# Patient Record
Sex: MALE | Race: WHITE | NOT HISPANIC OR LATINO | Employment: OTHER | URBAN - NONMETROPOLITAN AREA
[De-identification: names, ages, dates, MRNs, and addresses within clinical notes are randomized per-mention and may not be internally consistent; named-entity substitution may affect disease eponyms.]

---

## 2017-01-03 ENCOUNTER — TELEPHONE (OUTPATIENT)
Dept: FAMILY MEDICINE CLINIC | Facility: CLINIC | Age: 67
End: 2017-01-03

## 2017-01-03 DIAGNOSIS — N18.9 CHRONIC KIDNEY DISEASE, UNSPECIFIED STAGE: Primary | ICD-10-CM

## 2017-01-03 NOTE — TELEPHONE ENCOUNTER
----- Message from Radha Foote MA sent at 1/3/2017  9:17 AM EST -----  Mary from Dr Rojas office called and said that pt has an apt scheduled for tomorrow but has a different insurance and she said that his previous referral was from his old pcp. She wanted to know if you would be ok with putting a new referral in for him     # [phone]

## 2017-01-05 ENCOUNTER — OFFICE VISIT (OUTPATIENT)
Dept: FAMILY MEDICINE CLINIC | Facility: CLINIC | Age: 67
End: 2017-01-05

## 2017-01-05 ENCOUNTER — ANTICOAGULATION VISIT (OUTPATIENT)
Dept: CARDIOLOGY | Facility: CLINIC | Age: 67
End: 2017-01-05

## 2017-01-05 VITALS
TEMPERATURE: 98 F | HEIGHT: 65 IN | SYSTOLIC BLOOD PRESSURE: 162 MMHG | WEIGHT: 227 LBS | BODY MASS INDEX: 37.82 KG/M2 | OXYGEN SATURATION: 97 % | HEART RATE: 62 BPM | DIASTOLIC BLOOD PRESSURE: 110 MMHG

## 2017-01-05 DIAGNOSIS — R42 VERTIGO: Primary | ICD-10-CM

## 2017-01-05 DIAGNOSIS — R51.9 ACUTE INTRACTABLE HEADACHE, UNSPECIFIED HEADACHE TYPE: ICD-10-CM

## 2017-01-05 DIAGNOSIS — Z79.01 WARFARIN ANTICOAGULATION: ICD-10-CM

## 2017-01-05 DIAGNOSIS — R07.89 OTHER CHEST PAIN: ICD-10-CM

## 2017-01-05 DIAGNOSIS — J44.1 COPD EXACERBATION (HCC): ICD-10-CM

## 2017-01-05 DIAGNOSIS — R29.6 RECURRENT FALLS: ICD-10-CM

## 2017-01-05 DIAGNOSIS — R10.84 GENERALIZED ABDOMINAL PAIN: ICD-10-CM

## 2017-01-05 DIAGNOSIS — R30.0 DYSURIA: ICD-10-CM

## 2017-01-05 DIAGNOSIS — I10 UNCONTROLLED HYPERTENSION: ICD-10-CM

## 2017-01-05 LAB — INR PPP: 9.7

## 2017-01-05 PROCEDURE — 93000 ELECTROCARDIOGRAM COMPLETE: CPT | Performed by: FAMILY MEDICINE

## 2017-01-05 PROCEDURE — 36415 COLL VENOUS BLD VENIPUNCTURE: CPT | Performed by: FAMILY MEDICINE

## 2017-01-05 PROCEDURE — 99215 OFFICE O/P EST HI 40 MIN: CPT | Performed by: FAMILY MEDICINE

## 2017-01-05 RX ORDER — MECLIZINE HYDROCHLORIDE 25 MG/1
25 TABLET ORAL 3 TIMES DAILY PRN
Qty: 90 TABLET | Refills: 0 | Status: SHIPPED | OUTPATIENT
Start: 2017-01-05 | End: 2017-03-14 | Stop reason: SDUPTHER

## 2017-01-05 NOTE — MR AVS SNAPSHOT
Robe Aleisha Manny   1/5/2017 10:00 AM   Office Visit    Dept Phone:  [phone]   Encounter #:  [id number]    Provider:  Deanna Wheeler MD   Department:  [facility] PRIMARY CARE                Your Full Care Plan              Today's Medication Changes          These changes are accurate as of: 1/5/17 11:08 AM.  If you have any questions, ask your nurse or doctor.               New Medication(s)Ordered:     meclizine 25 MG tablet   Commonly known as:  ANTIVERT   Take 1 tablet by mouth 3 (Three) Times a Day As Needed for dizziness.            Where to Get Your Medications      These medications were sent to [facility] - [place], [place] - [address] - [phone]  - [phone] FX  [address] [place] [address]     Phone:  [phone]     meclizine 25 MG tablet                  Your Updated Medication List          This list is accurate as of: 1/5/17 11:08 AM.  Always use your most recent med list.                aspirin 81 MG tablet       baclofen 10 MG tablet   Commonly known as:  LIORESAL   TAKE 1 TABLET TWICE DAILY AS NEEDED FOR MUSCLE SPASM       bisoprolol-hydrochlorothiazide 10-6.25 MG per tablet   Commonly known as:  ZIAC       bumetanide 2 MG tablet   Commonly known as:  BUMEX       DULERA 100-5 MCG/ACT inhaler   Generic drug:  mometasone-formoterol   INHALE 2 PUFFS TWICE DAILY       FLUoxetine 20 MG capsule   Commonly known as:  PROzac   TAKE 1 CAPSULE DAILY       gabapentin 600 MG tablet   Commonly known as:  NEURONTIN   1 po twice as needed during day, may take 2 po qhs       hydrALAZINE 25 MG tablet   Commonly known as:  APRESOLINE   TAKE ONE-HALF TABLET FOUR TIMES DAILY       hydrochlorothiazide 25 MG tablet   Commonly known as:  HYDRODIURIL   TAKE ONE TABLET IN THE MORNING       HYDROcodone-acetaminophen  MG per tablet   Commonly known as:  NORCO   1 po q 8 prn severe pain; must last 30 days       * ipratropium-albuterol  MCG/ACT  inhaler   Commonly known as:  COMBIVENT RESPIMAT   Inhale 1 puff 4 (four) times a day. Indications: Disease involving Spasms of the Lungs       * ipratropium-albuterol 0.5-2.5 mg/mL nebulizer   Commonly known as:  DUO-NEB       * isosorbide mononitrate 30 MG 24 hr tablet   Commonly known as:  IMDUR   Take 1 tablet by mouth Every Night.       * isosorbide mononitrate 60 MG 24 hr tablet   Commonly known as:  IMDUR   Take 1 tablet by mouth Daily.       ketoconazole 2 % cream   Commonly known as:  NIZORAL   APPLY TOPICALLY EVERY 12 HOURS       lisinopril 20 MG tablet   Commonly known as:  PRINIVIL,ZESTRIL       meclizine 25 MG tablet   Commonly known as:  ANTIVERT   Take 1 tablet by mouth 3 (Three) Times a Day As Needed for dizziness.       metoprolol succinate  MG 24 hr tablet   Commonly known as:  TOPROL-XL   TAKE ONE TABLET DAILY       Multiple Vitamin tablet       nitroglycerin 0.4 MG SL tablet   Commonly known as:  NITROSTAT   Place 1 tablet under the tongue Every 5 (Five) Minutes As Needed for chest pain.       omeprazole 40 MG capsule   Commonly known as:  priLOSEC   TAKE ONE CAPSULE DAILY       potassium chloride 10 MEQ CR tablet   Commonly known as:  K-DUR   Take 1 tablet by mouth daily. Indications: Low Amount of Potassium in the Blood       pravastatin 80 MG tablet   Commonly known as:  PRAVACHOL   Take 1 tablet by mouth every night. Indications: Type II A Hyperlipidemia       tamsulosin 0.4 MG capsule 24 hr capsule   Commonly known as:  FLOMAX       terazosin 2 MG capsule   Commonly known as:  HYTRIN       traZODone 50 MG tablet   Commonly known as:  DESYREL       warfarin 10 MG tablet   Commonly known as:  COUMADIN   TAKE ONE TABLET DAILY       zolpidem 5 MG tablet   Commonly known as:  AMBIEN       * Notice:  This list has 4 medication(s) that are the same as other medications prescribed for you. Read the directions carefully, and ask your doctor or other care provider to review them with you.             We Performed the Following     Amylase     C-reactive Protein     CBC & Differential     CBC Auto Differential     Comprehensive Metabolic Panel     Lipase     Sedimentation Rate       You Were Diagnosed With        Codes Comments    Vertigo    -  Primary ICD-10-CM: R42  ICD-9-CM: 780.4     Generalized abdominal pain     ICD-10-CM: R10.84  ICD-9-CM: 789.07     Uncontrolled hypertension     ICD-10-CM: I10  ICD-9-CM: 401.9     COPD exacerbation     ICD-10-CM: J44.1  ICD-9-CM: 491.21     Acute intractable headache, unspecified headache type     ICD-10-CM: R51  ICD-9-CM: 784.0     Recurrent falls     ICD-10-CM: R29.6  ICD-9-CM: V15.88     Dysuria     ICD-10-CM: R30.0  ICD-9-CM: 788.1     Other chest pain     ICD-10-CM: R07.89  ICD-9-CM: 786.59     Warfarin anticoagulation     ICD-10-CM: Z79.01  ICD-9-CM: V58.61       Instructions     None    Patient Instructions History      Upcoming Appointments     Visit Type Date Time Department    FOLLOW UP 1/5/2017 10:00 AM [facility]    INIT MEDICARE WELLNESS VISIT 1/10/2017  8:30 AM [facility]    FOLLOW UP 3/14/2017  9:00 AM [facility]    FOLLOW UP 12/7/2017  1:15 PM [facility]      MyChart Signup     Our records indicate that you have declined [facility] MyChart signup. If you would like to sign up for [employer], please email [email or web address] or call [phone] to obtain an activation code.             Other Info from Your Visit           Your Appointments     Cristino 10, 2017  8:30 AM EST   Initial Medicare Wellness Visit with Deanna Wheeler MD   [facility] PRIMARY [facility] (--)    [address]  [place] [address]   [phone]            Mar 14, 2017  9:00 AM EDT   Follow Up with Deanna Wheeler MD   [facility]IMARY [facility] (--)    [address]  [place] [address]   [phone]           Arrive 15 minutes prior to appointment.            Dec 07, 2017  1:15 PM EST   Follow Up with Arelis Tucker MD  "  [facility] CARDIOLOGY (--)    [address] [facility] [address] [place] [id number] [phone]           Arrive 15 minutes prior to appointment.              Allergies     No Known Allergies      Reason for Visit     Dizziness     Difficulty Urinating burning with urination.      Vital Signs     Blood Pressure Pulse Temperature Height Weight Oxygen Saturation    162/110 (BP Location: Left arm, Patient Position: Standing) 62 98 °F (36.7 °C) 65\" (165.1 cm) 227 lb (103 kg) 97%    Body Mass Index Smoking Status                37.77 kg/m2 Former Smoker          Problems and Diagnoses Noted     Taking blood thinners    Vertigo    -  Primary    Generalized abdominal pain        Uncontrolled hypertension        Chronic bronchitis        Acute intractable headache, unspecified headache type        Recurrent falls        Difficult or painful urination        Chest pain            "

## 2017-01-05 NOTE — PROGRESS NOTES
"Subjective   Robe Aleisha Bryant is a 66 y.o. male.     History of Present Illness   Mr. Bryant presents today with c/o severe dizziness as well as dysuria. He became ill initially around 12/27 with cough, congestion and chest pain.  Was seen here in clinic on 12/30 by Jaki. She was concerned about his h/o CAD and reported left sided chest pain radiating to left arm, assoc'd with dizziness and relieved by NTG. He was sent to [facility] ER for further eval where he was dx'd with COPD exacerbation.  He was tx'd with abx and corticosteroids, advised to continue nebs every 4-6 hrs. His CXR was non-acute, his labs were essentially normal including u/a.  His rapid strep was neg as were Flu A/B.  He was dc'd home.  Shortly thereafter he began having severe dizziness which he describes as \"everything spinning\".  He has had multiple falls due to severity of dizziness. Feel and struck his left side on TV console at home. Denies head injury or LOC. Denies palpitations, increased swelling in feet. Worse when lying down, change in head position but otherwise constant. He has eaten very little but feels he has has stayed well-hydrated.  Symptoms have not improved. No particular treatments tried.  States he had similar symptoms on Imdur previously, prompting Dr. Tucker to decrease dose.    In regard to his dysuria, he denies difficulty urinating, decreased stream, n/v, hematuria, penile d/c. C/o lower abd pain radiating around sides bilaterally.    In regard to his COPD, he states his breathing and cough have improved. Still having some wheeze but this responds to neb tx. He does c/o hemoptysis/blood-tinged sputum. Notably he is on coumadin for A fib.    He has diabetes; blood glucose have been on \"low side\" but he is not checking them regularly.    The following portions of the patient's history were reviewed and updated as appropriate: allergies, current medications, past family history, past medical history, past social " history, past surgical history and problem list.    Review of Systems   Constitutional: Positive for appetite change, diaphoresis, fatigue and fever (subjective). Negative for unexpected weight change.   HENT: Positive for congestion and postnasal drip. Negative for mouth sores, nosebleeds, sore throat and trouble swallowing.    Eyes: Positive for visual disturbance. Negative for pain, discharge and redness.   Respiratory: Positive for cough, shortness of breath and wheezing.    Cardiovascular: Positive for chest pain (worse with cough). Negative for palpitations and leg swelling.   Gastrointestinal: Positive for abdominal pain. Negative for blood in stool, diarrhea, nausea and vomiting.   Endocrine: Positive for heat intolerance.   Genitourinary: Positive for dysuria. Negative for difficulty urinating, discharge, frequency, hematuria and urgency.   Musculoskeletal: Positive for arthralgias, back pain and myalgias.   Skin: Negative for rash and wound.   Neurological: Positive for dizziness, weakness, light-headedness and headaches. Negative for seizures and syncope.        See HPI   Hematological: Bruises/bleeds easily.   Psychiatric/Behavioral: Positive for confusion, dysphoric mood and sleep disturbance. The patient is nervous/anxious.      Objective    Vitals:    01/05/17 1012   BP: (!) 162/110   Pulse: 62   Temp:    SpO2: 97%     Body mass index is 37.77 kg/(m^2).  Last 2 weights    01/05/17  1003 01/05/17  1012   Weight: 227 lb (103 kg) 227 lb (103 kg)     ORTHOSTATICS:  Supine /108 HR 66  Sitting /108 HR 62  Standing /110 HR 62    Physical Exam   Constitutional: He is oriented to person, place, and time. He appears well-developed and well-nourished. He is cooperative. He appears ill. He appears distressed.   obese   HENT:   Head: Normocephalic and atraumatic.   Right Ear: Tympanic membrane, external ear and ear canal normal. Decreased hearing is noted.   Left Ear: Tympanic membrane, external  ear and ear canal normal. Decreased hearing is noted.   Nose: Mucosal edema present. No rhinorrhea.   Mouth/Throat: Uvula is midline, oropharynx is clear and moist and mucous membranes are normal. Mucous membranes are not dry.   Eyes: Conjunctivae, EOM and lids are normal. Pupils are equal, round, and reactive to light. Right eye exhibits no nystagmus. Left eye exhibits no nystagmus. Pupils are equal (small pupils).   Photophobia noted   Neck: Neck supple. Normal carotid pulses and no JVD present. No thyroid mass and no thyromegaly present.   Chronic hoarseness noted   Cardiovascular: Normal rate, regular rhythm and intact distal pulses.  Exam reveals distant heart sounds.    Pulmonary/Chest: Effort normal. No respiratory distress. He has decreased breath sounds (diffusely). He has wheezes (coarse scattered). He has rhonchi (scattered). He has no rales.   Able to lie propped on one pillow without SOA, orthopnea   Abdominal: Soft. He exhibits no distension and no mass. Bowel sounds are decreased. There is no hepatosplenomegaly (exam limited by body habitus). There is tenderness (moderate suprapubically, mild diffusely). There is no CVA tenderness.       Vascular Status -  His exam exhibits no right foot edema. His exam exhibits no left foot edema.  Lymphadenopathy:     He has no cervical adenopathy.   Neurological: He is alert and oriented to person, place, and time. He has normal strength. No cranial nerve deficit or sensory deficit. Gait (ataxic) abnormal.   Normal finger to nose test.  Markedly positive bilateral toby-hallpike   Skin: Skin is warm and dry. Bruising (left abdominal wall/lateral chest wall) noted. No rash noted.   Psychiatric: His speech is normal. Thought content normal. His mood appears anxious. He is agitated (mildly). Cognition and memory are normal.   Nursing note and vitals reviewed.      ECG 12 Lead  Date/Time: 1/5/2017 10:24 AM  Performed by: CECILE GRAHAM  Authorized by: CECILE GRAHAM    Comparison: compared with previous ECG from 5/15/2016  Comparison to previous ECG: Similar to previous EKG with exception of:  1) improved QTc on current EKG (previously 450)  2) longer QRS duration (previously 106)  Rhythm: paced  Ectopy comments: none  Rate: bradycardic  BPM: 60  Conduction: non-specific intraventricular conduction delay  ST Segments: ST segments normal  T depression: aVL and V1  T flattening: I  QRS axis: normal  Other findings: prolonged QTc interval  Clinical impression: non-specific ECG  Comments: MO int: 184  QRS dur: 121  QTc: 421          Assessment/Plan   Robe was seen today for dizziness and difficulty urinating.    Diagnoses and all orders for this visit:    Vertigo  -     CBC & Differential  -     CBC Auto Differential  -     CT Head Without Contrast; Future  -     XR Chest PA & Lateral; Future  -     meclizine (ANTIVERT) 25 MG tablet; Take 1 tablet by mouth 3 (Three) Times a Day As Needed for dizziness.  -     ECG 12 Lead    Generalized abdominal pain  -     CBC & Differential  -     Comprehensive Metabolic Panel  -     Sedimentation Rate  -     C-reactive Protein  -     Amylase  -     Lipase  -     CBC Auto Differential  -     XR Abdomen KUB; Future    Uncontrolled hypertension  -     CBC & Differential  -     Comprehensive Metabolic Panel  -     CBC Auto Differential  -     CT Head Without Contrast; Future  -     ECG 12 Lead  -     Protime-INR    COPD exacerbation  -     CBC & Differential  -     CBC Auto Differential  -     XR Chest PA & Lateral; Future    Acute intractable headache, unspecified headache type  -     CBC & Differential  -     Comprehensive Metabolic Panel  -     Sedimentation Rate  -     C-reactive Protein  -     CBC Auto Differential  -     CT Head Without Contrast; Future  -     Protime-INR    Recurrent falls  -     CBC & Differential  -     Comprehensive Metabolic Panel  -     CBC Auto Differential  -     CT Head Without Contrast; Future    Dysuria  -     XR  Abdomen KUB; Future    Other chest pain  -     XR Chest PA & Lateral; Future  -     ECG 12 Lead    Warfarin anticoagulation  -     Protime-INR    Recheck BP after pt resting in dark room, 140s/100.  Multiple concerning symptoms in a pt with multiple underlying chronic medication problems.  I have reviewed possible diagnoses, further eval/tx options.  I feel it is important to r/o stroke, particularly hemorrhagic.  His EKG is stable today.  His COPD exacerbation is improving overall but hemoptysis a concern. Dysuria and lower abd pain of unclear etiology as his u/a was normal in ER. He is amenable to stat CT scan head, repeat CXR, KUB and labs as above.  We have discussed decrease in Imdur dosing to 30 bid with further decrease to 30 qd if dizziness does not improve.  If CT neg consider use of Antivert as needed.  I will contact patient regarding test results and provide instructions regarding any necessary changes in plan of care.  Patient was encouraged to keep me informed of any acute changes, lack of improvement, or any new concerning symptoms.  He is aware of reasons to seek emergent care.  Patient voiced understanding of all instructions and denied further questions.

## 2017-01-06 LAB
ALBUMIN SERPL-MCNC: 3.8 G/DL (ref 3.2–4.8)
ALBUMIN/GLOB SERPL: 1.5 G/DL (ref 1.5–2.5)
ALP SERPL-CCNC: 66 U/L (ref 25–100)
ALT SERPL-CCNC: 21 U/L (ref 7–40)
AMYLASE SERPL-CCNC: 61 U/L (ref 30–118)
AST SERPL-CCNC: 27 U/L (ref 0–33)
BASOPHILS # BLD AUTO: 0.05 10*3/MM3 (ref 0–0.2)
BASOPHILS NFR BLD AUTO: 0.6 % (ref 0–1)
BILIRUB SERPL-MCNC: 0.5 MG/DL (ref 0.3–1.2)
BUN SERPL-MCNC: 19 MG/DL (ref 9–23)
BUN/CREAT SERPL: 14.6 (ref 7–25)
CALCIUM SERPL-MCNC: 9.8 MG/DL (ref 8.7–10.4)
CHLORIDE SERPL-SCNC: 97 MMOL/L (ref 99–109)
CO2 SERPL-SCNC: 27 MMOL/L (ref 20–31)
CREAT SERPL-MCNC: 1.3 MG/DL (ref 0.6–1.3)
CRP SERPL-MCNC: 7.9 MG/DL (ref 0–10)
DIFFERENTIAL COMMENT: NORMAL
EOSINOPHIL # BLD AUTO: 0.32 10*3/MM3 (ref 0.1–0.3)
EOSINOPHIL NFR BLD AUTO: 3.6 % (ref 0–3)
ERYTHROCYTE [DISTWIDTH] IN BLOOD BY AUTOMATED COUNT: 13.1 % (ref 11.3–14.5)
ERYTHROCYTE [SEDIMENTATION RATE] IN BLOOD BY WESTERGREN METHOD: 3 MM/HR (ref 0–20)
GLOBULIN SER CALC-MCNC: 2.5 GM/DL
GLUCOSE SERPL-MCNC: 84 MG/DL (ref 70–100)
HCT VFR BLD AUTO: 47.6 % (ref 38.9–50.9)
HGB BLD-MCNC: 15.7 G/DL (ref 13.1–17.5)
IMM GRANULOCYTES # BLD: 0.06 10*3/MM3 (ref 0–0.03)
IMM GRANULOCYTES NFR BLD: 0.7 % (ref 0–0.6)
LIPASE SERPL-CCNC: 32 U/L (ref 6–51)
LYMPHOCYTES # BLD AUTO: 1.63 10*3/MM3 (ref 0.6–4.8)
LYMPHOCYTES NFR BLD AUTO: 18.2 % (ref 24–44)
MCH RBC QN AUTO: 29.5 PG (ref 27–31)
MCHC RBC AUTO-ENTMCNC: 33 G/DL (ref 32–36)
MCV RBC AUTO: 89.5 FL (ref 80–99)
MONOCYTES # BLD AUTO: 0.79 10*3/MM3 (ref 0–1)
MONOCYTES NFR BLD AUTO: 8.8 % (ref 0–12)
NEUTROPHILS # BLD AUTO: 6.11 10*3/MM3 (ref 1.5–8.3)
NEUTROPHILS NFR BLD AUTO: 68.1 % (ref 41–71)
PLATELET # BLD AUTO: 196 10*3/MM3 (ref 150–450)
PLATELET BLD QL SMEAR: NORMAL
POTASSIUM SERPL-SCNC: 3.7 MMOL/L (ref 3.5–5.5)
PROT SERPL-MCNC: 6.3 G/DL (ref 5.7–8.2)
RBC # BLD AUTO: 5.32 10*6/MM3 (ref 4.2–5.76)
RBC MORPH BLD: NORMAL
SODIUM SERPL-SCNC: 139 MMOL/L (ref 132–146)
WBC # BLD AUTO: 8.96 10*3/MM3 (ref 3.5–10.8)

## 2017-01-06 NOTE — PROGRESS NOTES
I have reviewed the anticoagulation track calender, labs, and dosage adjustments made by Saba Monzon RN and I agree.    Electronically signed by BROOKE Livingston 01/06/17 12:46 PM

## 2017-01-09 ENCOUNTER — TELEPHONE (OUTPATIENT)
Dept: FAMILY MEDICINE CLINIC | Facility: CLINIC | Age: 67
End: 2017-01-09

## 2017-01-09 NOTE — TELEPHONE ENCOUNTER
----- Message from Deanna Wheeler MD sent at 1/5/2017  4:36 PM EST -----  As discussed please inform pt to hold coumadin and send results to Dr. Tucker as well as she manages his coumadin.  Also please make sure he is aware his scans were normal.

## 2017-01-10 ENCOUNTER — OFFICE VISIT (OUTPATIENT)
Dept: FAMILY MEDICINE CLINIC | Facility: CLINIC | Age: 67
End: 2017-01-10

## 2017-01-10 ENCOUNTER — ANTICOAGULATION VISIT (OUTPATIENT)
Dept: CARDIOLOGY | Facility: CLINIC | Age: 67
End: 2017-01-10

## 2017-01-10 ENCOUNTER — TELEPHONE (OUTPATIENT)
Dept: FAMILY MEDICINE CLINIC | Facility: CLINIC | Age: 67
End: 2017-01-10

## 2017-01-10 VITALS
SYSTOLIC BLOOD PRESSURE: 152 MMHG | WEIGHT: 221 LBS | DIASTOLIC BLOOD PRESSURE: 100 MMHG | OXYGEN SATURATION: 98 % | HEART RATE: 78 BPM | BODY MASS INDEX: 36.82 KG/M2 | HEIGHT: 65 IN

## 2017-01-10 DIAGNOSIS — M15.9 PRIMARY OSTEOARTHRITIS INVOLVING MULTIPLE JOINTS: ICD-10-CM

## 2017-01-10 DIAGNOSIS — Z13.6 SCREENING FOR AAA (AORTIC ABDOMINAL ANEURYSM): ICD-10-CM

## 2017-01-10 DIAGNOSIS — F33.1 MODERATE EPISODE OF RECURRENT MAJOR DEPRESSIVE DISORDER (HCC): ICD-10-CM

## 2017-01-10 DIAGNOSIS — Z00.00 ENCOUNTER FOR MEDICARE ANNUAL WELLNESS EXAM: Primary | ICD-10-CM

## 2017-01-10 DIAGNOSIS — M53.9 MULTILEVEL DEGENERATIVE DISC DISEASE: ICD-10-CM

## 2017-01-10 DIAGNOSIS — N40.1 BENIGN PROSTATIC HYPERPLASIA WITH LOWER URINARY TRACT SYMPTOMS, UNSPECIFIED MORPHOLOGY: ICD-10-CM

## 2017-01-10 DIAGNOSIS — Z11.59 NEED FOR HEPATITIS C SCREENING TEST: ICD-10-CM

## 2017-01-10 DIAGNOSIS — G89.29 CHRONIC SHOULDER PAIN, UNSPECIFIED LATERALITY: ICD-10-CM

## 2017-01-10 DIAGNOSIS — I10 ESSENTIAL HYPERTENSION: ICD-10-CM

## 2017-01-10 DIAGNOSIS — N42.9 DISORDER OF PROSTATE: ICD-10-CM

## 2017-01-10 DIAGNOSIS — Z12.5 SCREENING FOR PROSTATE CANCER: ICD-10-CM

## 2017-01-10 DIAGNOSIS — Z13.220 SCREENING FOR LIPID DISORDERS: ICD-10-CM

## 2017-01-10 DIAGNOSIS — M25.519 CHRONIC SHOULDER PAIN, UNSPECIFIED LATERALITY: ICD-10-CM

## 2017-01-10 DIAGNOSIS — Z87.891 FORMER SMOKER: ICD-10-CM

## 2017-01-10 PROCEDURE — 99214 OFFICE O/P EST MOD 30 MIN: CPT | Performed by: FAMILY MEDICINE

## 2017-01-10 PROCEDURE — G0438 PPPS, INITIAL VISIT: HCPCS | Performed by: FAMILY MEDICINE

## 2017-01-10 PROCEDURE — 96160 PT-FOCUSED HLTH RISK ASSMT: CPT | Performed by: FAMILY MEDICINE

## 2017-01-10 PROCEDURE — 36415 COLL VENOUS BLD VENIPUNCTURE: CPT | Performed by: FAMILY MEDICINE

## 2017-01-10 RX ORDER — LISINOPRIL 20 MG/1
TABLET ORAL
Qty: 60 TABLET | Refills: 6 | Status: SHIPPED | OUTPATIENT
Start: 2017-01-10 | End: 2017-04-26

## 2017-01-10 RX ORDER — IPRATROPIUM/ALBUTEROL SULFATE 20-100 MCG
MIST INHALER (GRAM) INHALATION
Qty: 4 G | Refills: 5 | Status: SHIPPED | OUTPATIENT
Start: 2017-01-10 | End: 2017-08-15

## 2017-01-10 RX ORDER — POTASSIUM CHLORIDE 750 MG/1
TABLET, FILM COATED, EXTENDED RELEASE ORAL
Qty: 30 TABLET | Refills: 5 | Status: SHIPPED | OUTPATIENT
Start: 2017-01-10 | End: 2017-05-24 | Stop reason: SDUPTHER

## 2017-01-10 RX ORDER — PRAVASTATIN SODIUM 80 MG/1
TABLET ORAL
Qty: 30 TABLET | Refills: 5 | Status: SHIPPED | OUTPATIENT
Start: 2017-01-10 | End: 2017-05-24 | Stop reason: SDUPTHER

## 2017-01-10 RX ORDER — HYDROCODONE BITARTRATE AND ACETAMINOPHEN 10; 325 MG/1; MG/1
TABLET ORAL
Qty: 90 TABLET | Refills: 0 | Status: SHIPPED | OUTPATIENT
Start: 2017-01-10 | End: 2017-02-07

## 2017-01-10 NOTE — MR AVS SNAPSHOT
Robe Moraes Manny   1/10/2017 8:30 AM   Office Visit    Dept Phone:  [phone]   Encounter #:  [id number]    Provider:  Deanna Wheeler MD   Department:  [facility] PRIMARY CARE                Your Full Care Plan              Your Updated Medication List          This list is accurate as of: 1/10/17  9:44 AM.  Always use your most recent med list.                aspirin 81 MG tablet       baclofen 10 MG tablet   Commonly known as:  LIORESAL   TAKE 1 TABLET TWICE DAILY AS NEEDED FOR MUSCLE SPASM       bisoprolol-hydrochlorothiazide 10-6.25 MG per tablet   Commonly known as:  ZIAC       bumetanide 2 MG tablet   Commonly known as:  BUMEX       DULERA 100-5 MCG/ACT inhaler   Generic drug:  mometasone-formoterol   INHALE 2 PUFFS TWICE DAILY       FLUoxetine 20 MG capsule   Commonly known as:  PROzac   TAKE 1 CAPSULE DAILY       gabapentin 600 MG tablet   Commonly known as:  NEURONTIN   1 po twice as needed during day, may take 2 po qhs       hydrALAZINE 25 MG tablet   Commonly known as:  APRESOLINE   TAKE ONE-HALF TABLET FOUR TIMES DAILY       hydrochlorothiazide 25 MG tablet   Commonly known as:  HYDRODIURIL   TAKE ONE TABLET IN THE MORNING       HYDROcodone-acetaminophen  MG per tablet   Commonly known as:  NORCO   1 po q 8 prn severe pain; must last 30 days       * ipratropium-albuterol 0.5-2.5 mg/mL nebulizer   Commonly known as:  DUO-NEB       * COMBIVENT RESPIMAT  MCG/ACT inhaler   Generic drug:  ipratropium-albuterol   USE 1 PUFF FOUR TIMES DAILY       * isosorbide mononitrate 30 MG 24 hr tablet   Commonly known as:  IMDUR   Take 1 tablet by mouth Every Night.       * isosorbide mononitrate 60 MG 24 hr tablet   Commonly known as:  IMDUR   Take 1 tablet by mouth Daily.       ketoconazole 2 % cream   Commonly known as:  NIZORAL   APPLY TOPICALLY EVERY 12 HOURS       lisinopril 20 MG tablet   Commonly known as:  PRINIVIL,ZESTRIL   TAKE ONE TABLET TWICE  DAILY       meclizine 25 MG tablet   Commonly known as:  ANTIVERT   Take 1 tablet by mouth 3 (Three) Times a Day As Needed for dizziness.       metFORMIN 500 MG tablet   Commonly known as:  GLUCOPHAGE       metoprolol succinate  MG 24 hr tablet   Commonly known as:  TOPROL-XL   TAKE ONE TABLET DAILY       Multiple Vitamin tablet       nitroglycerin 0.4 MG SL tablet   Commonly known as:  NITROSTAT   Place 1 tablet under the tongue Every 5 (Five) Minutes As Needed for chest pain.       omeprazole 40 MG capsule   Commonly known as:  priLOSEC   TAKE ONE CAPSULE DAILY       potassium chloride 10 MEQ CR tablet   Commonly known as:  K-DUR   TAKE 1 TABLET EVERY DAY       pravastatin 80 MG tablet   Commonly known as:  PRAVACHOL   TAKE 1 TABLET AT BEDTIME FOR CHOLESTEROL       tamsulosin 0.4 MG capsule 24 hr capsule   Commonly known as:  FLOMAX       terazosin 2 MG capsule   Commonly known as:  HYTRIN       traZODone 50 MG tablet   Commonly known as:  DESYREL       warfarin 10 MG tablet   Commonly known as:  COUMADIN   TAKE ONE TABLET DAILY       zolpidem 5 MG tablet   Commonly known as:  AMBIEN       * Notice:  This list has 4 medication(s) that are the same as other medications prescribed for you. Read the directions carefully, and ask your doctor or other care provider to review them with you.            We Performed the Following     Cholesterol, Total     Hepatitis C Antibody     PSA       You Were Diagnosed With        Codes Comments    Encounter for Medicare annual wellness exam    -  Primary ICD-10-CM: Z00.00  ICD-9-CM: V70.0     Screening for prostate cancer     ICD-10-CM: Z12.5  ICD-9-CM: V76.44     Screening for lipid disorders     ICD-10-CM: Z13.220  ICD-9-CM: V77.91     Disorder of prostate     ICD-10-CM: N42.9  ICD-9-CM: 602.9     Need for hepatitis C screening test     ICD-10-CM: Z11.59  ICD-9-CM: V73.89     Screening for AAA (aortic abdominal aneurysm)     ICD-10-CM: Z13.6  ICD-9-CM: V81.2     Former  smoker     ICD-10-CM: Z87.891  ICD-9-CM: V15.82     Essential hypertension     ICD-10-CM: I10  ICD-9-CM: 401.9       Instructions      Medicare Wellness  Personal Prevention Plan of Service     Date of Office Visit:  01/10/2017  Encounter Provider:  Deanna Wheeler MD  Place of Service:  [facility] PRIMARY CARE  Patient Name: Robe Bryant  :  1950    As part of the Medicare Wellness portion of your visit today, we are providing you with this personalized preventive plan of services (PPPS). This plan is based upon recommendations of the [place] Preventive Services Task Force (USPSTF) and the Advisory Committee on Immunization Practices (ACIP).    This lists the preventive care services that should be considered, and provides dates of when you are due. Items listed as completed are up-to-date and do not require any further intervention.    Health Maintenance   Topic Date Due   • TDAP/TD VACCINES (1 - Tdap) 1968   • AAA SCREEN (ONE-TIME)  2016   • DIABETIC FOOT EXAM  2016   • DIABETIC EYE EXAM  2017   • HEMOGLOBIN A1C  2017   • PNEUMOCOCCAL VACCINES (65+ LOW/MEDIUM RISK) (2 of 2 - PPSV23) 2017   • LIPID PANEL  2017   • URINE MICROALBUMIN  2017   • COLONOSCOPY  2025   • HEPATITIS C SCREENING  Addressed   • INFLUENZA VACCINE  Addressed   • ZOSTER VACCINE  Addressed         Patient Self-Management and Personalized Health Advice  The patient has been provided with information about: diet, exercise, weight management, prevention of cardiac or vascular disease, the relationship between weight and GERD, fall prevention, designing advance directives and mental health concerns and preventive services including:   · Advanced directives: has NO advanced directive - information provided to the patient today, Counseling for cardiovascular disease risk reduction, Fall Risk assessment done, Nutrition counseling provided, Prostate cancer  screening discussed, Screening for AAA, referral for ultrasound placed, Genesight testing performed, cardiology to be contacted regarding uncontrolled BP.    Visit Diagnoses:    ICD-10-CM ICD-9-CM   1. Encounter for Medicare annual wellness exam Z00.00 V70.0   2. Screening for prostate cancer Z12.5 V76.44   3. Screening for lipid disorders Z13.220 V77.91   4. Disorder of prostate  N42.9 602.9   5. Need for hepatitis C screening test Z11.59 V73.89   6. Screening for AAA (aortic abdominal aneurysm) Z13.6 V81.2   7. Former smoker Z87.891 V15.82   8. Essential hypertension I10 401.9       Orders Placed This Encounter   Procedures   • PSA   • Cholesterol, Total   • Hepatitis C Antibody       Outpatient Encounter Prescriptions as of 1/10/2017   Medication Sig Dispense Refill   • aspirin 81 MG tablet Take  by mouth daily.     • baclofen (LIORESAL) 10 MG tablet TAKE 1 TABLET TWICE DAILY AS NEEDED FOR MUSCLE SPASM 60 tablet 2   • bisoprolol-hydrochlorothiazide (ZIAC) 10-6.25 MG per tablet Take 1 tablet by mouth daily.     • bumetanide (BUMEX) 2 MG tablet Take 1 tablet by mouth daily.     • COMBIVENT RESPIMAT  MCG/ACT inhaler USE 1 PUFF FOUR TIMES DAILY 4 g 5   • DULERA 100-5 MCG/ACT inhaler INHALE 2 PUFFS TWICE DAILY 13 g 5   • FLUoxetine (PROzac) 20 MG capsule TAKE 1 CAPSULE DAILY 30 capsule 5   • gabapentin (NEURONTIN) 600 MG tablet 1 po twice as needed during day, may take 2 po qhs 120 tablet 2   • hydrALAZINE (APRESOLINE) 25 MG tablet TAKE ONE-HALF TABLET FOUR TIMES DAILY 180 tablet 1   • hydrochlorothiazide (HYDRODIURIL) 25 MG tablet TAKE ONE TABLET IN THE MORNING 30 tablet 5   • HYDROcodone-acetaminophen (NORCO)  MG per tablet 1 po q 8 prn severe pain; must last 30 days 90 tablet 0   • ipratropium-albuterol (DUO-NEB) 0.5-2.5 mg/mL nebulizer      • isosorbide mononitrate (IMDUR) 30 MG 24 hr tablet Take 1 tablet by mouth Every Night. 90 tablet 3   • isosorbide mononitrate (IMDUR) 60 MG 24 hr tablet Take 1  tablet by mouth Daily. 90 tablet 3   • ketoconazole (NIZORAL) 2 % cream APPLY TOPICALLY EVERY 12 HOURS 60 g 1   • lisinopril (PRINIVIL,ZESTRIL) 20 MG tablet TAKE ONE TABLET TWICE DAILY 60 tablet 6   • meclizine (ANTIVERT) 25 MG tablet Take 1 tablet by mouth 3 (Three) Times a Day As Needed for dizziness. 90 tablet 0   • metFORMIN (GLUCOPHAGE) 500 MG tablet      • metoprolol succinate XL (TOPROL-XL) 100 MG 24 hr tablet TAKE ONE TABLET DAILY 30 tablet 5   • Multiple Vitamin tablet Take 1 tablet by mouth daily.     • nitroglycerin (NITROSTAT) 0.4 MG SL tablet Place 1 tablet under the tongue Every 5 (Five) Minutes As Needed for chest pain. 25 tablet 11   • omeprazole (PriLOSEC) 40 MG capsule TAKE ONE CAPSULE DAILY 30 capsule 6   • potassium chloride (K-DUR) 10 MEQ CR tablet TAKE 1 TABLET EVERY DAY 30 tablet 5   • pravastatin (PRAVACHOL) 80 MG tablet TAKE 1 TABLET AT BEDTIME FOR CHOLESTEROL 30 tablet 5   • tamsulosin (FLOMAX) 0.4 MG capsule 24 hr capsule Take 1 capsule by mouth daily.     • terazosin (HYTRIN) 2 MG capsule Every Night.     • traZODone (DESYREL) 50 MG tablet      • warfarin (COUMADIN) 10 MG tablet TAKE ONE TABLET DAILY 30 tablet 6   • zolpidem (AMBIEN) 5 MG tablet Take 10 mg by mouth At Night As Needed for sleep.       No facility-administered encounter medications on file as of 1/10/2017.        Reviewed use of high risk medication in the elderly: yes  Reviewed for potential of harmful drug interactions in the elderly: yes    Follow Up:  Return for Next scheduled follow up.     An After Visit Summary and PPPS with all of these plans were given to the patient.              Patient Instructions History      Upcoming Appointments     Visit Type Date Time Department    INIT MEDICARE WELLNESS VISIT 1/10/2017  8:30 AM [facility]    FOLLOW UP 3/14/2017  9:00 AM [facility]    FOLLOW UP 12/7/2017  1:15 PM [facility]FF[facility] Carter Signup     Our records indicate that you have declined [facility]  "MyChart signup. If you would like to sign up for [facility], please email [email or web address] or call [phone] to obtain an activation code.             Other Info from Your Visit           Your Appointments     Mar 14, 2017  9:00 AM EDT   Follow Up with Deanna Wheeler MD   [facility] PRIMARY CARE (--)    [address] [place] [address]   [phone]           Arrive 15 minutes prior to appointment.            Dec 07, 2017  1:15 PM EST   Follow Up with Arelis Tucker MD   [facility] CARDIOLOGY (--)    [address]  [place] [address] [phone]           Arrive 15 minutes prior to appointment.            Jan 11, 2018  9:30 AM EST   Subsequent Medicare Wellness with Deanna Wheeler MD   [facility] PRIMARY CARE (--)    [address]  [place] [address]   [phone]              Allergies     No Known Allergies      Vital Signs     Blood Pressure Pulse Height Weight Oxygen Saturation Body Mass Index    152/100 78 65\" (165.1 cm) 221 lb (100 kg) 98% 36.78 kg/m2    Smoking Status                   Former Smoker           Problems and Diagnoses Noted     High blood pressure    Encounter for Medicare annual wellness exam    -  Primary    Screening for prostate cancer        Screening for cholesterol level        Disorder of prostate        Need for hepatitis C screening test        Screening for abdominal aortic aneurysm        Former smoker            "

## 2017-01-10 NOTE — TELEPHONE ENCOUNTER
Please contact Dr. Tucker's office regarding pt's persistently elevated BP. Running 150-160s/100. Do they have any recommendations regarding changes in his medications or would they rather contact him themselves. He does not have f/u visit until March.

## 2017-01-10 NOTE — PATIENT INSTRUCTIONS
Medicare Wellness  Personal Prevention Plan of Service     Date of Office Visit:  01/10/2017  Encounter Provider:  Deanna Wheeler MD  Place of Service:  [facility] PRIMARY CARE  Patient Name: Robe Bryant  :  1950    As part of the Medicare Wellness portion of your visit today, we are providing you with this personalized preventive plan of services (PPPS). This plan is based upon recommendations of the [place] Preventive Services Task Force (USPSTF) and the Advisory Committee on Immunization Practices (ACIP).    This lists the preventive care services that should be considered, and provides dates of when you are due. Items listed as completed are up-to-date and do not require any further intervention.    Health Maintenance   Topic Date Due   • TDAP/TD VACCINES (1 - Tdap) 1968   • AAA SCREEN (ONE-TIME)  2016   • DIABETIC FOOT EXAM  2016   • DIABETIC EYE EXAM  2017   • HEMOGLOBIN A1C  2017   • PNEUMOCOCCAL VACCINES (65+ LOW/MEDIUM RISK) (2 of 2 - PPSV23) 2017   • LIPID PANEL  2017   • URINE MICROALBUMIN  2017   • COLONOSCOPY  2025   • HEPATITIS C SCREENING  Addressed   • INFLUENZA VACCINE  Addressed   • ZOSTER VACCINE  Addressed         Patient Self-Management and Personalized Health Advice  The patient has been provided with information about: diet, exercise, weight management, prevention of cardiac or vascular disease, the relationship between weight and GERD, fall prevention, designing advance directives and mental health concerns and preventive services including:   · Advanced directives: has NO advanced directive - information provided to the patient today, Counseling for cardiovascular disease risk reduction, Fall Risk assessment done, Nutrition counseling provided, Prostate cancer screening discussed, Screening for AAA, referral for ultrasound placed, Genesight testing performed, cardiology to be contacted  regarding uncontrolled BP.    Visit Diagnoses:    ICD-10-CM ICD-9-CM   1. Encounter for Medicare annual wellness exam Z00.00 V70.0   2. Screening for prostate cancer Z12.5 V76.44   3. Screening for lipid disorders Z13.220 V77.91   4. Disorder of prostate  N42.9 602.9   5. Need for hepatitis C screening test Z11.59 V73.89   6. Screening for AAA (aortic abdominal aneurysm) Z13.6 V81.2   7. Former smoker Z87.891 V15.82   8. Essential hypertension I10 401.9       Orders Placed This Encounter   Procedures   • PSA   • Cholesterol, Total   • Hepatitis C Antibody       Outpatient Encounter Prescriptions as of 1/10/2017   Medication Sig Dispense Refill   • aspirin 81 MG tablet Take  by mouth daily.     • baclofen (LIORESAL) 10 MG tablet TAKE 1 TABLET TWICE DAILY AS NEEDED FOR MUSCLE SPASM 60 tablet 2   • bisoprolol-hydrochlorothiazide (ZIAC) 10-6.25 MG per tablet Take 1 tablet by mouth daily.     • bumetanide (BUMEX) 2 MG tablet Take 1 tablet by mouth daily.     • COMBIVENT RESPIMAT  MCG/ACT inhaler USE 1 PUFF FOUR TIMES DAILY 4 g 5   • DULERA 100-5 MCG/ACT inhaler INHALE 2 PUFFS TWICE DAILY 13 g 5   • FLUoxetine (PROzac) 20 MG capsule TAKE 1 CAPSULE DAILY 30 capsule 5   • gabapentin (NEURONTIN) 600 MG tablet 1 po twice as needed during day, may take 2 po qhs 120 tablet 2   • hydrALAZINE (APRESOLINE) 25 MG tablet TAKE ONE-HALF TABLET FOUR TIMES DAILY 180 tablet 1   • hydrochlorothiazide (HYDRODIURIL) 25 MG tablet TAKE ONE TABLET IN THE MORNING 30 tablet 5   • HYDROcodone-acetaminophen (NORCO)  MG per tablet 1 po q 8 prn severe pain; must last 30 days 90 tablet 0   • ipratropium-albuterol (DUO-NEB) 0.5-2.5 mg/mL nebulizer      • isosorbide mononitrate (IMDUR) 30 MG 24 hr tablet Take 1 tablet by mouth Every Night. 90 tablet 3   • isosorbide mononitrate (IMDUR) 60 MG 24 hr tablet Take 1 tablet by mouth Daily. 90 tablet 3   • ketoconazole (NIZORAL) 2 % cream APPLY TOPICALLY EVERY 12 HOURS 60 g 1   • lisinopril  (PRINIVIL,ZESTRIL) 20 MG tablet TAKE ONE TABLET TWICE DAILY 60 tablet 6   • meclizine (ANTIVERT) 25 MG tablet Take 1 tablet by mouth 3 (Three) Times a Day As Needed for dizziness. 90 tablet 0   • metFORMIN (GLUCOPHAGE) 500 MG tablet      • metoprolol succinate XL (TOPROL-XL) 100 MG 24 hr tablet TAKE ONE TABLET DAILY 30 tablet 5   • Multiple Vitamin tablet Take 1 tablet by mouth daily.     • nitroglycerin (NITROSTAT) 0.4 MG SL tablet Place 1 tablet under the tongue Every 5 (Five) Minutes As Needed for chest pain. 25 tablet 11   • omeprazole (PriLOSEC) 40 MG capsule TAKE ONE CAPSULE DAILY 30 capsule 6   • potassium chloride (K-DUR) 10 MEQ CR tablet TAKE 1 TABLET EVERY DAY 30 tablet 5   • pravastatin (PRAVACHOL) 80 MG tablet TAKE 1 TABLET AT BEDTIME FOR CHOLESTEROL 30 tablet 5   • tamsulosin (FLOMAX) 0.4 MG capsule 24 hr capsule Take 1 capsule by mouth daily.     • terazosin (HYTRIN) 2 MG capsule Every Night.     • traZODone (DESYREL) 50 MG tablet      • warfarin (COUMADIN) 10 MG tablet TAKE ONE TABLET DAILY 30 tablet 6   • zolpidem (AMBIEN) 5 MG tablet Take 10 mg by mouth At Night As Needed for sleep.       No facility-administered encounter medications on file as of 1/10/2017.        Reviewed use of high risk medication in the elderly: yes  Reviewed for potential of harmful drug interactions in the elderly: yes    Follow Up:  Return for Next scheduled follow up.     An After Visit Summary and PPPS with all of these plans were given to the patient.

## 2017-01-10 NOTE — PROGRESS NOTES
QUICK REFERENCE INFORMATION:  The ABCs of the Annual Wellness Visit    Initial Medicare Wellness Visit    HEALTH RISK ASSESSMENT    Recent Hospitalizations:  No recent hospitalization(s).    Health Habits:  Current Diet: Well Balanced Diet  Dental Exam. n/a, has dentures  Eye Exam. up to date  Exercise: 3 times/week.  Current exercise activities include: walking    Current Medical Providers:  Patient Care Team:  Chace Vasquez MD as PCP - General (Nephrology)  Deanna Wheeler MD as PCP - Family Medicine  Arelis Tucker MD as Consulting Physician (Cardiology)  Osiel Heaton MD as Consulting Physician (Urology)  Chace Vasquez MD as Consulting Physician (Nephrology)  Blayne Whitman MD as Consulting Physician (Ophthalmology)  Jose Hargrove MD as Consulting Physician (Pulmonary Disease)  John Solorzano MD as Consulting Physician (Urology)    The [facility] providers who are involved in the care of this patient are listed above. Additional providers and suppliers are listed below:  Dr. King Dr. Whitman      Smoking Status:  History   Smoking Status   • Former Smoker   Smokeless Tobacco   • Not on file       Alcohol Consumption:  History   Alcohol Use No       Depression Screen:   PHQ-9 Depression Screening 1/10/2017   Little interest or pleasure in doing things 1   Feeling down, depressed, or hopeless 0   Trouble falling or staying asleep, or sleeping too much 3   Feeling tired or having little energy 3   Poor appetite or overeating 0   Feeling bad about yourself - or that you are a failure or have let yourself or your family down 0   Trouble concentrating on things, such as reading the newspaper or watching television 0   Moving or speaking so slowly that other people could have noticed. Or the opposite - being so fidgety or restless that you have been moving around a lot more than usual 0   Thoughts that you would be better off dead, or of hurting yourself in some way 0   PHQ-9 Total Score 7   If you  checked off any problems, how difficult have these problems made it for you to do your work, take care of things at home, or get along with other people? Not difficult at all       Functional and Cognitive Screening:  Functional & Cognitive Status 1/10/2017   Do you have difficulty preparing food and eating? No   Do you have difficulty bathing yourself? No   Do you have difficulty getting dressed? No   Do you have difficulty using the toilet? No   Do you have difficulty moving around from place to place? No   In the past year have you fallen or experienced a near fall? Yes   Do you need help using the phone?  No   Are you deaf or do you have serious difficulty hearing?  No   Do you need help with transportation? No   Do you need help shopping? No   Do you need help preparing meals?  No   Do you need help with housework?  No   Do you need help with laundry? No   Do you need help taking your medications? No   Do you need help managing money? No   Do you have difficulty concentrating, remembering or making decisions? No       Falls Risk Assessment:   Recently eval'd for vertigo assoc'd with acute illness/fall. CT head negative    Does the patient have evidence of cognitive impairment? No    Recent Lab Results:  CMP:  Lab Results   Component Value Date    GLU 84 01/05/2017    BUN 19 01/05/2017    CREATININE 1.30 01/05/2017    EGFRIFNONA 55 (L) 01/05/2017    EGFRIFAFRI 67 01/05/2017    BCR 14.6 01/05/2017     01/05/2017    K 3.7 01/05/2017    CO2 27.0 01/05/2017    CALCIUM 9.8 01/05/2017    PROTENTOTREF 6.3 01/05/2017    ALBUMIN 3.80 01/05/2017    LABGLOBREF 2.5 01/05/2017    LABIL2 1.5 01/05/2017    BILITOT 0.5 01/05/2017    ALKPHOS 66 01/05/2017    AST 27 01/05/2017    ALT 21 01/05/2017     Lipid Panel:  Lab Results   Component Value Date    CHLPL 149 05/16/2016    TRIG 241 (H) 09/13/2016    HDL 33 (L) 09/13/2016     HbA1c:  Lab Results   Component Value Date    HGBA1C 5.9 (H) 12/14/2016     Urine  "Microalbumin:  Lab Results   Component Value Date    MARIANO 43.7 09/13/2016     Visual Acuity:   Visual Acuity Screening    Right eye Left eye Both eyes   Without correction: 20/25 20/25 20/20   With correction:      Comments: Patient has reading glasses at home.      Age-appropriate Screening Schedule:  Refer to the list below for future screening recommendations based on patient's age, sex and/or medical conditions. Orders for these recommended tests are listed in the plan section. The patient has been provided with a written plan.    Health Maintenance   Topic Date Due   • TDAP/TD VACCINES (1 - Tdap) 11/23/1969   • DIABETIC FOOT EXAM  09/13/2016   • DIABETIC EYE EXAM  06/01/2017   • HEMOGLOBIN A1C  06/14/2017   • PNEUMOCOCCAL VACCINES (65+ LOW/MEDIUM RISK) (2 of 2 - PPSV23) 09/13/2017   • LIPID PANEL  09/13/2017   • URINE MICROALBUMIN  09/13/2017   • COLONOSCOPY  08/25/2025   • INFLUENZA VACCINE  Addressed   • ZOSTER VACCINE  Addressed        Subjective   History of Present Illness    Robe Bryant is a 66 y.o. male who presents for an Annual Wellness Visit. In addition, we addressed the following health issues: COPD, CAD, uncontrolled HTN, dizziness, depression, recent supratherapeutic INR. Changes in dosing recommended to him per Dr. Tucker. He denies nikolas bleeding problems. Recent CT head negative. (Previous note reviewed.) COPD exacerbation improving, \"almost back to normal\".  He also requests refill of routine pain medications. No changes in status of pain since last visit. Meds improving ADLs, denies side effects.    Has upcoming apt with Dr. Vasquez. Has not been seen by urology in several years. Has h/o previous procedure for BPH. He is having significant symptoms including lower abd discomfort, urinary freq, nocturia, etc. (see ROS)    Home BP running 150s/ generally. Taking all meds as rx'd. He does have h/o CEHO for which he is using BiPAP. Recent visit with Dr. Hargrove. No changes " "made.    Has been on prozac for depression for \"some time\". Does not feel it helps. Denies suicidality. Denies side effects. Does not believe he has tried other meds. Currently on trazodone for sleep.    The following portions of the patient's history were reviewed and updated as appropriate: allergies, current medications, past family history, past medical history, past social history, past surgical history and problem list.    Outpatient Medications Prior to Visit   Medication Sig Dispense Refill   • aspirin 81 MG tablet Take  by mouth daily.     • baclofen (LIORESAL) 10 MG tablet TAKE 1 TABLET TWICE DAILY AS NEEDED FOR MUSCLE SPASM 60 tablet 2   • bisoprolol-hydrochlorothiazide (ZIAC) 10-6.25 MG per tablet Take 1 tablet by mouth daily.     • bumetanide (BUMEX) 2 MG tablet Take 1 tablet by mouth daily.     • COMBIVENT RESPIMAT  MCG/ACT inhaler USE 1 PUFF FOUR TIMES DAILY 4 g 5   • DULERA 100-5 MCG/ACT inhaler INHALE 2 PUFFS TWICE DAILY 13 g 5   • FLUoxetine (PROzac) 20 MG capsule TAKE 1 CAPSULE DAILY 30 capsule 5   • gabapentin (NEURONTIN) 600 MG tablet 1 po twice as needed during day, may take 2 po qhs 120 tablet 2   • hydrALAZINE (APRESOLINE) 25 MG tablet TAKE ONE-HALF TABLET FOUR TIMES DAILY 180 tablet 1   • hydrochlorothiazide (HYDRODIURIL) 25 MG tablet TAKE ONE TABLET IN THE MORNING 30 tablet 5   • ipratropium-albuterol (DUO-NEB) 0.5-2.5 mg/mL nebulizer      • isosorbide mononitrate (IMDUR) 30 MG 24 hr tablet Take 1 tablet by mouth Every Night. 90 tablet 3   • isosorbide mononitrate (IMDUR) 60 MG 24 hr tablet Take 1 tablet by mouth Daily. 90 tablet 3   • ketoconazole (NIZORAL) 2 % cream APPLY TOPICALLY EVERY 12 HOURS 60 g 1   • lisinopril (PRINIVIL,ZESTRIL) 20 MG tablet TAKE ONE TABLET TWICE DAILY 60 tablet 6   • meclizine (ANTIVERT) 25 MG tablet Take 1 tablet by mouth 3 (Three) Times a Day As Needed for dizziness. 90 tablet 0   • metoprolol succinate XL (TOPROL-XL) 100 MG 24 hr tablet TAKE ONE TABLET " DAILY 30 tablet 5   • Multiple Vitamin tablet Take 1 tablet by mouth daily.     • nitroglycerin (NITROSTAT) 0.4 MG SL tablet Place 1 tablet under the tongue Every 5 (Five) Minutes As Needed for chest pain. 25 tablet 11   • omeprazole (PriLOSEC) 40 MG capsule TAKE ONE CAPSULE DAILY 30 capsule 6   • potassium chloride (K-DUR) 10 MEQ CR tablet TAKE 1 TABLET EVERY DAY 30 tablet 5   • pravastatin (PRAVACHOL) 80 MG tablet TAKE 1 TABLET AT BEDTIME FOR CHOLESTEROL 30 tablet 5   • tamsulosin (FLOMAX) 0.4 MG capsule 24 hr capsule Take 1 capsule by mouth daily.     • terazosin (HYTRIN) 2 MG capsule Every Night.     • traZODone (DESYREL) 50 MG tablet      • warfarin (COUMADIN) 10 MG tablet TAKE ONE TABLET DAILY 30 tablet 6   • zolpidem (AMBIEN) 5 MG tablet Take 10 mg by mouth At Night As Needed for sleep.     • HYDROcodone-acetaminophen (NORCO)  MG per tablet 1 po q 8 prn severe pain; must last 30 days 90 tablet 0     No facility-administered medications prior to visit.        Patient Active Problem List   Diagnosis   • Pacemaker   • Neck pain   • Chronic low back pain   • Chronic kidney disease   • Essential hypertension   • Seborrheic dermatitis   • Benign prostatic hyperplasia   • Paroxysmal atrial fibrillation   • Cervico-occipital neuralgia   • Hypercholesterolemia   • Acute erosive gastritis   • Diastolic heart failure   • Chronic obstructive pulmonary disease   • Chronic coronary artery disease   • Gastroesophageal reflux disease   • Chronic shoulder pain   • Obstructive sleep apnea of adult   • Dysphagia   • Symptomatic bradycardia   • Type 2 diabetes mellitus   • Chronic chest pain   • Restless leg syndrome   • Warfarin anticoagulation   • Osteoarthritis of multiple joints   • Multilevel degenerative disc disease   • Moderate episode of recurrent major depressive disorder       Advanced Care Planning:  has NO advanced directive - information provided to the patient today    Identification of Risk Factors:  Risk  factors include: weight , cardiovascular risk, increased fall risk, chronic pain, depression and polypharmacy.    Review of Systems   Constitutional: Positive for fatigue. Negative for fever and unexpected weight change.   HENT: Positive for postnasal drip and sinus pressure. Negative for congestion, mouth sores, nosebleeds, sore throat and trouble swallowing.    Eyes: Positive for visual disturbance (chronic, stable). Negative for pain and redness.   Respiratory: Positive for cough (improving) and wheezing (improving). Negative for shortness of breath.    Cardiovascular: Negative for chest pain, palpitations and leg swelling.   Gastrointestinal: Positive for abdominal distention (lower, suprapubic, radiating around to sides). Negative for blood in stool, diarrhea, nausea and vomiting.   Endocrine: Positive for heat intolerance and polyuria. Negative for polydipsia.   Genitourinary: Positive for frequency. Negative for dysuria and urgency.        Nocturia   Musculoskeletal: Positive for arthralgias and back pain.   Skin: Negative for rash and wound.   Neurological: Positive for dizziness and headaches. Negative for syncope and weakness.        No further falls since last visit   Hematological: Negative for adenopathy. Bruises/bleeds easily.   Psychiatric/Behavioral: Positive for dysphoric mood. Negative for confusion and suicidal ideas. The patient is nervous/anxious.      Compared to one year ago, the patient feels his physical health is the same.  Compared to one year ago, the patient feels his mental health is worse.    Objective     Physical Exam   Constitutional: He is oriented to person, place, and time. He appears well-developed and well-nourished. He is cooperative. He does not appear ill (much improved from previous visit). No distress.   Appears older than stated age, overweight   HENT:   Mouth/Throat: Uvula is midline, oropharynx is clear and moist and mucous membranes are normal. Mucous membranes are not  "dry.   Eyes: Conjunctivae and lids are normal.   Neck: Neck supple.   Cardiovascular: Normal rate, regular rhythm and intact distal pulses.  Exam reveals distant heart sounds.    Pulmonary/Chest: Effort normal. He has decreased breath sounds. He has no wheezes. He has no rhonchi. He has no rales.   Abdominal: Soft. There is tenderness (mild) in the right lower quadrant, suprapubic area and left lower quadrant. There is no rigidity, no rebound and no guarding.       Vascular Status -  His exam exhibits no right foot edema. His exam exhibits no left foot edema.  Neurological: He is alert and oriented to person, place, and time. He has normal strength. No cranial nerve deficit. Gait (antalgic) abnormal.   Skin: Skin is warm and dry. No bruising and no rash noted.   Psychiatric: He has a normal mood and affect. His behavior is normal. Cognition and memory are normal.   Nursing note and vitals reviewed.    Vitals:    01/10/17 0837   BP: 152/100   Pulse: 78   SpO2: 98%   Weight: 221 lb (100 kg)   Height: 65\" (165.1 cm)       Body mass index is 36.78 kg/(m^2).  Discussed the patient's BMI with him. The BMI is above average; BMI management plan is completed.    Assessment/Plan   Patient Self-Management and Personalized Health Advice  The patient has been provided with information about: diet, exercise, weight management, prevention of cardiac or vascular disease, the relationship between weight and GERD, fall prevention, designing advance directives and mental health concerns and preventive services including:   · Advanced directives: has NO advanced directive - information provided to the patient today, Counseling for cardiovascular disease risk reduction, Fall Risk assessment done, Nutrition counseling provided, Prostate cancer screening discussed, Screening for AAA, referral for ultrasound placed, Genesight testing performed, cardiology to be contacted regarding uncontrolled BP.    Visit Diagnoses:    ICD-10-CM ICD-9-CM "   1. Encounter for Medicare annual wellness exam Z00.00 V70.0   2. Screening for prostate cancer Z12.5 V76.44   3. Screening for lipid disorders Z13.220 V77.91   4. Disorder of prostate  N42.9 602.9   5. Need for hepatitis C screening test Z11.59 V73.89   6. Screening for AAA (aortic abdominal aneurysm) Z13.6 V81.2   7. Former smoker Z87.891 V15.82   8. Essential hypertension I10 401.9   9. Chronic shoulder pain, unspecified laterality M25.519 719.41    G89.29 338.29   10. Primary osteoarthritis involving multiple joints M15.0 715.09   11. Multilevel degenerative disc disease M53.9 722.6   12. Benign prostatic hyperplasia with lower urinary tract symptoms, unspecified morphology N40.1 600.01   13. Moderate episode of recurrent major depressive disorder F33.1 296.32       Orders Placed This Encounter   Procedures   • PSA   • Cholesterol, Total   • Hepatitis C Antibody   • Ambulatory Referral to Urology     Referral Priority:   Routine     Referral Type:   Consultation     Referral Reason:   Specialty Services Required     Requested Specialty:   Urology     Number of Visits Requested:   1   US screen for AAA  GeneSight test performed, results pending. Further rec's pending review of results.    Outpatient Encounter Prescriptions as of 1/10/2017   Medication Sig Dispense Refill   • aspirin 81 MG tablet Take  by mouth daily.     • baclofen (LIORESAL) 10 MG tablet TAKE 1 TABLET TWICE DAILY AS NEEDED FOR MUSCLE SPASM 60 tablet 2   • bisoprolol-hydrochlorothiazide (ZIAC) 10-6.25 MG per tablet Take 1 tablet by mouth daily.     • bumetanide (BUMEX) 2 MG tablet Take 1 tablet by mouth daily.     • COMBIVENT RESPIMAT  MCG/ACT inhaler USE 1 PUFF FOUR TIMES DAILY 4 g 5   • DULERA 100-5 MCG/ACT inhaler INHALE 2 PUFFS TWICE DAILY 13 g 5   • FLUoxetine (PROzac) 20 MG capsule TAKE 1 CAPSULE DAILY 30 capsule 5   • gabapentin (NEURONTIN) 600 MG tablet 1 po twice as needed during day, may take 2 po qhs 120 tablet 2   • hydrALAZINE  (APRESOLINE) 25 MG tablet TAKE ONE-HALF TABLET FOUR TIMES DAILY 180 tablet 1   • hydrochlorothiazide (HYDRODIURIL) 25 MG tablet TAKE ONE TABLET IN THE MORNING 30 tablet 5   • HYDROcodone-acetaminophen (NORCO)  MG per tablet 1 po q 8 prn severe pain; must last 30 days 90 tablet 0   • ipratropium-albuterol (DUO-NEB) 0.5-2.5 mg/mL nebulizer      • isosorbide mononitrate (IMDUR) 30 MG 24 hr tablet Take 1 tablet by mouth Every Night. 90 tablet 3   • isosorbide mononitrate (IMDUR) 60 MG 24 hr tablet Take 1 tablet by mouth Daily. 90 tablet 3   • ketoconazole (NIZORAL) 2 % cream APPLY TOPICALLY EVERY 12 HOURS 60 g 1   • lisinopril (PRINIVIL,ZESTRIL) 20 MG tablet TAKE ONE TABLET TWICE DAILY 60 tablet 6   • meclizine (ANTIVERT) 25 MG tablet Take 1 tablet by mouth 3 (Three) Times a Day As Needed for dizziness. 90 tablet 0   • metFORMIN (GLUCOPHAGE) 500 MG tablet      • metoprolol succinate XL (TOPROL-XL) 100 MG 24 hr tablet TAKE ONE TABLET DAILY 30 tablet 5   • Multiple Vitamin tablet Take 1 tablet by mouth daily.     • nitroglycerin (NITROSTAT) 0.4 MG SL tablet Place 1 tablet under the tongue Every 5 (Five) Minutes As Needed for chest pain. 25 tablet 11   • omeprazole (PriLOSEC) 40 MG capsule TAKE ONE CAPSULE DAILY 30 capsule 6   • potassium chloride (K-DUR) 10 MEQ CR tablet TAKE 1 TABLET EVERY DAY 30 tablet 5   • pravastatin (PRAVACHOL) 80 MG tablet TAKE 1 TABLET AT BEDTIME FOR CHOLESTEROL 30 tablet 5   • tamsulosin (FLOMAX) 0.4 MG capsule 24 hr capsule Take 1 capsule by mouth daily.     • terazosin (HYTRIN) 2 MG capsule Every Night.     • traZODone (DESYREL) 50 MG tablet      • warfarin (COUMADIN) 10 MG tablet TAKE ONE TABLET DAILY 30 tablet 6   • zolpidem (AMBIEN) 5 MG tablet Take 10 mg by mouth At Night As Needed for sleep.     • [DISCONTINUED] HYDROcodone-acetaminophen (NORCO)  MG per tablet 1 po q 8 prn severe pain; must last 30 days 90 tablet 0     No facility-administered encounter medications on file as  of 1/10/2017.        Reviewed use of high risk medication in the elderly: yes  Reviewed for potential of harmful drug interactions in the elderly: yes    Follow Up:  Return for Next scheduled follow up.   I will contact patient regarding test results and provide instructions regarding any necessary changes in plan of care.  He is considering Tdap vaccination.   Routine pain medication refilled.  ALMA report reviewed and scanned into chart.  Last ALMA date 10/12/16.  As part of patient's treatment plan I am prescribing a controlled substance.  The patient has been made aware of the appropriate use of such medications, including potential risk of somnolence, limited ability to drive and/or work safely, and potential for dependence and/or overdose.  It has also been made clear that these medications are for use by this patient only, without concomitant use of alcohol or other substances, unless prescribed.  He is encouraged to f/u with Dr. Tucker and Dr. Vasquez as scheduled.  I will discuss with Dr. Tucker what she wishes to do in regard to his elevated BP.    An After Visit Summary and PPPS with all of these plans were given to the patient.

## 2017-01-11 LAB
CHOLEST SERPL-MCNC: 134 MG/DL (ref 100–199)
HCV AB S/CO SERPL IA: <0.1 S/CO RATIO (ref 0–0.9)
PSA SERPL-MCNC: 0.8 NG/ML (ref 0–4)

## 2017-01-12 ENCOUNTER — TELEPHONE (OUTPATIENT)
Dept: CARDIOLOGY | Facility: CLINIC | Age: 67
End: 2017-01-12

## 2017-01-12 RX ORDER — TERAZOSIN 5 MG/1
5 CAPSULE ORAL NIGHTLY
Qty: 90 CAPSULE | Refills: 0 | Status: SHIPPED | OUTPATIENT
Start: 2017-01-12 | End: 2017-05-17 | Stop reason: SDUPTHER

## 2017-01-12 NOTE — TELEPHONE ENCOUNTER
I spoke with Silva at  office and she stated Dr. Tucker wants to increase his terazosin from 2 mg to 5 mg.  I informed patient for them and they sent in the new strength.

## 2017-01-12 NOTE — TELEPHONE ENCOUNTER
Per [facility], increase terazosin to 5 mg daily. Jennifer at Dr. Wheeler's office notified. New script sent.

## 2017-01-12 NOTE — TELEPHONE ENCOUNTER
Jennifer at Dr. Wheeler's office called reporting patient having increased BP's. He was seen in her office for dizziness and they performed orthostatics, 146/108, 66 lying, 152/108, 62 sitting, 162/110, 62 standing. They also did an EKG, which per their office was unchanged from previous. They were wanting to know what you recommended as far as med changes? 1/5/2017 creatinine 1.3. On lisinopril 20 mg daily, metoprolol succ 100 mg daily, bumex 2 mg, hydralazine 25 1/2 tablet 4 times daily, HCTZ 25 mg daily, terazosin 2 mg nightly.

## 2017-01-31 ENCOUNTER — APPOINTMENT (OUTPATIENT)
Dept: ULTRASOUND IMAGING | Facility: HOSPITAL | Age: 67
End: 2017-01-31

## 2017-02-07 ENCOUNTER — OFFICE VISIT (OUTPATIENT)
Dept: FAMILY MEDICINE CLINIC | Facility: CLINIC | Age: 67
End: 2017-02-07

## 2017-02-07 VITALS
WEIGHT: 227 LBS | SYSTOLIC BLOOD PRESSURE: 146 MMHG | DIASTOLIC BLOOD PRESSURE: 96 MMHG | HEART RATE: 62 BPM | TEMPERATURE: 98.4 F | HEIGHT: 65 IN | OXYGEN SATURATION: 96 % | BODY MASS INDEX: 37.82 KG/M2

## 2017-02-07 DIAGNOSIS — Z79.01 WARFARIN ANTICOAGULATION: ICD-10-CM

## 2017-02-07 DIAGNOSIS — I48.0 PAROXYSMAL ATRIAL FIBRILLATION (HCC): ICD-10-CM

## 2017-02-07 DIAGNOSIS — E78.00 HYPERCHOLESTEROLEMIA: ICD-10-CM

## 2017-02-07 DIAGNOSIS — I10 ESSENTIAL HYPERTENSION: Primary | ICD-10-CM

## 2017-02-07 DIAGNOSIS — G89.4 CHRONIC PAIN SYNDROME: ICD-10-CM

## 2017-02-07 LAB — INR PPP: 4.3

## 2017-02-07 PROCEDURE — 36415 COLL VENOUS BLD VENIPUNCTURE: CPT | Performed by: FAMILY MEDICINE

## 2017-02-07 PROCEDURE — 99214 OFFICE O/P EST MOD 30 MIN: CPT | Performed by: FAMILY MEDICINE

## 2017-02-07 RX ORDER — KETOCONAZOLE 20 MG/G
CREAM TOPICAL
Qty: 60 G | Refills: 1 | Status: SHIPPED | OUTPATIENT
Start: 2017-02-07 | End: 2017-05-24 | Stop reason: SDUPTHER

## 2017-02-07 RX ORDER — HYDRALAZINE HYDROCHLORIDE 25 MG/1
TABLET, FILM COATED ORAL
Qty: 180 TABLET | Refills: 1 | Status: SHIPPED | OUTPATIENT
Start: 2017-02-07 | End: 2017-05-24 | Stop reason: SDUPTHER

## 2017-02-07 RX ORDER — OXYCODONE AND ACETAMINOPHEN 10; 325 MG/1; MG/1
1 TABLET ORAL EVERY 8 HOURS PRN
Qty: 90 TABLET | Refills: 0 | Status: SHIPPED | OUTPATIENT
Start: 2017-02-07 | End: 2017-03-03 | Stop reason: SDUPTHER

## 2017-02-07 NOTE — PROGRESS NOTES
"Subjective   Robe Aleisha Bryant is a 66 y.o. male.     History of Present Illness   Mr. Bryant is a 65 yo cauc male with h/o CAD, COPD, DM, and chronic pain syndrome due to OA, DDD who presents today with c/o \"sore spot on chest\". Located on left side upper chest. Presents x 6 months, more pain over past 3-4 weeks.  Has not noticed any aggravating or relieving factors. Thinks it may be a vein. No acute changes in status otherwise. BG well controlled. BP is improving- now running 130-140s/80-90.  Followed by Dr. Tucker for A fib and coumadin mngt. INR at last check 1 month ago was supratherapeutic. Denies bleeding tendencies, new focal neuro symptoms.    He wishes to adjust his chronic pain mgnt. Has been on lortab for several years. Feels it has declined in efficacy. "[employer] testing performed previous visit. Results reviewed again with pt. No red flags regarding gene-drug interactions for analgesics.    The following portions of the patient's history were reviewed and updated as appropriate: allergies, current medications, past family history, past medical history, past social history, past surgical history and problem list.    Review of Systems   Constitutional: Positive for fatigue. Negative for fever and unexpected weight change.   HENT: Positive for congestion and postnasal drip. Negative for nosebleeds, sore throat and trouble swallowing.    Eyes: Negative for pain, discharge and redness.   Respiratory: Negative for cough, shortness of breath and wheezing.    Cardiovascular: Negative for chest pain, palpitations and leg swelling.   Gastrointestinal: Negative for diarrhea, nausea and vomiting.   Genitourinary: Negative for dysuria and hematuria.   Musculoskeletal: Positive for arthralgias, back pain, joint swelling and myalgias.   Skin: Negative for rash and wound.        See HPI   Neurological: Negative for dizziness, syncope, weakness and headaches.   Hematological: Negative for adenopathy. " Bruises/bleeds easily.   Psychiatric/Behavioral: Positive for dysphoric mood. Negative for confusion, sleep disturbance and suicidal ideas. The patient is nervous/anxious.      Objective    Vitals:    02/07/17 0801   BP: 146/96   Pulse: 62   Temp: 98.4 °F (36.9 °C)   SpO2: 96%     Body mass index is 37.77 kg/(m^2).  Last 2 weights    02/07/17 0801   Weight: 227 lb (103 kg)     Physical Exam   Constitutional: He is oriented to person, place, and time. He appears well-developed and well-nourished. He is cooperative. He does not appear ill. No distress.   Overweight   HENT:   Mouth/Throat: Mucous membranes are normal. Mucous membranes are not dry.   Eyes: Conjunctivae and lids are normal.   Neck: Neck supple. Normal carotid pulses present. No thyroid mass and no thyromegaly present.   Chronic hoarseness noted   Cardiovascular: Normal rate, regular rhythm and intact distal pulses.    Pulmonary/Chest: Effort normal. He has decreased breath sounds. He has no wheezes. He has no rhonchi. He has no rales.       Vascular Status -  His exam exhibits no right foot edema. His exam exhibits no left foot edema.  Lymphadenopathy:     He has no cervical adenopathy.   Neurological: He is alert and oriented to person, place, and time. He has normal strength. Gait (antalgic) abnormal.   Skin: Skin is warm and dry. Bruising (anterior left chest with vascular lesion mildly TTP, appears c/w ruptured capillaries vs telangiectasia) noted. No rash noted.   Psychiatric: He has a normal mood and affect. His behavior is normal. Cognition and memory are normal.   Nursing note and vitals reviewed.    Recent Results (from the past 1008 hour(s))   Rapid Influenza A / B Antigens    Collection Time: 12/30/16 12:58 PM   Result Value Ref Range    Rapid Influenza A Ag NEGATIVE NEGATIVE    Influenza B Antigen NEGATIVE NEGATIVE   Urinalysis With / Culture If Indicated    Collection Time: 12/30/16  1:15 PM   Result Value Ref Range    Color, UA Yellow      Appearance, UA Clear     Glucose, UA Negative NEGATIVE    Bilirubin, UA Negative NEGATIVE    Ketones, UA Negative NEGATIVE    Specific Gravity, UA 1.010 1.003 - 1.035    Blood, UA Negative NEGATIVE    pH, UA 7.0 5.0 - 8.0    Protein, UA Negative NEGATIVE    Urobilinogen, UA 0.2 0.2    Nitrite, UA Negative NEGATIVE    Leukocytes, UA Negative NEGATIVE    WBC, UA 0-3 0 - 3    RBC, UA 0-3 0 - 3    Epithelial Cells, UA 0-3 0 - 3   CBC & Differential    Collection Time: 12/30/16  1:20 PM   Result Value Ref Range    WBC 5.5 4.8 - 10.8 THOUS    RBC 4.96 4.70 - 6.10 m/uL    Hemoglobin 14.2 14.0 - 18.0 g/dL    Hematocrit 43 42 - 52 %    MCV 86.7 80.0 - 94.0 fL    MCH 28.6 27.0 - 31.0 uug    MCHC 33.0 30.0 - 37.0 g/dL    RDW 12.7 11.5 - 14.5 %    Platelets 166 130 - 400 THOUS    Neutrophil Rel % 50.4 37.0 - 80.0 %    Lymphocyte Rel % 29.9 10.0 - 50.0 %    Monocyte Rel % 11.6 0.0 - 12.0 %    Eosinophil Rel % 6.5 0.0 - 7.0 %    Basophil Rel % 1.10 0.00 - 2.50 %    Immature Granulocyte Rel % 0.50 0.00 - 2.50 %    Neutrophils Absolute 2.77 2.00 - 6.90 THOUS    Lymphocytes Absolute 1.65 0.60 - 3.40 THOUS    Monocytes Absolute 0.64 0.00 - 0.90 THOUS    Eosinophils Absolute 0.36 0.00 - 0.70 THOUS    Basophils Absolute 0.06 0.00 - 0.20 THOUS    Abs Imm Gran 0.03 0.00 - 0.60 THOUS   Comprehensive Metabolic Panel    Collection Time: 12/30/16  1:20 PM   Result Value Ref Range    Sodium 145 137 - 145 mmol/L    Potassium 3.5 3.5 - 5.1 mmol/L    Chloride 101 98 - 107 mmol/L    CO2 33 (H) 26 - 30 mmol/L    Anion Gap 15 10 - 20 mmol/L    BUN 19 7 - 20 mg/dL    Creatinine 1.3 0.6 - 1.3 mg/dL    BUN/Creatinine Ratio 14.6 6.3 - 21.9    eGFR 55 mL/min    Glucose 72 (L) 74 - 98 mg/dL    Calcium 9.2 8.4 - 10.2 mg/dL    Total Bilirubin 0.5 0.2 - 1.3 mg/dL    AST (SGOT) 29 15 - 46 U/L    ALT (SGPT) 36 13 - 69 U/L    Total Protein 6.6 6.3 - 8.2 g/dL    Albumin 3.8 3.5 - 5.0 g/dL    A/G Ratio 1.4 1.0 - 2.0    Alkaline Phosphatase 62 38 - 126 U/L    Protime-INR    Collection Time: 01/05/17 12:00 AM   Result Value Ref Range    INR 9.70    CBC & Differential    Collection Time: 01/05/17 10:57 AM   Result Value Ref Range    WBC 8.96 3.50 - 10.80 10*3/mm3    RBC 5.32 4.20 - 5.76 10*6/mm3    Hemoglobin 15.7 13.1 - 17.5 g/dL    Hematocrit 47.6 38.9 - 50.9 %    MCV 89.5 80.0 - 99.0 fL    MCH 29.5 27.0 - 31.0 pg    MCHC 33.0 32.0 - 36.0 g/dL    RDW 13.1 11.3 - 14.5 %    Platelets 196 150 - 450 10*3/mm3    Neutrophil Rel % 68.1 41.0 - 71.0 %    Lymphocyte Rel % 18.2 (L) 24.0 - 44.0 %    Monocyte Rel % 8.8 0.0 - 12.0 %    Eosinophil Rel % 3.6 (H) 0.0 - 3.0 %    Basophil Rel % 0.6 0.0 - 1.0 %    Neutrophils Absolute 6.11 1.50 - 8.30 10*3/mm3    Lymphocytes Absolute 1.63 0.60 - 4.80 10*3/mm3    Monocytes Absolute 0.79 0.00 - 1.00 10*3/mm3    Eosinophils Absolute 0.32 (H) 0.10 - 0.30 10*3/mm3    Basophils Absolute 0.05 0.00 - 0.20 10*3/mm3    Immature Granulocyte Rel % 0.7 (H) 0.0 - 0.6 %    Immature Grans Absolute 0.06 (H) 0.00 - 0.03 10*3/mm3   Comprehensive Metabolic Panel    Collection Time: 01/05/17 10:57 AM   Result Value Ref Range    Glucose 84 70 - 100 mg/dL    BUN 19 9 - 23 mg/dL    Creatinine 1.30 0.60 - 1.30 mg/dL    eGFR Non African Am 55 (L) >60 mL/min/1.73    eGFR African Am 67 >60 mL/min/1.73    BUN/Creatinine Ratio 14.6 7.0 - 25.0    Sodium 139 132 - 146 mmol/L    Potassium 3.7 3.5 - 5.5 mmol/L    Chloride 97 (L) 99 - 109 mmol/L    Total CO2 27.0 20.0 - 31.0 mmol/L    Calcium 9.8 8.7 - 10.4 mg/dL    Total Protein 6.3 5.7 - 8.2 g/dL    Albumin 3.80 3.20 - 4.80 g/dL    Globulin 2.5 gm/dL    A/G Ratio 1.5 1.5 - 2.5 g/dL    Total Bilirubin 0.5 0.3 - 1.2 mg/dL    Alkaline Phosphatase 66 25 - 100 U/L    AST (SGOT) 27 0 - 33 U/L    ALT (SGPT) 21 7 - 40 U/L   Manual Differential    Collection Time: 01/05/17 10:57 AM   Result Value Ref Range    Differential Comment Comment     Comment Comment     Plt Comment Comment    Sedimentation Rate    Collection Time:  01/05/17 10:57 AM   Result Value Ref Range    Sed Rate 3 0 - 20 mm/hr   Amylase    Collection Time: 01/05/17 10:57 AM   Result Value Ref Range    Amylase 61 30 - 118 U/L   Lipase    Collection Time: 01/05/17 10:57 AM   Result Value Ref Range    Lipase 32 6 - 51 U/L   C-reactive Protein    Collection Time: 01/05/17 10:57 AM   Result Value Ref Range    C-Reactive Protein 7.90 0.00 - 10.00 mg/dL   PSA    Collection Time: 01/10/17  9:42 AM   Result Value Ref Range    PSA 0.8 0.0 - 4.0 ng/mL   Cholesterol, Total    Collection Time: 01/10/17  9:42 AM   Result Value Ref Range    Total Cholesterol 134 100 - 199 mg/dL   Hepatitis C Antibody    Collection Time: 01/10/17  9:42 AM   Result Value Ref Range    Hep C Virus Ab <0.1 0.0 - 0.9 s/co ratio     Labs reviewed with pt.    Assessment/Plan   Robe was seen today for bleeding/bruising.    Diagnoses and all orders for this visit:    Essential hypertension  -     Lipid Panel    Warfarin anticoagulation  -     Protime-INR    Hypercholesterolemia  -     Lipid Panel    Paroxysmal atrial fibrillation    Chronic pain syndrome    Other orders  -     hydrALAZINE (APRESOLINE) 25 MG tablet; 1/2 tablet po four times per day  -     oxyCODONE-acetaminophen (PERCOCET)  MG per tablet; Take 1 tablet by mouth Every 8 (Eight) Hours As Needed for severe pain (7-10).    BP with improved control.  DM stable.  A fib rate controlled; INR pending. Will be forwarded to Dr. Tucker.   Decreased efficacy of lortab. Needs narcotic analgesic rotation.  As part of patient's treatment plan I am prescribing a controlled substance.  The patient has been made aware of the appropriate use of such medications, including potential risk of somnolence, limited ability to drive and/or work safely, and potential for dependence and/or overdose.  It has also been made clear that these medications are for use by this patient only, without concomitant use of alcohol or other substances, unless  prescribed.  Pt reassured regarding varicosity left upper chest.  Will continue to closely monitor.  ALMA report reviewed and scanned into chart.  Last ALMA date 12/14/16.  Keep routine f/u in 1 month, f/u sooner as needed.  F/U with Dr. Tucker as scheduled.  Pt advised to eat a heart healthy diet and get regular aerobic exercise.  Patient was encouraged to keep me informed of any acute changes, lack of improvement, or any new concerning symptoms.  Patient voiced understanding of all instructions and denied further questions.

## 2017-02-08 ENCOUNTER — ANTICOAGULATION VISIT (OUTPATIENT)
Dept: CARDIOLOGY | Facility: CLINIC | Age: 67
End: 2017-02-08

## 2017-02-08 LAB
CHOLEST SERPL-MCNC: 99 MG/DL (ref 100–199)
HDLC SERPL-MCNC: 24 MG/DL
INR PPP: 4.3 (ref 0.8–1.2)
LDLC SERPL CALC-MCNC: 34 MG/DL (ref 0–99)
PROTHROMBIN TIME: 43.4 SEC (ref 9.1–12)
TRIGL SERPL-MCNC: 204 MG/DL (ref 0–149)
VLDLC SERPL CALC-MCNC: 41 MG/DL (ref 5–40)

## 2017-02-16 NOTE — PROGRESS NOTES
I have reviewed the anticoagulation track calender, labs, and dosage adjustments made by Carline Juraod RN and I agree.    Electronically signed by BROOKE Livingston 02/16/17 2:22 PM

## 2017-03-03 ENCOUNTER — TELEPHONE (OUTPATIENT)
Dept: FAMILY MEDICINE CLINIC | Facility: CLINIC | Age: 67
End: 2017-03-03

## 2017-03-03 RX ORDER — BUMETANIDE 2 MG/1
2 TABLET ORAL DAILY
Qty: 30 TABLET | Refills: 5 | Status: SHIPPED | OUTPATIENT
Start: 2017-03-03 | End: 2017-05-24 | Stop reason: SDUPTHER

## 2017-03-03 RX ORDER — OXYCODONE AND ACETAMINOPHEN 10; 325 MG/1; MG/1
1 TABLET ORAL EVERY 8 HOURS PRN
Qty: 90 TABLET | Refills: 0 | Status: SHIPPED | OUTPATIENT
Start: 2017-03-03 | End: 2017-04-11 | Stop reason: SDUPTHER

## 2017-03-14 ENCOUNTER — OFFICE VISIT (OUTPATIENT)
Dept: FAMILY MEDICINE CLINIC | Facility: CLINIC | Age: 67
End: 2017-03-14

## 2017-03-14 VITALS
WEIGHT: 223 LBS | HEART RATE: 86 BPM | HEIGHT: 65 IN | SYSTOLIC BLOOD PRESSURE: 148 MMHG | OXYGEN SATURATION: 97 % | BODY MASS INDEX: 37.15 KG/M2 | DIASTOLIC BLOOD PRESSURE: 100 MMHG

## 2017-03-14 DIAGNOSIS — J44.9 CHRONIC OBSTRUCTIVE PULMONARY DISEASE, UNSPECIFIED COPD TYPE (HCC): ICD-10-CM

## 2017-03-14 DIAGNOSIS — I95.1 ORTHOSTASIS: ICD-10-CM

## 2017-03-14 DIAGNOSIS — R42 VERTIGO: ICD-10-CM

## 2017-03-14 DIAGNOSIS — G25.81 RESTLESS LEG SYNDROME: ICD-10-CM

## 2017-03-14 DIAGNOSIS — G47.33 OBSTRUCTIVE SLEEP APNEA OF ADULT: ICD-10-CM

## 2017-03-14 DIAGNOSIS — E11.8 TYPE 2 DIABETES MELLITUS WITH COMPLICATION, WITHOUT LONG-TERM CURRENT USE OF INSULIN (HCC): ICD-10-CM

## 2017-03-14 DIAGNOSIS — I10 ESSENTIAL HYPERTENSION: ICD-10-CM

## 2017-03-14 DIAGNOSIS — R42 DIZZINESS: Primary | ICD-10-CM

## 2017-03-14 DIAGNOSIS — Z79.01 WARFARIN ANTICOAGULATION: ICD-10-CM

## 2017-03-14 DIAGNOSIS — I50.32 CHRONIC DIASTOLIC HEART FAILURE (HCC): ICD-10-CM

## 2017-03-14 LAB — INR PPP: 2.5 (ref 0.9–1.1)

## 2017-03-14 PROCEDURE — 99214 OFFICE O/P EST MOD 30 MIN: CPT | Performed by: FAMILY MEDICINE

## 2017-03-14 PROCEDURE — 36416 COLLJ CAPILLARY BLOOD SPEC: CPT | Performed by: FAMILY MEDICINE

## 2017-03-14 PROCEDURE — 85610 PROTHROMBIN TIME: CPT | Performed by: FAMILY MEDICINE

## 2017-03-14 RX ORDER — ISOSORBIDE MONONITRATE 60 MG/1
TABLET, EXTENDED RELEASE ORAL
Qty: 90 TABLET | Refills: 3 | Status: SHIPPED | OUTPATIENT
Start: 2017-03-14 | End: 2017-04-20

## 2017-03-14 RX ORDER — GABAPENTIN 600 MG/1
TABLET ORAL
Qty: 120 TABLET | Refills: 2 | Status: SHIPPED | OUTPATIENT
Start: 2017-03-14 | End: 2017-05-24 | Stop reason: SDUPTHER

## 2017-03-14 RX ORDER — HYDROCHLOROTHIAZIDE 25 MG/1
TABLET ORAL
Qty: 30 TABLET | Refills: 5 | Status: SHIPPED | OUTPATIENT
Start: 2017-03-14 | End: 2017-05-24 | Stop reason: SDUPTHER

## 2017-03-14 RX ORDER — FLUOXETINE HYDROCHLORIDE 20 MG/1
CAPSULE ORAL
Qty: 30 CAPSULE | Refills: 5 | Status: SHIPPED | OUTPATIENT
Start: 2017-03-14 | End: 2017-05-24 | Stop reason: SDUPTHER

## 2017-03-14 RX ORDER — MECLIZINE HYDROCHLORIDE 25 MG/1
25 TABLET ORAL 3 TIMES DAILY PRN
Qty: 90 TABLET | Refills: 0 | Status: SHIPPED | OUTPATIENT
Start: 2017-03-14 | End: 2017-08-29

## 2017-03-14 RX ORDER — BACLOFEN 10 MG/1
TABLET ORAL
Qty: 60 TABLET | Refills: 2 | Status: SHIPPED | OUTPATIENT
Start: 2017-03-14 | End: 2017-05-24 | Stop reason: SDUPTHER

## 2017-03-14 RX ORDER — METOPROLOL SUCCINATE 100 MG/1
TABLET, EXTENDED RELEASE ORAL
Qty: 30 TABLET | Refills: 5 | Status: SHIPPED | OUTPATIENT
Start: 2017-03-14 | End: 2017-05-24 | Stop reason: SDUPTHER

## 2017-03-14 NOTE — PROGRESS NOTES
"Subjective   Robe Bryant is a 66 y.o. male.     History of Present Illness  Mr. Bryant is a 65 yo cauc male with h/o CAD, COPD, DM, and chronic pain syndrome due to OA, DDD who presents today with c/o  dizziness. Describes a \"light-headedness\" and weakness during which his legs \"tend to give way\". He denies vertigo or LOC. Denies assoc'd chest pain, palpitations, nausea or SOA.  Has similar dizziness upon sitting or standing up too fast. BP at home has been running 150s/80s.  He is followed by Dr. Tucker for diastolic heart failure, h/o A fib on anticoagulation, resistant hypertension, etc. Last INR was high.  BG well controlled. Denies bleeding tendencies.    He reports that he has been without his BiPAP machine for approx 2 weeks as it malfunctioned. He has requested new machine but is not sure when he will receive it.  He is followed by Dr. Hargrove for ECHO.  Has had increased cough, white sputum and \"low grade fever\". He is not sleeping as well as he did previously.     The following portions of the patient's history were reviewed and updated as appropriate: allergies, current medications, past family history, past medical history, past social history, past surgical history and problem list.    Review of Systems   Constitutional: Positive for fatigue and fever (subjective). Negative for unexpected weight change.   HENT: Positive for congestion and postnasal drip. Negative for nosebleeds, sore throat and trouble swallowing.    Eyes: Negative for pain, discharge and redness.   Respiratory: Positive for cough. Negative for shortness of breath and wheezing.    Cardiovascular: Negative for chest pain, palpitations and leg swelling.   Gastrointestinal: Negative for abdominal pain, diarrhea, nausea and vomiting.   Genitourinary: Negative for dysuria and hematuria.   Musculoskeletal: Positive for arthralgias, back pain, joint swelling and myalgias.   Skin: Negative for rash and wound.   Neurological: " Positive for dizziness and weakness. Negative for syncope and headaches.   Hematological: Negative for adenopathy. Bruises/bleeds easily.   Psychiatric/Behavioral: Positive for dysphoric mood. Negative for confusion, sleep disturbance and suicidal ideas. The patient is nervous/anxious.      Objective    Vitals:    03/14/17 0914   BP: 148/100   Pulse: 86   SpO2: 97%     Body mass index is 37.11 kg/(m^2).  Last 2 weights    03/14/17 0914   Weight: 223 lb (101 kg)       Physical Exam   Constitutional: He is oriented to person, place, and time. He appears well-developed and well-nourished. He is cooperative. He does not appear ill. No distress.   Overweight, chronically ill appearing   HENT:   Head: Normocephalic and atraumatic.   Right Ear: Tympanic membrane, external ear and ear canal normal.   Left Ear: Tympanic membrane, external ear and ear canal normal.   Nose: Nose normal.   Mouth/Throat: Oropharynx is clear and moist and mucous membranes are normal. Mucous membranes are not dry. No oral lesions.   Eyes: Conjunctivae, EOM and lids are normal.   Neck: Neck supple. Normal carotid pulses and no JVD present. No thyroid mass and no thyromegaly present.   Chronic hoarseness noted   Cardiovascular: Normal rate, regular rhythm and intact distal pulses.    Pulmonary/Chest: Effort normal. He has decreased breath sounds. He has no wheezes. He has no rhonchi. He has no rales.       Vascular Status -  His exam exhibits no right foot edema. His exam exhibits no left foot edema.  Lymphadenopathy:     He has no cervical adenopathy.   Neurological: He is alert and oriented to person, place, and time. He has normal strength. He displays no tremor. No cranial nerve deficit or sensory deficit. Gait (antalgic) abnormal.   Dizziness recreated with sitting up from supine position, standing from sitting.   Skin: Skin is warm and dry. No bruising and no rash noted.   Psychiatric: He has a normal mood and affect. His behavior is normal.  Cognition and memory are normal.   Nursing note and vitals reviewed.    Previous labs reviewed on chart.    Recent Results (from the past 24 hour(s))   POC INR    Collection Time: 03/14/17 10:53 AM   Result Value Ref Range    INR 2.50 (A) 0.9 - 1.1     Assessment/Plan   Robe was seen today for dizziness.    Diagnoses and all orders for this visit:    Dizziness  -     POC INR    Orthostasis  -     POC INR    Obstructive sleep apnea of adult  -     POC INR    Chronic obstructive pulmonary disease, unspecified COPD type  -     POC INR    Warfarin anticoagulation  -     POC INR    Type 2 diabetes mellitus with complication, without long-term current use of insulin  -     POC INR    Chronic diastolic heart failure  -     POC INR    Essential hypertension  -     POC INR    Vertigo  -     meclizine (ANTIVERT) 25 MG tablet; Take 1 tablet by mouth 3 (Three) Times a Day As Needed for dizziness.  -     POC INR    Will report therapeutic INR to Dr. Tucker as well as pt's uncontrolled BP and orthostatic symptoms.  Suspect dizziness is most likely from orthostasis.  Needs to restart BiPAP as soon as possible.  Other chronic conditions appear stable.  Continue current medications.  Routine f/u in 3 months, sooner as needed.  Patient was encouraged to keep me informed of any acute changes, lack of improvement, or any new concerning symptoms.  Pt is aware of reasons to seek emergent care.  Patient voiced understanding of all instructions and denied further questions.

## 2017-04-11 ENCOUNTER — TELEPHONE (OUTPATIENT)
Dept: FAMILY MEDICINE CLINIC | Facility: CLINIC | Age: 67
End: 2017-04-11

## 2017-04-11 RX ORDER — OXYCODONE AND ACETAMINOPHEN 10; 325 MG/1; MG/1
1 TABLET ORAL EVERY 8 HOURS PRN
Qty: 90 TABLET | Refills: 0 | Status: SHIPPED | OUTPATIENT
Start: 2017-04-11 | End: 2017-05-12 | Stop reason: SDUPTHER

## 2017-04-11 RX ORDER — OMEPRAZOLE 40 MG/1
CAPSULE, DELAYED RELEASE ORAL
Qty: 30 CAPSULE | Refills: 6 | Status: SHIPPED | OUTPATIENT
Start: 2017-04-11 | End: 2017-05-24 | Stop reason: SDUPTHER

## 2017-04-11 RX ORDER — TERAZOSIN 5 MG/1
CAPSULE ORAL
Qty: 90 CAPSULE | Refills: 0 | OUTPATIENT
Start: 2017-04-11

## 2017-04-11 RX ORDER — WARFARIN SODIUM 10 MG/1
TABLET ORAL
Qty: 30 TABLET | Refills: 6 | Status: SHIPPED | OUTPATIENT
Start: 2017-04-11 | End: 2017-05-24

## 2017-04-20 ENCOUNTER — OFFICE VISIT (OUTPATIENT)
Dept: FAMILY MEDICINE CLINIC | Facility: CLINIC | Age: 67
End: 2017-04-20

## 2017-04-20 VITALS
HEIGHT: 65 IN | OXYGEN SATURATION: 97 % | BODY MASS INDEX: 36.15 KG/M2 | HEART RATE: 94 BPM | DIASTOLIC BLOOD PRESSURE: 100 MMHG | WEIGHT: 217 LBS | SYSTOLIC BLOOD PRESSURE: 142 MMHG

## 2017-04-20 DIAGNOSIS — E11.8 TYPE 2 DIABETES MELLITUS WITH COMPLICATION, WITHOUT LONG-TERM CURRENT USE OF INSULIN (HCC): ICD-10-CM

## 2017-04-20 DIAGNOSIS — Z79.01 WARFARIN ANTICOAGULATION: ICD-10-CM

## 2017-04-20 DIAGNOSIS — R55 SYNCOPE, UNSPECIFIED SYNCOPE TYPE: Primary | ICD-10-CM

## 2017-04-20 DIAGNOSIS — N18.9 CHRONIC KIDNEY DISEASE, UNSPECIFIED STAGE: ICD-10-CM

## 2017-04-20 DIAGNOSIS — I48.0 PAROXYSMAL ATRIAL FIBRILLATION (HCC): ICD-10-CM

## 2017-04-20 DIAGNOSIS — Z95.0 PACEMAKER: ICD-10-CM

## 2017-04-20 DIAGNOSIS — I10 ESSENTIAL HYPERTENSION: ICD-10-CM

## 2017-04-20 LAB
GLUCOSE BLDC GLUCOMTR-MCNC: 142 MG/DL (ref 70–130)
INR PPP: 1.4
INR PPP: 1.4 (ref 0.9–1.1)

## 2017-04-20 PROCEDURE — 85610 PROTHROMBIN TIME: CPT | Performed by: FAMILY MEDICINE

## 2017-04-20 PROCEDURE — 99215 OFFICE O/P EST HI 40 MIN: CPT | Performed by: FAMILY MEDICINE

## 2017-04-20 PROCEDURE — 82962 GLUCOSE BLOOD TEST: CPT | Performed by: FAMILY MEDICINE

## 2017-04-20 NOTE — PROGRESS NOTES
"Subjective   Robe Aleisha Bryant is a 66 y.o. male.     History of Present Illness  Mr. Bryant presents today with c/o recurrent syncope. He reports being out in his yard yesterday, feeling dizzy, passing out and hitting head on fence. He is not sure how long LOC lasted. He denies having CP, SOA or palpitations prior to syncope. He states he has been having left arm/leg numbness/tingling for 2 weeks. He has h/o A fib, is s/p pacemaker placement. Has assoc'd CAD, diastolic HF, HTN, HLP. Primary cardiologist is Dr. Tucker. He is on coumadin, Last INR In January which was supratherapeutic. He has had previous episodes of syncope. Denies h/o CVA. He reports pacemaker \"alarm\" has been going off but \"phone line for it does not work\".    He has h/o ECHO, COPD. Is back on his BiPAP after getting new equipment. He is sleeping well. Denies increased SOA, cough, orthopnea, pedal edema. NOo fever, CP or hemoptysis.    Has comorbid NIDDM, CKD. Blood sugars have been stable. Denies hypoglycemia.    His chronic back pain is worse since fall. Some radiation down left leg.    The following portions of the patient's history were reviewed and updated as appropriate: allergies, current medications, past family history, past medical history, past social history, past surgical history and problem list.    Review of Systems   Constitutional: Positive for fatigue. Negative for appetite change, fever and unexpected weight change.   HENT: Positive for congestion and postnasal drip. Negative for nosebleeds, sore throat and trouble swallowing.    Eyes: Negative for pain, discharge and redness.   Respiratory: Negative for cough, shortness of breath and wheezing.    Cardiovascular: Negative for chest pain, palpitations and leg swelling.   Gastrointestinal: Negative for diarrhea, nausea and vomiting.   Genitourinary: Negative for dysuria and hematuria.   Musculoskeletal: Positive for arthralgias, back pain, joint swelling and myalgias. "   Skin: Negative for rash and wound.   Neurological: Positive for dizziness, syncope, weakness and numbness. Negative for headaches.   Hematological: Negative for adenopathy. Bruises/bleeds easily.   Psychiatric/Behavioral: Positive for dysphoric mood. Negative for confusion, sleep disturbance and suicidal ideas. The patient is nervous/anxious.        Objective    Vitals:    04/20/17 0803   BP: 142/100   Pulse: 94   SpO2: 97%     Body mass index is 36.11 kg/(m^2).  Last 2 weights    04/20/17  0803   Weight: 217 lb (98.4 kg)       Physical Exam   Constitutional: He is oriented to person, place, and time. He appears well-developed and well-nourished. He is cooperative. He appears ill. No distress.   Overweight, chronically ill appearing   HENT:   Head: Normocephalic and atraumatic.   Mouth/Throat: Oropharynx is clear and moist and mucous membranes are normal. Mucous membranes are not dry. No oral lesions.   Eyes: Conjunctivae, EOM and lids are normal. Pupils are equal, round, and reactive to light.   Neck: Neck supple. Normal carotid pulses and no JVD present. No thyroid mass and no thyromegaly present.   Chronic hoarseness noted   Cardiovascular: Normal rate, regular rhythm and intact distal pulses.    Pulmonary/Chest: Effort normal. He has decreased breath sounds. He has no wheezes. He has no rhonchi. He has no rales.       Vascular Status -  His exam exhibits no right foot edema. His exam exhibits no left foot edema.  Lymphadenopathy:     He has no cervical adenopathy.   Neurological: He is alert and oriented to person, place, and time. He has normal strength. He displays no tremor. No cranial nerve deficit or sensory deficit. Gait (antalgic) abnormal.   Skin: Skin is warm and dry. No bruising and no rash noted.   Psychiatric: He has a normal mood and affect. His speech is normal and behavior is normal. Cognition and memory are normal.   Nursing note and vitals reviewed.      INR 1.4      Assessment/Plan    Robe was seen today for back pain, numbness, headache and syncope.    Diagnoses and all orders for this visit:    Syncope, unspecified syncope type  -     CT Head Without Contrast; Future  -     CBC (No Diff); Future  -     Comprehensive Metabolic Panel; Future  -     POC INR    Warfarin anticoagulation  -     CT Head Without Contrast; Future  -     CBC (No Diff); Future  -     POC INR    Chronic kidney disease, unspecified stage  -     Comprehensive Metabolic Panel; Future  -     POC INR    Type 2 diabetes mellitus with complication, without long-term current use of insulin  -     Comprehensive Metabolic Panel; Future  -     Hemoglobin A1c; Future  -     POC INR  -     POC Glucose Fingerstick    Paroxysmal atrial fibrillation  -     CT Head Without Contrast; Future  -     CBC (No Diff); Future  -     POC INR    Pacemaker  -     POC INR    Essential hypertension  -     Comprehensive Metabolic Panel; Future  -     POC INR    Recurrent syncopal episode with c/o persistent left sided paresthesia. High risk for thrombotic and hemorrhagic stroke. Unable to have MRI due to pacemaker. CT head ordered.  Episode possibly due to dysrhythmia, med side effects etc.   Labs as above.  I will contact patient regarding test results and provide instructions regarding any necessary changes in plan of care.  subtherapeutic INR. Will forward report to Dr. Tucker's office. Her office also informed of recent episode, uncontrolled BP etc. F/u apt moved to next Thursday.   Patient was encouraged to keep me informed of any acute changes, lack of improvement, or any new concerning symptoms.  Pt is aware of reasons to seek emergent care.  Keep next f/u apt as schedule, f/u sooner as needed.  Patient voiced understanding of all instructions and denied further questions.

## 2017-04-21 ENCOUNTER — TELEPHONE (OUTPATIENT)
Dept: FAMILY MEDICINE CLINIC | Facility: CLINIC | Age: 67
End: 2017-04-21

## 2017-04-21 NOTE — TELEPHONE ENCOUNTER
Please inform pt his recent CT of head showed no changes. Labs are stable.    Also please make sure Dr. Tucker's office is informed of his recent INR as his dose will likely need to be changed.

## 2017-04-25 ENCOUNTER — RESULTS ENCOUNTER (OUTPATIENT)
Dept: FAMILY MEDICINE CLINIC | Facility: CLINIC | Age: 67
End: 2017-04-25

## 2017-04-25 ENCOUNTER — TELEPHONE (OUTPATIENT)
Dept: FAMILY MEDICINE CLINIC | Facility: CLINIC | Age: 67
End: 2017-04-25

## 2017-04-25 ENCOUNTER — ANTICOAGULATION VISIT (OUTPATIENT)
Dept: CARDIOLOGY | Facility: CLINIC | Age: 67
End: 2017-04-25

## 2017-04-25 DIAGNOSIS — R55 SYNCOPE, UNSPECIFIED SYNCOPE TYPE: ICD-10-CM

## 2017-04-25 DIAGNOSIS — I48.0 PAROXYSMAL ATRIAL FIBRILLATION (HCC): ICD-10-CM

## 2017-04-25 DIAGNOSIS — N18.9 CHRONIC KIDNEY DISEASE, UNSPECIFIED STAGE: ICD-10-CM

## 2017-04-25 DIAGNOSIS — Z79.01 WARFARIN ANTICOAGULATION: ICD-10-CM

## 2017-04-25 DIAGNOSIS — E11.8 TYPE 2 DIABETES MELLITUS WITH COMPLICATION, WITHOUT LONG-TERM CURRENT USE OF INSULIN (HCC): ICD-10-CM

## 2017-04-25 DIAGNOSIS — I10 ESSENTIAL HYPERTENSION: ICD-10-CM

## 2017-04-25 NOTE — TELEPHONE ENCOUNTER
OK- thank you so much- I will make sure that our Coumadin clinic looks at his last INR- he has not had one in our office since February-

## 2017-04-25 NOTE — TELEPHONE ENCOUNTER
No not sure of name, think it was the , I had told her INR result and that it was abnormal also that it was documented in his chart and needed to be addressed. They never transferred me to anyone else, they put me on hold and then came back and said they would check with someone about his pacemaker (because pt had stated that his pacemaker had gone off a couple of times) and get an ok to schedule him an earlier apt, and give me an call back.  So in about 20 min. They called back and said she could go head and schedule him.

## 2017-04-26 ENCOUNTER — OFFICE VISIT (OUTPATIENT)
Dept: FAMILY MEDICINE CLINIC | Facility: CLINIC | Age: 67
End: 2017-04-26

## 2017-04-26 VITALS
OXYGEN SATURATION: 95 % | DIASTOLIC BLOOD PRESSURE: 80 MMHG | WEIGHT: 220 LBS | BODY MASS INDEX: 36.65 KG/M2 | SYSTOLIC BLOOD PRESSURE: 158 MMHG | HEIGHT: 65 IN | HEART RATE: 60 BPM

## 2017-04-26 DIAGNOSIS — Z98.890 HISTORY OF RIGHT INGUINAL HERNIA REPAIR: ICD-10-CM

## 2017-04-26 DIAGNOSIS — Z87.19 HISTORY OF RIGHT INGUINAL HERNIA REPAIR: ICD-10-CM

## 2017-04-26 DIAGNOSIS — R10.31 RIGHT GROIN PAIN: ICD-10-CM

## 2017-04-26 PROCEDURE — 99214 OFFICE O/P EST MOD 30 MIN: CPT | Performed by: NURSE PRACTITIONER

## 2017-04-26 RX ORDER — LISINOPRIL 20 MG/1
20 TABLET ORAL DAILY
COMMUNITY
End: 2017-05-24 | Stop reason: SDUPTHER

## 2017-04-26 NOTE — PROGRESS NOTES
I have reviewed the anticoagulation track calender, labs, and dosage adjustments made by Carline Jurado RN and I agree.    Electronically signed by BROOKE Livingston 04/26/17 9:44 AM

## 2017-04-26 NOTE — PROGRESS NOTES
Subjective   Robe Bryant is a 66 y.o. male.     HPI Comments: The patient was lifting a heavy box yesterday around 5:00 when he felt something pull in his right groin.  He has had 2 prior hernia repair surgeries with mesh, last one in 2002.  He states the pain is the same as when he had his prior hernias.  He states is his pain is severe, 8/10, tender to touch.  Pain does not improve when he lays down and it worsens when he strains to use the bathroom.  Right testicle also sore.  Takes home Percocet, Baclofen which hasn't helped the symptoms.       The following portions of the patient's history were reviewed and updated as appropriate: allergies, current medications, past family history, past medical history, past social history, past surgical history and problem list.    Review of Systems   Constitutional: Positive for activity change.   HENT: Negative.    Eyes: Negative.    Respiratory: Negative.    Cardiovascular: Negative.    Gastrointestinal: Negative.    Genitourinary: Positive for testicular pain.        Right groin pain, right testicular pain    Musculoskeletal: Negative.    Skin: Negative.    Hematological: Negative for adenopathy.   Psychiatric/Behavioral: Negative for confusion and suicidal ideas. The patient is not nervous/anxious.        Objective   Physical Exam   Constitutional: He is oriented to person, place, and time. Vital signs are normal. He appears well-developed and well-nourished.   Appears uncomfortable   HENT:   Head: Normocephalic.   Right Ear: External ear normal.   Left Ear: External ear normal.   Mouth/Throat: Oropharynx is clear and moist.   Neck: Normal range of motion. Neck supple.   Cardiovascular: Normal rate and regular rhythm.    Pulmonary/Chest: Effort normal. No respiratory distress.   Abdominal: Soft.   Right groin tender to palpation   Genitourinary:   Genitourinary Comments: Right testicle tender to touch    Neurological: He is alert and oriented to person, place, and  time.   Skin: Skin is warm, dry and intact. No rash noted.   Psychiatric: He has a normal mood and affect. His behavior is normal. Judgment and thought content normal.   Nursing note and vitals reviewed.      Assessment/Plan   Robe was seen today for groin pain.    Diagnoses and all orders for this visit:    Right groin pain  -     Cancel: CT abdomen pelvis w wo contrast; Future  -     CT Abdomen Pelvis With & Without Contrast    History of right inguinal hernia repair  -     Cancel: CT abdomen pelvis w wo contrast; Future  -     CT Abdomen Pelvis With & Without Contrast       CT of abdomen and pelvis ordered is going to be done today at 2:45 at [facility],  given patient's symptoms and history of hernia repair x 2.  If indicated, we will refer him to general surgery.  Patient to continue current pain meds and muscle relaxers.  I also encouraged him to use ice packs and to rest.        Patient was encouraged to keep me informed of any acute changes, lack of improvement, or any new concerning symptoms.  I will contact patient regarding test results and provide instructions regarding any necessary changes in plan of care.  Patient voiced understanding of all instructions and denied further questions.    Patient to follow up based on CT scan results.

## 2017-04-27 ENCOUNTER — OFFICE VISIT (OUTPATIENT)
Dept: CARDIOLOGY | Facility: CLINIC | Age: 67
End: 2017-04-27

## 2017-04-27 ENCOUNTER — TELEPHONE (OUTPATIENT)
Dept: FAMILY MEDICINE CLINIC | Facility: CLINIC | Age: 67
End: 2017-04-27

## 2017-04-27 VITALS
DIASTOLIC BLOOD PRESSURE: 74 MMHG | WEIGHT: 220.4 LBS | BODY MASS INDEX: 36.72 KG/M2 | SYSTOLIC BLOOD PRESSURE: 136 MMHG | HEART RATE: 61 BPM | HEIGHT: 65 IN

## 2017-04-27 DIAGNOSIS — R55 SYNCOPE, UNSPECIFIED SYNCOPE TYPE: Primary | ICD-10-CM

## 2017-04-27 DIAGNOSIS — I10 ESSENTIAL HYPERTENSION: ICD-10-CM

## 2017-04-27 DIAGNOSIS — I48.0 PAROXYSMAL ATRIAL FIBRILLATION (HCC): ICD-10-CM

## 2017-04-27 DIAGNOSIS — I50.32 CHRONIC DIASTOLIC HEART FAILURE (HCC): ICD-10-CM

## 2017-04-27 DIAGNOSIS — I25.10 CHRONIC CORONARY ARTERY DISEASE: ICD-10-CM

## 2017-04-27 PROCEDURE — 99213 OFFICE O/P EST LOW 20 MIN: CPT | Performed by: INTERNAL MEDICINE

## 2017-04-27 RX ORDER — WARFARIN SODIUM 10 MG/1
10 TABLET ORAL
COMMUNITY
End: 2017-05-24 | Stop reason: SDUPTHER

## 2017-04-27 NOTE — TELEPHONE ENCOUNTER
----- Message from Radha Foote MA sent at 4/27/2017  6:03 PM EDT -----  Pt notified of results    ----- Message -----     From: BROOKE Schofield     Sent: 4/27/2017  11:04 AM       To: Radha Foote MA    Let him know his CT scan was normal and he must have just strained a muscle in his groin from the lifting. Alternate heat and ice on it and rest it as much as possible and let us know if it is not getting better. It will take it a while to get better though.

## 2017-04-28 PROBLEM — R55 SYNCOPE: Status: ACTIVE | Noted: 2017-04-28

## 2017-05-08 ENCOUNTER — TELEPHONE (OUTPATIENT)
Dept: FAMILY MEDICINE CLINIC | Facility: CLINIC | Age: 67
End: 2017-05-08

## 2017-05-12 ENCOUNTER — APPOINTMENT (OUTPATIENT)
Dept: CARDIOLOGY | Facility: HOSPITAL | Age: 67
End: 2017-05-12

## 2017-05-12 RX ORDER — OXYCODONE AND ACETAMINOPHEN 10; 325 MG/1; MG/1
1 TABLET ORAL EVERY 8 HOURS PRN
Qty: 90 TABLET | Refills: 0 | Status: SHIPPED | OUTPATIENT
Start: 2017-05-12 | End: 2017-05-31 | Stop reason: SDUPTHER

## 2017-05-17 RX ORDER — ISOSORBIDE MONONITRATE 30 MG/1
30 TABLET, EXTENDED RELEASE ORAL 2 TIMES DAILY
Qty: 180 TABLET | Refills: 3 | Status: SHIPPED | OUTPATIENT
Start: 2017-05-17 | End: 2019-09-19

## 2017-05-17 RX ORDER — TERAZOSIN 5 MG/1
5 CAPSULE ORAL NIGHTLY
Qty: 90 CAPSULE | Refills: 3 | Status: SHIPPED | OUTPATIENT
Start: 2017-05-17 | End: 2017-08-29 | Stop reason: SINTOL

## 2017-05-24 DIAGNOSIS — G25.81 RESTLESS LEG SYNDROME: ICD-10-CM

## 2017-05-24 RX ORDER — BACLOFEN 10 MG/1
10 TABLET ORAL 2 TIMES DAILY
Qty: 180 TABLET | Refills: 0 | Status: SHIPPED | OUTPATIENT
Start: 2017-05-24 | End: 2017-08-03 | Stop reason: SDUPTHER

## 2017-05-24 RX ORDER — BISOPROLOL FUMARATE AND HYDROCHLOROTHIAZIDE 10; 6.25 MG/1; MG/1
1 TABLET ORAL DAILY
Qty: 90 TABLET | Refills: 0 | Status: SHIPPED | OUTPATIENT
Start: 2017-05-24 | End: 2017-07-06

## 2017-05-24 RX ORDER — PRAVASTATIN SODIUM 80 MG/1
80 TABLET ORAL NIGHTLY
Qty: 90 TABLET | Refills: 0 | Status: SHIPPED | OUTPATIENT
Start: 2017-05-24 | End: 2017-08-03 | Stop reason: SDUPTHER

## 2017-05-24 RX ORDER — HYDRALAZINE HYDROCHLORIDE 25 MG/1
TABLET, FILM COATED ORAL
Qty: 180 TABLET | Refills: 1 | Status: SHIPPED | OUTPATIENT
Start: 2017-05-24 | End: 2017-08-29

## 2017-05-24 RX ORDER — FLUOXETINE HYDROCHLORIDE 20 MG/1
20 CAPSULE ORAL DAILY
Qty: 90 CAPSULE | Refills: 0 | Status: SHIPPED | OUTPATIENT
Start: 2017-05-24 | End: 2017-08-03 | Stop reason: SDUPTHER

## 2017-05-24 RX ORDER — METOPROLOL SUCCINATE 100 MG/1
100 TABLET, EXTENDED RELEASE ORAL DAILY
Qty: 90 TABLET | Refills: 0 | Status: SHIPPED | OUTPATIENT
Start: 2017-05-24 | End: 2017-07-05 | Stop reason: SDUPTHER

## 2017-05-24 RX ORDER — KETOCONAZOLE 20 MG/G
CREAM TOPICAL EVERY 12 HOURS SCHEDULED
Qty: 180 G | Refills: 0 | Status: SHIPPED | OUTPATIENT
Start: 2017-05-24 | End: 2017-08-03 | Stop reason: SDUPTHER

## 2017-05-24 RX ORDER — HYDROCHLOROTHIAZIDE 25 MG/1
25 TABLET ORAL DAILY
Qty: 90 TABLET | Refills: 0 | Status: SHIPPED | OUTPATIENT
Start: 2017-05-24 | End: 2017-08-03 | Stop reason: SDUPTHER

## 2017-05-24 RX ORDER — TRAZODONE HYDROCHLORIDE 50 MG/1
50 TABLET ORAL NIGHTLY
Qty: 90 TABLET | Refills: 0 | Status: SHIPPED | OUTPATIENT
Start: 2017-05-24 | End: 2017-08-03 | Stop reason: SDUPTHER

## 2017-05-24 RX ORDER — GABAPENTIN 600 MG/1
TABLET ORAL
Qty: 360 TABLET | Refills: 0 | Status: SHIPPED | OUTPATIENT
Start: 2017-05-24 | End: 2017-08-03 | Stop reason: SDUPTHER

## 2017-05-24 RX ORDER — LISINOPRIL 20 MG/1
20 TABLET ORAL DAILY
Qty: 90 TABLET | Refills: 0 | Status: SHIPPED | OUTPATIENT
Start: 2017-05-24 | End: 2017-08-03 | Stop reason: SDUPTHER

## 2017-05-24 RX ORDER — WARFARIN SODIUM 10 MG/1
10 TABLET ORAL
Qty: 90 TABLET | Refills: 0 | Status: SHIPPED | OUTPATIENT
Start: 2017-05-24 | End: 2017-05-30 | Stop reason: SDUPTHER

## 2017-05-24 RX ORDER — BUMETANIDE 2 MG/1
2 TABLET ORAL DAILY
Qty: 90 TABLET | Refills: 0 | Status: SHIPPED | OUTPATIENT
Start: 2017-05-24 | End: 2017-08-03 | Stop reason: SDUPTHER

## 2017-05-24 RX ORDER — POTASSIUM CHLORIDE 750 MG/1
10 TABLET, FILM COATED, EXTENDED RELEASE ORAL DAILY
Qty: 90 TABLET | Refills: 0 | Status: SHIPPED | OUTPATIENT
Start: 2017-05-24 | End: 2018-01-11 | Stop reason: SDUPTHER

## 2017-05-24 RX ORDER — OMEPRAZOLE 40 MG/1
40 CAPSULE, DELAYED RELEASE ORAL DAILY
Qty: 90 CAPSULE | Refills: 0 | Status: SHIPPED | OUTPATIENT
Start: 2017-05-24 | End: 2017-08-03 | Stop reason: SDUPTHER

## 2017-05-31 ENCOUNTER — OFFICE VISIT (OUTPATIENT)
Dept: FAMILY MEDICINE CLINIC | Facility: CLINIC | Age: 67
End: 2017-05-31

## 2017-05-31 ENCOUNTER — TELEPHONE (OUTPATIENT)
Dept: FAMILY MEDICINE CLINIC | Facility: CLINIC | Age: 67
End: 2017-05-31

## 2017-05-31 VITALS
BODY MASS INDEX: 33.99 KG/M2 | HEIGHT: 65 IN | OXYGEN SATURATION: 96 % | WEIGHT: 204 LBS | SYSTOLIC BLOOD PRESSURE: 128 MMHG | DIASTOLIC BLOOD PRESSURE: 82 MMHG | HEART RATE: 78 BPM

## 2017-05-31 DIAGNOSIS — E11.8 TYPE 2 DIABETES MELLITUS WITH COMPLICATION, WITHOUT LONG-TERM CURRENT USE OF INSULIN (HCC): ICD-10-CM

## 2017-05-31 DIAGNOSIS — M54.2 NECK PAIN: ICD-10-CM

## 2017-05-31 DIAGNOSIS — M47.812 CERVICAL SPINE ARTHRITIS: ICD-10-CM

## 2017-05-31 DIAGNOSIS — I48.0 PAROXYSMAL ATRIAL FIBRILLATION (HCC): ICD-10-CM

## 2017-05-31 DIAGNOSIS — Z79.01 WARFARIN ANTICOAGULATION: ICD-10-CM

## 2017-05-31 DIAGNOSIS — G89.4 CHRONIC PAIN SYNDROME: ICD-10-CM

## 2017-05-31 DIAGNOSIS — M79.2 RADICULAR PAIN IN LEFT ARM: Primary | ICD-10-CM

## 2017-05-31 PROCEDURE — 99214 OFFICE O/P EST MOD 30 MIN: CPT | Performed by: FAMILY MEDICINE

## 2017-05-31 PROCEDURE — 96372 THER/PROPH/DIAG INJ SC/IM: CPT | Performed by: FAMILY MEDICINE

## 2017-05-31 RX ORDER — PREDNISONE 10 MG/1
TABLET ORAL
Qty: 15 TABLET | Refills: 0 | Status: SHIPPED | OUTPATIENT
Start: 2017-05-31 | End: 2017-08-15

## 2017-05-31 RX ORDER — METHYLPREDNISOLONE ACETATE 80 MG/ML
120 INJECTION, SUSPENSION INTRA-ARTICULAR; INTRALESIONAL; INTRAMUSCULAR; SOFT TISSUE ONCE
Status: COMPLETED | OUTPATIENT
Start: 2017-05-31 | End: 2017-05-31

## 2017-05-31 RX ORDER — OXYCODONE AND ACETAMINOPHEN 10; 325 MG/1; MG/1
1 TABLET ORAL EVERY 6 HOURS PRN
Qty: 120 TABLET | Refills: 0 | Status: SHIPPED | OUTPATIENT
Start: 2017-05-31 | End: 2017-06-26 | Stop reason: SDUPTHER

## 2017-05-31 RX ADMIN — METHYLPREDNISOLONE ACETATE 120 MG: 80 INJECTION, SUSPENSION INTRA-ARTICULAR; INTRALESIONAL; INTRAMUSCULAR; SOFT TISSUE at 09:53

## 2017-06-01 LAB — HBA1C MFR BLD: 6.1 %

## 2017-06-05 ENCOUNTER — TELEPHONE (OUTPATIENT)
Dept: CARDIOLOGY | Facility: CLINIC | Age: 67
End: 2017-06-05

## 2017-06-05 NOTE — TELEPHONE ENCOUNTER
Called PT in regards to missed Tilt table test and need for INR- he states that he had a death in the family and needs to reschedule- tom notified- In regards to INR- he states that he had it done at PCP last week- according to Dr. Wheeler's note, his INR was 1- I asked that he go get his labs drawn today- he states that he is in [place] but will go Friday when he gets back-

## 2017-06-15 LAB — INR PPP: 1.1

## 2017-06-16 ENCOUNTER — ANTICOAGULATION VISIT (OUTPATIENT)
Dept: CARDIOLOGY | Facility: CLINIC | Age: 67
End: 2017-06-16

## 2017-06-16 NOTE — PROGRESS NOTES
Patient states that he has been taking 10 mg Wednesday, Friday and Sunday and 5 mg all other days- states that he has not missed any doses- states that he has not started any meds-   We went over in detail his new dose and that he must take the medication as ordered and not miss any doses- explained that he MUST get his INR checked in 1 week to make sure that his number is coming up- he verbalized understanding-

## 2017-06-20 ENCOUNTER — OFFICE VISIT (OUTPATIENT)
Dept: NEUROSURGERY | Facility: CLINIC | Age: 67
End: 2017-06-20

## 2017-06-20 VITALS — WEIGHT: 204 LBS | BODY MASS INDEX: 33.99 KG/M2 | HEIGHT: 65 IN

## 2017-06-20 DIAGNOSIS — M50.30 DDD (DEGENERATIVE DISC DISEASE), CERVICAL: Primary | ICD-10-CM

## 2017-06-20 PROCEDURE — 99203 OFFICE O/P NEW LOW 30 MIN: CPT | Performed by: NEUROLOGICAL SURGERY
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
## 2017-06-22 ENCOUNTER — HOSPITAL ENCOUNTER (OUTPATIENT)
Dept: CARDIOLOGY | Facility: HOSPITAL | Age: 67
Discharge: HOME | End: 2017-06-22
Admitting: NURSE PRACTITIONER

## 2017-06-22 VITALS — HEART RATE: 60 BPM | SYSTOLIC BLOOD PRESSURE: 160 MMHG | DIASTOLIC BLOOD PRESSURE: 90 MMHG

## 2017-06-22 PROCEDURE — 93660 TILT TABLE EVALUATION: CPT

## 2017-06-22 PROCEDURE — 93660 TILT TABLE EVALUATION: CPT | Performed by: INTERNAL MEDICINE

## 2017-06-22 RX ORDER — WARFARIN SODIUM 10 MG/1
TABLET ORAL
Qty: 30 TABLET | Refills: 0 | Status: SHIPPED | OUTPATIENT
Start: 2017-06-22 | End: 2017-08-09 | Stop reason: SDUPTHER

## 2017-06-23 ENCOUNTER — TELEPHONE (OUTPATIENT)
Dept: FAMILY MEDICINE CLINIC | Facility: CLINIC | Age: 67
End: 2017-06-23

## 2017-06-23 DIAGNOSIS — M79.2 RADICULAR PAIN IN LEFT ARM: ICD-10-CM

## 2017-06-23 DIAGNOSIS — M54.2 NECK PAIN: ICD-10-CM

## 2017-06-23 DIAGNOSIS — G89.4 CHRONIC PAIN SYNDROME: ICD-10-CM

## 2017-06-23 DIAGNOSIS — M47.812 CERVICAL SPINE ARTHRITIS: ICD-10-CM

## 2017-06-26 RX ORDER — OXYCODONE AND ACETAMINOPHEN 10; 325 MG/1; MG/1
1 TABLET ORAL EVERY 6 HOURS PRN
Qty: 120 TABLET | Refills: 0 | Status: SHIPPED | OUTPATIENT
Start: 2017-06-26 | End: 2017-07-06 | Stop reason: SDUPTHER

## 2017-06-26 NOTE — TELEPHONE ENCOUNTER
ALMA reviewed.  Rx refill authorized.  Pt to keep routine f/u apt.    He can  at his convenience but it can't be filled until due.

## 2017-06-28 NOTE — PROGRESS NOTES
I have reviewed the anticoagulation track calender, labs, and dosage adjustments made by Arelis Tucker MD and I agree.    Electronically signed by BROOKE Livingston 06/27/17 10:38 PM

## 2017-07-05 RX ORDER — METOPROLOL SUCCINATE 100 MG/1
100 TABLET, EXTENDED RELEASE ORAL DAILY
Qty: 90 TABLET | Refills: 3 | Status: SHIPPED | OUTPATIENT
Start: 2017-07-05 | End: 2019-09-19

## 2017-07-06 ENCOUNTER — OFFICE VISIT (OUTPATIENT)
Dept: FAMILY MEDICINE CLINIC | Facility: CLINIC | Age: 67
End: 2017-07-06

## 2017-07-06 VITALS
SYSTOLIC BLOOD PRESSURE: 142 MMHG | BODY MASS INDEX: 35.32 KG/M2 | OXYGEN SATURATION: 98 % | HEART RATE: 70 BPM | HEIGHT: 65 IN | WEIGHT: 212 LBS | DIASTOLIC BLOOD PRESSURE: 100 MMHG

## 2017-07-06 DIAGNOSIS — M47.812 CERVICAL SPINE ARTHRITIS: ICD-10-CM

## 2017-07-06 DIAGNOSIS — G89.4 CHRONIC PAIN SYNDROME: ICD-10-CM

## 2017-07-06 DIAGNOSIS — I48.0 PAROXYSMAL ATRIAL FIBRILLATION (HCC): ICD-10-CM

## 2017-07-06 DIAGNOSIS — M79.2 RADICULAR PAIN IN LEFT ARM: ICD-10-CM

## 2017-07-06 DIAGNOSIS — Z79.01 WARFARIN ANTICOAGULATION: ICD-10-CM

## 2017-07-06 DIAGNOSIS — M54.2 NECK PAIN: ICD-10-CM

## 2017-07-06 DIAGNOSIS — E11.8 TYPE 2 DIABETES MELLITUS WITH COMPLICATION, WITHOUT LONG-TERM CURRENT USE OF INSULIN (HCC): Primary | ICD-10-CM

## 2017-07-06 LAB
GLUCOSE BLDC GLUCOMTR-MCNC: 138 MG/DL (ref 70–130)
INR PPP: 1.5 (ref 0.9–1.1)

## 2017-07-06 PROCEDURE — 99214 OFFICE O/P EST MOD 30 MIN: CPT | Performed by: FAMILY MEDICINE

## 2017-07-06 PROCEDURE — 82962 GLUCOSE BLOOD TEST: CPT | Performed by: FAMILY MEDICINE

## 2017-07-06 PROCEDURE — 85610 PROTHROMBIN TIME: CPT | Performed by: FAMILY MEDICINE

## 2017-07-06 RX ORDER — OXYCODONE AND ACETAMINOPHEN 10; 325 MG/1; MG/1
1 TABLET ORAL EVERY 6 HOURS PRN
Qty: 120 TABLET | Refills: 0 | Status: SHIPPED | OUTPATIENT
Start: 2017-07-06 | End: 2017-08-25 | Stop reason: SDUPTHER

## 2017-07-06 RX ORDER — WARFARIN SODIUM 5 MG/1
TABLET ORAL
COMMUNITY
Start: 2017-06-22 | End: 2017-08-09

## 2017-07-06 RX ORDER — TAMSULOSIN HYDROCHLORIDE 0.4 MG/1
1 CAPSULE ORAL DAILY
Qty: 90 CAPSULE | Refills: 1 | Status: SHIPPED | OUTPATIENT
Start: 2017-07-06 | End: 2019-09-19

## 2017-07-06 NOTE — PROGRESS NOTES
Subjective   Robe Bryant is a 66 y.o. male.     History of Present Illness  Mr. Bryant is a 67 yo male with h/o multiple chronic medical problems who presents today for routine f/u. He has a known h/o severe OA and DDD with chronic neck and back pain. Has been medically managed. He reports marked increased in neck pain over past 3 months. Pain is worst/severe in left neck, burning/aching, radiates down left arm to hand. Assoc'd with numbness in digits 1-3 as well as weakness in left arm. He has trouble lifting any weight, decreased  as well. Neck pain increased with rotation of head, looking up, looking down. He was seen for same at [facility] ER on 5/24/17. He had CT scan of C spine which showed diffuse marked changes from degenerative arthritis. (he is unable to undergo MRI due to pacemaker placement). 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 He has had surgical consultation with Dr. Montiel and was felt to not be a good surgical candidate at this time. He has been referred to PT; has yet to schedule apt but does plan to.    Since last visit he has also had f/u with Dr. Tucker. Has h/o A fib, CAD, HTN, HLP, ECHO. Had what sounds like + tilt table test. He has additional f/u later this month.  He had apparently gone without his metoprolol for 2 months. Has since been refilled per Dr. Tucker. He has had difficulty getting his coumadin regulated.    His diabetes has been well controlled.      The following portions of the patient's history were reviewed and updated as appropriate: allergies, current medications, past family history, past medical history, past social history, past surgical history and problem list.    Review of Systems   Constitutional: Positive for fatigue and unexpected weight change. Negative for appetite change and fever.   HENT: Positive for congestion and postnasal drip. Negative for nosebleeds, sore throat and trouble swallowing.     Eyes: Positive for visual disturbance (chronic).   Respiratory: Negative for cough, shortness of breath and wheezing.    Cardiovascular: Negative for chest pain, palpitations and leg swelling.   Gastrointestinal: Negative for diarrhea, nausea and vomiting.   Genitourinary: Negative for dysuria and hematuria.   Musculoskeletal: Positive for arthralgias, back pain, joint swelling, myalgias, neck pain and neck stiffness.   Skin: Negative for rash and wound.   Neurological: Positive for dizziness, weakness and numbness. Negative for headaches.   Hematological: Negative for adenopathy. Bruises/bleeds easily.   Psychiatric/Behavioral: Positive for dysphoric mood. Negative for confusion, sleep disturbance and suicidal ideas. The patient is nervous/anxious.        Objective    Vitals:    07/06/17 0906   BP: 142/100   Pulse: 70   SpO2: 98%     Body mass index is 35.28 kg/(m^2).  Last 2 weights    07/06/17 0906   Weight: 212 lb (96.2 kg)       Physical Exam   Constitutional: He is oriented to person, place, and time. He appears well-developed and well-nourished. He is cooperative. He does not appear ill. No distress.   HENT:   Head: Normocephalic and atraumatic.   Mouth/Throat: Oropharynx is clear and moist and mucous membranes are normal. Mucous membranes are not dry.   Eyes: Conjunctivae, EOM and lids are normal.   Cardiovascular: Normal rate, regular rhythm and intact distal pulses.    Pulmonary/Chest: Effort normal. He has decreased breath sounds. He has no wheezes. He has no rhonchi. He has no rales.   Musculoskeletal:        Left shoulder: He exhibits decreased range of motion (flexion, abduction to 90 deg), tenderness (diffuse soft tissue) and spasm.        Cervical back: He exhibits decreased range of motion (all planes), tenderness (diffuse soft tissue, L > R), bony tenderness (C4-C7) and spasm.       Vascular Status -  His exam exhibits no right foot edema. His exam exhibits no left foot edema.  Neurological: He  is alert and oriented to person, place, and time. A sensory deficit (dysesthesia left hand) is present. No cranial nerve deficit. Gait normal.   Skin: Skin is warm and dry. No rash noted.   Psychiatric: He has a normal mood and affect. His behavior is normal. Cognition and memory are normal.   Nursing note and vitals reviewed.    Consult notes per Dr. Montiel, Dr. Tucker reviewed on chart. Procedures reviewed on chart.    Recent Results (from the past 48 hour(s))   POC Glucose Fingerstick    Collection Time: 07/06/17 12:42 PM   Result Value Ref Range    Glucose 138 (A) 70 - 130 mg/dL   POC INR    Collection Time: 07/06/17 12:43 PM   Result Value Ref Range    INR 1.50 (A) 0.9 - 1.1     Assessment/Plan   Robe was seen today for diabetes, anticoagulation and pain.    Diagnoses and all orders for this visit:    Type 2 diabetes mellitus with complication, without long-term current use of insulin  -     POC Glucose Fingerstick  -     POC INR    Warfarin anticoagulation  -     POC INR    Paroxysmal atrial fibrillation  -     POC INR    Cervical spine arthritis  -     oxyCODONE-acetaminophen (PERCOCET)  MG per tablet; Take 1 tablet by mouth Every 6 (Six) Hours As Needed for Severe Pain (7-10). May fill 7/30/17  -     POC INR    Radicular pain in left arm  -     oxyCODONE-acetaminophen (PERCOCET)  MG per tablet; Take 1 tablet by mouth Every 6 (Six) Hours As Needed for Severe Pain (7-10). May fill 7/30/17  -     POC INR    Neck pain  -     oxyCODONE-acetaminophen (PERCOCET)  MG per tablet; Take 1 tablet by mouth Every 6 (Six) Hours As Needed for Severe Pain (7-10). May fill 7/30/17  -     POC INR    Chronic pain syndrome  -     oxyCODONE-acetaminophen (PERCOCET)  MG per tablet; Take 1 tablet by mouth Every 6 (Six) Hours As Needed for Severe Pain (7-10). May fill 7/30/17  -     POC INR    Other orders  -     tamsulosin (FLOMAX) 0.4 MG capsule 24 hr capsule; Take 1 capsule by mouth Daily.    DM  well controlled.  Chronic neck pain with marked spinal arthritis and left radicular symptoms. He is encouraged to follow through with PT as rc'd per Dr. Montiel.  Increase dosing of percocet to q 6 temporarily due to severity of pain, marked functional limitation.  I will inform his cardiologist of subtherapeutic INR. He is encouraged to keep f/u apt with her as scheduled.  Patient was encouraged to keep me informed of any acute changes, lack of improvement, or any new concerning symptoms.  Pt is aware of reasons to seek emergent care.  As part of patient's treatment plan I am prescribing a controlled substance. The patient has been made aware of the appropriate use of such medications, including potential risk of somnolence, limited ability to drive and/or work safely, and potential for dependence and/or overdose. It has also been made clear that these medications are for use by this patient only, without concomitant use of alcohol or other substances, unless prescribed.  ALMA report reviewed and scanned into chart. Last ALMA date 5/31/17.  Patient voiced understanding of all instructions and denied further questions.

## 2017-07-12 ENCOUNTER — ANTICOAGULATION VISIT (OUTPATIENT)
Dept: CARDIOLOGY | Facility: CLINIC | Age: 67
End: 2017-07-12

## 2017-07-12 LAB — INR PPP: 1.5

## 2017-07-17 NOTE — PROGRESS NOTES
I have reviewed the anticoagulation track calender, labs, and dosage adjustments made by Carline Jurado RN and I agree.    Electronically signed by BROOKE Livingston 07/17/17 12:17 PM

## 2017-07-26 ENCOUNTER — TELEPHONE (OUTPATIENT)
Dept: FAMILY MEDICINE CLINIC | Facility: CLINIC | Age: 67
End: 2017-07-26

## 2017-07-26 NOTE — TELEPHONE ENCOUNTER
----- Message from Jennifer Martinez MA sent at 7/26/2017  4:04 PM EDT -----  Regarding: FW: INR Results.  FYI  ----- Message -----     From: Brittney Leon RN     Sent: 7/25/2017   1:13 PM       To: Jennifer Martinez MA  Subject: RE: INR Results.                                 [facility]- I am Dr. Tucker's nurse in the office- Just wanted to let you know that Mr. Nagel is not keeping his appointments with us- he has no showed a few appointments- I talked with him about the results of tilt table and worked him into the office again last week and he no showed- we are giving him one more time to come see us- we are contacting him with an appointment- he was due for another INR this week and has yet to have it done-   Thanks for your help with this patient-     ----- Message -----     From: Arelis Tucker MD     Sent: 7/25/2017  12:52 PM       To: Brittney Leon RN  Subject: FW: INR Results.                                 INR results  ----- Message -----     From: Jennifer Martinez MA     Sent: 7/6/2017  12:46 PM       To: Arelis Tucker MD  Subject: INR Results.                                     Patient had an apt. Today with Dr. Wheeler and asked that we check his coumadin for you today.  His results are entered in his chart.  His INR-1.5 PT-18.1.

## 2017-08-03 ENCOUNTER — TELEPHONE (OUTPATIENT)
Dept: CARDIOLOGY | Facility: CLINIC | Age: 67
End: 2017-08-03

## 2017-08-03 DIAGNOSIS — G25.81 RESTLESS LEG SYNDROME: ICD-10-CM

## 2017-08-04 RX ORDER — GABAPENTIN 600 MG/1
TABLET ORAL
Qty: 360 TABLET | Refills: 0 | Status: SHIPPED | OUTPATIENT
Start: 2017-08-04 | End: 2017-08-17 | Stop reason: SDUPTHER

## 2017-08-04 RX ORDER — HYDROCHLOROTHIAZIDE 25 MG/1
TABLET ORAL
Qty: 90 TABLET | Refills: 0 | Status: SHIPPED | OUTPATIENT
Start: 2017-08-04 | End: 2017-08-29 | Stop reason: SINTOL

## 2017-08-04 RX ORDER — TRAZODONE HYDROCHLORIDE 50 MG/1
TABLET ORAL
Qty: 90 TABLET | Refills: 0 | Status: SHIPPED | OUTPATIENT
Start: 2017-08-04 | End: 2017-10-27

## 2017-08-04 RX ORDER — FLUOXETINE HYDROCHLORIDE 20 MG/1
CAPSULE ORAL
Qty: 90 CAPSULE | Refills: 0 | Status: SHIPPED | OUTPATIENT
Start: 2017-08-04 | End: 2017-11-30 | Stop reason: SDUPTHER

## 2017-08-04 RX ORDER — BUMETANIDE 2 MG/1
TABLET ORAL
Qty: 90 TABLET | Refills: 0 | Status: SHIPPED | OUTPATIENT
Start: 2017-08-04 | End: 2018-03-06 | Stop reason: HOSPADM

## 2017-08-04 RX ORDER — PRAVASTATIN SODIUM 80 MG/1
TABLET ORAL
Qty: 90 TABLET | Refills: 0 | Status: SHIPPED | OUTPATIENT
Start: 2017-08-04 | End: 2017-11-30 | Stop reason: SDUPTHER

## 2017-08-04 RX ORDER — BACLOFEN 10 MG/1
TABLET ORAL
Qty: 180 TABLET | Refills: 0 | Status: SHIPPED | OUTPATIENT
Start: 2017-08-04 | End: 2017-08-29 | Stop reason: SINTOL

## 2017-08-04 RX ORDER — POTASSIUM CHLORIDE 750 MG/1
TABLET, EXTENDED RELEASE ORAL
Qty: 90 TABLET | Refills: 0 | Status: SHIPPED | OUTPATIENT
Start: 2017-08-04 | End: 2017-08-15

## 2017-08-04 RX ORDER — LISINOPRIL 20 MG/1
TABLET ORAL
Qty: 90 TABLET | Refills: 0 | Status: SHIPPED | OUTPATIENT
Start: 2017-08-04 | End: 2017-11-30 | Stop reason: SDUPTHER

## 2017-08-04 RX ORDER — OMEPRAZOLE 40 MG/1
CAPSULE, DELAYED RELEASE ORAL
Qty: 90 CAPSULE | Refills: 0 | Status: SHIPPED | OUTPATIENT
Start: 2017-08-04 | End: 2017-11-30 | Stop reason: SDUPTHER

## 2017-08-04 RX ORDER — KETOCONAZOLE 20 MG/G
CREAM TOPICAL
Qty: 180 G | Refills: 0 | Status: SHIPPED | OUTPATIENT
Start: 2017-08-04 | End: 2019-08-26

## 2017-08-09 ENCOUNTER — ANTICOAGULATION VISIT (OUTPATIENT)
Dept: CARDIOLOGY | Facility: CLINIC | Age: 67
End: 2017-08-09

## 2017-08-09 LAB — INR PPP: 1

## 2017-08-09 RX ORDER — WARFARIN SODIUM 10 MG/1
TABLET ORAL
Qty: 30 TABLET | Refills: 1 | Status: SHIPPED | OUTPATIENT
Start: 2017-08-09 | End: 2017-10-20

## 2017-08-09 NOTE — PATIENT INSTRUCTIONS
Patient says that he has been taking his meds every day!!!!!!  Says that he is out of meds.now.    To take 10 mg qd and to recheck 1 week    Will order his Warfarin.    AH

## 2017-08-10 DIAGNOSIS — G25.81 RESTLESS LEG SYNDROME: ICD-10-CM

## 2017-08-11 RX ORDER — GABAPENTIN 600 MG/1
TABLET ORAL
Qty: 360 TABLET | Refills: 0 | OUTPATIENT
Start: 2017-08-11

## 2017-08-14 NOTE — PROGRESS NOTES
I have reviewed the anticoagulation track calender, labs, and dosage adjustments made by Carline Jurado RN and I agree.    Electronically signed by BROOKE Livingston 08/14/17 3:48 PM

## 2017-08-15 ENCOUNTER — OFFICE VISIT (OUTPATIENT)
Dept: CARDIOLOGY | Facility: CLINIC | Age: 67
End: 2017-08-15

## 2017-08-15 VITALS
HEART RATE: 68 BPM | DIASTOLIC BLOOD PRESSURE: 80 MMHG | SYSTOLIC BLOOD PRESSURE: 156 MMHG | WEIGHT: 208 LBS | BODY MASS INDEX: 34.66 KG/M2 | HEIGHT: 65 IN

## 2017-08-15 DIAGNOSIS — R42 POSTURAL DIZZINESS WITH PRESYNCOPE: Primary | ICD-10-CM

## 2017-08-15 DIAGNOSIS — I50.32 CHRONIC DIASTOLIC HEART FAILURE (HCC): ICD-10-CM

## 2017-08-15 DIAGNOSIS — E78.00 HYPERCHOLESTEROLEMIA: ICD-10-CM

## 2017-08-15 DIAGNOSIS — I25.10 CHRONIC CORONARY ARTERY DISEASE: ICD-10-CM

## 2017-08-15 DIAGNOSIS — R55 POSTURAL DIZZINESS WITH PRESYNCOPE: Primary | ICD-10-CM

## 2017-08-15 DIAGNOSIS — R00.1 SYMPTOMATIC BRADYCARDIA: ICD-10-CM

## 2017-08-15 DIAGNOSIS — I10 ESSENTIAL HYPERTENSION: ICD-10-CM

## 2017-08-15 DIAGNOSIS — I48.0 PAROXYSMAL ATRIAL FIBRILLATION (HCC): ICD-10-CM

## 2017-08-15 PROCEDURE — 99213 OFFICE O/P EST LOW 20 MIN: CPT | Performed by: INTERNAL MEDICINE

## 2017-08-15 PROCEDURE — 93288 INTERROG EVL PM/LDLS PM IP: CPT | Performed by: INTERNAL MEDICINE

## 2017-08-15 NOTE — PROGRESS NOTES
Robe BOLAÑOS Bryant  1950  [phone]      08/15/2017    Deanna Wheeler MD    Chief Complaint   Patient presents with   • Follow-up     Denies having any complaints    • Pacemaker Check       PROBLEM LIST:  1. Coronary artery disease:  a. Left heart catheterization, 01/14/2009, Dr. Vasquez: Placement of a 3.0 x 23 mm Xience drug-eluting stent to the ramus, 3.0 x 12 mm drug-eluting stent to the mid-circumflex.  b. Left heart catheterization, 10/11/2010: EF 45%, LVEDP 28.   c. A 2.25 x 13 mm Cypher drug-eluting stent placement to the distal circumflex, 05/22/2011. Discharge medications: Plavix and aspirin.   d. Cardiac catheterization, 01/10/2012, Arelis Tucker MD, revealing noncritical coronary artery disease, patient ramus and left circumflex stents, LVH with near cavity obliteration.  e. Medical management.  f. Abnormal Cardiolite, 03/01/2013, Dunia Rosado, showing moderate to severe perfusion defect of the basolateral wall of the LV.  g. Cardiac catheterization, 03/15/2013, Arelis Tucker MD, revealing widely patent stents of the left circumflex and ramus intermedius coronary arteries, no significant disease is seen in any territory.   h. Diastolic heart failure with recent hospital admission, May 2013, at [facility] in [place].  i. Recurrent anginal symptoms, September 2014; initiation of Imdur therapy, 09/04/2014.  j. Cardiac catheterization, 09/15/2014, Dr. Tucker: Noncritical coronary disease with widely patent stents in the circumflex and ramus intermedius arteries. Other sources for the patient’s chest discomfort should be considered.  k. [facility], 6/10/16,  EF Normal, widely patent ramus intermedius stent; no significant disease within LAD, Circ, RCA  2. Diastolic heart failure:  a. Echocardiogram, 05/16/2013, Hermes Vargas MD, revealing hyperdynamic LV with EF 75%, mild TR, trace MI.  b. Complaints of dyspnea at the time of office visit, 05/22/2013, with O2 saturation in the 91% to  95% range with ambulation.  c. Echocardiogram, 09/15/2014: Moderate LVH with LVEF 60% to 65%. Mild MR and mild TR.   3. Hypertension, uncontrolled at the time of office visit, 05/22/2013.  4. Symptomatic bradycardia:  a. Continued symptoms of presyncope in the setting of hypotension and bradycardia, 02/16/2012.  b. Permanent pacemaker implantation, Dr. Tucker, 02/21/2012, [employer] Accent RFDR, model #2210.  c. Hospitalization with acute dyspnea, 02/27/2013, at [facility] secondary to pacemaker-induced tachycardia.  d. Tilt Table (6/22/2017): Positive for orthostatic syncope. Patient developed abrupt symptomatic hypotension at 9 minutes, with any corresponding increase in heart rate. Consistent with vasodepressive response.  5. Paroxysmal atrial fibrillation:  a. Coumadin therapy followed by [facility].   b. CHADS-VASc score = 5.   c. Admission to [facility], 06/13/2015 through 06/16/2015, with DC cardioversion and return to sinus rhythm and subsequent metoprolol dosage increase.  6. Recent symptoms of left-sided facial numbness and occasional left arm weakness.  7. Nonsustained VT per device interrogation, 09/04/2014.  8. Brief episodes of atrial tachycardia at the time of device interrogation, 05/03/2012.  9. Hyperlipidemia.  10. Type 2 diabetes mellitus.  11. Obstructive sleep apnea with CPAP therapy.  12. Questionable transient ischemic attack, January 2010, without workup:  a. Carotid duplex, 09/15/2014: Noncritical coronary artery disease with widely patent bilateral carotid arteries. Velocity is all within normal limits.   13. History of tobacco use with cessation x7 years.   14. Gastroesophageal reflux disease.  15. Hernia repair x3.   16. Chronic kidney disease with a creatinine of 2.1 during hospitalization, 05/17/2013.      No Known Allergies    Current Medications:    Current Outpatient Prescriptions:   •  aspirin 81 MG tablet, Take  by mouth daily., Disp: , Rfl:   •  baclofen (LIORESAL) 10  MG tablet, TAKE 1 TABLET TWICE DAILY, Disp: 180 tablet, Rfl: 0  •  bumetanide (BUMEX) 2 MG tablet, TAKE 1 TABLET EVERY DAY, Disp: 90 tablet, Rfl: 0  •  DULERA 100-5 MCG/ACT inhaler, INHALE 2 PUFFS TWICE DAILY, Disp: 13 g, Rfl: 5  •  FLUoxetine (PROzac) 20 MG capsule, TAKE 1 CAPSULE EVERY DAY, Disp: 90 capsule, Rfl: 0  •  gabapentin (NEURONTIN) 600 MG tablet, TAKE 1 TABLET TWICE DAILY DURING THE DAY AS NEEDED. MAY TAKE 2 TABLETS AT BEDTIME, Disp: 360 tablet, Rfl: 0  •  hydrALAZINE (APRESOLINE) 25 MG tablet, 1/2 tablet po four times per day, Disp: 180 tablet, Rfl: 1  •  hydrochlorothiazide (HYDRODIURIL) 25 MG tablet, TAKE 1 TABLET EVERY DAY, Disp: 90 tablet, Rfl: 0  •  ipratropium-albuterol (DUO-NEB) 0.5-2.5 mg/mL nebulizer, 2 (Two) Times a Day., Disp: , Rfl:   •  isosorbide mononitrate (IMDUR) 30 MG 24 hr tablet, Take 1 tablet by mouth 2 (Two) Times a Day., Disp: 180 tablet, Rfl: 3  •  ketoconazole (NIZORAL) 2 % cream, APPLY  TOPICALLY EVERY 12 (TWELVE) HOURS., Disp: 180 g, Rfl: 0  •  lisinopril (PRINIVIL,ZESTRIL) 20 MG tablet, TAKE 1 TABLET EVERY DAY, Disp: 90 tablet, Rfl: 0  •  meclizine (ANTIVERT) 25 MG tablet, Take 1 tablet by mouth 3 (Three) Times a Day As Needed for dizziness., Disp: 90 tablet, Rfl: 0  •  metFORMIN (GLUCOPHAGE) 500 MG tablet, TAKE 1 TABLET EVERY DAY WITH BREAKFAST, Disp: 90 tablet, Rfl: 0  •  metoprolol succinate XL (TOPROL-XL) 100 MG 24 hr tablet, Take 1 tablet by mouth Daily., Disp: 90 tablet, Rfl: 3  •  Multiple Vitamin tablet, Take 1 tablet by mouth daily., Disp: , Rfl:   •  nitroglycerin (NITROSTAT) 0.4 MG SL tablet, Place 1 tablet under the tongue Every 5 (Five) Minutes As Needed for chest pain., Disp: 25 tablet, Rfl: 11  •  omeprazole (priLOSEC) 40 MG capsule, TAKE 1 CAPSULE EVERY DAY, Disp: 90 capsule, Rfl: 0  •  oxyCODONE-acetaminophen (PERCOCET)  MG per tablet, Take 1 tablet by mouth Every 6 (Six) Hours As Needed for Severe Pain (7-10). May fill 7/30/17, Disp: 120 tablet, Rfl:  "0  •  potassium chloride (K-DUR) 10 MEQ CR tablet, Take 1 tablet by mouth Daily., Disp: 90 tablet, Rfl: 0  •  pravastatin (PRAVACHOL) 80 MG tablet, TAKE 1 TABLET EVERY NIGHT, Disp: 90 tablet, Rfl: 0  •  tamsulosin (FLOMAX) 0.4 MG capsule 24 hr capsule, Take 1 capsule by mouth Daily., Disp: 90 capsule, Rfl: 1  •  terazosin (HYTRIN) 5 MG capsule, Take 1 capsule by mouth Every Night., Disp: 90 capsule, Rfl: 3  •  traZODone (DESYREL) 50 MG tablet, TAKE 1 TABLET BY MOUTH EVERY NIGHT., Disp: 90 tablet, Rfl: 0  •  warfarin (COUMADIN) 10 MG tablet, Take one every evening or as directed, Disp: 30 tablet, Rfl: 1  •  zolpidem (AMBIEN) 5 MG tablet, Take 10 mg by mouth At Night As Needed for sleep., Disp: , Rfl:     History of Present Illness (HPI):   Robe Bryant presents today for a 4 month follow-up with [employer] device interrogation. The patient has a history of coronary artery disease, diastolic heart failure, hypertension, bradycardia, paroxysmal atrial fibrillation, and hyperlipidemia. Since last visit, he has been experiencing dizziness, which he feels could be occurring as a result of low blood pressure. He states that he monitors his blood pressure at home, which runs around 125 systolic. Overall, the patient is doing well from a cardiac standpoint. Patient denies chest pain, palpitations, shortness of breath, edema, PND, orthopnea, and syncope. He reports that he stays physically active by walking often and gardening.    The following portions of the patient's history were reviewed and updated as appropriate: allergies, current medications and problem list.    Pertinent positives as listed in the HPI.  All other systems reviewed are negative.    Vitals:    08/15/17 1337   BP: 156/80   BP Location: Left arm   Patient Position: Sitting   Pulse: 68   Weight: 208 lb (94.3 kg)   Height: 65\" (165.1 cm)       Physical Exam:   General: Alert, awake, and oriented. Well-developed, well-nourished; in no acute distress.  Neck: " "Jugular venous pressure is within normal limits. Carotids have normal upstrokes without bruits.   Cardiovascular: Heart has a nondisplaced focal PMI. Regular rate and rhythm without murmur, gallop or rub.  Lungs: Clear without rales or wheezes. Equal expansion is noted.   Extremities: Show no edema.  Skin: Warm and dry.  Neurologic: Non-focal.    Diagnostic Data:      Procedures    DEVICE INTERROGATION: 08/15/2017, [employer] PPM Accent 2210: DDDR 60/110. RA pacing 92%, RV pacing <1%. P wave is none @ 30 mV with a threshold of 0.75 V at 0.4 msec and an impedance of 380 ohms. R wave is 4.8 mV with a threshold of 1.37 V at 0.4 msec and an impedance of 440 ohms. Battery voltage is 6.2 years, charge time: 2.90 v. Events: Atrial fibrillation for 3 days, 11 hours, 32 minutes (starting May 26th) AMS 3.2%. Atrial tachycardia lasting 11 hr:32 min    Assessment:    ICD-10-CM ICD-9-CM   1. Postural dizziness with presyncope R42 780.4    R55 780.2   2. Chronic coronary artery disease I25.10 414.00   3. Chronic diastolic heart failure I50.32 428.32   4. Essential hypertension I10 401.9   5. Symptomatic bradycardia R00.1 427.89   6. Paroxysmal atrial fibrillation I48.0 427.31   7. Hypercholesterolemia E78.00 272.0       Plan:  1. Discontinue HCTZ, use prn only.  2. Continue \"current medications\" as listed above.  3. Follow-up in 12 months or sooner, if needed.      Scribed for Arelis Tucker MD by Annalise King. 8/15/2017  2:25 PM     I Arelis Tucker MD personally performed the services described in this documentation as scribed by the above individual in my presence, and it is both accurate and complete.    Arelis Tucker MD, FACC      "

## 2017-08-16 ENCOUNTER — TELEPHONE (OUTPATIENT)
Dept: FAMILY MEDICINE CLINIC | Facility: CLINIC | Age: 67
End: 2017-08-16

## 2017-08-16 DIAGNOSIS — G25.81 RESTLESS LEG SYNDROME: ICD-10-CM

## 2017-08-16 NOTE — TELEPHONE ENCOUNTER
Patient came in today stating he has not received his gabapentin yet from [facility] mail order and he is out.  I called [facility] and they confirmed that they mailed it out today and it will take 3-5 business days for him to receive it.  She stated they would allow a over ride at his local pharmacy for up to a 10 day supply to do him until his arrives.

## 2017-08-17 RX ORDER — GABAPENTIN 600 MG/1
600 TABLET ORAL 2 TIMES DAILY
Qty: 20 TABLET | Refills: 0 | OUTPATIENT
Start: 2017-08-17 | End: 2017-11-30 | Stop reason: SDUPTHER

## 2017-08-25 ENCOUNTER — TELEPHONE (OUTPATIENT)
Dept: FAMILY MEDICINE CLINIC | Facility: CLINIC | Age: 67
End: 2017-08-25

## 2017-08-25 DIAGNOSIS — M54.2 NECK PAIN: ICD-10-CM

## 2017-08-25 DIAGNOSIS — G89.4 CHRONIC PAIN SYNDROME: ICD-10-CM

## 2017-08-25 DIAGNOSIS — M47.812 CERVICAL SPINE ARTHRITIS: ICD-10-CM

## 2017-08-25 DIAGNOSIS — M79.2 RADICULAR PAIN IN LEFT ARM: ICD-10-CM

## 2017-08-25 RX ORDER — OXYCODONE AND ACETAMINOPHEN 10; 325 MG/1; MG/1
1 TABLET ORAL EVERY 6 HOURS PRN
Qty: 120 TABLET | Refills: 0 | Status: SHIPPED | OUTPATIENT
Start: 2017-08-25 | End: 2017-09-29 | Stop reason: SDUPTHER

## 2017-08-28 NOTE — TELEPHONE ENCOUNTER
Pt came in and picked up script and gave mouth swab drug screen because he was unable to give urine at the time

## 2017-08-29 ENCOUNTER — TELEPHONE (OUTPATIENT)
Dept: FAMILY MEDICINE CLINIC | Facility: CLINIC | Age: 67
End: 2017-08-29

## 2017-08-29 ENCOUNTER — OFFICE VISIT (OUTPATIENT)
Dept: FAMILY MEDICINE CLINIC | Facility: CLINIC | Age: 67
End: 2017-08-29

## 2017-08-29 VITALS
HEIGHT: 65 IN | SYSTOLIC BLOOD PRESSURE: 144 MMHG | DIASTOLIC BLOOD PRESSURE: 92 MMHG | WEIGHT: 210 LBS | BODY MASS INDEX: 34.99 KG/M2 | OXYGEN SATURATION: 97 % | HEART RATE: 61 BPM

## 2017-08-29 DIAGNOSIS — Z95.0 PACEMAKER: ICD-10-CM

## 2017-08-29 DIAGNOSIS — R33.8 BPH (BENIGN PROSTATIC HYPERTROPHY) WITH URINARY RETENTION: ICD-10-CM

## 2017-08-29 DIAGNOSIS — J44.9 CHRONIC OBSTRUCTIVE PULMONARY DISEASE, UNSPECIFIED COPD TYPE (HCC): ICD-10-CM

## 2017-08-29 DIAGNOSIS — R55 SYNCOPE, UNSPECIFIED SYNCOPE TYPE: ICD-10-CM

## 2017-08-29 DIAGNOSIS — N40.1 BPH (BENIGN PROSTATIC HYPERTROPHY) WITH URINARY RETENTION: ICD-10-CM

## 2017-08-29 DIAGNOSIS — I48.0 PAROXYSMAL ATRIAL FIBRILLATION (HCC): ICD-10-CM

## 2017-08-29 DIAGNOSIS — N18.30 TYPE 2 DIABETES MELLITUS WITH STAGE 3 CHRONIC KIDNEY DISEASE, WITHOUT LONG-TERM CURRENT USE OF INSULIN (HCC): ICD-10-CM

## 2017-08-29 DIAGNOSIS — E78.00 HYPERCHOLESTEROLEMIA: ICD-10-CM

## 2017-08-29 DIAGNOSIS — E11.22 TYPE 2 DIABETES MELLITUS WITH STAGE 3 CHRONIC KIDNEY DISEASE, WITHOUT LONG-TERM CURRENT USE OF INSULIN (HCC): ICD-10-CM

## 2017-08-29 DIAGNOSIS — R29.6 RECURRENT FALLS: Primary | ICD-10-CM

## 2017-08-29 DIAGNOSIS — I10 ESSENTIAL HYPERTENSION: ICD-10-CM

## 2017-08-29 DIAGNOSIS — Z23 NEED FOR INFLUENZA VACCINATION: ICD-10-CM

## 2017-08-29 DIAGNOSIS — G89.4 CHRONIC PAIN SYNDROME: ICD-10-CM

## 2017-08-29 LAB
ALBUMIN SERPL-MCNC: 3.8 G/DL (ref 3.5–5)
ALBUMIN/GLOB SERPL: 1.6 G/DL (ref 1–2)
ALP SERPL-CCNC: 51 U/L (ref 38–126)
ALT SERPL-CCNC: 33 U/L (ref 13–69)
AST SERPL-CCNC: 28 U/L (ref 15–46)
BILIRUB SERPL-MCNC: 0.6 MG/DL (ref 0.2–1.3)
BUN SERPL-MCNC: 14 MG/DL (ref 7–20)
BUN/CREAT SERPL: 10 (ref 6.3–21.9)
CALCIUM SERPL-MCNC: 9.4 MG/DL (ref 8.4–10.2)
CHLORIDE SERPL-SCNC: 99 MMOL/L (ref 98–107)
CO2 SERPL-SCNC: 34 MMOL/L (ref 26–30)
CREAT SERPL-MCNC: 1.4 MG/DL (ref 0.6–1.3)
ERYTHROCYTE [DISTWIDTH] IN BLOOD BY AUTOMATED COUNT: 12.9 % (ref 11.5–14.5)
GLOBULIN SER CALC-MCNC: 2.4 GM/DL
GLUCOSE SERPL-MCNC: 102 MG/DL (ref 74–98)
HCT VFR BLD AUTO: 45.7 % (ref 42–52)
HGB BLD-MCNC: 14.8 G/DL (ref 14–18)
INR PPP: 2 (ref 0.9–1.1)
MCH RBC QN AUTO: 29.5 PG (ref 27–31)
MCHC RBC AUTO-ENTMCNC: 32.4 G/DL (ref 30–37)
MCV RBC AUTO: 91 FL (ref 80–94)
PLATELET # BLD AUTO: 157 10*3/MM3 (ref 130–400)
POTASSIUM SERPL-SCNC: 4 MMOL/L (ref 3.5–5.1)
PROT SERPL-MCNC: 6.2 G/DL (ref 6.3–8.2)
RBC # BLD AUTO: 5.02 10*6/MM3 (ref 4.7–6.1)
SODIUM SERPL-SCNC: 143 MMOL/L (ref 137–145)
WBC # BLD AUTO: 7.37 10*3/MM3 (ref 4.8–10.8)

## 2017-08-29 PROCEDURE — 90662 IIV NO PRSV INCREASED AG IM: CPT | Performed by: FAMILY MEDICINE

## 2017-08-29 PROCEDURE — G0008 ADMIN INFLUENZA VIRUS VAC: HCPCS | Performed by: FAMILY MEDICINE

## 2017-08-29 PROCEDURE — 99215 OFFICE O/P EST HI 40 MIN: CPT | Performed by: FAMILY MEDICINE

## 2017-08-29 PROCEDURE — 85610 PROTHROMBIN TIME: CPT | Performed by: FAMILY MEDICINE

## 2017-08-29 RX ORDER — BISOPROLOL FUMARATE AND HYDROCHLOROTHIAZIDE 10; 6.25 MG/1; MG/1
TABLET ORAL
COMMUNITY
Start: 2017-08-10 | End: 2018-03-06 | Stop reason: HOSPADM

## 2017-08-29 NOTE — PROGRESS NOTES
"Subjective   Robe Bryant is a 66 y.o. male.     History of Present Illness  Mr. Bryant is a 65 yo male with h/o multiple chronic medical problems who presents today with c/o recurrent falls. He has been seen over past few months on several occasions here in clinic as well as ER for same. His last fall was Wed of last week. He was carrying groceries up a few steps to his front door. He 'blacked out for a few seconds' and came too having hit side of head/shoulder on door frame. This is similar to previous episodes in which he feels he \"blacks out\" without warning. Other times he is dizzy upon standing too suddenly and feels like he will pass out. With recent episode he denies CP, palpitations, SOA, nausea, diaphoresis, headache. He has had CT head without contrast x 2 in past year which showed atrophy, enlarged ventricles and microvascular changes. He had cardiac cath in 5/2016 which was essentially normal with patent stent. Tilt table testing on 4/28/17 + for orthostatic change.    He also c/o poor urinary output in afternoons. Generally urinates well in AM and very little through rest of day. During night he is getting up 3-4 times to urinate. C/o urinary hesitation, occ dribbling of urine. No dysuria or hematuria. He has undergone prostate procedure in past at [facility] (>10 years) for BPH; denies h/o cancer. Brother has h/o prostate CA. Last saw a urologist ?2 years ago- Dr. Heaton in [place] he believes.    He was referred for AAA screen but did not keep apt; needs rescheduling of test.    He has a known h/o severe OA and DDD with chronic neck and back pain. Has been medically managed. He continues to have severe neck pain, increased over past several months. He has had CT scan of C spine which showed diffuse marked changes from degenerative arthritis. (he is unable to undergo MRI due to pacemaker placement). Of note he does have h/o chronic left shoulder problems with h/o chronic RTC tear on " that side. He has had surgical consultation with Dr. Montiel and was felt to not be a good surgical candidate at this time. He has been referred to PT; Has not schedule apt.      Since last visit he has also had f/u with Dr. Tucker. Has h/o A fib, CAD, HTN, HLP, ECHO. Has h/o  + tilt table test. He has had difficulty getting his coumadin regulated but states he is taking as rx'd. He has had orthostatic findings on several visits. Cardiology note states he was instructed to stop his HCTZ and only use as needed for swelling. He is unsure if he has stopped it.     His diabetes has been well controlled. Last A1c 6.1 in late May. He has assoc'd mild CKD. Has seen Dr. Rojas in past.    The following portions of the patient's history were reviewed and updated as appropriate: allergies, current medications, past family history, past medical history, past social history, past surgical history and problem list.    Review of Systems   Constitutional: Positive for fatigue. Negative for appetite change, fever and unexpected weight change.   HENT: Positive for congestion and postnasal drip. Negative for nosebleeds, sore throat and trouble swallowing.    Eyes: Positive for visual disturbance (chronic).   Respiratory: Negative for cough, shortness of breath and wheezing.    Cardiovascular: Negative for chest pain, palpitations and leg swelling.   Gastrointestinal: Negative for abdominal pain, blood in stool, diarrhea, nausea and vomiting.   Endocrine: Positive for heat intolerance.   Genitourinary: Negative for dysuria and hematuria.   Musculoskeletal: Positive for arthralgias, back pain, joint swelling, myalgias, neck pain and neck stiffness.   Skin: Negative for rash and wound.   Neurological: Positive for dizziness, syncope, weakness (generalized) and numbness (left hand). Negative for seizures, speech difficulty and headaches.   Hematological: Negative for adenopathy. Bruises/bleeds easily.   Psychiatric/Behavioral:  Positive for confusion (mild, intermittent) and dysphoric mood. Negative for sleep disturbance and suicidal ideas. The patient is nervous/anxious.        Objective    Vitals:    08/29/17 0933   BP: 144/92   Pulse: 61   SpO2: 97%     Body mass index is 34.95 kg/(m^2).  Last 2 weights    08/29/17 0933   Weight: 210 lb (95.3 kg)       Physical Exam   Constitutional: He is oriented to person, place, and time. He appears well-developed and well-nourished. He is cooperative. He does not appear ill. No distress.   HENT:   Head: Normocephalic and atraumatic.   Right Ear: Decreased hearing is noted.   Left Ear: Decreased hearing is noted.   Mouth/Throat: Oropharynx is clear and moist and mucous membranes are normal. Mucous membranes are not dry.   Eyes: Conjunctivae, EOM and lids are normal.   Neck: Neck supple. Normal carotid pulses present. No thyroid mass and no thyromegaly present.   Chronic hoarseness noted   Cardiovascular: Normal rate, regular rhythm, normal heart sounds and intact distal pulses.    Pulmonary/Chest: Effort normal. He has decreased breath sounds. He has no wheezes. He has no rhonchi. He has no rales.   Musculoskeletal:        Left shoulder: He exhibits decreased range of motion (flexion, abduction to 90 deg), tenderness (diffuse soft tissue) and spasm.        Left elbow: He exhibits normal range of motion and no swelling.        Right knee: He exhibits swelling (mild). He exhibits normal range of motion.        Left knee: He exhibits decreased range of motion and swelling (mild).        Cervical back: He exhibits decreased range of motion (all planes), tenderness (diffuse soft tissue, L > R), bony tenderness (C4-C7) and spasm.       Vascular Status -  His exam exhibits no right foot edema. His exam exhibits no left foot edema.  Lymphadenopathy:     He has no cervical adenopathy.        Right: No supraclavicular adenopathy present.        Left: No supraclavicular adenopathy present.   Neurological: He  is alert and oriented to person, place, and time. He has normal strength. He displays no tremor. No cranial nerve deficit. Gait (antalgic) abnormal.   Skin: Skin is warm and dry. Bruising (mild, left elbow, knee) noted. No rash noted.   Psychiatric: He has a normal mood and affect. His speech is normal and behavior is normal. Thought content normal. Cognition and memory are normal.   Nursing note and vitals reviewed.      INR 2.0    Assessment/Plan   Robe was seen today for fall.    Diagnoses and all orders for this visit:    Recurrent falls  -     CBC (No Diff)  -     Ambulatory Referral to Neurology    Essential hypertension  -     CBC (No Diff)  -     Comprehensive Metabolic Panel    Hypercholesterolemia    Pacemaker    Paroxysmal atrial fibrillation  -     CBC (No Diff)    Syncope, unspecified syncope type  -     CBC (No Diff)  -     Ambulatory Referral to Neurology    Chronic obstructive pulmonary disease, unspecified COPD type    Type 2 diabetes mellitus with stage 3 chronic kidney disease, without long-term current use of insulin  -     Comprehensive Metabolic Panel    BPH (benign prostatic hypertrophy) with urinary retention  -     Ambulatory Referral to Urology    Chronic pain syndrome    Other orders  -     POC INR    Recurrent falls/syncope of unclear etiology. Suspect most likely due to his orthostasis. He is on several medications which could exacerbate this problem. May benefit from D/C baclofen and one of the alpha blockers.. It appears he is taking 2 alpha blockers for his prostate symptoms and/or BP control. I have encouraged him to maintain good fluid intake as well.  I have referred him to neurology for further eval to which he is amenable.     His COPD appears stable.   His DM is well controlled.   His BP control has improved.   His A fib is rate controlled and INR is therapeutic although he has trouble with compliance regarding frequency of INR monitoring. May benefit from alternate  anticoagulant. Will discuss with Dr. Tucker.  HLP with good tolerance of statin (able to tolerate pravachol).  CKD which has been stable stage 3. Reassess today.  H/o BPH with what sounds like possible TURP; he is unsure of exact hx. Refer back to Dr. Heaton for recheck and med optimization.  Stable chronic pain syndrome.     Keep routine f/u apt in 6 weeks. F/U sooner as needed/instructed.  CT scan abd/pelvis with contrast on 4/26/17 showed 3 cm transverse diameter of aorta stable from previous. Therefore no AAA screen needed.  I will contact patient regarding test results and provide instructions regarding any necessary changes in plan of care.  Patient was encouraged to keep me informed of any acute changes, lack of improvement, or any new concerning symptoms.  Pt is aware of reasons to seek emergent care.  Patient voiced understanding of all instructions and denied further questions.

## 2017-08-30 ENCOUNTER — TELEPHONE (OUTPATIENT)
Dept: FAMILY MEDICINE CLINIC | Facility: CLINIC | Age: 67
End: 2017-08-30

## 2017-09-07 ENCOUNTER — OFFICE VISIT (OUTPATIENT)
Dept: NEUROLOGY | Facility: CLINIC | Age: 67
End: 2017-09-07

## 2017-09-07 VITALS
WEIGHT: 214 LBS | HEART RATE: 61 BPM | HEIGHT: 65 IN | SYSTOLIC BLOOD PRESSURE: 152 MMHG | DIASTOLIC BLOOD PRESSURE: 78 MMHG | OXYGEN SATURATION: 96 % | BODY MASS INDEX: 35.65 KG/M2

## 2017-09-07 DIAGNOSIS — M15.3 OTHER SECONDARY OSTEOARTHRITIS OF MULTIPLE SITES: Primary | ICD-10-CM

## 2017-09-07 PROCEDURE — 99203 OFFICE O/P NEW LOW 30 MIN: CPT | Performed by: PSYCHIATRY & NEUROLOGY

## 2017-09-07 RX ORDER — MECLIZINE HCL 12.5 MG/1
12.5 TABLET ORAL 3 TIMES DAILY PRN
COMMUNITY
End: 2019-08-26 | Stop reason: ALTCHOICE

## 2017-09-11 NOTE — TELEPHONE ENCOUNTER
I informed patient and voiced understanding. He will stop by office to  a copy of the DC'd meds to make sure he stops the correct ones.

## 2017-09-11 NOTE — PROGRESS NOTES
"[facility] NEUROLOGY [place] CONSULTATION   History of Present Illness     Date: September 7, 2017  Patient Identification  Robe Bryant is a 66 y.o. male.    Patient information was obtained from patient.  History/Exam limitations: none.    CONSULTATION requested by: Deanna Wheeler MD      Chief Complaint   Establish Care; Dizziness (Pt c/o dizziness with falls, pt states Dr Wheeler started him on medication and this has helped sx. Pt states sx started \"a couple years ago\"); Syncope; and Numbness (Pt c/o numbness in bilateral upper and lower extremities )      History of Present Illness   Patient is a pleasant 67-year-old gentleman referred to [facility] neurology for evaluation of recurrent fall.  He should also have severe hip pain neck pain the pain.  Patient has difficulty with ambulation because of generalized weakness and pain.      PMH:   Past Medical History:   Diagnosis Date   • Anxiety and depression    • Arthritis    • Backache     20 years   • Bladder trauma    • Cervicalgia    • Chronic kidney disease     Cr up to 2.1   • Diabetes mellitus     15 years--   • Emphysema of lung    • Eye exam, routine 2015   • FH: colonic polyps    • Gout     for 10 years--   • History of echocardiogram 09/15/2014   • History of tobacco use     with cessation x7 years   • Hyperlipidemia    • Hypertension    • Migraine    • Myocardial infarction     h/o coronary artery stenting--   • Restless leg syndrome     20 years   • Sleep apnea     12 years; uses a Bi-Pap   • Stroke    • Syncope 4/28/2017       Past Surgical History:   Past Surgical History:   Procedure Laterality Date   • BLADDER SURGERY     • CARDIAC CATHETERIZATION  03/15/2013    revealing widely patent stents of the left circumflex and ramus intermedius coronary arteries, no significant disease is seen in any territory   • CARDIAC PACEMAKER PLACEMENT  2012   • CATARACT EXTRACTION     • COLONOSCOPY  2004    No family history of colon cancer.     • " COLONOSCOPY  2015   • HERNIA REPAIR      Type unknown   • PROSTATE SURGERY         Family Hisotry:   Family History   Problem Relation Age of Onset   • Cancer Mother    • Diabetes Mother    • Hypertension Mother    • Stroke Mother    • Stomach cancer Mother    • Heart disease Mother    • Stomach cancer Father    • Cancer Father    • Lung cancer Father        Social History:   Social History     Social History   • Marital status:      Spouse name: N/A   • Number of children: N/A   • Years of education: N/A     Occupational History   • Not on file.     Social History Main Topics   • Smoking status: Former Smoker     Quit date: 8/15/2001   • Smokeless tobacco: Never Used      Comment: quit 16 years ago   • Alcohol use No   • Drug use: No   • Sexual activity: Defer     Other Topics Concern   • Not on file     Social History Narrative       Medications:   Current Outpatient Prescriptions   Medication Sig Dispense Refill   • aspirin 81 MG tablet Take  by mouth daily.     • bisoprolol-hydrochlorothiazide (ZIAC) 10-6.25 MG per tablet      • bumetanide (BUMEX) 2 MG tablet TAKE 1 TABLET EVERY DAY 90 tablet 0   • DULERA 100-5 MCG/ACT inhaler INHALE 2 PUFFS TWICE DAILY 13 g 5   • FLUoxetine (PROzac) 20 MG capsule TAKE 1 CAPSULE EVERY DAY 90 capsule 0   • gabapentin (NEURONTIN) 600 MG tablet Take 1 tablet by mouth 2 (Two) Times a Day. 20 tablet 0   • ipratropium-albuterol (DUO-NEB) 0.5-2.5 mg/mL nebulizer 2 (Two) Times a Day.     • isosorbide mononitrate (IMDUR) 30 MG 24 hr tablet Take 1 tablet by mouth 2 (Two) Times a Day. 180 tablet 3   • ketoconazole (NIZORAL) 2 % cream APPLY  TOPICALLY EVERY 12 (TWELVE) HOURS. 180 g 0   • lisinopril (PRINIVIL,ZESTRIL) 20 MG tablet TAKE 1 TABLET EVERY DAY 90 tablet 0   • meclizine (ANTIVERT) 12.5 MG tablet Take 12.5 mg by mouth 3 (Three) Times a Day As Needed for dizziness.     • metFORMIN (GLUCOPHAGE) 500 MG tablet TAKE 1 TABLET EVERY DAY WITH BREAKFAST 90 tablet 0   • metoprolol  succinate XL (TOPROL-XL) 100 MG 24 hr tablet Take 1 tablet by mouth Daily. 90 tablet 3   • Multiple Vitamin tablet Take 1 tablet by mouth daily.     • nitroglycerin (NITROSTAT) 0.4 MG SL tablet Place 1 tablet under the tongue Every 5 (Five) Minutes As Needed for chest pain. 25 tablet 11   • omeprazole (priLOSEC) 40 MG capsule TAKE 1 CAPSULE EVERY DAY 90 capsule 0   • oxyCODONE-acetaminophen (PERCOCET)  MG per tablet Take 1 tablet by mouth Every 6 (Six) Hours As Needed for Severe Pain . May fill 8/30/17 120 tablet 0   • potassium chloride (K-DUR) 10 MEQ CR tablet Take 1 tablet by mouth Daily. 90 tablet 0   • pravastatin (PRAVACHOL) 80 MG tablet TAKE 1 TABLET EVERY NIGHT 90 tablet 0   • tamsulosin (FLOMAX) 0.4 MG capsule 24 hr capsule Take 1 capsule by mouth Daily. 90 capsule 1   • traZODone (DESYREL) 50 MG tablet TAKE 1 TABLET BY MOUTH EVERY NIGHT. 90 tablet 0   • warfarin (COUMADIN) 10 MG tablet Take one every evening or as directed 30 tablet 1   • zolpidem (AMBIEN) 5 MG tablet Take 10 mg by mouth At Night As Needed for sleep.       No current facility-administered medications for this visit.        Allergy: No Known Allergies    Review of Systems:  Review of Systems   Constitutional: Negative for chills and fever.   HENT: Negative for congestion, ear pain, hearing loss, rhinorrhea and sore throat.    Eyes: Negative for pain, discharge and redness.   Respiratory: Negative for cough, shortness of breath, wheezing and stridor.    Cardiovascular: Negative for chest pain, palpitations and leg swelling.   Gastrointestinal: Negative for abdominal pain, constipation, nausea and vomiting.   Endocrine: Negative for cold intolerance, heat intolerance and polyphagia.   Genitourinary: Negative for dysuria, flank pain, frequency and urgency.   Musculoskeletal: Positive for arthralgias, back pain, gait problem, joint swelling, myalgias, neck pain and neck stiffness.   Skin: Negative for pallor, rash and wound.  "  Allergic/Immunologic: Negative for environmental allergies.   Neurological: Positive for weakness. Negative for dizziness, tremors, seizures, syncope, facial asymmetry, speech difficulty, light-headedness, numbness and headaches.   Hematological: Negative for adenopathy.   Psychiatric/Behavioral: Positive for sleep disturbance. Negative for confusion and hallucinations. The patient is not nervous/anxious.        Physical Exam     Vitals:    09/07/17 1134   BP: 152/78   Pulse: 61   SpO2: 96%   Weight: 214 lb (97.1 kg)   Height: 65\" (165.1 cm)     GENERAL: Patient is pleasant, cooperative, appears to be stated age.  Body habitus is endomorphic.  SKIN AND EXTREMITIES:  No skin rashes or lesions are noted.  No cyanosis, clubbing or edema of the extremities.    HEAD:  Head is normocephalic and atraumatic.    NECK: Neck are non-tender without thyromegaly or adenopathy.  Carotic upstrokes are 1+/4.  No cranial or cervical bruits.  The neck is supple with a full range of motion.   ENT: palate elevate symmetrically, no evidence of high arch palate, tongue midline erythema in posterior pharynx, Mallampati Classification Class III   CARDIOVASCULAR:  Regular rate and rhythm with normal S1 and S2 without rub or gallop.  RESPIRATORY:  Clear to auscultation without wheezes or crackle   ABDOMEN:  Soft and non-tender, positive bowel sound without hepatosplenomegaly  BACK:  Back is straight without midline defect.    PSYCH:  Higher cortical function/mental status:  The patient is alert.  She is oriented x3 to time, place and person.  Recent and the remote memory appear normal.  The patient has a good fund of knowledge.  There is no visual or auditory hallucination or suicidal or homicidal ideation.  SPEECH:There is no gross evidence of aphasia, dysarthria or agnosia.      CRANIAL NERVES:  Pupils are 4mm, equal round reactive to light, reacting briskly to 2mm without afferent pupillary defect.  Visual fields are intact to " confrontation testing.  Fundoscopic examination reveals sharp disk margins with normal vasculature.  No papilledema, hemorrhages or exudates.  Extraocular movements are full and smooth with normal pursuits and saccades.  No nystagmus noted.  The face is symmetric. palate elevate symmetrically, Tongue midline, positive gag reflex. The remainder of the cranial nerves are intact and symmetrical.    MOTOR: Strength is 5/5 throughout with normal tone and bulk with the following exceptions, 4/5 intrinsic muscles of the hands and feet.  No involuntary movements noted.    Deep Tendon Reflexes: are 2/4 and symmetrical in the upper extremities, 2/4 and symmetrical at the knees and 1/4 and symmetrical at the Achilles tendon.  Plantar responses were down-going bilaterally.    SENSATION:  Intact to pinprick, light touch, vibration and proprioception.  Coordination:  The patient normally performs finger-nose-finger, heel-to-knee-to-shin and rapid alternating movements in symmetrical fashion.    COORDINATION AND GAIT:  The patient walks with a narrow-based gait.  Patient is able to heel-toe and tandem walk forward and backwards without difficulty.  Romberg and monopedal  Romberg are negative.    MUSCULOSKELETAL: Bilateral knee swelling and hip pain on examination.  Positive Isaías test worse on the left hip as compared to the right  .       Studies: I have personally reviewed the following and discussed with the patient.  Results for orders placed or performed in visit on 08/29/17   CBC (No Diff)   Result Value Ref Range    WBC 7.37 4.80 - 10.80 10*3/mm3    RBC 5.02 4.70 - 6.10 10*6/mm3    Hemoglobin 14.8 14.0 - 18.0 g/dL    Hematocrit 45.7 42.0 - 52.0 %    MCV 91.0 80.0 - 94.0 fL    MCH 29.5 27.0 - 31.0 pg    MCHC 32.4 30.0 - 37.0 g/dL    RDW 12.9 11.5 - 14.5 %    Platelets 157 130 - 400 10*3/mm3   Comprehensive Metabolic Panel   Result Value Ref Range    Glucose 102 (H) 74 - 98 mg/dL    BUN 14 7 - 20 mg/dL    Creatinine 1.40  (H) 0.60 - 1.30 mg/dL    eGFR Non African Am 51 (L) >60 mL/min/1.73    eGFR African Am 61 >60 mL/min/1.73    BUN/Creatinine Ratio 10.0 6.3 - 21.9    Sodium 143 137 - 145 mmol/L    Potassium 4.0 3.5 - 5.1 mmol/L    Chloride 99 98 - 107 mmol/L    Total CO2 34.0 (H) 26.0 - 30.0 mmol/L    Calcium 9.4 8.4 - 10.2 mg/dL    Total Protein 6.2 (L) 6.3 - 8.2 g/dL    Albumin 3.80 3.50 - 5.00 g/dL    Globulin 2.4 gm/dL    A/G Ratio 1.6 1.0 - 2.0 g/dL    Total Bilirubin 0.6 0.2 - 1.3 mg/dL    Alkaline Phosphatase 51 38 - 126 U/L    AST (SGOT) 28 15 - 46 U/L    ALT (SGPT) 33 13 - 69 U/L   POC INR   Result Value Ref Range    INR 2.00 (A) 0.9 - 1.1       Review of Imaging: I have personally reviewed the following images and discussed with the patient.  No results found.      Records Reviewed: I have personally reviewed his previous medical record.    Robe was seen today for establish care, dizziness, syncope and numbness.    Diagnoses and all orders for this visit:    Other secondary osteoarthritis of multiple sites  -     Ambulatory Referral to Orthopedic Surgery        Treatments:    1. Patient significant hip pain and knee pain difficult with ambulation and frequent falling like to refer this patient to go orthopedic colleagues to further evaluation and treatment for injection     This Document is signed by Blayne Cason MD, FAAN, FAASM   September 7, 2017

## 2017-09-13 ENCOUNTER — TELEPHONE (OUTPATIENT)
Dept: FAMILY MEDICINE CLINIC | Facility: CLINIC | Age: 67
End: 2017-09-13

## 2017-09-13 DIAGNOSIS — M25.551 RIGHT HIP PAIN: Primary | ICD-10-CM

## 2017-09-13 NOTE — TELEPHONE ENCOUNTER
----- Message from Deanna Wheeler MD sent at 8/30/2017  6:04 PM EDT -----  Please inform pt his kidney function is stable. Liver function normal. Blood count normal.    Please see previous message regarding medications I want him to stop which may be contributing to his falls.      9/13/17  -------------  Patient informed and voiced understanding.

## 2017-09-29 ENCOUNTER — TELEPHONE (OUTPATIENT)
Dept: FAMILY MEDICINE CLINIC | Facility: CLINIC | Age: 67
End: 2017-09-29

## 2017-09-29 DIAGNOSIS — M79.2 RADICULAR PAIN IN LEFT ARM: ICD-10-CM

## 2017-09-29 DIAGNOSIS — M47.812 CERVICAL SPINE ARTHRITIS: ICD-10-CM

## 2017-09-29 DIAGNOSIS — G89.4 CHRONIC PAIN SYNDROME: ICD-10-CM

## 2017-09-29 DIAGNOSIS — M54.2 NECK PAIN: ICD-10-CM

## 2017-09-29 RX ORDER — OXYCODONE AND ACETAMINOPHEN 10; 325 MG/1; MG/1
1 TABLET ORAL EVERY 6 HOURS PRN
Qty: 120 TABLET | Refills: 0 | Status: SHIPPED | OUTPATIENT
Start: 2017-09-29 | End: 2017-10-27 | Stop reason: SDUPTHER

## 2017-10-19 ENCOUNTER — ANTICOAGULATION VISIT (OUTPATIENT)
Dept: PHARMACY | Facility: HOSPITAL | Age: 67
End: 2017-10-19

## 2017-10-19 LAB — INR PPP: 1.6

## 2017-10-19 NOTE — PROGRESS NOTES
Anticoagulation Clinic - Remote Progress Note  [ALERE HOME MONITOR vs REMOTE LAB]  Frequency of Monitoring (PST, ONLY):     Indication: Atrial fibrillation   Referring Provider: [facility]  Goal INR: 2-3  Current Drug Interactions: ASA, trazodone, fluoxetine, omeprazole, pravastatin  CHADS-VASc: 5 (CHF, HTN, Age 65-74, Vascular disease, DM)       Diet: Not consistent; averages 1x per week  Alcohol:   Tobacco:    OTC Pain Medication:    INR History:  Date 10/19           Total Weekly Dose 35 mg           INR 1.6           Notes Per patient             Phone Interview:  Tablet Strength: pt has 5mg tablets  Current Maintenance Dose: 5 mg daily per patient    Patient Findings      Positives Signs/symptoms of thrombosis, Signs/symptoms of bleeding, Change in diet/appetite     Negatives Laboratory test error suspected, Change in health, Change in alcohol use, Change in activity, Upcoming invasive procedure, Emergency department visit, Upcoming dental procedure, Missed doses, Extra doses, Change in medications, Hospital admission, Bruising, Other complaints     Comments Eats GLV about once per week (broccoli, brussel sprouts), but doesn't stay the same all the time. Pt reports pain/numbness in his arms/legs- more like nerve pain (takes gabapentin). Pt reports having some bleeding in his L eye a couple of weeks ago, but this has resolved.      Patient Contact Info: [phone] (Home), [phone] (Mobile)  Lab Contact Info: [facility], [phone]    Plan:  1. INR is subtherapeutic at 1.6. Has been extremely difficult reaching this patient. I instructed him to take 7.5 mg tonight and Monday, and 5mg AODs until INR check next week.  2. Repeat INR next Friday, 10/27. Transition to DOAC recommended due to instability in follow-up over an extended period of time in which the patient has largely been subtherapeutic.   3. Pt declines warfarin refills.  4. Verbal information provided over the phone to pt.  Pt RBV dosing instructions, expresses understanding, and has no further questions at this time.      Jennifer Kern PharmD  10/19/17  3:05 PM

## 2017-10-20 RX ORDER — WARFARIN SODIUM 5 MG/1
TABLET ORAL
Qty: 100 TABLET | Refills: 0 | OUTPATIENT
Start: 2017-10-20 | End: 2018-01-18 | Stop reason: SDUPTHER

## 2017-10-27 ENCOUNTER — ANTICOAGULATION VISIT (OUTPATIENT)
Dept: PHARMACY | Facility: HOSPITAL | Age: 67
End: 2017-10-27

## 2017-10-27 ENCOUNTER — OFFICE VISIT (OUTPATIENT)
Dept: FAMILY MEDICINE CLINIC | Facility: CLINIC | Age: 67
End: 2017-10-27

## 2017-10-27 VITALS
DIASTOLIC BLOOD PRESSURE: 80 MMHG | HEIGHT: 65 IN | BODY MASS INDEX: 36.82 KG/M2 | HEART RATE: 68 BPM | WEIGHT: 221 LBS | OXYGEN SATURATION: 97 % | SYSTOLIC BLOOD PRESSURE: 124 MMHG

## 2017-10-27 DIAGNOSIS — M54.2 CHRONIC NECK PAIN: ICD-10-CM

## 2017-10-27 DIAGNOSIS — M54.2 NECK PAIN: ICD-10-CM

## 2017-10-27 DIAGNOSIS — M25.531 BILATERAL WRIST PAIN: ICD-10-CM

## 2017-10-27 DIAGNOSIS — J44.9 CHRONIC OBSTRUCTIVE PULMONARY DISEASE, UNSPECIFIED COPD TYPE (HCC): ICD-10-CM

## 2017-10-27 DIAGNOSIS — N18.30 TYPE 2 DIABETES MELLITUS WITH STAGE 3 CHRONIC KIDNEY DISEASE, WITHOUT LONG-TERM CURRENT USE OF INSULIN (HCC): ICD-10-CM

## 2017-10-27 DIAGNOSIS — Z79.01 WARFARIN ANTICOAGULATION: ICD-10-CM

## 2017-10-27 DIAGNOSIS — M79.2 RADICULAR PAIN IN LEFT ARM: ICD-10-CM

## 2017-10-27 DIAGNOSIS — I10 ESSENTIAL HYPERTENSION: Primary | ICD-10-CM

## 2017-10-27 DIAGNOSIS — I48.0 PAROXYSMAL ATRIAL FIBRILLATION (HCC): ICD-10-CM

## 2017-10-27 DIAGNOSIS — E11.22 TYPE 2 DIABETES MELLITUS WITH STAGE 3 CHRONIC KIDNEY DISEASE, WITHOUT LONG-TERM CURRENT USE OF INSULIN (HCC): ICD-10-CM

## 2017-10-27 DIAGNOSIS — M25.532 BILATERAL WRIST PAIN: ICD-10-CM

## 2017-10-27 DIAGNOSIS — G89.29 CHRONIC NECK PAIN: ICD-10-CM

## 2017-10-27 DIAGNOSIS — G89.4 CHRONIC PAIN SYNDROME: ICD-10-CM

## 2017-10-27 DIAGNOSIS — M47.812 CERVICAL SPINE ARTHRITIS: ICD-10-CM

## 2017-10-27 DIAGNOSIS — R20.2 PARESTHESIA OF LEFT ARM: ICD-10-CM

## 2017-10-27 LAB
GLUCOSE BLDC GLUCOMTR-MCNC: 120 MG/DL (ref 70–130)
INR PPP: 2.5

## 2017-10-27 PROCEDURE — 82962 GLUCOSE BLOOD TEST: CPT | Performed by: FAMILY MEDICINE

## 2017-10-27 PROCEDURE — 99214 OFFICE O/P EST MOD 30 MIN: CPT | Performed by: FAMILY MEDICINE

## 2017-10-27 RX ORDER — OXYCODONE AND ACETAMINOPHEN 10; 325 MG/1; MG/1
1 TABLET ORAL EVERY 6 HOURS PRN
Qty: 120 TABLET | Refills: 0 | Status: SHIPPED | OUTPATIENT
Start: 2017-10-27 | End: 2017-11-21 | Stop reason: SDUPTHER

## 2017-10-27 NOTE — PROGRESS NOTES
Anticoagulation Clinic - Remote Progress Note  REMOTE LAB    Indication: Atrial fibrillation   Referring Provider: [facility]  Goal INR: 2-3  Current Drug Interactions: ASA, trazodone, fluoxetine, omeprazole, pravastatin  CHADS-VASc: 5 (CHF, HTN, Age 65-74, Vascular disease, DM)    Diet: Not consistent; averages 1x per week  Alcohol:   Tobacco:    OTC Pain Medication:    INR History:  Date 10/19 10/27          Total Weekly Dose 35 mg 42.5 mg 42.5 mg         INR 1.6 2.5          Notes Per patient             Phone Interview:  Tablet Strength: pt has 5mg tablets  Current Maintenance Dose: 5 mg daily per patient    Patient Contact Info: [phone] (Home), [phone] (Mobile)  Lab Contact Info: [facility], [phone]    Plan:  1. INR is therapeutic today following dose adjustment (more than recommended). For now, instructed Mr. Bryant to continue warfarin 5mg daily except 7.5 mg MonWedFri.  2. Repeat INR in 2 weeks. If subtherapeutic at follow up, transition to DOAC recommended due to instability in follow-up over an extended period of time in which the patient has largely been subtherapeutic.   3. Verbal information provided over the phone to Mr. Bryant. He RBV dosing instructions, expresses understanding, and has no further questions at this time.    Ann Cowden Mayer, PharmD  10/27/2017  4:49 PM

## 2017-10-31 ENCOUNTER — TELEPHONE (OUTPATIENT)
Dept: FAMILY MEDICINE CLINIC | Facility: CLINIC | Age: 67
End: 2017-10-31

## 2017-10-31 NOTE — TELEPHONE ENCOUNTER
----- Message from Deanna Wheeler MD sent at 10/30/2017  8:38 AM EDT -----  Please inform pt his xray of C spine shows nothing acute. I recommend he have a nerve conduction test on his arms to determine if problem is in neck or in arms. Order already on chart.      Pt notified of results

## 2017-11-01 ENCOUNTER — TELEPHONE (OUTPATIENT)
Dept: FAMILY MEDICINE CLINIC | Facility: CLINIC | Age: 67
End: 2017-11-01

## 2017-11-01 NOTE — TELEPHONE ENCOUNTER
Mr. Bryant rescheduled his apt. Today, he has had a family emergency today. He has been having heal pain and swelling on left leg for 2 days.

## 2017-11-02 ENCOUNTER — OFFICE VISIT (OUTPATIENT)
Dept: FAMILY MEDICINE CLINIC | Facility: CLINIC | Age: 67
End: 2017-11-02

## 2017-11-02 VITALS
HEIGHT: 65 IN | SYSTOLIC BLOOD PRESSURE: 128 MMHG | HEART RATE: 64 BPM | DIASTOLIC BLOOD PRESSURE: 80 MMHG | TEMPERATURE: 98.6 F | BODY MASS INDEX: 35.99 KG/M2 | WEIGHT: 216 LBS | OXYGEN SATURATION: 98 %

## 2017-11-02 DIAGNOSIS — M25.572 ACUTE LEFT ANKLE PAIN: Primary | ICD-10-CM

## 2017-11-02 DIAGNOSIS — M10.9 ACUTE GOUT OF LEFT ANKLE, UNSPECIFIED CAUSE: ICD-10-CM

## 2017-11-02 PROCEDURE — 99213 OFFICE O/P EST LOW 20 MIN: CPT | Performed by: FAMILY MEDICINE

## 2017-11-02 RX ORDER — METHYLPREDNISOLONE 4 MG/1
TABLET ORAL
Qty: 21 TABLET | Refills: 0 | Status: SHIPPED | OUTPATIENT
Start: 2017-11-02 | End: 2018-01-11

## 2017-11-02 NOTE — PATIENT INSTRUCTIONS
Gout  Gout is painful swelling that can occur in some of your joints. Gout is a type of arthritis. This condition is caused by having too much uric acid in your body. Uric acid is a chemical that forms when your body breaks down substances called purines. Purines are important for building body proteins.  When your body has too much uric acid, sharp crystals can form and build up inside your joints. This causes pain and swelling. Gout attacks can happen quickly and be very painful (acute gout). Over time, the attacks can affect more joints and become more frequent (chronic gout). Gout can also cause uric acid to build up under your skin and inside your kidneys.  CAUSES  This condition is caused by too much uric acid in your blood. This can occur because:  · Your kidneys do not remove enough uric acid from your blood. This is the most common cause.  · Your body makes too much uric acid. This can occur with some cancers and cancer treatments. It can also occur if your body is breaking down too many red blood cells (hemolytic anemia).  · You eat too many foods that are high in purines. These foods include organ meats and some seafood. Alcohol, especially beer, is also high in purines.  A gout attack may be triggered by trauma or stress.  RISK FACTORS  This condition is more likely to develop in people who:  · Have a family history of gout.  · Are male and middle-aged.  · Are female and have gone through menopause.  · Are obese.  · Frequently drink alcohol, especially beer.  · Are dehydrated.  · Lose weight too quickly.  · Have an organ transplant.  · Have lead poisoning.  · Take certain medicines, including aspirin, cyclosporine, diuretics, levodopa, and niacin.  · Have kidney disease or psoriasis.  SYMPTOMS  An attack of acute gout happens quickly. It usually occurs in just one joint. The most common place is the big toe. Attacks often start at night. Other joints that may be affected include joints of the feet,  ankle, knee, fingers, wrist, or elbow. Symptoms may include:  · Severe pain.  · Warmth.  · Swelling.  · Stiffness.  · Tenderness. The affected joint may be very painful to touch.  · Shiny, red, or purple skin.  · Chills and fever.  Chronic gout may cause symptoms more frequently. More joints may be involved. You may also have white or yellow lumps (tophi) on your hands or feet or in other areas near your joints.  DIAGNOSIS  This condition is diagnosed based on your symptoms, medical history, and physical exam. You may have tests, such as:  · Blood tests to measure uric acid levels.  · Removal of joint fluid with a needle (aspiration) to look for uric acid crystals.  · X-rays to look for joint damage.  TREATMENT  Treatment for this condition has two phases: treating an acute attack and preventing future attacks. Acute gout treatment may include medicines to reduce pain and swelling, including:  · NSAIDs.  · Steroids. These are strong anti-inflammatory medicines that can be taken by mouth (orally) or injected into a joint.  · Colchicine. This medicine relieves pain and swelling when it is taken soon after an attack. It can be given orally or through an IV tube.  Preventive treatment may include:  · Daily use of smaller doses of NSAIDs or colchicine.  · Use of a medicine that reduces uric acid levels in your blood.  · Changes to your diet. You may need to see a specialist about healthy eating (dietitian).  HOME CARE INSTRUCTIONS  During a Gout Attack  · If directed, apply ice to the affected area:    Put ice in a plastic bag.    Place a towel between your skin and the bag.    Leave the ice on for 20 minutes, 2-3 times a day.  · Rest the joint as much as possible. If the affected joint is in your leg, you may be given crutches to use.  · Raise (elevate) the affected joint above the level of your heart as often as possible.  · Drink enough fluids to keep your urine clear or pale yellow.  · Take over-the-counter and  prescription medicines only as told by your health care provider.  · Do not drive or operate heavy machinery while taking prescription pain medicine.  · Follow instructions from your health care provider about eating or drinking restrictions.  · Return to your normal activities as told by your health care provider. Ask your health care provider what activities are safe for you.  Avoiding Future Gout Attacks  · Follow a low-purine diet as told by your dietitian or health care provider. Avoid foods and drinks that are high in purines, including liver, kidney, anchovies, asparagus, herring, mushrooms, mussels, and beer.  · Limit alcohol intake to no more than 1 drink a day for nonpregnant women and 2 drinks a day for men. One drink equals 12 oz of beer, 5 oz of wine, or 1½ oz of hard liquor.  · Maintain a healthy weight or lose weight if you are overweight. If you want to lose weight, talk with your health care provider. It is important that you do not lose weight too quickly.  · Start or maintain an exercise program as told by your health care provider.  · Drink enough fluids to keep your urine clear or pale yellow.  · Take over-the-counter and prescription medicines only as told by your health care provider.  · Keep all follow-up visits as told by your health care provider. This is important.  SEEK MEDICAL CARE IF:  · You have another gout attack.  · You continue to have symptoms of a gout attack after10 days of treatment.  · You have side effects from your medicines.  · You have chills or a fever.  · You have burning pain when you urinate.  · You have pain in your lower back or belly.  SEEK IMMEDIATE MEDICAL CARE IF:  · You have severe or uncontrolled pain.  · You cannot urinate.     This information is not intended to replace advice given to you by your health care provider. Make sure you discuss any questions you have with your health care provider.     Document Released: 12/15/2001 Document Revised: 04/10/2017  Document Reviewed: 09/29/2016  [employer] Patient Education ©2017 [employer]

## 2017-11-03 NOTE — PROGRESS NOTES
"Subjective   Robe Bryant is a 66 y.o. male.     Lower Extremity Issue   This is a new (left ankle pain) problem. The current episode started in the past 7 days. The problem occurs constantly. The problem has been rapidly worsening. Associated symptoms include arthralgias, coughing (dry, stable) and joint swelling. Pertinent negatives include no chest pain, fever, nausea, rash or swollen glands. The symptoms are aggravated by walking (weight bearing). He has tried rest and oral narcotics (elevation) for the symptoms. The treatment provided no relief.   Has been tx'd for \"gout attack\" previously. Not currently on preventive meds.    The following portions of the patient's history were reviewed and updated as appropriate: allergies, current medications, past family history, past medical history, past social history, past surgical history and problem list.    Review of Systems   Constitutional: Negative for fever.   Respiratory: Positive for cough (dry, stable) and shortness of breath (ORELLANA). Negative for wheezing.    Cardiovascular: Negative for chest pain and palpitations.   Gastrointestinal: Negative for nausea.   Musculoskeletal: Positive for arthralgias and joint swelling.   Skin: Negative for rash.   Hematological: Bruises/bleeds easily.       Objective    Vitals:    11/02/17 1032   BP: 128/80   Pulse: 64   Temp: 98.6 °F (37 °C)   SpO2: 98%     Body mass index is 35.94 kg/(m^2).  Last 2 weights    11/02/17  1032   Weight: 216 lb (98 kg)       Physical Exam   Constitutional: He is oriented to person, place, and time. He appears well-developed and well-nourished. He is cooperative. He does not appear ill.   HENT:   Mouth/Throat: Mucous membranes are normal. Mucous membranes are not dry.   Cardiovascular: Normal rate, regular rhythm and intact distal pulses.    Pulmonary/Chest: Effort normal. He has decreased breath sounds. He has no wheezes. He has no rhonchi. He has no rales.   Musculoskeletal:        Left foot: " There is decreased range of motion, tenderness (diffuse soft tissue), bony tenderness (diffuse) and swelling (mildly increased warmth, no erythema).        Neurological: He is alert and oriented to person, place, and time. Gait (limping on left) abnormal.   Skin: Skin is warm and dry. No rash noted.   Psychiatric: He has a normal mood and affect. His behavior is normal. Cognition and memory are normal.   Nursing note and vitals reviewed.      Assessment/Plan   Robe was seen today for foot swelling.    Diagnoses and all orders for this visit:    Acute left ankle pain  -     MethylPREDNISolone (MEDROL, JEANETTE,) 4 MG tablet; Take as directed on package instructions.    Acute gout of left ankle, unspecified cause  -     MethylPREDNISolone (MEDROL, JEANETTE,) 4 MG tablet; Take as directed on package instructions.    Symptomatic tx reviewed.  Handout provided regarding dietary changes and symptoms mgnt.  Avoid NSAIDs due to chronic anticoagulation.  Patient was encouraged to keep me informed of any acute changes, lack of improvement, or any new concerning symptoms.  Pt is aware of reasons to seek emergent care.  Keep routine f/u apt, sooner as needed.  Check uric acid level next lab draw.  Patient voiced understanding of all instructions and denied further questions.

## 2017-11-08 ENCOUNTER — OFFICE VISIT (OUTPATIENT)
Dept: FAMILY MEDICINE CLINIC | Facility: CLINIC | Age: 67
End: 2017-11-08

## 2017-11-08 VITALS
HEART RATE: 60 BPM | TEMPERATURE: 99.6 F | DIASTOLIC BLOOD PRESSURE: 86 MMHG | WEIGHT: 217 LBS | HEIGHT: 65 IN | SYSTOLIC BLOOD PRESSURE: 132 MMHG | OXYGEN SATURATION: 98 % | BODY MASS INDEX: 36.15 KG/M2

## 2017-11-08 DIAGNOSIS — Z20.828 EXPOSURE TO THE FLU: ICD-10-CM

## 2017-11-08 DIAGNOSIS — J02.0 ACUTE STREPTOCOCCAL PHARYNGITIS: ICD-10-CM

## 2017-11-08 DIAGNOSIS — R50.9 FEVER, UNSPECIFIED FEVER CAUSE: ICD-10-CM

## 2017-11-08 DIAGNOSIS — R05.9 COUGH: Primary | ICD-10-CM

## 2017-11-08 DIAGNOSIS — R53.81 MALAISE: ICD-10-CM

## 2017-11-08 DIAGNOSIS — J98.01 BRONCHOSPASM: ICD-10-CM

## 2017-11-08 DIAGNOSIS — R68.89 FLU-LIKE SYMPTOMS: ICD-10-CM

## 2017-11-08 LAB
EXPIRATION DATE: NORMAL
EXPIRATION DATE: NORMAL
FLUAV AG NPH QL: NORMAL
FLUBV AG NPH QL: NORMAL
INTERNAL CONTROL: NORMAL
INTERNAL CONTROL: NORMAL
Lab: NORMAL
Lab: NORMAL
S PYO AG THROAT QL: NEGATIVE

## 2017-11-08 PROCEDURE — 87804 INFLUENZA ASSAY W/OPTIC: CPT | Performed by: FAMILY MEDICINE

## 2017-11-08 PROCEDURE — 99214 OFFICE O/P EST MOD 30 MIN: CPT | Performed by: FAMILY MEDICINE

## 2017-11-08 PROCEDURE — 87880 STREP A ASSAY W/OPTIC: CPT | Performed by: FAMILY MEDICINE

## 2017-11-08 RX ORDER — OSELTAMIVIR PHOSPHATE 75 MG/1
75 CAPSULE ORAL 2 TIMES DAILY
Qty: 10 CAPSULE | Refills: 0 | Status: SHIPPED | OUTPATIENT
Start: 2017-11-08 | End: 2018-01-11

## 2017-11-08 RX ORDER — PENICILLIN V POTASSIUM 500 MG/1
500 TABLET ORAL 3 TIMES DAILY
Qty: 30 TABLET | Refills: 0 | Status: SHIPPED | OUTPATIENT
Start: 2017-11-08 | End: 2018-01-11

## 2017-11-08 NOTE — PROGRESS NOTES
Subjective   Robe Bryant is a 66 y.o. male.     Cough   This is a new problem. The current episode started in the past 7 days (started Monday). The problem has been rapidly worsening. The problem occurs every few minutes. The cough is productive of sputum. Associated symptoms include chest pain (right side with cough), chills, a fever, headaches, myalgias, nasal congestion, postnasal drip, rhinorrhea, a sore throat, shortness of breath and wheezing. Pertinent negatives include no ear pain, eye redness, hemoptysis or rash. The symptoms are aggravated by lying down, exercise and cold air. Risk factors for lung disease include smoking/tobacco exposure (exposed to family member with flu and strep). He has tried a beta-agonist inhaler for the symptoms. The treatment provided no relief. His past medical history is significant for COPD.       The following portions of the patient's history were reviewed and updated as appropriate: allergies, current medications, past family history, past medical history, past social history, past surgical history and problem list.    Review of Systems   Constitutional: Positive for appetite change, chills, fatigue and fever.   HENT: Positive for congestion, postnasal drip, rhinorrhea and sore throat. Negative for ear pain, mouth sores, nosebleeds and trouble swallowing.    Eyes: Negative for pain and redness.   Respiratory: Positive for cough, shortness of breath and wheezing. Negative for hemoptysis.    Cardiovascular: Positive for chest pain (right side with cough). Negative for palpitations and leg swelling.   Gastrointestinal: Positive for nausea. Negative for abdominal pain, diarrhea and vomiting.   Genitourinary: Negative for dysuria and hematuria.   Musculoskeletal: Positive for arthralgias and myalgias.   Skin: Negative for rash and wound.   Neurological: Positive for dizziness, weakness and headaches.   Psychiatric/Behavioral: Positive for sleep disturbance.       Objective     Vitals:    11/08/17 1148   BP: 132/86   Pulse: 60   Temp: 99.6 °F (37.6 °C)   SpO2: 98%     Body mass index is 36.11 kg/(m^2).  Last 2 weights    11/08/17  1148   Weight: 217 lb (98.4 kg)       Physical Exam   Constitutional: He is oriented to person, place, and time. He appears well-developed and well-nourished. He is cooperative. He appears ill. No distress.   HENT:   Head: Normocephalic and atraumatic.   Right Ear: Tympanic membrane, external ear and ear canal normal.   Left Ear: Tympanic membrane, external ear and ear canal normal.   Nose: Mucosal edema and rhinorrhea present.   Mouth/Throat: Mucous membranes are not dry. No oral lesions. Posterior oropharyngeal erythema present. No oropharyngeal exudate.   Eyes: Conjunctivae and lids are normal.   Neck: Neck supple.   Cardiovascular: Normal rate, regular rhythm and intact distal pulses.    Pulmonary/Chest: Effort normal. No respiratory distress. He has wheezes. He has rhonchi.       Vascular Status -  His exam exhibits no right foot edema. His exam exhibits no left foot edema.  Lymphadenopathy:     He has cervical adenopathy.        Right cervical: Superficial cervical adenopathy present.        Left cervical: Superficial cervical adenopathy present.   Neurological: He is alert and oriented to person, place, and time.   Skin: Skin is warm and dry. No rash noted.   Psychiatric: He has a normal mood and affect. His behavior is normal. Cognition and memory are normal.   Nursing note and vitals reviewed.    Strep neg  Flu neg    Assessment/Plan   Robe was seen today for fever, cough, nasal congestion, fatigue, generalized body aches and chills.    Diagnoses and all orders for this visit:    Cough    Bronchospasm    Fever, unspecified fever cause    Malaise    Acute streptococcal pharyngitis  -     penicillin v potassium (VEETID) 500 MG tablet; Take 1 tablet by mouth 3 (Three) Times a Day.    Flu-like symptoms  -     oseltamivir (TAMIFLU) 75 MG capsule; Take 1  capsule by mouth 2 (Two) Times a Day.    Exposure to the flu  -     oseltamivir (TAMIFLU) 75 MG capsule; Take 1 capsule by mouth 2 (Two) Times a Day.    Due to exposure hx as well high risk medical condition and current symptoms will cover for flu and strep infection.  Use neb as needed.  Patient was encouraged to keep me informed of any acute changes, lack of improvement, or any new concerning symptoms.  Pt is aware of reasons to seek emergent care.  Patient voiced understanding of all instructions and denied further questions.

## 2017-11-10 ENCOUNTER — ANTICOAGULATION VISIT (OUTPATIENT)
Dept: PHARMACY | Facility: HOSPITAL | Age: 67
End: 2017-11-10

## 2017-11-10 DIAGNOSIS — I48.0 PAROXYSMAL ATRIAL FIBRILLATION (HCC): ICD-10-CM

## 2017-11-10 DIAGNOSIS — Z79.01 WARFARIN ANTICOAGULATION: ICD-10-CM

## 2017-11-10 LAB — INR PPP: 2

## 2017-11-10 NOTE — PROGRESS NOTES
Anticoagulation Clinic - Remote Progress Note  REMOTE LAB    Indication: Atrial fibrillation   Referring Provider: Garrett  Goal INR: 2-3  Current Drug Interactions: ASA, trazodone, fluoxetine, omeprazole, pravastatin  CHADS-VASc: 5 (CHF, HTN, Age 65-74, Vascular disease, DM)    Diet: Not consistent; averages 1x per week  Alcohol: none  Tobacco: none, quit smoking 17 years ago  OTC Pain Medication: none    INR History:  Date 10/19 10/27 11/10         Total Weekly Dose 35 mg 42.5 mg 42.5 mg         INR 1.6 2.5 2.0         Notes Per patient             Phone Interview:  Tablet Strength: pt has 5mg tablets  Current Maintenance Dose: 5 mg daily per patient    Patient Findings      Positives Change in medications, Change in diet/appetite     Negatives Signs/symptoms of thrombosis, Signs/symptoms of bleeding, Laboratory test error suspected, Change in health, Change in alcohol use, Change in activity, Upcoming invasive procedure, Emergency department visit, Upcoming dental procedure, Missed doses, Extra doses, Hospital admission, Bruising, Other complaints     Comments Still taking PenV 500mg TID. Started 11/9 and was given a 30 day supply.   Says his appetite hasn't been too strong since getting the flu, hasn't eaten as many greens and eats mostly chicken noodle soup.      Patient Contact Info: [phone] (Home), [phone] (Mobile)  Lab Contact Info: [facility], [phone]    Plan:  1. INR was therapeutic on Friday following PCN V initiation on 11/9. For now, instructed Mr. Bryant to continue warfarin 5mg daily except 7.5 mg MonWedFri.  2. Repeat INR in 1 week on 11/17 given use of PCN V. Patient understands need for close monitoring while taking antibiotic- will plan to monitor weekly while taking abx.  3. Verbal information provided over the phone to Mr. Bryant. He RBV dosing instructions, expresses understanding by read back, and has no further questions at this time.    Ronny SCHOFIELD  Cristhian Thrasher  11/10/2017  10:12 AM      IJocelyn, PharmD, have reviewed the note in full and agree with the assessment and plan.  11/13/17  12:01 PM

## 2017-11-13 LAB — INR PPP: 2.1

## 2017-11-21 ENCOUNTER — TELEPHONE (OUTPATIENT)
Dept: FAMILY MEDICINE CLINIC | Facility: CLINIC | Age: 67
End: 2017-11-21

## 2017-11-21 ENCOUNTER — TELEPHONE (OUTPATIENT)
Dept: CARDIOLOGY | Facility: CLINIC | Age: 67
End: 2017-11-21

## 2017-11-21 DIAGNOSIS — M79.2 RADICULAR PAIN IN LEFT ARM: ICD-10-CM

## 2017-11-21 DIAGNOSIS — M54.2 NECK PAIN: ICD-10-CM

## 2017-11-21 DIAGNOSIS — G89.4 CHRONIC PAIN SYNDROME: ICD-10-CM

## 2017-11-21 DIAGNOSIS — M47.812 CERVICAL SPINE ARTHRITIS: ICD-10-CM

## 2017-11-21 RX ORDER — OXYCODONE AND ACETAMINOPHEN 10; 325 MG/1; MG/1
1 TABLET ORAL EVERY 6 HOURS PRN
Qty: 120 TABLET | Refills: 0 | Status: SHIPPED | OUTPATIENT
Start: 2017-11-21 | End: 2017-12-22 | Stop reason: SDUPTHER

## 2017-11-21 NOTE — TELEPHONE ENCOUNTER
Called to talk to pt about Merlin home monitoring box not connecting or reading.  No answer and unable to leave message at both numbers in chart.

## 2017-11-22 ENCOUNTER — TELEPHONE (OUTPATIENT)
Dept: CARDIOLOGY | Facility: CLINIC | Age: 67
End: 2017-11-22

## 2017-11-22 NOTE — TELEPHONE ENCOUNTER
Called pt regarding home monitoring box not working.  Pt said [facility] sent him a new monitor and it's plugged up.  I think it is not paired with his device so sending pairing instructions and he will call once he does this.     YOU MAY NOW SIGN UP FOR MY [employer] WEB ACCOUNT TO OBTAIN LAB AND TEST RESULTS.  SOME MAY TAKE UP TO 2 WEEKS TO BE ENTERED INTO MY [employer]  WHILE OTHERS MAY BE ENTERED IMMEDIATELY.  YOU WILL ALWAYS RECEIVE A PHONE CALL FROM OUR OFFICE STAFF WITH IMPORTANT ABNORMAL LABS.  HOWEVER, YOU WILL NOT RECEIVE A CALL ABOUT NORMAL LAB RESULTS. PLEASE FEEL FREE TO CALL AND DISCUSS ANY LAB RESULTS YOU HAVE QUESTIONS ABOUT.     .PLEASE CHECK WITH YOUR INSURANCE FOR ANY REFERRALS TO BE SURE THEY ARE IN YOUR INSURANCE PLAN.         .SCHEDULE APPOINTMENT WITH DR. AYOUB AT [facility]:  [phone] FOR LOW IRON LEVLES    .SCHEDULE APPOINTMENT WITH DR. TAO -[phone] [phone] FOR SEIZURES.    REPEAT UA 1-2 WEEKS BEFORE OR AFTER MENSES    LAB HOURS  Monday-Friday 7AM-5PM  Saturday 7AM-12PM Sunday CLOSED    RIGHT TOE:  WASH, CLEAN & PAT DRY.  APPLY BACITRACIN.  APPLY 2X2.  USE DR. ROSALES TO CUSHION TOE.    .SCHEDULE APPOINTMENT WITH DR. ORELLANA:  [phone] IF NEEDED FOR RIGHT TOE.    START DURICEF 500 MG 2 TIMES PER DAY FOR 7 DAYS IF RIGHT TOE BECOMES RED, SWOLLEN OR HAS DRAINAGE    USE OVER THE COUNTER ROBITUCCIN.    IF DEVELOPS FEVER, COUGH, CHILLS GO TO WALK-IN.    SCHEDULE FOLLOW UP WITH DR. PARISI IN 6 MONTHS.        .DR PARISI'S HOURS  Monday 7AM-4:30PM  Tuesday 7AM-4:30PM  Wednesday CLOSED  Thursday 7AM-4:30PM  Friday CLOSED    DR. PARISI APPOINTMENT SCHEDULE  Monday 7AM-2:30PM  TUES 7AM-2:30PM  Wednesday CLOSED  Thursday 7AM-2:30PM  Friday CLOSED      In the next few weeks you may receive a Press Ganey survey regarding your most recent clinic visit with us.  Please take a few moments out of your day to accurately evaluate your visit.  We strive to provide you with the best medical care.  Again, thank you for your time and we look forward to your next visit.         If we need to contact you regarding any test results, we will make 2 attempts to reach you at the number you have listed during your office visit today. If  we are unable to reach you, a letter with your results and any further instructions will be mailed to you home.

## 2017-11-30 ENCOUNTER — TELEPHONE (OUTPATIENT)
Dept: CARDIOLOGY | Facility: CLINIC | Age: 67
End: 2017-11-30

## 2017-11-30 DIAGNOSIS — G25.81 RESTLESS LEG SYNDROME: ICD-10-CM

## 2017-11-30 NOTE — TELEPHONE ENCOUNTER
Pt said he received pairing instructions for his Merlin box and thinks it is paired.  It is not showing paired.  Having [facility] to reach out to pt.

## 2017-12-01 RX ORDER — POTASSIUM CHLORIDE 750 MG/1
TABLET, EXTENDED RELEASE ORAL
Qty: 90 TABLET | Refills: 0 | Status: SHIPPED | OUTPATIENT
Start: 2017-12-01 | End: 2018-03-06 | Stop reason: HOSPADM

## 2017-12-01 RX ORDER — GABAPENTIN 600 MG/1
TABLET ORAL
Qty: 360 TABLET | Refills: 0 | OUTPATIENT
Start: 2017-12-01 | End: 2018-03-06 | Stop reason: HOSPADM

## 2017-12-01 RX ORDER — PRAVASTATIN SODIUM 80 MG/1
TABLET ORAL
Qty: 90 TABLET | Refills: 0 | Status: SHIPPED | OUTPATIENT
Start: 2017-12-01 | End: 2018-03-09 | Stop reason: SDUPTHER

## 2017-12-01 RX ORDER — FLUOXETINE HYDROCHLORIDE 20 MG/1
CAPSULE ORAL
Qty: 90 CAPSULE | Refills: 0 | Status: SHIPPED | OUTPATIENT
Start: 2017-12-01 | End: 2018-03-09 | Stop reason: SDUPTHER

## 2017-12-01 RX ORDER — OMEPRAZOLE 40 MG/1
CAPSULE, DELAYED RELEASE ORAL
Qty: 90 CAPSULE | Refills: 0 | Status: SHIPPED | OUTPATIENT
Start: 2017-12-01 | End: 2018-03-09 | Stop reason: SDUPTHER

## 2017-12-01 RX ORDER — TRAZODONE HYDROCHLORIDE 50 MG/1
TABLET ORAL
Qty: 90 TABLET | Refills: 0 | Status: SHIPPED | OUTPATIENT
Start: 2017-12-01 | End: 2018-03-09 | Stop reason: SDUPTHER

## 2017-12-01 RX ORDER — BACLOFEN 10 MG/1
TABLET ORAL
Qty: 180 TABLET | Refills: 0 | Status: SHIPPED | OUTPATIENT
Start: 2017-12-01 | End: 2018-03-06 | Stop reason: HOSPADM

## 2017-12-01 RX ORDER — LISINOPRIL 20 MG/1
TABLET ORAL
Qty: 90 TABLET | Refills: 0 | Status: SHIPPED | OUTPATIENT
Start: 2017-12-01 | End: 2018-03-06 | Stop reason: HOSPADM

## 2017-12-08 ENCOUNTER — TELEPHONE (OUTPATIENT)
Dept: PHARMACY | Facility: HOSPITAL | Age: 67
End: 2017-12-08

## 2017-12-21 ENCOUNTER — TELEPHONE (OUTPATIENT)
Dept: FAMILY MEDICINE CLINIC | Facility: CLINIC | Age: 67
End: 2017-12-21

## 2017-12-21 DIAGNOSIS — M79.2 RADICULAR PAIN IN LEFT ARM: ICD-10-CM

## 2017-12-21 DIAGNOSIS — G89.4 CHRONIC PAIN SYNDROME: ICD-10-CM

## 2017-12-21 DIAGNOSIS — M54.2 NECK PAIN: ICD-10-CM

## 2017-12-21 DIAGNOSIS — M47.812 CERVICAL SPINE ARTHRITIS: ICD-10-CM

## 2017-12-22 RX ORDER — OXYCODONE AND ACETAMINOPHEN 10; 325 MG/1; MG/1
1 TABLET ORAL EVERY 6 HOURS PRN
Qty: 120 TABLET | Refills: 0 | Status: SHIPPED | OUTPATIENT
Start: 2017-12-22 | End: 2018-01-11

## 2018-01-09 ENCOUNTER — TELEPHONE (OUTPATIENT)
Dept: FAMILY MEDICINE CLINIC | Facility: CLINIC | Age: 68
End: 2018-01-09

## 2018-01-09 NOTE — TELEPHONE ENCOUNTER
Patient came into clinic today. He stated his wife and her family put him out of the house about a week ago, he packed part of his things and went to his sisters to stay and when he got there and went to take his pain med, it was missing.  He sees his   Lawrence Tate tomorrow and goes to court about a EPO they have out on him.  he's is getting an divorce, they have taken money out of his express account and have sold some of his belonging. He stated it was probably one of them that called pretending to be his daughter to try and get him in trouble.

## 2018-01-09 NOTE — TELEPHONE ENCOUNTER
I called patients daughter to get a new phone number, but she stated she has not seen or heard from patient in several weeks, and wanted to know if I tried his home phone.  I told her that I had his wife stated he no longer lived there and didn't know where he lived. When I asked her about calling the office yesterday she stated she didn't call us yesterday and didn't know anything about it.  She stated that when they fight in the past that they allways tried to start up a bunch of stuff and wished they would leaver out of it because she very seldom interacts with him

## 2018-01-09 NOTE — TELEPHONE ENCOUNTER
Patients number has been disconnected, I called his house number and his wife stated he no longer lives there.

## 2018-01-09 NOTE — TELEPHONE ENCOUNTER
I received a phone call yesterday stating she was Janel Hinojosa, but the voice was not patient.  It sounded like a very young girls voice.  She stated that Robe was trying to sale his pain med to her son who just turned 18 and that wasn't right for him to do.

## 2018-01-10 NOTE — TELEPHONE ENCOUNTER
Mr. Bryant is past due for an INR check. Do you happen to have an updated phone number / number for his sister? Thank you.

## 2018-01-11 ENCOUNTER — OFFICE VISIT (OUTPATIENT)
Dept: FAMILY MEDICINE CLINIC | Facility: CLINIC | Age: 68
End: 2018-01-11

## 2018-01-11 VITALS
HEART RATE: 76 BPM | SYSTOLIC BLOOD PRESSURE: 112 MMHG | WEIGHT: 211 LBS | OXYGEN SATURATION: 97 % | HEIGHT: 66 IN | BODY MASS INDEX: 33.91 KG/M2 | DIASTOLIC BLOOD PRESSURE: 72 MMHG

## 2018-01-11 DIAGNOSIS — Z00.00 MEDICARE ANNUAL WELLNESS VISIT, SUBSEQUENT: Primary | ICD-10-CM

## 2018-01-11 DIAGNOSIS — G89.29 CHRONIC CHEST PAIN: ICD-10-CM

## 2018-01-11 DIAGNOSIS — I10 ESSENTIAL HYPERTENSION: ICD-10-CM

## 2018-01-11 DIAGNOSIS — E11.22 TYPE 2 DIABETES MELLITUS WITH STAGE 3 CHRONIC KIDNEY DISEASE, WITHOUT LONG-TERM CURRENT USE OF INSULIN (HCC): ICD-10-CM

## 2018-01-11 DIAGNOSIS — M79.602 LEFT ARM PAIN: ICD-10-CM

## 2018-01-11 DIAGNOSIS — N18.9 CHRONIC KIDNEY DISEASE, UNSPECIFIED CKD STAGE: ICD-10-CM

## 2018-01-11 DIAGNOSIS — R73.01 IMPAIRED FASTING GLUCOSE: ICD-10-CM

## 2018-01-11 DIAGNOSIS — Z23 NEED FOR PNEUMOCOCCAL VACCINATION: ICD-10-CM

## 2018-01-11 DIAGNOSIS — R33.8 BENIGN PROSTATIC HYPERPLASIA WITH URINARY RETENTION: ICD-10-CM

## 2018-01-11 DIAGNOSIS — R07.9 CHRONIC CHEST PAIN: ICD-10-CM

## 2018-01-11 DIAGNOSIS — Z13.6 ENCOUNTER FOR LIPID SCREENING FOR CARDIOVASCULAR DISEASE: ICD-10-CM

## 2018-01-11 DIAGNOSIS — R07.89 ATYPICAL CHEST PAIN: ICD-10-CM

## 2018-01-11 DIAGNOSIS — I50.32 CHRONIC DIASTOLIC HEART FAILURE (HCC): ICD-10-CM

## 2018-01-11 DIAGNOSIS — Z95.0 PACEMAKER: ICD-10-CM

## 2018-01-11 DIAGNOSIS — E78.00 HYPERCHOLESTEROLEMIA: ICD-10-CM

## 2018-01-11 DIAGNOSIS — J44.9 CHRONIC OBSTRUCTIVE PULMONARY DISEASE, UNSPECIFIED COPD TYPE (HCC): ICD-10-CM

## 2018-01-11 DIAGNOSIS — G89.4 CHRONIC PAIN SYNDROME: ICD-10-CM

## 2018-01-11 DIAGNOSIS — Z13.220 ENCOUNTER FOR LIPID SCREENING FOR CARDIOVASCULAR DISEASE: ICD-10-CM

## 2018-01-11 DIAGNOSIS — Z13.1 SCREENING FOR DIABETES MELLITUS: ICD-10-CM

## 2018-01-11 DIAGNOSIS — I48.0 PAROXYSMAL ATRIAL FIBRILLATION (HCC): ICD-10-CM

## 2018-01-11 DIAGNOSIS — N40.1 BENIGN PROSTATIC HYPERPLASIA WITH URINARY RETENTION: ICD-10-CM

## 2018-01-11 DIAGNOSIS — N18.30 TYPE 2 DIABETES MELLITUS WITH STAGE 3 CHRONIC KIDNEY DISEASE, WITHOUT LONG-TERM CURRENT USE OF INSULIN (HCC): ICD-10-CM

## 2018-01-11 DIAGNOSIS — I25.10 CHRONIC CORONARY ARTERY DISEASE: ICD-10-CM

## 2018-01-11 DIAGNOSIS — Z79.01 WARFARIN ANTICOAGULATION: ICD-10-CM

## 2018-01-11 LAB
ALBUMIN SERPL-MCNC: 4 G/DL (ref 3.5–5)
ALBUMIN/GLOB SERPL: 1.5 G/DL (ref 1–2)
ALP SERPL-CCNC: 63 U/L (ref 38–126)
ALT SERPL-CCNC: 26 U/L (ref 13–69)
AST SERPL-CCNC: 26 U/L (ref 15–46)
BILIRUB SERPL-MCNC: 0.4 MG/DL (ref 0.2–1.3)
BUN SERPL-MCNC: 19 MG/DL (ref 7–20)
BUN/CREAT SERPL: 14.6 (ref 6.3–21.9)
CALCIUM SERPL-MCNC: 9.7 MG/DL (ref 8.4–10.2)
CHLORIDE SERPL-SCNC: 101 MMOL/L (ref 98–107)
CHOLEST SERPL-MCNC: 128 MG/DL (ref 0–199)
CO2 SERPL-SCNC: 32 MMOL/L (ref 26–30)
CREAT SERPL-MCNC: 1.3 MG/DL (ref 0.6–1.3)
ERYTHROCYTE [DISTWIDTH] IN BLOOD BY AUTOMATED COUNT: 13 % (ref 11.5–14.5)
GLOBULIN SER CALC-MCNC: 2.6 GM/DL
GLUCOSE SERPL-MCNC: 97 MG/DL (ref 74–98)
HBA1C MFR BLD: 5.7 %
HCT VFR BLD AUTO: 47.7 % (ref 42–52)
HDLC SERPL-MCNC: 31 MG/DL (ref 40–60)
HGB BLD-MCNC: 15.2 G/DL (ref 14–18)
INR PPP: 3 (ref 0.9–1.1)
LDLC SERPL CALC-MCNC: 48 MG/DL (ref 0–99)
MCH RBC QN AUTO: 28.1 PG (ref 27–31)
MCHC RBC AUTO-ENTMCNC: 31.9 G/DL (ref 30–37)
MCV RBC AUTO: 88.3 FL (ref 80–94)
PLATELET # BLD AUTO: 218 10*3/MM3 (ref 130–400)
POTASSIUM SERPL-SCNC: 3.8 MMOL/L (ref 3.5–5.1)
PROT SERPL-MCNC: 6.6 G/DL (ref 6.3–8.2)
RBC # BLD AUTO: 5.4 10*6/MM3 (ref 4.7–6.1)
SODIUM SERPL-SCNC: 142 MMOL/L (ref 137–145)
T4 FREE SERPL-MCNC: 1.16 NG/DL (ref 0.78–2.19)
TRIGL SERPL-MCNC: 244 MG/DL
TSH SERPL DL<=0.005 MIU/L-ACNC: 0.38 MIU/ML (ref 0.47–4.68)
VLDLC SERPL CALC-MCNC: 48.8 MG/DL
WBC # BLD AUTO: 8.66 10*3/MM3 (ref 4.8–10.8)

## 2018-01-11 PROCEDURE — 99397 PER PM REEVAL EST PAT 65+ YR: CPT | Performed by: FAMILY MEDICINE

## 2018-01-11 PROCEDURE — 90732 PPSV23 VACC 2 YRS+ SUBQ/IM: CPT | Performed by: FAMILY MEDICINE

## 2018-01-11 PROCEDURE — 99214 OFFICE O/P EST MOD 30 MIN: CPT | Performed by: FAMILY MEDICINE

## 2018-01-11 PROCEDURE — G0009 ADMIN PNEUMOCOCCAL VACCINE: HCPCS | Performed by: FAMILY MEDICINE

## 2018-01-11 PROCEDURE — G0439 PPPS, SUBSEQ VISIT: HCPCS | Performed by: FAMILY MEDICINE

## 2018-01-11 PROCEDURE — 96160 PT-FOCUSED HLTH RISK ASSMT: CPT | Performed by: FAMILY MEDICINE

## 2018-01-11 PROCEDURE — 85610 PROTHROMBIN TIME: CPT | Performed by: FAMILY MEDICINE

## 2018-01-11 PROCEDURE — 93000 ELECTROCARDIOGRAM COMPLETE: CPT | Performed by: FAMILY MEDICINE

## 2018-01-11 RX ORDER — OXYCODONE AND ACETAMINOPHEN 7.5; 325 MG/1; MG/1
1 TABLET ORAL EVERY 8 HOURS PRN
Qty: 33 TABLET | Refills: 0 | Status: SHIPPED | OUTPATIENT
Start: 2018-01-11 | End: 2018-01-23 | Stop reason: SDUPTHER

## 2018-01-11 NOTE — PATIENT INSTRUCTIONS
Preventive care refers to lifestyle choices and visits with your health care provider that can promote health and wellness.  WHAT DOES PREVENTIVE CARE INCLUDE?  · A yearly physical exam. This is also called an annual well check.  · Dental exams once or twice a year.  · Routine eye exams. Ask your health care provider how often you should have your eyes checked.  · Personal lifestyle choices, including:    Daily care of your teeth and gums.    Regular physical activity.    Eating a healthy diet.    Avoiding tobacco and drug use.    Limiting alcohol use.    Practicing safe sex.    Taking low doses of aspirin every day.    Taking vitamin and mineral supplements as recommended by your health care provider.  WHAT HAPPENS DURING AN ANNUAL WELL CHECK?  The services and screenings done by your health care provider during your annual well check will depend on your age, overall health, lifestyle risk factors, and family history of disease.  Counseling  Your health care provider may ask you questions about your:  · Alcohol use.  · Tobacco use.  · Drug use.  · Emotional well-being.  · Home and relationship well-being.  · Sexual activity.  · Eating habits.  · History of falls.  · Memory and ability to understand (cognition).  · Work and work environment.  Screening  You may have the following tests or measurements:  · Height, weight, and BMI.  · Blood pressure.  · Lipid and cholesterol levels. These may be checked every 5 years, or more frequently if you are over 50 years old.  · Skin check.  · Lung cancer screening. You may have this screening every year starting at age 55 if you have a 30-pack-year history of smoking and currently smoke or have quit within the past 15 years.  · Fecal occult blood test (FOBT) of the stool. You may have this test every year starting at age 50.  · Flexible sigmoidoscopy or colonoscopy. You may have a sigmoidoscopy every 5 years or a colonoscopy every 10 years starting at age 50.  · Prostate  cancer screening. Recommendations will vary depending on your family history and other risks.  · Hepatitis C blood test.  · Hepatitis B blood test.  · Sexually transmitted disease (STD) testing.  · Diabetes screening. This is done by checking your blood sugar (glucose) after you have not eaten for a while (fasting). You may have this done every 1-3 years.  · Abdominal aortic aneurysm (AAA) screening. You may need this if you are a current or former smoker.  · Osteoporosis. You may be screened starting at age 70 if you are at high risk.  Talk with your health care provider about your test results, treatment options, and if necessary, the need for more tests.  Vaccines  Your health care provider may recommend certain vaccines, such as:  · Influenza vaccine. This is recommended every year.  · Tetanus, diphtheria, and acellular pertussis (Tdap, Td) vaccine. You may need a Td booster every 10 years.  · Varicella vaccine. You may need this if you have not been vaccinated.  · Zoster vaccine. You may need this after age 60.  · Measles, mumps, and rubella (MMR) vaccine. You may need at least one dose of MMR if you were born in 1957 or later. You may also need a second dose.  · Pneumococcal 13-valent conjugate (PCV13) vaccine. One dose is recommended after age 65.  · Pneumococcal polysaccharide (PPSV23) vaccine. One dose is recommended after age 65.  · Meningococcal vaccine. You may need this if you have certain conditions.  · Hepatitis A vaccine. You may need this if you have certain conditions or if you travel or work in places where you may be exposed to hepatitis A.  · Hepatitis B vaccine. You may need this if you have certain conditions or if you travel or work in places where you may be exposed to hepatitis B.  · Haemophilus influenzae type b (Hib) vaccine. You may need this if you have certain risk factors.  Talk to your health care provider about which screenings and vaccines you need and how often you need them.      This information is not intended to replace advice given to you by your health care provider. Make sure you discuss any questions you have with your health care provider.     Document Released: 2017 Document Reviewed: 2017  [employer] Interactive Patient Education © [employer]        Medicare Wellness  Personal Prevention Plan of Service     Date of Office Visit:  2018  Encounter Provider:  Deanna Wheeler MD  Place of Service:  [facility]  Patient Name: Robe Bryant  :  1950    As part of the Medicare Wellness portion of your visit today, we are providing you with this personalized preventive plan of services (PPPS). This plan is based upon recommendations of the [place] Preventive Services Task Force (USPSTF) and the Advisory Committee on Immunization Practices (ACIP).    This lists the preventive care services that should be considered, and provides dates of when you are due. Items listed as completed are up-to-date and do not require any further intervention.    Health Maintenance   Topic Date Due   • TDAP/TD VACCINES (1 - Tdap) 1968   • DIABETIC FOOT EXAM  2016   • PNEUMOCOCCAL VACCINES (65+ LOW/MEDIUM RISK) (2 of 2 - PPSV23) 2017   • URINE MICROALBUMIN  2017   • HEMOGLOBIN A1C  2017   • MEDICARE ANNUAL WELLNESS  01/10/2018   • LIPID PANEL  2018   • DIABETIC EYE EXAM  2018   • COLONOSCOPY  2025   • HEPATITIS C SCREENING  Completed   • INFLUENZA VACCINE  Addressed   • AAA SCREEN (ONE-TIME)  Completed   • ZOSTER VACCINE  Addressed       Orders Placed This Encounter   Procedures   • CBC (No Diff)   • Comprehensive Metabolic Panel   • Lipid Panel   • Hemoglobin A1c   • TSH Rfx On Abnormal To Free T4   • POC INR     This is an external result entered through the Results Console.       Return in about 2 months (around 3/11/2018).

## 2018-01-11 NOTE — PROGRESS NOTES
QUICK REFERENCE INFORMATION:  The ABCs of the Annual Wellness Visit    Subsequent Medicare Wellness Visit    HEALTH RISK ASSESSMENT    1950    Recent Hospitalizations:  No hospitalization(s) within the last year..        Current Medical Providers:  Patient Care Team:  Deanna Wheeler MD as PCP - General (Family Medicine)  Deanna Wheeler MD as PCP - Family Medicine  Arelis Tucker MD as Consulting Physician (Cardiology)  Osiel Heaton MD as Consulting Physician (Urology)  Chace Vasquez MD as Consulting Physician (Nephrology)  Blayne Whitman MD as Consulting Physician (Ophthalmology)  Jose Hargrove MD as Consulting Physician (Pulmonary Disease)  John Solorzano MD as Consulting Physician (Urology)  Deanna Wheeler MD as Referring Physician (Family Medicine)        Smoking Status:  History   Smoking Status   • Former Smoker   • Quit date: 8/15/2001   Smokeless Tobacco   • Never Used     Comment: quit 16 years ago       Alcohol Consumption:  History   Alcohol Use No       Depression Screen:   PHQ-2/PHQ-9 Depression Screening 1/11/2018   Little interest or pleasure in doing things 1   Feeling down, depressed, or hopeless 2   Trouble falling or staying asleep, or sleeping too much 1   Feeling tired or having little energy 2   Poor appetite or overeating 0   Feeling bad about yourself - or that you are a failure or have let yourself or your family down 2   Trouble concentrating on things, such as reading the newspaper or watching television 0   Moving or speaking so slowly that other people could have noticed. Or the opposite - being so fidgety or restless that you have been moving around a lot more than usual 0   Thoughts that you would be better off dead, or of hurting yourself in some way 0   Total Score 8   If you checked off any problems, how difficult have these problems made it for you to do your work, take care of things at home, or get along with other people? -       Health Habits and  Functional and Cognitive Screening:  Functional & Cognitive Status 1/10/2017   Do you have difficulty preparing food and eating? No   Do you have difficulty bathing yourself, getting dressed or grooming yourself? No   Do you have difficulty using the toilet? No   Do you have difficulty moving around from place to place? No   In the past year have you fallen or experienced a near fall? Yes   Do you need help using the phone?  No   Are you deaf or do you have serious difficulty hearing?  No   Do you need help with transportation? No   Do you need help shopping? No   Do you need help preparing meals?  No   Do you need help with housework?  No   Do you need help with laundry? No   Do you need help taking your medications? No   Do you need help managing money? No   Do you have difficulty concentrating, remembering or making decisions? No           Does the patient have evidence of cognitive impairment? No    Aspirin use counseling: Taking ASA appropriately as indicated      Recent Lab Results:  CMP:    Lab Results   Component Value Date    GLU 97 01/11/2018    BUN 19 01/11/2018    CREATININE 1.30 01/11/2018    EGFRIFNONA 55 (L) 01/11/2018    EGFRIFAFRI 67 01/11/2018    BCR 14.6 01/11/2018     01/11/2018    K 3.8 01/11/2018    CO2 32.0 (H) 01/11/2018    CALCIUM 9.7 01/11/2018    PROTENTOTREF 6.6 01/11/2018    ALBUMIN 4.00 01/11/2018    LABGLOBREF 2.6 01/11/2018    LABIL2 1.5 01/11/2018    BILITOT 0.4 01/11/2018    ALKPHOS 63 01/11/2018    AST 26 01/11/2018    ALT 26 01/11/2018     Lipid Panel:  Lab Results   Component Value Date    CHOL 151 09/13/2016    TRIG 244 (H) 01/11/2018    HDL 31 (L) 01/11/2018    VLDL 48.8 01/11/2018    LDLDIRECT 82 09/13/2016     HbA1c:  Lab Results   Component Value Date    HGBA1C 5.70 01/11/2018       Visual Acuity:  No exam data present; pt declines today as he is UTD on eye exam.    Age-appropriate Screening Schedule:  Refer to the list below for future screening recommendations based  "on patient's age, sex and/or medical conditions. Orders for these recommended tests are listed in the plan section. The patient has been provided with a written plan.    Health Maintenance   Topic Date Due   • TDAP/TD VACCINES (1 - Tdap) 11/23/1968   • DIABETIC FOOT EXAM  09/13/2016   • URINE MICROALBUMIN  09/13/2017   • HEMOGLOBIN A1C  07/11/2018   • DIABETIC EYE EXAM  09/29/2018   • LIPID PANEL  01/11/2019   • COLONOSCOPY  08/25/2025   • INFLUENZA VACCINE  Addressed   • PNEUMOCOCCAL VACCINES (65+ LOW/MEDIUM RISK)  Completed   • ZOSTER VACCINE  Addressed        Subjective   History of Present Illness    Robe Bryant is a 67 y.o. male who presents for an Subsequent Wellness Visit. He also has multiple chronic medical problems.    He has a known h/o severe OA and DDD with chronic neck and back pain. Has been medically managed. He continues to have severe neck pain, of overall worsening course. Recently assoc'd with left arm weakness and numbness. Trouble with rotating head and \"looking up\". He has had CT scan of C spine which showed diffuse marked changes from degenerative arthritis. (he is unable to undergo MRI due to pacemaker placement). 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 He has had surgical consultation with Dr. Montiel and was felt to not be a good surgical candidate at this time. He has not been able to attend PT due to psychosocial stressors/limitations. Has had trouble having reliable transportation for apts. He is currently going through divorce; has moved out of his home and living with other family. He has had his medication taken from him.       Mr. Bryant has controlled NIDDM, HTN, HLP, COPD, CAD. Reports taking medications as rx'd. He is on coumadin for a fib. Followed by Dr. Tucker. No recent worsening cough, ORELLANA, palpitations, edema, orthopnea. Continues to have intermittent left sided mild/moderate aching chest pain. Over past few " days assoc'd with left arm pain/numbness/weakness. No assoc'd nausea, dizziness, SOA. He is using combivent with good symptom improvement. No hemoptysis, no fever, no increased sputum production.    Has h/o BPH with urinary retention as well as CKD. Is due for f/u with Dr. Heaton.    The following portions of the patient's history were reviewed and updated as appropriate: allergies, current medications, past family history, past medical history, past social history, past surgical history and problem list.    Outpatient Medications Prior to Visit   Medication Sig Dispense Refill   • aspirin 81 MG tablet Take  by mouth daily.     • baclofen (LIORESAL) 10 MG tablet TAKE 1 TABLET TWICE DAILY 180 tablet 0   • bisoprolol-hydrochlorothiazide (ZIAC) 10-6.25 MG per tablet      • bumetanide (BUMEX) 2 MG tablet TAKE 1 TABLET EVERY DAY 90 tablet 0   • DULERA 100-5 MCG/ACT inhaler INHALE 2 PUFFS TWICE DAILY 13 g 5   • FLUoxetine (PROzac) 20 MG capsule TAKE 1 CAPSULE EVERY DAY 90 capsule 0   • gabapentin (NEURONTIN) 600 MG tablet TAKE 1 TO 2 TABLETS DURING THE DAY AS NEEDED. MAY TAKE 2 TABLETS AT BEDTIME 360 tablet 0   • ipratropium-albuterol (DUO-NEB) 0.5-2.5 mg/mL nebulizer 2 (Two) Times a Day.     • isosorbide mononitrate (IMDUR) 30 MG 24 hr tablet Take 1 tablet by mouth 2 (Two) Times a Day. 180 tablet 3   • ketoconazole (NIZORAL) 2 % cream APPLY  TOPICALLY EVERY 12 (TWELVE) HOURS. 180 g 0   • lisinopril (PRINIVIL,ZESTRIL) 20 MG tablet TAKE 1 TABLET EVERY DAY 90 tablet 0   • meclizine (ANTIVERT) 12.5 MG tablet Take 12.5 mg by mouth 3 (Three) Times a Day As Needed for dizziness.     • metFORMIN (GLUCOPHAGE) 500 MG tablet TAKE 1 TABLET EVERY DAY WITH BREAKFAST 90 tablet 0   • metoprolol succinate XL (TOPROL-XL) 100 MG 24 hr tablet Take 1 tablet by mouth Daily. 90 tablet 3   • Multiple Vitamin tablet Take 1 tablet by mouth daily.     • nitroglycerin (NITROSTAT) 0.4 MG SL tablet Place 1 tablet under the tongue Every 5 (Five)  Minutes As Needed for chest pain. 25 tablet 11   • omeprazole (priLOSEC) 40 MG capsule TAKE 1 CAPSULE EVERY DAY 90 capsule 0   • potassium chloride (K-DUR,KLOR-CON) 10 MEQ CR tablet TAKE 1 TABLET EVERY DAY 90 tablet 0   • pravastatin (PRAVACHOL) 80 MG tablet TAKE 1 TABLET EVERY NIGHT 90 tablet 0   • tamsulosin (FLOMAX) 0.4 MG capsule 24 hr capsule Take 1 capsule by mouth Daily. 90 capsule 1   • traZODone (DESYREL) 50 MG tablet TAKE 1 TABLET EVERY NIGHT 90 tablet 0   • warfarin (COUMADIN) 5 MG tablet Take 1 to 1.5 tablets by mouth daily or as instructed by anticoagulation pharmacist 100 tablet 0   • zolpidem (AMBIEN) 5 MG tablet Take 10 mg by mouth At Night As Needed for sleep.     • MethylPREDNISolone (MEDROL, JEANETTE,) 4 MG tablet Take as directed on package instructions. 21 tablet 0   • oseltamivir (TAMIFLU) 75 MG capsule Take 1 capsule by mouth 2 (Two) Times a Day. 10 capsule 0   • oxyCODONE-acetaminophen (PERCOCET)  MG per tablet Take 1 tablet by mouth Every 6 (Six) Hours As Needed for Severe Pain . 120 tablet 0   • penicillin v potassium (VEETID) 500 MG tablet Take 1 tablet by mouth 3 (Three) Times a Day. 30 tablet 0   • potassium chloride (K-DUR) 10 MEQ CR tablet Take 1 tablet by mouth Daily. 90 tablet 0     No facility-administered medications prior to visit.        Patient Active Problem List   Diagnosis   • Pacemaker   • Neck pain   • Chronic low back pain   • Chronic kidney disease   • Essential hypertension   • Seborrheic dermatitis   • Benign prostatic hyperplasia   • Paroxysmal atrial fibrillation   • Cervico-occipital neuralgia   • Hypercholesterolemia   • Acute erosive gastritis   • Diastolic heart failure   • Chronic obstructive pulmonary disease   • Chronic coronary artery disease   • Gastroesophageal reflux disease   • Chronic shoulder pain   • Obstructive sleep apnea of adult   • Dysphagia   • Symptomatic bradycardia   • Type 2 diabetes mellitus, without long-term current use of insulin   •  Chronic chest pain   • Restless leg syndrome   • Warfarin anticoagulation   • Osteoarthritis of multiple joints   • Multilevel degenerative disc disease   • Moderate episode of recurrent major depressive disorder   • Chronic pain syndrome   • Syncope   • DDD (degenerative disc disease), cervical   • Postural dizziness with presyncope   • BPH (benign prostatic hypertrophy) with urinary retention       Advance Care Planning:  has NO advance directive - information provided to the patient today    Identification of Risk Factors:  Risk factors include: weight , unhealthy diet, cardiovascular risk, increased fall risk, chronic pain, inadequate social support, depression and polypharmacy.    Review of Systems   Constitutional: Positive for fatigue. Negative for appetite change, fever and unexpected weight change.   HENT: Positive for congestion and postnasal drip. Negative for nosebleeds, sore throat and trouble swallowing.    Eyes: Positive for visual disturbance (chronic).   Respiratory: Positive for shortness of breath. Negative for cough and wheezing.    Cardiovascular: Positive for chest pain. Negative for palpitations and leg swelling.   Gastrointestinal: Negative for abdominal pain, blood in stool, diarrhea, nausea and vomiting.   Endocrine: Positive for heat intolerance.   Genitourinary: Negative for dysuria and hematuria.        Nocturia   Musculoskeletal: Positive for arthralgias, back pain, joint swelling, myalgias, neck pain and neck stiffness.   Skin: Negative for rash and wound.   Neurological: Positive for dizziness, weakness (generalized) and numbness (left hand). Negative for seizures, syncope, speech difficulty and headaches.   Hematological: Negative for adenopathy. Bruises/bleeds easily.   Psychiatric/Behavioral: Positive for dysphoric mood. Negative for confusion, sleep disturbance and suicidal ideas. The patient is nervous/anxious.        Compared to one year ago, the patient feels his physical  health is worse.  Compared to one year ago, the patient feels his mental health is worse.    Objective     Physical Exam   Constitutional: He is oriented to person, place, and time. He appears well-developed and well-nourished. He is cooperative. He does not appear ill. No distress.   Appears older than stated age   HENT:   Head: Normocephalic and atraumatic.   Right Ear: Decreased hearing is noted.   Left Ear: Decreased hearing is noted.   Mouth/Throat: Oropharynx is clear and moist and mucous membranes are normal. Mucous membranes are not dry. No oral lesions.   Eyes: Conjunctivae and lids are normal.   Neck: Neck supple. Normal carotid pulses present. No thyroid mass and no thyromegaly present.   Chronic hoarseness noted   Cardiovascular: Normal rate, regular rhythm, normal heart sounds and intact distal pulses.    Pulmonary/Chest: Effort normal. He has decreased breath sounds. He has no wheezes. He has no rhonchi. He has no rales.   Musculoskeletal:        Left shoulder: He exhibits decreased range of motion (flexion, abduction to 90 deg), tenderness (diffuse soft tissue), bony tenderness (AC joint), spasm and decreased strength (due to pain). He exhibits normal pulse.        Cervical back: He exhibits decreased range of motion (all planes), tenderness (diffuse soft tissue, L > R), bony tenderness (C4-C7) and spasm.        Lumbar back: He exhibits decreased range of motion, bony tenderness, deformity (loss of normal lordosis) and spasm.       Vascular Status -  His exam exhibits no right foot edema. His exam exhibits no left foot edema.  Lymphadenopathy:     He has no cervical adenopathy.   Neurological: He is alert and oriented to person, place, and time. He has normal strength. He displays no tremor. No cranial nerve deficit or sensory deficit. Gait (antalgic) abnormal.   Reflex Scores:       Tricep reflexes are 1+ on the right side and 1+ on the left side.       Bicep reflexes are 1+ on the right side and 1+  "on the left side.       Brachioradialis reflexes are 1+ on the right side and 1+ on the left side.  Skin: Skin is warm and dry. No bruising and no rash noted.   Psychiatric: His speech is normal and behavior is normal. Thought content normal. Cognition and memory are normal. He exhibits a depressed mood. He expresses no suicidal ideation.   Nursing note and vitals reviewed.      Vitals:    01/11/18 0940   BP: 112/72   Pulse: 76   SpO2: 97%   Weight: 95.7 kg (211 lb)   Height: 166.4 cm (65.5\")       Body mass index is 34.58 kg/(m^2).  Discussed the patient's BMI with him. BMI is above normal parameters. Follow-up plan includes:  educational material, exercise counseling and nutrition counseling.      ECG 12 Lead  Date/Time: 1/11/2018 10:24 AM  Performed by: CECILE GRAHAM  Authorized by: CECILE GRAHAM   Comparison: compared with previous ECG from 1/5/2017  Similar to previous ECG  Comparison to previous ECG: Essentially unchanged. QTc at that time 421.  Rhythm: paced  Ectopy comments: none  Rate: normal  BPM: 60  Conduction: incomplete RBBB  ST Elevation: V3, V4 and V5 (non diagnostic)  T flattening: aVR, aVL, V1 and V2 (non diagnostic)  QRS axis: normal  Other findings: prolonged QTc interval  Clinical impression: abnormal ECG  Comments: OR int: 176 ms  QRS dur: 110 ms  QTc: 427 (stable)        INR 3.0.    Assessment/Plan   Patient Self-Management and Personalized Health Advice  The patient has been provided with information about: diet, exercise, weight management, prevention of cardiac or vascular disease, the relationship between weight and GERD, fall prevention, designing advance directives and mental health concerns and preventive services including:   · Advance directive, Diabetes screening, see lab orders, Exercise counseling provided, Fall Risk assessment done, Fall Risk plan of care done, Nutrition counseling provided, Pneumococcal vaccine , TdaP vaccine.    Visit Diagnoses:    ICD-10-CM ICD-9-CM   1. " Medicare annual wellness visit, subsequent Z00.00 V70.0   2. Warfarin anticoagulation Z79.01 V58.61   3. Paroxysmal atrial fibrillation I48.0 427.31   4. Left arm pain M79.602 729.5   5. Atypical chest pain R07.89 786.59   6. Encounter for lipid screening for cardiovascular disease Z13.220 V77.91    Z13.6 V81.2   7. Screening for diabetes mellitus Z13.1 V77.1   8. Impaired fasting glucose  R73.01 790.21   9. Need for pneumococcal vaccination Z23 V03.82   10. Chronic coronary artery disease I25.10 414.00   11. Chronic diastolic heart failure I50.32 428.32   12. Essential hypertension I10 401.9   13. Hypercholesterolemia E78.00 272.0   14. Pacemaker Z95.0 V45.01   15. Chronic obstructive pulmonary disease, unspecified COPD type J44.9 496   16. Type 2 diabetes mellitus with stage 3 chronic kidney disease, without long-term current use of insulin E11.22 250.40    N18.3 585.3   17. Chronic chest pain R07.9 786.50    G89.29 338.29   18. Chronic pain syndrome G89.4 338.4   19. Chronic kidney disease, unspecified CKD stage N18.9 585.9   20. BPH (benign prostatic hypertrophy) with urinary retention N40.1 600.01    R33.8 788.20       Orders Placed This Encounter   Procedures   • Pneumococcal Polysaccharide Vaccine 23-Valent Greater Than or Equal To 3yo Subcutaneous / IM   • CBC (No Diff)     Order Specific Question:   [facility] Has the patient fasted?     Answer:   No   • Comprehensive Metabolic Panel     Order Specific Question:   [facility] Has the patient fasted?     Answer:   No   • Lipid Panel     Order Specific Question:   [facility] Has the patient fasted?     Answer:   No   • Hemoglobin A1c     Order Specific Question:   [facility] Has the patient fasted?     Answer:   No   • TSH Rfx On Abnormal To Free T4     Order Specific Question:   [facility] Has the patient fasted?     Answer:   No   • T4F     Order Specific Question:   [facility] Has the patient fasted?     Answer:   No   • Ambulatory Referral to Urology     Referral Priority:    Routine     Referral Type:   Consultation     Referral Reason:   Specialty Services Required     Requested Specialty:   Urology     Number of Visits Requested:   1   • POC INR     This is an external result entered through the Results Console.   • ECG 12 Lead     This order was created via procedure documentation       Outpatient Encounter Prescriptions as of 1/11/2018   Medication Sig Dispense Refill   • aspirin 81 MG tablet Take  by mouth daily.     • baclofen (LIORESAL) 10 MG tablet TAKE 1 TABLET TWICE DAILY 180 tablet 0   • bisoprolol-hydrochlorothiazide (ZIAC) 10-6.25 MG per tablet      • bumetanide (BUMEX) 2 MG tablet TAKE 1 TABLET EVERY DAY 90 tablet 0   • DULERA 100-5 MCG/ACT inhaler INHALE 2 PUFFS TWICE DAILY 13 g 5   • FLUoxetine (PROzac) 20 MG capsule TAKE 1 CAPSULE EVERY DAY 90 capsule 0   • gabapentin (NEURONTIN) 600 MG tablet TAKE 1 TO 2 TABLETS DURING THE DAY AS NEEDED. MAY TAKE 2 TABLETS AT BEDTIME 360 tablet 0   • ipratropium-albuterol (DUO-NEB) 0.5-2.5 mg/mL nebulizer 2 (Two) Times a Day.     • isosorbide mononitrate (IMDUR) 30 MG 24 hr tablet Take 1 tablet by mouth 2 (Two) Times a Day. 180 tablet 3   • ketoconazole (NIZORAL) 2 % cream APPLY  TOPICALLY EVERY 12 (TWELVE) HOURS. 180 g 0   • lisinopril (PRINIVIL,ZESTRIL) 20 MG tablet TAKE 1 TABLET EVERY DAY 90 tablet 0   • meclizine (ANTIVERT) 12.5 MG tablet Take 12.5 mg by mouth 3 (Three) Times a Day As Needed for dizziness.     • metFORMIN (GLUCOPHAGE) 500 MG tablet TAKE 1 TABLET EVERY DAY WITH BREAKFAST 90 tablet 0   • metoprolol succinate XL (TOPROL-XL) 100 MG 24 hr tablet Take 1 tablet by mouth Daily. 90 tablet 3   • Multiple Vitamin tablet Take 1 tablet by mouth daily.     • nitroglycerin (NITROSTAT) 0.4 MG SL tablet Place 1 tablet under the tongue Every 5 (Five) Minutes As Needed for chest pain. 25 tablet 11   • omeprazole (priLOSEC) 40 MG capsule TAKE 1 CAPSULE EVERY DAY 90 capsule 0   • potassium chloride (K-DUR,KLOR-CON) 10 MEQ CR tablet  TAKE 1 TABLET EVERY DAY 90 tablet 0   • pravastatin (PRAVACHOL) 80 MG tablet TAKE 1 TABLET EVERY NIGHT 90 tablet 0   • tamsulosin (FLOMAX) 0.4 MG capsule 24 hr capsule Take 1 capsule by mouth Daily. 90 capsule 1   • traZODone (DESYREL) 50 MG tablet TAKE 1 TABLET EVERY NIGHT 90 tablet 0   • warfarin (COUMADIN) 5 MG tablet Take 1 to 1.5 tablets by mouth daily or as instructed by anticoagulation pharmacist 100 tablet 0   • zolpidem (AMBIEN) 5 MG tablet Take 10 mg by mouth At Night As Needed for sleep.     • [DISCONTINUED] MethylPREDNISolone (MEDROL, JEANETTE,) 4 MG tablet Take as directed on package instructions. 21 tablet 0   • [DISCONTINUED] oseltamivir (TAMIFLU) 75 MG capsule Take 1 capsule by mouth 2 (Two) Times a Day. 10 capsule 0   • [DISCONTINUED] oxyCODONE-acetaminophen (PERCOCET)  MG per tablet Take 1 tablet by mouth Every 6 (Six) Hours As Needed for Severe Pain . 120 tablet 0   • [DISCONTINUED] penicillin v potassium (VEETID) 500 MG tablet Take 1 tablet by mouth 3 (Three) Times a Day. 30 tablet 0   • [DISCONTINUED] potassium chloride (K-DUR) 10 MEQ CR tablet Take 1 tablet by mouth Daily. 90 tablet 0   • oxyCODONE-acetaminophen (PERCOCET) 7.5-325 MG per tablet Take 1 tablet by mouth Every 8 (Eight) Hours As Needed for Severe Pain . 33 tablet 0   • [] Tdap (BOOSTRIX) 5-2.5-18.5 LF-MCG/0.5 injection Inject 0.5 mL into the shoulder, thigh, or buttocks 1 (One) Time for 1 dose. 0.5 mL 0     No facility-administered encounter medications on file as of 2018.        Reviewed use of high risk medication in the elderly: yes  Reviewed for potential of harmful drug interactions in the elderly: yes    Chronic atypical chest pain with stable EKG and clinically stable in clinic today. Therapeutic INR. Will report to Dr. Tucker and he will f/u as scheduled. Left arm pain most likely due to C-DDD/DJD as well as chronic left shoulder RTC tear.  Stable chronic conditions including COPD, NIDDM, HTN, HLP,  CAD.  Chronic pain which is multifactorial. Current marked psychosocial stressors. Adjust medications for now.  As part of patient's treatment plan I am prescribing a controlled substance.  The patient has been made aware of the appropriate use of such medications, including potential risk of somnolence, limited ability to drive and/or work safely, and potential for dependence and/or overdose.  It has also been made clear that these medications are for use by this patient only, without concomitant use of alcohol or other substances, unless prescribed.  ALMA report reviewed and scanned into chart.  Last ALMA date 10/20/17.  Patient has completed a prescribing agreement detailing terms of continued prescribing of controlled substances, including monitoring ALMA reports, urine drug screening, and pill counts if necessary.  Patient is aware that inappropriate use will result in cessation of prescribing such medications.  History and physical exam exhibit continued safe and appropriate use of controlled substance.    Pt advised to eat a heart healthy diet and get regular aerobic exercise.  I will contact patient regarding test results and provide instructions regarding any necessary changes in plan of care.  Patient was encouraged to keep me informed of any acute changes, lack of improvement, or any new concerning symptoms.  Pt is aware of reasons to seek emergent care.  Patient voiced understanding of all instructions and denied further questions.    Follow Up:  Return in about 2 months (around 3/11/2018).     An After Visit Summary and PPPS with all of these plans were given to the patient.

## 2018-01-12 ENCOUNTER — ANTICOAGULATION VISIT (OUTPATIENT)
Dept: PHARMACY | Facility: HOSPITAL | Age: 68
End: 2018-01-12

## 2018-01-12 DIAGNOSIS — I48.0 PAROXYSMAL ATRIAL FIBRILLATION (HCC): ICD-10-CM

## 2018-01-12 DIAGNOSIS — Z79.01 WARFARIN ANTICOAGULATION: ICD-10-CM

## 2018-01-12 NOTE — TELEPHONE ENCOUNTER
Patient was in yesterday for an apt. We did his INR which is documented in his chart. And I updated his new phone number and address. He is staying with is sister for now. Let me know if I can do anything else.

## 2018-01-12 NOTE — PROGRESS NOTES
Anticoagulation Clinic - Remote Progress Note  REMOTE LAB    Indication: Atrial fibrillation   Referring Provider: [facility]  Goal INR: 2-3  Current Drug Interactions: ASA, trazodone, fluoxetine, omeprazole, pravastatin  CHADS-VASc: 5 (CHF, HTN, Age 65-74, Vascular disease, DM)    Diet: Not consistent; averages 1x per week  Alcohol: none  Tobacco: none, quit smoking 17 years ago  OTC Pain Medication: none    INR History:  Date 10/19 10/27 11/10 1/11/18        Total Weekly Dose 35 mg 42.5 mg 42.5 mg 42.5 mg        INR 1.6 2.5 2.0 3.0        Notes Per patient             Phone Interview:  Tablet Strength: pt has 5mg tablets  Current Maintenance Dose: 5 mg daily except 7.5mg MWF  Patient Findings      Negatives Signs/symptoms of thrombosis, Signs/symptoms of bleeding, Laboratory test error suspected, Change in health, Change in alcohol use, Change in activity, Upcoming invasive procedure, Emergency department visit, Upcoming dental procedure, Missed doses, Extra doses, Change in medications, Change in diet/appetite, Hospital admission, Bruising, Other complaints     Comments Mr. Bryant denies recent changes in medications, diet, or health, and he has not missed any doses of warfarin.     Patient Contact Info: [phone]  Lab Contact Info: [facility], [phone]    Plan:  1. INR is therapeutic at the upper end of pt's goal. For now, instructed Mr. Bryant to continue warfarin 5mg daily except 7.5 mg MonWedFri.  2. Repeat INR in 4 weeks.  3. Verbal information provided over the phone to Mr. Bryant. He RBV dosing instructions, expresses understanding by teach back, and has no further questions at this time.  4. Mr. Bryant requests refills of warfarin to [facility] -- will verify they have his current address updated (he has already called).    Ann Cowden Mayer, PharmD  1/12/2018  3:50 PM

## 2018-01-15 DIAGNOSIS — R79.89 LOW TSH LEVEL: Primary | ICD-10-CM

## 2018-01-18 DIAGNOSIS — I48.0 PAROXYSMAL ATRIAL FIBRILLATION (HCC): Primary | ICD-10-CM

## 2018-01-18 RX ORDER — NITROGLYCERIN 0.4 MG/1
0.4 TABLET SUBLINGUAL
Qty: 25 TABLET | Refills: 11 | Status: SHIPPED | OUTPATIENT
Start: 2018-01-18 | End: 2021-08-10 | Stop reason: SDUPTHER

## 2018-01-18 RX ORDER — WARFARIN SODIUM 5 MG/1
TABLET ORAL
Qty: 110 TABLET | Refills: 1 | OUTPATIENT
Start: 2018-01-18 | End: 2019-09-19

## 2018-01-20 ENCOUNTER — RESULTS ENCOUNTER (OUTPATIENT)
Dept: FAMILY MEDICINE CLINIC | Facility: CLINIC | Age: 68
End: 2018-01-20

## 2018-01-20 DIAGNOSIS — R79.89 LOW TSH LEVEL: ICD-10-CM

## 2018-01-22 ENCOUNTER — TELEPHONE (OUTPATIENT)
Dept: FAMILY MEDICINE CLINIC | Facility: CLINIC | Age: 68
End: 2018-01-22

## 2018-01-23 DIAGNOSIS — G89.29 OTHER CHRONIC PAIN: Primary | ICD-10-CM

## 2018-01-23 RX ORDER — OXYCODONE AND ACETAMINOPHEN 7.5; 325 MG/1; MG/1
1 TABLET ORAL EVERY 8 HOURS PRN
Qty: 9 TABLET | Refills: 0 | Status: SHIPPED | OUTPATIENT
Start: 2018-01-23 | End: 2018-01-26
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
## 2018-01-23 NOTE — TELEPHONE ENCOUNTER
lll check when I come in tomorrow. It looks like Dr Wheeler gave him a 10 day supply on 1/11. Do you know why she only gave him a 10 day supply?

## 2018-01-25 ENCOUNTER — TELEPHONE (OUTPATIENT)
Dept: FAMILY MEDICINE CLINIC | Facility: CLINIC | Age: 68
End: 2018-01-25

## 2018-01-26 ENCOUNTER — TELEPHONE (OUTPATIENT)
Dept: FAMILY MEDICINE CLINIC | Facility: CLINIC | Age: 68
End: 2018-01-26

## 2018-01-26 RX ORDER — OXYCODONE AND ACETAMINOPHEN 10; 325 MG/1; MG/1
1 TABLET ORAL EVERY 6 HOURS PRN
Qty: 120 TABLET | Refills: 0 | Status: SHIPPED | OUTPATIENT
Start: 2018-01-26 | End: 2018-02-26 | Stop reason: SDUPTHER

## 2018-01-26 NOTE — TELEPHONE ENCOUNTER
Pt called requesting refill on pain medication. Jaki gave him a three day refill while you were out

## 2018-02-05 ENCOUNTER — OFFICE VISIT (OUTPATIENT)
Dept: FAMILY MEDICINE CLINIC | Facility: CLINIC | Age: 68
End: 2018-02-05

## 2018-02-05 VITALS
WEIGHT: 208 LBS | HEIGHT: 66 IN | SYSTOLIC BLOOD PRESSURE: 122 MMHG | OXYGEN SATURATION: 96 % | HEART RATE: 80 BPM | DIASTOLIC BLOOD PRESSURE: 80 MMHG | BODY MASS INDEX: 33.43 KG/M2 | TEMPERATURE: 99.4 F

## 2018-02-05 DIAGNOSIS — R52 BODY ACHES: ICD-10-CM

## 2018-02-05 DIAGNOSIS — R50.9 FEVER AND CHILLS: ICD-10-CM

## 2018-02-05 DIAGNOSIS — J44.9 CHRONIC OBSTRUCTIVE PULMONARY DISEASE, UNSPECIFIED COPD TYPE (HCC): ICD-10-CM

## 2018-02-05 DIAGNOSIS — R05.9 COUGH: ICD-10-CM

## 2018-02-05 DIAGNOSIS — J02.9 SORE THROAT: ICD-10-CM

## 2018-02-05 DIAGNOSIS — J10.1 INFLUENZA A: Primary | ICD-10-CM

## 2018-02-05 PROCEDURE — 99213 OFFICE O/P EST LOW 20 MIN: CPT | Performed by: NURSE PRACTITIONER

## 2018-02-05 PROCEDURE — 87880 STREP A ASSAY W/OPTIC: CPT | Performed by: NURSE PRACTITIONER

## 2018-02-05 PROCEDURE — 87804 INFLUENZA ASSAY W/OPTIC: CPT | Performed by: NURSE PRACTITIONER

## 2018-02-05 PROCEDURE — 96372 THER/PROPH/DIAG INJ SC/IM: CPT | Performed by: NURSE PRACTITIONER

## 2018-02-05 RX ORDER — CEFTRIAXONE 1 G/1
1 INJECTION, POWDER, FOR SOLUTION INTRAMUSCULAR; INTRAVENOUS ONCE
Status: COMPLETED | OUTPATIENT
Start: 2018-02-05 | End: 2018-02-05

## 2018-02-05 RX ORDER — AZITHROMYCIN 250 MG/1
TABLET, FILM COATED ORAL
Qty: 6 TABLET | Refills: 0 | Status: SHIPPED | OUTPATIENT
Start: 2018-02-05 | End: 2018-02-06 | Stop reason: SDUPTHER

## 2018-02-05 RX ORDER — DEXTROMETHORPHAN HYDROBROMIDE AND PROMETHAZINE HYDROCHLORIDE 15; 6.25 MG/5ML; MG/5ML
5 SYRUP ORAL 4 TIMES DAILY PRN
Qty: 180 ML | Refills: 0 | Status: SHIPPED | OUTPATIENT
Start: 2018-02-05 | End: 2018-02-06 | Stop reason: SDUPTHER

## 2018-02-05 RX ADMIN — CEFTRIAXONE 1 G: 1 INJECTION, POWDER, FOR SOLUTION INTRAMUSCULAR; INTRAVENOUS at 16:31
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
## 2018-02-06 DIAGNOSIS — R50.9 FEVER AND CHILLS: ICD-10-CM

## 2018-02-06 DIAGNOSIS — R05.9 COUGH: ICD-10-CM

## 2018-02-06 DIAGNOSIS — R52 BODY ACHES: ICD-10-CM

## 2018-02-06 DIAGNOSIS — J10.1 INFLUENZA A: ICD-10-CM

## 2018-02-06 DIAGNOSIS — J02.9 SORE THROAT: ICD-10-CM

## 2018-02-06 DIAGNOSIS — J44.9 CHRONIC OBSTRUCTIVE PULMONARY DISEASE, UNSPECIFIED COPD TYPE (HCC): ICD-10-CM

## 2018-02-06 RX ORDER — AZITHROMYCIN 250 MG/1
TABLET, FILM COATED ORAL
Qty: 6 TABLET | Refills: 0 | Status: SHIPPED | OUTPATIENT
Start: 2018-02-06 | End: 2018-02-06 | Stop reason: SDUPTHER

## 2018-02-06 RX ORDER — DEXTROMETHORPHAN HYDROBROMIDE AND PROMETHAZINE HYDROCHLORIDE 15; 6.25 MG/5ML; MG/5ML
5 SYRUP ORAL 4 TIMES DAILY PRN
Qty: 180 ML | Refills: 0 | Status: SHIPPED | OUTPATIENT
Start: 2018-02-06 | End: 2018-02-11

## 2018-02-06 RX ORDER — AZITHROMYCIN 250 MG/1
TABLET, FILM COATED ORAL
Qty: 6 TABLET | Refills: 0 | Status: SHIPPED | OUTPATIENT
Start: 2018-02-06 | End: 2018-02-26

## 2018-02-08 ENCOUNTER — ANTICOAGULATION VISIT (OUTPATIENT)
Dept: PHARMACY | Facility: HOSPITAL | Age: 68
End: 2018-02-08

## 2018-02-08 ENCOUNTER — ANTICOAGULATION VISIT (OUTPATIENT)
Dept: FAMILY MEDICINE CLINIC | Facility: CLINIC | Age: 68
End: 2018-02-08

## 2018-02-08 DIAGNOSIS — I48.0 PAROXYSMAL ATRIAL FIBRILLATION (HCC): ICD-10-CM

## 2018-02-08 DIAGNOSIS — Z79.01 WARFARIN ANTICOAGULATION: ICD-10-CM

## 2018-02-08 LAB — INR PPP: 2 (ref 0.9–1.1)

## 2018-02-08 PROCEDURE — 85610 PROTHROMBIN TIME: CPT | Performed by: INTERNAL MEDICINE

## 2018-02-08 NOTE — PROGRESS NOTES
Anticoagulation Clinic - Remote Progress Note  REMOTE LAB    Indication: Atrial fibrillation   Referring Provider: [facility]  Goal INR: 2-3  Current Drug Interactions: ASA, trazodone, fluoxetine, omeprazole, pravastatin  CHADS-VASc: 5 (CHF, HTN, Age 65-74, Vascular disease, DM)    Diet: Not consistent; averages 1x per week  Alcohol: none  Tobacco: none, quit smoking 17 years ago  OTC Pain Medication: none    INR History:  Date 10/19 10/27 11/10 1/11/18 2/8       Total Weekly Dose 35 mg 42.5 mg 42.5 mg 42.5 mg 42.5 mg       INR 1.6 2.5 2.0 3.0 2.0       Notes per patient    flu A (+); Zpak, ceftriax IM on 2/5         Phone Interview:  Tablet Strength: pt has 5mg tablets  Current Maintenance Dose: 5 mg daily except 7.5mg MWF  Patient Contact Info: [phone]  Lab Contact Info: [facility], [phone]  Patient Findings      Positives Change in health, Change in medications     Negatives Signs/symptoms of thrombosis, Signs/symptoms of bleeding, Laboratory test error suspected, Change in alcohol use, Change in activity, Upcoming invasive procedure, Emergency department visit, Upcoming dental procedure, Missed doses, Extra doses, Change in diet/appetite, Hospital admission, Bruising, Other complaints     Comments Mr. Bryant was diagnosed with the flu, but his sx onset was 3-4 days prior to PCP appt, so he was not given Tamiflu -- he instead received an IM injection of Rocephin in the office, and a prophylactic rx for a Zpak to help avoid pneumonia (at risk with h/o smoking, COPD). He was also given an rx for PRN cough medicine. He reports he is feeling better today -- denies signs of bleeding or abnormal bruising at this time.      Plan:  1. INR is therapeutic despite recent initiation of abx. Instructed Mr. Bryant to continue warfarin 5mg daily except 7.5 mg MonWedFri for now.  2. Repeat INR next week, once abx complete.  3. Verbal information provided over the phone to Mr. Bryant. He expresses  understanding by teach back and has no further questions at this time.    Ann Cowden Mayer, PharmD  2/8/2018  4:23 PM

## 2018-02-15 DIAGNOSIS — G25.81 RESTLESS LEG SYNDROME: ICD-10-CM

## 2018-02-16 RX ORDER — LISINOPRIL 20 MG/1
TABLET ORAL
Qty: 90 TABLET | Refills: 0 | OUTPATIENT
Start: 2018-02-16

## 2018-02-16 RX ORDER — TAMSULOSIN HYDROCHLORIDE 0.4 MG/1
CAPSULE ORAL
Qty: 90 CAPSULE | Refills: 1 | OUTPATIENT
Start: 2018-02-16

## 2018-02-16 RX ORDER — OMEPRAZOLE 40 MG/1
CAPSULE, DELAYED RELEASE ORAL
Qty: 90 CAPSULE | Refills: 0 | OUTPATIENT
Start: 2018-02-16

## 2018-02-16 RX ORDER — HYDRALAZINE HYDROCHLORIDE 25 MG/1
TABLET, FILM COATED ORAL
Qty: 180 TABLET | Refills: 1 | OUTPATIENT
Start: 2018-02-16

## 2018-02-16 RX ORDER — PRAVASTATIN SODIUM 80 MG/1
TABLET ORAL
Qty: 90 TABLET | Refills: 0 | OUTPATIENT
Start: 2018-02-16

## 2018-02-16 RX ORDER — HYDROCHLOROTHIAZIDE 25 MG/1
TABLET ORAL
Qty: 90 TABLET | Refills: 0 | OUTPATIENT
Start: 2018-02-16

## 2018-02-16 RX ORDER — BACLOFEN 10 MG/1
TABLET ORAL
Qty: 180 TABLET | Refills: 0 | OUTPATIENT
Start: 2018-02-16

## 2018-02-16 RX ORDER — TRAZODONE HYDROCHLORIDE 50 MG/1
TABLET ORAL
Qty: 90 TABLET | Refills: 0 | OUTPATIENT
Start: 2018-02-16

## 2018-02-16 RX ORDER — GABAPENTIN 600 MG/1
TABLET ORAL
Qty: 360 TABLET | Refills: 0 | OUTPATIENT
Start: 2018-02-16

## 2018-02-16 RX ORDER — FLUOXETINE HYDROCHLORIDE 20 MG/1
CAPSULE ORAL
Qty: 90 CAPSULE | Refills: 0 | OUTPATIENT
Start: 2018-02-16

## 2018-02-16 NOTE — TELEPHONE ENCOUNTER
pharmacy sent a request for refills, one of them is HCTZ.  I don't think he is on this one anymore, he is already on 2 different meds with fluid pills in it already.

## 2018-02-16 NOTE — TELEPHONE ENCOUNTER
Please contact pt and review in details from his medicine bottles at home what he is actually taking. He has had several med changes from various doctors and reflex refills may only confuse him.

## 2018-02-26 RX ORDER — OXYCODONE AND ACETAMINOPHEN 10; 325 MG/1; MG/1
1 TABLET ORAL EVERY 6 HOURS PRN
Qty: 120 TABLET | Refills: 0 | Status: SHIPPED | OUTPATIENT
Start: 2018-02-26 | End: 2018-03-13 | Stop reason: SDUPTHER

## 2018-03-01 ENCOUNTER — HOSPITAL ENCOUNTER (INPATIENT)
Facility: HOSPITAL | Age: 68
LOS: 5 days | Discharge: HOME | End: 2018-03-06
Attending: EMERGENCY MEDICINE | Admitting: INTERNAL MEDICINE

## 2018-03-01 ENCOUNTER — APPOINTMENT (OUTPATIENT)
Dept: CT IMAGING | Facility: HOSPITAL | Age: 68
End: 2018-03-01

## 2018-03-01 ENCOUNTER — OFFICE VISIT (OUTPATIENT)
Dept: FAMILY MEDICINE CLINIC | Facility: CLINIC | Age: 68
End: 2018-03-01

## 2018-03-01 ENCOUNTER — APPOINTMENT (OUTPATIENT)
Dept: GENERAL RADIOLOGY | Facility: HOSPITAL | Age: 68
End: 2018-03-01

## 2018-03-01 VITALS
DIASTOLIC BLOOD PRESSURE: 86 MMHG | HEART RATE: 66 BPM | HEIGHT: 66 IN | WEIGHT: 209 LBS | SYSTOLIC BLOOD PRESSURE: 140 MMHG | BODY MASS INDEX: 33.59 KG/M2 | OXYGEN SATURATION: 94 %

## 2018-03-01 DIAGNOSIS — E11.21 TYPE 2 DIABETES MELLITUS WITH DIABETIC NEPHROPATHY, WITHOUT LONG-TERM CURRENT USE OF INSULIN (HCC): ICD-10-CM

## 2018-03-01 DIAGNOSIS — R06.01 ORTHOPNEA: ICD-10-CM

## 2018-03-01 DIAGNOSIS — R34 OLIGURIA: ICD-10-CM

## 2018-03-01 DIAGNOSIS — R51.9 SEVERE HEADACHE: ICD-10-CM

## 2018-03-01 DIAGNOSIS — H54.7 LOSS OF VISION: ICD-10-CM

## 2018-03-01 DIAGNOSIS — R29.6 REPEATED FALLS: ICD-10-CM

## 2018-03-01 DIAGNOSIS — Z74.09 IMPAIRED FUNCTIONAL MOBILITY, BALANCE, GAIT, AND ENDURANCE: ICD-10-CM

## 2018-03-01 DIAGNOSIS — I95.1 ORTHOSTASIS: ICD-10-CM

## 2018-03-01 DIAGNOSIS — I48.0 PAROXYSMAL ATRIAL FIBRILLATION (HCC): ICD-10-CM

## 2018-03-01 DIAGNOSIS — G25.81 RESTLESS LEG SYNDROME: ICD-10-CM

## 2018-03-01 DIAGNOSIS — Z79.01 WARFARIN ANTICOAGULATION: ICD-10-CM

## 2018-03-01 DIAGNOSIS — I10 UNCONTROLLED HYPERTENSION: ICD-10-CM

## 2018-03-01 DIAGNOSIS — R42 POSTURAL DIZZINESS: Primary | ICD-10-CM

## 2018-03-01 DIAGNOSIS — R40.0 SOMNOLENCE: ICD-10-CM

## 2018-03-01 DIAGNOSIS — N13.9 OBSTRUCTIVE UROPATHY: Primary | ICD-10-CM

## 2018-03-01 DIAGNOSIS — R10.2 SUPRAPUBIC ABDOMINAL PAIN: ICD-10-CM

## 2018-03-01 LAB
ALBUMIN SERPL-MCNC: 4.1 G/DL (ref 3.5–5)
ALBUMIN/GLOB SERPL: 1.5 G/DL (ref 1–2)
ALP SERPL-CCNC: 58 U/L (ref 38–126)
ALT SERPL W P-5'-P-CCNC: 33 U/L (ref 13–69)
ANION GAP SERPL CALCULATED.3IONS-SCNC: 17.7 MMOL/L
AST SERPL-CCNC: 28 U/L (ref 15–46)
BASOPHILS # BLD AUTO: 0.05 10*3/MM3 (ref 0–0.2)
BASOPHILS NFR BLD AUTO: 0.8 % (ref 0–2.5)
BILIRUB SERPL-MCNC: 0.7 MG/DL (ref 0.2–1.3)
BILIRUB UR QL STRIP: NEGATIVE
BUN BLD-MCNC: 41 MG/DL (ref 7–20)
BUN/CREAT SERPL: 6.7 (ref 6.3–21.9)
CALCIUM SPEC-SCNC: 9.4 MG/DL (ref 8.4–10.2)
CHLORIDE SERPL-SCNC: 103 MMOL/L (ref 98–107)
CLARITY UR: ABNORMAL
CO2 SERPL-SCNC: 30 MMOL/L (ref 26–30)
COLOR UR: YELLOW
CREAT BLD-MCNC: 6.1 MG/DL (ref 0.6–1.3)
DEPRECATED RDW RBC AUTO: 42.5 FL (ref 37–54)
EOSINOPHIL # BLD AUTO: 0.1 10*3/MM3 (ref 0–0.7)
EOSINOPHIL NFR BLD AUTO: 1.6 % (ref 0–7)
ERYTHROCYTE [DISTWIDTH] IN BLOOD BY AUTOMATED COUNT: 13.1 % (ref 11.5–14.5)
GFR SERPL CREATININE-BSD FRML MDRD: 9 ML/MIN/1.73
GLOBULIN UR ELPH-MCNC: 2.7 GM/DL
GLUCOSE BLD-MCNC: 130 MG/DL (ref 74–98)
GLUCOSE BLDC GLUCOMTR-MCNC: 126 MG/DL (ref 70–130)
GLUCOSE BLDC GLUCOMTR-MCNC: 142 MG/DL (ref 70–130)
GLUCOSE BLDC GLUCOMTR-MCNC: 83 MG/DL (ref 70–130)
GLUCOSE UR STRIP-MCNC: NEGATIVE MG/DL
HCT VFR BLD AUTO: 47.3 % (ref 42–52)
HGB BLD-MCNC: 15.9 G/DL (ref 14–18)
HGB UR QL STRIP.AUTO: NEGATIVE
HOLD SPECIMEN: NORMAL
HOLD SPECIMEN: NORMAL
IMM GRANULOCYTES # BLD: 0.02 10*3/MM3 (ref 0–0.06)
IMM GRANULOCYTES NFR BLD: 0.3 % (ref 0–0.6)
INR PPP: 2.36 (ref 0.9–1.1)
INR PPP: 2.4 (ref 0.9–1.1)
KETONES UR QL STRIP: NEGATIVE
LEUKOCYTE ESTERASE UR QL STRIP.AUTO: NEGATIVE
LIPASE SERPL-CCNC: 45 U/L (ref 23–300)
LYMPHOCYTES # BLD AUTO: 1.01 10*3/MM3 (ref 0.6–3.4)
LYMPHOCYTES NFR BLD AUTO: 15.9 % (ref 10–50)
MCH RBC QN AUTO: 29.8 PG (ref 27–31)
MCHC RBC AUTO-ENTMCNC: 33.6 G/DL (ref 30–37)
MCV RBC AUTO: 88.7 FL (ref 80–94)
MONOCYTES # BLD AUTO: 0.64 10*3/MM3 (ref 0–0.9)
MONOCYTES NFR BLD AUTO: 10.1 % (ref 0–12)
NEUTROPHILS # BLD AUTO: 4.53 10*3/MM3 (ref 2–6.9)
NEUTROPHILS NFR BLD AUTO: 71.3 % (ref 37–80)
NITRITE UR QL STRIP: NEGATIVE
NRBC BLD MANUAL-RTO: 0 /100 WBC (ref 0–0)
PH UR STRIP.AUTO: <=5 [PH] (ref 5–8)
PLATELET # BLD AUTO: 137 10*3/MM3 (ref 130–400)
PMV BLD AUTO: 10.8 FL (ref 6–12)
POTASSIUM BLD-SCNC: 4.7 MMOL/L (ref 3.5–5.1)
PROT SERPL-MCNC: 6.8 G/DL (ref 6.3–8.2)
PROT UR QL STRIP: NEGATIVE
PROTHROMBIN TIME: 25.9 SECONDS (ref 9.3–12.1)
RBC # BLD AUTO: 5.33 10*6/MM3 (ref 4.7–6.1)
SODIUM BLD-SCNC: 146 MMOL/L (ref 137–145)
SP GR UR STRIP: 1.01 (ref 1–1.03)
TROPONIN I SERPL-MCNC: 0.02 NG/ML (ref 0–0.03)
UROBILINOGEN UR QL STRIP: ABNORMAL
WBC NRBC COR # BLD: 6.35 10*3/MM3 (ref 4.8–10.8)
WHOLE BLOOD HOLD SPECIMEN: NORMAL
WHOLE BLOOD HOLD SPECIMEN: NORMAL

## 2018-03-01 PROCEDURE — 80053 COMPREHEN METABOLIC PANEL: CPT | Performed by: NURSE PRACTITIONER

## 2018-03-01 PROCEDURE — 85025 COMPLETE CBC W/AUTO DIFF WBC: CPT | Performed by: NURSE PRACTITIONER

## 2018-03-01 PROCEDURE — 25010000002 ONDANSETRON PER 1 MG: Performed by: NURSE PRACTITIONER

## 2018-03-01 PROCEDURE — 82962 GLUCOSE BLOOD TEST: CPT | Performed by: FAMILY MEDICINE

## 2018-03-01 PROCEDURE — 99223 1ST HOSP IP/OBS HIGH 75: CPT | Performed by: INTERNAL MEDICINE

## 2018-03-01 PROCEDURE — 85610 PROTHROMBIN TIME: CPT | Performed by: FAMILY MEDICINE

## 2018-03-01 PROCEDURE — 94660 CPAP INITIATION&MGMT: CPT

## 2018-03-01 PROCEDURE — 93005 ELECTROCARDIOGRAM TRACING: CPT | Performed by: EMERGENCY MEDICINE

## 2018-03-01 PROCEDURE — 84484 ASSAY OF TROPONIN QUANT: CPT | Performed by: NURSE PRACTITIONER

## 2018-03-01 PROCEDURE — 94799 UNLISTED PULMONARY SVC/PX: CPT

## 2018-03-01 PROCEDURE — 99285 EMERGENCY DEPT VISIT HI MDM: CPT

## 2018-03-01 PROCEDURE — 70450 CT HEAD/BRAIN W/O DYE: CPT

## 2018-03-01 PROCEDURE — 51798 US URINE CAPACITY MEASURE: CPT

## 2018-03-01 PROCEDURE — 84484 ASSAY OF TROPONIN QUANT: CPT | Performed by: INTERNAL MEDICINE

## 2018-03-01 PROCEDURE — 51702 INSERT TEMP BLADDER CATH: CPT

## 2018-03-01 PROCEDURE — 71046 X-RAY EXAM CHEST 2 VIEWS: CPT

## 2018-03-01 PROCEDURE — 99214 OFFICE O/P EST MOD 30 MIN: CPT | Performed by: FAMILY MEDICINE

## 2018-03-01 PROCEDURE — 87804 INFLUENZA ASSAY W/OPTIC: CPT | Performed by: NURSE PRACTITIONER

## 2018-03-01 PROCEDURE — 81003 URINALYSIS AUTO W/O SCOPE: CPT | Performed by: NURSE PRACTITIONER

## 2018-03-01 PROCEDURE — 82962 GLUCOSE BLOOD TEST: CPT

## 2018-03-01 PROCEDURE — 85610 PROTHROMBIN TIME: CPT | Performed by: NURSE PRACTITIONER

## 2018-03-01 PROCEDURE — 94640 AIRWAY INHALATION TREATMENT: CPT

## 2018-03-01 PROCEDURE — 83690 ASSAY OF LIPASE: CPT | Performed by: NURSE PRACTITIONER

## 2018-03-01 RX ORDER — PANTOPRAZOLE SODIUM 40 MG/1
40 TABLET, DELAYED RELEASE ORAL EVERY MORNING
Status: DISCONTINUED | OUTPATIENT
Start: 2018-03-02 | End: 2018-03-03

## 2018-03-01 RX ORDER — ISOSORBIDE MONONITRATE 30 MG/1
30 TABLET, EXTENDED RELEASE ORAL
Status: DISCONTINUED | OUTPATIENT
Start: 2018-03-02 | End: 2018-03-06 | Stop reason: HOSPADM

## 2018-03-01 RX ORDER — OXYCODONE AND ACETAMINOPHEN 10; 325 MG/1; MG/1
1 TABLET ORAL EVERY 6 HOURS PRN
Status: DISCONTINUED | OUTPATIENT
Start: 2018-03-01 | End: 2018-03-06 | Stop reason: HOSPADM

## 2018-03-01 RX ORDER — FINASTERIDE 5 MG/1
5 TABLET, FILM COATED ORAL DAILY
Status: DISCONTINUED | OUTPATIENT
Start: 2018-03-02 | End: 2018-03-06 | Stop reason: HOSPADM

## 2018-03-01 RX ORDER — ONDANSETRON 2 MG/ML
4 INJECTION INTRAMUSCULAR; INTRAVENOUS ONCE
Status: COMPLETED | OUTPATIENT
Start: 2018-03-01 | End: 2018-03-01

## 2018-03-01 RX ORDER — HEPARIN SODIUM 5000 [USP'U]/ML
5000 INJECTION, SOLUTION INTRAVENOUS; SUBCUTANEOUS EVERY 8 HOURS SCHEDULED
Status: DISCONTINUED | OUTPATIENT
Start: 2018-03-01 | End: 2018-03-01 | Stop reason: ALTCHOICE

## 2018-03-01 RX ORDER — GLUCOSAMINE HCL/CHONDROITIN SU 500-400 MG
1 CAPSULE ORAL 2 TIMES DAILY
Qty: 200 EACH | Refills: 5 | Status: SHIPPED | OUTPATIENT
Start: 2018-03-01 | End: 2019-09-19

## 2018-03-01 RX ORDER — ONDANSETRON 2 MG/ML
4 INJECTION INTRAMUSCULAR; INTRAVENOUS EVERY 6 HOURS PRN
Status: DISCONTINUED | OUTPATIENT
Start: 2018-03-01 | End: 2018-03-06 | Stop reason: HOSPADM

## 2018-03-01 RX ORDER — LANCETS 30 GAUGE
1 EACH MISCELLANEOUS 2 TIMES DAILY
Qty: 200 EACH | Refills: 5 | Status: SHIPPED | OUTPATIENT
Start: 2018-03-01 | End: 2020-10-01

## 2018-03-01 RX ORDER — ATORVASTATIN CALCIUM 20 MG/1
20 TABLET, FILM COATED ORAL NIGHTLY
Status: DISCONTINUED | OUTPATIENT
Start: 2018-03-01 | End: 2018-03-06 | Stop reason: HOSPADM

## 2018-03-01 RX ORDER — SODIUM CHLORIDE 0.9 % (FLUSH) 0.9 %
10 SYRINGE (ML) INJECTION AS NEEDED
Status: DISCONTINUED | OUTPATIENT
Start: 2018-03-01 | End: 2018-03-06 | Stop reason: HOSPADM

## 2018-03-01 RX ORDER — LABETALOL HYDROCHLORIDE 5 MG/ML
10 INJECTION, SOLUTION INTRAVENOUS ONCE
Status: COMPLETED | OUTPATIENT
Start: 2018-03-01 | End: 2018-03-01

## 2018-03-01 RX ORDER — ASPIRIN 81 MG/1
81 TABLET ORAL DAILY
Status: DISCONTINUED | OUTPATIENT
Start: 2018-03-02 | End: 2018-03-06 | Stop reason: HOSPADM

## 2018-03-01 RX ORDER — IPRATROPIUM BROMIDE AND ALBUTEROL SULFATE 2.5; .5 MG/3ML; MG/3ML
3 SOLUTION RESPIRATORY (INHALATION)
Status: DISCONTINUED | OUTPATIENT
Start: 2018-03-01 | End: 2018-03-06 | Stop reason: HOSPADM

## 2018-03-01 RX ORDER — BISACODYL 10 MG
10 SUPPOSITORY, RECTAL RECTAL DAILY PRN
Status: DISCONTINUED | OUTPATIENT
Start: 2018-03-01 | End: 2018-03-06 | Stop reason: HOSPADM

## 2018-03-01 RX ORDER — DOCUSATE SODIUM 100 MG/1
100 CAPSULE, LIQUID FILLED ORAL 2 TIMES DAILY
Status: DISCONTINUED | OUTPATIENT
Start: 2018-03-01 | End: 2018-03-06 | Stop reason: HOSPADM

## 2018-03-01 RX ORDER — DEXTROSE MONOHYDRATE 25 G/50ML
25 INJECTION, SOLUTION INTRAVENOUS
Status: DISCONTINUED | OUTPATIENT
Start: 2018-03-01 | End: 2018-03-06 | Stop reason: HOSPADM

## 2018-03-01 RX ORDER — WARFARIN SODIUM 5 MG/1
5 TABLET ORAL
Status: DISCONTINUED | OUTPATIENT
Start: 2018-03-01 | End: 2018-03-03

## 2018-03-01 RX ORDER — TAMSULOSIN HYDROCHLORIDE 0.4 MG/1
0.4 CAPSULE ORAL DAILY
Status: DISCONTINUED | OUTPATIENT
Start: 2018-03-02 | End: 2018-03-06 | Stop reason: HOSPADM

## 2018-03-01 RX ORDER — SODIUM CHLORIDE 450 MG/100ML
125 INJECTION, SOLUTION INTRAVENOUS CONTINUOUS
Status: DISCONTINUED | OUTPATIENT
Start: 2018-03-01 | End: 2018-03-03

## 2018-03-01 RX ORDER — SODIUM CHLORIDE 0.9 % (FLUSH) 0.9 %
1-10 SYRINGE (ML) INJECTION AS NEEDED
Status: DISCONTINUED | OUTPATIENT
Start: 2018-03-01 | End: 2018-03-06 | Stop reason: HOSPADM

## 2018-03-01 RX ORDER — NITROGLYCERIN 0.4 MG/1
0.4 TABLET SUBLINGUAL
Status: DISCONTINUED | OUTPATIENT
Start: 2018-03-01 | End: 2018-03-06 | Stop reason: HOSPADM

## 2018-03-01 RX ORDER — TRAZODONE HYDROCHLORIDE 50 MG/1
50 TABLET ORAL NIGHTLY
Status: DISCONTINUED | OUTPATIENT
Start: 2018-03-01 | End: 2018-03-06 | Stop reason: HOSPADM

## 2018-03-01 RX ORDER — BUDESONIDE AND FORMOTEROL FUMARATE DIHYDRATE 160; 4.5 UG/1; UG/1
2 AEROSOL RESPIRATORY (INHALATION)
Status: DISCONTINUED | OUTPATIENT
Start: 2018-03-01 | End: 2018-03-06 | Stop reason: HOSPADM

## 2018-03-01 RX ORDER — FLUOXETINE HYDROCHLORIDE 20 MG/1
20 CAPSULE ORAL DAILY
Status: DISCONTINUED | OUTPATIENT
Start: 2018-03-02 | End: 2018-03-06 | Stop reason: HOSPADM

## 2018-03-01 RX ORDER — NICOTINE POLACRILEX 4 MG
1 LOZENGE BUCCAL
Status: DISCONTINUED | OUTPATIENT
Start: 2018-03-01 | End: 2018-03-06 | Stop reason: HOSPADM

## 2018-03-01 RX ORDER — METOPROLOL SUCCINATE 100 MG/1
100 TABLET, EXTENDED RELEASE ORAL DAILY
Status: DISCONTINUED | OUTPATIENT
Start: 2018-03-02 | End: 2018-03-06 | Stop reason: HOSPADM

## 2018-03-01 RX ORDER — ACETAMINOPHEN 325 MG/1
650 TABLET ORAL EVERY 4 HOURS PRN
Status: DISCONTINUED | OUTPATIENT
Start: 2018-03-01 | End: 2018-03-06 | Stop reason: HOSPADM

## 2018-03-01 RX ADMIN — SODIUM CHLORIDE 1000 ML: 9 INJECTION, SOLUTION INTRAVENOUS at 13:54

## 2018-03-01 RX ADMIN — WARFARIN SODIUM 5 MG: 5 TABLET ORAL at 19:13

## 2018-03-01 RX ADMIN — ONDANSETRON 4 MG: 2 INJECTION INTRAMUSCULAR; INTRAVENOUS at 13:56

## 2018-03-01 RX ADMIN — SODIUM CHLORIDE 100 ML/HR: 4.5 INJECTION, SOLUTION INTRAVENOUS at 19:08

## 2018-03-01 RX ADMIN — OXYCODONE HYDROCHLORIDE AND ACETAMINOPHEN 1 TABLET: 10; 325 TABLET ORAL at 18:54

## 2018-03-01 RX ADMIN — LABETALOL 20 MG/4 ML (5 MG/ML) INTRAVENOUS SYRINGE 10 MG: at 14:53

## 2018-03-01 RX ADMIN — IPRATROPIUM BROMIDE AND ALBUTEROL SULFATE 3 ML: .5; 3 SOLUTION RESPIRATORY (INHALATION) at 19:45

## 2018-03-01 RX ADMIN — ATORVASTATIN CALCIUM 20 MG: 20 TABLET, FILM COATED ORAL at 21:16

## 2018-03-01 RX ADMIN — DOCUSATE SODIUM 100 MG: 100 CAPSULE, LIQUID FILLED ORAL at 21:15

## 2018-03-01 RX ADMIN — TRAZODONE HYDROCHLORIDE 50 MG: 50 TABLET ORAL at 21:15

## 2018-03-01 NOTE — ED NOTES
Dr. Vasquez was called again at 1609, the call was transferred to BROOKE Alvarez, at this time.      Juan Vincent  03/01/18 1392

## 2018-03-01 NOTE — PROGRESS NOTES
"Subjective   Robe MAMI Bryant is a 67 y.o. male.     History of Present Illness  Mr. Bryant is a 66 yo cauc male with multiple chronic medical problems as listed (COPD, NIDDM, a fib, ECHO, CAD, CHF, h/o erosive gastritis, BPH, MDD) who presents today for routine f/u. He appears markedly changed from baseline and complains of acute worsening of postural dizziness, generalized weakness, somnolence over weekend. Marked decrease in urination as well. Was tx'd for FLU 1 month ago. Felt he had recovered fully. No recent fever. No particular worsening of cough, SOA or wheeze. Does feel more SOA with lying down. No hemoptysis.  He has worsened vision over weekend with swelling of his right eye, has fallen multiple times due to dizziness. No dysuria or hematuria. Has taken 3 NTG over weekend due to chest pain. Has had recent therapeutic INR. Reports taking medications as rx'd but is not sure as he has been confused. He has noticed \"jerking\" in arms and legs particularly when \"trying to sleep\".    He is on chronic opioid analgesics but denies running out in past few days. Taking as rx'd.    The following portions of the patient's history were reviewed and updated as appropriate: allergies, current medications, past family history, past medical history, past social history, past surgical history and problem list.    Review of Systems   Constitutional: Positive for activity change, appetite change (decreased) and fatigue. Negative for diaphoresis, fever and unexpected weight change.   HENT: Positive for congestion. Negative for ear pain, mouth sores, nosebleeds, rhinorrhea, sore throat and trouble swallowing.    Eyes: Positive for discharge, redness and visual disturbance.   Respiratory: Positive for cough, shortness of breath and wheezing.    Cardiovascular: Positive for chest pain, palpitations and leg swelling.   Gastrointestinal: Positive for abdominal pain and nausea. Negative for blood in stool and vomiting.   Endocrine: " Positive for polydipsia. Negative for polyuria.   Genitourinary: Positive for decreased urine volume and difficulty urinating. Negative for dysuria, flank pain and hematuria.   Musculoskeletal: Positive for arthralgias, back pain, gait problem, myalgias and neck pain.   Skin: Negative for rash and wound.   Neurological: Positive for dizziness, syncope, weakness and headaches. Negative for speech difficulty.   Hematological: Negative for adenopathy. Bruises/bleeds easily.   Psychiatric/Behavioral: Positive for confusion and dysphoric mood. Negative for suicidal ideas. The patient is nervous/anxious.        Objective    Vitals:    03/01/18 1100   BP: 140/86   Pulse: 66   SpO2:      Body mass index is 34.25 kg/(m^2).  Last 2 weights    03/01/18  1043   Weight: 94.8 kg (209 lb)       ORTHOSTATICS:  Supine 200/100 and HR 59  Sitting  186/100 and HR 56  Standing 140/86 and HR 66    Fasting BG = 142     Physical Exam   Constitutional: He is oriented to person, place, and time. He appears well-developed and well-nourished. He appears lethargic. He is cooperative. He appears ill. No distress.   Appears older than stated age   HENT:   Head: Normocephalic and atraumatic.   Right Ear: Tympanic membrane, external ear and ear canal normal. Decreased hearing is noted.   Left Ear: Tympanic membrane, external ear and ear canal normal. Decreased hearing is noted.   Nose: Mucosal edema present. No rhinorrhea.   Mouth/Throat: Mucous membranes are dry. No oral lesions. No oropharyngeal exudate or posterior oropharyngeal erythema.   Eyes: EOM are normal. Right eye exhibits discharge (mild). Left eye exhibits discharge (mild). Right conjunctiva is injected. Left conjunctiva is injected.   Minimally reactive small pupils   Neck: Neck supple. Normal carotid pulses and no JVD present. No thyroid mass and no thyromegaly present.   Chronic hoarseness noted   Cardiovascular: Normal rate, regular rhythm and intact distal pulses.  Exam reveals  distant heart sounds.    Pulmonary/Chest: Effort normal. No respiratory distress (more SOA in supine position). He has decreased breath sounds (diffuse). He has wheezes in the right lower field and the left lower field. He has no rhonchi. He has no rales.   Abdominal: Soft. He exhibits no distension and no mass (exam limited by body habitus). Bowel sounds are decreased. There is no hepatosplenomegaly (exam limited by body habitus). There is tenderness (moderate) in the suprapubic area. There is no rigidity, no rebound and no guarding.       Vascular Status -  His exam exhibits right foot edema (mild). His exam exhibits left foot edema (mild).  Lymphadenopathy:     He has no cervical adenopathy.   Neurological: He is oriented to person, place, and time. He appears lethargic. Coordination and gait (antalgic, stumbling) abnormal.   Generally weak, requires assistance with rising from chair, getting on and off exam table. Very unsteady- marked change from baseline.   Skin: Skin is warm and dry.   Psychiatric: Thought content normal. His speech is delayed. He is slowed. Cognition and memory are impaired. He exhibits a depressed mood. He expresses no suicidal ideation.   Nursing note and vitals reviewed.    Recent Results (from the past 1344 hour(s))   POC INR    Collection Time: 01/11/18 10:10 AM   Result Value Ref Range    INR 3.00 (A) 0.9 - 1.1   CBC (No Diff)    Collection Time: 01/11/18 10:45 AM   Result Value Ref Range    WBC 8.66 4.80 - 10.80 10*3/mm3    RBC 5.40 4.70 - 6.10 10*6/mm3    Hemoglobin 15.2 14.0 - 18.0 g/dL    Hematocrit 47.7 42.0 - 52.0 %    MCV 88.3 80.0 - 94.0 fL    MCH 28.1 27.0 - 31.0 pg    MCHC 31.9 30.0 - 37.0 g/dL    RDW 13.0 11.5 - 14.5 %    Platelets 218 130 - 400 10*3/mm3   Comprehensive Metabolic Panel    Collection Time: 01/11/18 10:45 AM   Result Value Ref Range    Glucose 97 74 - 98 mg/dL    BUN 19 7 - 20 mg/dL    Creatinine 1.30 0.60 - 1.30 mg/dL    eGFR Non African Am 55 (L) >60  mL/min/1.73    eGFR African Am 67 >60 mL/min/1.73    BUN/Creatinine Ratio 14.6 6.3 - 21.9    Sodium 142 137 - 145 mmol/L    Potassium 3.8 3.5 - 5.1 mmol/L    Chloride 101 98 - 107 mmol/L    Total CO2 32.0 (H) 26.0 - 30.0 mmol/L    Calcium 9.7 8.4 - 10.2 mg/dL    Total Protein 6.6 6.3 - 8.2 g/dL    Albumin 4.00 3.50 - 5.00 g/dL    Globulin 2.6 gm/dL    A/G Ratio 1.5 1.0 - 2.0 g/dL    Total Bilirubin 0.4 0.2 - 1.3 mg/dL    Alkaline Phosphatase 63 38 - 126 U/L    AST (SGOT) 26 15 - 46 U/L    ALT (SGPT) 26 13 - 69 U/L   Lipid Panel    Collection Time: 01/11/18 10:45 AM   Result Value Ref Range    Total Cholesterol 128 0 - 199 mg/dL    Triglycerides 244 (H) <150 mg/dL    HDL Cholesterol 31 (L) 40 - 60 mg/dL    VLDL Cholesterol 48.8 mg/dL    LDL Cholesterol  48 0 - 99 mg/dL   Hemoglobin A1c    Collection Time: 01/11/18 10:45 AM   Result Value Ref Range    Hemoglobin A1C 5.70 %   TSH Rfx On Abnormal To Free T4    Collection Time: 01/11/18 10:45 AM   Result Value Ref Range    TSH 0.378 (L) 0.47 - 4.68 mIU/mL   T4F    Collection Time: 01/11/18 10:45 AM   Result Value Ref Range    Free T4 1.16 0.78 - 2.19 ng/dL   POC Influenza A / B    Collection Time: 02/05/18  4:10 PM   Result Value Ref Range    Rapid Influenza A Ag neg     Rapid Influenza B Ag neg     Internal Control Passed Passed    Lot Number [id number]     Expiration Date 2/20    POC Rapid Strep A    Collection Time: 02/05/18  4:11 PM   Result Value Ref Range    Rapid Strep A Screen Negative Negative, VALID, INVALID, Not Performed    Internal Control Passed Passed    Lot Number [id number]     Expiration Date 9/20    POC INR    Collection Time: 02/08/18  3:41 PM   Result Value Ref Range    INR 2.00 (A) 0.9 - 1.1   POC INR    Collection Time: 03/01/18 11:05 AM   Result Value Ref Range    INR 2.40 (A) 0.9 - 1.1     Assessment/Plan   Robe was seen today for diabetes, hypertension, pain, dizziness, leg/body jerks during sleep and sleeping.    Diagnoses and all orders for this  visit:    Postural dizziness    Type 2 diabetes mellitus with diabetic nephropathy, without long-term current use of insulin  -     Blood Glucose Monitoring Suppl w/Device kit; 1 each 2 (Two) Times a Day.  -     Glucose Blood (BLOOD GLUCOSE TEST) strip; 1 strip by In Vitro route 2 (Two) Times a Day.  -     Lancets misc; 1 each 2 (Two) Times a Day.  -     POC INR    Warfarin anticoagulation  -     POC INR    Paroxysmal atrial fibrillation  -     POC INR    Somnolence    Suprapubic abdominal pain    Orthostasis    Uncontrolled hypertension    Repeated falls    Severe headache    Loss of vision    Oliguria    Orthopnea    Mr. Bryant has had a marked change in physical and neurological status from baseline with multiple possible etiologies based on his chronic medical conditions as well as polypharmacy. He has uncontrolled HTN but with marked orthostatic change.      I have reviewed with him the seriousness of his condition/symptoms, possible etiologies, and advised that he be seen in ER for more thorough and rapid evaluation. He is amenable to this. Friend who has brought him today will take him to [facility] ER as advised.    Patient voiced understanding of all instructions and denied further questions.    Pt escorted to vehicle in wheelchair.

## 2018-03-01 NOTE — ED NOTES
HS was called at 1623 to arrange admission to tele floor. Will call back soon with bed assignment.      Juan Vincent  03/01/18 3252

## 2018-03-01 NOTE — ED PROVIDER NOTES
Subjective   History of Present Illness  This is a 67-year-old gentleman who comes in today complaining of generalized pain, and confusion.  His brother-in-law reports that his wife had kicked him out of the house 5 days ago and he went to live with him.  He states that 4 days ago he drank a entire large bottle of orange juice, grapefruit juice, and ate all the cakes and cookies in the house.  He states 3 days ago he laid around the couch and appeared to be in a glucose, according to him.  He states he did not check his glucose but after that time he become more confused, stumbling around the house, and sleeping a lot.  He took him to his primary care provider today Dr. Liddle and she advised him to come to the emergency department for evaluation.  Review of Systems   Constitutional: Positive for chills and fatigue.   HENT: Negative.    Eyes: Negative.    Respiratory: Negative.    Cardiovascular: Negative.    Gastrointestinal: Negative.    Genitourinary: Negative.    Skin: Negative.    Neurological: Positive for weakness.   Psychiatric/Behavioral: Positive for confusion and sleep disturbance.   All other systems reviewed and are negative.      Past Medical History:   Diagnosis Date   • Anxiety and depression    • Arthritis    • Backache     20 years   • Bladder trauma    • Cervicalgia    • Chronic kidney disease     Cr up to 2.1   • Diabetes mellitus     15 years--   • Emphysema of lung    • Eye exam, routine 2015   • FH: colonic polyps    • Gout     for 10 years--   • History of echocardiogram 09/15/2014   • History of tobacco use     with cessation x7 years   • Hyperlipidemia    • Hypertension    • Migraine    • Myocardial infarction     h/o coronary artery stenting--   • Restless leg syndrome     20 years   • Sleep apnea     12 years; uses a Bi-Pap   • Stroke    • Syncope 4/28/2017       No Known Allergies    Past Surgical History:   Procedure Laterality Date   • BLADDER SURGERY     • CARDIAC CATHETERIZATION   03/15/2013    revealing widely patent stents of the left circumflex and ramus intermedius coronary arteries, no significant disease is seen in any territory   • CARDIAC PACEMAKER PLACEMENT  2012   • CATARACT EXTRACTION     • COLONOSCOPY  2004    No family history of colon cancer.     • COLONOSCOPY  2015   • HERNIA REPAIR      Type unknown   • PROSTATE SURGERY         Family History   Problem Relation Age of Onset   • Cancer Mother    • Diabetes Mother    • Hypertension Mother    • Stroke Mother    • Stomach cancer Mother    • Heart disease Mother    • Stomach cancer Father    • Cancer Father    • Lung cancer Father        Social History     Social History   • Marital status:      Social History Main Topics   • Smoking status: Former Smoker     Quit date: 8/15/2001   • Smokeless tobacco: Never Used      Comment: quit 16 years ago   • Alcohol use No   • Drug use: No   • Sexual activity: Defer           Objective   Physical Exam   Constitutional: He appears well-developed and well-nourished.   Nursing note and vitals reviewed.  GEN: No acute distress  Head: Normocephalic, atraumatic  Eyes: Pupils equal round reactive to light  ENT: Posterior pharynx normal in appearance, oral mucosa is moist  Chest: Nontender to palpation  Cardiovascular: Regular rate  Lungs: Clear to auscultation bilaterally  Abdomen: Soft, nontender, nondistended, no peritoneal signs  Extremities: No edema, normal appearance  Neuro: GCS 15  Psych: Mood and affect are slightly anxious. Keeps eyes closed during interview and moans when ever touch any where on his body. Shakes his head that he hurts with any tactile stimulation. He does however answers all questions appropriately.   NIH 0      Procedures         ED Course  ED Course   Comment By Time   Bladder scan shows greater than 200 in bladder. Kim Tellez, APRN 03/01 1425                  [facility]  Number of Diagnoses or Management Options     Amount and/or Complexity of Data  Reviewed  Clinical lab tests: ordered and reviewed  Tests in the radiology section of CPT®: ordered and reviewed  Review and summarize past medical records: yes  Discuss the patient with other providers: yes  Independent visualization of images, tracings, or specimens: yes    Risk of Complications, Morbidity, and/or Mortality  Presenting problems: moderate  Diagnostic procedures: moderate  Management options: moderate        Final diagnoses:   Obstructive uropathy            Kim Tellez, APRN  03/01/18 5960

## 2018-03-01 NOTE — ED NOTES
Glucometers are malfunctioning and are in lab.  Leeanne FRANKS notified that we were unable to obtain a current glucose.       Kimberley Hercules RN  03/01/18 1405

## 2018-03-01 NOTE — ED NOTES
Patient has history of sleep apnea.  Spo2 at 85% on room air while asleep.  Placed on liters nasal cannula.     Kimberley Hercules RN  03/01/18 3257

## 2018-03-01 NOTE — H&P
[facility]   HISTORY AND PHYSICAL      Name:  Robe Bryant   Age:  67 y.o.  Sex:  male  :  1950  MRN:  [id number]   Visit Number:  [id number]  Admission Date:  3/1/2018  Date Of Service:  18  Primary Care Physician:  Deanna Wheeler MD    History Obtained From:    patient    Chief Complaint:     Renal failure     History Of Presenting Illness:      Patient is a 67-year-old  male with history of hyperlipidemia, ECHO, coronary artery disease with stents, paroxysmal atrial fibrillation, GERD, diabetes type 2, essential hypertension, and ECHO on CPAP who comes in after he was kicked out of his wife's house 5 days ago.  They are  but getting a divorce he states.  He went over to his brother-in-law's house and drank a large amount of orange juice as well as grape juice, carbonated beverages, and takes.  His brother-in-law states that he was then very sleepy almost comatose and was basically sleeping past 3 days.  At one point he was asleep for 24 hours.  Patient states 3 days ago he also has some chest pain with some shortness of breath in the center of his chest which radiated into his neck and left arm.  He took 3 nitroglycerin the pain is gone away.  He claims he is taking all of his medications.  He went to his physician's office today and was sent to the emergency room due to his symptoms.  He has been acting confused as well.  He has seen Dr. Vasquez in the past for chronic kidney disease, and he is due to see Dr. Heaton for BPH.  In the emergency room his BUN was 41 and creatinine was 6.10.  There was greater than 300 cc of urine in the bladder, so a Devi catheter has been placed.  Urinalysis was sent and was negative.  CT the brain was negative as well.  Patient is much more awake at this point and able to give his own history.  He denies any current fever, chills, chest pressure, shortness breath, nausea or vomiting.  Patient denies any depression or  suicidal ideation.    Review Of Systems:     General ROS: positive for  - fatigue and malaise  Psychological ROS: positive for - concentration difficulties  Ophthalmic ROS: negative  ENT ROS: negative  Allergy and Immunology ROS: negative  Hematological and Lymphatic ROS: negative  Endocrine ROS: negative  Breast ROS: negative  Respiratory ROS: negative  Cardiovascular ROS: negative  Gastrointestinal ROS: negative  Genito-Urinary ROS: positive for - nocturia  Musculoskeletal ROS: negative  Neurological ROS: negative  Dermatological ROS: negative       Past Medical History:    Cardiomyopathy with ejection fraction 45%, hyperlipidemia, ECHO and CPAP which he is not using, coronary artery disease with stents, hypertension, paroxysmal atrial fibrillation, TIA, GERD, diabetes type 2    Past Surgical history:    Left heart catheter in , , , 2013, 2014.  He has had stents placed ×3.  Seizure/AICD, appendectomy, cholecystectomy and hernia surgery.  Also claims to have had prostate surgery      Social History:    Quit smoking 15 years ago.  Had an extensive history before that.  Denies drugs or alcohol.  He is  but .  He has 2 grown children from a previous marriage.  Full code.    Family History:    Father  of lung cancer at the age of 88, mother  of gallbladder cancer at 79.  Has a brother with prostate cancer.    Allergies:      Review of patient's allergies indicates no known allergies.    Home Medications:    Prior to Admission Medications     Prescriptions Last Dose Informant Patient Reported? Taking?    aspirin 81 MG tablet   Yes No    Take  by mouth daily.    baclofen (LIORESAL) 10 MG tablet   No No    TAKE 1 TABLET TWICE DAILY    bisoprolol-hydrochlorothiazide (ZIAC) 10-6.25 MG per tablet   Yes No    Blood Glucose Monitoring Suppl w/Device kit   No No    1 each 2 (Two) Times a Day.    bumetanide (BUMEX) 2 MG tablet   No No    TAKE 1 TABLET EVERY DAY    DULERA 100-5 MCG/ACT inhaler    No No    INHALE 2 PUFFS TWICE DAILY    FLUoxetine (PROzac) 20 MG capsule   No No    TAKE 1 CAPSULE EVERY DAY    gabapentin (NEURONTIN) 600 MG tablet   No No    TAKE 1 TO 2 TABLETS DURING THE DAY AS NEEDED. MAY TAKE 2 TABLETS AT BEDTIME    Glucose Blood (BLOOD GLUCOSE TEST) strip   No No    1 strip by In Vitro route 2 (Two) Times a Day.    ipratropium-albuterol (DUO-NEB) 0.5-2.5 mg/mL nebulizer   Yes No    2 (Two) Times a Day.    isosorbide mononitrate (IMDUR) 30 MG 24 hr tablet   No No    Take 1 tablet by mouth 2 (Two) Times a Day.    ketoconazole (NIZORAL) 2 % cream   No No    APPLY  TOPICALLY EVERY 12 (TWELVE) HOURS.    Lancets misc   No No    1 each 2 (Two) Times a Day.    lisinopril (PRINIVIL,ZESTRIL) 20 MG tablet   No No    TAKE 1 TABLET EVERY DAY    meclizine (ANTIVERT) 12.5 MG tablet   Yes No    Take 12.5 mg by mouth 3 (Three) Times a Day As Needed for dizziness.    metFORMIN (GLUCOPHAGE) 500 MG tablet   No No    TAKE 1 TABLET EVERY DAY WITH BREAKFAST    metoprolol succinate XL (TOPROL-XL) 100 MG 24 hr tablet   No No    Take 1 tablet by mouth Daily.    Multiple Vitamin tablet   Yes No    Take 1 tablet by mouth daily.    nitroglycerin (NITROSTAT) 0.4 MG SL tablet   No No    Place 1 tablet under the tongue Every 5 (Five) Minutes As Needed for Chest Pain.    omeprazole (priLOSEC) 40 MG capsule   No No    TAKE 1 CAPSULE EVERY DAY    oxyCODONE-acetaminophen (PERCOCET)  MG per tablet   No No    Take 1 tablet by mouth Every 6 (Six) Hours As Needed for Severe Pain .    potassium chloride (K-DUR,KLOR-CON) 10 MEQ CR tablet   No No    TAKE 1 TABLET EVERY DAY    pravastatin (PRAVACHOL) 80 MG tablet   No No    TAKE 1 TABLET EVERY NIGHT    tamsulosin (FLOMAX) 0.4 MG capsule 24 hr capsule   No No    Take 1 capsule by mouth Daily.    traZODone (DESYREL) 50 MG tablet   No No    TAKE 1 TABLET EVERY NIGHT    warfarin (COUMADIN) 5 MG tablet   No No    Take 1 to 1.5 tablets by mouth daily or as instructed by  anticoagulation pharmacist             Hospital Scheduled Meds:               Vital Signs:    Temp:  [97.7 °F (36.5 °C)] 97.7 °F (36.5 °C)  Heart Rate:  [56-90] 60  Resp:  [16] 16  BP: (124-200)/() 153/81    Last 3 weights    03/01/18  1235   Weight: 92.5 kg (204 lb)       Body mass index is 33.95 kg/(m^2).    Physical Exam:      General Appearance:    Alert, cooperative, in no acute distress   Head:    Normocephalic, without obvious abnormality, atraumatic   Eyes:            Lids and lashes normal, conjunctivae and sclerae normal, no   icterus, no pallor, corneas clear, PERRLA   Ears:    Ears appear intact with no abnormalities noted   Throat:   No oral lesions, no thrush, oral mucosa moist   Neck:   No adenopathy, supple, trachea midline, no thyromegaly, no   carotid bruit, no JVD   Back:     No kyphosis present, no scoliosis present, no skin lesions,      erythema or scars, no tenderness to percussion or                   palpation,   range of motion normal   Lungs:     Clear to auscultation,respirations regular, even and                  unlabored    Heart:    Regular rhythm and normal rate, normal S1 and S2, no            murmur, no gallop, no rub, no click   Chest Wall:    No abnormalities observed   Abdomen:     Normal bowel sounds, no masses, no organomegaly, soft        non-tender, non-distended, no guarding, no rebound                tenderness   Rectal:     Deferred   Extremities:   Moves all extremities well, no edema, no cyanosis, no             redness   Pulses:   Pulses palpable and equal bilaterally   Skin:   No bleeding, bruising or rash   Lymph nodes:   No palpable adenopathy   Neurologic:   Cranial nerves 2 - 12 grossly intact, sensation intact, DTR       present and equal bilaterally       EKG:      Paced rhythm.    Telemetry:      Paced rhythm    I have personally looked at both the EKG and the telemetry strips.    Labs:      Results from last 7 days  Lab Units 03/01/18  1257  03/01/18  1105   WBC 10*3/mm3 6.35  --    HEMOGLOBIN g/dL 15.9  --    HEMATOCRIT % 47.3  --    MCV fL 88.7  --    MCHC g/dL 33.6  --    PLATELETS 10*3/mm3 137  --    INR  2.36* 2.40*           Results from last 7 days  Lab Units 03/01/18  1257   SODIUM mmol/L 146*   POTASSIUM mmol/L 4.7   CHLORIDE mmol/L 103   CO2 mmol/L 30.0   BUN mg/dL 41*   CREATININE mg/dL 6.10*   EGFR IF NONAFRICN AM mL/min/1.73 9*   CALCIUM mg/dL 9.4   GLUCOSE mg/dL 130*   ALBUMIN g/dL 4.10   BILIRUBIN mg/dL 0.7   ALK PHOS U/L 58   AST (SGOT) U/L 28   ALT (SGPT) U/L 33   Estimated Creatinine Clearance: 12.1 mL/min (by C-G formula based on Cr of 6.1).  No results found for: AMMONIA    Results from last 7 days  Lab Units 03/01/18  1257   TROPONIN I ng/mL 0.017         Lab Results   Component Value Date    HGBA1C 5.70 01/11/2018     Lab Results   Component Value Date    TSH 0.378 (L) 01/11/2018    FREET4 1.16 01/11/2018     No results found for: PREGTESTUR, PREGSERUM, HCG, HCGQUANT  Pain Management Panel     Pain Management Panel Latest Ref Rng & Units 9/13/2016    CREATININE UR Not Estab. mg/dL 56.7                          Radiology:    Imaging Results (last 7 days)     Procedure Component Value Units Date/Time    CT Head Without Contrast [[id number]] Collected:  03/01/18 1322     Updated:  03/01/18 1325    Narrative:       PROCEDURE: CT HEAD WO CONTRAST-     HISTORY: confusion        TECHNIQUE: Noncontrast exam     Mild atrophy and chronic ischemic white matter changes are noted.         No cortical edema is present. There is no mass or hemorrhage. Ventricles  are normal.     Bone windows show no skull fracture or obvious destructive lesion.       Impression:       1. No acute intracranial abnormality or obvious mass.   2. Atrophy and chronic ischemic white matter changes as above.            This study was performed with techniques to keep radiation doses as low  as reasonably achievable (ALARA). Individualized dose reduction  techniques using  automated exposure control or adjustment of vA and/or  kV according to the patient size were employed.      This report was finalized on 3/1/2018 1:23 PM by Fran Rojas MD.    XR Chest 2 View [[id number]] Collected:  03/01/18 1326     Updated:  03/01/18 1328    Narrative:       PROCEDURE: XR CHEST 2 VW-       HISTORY: cough        COMPARISON: 12/30/2016.     FINDINGS:  The lungs are clear.       There is no evidence of effusion or other pleural disease.  The  mediastinum has a normal appearance.      The cardiac silhouette is unremarkable.   Left-sided pacer is present.       Impression:       Unremarkable chest exam.        This report was finalized on 3/1/2018 1:26 PM by Fran Rojas MD.          Assessment:    1.  Acute renal failure due to obstructive uropathy  2.  Chronic kidney disease stage III   3.  BPH  4.  Coronary artery disease with multiple stents  5.  Stable angina  6.  ECHO on CPAP  7.  Paroxysmal atrial fibrillation on Coumadin  8.  Pacemaker/AICD status  9.  Essential hypertension  10.  Diabetes mellitus type 2  11.  Systolic cardiomyopathy with ejection fraction of 45%.  Stable systolic congestive heart failure        Plan:     We'll give IV fluids at 100 cc per hour.  Check urine sodium.  Placed on auto CPAP.  Consult Dr. Vasquez with nephrology.  Placing continue with Devi catheter.  Check renal ultrasound.  Monitor lab work.  Monitor creatinine.  Placed on his home medicines, but hold baclofen, Neurontin, Zestril, Ziac, Bumex, metformin.  Heparin for DVT prophylaxis.  We'll consult PT and OT as the patient appears weak.  Check serial troponins due to the chest pain he had 3 days ago.  Continue with aspirin.  We'll place on Proscar.  Pharmacy to dose Coumadin.  Anticipated stay is greater than 2 midnights.  Further recommendations will depend on the clinical course.    Dc Velazco,   03/01/18  4:57 PM

## 2018-03-01 NOTE — ED NOTES
Elisha Luna RN called back for update on bed assignment, room 325 given at this time.      Juan Vincent  03/01/18 7669

## 2018-03-02 ENCOUNTER — APPOINTMENT (OUTPATIENT)
Dept: ULTRASOUND IMAGING | Facility: HOSPITAL | Age: 68
End: 2018-03-02

## 2018-03-02 LAB
ANION GAP SERPL CALCULATED.3IONS-SCNC: 17.7 MMOL/L
BASOPHILS # BLD AUTO: 0.03 10*3/MM3 (ref 0–0.2)
BASOPHILS NFR BLD AUTO: 0.5 % (ref 0–2.5)
BUN BLD-MCNC: 43 MG/DL (ref 7–20)
BUN/CREAT SERPL: 7.3 (ref 6.3–21.9)
CALCIUM SPEC-SCNC: 8.8 MG/DL (ref 8.4–10.2)
CHLORIDE SERPL-SCNC: 106 MMOL/L (ref 98–107)
CO2 SERPL-SCNC: 26 MMOL/L (ref 26–30)
CREAT BLD-MCNC: 5.9 MG/DL (ref 0.6–1.3)
DEPRECATED RDW RBC AUTO: 43.9 FL (ref 37–54)
EOSINOPHIL # BLD AUTO: 0.08 10*3/MM3 (ref 0–0.7)
EOSINOPHIL NFR BLD AUTO: 1.2 % (ref 0–7)
ERYTHROCYTE [DISTWIDTH] IN BLOOD BY AUTOMATED COUNT: 13.2 % (ref 11.5–14.5)
FLUAV AG NPH QL: NEGATIVE
FLUBV AG NPH QL IA: NEGATIVE
GFR SERPL CREATININE-BSD FRML MDRD: 10 ML/MIN/1.73
GLUCOSE BLD-MCNC: 112 MG/DL (ref 74–98)
GLUCOSE BLDC GLUCOMTR-MCNC: 109 MG/DL (ref 70–130)
GLUCOSE BLDC GLUCOMTR-MCNC: 115 MG/DL (ref 70–130)
GLUCOSE BLDC GLUCOMTR-MCNC: 121 MG/DL (ref 70–130)
GLUCOSE BLDC GLUCOMTR-MCNC: 99 MG/DL (ref 70–130)
HBA1C MFR BLD: 6.1 % (ref 3–6)
HCT VFR BLD AUTO: 46.5 % (ref 42–52)
HGB BLD-MCNC: 15.1 G/DL (ref 14–18)
IMM GRANULOCYTES # BLD: 0.01 10*3/MM3 (ref 0–0.06)
IMM GRANULOCYTES NFR BLD: 0.2 % (ref 0–0.6)
INR PPP: 2.31 (ref 0.9–1.1)
LYMPHOCYTES # BLD AUTO: 0.75 10*3/MM3 (ref 0.6–3.4)
LYMPHOCYTES NFR BLD AUTO: 11.5 % (ref 10–50)
MAGNESIUM SERPL-MCNC: 2.3 MG/DL (ref 1.6–2.3)
MCH RBC QN AUTO: 29.3 PG (ref 27–31)
MCHC RBC AUTO-ENTMCNC: 32.5 G/DL (ref 30–37)
MCV RBC AUTO: 90.3 FL (ref 80–94)
MONOCYTES # BLD AUTO: 0.7 10*3/MM3 (ref 0–0.9)
MONOCYTES NFR BLD AUTO: 10.7 % (ref 0–12)
NEUTROPHILS # BLD AUTO: 4.98 10*3/MM3 (ref 2–6.9)
NEUTROPHILS NFR BLD AUTO: 75.9 % (ref 37–80)
NRBC BLD MANUAL-RTO: 0 /100 WBC (ref 0–0)
PLATELET # BLD AUTO: 121 10*3/MM3 (ref 130–400)
PMV BLD AUTO: 10.4 FL (ref 6–12)
POTASSIUM BLD-SCNC: 4.7 MMOL/L (ref 3.5–5.1)
PROTHROMBIN TIME: 25.4 SECONDS (ref 9.3–12.1)
RBC # BLD AUTO: 5.15 10*6/MM3 (ref 4.7–6.1)
SODIUM BLD-SCNC: 145 MMOL/L (ref 137–145)
SODIUM UR-SCNC: 45 MMOL/L (ref 30–90)
TROPONIN I SERPL-MCNC: 0.01 NG/ML (ref 0–0.03)
TROPONIN I SERPL-MCNC: <0.012 NG/ML (ref 0–0.03)
TROPONIN I SERPL-MCNC: <0.012 NG/ML (ref 0–0.03)
WBC NRBC COR # BLD: 6.55 10*3/MM3 (ref 4.8–10.8)

## 2018-03-02 PROCEDURE — 80048 BASIC METABOLIC PNL TOTAL CA: CPT | Performed by: INTERNAL MEDICINE

## 2018-03-02 PROCEDURE — 82962 GLUCOSE BLOOD TEST: CPT

## 2018-03-02 PROCEDURE — 85610 PROTHROMBIN TIME: CPT | Performed by: INTERNAL MEDICINE

## 2018-03-02 PROCEDURE — 99232 SBSQ HOSP IP/OBS MODERATE 35: CPT | Performed by: INTERNAL MEDICINE

## 2018-03-02 PROCEDURE — 85025 COMPLETE CBC W/AUTO DIFF WBC: CPT | Performed by: INTERNAL MEDICINE

## 2018-03-02 PROCEDURE — 94799 UNLISTED PULMONARY SVC/PX: CPT

## 2018-03-02 PROCEDURE — 76775 US EXAM ABDO BACK WALL LIM: CPT

## 2018-03-02 PROCEDURE — 94640 AIRWAY INHALATION TREATMENT: CPT

## 2018-03-02 PROCEDURE — 94660 CPAP INITIATION&MGMT: CPT

## 2018-03-02 PROCEDURE — 83036 HEMOGLOBIN GLYCOSYLATED A1C: CPT | Performed by: INTERNAL MEDICINE

## 2018-03-02 PROCEDURE — 83735 ASSAY OF MAGNESIUM: CPT | Performed by: INTERNAL MEDICINE

## 2018-03-02 PROCEDURE — 97162 PT EVAL MOD COMPLEX 30 MIN: CPT

## 2018-03-02 PROCEDURE — 84484 ASSAY OF TROPONIN QUANT: CPT | Performed by: INTERNAL MEDICINE

## 2018-03-02 PROCEDURE — 97165 OT EVAL LOW COMPLEX 30 MIN: CPT

## 2018-03-02 PROCEDURE — 84300 ASSAY OF URINE SODIUM: CPT | Performed by: INTERNAL MEDICINE

## 2018-03-02 RX ADMIN — DOCUSATE SODIUM 100 MG: 100 CAPSULE, LIQUID FILLED ORAL at 08:25

## 2018-03-02 RX ADMIN — IPRATROPIUM BROMIDE AND ALBUTEROL SULFATE 3 ML: .5; 3 SOLUTION RESPIRATORY (INHALATION) at 07:11

## 2018-03-02 RX ADMIN — IPRATROPIUM BROMIDE AND ALBUTEROL SULFATE 3 ML: .5; 3 SOLUTION RESPIRATORY (INHALATION) at 00:32

## 2018-03-02 RX ADMIN — TAMSULOSIN HYDROCHLORIDE 0.4 MG: 0.4 CAPSULE ORAL at 08:25

## 2018-03-02 RX ADMIN — METOPROLOL SUCCINATE 100 MG: 100 TABLET, EXTENDED RELEASE ORAL at 08:25

## 2018-03-02 RX ADMIN — ATORVASTATIN CALCIUM 20 MG: 20 TABLET, FILM COATED ORAL at 21:33

## 2018-03-02 RX ADMIN — BUDESONIDE AND FORMOTEROL FUMARATE DIHYDRATE 2 PUFF: 160; 4.5 AEROSOL RESPIRATORY (INHALATION) at 07:11

## 2018-03-02 RX ADMIN — BUDESONIDE AND FORMOTEROL FUMARATE DIHYDRATE 2 PUFF: 160; 4.5 AEROSOL RESPIRATORY (INHALATION) at 20:09

## 2018-03-02 RX ADMIN — ISOSORBIDE MONONITRATE 30 MG: 30 TABLET, EXTENDED RELEASE ORAL at 08:25

## 2018-03-02 RX ADMIN — OXYCODONE HYDROCHLORIDE AND ACETAMINOPHEN 1 TABLET: 10; 325 TABLET ORAL at 12:04

## 2018-03-02 RX ADMIN — FINASTERIDE 5 MG: 5 TABLET, FILM COATED ORAL at 08:25

## 2018-03-02 RX ADMIN — SODIUM CHLORIDE 125 ML/HR: 4.5 INJECTION, SOLUTION INTRAVENOUS at 15:28

## 2018-03-02 RX ADMIN — DOCUSATE SODIUM 100 MG: 100 CAPSULE, LIQUID FILLED ORAL at 21:33

## 2018-03-02 RX ADMIN — TRAZODONE HYDROCHLORIDE 50 MG: 50 TABLET ORAL at 21:33

## 2018-03-02 RX ADMIN — PANTOPRAZOLE SODIUM 40 MG: 40 TABLET, DELAYED RELEASE ORAL at 07:00

## 2018-03-02 RX ADMIN — IPRATROPIUM BROMIDE AND ALBUTEROL SULFATE 3 ML: .5; 3 SOLUTION RESPIRATORY (INHALATION) at 19:55

## 2018-03-02 RX ADMIN — OXYCODONE HYDROCHLORIDE AND ACETAMINOPHEN 1 TABLET: 10; 325 TABLET ORAL at 21:33

## 2018-03-02 RX ADMIN — WARFARIN SODIUM 5 MG: 5 TABLET ORAL at 17:00

## 2018-03-02 RX ADMIN — FLUOXETINE 20 MG: 20 CAPSULE ORAL at 08:25

## 2018-03-02 RX ADMIN — ASPIRIN 81 MG: 81 TABLET, COATED ORAL at 08:25

## 2018-03-02 RX ADMIN — PANTOPRAZOLE SODIUM 40 MG: 40 TABLET, DELAYED RELEASE ORAL at 05:49

## 2018-03-02 NOTE — PROGRESS NOTES
Discharge Planning Assessment  [facility]     Patient Name: Robe Bryant  MRN: [id number]  Today's Date: 3/2/2018    Admit Date: 3/1/2018          Discharge Needs Assessment       03/02/18 1013    Living Environment    Lives With other relative(s) (specify)    Living Environment Comment Brother in LAW     Living Environment    Provides Primary Care For no one    Able to Return to Prior Living Arrangements other (see comments)    Living Arrangement Comments unsure     Discharge Needs Assessment    Concerns To Be Addressed basic needs concerns;discharge planning concerns    Readmission Within The Last 30 Days no previous admission in last 30 days    Discharge Contact Information if Applicable Robe Bryant [phone]            Discharge Plan       03/02/18 1018    Case Management/Social Work Plan    Plan unsure     Additional Comments Spoke to pt regarding discharge plans  He is weak unsure of plans at this time He is living with his brother In law Has  a CPAP that the connection is not working Called [facility]  They do not service CPAP in the home Someone will  need to bring it to [facility] to be serviced  Will need to Fu with  pt when he is more alert  He has a [employer] nurse that  Visits twice a month  Confirmed address and Phone number         Discharge Placement     No information found                Demographic Summary       03/02/18 1006    Referral Information    Admission Type inpatient    Arrived From admitted as an inpatient;home or self-care    Referral Source admission list    Primary Care Physician Information    Name Deanna Wheeler             Functional Status       03/02/18 1013    IADL    Medications independent    Meal Preparation assistive person    Housekeeping assistive person    Laundry assistive person    Shopping assistive person    Oral Care independent            Psychosocial     None            Abuse/Neglect     None            Legal     None            Substance Abuse      None            Patient Forms     None          Alem Recinos

## 2018-03-02 NOTE — PLAN OF CARE
Problem: Patient Care Overview (Adult)  Goal: Plan of Care Review  Outcome: Ongoing (interventions implemented as appropriate)   03/02/18 1105   Coping/Psychosocial Response Interventions   Plan Of Care Reviewed With patient   Outcome Evaluation   Outcome Summary/Follow up Plan PT to completed. Patient presents with decreased strength, balance, endurance and independence but is expected to benefit from continued skilled PT intervention to improve his overall functional mobility status prior to D/C.       Problem: Inpatient Physical Therapy  Goal: Transfer Training Goal 1 LTG- PT  Outcome: Ongoing (interventions implemented as appropriate)   03/02/18 1105   Transfer Training PT LTG   Transfer Training PT LTG, Date Established 03/02/18   Transfer Training PT LTG, Time to Achieve 2 wks   Transfer Training PT LTG, Activity Type sit to stand/stand to sit;bed to chair /chair to bed   Transfer Training PT LTG, [place] Level contact guard assist   Transfer Training PT LTG, Assist Device walker, rolling   Transfer Training PT LTG, Outcome goal ongoing     Goal: Gait Training Goal LTG- PT  Outcome: Ongoing (interventions implemented as appropriate)   03/02/18 1105   Gait Training PT LTG   Gait Training Goal PT LTG, Date Established 03/02/18   Gait Training Goal PT LTG, Time to Achieve 2 wks   Gait Training Goal PT LTG, [place] Level contact guard assist   Gait Training Goal PT LTG, Assist Device walker, rolling   Gait Training Goal PT LTG, Distance to Achieve 100   Gait Training Goal PT LTG, Outcome goal ongoing     Goal: Strength Goal LTG- PT  Outcome: Ongoing (interventions implemented as appropriate)   03/02/18 1105   Strength Goal PT LTG   Strength Goal PT LTG, Date Established 03/02/18   Strength Goal PT LTG, Time to Achieve 2 wks   Strength Goal PT LTG, Measure to Achieve Patient will demonstrate independence with HEP.   Strength Goal PT LTG, Outcome goal ongoing

## 2018-03-02 NOTE — PLAN OF CARE
Problem: Patient Care Overview (Adult)  Goal: Plan of Care Review  Outcome: Ongoing (interventions implemented as appropriate)   03/02/18 1307   Coping/Psychosocial Response Interventions   Plan Of Care Reviewed With patient   Outcome Evaluation   Outcome Summary/Follow up Plan Pt seen for OT evaluation today. Pt presents wtih generalized weakness and decline in ADL independence. Pt is expected to benefit from skilled OT to improve his strength and independence with self care and functional mobility tasks.   Patient Care Overview   Progress no change       Problem: Inpatient Occupational Therapy  Goal: Transfer Training Goal 1 LTG- OT  Outcome: Ongoing (interventions implemented as appropriate)   03/02/18 1307   Transfer Training OT LTG   Transfer Training OT LTG, Date Established 03/02/18   Transfer Training OT LTG, Time to Achieve by discharge   Transfer Training OT LTG, Activity Type sit to stand/stand to sit   Transfer Training OT LTG, [place] Level set up required   Transfer Training OT LTG, Assist Device walker, rolling   Transfer Training OT LTG, Outcome goal ongoing     Goal: Strength Goal LTG- OT  Outcome: Ongoing (interventions implemented as appropriate)   03/02/18 1307   Strength OT LTG   Strength Goal OT LTG, Date Established 03/02/18   Strength Goal OT LTG, Time to Achieve by discharge   Strength Goal OT LTG, Functional Goal Pt will perform 15 reps BUE strengthening ex using theraband for resistance.   Strength Goal OT LTG, Outcome goal ongoing     Goal: LB Dressing Goal LTG- OT  Outcome: Ongoing (interventions implemented as appropriate)   03/02/18 1307   LB Dressing OT LTG   LB Dressing Goal OT LTG, Date Established 03/02/18   LB Dressing Goal OT LTG, Time to Achieve by discharge   LB Dressing Goal OT LTG, [place] Level set up required   LB Dressing Goal OT LTG, Outcome goal ongoing     Goal: Functional Mobility Goal LTG- OT  Outcome: Ongoing (interventions implemented as appropriate)    03/02/18 1307   Functional Mobility OT LTG   Functional Mobility Goal OT LTG, Date Established 03/02/18   Functional Mobility Goal OT LTG, Time to Achieve by discharge   Functional Mobility Goal OT LTG, [place] Level contact guard   Functional Mobility Goal OT LTG, Assist Device rolling walker   Functional Mobility Goal OT LTG, Distance to Achieve in hallway   Functional Mobility Goal OT LTG, Outcome goal ongoing

## 2018-03-02 NOTE — PAYOR COMM NOTE
"Please review for inpat auth  Todd Garnett (67 y.o. Male)     Date of Birth Social Security Number Address Home Phone MRN    1950  [id number] [facility] [phone] [id number]    [employer] Marital Status          [employer]        Admission Date Admission Type Admitting Provider Attending Provider Department, Room/Bed    3/1/18 Emergency Dc Velazco DO Hinsberg, Francis J, DO [facility] MED SURG  3,     Discharge Date Discharge Disposition Discharge Destination                      Attending Provider: Dc Velazco DO     Allergies:  No Known Allergies    Isolation:  None   Infection:  None   Code Status:  FULL    Ht:  165.1 cm (65\")   Wt:  90.7 kg (200 lb)    Admission Cmt:  None   Principal Problem:  None                Active Insurance as of 3/1/2018     Primary Coverage     Payor Plan Insurance Group Employer/Plan Group    HUMANA MEDICARE REPLACEMENT HUMANA MEDICARE REPL [id number]     Payor Plan Address Payor Plan Phone Number Effective From Effective To    [address] [address] [phone] 2018     [place] [address]       Subscriber Name Subscriber Birth Date Member ID       TODD GARNETT 1950 [id number]                 Emergency Contacts      (Rel.) Home Phone Work Phone Mobile Phone    Janel Benavides (Daughter) [phone] -- --               History & Physical      Dc Velazco DO at 3/1/2018  4:57 PM              [facility] HOSPITALIST   HISTORY AND PHYSICAL      Name:  Todd Garnett   Age:  67 y.o.  Sex:  male  :  1950  MRN:  [id number]   Visit Number:  [id number]  Admission Date:  3/1/2018  Date Of Service:  18  Primary Care Physician:  Deanna Wheeler MD    History Obtained From:    patient    Chief Complaint:     Renal failure     History Of Presenting Illness:      Patient is a 67-year-old  male with history of hyperlipidemia, ECHO, coronary artery " disease with stents, paroxysmal atrial fibrillation, GERD, diabetes type 2, essential hypertension, and ECHO on CPAP who comes in after he was kicked out of his wife's house 5 days ago.  They are  but getting a divorce he states.  He went over to his brother-in-law's house and drank a large amount of orange juice as well as grape juice, carbonated beverages, and takes.  His brother-in-law states that he was then very sleepy almost comatose and was basically sleeping past 3 days.  At one point he was asleep for 24 hours.  Patient states 3 days ago he also has some chest pain with some shortness of breath in the center of his chest which radiated into his neck and left arm.  He took 3 nitroglycerin the pain is gone away.  He claims he is taking all of his medications.  He went to his physician's office today and was sent to the emergency room due to his symptoms.  He has been acting confused as well.  He has seen Dr. Vasquez in the past for chronic kidney disease, and he is due to see Dr. Heaton for BPH.  In the emergency room his BUN was 41 and creatinine was 6.10.  There was greater than 300 cc of urine in the bladder, so a Devi catheter has been placed.  Urinalysis was sent and was negative.  CT the brain was negative as well.  Patient is much more awake at this point and able to give his own history.  He denies any current fever, chills, chest pressure, shortness breath, nausea or vomiting.  Patient denies any depression or suicidal ideation.    Review Of Systems:     General ROS: positive for  - fatigue and malaise  Psychological ROS: positive for - concentration difficulties  Ophthalmic ROS: negative  ENT ROS: negative  Allergy and Immunology ROS: negative  Hematological and Lymphatic ROS: negative  Endocrine ROS: negative  Breast ROS: negative  Respiratory ROS: negative  Cardiovascular ROS: negative  Gastrointestinal ROS: negative  Genito-Urinary ROS: positive for - nocturia  Musculoskeletal ROS:  negative  Neurological ROS: negative  Dermatological ROS: negative       Past Medical History:    Cardiomyopathy with ejection fraction 45%, hyperlipidemia, ECHO and CPAP which he is not using, coronary artery disease with stents, hypertension, paroxysmal atrial fibrillation, TIA, GERD, diabetes type 2    Past Surgical history:    Left heart catheter in , , , , .  He has had stents placed ×3.  Seizure/AICD, appendectomy, cholecystectomy and hernia surgery.  Also claims to have had prostate surgery      Social History:    Quit smoking 15 years ago.  Had an extensive history before that.  Denies drugs or alcohol.  He is  but .  He has 2 grown children from a previous marriage.  Full code.    Family History:    Father  of lung cancer at the age of 88, mother  of gallbladder cancer at 79.  Has a brother with prostate cancer.    Allergies:      Review of patient's allergies indicates no known allergies.    Home Medications:    Prior to Admission Medications     Prescriptions Last Dose Informant Patient Reported? Taking?    aspirin 81 MG tablet   Yes No    Take  by mouth daily.    baclofen (LIORESAL) 10 MG tablet   No No    TAKE 1 TABLET TWICE DAILY    bisoprolol-hydrochlorothiazide (ZIAC) 10-6.25 MG per tablet   Yes No    Blood Glucose Monitoring Suppl w/Device kit   No No    1 each 2 (Two) Times a Day.    bumetanide (BUMEX) 2 MG tablet   No No    TAKE 1 TABLET EVERY DAY    DULERA 100-5 MCG/ACT inhaler   No No    INHALE 2 PUFFS TWICE DAILY    FLUoxetine (PROzac) 20 MG capsule   No No    TAKE 1 CAPSULE EVERY DAY    gabapentin (NEURONTIN) 600 MG tablet   No No    TAKE 1 TO 2 TABLETS DURING THE DAY AS NEEDED. MAY TAKE 2 TABLETS AT BEDTIME    Glucose Blood (BLOOD GLUCOSE TEST) strip   No No    1 strip by In Vitro route 2 (Two) Times a Day.    ipratropium-albuterol (DUO-NEB) 0.5-2.5 mg/mL nebulizer   Yes No    2 (Two) Times a Day.    isosorbide mononitrate (IMDUR) 30 MG 24 hr tablet    No No    Take 1 tablet by mouth 2 (Two) Times a Day.    ketoconazole (NIZORAL) 2 % cream   No No    APPLY  TOPICALLY EVERY 12 (TWELVE) HOURS.    Lancets misc   No No    1 each 2 (Two) Times a Day.    lisinopril (PRINIVIL,ZESTRIL) 20 MG tablet   No No    TAKE 1 TABLET EVERY DAY    meclizine (ANTIVERT) 12.5 MG tablet   Yes No    Take 12.5 mg by mouth 3 (Three) Times a Day As Needed for dizziness.    metFORMIN (GLUCOPHAGE) 500 MG tablet   No No    TAKE 1 TABLET EVERY DAY WITH BREAKFAST    metoprolol succinate XL (TOPROL-XL) 100 MG 24 hr tablet   No No    Take 1 tablet by mouth Daily.    Multiple Vitamin tablet   Yes No    Take 1 tablet by mouth daily.    nitroglycerin (NITROSTAT) 0.4 MG SL tablet   No No    Place 1 tablet under the tongue Every 5 (Five) Minutes As Needed for Chest Pain.    omeprazole (priLOSEC) 40 MG capsule   No No    TAKE 1 CAPSULE EVERY DAY    oxyCODONE-acetaminophen (PERCOCET)  MG per tablet   No No    Take 1 tablet by mouth Every 6 (Six) Hours As Needed for Severe Pain .    potassium chloride (K-DUR,KLOR-CON) 10 MEQ CR tablet   No No    TAKE 1 TABLET EVERY DAY    pravastatin (PRAVACHOL) 80 MG tablet   No No    TAKE 1 TABLET EVERY NIGHT    tamsulosin (FLOMAX) 0.4 MG capsule 24 hr capsule   No No    Take 1 capsule by mouth Daily.    traZODone (DESYREL) 50 MG tablet   No No    TAKE 1 TABLET EVERY NIGHT    warfarin (COUMADIN) 5 MG tablet   No No    Take 1 to 1.5 tablets by mouth daily or as instructed by anticoagulation pharmacist             Hospital Scheduled Meds:               Vital Signs:    Temp:  [97.7 °F (36.5 °C)] 97.7 °F (36.5 °C)  Heart Rate:  [56-90] 60  Resp:  [16] 16  BP: (124-200)/() 153/81    Last 3 weights    03/01/18  1235   Weight: 92.5 kg (204 lb)       Body mass index is 33.95 kg/(m^2).    Physical Exam:      General Appearance:    Alert, cooperative, in no acute distress   Head:    Normocephalic, without obvious abnormality, atraumatic   Eyes:            Lids and  lashes normal, conjunctivae and sclerae normal, no   icterus, no pallor, corneas clear, PERRLA   Ears:    Ears appear intact with no abnormalities noted   Throat:   No oral lesions, no thrush, oral mucosa moist   Neck:   No adenopathy, supple, trachea midline, no thyromegaly, no   carotid bruit, no JVD   Back:     No kyphosis present, no scoliosis present, no skin lesions,      erythema or scars, no tenderness to percussion or                   palpation,   range of motion normal   Lungs:     Clear to auscultation,respirations regular, even and                  unlabored    Heart:    Regular rhythm and normal rate, normal S1 and S2, no            murmur, no gallop, no rub, no click   Chest Wall:    No abnormalities observed   Abdomen:     Normal bowel sounds, no masses, no organomegaly, soft        non-tender, non-distended, no guarding, no rebound                tenderness   Rectal:     Deferred   Extremities:   Moves all extremities well, no edema, no cyanosis, no             redness   Pulses:   Pulses palpable and equal bilaterally   Skin:   No bleeding, bruising or rash   Lymph nodes:   No palpable adenopathy   Neurologic:   Cranial nerves 2 - 12 grossly intact, sensation intact, DTR       present and equal bilaterally       EKG:      Paced rhythm.    Telemetry:      Paced rhythm    I have personally looked at both the EKG and the telemetry strips.    Labs:      Results from last 7 days  Lab Units 03/01/18  1257 03/01/18  1105   WBC 10*3/mm3 6.35  --    HEMOGLOBIN g/dL 15.9  --    HEMATOCRIT % 47.3  --    MCV fL 88.7  --    MCHC g/dL 33.6  --    PLATELETS 10*3/mm3 137  --    INR  2.36* 2.40*           Results from last 7 days  Lab Units 03/01/18  1257   SODIUM mmol/L 146*   POTASSIUM mmol/L 4.7   CHLORIDE mmol/L 103   CO2 mmol/L 30.0   BUN mg/dL 41*   CREATININE mg/dL 6.10*   EGFR IF NONAFRICN AM mL/min/1.73 9*   CALCIUM mg/dL 9.4   GLUCOSE mg/dL 130*   ALBUMIN g/dL 4.10   BILIRUBIN mg/dL 0.7   ALK PHOS U/L 58    AST (SGOT) U/L 28   ALT (SGPT) U/L 33   Estimated Creatinine Clearance: 12.1 mL/min (by C-G formula based on Cr of 6.1).  No results found for: AMMONIA    Results from last 7 days  Lab Units 03/01/18  1257   TROPONIN I ng/mL 0.017         Lab Results   Component Value Date    HGBA1C 5.70 01/11/2018     Lab Results   Component Value Date    TSH 0.378 (L) 01/11/2018    FREET4 1.16 01/11/2018     No results found for: PREGTESTUR, PREGSERUM, HCG, HCGQUANT  Pain Management Panel     Pain Management Panel Latest Ref Rng & Units 9/13/2016    CREATININE UR Not Estab. mg/dL 56.7                          Radiology:    Imaging Results (last 7 days)     Procedure Component Value Units Date/Time    CT Head Without Contrast [[id number]] Collected:  03/01/18 1322     Updated:  03/01/18 1325    Narrative:       PROCEDURE: CT HEAD WO CONTRAST-     HISTORY: confusion        TECHNIQUE: Noncontrast exam     Mild atrophy and chronic ischemic white matter changes are noted.         No cortical edema is present. There is no mass or hemorrhage. Ventricles  are normal.     Bone windows show no skull fracture or obvious destructive lesion.       Impression:       1. No acute intracranial abnormality or obvious mass.   2. Atrophy and chronic ischemic white matter changes as above.            This study was performed with techniques to keep radiation doses as low  as reasonably achievable (ALARA). Individualized dose reduction  techniques using automated exposure control or adjustment of vA and/or  kV according to the patient size were employed.      This report was finalized on 3/1/2018 1:23 PM by Fran Rojas MD.    XR Chest 2 View [[id number]] Collected:  03/01/18 1326     Updated:  03/01/18 1328    Narrative:       PROCEDURE: XR CHEST 2 VW-       HISTORY: cough        COMPARISON: 12/30/2016.     FINDINGS:  The lungs are clear.       There is no evidence of effusion or other pleural disease.  The  mediastinum has a normal appearance.       The cardiac silhouette is unremarkable.   Left-sided pacer is present.       Impression:       Unremarkable chest exam.        This report was finalized on 3/1/2018 1:26 PM by Fran Rojas MD.          Assessment:    1.  Acute renal failure due to obstructive uropathy  2.  Chronic kidney disease stage III   3.  BPH  4.  Coronary artery disease with multiple stents  5.  Stable angina  6.  ECHO on CPAP  7.  Paroxysmal atrial fibrillation on Coumadin  8.  Pacemaker/AICD status  9.  Essential hypertension  10.  Diabetes mellitus type 2  11.  Systolic cardiomyopathy with ejection fraction of 45%.  Stable systolic congestive heart failure        Plan:     We'll give IV fluids at 100 cc per hour.  Check urine sodium.  Placed on auto CPAP.  Consult Dr. Vasquez with nephrology.  Placing continue with Devi catheter.  Check renal ultrasound.  Monitor lab work.  Monitor creatinine.  Placed on his home medicines, but hold baclofen, Neurontin, Zestril, Ziac, Bumex, metformin.  Heparin for DVT prophylaxis.  We'll consult PT and OT as the patient appears weak.  Check serial troponins due to the chest pain he had 3 days ago.  Continue with aspirin.  We'll place on Proscar.  Pharmacy to dose Coumadin.  Anticipated stay is greater than 2 midnights.  Further recommendations will depend on the clinical course.    Dc Velazco DO  03/01/18  4:57 PM     Electronically signed by Dc Velazco DO at 3/1/2018  5:09 PM           Emergency Department Notes      BROOKE Coe at 3/1/2018  1:12 PM     Attestation signed by Ronny Cardona MD at 3/2/2018  7:01 AM              For this patient encounter, I reviewed the NP or PA documentation, treatment plan, and medical decision making. Ronny Cardona MD 3/2/2018 7:01 AM                               Subjective   History of Present Illness  This is a 67-year-old gentleman who comes in today complaining of generalized pain, and confusion.  His brother-in-law  reports that his wife had kicked him out of the house 5 days ago and he went to live with him.  He states that 4 days ago he drank a entire large bottle of orange juice, grapefruit juice, and ate all the cakes and cookies in the house.  He states 3 days ago he laid around the couch and appeared to be in a glucose, according to him.  He states he did not check his glucose but after that time he become more confused, stumbling around the house, and sleeping a lot.  He took him to his primary care provider today Dr. Liddle and she advised him to come to the emergency department for evaluation.  Review of Systems   Constitutional: Positive for chills and fatigue.   HENT: Negative.    Eyes: Negative.    Respiratory: Negative.    Cardiovascular: Negative.    Gastrointestinal: Negative.    Genitourinary: Negative.    Skin: Negative.    Neurological: Positive for weakness.   Psychiatric/Behavioral: Positive for confusion and sleep disturbance.   All other systems reviewed and are negative.      Past Medical History:   Diagnosis Date   • Anxiety and depression    • Arthritis    • Backache     20 years   • Bladder trauma    • Cervicalgia    • Chronic kidney disease     Cr up to 2.1   • Diabetes mellitus     15 years--   • Emphysema of lung    • Eye exam, routine 2015   • FH: colonic polyps    • Gout     for 10 years--   • History of echocardiogram 09/15/2014   • History of tobacco use     with cessation x7 years   • Hyperlipidemia    • Hypertension    • Migraine    • Myocardial infarction     h/o coronary artery stenting--   • Restless leg syndrome     20 years   • Sleep apnea     12 years; uses a Bi-Pap   • Stroke    • Syncope 4/28/2017       No Known Allergies    Past Surgical History:   Procedure Laterality Date   • BLADDER SURGERY     • CARDIAC CATHETERIZATION  03/15/2013    revealing widely patent stents of the left circumflex and ramus intermedius coronary arteries, no significant disease is seen in any territory   •  CARDIAC PACEMAKER PLACEMENT  2012   • CATARACT EXTRACTION     • COLONOSCOPY  2004    No family history of colon cancer.     • COLONOSCOPY  2015   • HERNIA REPAIR      Type unknown   • PROSTATE SURGERY         Family History   Problem Relation Age of Onset   • Cancer Mother    • Diabetes Mother    • Hypertension Mother    • Stroke Mother    • Stomach cancer Mother    • Heart disease Mother    • Stomach cancer Father    • Cancer Father    • Lung cancer Father        Social History     Social History   • Marital status:      Social History Main Topics   • Smoking status: Former Smoker     Quit date: 8/15/2001   • Smokeless tobacco: Never Used      Comment: quit 16 years ago   • Alcohol use No   • Drug use: No   • Sexual activity: Defer           Objective   Physical Exam   Constitutional: He appears well-developed and well-nourished.   Nursing note and vitals reviewed.  GEN: No acute distress  Head: Normocephalic, atraumatic  Eyes: Pupils equal round reactive to light  ENT: Posterior pharynx normal in appearance, oral mucosa is moist  Chest: Nontender to palpation  Cardiovascular: Regular rate  Lungs: Clear to auscultation bilaterally  Abdomen: Soft, nontender, nondistended, no peritoneal signs  Extremities: No edema, normal appearance  Neuro: GCS 15  Psych: Mood and affect are slightly anxious. Keeps eyes closed during interview and moans when ever touch any where on his body. Shakes his head that he hurts with any tactile stimulation. He does however answers all questions appropriately.   NIH 0      Procedures        ED Course  ED Course   Comment By Time   Bladder scan shows greater than 200 in bladder. Kim Tellez, APRN 03/01 1425                  MDM  Number of Diagnoses or Management Options     Amount and/or Complexity of Data Reviewed  Clinical lab tests: ordered and reviewed  Tests in the radiology section of CPT®:  ordered and reviewed  Review and summarize past medical records: yes  Discuss the  patient with other providers: yes  Independent visualization of images, tracings, or specimens: yes    Risk of Complications, Morbidity, and/or Mortality  Presenting problems: moderate  Diagnostic procedures: moderate  Management options: moderate        Final diagnoses:   Obstructive uropathy            BROOKE Coe  03/01/18 1622       Electronically signed by Ronny Cardona MD at 3/2/2018  7:01 AM      Kimberley Hercules, RN at 3/1/2018  2:08 PM          Patient has history of sleep apnea.  Spo2 at 85% on room air while asleep.  Placed on liters nasal cannula.     Kimberley Hercules RN  03/01/18 1409       Electronically signed by Kimberley Hercules RN at 3/1/2018  2:09 PM      Juan Vincent at 3/1/2018  3:31 PM          Dr. Vasquez was paged at 1531 per BROOKE Alvarez.      Juan Vincent  03/01/18 1531       Electronically signed by Juan Vincent at 3/1/2018  3:31 PM      Juan Vincent at 3/1/2018  3:47 PM          Dr. Vasquez was paged again at 1547.     Juan Vincent  03/01/18 1549       Electronically signed by Juan Vincent at 3/1/2018  3:49 PM      Juan Vincent at 3/1/2018  4:09 PM          Dr. Vasquez was called again at 1609, the call was transferred to BROOKE Alvarez, at this time.      Juan Vincent  03/01/18 1609       Electronically signed by Juan Vincent at 3/1/2018  4:09 PM      Juan Vincent at 3/1/2018  4:23 PM          HS was called at 1623 to arrange admission to tele floor. Will call back soon with bed assignment.      Juan Vincent  03/01/18 1645       Electronically signed by Juan Vincent at 3/1/2018  4:45 PM      Juan Vincent at 3/1/2018  4:54 PM          Elisha Luna RN called back for update on bed assignment, room 325 given at this time.      Juan Vincent  03/01/18 1654       Electronically signed by Juan Vincent at 3/1/2018  4:54 PM      Kimberley Hercules RN at 3/1/2018  5:07 PM          Unable to take report at this time.  Will call back.      Kimberley Hercules RN  03/01/18  1707       Electronically signed by Kimberley Hercules RN at 3/1/2018  5:07 PM      Kimberley Hercules RN at 3/1/2018  5:34 PM          Glucometers are malfunctioning and are in lab.  Leeanne FRANKS notified that we were unable to obtain a current glucose.       Kimberley Hercules RN  03/01/18 1737       Electronically signed by Kimberley Hercules RN at 3/1/2018  5:37 PM          Lab Results (last 24 hours)     Procedure Component Value Units Date/Time    CBC & Differential [[id number]] Collected:  03/01/18 1257    Specimen:  Blood Updated:  03/01/18 1314    Narrative:       The following orders were created for panel order CBC & Differential.  Procedure                               Abnormality         Status                     ---------                               -----------         ------                     CBC Auto Differential[[id number]]        Normal              Final result                 Please view results for these tests on the individual orders.    CBC Auto Differential [[id number]]  (Normal) Collected:  03/01/18 1257    Specimen:  Blood Updated:  03/01/18 1314     WBC 6.35 10*3/mm3      RBC 5.33 10*6/mm3      Hemoglobin 15.9 g/dL      Hematocrit 47.3 %      MCV 88.7 fL      MCH 29.8 pg      MCHC 33.6 g/dL      RDW 13.1 %      RDW-SD 42.5 fl      MPV 10.8 fL      Platelets 137 10*3/mm3      Neutrophil % 71.3 %      Lymphocyte % 15.9 %      Monocyte % 10.1 %      Eosinophil % 1.6 %      Basophil % 0.8 %      Immature Grans % 0.3 %      Neutrophils, Absolute 4.53 10*3/mm3      Lymphocytes, Absolute 1.01 10*3/mm3      Monocytes, Absolute 0.64 10*3/mm3      Eosinophils, Absolute 0.10 10*3/mm3      Basophils, Absolute 0.05 10*3/mm3      Immature Grans, Absolute 0.02 10*3/mm3      nRBC 0.0 /100 WBC     Comprehensive Metabolic Panel [[id number]]  (Abnormal) Collected:  03/01/18 1257    Specimen:  Blood Updated:  03/01/18 1317     Glucose 130 (H) mg/dL      BUN 41 (H) mg/dL      Creatinine 6.10 (H) mg/dL      Sodium 146 (H)  mmol/L      Potassium 4.7 mmol/L      Chloride 103 mmol/L      CO2 30.0 mmol/L      Calcium 9.4 mg/dL      Total Protein 6.8 g/dL      Albumin 4.10 g/dL      ALT (SGPT) 33 U/L      AST (SGOT) 28 U/L      Alkaline Phosphatase 58 U/L      Total Bilirubin 0.7 mg/dL      eGFR Non African Amer 9 (L) mL/min/1.73      Globulin 2.7 gm/dL      A/G Ratio 1.5 g/dL      BUN/Creatinine Ratio 6.7     Anion Gap 17.7 mmol/L     Lipase [[id number]]  (Normal) Collected:  03/01/18 1257    Specimen:  Blood Updated:  03/01/18 1317     Lipase 45 U/L     Protime-INR [[id number]]  (Abnormal) Collected:  03/01/18 1257    Specimen:  Blood Updated:  03/01/18 1317     Protime 25.9 (H) Seconds      INR 2.36 (H)    Troponin [[id number]]  (Normal) Collected:  03/01/18 1257    Specimen:  Blood Updated:  03/01/18 1327     Troponin I 0.017 ng/mL     Lavender Top [[id number]] Collected:  03/01/18 1257    Specimen:  Blood Updated:  03/01/18 1401     Extra Tube hold for add-on      Auto resulted       Gold Top - SST [[id number]] Collected:  03/01/18 1257    Specimen:  Blood Updated:  03/01/18 1401     Extra Tube Hold for add-ons.      Auto resulted.       Green Top (No Gel) [[id number]] Collected:  03/01/18 1257    Specimen:  Blood Updated:  03/01/18 1401     Extra Tube Hold for add-ons.      Auto resulted.       [facility] Draw [[id number]] Collected:  03/01/18 1257    Specimen:  Blood Updated:  03/01/18 1401    Narrative:       The following orders were created for panel order [facility] Draw.  Procedure                               Abnormality         Status                     ---------                               -----------         ------                     Light Blue Top[[id number]]                                   Final result               Lavender Top[[id number]]                                     Final result               Gold Top - SST[[id number]]                                   Final result               Green Top (No Gel)[[id number]]                                Final result                 Please view results for these tests on the individual orders.    Light Blue Top [[id number]] Collected:  03/01/18 1257    Specimen:  Blood Updated:  03/01/18 1401     Extra Tube hold for add-on      Auto resulted       Influenza Antigen, Rapid - Swab, Nasopharynx [[id number]]  (Normal) Collected:  03/01/18 1358    Specimen:  Swab from Nasopharynx Updated:  03/01/18 1421     Influenza A Ag, EIA Negative     Influenza B Ag, EIA Negative    Urinalysis With / Culture If Indicated - Urine, Clean Catch [[id number]]  (Abnormal) Collected:  03/01/18 1451    Specimen:  Urine from Urine, Catheter Updated:  03/01/18 1508     Color, UA Yellow     Appearance, UA Slightly Cloudy (A)     pH, UA <=5.0     Specific Gravity, UA 1.010     Glucose, UA Negative     Ketones, UA Negative     Bilirubin, UA Negative     Blood, UA Negative     Protein, UA Negative     Leuk Esterase, UA Negative     Nitrite, UA Negative     Urobilinogen, UA 0.2 E.U./dL    Narrative:       Urine microscopic not indicated.    POC Glucose Once [[id number]]  (Normal) Collected:  03/01/18 1255    Specimen:  Blood Updated:  03/01/18 1615     Glucose 126 mg/dL       Serial Number: [id number]:  [id number]       POC Glucose Once [[id number]]  (Normal) Collected:  03/01/18 2044    Specimen:  Blood Updated:  03/01/18 2050     Glucose 83 mg/dL       Serial Number: [id number]:  [id number]       Troponin [[id number]]  (Normal) Collected:  03/01/18 2350    Specimen:  Blood Updated:  03/02/18 0023     Troponin I 0.012 ng/mL     Narrative:       Normal Patient Upper Reference Limit (URL) (99th Percentile)=0.03 ng/mL   Non-AMI Illness Reference Limit=0.03-0.11 ng/mL   AMI Confirmation=0.12 ng/mL and above    POC Glucose Once [[id number]]  (Normal) Collected:  03/02/18 0611    Specimen:  Blood Updated:  03/02/18 0626     Glucose 99 mg/dL       Serial Number: [id number]:  [id number]       Hemoglobin A1c [[id number]] Collected:   03/02/18 0615    Specimen:  Blood Updated:  03/02/18 0627    CBC Auto Differential [[id number]]  (Abnormal) Collected:  03/02/18 0610    Specimen:  Blood Updated:  03/02/18 0643     WBC 6.55 10*3/mm3      RBC 5.15 10*6/mm3      Hemoglobin 15.1 g/dL      Hematocrit 46.5 %      MCV 90.3 fL      MCH 29.3 pg      MCHC 32.5 g/dL      RDW 13.2 %      RDW-SD 43.9 fl      MPV 10.4 fL      Platelets 121 (L) 10*3/mm3      Neutrophil % 75.9 %      Lymphocyte % 11.5 %      Monocyte % 10.7 %      Eosinophil % 1.2 %      Basophil % 0.5 %      Immature Grans % 0.2 %      Neutrophils, Absolute 4.98 10*3/mm3      Lymphocytes, Absolute 0.75 10*3/mm3      Monocytes, Absolute 0.70 10*3/mm3      Eosinophils, Absolute 0.08 10*3/mm3      Basophils, Absolute 0.03 10*3/mm3      Immature Grans, Absolute 0.01 10*3/mm3      nRBC 0.0 /100 WBC     Protime-INR [[id number]]  (Abnormal) Collected:  03/02/18 0610    Specimen:  Blood Updated:  03/02/18 0644     Protime 25.4 (H) Seconds      INR 2.31 (H)    Magnesium [[id number]]  (Normal) Collected:  03/02/18 0610    Specimen:  Blood Updated:  03/02/18 0659     Magnesium 2.3 mg/dL     Basic Metabolic Panel [[id number]]  (Abnormal) Collected:  03/02/18 0610    Specimen:  Blood Updated:  03/02/18 0659     Glucose 112 (H) mg/dL      BUN 43 (H) mg/dL      Creatinine 5.90 (H) mg/dL      Sodium 145 mmol/L      Potassium 4.7 mmol/L      Chloride 106 mmol/L      CO2 26.0 mmol/L      Calcium 8.8 mg/dL      eGFR Non African Amer 10 (L) mL/min/1.73      BUN/Creatinine Ratio 7.3     Anion Gap 17.7 mmol/L     Narrative:       Abnormal estimated GFR should be followed by more specific studies to confirm end stage chronic renal disease. The equation used for calculation may not be accurate for patients less than 19 years old, greater than 70 years old, patients at extremes of weight, malnutrition, or with acute renal dysfunction.    Troponin [[id number]]  (Normal) Collected:  03/02/18 0610    Specimen:  Blood Updated:   03/02/18 0659     Troponin I <0.012 ng/mL     Narrative:       Normal Patient Upper Reference Limit (URL) (99th Percentile)=0.03 ng/mL   Non-AMI Illness Reference Limit=0.03-0.11 ng/mL   AMI Confirmation=0.12 ng/mL and above        Imaging Results (last 24 hours)     Procedure Component Value Units Date/Time    CT Head Without Contrast [[id number]] Collected:  03/01/18 1322     Updated:  03/01/18 1325    Narrative:       PROCEDURE: CT HEAD WO CONTRAST-     HISTORY: confusion        TECHNIQUE: Noncontrast exam     Mild atrophy and chronic ischemic white matter changes are noted.         No cortical edema is present. There is no mass or hemorrhage. Ventricles  are normal.     Bone windows show no skull fracture or obvious destructive lesion.       Impression:       1. No acute intracranial abnormality or obvious mass.   2. Atrophy and chronic ischemic white matter changes as above.            This study was performed with techniques to keep radiation doses as low  as reasonably achievable (ALARA). Individualized dose reduction  techniques using automated exposure control or adjustment of vA and/or  kV according to the patient size were employed.      This report was finalized on 3/1/2018 1:23 PM by Fran Rojas MD.    XR Chest 2 View [[id number]] Collected:  03/01/18 1326     Updated:  03/01/18 1328    Narrative:       PROCEDURE: XR CHEST 2 VW-       HISTORY: cough        COMPARISON: 12/30/2016.     FINDINGS:  The lungs are clear.       There is no evidence of effusion or other pleural disease.  The  mediastinum has a normal appearance.      The cardiac silhouette is unremarkable.   Left-sided pacer is present.       Impression:       Unremarkable chest exam.        This report was finalized on 3/1/2018 1:26 PM by Fran Rojas MD.    [facility] [[id number]] Updated:  03/02/18 1037

## 2018-03-02 NOTE — THERAPY EVALUATION
Acute Care - Occupational Therapy Initial Evaluation   [facility]     Patient Name: Robe Bryant  : 1950  MRN: [id number]  Today's Date: 3/2/2018  Onset of Illness/Injury or Date of Surgery Date: 18  Date of Referral to OT: 18  Referring Physician: Dr. Velazco    Admit Date: 3/1/2018       ICD-10-CM ICD-9-CM   1. Obstructive uropathy N13.9 599.60   2. Impaired functional mobility, balance, gait, and endurance Z74.09 V49.89     Patient Active Problem List   Diagnosis   • Pacemaker   • Neck pain   • Chronic low back pain   • Chronic kidney disease   • Essential hypertension   • Seborrheic dermatitis   • Benign prostatic hyperplasia   • Paroxysmal atrial fibrillation   • Cervico-occipital neuralgia   • Hypercholesterolemia   • Acute erosive gastritis   • Diastolic heart failure   • Chronic obstructive pulmonary disease   • Chronic coronary artery disease   • Gastroesophageal reflux disease   • Chronic shoulder pain   • Obstructive sleep apnea of adult   • Dysphagia   • Symptomatic bradycardia   • Type 2 diabetes mellitus, without long-term current use of insulin   • Chronic chest pain   • Restless leg syndrome   • Warfarin anticoagulation   • Osteoarthritis of multiple joints   • Multilevel degenerative disc disease   • Moderate episode of recurrent major depressive disorder   • Chronic pain syndrome   • Syncope   • DDD (degenerative disc disease), cervical   • Postural dizziness with presyncope   • BPH (benign prostatic hypertrophy) with urinary retention   • Obstructive uropathy     Past Medical History:   Diagnosis Date   • Anxiety and depression    • Arthritis    • Backache     20 years   • Bladder trauma    • Cervicalgia    • Chronic kidney disease     Cr up to 2.1   • Diabetes mellitus     15 years--   • Emphysema of lung    • Eye exam, routine    • FH: colonic polyps    • Gout     for 10 years--   • History of echocardiogram 09/15/2014   • History of tobacco use     with cessation x7  years   • Hyperlipidemia    • Hypertension    • Migraine    • Myocardial infarction     h/o coronary artery stenting--   • Restless leg syndrome     20 years   • Sleep apnea     12 years; uses a Bi-Pap   • Stroke    • Syncope 4/28/2017     Past Surgical History:   Procedure Laterality Date   • BLADDER SURGERY     • CARDIAC CATHETERIZATION  03/15/2013    revealing widely patent stents of the left circumflex and ramus intermedius coronary arteries, no significant disease is seen in any territory   • CARDIAC PACEMAKER PLACEMENT  2012   • CATARACT EXTRACTION     • COLONOSCOPY  2004    No family history of colon cancer.     • COLONOSCOPY  2015   • HERNIA REPAIR      Type unknown   • PROSTATE SURGERY            OT ASSESSMENT FLOWSHEET (last 72 hours)      OT Evaluation       03/02/18 1013 03/02/18 0902 03/02/18 0856 03/01/18 1814 03/01/18 1808    Rehab Evaluation    Document Type  evaluation  - evaluation  -      Subjective Information  agree to therapy;complains of;pain  - agree to therapy;complains of;weakness;pain  -LM      Patient Effort, Rehab Treatment  adequate  - adequate  -      Symptoms Noted During/After Treatment  fatigue  - fatigue;other (see comments)   myoclonic jerking  -LM      General Information    Patient Profile Review  yes  -AH yes  -LM      Onset of Illness/Injury or Date of Surgery Date  03/01/18  -AH 03/01/18  -      Referring Physician  Dr[facility] Velazco  [facility]  -      General Observations  Pt received supine in bed on 2L O2 via nc  - Pt received supine in bed. 2 LPM per n/c. Urinary cath intact.  -      Pertinent History Of Current Problem  obstructive uropathy, acuterenal failure, ECHO, CAD, afib, GERD  - Obstructive uropathy/acute renal failure;ECHO,CAD,afib,GERD,DM,HTN,CKD,TIA  -      Precautions/Limitations  fall precautions;oxygen therapy device and L/min  - fall precautions;oxygen therapy device and L/min  -      Prior Level of Function  independent:;community  mobility;ADL's  - independent:;community mobility  -      Equipment Currently Used at Home  bipap/ cpap;cane, straight  - bipap/ cpap;cane, straight  -  cane, straight;bipap/ cpap  -    Plans/Goals Discussed With  patient;agreed upon  - patient;agreed upon  -      Risks Reviewed  patient:;increased discomfort  - patient:;increased discomfort  -      Benefits Reviewed  patient:;improve function;increase independence;increase strength  - patient:;improve function;increase independence  -      Living Environment    Lives With other relative(s) (specify)  - other relative(s) (specify)   Pt lives with his brother in law  - other relative(s) (specify);other (see comments)   Brother-in-law  - friend(s)  -     Living Arrangements  mobile home  - mobile home  - mobile home  -     Home Accessibility  bed and bath on same level  - bed and bath on same level;stairs to enter home  - no concerns  -     Number of Stairs to Enter Home  3  - 3  -LM      Stair Railings at Home  outside, present at both sides  - outside, present at both sides  - none  -JW     Type of Financial/Environmental Concern    none  -     Transportation Available    car;family or friend will provide  -     Living Environment Comment Brother in LAW   -        Clinical Impression    Date of Referral to OT  03/01/18  -       OT Diagnosis  ADL decline  -       Patient/Family Goals Statement  Pt wants to d/c home  -       Criteria for Skilled Therapeutic Interventions Met  yes;treatment indicated  -       Rehab Potential  good, to achieve stated therapy goals  -       Therapy Frequency  3-5 times/wk  -       Anticipated Discharge Disposition  home  -       Functional Level Prior    Ambulation  0-->independent  -   1-->assistive equipment  -    Transferring  0-->independent  -   1-->assistive equipment  -    Toileting  0-->independent  -   1-->assistive equipment  -    Bathing   0-->independent  -AH   1-->assistive equipment  -    Dressing  0-->independent  -AH   1-->assistive equipment  -JW    Eating  0-->independent  -AH   1-->assistive equipment  -    Communication  0-->understands/communicates without difficulty  -   0-->understands/communicates without difficulty  -    Swallowing  0-->swallows foods/liquids without difficulty  -   0-->swallows foods/liquids without difficulty  -    Vital Signs    O2 Delivery Pre Treatment  supplemental O2  -       O2 Delivery Intra Treatment  supplemental O2  -       O2 Delivery Post Treatment  supplemental O2  -       Pain Assessment    Pain Assessment  0-10  - 0-10  -LM      Pain Score  6  -AH 6  -LM      Post Pain Score  6  - 6  -LM      Pain Type  Chronic pain  - Chronic pain  -LM      Pain Location  Back  - Back  -LM      Pain Orientation  Lower  - Lower  -LM      Pain Intervention(s)  Repositioned;Ambulation/increased activity  - Repositioned;Ambulation/increased activity  -      Response to Interventions  tolerated  - Tolerated.  -LM      Vision Assessment/Intervention    Visual Impairment  WFL with corrective lenses  -       Cognitive Assessment/Intervention    Current Cognitive/Communication Assessment  functional  - functional  -LM      Orientation Status  oriented x 4  - oriented x 4  -LM      Follows Commands/Answers Questions  able to follow multi-step instructions  - able to follow multi-step instructions;needs cueing  -      Personal Safety  decreased awareness, need for assist;decreased awareness, need for safety  - decreased awareness, need for assist;decreased awareness, need for safety  -      Personal Safety Interventions  fall prevention program maintained;gait belt;nonskid shoes/slippers when out of bed  - fall prevention program maintained;gait belt;nonskid shoes/slippers when out of bed  -      ROM (Range of Motion)    General ROM  no range of motion deficits identified  - no  range of motion deficits identified  -LM      MMT (Manual Muscle Testing)    General MMT Assessment  upper extremity strength deficits identified  - lower extremity strength deficits identified  -      General MMT Assessment Detail  BUE strength grossly 4/5  - Grossly 4-/5.  -LM      Bed Mobility, Assessment/Treatment    Bed Mobility, Assistive Device  bed rails;head of bed elevated  - bed rails;head of bed elevated  -      Bed Mob, Supine to Sit, Carter  supervision required  - supervision required  -LM      Transfer Assessment/Treatment    Transfers, Bed-Chair Carter   minimum assist (75% patient effort)  -LM      Transfers, Bed-Chair-Bed, Assist Device   rolling walker  -LM      Transfers, Sit-Stand Carter  minimum assist (75% patient effort)  - minimum assist (75% patient effort)  -LM      Transfers, Stand-Sit Carter  minimum assist (75% patient effort)  - minimum assist (75% patient effort)  -LM      Transfers, Sit-Stand-Sit, Assist Device  rolling walker  - rolling walker  -LM      Transfer, Safety Issues   balance decreased during turns;sequencing ability decreased;step length decreased;knees buckling  -      Transfer, Impairments   strength decreased;impaired balance  -LM      Functional Mobility    Functional Mobility- Ind. Level  minimum assist (75% patient effort)  -       Functional Mobility- Device  --   hand held assist  -       Functional Mobility-Distance (Feet)  10  -       Upper Body Bathing Assessment/Training    UB Bathing Assess/Train, Carter Level  contact guard assist  -       Lower Body Bathing Assessment/Training    LB Bathing Assess/Train, Carter Level  minimum assist (75% patient effort)  -       Upper Body Dressing Assessment/Training    UB Dressing Assess/Train, Carter  set up required  -       Lower Body Dressing Assessment/Training    LB Dressing Assess/Train, Carter  minimum assist (75% patient effort)   -       Toileting Assessment/Training    Toileting Assess/Train, Indepen Level  minimum assist (75% patient effort)  -       Grooming Assessment/Training    Grooming Assess/Train, Indepen Level  set up required  -       General Therapy Interventions    Planned Therapy Interventions  ADL retraining;strengthening;transfer training  -       Positioning and Restraints    Pre-Treatment Position  in bed  - in bed  -      Post Treatment Position  chair  - chair  -      In Chair  reclined;call light within reach;encouraged to call for assist  - reclined;encouraged to call for assist;call light within reach  -        User Key  (r) = Recorded By, (t) = Taken By, (c) = Cosigned By    Initials Name Effective Dates     Kimberly Hart 10/26/16 -     JW Camron Mohr RN 10/26/16 -     LM Shirley Mantilla, PT 10/26/16 -     GIULIANO Recinos 10/26/16 -            Occupational Therapy Education     Title: PT OT SLP Therapies (Active)     Topic: Occupational Therapy (Active)     Point: ADL training (Done)    Description: Instruct learner(s) on proper safety adaptation and remediation techniques during self care or transfers.   Instruct in proper use of assistive devices.    Learning Progress Summary    Learner Readiness Method Response Comment Documented by Status   Patient Acceptance E VU Role of OT/POC  03/02/18 1307 Done                      User Key     Initials Effective Dates Name Provider Type Discipline     10/26/16 -  Kimberly Hart Occupational Therapist OT                  OT Recommendation and Plan  Anticipated Discharge Disposition: home  Planned Therapy Interventions: ADL retraining, strengthening, transfer training  Therapy Frequency: 3-5 times/wk  Plan of Care Review  Plan Of Care Reviewed With: patient  Progress: no change  Outcome Summary/Follow up Plan: Pt seen for OT evaluation today.  Pt presents wtih generalized weakness and decline in ADL independence.  Pt is expected to benefit from  skilled OT to improve his strength and independence with self care and functional mobility tasks.          OT Goals       03/02/18 1307          Transfer Training OT LTG    Transfer Training OT LTG, Date Established 03/02/18  -      Transfer Training OT LTG, Time to Achieve by discharge  -      Transfer Training OT LTG, Activity Type sit to stand/stand to sit  -      Transfer Training OT LTG, [place] Level set up required  -      Transfer Training OT LTG, Assist Device walker, rolling  -      Transfer Training OT LTG, Outcome goal ongoing  -      Strength OT LTG    Strength Goal OT LTG, Date Established 03/02/18  -      Strength Goal OT LTG, Time to Achieve by discharge  -      Strength Goal OT LTG, Functional Goal Pt will perform 15 reps BUE strengthening ex using theraband for resistance.  -      Strength Goal OT LTG, Outcome goal ongoing  -      LB Dressing OT LTG    LB Dressing Goal OT LTG, Date Established 03/02/18  -      LB Dressing Goal OT LTG, Time to Achieve by discharge  -      LB Dressing Goal OT LTG, [place] Level set up required  -      LB Dressing Goal OT LTG, Outcome goal ongoing  -      Functional Mobility OT LTG    Functional Mobility Goal OT LTG, Date Established 03/02/18  -      Functional Mobility Goal OT LTG, Time to Achieve by discharge  -      Functional Mobility Goal OT LTG, [place] Level contact guard  -      Functional Mobility Goal OT LTG, Assist Device rolling walker  -      Functional Mobility Goal OT LTG, Distance to Achieve in hallway  -      Functional Mobility Goal OT LTG, Outcome goal ongoing  -        User Key  (r) = Recorded By, (t) = Taken By, (c) = Cosigned By    Initials Name Provider Type    ROSALIE Hart Occupational Therapist                Outcome Measures       03/02/18 1300 03/02/18 0902 03/02/18 0856    How much help from another person do you currently need...    Turning from your back to your side while in flat  bed without using bedrails?   4  -LM    Moving from lying on back to sitting on the side of a flat bed without bedrails?   4  -LM    Moving to and from a bed to a chair (including a wheelchair)?   3  -LM    Standing up from a chair using your arms (e.g., wheelchair, bedside chair)?   3  -LM    Climbing 3-5 steps with a railing?   2  -LM    To walk in hospital room?   3  -LM    AM-PAC 6 Clicks Score   19  -LM    How much help from another is currently needed...    Putting on and taking off regular lower body clothing?  3  -AH     Bathing (including washing, rinsing, and drying)  3  -AH     Toileting (which includes using toilet bed pan or urinal)  3  -AH     Putting on and taking off regular upper body clothing  4  -AH     Taking care of personal grooming (such as brushing teeth)  4  -AH     Eating meals  4  -AH     Score  21  -AH     Functional Assessment    Outcome Measure Options AM-PAC 6 Clicks Daily Activity (OT)  -AH AM-PAC 6 Clicks Daily Activity (OT)  - AM-PAC 6 Clicks Basic Mobility (PT)  -LM      User Key  (r) = Recorded By, (t) = Taken By, (c) = Cosigned By    Initials Name Provider Type     Kimberly Hart Occupational Therapist    ANITA Mantilla, PT Physical Therapist          Time Calculation:   OT Start Time: 0902    Therapy Charges for Today     Code Description Service Date Service Provider Modifiers Qty    [id number]  OT EVAL LOW COMPLEXITY 4 3/2/2018 Kimberly Hart GO 1               Kimberly Hart  3/2/2018

## 2018-03-02 NOTE — PROGRESS NOTES
[facility]    [facility]    Name:  Robe Bryant   Age:  67 y.o.  Sex:  male  :  1950  MRN:  [id number]   Visit Number:  [id number]  Admission Date:  3/1/2018  Date Of Service:  18  Primary Care Physician:  Deanna Wheeler MD     LOS: 1 day :  Patient Care Team:  Deanna Wheeler MD as PCP - General (Family Medicine)  Deanna Wheeler MD as PCP - Family Medicine  Arelis Tucker MD as Consulting Physician (Cardiology)  Osiel Heaton MD as Consulting Physician (Urology)  Chace Vasquez MD as Consulting Physician (Nephrology)  Blayne Whitman MD as Consulting Physician (Ophthalmology)  Jose Hargrove MD as Consulting Physician (Pulmonary Disease)  John Solorzano MD as Consulting Physician (Urology)  Deanna Wheeler MD as Referring Physician (Family Medicine):    History taken from:     patient    Chief Complaint:      Renal failure    Subjective     Interval History:     Patient is a 67-year-old  male with history of hyperlipidemia, ECHO, coronary artery disease with stents, paroxysmal atrial fibrillation, GERD, diabetes type 2, essential hypertension, and ECHO on CPAP who comes in after he was kicked out of his wife's house 5 days ago.  They are  but getting a divorce he states.  He went over to his brother-in-law's house and drank a large amount of orange juice as well as grape juice, carbonated beverages, and takes.  His brother-in-law states that he was then very sleepy almost comatose and was basically sleeping past 3 days.  At one point he was asleep for 24 hours.  Patient states 3 days ago he also has some chest pain with some shortness of breath in the center of his chest which radiated into his neck and left arm.  He took 3 nitroglycerin the pain is gone away.  He claims he is taking all of his medications.  He went to his physician's office today and was sent to the emergency room due to his symptoms.  He has been acting confused  as well.  He has seen Dr. UGARTE in the past for chronic kidney disease, and he is due to see Dr. Heaton for BPH.  In the emergency room his BUN was 41 and creatinine was 6.10.  There was greater than 300 cc of urine in the bladder, so a Devi catheter has been placed.    He denies any chest pressure, shortness breath, nausea, vomiting, or pain today.  He appears to be profoundly depressed, and tearful at times.  Ancillary staff has noticed this as well.  We'll consult behavioral health to see him.  He refuses to speak about his feelings at this point.  His creatinine has improved only to 5.9 today.  Dr. Ugarte will be seeing him today.  He is very weak according to PT and OT.    Review of Systems:     All systems were reviewed and negative except for:  Behavioral/Psych: positive for  depression    Objective     Vital Signs:    Temp:  [97.7 °F (36.5 °C)-98.2 °F (36.8 °C)] 98 °F (36.7 °C)  Heart Rate:  [56-90] 63  Resp:  [16-18] 18  BP: (124-200)/() 143/80    Physical Exam:      General Appearance:    Alert, cooperative, in no acute distress, Flat affect    Head:    Normocephalic, without obvious abnormality, atraumatic   Eyes:            Lids and lashes normal, conjunctivae and sclerae normal, no   icterus, no pallor, corneas clear, PERRLA   Ears:    Ears appear intact with no abnormalities noted   Throat:   No oral lesions, no thrush, oral mucosa moist   Neck:   No adenopathy, supple, trachea midline, no thyromegaly, no   carotid bruit, no JVD   Back:     No kyphosis present, no scoliosis present, no skin lesions,      erythema or scars, no tenderness to percussion or                   palpation,   range of motion normal   Lungs:     Clear to auscultation,respirations regular, even and                  unlabored    Heart:    Regular rhythm and normal rate, normal S1 and S2, no            murmur, no gallop, no rub, no click   Chest Wall:    No abnormalities observed   Abdomen:     Normal bowel sounds, no  masses, no organomegaly, soft        non-tender, non-distended, no guarding, no rebound                tenderness   Rectal:     Deferred   Extremities:   Moves all extremities well, no edema, no cyanosis, no             redness   Pulses:   Pulses palpable and equal bilaterally   Skin:   No bleeding, bruising or rash   Lymph nodes:   No palpable adenopathy   Neurologic:   Cranial nerves 2 - 12 grossly intact, sensation intact, DTR       present and equal bilaterally        Results Review:      I reviewed the patient's new clinical results.    Labs:    Lab Results (last 24 hours)     Procedure Component Value Units Date/Time    CBC & Differential [[id number]] Collected:  03/01/18 1257    Specimen:  Blood Updated:  03/01/18 1314    Narrative:       The following orders were created for panel order CBC & Differential.  Procedure                               Abnormality         Status                     ---------                               -----------         ------                     CBC Auto Differential[[id number]]        Normal              Final result                 Please view results for these tests on the individual orders.    CBC Auto Differential [[id number]]  (Normal) Collected:  03/01/18 1257    Specimen:  Blood Updated:  03/01/18 1314     WBC 6.35 10*3/mm3      RBC 5.33 10*6/mm3      Hemoglobin 15.9 g/dL      Hematocrit 47.3 %      MCV 88.7 fL      MCH 29.8 pg      MCHC 33.6 g/dL      RDW 13.1 %      RDW-SD 42.5 fl      MPV 10.8 fL      Platelets 137 10*3/mm3      Neutrophil % 71.3 %      Lymphocyte % 15.9 %      Monocyte % 10.1 %      Eosinophil % 1.6 %      Basophil % 0.8 %      Immature Grans % 0.3 %      Neutrophils, Absolute 4.53 10*3/mm3      Lymphocytes, Absolute 1.01 10*3/mm3      Monocytes, Absolute 0.64 10*3/mm3      Eosinophils, Absolute 0.10 10*3/mm3      Basophils, Absolute 0.05 10*3/mm3      Immature Grans, Absolute 0.02 10*3/mm3      nRBC 0.0 /100 WBC     Comprehensive Metabolic Panel  [[id number]]  (Abnormal) Collected:  03/01/18 1257    Specimen:  Blood Updated:  03/01/18 1317     Glucose 130 (H) mg/dL      BUN 41 (H) mg/dL      Creatinine 6.10 (H) mg/dL      Sodium 146 (H) mmol/L      Potassium 4.7 mmol/L      Chloride 103 mmol/L      CO2 30.0 mmol/L      Calcium 9.4 mg/dL      Total Protein 6.8 g/dL      Albumin 4.10 g/dL      ALT (SGPT) 33 U/L      AST (SGOT) 28 U/L      Alkaline Phosphatase 58 U/L      Total Bilirubin 0.7 mg/dL      eGFR Non African Amer 9 (L) mL/min/1.73      Globulin 2.7 gm/dL      A/G Ratio 1.5 g/dL      BUN/Creatinine Ratio 6.7     Anion Gap 17.7 mmol/L     Lipase [[id number]]  (Normal) Collected:  03/01/18 1257    Specimen:  Blood Updated:  03/01/18 1317     Lipase 45 U/L     Protime-INR [[id number]]  (Abnormal) Collected:  03/01/18 1257    Specimen:  Blood Updated:  03/01/18 1317     Protime 25.9 (H) Seconds      INR 2.36 (H)    Troponin [[id number]]  (Normal) Collected:  03/01/18 1257    Specimen:  Blood Updated:  03/01/18 1327     Troponin I 0.017 ng/mL     Lavender Top [[id number]] Collected:  03/01/18 1257    Specimen:  Blood Updated:  03/01/18 1401     Extra Tube hold for add-on      Auto resulted       Gold Top - SST [[id number]] Collected:  03/01/18 1257    Specimen:  Blood Updated:  03/01/18 1401     Extra Tube Hold for add-ons.      Auto resulted.       Green Top (No Gel) [[id number]] Collected:  03/01/18 1257    Specimen:  Blood Updated:  03/01/18 1401     Extra Tube Hold for add-ons.      Auto resulted.       [place] Draw [[id number]] Collected:  03/01/18 1257    Specimen:  Blood Updated:  03/01/18 1401    Narrative:       The following orders were created for panel order [place] Draw.  Procedure                               Abnormality         Status                     ---------                               -----------         ------                     Light Blue Top[[id number]]                                   Final result               Lavender Top[[id number]]                                      Final result               Gold Top - SST[[id number]]                                   Final result               Green Top (No Gel)[[id number]]                               Final result                 Please view results for these tests on the individual orders.    Light Blue Top [[id number]] Collected:  03/01/18 1257    Specimen:  Blood Updated:  03/01/18 1401     Extra Tube hold for add-on      Auto resulted       Influenza Antigen, Rapid - Swab, Nasopharynx [[id number]]  (Normal) Collected:  03/01/18 1358    Specimen:  Swab from Nasopharynx Updated:  03/01/18 1421     Influenza A Ag, EIA Negative     Influenza B Ag, EIA Negative    Urinalysis With / Culture If Indicated - Urine, Clean Catch [[id number]]  (Abnormal) Collected:  03/01/18 1451    Specimen:  Urine from Urine, Catheter Updated:  03/01/18 1508     Color, UA Yellow     Appearance, UA Slightly Cloudy (A)     pH, UA <=5.0     Specific Gravity, UA 1.010     Glucose, UA Negative     Ketones, UA Negative     Bilirubin, UA Negative     Blood, UA Negative     Protein, UA Negative     Leuk Esterase, UA Negative     Nitrite, UA Negative     Urobilinogen, UA 0.2 E.U./dL    Narrative:       Urine microscopic not indicated.    POC Glucose Once [[id number]]  (Normal) Collected:  03/01/18 1255    Specimen:  Blood Updated:  03/01/18 1615     Glucose 126 mg/dL       Serial Number: [id number]:  [id number]       POC Glucose Once [[id number]]  (Normal) Collected:  03/01/18 2044    Specimen:  Blood Updated:  03/01/18 2050     Glucose 83 mg/dL       Serial Number: [id number]:  [id number]       Troponin [[id number]]  (Normal) Collected:  03/01/18 2350    Specimen:  Blood Updated:  03/02/18 0023     Troponin I 0.012 ng/mL     Narrative:       Normal Patient Upper Reference Limit (URL) (99th Percentile)=0.03 ng/mL   Non-AMI Illness Reference Limit=0.03-0.11 ng/mL   AMI Confirmation=0.12 ng/mL and above    POC Glucose Once [[id number]]   (Normal) Collected:  03/02/18 0611    Specimen:  Blood Updated:  03/02/18 0626     Glucose 99 mg/dL       Serial Number: [id number]:  [id number]       Hemoglobin A1c [[id number]] Collected:  03/02/18 0615    Specimen:  Blood Updated:  03/02/18 0627    CBC Auto Differential [[id number]]  (Abnormal) Collected:  03/02/18 0610    Specimen:  Blood Updated:  03/02/18 0643     WBC 6.55 10*3/mm3      RBC 5.15 10*6/mm3      Hemoglobin 15.1 g/dL      Hematocrit 46.5 %      MCV 90.3 fL      MCH 29.3 pg      MCHC 32.5 g/dL      RDW 13.2 %      RDW-SD 43.9 fl      MPV 10.4 fL      Platelets 121 (L) 10*3/mm3      Neutrophil % 75.9 %      Lymphocyte % 11.5 %      Monocyte % 10.7 %      Eosinophil % 1.2 %      Basophil % 0.5 %      Immature Grans % 0.2 %      Neutrophils, Absolute 4.98 10*3/mm3      Lymphocytes, Absolute 0.75 10*3/mm3      Monocytes, Absolute 0.70 10*3/mm3      Eosinophils, Absolute 0.08 10*3/mm3      Basophils, Absolute 0.03 10*3/mm3      Immature Grans, Absolute 0.01 10*3/mm3      nRBC 0.0 /100 WBC     Protime-INR [[id number]]  (Abnormal) Collected:  03/02/18 0610    Specimen:  Blood Updated:  03/02/18 0644     Protime 25.4 (H) Seconds      INR 2.31 (H)    Magnesium [[id number]]  (Normal) Collected:  03/02/18 0610    Specimen:  Blood Updated:  03/02/18 0659     Magnesium 2.3 mg/dL     Basic Metabolic Panel [[id number]]  (Abnormal) Collected:  03/02/18 0610    Specimen:  Blood Updated:  03/02/18 0659     Glucose 112 (H) mg/dL      BUN 43 (H) mg/dL      Creatinine 5.90 (H) mg/dL      Sodium 145 mmol/L      Potassium 4.7 mmol/L      Chloride 106 mmol/L      CO2 26.0 mmol/L      Calcium 8.8 mg/dL      eGFR Non African Amer 10 (L) mL/min/1.73      BUN/Creatinine Ratio 7.3     Anion Gap 17.7 mmol/L     Narrative:       Abnormal estimated GFR should be followed by more specific studies to confirm end stage chronic renal disease. The equation used for calculation may not be accurate for patients less than 19 years old,  greater than 70 years old, patients at extremes of weight, malnutrition, or with acute renal dysfunction.    Troponin [[id number]]  (Normal) Collected:  03/02/18 0610    Specimen:  Blood Updated:  03/02/18 0659     Troponin I <0.012 ng/mL     Narrative:       Normal Patient Upper Reference Limit (URL) (99th Percentile)=0.03 ng/mL   Non-AMI Illness Reference Limit=0.03-0.11 ng/mL   AMI Confirmation=0.12 ng/mL and above           Radiology:    Imaging Results (last 24 hours)     Procedure Component Value Units Date/Time    CT Head Without Contrast [[id number]] Collected:  03/01/18 1322     Updated:  03/01/18 1325    Narrative:       PROCEDURE: CT HEAD WO CONTRAST-     HISTORY: confusion        TECHNIQUE: Noncontrast exam     Mild atrophy and chronic ischemic white matter changes are noted.         No cortical edema is present. There is no mass or hemorrhage. Ventricles  are normal.     Bone windows show no skull fracture or obvious destructive lesion.       Impression:       1. No acute intracranial abnormality or obvious mass.   2. Atrophy and chronic ischemic white matter changes as above.            This study was performed with techniques to keep radiation doses as low  as reasonably achievable (ALARA). Individualized dose reduction  techniques using automated exposure control or adjustment of vA and/or  kV according to the patient size were employed.      This report was finalized on 3/1/2018 1:23 PM by Fran Rojas MD.    XR Chest 2 View [[id number]] Collected:  03/01/18 1326     Updated:  03/01/18 1328    Narrative:       PROCEDURE: XR CHEST 2 VW-       HISTORY: cough        COMPARISON: 12/30/2016.     FINDINGS:  The lungs are clear.       There is no evidence of effusion or other pleural disease.  The  mediastinum has a normal appearance.      The cardiac silhouette is unremarkable.   Left-sided pacer is present.       Impression:       Unremarkable chest exam.        This report was finalized on 3/1/2018 1:26  PM by Fran Rojas MD.          Medication Review:       aspirin 81 mg Oral Daily   atorvastatin 20 mg Oral Nightly   budesonide-formoterol 2 puff Inhalation BID - RT   docusate sodium 100 mg Oral BID   finasteride 5 mg Oral Daily   FLUoxetine 20 mg Oral Daily   insulin aspart 0-9 Units Subcutaneous 4x Daily AC & at Bedtime   ipratropium-albuterol 3 mL Nebulization Q6H - RT   isosorbide mononitrate 30 mg Oral Q24H   metoprolol succinate  mg Oral Daily   pantoprazole 40 mg Oral QAM   tamsulosin 0.4 mg Oral Daily   traZODone 50 mg Oral Nightly   warfarin 5 mg Oral Daily         Pharmacy to dose warfarin     sodium chloride 100 mL/hr Last Rate: 100 mL/hr (03/01/18 1908)       Assessment/Plan     Problem List Items Addressed This Visit     Obstructive uropathy - Primary          1.  Acute renal failure due to obstructive uropathy  2.  Chronic kidney disease stage III   3.  BPH  4.  Coronary artery disease with multiple stents  5.  Stable angina  6.  ECHO on CPAP  7.  Paroxysmal atrial fibrillation on Coumadin  8.  Pacemaker/AICD status  9.  Essential hypertension  10.  Diabetes mellitus type 2  11.  Systolic cardiomyopathy with ejection fraction of 45%.  Stable systolic congestive heart failure  12.  Profound depression  13.  Weakness    Plan:    Renal ultrasound is pending.  Continue with IV fluids.  Continue to monitor creatinine.  Nephrology has been consulted, spoke to Dr. Vasquez.  Continue with home medications.  Continue to hold nephrotoxins Zestril, Ziac, Bumex, metformin.  Also hold Neurontin and baclofen at present time.  We will consult behavioral health as the patient appears to be profoundly depressed.  He and his wife are getting a divorce.  Check lab work in the a.m.  Continue with PT and OT.  Further recommendations will depend on clinical course.    Dc Velazco,   03/02/18  10:14 AM

## 2018-03-02 NOTE — PROGRESS NOTES
"Adult Nutrition  Assessment    Patient Name:  Robe Bryant  YOB: 1950  MRN: [id number]  Admit Date:  3/1/2018    Assessment Date:  3/2/2018    Comments:  Rec. #1: Continue with diet as ordered. RD added Nepro BID. Pt. Exhibiting low p.o. Intake ~30% of meals on average per NSG x 3 meals. Rec. #2: Consider adding renal MVI once medically feasible. RD to follow pt. Consult RD PRN.           Reason for Assessment       03/02/18 1443    Reason for Assessment    Reason For Assessment/Visit admission assessment    Identified At Risk By Screening Criteria diagnosis;BMI    Cardiac Other (comment);SHF;HTN;CAD;Stent   Afib    Endocrine DM Type 2    Gastrointestinal GERD/Reflux    Neurological TIA    Pulmonary/Critical Care CPAP;Obstructive sleep apnea    Renal ARF;CKD    Factors Affecting Nutrition Factors    Appetite Fair                    Labs/Tests/Procedures/Meds       03/02/18 1445    Labs/Tests/Procedures/Meds    Labs/Tests Review Reviewed;BUN;Creat   HIgh: BUN, Cr    Medication Review Reviewed, pertinent;Insulin;Anticoag            Physical Findings       03/02/18 1446    Physical Findings/Assessment    Additional Documentation Physical Appearance (Group)    Physical Appearance    Overall Physical Appearance obese            Estimated/Assessed Needs       03/02/18 1446    Calculation Measurements    Weight Used For Calculations 62.5 kg (137 lb 13 oz)    Height Used for Calculations 1.651 m (5' 5\")    Estimated/Assessed Energy Needs    Energy Need Method Noble-St Jeor    Age 67    RMR (Noble-St. Jeor Equation) 1321.98    Activity Factors (Noble St Jeor)  Out of bed, ambulatory  1.3    Estimated Kcal Range  ~6477-7545    Estimated/Assessed Protein Needs    Weight Used for Protein Calculation 62.5 kg (137 lb 13 oz)    Protein (gm/kg) 0.6    0.6 Gm Protein (gm) 37.51    Estimated Protein Range ~37.51-46.88    Estimated/Assessed Fluid Needs    Fluid Need Method Other (comment)   output + 1000 ml "            Nutrition Prescription Ordered       03/02/18 1447    Nutrition Prescription PO    Current PO Diet Regular    Common Modifiers Cardiac;Consistent Carbohydrate;Renal            Evaluation of Received Nutrient/Fluid Intake       03/02/18 1447    PO Evaluation    Number of Days PO Intake Evaluated 2 days    Number of Meals 3    % PO Intake 30            Electronically signed by:  Daily Manuel RD  03/02/18 2:51 PM

## 2018-03-02 NOTE — CONSULTS
[facility]      Nephrology Consultation    Referring Provider:   No ref. provider found    Reason for Consultation:  Acute Kidney Injury and chronic kidney disease stage III      Subjective     Chief complaint   Chief Complaint   Patient presents with   • Altered Mental Status   • Pain     all over body       History of present illness:    Patient 67-year-old white male with multiple medical problems as listed below in the past medical history.  He was kicked out of his wife's house about 5 days ago as he is in the process of getting an eye worse.  Went to his mother-in-law's house and drank some orange juice as well as some carbonated beverages and slept for at least 24 hours woke up with some chest pain some sublingual nitroglycerin and got better.  Was finally brought into the emergency room after having the chest pain was finally admitted as he was noted to have a creatinine that bumped from 1.7 range to 6.0.  He does have a history of BPH sees Dr. Heaton.  He was noted to have more than 300 mL of urine in the bladder Devi catheter was placed since he has been hospitalized creatinine has not improved much but it is slightly better.  Still has a fairly good urine output.  I was asked to evaluate his acute on chronic kidney disease.  Patient is alert responsive osseous question and did tell me that he has not been using his BiPAP because he does not have the right equipment it was in the process of being replaced.  I have reviewed labs/imaging/records from this hospitalization, including ER staff and admitting/attending physicians H/P's and progress notes to establish a comprehensive understanding of this patient's clinical hospital course, as well as to establish plan of care appropriately.   Past Medical History:   Diagnosis Date   • Anxiety and depression    • Arthritis    • Backache     20 years   • Bladder trauma    • Cervicalgia    • Chronic kidney disease     Cr up to 2.1   • Diabetes  mellitus     15 years--   • Emphysema of lung    • Eye exam, routine 2015   • FH: colonic polyps    • Gout     for 10 years--   • History of echocardiogram 09/15/2014   • History of tobacco use     with cessation x7 years   • Hyperlipidemia    • Hypertension    • Migraine    • Myocardial infarction     h/o coronary artery stenting--   • Restless leg syndrome     20 years   • Sleep apnea     12 years; uses a Bi-Pap   • Stroke    • Syncope 4/28/2017       Past Surgical History:   Procedure Laterality Date   • BLADDER SURGERY     • CARDIAC CATHETERIZATION  03/15/2013    revealing widely patent stents of the left circumflex and ramus intermedius coronary arteries, no significant disease is seen in any territory   • CARDIAC PACEMAKER PLACEMENT  2012   • CATARACT EXTRACTION     • COLONOSCOPY  2004    No family history of colon cancer.     • COLONOSCOPY  2015   • HERNIA REPAIR      Type unknown   • PROSTATE SURGERY         Family History   Problem Relation Age of Onset   • Cancer Mother    • Diabetes Mother    • Hypertension Mother    • Stroke Mother    • Stomach cancer Mother    • Heart disease Mother    • Stomach cancer Father    • Cancer Father    • Lung cancer Father           negative h/o ESRD     Social History   Substance Use Topics   • Smoking status: Former Smoker     Quit date: 8/15/2001   • Smokeless tobacco: Never Used      Comment: quit 16 years ago   • Alcohol use No     Prescriptions Prior to Admission   Medication Sig Dispense Refill Last Dose   • aspirin 81 MG tablet Take  by mouth daily.   3/1/2018 at Unknown time   • baclofen (LIORESAL) 10 MG tablet TAKE 1 TABLET TWICE DAILY 180 tablet 0 3/1/2018 at Unknown time   • bisoprolol-hydrochlorothiazide (ZIAC) 10-6.25 MG per tablet    3/1/2018 at Unknown time   • bumetanide (BUMEX) 2 MG tablet TAKE 1 TABLET EVERY DAY 90 tablet 0 3/1/2018 at Unknown time   • DULERA 100-5 MCG/ACT inhaler INHALE 2 PUFFS TWICE DAILY 13 g 5 3/1/2018 at Unknown time   • FLUoxetine  (PROzac) 20 MG capsule TAKE 1 CAPSULE EVERY DAY 90 capsule 0 3/1/2018 at Unknown time   • gabapentin (NEURONTIN) 600 MG tablet TAKE 1 TO 2 TABLETS DURING THE DAY AS NEEDED. MAY TAKE 2 TABLETS AT BEDTIME 360 tablet 0 3/1/2018 at Unknown time   • ipratropium-albuterol (DUO-NEB) 0.5-2.5 mg/mL nebulizer 2 (Two) Times a Day.   3/1/2018 at Unknown time   • isosorbide mononitrate (IMDUR) 30 MG 24 hr tablet Take 1 tablet by mouth 2 (Two) Times a Day. 180 tablet 3 3/1/2018 at Unknown time   • ketoconazole (NIZORAL) 2 % cream APPLY  TOPICALLY EVERY 12 (TWELVE) HOURS. 180 g 0 3/1/2018 at Unknown time   • lisinopril (PRINIVIL,ZESTRIL) 20 MG tablet TAKE 1 TABLET EVERY DAY 90 tablet 0 3/1/2018 at Unknown time   • meclizine (ANTIVERT) 12.5 MG tablet Take 12.5 mg by mouth 3 (Three) Times a Day As Needed for dizziness.   Past Month at Unknown time   • metFORMIN (GLUCOPHAGE) 500 MG tablet TAKE 1 TABLET EVERY DAY WITH BREAKFAST 90 tablet 0 3/1/2018 at Unknown time   • metoprolol succinate XL (TOPROL-XL) 100 MG 24 hr tablet Take 1 tablet by mouth Daily. 90 tablet 3 3/1/2018 at Unknown time   • Multiple Vitamin tablet Take 1 tablet by mouth daily.   3/1/2018 at Unknown time   • omeprazole (priLOSEC) 40 MG capsule TAKE 1 CAPSULE EVERY DAY 90 capsule 0 3/1/2018 at Unknown time   • oxyCODONE-acetaminophen (PERCOCET)  MG per tablet Take 1 tablet by mouth Every 6 (Six) Hours As Needed for Severe Pain . 120 tablet 0 3/1/2018 at Unknown time   • potassium chloride (K-DUR,KLOR-CON) 10 MEQ CR tablet TAKE 1 TABLET EVERY DAY 90 tablet 0 3/1/2018 at Unknown time   • pravastatin (PRAVACHOL) 80 MG tablet TAKE 1 TABLET EVERY NIGHT 90 tablet 0 2/28/2018 at Unknown time   • tamsulosin (FLOMAX) 0.4 MG capsule 24 hr capsule Take 1 capsule by mouth Daily. 90 capsule 1 3/1/2018 at Unknown time   • traZODone (DESYREL) 50 MG tablet TAKE 1 TABLET EVERY NIGHT 90 tablet 0 3/1/2018 at Unknown time   • warfarin (COUMADIN) 5 MG tablet Take 1 to 1.5 tablets by  "mouth daily or as instructed by anticoagulation pharmacist 110 tablet 1 2/28/2018 at Unknown time   • Blood Glucose Monitoring Suppl w/Device kit 1 each 2 (Two) Times a Day. 1 each 0 Taking   • Glucose Blood (BLOOD GLUCOSE TEST) strip 1 strip by In Vitro route 2 (Two) Times a Day. 200 each 5 Taking   • Lancets misc 1 each 2 (Two) Times a Day. 200 each 5 Taking   • nitroglycerin (NITROSTAT) 0.4 MG SL tablet Place 1 tablet under the tongue Every 5 (Five) Minutes As Needed for Chest Pain. 25 tablet 11 Unknown at Unknown time     Allergies:  Review of patient's allergies indicates no known allergies.    Review of Systems  1. Constitutional: Negative for fever. Negative for chills, diaphoresis, does complain of fatigue and unexpected weight change.   2. HENT: Negative for congestion and hearing loss.   3. Eyes: Negative for redness and visual disturbance.   4. Respiratory: negative for shortness of breath or cough. Negative for chest pain  5. Cardiovascular: Negative for chest pain and chest tightness or palpitations.   6. Gastrointestinal: Negative for abdominal pain or distention, and blood in stool. Denies any Nausea, vomiting, diarrhea or constipation.  7. Endocrine: Negative for heat or cold intolerance.   8. Genitourinary: Negative for difficulty urinating, dysuria and frequency.  He does have a Devi catheter in place right now.  9. Musculoskeletal: Negative for arthralgias, back pain and myalgias.  Denies taking any nonsteroidals.  10. Skin: Negative for color change, rash and wound.   11. Neurological: Negative for syncope, weakness and headaches.   12. Hematological: Negative for adenopathy. Does not bruise/bleed easily.   13. Psychiatric/Behavioral: Negative for confusion. The patient is not nervous/anxious.     Objective     Vital Signs  /80 (BP Location: Right arm, Patient Position: Lying)  Pulse 63  Temp 98 °F (36.7 °C) (Oral)   Resp 18  Ht 165.1 cm (65\")  Wt 90.7 kg (200 lb)  SpO2 98%  BMI " 33.28 kg/m2              Intake/Output Summary (Last 24 hours) at 03/02/18 0923  Last data filed at 03/02/18 0555   Gross per 24 hour   Intake             3100 ml   Output             1300 ml   Net             1800 ml       Physical Exam:     General Appearance:   Alert, cooperative, in no acute distress.     Head:   Normocephalic, without obvious abnormality, atraumatic.     Eyes:      Normal, conjunctivae and sclerae, no icterus, no pallor, corneas clear, PERRLA        Throat:   Oral mucosa dry      Neck:  No adenopathy, supple, trachea midline, no thyromegaly, no carotid bruit, no JVD      Back:   No CVA tenderness on Percussion.     Lungs:    Clear to auscultation and fair air movement noted.      Heart:   Regular rhythm and normal rate, normal S1 and S2.       Abdomen:   Obese. Normal bowel sounds, no masses, no organomegaly, soft non-tender, non-distended, no guarding, no rebound tenderness        Extremities:  Moves all extremities, no edema, no cyanosis, no redness.     Pulses:  Pulses palpable and equal bilaterally but weak.     Skin:  No bleeding, bruising or rash        Neurologic:  Cranial nerves grossly intact, move all extremities             Lab Results (last 7 days)     Procedure Component Value Units Date/Time    CBC & Differential [[id number]] Collected:  03/01/18 1257    Specimen:  Blood Updated:  03/01/18 1314    Narrative:       The following orders were created for panel order CBC & Differential.  Procedure                               Abnormality         Status                     ---------                               -----------         ------                     CBC Auto Differential[[id number]]        Normal              Final result                 Please view results for these tests on the individual orders.    CBC Auto Differential [[id number]]  (Normal) Collected:  03/01/18 1257    Specimen:  Blood Updated:  03/01/18 1314     WBC 6.35 10*3/mm3      RBC 5.33 10*6/mm3      Hemoglobin 15.9  g/dL      Hematocrit 47.3 %      MCV 88.7 fL      MCH 29.8 pg      MCHC 33.6 g/dL      RDW 13.1 %      RDW-SD 42.5 fl      MPV 10.8 fL      Platelets 137 10*3/mm3      Neutrophil % 71.3 %      Lymphocyte % 15.9 %      Monocyte % 10.1 %      Eosinophil % 1.6 %      Basophil % 0.8 %      Immature Grans % 0.3 %      Neutrophils, Absolute 4.53 10*3/mm3      Lymphocytes, Absolute 1.01 10*3/mm3      Monocytes, Absolute 0.64 10*3/mm3      Eosinophils, Absolute 0.10 10*3/mm3      Basophils, Absolute 0.05 10*3/mm3      Immature Grans, Absolute 0.02 10*3/mm3      nRBC 0.0 /100 WBC     Comprehensive Metabolic Panel [[id number]]  (Abnormal) Collected:  03/01/18 1257    Specimen:  Blood Updated:  03/01/18 1317     Glucose 130 (H) mg/dL      BUN 41 (H) mg/dL      Creatinine 6.10 (H) mg/dL      Sodium 146 (H) mmol/L      Potassium 4.7 mmol/L      Chloride 103 mmol/L      CO2 30.0 mmol/L      Calcium 9.4 mg/dL      Total Protein 6.8 g/dL      Albumin 4.10 g/dL      ALT (SGPT) 33 U/L      AST (SGOT) 28 U/L      Alkaline Phosphatase 58 U/L      Total Bilirubin 0.7 mg/dL      eGFR Non African Amer 9 (L) mL/min/1.73      Globulin 2.7 gm/dL      A/G Ratio 1.5 g/dL      BUN/Creatinine Ratio 6.7     Anion Gap 17.7 mmol/L     Lipase [[id number]]  (Normal) Collected:  03/01/18 1257    Specimen:  Blood Updated:  03/01/18 1317     Lipase 45 U/L     Protime-INR [[id number]]  (Abnormal) Collected:  03/01/18 1257    Specimen:  Blood Updated:  03/01/18 1317     Protime 25.9 (H) Seconds      INR 2.36 (H)    Troponin [[id number]]  (Normal) Collected:  03/01/18 1257    Specimen:  Blood Updated:  03/01/18 1327     Troponin I 0.017 ng/mL     Lavender Top [[id number]] Collected:  03/01/18 1257    Specimen:  Blood Updated:  03/01/18 1401     Extra Tube hold for add-on      Auto resulted       Gold Top - SST [[id number]] Collected:  03/01/18 1257    Specimen:  Blood Updated:  03/01/18 1401     Extra Tube Hold for add-ons.      Auto resulted.       Green Top  (No Gel) [[id number]] Collected:  03/01/18 1257    Specimen:  Blood Updated:  03/01/18 1401     Extra Tube Hold for add-ons.      Auto resulted.       [place] Draw [[id number]] Collected:  03/01/18 1257    Specimen:  Blood Updated:  03/01/18 1401    Narrative:       The following orders were created for panel order [place] Draw.  Procedure                               Abnormality         Status                     ---------                               -----------         ------                     Light Blue Top[[id number]]                                   Final result               Lavender Top[[id number]]                                     Final result               Gold Top - SST[[id number]]                                   Final result               Green Top (No Gel)[[id number]]                               Final result                 Please view results for these tests on the individual orders.    Light Blue Top [[id number]] Collected:  03/01/18 1257    Specimen:  Blood Updated:  03/01/18 1401     Extra Tube hold for add-on      Auto resulted       Influenza Antigen, Rapid - Swab, Nasopharynx [[id number]]  (Normal) Collected:  03/01/18 1358    Specimen:  Swab from Nasopharynx Updated:  03/01/18 1421     Influenza A Ag, EIA Negative     Influenza B Ag, EIA Negative    Urinalysis With / Culture If Indicated - Urine, Clean Catch [[id number]]  (Abnormal) Collected:  03/01/18 1451    Specimen:  Urine from Urine, Catheter Updated:  03/01/18 1508     Color, UA Yellow     Appearance, UA Slightly Cloudy (A)     pH, UA <=5.0     Specific Gravity, UA 1.010     Glucose, UA Negative     Ketones, UA Negative     Bilirubin, UA Negative     Blood, UA Negative     Protein, UA Negative     Leuk Esterase, UA Negative     Nitrite, UA Negative     Urobilinogen, UA 0.2 E.U./dL    Narrative:       Urine microscopic not indicated.    POC Glucose Once [[id number]]  (Normal) Collected:  03/01/18 1255    Specimen:  Blood Updated:   03/01/18 1615     Glucose 126 mg/dL       Serial Number: [id number]:  [id number]       POC Glucose Once [[id number]]  (Normal) Collected:  03/01/18 2044    Specimen:  Blood Updated:  03/01/18 2050     Glucose 83 mg/dL       Serial Number: [id number]:  [id number]       Troponin [[id number]]  (Normal) Collected:  03/01/18 2350    Specimen:  Blood Updated:  03/02/18 0023     Troponin I 0.012 ng/mL     Narrative:       Normal Patient Upper Reference Limit (URL) (99th Percentile)=0.03 ng/mL   Non-AMI Illness Reference Limit=0.03-0.11 ng/mL   AMI Confirmation=0.12 ng/mL and above    POC Glucose Once [[id number]]  (Normal) Collected:  03/02/18 0611    Specimen:  Blood Updated:  03/02/18 0626     Glucose 99 mg/dL       Serial Number: [id number]:  [id number]       Hemoglobin A1c [[id number]] Collected:  03/02/18 0615    Specimen:  Blood Updated:  03/02/18 0627    CBC Auto Differential [[id number]]  (Abnormal) Collected:  03/02/18 0610    Specimen:  Blood Updated:  03/02/18 0643     WBC 6.55 10*3/mm3      RBC 5.15 10*6/mm3      Hemoglobin 15.1 g/dL      Hematocrit 46.5 %      MCV 90.3 fL      MCH 29.3 pg      MCHC 32.5 g/dL      RDW 13.2 %      RDW-SD 43.9 fl      MPV 10.4 fL      Platelets 121 (L) 10*3/mm3      Neutrophil % 75.9 %      Lymphocyte % 11.5 %      Monocyte % 10.7 %      Eosinophil % 1.2 %      Basophil % 0.5 %      Immature Grans % 0.2 %      Neutrophils, Absolute 4.98 10*3/mm3      Lymphocytes, Absolute 0.75 10*3/mm3      Monocytes, Absolute 0.70 10*3/mm3      Eosinophils, Absolute 0.08 10*3/mm3      Basophils, Absolute 0.03 10*3/mm3      Immature Grans, Absolute 0.01 10*3/mm3      nRBC 0.0 /100 WBC     Protime-INR [[id number]]  (Abnormal) Collected:  03/02/18 0610    Specimen:  Blood Updated:  03/02/18 0644     Protime 25.4 (H) Seconds      INR 2.31 (H)    Magnesium [[id number]]  (Normal) Collected:  03/02/18 0610    Specimen:  Blood Updated:  03/02/18 0659     Magnesium 2.3 mg/dL     Basic Metabolic  Panel [[id number]]  (Abnormal) Collected:  03/02/18 0610    Specimen:  Blood Updated:  03/02/18 0659     Glucose 112 (H) mg/dL      BUN 43 (H) mg/dL      Creatinine 5.90 (H) mg/dL      Sodium 145 mmol/L      Potassium 4.7 mmol/L      Chloride 106 mmol/L      CO2 26.0 mmol/L      Calcium 8.8 mg/dL      eGFR Non African Amer 10 (L) mL/min/1.73      BUN/Creatinine Ratio 7.3     Anion Gap 17.7 mmol/L     Narrative:       Abnormal estimated GFR should be followed by more specific studies to confirm end stage chronic renal disease. The equation used for calculation may not be accurate for patients less than 19 years old, greater than 70 years old, patients at extremes of weight, malnutrition, or with acute renal dysfunction.    Troponin [[id number]]  (Normal) Collected:  03/02/18 0610    Specimen:  Blood Updated:  03/02/18 0659     Troponin I <0.012 ng/mL     Narrative:       Normal Patient Upper Reference Limit (URL) (99th Percentile)=0.03 ng/mL   Non-AMI Illness Reference Limit=0.03-0.11 ng/mL   AMI Confirmation=0.12 ng/mL and above        Imaging Results (last 72 hours)     Procedure Component Value Units Date/Time    CT Head Without Contrast [[id number]] Collected:  03/01/18 1322     Updated:  03/01/18 1325    Narrative:       PROCEDURE: CT HEAD WO CONTRAST-     HISTORY: confusion        TECHNIQUE: Noncontrast exam     Mild atrophy and chronic ischemic white matter changes are noted.         No cortical edema is present. There is no mass or hemorrhage. Ventricles  are normal.     Bone windows show no skull fracture or obvious destructive lesion.       Impression:       1. No acute intracranial abnormality or obvious mass.   2. Atrophy and chronic ischemic white matter changes as above.            This study was performed with techniques to keep radiation doses as low  as reasonably achievable (ALARA). Individualized dose reduction  techniques using automated exposure control or adjustment of vA and/or  kV according to  the patient size were employed.      This report was finalized on 3/1/2018 1:23 PM by Fran Rojas MD.    XR Chest 2 View [[id number]] Collected:  03/01/18 1326     Updated:  03/01/18 1328    Narrative:       PROCEDURE: XR CHEST 2 VW-       HISTORY: cough        COMPARISON: 12/30/2016.     FINDINGS:  The lungs are clear.       There is no evidence of effusion or other pleural disease.  The  mediastinum has a normal appearance.      The cardiac silhouette is unremarkable.   Left-sided pacer is present.       Impression:       Unremarkable chest exam.        This report was finalized on 3/1/2018 1:26 PM by Fran Rojas MD.              aspirin 81 mg Oral Daily   atorvastatin 20 mg Oral Nightly   budesonide-formoterol 2 puff Inhalation BID - RT   docusate sodium 100 mg Oral BID   finasteride 5 mg Oral Daily   FLUoxetine 20 mg Oral Daily   insulin aspart 0-9 Units Subcutaneous 4x Daily AC & at Bedtime   ipratropium-albuterol 3 mL Nebulization Q6H - RT   isosorbide mononitrate 30 mg Oral Q24H   metoprolol succinate  mg Oral Daily   pantoprazole 40 mg Oral QAM   tamsulosin 0.4 mg Oral Daily   traZODone 50 mg Oral Nightly   warfarin 5 mg Oral Daily       Pharmacy to dose warfarin     sodium chloride 100 mL/hr Last Rate: 100 mL/hr (03/01/18 1908)       Assessment/Plan     1. Acute kidney injury  2. Chronic kidney disease stage III  3. Essential hypertension  4. Paroxysmal atrial fibrillation  5. Chronic coronary artery disease  6. Obstructive sleep apnea of adult  7. Type 2 diabetes mellitus, without long-term current use of insulin  8. BPH (benign prostatic hypertrophy) with urinary retention  9. Obstructive uropathy    10. Pacemaker/AICD    Plan:    · Patient was on multiple medications that can contribute to hemodynamic effect and acute kidney injury, he is been taken off all those medicines which include lisinopril, Bumex, and other medicines that may contribute to low blood pressure and hemodynamic  abnormalities.  · Patient does have a Devi catheter and is putting out good urine, creatinine has not worsened any and it's starting to improve.  Continue IV hydration.  · Continue to follow urine sodium.  · Patient clearly appears depressed likely from his current home situation may need more help from  and/or psych evaluation.  · Details were discussed with the patient no family in the room.    · Details were also discussed with the hospitalist service.   · Further recommendations will depend on clinical course of the patient during the current hospitalization.    · I also discussed the details with the nursing staff.  · Rest as ordered.    In closing, I sincerely appreciate opportunity to participate in care of this patient. If I can be of any further assistance with the management of this patient, please don’t hesitate to contact me.    Chace Vasquez MD  03/02/18  9:23 AM    Dictated using Dragon.

## 2018-03-02 NOTE — PLAN OF CARE
Problem: Urine Elimination, Impaired (Adult)  Goal: Reduced Incontinence Episodes  Outcome: Ongoing (interventions implemented as appropriate)

## 2018-03-02 NOTE — PLAN OF CARE
Problem: NPPV/CPAP (Adult)  Goal: Signs and Symptoms of Listed Potential Problems Will be Absent or Manageable (NPPV/CPAP)  Outcome: Ongoing (interventions implemented as appropriate)   03/02/18 1529   NPPV/CPAP   Problems Assessed (NPPV/CPAP) all   Problems Present (NPPV/CPAP) none

## 2018-03-02 NOTE — THERAPY EVALUATION
Acute Care - Physical Therapy Initial Evaluation   Len     Patient Name: Robe Bryant  : 1950  MRN: [id number]  Today's Date: 3/2/2018   Onset of Illness/Injury or Date of Surgery Date: 18  Date of Referral to PT: 18  Referring Physician: Mora      Admit Date: 3/1/2018     Visit Dx:    ICD-10-CM ICD-9-CM   1. Obstructive uropathy N13.9 599.60   2. Impaired functional mobility, balance, gait, and endurance Z74.09 V49.89     Patient Active Problem List   Diagnosis   • Pacemaker   • Neck pain   • Chronic low back pain   • Chronic kidney disease   • Essential hypertension   • Seborrheic dermatitis   • Benign prostatic hyperplasia   • Paroxysmal atrial fibrillation   • Cervico-occipital neuralgia   • Hypercholesterolemia   • Acute erosive gastritis   • Diastolic heart failure   • Chronic obstructive pulmonary disease   • Chronic coronary artery disease   • Gastroesophageal reflux disease   • Chronic shoulder pain   • Obstructive sleep apnea of adult   • Dysphagia   • Symptomatic bradycardia   • Type 2 diabetes mellitus, without long-term current use of insulin   • Chronic chest pain   • Restless leg syndrome   • Warfarin anticoagulation   • Osteoarthritis of multiple joints   • Multilevel degenerative disc disease   • Moderate episode of recurrent major depressive disorder   • Chronic pain syndrome   • Syncope   • DDD (degenerative disc disease), cervical   • Postural dizziness with presyncope   • BPH (benign prostatic hypertrophy) with urinary retention   • Obstructive uropathy     Past Medical History:   Diagnosis Date   • Anxiety and depression    • Arthritis    • Backache     20 years   • Bladder trauma    • Cervicalgia    • Chronic kidney disease     Cr up to 2.1   • Diabetes mellitus     15 years--   • Emphysema of lung    • Eye exam, routine    • FH: colonic polyps    • Gout     for 10 years--   • History of echocardiogram 09/15/2014   • History of tobacco use     with  cessation x7 years   • Hyperlipidemia    • Hypertension    • Migraine    • Myocardial infarction     h/o coronary artery stenting--   • Restless leg syndrome     20 years   • Sleep apnea     12 years; uses a Bi-Pap   • Stroke    • Syncope 4/28/2017     Past Surgical History:   Procedure Laterality Date   • BLADDER SURGERY     • CARDIAC CATHETERIZATION  03/15/2013    revealing widely patent stents of the left circumflex and ramus intermedius coronary arteries, no significant disease is seen in any territory   • CARDIAC PACEMAKER PLACEMENT  2012   • CATARACT EXTRACTION     • COLONOSCOPY  2004    No family history of colon cancer.     • COLONOSCOPY  2015   • HERNIA REPAIR      Type unknown   • PROSTATE SURGERY            PT ASSESSMENT (last 72 hours)      PT Evaluation       03/02/18 1013 03/02/18 0856    Rehab Evaluation    Document Type  evaluation  -LM    Subjective Information  agree to therapy;complains of;weakness;pain  -LM    Patient Effort, Rehab Treatment  adequate  -LM    Symptoms Noted During/After Treatment  fatigue;other (see comments)   myoclonic jerking  -LM    General Information    Patient Profile Review  yes  -LM    Onset of Illness/Injury or Date of Surgery Date  03/01/18  -LM    Referring Physician  Mora  -LM    General Observations  Pt received supine in bed. 2 LPM per n/c. Urinary cath intact.  -LM    Pertinent History Of Current Problem  Obstructive uropathy/acute renal failure;ECHO,CAD,afib,GERD,DM,HTN,CKD,TIA  -LM    Precautions/Limitations  fall precautions;oxygen therapy device and L/min  -LM    Prior Level of Function  independent:;community mobility  -LM    Equipment Currently Used at Home  bipap/ cpap;cane, straight  -LM    Plans/Goals Discussed With  patient;agreed upon  -LM    Risks Reviewed  patient:;increased discomfort  -LM    Benefits Reviewed  patient:;improve function;increase independence  -LM    Living Environment    Lives With other relative(s) (specify)  -LP other  relative(s) (specify);other (see comments)   Brother-in-law  -LM    Living Arrangements  mobile home  -LM    Home Accessibility  bed and bath on same level;stairs to enter home  -LM    Number of Stairs to Enter Home  3  -LM    Stair Railings at Home  outside, present at both sides  -LM    Living Environment Comment Brother in LAW   -LP     Clinical Impression    Date of Referral to PT  03/01/18  -LM    PT Diagnosis  Generalized weakness  -LM    Patient/Family Goals Statement  Get stronger and return home.  -LM    Criteria for Skilled Therapeutic Interventions Met  yes;treatment indicated  -LM    Rehab Potential  good, to achieve stated therapy goals  -LM    Pain Assessment    Pain Assessment  0-10  -LM    Pain Score  6  -LM    Post Pain Score  6  -LM    Pain Type  Chronic pain  -LM    Pain Location  Back  -LM    Pain Orientation  Lower  -LM    Pain Intervention(s)  Repositioned;Ambulation/increased activity  -LM    Response to Interventions  Tolerated.  -LM    Cognitive Assessment/Intervention    Current Cognitive/Communication Assessment  functional  -LM    Orientation Status  oriented x 4  -LM    Follows Commands/Answers Questions  able to follow multi-step instructions;needs cueing  -LM    Personal Safety  decreased awareness, need for assist;decreased awareness, need for safety  -LM    Personal Safety Interventions  fall prevention program maintained;gait belt;nonskid shoes/slippers when out of bed  -LM    ROM (Range of Motion)    General ROM  no range of motion deficits identified  -LM    MMT (Manual Muscle Testing)    General MMT Assessment  lower extremity strength deficits identified  -LM    General MMT Assessment Detail  Grossly 4-/5.  -LM    Bed Mobility, Assessment/Treatment    Bed Mobility, Assistive Device  bed rails;head of bed elevated  -LM    Bed Mob, Supine to Sit, Camp  supervision required  -LM    Transfer Assessment/Treatment    Transfers, Bed-Chair Camp  minimum assist (75%  patient effort)  -LM    Transfers, Bed-Chair-Bed, Assist Device  rolling walker  -LM    Transfers, Sit-Stand Major  minimum assist (75% patient effort)  -LM    Transfers, Stand-Sit Major  minimum assist (75% patient effort)  -LM    Transfers, Sit-Stand-Sit, Assist Device  rolling walker  -LM    Transfer, Safety Issues  balance decreased during turns;sequencing ability decreased;step length decreased;knees buckling  -LM    Transfer, Impairments  strength decreased;impaired balance  -LM    Gait Assessment/Treatment    Gait, Major Level  minimum assist (75% patient effort)  -LM    Gait, Assistive Device  rolling walker  -LM    Gait, Distance (Feet)  10  -LM    Gait, Safety Issues  balance decreased during turns;sequencing ability decreased;step length decreased;supplemental O2  -LM    Gait, Impairments  strength decreased;impaired balance  -LM    Positioning and Restraints    Pre-Treatment Position  in bed  -LM    Post Treatment Position  chair  -LM    In Chair  reclined;encouraged to call for assist;call light within reach  -LM      03/01/18 1814 03/01/18 1808    General Information    Equipment Currently Used at Home  cane, straight;bipap/ cpap  -JW    Living Environment    Lives With friend(s)  -JW     Living Arrangements mobile home  -JW     Home Accessibility no concerns  -JW     Stair Railings at Home none  -JW     Type of Financial/Environmental Concern none  -JW     Transportation Available car;family or friend will provide  -       User Key  (r) = Recorded By, (t) = Taken By, (c) = Cosigned By    Initials Name Provider Type    RODO Mohr, GOLDEN Registered Nurse    ANITA Mantilla, PT Physical Therapist    GIULIANO Recinos           Physical Therapy Education     Title: PT OT SLP Therapies (Done)     Topic: Physical Therapy (Done)     Point: Mobility training (Done)    Learning Progress Summary    Learner Readiness Method Response Comment Documented by Status   Patient  Acceptance E VU Purpose of PT/POC; proper use of RW for safe transfers/ambulation. LM 03/02/18 1104 Done               Point: Home exercise program (Done)    Learning Progress Summary    Learner Readiness Method Response Comment Documented by Status   Patient Acceptance E VU Purpose of PT/POC; proper use of RW for safe transfers/ambulation. LM 03/02/18 1104 Done               Point: Precautions (Done)    Learning Progress Summary    Learner Readiness Method Response Comment Documented by Status   Patient Acceptance E VU Purpose of PT/POC; proper use of RW for safe transfers/ambulation. LM 03/02/18 1104 Done                      User Key     Initials Effective Dates Name Provider Type Discipline     10/26/16 -  Shirley Mantilla, PT Physical Therapist PT                PT Recommendation and Plan  Anticipated Equipment Needs At Discharge: front wheeled walker  Anticipated Discharge Disposition: home with home health, home with assist  Planned Therapy Interventions: balance training, bed mobility training, gait training, home exercise program, patient/family education, strengthening, transfer training  PT Frequency: daily  Plan of Care Review  Plan Of Care Reviewed With: patient  Outcome Summary/Follow up Plan: PT eval completed.  Patient presents with decreased strength, balance, endurance and independence but is expected to benefit from continued skilled PT intervention to improve his overall functional mobility status prior to D/C.          IP PT Goals       03/02/18 1105          Transfer Training PT LTG    Transfer Training PT LTG, Date Established 03/02/18  -LM      Transfer Training PT LTG, Time to Achieve 2 wks  -LM      Transfer Training PT LTG, Activity Type sit to stand/stand to sit;bed to chair /chair to bed  -LM      Transfer Training PT LTG, [place] Level contact guard assist  -LM      Transfer Training PT LTG, Assist Device walker, rolling  -LM      Transfer Training PT LTG, Outcome goal ongoing  -LM       Gait Training PT LTG    Gait Training Goal PT LTG, Date Established 03/02/18  -LM      Gait Training Goal PT LTG, Time to Achieve 2 wks  -LM      Gait Training Goal PT LTG, [place] Level contact guard assist  -LM      Gait Training Goal PT LTG, Assist Device walker, rolling  -LM      Gait Training Goal PT LTG, Distance to Achieve 100  -LM      Gait Training Goal PT LTG, Outcome goal ongoing  -LM      Strength Goal PT LTG    Strength Goal PT LTG, Date Established 03/02/18  -LM      Strength Goal PT LTG, Time to Achieve 2 wks  -LM      Strength Goal PT LTG, Measure to Achieve Patient will demonstrate independence with HEP.  -LM      Strength Goal PT LTG, Outcome goal ongoing  -LM        User Key  (r) = Recorded By, (t) = Taken By, (c) = Cosigned By    Initials Name Provider Type    ANITA Mantilla PT Physical Therapist                Outcome Measures       03/02/18 0856          How much help from another person do you currently need...    Turning from your back to your side while in flat bed without using bedrails? 4  -LM      Moving from lying on back to sitting on the side of a flat bed without bedrails? 4  -LM      Moving to and from a bed to a chair (including a wheelchair)? 3  -LM      Standing up from a chair using your arms (e.g., wheelchair, bedside chair)? 3  -LM      Climbing 3-5 steps with a railing? 2  -LM      To walk in hospital room? 3  -LM      AM-PAC 6 Clicks Score 19  -LM      Functional Assessment    Outcome Measure Options AM-PAC 6 Clicks Basic Mobility (PT)  -LM        User Key  (r) = Recorded By, (t) = Taken By, (c) = Cosigned By    Initials Name Provider Type    ANITA Mantilla PT Physical Therapist           Time Calculation:         PT Charges       03/02/18 1107          Time Calculation    Start Time 0856  -LM      PT Received On 03/02/18  -LM      PT Goal Re-Cert Due Date 03/12/18  -LM        User Key  (r) = Recorded By, (t) = Taken By, (c) = Cosigned By    Initials Name Provider  Type    LM Shirley Mantilla, PT Physical Therapist          Therapy Charges for Today     Code Description Service Date Service Provider Modifiers Qty    67327535330 HC PT EVAL MOD COMPLEXITY 4 3/2/2018 Shirley Mantilla, PT GP 1          PT G-Codes  Outcome Measure Options: AM-PAC 6 Clicks Basic Mobility (PT)      Shirley Mantilla, PT  3/2/2018

## 2018-03-03 LAB
ALBUMIN SERPL-MCNC: 3.6 G/DL (ref 3.5–5)
ANION GAP SERPL CALCULATED.3IONS-SCNC: 15.2 MMOL/L
BASOPHILS # BLD AUTO: 0.04 10*3/MM3 (ref 0–0.2)
BASOPHILS NFR BLD AUTO: 0.8 % (ref 0–2.5)
BUN BLD-MCNC: 47 MG/DL (ref 7–20)
BUN/CREAT SERPL: 8.2 (ref 6.3–21.9)
CALCIUM SPEC-SCNC: 8.7 MG/DL (ref 8.4–10.2)
CHLORIDE SERPL-SCNC: 105 MMOL/L (ref 98–107)
CO2 SERPL-SCNC: 29 MMOL/L (ref 26–30)
CREAT BLD-MCNC: 5.7 MG/DL (ref 0.6–1.3)
DEPRECATED RDW RBC AUTO: 43.6 FL (ref 37–54)
EOSINOPHIL # BLD AUTO: 0.16 10*3/MM3 (ref 0–0.7)
EOSINOPHIL NFR BLD AUTO: 3.1 % (ref 0–7)
ERYTHROCYTE [DISTWIDTH] IN BLOOD BY AUTOMATED COUNT: 13.1 % (ref 11.5–14.5)
GFR SERPL CREATININE-BSD FRML MDRD: 10 ML/MIN/1.73
GLUCOSE BLD-MCNC: 112 MG/DL (ref 74–98)
GLUCOSE BLDC GLUCOMTR-MCNC: 126 MG/DL (ref 70–130)
GLUCOSE BLDC GLUCOMTR-MCNC: 96 MG/DL (ref 70–130)
GLUCOSE BLDC GLUCOMTR-MCNC: 96 MG/DL (ref 70–130)
GLUCOSE BLDC GLUCOMTR-MCNC: 98 MG/DL (ref 70–130)
HCT VFR BLD AUTO: 43 % (ref 42–52)
HGB BLD-MCNC: 14.1 G/DL (ref 14–18)
IMM GRANULOCYTES # BLD: 0.03 10*3/MM3 (ref 0–0.06)
IMM GRANULOCYTES NFR BLD: 0.6 % (ref 0–0.6)
INR PPP: 3.09 (ref 0.9–1.1)
LYMPHOCYTES # BLD AUTO: 1.06 10*3/MM3 (ref 0.6–3.4)
LYMPHOCYTES NFR BLD AUTO: 20.6 % (ref 10–50)
MAGNESIUM SERPL-MCNC: 2.2 MG/DL (ref 1.6–2.3)
MCH RBC QN AUTO: 29.6 PG (ref 27–31)
MCHC RBC AUTO-ENTMCNC: 32.8 G/DL (ref 30–37)
MCV RBC AUTO: 90.1 FL (ref 80–94)
MONOCYTES # BLD AUTO: 0.58 10*3/MM3 (ref 0–0.9)
MONOCYTES NFR BLD AUTO: 11.3 % (ref 0–12)
NEUTROPHILS # BLD AUTO: 3.27 10*3/MM3 (ref 2–6.9)
NEUTROPHILS NFR BLD AUTO: 63.6 % (ref 37–80)
NRBC BLD MANUAL-RTO: 0 /100 WBC (ref 0–0)
PHOSPHATE SERPL-MCNC: 5.4 MG/DL (ref 2.5–4.5)
PLATELET # BLD AUTO: 122 10*3/MM3 (ref 130–400)
PMV BLD AUTO: 10.8 FL (ref 6–12)
POTASSIUM BLD-SCNC: 4.2 MMOL/L (ref 3.5–5.1)
PROTHROMBIN TIME: 33.8 SECONDS (ref 9.3–12.1)
RBC # BLD AUTO: 4.77 10*6/MM3 (ref 4.7–6.1)
SODIUM BLD-SCNC: 145 MMOL/L (ref 137–145)
SODIUM UR-SCNC: 67 MMOL/L (ref 30–90)
WBC NRBC COR # BLD: 5.14 10*3/MM3 (ref 4.8–10.8)

## 2018-03-03 PROCEDURE — 84300 ASSAY OF URINE SODIUM: CPT | Performed by: INTERNAL MEDICINE

## 2018-03-03 PROCEDURE — 85025 COMPLETE CBC W/AUTO DIFF WBC: CPT | Performed by: INTERNAL MEDICINE

## 2018-03-03 PROCEDURE — 80069 RENAL FUNCTION PANEL: CPT | Performed by: INTERNAL MEDICINE

## 2018-03-03 PROCEDURE — 83735 ASSAY OF MAGNESIUM: CPT | Performed by: INTERNAL MEDICINE

## 2018-03-03 PROCEDURE — 94799 UNLISTED PULMONARY SVC/PX: CPT

## 2018-03-03 PROCEDURE — 99232 SBSQ HOSP IP/OBS MODERATE 35: CPT | Performed by: INTERNAL MEDICINE

## 2018-03-03 PROCEDURE — 85610 PROTHROMBIN TIME: CPT | Performed by: INTERNAL MEDICINE

## 2018-03-03 PROCEDURE — 97110 THERAPEUTIC EXERCISES: CPT

## 2018-03-03 PROCEDURE — 82962 GLUCOSE BLOOD TEST: CPT

## 2018-03-03 PROCEDURE — 97116 GAIT TRAINING THERAPY: CPT

## 2018-03-03 RX ORDER — FAMOTIDINE 20 MG/1
20 TABLET, FILM COATED ORAL DAILY
Status: DISCONTINUED | OUTPATIENT
Start: 2018-03-04 | End: 2018-03-06 | Stop reason: HOSPADM

## 2018-03-03 RX ORDER — WARFARIN SODIUM 5 MG/1
5 TABLET ORAL
Status: DISCONTINUED | OUTPATIENT
Start: 2018-03-04 | End: 2018-03-05

## 2018-03-03 RX ORDER — WARFARIN SODIUM 2.5 MG/1
2.5 TABLET ORAL
Status: COMPLETED | OUTPATIENT
Start: 2018-03-03 | End: 2018-03-03

## 2018-03-03 RX ORDER — AMLODIPINE BESYLATE 5 MG/1
5 TABLET ORAL
Status: DISCONTINUED | OUTPATIENT
Start: 2018-03-03 | End: 2018-03-05

## 2018-03-03 RX ADMIN — ISOSORBIDE MONONITRATE 30 MG: 30 TABLET, EXTENDED RELEASE ORAL at 08:22

## 2018-03-03 RX ADMIN — DOCUSATE SODIUM 100 MG: 100 CAPSULE, LIQUID FILLED ORAL at 08:23

## 2018-03-03 RX ADMIN — TRAZODONE HYDROCHLORIDE 50 MG: 50 TABLET ORAL at 20:12

## 2018-03-03 RX ADMIN — IPRATROPIUM BROMIDE AND ALBUTEROL SULFATE 3 ML: .5; 3 SOLUTION RESPIRATORY (INHALATION) at 12:45

## 2018-03-03 RX ADMIN — BUDESONIDE AND FORMOTEROL FUMARATE DIHYDRATE 2 PUFF: 160; 4.5 AEROSOL RESPIRATORY (INHALATION) at 06:49

## 2018-03-03 RX ADMIN — OXYCODONE HYDROCHLORIDE AND ACETAMINOPHEN 1 TABLET: 10; 325 TABLET ORAL at 20:12

## 2018-03-03 RX ADMIN — FINASTERIDE 5 MG: 5 TABLET, FILM COATED ORAL at 08:23

## 2018-03-03 RX ADMIN — IPRATROPIUM BROMIDE AND ALBUTEROL SULFATE 3 ML: .5; 3 SOLUTION RESPIRATORY (INHALATION) at 19:25

## 2018-03-03 RX ADMIN — TAMSULOSIN HYDROCHLORIDE 0.4 MG: 0.4 CAPSULE ORAL at 08:23

## 2018-03-03 RX ADMIN — OXYCODONE HYDROCHLORIDE AND ACETAMINOPHEN 1 TABLET: 10; 325 TABLET ORAL at 05:14

## 2018-03-03 RX ADMIN — ASPIRIN 81 MG: 81 TABLET, COATED ORAL at 08:23

## 2018-03-03 RX ADMIN — METOPROLOL SUCCINATE 100 MG: 100 TABLET, EXTENDED RELEASE ORAL at 08:23

## 2018-03-03 RX ADMIN — IPRATROPIUM BROMIDE AND ALBUTEROL SULFATE 3 ML: .5; 3 SOLUTION RESPIRATORY (INHALATION) at 00:40

## 2018-03-03 RX ADMIN — IPRATROPIUM BROMIDE AND ALBUTEROL SULFATE 3 ML: .5; 3 SOLUTION RESPIRATORY (INHALATION) at 06:49

## 2018-03-03 RX ADMIN — DOCUSATE SODIUM 100 MG: 100 CAPSULE, LIQUID FILLED ORAL at 20:12

## 2018-03-03 RX ADMIN — PANTOPRAZOLE SODIUM 40 MG: 40 TABLET, DELAYED RELEASE ORAL at 07:00

## 2018-03-03 RX ADMIN — FLUOXETINE 20 MG: 20 CAPSULE ORAL at 08:23

## 2018-03-03 RX ADMIN — ATORVASTATIN CALCIUM 20 MG: 20 TABLET, FILM COATED ORAL at 20:12

## 2018-03-03 RX ADMIN — WARFARIN SODIUM 2.5 MG: 2.5 TABLET ORAL at 17:10

## 2018-03-03 RX ADMIN — BUDESONIDE AND FORMOTEROL FUMARATE DIHYDRATE 2 PUFF: 160; 4.5 AEROSOL RESPIRATORY (INHALATION) at 19:33

## 2018-03-03 RX ADMIN — AMLODIPINE BESYLATE 5 MG: 5 TABLET ORAL at 14:04

## 2018-03-03 NOTE — PLAN OF CARE
Problem: Patient Care Overview (Adult)  Goal: Plan of Care Review  Outcome: Ongoing (interventions implemented as appropriate)   03/02/18 1307 03/03/18 1050   Coping/Psychosocial Response Interventions   Plan Of Care Reviewed With patient --    Outcome Evaluation   Outcome Summary/Follow up Plan --  PT tx complete: pt showed improvement in endurance evidenced by greater distance ambulated. Pt was able to participate in bed mobility, transfers, ambulation, and was left sitting up in a chair, needs within reach. see flowsheet for details.    Patient Care Overview   Progress no change --        Problem: Inpatient Physical Therapy  Goal: Transfer Training Goal 1 LTG- PT  Outcome: Ongoing (interventions implemented as appropriate)   03/02/18 1105 03/03/18 1050   Transfer Training PT LTG   Transfer Training PT LTG, Date Established 03/02/18 --    Transfer Training PT LTG, Time to Achieve 2 wks --    Transfer Training PT LTG, Activity Type sit to stand/stand to sit;bed to chair /chair to bed --    Transfer Training PT LTG, [place] Level contact guard assist --    Transfer Training PT LTG, Assist Device walker, rolling --    Transfer Training PT LTG, Date Goal Reviewed --  03/03/18   Transfer Training PT LTG, Outcome goal ongoing --      Goal: Gait Training Goal LTG- PT  Outcome: Ongoing (interventions implemented as appropriate)   03/02/18 1105 03/03/18 1050   Gait Training PT LTG   Gait Training Goal PT LTG, Date Established 03/02/18 --    Gait Training Goal PT LTG, Time to Achieve 2 wks --    Gait Training Goal PT LTG, [place] Level contact guard assist --    Gait Training Goal PT LTG, Assist Device walker, rolling --    Gait Training Goal PT LTG, Distance to Achieve 100 --    Gait Training Goal PT LTG, Date Goal Reviewed --  03/03/18   Gait Training Goal PT LTG, Outcome --  goal partially met     Goal: Strength Goal LTG- PT  Outcome: Ongoing (interventions implemented as appropriate)   03/02/18 1105 03/03/18  1050   Strength Goal PT LTG   Strength Goal PT LTG, Date Established 03/02/18 --    Strength Goal PT LTG, Time to Achieve 2 wks --    Strength Goal PT LTG, Measure to Achieve Patient will demonstrate independence with HEP. --    Strength Goal PT LTG, Date Goal Reviewed --  03/03/18   Strength Goal PT LTG, Outcome goal ongoing --

## 2018-03-03 NOTE — PLAN OF CARE
Problem: Patient Care Overview (Adult)  Goal: Plan of Care Review  Outcome: Ongoing (interventions implemented as appropriate)      Problem: NPPV/CPAP (Adult)  Goal: Signs and Symptoms of Listed Potential Problems Will be Absent or Manageable (NPPV/CPAP)  Outcome: Ongoing (interventions implemented as appropriate)

## 2018-03-03 NOTE — PROGRESS NOTES
"   LOS: 2 days    Patient Care Team:  Deanna Wheeler MD as PCP - General (Family Medicine)  Deanna Wheeler MD as PCP - Family Medicine  Arelis Tucker MD as Consulting Physician (Cardiology)  Osiel Heaton MD as Consulting Physician (Urology)  Chace Vasquez MD as Consulting Physician (Nephrology)  Blayne Whitman MD as Consulting Physician (Ophthalmology)  Jose Hargrove MD as Consulting Physician (Pulmonary Disease)  John Solorzano MD as Consulting Physician (Urology)  Deanna Wheeler MD as Referring Physician (Family Medicine)    Chief Complaint:    Chief Complaint   Patient presents with   • Altered Mental Status   • Pain     all over body     Follow UP JACQUE   Subjective     Interval History:   Devi removed 2h ago, has not voided yet.  Denies dyspnea or n/v/d    Objective     Vital Signs  Temp:  [97.5 °F (36.4 °C)-98.7 °F (37.1 °C)] 98.4 °F (36.9 °C)  Heart Rate:  [60-68] 61  Resp:  [16-18] 16  BP: (129-167)/(72-88) 156/79    Flowsheet Rows         First Filed Value    Admission Height  165.1 cm (65\") Documented at 03/01/2018 1235    Admission Weight  92.5 kg (204 lb) Documented at 03/01/2018 1235             I/O last 3 completed shifts:  In: 3100 [I.V.:3100]  Out: 2875 [Urine:2875]    Intake/Output Summary (Last 24 hours) at 03/03/18 0824  Last data filed at 03/03/18 0437   Gross per 24 hour   Intake             1000 ml   Output             1975 ml   Net             -975 ml       Physical Exam:  gen nad, alert  Neck supple no jvd  Lungs CTA bilat no rales  CV RRR  abd soft NT/ND  vasc no pedal edema 2+ radial pulses      Results Review:      Results from last 7 days  Lab Units 03/03/18  0559 03/02/18  0610 03/01/18  1257   SODIUM mmol/L 145 145 146*   POTASSIUM mmol/L 4.2 4.7 4.7   CHLORIDE mmol/L 105 106 103   CO2 mmol/L 29.0 26.0 30.0   BUN mg/dL 47* 43* 41*   CREATININE mg/dL 5.70* 5.90* 6.10*   CALCIUM mg/dL 8.7 8.8 9.4   BILIRUBIN mg/dL  --   --  0.7   ALK PHOS U/L  --   --  58   ALT " (SGPT) U/L  --   --  33   AST (SGOT) U/L  --   --  28   GLUCOSE mg/dL 112* 112* 130*       Estimated Creatinine Clearance: 13.2 mL/min (by C-G formula based on Cr of 5.7).      Results from last 7 days  Lab Units 03/03/18  0559 03/02/18  0610   MAGNESIUM mg/dL 2.2 2.3   PHOSPHORUS mg/dL 5.4*  --                Results from last 7 days  Lab Units 03/03/18  0559 03/02/18  0610 03/01/18  1257   WBC 10*3/mm3 5.14 6.55 6.35   HEMOGLOBIN g/dL 14.1 15.1 15.9   PLATELETS 10*3/mm3 122* 121* 137         Results from last 7 days  Lab Units 03/03/18  0559 03/02/18  0610 03/01/18  1257 03/01/18  1105   INR  3.09* 2.31* 2.36* 2.40*         Imaging Results (last 24 hours)     Procedure Component Value Units Date/Time    US Renal Limited [[id number]] Collected:  03/02/18 1116     Updated:  03/02/18 1118    Narrative:       PROCEDURE: US RENAL LIMITED-     HISTORY: renal failure; N13.9-Obstructive and reflux uropathy,  unspecified     FINDINGS: Kidneys show a normal echo pattern.    .  Right kidney  measures 11.1 cm in length. Left kidney measures 10.4 cm in length.   Size is normal.     No mass or cyst is seen.No obstructive changes are present.          Impression:       Normal renal ultrasound.        This report was finalized on 3/2/2018 11:16 AM by Fran Rojas MD.          aspirin 81 mg Oral Daily   atorvastatin 20 mg Oral Nightly   budesonide-formoterol 2 puff Inhalation BID - RT   docusate sodium 100 mg Oral BID   finasteride 5 mg Oral Daily   FLUoxetine 20 mg Oral Daily   insulin aspart 0-9 Units Subcutaneous 4x Daily AC & at Bedtime   ipratropium-albuterol 3 mL Nebulization Q6H - RT   isosorbide mononitrate 30 mg Oral Q24H   metoprolol succinate  mg Oral Daily   pantoprazole 40 mg Oral QAM   tamsulosin 0.4 mg Oral Daily   traZODone 50 mg Oral Nightly   warfarin 5 mg Oral Daily       Pharmacy to dose warfarin     sodium chloride 125 mL/hr Last Rate: 125 mL/hr (03/02/18 7146)       Medication Review:   Current  Facility-Administered Medications   Medication Dose Route Frequency Provider Last Rate Last Dose   • acetaminophen (TYLENOL) tablet 650 mg  650 mg Oral Q4H PRN Dc Velazco, DO       • aspirin EC tablet 81 mg  81 mg Oral Daily Dc Velazco, DO   81 mg at 03/03/18 0823   • atorvastatin (LIPITOR) tablet 20 mg  20 mg Oral Nightly Dc Velazco, DO   20 mg at 03/02/18 2133   • bisacodyl (DULCOLAX) suppository 10 mg  10 mg Rectal Daily PRN Dc Velazco, DO       • budesonide-formoterol (SYMBICORT) 160-4.5 MCG/ACT inhaler 2 puff  2 puff Inhalation BID - RT Dc Velazco DO   2 puff at 03/03/18 0649   • dextrose (D50W) solution 25 g  25 g Intravenous Q15 Min PRN Dc Velazco, DO       • dextrose (GLUTOSE) oral gel 1 tube  1 tube Oral Q15 Min PRN Dc Velazco DO       • docusate sodium (COLACE) capsule 100 mg  100 mg Oral BID Dc Velazco, DO   100 mg at 03/03/18 0823   • finasteride (PROSCAR) tablet 5 mg  5 mg Oral Daily Dc Velazco DO   5 mg at 03/03/18 0823   • FLUoxetine (PROzac) capsule 20 mg  20 mg Oral Daily Dc Velazco, DO   20 mg at 03/03/18 0823   • glucagon (human recombinant) (GLUCAGEN DIAGNOSTIC) injection 1 mg  1 mg Subcutaneous PRN Dc Velazco DO       • insulin aspart (novoLOG) injection 0-9 Units  0-9 Units Subcutaneous 4x Daily AC & at Bedtime Dc Velazco DO       • ipratropium-albuterol (DUO-NEB) nebulizer solution 3 mL  3 mL Nebulization Q6H - RT Dc Velazco DO   3 mL at 03/03/18 0649   • isosorbide mononitrate (IMDUR) 24 hr tablet 30 mg  30 mg Oral Q24H Dc Velazco DO   30 mg at 03/03/18 0822   • metoprolol succinate XL (TOPROL-XL) 24 hr tablet 100 mg  100 mg Oral Daily Dc Velazco DO   100 mg at 03/03/18 0823   • nitroglycerin (NITROSTAT) SL tablet 0.4 mg  0.4 mg Sublingual Q5 Min PRN Dc Velazco, DO       • ondansetron (ZOFRAN) injection 4 mg  4 mg Intravenous Q6H PRN Dc Velazco, DO        • oxyCODONE-acetaminophen (PERCOCET)  MG per tablet 1 tablet  1 tablet Oral Q6H PRN Dc Velazco DO   1 tablet at 03/03/18 0514   • pantoprazole (PROTONIX) EC tablet 40 mg  40 mg Oral QAM Dc Velazco, DO   40 mg at 03/03/18 0700   • Pharmacy to dose warfarin   Does not apply Continuous PRN Dc Velazco DO       • sodium chloride 0.45 % infusion  125 mL/hr Intravenous Continuous Chace Vasquez  mL/hr at 03/02/18 1528 125 mL/hr at 03/02/18 1528   • sodium chloride 0.9 % flush 1-10 mL  1-10 mL Intravenous PRN Dc Velazco DO       • sodium chloride 0.9 % flush 10 mL  10 mL Intravenous PRN Ronny Cardona MD       • sodium chloride 0.9 % flush 10 mL  10 mL Intravenous PRN BROOKE Coe       • tamsulosin (FLOMAX) 24 hr capsule 0.4 mg  0.4 mg Oral Daily Dc Velazco DO   0.4 mg at 03/03/18 0823   • traZODone (DESYREL) tablet 50 mg  50 mg Oral Nightly Dc Velazco DO   50 mg at 03/02/18 2133   • warfarin (COUMADIN) tablet 5 mg  5 mg Oral Daily Dc Velazco DO   5 mg at 03/02/18 1700       Assessment/Plan   1. Acute kidney injury - ATN, multifactorial; Cr trending down slowly 6.1 - 5.9 - 5.7, BUN up; Lytes/Volume stable  2. Chronic kidney disease stage III  3. Essential hypertension - BP high  4. Paroxysmal atrial fibrillation  5. Chronic coronary artery disease  6. Obstructive sleep apnea of adult  7. Type 2 diabetes mellitus, without long-term current use of insulin  8. BPH (benign prostatic hypertrophy) with urinary retention  9. H/o urinary retention   10. Pacemaker/AICD    Plan  - no indication for HD  - may need to replace cotton if does not void by 5PM, d/w RN  - cont flomax, proscar  - norvasc added  - d/c IVF   - change PPI to pepcid    Active Problems:    Pacemaker    Chronic kidney disease    Essential hypertension    Paroxysmal atrial fibrillation    Chronic coronary artery disease    Obstructive sleep apnea of adult    Type 2  diabetes mellitus, without long-term current use of insulin    BPH (benign prostatic hypertrophy) with urinary retention    Obstructive uropathy              Ronny Walker MD  03/03/18  8:24 AM

## 2018-03-03 NOTE — PHARMACY RECOMMENDATION
"Pharmacy Consult  -  Warfarin    Robe Bryant is a  67 y.o. male , pharmacy consulted for warfarin dosing  Height - 165.1 cm (65\")  Weight - 95.3 kg (210 lb)    Consulting Provider: - Dr. Vleazco  Indication: - Atrial fibrillation  Goal INR: -  2-3  Home Regimen:   - 5 mg daily except 7.5 mg Mon/Wed/Fri per Pharmacy Anticoagulation Clinic    Drug-Drug Interactions with current regimen:  Aspirin, fluoxetine, trazodone    Warfarin Dosing During Admission:    Date  3/1 3/2 3/3         INR  2.36 2.31 3.09         Dose  5mg 5mg 2.5 mg            Labs:    Results from last 7 days     Lab Units 03/03/18  0559 03/02/18  0610 03/01/18  1257 03/01/18  1105   INR  3.09* 2.31* 2.36* 2.40*   HEMOGLOBIN g/dL 14.1 15.1 15.9 --    HEMATOCRIT % 43.0 46.5 47.3 --    PLATELETS 10*3/mm3 122* 121* 137 --      Current dietary intake: regular; 25-50% intake reported    Assessment/Plan:   INR has increased by 0.78 since yesterday. Will decrease today's dose by 50% to 2.5 mg, per protocol. Pharmacy will continue to follow PT/INR results and monitor for signs and symptoms of bleeding or thrombosis.    Thank you,  Bonny Morales, PharmD, BCPS  03/03/18 8:52 AM    "

## 2018-03-03 NOTE — SIGNIFICANT NOTE
03/03/18 1055   PT Deferred Reason   PT Deferred Reason Patient/family declined treatment, not feeling well  (pt declines tx this date citing fatigue/exhaustion. PT will f/u tomorrow)

## 2018-03-03 NOTE — PLAN OF CARE
Problem: Patient Care Overview (Adult)  Goal: Plan of Care Review  Outcome: Ongoing (interventions implemented as appropriate)   03/03/18 1748   Coping/Psychosocial Response Interventions   Plan Of Care Reviewed With patient   Patient Care Overview   Progress improving       Problem: NPPV/CPAP (Adult)  Goal: Signs and Symptoms of Listed Potential Problems Will be Absent or Manageable (NPPV/CPAP)  Outcome: Ongoing (interventions implemented as appropriate)   03/03/18 3968   NPPV/CPAP   Problems Assessed (NPPV/CPAP) all   Problems Present (NPPV/CPAP) none

## 2018-03-03 NOTE — PROGRESS NOTES
[facility]    Name:  Roeb Bryant   Age:  67 y.o.  Sex:  male  :  1950  MRN:  [id number]   Visit Number:  [id number]  Admission Date:  3/1/2018  Date Of Service:  18  Primary Care Physician:  Deanna Wheeler MD     LOS: 2 days :  Patient Care Team:  Deanna Wheeler MD as PCP - General (Family Medicine)  Deanna Wheeler MD as PCP - Family Medicine  Arelis Tucker MD as Consulting Physician (Cardiology)  Osiel Heaton MD as Consulting Physician (Urology)  Chace Vasquez MD as Consulting Physician (Nephrology)  Blayne Whitman MD as Consulting Physician (Ophthalmology)  Jose Hargrove MD as Consulting Physician (Pulmonary Disease)  John Solorzano MD as Consulting Physician (Urology)  Deanna Wheeler MD as Referring Physician (Family Medicine):      Subjective / Interval History:     59-year-old patient who has been admitted because of her acute renal failure due to obstructive uropathy.  His chart has been reviewed and events noted.  Patient has been having a CK D stage III but has got worse because of  BPH and obstructive uropathy.  He also had the encephalopathy with the lethargy which has resolved.  Patient has been taken off the nephrotoxic drugs and is being hydrated.    Today the patient is feeling the little better.  Though he is clinically depressed and behavioral counseling has been done but they would do not be able to see the patient until discharge.  Patient has been on now catheter and the Flomax has been given and a voiding trial will be done.      Vital Signs:    Temp:  [97.5 °F (36.4 °C)-98.7 °F (37.1 °C)] 98.4 °F (36.9 °C)  Heart Rate:  [60-68] 61  Resp:  [16-18] 16  BP: (129-167)/(72-88) 156/79    Intake and output:    I/O last 3 completed shifts:  In: 3100 [I.V.:3100]  Out: 2875 [Urine:2875]  I/O this shift:  In: 240 [P.O.:240]  Out: 600 [Urine:600]    Physical Examination:    General Appearance:    Alert and  cooperative, not in any acute distress.   Head:    Atraumatic and normocephalic, without obvious abnormality.   Eyes:            PERRLA,  No pallor. Extra-occular movements are within normal limits.   Neck:   Supple,  No lymphglands, no bruit   Lungs:     Chest shape is normal. Breath sounds heard bilaterally equally.  No crackles or wheezing.     Heart:    Normal S1 and S2, no murmur,  No JVD   Abdomen:     Normal bowel sounds, no masses, no organomegaly. Soft        non-tender, no guarding, no rebound                tenderness   Extremities:   Moves all extremities well, no edema, no cyanosis,    Skin:   No  bruising or rash.   Neurologic:   Grossly non focal and moves all extrimities equally.    Laboratory results:    Results from last 7 days  Lab Units 03/03/18  0559 03/02/18  0610 03/01/18  1257   SODIUM mmol/L 145 145 146*   POTASSIUM mmol/L 4.2 4.7 4.7   CHLORIDE mmol/L 105 106 103   CO2 mmol/L 29.0 26.0 30.0   BUN mg/dL 47* 43* 41*   CREATININE mg/dL 5.70* 5.90* 6.10*   CALCIUM mg/dL 8.7 8.8 9.4   BILIRUBIN mg/dL  --   --  0.7   ALK PHOS U/L  --   --  58   ALT (SGPT) U/L  --   --  33   AST (SGOT) U/L  --   --  28   GLUCOSE mg/dL 112* 112* 130*       Results from last 7 days  Lab Units 03/03/18  0559 03/02/18  0610 03/01/18  1257   WBC 10*3/mm3 5.14 6.55 6.35   HEMOGLOBIN g/dL 14.1 15.1 15.9   HEMATOCRIT % 43.0 46.5 47.3   PLATELETS 10*3/mm3 122* 121* 137       Results from last 7 days  Lab Units 03/03/18  0559 03/02/18  0610 03/01/18  1257   INR  3.09* 2.31* 2.36*       Results from last 7 days  Lab Units 03/02/18  1154 03/02/18  0610 03/01/18  2350   TROPONIN I ng/mL <0.012 <0.012 0.012           Radiology results:    Imaging Results (last 24 hours)     ** No results found for the last 24 hours. **          I have reviewed the patient's radiology reports.    Medication Review:     I have reviewed the patients active and prn medications.     Assessment:    Principal Problem:    Obstructive uropathy  Active  Problems:    Chronic kidney disease    Pacemaker    Essential hypertension    Paroxysmal atrial fibrillation    Chronic coronary artery disease    Obstructive sleep apnea of adult    Type 2 diabetes mellitus, without long-term current use of insulin    Osteoarthritis of multiple joints    Moderate episode of recurrent major depressive disorder    BPH (benign prostatic hypertrophy) with urinary retention          Plan:    Patient with acute on chronic renal failure due to obstructive uropathy.  The continue with the catheter and the IV fluids and see slowly if his renal function Improving.  Also His Renal Ultrasound Was Unremarkable and Is Been Taken off the Nephrotoxic Drugs and Would Do Need to Continue Different Medicine for His Hypertension.  His Blood Pressure Has Been Fluctuating Has Been on the Higher Range and so Will Start with Norvasc 5 Mg Daily along with the Beta Blocker.  Follow-Up Labs to Be Done and the Will Hold the Neurontin and Baclofen along with the Ace Inhibitors and Metformin.    Raj Sullivan MD  03/03/18  11:58 AM      Please note that portions of this note may have been completed with a voice recognition program. Efforts were made to edit the dictations, but occasionally words are mistranscribed.

## 2018-03-03 NOTE — THERAPY TREATMENT NOTE
Acute Care - Physical Therapy Treatment Note   Len     Patient Name: Robe Bryant  : 1950  MRN: [id number]  Today's Date: 3/3/2018  Onset of Illness/Injury or Date of Surgery Date: 18  Date of Referral to PT: 18  Referring Physician: Dr. Velazco    Admit Date: 3/1/2018    Visit Dx:    ICD-10-CM ICD-9-CM   1. Obstructive uropathy N13.9 599.60   2. Impaired functional mobility, balance, gait, and endurance Z74.09 V49.89     Patient Active Problem List   Diagnosis   • Pacemaker   • Neck pain   • Chronic low back pain   • Chronic kidney disease   • Essential hypertension   • Seborrheic dermatitis   • Benign prostatic hyperplasia   • Paroxysmal atrial fibrillation   • Cervico-occipital neuralgia   • Hypercholesterolemia   • Acute erosive gastritis   • Diastolic heart failure   • Chronic obstructive pulmonary disease   • Chronic coronary artery disease   • Gastroesophageal reflux disease   • Chronic shoulder pain   • Obstructive sleep apnea of adult   • Dysphagia   • Symptomatic bradycardia   • Type 2 diabetes mellitus, without long-term current use of insulin   • Chronic chest pain   • Restless leg syndrome   • Warfarin anticoagulation   • Osteoarthritis of multiple joints   • Multilevel degenerative disc disease   • Moderate episode of recurrent major depressive disorder   • Chronic pain syndrome   • Syncope   • DDD (degenerative disc disease), cervical   • Postural dizziness with presyncope   • BPH (benign prostatic hypertrophy) with urinary retention   • Obstructive uropathy               Adult Rehabilitation Note       18 1009          Rehab Assessment/Intervention    Discipline physical therapy assistant  -CK      Document Type therapy note (daily note)  -CK      Subjective Information agree to therapy  -CK      Patient Effort, Rehab Treatment good  -CK      Recorded by [CK] Janine Meyer PTA      Vital Signs    O2 Delivery Pre Treatment supplemental O2  -CK      O2 Delivery  Intra Treatment supplemental O2  -CK      O2 Delivery Post Treatment supplemental O2  -CK      Recorded by [CK] Janine Meyer PTA      Bed Mobility, Assessment/Treatment    Bed Mobility, Assistive Device bed rails;head of bed elevated  -CK      Bed Mob, Supine to Sit, [place] supervision required;verbal cues required  -CK      Recorded by [CK] Janine Meyer PTA      Transfer Assessment/Treatment    Transfers, Sit-Stand [place] contact guard assist  -CK      Transfers, Stand-Sit [place] contact guard assist  -CK      Transfers, Sit-Stand-Sit, Assist Device rolling walker  -CK      Transfer, Safety Issues balance decreased during turns  -CK      Transfer, Impairments strength decreased;impaired balance  -CK      Recorded by [CK] Janine Meyer PTA      Gait Assessment/Treatment    Gait, [place] Level contact guard assist;verbal cues required  -CK      Gait, Assistive Device rolling walker  -CK      Gait, Distance (Feet) 170  -CK      Gait, Gait Pattern Analysis swing-through gait  -CK      Gait, Gait Deviations sulaiman decreased;limb motion velocity decreased  -CK      Gait, Safety Issues balance decreased during turns  -CK      Gait, Impairments strength decreased;impaired balance  -CK      Recorded by [CK] Janine Meyer PTA      Therapy Exercises    Bilateral Lower Extremities 10 reps;sitting;ankle pumps/circles;hip flexion;LAQ  -CK      Recorded by [CKSoha Meyer PTA      Positioning and Restraints    Pre-Treatment Position in bed  -CK      Post Treatment Position chair  -CK      In Chair sitting;call light within reach;encouraged to call for assist  -CK      Recorded by [CK] Janine Meyer PTA        User Key  (r) = Recorded By, (t) = Taken By, (c) = Cosigned By    Initials Name Effective Dates    LIGIA Meyer PTA 10/26/16 -                 IP PT Goals       03/03/18 1050 03/02/18 1105       Transfer Training PT LTG    Transfer Training PT LTG, Date Established   03/02/18  -LM     Transfer Training PT LTG, Time to Achieve  2 wks  -LM     Transfer Training PT LTG, Activity Type  sit to stand/stand to sit;bed to chair /chair to bed  -LM     Transfer Training PT LTG, [place] Level  contact guard assist  -LM     Transfer Training PT LTG, Assist Device  walker, rolling  -LM     Transfer Training PT  LTG, Date Goal Reviewed 03/03/18  -CK      Transfer Training PT LTG, Outcome  goal ongoing  -LM     Gait Training PT LTG    Gait Training Goal PT LTG, Date Established  03/02/18  -LM     Gait Training Goal PT LTG, Time to Achieve  2 wks  -LM     Gait Training Goal PT LTG, [place] Level  contact guard assist  -LM     Gait Training Goal PT LTG, Assist Device  walker, rolling  -LM     Gait Training Goal PT LTG, Distance to Achieve  100  -LM     Gait Training Goal PT LTG, Date Goal Reviewed 03/03/18  -CK      Gait Training Goal PT LTG, Outcome goal partially met  -CK goal ongoing  -LM     Strength Goal PT LTG    Strength Goal PT LTG, Date Established  03/02/18  -LM     Strength Goal PT LTG, Time to Achieve  2 wks  -LM     Strength Goal PT LTG, Measure to Achieve  Patient will demonstrate independence with HEP.  -LM     Strength Goal PT LTG, Date Goal Reviewed 03/03/18  -CK      Strength Goal PT LTG, Outcome  goal ongoing  -LM       User Key  (r) = Recorded By, (t) = Taken By, (c) = Cosigned By    Initials Name Provider Type    LM Shirley Mantilla, PT Physical Therapist    CK Janine Meyer, PTA Physical Therapy Assistant          Physical Therapy Education     Title: PT OT SLP Therapies (Active)     Topic: Physical Therapy (Done)     Point: Mobility training (Done)    Learning Progress Summary    Learner Readiness Method Response Comment Documented by Status   Patient Acceptance E VU  CK 03/03/18 1050 Done    Acceptance E VU Purpose of PT/POC; proper use of RW for safe transfers/ambulation. LM 03/02/18 1104 Done               Point: Home exercise program (Done)    Learning Progress  Summary    Learner Readiness Method Response Comment Documented by Status   Patient Acceptance E VU  CK 03/03/18 1050 Done    Acceptance E VU Purpose of PT/POC; proper use of RW for safe transfers/ambulation. LM 03/02/18 1104 Done               Point: Precautions (Done)    Learning Progress Summary    Learner Readiness Method Response Comment Documented by Status   Patient Acceptance E VU  CK 03/03/18 1050 Done    Acceptance E VU Purpose of PT/POC; proper use of RW for safe transfers/ambulation. LM 03/02/18 1104 Done                      User Key     Initials Effective Dates Name Provider Type Discipline    LM 10/26/16 -  Shirley Mantilla, PT Physical Therapist PT    CK 10/26/16 -  Janine Meyer PTA Physical Therapy Assistant PT                    PT Recommendation and Plan  Anticipated Equipment Needs At Discharge: front wheeled walker  Anticipated Discharge Disposition: home with home health, home with assist  Planned Therapy Interventions: balance training, bed mobility training, gait training, home exercise program, patient/family education, strengthening, transfer training  PT Frequency: daily  Plan of Care Review  Outcome Summary/Follow up Plan: PT tx complete: pt showed improvement in endurance evidenced by greater distance ambulated. Pt was able to participate in bed mobility, transfers, ambulation, and was left sitting up in a chair, needs within reach. see flowsheet for details.           Outcome Measures       03/03/18 1009 03/02/18 1300 03/02/18 0902    How much help from another person do you currently need...    Turning from your back to your side while in flat bed without using bedrails? 4  -CK      Moving from lying on back to sitting on the side of a flat bed without bedrails? 4  -CK      Moving to and from a bed to a chair (including a wheelchair)? 3  -CK      Standing up from a chair using your arms (e.g., wheelchair, bedside chair)? 3  -CK      Climbing 3-5 steps with a railing? 2  -CK      To  walk in hospital room? 3  -CK      AM-PAC 6 Clicks Score 19  -CK      How much help from another is currently needed...    Putting on and taking off regular lower body clothing?   3  -AH    Bathing (including washing, rinsing, and drying)   3  -AH    Toileting (which includes using toilet bed pan or urinal)   3  -AH    Putting on and taking off regular upper body clothing   4  -AH    Taking care of personal grooming (such as brushing teeth)   4  -AH    Eating meals   4  -    Score   21  -AH    Functional Assessment    Outcome Measure Options AM-PAC 6 Clicks Basic Mobility (PT)  -CK AM-PAC 6 Clicks Daily Activity (OT)  - AM-PAC 6 Clicks Daily Activity (OT)  -      03/02/18 0856          How much help from another person do you currently need...    Turning from your back to your side while in flat bed without using bedrails? 4  -LM      Moving from lying on back to sitting on the side of a flat bed without bedrails? 4  -LM      Moving to and from a bed to a chair (including a wheelchair)? 3  -LM      Standing up from a chair using your arms (e.g., wheelchair, bedside chair)? 3  -LM      Climbing 3-5 steps with a railing? 2  -LM      To walk in hospital room? 3  -LM      AM-PAC 6 Clicks Score 19  -LM      Functional Assessment    Outcome Measure Options AM-PAC 6 Clicks Basic Mobility (PT)  -LM        User Key  (r) = Recorded By, (t) = Taken By, (c) = Cosigned By    Initials Name Provider Type     Kimberly Hart Occupational Therapist    ANITA Mantilla, PT Physical Therapist    LIGIA Meyer PTA Physical Therapy Assistant           Time Calculation:         PT Charges       03/03/18 1054          Time Calculation    Start Time 1009  -CK      PT Received On 03/03/18  -CK      Time Calculation- PT    Total Timed Code Minutes- PT 25 minute(s)  -        User Key  (r) = Recorded By, (t) = Taken By, (c) = Cosigned By    Initials Name Provider Type    LIGIA Meyer PTA Physical Therapy Assistant           Therapy Charges for Today     Code Description Service Date Service Provider Modifiers Qty    17235385676 HC GAIT TRAINING EA 15 MIN 3/3/2018 Janine Meyer PTA GP 1    62571180389 HC PT THER PROC EA 15 MIN 3/3/2018 Janine Meyer PTA GP 1          PT G-Codes  Outcome Measure Options: AM-PAC 6 Clicks Basic Mobility (PT)    Janine Meyer PTA  3/3/2018

## 2018-03-04 LAB
ALBUMIN SERPL-MCNC: 3.4 G/DL (ref 3.5–5)
ANION GAP SERPL CALCULATED.3IONS-SCNC: 19 MMOL/L
BASOPHILS # BLD AUTO: 0.03 10*3/MM3 (ref 0–0.2)
BASOPHILS NFR BLD AUTO: 0.6 % (ref 0–2.5)
BUN BLD-MCNC: 51 MG/DL (ref 7–20)
BUN/CREAT SERPL: 8.9 (ref 6.3–21.9)
CALCIUM SPEC-SCNC: 8.8 MG/DL (ref 8.4–10.2)
CHLORIDE SERPL-SCNC: 104 MMOL/L (ref 98–107)
CO2 SERPL-SCNC: 27 MMOL/L (ref 26–30)
CREAT BLD-MCNC: 5.7 MG/DL (ref 0.6–1.3)
DEPRECATED RDW RBC AUTO: 44 FL (ref 37–54)
EOSINOPHIL # BLD AUTO: 0.25 10*3/MM3 (ref 0–0.7)
EOSINOPHIL NFR BLD AUTO: 5 % (ref 0–7)
ERYTHROCYTE [DISTWIDTH] IN BLOOD BY AUTOMATED COUNT: 13.8 % (ref 11.5–14.5)
GFR SERPL CREATININE-BSD FRML MDRD: 10 ML/MIN/1.73
GLUCOSE BLD-MCNC: 110 MG/DL (ref 74–98)
GLUCOSE BLDC GLUCOMTR-MCNC: 106 MG/DL (ref 70–130)
GLUCOSE BLDC GLUCOMTR-MCNC: 117 MG/DL (ref 70–130)
GLUCOSE BLDC GLUCOMTR-MCNC: 127 MG/DL (ref 70–130)
GLUCOSE BLDC GLUCOMTR-MCNC: 91 MG/DL (ref 70–130)
HCT VFR BLD AUTO: 39.9 % (ref 42–52)
HGB BLD-MCNC: 13 G/DL (ref 14–18)
INR PPP: 2.75 (ref 0.9–1.1)
LYMPHOCYTES # BLD AUTO: 1.12 10*3/MM3 (ref 0.6–3.4)
LYMPHOCYTES NFR BLD AUTO: 22.4 % (ref 10–50)
MCH RBC QN AUTO: 29.1 PG (ref 27–31)
MCHC RBC AUTO-ENTMCNC: 32.6 G/DL (ref 30–37)
MCV RBC AUTO: 89.3 FL (ref 80–94)
MONOCYTES # BLD AUTO: 0.72 10*3/MM3 (ref 0–0.9)
MONOCYTES NFR BLD AUTO: 14.4 % (ref 0–12)
NEUTROPHILS # BLD AUTO: 2.89 10*3/MM3 (ref 2–6.9)
NEUTROPHILS NFR BLD AUTO: 57.6 % (ref 37–80)
PHOSPHATE SERPL-MCNC: 5.3 MG/DL (ref 2.5–4.5)
PLATELET # BLD AUTO: 127 10*3/MM3 (ref 130–400)
PMV BLD AUTO: 10.9 FL (ref 6–12)
POTASSIUM BLD-SCNC: 4 MMOL/L (ref 3.5–5.1)
PROTHROMBIN TIME: 30.1 SECONDS (ref 9.3–12.1)
RBC # BLD AUTO: 4.47 10*6/MM3 (ref 4.7–6.1)
SODIUM BLD-SCNC: 146 MMOL/L (ref 137–145)
SODIUM UR-SCNC: 60 MMOL/L (ref 30–90)
WBC NRBC COR # BLD: 5.01 10*3/MM3 (ref 4.8–10.8)

## 2018-03-04 PROCEDURE — 80069 RENAL FUNCTION PANEL: CPT | Performed by: INTERNAL MEDICINE

## 2018-03-04 PROCEDURE — 94799 UNLISTED PULMONARY SVC/PX: CPT

## 2018-03-04 PROCEDURE — 85610 PROTHROMBIN TIME: CPT | Performed by: INTERNAL MEDICINE

## 2018-03-04 PROCEDURE — 97116 GAIT TRAINING THERAPY: CPT

## 2018-03-04 PROCEDURE — 84300 ASSAY OF URINE SODIUM: CPT | Performed by: INTERNAL MEDICINE

## 2018-03-04 PROCEDURE — 85025 COMPLETE CBC W/AUTO DIFF WBC: CPT | Performed by: INTERNAL MEDICINE

## 2018-03-04 PROCEDURE — 82962 GLUCOSE BLOOD TEST: CPT

## 2018-03-04 PROCEDURE — 97110 THERAPEUTIC EXERCISES: CPT

## 2018-03-04 PROCEDURE — 99232 SBSQ HOSP IP/OBS MODERATE 35: CPT | Performed by: INTERNAL MEDICINE

## 2018-03-04 RX ADMIN — IPRATROPIUM BROMIDE AND ALBUTEROL SULFATE 3 ML: .5; 3 SOLUTION RESPIRATORY (INHALATION) at 12:47

## 2018-03-04 RX ADMIN — ATORVASTATIN CALCIUM 20 MG: 20 TABLET, FILM COATED ORAL at 20:44

## 2018-03-04 RX ADMIN — FAMOTIDINE 20 MG: 20 TABLET, FILM COATED ORAL at 08:16

## 2018-03-04 RX ADMIN — IPRATROPIUM BROMIDE AND ALBUTEROL SULFATE 3 ML: .5; 3 SOLUTION RESPIRATORY (INHALATION) at 07:14

## 2018-03-04 RX ADMIN — FINASTERIDE 5 MG: 5 TABLET, FILM COATED ORAL at 08:15

## 2018-03-04 RX ADMIN — IPRATROPIUM BROMIDE AND ALBUTEROL SULFATE 3 ML: .5; 3 SOLUTION RESPIRATORY (INHALATION) at 00:11

## 2018-03-04 RX ADMIN — TAMSULOSIN HYDROCHLORIDE 0.4 MG: 0.4 CAPSULE ORAL at 08:16

## 2018-03-04 RX ADMIN — ACETAMINOPHEN 650 MG: 325 TABLET, FILM COATED ORAL at 17:44

## 2018-03-04 RX ADMIN — IPRATROPIUM BROMIDE AND ALBUTEROL SULFATE 3 ML: .5; 3 SOLUTION RESPIRATORY (INHALATION) at 19:42

## 2018-03-04 RX ADMIN — BUDESONIDE AND FORMOTEROL FUMARATE DIHYDRATE 2 PUFF: 160; 4.5 AEROSOL RESPIRATORY (INHALATION) at 07:13

## 2018-03-04 RX ADMIN — TRAZODONE HYDROCHLORIDE 50 MG: 50 TABLET ORAL at 20:44

## 2018-03-04 RX ADMIN — BUDESONIDE AND FORMOTEROL FUMARATE DIHYDRATE 2 PUFF: 160; 4.5 AEROSOL RESPIRATORY (INHALATION) at 19:47

## 2018-03-04 RX ADMIN — DOCUSATE SODIUM 100 MG: 100 CAPSULE, LIQUID FILLED ORAL at 08:15

## 2018-03-04 RX ADMIN — ASPIRIN 81 MG: 81 TABLET, COATED ORAL at 08:15

## 2018-03-04 RX ADMIN — DOCUSATE SODIUM 100 MG: 100 CAPSULE, LIQUID FILLED ORAL at 20:44

## 2018-03-04 RX ADMIN — AMLODIPINE BESYLATE 5 MG: 5 TABLET ORAL at 08:16

## 2018-03-04 RX ADMIN — WARFARIN SODIUM 5 MG: 5 TABLET ORAL at 17:05

## 2018-03-04 RX ADMIN — FLUOXETINE 20 MG: 20 CAPSULE ORAL at 08:15

## 2018-03-04 RX ADMIN — OXYCODONE HYDROCHLORIDE AND ACETAMINOPHEN 1 TABLET: 10; 325 TABLET ORAL at 20:47

## 2018-03-04 RX ADMIN — ISOSORBIDE MONONITRATE 30 MG: 30 TABLET, EXTENDED RELEASE ORAL at 08:15

## 2018-03-04 RX ADMIN — METOPROLOL SUCCINATE 100 MG: 100 TABLET, EXTENDED RELEASE ORAL at 08:15

## 2018-03-04 NOTE — PLAN OF CARE
Problem: Patient Care Overview (Adult)  Goal: Plan of Care Review  Outcome: Ongoing (interventions implemented as appropriate)   03/04/18 2887   Coping/Psychosocial Response Interventions   Plan Of Care Reviewed With patient   Patient Care Overview   Progress improving

## 2018-03-04 NOTE — PLAN OF CARE
Problem: Patient Care Overview (Adult)  Goal: Plan of Care Review  Outcome: Ongoing (interventions implemented as appropriate)   03/03/18 1748 03/03/18 1800 03/04/18 0413   Coping/Psychosocial Response Interventions   Plan Of Care Reviewed With --  patient --    Outcome Evaluation   Outcome Summary/Follow up Plan --  --  Pt rested well tonight and was without confusion. Pain management was necessary for chromnic back pain.   Patient Care Overview   Progress improving --  --      Goal: Adult Individualization and Mutuality  Outcome: Ongoing (interventions implemented as appropriate)    Goal: Discharge Needs Assessment  Outcome: Ongoing (interventions implemented as appropriate)      Problem: Fall Risk (Adult)  Goal: Absence of Falls  Outcome: Ongoing (interventions implemented as appropriate)      Problem: Urine Elimination, Impaired (Adult)  Goal: Identify Related Risk Factors and Signs and Symptoms  Outcome: Ongoing (interventions implemented as appropriate)    Goal: Effective Urinary Elimination  Outcome: Ongoing (interventions implemented as appropriate)    Goal: Effective Containment of Urine  Outcome: Resolved for upgrade, new template will be applied Date Met: 03/04/18      Problem: Pain, Acute (Adult)  Goal: Identify Related Risk Factors and Signs and Symptoms  Outcome: Ongoing (interventions implemented as appropriate)    Goal: Acceptable Pain Control/Comfort Level  Outcome: Ongoing (interventions implemented as appropriate)

## 2018-03-04 NOTE — PROGRESS NOTES
"   LOS: 3 days    Patient Care Team:  Deanna Wheeler MD as PCP - General (Family Medicine)  Deanna Wheeler MD as PCP - Family Medicine  Arelis Tucker MD as Consulting Physician (Cardiology)  Osiel Heaton MD as Consulting Physician (Urology)  Chace Vasquez MD as Consulting Physician (Nephrology)  Blayne Whitman MD as Consulting Physician (Ophthalmology)  Jose Hargrove MD as Consulting Physician (Pulmonary Disease)  John Solorzano MD as Consulting Physician (Urology)  Deanna Wheeler MD as Referring Physician (Family Medicine)    Chief Complaint:    Chief Complaint   Patient presents with   • Altered Mental Status   • Pain     all over body     Follow UP JACQUE  Subjective     Interval History:     PVRs normal yest.  UOP 1.6L.  Denies dyspnea.  No n/v    Objective     Vital Signs  Temp:  [97.3 °F (36.3 °C)-98.2 °F (36.8 °C)] 98 °F (36.7 °C)  Heart Rate:  [60-79] 63  Resp:  [18] 18  BP: (142-173)/(74-90) 173/86    Flowsheet Rows         First Filed Value    Admission Height  165.1 cm (65\") Documented at 03/01/2018 1235    Admission Weight  92.5 kg (204 lb) Documented at 03/01/2018 1235          I/O this shift:  In: 360 [P.O.:360]  Out: 450 [Urine:450]  I/O last 3 completed shifts:  In: 720 [P.O.:720]  Out: 3600 [Urine:3600]    Intake/Output Summary (Last 24 hours) at 03/04/18 1236  Last data filed at 03/04/18 0813   Gross per 24 hour   Intake              840 ml   Output             1475 ml   Net             -635 ml       Physical Exam:  gen alert, no distress  Neck supple no jvd  Lungs CTA bilat no rales  CV RRR   abd soft NT/ND  vasc no pedal edema, 2+ radial pulses      Results Review:      Results from last 7 days  Lab Units 03/04/18  0509 03/03/18  0559 03/02/18  0610 03/01/18  1257   SODIUM mmol/L 146* 145 145 146*   POTASSIUM mmol/L 4.0 4.2 4.7 4.7   CHLORIDE mmol/L 104 105 106 103   CO2 mmol/L 27.0 29.0 26.0 30.0   BUN mg/dL 51* 47* 43* 41*   CREATININE mg/dL 5.70* 5.70* 5.90* 6.10*   CALCIUM " mg/dL 8.8 8.7 8.8 9.4   BILIRUBIN mg/dL  --   --   --  0.7   ALK PHOS U/L  --   --   --  58   ALT (SGPT) U/L  --   --   --  33   AST (SGOT) U/L  --   --   --  28   GLUCOSE mg/dL 110* 112* 112* 130*       Estimated Creatinine Clearance: 13 mL/min (by C-G formula based on Cr of 5.7).      Results from last 7 days  Lab Units 03/04/18  0509 03/03/18  0559 03/02/18  0610   MAGNESIUM mg/dL  --  2.2 2.3   PHOSPHORUS mg/dL 5.3* 5.4*  --                Results from last 7 days  Lab Units 03/04/18  0509 03/03/18  0559 03/02/18  0610 03/01/18  1257   WBC 10*3/mm3 5.01 5.14 6.55 6.35   HEMOGLOBIN g/dL 13.0* 14.1 15.1 15.9   PLATELETS 10*3/mm3 127* 122* 121* 137         Results from last 7 days  Lab Units 03/04/18  0509 03/03/18  0559 03/02/18  0610 03/01/18  1257 03/01/18  1105   INR  2.75* 3.09* 2.31* 2.36* 2.40*         Imaging Results (last 24 hours)     ** No results found for the last 24 hours. **          amLODIPine 5 mg Oral Q24H   aspirin 81 mg Oral Daily   atorvastatin 20 mg Oral Nightly   budesonide-formoterol 2 puff Inhalation BID - RT   docusate sodium 100 mg Oral BID   famotidine 20 mg Oral Daily   finasteride 5 mg Oral Daily   FLUoxetine 20 mg Oral Daily   insulin aspart 0-9 Units Subcutaneous 4x Daily AC & at Bedtime   ipratropium-albuterol 3 mL Nebulization Q6H - RT   isosorbide mononitrate 30 mg Oral Q24H   metoprolol succinate  mg Oral Daily   tamsulosin 0.4 mg Oral Daily   traZODone 50 mg Oral Nightly   warfarin 5 mg Oral Daily       Pharmacy to dose warfarin        Medication Review:   Current Facility-Administered Medications   Medication Dose Route Frequency Provider Last Rate Last Dose   • acetaminophen (TYLENOL) tablet 650 mg  650 mg Oral Q4H PRN Dc Velazco DO       • amLODIPine (NORVASC) tablet 5 mg  5 mg Oral Q24H Raj Sullivan MD   5 mg at 03/04/18 0816   • aspirin EC tablet 81 mg  81 mg Oral Daily Dc Velazco DO   81 mg at 03/04/18 0815   • atorvastatin (LIPITOR) tablet 20 mg   20 mg Oral Nightly Dc Velazco DO   20 mg at 03/03/18 2012   • bisacodyl (DULCOLAX) suppository 10 mg  10 mg Rectal Daily PRN Dc Velazco DO       • budesonide-formoterol (SYMBICORT) 160-4.5 MCG/ACT inhaler 2 puff  2 puff Inhalation BID - RT Dc Velazco DO   2 puff at 03/04/18 0713   • dextrose (D50W) solution 25 g  25 g Intravenous Q15 Min PRN Dc Velazco DO       • dextrose (GLUTOSE) oral gel 1 tube  1 tube Oral Q15 Min PRN Dc Velazco DO       • docusate sodium (COLACE) capsule 100 mg  100 mg Oral BID Dc Velazco DO   100 mg at 03/04/18 0815   • famotidine (PEPCID) tablet 20 mg  20 mg Oral Daily Ronny Walker MD   20 mg at 03/04/18 0816   • finasteride (PROSCAR) tablet 5 mg  5 mg Oral Daily Dc Velazco DO   5 mg at 03/04/18 0815   • FLUoxetine (PROzac) capsule 20 mg  20 mg Oral Daily Dc Velazco DO   20 mg at 03/04/18 0815   • glucagon (human recombinant) (GLUCAGEN DIAGNOSTIC) injection 1 mg  1 mg Subcutaneous PRN Dc Velazco DO       • insulin aspart (novoLOG) injection 0-9 Units  0-9 Units Subcutaneous 4x Daily AC & at Bedtime Dc Velazco DO       • ipratropium-albuterol (DUO-NEB) nebulizer solution 3 mL  3 mL Nebulization Q6H - RT Dc Velazco DO   3 mL at 03/04/18 0714   • isosorbide mononitrate (IMDUR) 24 hr tablet 30 mg  30 mg Oral Q24H Dc Velazco DO   30 mg at 03/04/18 0815   • metoprolol succinate XL (TOPROL-XL) 24 hr tablet 100 mg  100 mg Oral Daily Dc Velazco DO   100 mg at 03/04/18 0815   • nitroglycerin (NITROSTAT) SL tablet 0.4 mg  0.4 mg Sublingual Q5 Min PRN Dc Velazco DO       • ondansetron (ZOFRAN) injection 4 mg  4 mg Intravenous Q6H PRN Dc Velazco, DO       • oxyCODONE-acetaminophen (PERCOCET)  MG per tablet 1 tablet  1 tablet Oral Q6H PRN Dc Velazco,    1 tablet at 03/03/18 2012   • Pharmacy to dose warfarin   Does not apply Continuous PRN  Dc Velazco, DO       • sodium chloride 0.9 % flush 1-10 mL  1-10 mL Intravenous PRN Dc Velazco DO       • sodium chloride 0.9 % flush 10 mL  10 mL Intravenous PRN Ronny Cardona MD       • sodium chloride 0.9 % flush 10 mL  10 mL Intravenous PRN BROOKE Coe       • tamsulosin (FLOMAX) 24 hr capsule 0.4 mg  0.4 mg Oral Daily Dc Velazco DO   0.4 mg at 03/04/18 0816   • traZODone (DESYREL) tablet 50 mg  50 mg Oral Nightly Dc Velazco DO   50 mg at 03/03/18 2012   • warfarin (COUMADIN) tablet 5 mg  5 mg Oral Daily Dc Velazco DO           Assessment/Plan   1. Acute kidney injury - ATN, multifactorial; Cr trending down slowly 6.1 - 5.9 - 5.7 - 5.7, stagnant today; Lytes/Volume stable -- mild hypernatremia (146)  2. Chronic kidney disease stage III - BL Cr 1.3 as of 1/11/18  3. Essential hypertension - BP high, norvasc added yest  4. Paroxysmal atrial fibrillation  5. Chronic coronary artery disease  6. Obstructive sleep apnea of adult  7. Type 2 diabetes mellitus, without long-term current use of insulin  8. BPH (benign prostatic hypertrophy) with urinary retention  9. H/o urinary retention - cotton removed 3/3/18 and voiding w/o difficulty, normal PVRs  10. Pacemaker/AICD  11. Thrombocytopenia - stable, no worse after switching PPI to pecid (due to JACQUE)  12. GERD - switched PPI to pepcid     Plan  - encourage PO fluid intake to correct sodium (but no IVF needed)  - no objection to discharge in next 1-2 days but will need close follow-up: BMP within 1 week and f/u Dr Vasquez soon thereafter (left lab Rx at RN station and gave pt contact information to arrange follow-up appt)    Principal Problem:    Obstructive uropathy  Active Problems:    Pacemaker    Chronic kidney disease    Essential hypertension    Paroxysmal atrial fibrillation    Chronic coronary artery disease    Obstructive sleep apnea of adult    Type 2 diabetes mellitus, without long-term current use  of insulin    Osteoarthritis of multiple joints    Moderate episode of recurrent major depressive disorder    BPH (benign prostatic hypertrophy) with urinary retention              Ronny Walker MD  03/04/18  12:36 PM

## 2018-03-04 NOTE — PLAN OF CARE
Problem: NPPV/CPAP (Adult)  Goal: Signs and Symptoms of Listed Potential Problems Will be Absent or Manageable (NPPV/CPAP)  Outcome: Ongoing (interventions implemented as appropriate)   03/04/18 1708   NPPV/CPAP   Problems Assessed (NPPV/CPAP) all   Problems Present (NPPV/CPAP) none

## 2018-03-04 NOTE — PROGRESS NOTES
[facility]    Name:  Robe Bryant   Age:  67 y.o.  Sex:  male  :  1950  MRN:  [id number]   Visit Number:  [id number]  Admission Date:  3/1/2018  Date Of Service:  18  Primary Care Physician:  Deanna Wheeler MD     LOS: 3 days :  Patient Care Team:  Deanna Wheeler MD as PCP - General (Family Medicine)  Deanna Wheeler MD as PCP - Family Medicine  Arelis Tucker MD as Consulting Physician (Cardiology)  Osiel Heaton MD as Consulting Physician (Urology)  Chace Vasquez MD as Consulting Physician (Nephrology)  Blayne Whitman MD as Consulting Physician (Ophthalmology)  Jose Hargrove MD as Consulting Physician (Pulmonary Disease)  John Solorzano MD as Consulting Physician (Urology)  Deanna Wheeler MD as Referring Physician (Family Medicine):      Subjective / Interval History:     59-year-old patient who has been admitted because of her acute renal failure due to obstructive uropathy.  His chart has been reviewed and events noted.  Patient has been having a CK D stage III but has got worse because of  BPH and obstructive uropathy.  He also had the encephalopathy with the lethargy which has resolved.  Patient has been taken off the nephrotoxic drugs and is being hydrated.    Today the patient is feeling the little better.  Though he is clinically depressed and behavioral counseling has been done but they would do not be able to see the patient until discharge.  Patient has been taken off the catheter yesterday and he has voided  But  the creatinine has not changed and is still 5.7.      Vital Signs:    Temp:  [97.3 °F (36.3 °C)-98.2 °F (36.8 °C)] 97.9 °F (36.6 °C)  Heart Rate:  [60-79] 67  Resp:  [18] 18  BP: (142-169)/(74-90) 163/90    Intake and output:    I/O last 3 completed shifts:  In: 720 [P.O.:720]  Out: 3600 [Urine:3600]  I/O this shift:  In: 360 [P.O.:360]  Out: 450 [Urine:450]    Physical Examination:    General Appearance:     Alert and cooperative, not in any acute distress.   Head:    Atraumatic and normocephalic, without obvious abnormality.   Eyes:            PERRLA,  No pallor. Extra-occular movements are within normal limits.   Neck:   Supple,  No lymphglands, no bruit   Lungs:     Chest shape is normal. Breath sounds heard bilaterally equally.  No crackles or wheezing.     Heart:    Normal S1 and S2, no murmur,  No JVD   Abdomen:     Normal bowel sounds, no masses, no organomegaly. Soft        non-tender, no guarding, no rebound                tenderness   Extremities:   Moves all extremities well, no edema, no cyanosis,    Skin:   No  bruising or rash.   Neurologic:   Grossly non focal and moves all extrimities equally.    Laboratory results:    Results from last 7 days  Lab Units 03/04/18  0509 03/03/18  0559 03/02/18  0610 03/01/18  1257   SODIUM mmol/L 146* 145 145 146*   POTASSIUM mmol/L 4.0 4.2 4.7 4.7   CHLORIDE mmol/L 104 105 106 103   CO2 mmol/L 27.0 29.0 26.0 30.0   BUN mg/dL 51* 47* 43* 41*   CREATININE mg/dL 5.70* 5.70* 5.90* 6.10*   CALCIUM mg/dL 8.8 8.7 8.8 9.4   BILIRUBIN mg/dL  --   --   --  0.7   ALK PHOS U/L  --   --   --  58   ALT (SGPT) U/L  --   --   --  33   AST (SGOT) U/L  --   --   --  28   GLUCOSE mg/dL 110* 112* 112* 130*       Results from last 7 days  Lab Units 03/04/18  0509 03/03/18  0559 03/02/18  0610   WBC 10*3/mm3 5.01 5.14 6.55   HEMOGLOBIN g/dL 13.0* 14.1 15.1   HEMATOCRIT % 39.9* 43.0 46.5   PLATELETS 10*3/mm3 127* 122* 121*       Results from last 7 days  Lab Units 03/04/18  0509 03/03/18  0559 03/02/18  0610   INR  2.75* 3.09* 2.31*       Results from last 7 days  Lab Units 03/02/18  1154 03/02/18  0610 03/01/18  2350   TROPONIN I ng/mL <0.012 <0.012 0.012           Radiology results:    Imaging Results (last 24 hours)     ** No results found for the last 24 hours. **          I have reviewed the patient's radiology reports.    Medication Review:     I have reviewed the patients active and  prn medications.     Assessment:    Principal Problem:    Obstructive uropathy  Active Problems:    Chronic kidney disease    Pacemaker    Essential hypertension    Paroxysmal atrial fibrillation    Chronic coronary artery disease    Obstructive sleep apnea of adult    Type 2 diabetes mellitus, without long-term current use of insulin    Osteoarthritis of multiple joints    Moderate episode of recurrent major depressive disorder    BPH (benign prostatic hypertrophy) with urinary retention          Plan:    Patient with acute on chronic renal failure due to obstructive uropathy.    Also His Renal Ultrasound Was Unremarkable and Is Been Taken off the Nephrotoxic Drugs and Would Do Need to Continue Different Medicine for His Hypertension.  His Blood Pressure Has Been Fluctuating Has Been on the Higher Range and so  with Norvasc 5 Mg Daily along with the Beta Blocker.  Follow-Up Labs to Be Done and the Will Hold the Neurontin and Baclofen along with the Ace Inhibitors and Metformin.  His renal function is stable but not still improving as we would want it.  We'll repeat bladder scan today and the continue with hydration and an closely follow the labs in morning.  If stable he could be discharged in the next 2448 hrs. and further workup can be done as an outpatient.  Raj Sullivan MD  03/04/18  9:59 AM      Please note that portions of this note may have been completed with a voice recognition program. Efforts were made to edit the dictations, but occasionally words are mistranscribed.

## 2018-03-05 ENCOUNTER — DOCUMENTATION (OUTPATIENT)
Dept: EMERGENCY DEPT | Facility: HOSPITAL | Age: 68
End: 2018-03-05

## 2018-03-05 LAB
ALBUMIN SERPL-MCNC: 4 G/DL (ref 3.5–5)
ALBUMIN/GLOB SERPL: 1.5 G/DL (ref 1–2)
ALP SERPL-CCNC: 63 U/L (ref 38–126)
ALT SERPL W P-5'-P-CCNC: 45 U/L (ref 13–69)
ANION GAP SERPL CALCULATED.3IONS-SCNC: 19.9 MMOL/L
AST SERPL-CCNC: 29 U/L (ref 15–46)
BILIRUB SERPL-MCNC: 0.6 MG/DL (ref 0.2–1.3)
BUN BLD-MCNC: 57 MG/DL (ref 7–20)
BUN/CREAT SERPL: 11.9 (ref 6.3–21.9)
CALCIUM SPEC-SCNC: 9.5 MG/DL (ref 8.4–10.2)
CHLORIDE SERPL-SCNC: 104 MMOL/L (ref 98–107)
CO2 SERPL-SCNC: 27 MMOL/L (ref 26–30)
CREAT BLD-MCNC: 4.8 MG/DL (ref 0.6–1.3)
DEPRECATED RDW RBC AUTO: 42.3 FL (ref 37–54)
ERYTHROCYTE [DISTWIDTH] IN BLOOD BY AUTOMATED COUNT: 13.2 % (ref 11.5–14.5)
GFR SERPL CREATININE-BSD FRML MDRD: 12 ML/MIN/1.73
GLOBULIN UR ELPH-MCNC: 2.7 GM/DL
GLUCOSE BLD-MCNC: 95 MG/DL (ref 74–98)
GLUCOSE BLDC GLUCOMTR-MCNC: 83 MG/DL (ref 70–130)
GLUCOSE BLDC GLUCOMTR-MCNC: 86 MG/DL (ref 70–130)
GLUCOSE BLDC GLUCOMTR-MCNC: 87 MG/DL (ref 70–130)
GLUCOSE BLDC GLUCOMTR-MCNC: 90 MG/DL (ref 70–130)
HCT VFR BLD AUTO: 43.9 % (ref 42–52)
HGB BLD-MCNC: 14.7 G/DL (ref 14–18)
INR PPP: 2.14 (ref 0.9–1.1)
MCH RBC QN AUTO: 29.5 PG (ref 27–31)
MCHC RBC AUTO-ENTMCNC: 33.5 G/DL (ref 30–37)
MCV RBC AUTO: 88.2 FL (ref 80–94)
PLATELET # BLD AUTO: 146 10*3/MM3 (ref 130–400)
PMV BLD AUTO: 10.9 FL (ref 6–12)
POTASSIUM BLD-SCNC: 3.9 MMOL/L (ref 3.5–5.1)
PROT SERPL-MCNC: 6.7 G/DL (ref 6.3–8.2)
PROTHROMBIN TIME: 23.6 SECONDS (ref 9.3–12.1)
RBC # BLD AUTO: 4.98 10*6/MM3 (ref 4.7–6.1)
SODIUM BLD-SCNC: 147 MMOL/L (ref 137–145)
WBC NRBC COR # BLD: 5.82 10*3/MM3 (ref 4.8–10.8)

## 2018-03-05 PROCEDURE — 80053 COMPREHEN METABOLIC PANEL: CPT | Performed by: INTERNAL MEDICINE

## 2018-03-05 PROCEDURE — 82962 GLUCOSE BLOOD TEST: CPT

## 2018-03-05 PROCEDURE — 94660 CPAP INITIATION&MGMT: CPT

## 2018-03-05 PROCEDURE — 94799 UNLISTED PULMONARY SVC/PX: CPT

## 2018-03-05 PROCEDURE — 85610 PROTHROMBIN TIME: CPT | Performed by: INTERNAL MEDICINE

## 2018-03-05 PROCEDURE — 85027 COMPLETE CBC AUTOMATED: CPT | Performed by: INTERNAL MEDICINE

## 2018-03-05 PROCEDURE — 99232 SBSQ HOSP IP/OBS MODERATE 35: CPT | Performed by: INTERNAL MEDICINE

## 2018-03-05 RX ORDER — AMLODIPINE BESYLATE 5 MG/1
5 TABLET ORAL EVERY 12 HOURS
Status: DISCONTINUED | OUTPATIENT
Start: 2018-03-05 | End: 2018-03-06 | Stop reason: HOSPADM

## 2018-03-05 RX ORDER — SODIUM CHLORIDE 450 MG/100ML
100 INJECTION, SOLUTION INTRAVENOUS ONCE
Status: COMPLETED | OUTPATIENT
Start: 2018-03-05 | End: 2018-03-05

## 2018-03-05 RX ORDER — SODIUM CHLORIDE 450 MG/100ML
100 INJECTION, SOLUTION INTRAVENOUS CONTINUOUS
Status: DISCONTINUED | OUTPATIENT
Start: 2018-03-05 | End: 2018-03-05

## 2018-03-05 RX ADMIN — IPRATROPIUM BROMIDE AND ALBUTEROL SULFATE 3 ML: .5; 3 SOLUTION RESPIRATORY (INHALATION) at 18:32

## 2018-03-05 RX ADMIN — FINASTERIDE 5 MG: 5 TABLET, FILM COATED ORAL at 08:01

## 2018-03-05 RX ADMIN — IPRATROPIUM BROMIDE AND ALBUTEROL SULFATE 3 ML: .5; 3 SOLUTION RESPIRATORY (INHALATION) at 12:52

## 2018-03-05 RX ADMIN — AMLODIPINE BESYLATE 5 MG: 5 TABLET ORAL at 20:39

## 2018-03-05 RX ADMIN — METOPROLOL SUCCINATE 100 MG: 100 TABLET, EXTENDED RELEASE ORAL at 08:01

## 2018-03-05 RX ADMIN — BUDESONIDE AND FORMOTEROL FUMARATE DIHYDRATE 2 PUFF: 160; 4.5 AEROSOL RESPIRATORY (INHALATION) at 18:32

## 2018-03-05 RX ADMIN — AMLODIPINE BESYLATE 5 MG: 5 TABLET ORAL at 08:01

## 2018-03-05 RX ADMIN — DOCUSATE SODIUM 100 MG: 100 CAPSULE, LIQUID FILLED ORAL at 08:01

## 2018-03-05 RX ADMIN — ISOSORBIDE MONONITRATE 30 MG: 30 TABLET, EXTENDED RELEASE ORAL at 08:01

## 2018-03-05 RX ADMIN — OXYCODONE HYDROCHLORIDE AND ACETAMINOPHEN 1 TABLET: 10; 325 TABLET ORAL at 18:16

## 2018-03-05 RX ADMIN — SODIUM CHLORIDE 100 ML/HR: 4.5 INJECTION, SOLUTION INTRAVENOUS at 13:26

## 2018-03-05 RX ADMIN — TAMSULOSIN HYDROCHLORIDE 0.4 MG: 0.4 CAPSULE ORAL at 08:01

## 2018-03-05 RX ADMIN — DOCUSATE SODIUM 100 MG: 100 CAPSULE, LIQUID FILLED ORAL at 20:39

## 2018-03-05 RX ADMIN — FLUOXETINE 20 MG: 20 CAPSULE ORAL at 08:01

## 2018-03-05 RX ADMIN — ATORVASTATIN CALCIUM 20 MG: 20 TABLET, FILM COATED ORAL at 20:39

## 2018-03-05 RX ADMIN — BUDESONIDE AND FORMOTEROL FUMARATE DIHYDRATE 2 PUFF: 160; 4.5 AEROSOL RESPIRATORY (INHALATION) at 06:32

## 2018-03-05 RX ADMIN — ASPIRIN 81 MG: 81 TABLET, COATED ORAL at 08:01

## 2018-03-05 RX ADMIN — FAMOTIDINE 20 MG: 20 TABLET, FILM COATED ORAL at 08:01

## 2018-03-05 RX ADMIN — IPRATROPIUM BROMIDE AND ALBUTEROL SULFATE 3 ML: .5; 3 SOLUTION RESPIRATORY (INHALATION) at 00:37

## 2018-03-05 RX ADMIN — WARFARIN SODIUM 6 MG: 5 TABLET ORAL at 18:12

## 2018-03-05 RX ADMIN — TRAZODONE HYDROCHLORIDE 50 MG: 50 TABLET ORAL at 20:39

## 2018-03-05 RX ADMIN — IPRATROPIUM BROMIDE AND ALBUTEROL SULFATE 3 ML: .5; 3 SOLUTION RESPIRATORY (INHALATION) at 06:32

## 2018-03-05 RX ADMIN — OXYCODONE HYDROCHLORIDE AND ACETAMINOPHEN 1 TABLET: 10; 325 TABLET ORAL at 08:01

## 2018-03-05 NOTE — PROGRESS NOTES
Continued Stay Note  MAYNOR Harrington     Patient Name: Robe Bryant  MRN: [id number]  Today's Date: 3/5/2018    Admit Date: 3/1/2018          Discharge Plan       03/05/18 1128    Case Management/Social Work Plan    Additional Comments Spoke o tp regarding discahrge plans He is looking fowqard to going home to his brother in law house  He has a [employer] Nurse  That visits at least twice a month He reports that [facility] sent [facility] wrong [facility] Called Sheri at   [facility]    they will call back regarding this               Discharge Codes     None        Expected Discharge Date and Time     Expected Discharge Date Expected Discharge Time    Mar 5, 2018             Alem Recinos

## 2018-03-05 NOTE — THERAPY TREATMENT NOTE
Acute Care - Physical Therapy Treatment Note   [place]     Patient Name: Robe Bryant  : 1950  MRN: [id number]  Today's Date: 3/4/2018  Onset of Illness/Injury or Date of Surgery Date: 18  Date of Referral to PT: 18  Referring Physician: Dr. Vleazco    Admit Date: 3/1/2018    Visit Dx:    ICD-10-CM ICD-9-CM   1. Obstructive uropathy N13.9 599.60   2. Impaired functional mobility, balance, gait, and endurance Z74.09 V49.89     Patient Active Problem List   Diagnosis   • Pacemaker   • Neck pain   • Chronic low back pain   • Chronic kidney disease   • Essential hypertension   • Seborrheic dermatitis   • Benign prostatic hyperplasia   • Paroxysmal atrial fibrillation   • Cervico-occipital neuralgia   • Hypercholesterolemia   • Acute erosive gastritis   • Diastolic heart failure   • Chronic obstructive pulmonary disease   • Chronic coronary artery disease   • Gastroesophageal reflux disease   • Chronic shoulder pain   • Obstructive sleep apnea of adult   • Dysphagia   • Symptomatic bradycardia   • Type 2 diabetes mellitus, without long-term current use of insulin   • Chronic chest pain   • Restless leg syndrome   • Warfarin anticoagulation   • Osteoarthritis of multiple joints   • Multilevel degenerative disc disease   • Moderate episode of recurrent major depressive disorder   • Chronic pain syndrome   • Syncope   • DDD (degenerative disc disease), cervical   • Postural dizziness with presyncope   • BPH (benign prostatic hypertrophy) with urinary retention   • Obstructive uropathy               Adult Rehabilitation Note       18 1243 18 1009       Rehab Assessment/Intervention    Discipline physical therapist  -JR physical therapy assistant  -CK     Document Type therapy note (daily note)  -JR therapy note (daily note)  -CK     Subjective Information agree to therapy;no complaints  -JR agree to therapy  -CK     Patient Effort, Rehab Treatment good  -JR good  -CK     Symptoms Noted  During/After Treatment fatigue  -JR      Precautions/Limitations fall precautions  -JR      Patient Response to Treatment Patient feels a bit unsteady, but pleased with the progress he has made  -JR      Recorded by [JR] Ariadna Gustafson, PT [CK] Janine Meyer, PTA     Vital Signs    O2 Delivery Pre Treatment  supplemental O2  -CK     O2 Delivery Intra Treatment  supplemental O2  -CK     O2 Delivery Post Treatment  supplemental O2  -CK     Recorded by  [CK] Janine Meyer PTA     Pain Assessment    Pain Assessment No/denies pain  -JR      Recorded by [JR] Ariadna Gustafson, PT      Bed Mobility, Assessment/Treatment    Bed Mobility, Assistive Device head of bed elevated;bed rails  -JR bed rails;head of bed elevated  -CK     Bed Mob, Supine to Sit, Benson supervision required;verbal cues required  -JR supervision required;verbal cues required  -CK     Recorded by [JR] Ariadna Gustafson, PT [CK] Janine Meyer, ELI     Transfer Assessment/Treatment    Transfers, Sit-Stand Benson contact guard assist  -JR contact guard assist  -CK     Transfers, Stand-Sit Benson contact guard assist  -JR contact guard assist  -CK     Transfers, Sit-Stand-Sit, Assist Device rolling walker  -JR rolling walker  -CK     Transfer, Safety Issues balance decreased during turns;step length decreased;sequencing ability decreased  -JR balance decreased during turns  -CK     Transfer, Impairments strength decreased;impaired balance  -JR strength decreased;impaired balance  -CK     Recorded by [JR] Ariadna Gustafson, PT [CK] Janine Meyer, PTA     Gait Assessment/Treatment    Gait, Benson Level contact guard assist;verbal cues required  -JR contact guard assist;verbal cues required  -CK     Gait, Assistive Device rolling walker  -JR rolling walker  -CK     Gait, Distance (Feet) 220  -  -CK     Gait, Gait Pattern Analysis swing-through gait  -JR swing-through gait  -CK     Gait, Gait Deviations sulaiman decreased;stride width  increased;decreased heel strike;forward flexed posture  -JR sulaiman decreased;limb motion velocity decreased  -CK     Gait, Safety Issues step length decreased;balance decreased during turns;sequencing ability decreased  -JR balance decreased during turns  -CK     Gait, Impairments strength decreased;impaired balance  -JR strength decreased;impaired balance  -CK     Recorded by [JR] Ariadna Gustafson, PT [CK] Janine Meyer PTA     Therapy Exercises    Bilateral Lower Extremities AROM:;15 reps;sitting;ankle pumps/circles;hip abduction/adduction;LAQ;hip flexion;SLR  -JR 10 reps;sitting;ankle pumps/circles;hip flexion;LAQ  -CK     Recorded by [JR] Ariadna Gustafson, PT [CK] Janine Meyer PTA     Positioning and Restraints    Pre-Treatment Position in bed  -JR in bed  -CK     Post Treatment Position chair  -JR chair  -CK     In Chair sitting;call light within reach;encouraged to call for assist  -JR sitting;call light within reach;encouraged to call for assist  -CK     Recorded by [JR] Ariadna Gustafson, PT [CK] Janine Meyer PTA       User Key  (r) = Recorded By, (t) = Taken By, (c) = Cosigned By    Initials Name Effective Dates    JR Ariadna Gustafson, PT 10/26/16 -     CK Janine Meyer PTA 10/26/16 -                 IP PT Goals       03/04/18 1858 03/03/18 1050 03/02/18 1105    Transfer Training PT LTG    Transfer Training PT LTG, Date Established   03/02/18  -LM    Transfer Training PT LTG, Time to Achieve   2 wks  -LM    Transfer Training PT LTG, Activity Type   sit to stand/stand to sit;bed to chair /chair to bed  -LM    Transfer Training PT LTG, [place] Level   contact guard assist  -LM    Transfer Training PT LTG, Assist Device   walker, rolling  -LM    Transfer Training PT  LTG, Date Goal Reviewed  03/03/18  -CK     Transfer Training PT LTG, Outcome goal ongoing  -JR  goal ongoing  -LM    Gait Training PT LTG    Gait Training Goal PT LTG, Date Established   03/02/18  -LM    Gait Training Goal PT LTG, Time to Achieve    2 wks  -LM    Gait Training Goal PT LTG, [place] Level   contact guard assist  -LM    Gait Training Goal PT LTG, Assist Device   walker, rolling  -LM    Gait Training Goal PT LTG, Distance to Achieve   100  -LM    Gait Training Goal PT LTG, Date Goal Reviewed  03/03/18  -CK     Gait Training Goal PT LTG, Outcome goal ongoing  -JR goal partially met  -CK goal ongoing  -LM    Strength Goal PT LTG    Strength Goal PT LTG, Date Established   03/02/18  -LM    Strength Goal PT LTG, Time to Achieve   2 wks  -LM    Strength Goal PT LTG, Measure to Achieve   Patient will demonstrate independence with HEP.  -LM    Strength Goal PT LTG, Date Goal Reviewed  03/03/18  -CK     Strength Goal PT LTG, Outcome goal ongoing  -JR  goal ongoing  -LM      User Key  (r) = Recorded By, (t) = Taken By, (c) = Cosigned By    Initials Name Provider Type    JR Ariadna Gustafson, PT Physical Therapist    LM Shirley Mantilla, PT Physical Therapist    CK Janine Meyer, PTA Physical Therapy Assistant          Physical Therapy Education     Title: PT OT SLP Therapies (Active)     Topic: Physical Therapy (Done)     Point: Mobility training (Done)    Learning Progress Summary    Learner Readiness Method Response Comment Documented by Status   Patient Acceptance E VU Importance of balance activities to improve safety with mobility JR 03/04/18 1856 Done    Acceptance E VU  CK 03/03/18 1050 Done    Acceptance E VU Purpose of PT/POC; proper use of RW for safe transfers/ambulation. LM 03/02/18 1104 Done               Point: Home exercise program (Done)    Learning Progress Summary    Learner Readiness Method Response Comment Documented by Status   Patient Acceptance E VU  CK 03/03/18 1050 Done    Acceptance E VU Purpose of PT/POC; proper use of RW for safe transfers/ambulation. LM 03/02/18 1104 Done               Point: Precautions (Done)    Learning Progress Summary    Learner Readiness Method Response Comment Documented by Status   Patient Acceptance E VU   CK 03/03/18 1050 Done    Acceptance E VU Purpose of PT/POC; proper use of RW for safe transfers/ambulation. LM 03/02/18 1104 Done                      User Key     Initials Effective Dates Name Provider Type Discipline    JR 10/26/16 -  Ariadna Gustafson, PT Physical Therapist PT    LM 10/26/16 -  Shirley Mantilla, PT Physical Therapist PT    CK 10/26/16 -  Janine Meyer, ELI Physical Therapy Assistant PT                    PT Recommendation and Plan  Anticipated Equipment Needs At Discharge: front wheeled walker  Anticipated Discharge Disposition: home with home health, home with assist  Planned Therapy Interventions: balance training, bed mobility training, gait training, home exercise program, patient/family education, strengthening, transfer training  PT Frequency: daily  Plan of Care Review  Plan Of Care Reviewed With: patient  Progress: improving  Outcome Summary/Follow up Plan: Patient is able to perform mobility tasks with no more than CGA.  He is able to demonstrate gait with no assistive device, with several path deviaitons and LOB that require stepping strategy to recover.  Plan to continue PT while hospitalized and upon discharge to home with home health care services.          Outcome Measures       03/04/18 1243 03/03/18 1009 03/02/18 1300    How much help from another person do you currently need...    Turning from your back to your side while in flat bed without using bedrails? 4  -JR 4  -CK     Moving from lying on back to sitting on the side of a flat bed without bedrails? 4  -JR 4  -CK     Moving to and from a bed to a chair (including a wheelchair)? 3  -JR 3  -CK     Standing up from a chair using your arms (e.g., wheelchair, bedside chair)? 3  -JR 3  -CK     Climbing 3-5 steps with a railing? 2  -JR 2  -CK     To walk in hospital room? 3  -JR 3  -CK     AM-PAC 6 Clicks Score 19  -JR 19  -CK     Functional Assessment    Outcome Measure Options AM-PAC 6 Clicks Basic Mobility (PT)  -JR AM-PAC 6 Clicks  Basic Mobility (PT)  -CK -[facility] 6 Clicks Daily Activity (OT)  -      03/02/18 0902 03/02/18 0856       How much help from another person do you currently need...    Turning from your back to your side while in flat bed without using bedrails?  4  -LM     Moving from lying on back to sitting on the side of a flat bed without bedrails?  4  -LM     Moving to and from a bed to a chair (including a wheelchair)?  3  -LM     Standing up from a chair using your arms (e.g., wheelchair, bedside chair)?  3  -LM     Climbing 3-5 steps with a railing?  2  -LM     To walk in hospital room?  3  -LM     AM-PAC 6 Clicks Score  19  -LM     How much help from another is currently needed...    Putting on and taking off regular lower body clothing? 3  -AH      Bathing (including washing, rinsing, and drying) 3  -AH      Toileting (which includes using toilet bed pan or urinal) 3  -AH      Putting on and taking off regular upper body clothing 4  -AH      Taking care of personal grooming (such as brushing teeth) 4  -AH      Eating meals 4  -      Score 21  -AH      Functional Assessment    Outcome Measure Options AM-PAC 6 Clicks Daily Activity (OT)  - AM-[facility] 6 Clicks Basic Mobility (PT)  -LM       User Key  (r) = Recorded By, (t) = Taken By, (c) = Cosigned By    Initials Name Provider Type     Kimberly Hart Occupational Therapist    JR Ariadna Gustafson, PT Physical Therapist    LM Shirley Mantilla, PT Physical Therapist    LIGIA Meyer, PTA Physical Therapy Assistant           Time Calculation:         PT Charges       03/04/18 1857          Time Calculation    Start Time 1243  -      PT Received On 03/04/18  -      PT Goal Re-Cert Due Date 03/12/18  -      Time Calculation- PT    Total Timed Code Minutes- PT 26 minute(s)  -        User Key  (r) = Recorded By, (t) = Taken By, (c) = Cosigned By    Initials Name Provider Type    JR Ariadna Gustafson, PT Physical Therapist          Therapy Charges for Today     Code Description  Service Date Service Provider Modifiers Qty    27076433465 HC GAIT TRAINING EA 15 MIN 3/4/2018 Ariadna Gustafson, PT GP 1    95377205121 HC PT THER PROC EA 15 MIN 3/4/2018 Ariadna Gustafson, PT GP 1          PT G-Codes  Outcome Measure Options: AM-PAC 6 Clicks Basic Mobility (PT)    Ariadna Gustafson, PT  3/4/2018

## 2018-03-05 NOTE — CONSULTS
"[id number] - [id number]    DATA:  Behavioral Health Navigator received request for behavioral health consult from [facility] MS3 staff, Mariama Scott RN. Met with DO Richa, to inform I was present to complete the patient's assessment. Navigator met with patient at bedside.  Patient is resting comfortably in his room, watching the news.  He has not required security throughout his hospital stay.  Patient is a 67 year old, , white male from [place], [place].  Patient reports he was born and raised in [place]. He presented to [facility] ED on 3/1/18 with his brother in law, Delmar, and was admitted to [facility] via Dr. Velazco for acute renal failure, chronic kidney disease, BPH, coronary artery disease with multiple stents, as well as diabetes type 2, hypertension, see H&P for full medical assessment.  Patient was monitored and greatly improved, is medically cleared at this time for my assessment.  Mr. Bryant reports to me he is going through a divorce. He states he had  his wife, they  once before and remarried, and now they are  again.  He states he has a  and he will go to court on 3/14.  He states \"I have been adjusting. It has been a lot, but I can't do that anymore.  It's time I get to feeling better and movin' on.\"  Patient reports his wife \"kicked me out with no where to go.\"  However, he does identify his brother in law, Delmar, as a positive support system. The patient states “He is real good to me.  We help each other out around the house.” With verbal consent from the patient, Delmar spoke with me and confirmed this statement to be true.  The patient reports he has a home health nurse that also comes to the home to attend to his medical needs.  When confronted about his recent stressors, he reports, “Most of my stress is gone now that I am out of there.  I am just getting used to things and getting back on my feet.”  He denies any suicidal ideations or homicidal ideations.  He denies any AVH or " delusional thoughts.  He reports “everyone has treated me real good here, it’s been a blessing.”  He has no acute psychiatric indicators or complaints at this time.    ASSESSMENT:  Upon assessment, patient is alert and oriented x 4.  Patient is denying VH/AH and does not appear to be experiencing any perceptual disturbances.  Patient appears to be experiencing depressive symptoms in relation to a situational stressor of divorce.  These symptoms appear to have been present less than 1 month and appear directly related to his divorce, suggestion they are in relation to adjustment versus acute in nature.   Patient affect is tearful at times, but improves throughout the assessment.  He is polite, engaged, and open.  He discusses his family freely, talks about his “good family growing up” and talks about his past history of working and his adult children. He appears to present with clear cognitive functioning, no delusional or irrational thoughts.  Patient is actively denying both SI/HI.  Navigator administered CSSRS for suicide risk assessment.  The results of patient’s CSSRS suggest that patient is denying any suicidal ideation, intent, or behaviors.  He states, “I sure don’t wanna end nobody’s life or take my own.  I want to live.  I’ll let the Lord take me when it’s my time, but I ain’t in no hurry.”  Patient does not identify with illicit drug use.  He appears hopeful and goal oriented, as he identifies plans to purchase his own truck, talk with his  about settling his divorce, as well as starting gardening again.  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  He reports he has multiple family members he could call for support and feels safe returning home with Delmar.  Patient is receptive to taking local psychiatric resources.      PLAN:  At this time, this Navigator recommends outpatient follow up, based upon the above assessment.   Patient reports to be agreeable to taking resources.  He does not appear to warrant acute hosptilzation at this time based upon his presentation and above indicators.  Navigator informed [facility] ED staff, GOLDEN Alvarez of completed assessment.  I provided the patient with local psychiatric resources and Jehovah's witness based resources. He was very polite and thankful and stated “It was good talkin’ to you.” Resting comfortably with no needs at this time.      -Valerie Cummings, EDUARDOA

## 2018-03-05 NOTE — SIGNIFICANT NOTE
03/05/18 0952   Rehab Treatment   Discipline occupational therapist   Treatment Not Performed patient/family declined treatment  (Patient chooses to not participate this date because he says the doctor told him he was going home. )

## 2018-03-05 NOTE — PLAN OF CARE
Problem: Patient Care Overview (Adult)  Goal: Plan of Care Review  Outcome: Ongoing (interventions implemented as appropriate)   03/05/18 1306   Coping/Psychosocial Response Interventions   Plan Of Care Reviewed With patient   Patient Care Overview   Progress no change       Problem: NPPV/CPAP (Adult)  Goal: Signs and Symptoms of Listed Potential Problems Will be Absent or Manageable (NPPV/CPAP)  Outcome: Ongoing (interventions implemented as appropriate)   03/05/18 1306   NPPV/CPAP   Problems Assessed (NPPV/CPAP) all   Problems Present (NPPV/CPAP) none

## 2018-03-05 NOTE — PLAN OF CARE
Problem: Patient Care Overview (Adult)  Goal: Plan of Care Review  Outcome: Ongoing (interventions implemented as appropriate)   03/05/18 0527   Coping/Psychosocial Response Interventions   Plan Of Care Reviewed With patient   Outcome Evaluation   Outcome Summary/Follow up Plan no acute events overnight. patient rested well. he is still able to urinate without difficulty. anticipated Discharge home today. Continue to monitor.    Patient Care Overview   Progress no change     Goal: Discharge Needs Assessment  Outcome: Ongoing (interventions implemented as appropriate)      Problem: Fall Risk (Adult)  Goal: Identify Related Risk Factors and Signs and Symptoms  Outcome: Outcome(s) achieved Date Met: 03/05/18    Goal: Absence of Falls  Outcome: Ongoing (interventions implemented as appropriate)      Problem: Urine Elimination, Impaired (Adult)  Goal: Identify Related Risk Factors and Signs and Symptoms  Outcome: Outcome(s) achieved Date Met: 03/05/18    Goal: Effective Urinary Elimination  Outcome: Ongoing (interventions implemented as appropriate)    Goal: Reduced Incontinence Episodes  Outcome: Ongoing (interventions implemented as appropriate)      Problem: Pain, Acute (Adult)  Goal: Identify Related Risk Factors and Signs and Symptoms  Outcome: Outcome(s) achieved Date Met: 03/05/18    Goal: Acceptable Pain Control/Comfort Level  Outcome: Ongoing (interventions implemented as appropriate)

## 2018-03-05 NOTE — PLAN OF CARE
Problem: NPPV/CPAP (Adult)  Goal: Signs and Symptoms of Listed Potential Problems Will be Absent or Manageable (NPPV/CPAP)  Outcome: Ongoing (interventions implemented as appropriate)   03/05/18 0533   NPPV/CPAP   Problems Assessed (NPPV/CPAP) all   Problems Present (NPPV/CPAP) none

## 2018-03-05 NOTE — PAYOR COMM NOTE
"Additional clinicals for reconsideration for inpatient admission  Marie  [phone]      Todd Garnett (67 y.o. Male)     Date of Birth Social Security Number Address Home Phone MRN    1950  [address] [facility] [address] [phone] [id number]    Confucianism Marital Status          Presybeterian        Admission Date Admission Type Admitting Provider Attending Provider Department, Room/Bed    3/1/18 Emergency Dc Velazco DO Nevarez-Maggard, April G, DO [facility] MED SURG  3, 325/1    Discharge Date Discharge Disposition Discharge Destination                      Attending Provider: Liana Chaudhry DO     Allergies:  No Known Allergies    Isolation:  None   Infection:  None   Code Status:  FULL    Ht:  165.1 cm (65\")   Wt:  93.6 kg (206 lb 4.8 oz)    Admission Cmt:  None   Principal Problem:  Obstructive uropathy [N13.9]                 Active Insurance as of 3/1/2018     Primary Coverage     Payor Plan Insurance Group Employer/Plan Group    HUMANA MEDICARE REPLACEMENT HUMANA MEDICARE REPL [id number]     Payor Plan Address Payor Plan Phone Number Effective From Effective To    [address] [address] [phone] 1/1/2018     [place] [address]       Subscriber Name Subscriber Birth Date Member ID       TODD GARNETT 1950 [id number]                 Emergency Contacts      (Rel.) Home Phone Work Phone Mobile Phone    Janel Benavides (Daughter) [phone] -- --            Insurance Information                HUMANA MEDICARE REPLACEMENT/HUMANA MEDICARE REPL Phone: [phone]    Subscriber: Todd Garnett Subscriber#: [id number]    Group#: [id number] Precert#:              Physician Progress Notes (last 72 hours) (Notes from 3/2/2018  8:22 AM through 3/5/2018  8:22 AM)      Dc Velazco DO at 3/2/2018 10:13 AM  Version 1 of 1               [facility] HOSPITALIST    PROGRESS NOTE    Name:  Todd Garnett   Age:  67 " y.o.  Sex:  male  :  1950  MRN:  [id number]   Visit Number:  [id number]  Admission Date:  3/1/2018  Date Of Service:  18  Primary Care Physician:  Deanna Wheeler MD     LOS: 1 day :  Patient Care Team:  Deanna Wheeler MD as PCP - General (Family Medicine)  Deanna Wheeler MD as PCP - Family Medicine  Arelis Tucker MD as Consulting Physician (Cardiology)  Osiel Heaton MD as Consulting Physician (Urology)  Chace Ugarte MD as Consulting Physician (Nephrology)  Blayne Whitman MD as Consulting Physician (Ophthalmology)  Jose Hargrove MD as Consulting Physician (Pulmonary Disease)  John Solorzano MD as Consulting Physician (Urology)  Deanna Wheeler MD as Referring Physician (Family Medicine):    History taken from:     patient    Chief Complaint:      Renal failure    Subjective     Interval History:     Patient is a 67-year-old  male with history of hyperlipidemia, ECHO, coronary artery disease with stents, paroxysmal atrial fibrillation, GERD, diabetes type 2, essential hypertension, and ECHO on CPAP who comes in after he was kicked out of his wife's house 5 days ago.  They are  but getting a divorce he states.  He went over to his brother-in-law's house and drank a large amount of orange juice as well as grape juice, carbonated beverages, and takes.  His brother-in-law states that he was then very sleepy almost comatose and was basically sleeping past 3 days.  At one point he was asleep for 24 hours.  Patient states 3 days ago he also has some chest pain with some shortness of breath in the center of his chest which radiated into his neck and left arm.  He took 3 nitroglycerin the pain is gone away.  He claims he is taking all of his medications.  He went to his physician's office today and was sent to the emergency room due to his symptoms.  He has been acting confused as well.  He has seen Dr. UGARTE in the past for chronic kidney disease, and he is due to see  Dr. Heaton for BPH.  In the emergency room his BUN was 41 and creatinine was 6.10.  There was greater than 300 cc of urine in the bladder, so a Devi catheter has been placed.    He denies any chest pressure, shortness breath, nausea, vomiting, or pain today.  He appears to be profoundly depressed, and tearful at times.  Ancillary staff has noticed this as well.  We'll consult behavioral health to see him.  He refuses to speak about his feelings at this point.  His creatinine has improved only to 5.9 today.  Dr. Vasquez will be seeing him today.  He is very weak according to PT and OT.    Review of Systems:     All systems were reviewed and negative except for:  Behavioral/Psych: positive for  depression    Objective     Vital Signs:    Temp:  [97.7 °F (36.5 °C)-98.2 °F (36.8 °C)] 98 °F (36.7 °C)  Heart Rate:  [56-90] 63  Resp:  [16-18] 18  BP: (124-200)/() 143/80    Physical Exam:      General Appearance:    Alert, cooperative, in no acute distress, Flat affect    Head:    Normocephalic, without obvious abnormality, atraumatic   Eyes:            Lids and lashes normal, conjunctivae and sclerae normal, no   icterus, no pallor, corneas clear, PERRLA   Ears:    Ears appear intact with no abnormalities noted   Throat:   No oral lesions, no thrush, oral mucosa moist   Neck:   No adenopathy, supple, trachea midline, no thyromegaly, no   carotid bruit, no JVD   Back:     No kyphosis present, no scoliosis present, no skin lesions,      erythema or scars, no tenderness to percussion or                   palpation,   range of motion normal   Lungs:     Clear to auscultation,respirations regular, even and                  unlabored    Heart:    Regular rhythm and normal rate, normal S1 and S2, no            murmur, no gallop, no rub, no click   Chest Wall:    No abnormalities observed   Abdomen:     Normal bowel sounds, no masses, no organomegaly, soft        non-tender, non-distended, no guarding, no rebound                 tenderness   Rectal:     Deferred   Extremities:   Moves all extremities well, no edema, no cyanosis, no             redness   Pulses:   Pulses palpable and equal bilaterally   Skin:   No bleeding, bruising or rash   Lymph nodes:   No palpable adenopathy   Neurologic:   Cranial nerves 2 - 12 grossly intact, sensation intact, DTR       present and equal bilaterally        Results Review:      I reviewed the patient's new clinical results.    Labs:    Lab Results (last 24 hours)     Procedure Component Value Units Date/Time    CBC & Differential [[id number]] Collected:  03/01/18 1257    Specimen:  Blood Updated:  03/01/18 1314    Narrative:       The following orders were created for panel order CBC & Differential.  Procedure                               Abnormality         Status                     ---------                               -----------         ------                     CBC Auto Differential[[id number]]        Normal              Final result                 Please view results for these tests on the individual orders.    CBC Auto Differential [[id number]]  (Normal) Collected:  03/01/18 1257    Specimen:  Blood Updated:  03/01/18 1314     WBC 6.35 10*3/mm3      RBC 5.33 10*6/mm3      Hemoglobin 15.9 g/dL      Hematocrit 47.3 %      MCV 88.7 fL      MCH 29.8 pg      MCHC 33.6 g/dL      RDW 13.1 %      RDW-SD 42.5 fl      MPV 10.8 fL      Platelets 137 10*3/mm3      Neutrophil % 71.3 %      Lymphocyte % 15.9 %      Monocyte % 10.1 %      Eosinophil % 1.6 %      Basophil % 0.8 %      Immature Grans % 0.3 %      Neutrophils, Absolute 4.53 10*3/mm3      Lymphocytes, Absolute 1.01 10*3/mm3      Monocytes, Absolute 0.64 10*3/mm3      Eosinophils, Absolute 0.10 10*3/mm3      Basophils, Absolute 0.05 10*3/mm3      Immature Grans, Absolute 0.02 10*3/mm3      nRBC 0.0 /100 WBC     Comprehensive Metabolic Panel [[id number]]  (Abnormal) Collected:  03/01/18 1257    Specimen:  Blood Updated:  03/01/18 1317      Glucose 130 (H) mg/dL      BUN 41 (H) mg/dL      Creatinine 6.10 (H) mg/dL      Sodium 146 (H) mmol/L      Potassium 4.7 mmol/L      Chloride 103 mmol/L      CO2 30.0 mmol/L      Calcium 9.4 mg/dL      Total Protein 6.8 g/dL      Albumin 4.10 g/dL      ALT (SGPT) 33 U/L      AST (SGOT) 28 U/L      Alkaline Phosphatase 58 U/L      Total Bilirubin 0.7 mg/dL      eGFR Non African Amer 9 (L) mL/min/1.73      Globulin 2.7 gm/dL      A/G Ratio 1.5 g/dL      BUN/Creatinine Ratio 6.7     Anion Gap 17.7 mmol/L     Lipase [[id number]]  (Normal) Collected:  03/01/18 1257    Specimen:  Blood Updated:  03/01/18 1317     Lipase 45 U/L     Protime-INR [[id number]]  (Abnormal) Collected:  03/01/18 1257    Specimen:  Blood Updated:  03/01/18 1317     Protime 25.9 (H) Seconds      INR 2.36 (H)    Troponin [[id number]]  (Normal) Collected:  03/01/18 1257    Specimen:  Blood Updated:  03/01/18 1327     Troponin I 0.017 ng/mL     Lavender Top [[id number]] Collected:  03/01/18 1257    Specimen:  Blood Updated:  03/01/18 1401     Extra Tube hold for add-on      Auto resulted       Gold Top - SST [[id number]] Collected:  03/01/18 1257    Specimen:  Blood Updated:  03/01/18 1401     Extra Tube Hold for add-ons.      Auto resulted.       Green Top (No Gel) [[id number]] Collected:  03/01/18 1257    Specimen:  Blood Updated:  03/01/18 1401     Extra Tube Hold for add-ons.      Auto resulted.       Eden Draw [[id number]] Collected:  03/01/18 1257    Specimen:  Blood Updated:  03/01/18 1401    Narrative:       The following orders were created for panel order Eden Draw.  Procedure                               Abnormality         Status                     ---------                               -----------         ------                     Light Blue Top[[id number]]                                   Final result               Lavender Top[[id number]]                                     Final result               Gold Top - SST[[id number]]                                    Final result               Green Top (No Gel)[[id number]]                               Final result                 Please view results for these tests on the individual orders.    Light Blue Top [[id number]] Collected:  03/01/18 1257    Specimen:  Blood Updated:  03/01/18 1401     Extra Tube hold for add-on      Auto resulted       Influenza Antigen, Rapid - Swab, Nasopharynx [[id number]]  (Normal) Collected:  03/01/18 1358    Specimen:  Swab from Nasopharynx Updated:  03/01/18 1421     Influenza A Ag, EIA Negative     Influenza B Ag, EIA Negative    Urinalysis With / Culture If Indicated - Urine, Clean Catch [[id number]]  (Abnormal) Collected:  03/01/18 1451    Specimen:  Urine from Urine, Catheter Updated:  03/01/18 1508     Color, UA Yellow     Appearance, UA Slightly Cloudy (A)     pH, UA <=5.0     Specific Gravity, UA 1.010     Glucose, UA Negative     Ketones, UA Negative     Bilirubin, UA Negative     Blood, UA Negative     Protein, UA Negative     Leuk Esterase, UA Negative     Nitrite, UA Negative     Urobilinogen, UA 0.2 E.U./dL    Narrative:       Urine microscopic not indicated.    POC Glucose Once [[id number]]  (Normal) Collected:  03/01/18 1255    Specimen:  Blood Updated:  03/01/18 1615     Glucose 126 mg/dL       Serial Number: [id number]:  [id number]       POC Glucose Once [[id number]]  (Normal) Collected:  03/01/18 2044    Specimen:  Blood Updated:  03/01/18 2050     Glucose 83 mg/dL       Serial Number: [id number]:  [id number]       Troponin [[id number]]  (Normal) Collected:  03/01/18 2350    Specimen:  Blood Updated:  03/02/18 0023     Troponin I 0.012 ng/mL     Narrative:       Normal Patient Upper Reference Limit (URL) (99th Percentile)=0.03 ng/mL   Non-AMI Illness Reference Limit=0.03-0.11 ng/mL   AMI Confirmation=0.12 ng/mL and above    POC Glucose Once [[id number]]  (Normal) Collected:  03/02/18 0611    Specimen:  Blood Updated:  03/02/18 0626     Glucose 99 mg/dL        Serial Number: [id number]:  [id number]       Hemoglobin A1c [[id number]] Collected:  03/02/18 0615    Specimen:  Blood Updated:  03/02/18 0627    CBC Auto Differential [[id number]]  (Abnormal) Collected:  03/02/18 0610    Specimen:  Blood Updated:  03/02/18 0643     WBC 6.55 10*3/mm3      RBC 5.15 10*6/mm3      Hemoglobin 15.1 g/dL      Hematocrit 46.5 %      MCV 90.3 fL      MCH 29.3 pg      MCHC 32.5 g/dL      RDW 13.2 %      RDW-SD 43.9 fl      MPV 10.4 fL      Platelets 121 (L) 10*3/mm3      Neutrophil % 75.9 %      Lymphocyte % 11.5 %      Monocyte % 10.7 %      Eosinophil % 1.2 %      Basophil % 0.5 %      Immature Grans % 0.2 %      Neutrophils, Absolute 4.98 10*3/mm3      Lymphocytes, Absolute 0.75 10*3/mm3      Monocytes, Absolute 0.70 10*3/mm3      Eosinophils, Absolute 0.08 10*3/mm3      Basophils, Absolute 0.03 10*3/mm3      Immature Grans, Absolute 0.01 10*3/mm3      nRBC 0.0 /100 WBC     Protime-INR [[id number]]  (Abnormal) Collected:  03/02/18 0610    Specimen:  Blood Updated:  03/02/18 0644     Protime 25.4 (H) Seconds      INR 2.31 (H)    Magnesium [[id number]]  (Normal) Collected:  03/02/18 0610    Specimen:  Blood Updated:  03/02/18 0659     Magnesium 2.3 mg/dL     Basic Metabolic Panel [[id number]]  (Abnormal) Collected:  03/02/18 0610    Specimen:  Blood Updated:  03/02/18 0659     Glucose 112 (H) mg/dL      BUN 43 (H) mg/dL      Creatinine 5.90 (H) mg/dL      Sodium 145 mmol/L      Potassium 4.7 mmol/L      Chloride 106 mmol/L      CO2 26.0 mmol/L      Calcium 8.8 mg/dL      eGFR Non African Amer 10 (L) mL/min/1.73      BUN/Creatinine Ratio 7.3     Anion Gap 17.7 mmol/L     Narrative:       Abnormal estimated GFR should be followed by more specific studies to confirm end stage chronic renal disease. The equation used for calculation may not be accurate for patients less than 19 years old, greater than 70 years old, patients at extremes of weight, malnutrition, or with acute renal  dysfunction.    Troponin [[id number]]  (Normal) Collected:  03/02/18 0610    Specimen:  Blood Updated:  03/02/18 0659     Troponin I <0.012 ng/mL     Narrative:       Normal Patient Upper Reference Limit (URL) (99th Percentile)=0.03 ng/mL   Non-AMI Illness Reference Limit=0.03-0.11 ng/mL   AMI Confirmation=0.12 ng/mL and above           Radiology:    Imaging Results (last 24 hours)     Procedure Component Value Units Date/Time    CT Head Without Contrast [[id number]] Collected:  03/01/18 1322     Updated:  03/01/18 1325    Narrative:       PROCEDURE: CT HEAD WO CONTRAST-     HISTORY: confusion        TECHNIQUE: Noncontrast exam     Mild atrophy and chronic ischemic white matter changes are noted.         No cortical edema is present. There is no mass or hemorrhage. Ventricles  are normal.     Bone windows show no skull fracture or obvious destructive lesion.       Impression:       1. No acute intracranial abnormality or obvious mass.   2. Atrophy and chronic ischemic white matter changes as above.            This study was performed with techniques to keep radiation doses as low  as reasonably achievable (ALARA). Individualized dose reduction  techniques using automated exposure control or adjustment of vA and/or  kV according to the patient size were employed.      This report was finalized on 3/1/2018 1:23 PM by Fran Rojas MD.    XR Chest 2 View [[id number]] Collected:  03/01/18 1326     Updated:  03/01/18 1328    Narrative:       PROCEDURE: XR CHEST 2 VW-       HISTORY: cough        COMPARISON: 12/30/2016.     FINDINGS:  The lungs are clear.       There is no evidence of effusion or other pleural disease.  The  mediastinum has a normal appearance.      The cardiac silhouette is unremarkable.   Left-sided pacer is present.       Impression:       Unremarkable chest exam.        This report was finalized on 3/1/2018 1:26 PM by Fran Rojas MD.          Medication Review:       aspirin 81 mg Oral Daily    atorvastatin 20 mg Oral Nightly   budesonide-formoterol 2 puff Inhalation BID - RT   docusate sodium 100 mg Oral BID   finasteride 5 mg Oral Daily   FLUoxetine 20 mg Oral Daily   insulin aspart 0-9 Units Subcutaneous 4x Daily AC & at Bedtime   ipratropium-albuterol 3 mL Nebulization Q6H - RT   isosorbide mononitrate 30 mg Oral Q24H   metoprolol succinate  mg Oral Daily   pantoprazole 40 mg Oral QAM   tamsulosin 0.4 mg Oral Daily   traZODone 50 mg Oral Nightly   warfarin 5 mg Oral Daily         Pharmacy to dose warfarin     sodium chloride 100 mL/hr Last Rate: 100 mL/hr (03/01/18 1908)       Assessment/Plan     Problem List Items Addressed This Visit     Obstructive uropathy - Primary          1.  Acute renal failure due to obstructive uropathy  2.  Chronic kidney disease stage III   3.  BPH  4.  Coronary artery disease with multiple stents  5.  Stable angina  6.  ECHO on CPAP  7.  Paroxysmal atrial fibrillation on Coumadin  8.  Pacemaker/AICD status  9.  Essential hypertension  10.  Diabetes mellitus type 2  11.  Systolic cardiomyopathy with ejection fraction of 45%.  Stable systolic congestive heart failure  12.  Profound depression  13.  Weakness    Plan:    Renal ultrasound is pending.  Continue with IV fluids.  Continue to monitor creatinine.  Nephrology has been consulted, spoke to Dr. Vasquez.  Continue with home medications.  Continue to hold nephrotoxins Zestril, Ziac, Bumex, metformin.  Also hold Neurontin and baclofen at present time.  We will consult behavioral health as the patient appears to be profoundly depressed.  He and his wife are getting a divorce.  Check lab work in the a.m.  Continue with PT and OT.  Further recommendations will depend on clinical course.    Dc Velazco DO  03/02/18  10:14 AM             Electronically signed by Dc Velazco DO at 3/2/2018 10:20 AM      Ronny Walker MD at 3/3/2018  8:24 AM  Version 4 of 4            LOS: 2 days    Patient Care  "Team:  Deanna Wheeler MD as PCP - General (Family Medicine)  Deanna Wheeler MD as PCP - Family Medicine  Arelis Tucker MD as Consulting Physician (Cardiology)  Osiel Heaton MD as Consulting Physician (Urology)  Chace Vasquez MD as Consulting Physician (Nephrology)  Blayne Whitman MD as Consulting Physician (Ophthalmology)  Jose Hargrove MD as Consulting Physician (Pulmonary Disease)  John Solorzano MD as Consulting Physician (Urology)  Deanna Wheeler MD as Referring Physician (Family Medicine)    Chief Complaint:    Chief Complaint   Patient presents with   • Altered Mental Status   • Pain     all over body     Follow UP JACQUE   Subjective     Interval History:   Devi removed 2h ago, has not voided yet.  Denies dyspnea or n/v/d    Objective     Vital Signs  Temp:  [97.5 °F (36.4 °C)-98.7 °F (37.1 °C)] 98.4 °F (36.9 °C)  Heart Rate:  [60-68] 61  Resp:  [16-18] 16  BP: (129-167)/(72-88) 156/79    Flowsheet Rows         First Filed Value    Admission Height  165.1 cm (65\") Documented at 03/01/2018 1235    Admission Weight  92.5 kg (204 lb) Documented at 03/01/2018 1235             I/O last 3 completed shifts:  In: 3100 [I.V.:3100]  Out: 2875 [Urine:2875]    Intake/Output Summary (Last 24 hours) at 03/03/18 0824  Last data filed at 03/03/18 0437   Gross per 24 hour   Intake             1000 ml   Output             1975 ml   Net             -975 ml       Physical Exam:  gen nad, alert  Neck supple no jvd  Lungs CTA bilat no rales  CV RRR  abd soft NT/ND  vasc no pedal edema 2+ radial pulses      Results Review:      Results from last 7 days  Lab Units 03/03/18  0559 03/02/18  0610 03/01/18  1257   SODIUM mmol/L 145 145 146*   POTASSIUM mmol/L 4.2 4.7 4.7   CHLORIDE mmol/L 105 106 103   CO2 mmol/L 29.0 26.0 30.0   BUN mg/dL 47* 43* 41*   CREATININE mg/dL 5.70* 5.90* 6.10*   CALCIUM mg/dL 8.7 8.8 9.4   BILIRUBIN mg/dL  --   --  0.7   ALK PHOS U/L  --   --  58   ALT (SGPT) U/L  --   --  33   AST (SGOT) " U/L  --   --  28   GLUCOSE mg/dL 112* 112* 130*       Estimated Creatinine Clearance: 13.2 mL/min (by C-G formula based on Cr of 5.7).      Results from last 7 days  Lab Units 03/03/18  0559 03/02/18  0610   MAGNESIUM mg/dL 2.2 2.3   PHOSPHORUS mg/dL 5.4*  --                Results from last 7 days  Lab Units 03/03/18  0559 03/02/18  0610 03/01/18  1257   WBC 10*3/mm3 5.14 6.55 6.35   HEMOGLOBIN g/dL 14.1 15.1 15.9   PLATELETS 10*3/mm3 122* 121* 137         Results from last 7 days  Lab Units 03/03/18  0559 03/02/18  0610 03/01/18  1257 03/01/18  1105   INR  3.09* 2.31* 2.36* 2.40*         Imaging Results (last 24 hours)     Procedure Component Value Units Date/Time    US Renal Limited [[id number]] Collected:  03/02/18 1116     Updated:  03/02/18 1118    Narrative:       PROCEDURE: US RENAL LIMITED-     HISTORY: renal failure; N13.9-Obstructive and reflux uropathy,  unspecified     FINDINGS: Kidneys show a normal echo pattern.    .  Right kidney  measures 11.1 cm in length. Left kidney measures 10.4 cm in length.   Size is normal.     No mass or cyst is seen.No obstructive changes are present.          Impression:       Normal renal ultrasound.        This report was finalized on 3/2/2018 11:16 AM by Fran Rojas MD.          aspirin 81 mg Oral Daily   atorvastatin 20 mg Oral Nightly   budesonide-formoterol 2 puff Inhalation BID - RT   docusate sodium 100 mg Oral BID   finasteride 5 mg Oral Daily   FLUoxetine 20 mg Oral Daily   insulin aspart 0-9 Units Subcutaneous 4x Daily AC & at Bedtime   ipratropium-albuterol 3 mL Nebulization Q6H - RT   isosorbide mononitrate 30 mg Oral Q24H   metoprolol succinate  mg Oral Daily   pantoprazole 40 mg Oral QAM   tamsulosin 0.4 mg Oral Daily   traZODone 50 mg Oral Nightly   warfarin 5 mg Oral Daily       Pharmacy to dose warfarin     sodium chloride 125 mL/hr Last Rate: 125 mL/hr (03/02/18 1528)       Medication Review:   Current Facility-Administered Medications    Medication Dose Route Frequency Provider Last Rate Last Dose   • acetaminophen (TYLENOL) tablet 650 mg  650 mg Oral Q4H PRN Dc Velazco, DO       • aspirin EC tablet 81 mg  81 mg Oral Daily Dc Velazco DO   81 mg at 03/03/18 0823   • atorvastatin (LIPITOR) tablet 20 mg  20 mg Oral Nightly Dc Velazco DO   20 mg at 03/02/18 2133   • bisacodyl (DULCOLAX) suppository 10 mg  10 mg Rectal Daily PRN Dc Velazco DO       • budesonide-formoterol (SYMBICORT) 160-4.5 MCG/ACT inhaler 2 puff  2 puff Inhalation BID - RT Dc Velazco DO   2 puff at 03/03/18 0649   • dextrose (D50W) solution 25 g  25 g Intravenous Q15 Min PRN Dc Velazco, DO       • dextrose (GLUTOSE) oral gel 1 tube  1 tube Oral Q15 Min PRN Dc Velazco DO       • docusate sodium (COLACE) capsule 100 mg  100 mg Oral BID Dc Velazco DO   100 mg at 03/03/18 0823   • finasteride (PROSCAR) tablet 5 mg  5 mg Oral Daily Dc Velazco DO   5 mg at 03/03/18 0823   • FLUoxetine (PROzac) capsule 20 mg  20 mg Oral Daily Dc Velazco, DO   20 mg at 03/03/18 0823   • glucagon (human recombinant) (GLUCAGEN DIAGNOSTIC) injection 1 mg  1 mg Subcutaneous PRN Dc Velazco DO       • insulin aspart (novoLOG) injection 0-9 Units  0-9 Units Subcutaneous 4x Daily AC & at Bedtime Dc Velazco DO       • ipratropium-albuterol (DUO-NEB) nebulizer solution 3 mL  3 mL Nebulization Q6H - RT Dc Velazco DO   3 mL at 03/03/18 0649   • isosorbide mononitrate (IMDUR) 24 hr tablet 30 mg  30 mg Oral Q24H Dc Velazco, DO   30 mg at 03/03/18 0822   • metoprolol succinate XL (TOPROL-XL) 24 hr tablet 100 mg  100 mg Oral Daily Dc Velazco DO   100 mg at 03/03/18 0823   • nitroglycerin (NITROSTAT) SL tablet 0.4 mg  0.4 mg Sublingual Q5 Min PRN Dc Velazco, DO       • ondansetron (ZOFRAN) injection 4 mg  4 mg Intravenous Q6H PRN Dc Velazco, DO       • oxyCODONE-acetaminophen  (PERCOCET)  MG per tablet 1 tablet  1 tablet Oral Q6H PRN Dc Velazco, DO   1 tablet at 03/03/18 0514   • pantoprazole (PROTONIX) EC tablet 40 mg  40 mg Oral QAM Dc Velazco, DO   40 mg at 03/03/18 0700   • Pharmacy to dose warfarin   Does not apply Continuous PRN Dc Velazco DO       • sodium chloride 0.45 % infusion  125 mL/hr Intravenous Continuous Chace Vasquez  mL/hr at 03/02/18 1528 125 mL/hr at 03/02/18 1528   • sodium chloride 0.9 % flush 1-10 mL  1-10 mL Intravenous PRN Dc Velazco DO       • sodium chloride 0.9 % flush 10 mL  10 mL Intravenous PRN Ronny Cardona MD       • sodium chloride 0.9 % flush 10 mL  10 mL Intravenous PRN BROOKE Coe       • tamsulosin (FLOMAX) 24 hr capsule 0.4 mg  0.4 mg Oral Daily Dc Velazco DO   0.4 mg at 03/03/18 0823   • traZODone (DESYREL) tablet 50 mg  50 mg Oral Nightly Dc Velazco, DO   50 mg at 03/02/18 2133   • warfarin (COUMADIN) tablet 5 mg  5 mg Oral Daily Dc Velazco, DO   5 mg at 03/02/18 1700       Assessment/Plan   1. Acute kidney injury - ATN, multifactorial; Cr trending down slowly 6.1 - 5.9 - 5.7, BUN up; Lytes/Volume stable  2. Chronic kidney disease stage III  3. Essential hypertension - BP high  4. Paroxysmal atrial fibrillation  5. Chronic coronary artery disease  6. Obstructive sleep apnea of adult  7. Type 2 diabetes mellitus, without long-term current use of insulin  8. BPH (benign prostatic hypertrophy) with urinary retention  9. H/o urinary retention   10. Pacemaker/AICD    Plan  - no indication for HD  - may need to replace cotton if does not void by 5PM, d/w RN  - cont flomax, proscar  - norvasc added  - d/c IVF   - change PPI to pepcid    Active Problems:    Pacemaker    Chronic kidney disease    Essential hypertension    Paroxysmal atrial fibrillation    Chronic coronary artery disease    Obstructive sleep apnea of adult    Type 2 diabetes mellitus, without  "long-term current use of insulin    BPH (benign prostatic hypertrophy) with urinary retention    Obstructive uropathy              Ronny Walker MD  03/03/18  8:24 AM       Electronically signed by Ronny Walker MD at 3/3/2018  1:55 PM      Ronny Walker MD at 3/3/2018  8:24 AM  Version 3 of 4            LOS: 2 days    Patient Care Team:  Deanna Wheeler MD as PCP - General (Family Medicine)  Deanna Wheeler MD as PCP - Family Medicine  Arelis Tucker MD as Consulting Physician (Cardiology)  Osiel Heaton MD as Consulting Physician (Urology)  Chace Vasquez MD as Consulting Physician (Nephrology)  Blayne Whitman MD as Consulting Physician (Ophthalmology)  Jose Hargrove MD as Consulting Physician (Pulmonary Disease)  John Solorzano MD as Consulting Physician (Urology)  Deanna Wheeler MD as Referring Physician (Family Medicine)    Chief Complaint:    Chief Complaint   Patient presents with   • Altered Mental Status   • Pain     all over body     Follow UP JACQUE   Subjective     Interval History:   Devi removed 2h ago, has not voided yet.  Denies dyspnea or n/v/d    Objective     Vital Signs  Temp:  [97.5 °F (36.4 °C)-98.7 °F (37.1 °C)] 98.4 °F (36.9 °C)  Heart Rate:  [60-68] 61  Resp:  [16-18] 16  BP: (129-167)/(72-88) 156/79    Flowsheet Rows         First Filed Value    Admission Height  165.1 cm (65\") Documented at 03/01/2018 1235    Admission Weight  92.5 kg (204 lb) Documented at 03/01/2018 1235             I/O last 3 completed shifts:  In: 3100 [I.V.:3100]  Out: 2875 [Urine:2875]    Intake/Output Summary (Last 24 hours) at 03/03/18 0824  Last data filed at 03/03/18 0437   Gross per 24 hour   Intake             1000 ml   Output             1975 ml   Net             -975 ml       Physical Exam:  gen nad, alert  Neck supple no jvd  Lungs CTA bilat no rales  CV RRR  abd soft NT/ND  vasc no pedal edema 2+ radial pulses      Results Review:      Results from last 7 " days  Lab Units 03/03/18  0559 03/02/18  0610 03/01/18  1257   SODIUM mmol/L 145 145 146*   POTASSIUM mmol/L 4.2 4.7 4.7   CHLORIDE mmol/L 105 106 103   CO2 mmol/L 29.0 26.0 30.0   BUN mg/dL 47* 43* 41*   CREATININE mg/dL 5.70* 5.90* 6.10*   CALCIUM mg/dL 8.7 8.8 9.4   BILIRUBIN mg/dL  --   --  0.7   ALK PHOS U/L  --   --  58   ALT (SGPT) U/L  --   --  33   AST (SGOT) U/L  --   --  28   GLUCOSE mg/dL 112* 112* 130*       Estimated Creatinine Clearance: 13.2 mL/min (by C-G formula based on Cr of 5.7).      Results from last 7 days  Lab Units 03/03/18  0559 03/02/18  0610   MAGNESIUM mg/dL 2.2 2.3   PHOSPHORUS mg/dL 5.4*  --                Results from last 7 days  Lab Units 03/03/18  0559 03/02/18  0610 03/01/18  1257   WBC 10*3/mm3 5.14 6.55 6.35   HEMOGLOBIN g/dL 14.1 15.1 15.9   PLATELETS 10*3/mm3 122* 121* 137         Results from last 7 days  Lab Units 03/03/18  0559 03/02/18  0610 03/01/18  1257 03/01/18  1105   INR  3.09* 2.31* 2.36* 2.40*         Imaging Results (last 24 hours)     Procedure Component Value Units Date/Time    US Renal Limited [[id number]] Collected:  03/02/18 1116     Updated:  03/02/18 1118    Narrative:       PROCEDURE: US RENAL LIMITED-     HISTORY: renal failure; N13.9-Obstructive and reflux uropathy,  unspecified     FINDINGS: Kidneys show a normal echo pattern.    .  Right kidney  measures 11.1 cm in length. Left kidney measures 10.4 cm in length.   Size is normal.     No mass or cyst is seen.No obstructive changes are present.          Impression:       Normal renal ultrasound.        This report was finalized on 3/2/2018 11:16 AM by Fran Rojas MD.          aspirin 81 mg Oral Daily   atorvastatin 20 mg Oral Nightly   budesonide-formoterol 2 puff Inhalation BID - RT   docusate sodium 100 mg Oral BID   finasteride 5 mg Oral Daily   FLUoxetine 20 mg Oral Daily   insulin aspart 0-9 Units Subcutaneous 4x Daily AC & at Bedtime   ipratropium-albuterol 3 mL Nebulization Q6H - RT   isosorbide  mononitrate 30 mg Oral Q24H   metoprolol succinate  mg Oral Daily   pantoprazole 40 mg Oral QAM   tamsulosin 0.4 mg Oral Daily   traZODone 50 mg Oral Nightly   warfarin 5 mg Oral Daily       Pharmacy to dose warfarin     sodium chloride 125 mL/hr Last Rate: 125 mL/hr (03/02/18 1528)       Medication Review:   Current Facility-Administered Medications   Medication Dose Route Frequency Provider Last Rate Last Dose   • acetaminophen (TYLENOL) tablet 650 mg  650 mg Oral Q4H PRN Dc Velazco, DO       • aspirin EC tablet 81 mg  81 mg Oral Daily Dc Velazco, DO   81 mg at 03/03/18 0823   • atorvastatin (LIPITOR) tablet 20 mg  20 mg Oral Nightly Dc Velazco DO   20 mg at 03/02/18 2133   • bisacodyl (DULCOLAX) suppository 10 mg  10 mg Rectal Daily PRN Dc Velazco, DO       • budesonide-formoterol (SYMBICORT) 160-4.5 MCG/ACT inhaler 2 puff  2 puff Inhalation BID - RT Dc Velazco DO   2 puff at 03/03/18 0649   • dextrose (D50W) solution 25 g  25 g Intravenous Q15 Min PRN Dc Velazco DO       • dextrose (GLUTOSE) oral gel 1 tube  1 tube Oral Q15 Min PRN Dc Velazco DO       • docusate sodium (COLACE) capsule 100 mg  100 mg Oral BID Dc Velazco, DO   100 mg at 03/03/18 0823   • finasteride (PROSCAR) tablet 5 mg  5 mg Oral Daily Dc Velazco DO   5 mg at 03/03/18 0823   • FLUoxetine (PROzac) capsule 20 mg  20 mg Oral Daily Dc Velazco DO   20 mg at 03/03/18 0823   • glucagon (human recombinant) (GLUCAGEN DIAGNOSTIC) injection 1 mg  1 mg Subcutaneous PRN Dc Velazco DO       • insulin aspart (novoLOG) injection 0-9 Units  0-9 Units Subcutaneous 4x Daily AC & at Bedtime Dc Velazco DO       • ipratropium-albuterol (DUO-NEB) nebulizer solution 3 mL  3 mL Nebulization Q6H - RT Dc Velazco DO   3 mL at 03/03/18 0649   • isosorbide mononitrate (IMDUR) 24 hr tablet 30 mg  30 mg Oral Q24H Dc Velazco DO   30 mg at 03/03/18  0822   • metoprolol succinate XL (TOPROL-XL) 24 hr tablet 100 mg  100 mg Oral Daily Dc Velazco, DO   100 mg at 03/03/18 0823   • nitroglycerin (NITROSTAT) SL tablet 0.4 mg  0.4 mg Sublingual Q5 Min PRN Dc Velazco DO       • ondansetron (ZOFRAN) injection 4 mg  4 mg Intravenous Q6H PRN Dc Velazco DO       • oxyCODONE-acetaminophen (PERCOCET)  MG per tablet 1 tablet  1 tablet Oral Q6H PRN Dc Velazco DO   1 tablet at 03/03/18 0514   • pantoprazole (PROTONIX) EC tablet 40 mg  40 mg Oral QAM Dc Velazco, DO   40 mg at 03/03/18 0700   • Pharmacy to dose warfarin   Does not apply Continuous PRN Dc Velazco DO       • sodium chloride 0.45 % infusion  125 mL/hr Intravenous Continuous Chace Vasquez  mL/hr at 03/02/18 1528 125 mL/hr at 03/02/18 1528   • sodium chloride 0.9 % flush 1-10 mL  1-10 mL Intravenous PRN Dc Velazco DO       • sodium chloride 0.9 % flush 10 mL  10 mL Intravenous PRN Ronny Cardona MD       • sodium chloride 0.9 % flush 10 mL  10 mL Intravenous PRN BROOKE Coe       • tamsulosin (FLOMAX) 24 hr capsule 0.4 mg  0.4 mg Oral Daily Dc Velazco DO   0.4 mg at 03/03/18 0823   • traZODone (DESYREL) tablet 50 mg  50 mg Oral Nightly Dc Velazco DO   50 mg at 03/02/18 2133   • warfarin (COUMADIN) tablet 5 mg  5 mg Oral Daily Dc Velazco DO   5 mg at 03/02/18 1700       Assessment/Plan   11. Acute kidney injury - ATN, multifactorial; Cr trending down slowly 6.1 - 5.9 - 5.7, BUN up; Lytes/Volume stable  12. Chronic kidney disease stage III  13. Essential hypertension - BP high  14. Paroxysmal atrial fibrillation  15. Chronic coronary artery disease  16. Obstructive sleep apnea of adult  17. Type 2 diabetes mellitus, without long-term current use of insulin  18. BPH (benign prostatic hypertrophy) with urinary retention  19. H/o urinary retention   20. Pacemaker/AICD    Plan  - no indication for HD  - may  "need to replace cotton if does not void by 5PM, d/w RN  - cont flomax, proscar  - norvasc added  - d/c IVF     Active Problems:    Pacemaker    Chronic kidney disease    Essential hypertension    Paroxysmal atrial fibrillation    Chronic coronary artery disease    Obstructive sleep apnea of adult    Type 2 diabetes mellitus, without long-term current use of insulin    BPH (benign prostatic hypertrophy) with urinary retention    Obstructive uropathy              Ronny Walker MD  03/03/18  8:24 AM       Electronically signed by Ronny Walker MD at 3/3/2018  1:55 PM      Ronny Walker MD at 3/3/2018  8:24 AM  Version 2 of 4            LOS: 2 days    Patient Care Team:  Deanna Wheeler MD as PCP - General (Family Medicine)  Deanna Wheeler MD as PCP - Family Medicine  Arelis Tucker MD as Consulting Physician (Cardiology)  Osiel Heaton MD as Consulting Physician (Urology)  Chace Vasquez MD as Consulting Physician (Nephrology)  Blayne Whitman MD as Consulting Physician (Ophthalmology)  Jose Hargrove MD as Consulting Physician (Pulmonary Disease)  John Solorzano MD as Consulting Physician (Urology)  Deanna Wheeler MD as Referring Physician (Family Medicine)    Chief Complaint:    Chief Complaint   Patient presents with   • Altered Mental Status   • Pain     all over body     Follow UP JACQUE   Subjective     Interval History:   Cotton removed 2h ago, has not voided yet.  Denies dyspnea or n/v/d    Objective     Vital Signs  Temp:  [97.5 °F (36.4 °C)-98.7 °F (37.1 °C)] 98.4 °F (36.9 °C)  Heart Rate:  [60-68] 61  Resp:  [16-18] 16  BP: (129-167)/(72-88) 156/79    Flowsheet Rows         First Filed Value    Admission Height  165.1 cm (65\") Documented at 03/01/2018 1235    Admission Weight  92.5 kg (204 lb) Documented at 03/01/2018 1235             I/O last 3 completed shifts:  In: 3100 [I.V.:3100]  Out: 2875 [Urine:2875]    Intake/Output Summary (Last 24 hours) at 03/03/18 " 0824  Last data filed at 03/03/18 0437   Gross per 24 hour   Intake             1000 ml   Output             1975 ml   Net             -975 ml       Physical Exam:  gen nad, alert  Neck supple no jvd  Lungs CTA bilat no rales  CV RRR  abd soft NT/ND  vasc no pedal edema 2+ radial pulses      Results Review:      Results from last 7 days  Lab Units 03/03/18  0559 03/02/18  0610 03/01/18  1257   SODIUM mmol/L 145 145 146*   POTASSIUM mmol/L 4.2 4.7 4.7   CHLORIDE mmol/L 105 106 103   CO2 mmol/L 29.0 26.0 30.0   BUN mg/dL 47* 43* 41*   CREATININE mg/dL 5.70* 5.90* 6.10*   CALCIUM mg/dL 8.7 8.8 9.4   BILIRUBIN mg/dL  --   --  0.7   ALK PHOS U/L  --   --  58   ALT (SGPT) U/L  --   --  33   AST (SGOT) U/L  --   --  28   GLUCOSE mg/dL 112* 112* 130*       Estimated Creatinine Clearance: 13.2 mL/min (by C-G formula based on Cr of 5.7).      Results from last 7 days  Lab Units 03/03/18  0559 03/02/18  0610   MAGNESIUM mg/dL 2.2 2.3   PHOSPHORUS mg/dL 5.4*  --                Results from last 7 days  Lab Units 03/03/18  0559 03/02/18  0610 03/01/18  1257   WBC 10*3/mm3 5.14 6.55 6.35   HEMOGLOBIN g/dL 14.1 15.1 15.9   PLATELETS 10*3/mm3 122* 121* 137         Results from last 7 days  Lab Units 03/03/18  0559 03/02/18  0610 03/01/18  1257 03/01/18  1105   INR  3.09* 2.31* 2.36* 2.40*         Imaging Results (last 24 hours)     Procedure Component Value Units Date/Time    US Renal Limited [[id number]] Collected:  03/02/18 1116     Updated:  03/02/18 1118    Narrative:       PROCEDURE: US RENAL LIMITED-     HISTORY: renal failure; N13.9-Obstructive and reflux uropathy,  unspecified     FINDINGS: Kidneys show a normal echo pattern.    .  Right kidney  measures 11.1 cm in length. Left kidney measures 10.4 cm in length.   Size is normal.     No mass or cyst is seen.No obstructive changes are present.          Impression:       Normal renal ultrasound.        This report was finalized on 3/2/2018 11:16 AM by Fran Rojas MD.           aspirin 81 mg Oral Daily   atorvastatin 20 mg Oral Nightly   budesonide-formoterol 2 puff Inhalation BID - RT   docusate sodium 100 mg Oral BID   finasteride 5 mg Oral Daily   FLUoxetine 20 mg Oral Daily   insulin aspart 0-9 Units Subcutaneous 4x Daily AC & at Bedtime   ipratropium-albuterol 3 mL Nebulization Q6H - RT   isosorbide mononitrate 30 mg Oral Q24H   metoprolol succinate  mg Oral Daily   pantoprazole 40 mg Oral QAM   tamsulosin 0.4 mg Oral Daily   traZODone 50 mg Oral Nightly   warfarin 5 mg Oral Daily       Pharmacy to dose warfarin     sodium chloride 125 mL/hr Last Rate: 125 mL/hr (03/02/18 1528)       Medication Review:   Current Facility-Administered Medications   Medication Dose Route Frequency Provider Last Rate Last Dose   • acetaminophen (TYLENOL) tablet 650 mg  650 mg Oral Q4H PRN Dc Velazco, DO       • aspirin EC tablet 81 mg  81 mg Oral Daily Dc Velazco, DO   81 mg at 03/03/18 0823   • atorvastatin (LIPITOR) tablet 20 mg  20 mg Oral Nightly Dc Velazco, DO   20 mg at 03/02/18 2133   • bisacodyl (DULCOLAX) suppository 10 mg  10 mg Rectal Daily PRN Dc Velazco, DO       • budesonide-formoterol (SYMBICORT) 160-4.5 MCG/ACT inhaler 2 puff  2 puff Inhalation BID - RT Dc Velazco, DO   2 puff at 03/03/18 0649   • dextrose (D50W) solution 25 g  25 g Intravenous Q15 Min PRN Dc Velazco, DO       • dextrose (GLUTOSE) oral gel 1 tube  1 tube Oral Q15 Min PRN Dc Velazco, DO       • docusate sodium (COLACE) capsule 100 mg  100 mg Oral BID Dc Velazco DO   100 mg at 03/03/18 0823   • finasteride (PROSCAR) tablet 5 mg  5 mg Oral Daily Dc Velazco DO   5 mg at 03/03/18 0823   • FLUoxetine (PROzac) capsule 20 mg  20 mg Oral Daily Dc Velazco, DO   20 mg at 03/03/18 0823   • glucagon (human recombinant) (GLUCAGEN DIAGNOSTIC) injection 1 mg  1 mg Subcutaneous PRN Dc Velazco, DO       • insulin aspart (novoLOG) injection  0-9 Units  0-9 Units Subcutaneous 4x Daily AC & at Bedtime Dc Velazco DO       • ipratropium-albuterol (DUO-NEB) nebulizer solution 3 mL  3 mL Nebulization Q6H - RT Dc Velazco DO   3 mL at 03/03/18 0649   • isosorbide mononitrate (IMDUR) 24 hr tablet 30 mg  30 mg Oral Q24H Dc Velazco DO   30 mg at 03/03/18 0822   • metoprolol succinate XL (TOPROL-XL) 24 hr tablet 100 mg  100 mg Oral Daily Dc Velazco DO   100 mg at 03/03/18 0823   • nitroglycerin (NITROSTAT) SL tablet 0.4 mg  0.4 mg Sublingual Q5 Min PRN Dc Velazco DO       • ondansetron (ZOFRAN) injection 4 mg  4 mg Intravenous Q6H PRN Dc Velazco DO       • oxyCODONE-acetaminophen (PERCOCET)  MG per tablet 1 tablet  1 tablet Oral Q6H PRN Dc Velazco DO   1 tablet at 03/03/18 0514   • pantoprazole (PROTONIX) EC tablet 40 mg  40 mg Oral QAM Dc Velazco DO   40 mg at 03/03/18 0700   • Pharmacy to dose warfarin   Does not apply Continuous PRN Dc Velazco DO       • sodium chloride 0.45 % infusion  125 mL/hr Intravenous Continuous Chace Vasquez  mL/hr at 03/02/18 1528 125 mL/hr at 03/02/18 1528   • sodium chloride 0.9 % flush 1-10 mL  1-10 mL Intravenous PRN Dc Velazco DO       • sodium chloride 0.9 % flush 10 mL  10 mL Intravenous PRN Ronny Cardona MD       • sodium chloride 0.9 % flush 10 mL  10 mL Intravenous PRN BROOKE Coe       • tamsulosin (FLOMAX) 24 hr capsule 0.4 mg  0.4 mg Oral Daily Dc Velazco DO   0.4 mg at 03/03/18 0823   • traZODone (DESYREL) tablet 50 mg  50 mg Oral Nightly Dc Velazco DO   50 mg at 03/02/18 2133   • warfarin (COUMADIN) tablet 5 mg  5 mg Oral Daily Dc Velazco, DO   5 mg at 03/02/18 1700       Assessment/Plan   21. Acute kidney injury - ATN, multifactorial; Cr trending down slowly 6.1 - 5.9 - 5.7, BUN up; Lytes/Volume stable  22. Chronic kidney disease stage III  23. Essential  hypertension  24. Paroxysmal atrial fibrillation  25. Chronic coronary artery disease  26. Obstructive sleep apnea of adult  27. Type 2 diabetes mellitus, without long-term current use of insulin  28. BPH (benign prostatic hypertrophy) with urinary retention  29. H/o urinary retention   30. Pacemaker/AICD    Plan  - no indication for HD  - may need to replace cotton if does not void by 5PM, d/w RN  - cont flomax, proscar  - d/c IVF     Active Problems:    Pacemaker    Chronic kidney disease    Essential hypertension    Paroxysmal atrial fibrillation    Chronic coronary artery disease    Obstructive sleep apnea of adult    Type 2 diabetes mellitus, without long-term current use of insulin    BPH (benign prostatic hypertrophy) with urinary retention    Obstructive uropathy              Rnony Walker MD  03/03/18  8:24 AM       Electronically signed by Ronny Walker MD at 3/3/2018  1:55 PM      Ronny Walker MD at 3/3/2018  8:24 AM  Version 1 of 4            LOS: 2 days    Patient Care Team:  Deanna Wheeler MD as PCP - General (Family Medicine)  Deanna Wheeler MD as PCP - Family Medicine  Arelis Tucker MD as Consulting Physician (Cardiology)  Osiel Heaton MD as Consulting Physician (Urology)  Chace Vasquez MD as Consulting Physician (Nephrology)  Blayne Whitman MD as Consulting Physician (Ophthalmology)  Jose Hargrove MD as Consulting Physician (Pulmonary Disease)  John Solorzano MD as Consulting Physician (Urology)  Deanna Wheeler MD as Referring Physician (Family Medicine)    Chief Complaint:    Chief Complaint   Patient presents with   • Altered Mental Status   • Pain     all over body     Follow UP JACQUE   Subjective     Interval History:   Cotton removed 2h ago, has not voided yet.  Denies dyspnea or n/v/d    Objective     Vital Signs  Temp:  [97.5 °F (36.4 °C)-98.7 °F (37.1 °C)] 98.4 °F (36.9 °C)  Heart Rate:  [60-68] 61  Resp:  [16-18] 16  BP: (129-167)/(72-88)  "156/79    Flowsheet Rows         First Filed Value    Admission Height  165.1 cm (65\") Documented at 03/01/2018 1235    Admission Weight  92.5 kg (204 lb) Documented at 03/01/2018 1235             I/O last 3 completed shifts:  In: 3100 [I.V.:3100]  Out: 2875 [Urine:2875]    Intake/Output Summary (Last 24 hours) at 03/03/18 0824  Last data filed at 03/03/18 0437   Gross per 24 hour   Intake             1000 ml   Output             1975 ml   Net             -975 ml       Physical Exam:  gen nad, alert  Neck supple no jvd  Lungs CTA bilat no rales  CV RRR  abd soft NT/ND  vasc no pedal edema 2+ radial pulses      Results Review:      Results from last 7 days  Lab Units 03/03/18  0559 03/02/18  0610 03/01/18  1257   SODIUM mmol/L 145 145 146*   POTASSIUM mmol/L 4.2 4.7 4.7   CHLORIDE mmol/L 105 106 103   CO2 mmol/L 29.0 26.0 30.0   BUN mg/dL 47* 43* 41*   CREATININE mg/dL 5.70* 5.90* 6.10*   CALCIUM mg/dL 8.7 8.8 9.4   BILIRUBIN mg/dL  --   --  0.7   ALK PHOS U/L  --   --  58   ALT (SGPT) U/L  --   --  33   AST (SGOT) U/L  --   --  28   GLUCOSE mg/dL 112* 112* 130*       Estimated Creatinine Clearance: 13.2 mL/min (by C-G formula based on Cr of 5.7).      Results from last 7 days  Lab Units 03/03/18  0559 03/02/18  0610   MAGNESIUM mg/dL 2.2 2.3   PHOSPHORUS mg/dL 5.4*  --                Results from last 7 days  Lab Units 03/03/18  0559 03/02/18  0610 03/01/18  1257   WBC 10*3/mm3 5.14 6.55 6.35   HEMOGLOBIN g/dL 14.1 15.1 15.9   PLATELETS 10*3/mm3 122* 121* 137         Results from last 7 days  Lab Units 03/03/18  0559 03/02/18  0610 03/01/18  1257 03/01/18  1105   INR  3.09* 2.31* 2.36* 2.40*         Imaging Results (last 24 hours)     Procedure Component Value Units Date/Time    US Renal Limited [[id number]] Collected:  03/02/18 1116     Updated:  03/02/18 1118    Narrative:       PROCEDURE: US RENAL LIMITED-     HISTORY: renal failure; N13.9-Obstructive and reflux uropathy,  unspecified     FINDINGS: Kidneys show a " normal echo pattern.    .  Right kidney  measures 11.1 cm in length. Left kidney measures 10.4 cm in length.   Size is normal.     No mass or cyst is seen.No obstructive changes are present.          Impression:       Normal renal ultrasound.        This report was finalized on 3/2/2018 11:16 AM by Fran Rojas MD.          aspirin 81 mg Oral Daily   atorvastatin 20 mg Oral Nightly   budesonide-formoterol 2 puff Inhalation BID - RT   docusate sodium 100 mg Oral BID   finasteride 5 mg Oral Daily   FLUoxetine 20 mg Oral Daily   insulin aspart 0-9 Units Subcutaneous 4x Daily AC & at Bedtime   ipratropium-albuterol 3 mL Nebulization Q6H - RT   isosorbide mononitrate 30 mg Oral Q24H   metoprolol succinate  mg Oral Daily   pantoprazole 40 mg Oral QAM   tamsulosin 0.4 mg Oral Daily   traZODone 50 mg Oral Nightly   warfarin 5 mg Oral Daily       Pharmacy to dose warfarin     sodium chloride 125 mL/hr Last Rate: 125 mL/hr (03/02/18 1528)       Medication Review:   Current Facility-Administered Medications   Medication Dose Route Frequency Provider Last Rate Last Dose   • acetaminophen (TYLENOL) tablet 650 mg  650 mg Oral Q4H PRN Dc Velazco, DO       • aspirin EC tablet 81 mg  81 mg Oral Daily Dc Velazco, DO   81 mg at 03/03/18 0823   • atorvastatin (LIPITOR) tablet 20 mg  20 mg Oral Nightly Dc Velazco, DO   20 mg at 03/02/18 2133   • bisacodyl (DULCOLAX) suppository 10 mg  10 mg Rectal Daily PRN Dc Velazco, DO       • budesonide-formoterol (SYMBICORT) 160-4.5 MCG/ACT inhaler 2 puff  2 puff Inhalation BID - RT Dc Velazco, DO   2 puff at 03/03/18 0649   • dextrose (D50W) solution 25 g  25 g Intravenous Q15 Min PRN Dc Velazco, DO       • dextrose (GLUTOSE) oral gel 1 tube  1 tube Oral Q15 Min PRN Dc Velazco, DO       • docusate sodium (COLACE) capsule 100 mg  100 mg Oral BID Dc Velazco, DO   100 mg at 03/03/18 0823   • finasteride (PROSCAR) tablet 5 mg  5 mg  Oral Daily Dc Velazco DO   5 mg at 03/03/18 0823   • FLUoxetine (PROzac) capsule 20 mg  20 mg Oral Daily Dc Velazco DO   20 mg at 03/03/18 0823   • glucagon (human recombinant) (GLUCAGEN DIAGNOSTIC) injection 1 mg  1 mg Subcutaneous PRN Dc Velazco DO       • insulin aspart (novoLOG) injection 0-9 Units  0-9 Units Subcutaneous 4x Daily AC & at Bedtime Dc Velazco DO       • ipratropium-albuterol (DUO-NEB) nebulizer solution 3 mL  3 mL Nebulization Q6H - RT Dc Velazco DO   3 mL at 03/03/18 0649   • isosorbide mononitrate (IMDUR) 24 hr tablet 30 mg  30 mg Oral Q24H Dc Velazco DO   30 mg at 03/03/18 0822   • metoprolol succinate XL (TOPROL-XL) 24 hr tablet 100 mg  100 mg Oral Daily Dc Velazco DO   100 mg at 03/03/18 0823   • nitroglycerin (NITROSTAT) SL tablet 0.4 mg  0.4 mg Sublingual Q5 Min PRN Dc Velazco DO       • ondansetron (ZOFRAN) injection 4 mg  4 mg Intravenous Q6H PRN Dc Velazco DO       • oxyCODONE-acetaminophen (PERCOCET)  MG per tablet 1 tablet  1 tablet Oral Q6H PRN Dc Velazco DO   1 tablet at 03/03/18 0514   • pantoprazole (PROTONIX) EC tablet 40 mg  40 mg Oral QAM Dc Velazco DO   40 mg at 03/03/18 0700   • Pharmacy to dose warfarin   Does not apply Continuous PRN Dc Velazco DO       • sodium chloride 0.45 % infusion  125 mL/hr Intravenous Continuous Chace Vasquez  mL/hr at 03/02/18 1528 125 mL/hr at 03/02/18 1528   • sodium chloride 0.9 % flush 1-10 mL  1-10 mL Intravenous PRN Dc Velazco DO       • sodium chloride 0.9 % flush 10 mL  10 mL Intravenous PRN Ronny Cardona MD       • sodium chloride 0.9 % flush 10 mL  10 mL Intravenous PRN BROOKE Coe       • tamsulosin (FLOMAX) 24 hr capsule 0.4 mg  0.4 mg Oral Daily Dc Velazco DO   0.4 mg at 03/03/18 0823   • traZODone (DESYREL) tablet 50 mg  50 mg Oral Nightly Dc Velazco DO   50 mg at  18 2133   • warfarin (COUMADIN) tablet 5 mg  5 mg Oral Daily Dc Velazco, DO   5 mg at 18 1700       Assessment/Plan   31. Acute kidney injury - ATN, multifactorial; Cr trending down slowly 6.1 - 5.9 - 5.7, BUN up; Lytes/Volume stable  32. Chronic kidney disease stage III  33. Essential hypertension  34. Paroxysmal atrial fibrillation  35. Chronic coronary artery disease  36. Obstructive sleep apnea of adult  37. Type 2 diabetes mellitus, without long-term current use of insulin  38. BPH (benign prostatic hypertrophy) with urinary retention  39. H/o urinary retention   40. Pacemaker/AICD    Plan  - no indication for HD  - may need to replace cotton if does not void by 5PM, d/w RN  - cont flomax  - d/c IVF     Active Problems:    Pacemaker    Chronic kidney disease    Essential hypertension    Paroxysmal atrial fibrillation    Chronic coronary artery disease    Obstructive sleep apnea of adult    Type 2 diabetes mellitus, without long-term current use of insulin    BPH (benign prostatic hypertrophy) with urinary retention    Obstructive uropathy              Ronny Walker MD  18  8:24 AM       Electronically signed by Ronny Walker MD at 3/3/2018  1:54 PM      Raj Sullivan MD at 3/3/2018 11:57 AM  Version 1 of 1               [facility]    Name:  Robe Bryant   Age:  67 y.o.  Sex:  male  :  1950  MRN:  [id number]   Visit Number:  [id number]  Admission Date:  3/1/2018  Date Of Service:  18  Primary Care Physician:  Deanna Wheeler MD     LOS: 2 days :  Patient Care Team:  Deanna Wheeler MD as PCP - General (Family Medicine)  Deanna Wheeler MD as PCP - Family Medicine  Arelis Tucker MD as Consulting Physician (Cardiology)  Osiel Heaton MD as Consulting Physician (Urology)  Chace Vasquez MD as Consulting Physician (Nephrology)  Blayne Whitman MD as Consulting Physician (Ophthalmology)  Jose CASE  MD Delvin as Consulting Physician (Pulmonary Disease)  John Solorzano MD as Consulting Physician (Urology)  Deanna Wheeler MD as Referring Physician (Family Medicine):      Subjective / Interval History:     59-year-old patient who has been admitted because of her acute renal failure due to obstructive uropathy.  His chart has been reviewed and events noted.  Patient has been having a CK D stage III but has got worse because of  BPH and obstructive uropathy.  He also had the encephalopathy with the lethargy which has resolved.  Patient has been taken off the nephrotoxic drugs and is being hydrated.    Today the patient is feeling the little better.  Though he is clinically depressed and behavioral counseling has been done but they would do not be able to see the patient until discharge.  Patient has been on now catheter and the Flomax has been given and a voiding trial will be done.      Vital Signs:    Temp:  [97.5 °F (36.4 °C)-98.7 °F (37.1 °C)] 98.4 °F (36.9 °C)  Heart Rate:  [60-68] 61  Resp:  [16-18] 16  BP: (129-167)/(72-88) 156/79    Intake and output:    I/O last 3 completed shifts:  In: 3100 [I.V.:3100]  Out: 2875 [Urine:2875]  I/O this shift:  In: 240 [P.O.:240]  Out: 600 [Urine:600]    Physical Examination:    General Appearance:    Alert and cooperative, not in any acute distress.   Head:    Atraumatic and normocephalic, without obvious abnormality.   Eyes:            PERRLA,  No pallor. Extra-occular movements are within normal limits.   Neck:   Supple,  No lymphglands, no bruit   Lungs:     Chest shape is normal. Breath sounds heard bilaterally equally.  No crackles or wheezing.     Heart:    Normal S1 and S2, no murmur,  No JVD   Abdomen:     Normal bowel sounds, no masses, no organomegaly. Soft        non-tender, no guarding, no rebound                tenderness   Extremities:   Moves all extremities well, no edema, no cyanosis,    Skin:   No  bruising or rash.   Neurologic:   Grossly non focal  and moves all extrimities equally.    Laboratory results:    Results from last 7 days  Lab Units 03/03/18  0559 03/02/18  0610 03/01/18  1257   SODIUM mmol/L 145 145 146*   POTASSIUM mmol/L 4.2 4.7 4.7   CHLORIDE mmol/L 105 106 103   CO2 mmol/L 29.0 26.0 30.0   BUN mg/dL 47* 43* 41*   CREATININE mg/dL 5.70* 5.90* 6.10*   CALCIUM mg/dL 8.7 8.8 9.4   BILIRUBIN mg/dL  --   --  0.7   ALK PHOS U/L  --   --  58   ALT (SGPT) U/L  --   --  33   AST (SGOT) U/L  --   --  28   GLUCOSE mg/dL 112* 112* 130*       Results from last 7 days  Lab Units 03/03/18  0559 03/02/18  0610 03/01/18  1257   WBC 10*3/mm3 5.14 6.55 6.35   HEMOGLOBIN g/dL 14.1 15.1 15.9   HEMATOCRIT % 43.0 46.5 47.3   PLATELETS 10*3/mm3 122* 121* 137       Results from last 7 days  Lab Units 03/03/18  0559 03/02/18  0610 03/01/18  1257   INR  3.09* 2.31* 2.36*       Results from last 7 days  Lab Units 03/02/18  1154 03/02/18  0610 03/01/18  2350   TROPONIN I ng/mL <0.012 <0.012 0.012           Radiology results:    Imaging Results (last 24 hours)     ** No results found for the last 24 hours. **          I have reviewed the patient's radiology reports.    Medication Review:     I have reviewed the patients active and prn medications.     Assessment:    Principal Problem:    Obstructive uropathy  Active Problems:    Chronic kidney disease    Pacemaker    Essential hypertension    Paroxysmal atrial fibrillation    Chronic coronary artery disease    Obstructive sleep apnea of adult    Type 2 diabetes mellitus, without long-term current use of insulin    Osteoarthritis of multiple joints    Moderate episode of recurrent major depressive disorder    BPH (benign prostatic hypertrophy) with urinary retention          Plan:    Patient with acute on chronic renal failure due to obstructive uropathy.  The continue with the catheter and the IV fluids and see slowly if his renal function Improving.  Also His Renal Ultrasound Was Unremarkable and Is Been Taken off the  Nephrotoxic Drugs and Would Do Need to Continue Different Medicine for His Hypertension.  His Blood Pressure Has Been Fluctuating Has Been on the Higher Range and so Will Start with Norvasc 5 Mg Daily along with the Beta Blocker.  Follow-Up Labs to Be Done and the Will Hold the Neurontin and Baclofen along with the Ace Inhibitors and Metformin.    Raj Sullivan MD  18  11:58 AM      Please note that portions of this note may have been completed with a voice recognition program. Efforts were made to edit the dictations, but occasionally words are mistranscribed.       Electronically signed by Raj Sullivan MD at 3/3/2018 12:01 PM      Raj Sullivan MD at 3/4/2018  9:59 AM  Version 1 of 1               [facility]    Name:  Robe Bryant   Age:  67 y.o.  Sex:  male  :  1950  MRN:  [id number]   Visit Number:  [id number]  Admission Date:  3/1/2018  Date Of Service:  18  Primary Care Physician:  Deanna Wheeler MD     LOS: 3 days :  Patient Care Team:  Deanna Wheeler MD as PCP - General (Family Medicine)  Deanna Wheeler MD as PCP - Family Medicine  Arelis Tucker MD as Consulting Physician (Cardiology)  Osiel Heaton MD as Consulting Physician (Urology)  Chace Vasquez MD as Consulting Physician (Nephrology)  Blayne Whitman MD as Consulting Physician (Ophthalmology)  Jose Hargrove MD as Consulting Physician (Pulmonary Disease)  John Solorzano MD as Consulting Physician (Urology)  Deanna Wheeler MD as Referring Physician (Family Medicine):      Subjective / Interval History:     59-year-old patient who has been admitted because of her acute renal failure due to obstructive uropathy.  His chart has been reviewed and events noted.  Patient has been having a CK D stage III but has got worse because of  BPH and obstructive uropathy.  He also had the encephalopathy with the lethargy which has resolved.  Patient has been taken off the  nephrotoxic drugs and is being hydrated.    Today the patient is feeling the little better.  Though he is clinically depressed and behavioral counseling has been done but they would do not be able to see the patient until discharge.  Patient has been taken off the catheter yesterday and he has voided  But  the creatinine has not changed and is still 5.7.      Vital Signs:    Temp:  [97.3 °F (36.3 °C)-98.2 °F (36.8 °C)] 97.9 °F (36.6 °C)  Heart Rate:  [60-79] 67  Resp:  [18] 18  BP: (142-169)/(74-90) 163/90    Intake and output:    I/O last 3 completed shifts:  In: 720 [P.O.:720]  Out: 3600 [Urine:3600]  I/O this shift:  In: 360 [P.O.:360]  Out: 450 [Urine:450]    Physical Examination:    General Appearance:    Alert and cooperative, not in any acute distress.   Head:    Atraumatic and normocephalic, without obvious abnormality.   Eyes:            PERRLA,  No pallor. Extra-occular movements are within normal limits.   Neck:   Supple,  No lymphglands, no bruit   Lungs:     Chest shape is normal. Breath sounds heard bilaterally equally.  No crackles or wheezing.     Heart:    Normal S1 and S2, no murmur,  No JVD   Abdomen:     Normal bowel sounds, no masses, no organomegaly. Soft        non-tender, no guarding, no rebound                tenderness   Extremities:   Moves all extremities well, no edema, no cyanosis,    Skin:   No  bruising or rash.   Neurologic:   Grossly non focal and moves all extrimities equally.    Laboratory results:    Results from last 7 days  Lab Units 03/04/18  0509 03/03/18  0559 03/02/18  0610 03/01/18  1257   SODIUM mmol/L 146* 145 145 146*   POTASSIUM mmol/L 4.0 4.2 4.7 4.7   CHLORIDE mmol/L 104 105 106 103   CO2 mmol/L 27.0 29.0 26.0 30.0   BUN mg/dL 51* 47* 43* 41*   CREATININE mg/dL 5.70* 5.70* 5.90* 6.10*   CALCIUM mg/dL 8.8 8.7 8.8 9.4   BILIRUBIN mg/dL  --   --   --  0.7   ALK PHOS U/L  --   --   --  58   ALT (SGPT) U/L  --   --   --  33   AST (SGOT) U/L  --   --   --  28   GLUCOSE  mg/dL 110* 112* 112* 130*       Results from last 7 days  Lab Units 03/04/18  0509 03/03/18  0559 03/02/18  0610   WBC 10*3/mm3 5.01 5.14 6.55   HEMOGLOBIN g/dL 13.0* 14.1 15.1   HEMATOCRIT % 39.9* 43.0 46.5   PLATELETS 10*3/mm3 127* 122* 121*       Results from last 7 days  Lab Units 03/04/18  0509 03/03/18  0559 03/02/18  0610   INR  2.75* 3.09* 2.31*       Results from last 7 days  Lab Units 03/02/18  1154 03/02/18  0610 03/01/18  2350   TROPONIN I ng/mL <0.012 <0.012 0.012           Radiology results:    Imaging Results (last 24 hours)     ** No results found for the last 24 hours. **          I have reviewed the patient's radiology reports.    Medication Review:     I have reviewed the patients active and prn medications.     Assessment:    Principal Problem:    Obstructive uropathy  Active Problems:    Chronic kidney disease    Pacemaker    Essential hypertension    Paroxysmal atrial fibrillation    Chronic coronary artery disease    Obstructive sleep apnea of adult    Type 2 diabetes mellitus, without long-term current use of insulin    Osteoarthritis of multiple joints    Moderate episode of recurrent major depressive disorder    BPH (benign prostatic hypertrophy) with urinary retention          Plan:    Patient with acute on chronic renal failure due to obstructive uropathy.    Also His Renal Ultrasound Was Unremarkable and Is Been Taken off the Nephrotoxic Drugs and Would Do Need to Continue Different Medicine for His Hypertension.  His Blood Pressure Has Been Fluctuating Has Been on the Higher Range and so  with Norvasc 5 Mg Daily along with the Beta Blocker.  Follow-Up Labs to Be Done and the Will Hold the Neurontin and Baclofen along with the Ace Inhibitors and Metformin.  His renal function is stable but not still improving as we would want it.  We'll repeat bladder scan today and the continue with hydration and an closely follow the labs in morning.  If stable he could be discharged in the next 2449  "hrs. and further workup can be done as an outpatient.  Raj Sullivan MD  03/04/18  9:59 AM      Please note that portions of this note may have been completed with a voice recognition program. Efforts were made to edit the dictations, but occasionally words are mistranscribed.       Electronically signed by Raj Sullivan MD at 3/4/2018 10:05 AM      Ronny Walker MD at 3/4/2018 12:36 PM  Version 2 of 2            LOS: 3 days    Patient Care Team:  Deanna Wheeler MD as PCP - General (Family Medicine)  Deanna Wheeler MD as PCP - Family Medicine  Arelis Tucker MD as Consulting Physician (Cardiology)  Osiel Heaton MD as Consulting Physician (Urology)  Chace Vasquez MD as Consulting Physician (Nephrology)  Blayne Whitman MD as Consulting Physician (Ophthalmology)  Jose Hargrove MD as Consulting Physician (Pulmonary Disease)  John Solorzano MD as Consulting Physician (Urology)  Deanna Wheeler MD as Referring Physician (Family Medicine)    Chief Complaint:    Chief Complaint   Patient presents with   • Altered Mental Status   • Pain     all over body     Follow UP JACQUE  Subjective     Interval History:     PVRs normal yest.  UOP 1.6L.  Denies dyspnea.  No n/v    Objective     Vital Signs  Temp:  [97.3 °F (36.3 °C)-98.2 °F (36.8 °C)] 98 °F (36.7 °C)  Heart Rate:  [60-79] 63  Resp:  [18] 18  BP: (142-173)/(74-90) 173/86    Flowsheet Rows         First Filed Value    Admission Height  165.1 cm (65\") Documented at 03/01/2018 1235    Admission Weight  92.5 kg (204 lb) Documented at 03/01/2018 1235          I/O this shift:  In: 360 [P.O.:360]  Out: 450 [Urine:450]  I/O last 3 completed shifts:  In: 720 [P.O.:720]  Out: 3600 [Urine:3600]    Intake/Output Summary (Last 24 hours) at 03/04/18 1236  Last data filed at 03/04/18 0813   Gross per 24 hour   Intake              840 ml   Output             1475 ml   Net             -635 ml       Physical Exam:  gen alert, no distress  Neck supple no " jvd  Lungs CTA bilat no rales  CV RRR   abd soft NT/ND  vasc no pedal edema, 2+ radial pulses      Results Review:      Results from last 7 days  Lab Units 03/04/18  0509 03/03/18  0559 03/02/18  0610 03/01/18  1257   SODIUM mmol/L 146* 145 145 146*   POTASSIUM mmol/L 4.0 4.2 4.7 4.7   CHLORIDE mmol/L 104 105 106 103   CO2 mmol/L 27.0 29.0 26.0 30.0   BUN mg/dL 51* 47* 43* 41*   CREATININE mg/dL 5.70* 5.70* 5.90* 6.10*   CALCIUM mg/dL 8.8 8.7 8.8 9.4   BILIRUBIN mg/dL  --   --   --  0.7   ALK PHOS U/L  --   --   --  58   ALT (SGPT) U/L  --   --   --  33   AST (SGOT) U/L  --   --   --  28   GLUCOSE mg/dL 110* 112* 112* 130*       Estimated Creatinine Clearance: 13 mL/min (by C-G formula based on Cr of 5.7).      Results from last 7 days  Lab Units 03/04/18  0509 03/03/18  0559 03/02/18  0610   MAGNESIUM mg/dL  --  2.2 2.3   PHOSPHORUS mg/dL 5.3* 5.4*  --                Results from last 7 days  Lab Units 03/04/18  0509 03/03/18  0559 03/02/18  0610 03/01/18  1257   WBC 10*3/mm3 5.01 5.14 6.55 6.35   HEMOGLOBIN g/dL 13.0* 14.1 15.1 15.9   PLATELETS 10*3/mm3 127* 122* 121* 137         Results from last 7 days  Lab Units 03/04/18  0509 03/03/18  0559 03/02/18  0610 03/01/18  1257 03/01/18  1105   INR  2.75* 3.09* 2.31* 2.36* 2.40*         Imaging Results (last 24 hours)     ** No results found for the last 24 hours. **          amLODIPine 5 mg Oral Q24H   aspirin 81 mg Oral Daily   atorvastatin 20 mg Oral Nightly   budesonide-formoterol 2 puff Inhalation BID - RT   docusate sodium 100 mg Oral BID   famotidine 20 mg Oral Daily   finasteride 5 mg Oral Daily   FLUoxetine 20 mg Oral Daily   insulin aspart 0-9 Units Subcutaneous 4x Daily AC & at Bedtime   ipratropium-albuterol 3 mL Nebulization Q6H - RT   isosorbide mononitrate 30 mg Oral Q24H   metoprolol succinate  mg Oral Daily   tamsulosin 0.4 mg Oral Daily   traZODone 50 mg Oral Nightly   warfarin 5 mg Oral Daily       Pharmacy to dose warfarin        Medication  Review:   Current Facility-Administered Medications   Medication Dose Route Frequency Provider Last Rate Last Dose   • acetaminophen (TYLENOL) tablet 650 mg  650 mg Oral Q4H PRN Dc Velazco, DO       • amLODIPine (NORVASC) tablet 5 mg  5 mg Oral Q24H Raj Sullivan MD   5 mg at 03/04/18 0816   • aspirin EC tablet 81 mg  81 mg Oral Daily Dc Velazco, DO   81 mg at 03/04/18 0815   • atorvastatin (LIPITOR) tablet 20 mg  20 mg Oral Nightly Dc Velazco DO   20 mg at 03/03/18 2012   • bisacodyl (DULCOLAX) suppository 10 mg  10 mg Rectal Daily PRN Dc Velazco DO       • budesonide-formoterol (SYMBICORT) 160-4.5 MCG/ACT inhaler 2 puff  2 puff Inhalation BID - RT Dc Velazco DO   2 puff at 03/04/18 0713   • dextrose (D50W) solution 25 g  25 g Intravenous Q15 Min PRN Dc Velazco, DO       • dextrose (GLUTOSE) oral gel 1 tube  1 tube Oral Q15 Min PRN Dc Velazco DO       • docusate sodium (COLACE) capsule 100 mg  100 mg Oral BID Dc Velazco DO   100 mg at 03/04/18 0815   • famotidine (PEPCID) tablet 20 mg  20 mg Oral Daily Ronny Walker MD   20 mg at 03/04/18 0816   • finasteride (PROSCAR) tablet 5 mg  5 mg Oral Daily Dc Velazco DO   5 mg at 03/04/18 0815   • FLUoxetine (PROzac) capsule 20 mg  20 mg Oral Daily Dc Velazco DO   20 mg at 03/04/18 0815   • glucagon (human recombinant) (GLUCAGEN DIAGNOSTIC) injection 1 mg  1 mg Subcutaneous PRN Dc Velazco DO       • insulin aspart (novoLOG) injection 0-9 Units  0-9 Units Subcutaneous 4x Daily AC & at Bedtime Dc Velazco DO       • ipratropium-albuterol (DUO-NEB) nebulizer solution 3 mL  3 mL Nebulization Q6H - RT Dc Velazco DO   3 mL at 03/04/18 0714   • isosorbide mononitrate (IMDUR) 24 hr tablet 30 mg  30 mg Oral Q24H Dc Velazco DO   30 mg at 03/04/18 0815   • metoprolol succinate XL (TOPROL-XL) 24 hr tablet 100 mg  100 mg Oral Daily Dc Velazco DO    100 mg at 03/04/18 0815   • nitroglycerin (NITROSTAT) SL tablet 0.4 mg  0.4 mg Sublingual Q5 Min PRN Dc Velazco DO       • ondansetron (ZOFRAN) injection 4 mg  4 mg Intravenous Q6H PRN Dc Velazco DO       • oxyCODONE-acetaminophen (PERCOCET)  MG per tablet 1 tablet  1 tablet Oral Q6H PRN Dc Velazco DO   1 tablet at 03/03/18 2012   • Pharmacy to dose warfarin   Does not apply Continuous PRN Dc Velazco DO       • sodium chloride 0.9 % flush 1-10 mL  1-10 mL Intravenous PRN Dc Velazco DO       • sodium chloride 0.9 % flush 10 mL  10 mL Intravenous PRN Ronny Cardona MD       • sodium chloride 0.9 % flush 10 mL  10 mL Intravenous PRN BROOKE Coe       • tamsulosin (FLOMAX) 24 hr capsule 0.4 mg  0.4 mg Oral Daily Dc Velazco DO   0.4 mg at 03/04/18 0816   • traZODone (DESYREL) tablet 50 mg  50 mg Oral Nightly Dc Velazco DO   50 mg at 03/03/18 2012   • warfarin (COUMADIN) tablet 5 mg  5 mg Oral Daily Dc Velazco DO           Assessment/Plan   41. Acute kidney injury - ATN, multifactorial; Cr trending down slowly 6.1 - 5.9 - 5.7 - 5.7, stagnant today; Lytes/Volume stable -- mild hypernatremia (146)  42. Chronic kidney disease stage III - BL Cr 1.3 as of 1/11/18  43. Essential hypertension - BP high, norvasc added yest  44. Paroxysmal atrial fibrillation  45. Chronic coronary artery disease  46. Obstructive sleep apnea of adult  47. Type 2 diabetes mellitus, without long-term current use of insulin  48. BPH (benign prostatic hypertrophy) with urinary retention  49. H/o urinary retention - cotton removed 3/3/18 and voiding w/o difficulty, normal PVRs  50. Pacemaker/AICD  51. Thrombocytopenia - stable, no worse after switching PPI to pecid (due to JACQUE)  52. GERD - switched PPI to pepcid     Plan  - encourage PO fluid intake to correct sodium (but no IVF needed)  - no objection to discharge in next 1-2 days but will need close  "follow-up: BMP within 1 week and f/u Dr Vasquez soon thereafter (left lab Rx at RN station and gave pt contact information to arrange follow-up appt)    Principal Problem:    Obstructive uropathy  Active Problems:    Pacemaker    Chronic kidney disease    Essential hypertension    Paroxysmal atrial fibrillation    Chronic coronary artery disease    Obstructive sleep apnea of adult    Type 2 diabetes mellitus, without long-term current use of insulin    Osteoarthritis of multiple joints    Moderate episode of recurrent major depressive disorder    BPH (benign prostatic hypertrophy) with urinary retention              Ronny Walker MD  03/04/18  12:36 PM       Electronically signed by Ronny Walker MD at 3/4/2018 12:58 PM      Ronny Walker MD at 3/4/2018 12:36 PM  Version 1 of 2            LOS: 3 days    Patient Care Team:  Deanna Wheeler MD as PCP - General (Family Medicine)  Deanna Wheeler MD as PCP - Family Medicine  Arelis Tucker MD as Consulting Physician (Cardiology)  Osiel Heaton MD as Consulting Physician (Urology)  Chace Vasquez MD as Consulting Physician (Nephrology)  Blayne Whitman MD as Consulting Physician (Ophthalmology)  Jose Hargrove MD as Consulting Physician (Pulmonary Disease)  John Solorzano MD as Consulting Physician (Urology)  Deanna Wheeler MD as Referring Physician (Family Medicine)    Chief Complaint:    Chief Complaint   Patient presents with   • Altered Mental Status   • Pain     all over body     Follow UP JACQUE  Subjective     Interval History:     PVRs normal yest.  UOP 1.6L.  Denies dyspnea.  No n/v    Objective     Vital Signs  Temp:  [97.3 °F (36.3 °C)-98.2 °F (36.8 °C)] 98 °F (36.7 °C)  Heart Rate:  [60-79] 63  Resp:  [18] 18  BP: (142-173)/(74-90) 173/86    Flowsheet Rows         First Filed Value    Admission Height  165.1 cm (65\") Documented at 03/01/2018 1235    Admission Weight  92.5 kg (204 lb) Documented at 03/01/2018 1235 "          I/O this shift:  In: 360 [P.O.:360]  Out: 450 [Urine:450]  I/O last 3 completed shifts:  In: 720 [P.O.:720]  Out: 3600 [Urine:3600]    Intake/Output Summary (Last 24 hours) at 03/04/18 1236  Last data filed at 03/04/18 0813   Gross per 24 hour   Intake              840 ml   Output             1475 ml   Net             -635 ml       Physical Exam:  gen alert, no distress  Neck supple no jvd  Lungs CTA bilat no rales  CV RRR   abd soft NT/ND  vasc no pedal edema, 2+ radial pulses      Results Review:      Results from last 7 days  Lab Units 03/04/18  0509 03/03/18  0559 03/02/18  0610 03/01/18  1257   SODIUM mmol/L 146* 145 145 146*   POTASSIUM mmol/L 4.0 4.2 4.7 4.7   CHLORIDE mmol/L 104 105 106 103   CO2 mmol/L 27.0 29.0 26.0 30.0   BUN mg/dL 51* 47* 43* 41*   CREATININE mg/dL 5.70* 5.70* 5.90* 6.10*   CALCIUM mg/dL 8.8 8.7 8.8 9.4   BILIRUBIN mg/dL  --   --   --  0.7   ALK PHOS U/L  --   --   --  58   ALT (SGPT) U/L  --   --   --  33   AST (SGOT) U/L  --   --   --  28   GLUCOSE mg/dL 110* 112* 112* 130*       Estimated Creatinine Clearance: 13 mL/min (by C-G formula based on Cr of 5.7).      Results from last 7 days  Lab Units 03/04/18  0509 03/03/18  0559 03/02/18  0610   MAGNESIUM mg/dL  --  2.2 2.3   PHOSPHORUS mg/dL 5.3* 5.4*  --                Results from last 7 days  Lab Units 03/04/18  0509 03/03/18  0559 03/02/18  0610 03/01/18  1257   WBC 10*3/mm3 5.01 5.14 6.55 6.35   HEMOGLOBIN g/dL 13.0* 14.1 15.1 15.9   PLATELETS 10*3/mm3 127* 122* 121* 137         Results from last 7 days  Lab Units 03/04/18  0509 03/03/18  0559 03/02/18  0610 03/01/18  1257 03/01/18  1105   INR  2.75* 3.09* 2.31* 2.36* 2.40*         Imaging Results (last 24 hours)     ** No results found for the last 24 hours. **          amLODIPine 5 mg Oral Q24H   aspirin 81 mg Oral Daily   atorvastatin 20 mg Oral Nightly   budesonide-formoterol 2 puff Inhalation BID - RT   docusate sodium 100 mg Oral BID   famotidine 20 mg Oral Daily    finasteride 5 mg Oral Daily   FLUoxetine 20 mg Oral Daily   insulin aspart 0-9 Units Subcutaneous 4x Daily AC & at Bedtime   ipratropium-albuterol 3 mL Nebulization Q6H - RT   isosorbide mononitrate 30 mg Oral Q24H   metoprolol succinate  mg Oral Daily   tamsulosin 0.4 mg Oral Daily   traZODone 50 mg Oral Nightly   warfarin 5 mg Oral Daily       Pharmacy to dose warfarin        Medication Review:   Current Facility-Administered Medications   Medication Dose Route Frequency Provider Last Rate Last Dose   • acetaminophen (TYLENOL) tablet 650 mg  650 mg Oral Q4H PRN Dc Velazco, DO       • amLODIPine (NORVASC) tablet 5 mg  5 mg Oral Q24H Raj Sullivan MD   5 mg at 03/04/18 0816   • aspirin EC tablet 81 mg  81 mg Oral Daily Dc Velazco, DO   81 mg at 03/04/18 0815   • atorvastatin (LIPITOR) tablet 20 mg  20 mg Oral Nightly Dc Velazco, DO   20 mg at 03/03/18 2012   • bisacodyl (DULCOLAX) suppository 10 mg  10 mg Rectal Daily PRN Dc Velazco, DO       • budesonide-formoterol (SYMBICORT) 160-4.5 MCG/ACT inhaler 2 puff  2 puff Inhalation BID - RT Dc Velazco DO   2 puff at 03/04/18 0713   • dextrose (D50W) solution 25 g  25 g Intravenous Q15 Min PRN Dc Velazco, DO       • dextrose (GLUTOSE) oral gel 1 tube  1 tube Oral Q15 Min PRN Dc Velazco DO       • docusate sodium (COLACE) capsule 100 mg  100 mg Oral BID Dc Velazco DO   100 mg at 03/04/18 0815   • famotidine (PEPCID) tablet 20 mg  20 mg Oral Daily Ronny Walker MD   20 mg at 03/04/18 0816   • finasteride (PROSCAR) tablet 5 mg  5 mg Oral Daily Dc Velazco, DO   5 mg at 03/04/18 0815   • FLUoxetine (PROzac) capsule 20 mg  20 mg Oral Daily Dc Velazco, DO   20 mg at 03/04/18 0815   • glucagon (human recombinant) (GLUCAGEN DIAGNOSTIC) injection 1 mg  1 mg Subcutaneous PRN Dc Velazco,        • insulin aspart (novoLOG) injection 0-9 Units  0-9 Units Subcutaneous 4x  Daily AC & at Bedtime Dc Velazco DO       • ipratropium-albuterol (DUO-NEB) nebulizer solution 3 mL  3 mL Nebulization Q6H - RT Dc Velazco DO   3 mL at 03/04/18 0714   • isosorbide mononitrate (IMDUR) 24 hr tablet 30 mg  30 mg Oral Q24H Dc Velazco DO   30 mg at 03/04/18 0815   • metoprolol succinate XL (TOPROL-XL) 24 hr tablet 100 mg  100 mg Oral Daily Dc Velazco DO   100 mg at 03/04/18 0815   • nitroglycerin (NITROSTAT) SL tablet 0.4 mg  0.4 mg Sublingual Q5 Min PRN Dc Velazco DO       • ondansetron (ZOFRAN) injection 4 mg  4 mg Intravenous Q6H PRN Dc Velazco DO       • oxyCODONE-acetaminophen (PERCOCET)  MG per tablet 1 tablet  1 tablet Oral Q6H PRN Dc Velazco DO   1 tablet at 03/03/18 2012   • Pharmacy to dose warfarin   Does not apply Continuous PRN Dc Velazco DO       • sodium chloride 0.9 % flush 1-10 mL  1-10 mL Intravenous PRN Dc Velazco DO       • sodium chloride 0.9 % flush 10 mL  10 mL Intravenous PRN Ronny Cardona MD       • sodium chloride 0.9 % flush 10 mL  10 mL Intravenous PRN BROOKE Coe       • tamsulosin (FLOMAX) 24 hr capsule 0.4 mg  0.4 mg Oral Daily Dc Velazco DO   0.4 mg at 03/04/18 0816   • traZODone (DESYREL) tablet 50 mg  50 mg Oral Nightly Dc Velazco DO   50 mg at 03/03/18 2012   • warfarin (COUMADIN) tablet 5 mg  5 mg Oral Daily Dc Velazco DO           Assessment/Plan   53. Acute kidney injury - ATN, multifactorial; Cr trending down slowly 6.1 - 5.9 - 5.7 - 5.7, stagnant today; Lytes/Volume stable -- mild hypernatremia (146)  54. Chronic kidney disease stage III - BL Cr 1.3 as of 1/11/18  55. Essential hypertension - BP high, norvasc added yest  56. Paroxysmal atrial fibrillation  57. Chronic coronary artery disease  58. Obstructive sleep apnea of adult  59. Type 2 diabetes mellitus, without long-term current use of insulin  60. BPH (benign prostatic  hypertrophy) with urinary retention  61. H/o urinary retention - cotton removed 3/3/18 and voiding w/o difficulty, normal PVRs  62. Pacemaker/AICD    Plan  - encourage PO fluid intake to correct sodium (but no IVF needed)  - no objection to discharge in next 1-2 days but will need close follow-up: BMP within 1 week and f/u Dr Vasquez soon thereafter (left lab Rx at RN station and gave pt contact information to arrange follow-up appt)    Principal Problem:    Obstructive uropathy  Active Problems:    Pacemaker    Chronic kidney disease    Essential hypertension    Paroxysmal atrial fibrillation    Chronic coronary artery disease    Obstructive sleep apnea of adult    Type 2 diabetes mellitus, without long-term current use of insulin    Osteoarthritis of multiple joints    Moderate episode of recurrent major depressive disorder    BPH (benign prostatic hypertrophy) with urinary retention              Ronny Walker MD  03/04/18  12:36 PM       Electronically signed by Ronny Walker MD at 3/4/2018 12:56 PM           Physical Therapy Notes (last 24 hours) (Notes from 3/4/2018  8:22 AM through 3/5/2018  8:22 AM)      Ariadna Gustafson, PT at 3/4/2018  7:05 PM  Version 1 of 1         Problem: Patient Care Overview (Adult)  Goal: Plan of Care Review  Outcome: Ongoing (interventions implemented as appropriate)   03/04/18 0113   Coping/Psychosocial Response Interventions   Plan Of Care Reviewed With patient   Outcome Evaluation   Outcome Summary/Follow up Plan Patient is able to perform mobility tasks with no more than CGA. He is able to demonstrate gait with no assistive device, with several path deviaitons and LOB that require stepping strategy to recover. Plan to continue PT while hospitalized and upon discharge to home with home health care services.   Patient Care Overview   Progress improving       Problem: Inpatient Physical Therapy  Goal: Transfer Training Goal 1 LTG- PT  Outcome: Ongoing (interventions  implemented as appropriate)   18 1105 18 1050 18 185   Transfer Training PT LTG   Transfer Training PT LTG, Date Established 18 --  --    Transfer Training PT LTG, Time to Achieve 2 wks --  --    Transfer Training PT LTG, Activity Type sit to stand/stand to sit;bed to chair /chair to bed --  --    Transfer Training PT LTG, [place] Level contact guard assist --  --    Transfer Training PT LTG, Assist Device walker, rolling --  --    Transfer Training PT LTG, Date Goal Reviewed --  18 --    Transfer Training PT LTG, Outcome --  --  goal ongoing     Goal: Gait Training Goal LTG- PT  Outcome: Ongoing (interventions implemented as appropriate)   18 1105 18 1050 18 185   Gait Training PT LTG   Gait Training Goal PT LTG, Date Established 18 --  --    Gait Training Goal PT LTG, Time to Achieve 2 wks --  --    Gait Training Goal PT LTG, [place] Level contact guard assist --  --    Gait Training Goal PT LTG, Assist Device walker, rolling --  --    Gait Training Goal PT LTG, Distance to Achieve 100 --  --    Gait Training Goal PT LTG, Date Goal Reviewed --  18 --    Gait Training Goal PT LTG, Outcome --  --  goal ongoing     Goal: Strength Goal LTG- PT  Outcome: Ongoing (interventions implemented as appropriate)   18 1105 18 1050 18 185   Strength Goal PT LTG   Strength Goal PT LTG, Date Established 18 --  --    Strength Goal PT LTG, Time to Achieve 2 wks --  --    Strength Goal PT LTG, Measure to Achieve Patient will demonstrate independence with HEP. --  --    Strength Goal PT LTG, Date Goal Reviewed --  18 --    Strength Goal PT LTG, Outcome --  --  goal ongoing            Electronically signed by Ariadna Gustafson, PT at 3/4/2018  7:05 PM      Ariadna Gustafson, PT at 3/4/2018  7:05 PM  Version 1 of 1         Acute Care - Physical Therapy Treatment Note  MAYNOR Harrington     Patient Name: Robe Bryant  : 1950  MRN:  [id number]  Today's Date: 3/4/2018  Onset of Illness/Injury or Date of Surgery Date: 03/01/18  Date of Referral to PT: 03/01/18  Referring Physician: Dr. Velazco    Admit Date: 3/1/2018    Visit Dx:    ICD-10-CM ICD-9-CM   1. Obstructive uropathy N13.9 599.60   2. Impaired functional mobility, balance, gait, and endurance Z74.09 V49.89     Patient Active Problem List   Diagnosis   • Pacemaker   • Neck pain   • Chronic low back pain   • Chronic kidney disease   • Essential hypertension   • Seborrheic dermatitis   • Benign prostatic hyperplasia   • Paroxysmal atrial fibrillation   • Cervico-occipital neuralgia   • Hypercholesterolemia   • Acute erosive gastritis   • Diastolic heart failure   • Chronic obstructive pulmonary disease   • Chronic coronary artery disease   • Gastroesophageal reflux disease   • Chronic shoulder pain   • Obstructive sleep apnea of adult   • Dysphagia   • Symptomatic bradycardia   • Type 2 diabetes mellitus, without long-term current use of insulin   • Chronic chest pain   • Restless leg syndrome   • Warfarin anticoagulation   • Osteoarthritis of multiple joints   • Multilevel degenerative disc disease   • Moderate episode of recurrent major depressive disorder   • Chronic pain syndrome   • Syncope   • DDD (degenerative disc disease), cervical   • Postural dizziness with presyncope   • BPH (benign prostatic hypertrophy) with urinary retention   • Obstructive uropathy               Adult Rehabilitation Note       03/04/18 1243 03/03/18 1009       Rehab Assessment/Intervention    Discipline physical therapist  -JR physical therapy assistant  -CK     Document Type therapy note (daily note)  -JR therapy note (daily note)  -CK     Subjective Information agree to therapy;no complaints  -JR agree to therapy  -CK     Patient Effort, Rehab Treatment good  -JR good  -CK     Symptoms Noted During/After Treatment fatigue  -JR      Precautions/Limitations fall precautions  -JR      Patient Response to  Treatment Patient feels a bit unsteady, but pleased with the progress he has made  -JR      Recorded by [JR] Ariadna Gustafson, PT [CK] Janine Meyer PTA     Vital Signs    O2 Delivery Pre Treatment  supplemental O2  -CK     O2 Delivery Intra Treatment  supplemental O2  -CK     O2 Delivery Post Treatment  supplemental O2  -CK     Recorded by  [CK] Janine Meyer PTA     Pain Assessment    Pain Assessment No/denies pain  -JR      Recorded by [JR] Ariadna Gustafson, PT      Bed Mobility, Assessment/Treatment    Bed Mobility, Assistive Device head of bed elevated;bed rails  -JR bed rails;head of bed elevated  -CK     Bed Mob, Supine to Sit, [place] supervision required;verbal cues required  -JR supervision required;verbal cues required  -CK     Recorded by [JR] Ariadna Gustafson, PT [CK] Janine Meyer PTA     Transfer Assessment/Treatment    Transfers, Sit-Stand [place] contact guard assist  -JR contact guard assist  -CK     Transfers, Stand-Sit [place] contact guard assist  -JR contact guard assist  -CK     Transfers, Sit-Stand-Sit, Assist Device rolling walker  -JR rolling walker  -CK     Transfer, Safety Issues balance decreased during turns;step length decreased;sequencing ability decreased  -JR balance decreased during turns  -CK     Transfer, Impairments strength decreased;impaired balance  -JR strength decreased;impaired balance  -CK     Recorded by [JR] Ariadna Gustafson, PT [CK] Janine Meyer PTA     Gait Assessment/Treatment    Gait, [place] Level contact guard assist;verbal cues required  -JR contact guard assist;verbal cues required  -CK     Gait, Assistive Device rolling walker  -JR rolling walker  -CK     Gait, Distance (Feet) 220  -  -CK     Gait, Gait Pattern Analysis swing-through gait  -JR swing-through gait  -CK     Gait, Gait Deviations sulaiman decreased;stride width increased;decreased heel strike;forward flexed posture  -JR uslaiman decreased;limb motion velocity decreased  -CK      Gait, Safety Issues step length decreased;balance decreased during turns;sequencing ability decreased  -JR balance decreased during turns  -CK     Gait, Impairments strength decreased;impaired balance  -JR strength decreased;impaired balance  -CK     Recorded by [JR] Ariadna Gustafson, PT [CK] Janine Meyer PTA     Therapy Exercises    Bilateral Lower Extremities AROM:;15 reps;sitting;ankle pumps/circles;hip abduction/adduction;LAQ;hip flexion;SLR  -JR 10 reps;sitting;ankle pumps/circles;hip flexion;LAQ  -CK     Recorded by [JR] Ariadna Gustafson, PT [CK] Janine Meyer PTA     Positioning and Restraints    Pre-Treatment Position in bed  -JR in bed  -CK     Post Treatment Position chair  -JR chair  -CK     In Chair sitting;call light within reach;encouraged to call for assist  -JR sitting;call light within reach;encouraged to call for assist  -CK     Recorded by [JR] Ariadna Gustafson, PT [CK] Janine Meyer PTA       User Key  (r) = Recorded By, (t) = Taken By, (c) = Cosigned By    Initials Name Effective Dates    JR Ariadna Gustafson, PT 10/26/16 -     CK Janine Meyer PTA 10/26/16 -                 IP PT Goals       03/04/18 1858 03/03/18 1050 03/02/18 1105    Transfer Training PT LTG    Transfer Training PT LTG, Date Established   03/02/18  -LM    Transfer Training PT LTG, Time to Achieve   2 wks  -LM    Transfer Training PT LTG, Activity Type   sit to stand/stand to sit;bed to chair /chair to bed  -LM    Transfer Training PT LTG, [place] Level   contact guard assist  -LM    Transfer Training PT LTG, Assist Device   walker, rolling  -LM    Transfer Training PT  LTG, Date Goal Reviewed  03/03/18  -CK     Transfer Training PT LTG, Outcome goal ongoing  -JR  goal ongoing  -LM    Gait Training PT LTG    Gait Training Goal PT LTG, Date Established   03/02/18  -LM    Gait Training Goal PT LTG, Time to Achieve   2 wks  -LM    Gait Training Goal PT LTG, [place] Level   contact guard assist  -LM    Gait Training Goal PT LTG,  Assist Device   walker, rolling  -LM    Gait Training Goal PT LTG, Distance to Achieve   100  -LM    Gait Training Goal PT LTG, Date Goal Reviewed  03/03/18  -CK     Gait Training Goal PT LTG, Outcome goal ongoing  -JR goal partially met  -CK goal ongoing  -LM    Strength Goal PT LTG    Strength Goal PT LTG, Date Established   03/02/18  -LM    Strength Goal PT LTG, Time to Achieve   2 wks  -LM    Strength Goal PT LTG, Measure to Achieve   Patient will demonstrate independence with HEP.  -LM    Strength Goal PT LTG, Date Goal Reviewed  03/03/18  -CK     Strength Goal PT LTG, Outcome goal ongoing  -JR  goal ongoing  -LM      User Key  (r) = Recorded By, (t) = Taken By, (c) = Cosigned By    Initials Name Provider Type    JR Ariadna Gustafson, PT Physical Therapist    LM Shirley Mantilla, PT Physical Therapist    CK Janine Meyer, PTA Physical Therapy Assistant          Physical Therapy Education     Title: PT OT SLP Therapies (Active)     Topic: Physical Therapy (Done)     Point: Mobility training (Done)    Learning Progress Summary    Learner Readiness Method Response Comment Documented by Status   Patient Acceptance E VU Importance of balance activities to improve safety with mobility JR 03/04/18 1856 Done    Acceptance E VU  CK 03/03/18 1050 Done    Acceptance E VU Purpose of PT/POC; proper use of RW for safe transfers/ambulation. LM 03/02/18 1104 Done               Point: Home exercise program (Done)    Learning Progress Summary    Learner Readiness Method Response Comment Documented by Status   Patient Acceptance E VU  CK 03/03/18 1050 Done    Acceptance E VU Purpose of PT/POC; proper use of RW for safe transfers/ambulation. LM 03/02/18 1104 Done               Point: Precautions (Done)    Learning Progress Summary    Learner Readiness Method Response Comment Documented by Status   Patient Acceptance E VU  CK 03/03/18 1050 Done    Acceptance E VU Purpose of PT/POC; proper use of RW for safe transfers/ambulation.   03/02/18 1104 Done                      User Key     Initials Effective Dates Name Provider Type Discipline    JR 10/26/16 -  Ariadna Gustafson, PT Physical Therapist PT    LM 10/26/16 -  Shirley Mantilla, PT Physical Therapist PT    CK 10/26/16 -  Janine Meyer PTA Physical Therapy Assistant PT                    PT Recommendation and Plan  Anticipated Equipment Needs At Discharge: front wheeled walker  Anticipated Discharge Disposition: home with home health, home with assist  Planned Therapy Interventions: balance training, bed mobility training, gait training, home exercise program, patient/family education, strengthening, transfer training  PT Frequency: daily  Plan of Care Review  Plan Of Care Reviewed With: patient  Progress: improving  Outcome Summary/Follow up Plan: Patient is able to perform mobility tasks with no more than CGA.  He is able to demonstrate gait with no assistive device, with several path deviaitons and LOB that require stepping strategy to recover.  Plan to continue PT while hospitalized and upon discharge to home with home health care services.          Outcome Measures       03/04/18 1243 03/03/18 1009 03/02/18 1300    How much help from another person do you currently need...    Turning from your back to your side while in flat bed without using bedrails? 4  -JR 4  -CK     Moving from lying on back to sitting on the side of a flat bed without bedrails? 4  -JR 4  -CK     Moving to and from a bed to a chair (including a wheelchair)? 3  -JR 3  -CK     Standing up from a chair using your arms (e.g., wheelchair, bedside chair)? 3  -JR 3  -CK     Climbing 3-5 steps with a railing? 2  -JR 2  -CK     To walk in hospital room? 3  -JR 3  -CK     AM-PAC 6 Clicks Score 19  -JR 19  -CK     Functional Assessment    Outcome Measure Options AM-PAC 6 Clicks Basic Mobility (PT)  -JR AM-PAC 6 Clicks Basic Mobility (PT)  -CK AM-PAC 6 Clicks Daily Activity (OT)  -      03/02/18 0902 03/02/18 0856       How much  help from another person do you currently need...    Turning from your back to your side while in flat bed without using bedrails?  4  -LM     Moving from lying on back to sitting on the side of a flat bed without bedrails?  4  -LM     Moving to and from a bed to a chair (including a wheelchair)?  3  -LM     Standing up from a chair using your arms (e.g., wheelchair, bedside chair)?  3  -LM     Climbing 3-5 steps with a railing?  2  -LM     To walk in hospital room?  3  -LM     AM-PAC 6 Clicks Score  19  -LM     How much help from another is currently needed...    Putting on and taking off regular lower body clothing? 3  -AH      Bathing (including washing, rinsing, and drying) 3  -AH      Toileting (which includes using toilet bed pan or urinal) 3  -AH      Putting on and taking off regular upper body clothing 4  -AH      Taking care of personal grooming (such as brushing teeth) 4  -AH      Eating meals 4  -AH      Score 21  -AH      Functional Assessment    Outcome Measure Options AM-PAC 6 Clicks Daily Activity (OT)  - AM-PAC 6 Clicks Basic Mobility (PT)  -       User Key  (r) = Recorded By, (t) = Taken By, (c) = Cosigned By    Initials Name Provider Type     Kimberly Hart Occupational Therapist    JR Ariadna Gustafson, PT Physical Therapist    ANITA Mantilla, PT Physical Therapist    LIGIA Meyer, PTA Physical Therapy Assistant           Time Calculation:         PT Charges       03/04/18 1857          Time Calculation    Start Time 1243  -      PT Received On 03/04/18  -      PT Goal Re-Cert Due Date 03/12/18  -      Time Calculation- PT    Total Timed Code Minutes- PT 26 minute(s)  -        User Key  (r) = Recorded By, (t) = Taken By, (c) = Cosigned By    Initials Name Provider Type    JR Ariadna Gustafson, PT Physical Therapist          Therapy Charges for Today     Code Description Service Date Service Provider Modifiers Qty    [id number] HC GAIT TRAINING EA 15 MIN 3/4/2018 Ariadna Gustafson, PT GP 1     [id number]  PT THER PROC EA 15 MIN 3/4/2018 Ariadna Gustafson, PT GP 1          PT G-Codes  Outcome Measure Options: AM-PAC 6 Clicks Basic Mobility (PT)    Ariadna Gustafson, PT  3/4/2018          Electronically signed by Ariadna Gustafson, PT at 3/4/2018  7:06 PM

## 2018-03-05 NOTE — PLAN OF CARE
Problem: Patient Care Overview (Adult)  Goal: Plan of Care Review  Outcome: Ongoing (interventions implemented as appropriate)   03/04/18 1858   Coping/Psychosocial Response Interventions   Plan Of Care Reviewed With patient   Outcome Evaluation   Outcome Summary/Follow up Plan Patient is able to perform mobility tasks with no more than CGA. He is able to demonstrate gait with no assistive device, with several path deviaitons and LOB that require stepping strategy to recover. Plan to continue PT while hospitalized and upon discharge to home with home health care services.   Patient Care Overview   Progress improving       Problem: Inpatient Physical Therapy  Goal: Transfer Training Goal 1 LTG- PT  Outcome: Ongoing (interventions implemented as appropriate)   03/02/18 1105 03/03/18 1050 03/04/18 1858   Transfer Training PT LTG   Transfer Training PT LTG, Date Established 03/02/18 --  --    Transfer Training PT LTG, Time to Achieve 2 wks --  --    Transfer Training PT LTG, Activity Type sit to stand/stand to sit;bed to chair /chair to bed --  --    Transfer Training PT LTG, [place] Level contact guard assist --  --    Transfer Training PT LTG, Assist Device walker, rolling --  --    Transfer Training PT LTG, Date Goal Reviewed --  03/03/18 --    Transfer Training PT LTG, Outcome --  --  goal ongoing     Goal: Gait Training Goal LTG- PT  Outcome: Ongoing (interventions implemented as appropriate)   03/02/18 1105 03/03/18 1050 03/04/18 1858   Gait Training PT LTG   Gait Training Goal PT LTG, Date Established 03/02/18 --  --    Gait Training Goal PT LTG, Time to Achieve 2 wks --  --    Gait Training Goal PT LTG, [place] Level contact guard assist --  --    Gait Training Goal PT LTG, Assist Device walker, rolling --  --    Gait Training Goal PT LTG, Distance to Achieve 100 --  --    Gait Training Goal PT LTG, Date Goal Reviewed --  03/03/18 --    Gait Training Goal PT LTG, Outcome --  --  goal ongoing     Goal:  Strength Goal LTG- PT  Outcome: Ongoing (interventions implemented as appropriate)   03/02/18 1105 03/03/18 1050 03/04/18 1858   Strength Goal PT LTG   Strength Goal PT LTG, Date Established 03/02/18 --  --    Strength Goal PT LTG, Time to Achieve 2 wks --  --    Strength Goal PT LTG, Measure to Achieve Patient will demonstrate independence with HEP. --  --    Strength Goal PT LTG, Date Goal Reviewed --  03/03/18 --    Strength Goal PT LTG, Outcome --  --  goal ongoing

## 2018-03-05 NOTE — PROGRESS NOTES
Continued Stay Note  MAYNOR Harrington     Patient Name: Robe Bryant  MRN: [id number]  Today's Date: 3/5/2018    Admit Date: 3/1/2018          Discharge Plan       03/05/18 1222    Case Management/Social Work Plan    Additional Comments  Sheri at   [facility] would like pt  to call her regarding the mask Gave him contact infoprmation regaridnaissatou this       03/05/18 1128    Case Management/Social Work Plan    Additional Comments Spoke o tp regarding discharge plans He is looking forward to going home to his brother in law house  He has a [employer] Nurse [facility] Called Sheri at  [facility] they will call back regarding this               Discharge Codes     None        Expected Discharge Date and Time     Expected Discharge Date Expected Discharge Time    Mar 6, 2018             Alem Recinos

## 2018-03-05 NOTE — PROGRESS NOTES
[facility]    [facility]    Name:  Robe Bryant   Age:  67 y.o.  Sex:  male  :  1950  MRN:  [id number]   Visit Number:  [id number]  Admission Date:  3/1/2018  Date Of Service:  18  Primary Care Physician:  Deanna Wheeler MD     LOS: 4 days :  Patient Care Team:  Deanna Wheeler MD as PCP - General (Family Medicine)  Deanna Wheeler MD as PCP - Family Medicine  Arelis Tucker MD as Consulting Physician (Cardiology)  Osiel Heaton MD as Consulting Physician (Urology)  Chace Vasquez MD as Consulting Physician (Nephrology)  Blayne Whitman MD as Consulting Physician (Ophthalmology)  Jose Hargrove MD as Consulting Physician (Pulmonary Disease)  John Solorzano MD as Consulting Physician (Urology)  Deanna Wheeler MD as Referring Physician (Family Medicine):      Subjective / Interval History:     67-year-old patient who has been admitted because of her acute renal failure due to obstructive uropathy.  His chart has been reviewed and events noted.  Patient has h/o CKD stage III but is worse because of  BPH and obstructive uropathy.  He also had the encephalopathy with the lethargy which has resolved since adm per prior provider.  Patient has been taken off the nephrotoxic drugs and is being hydrated.    Today the patient is feeling the little better. Though he is clinically depressed and behavioral counseling has been done with resources offered for counseling if desired.  Patient has been taken off the catheter yesterday and he has voided. No PVR per staff.      No complaints today. No CP, SOA. Wants to be discharged as soon as able.    Vital Signs:    Temp:  [98 °F (36.7 °C)-98.5 °F (36.9 °C)] 98.5 °F (36.9 °C)  Heart Rate:  [60-66] 66  Resp:  [16-18] 17  BP: (132-178)/(69-94) 178/94    Intake and output:    I/O last 3 completed shifts:  In: 960 [P.O.:960]  Out: 3500 [Urine:3500]       Physical Examination:    General Appearance:    Alert and  cooperative, not in any acute distress.   Head:    Atraumatic and normocephalic, without obvious abnormality.   Eyes:            PERRLA,  No pallor. Extra-occular movements are within normal limits.   Neck:   Supple,  No lymphglands, no bruit   Lungs:     Chest shape is normal. Breath sounds heard bilaterally equally.  No crackles or wheezing.     Heart:    Normal S1 and S2, no murmur,  No JVD   Abdomen:     Normal bowel sounds, no masses, no organomegaly. Soft        non-tender, no guarding, no rebound                tenderness   Extremities:   Moves all extremities well, no edema, no cyanosis,    Skin:   No  bruising or rash.   Neurologic:   Grossly non focal and moves all extrimities equally.    Laboratory results:    Results from last 7 days  Lab Units 03/05/18  0607 03/04/18  0509 03/03/18  0559 03/01/18  1257   SODIUM mmol/L 147* 146* 145  < > 146*   POTASSIUM mmol/L 3.9 4.0 4.2  < > 4.7   CHLORIDE mmol/L 104 104 105  < > 103   CO2 mmol/L 27.0 27.0 29.0  < > 30.0   BUN mg/dL 57* 51* 47*  < > 41*   CREATININE mg/dL 4.80* 5.70* 5.70*  < > 6.10*   CALCIUM mg/dL 9.5 8.8 8.7  < > 9.4   BILIRUBIN mg/dL 0.6  --   --   --  0.7   ALK PHOS U/L 63  --   --   --  58   ALT (SGPT) U/L 45  --   --   --  33   AST (SGOT) U/L 29  --   --   --  28   GLUCOSE mg/dL 95 110* 112*  < > 130*   < > = values in this interval not displayed.    Results from last 7 days  Lab Units 03/05/18  0607 03/04/18  0509 03/03/18  0559   WBC 10*3/mm3 5.82 5.01 5.14   HEMOGLOBIN g/dL 14.7 13.0* 14.1   HEMATOCRIT % 43.9 39.9* 43.0   PLATELETS 10*3/mm3 146 127* 122*       Results from last 7 days  Lab Units 03/05/18  0607 03/04/18  0509 03/03/18  0559   INR  2.14* 2.75* 3.09*       Results from last 7 days  Lab Units 03/02/18  1154 03/02/18  0610 03/01/18  2350   TROPONIN I ng/mL <0.012 <0.012 0.012           Radiology results:    Imaging Results (last 24 hours)     ** No results found for the last 24 hours. **          I have reviewed the patient's  radiology reports.    Medication Review:     I have reviewed the patients active and prn medications.     Assessment:    Principal Problem:    Obstructive uropathy  Active Problems:    Pacemaker    Chronic kidney disease    Essential hypertension    Paroxysmal atrial fibrillation    Chronic coronary artery disease    Obstructive sleep apnea of adult    Type 2 diabetes mellitus, without long-term current use of insulin    Osteoarthritis of multiple joints    Moderate episode of recurrent major depressive disorder    BPH (benign prostatic hypertrophy) with urinary retention          Plan:    Patient with acute on chronic renal failure due to obstructive uropathy. PVR unremarkable since ctoton removed. Cont to follow today.   Also His Renal Ultrasound Was Unremarkable and Is Been Taken off the Nephrotoxic Drugs and Would Do Need to Continue Different Medicine for His Hypertension.  His Blood Pressure Has Been Fluctuating Has Been on the Higher Range and so  with Norvasc 5 Mg Daily along with the Beta Blocker--- likely some assoc with recent life stressors. He is currently undergoing a divorce. He denies thoughts of suicide, self harm and harm to others. Behavioral medicine seen pt today and provided him with resources for counseling if he desires this after discharge.    Hold the Neurontin and Baclofen along with the Ace Inhibitors and Metformin for now. His renal function is improving very slowly. Cont IVF. Anticipate dispo in 1-2 days as per nephrology note pending continued clinical improvement.    INR therapeutic. H2B.    Liana Chaudhry DO  03/05/18  10:59 AM      Please note that portions of this note may have been completed with a voice recognition program. Efforts were made to edit the dictations, but occasionally words are mistranscribed.

## 2018-03-05 NOTE — PROGRESS NOTES
"   LOS: 4 days    Patient Care Team:  Deanna Wheeler MD as PCP - General (Family Medicine)  Deanna Wheeler MD as PCP - Family Medicine  Arelis Tucker MD as Consulting Physician (Cardiology)  Osiel Heaton MD as Consulting Physician (Urology)  Chace Vasquez MD as Consulting Physician (Nephrology)  Blayne Whitman MD as Consulting Physician (Ophthalmology)  Jose Hargrove MD as Consulting Physician (Pulmonary Disease)  John Solorzano MD as Consulting Physician (Urology)  Deanna Wheeler MD as Referring Physician (Family Medicine)    Chief Complaint:    Chief Complaint   Patient presents with   • Altered Mental Status   • Pain     all over body     Follow UP JACQUE  Subjective     Interval History:     Seen and examined. No new complaints. No SOA .     Objective     Vital Signs  Temp:  [98 °F (36.7 °C)-98.5 °F (36.9 °C)] 98.5 °F (36.9 °C)  Heart Rate:  [60-66] 66  Resp:  [16-18] 17  BP: (132-178)/(69-94) 178/94    Flowsheet Rows         First Filed Value    Admission Height  165.1 cm (65\") Documented at 03/01/2018 1235    Admission Weight  92.5 kg (204 lb) Documented at 03/01/2018 1235             I/O last 3 completed shifts:  In: 960 [P.O.:960]  Out: 3500 [Urine:3500]    Intake/Output Summary (Last 24 hours) at 03/05/18 0833  Last data filed at 03/05/18 0525   Gross per 24 hour   Intake              600 ml   Output             2550 ml   Net            -1950 ml       Physical Exam:  gen alert, no distress  Neck supple no jvd  Lungs CTA bilat no rales  CV RRR   abd soft NT/ND  vasc no pedal edema, 2+ radial pulses      Results Review:      Results from last 7 days  Lab Units 03/05/18  0607 03/04/18  0509 03/03/18  0559  03/01/18  1257   SODIUM mmol/L 147* 146* 145  < > 146*   POTASSIUM mmol/L 3.9 4.0 4.2  < > 4.7   CHLORIDE mmol/L 104 104 105  < > 103   CO2 mmol/L 27.0 27.0 29.0  < > 30.0   BUN mg/dL 57* 51* 47*  < > 41*   CREATININE mg/dL 4.80* 5.70* 5.70*  < > 6.10*   CALCIUM mg/dL 9.5 8.8 8.7  < > 9.4 "   BILIRUBIN mg/dL 0.6  --   --   --  0.7   ALK PHOS U/L 63  --   --   --  58   ALT (SGPT) U/L 45  --   --   --  33   AST (SGOT) U/L 29  --   --   --  28   GLUCOSE mg/dL 95 110* 112*  < > 130*   < > = values in this interval not displayed.    Estimated Creatinine Clearance: 15.5 mL/min (by C-G formula based on Cr of 4.8).      Results from last 7 days  Lab Units 03/04/18  0509 03/03/18  0559 03/02/18  0610   MAGNESIUM mg/dL  --  2.2 2.3   PHOSPHORUS mg/dL 5.3* 5.4*  --                Results from last 7 days  Lab Units 03/05/18  0607 03/04/18  0509 03/03/18  0559 03/02/18  0610 03/01/18  1257   WBC 10*3/mm3 5.82 5.01 5.14 6.55 6.35   HEMOGLOBIN g/dL 14.7 13.0* 14.1 15.1 15.9   PLATELETS 10*3/mm3 146 127* 122* 121* 137         Results from last 7 days  Lab Units 03/05/18  0607 03/04/18  0509 03/03/18  0559 03/02/18  0610 03/01/18  1257   INR  2.14* 2.75* 3.09* 2.31* 2.36*         Imaging Results (last 24 hours)     ** No results found for the last 24 hours. **          amLODIPine 5 mg Oral Q24H   aspirin 81 mg Oral Daily   atorvastatin 20 mg Oral Nightly   budesonide-formoterol 2 puff Inhalation BID - RT   docusate sodium 100 mg Oral BID   famotidine 20 mg Oral Daily   finasteride 5 mg Oral Daily   FLUoxetine 20 mg Oral Daily   insulin aspart 0-9 Units Subcutaneous 4x Daily AC & at Bedtime   ipratropium-albuterol 3 mL Nebulization Q6H - RT   isosorbide mononitrate 30 mg Oral Q24H   metoprolol succinate  mg Oral Daily   tamsulosin 0.4 mg Oral Daily   traZODone 50 mg Oral Nightly   warfarin 5 mg Oral Daily       Pharmacy to dose warfarin        Medication Review:   Current Facility-Administered Medications   Medication Dose Route Frequency Provider Last Rate Last Dose   • acetaminophen (TYLENOL) tablet 650 mg  650 mg Oral Q4H PRN Dc Velazco DO   650 mg at 03/04/18 1744   • amLODIPine (NORVASC) tablet 5 mg  5 mg Oral Q24H Raj Sullivan MD   5 mg at 03/05/18 0801   • aspirin EC tablet 81 mg  81 mg Oral  Daily Dc Velazco DO   81 mg at 03/05/18 0801   • atorvastatin (LIPITOR) tablet 20 mg  20 mg Oral Nightly Dc Velazco DO   20 mg at 03/04/18 2044   • bisacodyl (DULCOLAX) suppository 10 mg  10 mg Rectal Daily PRN Dc Velazco DO       • budesonide-formoterol (SYMBICORT) 160-4.5 MCG/ACT inhaler 2 puff  2 puff Inhalation BID - RT Dc Velazco DO   2 puff at 03/05/18 0632   • dextrose (D50W) solution 25 g  25 g Intravenous Q15 Min PRN Dc Velazco DO       • dextrose (GLUTOSE) oral gel 1 tube  1 tube Oral Q15 Min PRN Dc Velazco DO       • docusate sodium (COLACE) capsule 100 mg  100 mg Oral BID Dc Velazco DO   100 mg at 03/05/18 0801   • famotidine (PEPCID) tablet 20 mg  20 mg Oral Daily Ronny Walker MD   20 mg at 03/05/18 0801   • finasteride (PROSCAR) tablet 5 mg  5 mg Oral Daily Dc Velazco DO   5 mg at 03/05/18 0801   • FLUoxetine (PROzac) capsule 20 mg  20 mg Oral Daily Dc Velazco DO   20 mg at 03/05/18 0801   • glucagon (human recombinant) (GLUCAGEN DIAGNOSTIC) injection 1 mg  1 mg Subcutaneous PRN Dc Velazco DO       • insulin aspart (novoLOG) injection 0-9 Units  0-9 Units Subcutaneous 4x Daily AC & at Bedtime Dc Velazco DO       • ipratropium-albuterol (DUO-NEB) nebulizer solution 3 mL  3 mL Nebulization Q6H - RT Dc Velazco DO   3 mL at 03/05/18 0632   • isosorbide mononitrate (IMDUR) 24 hr tablet 30 mg  30 mg Oral Q24H Dc Velazco DO   30 mg at 03/05/18 0801   • metoprolol succinate XL (TOPROL-XL) 24 hr tablet 100 mg  100 mg Oral Daily Dc Vleazco DO   100 mg at 03/05/18 0801   • nitroglycerin (NITROSTAT) SL tablet 0.4 mg  0.4 mg Sublingual Q5 Min PRN Dc Velazco, DO       • ondansetron (ZOFRAN) injection 4 mg  4 mg Intravenous Q6H PRN Dc Velazco, DO       • oxyCODONE-acetaminophen (PERCOCET)  MG per tablet 1 tablet  1 tablet Oral Q6H PRN Dc Velazco, DO    1 tablet at 03/05/18 0801   • Pharmacy to dose warfarin   Does not apply Continuous PRN Dc Velazco DO       • sodium chloride 0.9 % flush 1-10 mL  1-10 mL Intravenous PRN Dc Velazco DO       • sodium chloride 0.9 % flush 10 mL  10 mL Intravenous PRN Ronny Cardona MD       • sodium chloride 0.9 % flush 10 mL  10 mL Intravenous PRN BROOKE Coe       • tamsulosin (FLOMAX) 24 hr capsule 0.4 mg  0.4 mg Oral Daily Dc Velazco DO   0.4 mg at 03/05/18 0801   • traZODone (DESYREL) tablet 50 mg  50 mg Oral Nightly Dc Velazco DO   50 mg at 03/04/18 2044   • warfarin (COUMADIN) tablet 5 mg  5 mg Oral Daily Dc Velazco, DO   5 mg at 03/04/18 1705       Assessment/Plan   1. Acute kidney injury - ATN, multifactorial; Cr trending down slowly 6.1 - 5.9 - 5.7 - 5.7, s4.9; Lytes/Volume stable -- mild hypernatremia (147)  2. Chronic kidney disease stage III - BL Cr 1.3 as of 1/11/18  3. Essential hypertension - BP high, norvasc added yest  4. Paroxysmal atrial fibrillation  5. Chronic coronary artery disease  6. Obstructive sleep apnea of adult  7. Type 2 diabetes mellitus, without long-term current use of insulin  8. BPH (benign prostatic hypertrophy) with urinary retention  9. H/o urinary retention - cotton removed 3/3/18 and voiding w/o difficulty, normal PVRs  10. Pacemaker/AICD  11. Thrombocytopenia - stable, no worse after switching PPI to pecid (due to JACQUE)  12. GERD - switched PPI to pepcid     Plan  - encourage free water intake  - no objection to discharge in next 1-2 days but will need close follow-up: BMP within 1 week and f/u Dr Vasquez soon thereafter (left lab Rx at RN station and gave pt contact information to arrange follow-up appt)    Principal Problem:    Obstructive uropathy  Active Problems:    Pacemaker    Chronic kidney disease    Essential hypertension    Paroxysmal atrial fibrillation    Chronic coronary artery disease    Obstructive sleep apnea of  adult    Type 2 diabetes mellitus, without long-term current use of insulin    Osteoarthritis of multiple joints    Moderate episode of recurrent major depressive disorder    BPH (benign prostatic hypertrophy) with urinary retention              Pooja Sutton MD  03/05/18  8:33 AM

## 2018-03-06 VITALS
RESPIRATION RATE: 18 BRPM | OXYGEN SATURATION: 97 % | DIASTOLIC BLOOD PRESSURE: 78 MMHG | SYSTOLIC BLOOD PRESSURE: 174 MMHG | WEIGHT: 206.3 LBS | TEMPERATURE: 97.9 F | HEIGHT: 65 IN | HEART RATE: 70 BPM | BODY MASS INDEX: 34.37 KG/M2

## 2018-03-06 PROBLEM — N13.9 OBSTRUCTIVE UROPATHY: Status: RESOLVED | Noted: 2018-03-01 | Resolved: 2018-03-06

## 2018-03-06 LAB
ANION GAP SERPL CALCULATED.3IONS-SCNC: 19 MMOL/L
BUN BLD-MCNC: 52 MG/DL (ref 7–20)
BUN/CREAT SERPL: 14.1 (ref 6.3–21.9)
CALCIUM SPEC-SCNC: 9.6 MG/DL (ref 8.4–10.2)
CHLORIDE SERPL-SCNC: 102 MMOL/L (ref 98–107)
CO2 SERPL-SCNC: 30 MMOL/L (ref 26–30)
CREAT BLD-MCNC: 3.7 MG/DL (ref 0.6–1.3)
GFR SERPL CREATININE-BSD FRML MDRD: 16 ML/MIN/1.73
GLUCOSE BLD-MCNC: 98 MG/DL (ref 74–98)
GLUCOSE BLDC GLUCOMTR-MCNC: 89 MG/DL (ref 70–130)
INR PPP: 2.34 (ref 0.9–1.1)
POTASSIUM BLD-SCNC: 4 MMOL/L (ref 3.5–5.1)
PROTHROMBIN TIME: 25.7 SECONDS (ref 9.3–12.1)
SODIUM BLD-SCNC: 147 MMOL/L (ref 137–145)

## 2018-03-06 PROCEDURE — 94799 UNLISTED PULMONARY SVC/PX: CPT

## 2018-03-06 PROCEDURE — 82962 GLUCOSE BLOOD TEST: CPT

## 2018-03-06 PROCEDURE — 80048 BASIC METABOLIC PNL TOTAL CA: CPT | Performed by: INTERNAL MEDICINE

## 2018-03-06 PROCEDURE — 94660 CPAP INITIATION&MGMT: CPT

## 2018-03-06 PROCEDURE — 85610 PROTHROMBIN TIME: CPT | Performed by: INTERNAL MEDICINE

## 2018-03-06 PROCEDURE — 99239 HOSP IP/OBS DSCHRG MGMT >30: CPT | Performed by: INTERNAL MEDICINE

## 2018-03-06 RX ORDER — HYDRALAZINE HYDROCHLORIDE 10 MG/1
10 TABLET, FILM COATED ORAL EVERY 8 HOURS SCHEDULED
Qty: 90 TABLET | Refills: 0 | Status: SHIPPED | OUTPATIENT
Start: 2018-03-06 | End: 2019-08-26

## 2018-03-06 RX ORDER — AMLODIPINE BESYLATE 5 MG/1
5 TABLET ORAL EVERY 12 HOURS
Qty: 60 TABLET | Refills: 0 | Status: SHIPPED | OUTPATIENT
Start: 2018-03-06 | End: 2019-08-26 | Stop reason: ALTCHOICE

## 2018-03-06 RX ORDER — FINASTERIDE 5 MG/1
5 TABLET, FILM COATED ORAL DAILY
Qty: 30 TABLET | Refills: 0 | Status: SHIPPED | OUTPATIENT
Start: 2018-03-07 | End: 2019-09-19

## 2018-03-06 RX ORDER — HYDRALAZINE HYDROCHLORIDE 10 MG/1
10 TABLET, FILM COATED ORAL EVERY 8 HOURS SCHEDULED
Status: DISCONTINUED | OUTPATIENT
Start: 2018-03-06 | End: 2018-03-06 | Stop reason: HOSPADM

## 2018-03-06 RX ADMIN — DOCUSATE SODIUM 100 MG: 100 CAPSULE, LIQUID FILLED ORAL at 09:35

## 2018-03-06 RX ADMIN — ASPIRIN 81 MG: 81 TABLET, COATED ORAL at 09:35

## 2018-03-06 RX ADMIN — AMLODIPINE BESYLATE 5 MG: 5 TABLET ORAL at 09:35

## 2018-03-06 RX ADMIN — METOPROLOL SUCCINATE 100 MG: 100 TABLET, EXTENDED RELEASE ORAL at 09:35

## 2018-03-06 RX ADMIN — FLUOXETINE 20 MG: 20 CAPSULE ORAL at 09:36

## 2018-03-06 RX ADMIN — TAMSULOSIN HYDROCHLORIDE 0.4 MG: 0.4 CAPSULE ORAL at 09:36

## 2018-03-06 RX ADMIN — FINASTERIDE 5 MG: 5 TABLET, FILM COATED ORAL at 09:35

## 2018-03-06 RX ADMIN — IPRATROPIUM BROMIDE AND ALBUTEROL SULFATE 3 ML: .5; 3 SOLUTION RESPIRATORY (INHALATION) at 06:31

## 2018-03-06 RX ADMIN — IPRATROPIUM BROMIDE AND ALBUTEROL SULFATE 3 ML: .5; 3 SOLUTION RESPIRATORY (INHALATION) at 00:00

## 2018-03-06 RX ADMIN — BUDESONIDE AND FORMOTEROL FUMARATE DIHYDRATE 2 PUFF: 160; 4.5 AEROSOL RESPIRATORY (INHALATION) at 06:31

## 2018-03-06 RX ADMIN — OXYCODONE HYDROCHLORIDE AND ACETAMINOPHEN 1 TABLET: 10; 325 TABLET ORAL at 04:50

## 2018-03-06 RX ADMIN — FAMOTIDINE 20 MG: 20 TABLET, FILM COATED ORAL at 09:35

## 2018-03-06 RX ADMIN — HYDRALAZINE HYDROCHLORIDE 10 MG: 10 TABLET, FILM COATED ORAL at 09:35

## 2018-03-06 RX ADMIN — ISOSORBIDE MONONITRATE 30 MG: 30 TABLET, EXTENDED RELEASE ORAL at 09:35

## 2018-03-06 NOTE — SIGNIFICANT NOTE
03/06/18 0953   Rehab Treatment   Discipline physical therapy assistant   Treatment Not Performed other (see comments)  (Pt ambulating independantly in hallway per nurse as well as pt is to be disharged this date.  )

## 2018-03-06 NOTE — PROGRESS NOTES
Continued Stay Note  MAYNOR Harrington     Patient Name: Robe Bryant  MRN: [id number]  Today's Date: 3/6/2018    Admit Date: 3/1/2018          Discharge Plan       03/06/18 1025    Case Management/Social Work Plan    Additional Comments Instructed pt  to at MS go to [facility] a Care office to discuss BIPAP Mask issues Consult placed for resources on housing               Discharge Codes     None        Expected Discharge Date and Time     Expected Discharge Date Expected Discharge Time    Mar 6, 2018             Alem Recinos

## 2018-03-06 NOTE — PROGRESS NOTES
Met with pt due to request for housing resources. Provided pt with [employer] application and [facility] application. Advised that both of these agencies may have a wait list. Pt voiced understanding. Pt also states that he does have someone that can help with filling these apps out. GOLDEN Alvarez updated with this info as well. No further needs at this time. Will assist as needed.    LOYD Shaw, CALDERON  03/06/18  11:02 AM

## 2018-03-06 NOTE — THERAPY DISCHARGE NOTE
Acute Care - Occupational Therapy Discharge Summary   Len     Patient Name: Robe Bryant  : 1950  MRN: [id number]    Today's Date: 3/6/2018  Onset of Illness/Injury or Date of Surgery Date: 18    Date of Referral to OT: 18  Referring Physician: Dr. Velazco      Admit Date: 3/1/2018        OT Recommendation and Plan    Visit Dx:    ICD-10-CM ICD-9-CM   1. Obstructive uropathy N13.9 599.60   2. Impaired functional mobility, balance, gait, and endurance Z74.09 V49.89                     OT Goals       18 0954 18 1307       Transfer Training OT LTG    Transfer Training OT LTG, Date Established  18  -     Transfer Training OT LTG, Time to Achieve  by discharge  -     Transfer Training OT LTG, Activity Type  sit to stand/stand to sit  -     Transfer Training OT LTG, Hall Level  set up required  -     Transfer Training OT LTG, Assist Device  walker, rolling  -     Transfer Training OT LTG, Date Goal Reviewed 18  -      Transfer Training OT LTG, Outcome goal met  - goal ongoing  -     Strength OT LTG    Strength Goal OT LTG, Date Established  18  -     Strength Goal OT LTG, Time to Achieve  by discharge  -     Strength Goal OT LTG, Functional Goal  Pt will perform 15 reps BUE strengthening ex using theraband for resistance.  -     Strength Goal OT LTG, Date Goal Reviewed 18  -      Strength Goal OT LTG, Outcome goal ongoing  - goal ongoing  -AH     LB Dressing OT LTG    LB Dressing Goal OT LTG, Date Established  18  -     LB Dressing Goal OT LTG, Time to Achieve  by discharge  -     LB Dressing Goal OT LTG, Hall Level  set up required  -     LB Dressing Goal OT LTG, Date Goal Reviewed 18  -      LB Dressing Goal OT LTG, Outcome goal met  - goal ongoing  -     Functional Mobility OT LTG    Functional Mobility Goal OT LTG, Date Established  18  -     Functional Mobility Goal OT LTG,  Time to Achieve  by discharge  -     Functional Mobility Goal OT LTG, [place] Level  contact guard  -     Functional Mobility Goal OT LTG, Assist Device  rolling walker  -     Functional Mobility Goal OT LTG, Distance to Achieve  in hallway  -     Functional Mobility Goal OT LTG, Date Goal Reviewed 03/06/18  -      Functional Mobility Goal OT LTG, Outcome goal met  - goal ongoing  -       User Key  (r) = Recorded By, (t) = Taken By, (c) = Cosigned By    Initials Name Provider Type     Kimberly Tanner Occupational Therapist                Outcome Measures       03/04/18 1243 03/03/18 1009       How much help from another person do you currently need...    Turning from your back to your side while in flat bed without using bedrails? 4  -JR 4  -CK     Moving from lying on back to sitting on the side of a flat bed without bedrails? 4  -JR 4  -CK     Moving to and from a bed to a chair (including a wheelchair)? 3  -JR 3  -CK     Standing up from a chair using your arms (e.g., wheelchair, bedside chair)? 3  -JR 3  -CK     Climbing 3-5 steps with a railing? 2  -JR 2  -CK     To walk in hospital room? 3  -JR 3  -CK     AM-PAC 6 Clicks Score 19  - 19  -CK     Functional Assessment    Outcome Measure Options AM-PAC 6 Clicks Basic Mobility (PT)  -JR AM-PAC 6 Clicks Basic Mobility (PT)  -CK       User Key  (r) = Recorded By, (t) = Taken By, (c) = Cosigned By    Initials Name Provider Type    JR Ariadna Gustafson, PT Physical Therapist    LIGIA Meyer PTA Physical Therapy Assistant              OT Discharge Summary  Anticipated Discharge Disposition: home  Reason for Discharge: Discharge from facility  Outcomes Achieved: Patient able to partially acheive established goals  Discharge Destination: Home      Kimberly Hart  3/6/2018

## 2018-03-06 NOTE — DISCHARGE SUMMARY
"    [facility]      Name:  Robe Bryant   Age:  67 y.o.  Sex:  male  :  1950  MRN:  [id number]   Visit Number:  [id number]    Admission Date:  3/1/2018  Date of Discharge:  3/6/2018  Primary Care Physician:  Deanna Wheeler MD    Discharge Diagnoses:     Obstructive uropathy    Active Problems:    Pacemaker    Chronic kidney disease    Essential hypertension    Paroxysmal atrial fibrillation    Chronic coronary artery disease    Obstructive sleep apnea of adult    Type 2 diabetes mellitus, without long-term current use of insulin    Osteoarthritis of multiple joints    Moderate episode of recurrent major depressive disorder    BPH (benign prostatic hypertrophy) with urinary retention      Presenting Problem:    Obstructive uropathy [N13.9]     Consults:     Consults     Date and Time Order Name Status Description    3/2/2018 1013 Inpatient Psychiatrist Consult Completed     3/1/2018 1622 Nephrology  (on-call MD unless specified) Completed         Consulting Physician(s)     Provider Relationship Specialty    Ronny Walker MD Consulting Physician Nephrology    Chace Vasquez MD Consulting Physician Nephrology    Pooja Sutton MD Consulting Physician Nephrology          Procedures Performed:    none       History of presenting illness:    \"Patient is a 67-year-old  male with history of hyperlipidemia, ECHO, coronary artery disease with stents, paroxysmal atrial fibrillation, GERD, diabetes type 2, essential hypertension, and ECHO on CPAP who comes in after he was kicked out of his wife's house 5 days ago.  They are  but getting a divorce he states.  He went over to his brother-in-law's house and drank a large amount of orange juice as well as grape juice, carbonated beverages, and takes.  His brother-in-law states that he was then very sleepy almost comatose and was basically sleeping past 3 days.  At one point he was asleep for " "24 hours.  Patient states 3 days ago he also has some chest pain with some shortness of breath in the center of his chest which radiated into his neck and left arm.  He took 3 nitroglycerin the pain is gone away.  He claims he is taking all of his medications.  He went to his physician's office today and was sent to the emergency room due to his symptoms.  He has been acting confused as well.  He has seen Dr. Vasquez in the past for chronic kidney disease, and he is due to see Dr. Heaton for BPH.  In the emergency room his BUN was 41 and creatinine was 6.10.  There was greater than 300 cc of urine in the bladder, so a Devi catheter has been placed.  Urinalysis was sent and was negative.  CT the brain was negative as well.  Patient is much more awake at this point and able to give his own history.  He denies any current fever, chills, chest pressure, shortness breath, nausea or vomiting.  Patient denies any depression or suicidal ideation.\" - Dr. Velazco    [facility] Course:    Mr. Bryant is a pleasant 67-year-old  male who presented with the above complaints.  A Devi catheter was placed and he was started on Proscar in addition to Flomax.  His urine output has improved throughout the hospital course in addition to his creatinine.  His Devi catheter has since been removed, and he has urinated the past 72 hours without significant retention noted on his post-void bladder scans. On admission his creatinine was 6.1.  It is currently 3.8 and improving daily.  He has maintained adequate fluid intake.  Other than this he has had some issues with hypertension as he cannot tolerate his ACE inhibitor and diuretics he is normally on at home due to renal dysfunction.  His home antihypertensives have been modified to include Norvasc 5 mg by mouth twice a day and hydralazine 10 mg by mouth every 8 hours.  Other medications have been held upon discharge due to renal dysfunction which he is to review with his primary care " physician after discharge.  We'll provide the patient recommendations for dietary restrictions with renal dysfunction.  Nephrology recommends follow-up next week with Dr. Vasquez and a BMP in clinic.  We'll also recommend that he follow-up with his primary care physician in one week to review all medications.  Patient is agreeable with plan.  His daughters are present and also agreeable.  No complaints or concerns per the patient her discharge.    Vital Signs:    Temp:  [97.4 °F (36.3 °C)-98 °F (36.7 °C)] 97.9 °F (36.6 °C)  Heart Rate:  [62-70] 70  Resp:  [18] 18  BP: (160-185)/() 174/78    Physical Exam:    General Appearance:  Alert and cooperative, not in any acute distress.   Head:  Atraumatic and normocephalic, without obvious abnormality.   Eyes:          PERRLA, conjunctivae and sclerae normal, no Icterus. No pallor. Extra-occular movements are within normal limits.   Ears:  Ears appear intact with no abnormalities noted.   Throat: No oral lesions, no thrush, oral mucosa moist.   Neck: Supple, trachea midline, no thyromegaly, no carotid bruit.   Back:   No kyphoscoliosis present. No tenderness to palpation,   range of motion normal.   Lungs:   Chest shape is normal. Breath sounds heard bilaterally equally.  No crackles or wheezing. No Pleural rub or bronchial breathing.   Heart:  Normal S1 and S2, no murmur, no gallop, no rub. No JVD.   Abdomen:   Normal bowel sounds, no masses, no organomegaly. Soft, non-tender, non-distended, no guarding, no rebound tenderness.   Extremities: Moves all extremities well, no edema, no cyanosis, no clubbing.   Pulses: Pulses palpable and equal bilaterally.   Skin: No bleeding, bruising or rash.   Lymph nodes: No palpable adenopathy.   Neurologic: Alert and oriented x 3. Moves all four limbs equally. No tremors. No facial asymetry.     Pertinent Lab Results:       Results from last 7 days  Lab Units 03/06/18  0619 03/05/18  0607 03/04/18  0509  03/01/18  1257   SODIUM  mmol/L 147* 147* 146*  < > 146*   POTASSIUM mmol/L 4.0 3.9 4.0  < > 4.7   CHLORIDE mmol/L 102 104 104  < > 103   CO2 mmol/L 30.0 27.0 27.0  < > 30.0   BUN mg/dL 52* 57* 51*  < > 41*   CREATININE mg/dL 3.70* 4.80* 5.70*  < > 6.10*   CALCIUM mg/dL 9.6 9.5 8.8  < > 9.4   BILIRUBIN mg/dL  --  0.6  --   --  0.7   ALK PHOS U/L  --  63  --   --  58   ALT (SGPT) U/L  --  45  --   --  33   AST (SGOT) U/L  --  29  --   --  28   GLUCOSE mg/dL 98 95 110*  < > 130*   < > = values in this interval not displayed.    Results from last 7 days  Lab Units 03/05/18  0607 03/04/18  0509 03/03/18  0559   WBC 10*3/mm3 5.82 5.01 5.14   HEMOGLOBIN g/dL 14.7 13.0* 14.1   HEMATOCRIT % 43.9 39.9* 43.0   PLATELETS 10*3/mm3 146 127* 122*       Results from last 7 days  Lab Units 03/06/18  0619 03/05/18  0607 03/04/18  0509   INR  2.34* 2.14* 2.75*       Results from last 7 days  Lab Units 03/02/18  1154 03/02/18  0610 03/01/18  2350   TROPONIN I ng/mL <0.012 <0.012 0.012               Results from last 7 days  Lab Units 03/01/18  1257   LIPASE U/L 45           Results from last 7 days  Lab Units 03/01/18  1451   COLOR UA  Yellow   GLUCOSE UA  Negative   KETONES UA  Negative   LEUKOCYTES UA  Negative   PH, URINE  <=5.0   BILIRUBIN UA  Negative   UROBILINOGEN UA  0.2 E.U./dL     Pain Management Panel     Pain Management Panel Latest Ref Rng & Units 9/13/2016    CREATININE UR Not Estab. mg/dL 56.7              Pertinent Radiology Results:    Imaging Results (all)     Procedure Component Value Units Date/Time    CT Head Without Contrast [[id number]] Collected:  03/01/18 1322     Updated:  03/01/18 1325    Narrative:       PROCEDURE: CT HEAD WO CONTRAST-     HISTORY: confusion        TECHNIQUE: Noncontrast exam     Mild atrophy and chronic ischemic white matter changes are noted.         No cortical edema is present. There is no mass or hemorrhage. Ventricles  are normal.     Bone windows show no skull fracture or obvious destructive lesion.        Impression:       1. No acute intracranial abnormality or obvious mass.   2. Atrophy and chronic ischemic white matter changes as above.            This study was performed with techniques to keep radiation doses as low  as reasonably achievable (ALARA). Individualized dose reduction  techniques using automated exposure control or adjustment of vA and/or  kV according to the patient size were employed.      This report was finalized on 3/1/2018 1:23 PM by Fran Rojas MD.    XR Chest 2 View [[id number]] Collected:  03/01/18 1326     Updated:  03/01/18 1328    Narrative:       PROCEDURE: XR CHEST 2 VW-       HISTORY: cough        COMPARISON: 12/30/2016.     FINDINGS:  The lungs are clear.       There is no evidence of effusion or other pleural disease.  The  mediastinum has a normal appearance.      The cardiac silhouette is unremarkable.   Left-sided pacer is present.       Impression:       Unremarkable chest exam.        This report was finalized on 3/1/2018 1:26 PM by Fran Rojas MD.    US Renal Limited [[id number]] Collected:  03/02/18 1116     Updated:  03/02/18 1118    Narrative:       PROCEDURE: US RENAL LIMITED-     HISTORY: renal failure; N13.9-Obstructive and reflux uropathy,  unspecified     FINDINGS: Kidneys show a normal echo pattern.    .  Right kidney  measures 11.1 cm in length. Left kidney measures 10.4 cm in length.   Size is normal.     No mass or cyst is seen.No obstructive changes are present.          Impression:       Normal renal ultrasound.        This report was finalized on 3/2/2018 11:16 AM by Fran Rojas MD.          Condition on Discharge:      Stable.    Code status during the hospital stay:    Full Code    Discharge Disposition:    Home or Self Care    Discharge Medications:     Robe Bryant   Home Medication Instructions ZHANG:[id number]    Printed on:03/06/18 1043   Medication Information                      amLODIPine (NORVASC) 5 MG tablet  Take 1 tablet by mouth Every 12  (Twelve) Hours.             aspirin 81 MG tablet  Take  by mouth daily.             Blood Glucose Monitoring Suppl w/Device kit  1 each 2 (Two) Times a Day.             DULERA 100-5 MCG/ACT inhaler  INHALE 2 PUFFS TWICE DAILY             finasteride (PROSCAR) 5 MG tablet  Take 1 tablet by mouth Daily.             FLUoxetine (PROzac) 20 MG capsule  TAKE 1 CAPSULE EVERY DAY             Glucose Blood (BLOOD GLUCOSE TEST) strip  1 strip by In Vitro route 2 (Two) Times a Day.             hydrALAZINE (APRESOLINE) 10 MG tablet  Take 1 tablet by mouth Every 8 (Eight) Hours.             ipratropium-albuterol (DUO-NEB) 0.5-2.5 mg/mL nebulizer  2 (Two) Times a Day.             isosorbide mononitrate (IMDUR) 30 MG 24 hr tablet  Take 1 tablet by mouth 2 (Two) Times a Day.             ketoconazole (NIZORAL) 2 % cream  APPLY  TOPICALLY EVERY 12 (TWELVE) HOURS.             Lancets misc  1 each 2 (Two) Times a Day.             meclizine (ANTIVERT) 12.5 MG tablet  Take 12.5 mg by mouth 3 (Three) Times a Day As Needed for dizziness.             metoprolol succinate XL (TOPROL-XL) 100 MG 24 hr tablet  Take 1 tablet by mouth Daily.             Multiple Vitamin tablet  Take 1 tablet by mouth daily.             nitroglycerin (NITROSTAT) 0.4 MG SL tablet  Place 1 tablet under the tongue Every 5 (Five) Minutes As Needed for Chest Pain.             omeprazole (priLOSEC) 40 MG capsule  TAKE 1 CAPSULE EVERY DAY             oxyCODONE-acetaminophen (PERCOCET)  MG per tablet  Take 1 tablet by mouth Every 6 (Six) Hours As Needed for Severe Pain .             pravastatin (PRAVACHOL) 80 MG tablet  TAKE 1 TABLET EVERY NIGHT             tamsulosin (FLOMAX) 0.4 MG capsule 24 hr capsule  Take 1 capsule by mouth Daily.             traZODone (DESYREL) 50 MG tablet  TAKE 1 TABLET EVERY NIGHT             warfarin (COUMADIN) 5 MG tablet  Take 1 to 1.5 tablets by mouth daily or as instructed by anticoagulation pharmacist                 Discharge Diet:      Renal diet    Activity at Discharge:     As tolerated    Follow-up Appointments:    Follow-up Information     Follow up with Deanna Wheeler MD Follow up in 1 week(s).    Specialty:  Family Medicine    Contact information:    [address] [place] [id number] [phone]          Follow up with Chace Vasquez MD .    Specialties:  Nephrology, Hospitalist    Why:  FOLLOW UP WITH ELEUTERIO LEHMAN ON MARCH 16TH @ 10:45  PATIENT REPEAT BMP    Contact information:    [address] [place] [address] [phone]            Future Appointments  Date Time Provider Department Center   3/9/2018 11:15 AM MD SHELBY Valero None   3/15/2018 2:30 PM MD SHELBY De Leon None                April DO Richa  03/06/18  10:43 AM    Time spent: 34min    Dictated utilizing Dragon dictation.

## 2018-03-06 NOTE — PLAN OF CARE
Problem: Patient Care Overview (Adult)  Goal: Plan of Care Review  Outcome: Ongoing (interventions implemented as appropriate)   03/06/18 0954   Coping/Psychosocial Response Interventions   Plan Of Care Reviewed With patient   Outcome Evaluation   Outcome Summary/Follow up Plan Pt was not seen for formal OT session, but pt was observed walking independently in hallway. Pt fully dressed in street clothes which he completed dressing tasks independently. Pt is being d/c home today.   Patient Care Overview   Progress improving       Problem: Inpatient Occupational Therapy  Goal: Transfer Training Goal 1 LTG- OT  Outcome: Outcome(s) achieved Date Met: 03/06/18 03/02/18 1307 03/06/18 0954   Transfer Training OT LTG   Transfer Training OT LTG, Date Established 03/02/18 --    Transfer Training OT LTG, Time to Achieve by discharge --    Transfer Training OT LTG, Activity Type sit to stand/stand to sit --    Transfer Training OT LTG, [place] Level set up required --    Transfer Training OT LTG, Assist Device walker, rolling --    Transfer Training OT LTG, Date Goal Reviewed --  03/06/18   Transfer Training OT LTG, Outcome --  goal met     Goal: Strength Goal LTG- OT  Outcome: Ongoing (interventions implemented as appropriate)   03/02/18 1307 03/06/18 0954   Strength OT LTG   Strength Goal OT LTG, Date Established 03/02/18 --    Strength Goal OT LTG, Time to Achieve by discharge --    Strength Goal OT LTG, Functional Goal Pt will perform 15 reps BUE strengthening ex using theraband for resistance. --    Strength Goal OT LTG, Date Goal Reviewed --  03/06/18   Strength Goal OT LTG, Outcome --  goal ongoing     Goal: LB Dressing Goal LTG- OT  Outcome: Outcome(s) achieved Date Met: 03/06/18 03/02/18 1307 03/06/18 0954   LB Dressing OT LTG   LB Dressing Goal OT LTG, Date Established 03/02/18 --    LB Dressing Goal OT LTG, Time to Achieve by discharge --    LB Dressing Goal OT LTG, [place] Level set up required --     LB Dressing Goal OT LTG, Date Goal Reviewed --  03/06/18   LB Dressing Goal OT LTG, Outcome --  goal met     Goal: Functional Mobility Goal LTG- OT  Outcome: Outcome(s) achieved Date Met: 03/06/18 03/02/18 1307 03/06/18 0954   Functional Mobility OT LTG   Functional Mobility Goal OT LTG, Date Established 03/02/18 --    Functional Mobility Goal OT LTG, Time to Achieve by discharge --    Functional Mobility Goal OT LTG, [place] Level contact guard --    Functional Mobility Goal OT LTG, Assist Device rolling walker --    Functional Mobility Goal OT LTG, Distance to Achieve in hallway --    Functional Mobility Goal OT LTG, Date Goal Reviewed --  03/06/18   Functional Mobility Goal OT LTG, Outcome --  goal met

## 2018-03-06 NOTE — PLAN OF CARE
Problem: Patient Care Overview (Adult)  Goal: Plan of Care Review  Outcome: Ongoing (interventions implemented as appropriate)   03/06/18 0501   Coping/Psychosocial Response Interventions   Plan Of Care Reviewed With patient   Outcome Evaluation   Outcome Summary/Follow up Plan no acute events overnight. patient rested well. continue to monitor.    Patient Care Overview   Progress no change     Goal: Discharge Needs Assessment  Outcome: Ongoing (interventions implemented as appropriate)      Problem: Fall Risk (Adult)  Goal: Absence of Falls  Outcome: Ongoing (interventions implemented as appropriate)      Problem: Urine Elimination, Impaired (Adult)  Goal: Effective Urinary Elimination  Outcome: Ongoing (interventions implemented as appropriate)    Goal: Reduced Incontinence Episodes  Outcome: Ongoing (interventions implemented as appropriate)      Problem: Pain, Acute (Adult)  Goal: Acceptable Pain Control/Comfort Level  Outcome: Ongoing (interventions implemented as appropriate)

## 2018-03-07 ENCOUNTER — TRANSCRIBE ORDERS (OUTPATIENT)
Dept: ADMINISTRATIVE | Facility: HOSPITAL | Age: 68
End: 2018-03-07

## 2018-03-07 ENCOUNTER — TRANSITIONAL CARE MANAGEMENT TELEPHONE ENCOUNTER (OUTPATIENT)
Dept: FAMILY MEDICINE CLINIC | Facility: CLINIC | Age: 68
End: 2018-03-07

## 2018-03-07 DIAGNOSIS — N17.0 ACUTE KIDNEY FAILURE WITH LESION OF TUBULAR NECROSIS (HCC): Primary | ICD-10-CM

## 2018-03-07 NOTE — PROGRESS NOTES
Case Management Discharge Note    Final Note: Discharged home     Discharge Placement     No information found        Other:  (private car )    Discharge Codes: 01  Discharge to home

## 2018-03-09 DIAGNOSIS — N18.30 TYPE 2 DIABETES MELLITUS WITH STAGE 3 CHRONIC KIDNEY DISEASE, WITHOUT LONG-TERM CURRENT USE OF INSULIN (HCC): Primary | ICD-10-CM

## 2018-03-09 DIAGNOSIS — E11.22 TYPE 2 DIABETES MELLITUS WITH STAGE 3 CHRONIC KIDNEY DISEASE, WITHOUT LONG-TERM CURRENT USE OF INSULIN (HCC): Primary | ICD-10-CM

## 2018-03-09 RX ORDER — FLUOXETINE HYDROCHLORIDE 20 MG/1
20 CAPSULE ORAL DAILY
Qty: 90 CAPSULE | Refills: 1 | Status: SHIPPED | OUTPATIENT
Start: 2018-03-09 | End: 2019-08-26 | Stop reason: SDUPTHER

## 2018-03-09 RX ORDER — TRAZODONE HYDROCHLORIDE 50 MG/1
50 TABLET ORAL NIGHTLY
Qty: 90 TABLET | Refills: 1 | Status: SHIPPED | OUTPATIENT
Start: 2018-03-09 | End: 2019-08-26

## 2018-03-09 RX ORDER — OMEPRAZOLE 40 MG/1
40 CAPSULE, DELAYED RELEASE ORAL DAILY
Qty: 90 CAPSULE | Refills: 1 | Status: SHIPPED | OUTPATIENT
Start: 2018-03-09 | End: 2019-08-26 | Stop reason: SDUPTHER

## 2018-03-09 RX ORDER — PRAVASTATIN SODIUM 80 MG/1
80 TABLET ORAL NIGHTLY
Qty: 90 TABLET | Refills: 1 | Status: SHIPPED | OUTPATIENT
Start: 2018-03-09 | End: 2019-08-26 | Stop reason: ALTCHOICE

## 2018-03-13 ENCOUNTER — OFFICE VISIT (OUTPATIENT)
Dept: FAMILY MEDICINE CLINIC | Facility: CLINIC | Age: 68
End: 2018-03-13

## 2018-03-13 ENCOUNTER — ANTICOAGULATION VISIT (OUTPATIENT)
Dept: PHARMACY | Facility: HOSPITAL | Age: 68
End: 2018-03-13

## 2018-03-13 ENCOUNTER — TELEPHONE (OUTPATIENT)
Dept: FAMILY MEDICINE CLINIC | Facility: CLINIC | Age: 68
End: 2018-03-13

## 2018-03-13 VITALS
DIASTOLIC BLOOD PRESSURE: 86 MMHG | BODY MASS INDEX: 31.82 KG/M2 | HEIGHT: 66 IN | HEART RATE: 68 BPM | SYSTOLIC BLOOD PRESSURE: 142 MMHG | OXYGEN SATURATION: 96 % | WEIGHT: 198 LBS

## 2018-03-13 DIAGNOSIS — R79.1 SUBTHERAPEUTIC INTERNATIONAL NORMALIZED RATIO (INR): ICD-10-CM

## 2018-03-13 DIAGNOSIS — N18.30 CHRONIC RENAL INSUFFICIENCY, STAGE 3 (MODERATE) (HCC): ICD-10-CM

## 2018-03-13 DIAGNOSIS — N13.9 OBSTRUCTIVE UROPATHY: Primary | ICD-10-CM

## 2018-03-13 DIAGNOSIS — Z79.01 WARFARIN ANTICOAGULATION: ICD-10-CM

## 2018-03-13 DIAGNOSIS — R10.2 SUPRAPUBIC PAIN: ICD-10-CM

## 2018-03-13 DIAGNOSIS — I50.32 CHRONIC DIASTOLIC HEART FAILURE (HCC): ICD-10-CM

## 2018-03-13 DIAGNOSIS — I25.10 CHRONIC CORONARY ARTERY DISEASE: ICD-10-CM

## 2018-03-13 DIAGNOSIS — G47.33 OBSTRUCTIVE SLEEP APNEA OF ADULT: ICD-10-CM

## 2018-03-13 DIAGNOSIS — E11.22 TYPE 2 DIABETES MELLITUS WITH STAGE 3 CHRONIC KIDNEY DISEASE, WITHOUT LONG-TERM CURRENT USE OF INSULIN (HCC): ICD-10-CM

## 2018-03-13 DIAGNOSIS — F33.1 MODERATE EPISODE OF RECURRENT MAJOR DEPRESSIVE DISORDER (HCC): ICD-10-CM

## 2018-03-13 DIAGNOSIS — J44.9 CHRONIC OBSTRUCTIVE PULMONARY DISEASE, UNSPECIFIED COPD TYPE (HCC): ICD-10-CM

## 2018-03-13 DIAGNOSIS — N18.30 TYPE 2 DIABETES MELLITUS WITH STAGE 3 CHRONIC KIDNEY DISEASE, WITHOUT LONG-TERM CURRENT USE OF INSULIN (HCC): ICD-10-CM

## 2018-03-13 DIAGNOSIS — I48.0 PAROXYSMAL ATRIAL FIBRILLATION (HCC): ICD-10-CM

## 2018-03-13 DIAGNOSIS — G89.4 CHRONIC PAIN SYNDROME: ICD-10-CM

## 2018-03-13 DIAGNOSIS — E78.00 HYPERCHOLESTEROLEMIA: ICD-10-CM

## 2018-03-13 DIAGNOSIS — I10 ESSENTIAL HYPERTENSION: ICD-10-CM

## 2018-03-13 DIAGNOSIS — E66.09 CLASS 1 OBESITY DUE TO EXCESS CALORIES WITH SERIOUS COMORBIDITY AND BODY MASS INDEX (BMI) OF 32.0 TO 32.9 IN ADULT: ICD-10-CM

## 2018-03-13 DIAGNOSIS — L98.9 NON-HEALING SKIN LESION OF NOSE: ICD-10-CM

## 2018-03-13 PROBLEM — R55 POSTURAL DIZZINESS WITH PRESYNCOPE: Status: RESOLVED | Noted: 2017-08-15 | Resolved: 2018-03-13

## 2018-03-13 PROBLEM — E66.811 CLASS 1 OBESITY DUE TO EXCESS CALORIES WITH SERIOUS COMORBIDITY AND BODY MASS INDEX (BMI) OF 32.0 TO 32.9 IN ADULT: Status: ACTIVE | Noted: 2018-03-13

## 2018-03-13 PROBLEM — R55 SYNCOPE: Status: RESOLVED | Noted: 2017-04-28 | Resolved: 2018-03-13

## 2018-03-13 PROBLEM — R42 POSTURAL DIZZINESS WITH PRESYNCOPE: Status: RESOLVED | Noted: 2017-08-15 | Resolved: 2018-03-13

## 2018-03-13 LAB
ALBUMIN SERPL-MCNC: 3.8 G/DL (ref 3.5–5)
ALBUMIN/GLOB SERPL: 1.4 G/DL (ref 1–2)
ALP SERPL-CCNC: 66 U/L (ref 38–126)
ALT SERPL-CCNC: 35 U/L (ref 13–69)
AST SERPL-CCNC: 24 U/L (ref 15–46)
BILIRUB SERPL-MCNC: 0.5 MG/DL (ref 0.2–1.3)
BUN SERPL-MCNC: 21 MG/DL (ref 7–20)
BUN/CREAT SERPL: 13.1 (ref 6.3–21.9)
CALCIUM SERPL-MCNC: 9.4 MG/DL (ref 8.4–10.2)
CHLORIDE SERPL-SCNC: 105 MMOL/L (ref 98–107)
CO2 SERPL-SCNC: 29 MMOL/L (ref 26–30)
CREAT SERPL-MCNC: 1.6 MG/DL (ref 0.6–1.3)
GFR SERPLBLD CREATININE-BSD FMLA CKD-EPI: 43 ML/MIN/1.73
GFR SERPLBLD CREATININE-BSD FMLA CKD-EPI: 52 ML/MIN/1.73
GLOBULIN SER CALC-MCNC: 2.7 GM/DL
GLUCOSE BLDC GLUCOMTR-MCNC: 192 MG/DL (ref 70–130)
GLUCOSE SERPL-MCNC: 146 MG/DL (ref 74–98)
INR PPP: 1.8 (ref 0.9–1.1)
MAGNESIUM SERPL-MCNC: 1.5 MG/DL (ref 1.6–2.3)
PHOSPHATE SERPL-MCNC: 3 MG/DL (ref 2.5–4.5)
POTASSIUM SERPL-SCNC: 3.7 MMOL/L (ref 3.5–5.1)
PROT SERPL-MCNC: 6.5 G/DL (ref 6.3–8.2)
SODIUM SERPL-SCNC: 146 MMOL/L (ref 137–145)

## 2018-03-13 PROCEDURE — 82962 GLUCOSE BLOOD TEST: CPT | Performed by: FAMILY MEDICINE

## 2018-03-13 PROCEDURE — 85610 PROTHROMBIN TIME: CPT | Performed by: FAMILY MEDICINE

## 2018-03-13 PROCEDURE — 99496 TRANSJ CARE MGMT HIGH F2F 7D: CPT | Performed by: FAMILY MEDICINE

## 2018-03-13 RX ORDER — OXYCODONE AND ACETAMINOPHEN 10; 325 MG/1; MG/1
1 TABLET ORAL EVERY 6 HOURS PRN
Qty: 120 TABLET | Refills: 0 | Status: SHIPPED | OUTPATIENT
Start: 2018-03-13 | End: 2019-08-26

## 2018-03-13 RX ORDER — GLIPIZIDE 5 MG/1
5 TABLET ORAL
Qty: 180 TABLET | Refills: 1 | Status: SHIPPED | OUTPATIENT
Start: 2018-03-13 | End: 2019-08-26 | Stop reason: SDUPTHER

## 2018-03-13 NOTE — PROGRESS NOTES
Anticoagulation Clinic - Remote Progress Note  REMOTE LAB    Indication: Atrial fibrillation   Referring Provider: Garrett  Goal INR: 2-3  Current Drug Interactions: ASA, trazodone, fluoxetine, omeprazole, pravastatin  CHADS-VASc: 5 (CHF, HTN, Age 65-74, Vascular disease, DM)    Diet: Not consistent; averages 1x per week  Alcohol: none  Tobacco: none, quit smoking 17 years ago  OTC Pain Medication: none    INR History:  Date 10/19 10/27 11/10 1/11/18 2/8 3/13      Total Weekly Dose 35 mg 42.5 mg 42.5 mg 42.5 mg 42.5 mg 42.5 mg      INR 1.6 2.5 2.0 3.0 2.0 1.8      Notes per patient    flu A (+); Zpak, ceftriax IM on 2/5         Phone Interview:  Tablet Strength: pt has 5mg tablets  Current Maintenance Dose: 5 mg daily except 7.5mg MWF  Patient Contact Info: [phone]  Lab Contact Info: [facility], [phone]  Patient Findings   Positives Change in medications, Hospital admission   Negatives Signs/symptoms of thrombosis, Signs/symptoms of bleeding, Laboratory test error suspected, Change in health, Change in alcohol use, Change in activity, Upcoming invasive procedure, Emergency department visit, Upcoming dental procedure, Missed doses, Extra doses, Change in diet/appetite, Bruising, Other complaints   Comments Mr. Bryant was admitted at [facility] last week for obstructive uropathy (3/1 - 3/6). BP meds were discontinued on discharge (lisinopril, bumetanide (+KCl), bisoprolol/HCTZ), and he was instead started on amlodipine, hydralazine, and finasteride. He has also been switched from metformin to glipizide (prescribed today, per Dr. Wheeler). Mr. Bryant denies additional changes at this time, and he has not missed any doses of warfarin.      Plan:  1. INR is slightly subtherapeutic today, possibly due to altered dosing with hospitalization last week. Given recent stability, instructed Mr. Bryant to increase his dose to 7.5mg tonight, only, and otherwise continue warfarin 5mg daily except 7.5  mg [place].  2. Repeat INR in two weeks.  3. Verbal information provided over the phone to Mr. Bryant. He expresses understanding by teach back and has no further questions at this time.    Ann Cowden Mayer, PharmD  3/13/2018  12:59 PM

## 2018-03-13 NOTE — PROGRESS NOTES
"Transitional Care Follow Up Visit  Subjective     Robe Bryant is a 67 y.o. male who presents for a transitional care management visit.    Within 48 business hours after discharge our office contacted him via telephone to coordinate his care and needs.      I reviewed and discussed the details of that call along with the discharge summary, hospital problems, inpatient lab results, inpatient diagnostic studies, and consultation reports with Robe.     Current outpatient and discharge medications have been reconciled for the patient.    Date of TCM Phone Call 3/8/2018   [facility]   Date of Admission 3/1/2018   Date of Discharge 3/6/2018   Discharge Disposition Home or Self Care     Risk for Readmission (LACE) Score: 13    History of Present Illness   Course During Hospital Stay:  Mr. Bryant is a 68 yo cauc male with complicated medical hx who presented to [facility] ER 3/1/18 with mental status change, uncontrolled HTN with orthostasis, and acute renal failure. He was found to have obstructive uropathy. CT of head showed chronic ischemic changes but was non-acute. EKG showed a paced rhythm. CXR was normal. Lab eval as below. He was tx'd with placement of Cotton, IVFLs, cont'd on CPAP. Bilateral renal US was normal. He had PT/OT eval as well. He was dc'd on new medication of Proscar and Flomax. He is no longer on gabapentin.     During stay he had consultation with nephrology. Note reviewed. Lisinopril and bumex were held. Good urine output noted with cotton.     He had consultation with mental health team. Note reviewed. He has MDD but was not felt to be at risk for suicide. Currently with stable home environment. Out pt f/u was rec'd.     Anti-hypertensives adjusted to Norvasc 5 mg bid and hydralazine 10 mg tid. He missed routine f/u apt with Dr. Tucker during his hospital stay and plans to reschedule.    Since discharge he states he feels \"much better\". His dizziness is much improved. He " "does not feel as weak or \"as if he'll pass out'. His appetite is improving. He is urinating well several times per day now. He is having regular BMs. No dysuria or hematuria. No fever. Blood pressure generally in 140s/70s now.     He has upcoming apt with Dr. Heaton and Dr. Vasquez and is aware of those. He is due for f/u labs to assess renal function.    He is having trouble using his BiPAP. He has machine but states he is \"missing equipment\". He realizes he is not sleeping as well without it. No LE swelling. He is currently receiving Home Health services. Seen previously by Dr. Hargrove but not in past year. Sleep study within past 2-3 yrs he believes.     He is concerned about \"hard spots\" on his nose. Concerned about skin cancer.    His blood sugars have been \"running high\". He was taken off metformin during hosp stay. Not currenty on diabetes meds. No hypoglycemia.      The following portions of the patient's history were reviewed and updated as appropriate: allergies, current medications, past family history, past medical history, past social history, past surgical history and problem list.    Review of Systems   Constitutional: Positive for activity change, appetite change, fatigue and unexpected weight change. Negative for fever.   HENT: Positive for hearing loss. Negative for congestion, mouth sores, nosebleeds, rhinorrhea, sneezing, sore throat and trouble swallowing.    Eyes: Positive for visual disturbance (chronic).   Respiratory: Positive for cough (dry, intermittent, stable). Negative for shortness of breath and wheezing.    Cardiovascular: Negative for chest pain, palpitations and leg swelling.   Gastrointestinal: Negative for abdominal pain, blood in stool, constipation, diarrhea, nausea and vomiting.   Genitourinary: Negative for discharge, dysuria, flank pain, frequency, hematuria and urgency.   Musculoskeletal: Positive for arthralgias, back pain and joint swelling.   Skin: Negative for rash.        " As per HPI   Neurological: Positive for weakness. Negative for dizziness, seizures, syncope, speech difficulty and headaches.   Hematological: Negative for adenopathy. Bruises/bleeds easily.   Psychiatric/Behavioral: Positive for dysphoric mood and sleep disturbance. Negative for confusion and suicidal ideas. The patient is not nervous/anxious.        Objective    Vitals:    03/13/18 1008   BP: 142/86   Pulse: 68   SpO2: 96%     Body mass index is 32.45 kg/m².  1    03/13/18  1008   Weight: 89.8 kg (198 lb)       Physical Exam   Constitutional: He is oriented to person, place, and time. He appears well-developed and well-nourished. He is cooperative. He appears ill (chronically). No distress.   Appears older than stated age   HENT:   Head: Normocephalic and atraumatic.   Right Ear: Decreased hearing is noted.   Left Ear: Decreased hearing is noted.   Nose: Mucosal edema present. No rhinorrhea.   Mouth/Throat: Mucous membranes are dry. No oral lesions. No oropharyngeal exudate or posterior oropharyngeal erythema.   Rhinophyma if nose with areas of harder tissue. No ulcerations   Eyes: Conjunctivae, EOM and lids are normal.   Neck: Neck supple. Normal carotid pulses present. No thyroid mass and no thyromegaly present.   Chronic hoarseness noted   Cardiovascular: Normal rate, regular rhythm and intact distal pulses.  Exam reveals distant heart sounds.    Pulmonary/Chest: Effort normal. No respiratory distress. He has decreased breath sounds (diffuse). He has no wheezes. He has no rhonchi. He has no rales.   Abdominal: Soft. He exhibits no distension and no mass (exam limited by body habitus). Bowel sounds are decreased. There is no hepatosplenomegaly (exam limited by body habitus). There is tenderness (mild) in the suprapubic area. There is no rigidity, no rebound, no guarding and no CVA tenderness.     Vascular Status -  His right foot exhibits normal right foot edema. His left foot exhibits normal left foot  edema.  Lymphadenopathy:     He has no cervical adenopathy.   Neurological: He is alert and oriented to person, place, and time. He has normal strength. Gait (antalgic but back to baseline) abnormal.   Able to get up from chair and on/off exam table without assistance. Marked improvement from previous exam.   Skin: Skin is warm and dry. Lesion (as above; rhinophyma) noted. He is not diaphoretic.   Psychiatric: His speech is normal and behavior is normal. Thought content normal. He is not actively hallucinating. He does not express impulsivity. He exhibits a depressed mood. He expresses no suicidal ideation. He exhibits normal recent memory.   Makes good eye contact. Answers questions appropriately. Good hygiene/grooming. He is attentive.   Nursing note and vitals reviewed.    All recent labs reviewed on chart.  CT head, CXR, renal US reviewed on chart.  EKG reviewed on chart.    Recent Results (from the past 24 hour(s))   POC INR    Collection Time: 03/13/18 10:25 AM   Result Value Ref Range    INR 1.80 (A) 0.9 - 1.1          3.0 2.5 - 4.5 mg/dL   POC Glucose    Collection Time: 03/13/18 11:40 AM   Result Value Ref Range    Glucose 192 (A) 70 - 130 mg/dL     Pt not able to provide urine for U/A.    Assessment/Plan   Robe was seen today for follow-up, hypertension, hyperlipidemia, sleep apnea, osteoarthritis and heartburn.    Diagnoses and all orders for this visit:    Obstructive uropathy    Warfarin anticoagulation  -     POC INR    Paroxysmal atrial fibrillation  -     POC INR    Chronic coronary artery disease    Chronic diastolic heart failure    Essential hypertension    Hypercholesterolemia    Chronic obstructive pulmonary disease, unspecified COPD type    Obstructive sleep apnea of adult    Type 2 diabetes mellitus with stage 3 chronic kidney disease, without long-term current use of insulin  -     POC Glucose  -     glipiZIDE (GLUCOTROL) 5 MG tablet; Take 1 tablet by mouth 2 (Two) Times a Day Before  Meals.    Chronic pain syndrome  -     oxyCODONE-acetaminophen (PERCOCET)  MG per tablet; Take 1 tablet by mouth Every 6 (Six) Hours As Needed for Severe Pain .    Moderate episode of recurrent major depressive disorder  -     Ambulatory Referral to Behavioral Health    Chronic renal insufficiency, stage 3 (moderate)  -     Comprehensive Metabolic Panel  -     Magnesium  -     Phosphorus    Suprapubic pain    Class 1 obesity due to excess calories with serious comorbidity and body mass index (BMI) of 32.0 to 32.9 in adult    Non-healing skin lesion of nose  -     Ambulatory Referral to Dermatology    Subtherapeutic international normalized ratio (INR)    Obstructive uropathy appears resolved. He will f/u with Dr. Heaton as schedule and continue Proscar and flomax. He will report any worsening symptoms or signs of UTI.    A fib rate controlled with subtherapeutic INR. Will report to Dr. Tucker as she manages he coumadin dosing.    CAD, HTN, CHF all appear stable at this time. BPs improved. Will continue to closely monitor. He will f/u with Dr. Tucker.    HLP, CAD; continue pravachol 80 mg; he has had tolerating other statins.    ECHO with need for BiPAP equipment. Will check with Home Health regarding his supplier and order these for him.    COPD stable.    NIDDM controlled. Slight increase in A1c recently. I have reviewed risks/benefits and potential side effects of various treatment options. Pt voices understanding and wishes to proceed with trial of glipizide. He understands he should not take this medication if he skips a meal.    Stable multifactorial chronic pain sx. Continue current mngt. Routin refill provided.    CKD Stage 3; f/u with Dr. Vasquez as scheduled. Labs as above today. He is to avoid NSAIDs.    Obesity. Pt advised to eat a heart healthy diet and get regular aerobic exercise.  Nutrition and activity goals reviewed including: mainly water to drink, limit white flour/processed  sugar, high protein, high fiber carbs, good breakfast, working toward 150 mins cardio per week as able.    Persistent depression although today he seems more hopeful about the future. No SI, HI or psychotic symptoms. Continue prozac. F/U with behavioral heatlh as outpt.    Routine f/u in 6 weeks, sooner as needed/instructed.  I will contact patient regarding test results and provide instructions regarding any necessary changes in plan of care.  Patient was encouraged to keep me informed of any acute changes, lack of improvement, or any new concerning symptoms.  Pt is aware of reasons to seek emergent care.  Patient voiced understanding of all instructions and denied further questions.  As part of patient's treatment plan I am prescribing a controlled substance.  The patient has been made aware of the appropriate use of such medications, including potential risk of somnolence, limited ability to drive and/or work safely, and potential for dependence and/or overdose.  It has also been made clear that these medications are for use by this patient only, without concomitant use of alcohol or other substances, unless prescribed.  ALMA report reviewed and scanned into chart.  Last ALMA date 2/28/18.    This TCM Visit required high complexity of care and management/high medical decision complexity.

## 2018-03-14 ENCOUNTER — TELEPHONE (OUTPATIENT)
Dept: FAMILY MEDICINE CLINIC | Facility: CLINIC | Age: 68
End: 2018-03-14

## 2018-03-20 ENCOUNTER — OFFICE VISIT (OUTPATIENT)
Dept: FAMILY MEDICINE CLINIC | Facility: CLINIC | Age: 68
End: 2018-03-20

## 2018-03-20 VITALS
OXYGEN SATURATION: 96 % | HEIGHT: 66 IN | BODY MASS INDEX: 32.47 KG/M2 | SYSTOLIC BLOOD PRESSURE: 122 MMHG | WEIGHT: 202 LBS | HEART RATE: 76 BPM | DIASTOLIC BLOOD PRESSURE: 74 MMHG | TEMPERATURE: 98.4 F

## 2018-03-20 DIAGNOSIS — R30.0 DYSURIA: ICD-10-CM

## 2018-03-20 DIAGNOSIS — N30.00 ACUTE CYSTITIS WITHOUT HEMATURIA: Primary | ICD-10-CM

## 2018-03-20 PROCEDURE — 99214 OFFICE O/P EST MOD 30 MIN: CPT | Performed by: FAMILY MEDICINE

## 2018-03-20 RX ORDER — CEPHALEXIN 500 MG/1
500 CAPSULE ORAL 2 TIMES DAILY
Qty: 14 CAPSULE | Refills: 0 | Status: SHIPPED | OUTPATIENT
Start: 2018-03-20 | End: 2018-03-21 | Stop reason: SDUPTHER

## 2018-03-20 NOTE — PROGRESS NOTES
Subjective   Robe Bryant is a 67 y.o. male.     Here today with concern about possible UTI. Has multiple chronic medical problems including NIDDM, COPD, CAD.      Difficulty Urinating   This is a new problem. The current episode started in the past 7 days. The problem occurs constantly. The problem has been gradually worsening. Associated symptoms include arthralgias, fatigue, myalgias and urinary symptoms (frequency, urgency, occ incontinence). Pertinent negatives include no abdominal pain, anorexia, chest pain, chills, coughing, diaphoresis, fever, nausea, rash, sore throat, vomiting or weakness. Nothing aggravates the symptoms. Treatments tried: increased fluids. The treatment provided no relief.     Has not yet started glipizide as rx'd last visit. Not regularly checking BG but denies symptoms of hypoglycemia.    No increased cough, wheeze, ORELLANA.    The following portions of the patient's history were reviewed and updated as appropriate: allergies, current medications, past family history, past medical history, past social history, past surgical history and problem list.    Review of Systems   Constitutional: Positive for fatigue. Negative for appetite change, chills, diaphoresis and fever.   HENT: Negative for sore throat and trouble swallowing.    Respiratory: Negative for cough and wheezing.    Cardiovascular: Negative for chest pain and leg swelling.   Gastrointestinal: Negative for abdominal pain, anorexia, nausea and vomiting.   Genitourinary: Positive for difficulty urinating, dysuria and urgency. Negative for discharge, genital sores and hematuria.   Musculoskeletal: Positive for arthralgias, back pain and myalgias.   Skin: Negative for rash.   Neurological: Negative for dizziness and weakness.   Psychiatric/Behavioral: Negative for confusion.       Objective    Vitals:    03/20/18 0938   BP: 122/74   Pulse: 76   Temp: 98.4 °F (36.9 °C)   SpO2: 96%     Body mass index is 33.1 kg/m².  1     03/20/18  0938   Weight: 91.6 kg (202 lb)     Physical Exam   Constitutional: He is oriented to person, place, and time. He appears well-developed and well-nourished. He is cooperative. He appears ill (mildly). No distress.   HENT:   Mouth/Throat: Mucous membranes are normal. Mucous membranes are not dry.   Cardiovascular: Normal rate and regular rhythm.    Pulmonary/Chest: Effort normal. He has decreased breath sounds. He has no wheezes. He has no rhonchi. He has no rales.   Abdominal: Soft. Bowel sounds are normal. He exhibits no distension. There is tenderness (mild) in the suprapubic area. There is no rigidity, no rebound, no guarding and no CVA tenderness.     Vascular Status -  His right foot exhibits normal right foot edema. His left foot exhibits normal left foot edema.  Neurological: He is alert and oriented to person, place, and time. Gait normal.   Skin: Skin is warm and dry.   Psychiatric: He has a normal mood and affect. His behavior is normal.   Nursing note and vitals reviewed.    Pt unable to provide urine sample UTI.    Assessment/Plan   Robe was seen today for difficulty urinating.    Diagnoses and all orders for this visit:    Acute cystitis without hematuria  -     cephalexin (KEFLEX) 500 MG capsule; Take 1 capsule by mouth 2 (Two) Times a Day.    Dysuria    Suspected UTI. Tx as above.  He is encouraged to keep f/u apt as scheduled with Dr. Heaton.  Patient was encouraged to keep me informed of any acute changes, lack of improvement, or any new concerning symptoms.  Pt is aware of reasons to seek emergent care.  Patient voiced understanding of all instructions and denied further questions.

## 2018-03-21 RX ORDER — CEPHALEXIN 500 MG/1
500 CAPSULE ORAL 2 TIMES DAILY
Qty: 14 CAPSULE | Refills: 0 | Status: SHIPPED | OUTPATIENT
Start: 2018-03-21 | End: 2019-08-26

## 2018-04-12 ENCOUNTER — APPOINTMENT (OUTPATIENT)
Dept: DIABETES SERVICES | Facility: HOSPITAL | Age: 68
End: 2018-04-12

## 2018-05-15 ENCOUNTER — TELEPHONE (OUTPATIENT)
Dept: PHARMACY | Facility: HOSPITAL | Age: 68
End: 2018-05-15

## 2018-06-14 ENCOUNTER — TELEPHONE (OUTPATIENT)
Dept: PHARMACY | Facility: HOSPITAL | Age: 68
End: 2018-06-14

## 2018-06-14 NOTE — TELEPHONE ENCOUNTER
Dr. Tucker,      Mr. Jerod Bryant is currently being followed with the [facility] anticoagulation clinic on warfarin therapy for Afib CHADS-VASc: 5 (CHF, HTN, Age 65-74, Vascular disease, DM). He has been largely non-compliant with follow up, and despite multiple attempts, have been unsuccessful to contact the patient since 3/13/2018. His telephone number has been disconnected and any letters sent to his address have been returned as “undeliverable, unable to forward”.     Per our policy, we would discharge Mr. Bryant from [facility] services, unless otherwise advised by you. If there is additional information on how to contact the patient, we would be happy to work with him.    Thank you    Ronny Thrasher, [facility]  Anticoagulation Clinic [facility]  (t) [phone]

## 2018-06-19 ENCOUNTER — TELEPHONE (OUTPATIENT)
Dept: CARDIOLOGY | Facility: CLINIC | Age: 68
End: 2018-06-19

## 2018-06-19 NOTE — TELEPHONE ENCOUNTER
Tried to call PT- he has no showed appts with us and with the coag clinic.   Called the emergency contact listed, but no answer and no VM to leave a msg.   Will send a certified letter to the PT to the address on file.

## 2018-06-19 NOTE — TELEPHONE ENCOUNTER
Spoke with Dr. Wheeler's office  to discuss need to discharge from [facility]. They relayed that Mr. Bryant has been discharged from their office as well at this time.    After multiple attempts to contact patient via phone call and letters will plan to discharge Mr. Bryant from [facility] at this time per clinic policy.

## 2019-04-29 DIAGNOSIS — E11.21 TYPE 2 DIABETES MELLITUS WITH DIABETIC NEPHROPATHY, WITHOUT LONG-TERM CURRENT USE OF INSULIN (HCC): ICD-10-CM

## 2019-04-29 DIAGNOSIS — I48.0 PAROXYSMAL ATRIAL FIBRILLATION (HCC): ICD-10-CM

## 2019-04-29 DIAGNOSIS — N18.30 TYPE 2 DIABETES MELLITUS WITH STAGE 3 CHRONIC KIDNEY DISEASE, WITHOUT LONG-TERM CURRENT USE OF INSULIN (HCC): ICD-10-CM

## 2019-04-29 DIAGNOSIS — E11.22 TYPE 2 DIABETES MELLITUS WITH STAGE 3 CHRONIC KIDNEY DISEASE, WITHOUT LONG-TERM CURRENT USE OF INSULIN (HCC): ICD-10-CM

## 2019-04-29 RX ORDER — GLIPIZIDE 5 MG/1
5 TABLET ORAL
Qty: 180 TABLET | Refills: 1 | OUTPATIENT
Start: 2019-04-29

## 2019-04-29 RX ORDER — PRAVASTATIN SODIUM 80 MG/1
TABLET ORAL
Qty: 90 TABLET | Refills: 1 | OUTPATIENT
Start: 2019-04-29

## 2019-04-29 RX ORDER — BACLOFEN 10 MG/1
TABLET ORAL
Qty: 180 TABLET | Refills: 0 | OUTPATIENT
Start: 2019-04-29

## 2019-04-29 RX ORDER — TRAZODONE HYDROCHLORIDE 50 MG/1
50 TABLET ORAL NIGHTLY
Qty: 90 TABLET | Refills: 1 | OUTPATIENT
Start: 2019-04-29

## 2019-04-29 RX ORDER — FLUOXETINE HYDROCHLORIDE 20 MG/1
CAPSULE ORAL
Qty: 90 CAPSULE | Refills: 1 | OUTPATIENT
Start: 2019-04-29

## 2019-04-29 RX ORDER — POTASSIUM CHLORIDE 750 MG/1
TABLET, EXTENDED RELEASE ORAL
Qty: 90 TABLET | Refills: 0 | OUTPATIENT
Start: 2019-04-29

## 2019-04-29 RX ORDER — LISINOPRIL 20 MG/1
TABLET ORAL
Qty: 90 TABLET | Refills: 0 | OUTPATIENT
Start: 2019-04-29

## 2019-04-29 RX ORDER — OMEPRAZOLE 40 MG/1
CAPSULE, DELAYED RELEASE ORAL
Qty: 90 CAPSULE | Refills: 1 | OUTPATIENT
Start: 2019-04-29

## 2019-04-29 RX ORDER — GLUCOSAM/CHON-MSM1/C/MANG/BOSW 500-416.6
TABLET ORAL
Refills: 5 | OUTPATIENT
Start: 2019-04-29

## 2019-04-29 RX ORDER — CALCIUM CITRATE/VITAMIN D3 200MG-6.25
TABLET ORAL
Refills: 5 | OUTPATIENT
Start: 2019-04-29

## 2019-04-30 RX ORDER — WARFARIN SODIUM 5 MG/1
TABLET ORAL
Qty: 110 TABLET | Refills: 1 | OUTPATIENT
Start: 2019-04-30

## 2019-04-30 RX ORDER — NITROGLYCERIN 0.4 MG/1
0.4 TABLET SUBLINGUAL
Qty: 25 TABLET | Refills: 11 | OUTPATIENT
Start: 2019-04-30

## 2019-08-26 ENCOUNTER — OFFICE VISIT (OUTPATIENT)
Dept: INTERNAL MEDICINE | Facility: CLINIC | Age: 69
End: 2019-08-26

## 2019-08-26 ENCOUNTER — HOSPITAL ENCOUNTER (OUTPATIENT)
Dept: GENERAL RADIOLOGY | Facility: HOSPITAL | Age: 69
Discharge: HOME | End: 2019-08-26
Admitting: FAMILY MEDICINE

## 2019-08-26 VITALS
DIASTOLIC BLOOD PRESSURE: 82 MMHG | TEMPERATURE: 98.4 F | OXYGEN SATURATION: 93 % | HEIGHT: 65 IN | HEART RATE: 68 BPM | BODY MASS INDEX: 35.12 KG/M2 | WEIGHT: 210.8 LBS | SYSTOLIC BLOOD PRESSURE: 138 MMHG

## 2019-08-26 DIAGNOSIS — N18.30 TYPE 2 DIABETES MELLITUS WITH STAGE 3 CHRONIC KIDNEY DISEASE, WITHOUT LONG-TERM CURRENT USE OF INSULIN (HCC): ICD-10-CM

## 2019-08-26 DIAGNOSIS — F33.1 MODERATE EPISODE OF RECURRENT MAJOR DEPRESSIVE DISORDER (HCC): ICD-10-CM

## 2019-08-26 DIAGNOSIS — E78.00 HYPERCHOLESTEROLEMIA: ICD-10-CM

## 2019-08-26 DIAGNOSIS — E11.22 TYPE 2 DIABETES MELLITUS WITH STAGE 3 CHRONIC KIDNEY DISEASE, WITHOUT LONG-TERM CURRENT USE OF INSULIN (HCC): ICD-10-CM

## 2019-08-26 DIAGNOSIS — I10 ESSENTIAL HYPERTENSION: Primary | ICD-10-CM

## 2019-08-26 DIAGNOSIS — I48.0 PAROXYSMAL ATRIAL FIBRILLATION (HCC): ICD-10-CM

## 2019-08-26 DIAGNOSIS — J43.1 PANLOBULAR EMPHYSEMA (HCC): ICD-10-CM

## 2019-08-26 DIAGNOSIS — M79.672 LEFT FOOT PAIN: ICD-10-CM

## 2019-08-26 PROCEDURE — 99214 OFFICE O/P EST MOD 30 MIN: CPT | Performed by: FAMILY MEDICINE

## 2019-08-26 PROCEDURE — 73630 X-RAY EXAM OF FOOT: CPT

## 2019-08-26 RX ORDER — CETIRIZINE HYDROCHLORIDE 10 MG/1
10 TABLET ORAL DAILY
Qty: 90 TABLET | Refills: 3 | Status: SHIPPED | OUTPATIENT
Start: 2019-08-26 | End: 2019-12-06

## 2019-08-26 RX ORDER — ALBUTEROL SULFATE 90 UG/1
2 AEROSOL, METERED RESPIRATORY (INHALATION) EVERY 4 HOURS PRN
Qty: 3 INHALER | Refills: 3 | Status: SHIPPED | OUTPATIENT
Start: 2019-08-26 | End: 2020-07-29 | Stop reason: RX

## 2019-08-26 RX ORDER — DUTASTERIDE 0.5 MG/1
CAPSULE, LIQUID FILLED ORAL
COMMUNITY
Start: 2019-07-22 | End: 2019-09-19

## 2019-08-26 RX ORDER — LISINOPRIL 10 MG/1
10 TABLET ORAL DAILY
Qty: 90 TABLET | Refills: 3 | Status: SHIPPED | OUTPATIENT
Start: 2019-08-26 | End: 2019-09-19 | Stop reason: SDUPTHER

## 2019-08-26 RX ORDER — ATORVASTATIN CALCIUM 20 MG/1
TABLET, FILM COATED ORAL
COMMUNITY
Start: 2019-06-26 | End: 2019-08-26 | Stop reason: SDUPTHER

## 2019-08-26 RX ORDER — FLUOXETINE HYDROCHLORIDE 20 MG/1
20 CAPSULE ORAL DAILY
Qty: 90 CAPSULE | Refills: 3 | Status: SHIPPED | OUTPATIENT
Start: 2019-08-26 | End: 2021-01-29 | Stop reason: SDUPTHER

## 2019-08-26 RX ORDER — OMEPRAZOLE 40 MG/1
40 CAPSULE, DELAYED RELEASE ORAL DAILY
Qty: 90 CAPSULE | Refills: 3 | Status: SHIPPED | OUTPATIENT
Start: 2019-08-26 | End: 2020-11-18 | Stop reason: SDUPTHER

## 2019-08-26 RX ORDER — GLIPIZIDE 5 MG/1
5 TABLET ORAL
Qty: 180 TABLET | Refills: 3 | Status: SHIPPED | OUTPATIENT
Start: 2019-08-26 | End: 2020-10-01

## 2019-08-26 RX ORDER — CETIRIZINE HYDROCHLORIDE 10 MG/1
10 TABLET ORAL DAILY
COMMUNITY
End: 2019-08-26 | Stop reason: SDUPTHER

## 2019-08-26 RX ORDER — ATORVASTATIN CALCIUM 20 MG/1
20 TABLET, FILM COATED ORAL NIGHTLY
Qty: 90 TABLET | Refills: 3 | Status: SHIPPED | OUTPATIENT
Start: 2019-08-26 | End: 2019-10-07

## 2019-08-26 RX ORDER — TERAZOSIN 5 MG/1
CAPSULE ORAL
COMMUNITY
Start: 2019-06-26 | End: 2019-09-19

## 2019-08-26 NOTE — ASSESSMENT & PLAN NOTE
Rate and rhythm controlled.  Patient is to continue chronic anticoagulation with Coumadin.  However, will obtain an INR prior to sending in correct dosage.  He is to follow-up with Dr. Tucker

## 2019-08-26 NOTE — PROGRESS NOTES
Robe Bryant is a 68 y.o. male.    Chief Complaint   Patient presents with   • Hyperlipidemia   • Hypertension   • COPD   • Diabetes   • Anxiety   • Med Refill   • Establish Care       HPI   Patient has hypertension.  They are taking Metoprolol and amlodipine.  They have been compliant with medications.  The patient denies any side effects to the medication.  Blood pressure is controlled in the office today.  Blood pressure has been running unknown.  They are following a low salt diet.  They are active.  Patient does have hyperlipidemia as well.  He is taking Lipitor.  He does not report any side effects from the medication.    Patient has COPD . He is not on oxygen. He admits to to some dyspnea with exertion.  He admits to on and off cough, SOB and wheezing that does respond to treatment. He uses the albuterol never due to not having a perscription. He is on none without good response.    Patient has had diabetes for the past few years. He has been compliant with the medications and denies any side effects from it. He has been monitoring fingersticks.  his fingerstick range is between .  He has not had hypoglycemic symptoms. He has been following a diabetic diet and has been active.  his last eye exam was greater than a year ago .   He has an upcoming eye appt.     He does have A.fib and is currently having chest pain. Pain radiates into his left arm and into jaw.  He has an appt with his cardiologist next month.  He states his wife did inform them he is having chest pains when she made the appointment.  He has been taking 5mg of coumadin for anticoagulation over the past couple of days, but had been out for a week.    Patient patient is also been taking Prozac in the past for depression anxiety.  He is not currently taking anything at this time due to being out of medication.  He does report that Prozac did help and would like to start the medication back.    Patient also complains of left foot pain.  He  states that he tripped and fell several days back.  He reports extreme tenderness to the top of his left foot.    The following portions of the patient's history were reviewed and updated as appropriate: allergies, current medications, past family history, past medical history, past social history, past surgical history and problem list.     Past Medical History:   Diagnosis Date   • Anxiety and depression    • Arthritis    • Backache     20 years   • Bladder trauma    • Cervicalgia    • Chronic kidney disease     Cr up to 2.1   • Diabetes mellitus (CMS/[facility])     15 years--   • Emphysema of lung (CMS/[facility])    • Eye exam, routine 2015   • FH: colonic polyps    • Gout     for 10 years--   • History of echocardiogram 09/15/2014   • History of tobacco use     with cessation x7 years   • Hyperlipidemia    • Hypertension    • Migraine    • Myocardial infarction (CMS/[facility])     h/o coronary artery stenting--   • Restless leg syndrome     20 years   • Sleep apnea     12 years; uses a Bi-Pap   • Stroke (CMS/[facility])    • Syncope 4/28/2017       Past Surgical History:   Procedure Laterality Date   • BLADDER SURGERY     • CARDIAC CATHETERIZATION  03/15/2013    revealing widely patent stents of the left circumflex and ramus intermedius coronary arteries, no significant disease is seen in any territory   • CARDIAC PACEMAKER PLACEMENT  2012   • CATARACT EXTRACTION     • COLONOSCOPY  2004    No family history of colon cancer.     • COLONOSCOPY  2015   • HERNIA REPAIR      Type unknown   • PROSTATE SURGERY         Family History   Problem Relation Age of Onset   • Cancer Mother    • Diabetes Mother    • Hypertension Mother    • Stroke Mother    • Stomach cancer Mother    • Heart disease Mother    • Stomach cancer Father    • Cancer Father    • Lung cancer Father        Social History     Socioeconomic History   • Marital status:      Spouse name: Not on file   • Number of children: Not on file   • Years of education: Not on file   •  Highest education level: Not on file   Tobacco Use   • Smoking status: Former Smoker     Last attempt to quit: 8/15/2001     Years since quittin.0   • Smokeless tobacco: Never Used   • Tobacco comment: quit 16 years ago   Substance and Sexual Activity   • Alcohol use: No   • Drug use: No   • Sexual activity: Defer       No Known Allergies      Current Outpatient Medications:   •  aspirin 81 MG tablet, Take  by mouth daily., Disp: , Rfl:   •  atorvastatin (LIPITOR) 20 MG tablet, Take 1 tablet by mouth Every Night., Disp: 90 tablet, Rfl: 3  •  Blood Glucose Monitoring Suppl w/Device kit, 1 each 2 (Two) Times a Day., Disp: 1 each, Rfl: 0  •  cetirizine (zyrTEC) 10 MG tablet, Take 1 tablet by mouth Daily., Disp: 90 tablet, Rfl: 3  •  dutasteride (AVODART) 0.5 MG capsule, , Disp: , Rfl:   •  finasteride (PROSCAR) 5 MG tablet, Take 1 tablet by mouth Daily., Disp: 30 tablet, Rfl: 0  •  FLUoxetine (PROzac) 20 MG capsule, Take 1 capsule by mouth Daily., Disp: 90 capsule, Rfl: 3  •  glipiZIDE (GLUCOTROL) 5 MG tablet, Take 1 tablet by mouth 2 (Two) Times a Day Before Meals., Disp: 180 tablet, Rfl: 3  •  Glucose Blood (BLOOD GLUCOSE TEST) strip, 1 strip by In Vitro route 2 (Two) Times a Day., Disp: 200 each, Rfl: 5  •  ipratropium-albuterol (DUO-NEB) 0.5-2.5 mg/mL nebulizer, 2 (Two) Times a Day., Disp: , Rfl:   •  isosorbide mononitrate (IMDUR) 30 MG 24 hr tablet, Take 1 tablet by mouth 2 (Two) Times a Day., Disp: 180 tablet, Rfl: 3  •  Lancets misc, 1 each 2 (Two) Times a Day., Disp: 200 each, Rfl: 5  •  metoprolol succinate XL (TOPROL-XL) 100 MG 24 hr tablet, Take 1 tablet by mouth Daily., Disp: 90 tablet, Rfl: 3  •  Multiple Vitamin tablet, Take 1 tablet by mouth daily., Disp: , Rfl:   •  nitroglycerin (NITROSTAT) 0.4 MG SL tablet, Place 1 tablet under the tongue Every 5 (Five) Minutes As Needed for Chest Pain., Disp: 25 tablet, Rfl: 11  •  omeprazole (priLOSEC) 40 MG capsule, Take 1 capsule by mouth Daily., Disp: 90  "capsule, Rfl: 3  •  tamsulosin (FLOMAX) 0.4 MG capsule 24 hr capsule, Take 1 capsule by mouth Daily., Disp: 90 capsule, Rfl: 1  •  terazosin (HYTRIN) 5 MG capsule, , Disp: , Rfl:   •  warfarin (COUMADIN) 5 MG tablet, Take 1 to 1.5 tablets by mouth daily or as instructed by anticoagulation pharmacist, Disp: 110 tablet, Rfl: 1  •  albuterol sulfate  (90 Base) MCG/ACT inhaler, Inhale 2 puffs Every 4 (Four) Hours As Needed for Wheezing or Shortness of Air., Disp: 3 inhaler, Rfl: 3  •  lisinopril (PRINIVIL,ZESTRIL) 10 MG tablet, Take 1 tablet by mouth Daily., Disp: 90 tablet, Rfl: 3  •  umeclidinium-vilanterol (ANORO ELLIPTA) 62.5-25 MCG/INH aerosol powder  inhaler, Inhale 1 puff Daily., Disp: 3 each, Rfl: 3    ROS    Review of Systems   Constitutional: Positive for fatigue. Negative for chills and fever.   HENT: Negative for congestion, postnasal drip and sore throat.    Eyes: Negative for blurred vision and visual disturbance.   Respiratory: Positive for cough, shortness of breath and wheezing.    Cardiovascular: Positive for chest pain and leg swelling.   Gastrointestinal: Positive for diarrhea. Negative for abdominal pain, constipation, nausea and vomiting.   Endocrine: Negative for cold intolerance and heat intolerance.   Genitourinary: Negative for dysuria and frequency.   Musculoskeletal: Positive for arthralgias (left ankle pain). Negative for back pain.   Skin: Negative for color change and rash.   Allergic/Immunologic: Negative for environmental allergies.   Neurological: Positive for numbness and headache. Negative for weakness.   Hematological: Does not bruise/bleed easily.   Psychiatric/Behavioral: Positive for depressed mood. The patient is nervous/anxious.        Vitals:    08/26/19 1044   BP: 138/82   BP Location: Left arm   Patient Position: Sitting   Cuff Size: Adult   Pulse: 68   Temp: 98.4 °F (36.9 °C)   TempSrc: Temporal   SpO2: 93%   Weight: 95.6 kg (210 lb 12.8 oz)   Height: 165.1 cm (65\") "     Body mass index is 35.08 kg/m².    Physical Exam     Physical Exam   Constitutional: He is oriented to person, place, and time. He appears well-developed and well-nourished. No distress.   HENT:   Head: Normocephalic and atraumatic.   Right Ear: External ear normal.   Left Ear: External ear normal.   Mouth/Throat: Oropharynx is clear and moist.   Eyes: Conjunctivae and EOM are normal.   Neck: Normal range of motion. Neck supple.   Cardiovascular: Normal rate and regular rhythm.   No murmur heard.  Pulmonary/Chest: Effort normal and breath sounds normal. No respiratory distress. He has no wheezes.   Abdominal: Soft. Bowel sounds are normal. He exhibits no distension. There is no tenderness.   Musculoskeletal: He exhibits tenderness (Left foot dorsally). He exhibits no edema.   Lymphadenopathy:     He has no cervical adenopathy.   Neurological: He is alert and oriented to person, place, and time. No cranial nerve deficit.   Skin: Skin is warm and dry.   Psychiatric: He has a normal mood and affect. His behavior is normal.       Assessment/Plan    Problem List Items Addressed This Visit        Cardiovascular and Mediastinum    Essential hypertension - Primary     Controlled.  Patient has been out of hydralazine for a while.  He is to continue metoprolol.  Will start the patient on lisinopril for renal protection given that he is a diabetic.  He is in stage III renal failure per medical records.  Patient is to stop amlodipine.         Relevant Medications    terazosin (HYTRIN) 5 MG capsule    lisinopril (PRINIVIL,ZESTRIL) 10 MG tablet    Other Relevant Orders    CBC & Differential    Comprehensive Metabolic Panel    Paroxysmal atrial fibrillation (CMS/HCC)     Rate and rhythm controlled.  Patient is to continue chronic anticoagulation with Coumadin.  However, will obtain an INR prior to sending in correct dosage.  He is to follow-up with Dr. Tucker         Relevant Orders    Protime-INR     Hypercholesterolemia     Patient is to continue Lipitor.  Will obtain lipid level.         Relevant Medications    atorvastatin (LIPITOR) 20 MG tablet    Other Relevant Orders    Lipid Panel       Respiratory    Chronic obstructive pulmonary disease (CMS/HCC)     Uncontrolled.  Will start Anoro.  Patient may take albuterol as needed.             Relevant Medications    albuterol sulfate  (90 Base) MCG/ACT inhaler    umeclidinium-vilanterol (ANORO ELLIPTA) 62.5-25 MCG/INH aerosol powder  inhaler    cetirizine (zyrTEC) 10 MG tablet       Endocrine    Type 2 diabetes mellitus, without long-term current use of insulin (CMS/[facility])     Likely controlled based on patient's fingersticks.  Will obtain updated A1c and microalbumin.  Patient is to continue glipizide at this time.  He is on a statin and baby aspirin.         Relevant Medications    glipiZIDE (GLUCOTROL) 5 MG tablet    Other Relevant Orders    Hemoglobin A1c    Microalbumin / Creatinine Urine Ratio - Urine, Clean Catch       Other    Moderate episode of recurrent major depressive disorder (CMS/[facility])     Uncontrolled at this time.  We will start the patient back on Prozac.         Relevant Medications    FLUoxetine (PROzac) 20 MG capsule      Other Visit Diagnoses     Left foot pain        Relevant Orders    XR Foot 3+ View Left          New Medications Ordered This Visit   Medications   • albuterol sulfate  (90 Base) MCG/ACT inhaler     Sig: Inhale 2 puffs Every 4 (Four) Hours As Needed for Wheezing or Shortness of Air.     Dispense:  3 inhaler     Refill:  3   • umeclidinium-vilanterol (ANORO ELLIPTA) 62.5-25 MCG/INH aerosol powder  inhaler     Sig: Inhale 1 puff Daily.     Dispense:  3 each     Refill:  3     Please dispense 90 day supply   • atorvastatin (LIPITOR) 20 MG tablet     Sig: Take 1 tablet by mouth Every Night.     Dispense:  90 tablet     Refill:  3   • cetirizine (zyrTEC) 10 MG tablet     Sig: Take 1 tablet by mouth Daily.     Dispense:   90 tablet     Refill:  3   • FLUoxetine (PROzac) 20 MG capsule     Sig: Take 1 capsule by mouth Daily.     Dispense:  90 capsule     Refill:  3   • omeprazole (priLOSEC) 40 MG capsule     Sig: Take 1 capsule by mouth Daily.     Dispense:  90 capsule     Refill:  3   • glipiZIDE (GLUCOTROL) 5 MG tablet     Sig: Take 1 tablet by mouth 2 (Two) Times a Day Before Meals.     Dispense:  180 tablet     Refill:  3   • lisinopril (PRINIVIL,ZESTRIL) 10 MG tablet     Sig: Take 1 tablet by mouth Daily.     Dispense:  90 tablet     Refill:  3       No orders of the defined types were placed in this encounter.      Return in about 1 month (around 9/26/2019) for HTN, COPD.      Radha Jean DO

## 2019-08-26 NOTE — ASSESSMENT & PLAN NOTE
Likely controlled based on patient's fingersticks.  Will obtain updated A1c and microalbumin.  Patient is to continue glipizide at this time.  He is on a statin and baby aspirin.

## 2019-08-26 NOTE — ASSESSMENT & PLAN NOTE
Controlled.  Patient has been out of hydralazine for a while.  He is to continue metoprolol.  Will start the patient on lisinopril for renal protection given that he is a diabetic.  He is in stage III renal failure per medical records.  Patient is to stop amlodipine.

## 2019-09-16 NOTE — PROGRESS NOTES
[facility] Cardiology    Subjective:     Encounter Date:09/19/2019      Patient ID: Robe Bryant is a 68 y.o. male.    Reason for consultation: Coronary artery disease    Problem List:  1. Coronary artery disease:  a. Left heart catheterization, 01/14/2009, Dr. Vasquez: Placement of a 3.0 x 23 mm Xience drug-eluting stent to the ramus, 3.0 x 12 mm drug-eluting stent to the mid-circumflex.  b. Left heart catheterization, 10/11/2010: EF 45%, LVEDP 28.   c. A 2.25 x 13 mm Cypher drug-eluting stent placement to the distal circumflex, 05/22/2011. Discharge medications: Plavix and aspirin.   d. Cardiac catheterization, 01/10/2012, Arelis Tucker MD, revealing noncritical coronary artery disease, patient ramus and left circumflex stents, LVH with near cavity obliteration.  e. Medical management.  f. Abnormal Cardiolite, 03/01/2013, Dunia Rosado, showing moderate to severe perfusion defect of the basolateral wall of the LV.  g. Cardiac catheterization, 03/15/2013, Arelis Tucker MD, revealing widely patent stents of the left circumflex and ramus intermedius coronary arteries, no significant disease is seen in any territory.   h. Diastolic heart failure with recent hospital admission, May 2013, at [facility] in [place].  i. Recurrent anginal symptoms, September 2014; initiation of Imdur therapy, 09/04/2014.  j. Cardiac catheterization, 09/15/2014, Dr. Tucker: Noncritical coronary disease with widely patent stents in the circumflex and ramus intermedius arteries. Other sources for the patient’s chest discomfort should be considered.  k. [facility], 6/10/16,  EF Normal, widely patent ramus intermedius stent; no significant disease within LAD, Circ, RCA  2. Diastolic heart failure:  a. Echocardiogram, 05/16/2013, Hermes Vargas MD, revealing hyperdynamic LV with EF 75%, mild TR, trace MI.  b. Complaints of dyspnea at the time of office visit, 05/22/2013, with O2 saturation in the  91% to 95% range with ambulation.  c. Echocardiogram, 09/15/2014: Moderate LVH with LVEF 60% to 65%. Mild MR and mild TR.   3. Hypertension, uncontrolled at the time of office visit, 05/22/2013.  4. Symptomatic bradycardia:  a. Continued symptoms of presyncope in the setting of hypotension and bradycardia, 02/16/2012.  b. Permanent pacemaker implantation, Dr. Tucker, 02/21/2012, [employer] RFDR, model #2210.  c. Hospitalization with acute dyspnea, 02/27/2013, at [facility] secondary to pacemaker-induced tachycardia.  d. Tilt Table (6/22/2017): Positive for orthostatic syncope. Patient developed abrupt symptomatic hypotension at 9 minutes, with any corresponding increase in heart rate. Consistent with vasodepressive response.  5. Paroxysmal atrial fibrillation:  a. Coumadin therapy followed by [facility].   b. CHADS-VASc score = 5.   c. Admission to [facility], 06/13/2015 through 06/16/2015, with DC cardioversion and return to sinus rhythm and subsequent metoprolol dosage increase.  6. Recent symptoms of left-sided facial numbness and occasional left arm weakness.  7. Nonsustained VT per device interrogation, 09/04/2014.  8. Brief episodes of atrial tachycardia at the time of device interrogation, 05/03/2012.  9. Hyperlipidemia.  10. Type 2 diabetes mellitus.  11. Obstructive sleep apnea with CPAP therapy.  12. Questionable transient ischemic attack, January 2010, without workup:  a. Carotid duplex, 09/15/2014: Noncritical coronary artery disease with widely patent bilateral carotid arteries. Velocity is all within normal limits.   13. History of tobacco use with cessation x7 years.   14. Gastroesophageal reflux disease.  15. Hernia repair x3.   16. Chronic kidney disease with a creatinine of 2.1 during hospitalization, 05/17/2013.     No problems updated.    No Known Allergies  ol    Current Outpatient Medications:   •  albuterol sulfate  (90 Base) MCG/ACT inhaler, Inhale 2 puffs Every 4  (Four) Hours As Needed for Wheezing or Shortness of Air., Disp: 3 inhaler, Rfl: 3  •  amLODIPine (NORVASC) 10 MG tablet, Take 10 mg by mouth 2 (Two) Times a Day., Disp: , Rfl:   •  aspirin 81 MG tablet, Take  by mouth daily., Disp: , Rfl:   •  atorvastatin (LIPITOR) 20 MG tablet, Take 1 tablet by mouth Every Night., Disp: 90 tablet, Rfl: 3  •  cetirizine (zyrTEC) 10 MG tablet, Take 1 tablet by mouth Daily., Disp: 90 tablet, Rfl: 3  •  finasteride (PROSCAR) 5 MG tablet, Take 1 tablet by mouth Daily., Disp: 30 tablet, Rfl: 0  •  FLUoxetine (PROzac) 20 MG capsule, Take 1 capsule by mouth Daily., Disp: 90 capsule, Rfl: 3  •  glipiZIDE (GLUCOTROL) 5 MG tablet, Take 1 tablet by mouth 2 (Two) Times a Day Before Meals., Disp: 180 tablet, Rfl: 3  •  hydrALAZINE (APRESOLINE) 10 MG tablet, Take 10 mg by mouth 2 (Two) Times a Day., Disp: , Rfl:   •  isosorbide mononitrate (IMDUR) 30 MG 24 hr tablet, Take 1 tablet by mouth 2 (Two) Times a Day., Disp: 180 tablet, Rfl: 3  •  Lancets misc, 1 each 2 (Two) Times a Day., Disp: 200 each, Rfl: 5  •  lisinopril (PRINIVIL,ZESTRIL) 10 MG tablet, Take 2 tablets by mouth Daily., Disp: 90 tablet, Rfl: 3  •  metoprolol succinate XL (TOPROL-XL) 100 MG 24 hr tablet, Take 1 tablet by mouth Daily. (Patient taking differently: Take 100 mg by mouth 2 (Two) Times a Day.), Disp: 90 tablet, Rfl: 3  •  Multiple Vitamin tablet, Take 1 tablet by mouth daily., Disp: , Rfl:   •  nitroglycerin (NITROSTAT) 0.4 MG SL tablet, Place 1 tablet under the tongue Every 5 (Five) Minutes As Needed for Chest Pain., Disp: 25 tablet, Rfl: 11  •  omeprazole (priLOSEC) 40 MG capsule, Take 1 capsule by mouth Daily., Disp: 90 capsule, Rfl: 3  •  tamsulosin (FLOMAX) 0.4 MG capsule 24 hr capsule, Take 1 capsule by mouth Daily., Disp: 90 capsule, Rfl: 1  •  apixaban (ELIQUIS) 5 MG tablet tablet, Take 1 tablet by mouth 2 (Two) Times a Day., Disp: 60 tablet, Rfl: 11    HPI:      Robe Bryant is a 68 y.o. male who present today  to establish care for his CAD, s/p stents, SSS, s/p DDD PPM,  PAF, HLD. He has been lost to follow up since 2017. He has self-discontinued most of his medications. He has not had PPM checked in nearly two years. He complains of chest pain. Chest pain described as left-sided to center with radiation to left arm and right side of chest. Described as squeezing. Associated with shortness of breath. Sometimes associated with nausea but not with diaphoresis. Similar to prior angina. Off and on for the last month. Lasts for about several minutes or longer. Worsened with exertion. He can only walk up a couple of steps before he becomes symptomatic. Improved some with nitroglycerin. BP has been poorly controlled as well. He denies symptoms of Afib.     Cardiac Risk Factors:    Social History     Socioeconomic History   • Marital status:      Spouse name: Not on file   • Number of children: Not on file   • Years of education: Not on file   • Highest education level: Not on file   Tobacco Use   • Smoking status: Former Smoker     Last attempt to quit: 8/15/2001     Years since quittin.1   • Smokeless tobacco: Never Used   • Tobacco comment: quit 16 years ago   Substance and Sexual Activity   • Alcohol use: No   • Drug use: No   • Sexual activity: Defer     Family History   Problem Relation Age of Onset   • Cancer Mother    • Diabetes Mother    • Hypertension Mother    • Stroke Mother    • Stomach cancer Mother    • Heart disease Mother    • Stomach cancer Father    • Cancer Father    • Lung cancer Father        Review of Systems:  The following portions of the patient's history were reviewed and updated as appropriate: allergies, current medications and problem list.    Pertinent positives as listed in the HPI.  All other systems reviewed are negative.    Objective:        Vitals:    19 1001   BP: (!) 188/104   Pulse: 60     GENERAL: This is a well-developed, well-nourished, male who is in no acute distress.  Alert and oriented x3. Normal mood and affect.   SKIN: Pink and warm without rash or abnormality noted.   HEENT: Head is normocephalic and atraumatic. Pupils are equal and reactive to light bilaterally. Mucous membranes are pink and moist.   NECK: Supple without lymphadenopathy or thyromegaly. There is no jugular venous distention at 30°.  LUNGS: Clear to auscultation bilaterally without wheezing, rhonchi, or rales noted.   CARDIOVASCULAR: The heart has a regular rate with a normal S1 and S2. There is no murmur, gallop, rub, or click appreciated. The PMI is nondisplaced. Carotid upstrokes are 2+ and symmetrical without bruits.   ABDOMEN: Soft and nondistended with positive bowel sounds x4. + diffuse tenderness.    MUSCULOSKELETAL: There are no obvious bony abnormalities. Normal range of tenderness to palpation.   NEUROLOGICAL: Nonfocal.   PERIPHERAL VASCULAR: Femoral pulses are 2+ and symmetrical without bruits. Posterior tibial and dorsalis pedis pulses are 4+ and symmetrical. There is no peripheral edema.       Office Visit on 03/13/2018   Component Date Value Ref Range Status   • INR 03/13/2018 1.80* 0.9 - 1.1 Final    PT: 21.5   • Glucose 03/13/2018 192* 70 - 130 mg/dL Final   • Glucose 03/13/2018 146* 74 - 98 mg/dL Final   • BUN 03/13/2018 21* 7 - 20 mg/dL Final   • Creatinine 03/13/2018 1.60* 0.60 - 1.30 mg/dL Final   • eGFR Non  Am 03/13/2018 43* >60 mL/min/1.73 Final    Comment: Abnormal estimated GFR should be followed by more specific  studies to confirm end stage chronic renal disease. The  equation used for calculation may not be accurate for  patients less than 19 years old, greater than 70 years old,  patients at extremes of weight, malnutrition, or with acute  renal dysfunction.     • eGFR  Am 03/13/2018 52* >60 mL/min/1.73 Final   • BUN/Creatinine Ratio 03/13/2018 13.1  6.3 - 21.9 Final   • Sodium 03/13/2018 146* 137 - 145 mmol/L Final   • Potassium 03/13/2018 3.7  3.5 - 5.1 mmol/L  Final   • Chloride 03/13/2018 105  98 - 107 mmol/L Final   • Total CO2 03/13/2018 29.0  26.0 - 30.0 mmol/L Final   • Calcium 03/13/2018 9.4  8.4 - 10.2 mg/dL Final   • Total Protein 03/13/2018 6.5  6.3 - 8.2 g/dL Final   • Albumin 03/13/2018 3.80  3.50 - 5.00 g/dL Final   • Globulin 03/13/2018 2.7  gm/dL Final   • A/G Ratio 03/13/2018 1.4  1.0 - 2.0 g/dL Final   • Total Bilirubin 03/13/2018 0.5  0.2 - 1.3 mg/dL Final   • Alkaline Phosphatase 03/13/2018 66  38 - 126 U/L Final   • AST (SGOT) 03/13/2018 24  15 - 46 U/L Final   • ALT (SGPT) 03/13/2018 35  13 - 69 U/L Final   • Magnesium 03/13/2018 1.5* 1.6 - 2.3 mg/dL Final   • Phosphorus 03/13/2018 3.0  2.5 - 4.5 mg/dL Final         Diagnostic Data:          ECG 12 Lead  Date/Time: 9/19/2019 12:59 PM  Performed by: Johanna Medina APRN  Authorized by: Johanna Medina APRN   Comparison: compared with previous ECG from 3/1/2018  Rhythm: paced  BPM: 60             Device check 9/19/19: RA 95%, RV <1%, 3.4 years left on battery. Normal threshold and impedance, AMS 1%, longest Afib 7 hours on 6/15/19. Some RVR.     Assessment:       ICD-10-CM ICD-9-CM   1. Chronic coronary artery disease I25.10 414.00   2. Paroxysmal atrial fibrillation (CMS/HCC) I48.0 427.31   3. Essential hypertension I10 401.9   4. Pacemaker Z95.0 V45.01   5. Chronic diastolic heart failure (CMS/HCC) I50.32 428.32   6. Panlobular emphysema (CMS/HCC) J43.1 492.8          Plan:       1. Restart ASA.   2. Start Eliquis 5 mg bid for PAF.   3. Metoprolol 12.5 mg bid for rate control of Afib.   4. Increase lisinopril to 20 mg daily for HTN.   5. Continue statin. Will check CMP and FLP with [facility].   Plan to proceed with [facility] plus/minus CBI using RAA. The risks, benefits, and alternatives of the procedure have been reviewed and the patient wishes to proceed.   6. Follow-up in after [facility], sooner as needed.      Scribed for Arelis Tucker MD by URSULA Medina, APRN. 9/19/2019  10:51 AM      I Arelis Tucker MD personally performed the services described in this documentation as scribed by the above individual in my presence, and it is both accurate and complete.    Arelis Tucker MD, FACC

## 2019-09-17 ENCOUNTER — TELEPHONE (OUTPATIENT)
Dept: INTERNAL MEDICINE | Facility: CLINIC | Age: 69
End: 2019-09-17

## 2019-09-17 NOTE — TELEPHONE ENCOUNTER
Patients wife states that they cannot afford the inhalers. She also states that for some reason [facility] is not filling all of his prescriptions and they are without his blood thinner and he has been off of it for a bit. She would like someone to call [facility] to see what they have sent and what they need sent and why they are saying they dont have refills.

## 2019-09-18 NOTE — TELEPHONE ENCOUNTER
Spoke with patients wife, she states that they will get paperwork for anoro and will discuss warfarin with Dr Tucker tomorrow.

## 2019-09-18 NOTE — TELEPHONE ENCOUNTER
There's not much we can do about the price of the albuterol inhaler unless he wants to shop around an other pharmacies.  There is an anoro savings program that I will print out for him for medicare patients.  He can pick this up.  It may help with the price of the anoro.  All inhalers are going to be expensive, especially until he meets his deductible.  He has an appointment with Dr. Tucker tomorrow.  I'm sure she will refill his coumadin at that visit.

## 2019-09-18 NOTE — TELEPHONE ENCOUNTER
Spoke with [facility] pharmacy, the ventolin inhaler is a $94 copay for 3 months, and Anoro is $131 copay for 3 months. [facility] did state they could fill a one month supply for each which would be about $30 for ventolin and $47 for Anoro.   As for pts warfarin, this has not been filled since 2018 by Dr Tucker, RX has since .

## 2019-09-19 ENCOUNTER — TELEPHONE (OUTPATIENT)
Dept: CARDIOLOGY | Facility: CLINIC | Age: 69
End: 2019-09-19

## 2019-09-19 ENCOUNTER — HOSPITAL ENCOUNTER (OUTPATIENT)
Facility: HOSPITAL | Age: 69
Discharge: HOME | End: 2019-09-19
Payer: MEDICARE

## 2019-09-19 ENCOUNTER — OFFICE VISIT (OUTPATIENT)
Dept: CARDIOLOGY | Facility: CLINIC | Age: 69
End: 2019-09-19

## 2019-09-19 VITALS
HEIGHT: 65 IN | WEIGHT: 211 LBS | DIASTOLIC BLOOD PRESSURE: 104 MMHG | HEART RATE: 60 BPM | BODY MASS INDEX: 35.16 KG/M2 | SYSTOLIC BLOOD PRESSURE: 188 MMHG

## 2019-09-19 DIAGNOSIS — J43.1 PANLOBULAR EMPHYSEMA (HCC): ICD-10-CM

## 2019-09-19 DIAGNOSIS — I10 ESSENTIAL HYPERTENSION: ICD-10-CM

## 2019-09-19 DIAGNOSIS — I25.10 CHRONIC CORONARY ARTERY DISEASE: Primary | ICD-10-CM

## 2019-09-19 DIAGNOSIS — I48.0 PAROXYSMAL ATRIAL FIBRILLATION (HCC): ICD-10-CM

## 2019-09-19 DIAGNOSIS — I50.32 CHRONIC DIASTOLIC HEART FAILURE (HCC): ICD-10-CM

## 2019-09-19 DIAGNOSIS — I20.0 UNSTABLE ANGINA (HCC): ICD-10-CM

## 2019-09-19 DIAGNOSIS — Z95.0 PACEMAKER: ICD-10-CM

## 2019-09-19 PROCEDURE — 93000 ELECTROCARDIOGRAM COMPLETE: CPT | Performed by: NURSE PRACTITIONER

## 2019-09-19 PROCEDURE — 93005 ELECTROCARDIOGRAM TRACING: CPT

## 2019-09-19 PROCEDURE — 99214 OFFICE O/P EST MOD 30 MIN: CPT | Performed by: INTERNAL MEDICINE

## 2019-09-19 RX ORDER — HYDRALAZINE HYDROCHLORIDE 10 MG/1
10 TABLET, FILM COATED ORAL 2 TIMES DAILY
COMMUNITY
End: 2019-09-19

## 2019-09-19 RX ORDER — AMLODIPINE BESYLATE 10 MG/1
10 TABLET ORAL 2 TIMES DAILY
COMMUNITY
End: 2019-09-19

## 2019-09-19 RX ORDER — LISINOPRIL 10 MG/1
20 TABLET ORAL DAILY
Qty: 90 TABLET | Refills: 3 | Status: SHIPPED | OUTPATIENT
Start: 2019-09-19 | End: 2019-10-07

## 2019-09-19 NOTE — TELEPHONE ENCOUNTER
Was seen in office today and was notified pt needed merlin home monitor.  Ordered home monitor and sent cell adapter to pt.

## 2019-09-24 PROBLEM — I20.0 UNSTABLE ANGINA: Status: ACTIVE | Noted: 2019-09-24

## 2019-09-26 ENCOUNTER — PREP FOR SURGERY (OUTPATIENT)
Dept: OTHER | Facility: HOSPITAL | Age: 69
End: 2019-09-26

## 2019-09-26 ENCOUNTER — OFFICE VISIT (OUTPATIENT)
Dept: INTERNAL MEDICINE | Facility: CLINIC | Age: 69
End: 2019-09-26

## 2019-09-26 VITALS
OXYGEN SATURATION: 95 % | BODY MASS INDEX: 34.66 KG/M2 | DIASTOLIC BLOOD PRESSURE: 80 MMHG | HEIGHT: 65 IN | RESPIRATION RATE: 16 BRPM | HEART RATE: 55 BPM | TEMPERATURE: 98.2 F | SYSTOLIC BLOOD PRESSURE: 130 MMHG | WEIGHT: 208 LBS

## 2019-09-26 DIAGNOSIS — R79.9 ABNORMAL FINDING OF BLOOD CHEMISTRY: ICD-10-CM

## 2019-09-26 DIAGNOSIS — J43.1 PANLOBULAR EMPHYSEMA (HCC): ICD-10-CM

## 2019-09-26 DIAGNOSIS — N18.30 TYPE 2 DIABETES MELLITUS WITH STAGE 3 CHRONIC KIDNEY DISEASE, WITHOUT LONG-TERM CURRENT USE OF INSULIN (HCC): Primary | ICD-10-CM

## 2019-09-26 DIAGNOSIS — I10 ESSENTIAL HYPERTENSION: ICD-10-CM

## 2019-09-26 DIAGNOSIS — I25.10 CHRONIC CORONARY ARTERY DISEASE: Primary | ICD-10-CM

## 2019-09-26 DIAGNOSIS — L98.9 SKIN LESION OF FACE: ICD-10-CM

## 2019-09-26 DIAGNOSIS — E11.22 TYPE 2 DIABETES MELLITUS WITH STAGE 3 CHRONIC KIDNEY DISEASE, WITHOUT LONG-TERM CURRENT USE OF INSULIN (HCC): Primary | ICD-10-CM

## 2019-09-26 DIAGNOSIS — I20.0 UNSTABLE ANGINA (HCC): ICD-10-CM

## 2019-09-26 LAB — HBA1C MFR BLD: 5.2 %

## 2019-09-26 PROCEDURE — 99214 OFFICE O/P EST MOD 30 MIN: CPT | Performed by: FAMILY MEDICINE

## 2019-09-26 RX ORDER — SODIUM CHLORIDE 0.9 % (FLUSH) 0.9 %
10 SYRINGE (ML) INJECTION AS NEEDED
Status: CANCELLED | OUTPATIENT
Start: 2019-09-26

## 2019-09-26 RX ORDER — SODIUM CHLORIDE 9 MG/ML
3 INJECTION, SOLUTION INTRAVENOUS CONTINUOUS
Status: CANCELLED | OUTPATIENT
Start: 2019-09-26 | End: 2019-09-26

## 2019-09-26 RX ORDER — BLOOD-GLUCOSE METER
1 KIT MISCELLANEOUS DAILY
Qty: 1 EACH | Refills: 0 | Status: SHIPPED | OUTPATIENT
Start: 2019-09-26

## 2019-09-26 RX ORDER — SODIUM CHLORIDE 0.9 % (FLUSH) 0.9 %
3 SYRINGE (ML) INJECTION EVERY 12 HOURS SCHEDULED
Status: CANCELLED | OUTPATIENT
Start: 2019-09-26

## 2019-09-26 RX ORDER — SYRING-NEEDL,DISP,INSUL,0.3 ML 30 GX5/16"
1 SYRINGE, EMPTY DISPOSABLE MISCELLANEOUS DAILY
Qty: 100 EACH | Refills: 3 | Status: SHIPPED | OUTPATIENT
Start: 2019-09-26

## 2019-09-26 RX ORDER — FINASTERIDE 5 MG/1
5 TABLET, FILM COATED ORAL DAILY
Qty: 90 TABLET | Refills: 0 | Status: SHIPPED | OUTPATIENT
Start: 2019-09-26 | End: 2020-01-14

## 2019-09-26 RX ORDER — TAMSULOSIN HYDROCHLORIDE 0.4 MG/1
1 CAPSULE ORAL DAILY
Qty: 90 CAPSULE | Refills: 0 | Status: SHIPPED | OUTPATIENT
Start: 2019-09-26 | End: 2020-02-03

## 2019-09-26 NOTE — PROGRESS NOTES
Robe Bryant is a 68 y.o. male.    Chief Complaint   Patient presents with   • Hypertension     1 month follow up       HPI   Patient has hypertension.  They are taking lisinopril (Prinivil) and Metoprolol.  They have been compliant with medications.  The patient denies any side effects to the medication.  Blood pressure is controlled in the office today.   They are following a low salt diet.  They are active.    Patient has had diabetes for the past few years. He has been compliant with the medications and denies any side effects from it. He has been monitoring fingersticks.  He has not had hypoglycemic symptoms. He has been following a diabetic diet and has been active.  his last eye exam was less than a year ago .     Patient has COPD as well.  He has been using a Combivent inhaler a couple of times a day with some improvement in breathing.  He does admit to shortness of breath with cough and wheezing off and on.  Patient does report that he was on a daily inhaler in the past which helps with his shortness of breath.    She reports that he has a skin lesion on his nose.  He states that he was told it was skin cancer and would like to be referred to dermatology to have it removed.    The following portions of the patient's history were reviewed and updated as appropriate: allergies, current medications, past family history, past medical history, past social history, past surgical history and problem list.     No Known Allergies      Current Outpatient Medications:   •  albuterol sulfate  (90 Base) MCG/ACT inhaler, Inhale 2 puffs Every 4 (Four) Hours As Needed for Wheezing or Shortness of Air., Disp: 3 inhaler, Rfl: 3  •  apixaban (ELIQUIS) 5 MG tablet tablet, Take 1 tablet by mouth 2 (Two) Times a Day., Disp: 60 tablet, Rfl: 11  •  atorvastatin (LIPITOR) 20 MG tablet, Take 1 tablet by mouth Every Night., Disp: 90 tablet, Rfl: 3  •  cetirizine (zyrTEC) 10 MG tablet, Take 1 tablet by mouth Daily., Disp: 90  tablet, Rfl: 3  •  FLUoxetine (PROzac) 20 MG capsule, Take 1 capsule by mouth Daily., Disp: 90 capsule, Rfl: 3  •  glipiZIDE (GLUCOTROL) 5 MG tablet, Take 1 tablet by mouth 2 (Two) Times a Day Before Meals., Disp: 180 tablet, Rfl: 3  •  Lancets misc, 1 each 2 (Two) Times a Day., Disp: 200 each, Rfl: 5  •  lisinopril (PRINIVIL,ZESTRIL) 10 MG tablet, Take 2 tablets by mouth Daily., Disp: 90 tablet, Rfl: 3  •  metoprolol tartrate (LOPRESSOR) 12.5 MG half tablet, Take 25 mg by mouth 2 (Two) Times a Day., Disp: , Rfl:   •  Multiple Vitamin tablet, Take 1 tablet by mouth daily., Disp: , Rfl:   •  nitroglycerin (NITROSTAT) 0.4 MG SL tablet, Place 1 tablet under the tongue Every 5 (Five) Minutes As Needed for Chest Pain., Disp: 25 tablet, Rfl: 11  •  omeprazole (priLOSEC) 40 MG capsule, Take 1 capsule by mouth Daily., Disp: 90 capsule, Rfl: 3  •  finasteride (PROSCAR) 5 MG tablet, Take 1 tablet by mouth Daily., Disp: 90 tablet, Rfl: 0  •  glucose blood test strip, Use as instructed, E11.9, Disp: 100 each, Rfl: 3  •  glucose monitor monitoring kit, 1 each Daily. E11.9, Disp: 1 each, Rfl: 0  •  Lancet Device misc, 1 each Daily. E11.9, Disp: 100 each, Rfl: 3  •  tamsulosin (FLOMAX) 0.4 MG capsule 24 hr capsule, Take 1 capsule by mouth Daily., Disp: 90 capsule, Rfl: 0  •  tiotropium bromide monohydrate (SPIRIVA RESPIMAT) 2.5 MCG/ACT aerosol solution inhaler, Inhale 2 puffs Daily., Disp: 3 inhaler, Rfl: 3    ROS    Review of Systems   Constitutional: Positive for fatigue. Negative for chills and fever.   HENT: Negative for congestion, postnasal drip and sore throat.    Respiratory: Positive for cough, shortness of breath and wheezing.    Cardiovascular: Positive for leg swelling. Negative for chest pain.   Gastrointestinal: Negative for abdominal pain, constipation, nausea and vomiting.   Musculoskeletal: Positive for arthralgias (left ankle pain). Negative for back pain.   Skin: Positive for skin lesions. Negative for color  "change and rash.   Allergic/Immunologic: Negative for environmental allergies.   Neurological: Positive for numbness.       Vitals:    09/26/19 1011   BP: 130/80   Pulse: 55   Resp: 16   Temp: 98.2 °F (36.8 °C)   SpO2: 95%   Weight: 94.3 kg (208 lb)   Height: 165.1 cm (65\")     Body mass index is 34.61 kg/m².    Physical Exam     Physical Exam   Constitutional: He is oriented to person, place, and time. He appears well-developed and well-nourished. No distress.   HENT:   Head: Normocephalic and atraumatic.   Right Ear: External ear normal.   Left Ear: External ear normal.   Eyes: Conjunctivae and EOM are normal.   Cardiovascular: Normal rate and regular rhythm.   No murmur heard.  Pulmonary/Chest: Effort normal and breath sounds normal. No respiratory distress. He has no wheezes.   Abdominal: Soft. Bowel sounds are normal. He exhibits no distension. There is no tenderness.   Musculoskeletal: He exhibits no edema.   Neurological: He is alert and oriented to person, place, and time. No cranial nerve deficit.   Skin: Skin is warm and dry.   Psychiatric: He has a normal mood and affect. His behavior is normal.       Assessment/Plan    Problem List Items Addressed This Visit        Cardiovascular and Mediastinum    Essential hypertension     Controlled on current medication.  Patient is to continue lisinopril and metoprolol.         Relevant Medications    metoprolol tartrate (LOPRESSOR) 12.5 MG half tablet       Respiratory    Chronic obstructive pulmonary disease (CMS/HCC)     Uncontrolled.  Patient is being started on Spiriva.  He may continue Combivent only on an as-needed basis.             Relevant Medications    tiotropium bromide monohydrate (SPIRIVA RESPIMAT) 2.5 MCG/ACT aerosol solution inhaler       Endocrine    Type 2 diabetes mellitus, without long-term current use of insulin (CMS/HCC) - Primary     Controlled on current medication.  Patient is to continue glipizide.  We will continue to monitor A1c and " microalbumin every few months.  Patient is on an ACE inhibitor, statin, and baby aspirin.         Relevant Orders    POC Glycosylated Hemoglobin (Hb A1C) (Completed)      Other Visit Diagnoses     Skin lesion of face        Relevant Orders    Ambulatory Referral to Dermatology (Completed)          New Medications Ordered This Visit   Medications   • tiotropium bromide monohydrate (SPIRIVA RESPIMAT) 2.5 MCG/ACT aerosol solution inhaler     Sig: Inhale 2 puffs Daily.     Dispense:  3 inhaler     Refill:  3   • finasteride (PROSCAR) 5 MG tablet     Sig: Take 1 tablet by mouth Daily.     Dispense:  90 tablet     Refill:  0   • tamsulosin (FLOMAX) 0.4 MG capsule 24 hr capsule     Sig: Take 1 capsule by mouth Daily.     Dispense:  90 capsule     Refill:  0   • glucose monitor monitoring kit     Si each Daily. E11.9     Dispense:  1 each     Refill:  0   • Lancet Device misc     Si each Daily. E11.9     Dispense:  100 each     Refill:  3   • glucose blood test strip     Sig: Use as instructed, E11.9     Dispense:  100 each     Refill:  3       No orders of the defined types were placed in this encounter.      Return in about 3 months (around 2019) for diabetes, copd, htn.    Radha Jean DO

## 2019-09-26 NOTE — ASSESSMENT & PLAN NOTE
Controlled on current medication.  Patient is to continue glipizide.  We will continue to monitor A1c and microalbumin every few months.  Patient is on an ACE inhibitor, statin, and baby aspirin.

## 2019-09-26 NOTE — ASSESSMENT & PLAN NOTE
Uncontrolled.  Patient is being started on Spiriva.  He may continue Combivent only on an as-needed basis.

## 2019-09-27 LAB
ALBUMIN SERPL-MCNC: 4.1 G/DL (ref 3.5–5.2)
ALBUMIN/GLOB SERPL: 1.9 G/DL
ALP SERPL-CCNC: 65 U/L (ref 39–117)
ALT SERPL-CCNC: 13 U/L (ref 1–41)
AST SERPL-CCNC: 19 U/L (ref 1–40)
BASOPHILS # BLD AUTO: 0.06 10*3/MM3 (ref 0–0.2)
BASOPHILS NFR BLD AUTO: 1.1 % (ref 0–1.5)
BILIRUB SERPL-MCNC: 0.7 MG/DL (ref 0.2–1.2)
BUN SERPL-MCNC: 19 MG/DL (ref 8–23)
BUN/CREAT SERPL: 13.5 (ref 7–25)
CALCIUM SERPL-MCNC: 9.3 MG/DL (ref 8.6–10.5)
CHLORIDE SERPL-SCNC: 105 MMOL/L (ref 98–107)
CHOLEST SERPL-MCNC: 116 MG/DL (ref 0–200)
CO2 SERPL-SCNC: 28.6 MMOL/L (ref 22–29)
CREAT SERPL-MCNC: 1.41 MG/DL (ref 0.76–1.27)
EOSINOPHIL # BLD AUTO: 0.11 10*3/MM3 (ref 0–0.4)
EOSINOPHIL NFR BLD AUTO: 2 % (ref 0.3–6.2)
ERYTHROCYTE [DISTWIDTH] IN BLOOD BY AUTOMATED COUNT: 12.9 % (ref 12.3–15.4)
GLOBULIN SER CALC-MCNC: 2.2 GM/DL
GLUCOSE SERPL-MCNC: 66 MG/DL (ref 65–99)
HBA1C MFR BLD: 5.3 % (ref 4.8–5.6)
HCT VFR BLD AUTO: 45.2 % (ref 37.5–51)
HDLC SERPL-MCNC: 48 MG/DL (ref 40–60)
HGB BLD-MCNC: 14.6 G/DL (ref 13–17.7)
IMM GRANULOCYTES # BLD AUTO: 0.03 10*3/MM3 (ref 0–0.05)
IMM GRANULOCYTES NFR BLD AUTO: 0.6 % (ref 0–0.5)
INR PPP: 0.98 (ref 0.9–1.1)
LDLC SERPL CALC-MCNC: 54 MG/DL (ref 0–100)
LYMPHOCYTES # BLD AUTO: 1.52 10*3/MM3 (ref 0.7–3.1)
LYMPHOCYTES NFR BLD AUTO: 27.9 % (ref 19.6–45.3)
MCH RBC QN AUTO: 29 PG (ref 26.6–33)
MCHC RBC AUTO-ENTMCNC: 32.3 G/DL (ref 31.5–35.7)
MCV RBC AUTO: 89.7 FL (ref 79–97)
MONOCYTES # BLD AUTO: 0.59 10*3/MM3 (ref 0.1–0.9)
MONOCYTES NFR BLD AUTO: 10.8 % (ref 5–12)
NEUTROPHILS # BLD AUTO: 3.14 10*3/MM3 (ref 1.7–7)
NEUTROPHILS NFR BLD AUTO: 57.6 % (ref 42.7–76)
NRBC BLD AUTO-RTO: 0 /100 WBC (ref 0–0.2)
PLATELET # BLD AUTO: 168 10*3/MM3 (ref 140–450)
POTASSIUM SERPL-SCNC: 4.4 MMOL/L (ref 3.5–5.2)
PROT SERPL-MCNC: 6.3 G/DL (ref 6–8.5)
PROTHROMBIN TIME: 13.3 SECONDS (ref 12–15.1)
RBC # BLD AUTO: 5.04 10*6/MM3 (ref 4.14–5.8)
SODIUM SERPL-SCNC: 143 MMOL/L (ref 136–145)
TRIGL SERPL-MCNC: 71 MG/DL (ref 0–150)
UNABLE TO VOID: NORMAL
VLDLC SERPL CALC-MCNC: 14.2 MG/DL
WBC # BLD AUTO: 5.45 10*3/MM3 (ref 3.4–10.8)

## 2019-09-30 ENCOUNTER — TELEPHONE (OUTPATIENT)
Dept: INTERNAL MEDICINE | Facility: CLINIC | Age: 69
End: 2019-09-30

## 2019-09-30 ENCOUNTER — HOSPITAL ENCOUNTER (OUTPATIENT)
Facility: HOSPITAL | Age: 69
Discharge: HOME | End: 2019-10-02
Attending: INTERNAL MEDICINE | Admitting: INTERNAL MEDICINE

## 2019-09-30 DIAGNOSIS — I25.10 CHRONIC CORONARY ARTERY DISEASE: ICD-10-CM

## 2019-09-30 DIAGNOSIS — I20.0 UNSTABLE ANGINA (HCC): ICD-10-CM

## 2019-09-30 PROCEDURE — 94799 UNLISTED PULMONARY SVC/PX: CPT

## 2019-09-30 PROCEDURE — 63710000001 TAB-A-VITE/BETA CAROTENE TABLET: Performed by: INTERNAL MEDICINE

## 2019-09-30 PROCEDURE — A9270 NON-COVERED ITEM OR SERVICE: HCPCS | Performed by: INTERNAL MEDICINE

## 2019-09-30 PROCEDURE — 96368 THER/DIAG CONCURRENT INF: CPT

## 2019-09-30 PROCEDURE — 63710000001 METOPROLOL TARTRATE 12.5 MG TABLET: Performed by: INTERNAL MEDICINE

## 2019-09-30 PROCEDURE — 25010000003 LIDOCAINE 1 % SOLUTION: Performed by: INTERNAL MEDICINE

## 2019-09-30 PROCEDURE — 96366 THER/PROPH/DIAG IV INF ADDON: CPT

## 2019-09-30 PROCEDURE — C1769 GUIDE WIRE: HCPCS | Performed by: INTERNAL MEDICINE

## 2019-09-30 PROCEDURE — 0 IOPAMIDOL PER 1 ML: Performed by: INTERNAL MEDICINE

## 2019-09-30 PROCEDURE — C1894 INTRO/SHEATH, NON-LASER: HCPCS | Performed by: INTERNAL MEDICINE

## 2019-09-30 PROCEDURE — 63710000001 GLIPIZIDE 5 MG TABLET: Performed by: INTERNAL MEDICINE

## 2019-09-30 PROCEDURE — 63710000001 LISINOPRIL 20 MG TABLET: Performed by: INTERNAL MEDICINE

## 2019-09-30 PROCEDURE — 63710000001 ACETAMINOPHEN 325 MG TABLET: Performed by: INTERNAL MEDICINE

## 2019-09-30 PROCEDURE — 63710000001 ATORVASTATIN 20 MG TABLET: Performed by: INTERNAL MEDICINE

## 2019-09-30 PROCEDURE — 63710000001 TAMSULOSIN 0.4 MG CAPSULE: Performed by: INTERNAL MEDICINE

## 2019-09-30 PROCEDURE — 63710000001 FLUOXETINE 20 MG CAPSULE: Performed by: INTERNAL MEDICINE

## 2019-09-30 PROCEDURE — 93458 L HRT ARTERY/VENTRICLE ANGIO: CPT | Performed by: INTERNAL MEDICINE

## 2019-09-30 PROCEDURE — 63710000001 HYDROCODONE-ACETAMINOPHEN 7.5-325 MG TABLET: Performed by: INTERNAL MEDICINE

## 2019-09-30 PROCEDURE — 36415 COLL VENOUS BLD VENIPUNCTURE: CPT

## 2019-09-30 PROCEDURE — 96365 THER/PROPH/DIAG IV INF INIT: CPT

## 2019-09-30 RX ORDER — SODIUM CHLORIDE 0.9 % (FLUSH) 0.9 %
10 SYRINGE (ML) INJECTION AS NEEDED
Status: DISCONTINUED | OUTPATIENT
Start: 2019-09-30 | End: 2019-09-30 | Stop reason: HOSPADM

## 2019-09-30 RX ORDER — PANTOPRAZOLE SODIUM 40 MG/1
40 TABLET, DELAYED RELEASE ORAL DAILY
Status: DISCONTINUED | OUTPATIENT
Start: 2019-09-30 | End: 2019-10-02 | Stop reason: HOSPADM

## 2019-09-30 RX ORDER — FLUOXETINE HYDROCHLORIDE 20 MG/1
20 CAPSULE ORAL DAILY
Status: DISCONTINUED | OUTPATIENT
Start: 2019-09-30 | End: 2019-10-02 | Stop reason: HOSPADM

## 2019-09-30 RX ORDER — FINASTERIDE 5 MG/1
5 TABLET, FILM COATED ORAL DAILY
Status: DISCONTINUED | OUTPATIENT
Start: 2019-09-30 | End: 2019-10-02 | Stop reason: HOSPADM

## 2019-09-30 RX ORDER — LISINOPRIL 20 MG/1
20 TABLET ORAL DAILY
Status: DISCONTINUED | OUTPATIENT
Start: 2019-09-30 | End: 2019-10-01

## 2019-09-30 RX ORDER — SODIUM CHLORIDE 9 MG/ML
100 INJECTION, SOLUTION INTRAVENOUS CONTINUOUS
Status: ACTIVE | OUTPATIENT
Start: 2019-09-30 | End: 2019-09-30

## 2019-09-30 RX ORDER — SODIUM CHLORIDE 9 MG/ML
250 INJECTION, SOLUTION INTRAVENOUS ONCE AS NEEDED
Status: DISCONTINUED | OUTPATIENT
Start: 2019-09-30 | End: 2019-10-02 | Stop reason: HOSPADM

## 2019-09-30 RX ORDER — LIDOCAINE HYDROCHLORIDE 10 MG/ML
INJECTION, SOLUTION INFILTRATION; PERINEURAL AS NEEDED
Status: DISCONTINUED | OUTPATIENT
Start: 2019-09-30 | End: 2019-09-30 | Stop reason: HOSPADM

## 2019-09-30 RX ORDER — NALOXONE HCL 0.4 MG/ML
0.4 VIAL (ML) INJECTION
Status: DISCONTINUED | OUTPATIENT
Start: 2019-09-30 | End: 2019-10-02 | Stop reason: HOSPADM

## 2019-09-30 RX ORDER — ATORVASTATIN CALCIUM 20 MG/1
20 TABLET, FILM COATED ORAL NIGHTLY
Status: DISCONTINUED | OUTPATIENT
Start: 2019-09-30 | End: 2019-10-02 | Stop reason: HOSPADM

## 2019-09-30 RX ORDER — HYDROCODONE BITARTRATE AND ACETAMINOPHEN 7.5; 325 MG/1; MG/1
1 TABLET ORAL EVERY 4 HOURS PRN
Status: DISCONTINUED | OUTPATIENT
Start: 2019-09-30 | End: 2019-10-02 | Stop reason: HOSPADM

## 2019-09-30 RX ORDER — NITROGLYCERIN 20 MG/100ML
5-200 INJECTION INTRAVENOUS
Status: DISCONTINUED | OUTPATIENT
Start: 2019-09-30 | End: 2019-10-02 | Stop reason: HOSPADM

## 2019-09-30 RX ORDER — DIPHENOXYLATE HYDROCHLORIDE AND ATROPINE SULFATE 2.5; .025 MG/1; MG/1
1 TABLET ORAL DAILY
Status: DISCONTINUED | OUTPATIENT
Start: 2019-09-30 | End: 2019-10-02 | Stop reason: HOSPADM

## 2019-09-30 RX ORDER — SODIUM CHLORIDE 9 MG/ML
3 INJECTION, SOLUTION INTRAVENOUS CONTINUOUS
Status: ACTIVE | OUTPATIENT
Start: 2019-09-30 | End: 2019-09-30

## 2019-09-30 RX ORDER — TAMSULOSIN HYDROCHLORIDE 0.4 MG/1
0.4 CAPSULE ORAL NIGHTLY
Status: DISCONTINUED | OUTPATIENT
Start: 2019-09-30 | End: 2019-10-02 | Stop reason: HOSPADM

## 2019-09-30 RX ORDER — GLIPIZIDE 5 MG/1
5 TABLET ORAL
Status: DISCONTINUED | OUTPATIENT
Start: 2019-09-30 | End: 2019-10-02 | Stop reason: HOSPADM

## 2019-09-30 RX ORDER — ACETAMINOPHEN 325 MG/1
650 TABLET ORAL EVERY 4 HOURS PRN
Status: DISCONTINUED | OUTPATIENT
Start: 2019-09-30 | End: 2019-10-02 | Stop reason: HOSPADM

## 2019-09-30 RX ORDER — SODIUM CHLORIDE 0.9 % (FLUSH) 0.9 %
3 SYRINGE (ML) INJECTION EVERY 12 HOURS SCHEDULED
Status: DISCONTINUED | OUTPATIENT
Start: 2019-09-30 | End: 2019-09-30 | Stop reason: HOSPADM

## 2019-09-30 RX ORDER — NITROGLYCERIN 10 MG/100ML
INJECTION INTRAVENOUS CONTINUOUS PRN
Status: COMPLETED | OUTPATIENT
Start: 2019-09-30 | End: 2019-09-30

## 2019-09-30 RX ORDER — MORPHINE SULFATE 2 MG/ML
1 INJECTION, SOLUTION INTRAMUSCULAR; INTRAVENOUS EVERY 4 HOURS PRN
Status: ACTIVE | OUTPATIENT
Start: 2019-09-30 | End: 2019-10-01

## 2019-09-30 RX ORDER — NITROGLYCERIN 0.4 MG/1
0.4 TABLET SUBLINGUAL
Status: DISCONTINUED | OUTPATIENT
Start: 2019-09-30 | End: 2019-10-02 | Stop reason: HOSPADM

## 2019-09-30 RX ADMIN — LISINOPRIL 20 MG: 20 TABLET ORAL at 15:13

## 2019-09-30 RX ADMIN — HYDROCODONE BITARTRATE AND ACETAMINOPHEN 1 TABLET: 7.5; 325 TABLET ORAL at 14:32

## 2019-09-30 RX ADMIN — SODIUM CHLORIDE 5 MG/HR: 9 INJECTION, SOLUTION INTRAVENOUS at 16:28

## 2019-09-30 RX ADMIN — SODIUM CHLORIDE 3 ML/KG/HR: 9 INJECTION, SOLUTION INTRAVENOUS at 10:27

## 2019-09-30 RX ADMIN — TAMSULOSIN HYDROCHLORIDE 0.4 MG: 0.4 CAPSULE ORAL at 20:32

## 2019-09-30 RX ADMIN — METOPROLOL TARTRATE 12.5 MG: 25 TABLET, FILM COATED ORAL at 20:32

## 2019-09-30 RX ADMIN — IPRATROPIUM BROMIDE 0.5 MG: 0.5 SOLUTION RESPIRATORY (INHALATION) at 19:32

## 2019-09-30 RX ADMIN — MULTIVITAMIN TABLET 1 TABLET: TABLET at 15:13

## 2019-09-30 RX ADMIN — ACETAMINOPHEN 650 MG: 325 TABLET ORAL at 20:37

## 2019-09-30 RX ADMIN — GLIPIZIDE 5 MG: 5 TABLET ORAL at 18:24

## 2019-09-30 RX ADMIN — FLUOXETINE HYDROCHLORIDE 20 MG: 20 CAPSULE ORAL at 15:13

## 2019-09-30 RX ADMIN — ATORVASTATIN CALCIUM 20 MG: 20 TABLET, FILM COATED ORAL at 20:32

## 2019-10-01 LAB — GLUCOSE BLDC GLUCOMTR-MCNC: 113 MG/DL (ref 70–130)

## 2019-10-01 PROCEDURE — 63710000001 PANTOPRAZOLE 40 MG TABLET DELAYED-RELEASE: Performed by: INTERNAL MEDICINE

## 2019-10-01 PROCEDURE — A9270 NON-COVERED ITEM OR SERVICE: HCPCS | Performed by: INTERNAL MEDICINE

## 2019-10-01 PROCEDURE — 63710000001 FLUOXETINE 20 MG CAPSULE: Performed by: INTERNAL MEDICINE

## 2019-10-01 PROCEDURE — A9270 NON-COVERED ITEM OR SERVICE: HCPCS | Performed by: NURSE PRACTITIONER

## 2019-10-01 PROCEDURE — 82962 GLUCOSE BLOOD TEST: CPT

## 2019-10-01 PROCEDURE — 94799 UNLISTED PULMONARY SVC/PX: CPT

## 2019-10-01 PROCEDURE — 93005 ELECTROCARDIOGRAM TRACING: CPT | Performed by: INTERNAL MEDICINE

## 2019-10-01 PROCEDURE — 63710000001 LISINOPRIL 40 MG TABLET: Performed by: NURSE PRACTITIONER

## 2019-10-01 PROCEDURE — 63710000001 AMLODIPINE 5 MG TABLET: Performed by: PHYSICIAN ASSISTANT

## 2019-10-01 PROCEDURE — 63710000001 METOPROLOL TARTRATE 25 MG TABLET: Performed by: PHYSICIAN ASSISTANT

## 2019-10-01 PROCEDURE — 63710000001 HYDROCODONE-ACETAMINOPHEN 7.5-325 MG TABLET: Performed by: INTERNAL MEDICINE

## 2019-10-01 PROCEDURE — 63710000001 TAB-A-VITE/BETA CAROTENE TABLET: Performed by: INTERNAL MEDICINE

## 2019-10-01 PROCEDURE — 96366 THER/PROPH/DIAG IV INF ADDON: CPT

## 2019-10-01 PROCEDURE — 63710000001 TAMSULOSIN 0.4 MG CAPSULE: Performed by: INTERNAL MEDICINE

## 2019-10-01 PROCEDURE — 63710000001 GLIPIZIDE 5 MG TABLET: Performed by: INTERNAL MEDICINE

## 2019-10-01 PROCEDURE — A9270 NON-COVERED ITEM OR SERVICE: HCPCS | Performed by: PHYSICIAN ASSISTANT

## 2019-10-01 PROCEDURE — 63710000001 METOPROLOL TARTRATE 12.5 MG TABLET: Performed by: INTERNAL MEDICINE

## 2019-10-01 PROCEDURE — 63710000001 ACETAMINOPHEN 325 MG TABLET: Performed by: INTERNAL MEDICINE

## 2019-10-01 PROCEDURE — 63710000001 TERAZOSIN 1 MG CAPSULE: Performed by: NURSE PRACTITIONER

## 2019-10-01 PROCEDURE — 99213 OFFICE O/P EST LOW 20 MIN: CPT | Performed by: INTERNAL MEDICINE

## 2019-10-01 PROCEDURE — 63710000001 FINASTERIDE 5 MG TABLET: Performed by: INTERNAL MEDICINE

## 2019-10-01 PROCEDURE — 63710000001 ATORVASTATIN 20 MG TABLET: Performed by: INTERNAL MEDICINE

## 2019-10-01 RX ORDER — AMLODIPINE BESYLATE 5 MG/1
5 TABLET ORAL ONCE
Status: COMPLETED | OUTPATIENT
Start: 2019-10-01 | End: 2019-10-01

## 2019-10-01 RX ORDER — AMLODIPINE BESYLATE 5 MG/1
5 TABLET ORAL
Status: DISCONTINUED | OUTPATIENT
Start: 2019-10-01 | End: 2019-10-01

## 2019-10-01 RX ORDER — TERAZOSIN 1 MG/1
1 CAPSULE ORAL ONCE
Status: COMPLETED | OUTPATIENT
Start: 2019-10-01 | End: 2019-10-01

## 2019-10-01 RX ORDER — TERAZOSIN 1 MG/1
1 CAPSULE ORAL EVERY 12 HOURS SCHEDULED
Status: DISCONTINUED | OUTPATIENT
Start: 2019-10-01 | End: 2019-10-01

## 2019-10-01 RX ORDER — LISINOPRIL 40 MG/1
40 TABLET ORAL DAILY
Status: DISCONTINUED | OUTPATIENT
Start: 2019-10-01 | End: 2019-10-02 | Stop reason: HOSPADM

## 2019-10-01 RX ADMIN — FLUOXETINE HYDROCHLORIDE 20 MG: 20 CAPSULE ORAL at 09:11

## 2019-10-01 RX ADMIN — TERAZOSIN HYDROCHLORIDE ANHYDROUS 1 MG: 1 CAPSULE ORAL at 12:46

## 2019-10-01 RX ADMIN — LISINOPRIL 40 MG: 40 TABLET ORAL at 09:11

## 2019-10-01 RX ADMIN — AMLODIPINE BESYLATE 5 MG: 5 TABLET ORAL at 04:27

## 2019-10-01 RX ADMIN — MULTIVITAMIN TABLET 1 TABLET: TABLET at 09:11

## 2019-10-01 RX ADMIN — ACETAMINOPHEN 650 MG: 325 TABLET ORAL at 04:31

## 2019-10-01 RX ADMIN — FINASTERIDE 5 MG: 5 TABLET, FILM COATED ORAL at 09:11

## 2019-10-01 RX ADMIN — GLIPIZIDE 5 MG: 5 TABLET ORAL at 09:10

## 2019-10-01 RX ADMIN — IPRATROPIUM BROMIDE 0.5 MG: 0.5 SOLUTION RESPIRATORY (INHALATION) at 07:07

## 2019-10-01 RX ADMIN — PANTOPRAZOLE SODIUM 40 MG: 40 TABLET, DELAYED RELEASE ORAL at 09:11

## 2019-10-01 RX ADMIN — TAMSULOSIN HYDROCHLORIDE 0.4 MG: 0.4 CAPSULE ORAL at 20:31

## 2019-10-01 RX ADMIN — ATORVASTATIN CALCIUM 20 MG: 20 TABLET, FILM COATED ORAL at 20:31

## 2019-10-01 RX ADMIN — IPRATROPIUM BROMIDE 0.5 MG: 0.5 SOLUTION RESPIRATORY (INHALATION) at 15:41

## 2019-10-01 RX ADMIN — METOPROLOL TARTRATE 12.5 MG: 25 TABLET, FILM COATED ORAL at 09:11

## 2019-10-01 RX ADMIN — SODIUM CHLORIDE 2.5 MG/HR: 9 INJECTION, SOLUTION INTRAVENOUS at 20:00

## 2019-10-01 RX ADMIN — METOPROLOL TARTRATE 12.5 MG: 25 TABLET, FILM COATED ORAL at 18:03

## 2019-10-01 RX ADMIN — HYDROCODONE BITARTRATE AND ACETAMINOPHEN 1 TABLET: 7.5; 325 TABLET ORAL at 01:31

## 2019-10-01 RX ADMIN — METOPROLOL TARTRATE 25 MG: 25 TABLET ORAL at 01:31

## 2019-10-01 RX ADMIN — GLIPIZIDE 5 MG: 5 TABLET ORAL at 17:55

## 2019-10-01 RX ADMIN — IPRATROPIUM BROMIDE 0.5 MG: 0.5 SOLUTION RESPIRATORY (INHALATION) at 19:04

## 2019-10-01 RX ADMIN — IPRATROPIUM BROMIDE 0.5 MG: 0.5 SOLUTION RESPIRATORY (INHALATION) at 11:58

## 2019-10-02 VITALS
HEART RATE: 69 BPM | SYSTOLIC BLOOD PRESSURE: 173 MMHG | DIASTOLIC BLOOD PRESSURE: 86 MMHG | HEIGHT: 65 IN | BODY MASS INDEX: 34.99 KG/M2 | OXYGEN SATURATION: 94 % | WEIGHT: 210 LBS | RESPIRATION RATE: 18 BRPM | TEMPERATURE: 98.2 F

## 2019-10-02 PROCEDURE — 63710000001 GLIPIZIDE 5 MG TABLET: Performed by: INTERNAL MEDICINE

## 2019-10-02 PROCEDURE — A9270 NON-COVERED ITEM OR SERVICE: HCPCS | Performed by: INTERNAL MEDICINE

## 2019-10-02 PROCEDURE — 99213 OFFICE O/P EST LOW 20 MIN: CPT | Performed by: INTERNAL MEDICINE

## 2019-10-02 PROCEDURE — 63710000001 FINASTERIDE 5 MG TABLET: Performed by: INTERNAL MEDICINE

## 2019-10-02 PROCEDURE — A9270 NON-COVERED ITEM OR SERVICE: HCPCS | Performed by: NURSE PRACTITIONER

## 2019-10-02 PROCEDURE — 63710000001 APIXABAN 5 MG TABLET: Performed by: INTERNAL MEDICINE

## 2019-10-02 PROCEDURE — 63710000001 LISINOPRIL 40 MG TABLET: Performed by: NURSE PRACTITIONER

## 2019-10-02 PROCEDURE — 63710000001 ACETAMINOPHEN 325 MG TABLET: Performed by: INTERNAL MEDICINE

## 2019-10-02 PROCEDURE — 63710000001 FLUOXETINE 20 MG CAPSULE: Performed by: INTERNAL MEDICINE

## 2019-10-02 PROCEDURE — 63710000001 TAB-A-VITE/BETA CAROTENE TABLET: Performed by: INTERNAL MEDICINE

## 2019-10-02 PROCEDURE — 63710000001 ASPIRIN 81 MG TABLET DELAYED-RELEASE: Performed by: INTERNAL MEDICINE

## 2019-10-02 PROCEDURE — 96366 THER/PROPH/DIAG IV INF ADDON: CPT

## 2019-10-02 PROCEDURE — 63710000001 PANTOPRAZOLE 40 MG TABLET DELAYED-RELEASE: Performed by: INTERNAL MEDICINE

## 2019-10-02 PROCEDURE — 63710000001 METOPROLOL TARTRATE 12.5 MG TABLET: Performed by: INTERNAL MEDICINE

## 2019-10-02 PROCEDURE — 94799 UNLISTED PULMONARY SVC/PX: CPT

## 2019-10-02 PROCEDURE — 93010 ELECTROCARDIOGRAM REPORT: CPT | Performed by: INTERNAL MEDICINE

## 2019-10-02 RX ORDER — ASPIRIN 81 MG/1
81 TABLET ORAL DAILY
Status: DISCONTINUED | OUTPATIENT
Start: 2019-10-02 | End: 2019-10-02 | Stop reason: HOSPADM

## 2019-10-02 RX ORDER — TERAZOSIN 1 MG/1
1 CAPSULE ORAL NIGHTLY
Qty: 30 CAPSULE | Refills: 11 | Status: SHIPPED | OUTPATIENT
Start: 2019-10-02 | End: 2019-10-02

## 2019-10-02 RX ORDER — TERAZOSIN 1 MG/1
2 CAPSULE ORAL NIGHTLY
Qty: 30 CAPSULE | Refills: 11 | Status: SHIPPED | OUTPATIENT
Start: 2019-10-02 | End: 2019-10-07

## 2019-10-02 RX ORDER — TERAZOSIN 1 MG/1
1 CAPSULE ORAL NIGHTLY
Status: DISCONTINUED | OUTPATIENT
Start: 2019-10-02 | End: 2019-10-02 | Stop reason: HOSPADM

## 2019-10-02 RX ADMIN — ACETAMINOPHEN 650 MG: 325 TABLET ORAL at 07:40

## 2019-10-02 RX ADMIN — PANTOPRAZOLE SODIUM 40 MG: 40 TABLET, DELAYED RELEASE ORAL at 08:41

## 2019-10-02 RX ADMIN — MULTIVITAMIN TABLET 1 TABLET: TABLET at 08:41

## 2019-10-02 RX ADMIN — FINASTERIDE 5 MG: 5 TABLET, FILM COATED ORAL at 08:41

## 2019-10-02 RX ADMIN — IPRATROPIUM BROMIDE 0.5 MG: 0.5 SOLUTION RESPIRATORY (INHALATION) at 08:31

## 2019-10-02 RX ADMIN — ASPIRIN 81 MG: 81 TABLET, COATED ORAL at 08:39

## 2019-10-02 RX ADMIN — APIXABAN 5 MG: 5 TABLET, FILM COATED ORAL at 08:41

## 2019-10-02 RX ADMIN — GLIPIZIDE 5 MG: 5 TABLET ORAL at 07:40

## 2019-10-02 RX ADMIN — METOPROLOL TARTRATE 12.5 MG: 25 TABLET, FILM COATED ORAL at 08:42

## 2019-10-02 RX ADMIN — LISINOPRIL 40 MG: 40 TABLET ORAL at 08:40

## 2019-10-02 RX ADMIN — FLUOXETINE HYDROCHLORIDE 20 MG: 20 CAPSULE ORAL at 08:41

## 2019-10-03 ENCOUNTER — DOCUMENTATION (OUTPATIENT)
Dept: CARDIAC REHAB | Facility: HOSPITAL | Age: 69
End: 2019-10-03

## 2019-10-04 NOTE — PROGRESS NOTES
[facility]  Heart and Valve Center      Encounter Date:10/07/2019     Robe Bryant  [address] [facility] [id number]  [unfilled]    1950    Radha Jean DO    Robe Bryant is a 68 y.o. male.      Subjective:     Chief Complaint:  Congestive Heart Failure; Atrial Fibrillation; and Establish Care       HPI   Patient is a 68-year-old male past medical history significant for chronic diastolic heart failure, hypertension, sick sinus syndrome status post permanent pacemaker, paroxysmal atrial fibrillation, ECHO and CKD who presents to the [facility] and Valve Center the hospital referral for CHF and atrial fibrillation. Patient admitted 9/30 with no anginal-like symptoms and underwent left heart cath showing noncritical CAD with patent stents.  Hypertension was poorly controlled medications were adjusted. He brings in his medications today, which does not include metoprolol, includes 2 different doses of lisinopril, bystolic, and edarbi. He says he is taking everything that the hospital prescribed and is not on bystolic or edarbi. He is picking up metoprolol today.  He reports the chest pain has resolved.  He he notes feeling significant fatigue and headache which he attributes to uncontrolled blood pressure.  He reports systolic blood pressure at home is been in the 180s since discharge from the hospital.  He has chronic dyspnea on exertion which is stable    Patient Active Problem List   Diagnosis   • Pacemaker   • Neck pain   • Chronic low back pain   • Chronic kidney disease   • Essential hypertension   • Seborrheic dermatitis   • Benign prostatic hyperplasia   • Paroxysmal atrial fibrillation (CMS/HCC)   • Cervico-occipital neuralgia   • Hypercholesterolemia   • Acute erosive gastritis   • Diastolic heart failure (CMS/HCC)   • Chronic obstructive pulmonary disease (CMS/HCC)   • Chronic coronary artery disease   • Gastroesophageal reflux disease   • Chronic shoulder  pain   • Obstructive sleep apnea of adult   • Dysphagia   • Symptomatic bradycardia   • Type 2 diabetes mellitus, without long-term current use of insulin (CMS/[facility])   • Chronic chest pain   • Restless leg syndrome   • Warfarin anticoagulation   • Osteoarthritis of multiple joints   • Multilevel degenerative disc disease   • Moderate episode of recurrent major depressive disorder (CMS/[facility])   • Chronic pain syndrome   • DDD (degenerative disc disease), cervical   • BPH (benign prostatic hypertrophy) with urinary retention   • Class 1 obesity due to excess calories with serious comorbidity and body mass index (BMI) of 32.0 to 32.9 in adult   • Chronic renal insufficiency, stage 3 (moderate) (CMS/[facility])   • Unstable angina (CMS/[facility])       Past Medical History:   Diagnosis Date   • Anxiety and depression    • Arthritis    • Backache     20 years   • Bladder trauma    • Cervicalgia    • Chronic kidney disease     Cr up to 2.1   • Diabetes mellitus (CMS/[facility])     15 years--   • Emphysema of lung (CMS/[facility])    • Eye exam, routine 2015   • FH: colonic polyps    • Gout     for 10 years--   • History of echocardiogram 09/15/2014   • History of tobacco use     with cessation x7 years   • Hyperlipidemia    • Hypertension    • Migraine    • Myocardial infarction (CMS/[facility])     h/o coronary artery stenting--   • Restless leg syndrome     20 years   • Sleep apnea     12 years; uses a Bi-Pap   • Stroke (CMS/[facility])    • Syncope 4/28/2017       Past Surgical History:   Procedure Laterality Date   • BLADDER SURGERY     • CARDIAC CATHETERIZATION  03/15/2013    revealing widely patent stents of the left circumflex and ramus intermedius coronary arteries, no significant disease is seen in any territory   • CARDIAC CATHETERIZATION Left 9/30/2019    Procedure: Left Heart Cath;  Surgeon: Arelis Tucker MD;  Location: [facility] INVASIVE LOCATION;  Service: Cardiology   • CARDIAC PACEMAKER PLACEMENT  2012   • CATARACT EXTRACTION     •  COLONOSCOPY      No family history of colon cancer.     • COLONOSCOPY     • HERNIA REPAIR      Type unknown   • PROSTATE SURGERY         Family History   Problem Relation Age of Onset   • Cancer Mother    • Diabetes Mother    • Hypertension Mother    • Stroke Mother    • Stomach cancer Mother    • Heart disease Mother    • Stomach cancer Father    • Cancer Father    • Lung cancer Father        Social History     Socioeconomic History   • Marital status:      Spouse name: Not on file   • Number of children: Not on file   • Years of education: Not on file   • Highest education level: Not on file   Tobacco Use   • Smoking status: Former Smoker     Last attempt to quit: 8/15/2001     Years since quittin.1   • Smokeless tobacco: Never Used   • Tobacco comment: quit 16 years ago   Substance and Sexual Activity   • Alcohol use: No   • Drug use: No   • Sexual activity: Defer   Social History Narrative    Caffeine use: 2 - 3 servings daily       No Known Allergies      Current Outpatient Medications:   •  albuterol sulfate  (90 Base) MCG/ACT inhaler, Inhale 2 puffs Every 4 (Four) Hours As Needed for Wheezing or Shortness of Air., Disp: 3 inhaler, Rfl: 3  •  apixaban (ELIQUIS) 5 MG tablet tablet, Take 1 tablet by mouth 2 (Two) Times a Day., Disp: 60 tablet, Rfl: 11  •  aspirin 81 MG chewable tablet, Chew 81 mg Daily., Disp: , Rfl:   •  atorvastatin (LIPITOR) 20 MG tablet, Take 1 tablet by mouth every night at bedtime., Disp: 90 tablet, Rfl: 0  •  Blood Glucose Monitoring Suppl (TRUE METRIX AIR GLUCOSE METER) w/Device kit, , Disp: , Rfl:   •  cetirizine (zyrTEC) 10 MG tablet, Take 1 tablet by mouth Daily., Disp: 90 tablet, Rfl: 3  •  finasteride (PROSCAR) 5 MG tablet, Take 1 tablet by mouth Daily., Disp: 90 tablet, Rfl: 0  •  FLUoxetine (PROzac) 20 MG capsule, Take 1 capsule by mouth Daily., Disp: 90 capsule, Rfl: 3  •  glipiZIDE (GLUCOTROL) 5 MG tablet, Take 1 tablet by mouth 2 (Two) Times a Day  Before Meals., Disp: 180 tablet, Rfl: 3  •  glucose blood test strip, Use as instructed, E11.9, Disp: 100 each, Rfl: 3  •  glucose monitor monitoring kit, 1 each Daily. E11.9, Disp: 1 each, Rfl: 0  •  Lancet Device misc, 1 each Daily. E11.9, Disp: 100 each, Rfl: 3  •  Lancets misc, 1 each 2 (Two) Times a Day., Disp: 200 each, Rfl: 5  •  lisinopril (PRINIVIL,ZESTRIL) 40 MG tablet, Take 1 tablet by mouth Daily., Disp: 90 tablet, Rfl: 0  •  metoprolol tartrate (LOPRESSOR) 25 MG tablet, Take 0.5 tablets by mouth Every 12 (Twelve) Hours., Disp: 90 tablet, Rfl: 0  •  nitroglycerin (NITROSTAT) 0.4 MG SL tablet, Place 1 tablet under the tongue Every 5 (Five) Minutes As Needed for Chest Pain., Disp: 25 tablet, Rfl: 11  •  omeprazole (priLOSEC) 40 MG capsule, Take 1 capsule by mouth Daily., Disp: 90 capsule, Rfl: 3  •  terazosin (HYTRIN) 2 MG capsule, Take 1 capsule by mouth every night at bedtime., Disp: 90 capsule, Rfl: 0  •  amLODIPine (NORVASC) 10 MG tablet, Take 1 tablet by mouth Daily., Disp: 30 tablet, Rfl: 1  •  Multiple Vitamin tablet, Take 1 tablet by mouth daily., Disp: , Rfl:   •  tamsulosin (FLOMAX) 0.4 MG capsule 24 hr capsule, Take 1 capsule by mouth Daily., Disp: 90 capsule, Rfl: 0  •  tiotropium bromide monohydrate (SPIRIVA RESPIMAT) 2.5 MCG/ACT aerosol solution inhaler, Inhale 2 puffs Daily., Disp: 3 inhaler, Rfl: 3    The following portions of the patient's history were reviewed today and updated as appropriate: allergies, current medications, past family history, past medical history, past social history, past surgical history and problem list     Review of Systems   Constitution: Positive for weakness and malaise/fatigue. Negative for chills and fever.   HENT: Positive for congestion.    Eyes: Negative.    Cardiovascular: Positive for dyspnea on exertion and orthopnea. Negative for chest pain, claudication, cyanosis, irregular heartbeat, leg swelling, near-syncope, palpitations, paroxysmal nocturnal  dyspnea and syncope.   Respiratory: Positive for cough and shortness of breath. Negative for snoring.    Endocrine: Negative.    Hematologic/Lymphatic: Does not bruise/bleed easily.   Skin: Negative for poor wound healing.   Musculoskeletal: Negative.    Gastrointestinal: Negative for abdominal pain, heartburn, hematemesis, melena, nausea and vomiting.   Genitourinary: Negative.  Negative for hematuria.   Neurological: Positive for headaches and light-headedness.   Psychiatric/Behavioral: Negative.    Allergic/Immunologic: Negative.        Objective:     Vitals:    10/07/19 1453 10/07/19 1454 10/07/19 1547 10/07/19 1606   BP: (!) 195/86 (!) 190/94 (!) 193/91 175/86   BP Location: Left arm Left arm Left arm Left arm   Patient Position: Sitting Standing Sitting Sitting   Cuff Size: Adult Adult  Adult   Pulse:  66 60 63   Resp:       Temp:       TempSrc:       SpO2:  97%     Weight:       Height:           Physical Exam   Constitutional: He is oriented to person, place, and time. He appears well-developed and well-nourished. No distress.   HENT:   Head: Normocephalic.   Eyes: Conjunctivae are normal. Pupils are equal, round, and reactive to light.   Neck: Neck supple. No JVD present. No thyromegaly present.   Cardiovascular: Normal rate, regular rhythm and intact distal pulses. Exam reveals no gallop and no friction rub.   Murmur (Grade II MAGDA) heard.  Pulmonary/Chest: Effort normal and breath sounds normal. No respiratory distress. He has no wheezes. He has no rales. He exhibits no tenderness.   Abdominal: Soft. Bowel sounds are normal.   Musculoskeletal: Normal range of motion. He exhibits no edema.   Neurological: He is alert and oriented to person, place, and time.   Skin: Skin is warm and dry.   Psychiatric: He has a normal mood and affect. His behavior is normal. Thought content normal.   Vitals reviewed.      Lab and Diagnostic Review:  [facility] 9/30/19  · Noncritical coronary artery disease with patent stents  noted    Assessment and Plan:   1. Essential hypertension  Uncontrolled  1 dose of clonidine 0.1 mg p.o. given in clinic today (NDC 1440211008) with appropriate reduction in systolic blood pressure  It appears patient was on amlodipine in the hospital and this was discontinued for unclear reasons.  He has no history of any adverse reactions to medications.  Will resume amlodipine 10 mg daily.  Advised to  metoprolol tartrate today.  HTN Education provided today including signs and symptoms, medication management, daily blood pressure monitoring. Recommended DASH diet. Patient encouraged to call the Heart and Valve center with any blood pressure consistently greater than 130/80      2. Chronic diastolic heart failure (CMS/HCC)  Appears euvolemic  Patient has history of severe and labile renal insufficiency and therefore is off diuretics. Heart failure education provided today including signs and symptoms, daily weights, low sodium diet (less than 2000 mg per day)        3. Paroxysmal atrial fibrillation (CMS/HCC)  Continue metoprolol for rate control  Continue eliquis    4. Chronic coronary artery disease  Recent [facility] showing patent stents and noncritical disease      Patient prefers to follow up with local PCP for BP management. Advised close follow up and to call with any new or worsening symptoms  Keep fu with Dr. Tucker    It has been a pleasure to participate in the care of this patient.  Patient was instructed to call the Heart and Valve Center with any questions, concerns, or worsening symptoms.    *Please note that portions of this note were completed with a voice recognition program. Efforts were made to edit the dictations, but occasionally words are mistranscribed.

## 2019-10-07 ENCOUNTER — OFFICE VISIT (OUTPATIENT)
Dept: CARDIOLOGY | Facility: HOSPITAL | Age: 69
End: 2019-10-07

## 2019-10-07 VITALS
TEMPERATURE: 97.8 F | WEIGHT: 216.38 LBS | OXYGEN SATURATION: 97 % | RESPIRATION RATE: 18 BRPM | DIASTOLIC BLOOD PRESSURE: 86 MMHG | BODY MASS INDEX: 36.05 KG/M2 | HEART RATE: 63 BPM | SYSTOLIC BLOOD PRESSURE: 175 MMHG | HEIGHT: 65 IN

## 2019-10-07 DIAGNOSIS — I48.0 PAROXYSMAL ATRIAL FIBRILLATION (HCC): ICD-10-CM

## 2019-10-07 DIAGNOSIS — I10 ESSENTIAL HYPERTENSION: Primary | ICD-10-CM

## 2019-10-07 DIAGNOSIS — I50.32 CHRONIC DIASTOLIC HEART FAILURE (HCC): ICD-10-CM

## 2019-10-07 DIAGNOSIS — I25.10 CHRONIC CORONARY ARTERY DISEASE: ICD-10-CM

## 2019-10-07 PROCEDURE — 99214 OFFICE O/P EST MOD 30 MIN: CPT | Performed by: NURSE PRACTITIONER

## 2019-10-07 RX ORDER — NEBIVOLOL 5 MG/1
5 TABLET ORAL DAILY
COMMUNITY
End: 2019-10-07

## 2019-10-07 RX ORDER — AMLODIPINE BESYLATE 10 MG/1
10 TABLET ORAL DAILY
Qty: 30 TABLET | Refills: 1 | Status: SHIPPED | OUTPATIENT
Start: 2019-10-07

## 2019-10-07 RX ORDER — ASPIRIN 81 MG/1
81 TABLET, CHEWABLE ORAL DAILY
COMMUNITY
End: 2023-03-21 | Stop reason: HOSPADM

## 2019-10-07 RX ORDER — DIPHENHYDRAMINE HCL 25 MG
TABLET ORAL
COMMUNITY
Start: 2019-09-27 | End: 2020-07-29 | Stop reason: SDUPTHER

## 2019-10-08 ENCOUNTER — HOSPITAL ENCOUNTER (OUTPATIENT)
Dept: GENERAL RADIOLOGY | Facility: HOSPITAL | Age: 69
Discharge: HOME | End: 2019-10-08
Admitting: FAMILY MEDICINE

## 2019-10-08 ENCOUNTER — OFFICE VISIT (OUTPATIENT)
Dept: INTERNAL MEDICINE | Facility: CLINIC | Age: 69
End: 2019-10-08

## 2019-10-08 VITALS
HEIGHT: 65 IN | RESPIRATION RATE: 17 BRPM | SYSTOLIC BLOOD PRESSURE: 138 MMHG | OXYGEN SATURATION: 97 % | HEART RATE: 62 BPM | BODY MASS INDEX: 35.99 KG/M2 | TEMPERATURE: 98.1 F | DIASTOLIC BLOOD PRESSURE: 96 MMHG | WEIGHT: 216 LBS

## 2019-10-08 DIAGNOSIS — J06.9 UPPER RESPIRATORY TRACT INFECTION, UNSPECIFIED TYPE: ICD-10-CM

## 2019-10-08 DIAGNOSIS — I10 ESSENTIAL HYPERTENSION: ICD-10-CM

## 2019-10-08 DIAGNOSIS — I25.10 CHRONIC CORONARY ARTERY DISEASE: ICD-10-CM

## 2019-10-08 DIAGNOSIS — J44.1 COPD EXACERBATION (HCC): Primary | ICD-10-CM

## 2019-10-08 DIAGNOSIS — R06.2 WHEEZE: ICD-10-CM

## 2019-10-08 PROCEDURE — 99214 OFFICE O/P EST MOD 30 MIN: CPT | Performed by: FAMILY MEDICINE

## 2019-10-08 PROCEDURE — 71046 X-RAY EXAM CHEST 2 VIEWS: CPT

## 2019-10-08 RX ORDER — METHYLPREDNISOLONE 4 MG/1
TABLET ORAL
Qty: 21 EACH | Refills: 0 | Status: SHIPPED | OUTPATIENT
Start: 2019-10-08 | End: 2019-10-09 | Stop reason: SDUPTHER

## 2019-10-08 RX ORDER — CEFDINIR 300 MG/1
300 CAPSULE ORAL 2 TIMES DAILY
Qty: 20 CAPSULE | Refills: 0 | Status: SHIPPED | OUTPATIENT
Start: 2019-10-08 | End: 2019-10-09 | Stop reason: SDUPTHER

## 2019-10-08 RX ORDER — IPRATROPIUM BROMIDE AND ALBUTEROL SULFATE 2.5; .5 MG/3ML; MG/3ML
3 SOLUTION RESPIRATORY (INHALATION) EVERY 4 HOURS PRN
Qty: 360 ML | Refills: 0 | Status: SHIPPED | OUTPATIENT
Start: 2019-10-08 | End: 2019-10-09 | Stop reason: SDUPTHER

## 2019-10-08 NOTE — ASSESSMENT & PLAN NOTE
Likely the trigger for the patient's COPD exacerbation.  Patient is being treated with steroids and antibiotics.

## 2019-10-08 NOTE — PROGRESS NOTES
Robe Bryant is a 68 y.o. male.    Chief Complaint   Patient presents with   • Coronary Artery Disease     Patient here for a hospital follow up, patient had a heart cath done on 09/30/2019 and patient was discharged on 10/02/2019. Patient states the day after he was discharged he began coughing and has some congestion as well.       HPI   Patient reports they have not been feeling well for 1week(s).  This started right after he got out of the hospital for his heart cath. He admits to rhinorrhea, nasal congestion, facial pain, headache, sneezing, cough, drainage, body aches, fatigue.  He denies sore throat, fever.  They have tried aspirin for this issue without good response.  He does have COPD and has not received spiriva yet through mail order.  He reports he has been taking an old combivent inhaler with minimal response.     Patient did have a recent short stay in the hospital for observation after a heart cath.  Heart cath showed current stents were patent and no additional stents were placed.  Denies any CP or shortness of breath.  He does admit to dyspnea on exertion.  Denies any palpitations.  Cath was done through the groin.  He does admit to some soreness and bruising.  BP was elevated at cardiology yesterday and he was started back on amlodipine in addition to other medications.  BP remains mildly elevated in the office today.      The following portions of the patient's history were reviewed and updated as appropriate: allergies, current medications, past family history, past medical history, past social history, past surgical history and problem list.     No Known Allergies      Current Outpatient Medications:   •  albuterol sulfate  (90 Base) MCG/ACT inhaler, Inhale 2 puffs Every 4 (Four) Hours As Needed for Wheezing or Shortness of Air., Disp: 3 inhaler, Rfl: 3  •  amLODIPine (NORVASC) 10 MG tablet, Take 1 tablet by mouth Daily., Disp: 30 tablet, Rfl: 1  •  apixaban (ELIQUIS) 5 MG tablet  tablet, Take 1 tablet by mouth 2 (Two) Times a Day., Disp: 60 tablet, Rfl: 11  •  aspirin 81 MG chewable tablet, Chew 81 mg Daily., Disp: , Rfl:   •  atorvastatin (LIPITOR) 20 MG tablet, Take 1 tablet by mouth every night at bedtime., Disp: 90 tablet, Rfl: 0  •  Blood Glucose Monitoring Suppl (TRUE METRIX AIR GLUCOSE METER) w/Device kit, , Disp: , Rfl:   •  cetirizine (zyrTEC) 10 MG tablet, Take 1 tablet by mouth Daily., Disp: 90 tablet, Rfl: 3  •  finasteride (PROSCAR) 5 MG tablet, Take 1 tablet by mouth Daily., Disp: 90 tablet, Rfl: 0  •  FLUoxetine (PROzac) 20 MG capsule, Take 1 capsule by mouth Daily., Disp: 90 capsule, Rfl: 3  •  glipiZIDE (GLUCOTROL) 5 MG tablet, Take 1 tablet by mouth 2 (Two) Times a Day Before Meals., Disp: 180 tablet, Rfl: 3  •  glucose blood test strip, Use as instructed, E11.9, Disp: 100 each, Rfl: 3  •  glucose monitor monitoring kit, 1 each Daily. E11.9, Disp: 1 each, Rfl: 0  •  Lancet Device misc, 1 each Daily. E11.9, Disp: 100 each, Rfl: 3  •  Lancets misc, 1 each 2 (Two) Times a Day., Disp: 200 each, Rfl: 5  •  lisinopril (PRINIVIL,ZESTRIL) 40 MG tablet, Take 1 tablet by mouth Daily., Disp: 90 tablet, Rfl: 0  •  metoprolol tartrate (LOPRESSOR) 25 MG tablet, Take 0.5 tablets by mouth Every 12 (Twelve) Hours., Disp: 90 tablet, Rfl: 0  •  Multiple Vitamin tablet, Take 1 tablet by mouth daily., Disp: , Rfl:   •  nitroglycerin (NITROSTAT) 0.4 MG SL tablet, Place 1 tablet under the tongue Every 5 (Five) Minutes As Needed for Chest Pain., Disp: 25 tablet, Rfl: 11  •  omeprazole (priLOSEC) 40 MG capsule, Take 1 capsule by mouth Daily., Disp: 90 capsule, Rfl: 3  •  tamsulosin (FLOMAX) 0.4 MG capsule 24 hr capsule, Take 1 capsule by mouth Daily., Disp: 90 capsule, Rfl: 0  •  terazosin (HYTRIN) 2 MG capsule, Take 1 capsule by mouth every night at bedtime., Disp: 90 capsule, Rfl: 0  •  tiotropium bromide monohydrate (SPIRIVA RESPIMAT) 2.5 MCG/ACT aerosol solution inhaler, Inhale 2 puffs Daily.,  "Disp: 2 inhaler, Rfl: 0  •  cefdinir (OMNICEF) 300 MG capsule, Take 1 capsule by mouth 2 (Two) Times a Day for 10 days., Disp: 20 capsule, Rfl: 0  •  ipratropium-albuterol (DUO-NEB) 0.5-2.5 mg/3 ml nebulizer, Take 3 mL by nebulization Every 4 (Four) Hours As Needed for Wheezing or Shortness of Air., Disp: 360 mL, Rfl: 0  •  methylPREDNISolone (MEDROL, JEANETTE,) 4 MG tablet, Take as directed on package instructions., Disp: 21 each, Rfl: 0    ROS    Review of Systems   Constitutional: Positive for fatigue. Negative for chills and fever.   HENT: Positive for congestion, postnasal drip, rhinorrhea and sinus pressure. Negative for ear pain and sore throat.    Respiratory: Positive for cough, shortness of breath (dyspnea on exertion) and wheezing.    Cardiovascular: Negative for chest pain, palpitations and leg swelling.   Gastrointestinal: Negative for abdominal pain, constipation, diarrhea, nausea and vomiting.   Musculoskeletal: Positive for arthralgias and myalgias.   Neurological: Positive for headache. Negative for weakness.       Vitals:    10/08/19 1113   BP: 138/96   Pulse: 62   Resp: 17   Temp: 98.1 °F (36.7 °C)   SpO2: 97%   Weight: 98 kg (216 lb)   Height: 165.1 cm (65\")     Body mass index is 35.94 kg/m².    Physical Exam     Physical Exam   Constitutional: He is oriented to person, place, and time. He appears well-developed and well-nourished. No distress.   HENT:   Head: Normocephalic and atraumatic.   Right Ear: Tympanic membrane and external ear normal.   Left Ear: Tympanic membrane and external ear normal.   Mouth/Throat: Posterior oropharyngeal erythema present.   Eyes: Conjunctivae and EOM are normal.   Cardiovascular: Normal rate and regular rhythm.   No murmur heard.  Pulmonary/Chest: Effort normal. No respiratory distress. He has decreased breath sounds in the left upper field, the left middle field and the left lower field. He has wheezes in the right upper field, the right middle field and the right " lower field.   Abdominal: Soft. Bowel sounds are normal. He exhibits no distension. There is no tenderness.   Neurological: He is alert and oriented to person, place, and time. No cranial nerve deficit.   Skin: Skin is warm and dry.   Psychiatric: He has a normal mood and affect.       Assessment/Plan    Problem List Items Addressed This Visit        Cardiovascular and Mediastinum    Essential hypertension     Mildly uncontrolled in the office today.  Patient is to continue current medications of lisinopril, metoprolol, and amlodipine.  We will see the patient back in 1 week for blood pressure recheck.  He has been encouraged to keep a log of his blood pressure over the next week.         Chronic coronary artery disease     Stable.  Patient recently had a heart cath performed which showed patent stents.            Respiratory    COPD exacerbation (CMS/[facility]) - Primary     Will obtain a chest x-ray to rule out pneumonia.  Patient is being started on a round of steroids and Omnicef.  He has been encouraged to take either Delsym or Robitussin as needed for cough.  Patient is also been provided with samples today as Spiriva.  A prescription for duo nebs has been sent in as well.             Relevant Medications    ipratropium-albuterol (DUO-NEB) 0.5-2.5 mg/3 ml nebulizer    methylPREDNISolone (MEDROL, JEANETTE,) 4 MG tablet    tiotropium bromide monohydrate (SPIRIVA RESPIMAT) 2.5 MCG/ACT aerosol solution inhaler    Other Relevant Orders    XR Chest PA & Lateral    Upper respiratory tract infection     Likely the trigger for the patient's COPD exacerbation.  Patient is being treated with steroids and antibiotics.         Relevant Medications    cefdinir (OMNICEF) 300 MG capsule      Other Visit Diagnoses     Wheeze        Relevant Orders    XR Chest PA & Lateral          New Medications Ordered This Visit   Medications   • cefdinir (OMNICEF) 300 MG capsule     Sig: Take 1 capsule by mouth 2 (Two) Times a Day for 10 days.      Dispense:  20 capsule     Refill:  0   • ipratropium-albuterol (DUO-NEB) 0.5-2.5 mg/3 ml nebulizer     Sig: Take 3 mL by nebulization Every 4 (Four) Hours As Needed for Wheezing or Shortness of Air.     Dispense:  360 mL     Refill:  0   • methylPREDNISolone (MEDROL, JEANETTE,) 4 MG tablet     Sig: Take as directed on package instructions.     Dispense:  21 each     Refill:  0   • tiotropium bromide monohydrate (SPIRIVA RESPIMAT) 2.5 MCG/ACT aerosol solution inhaler     Sig: Inhale 2 puffs Daily.     Dispense:  2 inhaler     Refill:  0       No orders of the defined types were placed in this encounter.      Return in about 1 week (around 10/15/2019) for HTN.    Radha Jean DO

## 2019-10-08 NOTE — ASSESSMENT & PLAN NOTE
Will obtain a chest x-ray to rule out pneumonia.  Patient is being started on a round of steroids and Omnicef.  He has been encouraged to take either Delsym or Robitussin as needed for cough.  Patient is also been provided with samples today as Spiriva.  A prescription for duo nebs has been sent in as well.

## 2019-10-08 NOTE — PATIENT INSTRUCTIONS
Check blood pressure twice a day and write it down.  Bring that next week to your appointment.  If gets consistently above 150/100 come in sooner.  Get delsym or robitussin for cough.

## 2019-10-08 NOTE — ASSESSMENT & PLAN NOTE
Mildly uncontrolled in the office today.  Patient is to continue current medications of lisinopril, metoprolol, and amlodipine.  We will see the patient back in 1 week for blood pressure recheck.  He has been encouraged to keep a log of his blood pressure over the next week.

## 2019-10-09 RX ORDER — CEFDINIR 300 MG/1
300 CAPSULE ORAL 2 TIMES DAILY
Qty: 20 CAPSULE | Refills: 0 | Status: SHIPPED | OUTPATIENT
Start: 2019-10-09 | End: 2019-10-19

## 2019-10-09 RX ORDER — IPRATROPIUM BROMIDE AND ALBUTEROL SULFATE 2.5; .5 MG/3ML; MG/3ML
3 SOLUTION RESPIRATORY (INHALATION) EVERY 4 HOURS PRN
Qty: 360 ML | Refills: 0 | Status: SHIPPED | OUTPATIENT
Start: 2019-10-09 | End: 2019-10-09 | Stop reason: SDUPTHER

## 2019-10-09 RX ORDER — METHYLPREDNISOLONE 4 MG/1
TABLET ORAL
Qty: 21 EACH | Refills: 0 | Status: SHIPPED | OUTPATIENT
Start: 2019-10-09 | End: 2019-11-21

## 2019-10-09 NOTE — TELEPHONE ENCOUNTER
Patient called requesting medication sent to the [facility] in [place], [place] be sent to [facility] in [place], [place].   Sent medication to [facility] in [place].

## 2019-10-10 RX ORDER — IPRATROPIUM BROMIDE AND ALBUTEROL SULFATE 2.5; .5 MG/3ML; MG/3ML
SOLUTION RESPIRATORY (INHALATION)
Qty: 1620 ML | Refills: 0 | Status: SHIPPED | OUTPATIENT
Start: 2019-10-10 | End: 2019-12-06 | Stop reason: SDUPTHER

## 2019-10-15 ENCOUNTER — OFFICE VISIT (OUTPATIENT)
Dept: INTERNAL MEDICINE | Facility: CLINIC | Age: 69
End: 2019-10-15

## 2019-10-15 VITALS
RESPIRATION RATE: 17 BRPM | TEMPERATURE: 97.9 F | OXYGEN SATURATION: 97 % | SYSTOLIC BLOOD PRESSURE: 132 MMHG | HEIGHT: 65 IN | DIASTOLIC BLOOD PRESSURE: 70 MMHG | BODY MASS INDEX: 36.32 KG/M2 | WEIGHT: 218 LBS | HEART RATE: 76 BPM

## 2019-10-15 DIAGNOSIS — I10 ESSENTIAL HYPERTENSION: Primary | ICD-10-CM

## 2019-10-15 DIAGNOSIS — Z23 FLU VACCINE NEED: ICD-10-CM

## 2019-10-15 DIAGNOSIS — J18.9 PNEUMONIA DUE TO INFECTIOUS ORGANISM, UNSPECIFIED LATERALITY, UNSPECIFIED PART OF LUNG: ICD-10-CM

## 2019-10-15 PROCEDURE — 90653 IIV ADJUVANT VACCINE IM: CPT | Performed by: FAMILY MEDICINE

## 2019-10-15 PROCEDURE — G0008 ADMIN INFLUENZA VIRUS VAC: HCPCS | Performed by: FAMILY MEDICINE

## 2019-10-15 PROCEDURE — 99213 OFFICE O/P EST LOW 20 MIN: CPT | Performed by: FAMILY MEDICINE

## 2019-10-15 RX ORDER — LISINOPRIL 10 MG/1
10 TABLET ORAL DAILY
COMMUNITY
Start: 2019-10-11 | End: 2019-10-15 | Stop reason: SDUPTHER

## 2019-10-15 NOTE — PROGRESS NOTES
Robe Bryant is a 68 y.o. male.    Chief Complaint   Patient presents with   • Hypertension       HPI   Patient has hypertension.  He is here for a 1 week f/u on BP.  It was elevated at his last visit.  However, patient was also acutely ill with pneumonia.  His medications were adjusted the day prior to his last appointment and he is taking lisinopril, metoprolol, and amlodipine.  BP is controlled today in the office.    Patient also reports he is feeling much better.  He reports cough and shortness of breath have improved.  He is still on antibiotics and steroids for pneumonia.     The following portions of the patient's history were reviewed and updated as appropriate: allergies, current medications, past family history, past medical history, past social history, past surgical history and problem list.     No Known Allergies      Current Outpatient Medications:   •  albuterol sulfate  (90 Base) MCG/ACT inhaler, Inhale 2 puffs Every 4 (Four) Hours As Needed for Wheezing or Shortness of Air., Disp: 3 inhaler, Rfl: 3  •  amLODIPine (NORVASC) 10 MG tablet, Take 1 tablet by mouth Daily., Disp: 30 tablet, Rfl: 1  •  apixaban (ELIQUIS) 5 MG tablet tablet, Take 1 tablet by mouth 2 (Two) Times a Day., Disp: 60 tablet, Rfl: 11  •  aspirin 81 MG chewable tablet, Chew 81 mg Daily., Disp: , Rfl:   •  atorvastatin (LIPITOR) 20 MG tablet, Take 1 tablet by mouth every night at bedtime., Disp: 90 tablet, Rfl: 0  •  Blood Glucose Monitoring Suppl (TRUE METRIX AIR GLUCOSE METER) w/Device kit, , Disp: , Rfl:   •  cetirizine (zyrTEC) 10 MG tablet, Take 1 tablet by mouth Daily., Disp: 90 tablet, Rfl: 3  •  finasteride (PROSCAR) 5 MG tablet, Take 1 tablet by mouth Daily., Disp: 90 tablet, Rfl: 0  •  FLUoxetine (PROzac) 20 MG capsule, Take 1 capsule by mouth Daily., Disp: 90 capsule, Rfl: 3  •  glipiZIDE (GLUCOTROL) 5 MG tablet, Take 1 tablet by mouth 2 (Two) Times a Day Before Meals., Disp: 180 tablet, Rfl: 3  •  glucose  "blood test strip, Use as instructed, E11.9, Disp: 100 each, Rfl: 3  •  glucose monitor monitoring kit, 1 each Daily. E11.9, Disp: 1 each, Rfl: 0  •  ipratropium-albuterol (DUO-NEB) 0.5-2.5 mg/3 ml nebulizer, USE 1 VIAL VIA NEBULIZER EVERY 4 HOURS AS NEEDED FOR WHEEZING OR SHORTNESS OF BREATH, Disp: 1620 mL, Rfl: 0  •  Lancet Device misc, 1 each Daily. E11.9, Disp: 100 each, Rfl: 3  •  Lancets misc, 1 each 2 (Two) Times a Day., Disp: 200 each, Rfl: 5  •  lisinopril (PRINIVIL,ZESTRIL) 40 MG tablet, Take 1 tablet by mouth Daily., Disp: 90 tablet, Rfl: 0  •  methylPREDNISolone (MEDROL, JEANETTE,) 4 MG tablet, Take as directed on package instructions., Disp: 21 each, Rfl: 0  •  metoprolol tartrate (LOPRESSOR) 25 MG tablet, Take 0.5 tablets by mouth Every 12 (Twelve) Hours., Disp: 90 tablet, Rfl: 0  •  Multiple Vitamin tablet, Take 1 tablet by mouth daily., Disp: , Rfl:   •  nitroglycerin (NITROSTAT) 0.4 MG SL tablet, Place 1 tablet under the tongue Every 5 (Five) Minutes As Needed for Chest Pain., Disp: 25 tablet, Rfl: 11  •  omeprazole (priLOSEC) 40 MG capsule, Take 1 capsule by mouth Daily., Disp: 90 capsule, Rfl: 3  •  tamsulosin (FLOMAX) 0.4 MG capsule 24 hr capsule, Take 1 capsule by mouth Daily., Disp: 90 capsule, Rfl: 0  •  terazosin (HYTRIN) 2 MG capsule, Take 1 capsule by mouth every night at bedtime., Disp: 90 capsule, Rfl: 0  •  tiotropium bromide monohydrate (SPIRIVA RESPIMAT) 2.5 MCG/ACT aerosol solution inhaler, Inhale 2 puffs Daily., Disp: 2 inhaler, Rfl: 0    ROS    Review of Systems   Constitutional: Negative for chills and fever.   Respiratory: Positive for cough, shortness of breath and wheezing.    Cardiovascular: Negative for chest pain.   Gastrointestinal: Negative for abdominal pain, diarrhea, nausea and vomiting.       Vitals:    10/15/19 1027 10/15/19 1101   BP: 142/78 132/70   Pulse: 76    Resp: 17    Temp: 97.9 °F (36.6 °C)    SpO2: 97%    Weight: 98.9 kg (218 lb)    Height: 165.1 cm (65\")      Body " mass index is 36.28 kg/m².    Physical Exam     Physical Exam   Constitutional: He is oriented to person, place, and time. He appears well-developed and well-nourished. No distress.   HENT:   Head: Normocephalic and atraumatic.   Right Ear: External ear normal.   Left Ear: External ear normal.   Eyes: Conjunctivae and EOM are normal.   Cardiovascular: Normal rate and regular rhythm.   No murmur heard.  Pulmonary/Chest: Effort normal and breath sounds normal. No respiratory distress. He has no wheezes.   Abdominal: Soft. Bowel sounds are normal. He exhibits no distension. There is no tenderness.   Neurological: He is alert and oriented to person, place, and time. No cranial nerve deficit.   Skin: Skin is warm and dry.   Psychiatric: He has a normal mood and affect.       Assessment/Plan    Problem List Items Addressed This Visit        Cardiovascular and Mediastinum    Essential hypertension - Primary     Controlled in office today.  Patient is to continue current medications of lisinopril, metoprolol, and amlodipine.              Respiratory    Pneumonia due to infectious organism     Improved.  Lungs are CTA bilaterally today.  Continue antibiotics and steroids.            Other Visit Diagnoses     Flu vaccine need        Relevant Orders    Fluad Tri 65yr+ (Completed)          No orders of the defined types were placed in this encounter.      Orders Placed This Encounter   Procedures   • Fluad Tri 65yr+       Return for Next scheduled follow up.    Radha Jean DO

## 2019-10-20 PROBLEM — J18.9 PNEUMONIA DUE TO INFECTIOUS ORGANISM: Status: ACTIVE | Noted: 2019-10-20

## 2019-10-20 PROBLEM — J06.9 UPPER RESPIRATORY TRACT INFECTION: Status: RESOLVED | Noted: 2019-10-08 | Resolved: 2019-10-20

## 2019-10-20 NOTE — ASSESSMENT & PLAN NOTE
Controlled in office today.  Patient is to continue current medications of lisinopril, metoprolol, and amlodipine.

## 2019-10-21 ENCOUNTER — TRANSCRIBE ORDERS (OUTPATIENT)
Dept: CT IMAGING | Facility: HOSPITAL | Age: 69
End: 2019-10-21

## 2019-10-21 DIAGNOSIS — J84.9 ILD (INTERSTITIAL LUNG DISEASE) (HCC): Primary | ICD-10-CM

## 2019-10-21 DIAGNOSIS — R05.3 CHRONIC COUGH: ICD-10-CM

## 2019-10-25 ENCOUNTER — APPOINTMENT (OUTPATIENT)
Dept: CT IMAGING | Facility: HOSPITAL | Age: 69
End: 2019-10-25

## 2019-11-13 ENCOUNTER — CLINICAL SUPPORT NO REQUIREMENTS (OUTPATIENT)
Dept: CARDIOLOGY | Facility: CLINIC | Age: 69
End: 2019-11-13

## 2019-11-13 DIAGNOSIS — I48.0 PAROXYSMAL ATRIAL FIBRILLATION (HCC): ICD-10-CM

## 2019-11-13 DIAGNOSIS — I25.10 CORONARY ARTERY DISEASE, ANGINA PRESENCE UNSPECIFIED, UNSPECIFIED VESSEL OR LESION TYPE, UNSPECIFIED WHETHER NATIVE OR TRANSPLANTED HEART: ICD-10-CM

## 2019-11-13 DIAGNOSIS — I50.32 CHRONIC DIASTOLIC HEART FAILURE (HCC): Primary | ICD-10-CM

## 2019-11-13 PROCEDURE — 93294 REM INTERROG EVL PM/LDLS PM: CPT | Performed by: INTERNAL MEDICINE

## 2019-11-13 PROCEDURE — 93296 REM INTERROG EVL PM/IDS: CPT | Performed by: INTERNAL MEDICINE

## 2019-11-21 ENCOUNTER — OFFICE VISIT (OUTPATIENT)
Dept: CARDIOLOGY | Facility: CLINIC | Age: 69
End: 2019-11-21

## 2019-11-21 VITALS
WEIGHT: 228 LBS | HEIGHT: 65 IN | HEART RATE: 66 BPM | SYSTOLIC BLOOD PRESSURE: 142 MMHG | DIASTOLIC BLOOD PRESSURE: 78 MMHG | BODY MASS INDEX: 37.99 KG/M2

## 2019-11-21 DIAGNOSIS — Z95.0 PACEMAKER: ICD-10-CM

## 2019-11-21 DIAGNOSIS — I10 ESSENTIAL HYPERTENSION: Primary | ICD-10-CM

## 2019-11-21 DIAGNOSIS — E78.00 HYPERCHOLESTEROLEMIA: ICD-10-CM

## 2019-11-21 DIAGNOSIS — R00.1 SYMPTOMATIC BRADYCARDIA: ICD-10-CM

## 2019-11-21 DIAGNOSIS — I25.10 CHRONIC CORONARY ARTERY DISEASE: ICD-10-CM

## 2019-11-21 DIAGNOSIS — I48.0 PAROXYSMAL ATRIAL FIBRILLATION (HCC): ICD-10-CM

## 2019-11-21 PROCEDURE — 99214 OFFICE O/P EST MOD 30 MIN: CPT | Performed by: INTERNAL MEDICINE

## 2019-11-21 RX ORDER — TERAZOSIN 5 MG/1
5 CAPSULE ORAL NIGHTLY
Qty: 90 CAPSULE | Refills: 3 | Status: SHIPPED | OUTPATIENT
Start: 2019-11-21 | End: 2020-11-18 | Stop reason: SDUPTHER

## 2019-11-21 NOTE — PROGRESS NOTES
[facility]  Robe Bryant  1950  68 y.o.  [phone]  [phone]      Date: 11/21/2019    PCP: Radha Jean DO    Chief Complaint   Patient presents with   • Chronic coronary artery disease       Problem List:  1. Coronary artery disease:  a. [facility] [place], 01/14/2009, Dr. Vasquez: Placement of a 3.0 x 23 mm Xience LATOYA to the ramus, 3.0 x 12 mm LATOYA to the mid-circumflex.  b. [facility] [place], 10/11/2010: EF 45%, LVEDP 28.   c. [facility] [place], 05/22/2011: 2.25 x 13 mm Cypher LATOYA placement to the distal circumflex.   d. [facility] [place], 01/10/2012, [facility]: Noncritical CAD, patient ramus and left circumflex stents, LVH with near cavity obliteration.  e. Medical management.  f. Abnormal Cardiolite, 03/01/2013, [facility], showing moderate to severe perfusion defect of the basolateral wall of the LV.  g. [facility] [place], 03/15/2013, PW: Widely patent stents of the LCx and ramus intermedius coronary arteries, no significant disease is seen in any territory.   h. Diastolic heart failure with hospital admission, May 2013, at [facility] in [place].  i. Recurrent anginal symptoms, September 2014; initiation of Imdur therapy, 09/04/2014.  j. [facility] [place], 09/15/2014, PW: Noncritical CAD with widely patent stents in the LCx and ramus intermedius arteries. Other sources for the patient’s chest discomfort should be considered.  k. [facility] [place], 06/10/2016: EF Normal, widely patent ramus intermedius stent; no significant disease within LAD, LCx, RCA.  l. [facility] [place], 09/30/2019: Noncritical CAD with patent stents noted. Aggressive risk factor modification which will include better control of the patient's excessively high blood pressure  2. Diastolic heart failure:  a. Echocardiogram, 05/16/2013, Hermes Vargas MD: Hyperdynamic LV with EF 75%, mild TR, trace MI.  b. Complaints of dyspnea at the time of office visit, 05/22/2013, with O2 saturation in the 91% to 95% range with ambulation.  c. Echocardiogram, 09/15/2014: Moderate LVH with LVEF  60% to 65%. Mild MR and mild TR.   3. Hypertension, uncontrolled at the time of office visit, 05/22/2013.  4. Symptomatic bradycardia:  a. Continued symptoms of presyncope in the setting of hypotension and bradycardia, 02/16/2012.  b. PPM implantation, Dr. Tucker, 02/21/2012, [employer] RFDR, model #2210.  c. Hospitalization with acute dyspnea, 02/27/2013, at [facility] secondary to pacemaker-induced tachycardia.  d. Tilt Table (6/22/2017): Positive for orthostatic syncope. Patient developed abrupt symptomatic hypotension at 9 minutes, with any corresponding increase in heart rate. Consistent with vasodepressive response.  5. Paroxysmal atrial fibrillation:  a. Coumadin therapy followed by [facility].   b. CHADS-VASc score = 5.   c. Admission to [facility], 06/13/2015 through 06/16/2015, with DC cardioversion and return to sinus rhythm and subsequent metoprolol dosage increase.  6. Recent symptoms of left-sided facial numbness and occasional left arm weakness.  7. Nonsustained VT per device interrogation, 09/04/2014.  8. Brief episodes of atrial tachycardia at the time of device interrogation, 05/03/2012.  9. Hyperlipidemia.  10. Type 2 diabetes mellitus.  11. Obstructive sleep apnea with CPAP therapy.  12. Questionable transient ischemic attack, January 2010, without workup:  a. Carotid duplex, 09/15/2014: Noncritical coronary artery disease with widely patent bilateral carotid arteries. Velocity is all within normal limits.   13. History of tobacco use with cessation x7 years.   14. Gastroesophageal reflux disease.  15. Hernia repair x3.   16. Chronic kidney disease with a creatinine of 2.1 during hospitalization, 05/17/2013.    No Known Allergies    Current Medications:      Current Outpatient Medications:   •  albuterol sulfate  (90 Base) MCG/ACT inhaler, Inhale 2 puffs Every 4 (Four) Hours As Needed for Wheezing or Shortness of Air., Disp: 3 inhaler, Rfl: 3  •  amLODIPine (NORVASC) 10  MG tablet, Take 1 tablet by mouth Daily., Disp: 30 tablet, Rfl: 1  •  apixaban (ELIQUIS) 5 MG tablet tablet, Take 1 tablet by mouth 2 (Two) Times a Day., Disp: 60 tablet, Rfl: 11  •  aspirin 81 MG chewable tablet, Chew 81 mg Daily., Disp: , Rfl:   •  atorvastatin (LIPITOR) 20 MG tablet, Take 1 tablet by mouth every night at bedtime., Disp: 90 tablet, Rfl: 0  •  Blood Glucose Monitoring Suppl (TRUE METRIX AIR GLUCOSE METER) w/Device kit, , Disp: , Rfl:   •  cetirizine (zyrTEC) 10 MG tablet, Take 1 tablet by mouth Daily., Disp: 90 tablet, Rfl: 3  •  finasteride (PROSCAR) 5 MG tablet, Take 1 tablet by mouth Daily., Disp: 90 tablet, Rfl: 0  •  FLUoxetine (PROzac) 20 MG capsule, Take 1 capsule by mouth Daily., Disp: 90 capsule, Rfl: 3  •  glipiZIDE (GLUCOTROL) 5 MG tablet, Take 1 tablet by mouth 2 (Two) Times a Day Before Meals., Disp: 180 tablet, Rfl: 3  •  glucose blood test strip, Use as instructed, E11.9, Disp: 100 each, Rfl: 3  •  glucose monitor monitoring kit, 1 each Daily. E11.9, Disp: 1 each, Rfl: 0  •  ipratropium-albuterol (DUO-NEB) 0.5-2.5 mg/3 ml nebulizer, USE 1 VIAL VIA NEBULIZER EVERY 4 HOURS AS NEEDED FOR WHEEZING OR SHORTNESS OF BREATH, Disp: 1620 mL, Rfl: 0  •  Lancet Device misc, 1 each Daily. E11.9, Disp: 100 each, Rfl: 3  •  Lancets misc, 1 each 2 (Two) Times a Day., Disp: 200 each, Rfl: 5  •  lisinopril (PRINIVIL,ZESTRIL) 40 MG tablet, Take 1 tablet by mouth Daily., Disp: 90 tablet, Rfl: 0  •  metoprolol tartrate (LOPRESSOR) 25 MG tablet, Take 0.5 tablets by mouth Every 12 (Twelve) Hours., Disp: 90 tablet, Rfl: 0  •  Multiple Vitamin tablet, Take 1 tablet by mouth daily., Disp: , Rfl:   •  nitroglycerin (NITROSTAT) 0.4 MG SL tablet, Place 1 tablet under the tongue Every 5 (Five) Minutes As Needed for Chest Pain., Disp: 25 tablet, Rfl: 11  •  omeprazole (priLOSEC) 40 MG capsule, Take 1 capsule by mouth Daily., Disp: 90 capsule, Rfl: 3  •  tamsulosin (FLOMAX) 0.4 MG capsule 24 hr capsule, Take 1  "capsule by mouth Daily., Disp: 90 capsule, Rfl: 0  •  terazosin (HYTRIN) 2 MG capsule, Take 1 capsule by mouth every night at bedtime., Disp: 90 capsule, Rfl: 0  •  tiotropium bromide monohydrate (SPIRIVA RESPIMAT) 2.5 MCG/ACT aerosol solution inhaler, Inhale 2 puffs Daily., Disp: 2 inhaler, Rfl: 0    HPI    Robe Bryant is a 68 y.o. male who presents today for hospital follow up s/p left heart catheterization, which was normal. He has a history of CAD, paroxysmal atrial fibrillation, pacemaker for bradycardia, diastolic heart failure, hypertension, and hyperlipidemia. His blood pressure has been better controlled lately, with his highest reading being about 152/80. His chest pressure has gotten better, and now only happens when he is lying in bed. Patient denies exertional chest pain, palpitations, shortness of breath, edema, dizziness, and syncope.        The following portions of the patient's history were reviewed and updated as appropriate: allergies, current medications and problem list.    Pertinent positives as listed in the HPI.  All other systems reviewed are negative.    Vitals:    11/21/19 1514   BP: 142/78   BP Location: Right arm   Patient Position: Sitting   Pulse: 66   Weight: 103 kg (228 lb)   Height: 165.1 cm (65\")       Physical Exam:    General: Alert and oriented.  Neck: Jugular venous pressure is within normal limits. Carotids have normal upstrokes without bruits.   Cardiovascular: Heart has a nondisplaced focal PMI. Regular rate and rhythm without murmur, gallop or rub.  Lungs: Clear without rales or wheezes. Equal expansion is noted.   Extremities: Show no edema.  Skin: Warm and dry.  Neurologic: Nonfocal.    Diagnostic Data:  Lab Results   Component Value Date    GLUCOSE 98 03/06/2018    BUN 19 09/26/2019    CREATININE 1.41 (H) 09/26/2019    EGFRIFNONA 50 (L) 09/26/2019    EGFRIFAFRI 60 (L) 09/26/2019    BCR 13.5 09/26/2019     09/26/2019    K 4.4 09/26/2019     " 09/26/2019    CO2 28.6 09/26/2019    CALCIUM 9.3 09/26/2019    PROTENTOTREF 6.3 09/26/2019    ALBUMIN 4.10 09/26/2019    LABIL2 1.9 09/26/2019    AST 19 09/26/2019    ALT 13 09/26/2019     Lab Results   Component Value Date    CHOL 151 09/13/2016    CHLPL 116 09/26/2019    TRIG 71 09/26/2019    HDL 48 09/26/2019    LDL 54 09/26/2019      Lab Results   Component Value Date    WBC 5.45 09/26/2019    HGB 14.6 09/26/2019    HCT 45.2 09/26/2019    MCV 89.7 09/26/2019     09/26/2019     Lab Results   Component Value Date    HGBA1C 5.30 09/26/2019        Procedures    Assessment:      ICD-10-CM ICD-9-CM   1. Essential hypertension I10 401.9   2. Hypercholesterolemia E78.00 272.0   3. Chronic coronary artery disease I25.10 414.00   4. Paroxysmal atrial fibrillation (CMS/LTAC, located within [facility]) I48.0 427.31   5. Symptomatic bradycardia R00.1 427.89   6. Pacemaker Z95.0 V45.01     Lab results found above were reviewed with the patient.    Plan:    1. Increase terazosin from 2 to 5 mg nightly for better control of blood pressure. Continue amlodipine, lisinopril, and metoprolol.  2. Continue aspirin 81 mg for CAD s/p stents.  3. Continue Eliquis for stroke prophylaxis with PAF.   4. Continue atorvastatin 40 mg for hyperlipidemia.  5. Continue all other current medications.  6. F/up in 6 months or sooner if needed.      Scribed for Arelis Tucker MD by Rosie Barrios. 11/21/2019  3:30 PM     I Arelis Tucker MD personally performed the services described in this documentation as scribed by the above individual in my presence, and it is both accurate and complete.    Arelis Tucker MD, [facility]

## 2019-12-06 ENCOUNTER — HOSPITAL ENCOUNTER (OUTPATIENT)
Dept: GENERAL RADIOLOGY | Facility: HOSPITAL | Age: 69
Discharge: HOME | End: 2019-12-06
Admitting: FAMILY MEDICINE

## 2019-12-06 ENCOUNTER — OFFICE VISIT (OUTPATIENT)
Dept: INTERNAL MEDICINE | Facility: CLINIC | Age: 69
End: 2019-12-06

## 2019-12-06 VITALS
HEIGHT: 65 IN | WEIGHT: 234.6 LBS | TEMPERATURE: 98.4 F | HEART RATE: 60 BPM | DIASTOLIC BLOOD PRESSURE: 80 MMHG | BODY MASS INDEX: 39.09 KG/M2 | SYSTOLIC BLOOD PRESSURE: 140 MMHG | OXYGEN SATURATION: 98 %

## 2019-12-06 DIAGNOSIS — J44.1 COPD EXACERBATION (HCC): Primary | ICD-10-CM

## 2019-12-06 DIAGNOSIS — R25.2 LEG CRAMPS: ICD-10-CM

## 2019-12-06 DIAGNOSIS — I25.10 CHRONIC CORONARY ARTERY DISEASE: ICD-10-CM

## 2019-12-06 PROCEDURE — 71046 X-RAY EXAM CHEST 2 VIEWS: CPT

## 2019-12-06 PROCEDURE — 96372 THER/PROPH/DIAG INJ SC/IM: CPT | Performed by: FAMILY MEDICINE

## 2019-12-06 PROCEDURE — 99214 OFFICE O/P EST MOD 30 MIN: CPT | Performed by: FAMILY MEDICINE

## 2019-12-06 RX ORDER — METHYLPREDNISOLONE SODIUM SUCCINATE 125 MG/2ML
125 INJECTION, POWDER, LYOPHILIZED, FOR SOLUTION INTRAMUSCULAR; INTRAVENOUS ONCE
Status: COMPLETED | OUTPATIENT
Start: 2019-12-06 | End: 2019-12-06

## 2019-12-06 RX ORDER — CEFDINIR 300 MG/1
300 CAPSULE ORAL 2 TIMES DAILY
Qty: 20 CAPSULE | Refills: 0 | Status: SHIPPED | OUTPATIENT
Start: 2019-12-06 | End: 2019-12-16

## 2019-12-06 RX ORDER — IPRATROPIUM BROMIDE AND ALBUTEROL SULFATE 2.5; .5 MG/3ML; MG/3ML
3 SOLUTION RESPIRATORY (INHALATION)
Qty: 1620 ML | Refills: 2 | Status: SHIPPED | OUTPATIENT
Start: 2019-12-06 | End: 2020-09-30 | Stop reason: SDUPTHER

## 2019-12-06 RX ORDER — CEFTRIAXONE 1 G/1
1 INJECTION, POWDER, FOR SOLUTION INTRAMUSCULAR; INTRAVENOUS EVERY 24 HOURS
Status: COMPLETED | OUTPATIENT
Start: 2019-12-06 | End: 2019-12-06

## 2019-12-06 RX ORDER — METHYLPREDNISOLONE 4 MG/1
TABLET ORAL
Qty: 21 EACH | Refills: 0 | Status: SHIPPED | OUTPATIENT
Start: 2019-12-06 | End: 2019-12-30

## 2019-12-06 RX ADMIN — CEFTRIAXONE 1 G: 1 INJECTION, POWDER, FOR SOLUTION INTRAMUSCULAR; INTRAVENOUS at 15:25

## 2019-12-06 RX ADMIN — METHYLPREDNISOLONE SODIUM SUCCINATE 125 MG: 125 INJECTION, POWDER, LYOPHILIZED, FOR SOLUTION INTRAMUSCULAR; INTRAVENOUS at 15:26

## 2019-12-06 NOTE — ASSESSMENT & PLAN NOTE
Patient is receiving a Rocephin and Solu-Medrol injection in the office today.  He is to start Omnicef and a steroid pack tomorrow.  Nebulizer solution has been refilled.  Will obtain a chest x-ray to rule out pneumonia.  Patient encouraged to proceed to the emergency room if symptoms do not improve.

## 2019-12-06 NOTE — PROGRESS NOTES
Robe Bryant is a 69 y.o. male.    Chief Complaint   Patient presents with   • Shortness of Breath       HPI   Patient reports they have not been feeling well for 3day(s). He admits to nasal congestion, ear pain, headache, dyspnea, cough, increased wheezing.  He denies rhinorrhea,  sore throat.  They have not tried anything for this issue without good response.  He does have COPD and has been using nebulizer treatments more than usual.  He is currently out of [facility] due to this.    Patient also has a significant history of heart disease.  He does have diastolic heart failure and has a pacemaker in place.  He complains of leg cramps that has been worse over the past few days.  He reports swelling to his legs as well.  He did recently have an increase to terazosin by cardiology.  No other changes were made.    The following portions of the patient's history were reviewed and updated as appropriate: allergies, current medications, past family history, past medical history, past social history, past surgical history and problem list.     No Known Allergies      Current Outpatient Medications:   •  albuterol sulfate  (90 Base) MCG/ACT inhaler, Inhale 2 puffs Every 4 (Four) Hours As Needed for Wheezing or Shortness of Air., Disp: 3 inhaler, Rfl: 3  •  amLODIPine (NORVASC) 10 MG tablet, Take 1 tablet by mouth Daily., Disp: 30 tablet, Rfl: 1  •  apixaban (ELIQUIS) 5 MG tablet tablet, Take 1 tablet by mouth 2 (Two) Times a Day., Disp: 60 tablet, Rfl: 11  •  aspirin 81 MG chewable tablet, Chew 81 mg Daily., Disp: , Rfl:   •  atorvastatin (LIPITOR) 20 MG tablet, Take 1 tablet by mouth every night at bedtime., Disp: 90 tablet, Rfl: 0  •  Blood Glucose Monitoring Suppl (TRUE METRIX AIR GLUCOSE METER) w/Device kit, , Disp: , Rfl:   •  finasteride (PROSCAR) 5 MG tablet, Take 1 tablet by mouth Daily., Disp: 90 tablet, Rfl: 0  •  FLUoxetine (PROzac) 20 MG capsule, Take 1 capsule by mouth Daily., Disp: 90 capsule, Rfl:  3  •  glipiZIDE (GLUCOTROL) 5 MG tablet, Take 1 tablet by mouth 2 (Two) Times a Day Before Meals., Disp: 180 tablet, Rfl: 3  •  glucose blood test strip, Use as instructed, E11.9, Disp: 100 each, Rfl: 3  •  glucose monitor monitoring kit, 1 each Daily. E11.9, Disp: 1 each, Rfl: 0  •  ipratropium-albuterol (DUO-NEB) 0.5-2.5 mg/3 ml nebulizer, Take 3 mL by nebulization 4 (Four) Times a Day., Disp: 1620 mL, Rfl: 2  •  Lancet Device misc, 1 each Daily. E11.9, Disp: 100 each, Rfl: 3  •  Lancets misc, 1 each 2 (Two) Times a Day., Disp: 200 each, Rfl: 5  •  lisinopril (PRINIVIL,ZESTRIL) 40 MG tablet, Take 1 tablet by mouth Daily., Disp: 90 tablet, Rfl: 0  •  metoprolol tartrate (LOPRESSOR) 25 MG tablet, Take 0.5 tablets by mouth Every 12 (Twelve) Hours., Disp: 90 tablet, Rfl: 0  •  Multiple Vitamin tablet, Take 1 tablet by mouth daily., Disp: , Rfl:   •  nitroglycerin (NITROSTAT) 0.4 MG SL tablet, Place 1 tablet under the tongue Every 5 (Five) Minutes As Needed for Chest Pain., Disp: 25 tablet, Rfl: 11  •  omeprazole (priLOSEC) 40 MG capsule, Take 1 capsule by mouth Daily., Disp: 90 capsule, Rfl: 3  •  tamsulosin (FLOMAX) 0.4 MG capsule 24 hr capsule, Take 1 capsule by mouth Daily., Disp: 90 capsule, Rfl: 0  •  terazosin (HYTRIN) 5 MG capsule, Take 1 capsule by mouth Every Night., Disp: 90 capsule, Rfl: 3  •  tiotropium bromide monohydrate (SPIRIVA RESPIMAT) 2.5 MCG/ACT aerosol solution inhaler, Inhale 2 puffs Daily., Disp: 2 inhaler, Rfl: 0  •  cefdinir (OMNICEF) 300 MG capsule, Take 1 capsule by mouth 2 (Two) Times a Day for 10 days., Disp: 20 capsule, Rfl: 0  •  methylPREDNISolone (MEDROL, JEANETTE,) 4 MG tablet, Take as directed on package instructions., Disp: 21 each, Rfl: 0    ROS    Review of Systems   Constitutional: Positive for fatigue.   HENT: Positive for congestion and ear pain.    Respiratory: Positive for cough, shortness of breath and wheezing.    Cardiovascular: Positive for leg swelling. Negative for chest  "pain.   Gastrointestinal: Negative for diarrhea, nausea and vomiting.   Musculoskeletal: Positive for myalgias.   Neurological: Positive for weakness and headache.       Vitals:    12/06/19 1441   BP: 140/80   BP Location: Left arm   Patient Position: Sitting   Cuff Size: Adult   Pulse: 60   Temp: 98.4 °F (36.9 °C)   TempSrc: Temporal   SpO2: 98%   Weight: 106 kg (234 lb 9.6 oz)   Height: 165.1 cm (65\")     Body mass index is 39.04 kg/m².    Physical Exam     Physical Exam   Constitutional: He is oriented to person, place, and time. He appears well-developed and well-nourished. No distress.   HENT:   Head: Normocephalic and atraumatic.   Right Ear: Tympanic membrane and external ear normal.   Left Ear: Tympanic membrane and external ear normal.   Mouth/Throat: Posterior oropharyngeal erythema present.   Eyes: Conjunctivae and EOM are normal.   Neck: Neck supple.   Cardiovascular: Normal rate and regular rhythm.   No murmur heard.  Pulmonary/Chest: Effort normal. No respiratory distress. He has wheezes.   Abdominal: Soft. Bowel sounds are normal. He exhibits no distension. There is no tenderness.   Musculoskeletal: He exhibits edema (1+ pitting edema to lower extremities bilaterally).   Lymphadenopathy:     He has no cervical adenopathy.   Neurological: He is alert and oriented to person, place, and time. No cranial nerve deficit.   Skin: Skin is warm and dry.   Psychiatric: He has a normal mood and affect.       Assessment/Plan    ECG 12 Lead  Date/Time: 12/9/2019 4:14 PM  Performed by: Radha Jean DO  Authorized by: Radha Jean DO   Comparison: compared with previous ECG from 10/2/2019  Rhythm: sinus rhythm  Rate: normal  Conduction: conduction normal  ST Segments: ST segments normal  T Waves: T waves normal  QRS axis: normal  Other findings: non-specific ST-T wave changes  Pacing: atrial sensed rhythm  Clinical impression: non-specific ECG  Comments: Similar to previous " EKG.            Problem List Items Addressed This Visit        Cardiovascular and Mediastinum    Chronic coronary artery disease     EKG was performed due to shortness of breath.  No significant changes from previous EKG earlier this year.            Respiratory    COPD exacerbation (CMS/HCC) - Primary     Patient is receiving a Rocephin and Solu-Medrol injection in the office today.  He is to start Omnicef and a steroid pack tomorrow.  Nebulizer solution has been refilled.  Will obtain a chest x-ray to rule out pneumonia.  Patient encouraged to proceed to the emergency room if symptoms do not improve.             Relevant Medications    cefTRIAXone (ROCEPHIN) injection 1 g (Completed)    methylPREDNISolone (MEDROL, JEANETTE,) 4 MG tablet    methylPREDNISolone sodium succinate (SOLU-Medrol) injection 125 mg (Completed)    ipratropium-albuterol (DUO-NEB) 0.5-2.5 mg/3 ml nebulizer    Other Relevant Orders    XR Chest PA & Lateral (Completed)    CBC & Differential    Basic Metabolic Panel       Musculoskeletal and Integument    Leg cramps     May be secondary to electrolyte abnormality.  Will obtain laboratory studies for further evaluation.  Patient does have a history of restless leg syndrome.  If labs are unremarkable, will place the patient on Requip or Mirapex.         Relevant Orders    Basic Metabolic Panel          New Medications Ordered This Visit   Medications   • cefdinir (OMNICEF) 300 MG capsule     Sig: Take 1 capsule by mouth 2 (Two) Times a Day for 10 days.     Dispense:  20 capsule     Refill:  0   • cefTRIAXone (ROCEPHIN) injection 1 g   • methylPREDNISolone (MEDROL, JEANETTE,) 4 MG tablet     Sig: Take as directed on package instructions.     Dispense:  21 each     Refill:  0   • methylPREDNISolone sodium succinate (SOLU-Medrol) injection 125 mg   • ipratropium-albuterol (DUO-NEB) 0.5-2.5 mg/3 ml nebulizer     Sig: Take 3 mL by nebulization 4 (Four) Times a Day.     Dispense:  1620 mL     Refill:  2      **Patient requests 90 days supply**       No orders of the defined types were placed in this encounter.      No follow-ups on file.    Radha Jean, DO

## 2019-12-06 NOTE — ASSESSMENT & PLAN NOTE
May be secondary to electrolyte abnormality.  Will obtain laboratory studies for further evaluation.  Patient does have a history of restless leg syndrome.  If labs are unremarkable, will place the patient on Requip or Mirapex.

## 2019-12-06 NOTE — ASSESSMENT & PLAN NOTE
EKG was performed due to shortness of breath.  No significant changes from previous EKG earlier this year.

## 2019-12-09 PROCEDURE — 93000 ELECTROCARDIOGRAM COMPLETE: CPT | Performed by: FAMILY MEDICINE

## 2019-12-30 ENCOUNTER — OFFICE VISIT (OUTPATIENT)
Dept: INTERNAL MEDICINE | Facility: CLINIC | Age: 69
End: 2019-12-30

## 2019-12-30 VITALS
HEIGHT: 65 IN | BODY MASS INDEX: 39.82 KG/M2 | TEMPERATURE: 98.4 F | DIASTOLIC BLOOD PRESSURE: 84 MMHG | WEIGHT: 239 LBS | SYSTOLIC BLOOD PRESSURE: 120 MMHG | HEART RATE: 63 BPM | OXYGEN SATURATION: 93 %

## 2019-12-30 DIAGNOSIS — E11.22 TYPE 2 DIABETES MELLITUS WITH STAGE 3 CHRONIC KIDNEY DISEASE, WITHOUT LONG-TERM CURRENT USE OF INSULIN (HCC): ICD-10-CM

## 2019-12-30 DIAGNOSIS — J43.1 PANLOBULAR EMPHYSEMA (HCC): ICD-10-CM

## 2019-12-30 DIAGNOSIS — R07.9 CHRONIC CHEST PAIN: ICD-10-CM

## 2019-12-30 DIAGNOSIS — G89.29 CHRONIC CHEST PAIN: ICD-10-CM

## 2019-12-30 DIAGNOSIS — N18.30 TYPE 2 DIABETES MELLITUS WITH STAGE 3 CHRONIC KIDNEY DISEASE, WITHOUT LONG-TERM CURRENT USE OF INSULIN (HCC): ICD-10-CM

## 2019-12-30 DIAGNOSIS — G25.81 RESTLESS LEG SYNDROME: Primary | ICD-10-CM

## 2019-12-30 DIAGNOSIS — I10 ESSENTIAL HYPERTENSION: ICD-10-CM

## 2019-12-30 PROCEDURE — 99214 OFFICE O/P EST MOD 30 MIN: CPT | Performed by: FAMILY MEDICINE

## 2019-12-30 RX ORDER — ROPINIROLE 0.5 MG/1
0.5 TABLET, FILM COATED ORAL NIGHTLY
Qty: 90 TABLET | Refills: 1 | Status: SHIPPED | OUTPATIENT
Start: 2019-12-30 | End: 2020-02-03 | Stop reason: DRUGHIGH

## 2019-12-30 NOTE — PROGRESS NOTES
Robe Bryant is a 69 y.o. male.    Chief Complaint   Patient presents with   • Diabetes   • COPD   • Hypertension       HPI   Patient has had diabetes for the past few years. He has been compliant with the medications and denies any side effects from it. He has been monitoring fingersticks.  his fingerstick range is between 90's.  He has not had hypoglycemic symptoms. He has been following a diabetic diet and has been active.  his last eye exam was greater than a year ago .     Patient has COPD . He is not on oxygen. He admits to to some dyspnea with exertion.  He admits to on and off cough, SOB and wheezing that does respond to treatment. He uses the albuterol regularly here lately. He is on anoro with good response.  However, he was recently treated for an exacerbation with steroids and antibiotics.  He reports that he feels a little better, but is still having trouble breathing.     Patient has hypertension.  They are taking terazosin, Metoprolol, amlodipine and lisinopril.  They have been compliant with medications.  The patient denies any side effects to the medication.  Blood pressure is controlled in the office today.  Blood pressure has been running unkown.  They are following a low salt diet.      Patient also complains of restless leg syndrome.  He is not currently taking anything for this problem.  He states that his wife reports that he moves his Licox constantly at night.  He does admit that this is worse at night and does not seem to bother him during the day.    The following portions of the patient's history were reviewed and updated as appropriate: allergies, current medications, past family history, past medical history, past social history, past surgical history and problem list.     No Known Allergies      Current Outpatient Medications:   •  albuterol sulfate  (90 Base) MCG/ACT inhaler, Inhale 2 puffs Every 4 (Four) Hours As Needed for Wheezing or Shortness of Air., Disp: 3  inhaler, Rfl: 3  •  amLODIPine (NORVASC) 10 MG tablet, Take 1 tablet by mouth Daily., Disp: 30 tablet, Rfl: 1  •  apixaban (ELIQUIS) 5 MG tablet tablet, Take 1 tablet by mouth 2 (Two) Times a Day., Disp: 60 tablet, Rfl: 11  •  aspirin 81 MG chewable tablet, Chew 81 mg Daily., Disp: , Rfl:   •  atorvastatin (LIPITOR) 20 MG tablet, Take 1 tablet by mouth every night at bedtime., Disp: 90 tablet, Rfl: 0  •  Blood Glucose Monitoring Suppl (TRUE METRIX AIR GLUCOSE METER) w/Device kit, , Disp: , Rfl:   •  finasteride (PROSCAR) 5 MG tablet, Take 1 tablet by mouth Daily., Disp: 90 tablet, Rfl: 0  •  FLUoxetine (PROzac) 20 MG capsule, Take 1 capsule by mouth Daily., Disp: 90 capsule, Rfl: 3  •  glipiZIDE (GLUCOTROL) 5 MG tablet, Take 1 tablet by mouth 2 (Two) Times a Day Before Meals., Disp: 180 tablet, Rfl: 3  •  glucose blood test strip, Use as instructed, E11.9, Disp: 100 each, Rfl: 3  •  glucose monitor monitoring kit, 1 each Daily. E11.9, Disp: 1 each, Rfl: 0  •  ipratropium-albuterol (DUO-NEB) 0.5-2.5 mg/3 ml nebulizer, Take 3 mL by nebulization 4 (Four) Times a Day., Disp: 1620 mL, Rfl: 2  •  Lancet Device misc, 1 each Daily. E11.9, Disp: 100 each, Rfl: 3  •  Lancets misc, 1 each 2 (Two) Times a Day., Disp: 200 each, Rfl: 5  •  lisinopril (PRINIVIL,ZESTRIL) 40 MG tablet, Take 1 tablet by mouth Daily., Disp: 90 tablet, Rfl: 0  •  metoprolol tartrate (LOPRESSOR) 25 MG tablet, Take 0.5 tablets by mouth Every 12 (Twelve) Hours., Disp: 90 tablet, Rfl: 0  •  Multiple Vitamin tablet, Take 1 tablet by mouth daily., Disp: , Rfl:   •  nitroglycerin (NITROSTAT) 0.4 MG SL tablet, Place 1 tablet under the tongue Every 5 (Five) Minutes As Needed for Chest Pain., Disp: 25 tablet, Rfl: 11  •  omeprazole (priLOSEC) 40 MG capsule, Take 1 capsule by mouth Daily., Disp: 90 capsule, Rfl: 3  •  tamsulosin (FLOMAX) 0.4 MG capsule 24 hr capsule, Take 1 capsule by mouth Daily., Disp: 90 capsule, Rfl: 0  •  terazosin (HYTRIN) 5 MG capsule,  "Take 1 capsule by mouth Every Night., Disp: 90 capsule, Rfl: 3  •  tiotropium bromide monohydrate (SPIRIVA RESPIMAT) 2.5 MCG/ACT aerosol solution inhaler, Inhale 2 puffs Daily., Disp: 2 inhaler, Rfl: 0  •  rOPINIRole (REQUIP) 0.5 MG tablet, Take 1 tablet by mouth Every Night. Take 1 hour before bedtime., Disp: 90 tablet, Rfl: 1    ROS    Review of Systems   Constitutional: Positive for fatigue. Negative for chills and fever.   HENT: Positive for congestion. Negative for postnasal drip and sore throat.    Respiratory: Positive for cough, chest tightness and shortness of breath. Negative for wheezing.    Cardiovascular: Positive for chest pain (radiates to left arm). Negative for leg swelling.   Gastrointestinal: Positive for abdominal pain, blood in stool (black stool) and diarrhea. Negative for constipation, nausea and vomiting.   Musculoskeletal: Negative for arthralgias and back pain.        Restless legs.   Allergic/Immunologic: Negative for environmental allergies.   Neurological: Positive for weakness and headache.   Psychiatric/Behavioral: Negative for depressed mood. The patient is not nervous/anxious.        Vitals:    12/30/19 1016 12/30/19 1048   BP: 128/90 120/84   BP Location: Left arm    Patient Position: Sitting    Cuff Size: Adult    Pulse: 63    Temp: 98.4 °F (36.9 °C)    TempSrc: Temporal    SpO2: 93%    Weight: 108 kg (239 lb)    Height: 165.1 cm (65\")      Body mass index is 39.77 kg/m².    Physical Exam     Physical Exam   Constitutional: He is oriented to person, place, and time. He appears well-developed and well-nourished. No distress.   HENT:   Head: Normocephalic and atraumatic.   Right Ear: External ear normal.   Left Ear: External ear normal.   Mouth/Throat: Oropharynx is clear and moist.   Eyes: Pupils are equal, round, and reactive to light. Conjunctivae and EOM are normal.   Neck: Normal range of motion. Neck supple.   Cardiovascular: Normal rate and regular rhythm.   No murmur " heard.  Pulmonary/Chest: Effort normal and breath sounds normal. No respiratory distress. He has no wheezes.   Abdominal: Soft. Bowel sounds are normal. He exhibits no distension. There is no tenderness.   Musculoskeletal: Normal range of motion. He exhibits no edema.   Lymphadenopathy:     He has no cervical adenopathy.   Neurological: He is alert and oriented to person, place, and time. No cranial nerve deficit.   Skin: Skin is warm and dry.   Psychiatric: He has a normal mood and affect. His behavior is normal.       Assessment/Plan    Problem List Items Addressed This Visit        Cardiovascular and Mediastinum    Essential hypertension     Controlled on current medication.  Patient is to continue terazosin, metoprolol, amlodipine, and lisinopril.            Respiratory    Chronic obstructive pulmonary disease (CMS/HCC)     Currently uncontrolled due to recent flare.  Patient has been given a sample of Symbicort to take over the next couple of weeks.  If no resolution symptoms will need to evaluate further.            Endocrine    Type 2 diabetes mellitus, without long-term current use of insulin (CMS/HCC)     Controlled on current medication.  Patient is to continue glipizide.  Will continue to monitor A1c and microalbumin every few months.  Patient is on an ACE inhibitor, statin, and baby aspirin.            Nervous and Auditory    Chronic chest pain     Discussed with patient if intermittent chest pain does not seem to improve or resolve, he should call cardiology or proceed to the emergency department.            Other    Restless leg syndrome - Primary     Uncontrolled without medication.  We will start the patient on Requip nightly.               New Medications Ordered This Visit   Medications   • rOPINIRole (REQUIP) 0.5 MG tablet     Sig: Take 1 tablet by mouth Every Night. Take 1 hour before bedtime.     Dispense:  90 tablet     Refill:  1       No orders of the defined types were placed in this  encounter.      Return in about 2 months (around 2/29/2020) for restless legs, copd.    Radha Jean, DO

## 2019-12-31 NOTE — ASSESSMENT & PLAN NOTE
Controlled on current medication.  Patient is to continue glipizide.  Will continue to monitor A1c and microalbumin every few months.  Patient is on an ACE inhibitor, statin, and baby aspirin.

## 2019-12-31 NOTE — ASSESSMENT & PLAN NOTE
Currently uncontrolled due to recent flare.  Patient has been given a sample of Symbicort to take over the next couple of weeks.  If no resolution symptoms will need to evaluate further.

## 2019-12-31 NOTE — ASSESSMENT & PLAN NOTE
Discussed with patient if intermittent chest pain does not seem to improve or resolve, he should call cardiology or proceed to the emergency department.

## 2019-12-31 NOTE — ASSESSMENT & PLAN NOTE
Controlled on current medication.  Patient is to continue terazosin, metoprolol, amlodipine, and lisinopril.

## 2020-01-14 RX ORDER — FINASTERIDE 5 MG/1
TABLET, FILM COATED ORAL
Qty: 90 TABLET | Refills: 0 | Status: SHIPPED | OUTPATIENT
Start: 2020-01-14 | End: 2020-03-26

## 2020-02-03 ENCOUNTER — OFFICE VISIT (OUTPATIENT)
Dept: INTERNAL MEDICINE | Facility: CLINIC | Age: 70
End: 2020-02-03

## 2020-02-03 VITALS
BODY MASS INDEX: 38.82 KG/M2 | HEIGHT: 65 IN | OXYGEN SATURATION: 98 % | TEMPERATURE: 97.9 F | WEIGHT: 233 LBS | SYSTOLIC BLOOD PRESSURE: 130 MMHG | DIASTOLIC BLOOD PRESSURE: 84 MMHG | HEART RATE: 61 BPM

## 2020-02-03 DIAGNOSIS — N18.30 TYPE 2 DIABETES MELLITUS WITH STAGE 3 CHRONIC KIDNEY DISEASE, WITHOUT LONG-TERM CURRENT USE OF INSULIN (HCC): ICD-10-CM

## 2020-02-03 DIAGNOSIS — I50.32 CHRONIC DIASTOLIC HEART FAILURE (HCC): ICD-10-CM

## 2020-02-03 DIAGNOSIS — G25.81 RESTLESS LEG SYNDROME: Primary | ICD-10-CM

## 2020-02-03 DIAGNOSIS — E11.22 TYPE 2 DIABETES MELLITUS WITH STAGE 3 CHRONIC KIDNEY DISEASE, WITHOUT LONG-TERM CURRENT USE OF INSULIN (HCC): ICD-10-CM

## 2020-02-03 DIAGNOSIS — I48.0 PAROXYSMAL ATRIAL FIBRILLATION (HCC): ICD-10-CM

## 2020-02-03 DIAGNOSIS — I10 ESSENTIAL HYPERTENSION: ICD-10-CM

## 2020-02-03 DIAGNOSIS — R60.0 LOWER LEG EDEMA: ICD-10-CM

## 2020-02-03 PROCEDURE — 99214 OFFICE O/P EST MOD 30 MIN: CPT | Performed by: NURSE PRACTITIONER

## 2020-02-03 RX ORDER — ROPINIROLE 1 MG/1
1 TABLET, FILM COATED ORAL NIGHTLY
Qty: 30 TABLET | Refills: 1 | Status: SHIPPED | OUTPATIENT
Start: 2020-02-03 | End: 2020-02-20 | Stop reason: SDUPTHER

## 2020-02-03 NOTE — PROGRESS NOTES
"Date: 2020    Name: Robe Bryant  : 1950    Chief Complaint:   Chief Complaint   Patient presents with   • Leg Swelling       HPI:  Robe Bryant is a 69 y.o. male presents with bilateral ankle swelling and swelling beneath eyes for about a week.  He has had occasional palpitations for over 2 months.  He has a history of hypertension, CAD, Afib, diastolic heart failure, type 2 diabetes mellitus with CKD stage III.  He takes all medications as prescribed, is concerned he is going to run out of Eliquis.  He has enough for this week , cannot afford prescription.  Appointment scheduled with Dr Tucker next month, per patient.  Denies chest pain, dyspnea, orthopnea, headaches, weakness, increased fatigue, visual disturbances or confusion.  Does not monitor blood glucose at home. Patient denies foot ulcerations, hypoglycemia, nausea, polydipsia, polyuria, polyphagia,and weight loss.   He is currently being treated for restless leg syndrome.  Takes ropinirole 1 mg an hour before bed.  States this is helpful, needs refill.    History:  The following portions of the patient's history were reviewed and updated as appropriate: allergies, current medications, past medical history, family history, surgical history, social history and problem list.     ROS:  Review of Systems   Respiratory: Negative for wheezing.    Skin: Negative for pallor and rash.   Neurological: Negative for syncope, facial asymmetry, speech difficulty and light-headedness.   Hematological: Does not bruise/bleed easily.       VS:  Vitals:    20 0924   BP: 130/84   Pulse: 61   Temp: 97.9 °F (36.6 °C)   TempSrc: Temporal   SpO2: 98%   Weight: 106 kg (233 lb)   Height: 165.1 cm (65\")     Body mass index is 38.77 kg/m².    PE:  Physical Exam   Constitutional: He is oriented to person, place, and time. He appears well-developed and well-nourished. He appears ill (chronically).   HENT:   Head: Normocephalic. "   Mouth/Throat: Oropharynx is clear and moist.   Eyes: Pupils are equal, round, and reactive to light. Conjunctivae and EOM are normal.   Neck: Normal range of motion. Neck supple.   Cardiovascular: Normal rate, regular rhythm, normal heart sounds and intact distal pulses.   +1 non-pitting edema BLE   Pulmonary/Chest: Effort normal and breath sounds normal.   Neurological: He is alert and oriented to person, place, and time.   Skin: Skin is warm. Capillary refill takes less than 2 seconds.       Assessment/Plan:  Robe was seen today for leg swelling.    Diagnoses and all orders for this visit:    Restless leg syndrome  -     rOPINIRole (REQUIP) 1 MG tablet; Take 1 tablet by mouth Every Night. Take 1 hour before bedtime.  Type 2 diabetes mellitus with stage 3 chronic kidney disease, without long-term current use of insulin (CMS/[facility])        - Follow diabetic diet        - Monitor blood sugars as discussed        - See eye doctor annually or as discussed        - Wear protective foot wear/no bare feet        - Check feet regularly for calluses or ulcers        - Discussed risk of poorly controlled diabetes and long-term complications        - Exercise as tolerated up to 30 minutes 5 days a week        - Take all medications as prescribed        - Avoid nephrotoxic medications        - Monitor for and reports decreased urinary output  Essential hypertension  Paroxysmal atrial fibrillation (CMS/HCC)  Chronic diastolic heart failure (CMS/HCC)        - Follow heart healthy diet.  Advised to reduce daily sodium intake to < 1500 mg per day.  Avoid processed & fast foods.        - Monitor blood pressure as discussed, keep log and bring to next appointment.          - Exercise as tolerated, with a goal of 30 minutes of moderate exercise most days.         - Take medications as prescribed.  Lower leg edema        - Wear compression stocking when out of bed        - Keep sodium intake < 1500 mg day, or less        - Elevate  legs above level of heart when not ambulating    Return for Next scheduled follow up. Monitor for worsening or persistent lower leg edema, call/return to office for further evaluation should this be noted.

## 2020-02-04 LAB
BASOPHILS # BLD AUTO: 0.06 10*3/MM3 (ref 0–0.2)
BASOPHILS NFR BLD AUTO: 1 % (ref 0–1.5)
BUN SERPL-MCNC: 14 MG/DL (ref 8–23)
BUN/CREAT SERPL: 10 (ref 7–25)
CALCIUM SERPL-MCNC: 8.9 MG/DL (ref 8.6–10.5)
CHLORIDE SERPL-SCNC: 104 MMOL/L (ref 98–107)
CO2 SERPL-SCNC: 29.5 MMOL/L (ref 22–29)
CREAT SERPL-MCNC: 1.4 MG/DL (ref 0.76–1.27)
EOSINOPHIL # BLD AUTO: 0.38 10*3/MM3 (ref 0–0.4)
EOSINOPHIL NFR BLD AUTO: 6.1 % (ref 0.3–6.2)
ERYTHROCYTE [DISTWIDTH] IN BLOOD BY AUTOMATED COUNT: 13.4 % (ref 12.3–15.4)
GLUCOSE SERPL-MCNC: 117 MG/DL (ref 65–99)
HCT VFR BLD AUTO: 41.1 % (ref 37.5–51)
HGB BLD-MCNC: 13.5 G/DL (ref 13–17.7)
IMM GRANULOCYTES # BLD AUTO: 0.02 10*3/MM3 (ref 0–0.05)
IMM GRANULOCYTES NFR BLD AUTO: 0.3 % (ref 0–0.5)
LYMPHOCYTES # BLD AUTO: 1.25 10*3/MM3 (ref 0.7–3.1)
LYMPHOCYTES NFR BLD AUTO: 20.1 % (ref 19.6–45.3)
MCH RBC QN AUTO: 29.2 PG (ref 26.6–33)
MCHC RBC AUTO-ENTMCNC: 32.8 G/DL (ref 31.5–35.7)
MCV RBC AUTO: 89 FL (ref 79–97)
MONOCYTES # BLD AUTO: 0.71 10*3/MM3 (ref 0.1–0.9)
MONOCYTES NFR BLD AUTO: 11.4 % (ref 5–12)
NEUTROPHILS # BLD AUTO: 3.79 10*3/MM3 (ref 1.7–7)
NEUTROPHILS NFR BLD AUTO: 61.1 % (ref 42.7–76)
NRBC BLD AUTO-RTO: 0 /100 WBC (ref 0–0.2)
PLATELET # BLD AUTO: 188 10*3/MM3 (ref 140–450)
POTASSIUM SERPL-SCNC: 4.1 MMOL/L (ref 3.5–5.2)
RBC # BLD AUTO: 4.62 10*6/MM3 (ref 4.14–5.8)
SODIUM SERPL-SCNC: 143 MMOL/L (ref 136–145)
WBC # BLD AUTO: 6.21 10*3/MM3 (ref 3.4–10.8)

## 2020-02-10 ENCOUNTER — HOSPITAL ENCOUNTER (OUTPATIENT)
Dept: GENERAL RADIOLOGY | Facility: HOSPITAL | Age: 70
Discharge: HOME | End: 2020-02-10
Admitting: FAMILY MEDICINE

## 2020-02-10 ENCOUNTER — OFFICE VISIT (OUTPATIENT)
Dept: INTERNAL MEDICINE | Facility: CLINIC | Age: 70
End: 2020-02-10

## 2020-02-10 ENCOUNTER — APPOINTMENT (OUTPATIENT)
Dept: LAB | Facility: HOSPITAL | Age: 70
End: 2020-02-10

## 2020-02-10 VITALS
HEART RATE: 60 BPM | HEIGHT: 65 IN | DIASTOLIC BLOOD PRESSURE: 100 MMHG | OXYGEN SATURATION: 95 % | SYSTOLIC BLOOD PRESSURE: 156 MMHG | WEIGHT: 242.1 LBS | BODY MASS INDEX: 40.33 KG/M2 | TEMPERATURE: 98.7 F

## 2020-02-10 DIAGNOSIS — Z12.5 ENCOUNTER FOR SCREENING FOR MALIGNANT NEOPLASM OF PROSTATE: ICD-10-CM

## 2020-02-10 DIAGNOSIS — R34 DECREASED URINATION: ICD-10-CM

## 2020-02-10 DIAGNOSIS — J43.1 PANLOBULAR EMPHYSEMA (HCC): ICD-10-CM

## 2020-02-10 DIAGNOSIS — I50.32 CHRONIC DIASTOLIC HEART FAILURE (HCC): ICD-10-CM

## 2020-02-10 DIAGNOSIS — R06.02 SHORTNESS OF BREATH: Primary | ICD-10-CM

## 2020-02-10 LAB
ALBUMIN SERPL-MCNC: 3.8 G/DL (ref 3.5–5.2)
ALBUMIN/GLOB SERPL: 1.6 G/DL
ALP SERPL-CCNC: 73 U/L (ref 39–117)
ALT SERPL W P-5'-P-CCNC: 12 U/L (ref 1–41)
ANION GAP SERPL CALCULATED.3IONS-SCNC: 8.9 MMOL/L (ref 5–15)
AST SERPL-CCNC: 21 U/L (ref 1–40)
BASOPHILS # BLD AUTO: 0.07 10*3/MM3 (ref 0–0.2)
BASOPHILS NFR BLD AUTO: 1 % (ref 0–1.5)
BILIRUB SERPL-MCNC: 0.4 MG/DL (ref 0.2–1.2)
BUN BLD-MCNC: 13 MG/DL (ref 8–23)
BUN/CREAT SERPL: 9.2 (ref 7–25)
CALCIUM SPEC-SCNC: 8.8 MG/DL (ref 8.6–10.5)
CHLORIDE SERPL-SCNC: 101 MMOL/L (ref 98–107)
CO2 SERPL-SCNC: 29.1 MMOL/L (ref 22–29)
CREAT BLD-MCNC: 1.41 MG/DL (ref 0.76–1.27)
DEPRECATED RDW RBC AUTO: 42.3 FL (ref 37–54)
EOSINOPHIL # BLD AUTO: 0.57 10*3/MM3 (ref 0–0.4)
EOSINOPHIL NFR BLD AUTO: 8.2 % (ref 0.3–6.2)
ERYTHROCYTE [DISTWIDTH] IN BLOOD BY AUTOMATED COUNT: 12.9 % (ref 12.3–15.4)
GFR SERPL CREATININE-BSD FRML MDRD: 50 ML/MIN/1.73
GLOBULIN UR ELPH-MCNC: 2.4 GM/DL
GLUCOSE BLD-MCNC: 97 MG/DL (ref 65–99)
HCT VFR BLD AUTO: 40.1 % (ref 37.5–51)
HGB BLD-MCNC: 12.8 G/DL (ref 13–17.7)
IMM GRANULOCYTES # BLD AUTO: 0.02 10*3/MM3 (ref 0–0.05)
IMM GRANULOCYTES NFR BLD AUTO: 0.3 % (ref 0–0.5)
LYMPHOCYTES # BLD AUTO: 1.35 10*3/MM3 (ref 0.7–3.1)
LYMPHOCYTES NFR BLD AUTO: 19.5 % (ref 19.6–45.3)
MCH RBC QN AUTO: 28.4 PG (ref 26.6–33)
MCHC RBC AUTO-ENTMCNC: 31.9 G/DL (ref 31.5–35.7)
MCV RBC AUTO: 88.9 FL (ref 79–97)
MONOCYTES # BLD AUTO: 0.8 10*3/MM3 (ref 0.1–0.9)
MONOCYTES NFR BLD AUTO: 11.6 % (ref 5–12)
NEUTROPHILS # BLD AUTO: 4.11 10*3/MM3 (ref 1.7–7)
NEUTROPHILS NFR BLD AUTO: 59.4 % (ref 42.7–76)
NRBC BLD AUTO-RTO: 0 /100 WBC (ref 0–0.2)
NT-PROBNP SERPL-MCNC: 1260 PG/ML (ref 5–900)
PLATELET # BLD AUTO: 183 10*3/MM3 (ref 140–450)
PMV BLD AUTO: 12.4 FL (ref 6–12)
POTASSIUM BLD-SCNC: 4.5 MMOL/L (ref 3.5–5.2)
PROT SERPL-MCNC: 6.2 G/DL (ref 6–8.5)
PSA SERPL-MCNC: 0.31 NG/ML (ref 0–4)
RBC # BLD AUTO: 4.51 10*6/MM3 (ref 4.14–5.8)
SODIUM BLD-SCNC: 139 MMOL/L (ref 136–145)
WBC NRBC COR # BLD: 6.92 10*3/MM3 (ref 3.4–10.8)

## 2020-02-10 PROCEDURE — 36415 COLL VENOUS BLD VENIPUNCTURE: CPT | Performed by: FAMILY MEDICINE

## 2020-02-10 PROCEDURE — 80053 COMPREHEN METABOLIC PANEL: CPT | Performed by: FAMILY MEDICINE

## 2020-02-10 PROCEDURE — 99214 OFFICE O/P EST MOD 30 MIN: CPT | Performed by: FAMILY MEDICINE

## 2020-02-10 PROCEDURE — 83880 ASSAY OF NATRIURETIC PEPTIDE: CPT | Performed by: FAMILY MEDICINE

## 2020-02-10 PROCEDURE — 74019 RADEX ABDOMEN 2 VIEWS: CPT

## 2020-02-10 PROCEDURE — 85025 COMPLETE CBC W/AUTO DIFF WBC: CPT | Performed by: FAMILY MEDICINE

## 2020-02-10 PROCEDURE — G0103 PSA SCREENING: HCPCS | Performed by: FAMILY MEDICINE

## 2020-02-10 RX ORDER — PREDNISONE 10 MG/1
TABLET ORAL
Qty: 48 EACH | Refills: 0 | Status: SHIPPED | OUTPATIENT
Start: 2020-02-10 | End: 2020-02-20

## 2020-02-10 RX ORDER — DOXYCYCLINE HYCLATE 100 MG
100 TABLET ORAL 2 TIMES DAILY
Qty: 20 TABLET | Refills: 0 | Status: SHIPPED | OUTPATIENT
Start: 2020-02-10 | End: 2020-03-13

## 2020-02-10 NOTE — PATIENT INSTRUCTIONS
Advance Directive    Advance directives are legal documents that let you make choices ahead of time about your health care and medical treatment in case you become unable to communicate for yourself. Advance directives are a way for you to communicate your wishes to family, friends, and health care providers. This can help convey your decisions about end-of-life care if you become unable to communicate.  Discussing and writing advance directives should happen over time rather than all at once. Advance directives can be changed depending on your situation and what you want, even after you have signed the advance directives.  If you do not have an advance directive, some states assign family decision makers to act on your behalf based on how closely you are related to them. Each state has its own laws regarding advance directives. You may want to check with your health care provider, , or state representative about the laws in your state. There are different types of advance directives, such as:  · Medical power of .  · Living will.  · Do not resuscitate (DNR) or do not attempt resuscitation (DNAR) order.  Health care proxy and medical power of   A health care proxy, also called a health care agent, is a person who is appointed to make medical decisions for you in cases in which you are unable to make the decisions yourself. Generally, people choose someone they know well and trust to represent their preferences. Make sure to ask this person for an agreement to act as your proxy. A proxy may have to exercise judgment in the event of a medical decision for which your wishes are not known.  A medical power of  is a legal document that names your health care proxy. Depending on the laws in your state, after the document is written, it may also need to be:  · Signed.  · Notarized.  · Dated.  · Copied.  · Witnessed.  · Incorporated into your medical record.  You may also want to appoint  someone to manage your financial affairs in a situation in which you are unable to do so. This is called a durable power of  for finances. It is a separate legal document from the durable power of  for health care. You may choose the same person or someone different from your health care proxy to act as your agent in financial matters.  If you do not appoint a proxy, or if there is a concern that the proxy is not acting in your best interests, a court-appointed guardian may be designated to act on your behalf.  Living will  A living will is a set of instructions documenting your wishes about medical care when you cannot express them yourself. Health care providers should keep a copy of your living will in your medical record. You may want to give a copy to family members or friends. To alert caregivers in case of an emergency, you can place a card in your wallet to let them know that you have a living will and where they can find it. A living will is used if you become:  · Terminally ill.  · Incapacitated.  · Unable to communicate or make decisions.  Items to consider in your living will include:  · The use or non-use of life-sustaining equipment, such as dialysis machines and breathing machines (ventilators).  · A DNR or DNAR order, which is the instruction not to use cardiopulmonary resuscitation (CPR) if breathing or heartbeat stops.  · The use or non-use of tube feeding.  · Withholding of food and fluids.  · Comfort (palliative) care when the goal becomes comfort rather than a cure.  · Organ and tissue donation.  A living will does not give instructions for distributing your money and property if you should pass away. It is recommended that you seek the advice of a  when writing a will. Decisions about taxes, beneficiaries, and asset distribution will be legally binding. This process can relieve your family and friends of any concerns surrounding disputes or questions that may come up about  the distribution of your assets.  DNR or DNAR  A DNR or DNAR order is a request not to have CPR in the event that your heart stops beating or you stop breathing. If a DNR or DNAR order has not been made and shared, a health care provider will try to help any patient whose heart has stopped or who has stopped breathing. If you plan to have surgery, talk with your health care provider about how your DNR or DNAR order will be followed if problems occur.  Summary  · Advance directives are the legal documents that allow you to make choices ahead of time about your health care and medical treatment in case you become unable to communicate for yourself.  · The process of discussing and writing advance directives should happen over time. You can change the advance directives, even after you have signed them.  · Advance directives include DNR or DNAR orders, living darby, and designating an agent as your medical power of .  This information is not intended to replace advice given to you by your health care provider. Make sure you discuss any questions you have with your health care provider.  Document Released: 03/26/2009 Document Revised: 11/06/2017 Document Reviewed: 11/06/2017  [employer] Patient Education © 2019 [employer]

## 2020-02-10 NOTE — PROGRESS NOTES
Robe Bryant is a 69 y.o. male.    Chief Complaint   Patient presents with   • Shortness of Breath   • Wheezing       HPI   Patient reports shortness of breath and wheezing for about 1 to 2 weeks.  He is coughing quite a bit, which is productive.  He does admit to nasal congestion, rhinorrhea, drainage. He reports hurting in chest an lungs. He reports increased swelling. Denies any fever.  He does report using nebs without much response.  Patient does have COPD in addition to chronic diastolic heart failure.  He does admit to decreased urination.  He reports that is difficult for him to climb a flight of stairs.    The following portions of the patient's history were reviewed and updated as appropriate: allergies, current medications, past family history, past medical history, past social history, past surgical history and problem list.     No Known Allergies      Current Outpatient Medications:   •  albuterol sulfate  (90 Base) MCG/ACT inhaler, Inhale 2 puffs Every 4 (Four) Hours As Needed for Wheezing or Shortness of Air., Disp: 3 inhaler, Rfl: 3  •  amLODIPine (NORVASC) 10 MG tablet, Take 1 tablet by mouth Daily., Disp: 30 tablet, Rfl: 1  •  apixaban (ELIQUIS) 5 MG tablet tablet, Take 1 tablet by mouth 2 (Two) Times a Day., Disp: 60 tablet, Rfl: 11  •  aspirin 81 MG chewable tablet, Chew 81 mg Daily., Disp: , Rfl:   •  atorvastatin (LIPITOR) 20 MG tablet, Take 1 tablet by mouth every night at bedtime., Disp: 90 tablet, Rfl: 0  •  Blood Glucose Monitoring Suppl (TRUE METRIX AIR GLUCOSE METER) w/Device kit, , Disp: , Rfl:   •  finasteride (PROSCAR) 5 MG tablet, TAKE 1 TABLET EVERY DAY, Disp: 90 tablet, Rfl: 0  •  FLUoxetine (PROzac) 20 MG capsule, Take 1 capsule by mouth Daily., Disp: 90 capsule, Rfl: 3  •  glipiZIDE (GLUCOTROL) 5 MG tablet, Take 1 tablet by mouth 2 (Two) Times a Day Before Meals., Disp: 180 tablet, Rfl: 3  •  glucose blood test strip, Use as instructed, E11.9, Disp: 100 each, Rfl:  3  •  glucose monitor monitoring kit, 1 each Daily. E11.9, Disp: 1 each, Rfl: 0  •  ipratropium-albuterol (DUO-NEB) 0.5-2.5 mg/3 ml nebulizer, Take 3 mL by nebulization 4 (Four) Times a Day., Disp: 1620 mL, Rfl: 2  •  Lancet Device misc, 1 each Daily. E11.9, Disp: 100 each, Rfl: 3  •  Lancets misc, 1 each 2 (Two) Times a Day., Disp: 200 each, Rfl: 5  •  lisinopril (PRINIVIL,ZESTRIL) 40 MG tablet, Take 1 tablet by mouth Daily., Disp: 90 tablet, Rfl: 0  •  metoprolol tartrate (LOPRESSOR) 25 MG tablet, Take 0.5 tablets by mouth Every 12 (Twelve) Hours., Disp: 90 tablet, Rfl: 0  •  Multiple Vitamin tablet, Take 1 tablet by mouth daily., Disp: , Rfl:   •  nitroglycerin (NITROSTAT) 0.4 MG SL tablet, Place 1 tablet under the tongue Every 5 (Five) Minutes As Needed for Chest Pain., Disp: 25 tablet, Rfl: 11  •  omeprazole (priLOSEC) 40 MG capsule, Take 1 capsule by mouth Daily., Disp: 90 capsule, Rfl: 3  •  rOPINIRole (REQUIP) 1 MG tablet, Take 1 tablet by mouth Every Night. Take 1 hour before bedtime., Disp: 30 tablet, Rfl: 1  •  terazosin (HYTRIN) 5 MG capsule, Take 1 capsule by mouth Every Night., Disp: 90 capsule, Rfl: 3  •  tiotropium bromide monohydrate (SPIRIVA RESPIMAT) 2.5 MCG/ACT aerosol solution inhaler, Inhale 2 puffs Daily., Disp: 2 inhaler, Rfl: 0  •  doxycycline (VIBRAMYICN) 100 MG tablet, Take 1 tablet by mouth 2 (Two) Times a Day., Disp: 20 tablet, Rfl: 0  •  predniSONE (DELTASONE) 10 MG (48) tablet pack, Take by mouth as directed., Disp: 48 each, Rfl: 0    ROS    Review of Systems   Constitutional: Positive for fatigue. Negative for chills and fever.   HENT: Positive for congestion and postnasal drip.    Respiratory: Positive for cough, shortness of breath and wheezing.    Cardiovascular: Positive for chest pain and leg swelling.   Gastrointestinal: Negative for abdominal pain, constipation, diarrhea, nausea and vomiting.   Neurological: Positive for weakness.       Vitals:    02/10/20 1605   BP: 156/100  "  BP Location: Left arm   Patient Position: Sitting   Cuff Size: Adult   Pulse: 60   Temp: 98.7 °F (37.1 °C)   TempSrc: Temporal   SpO2: 95%   Weight: 110 kg (242 lb 1.6 oz)   Height: 165.1 cm (65\")     Body mass index is 40.29 kg/m².    Physical Exam     Physical Exam   Constitutional: He is oriented to person, place, and time. He appears well-developed and well-nourished. No distress.   HENT:   Head: Normocephalic and atraumatic.   Right Ear: External ear normal.   Left Ear: External ear normal.   Eyes: Conjunctivae and EOM are normal.   Cardiovascular: Normal rate and regular rhythm.   No murmur heard.  Pulmonary/Chest: Effort normal. No respiratory distress. He has decreased breath sounds. He has wheezes in the right lower field and the left lower field.   Abdominal: Soft. Bowel sounds are normal. He exhibits no distension. There is no tenderness.   Musculoskeletal: He exhibits edema (Face and hands with trace edema to lower extremities bilaterally.).   Neurological: He is alert and oriented to person, place, and time. No cranial nerve deficit.   Skin: Skin is warm and dry.   Psychiatric: He has a normal mood and affect.       Assessment/Plan    Problem List Items Addressed This Visit        Cardiovascular and Mediastinum    Diastolic heart failure (CMS/HCC)    Relevant Orders    XR Abdomen Flat & Upright    CBC & Differential    Comprehensive Metabolic Panel    BNP       Respiratory    Chronic obstructive pulmonary disease (CMS/HCC)    Relevant Medications    predniSONE (DELTASONE) 10 MG (48) tablet pack      Other Visit Diagnoses     Shortness of breath    -  Primary    Relevant Orders    XR Abdomen Flat & Upright    CBC & Differential    Comprehensive Metabolic Panel    Decreased urination        Relevant Orders    PSA Screen    Encounter for screening for malignant neoplasm of prostate         Relevant Orders    PSA Screen        Given patient's symptoms, there is concern for COPD exacerbation.  He has been " started on a round of steroids and antibiotics.  However, he does have a history of congestive heart failure as well.  Will obtain a chest x-ray for further evaluation in addition to laboratory studies.  Patient may benefit from additional diuresis.    New Medications Ordered This Visit   Medications   • doxycycline (VIBRAMYICN) 100 MG tablet     Sig: Take 1 tablet by mouth 2 (Two) Times a Day.     Dispense:  20 tablet     Refill:  0   • predniSONE (DELTASONE) 10 MG (48) tablet pack     Sig: Take by mouth as directed.     Dispense:  48 each     Refill:  0       No orders of the defined types were placed in this encounter.      No follow-ups on file.    Radha Jean DO

## 2020-02-11 ENCOUNTER — TELEPHONE (OUTPATIENT)
Dept: INTERNAL MEDICINE | Facility: CLINIC | Age: 70
End: 2020-02-11

## 2020-02-11 ENCOUNTER — HOSPITAL ENCOUNTER (OUTPATIENT)
Dept: GENERAL RADIOLOGY | Facility: HOSPITAL | Age: 70
Discharge: HOME | End: 2020-02-11
Admitting: FAMILY MEDICINE

## 2020-02-11 DIAGNOSIS — J43.1 PANLOBULAR EMPHYSEMA (HCC): ICD-10-CM

## 2020-02-11 DIAGNOSIS — I50.32 CHRONIC DIASTOLIC HEART FAILURE (HCC): ICD-10-CM

## 2020-02-11 DIAGNOSIS — R06.02 SHORTNESS OF BREATH: Primary | ICD-10-CM

## 2020-02-11 PROCEDURE — 71046 X-RAY EXAM CHEST 2 VIEWS: CPT

## 2020-02-11 RX ORDER — FUROSEMIDE 40 MG/1
40 TABLET ORAL DAILY
Qty: 30 TABLET | Refills: 0 | Status: SHIPPED | OUTPATIENT
Start: 2020-02-11 | End: 2020-03-13 | Stop reason: ALTCHOICE

## 2020-02-13 ENCOUNTER — OFFICE VISIT (OUTPATIENT)
Dept: INTERNAL MEDICINE | Facility: CLINIC | Age: 70
End: 2020-02-13

## 2020-02-13 VITALS
HEART RATE: 64 BPM | TEMPERATURE: 97.9 F | WEIGHT: 244.8 LBS | OXYGEN SATURATION: 95 % | HEIGHT: 65 IN | BODY MASS INDEX: 40.79 KG/M2 | DIASTOLIC BLOOD PRESSURE: 98 MMHG | SYSTOLIC BLOOD PRESSURE: 168 MMHG

## 2020-02-13 DIAGNOSIS — R06.02 SHORTNESS OF BREATH: Primary | ICD-10-CM

## 2020-02-13 DIAGNOSIS — J43.1 PANLOBULAR EMPHYSEMA (HCC): ICD-10-CM

## 2020-02-13 DIAGNOSIS — I50.32 CHRONIC DIASTOLIC HEART FAILURE (HCC): ICD-10-CM

## 2020-02-13 PROCEDURE — 99213 OFFICE O/P EST LOW 20 MIN: CPT | Performed by: FAMILY MEDICINE

## 2020-02-13 RX ORDER — POTASSIUM CHLORIDE 750 MG/1
10 TABLET, EXTENDED RELEASE ORAL DAILY PRN
Qty: 30 TABLET | Refills: 0 | Status: SHIPPED | OUTPATIENT
Start: 2020-02-13 | End: 2020-02-14

## 2020-02-13 NOTE — PROGRESS NOTES
Robe Bryant is a 69 y.o. male.    Chief Complaint   Patient presents with   • Shortness of Breath   • Wheezing       HPI   Patient presents today for follow-up visit from a couple of days ago.  He was recently seen for severe shortness of breath.  BNP was elevated.  However, chest x-ray was read as normal.  I do appreciate some vascular congestion present on chest x-ray.  He was treated for his COPD exacerbation as well with doxycycline and prednisone.  Patient was started on Lasix 1-2 times daily.  Patient reports he is feelings some better.  He is less short of breath.  He is urinating better since starting lasix.  He is coughing up yellow sputum.  He is wheezing as well.  He is taking steroids and antibiotics.  He is also taking breathing treatments with good response.     The following portions of the patient's history were reviewed and updated as appropriate: allergies, current medications, past family history, past medical history, past social history, past surgical history and problem list.     No Known Allergies      Current Outpatient Medications:   •  albuterol sulfate  (90 Base) MCG/ACT inhaler, Inhale 2 puffs Every 4 (Four) Hours As Needed for Wheezing or Shortness of Air., Disp: 3 inhaler, Rfl: 3  •  amLODIPine (NORVASC) 10 MG tablet, Take 1 tablet by mouth Daily., Disp: 30 tablet, Rfl: 1  •  apixaban (ELIQUIS) 5 MG tablet tablet, Take 1 tablet by mouth 2 (Two) Times a Day., Disp: 60 tablet, Rfl: 11  •  aspirin 81 MG chewable tablet, Chew 81 mg Daily., Disp: , Rfl:   •  atorvastatin (LIPITOR) 20 MG tablet, Take 1 tablet by mouth every night at bedtime., Disp: 90 tablet, Rfl: 0  •  Blood Glucose Monitoring Suppl (TRUE METRIX AIR GLUCOSE METER) w/Device kit, , Disp: , Rfl:   •  doxycycline (VIBRAMYICN) 100 MG tablet, Take 1 tablet by mouth 2 (Two) Times a Day., Disp: 20 tablet, Rfl: 0  •  finasteride (PROSCAR) 5 MG tablet, TAKE 1 TABLET EVERY DAY, Disp: 90 tablet, Rfl: 0  •  FLUoxetine  (PROzac) 20 MG capsule, Take 1 capsule by mouth Daily., Disp: 90 capsule, Rfl: 3  •  furosemide (LASIX) 40 MG tablet, Take 1 tablet by mouth Daily. May take up to twice daily prn for swelling., Disp: 30 tablet, Rfl: 0  •  glipiZIDE (GLUCOTROL) 5 MG tablet, Take 1 tablet by mouth 2 (Two) Times a Day Before Meals., Disp: 180 tablet, Rfl: 3  •  glucose blood test strip, Use as instructed, E11.9, Disp: 100 each, Rfl: 3  •  glucose monitor monitoring kit, 1 each Daily. E11.9, Disp: 1 each, Rfl: 0  •  ipratropium-albuterol (DUO-NEB) 0.5-2.5 mg/3 ml nebulizer, Take 3 mL by nebulization 4 (Four) Times a Day., Disp: 1620 mL, Rfl: 2  •  Lancet Device misc, 1 each Daily. E11.9, Disp: 100 each, Rfl: 3  •  Lancets misc, 1 each 2 (Two) Times a Day., Disp: 200 each, Rfl: 5  •  lisinopril (PRINIVIL,ZESTRIL) 40 MG tablet, Take 1 tablet by mouth Daily., Disp: 90 tablet, Rfl: 0  •  metoprolol tartrate (LOPRESSOR) 25 MG tablet, Take 0.5 tablets by mouth Every 12 (Twelve) Hours., Disp: 90 tablet, Rfl: 0  •  Multiple Vitamin tablet, Take 1 tablet by mouth daily., Disp: , Rfl:   •  nitroglycerin (NITROSTAT) 0.4 MG SL tablet, Place 1 tablet under the tongue Every 5 (Five) Minutes As Needed for Chest Pain., Disp: 25 tablet, Rfl: 11  •  omeprazole (priLOSEC) 40 MG capsule, Take 1 capsule by mouth Daily., Disp: 90 capsule, Rfl: 3  •  predniSONE (DELTASONE) 10 MG (48) tablet pack, Take by mouth as directed., Disp: 48 each, Rfl: 0  •  rOPINIRole (REQUIP) 1 MG tablet, Take 1 tablet by mouth Every Night. Take 1 hour before bedtime., Disp: 30 tablet, Rfl: 1  •  terazosin (HYTRIN) 5 MG capsule, Take 1 capsule by mouth Every Night., Disp: 90 capsule, Rfl: 3  •  tiotropium bromide monohydrate (SPIRIVA RESPIMAT) 2.5 MCG/ACT aerosol solution inhaler, Inhale 2 puffs Daily., Disp: 2 inhaler, Rfl: 0  •  potassium chloride (K-DUR,KLOR-CON) 10 MEQ CR tablet, Take 1 tablet by mouth Daily As Needed (with lasix only)., Disp: 30 tablet, Rfl: 0    ROS    Review  "of Systems   Constitutional: Negative for chills and fever.   HENT: Negative for congestion, postnasal drip, rhinorrhea and sore throat.    Respiratory: Positive for cough, shortness of breath and wheezing.    Cardiovascular: Positive for leg swelling. Negative for chest pain.   Gastrointestinal: Negative for abdominal pain, diarrhea, nausea and vomiting.       Vitals:    02/13/20 1449   BP: 168/98   BP Location: Left arm   Patient Position: Sitting   Cuff Size: Adult   Pulse: 64   Temp: 97.9 °F (36.6 °C)   TempSrc: Temporal   SpO2: 95%   Weight: 111 kg (244 lb 12.8 oz)   Height: 165.1 cm (65\")     Body mass index is 40.74 kg/m².    Physical Exam     Physical Exam   Constitutional: He is oriented to person, place, and time. He appears well-developed and well-nourished. No distress.   HENT:   Head: Normocephalic and atraumatic.   Right Ear: External ear normal.   Left Ear: External ear normal.   Eyes: Conjunctivae and EOM are normal.   Cardiovascular: Normal rate and regular rhythm.   No murmur heard.  Pulmonary/Chest: Effort normal and breath sounds normal. No respiratory distress. He has no wheezes.   Abdominal: Soft. Bowel sounds are normal. He exhibits no distension. There is no tenderness.   Musculoskeletal: He exhibits edema (1+ pitting edema bilaterally to lower extremities.  Edema in hands has resolved.).   Neurological: He is alert and oriented to person, place, and time. No cranial nerve deficit.   Skin: Skin is warm and dry.   Psychiatric: He has a normal mood and affect.       Assessment/Plan    Problem List Items Addressed This Visit        Cardiovascular and Mediastinum    Diastolic heart failure (CMS/HCC)       Respiratory    Chronic obstructive pulmonary disease (CMS/HCC)    Shortness of breath - Primary        Shortness of breath is likely multifactorial.  Patient is to continue steroids and antibiotics for treatment of COPD exacerbation.  He may continue using nebulizer treatments as needed as well. "  There is concern that patient does have exacerbation of heart failure as well.  He has noticed some improvement since starting Lasix.  He does seem to be urinating much better and swelling has gone down in his face and hands.  Patient is to continue Lasix over the next few days. He has been encouraged to take potassium with Lasix. He is to follow-up with me in about 1 week.      New Medications Ordered This Visit   Medications   • potassium chloride (K-DUR,KLOR-CON) 10 MEQ CR tablet     Sig: Take 1 tablet by mouth Daily As Needed (with lasix only).     Dispense:  30 tablet     Refill:  0       No orders of the defined types were placed in this encounter.      Return in about 1 week (around 2/20/2020) for shortness of breath.    Radha Jean, DO

## 2020-02-14 RX ORDER — POTASSIUM CHLORIDE 750 MG/1
TABLET, EXTENDED RELEASE ORAL
Qty: 90 TABLET | Refills: 0 | Status: SHIPPED | OUTPATIENT
Start: 2020-02-14 | End: 2020-03-13 | Stop reason: SDUPTHER

## 2020-02-20 ENCOUNTER — OFFICE VISIT (OUTPATIENT)
Dept: INTERNAL MEDICINE | Facility: CLINIC | Age: 70
End: 2020-02-20

## 2020-02-20 VITALS
WEIGHT: 246.6 LBS | TEMPERATURE: 98.3 F | SYSTOLIC BLOOD PRESSURE: 168 MMHG | HEART RATE: 67 BPM | HEIGHT: 65 IN | DIASTOLIC BLOOD PRESSURE: 100 MMHG | OXYGEN SATURATION: 94 % | BODY MASS INDEX: 41.09 KG/M2

## 2020-02-20 DIAGNOSIS — I50.32 CHRONIC DIASTOLIC HEART FAILURE (HCC): ICD-10-CM

## 2020-02-20 DIAGNOSIS — I10 ESSENTIAL HYPERTENSION: Primary | ICD-10-CM

## 2020-02-20 DIAGNOSIS — J43.1 PANLOBULAR EMPHYSEMA (HCC): ICD-10-CM

## 2020-02-20 DIAGNOSIS — G25.81 RESTLESS LEG SYNDROME: ICD-10-CM

## 2020-02-20 LAB
BUN SERPL-MCNC: 36 MG/DL (ref 8–23)
BUN/CREAT SERPL: 24.5 (ref 7–25)
CALCIUM SERPL-MCNC: 9.2 MG/DL (ref 8.6–10.5)
CHLORIDE SERPL-SCNC: 100 MMOL/L (ref 98–107)
CO2 SERPL-SCNC: 27.7 MMOL/L (ref 22–29)
CREAT SERPL-MCNC: 1.47 MG/DL (ref 0.76–1.27)
GLUCOSE SERPL-MCNC: 51 MG/DL (ref 65–99)
POTASSIUM SERPL-SCNC: 4.2 MMOL/L (ref 3.5–5.2)
SODIUM SERPL-SCNC: 141 MMOL/L (ref 136–145)

## 2020-02-20 PROCEDURE — 99214 OFFICE O/P EST MOD 30 MIN: CPT | Performed by: FAMILY MEDICINE

## 2020-02-20 RX ORDER — ROPINIROLE 2 MG/1
2 TABLET, FILM COATED ORAL NIGHTLY
Qty: 30 TABLET | Refills: 2 | Status: SHIPPED | OUTPATIENT
Start: 2020-02-20 | End: 2020-05-01 | Stop reason: SDUPTHER

## 2020-03-02 NOTE — ASSESSMENT & PLAN NOTE
Edema has improved with lasix.  Encouraged patient to continue medication.  Also encouraged patient to make a sooner appt with his cardiologist.

## 2020-03-02 NOTE — ASSESSMENT & PLAN NOTE
Uncontrolled in office.  I am concerned about medication compliance since BP seemed to be better controlled before lasix was added.  No change to medication today.  Will f/u for regular office visit next week.

## 2020-03-02 NOTE — PROGRESS NOTES
Robe Bryant is a 69 y.o. male.    Chief Complaint   Patient presents with   • Shortness of Breath       HPI   Patient presents today to f/u on shortness of breath and edema.  He does have COPD and diastolic heart failure.  He has completed antibiotics and steroids for COPD exacerbation.  He is continuing lasix daily and has decreased in swelling in face, hands, and legs. He does still feel poorly. BP is elevated today as well.  He does not have an appt with his cardiologist until several months from now.      He complains of restless leg related pain that is worse at night.  He has received some relief from requip, but does not seem to be as effective here lately.     The following portions of the patient's history were reviewed and updated as appropriate: allergies, current medications, past family history, past medical history, past social history, past surgical history and problem list.     No Known Allergies      Current Outpatient Medications:   •  albuterol sulfate  (90 Base) MCG/ACT inhaler, Inhale 2 puffs Every 4 (Four) Hours As Needed for Wheezing or Shortness of Air., Disp: 3 inhaler, Rfl: 3  •  amLODIPine (NORVASC) 10 MG tablet, Take 1 tablet by mouth Daily., Disp: 30 tablet, Rfl: 1  •  apixaban (ELIQUIS) 5 MG tablet tablet, Take 1 tablet by mouth 2 (Two) Times a Day., Disp: 60 tablet, Rfl: 11  •  aspirin 81 MG chewable tablet, Chew 81 mg Daily., Disp: , Rfl:   •  atorvastatin (LIPITOR) 20 MG tablet, Take 1 tablet by mouth every night at bedtime., Disp: 90 tablet, Rfl: 0  •  Blood Glucose Monitoring Suppl (TRUE METRIX AIR GLUCOSE METER) w/Device kit, , Disp: , Rfl:   •  doxycycline (VIBRAMYICN) 100 MG tablet, Take 1 tablet by mouth 2 (Two) Times a Day., Disp: 20 tablet, Rfl: 0  •  finasteride (PROSCAR) 5 MG tablet, TAKE 1 TABLET EVERY DAY, Disp: 90 tablet, Rfl: 0  •  FLUoxetine (PROzac) 20 MG capsule, Take 1 capsule by mouth Daily., Disp: 90 capsule, Rfl: 3  •  furosemide (LASIX) 40 MG  tablet, Take 1 tablet by mouth Daily. May take up to twice daily prn for swelling., Disp: 30 tablet, Rfl: 0  •  glipiZIDE (GLUCOTROL) 5 MG tablet, Take 1 tablet by mouth 2 (Two) Times a Day Before Meals., Disp: 180 tablet, Rfl: 3  •  glucose blood test strip, Use as instructed, E11.9, Disp: 100 each, Rfl: 3  •  glucose monitor monitoring kit, 1 each Daily. E11.9, Disp: 1 each, Rfl: 0  •  ipratropium-albuterol (DUO-NEB) 0.5-2.5 mg/3 ml nebulizer, Take 3 mL by nebulization 4 (Four) Times a Day., Disp: 1620 mL, Rfl: 2  •  Lancet Device misc, 1 each Daily. E11.9, Disp: 100 each, Rfl: 3  •  Lancets misc, 1 each 2 (Two) Times a Day., Disp: 200 each, Rfl: 5  •  lisinopril (PRINIVIL,ZESTRIL) 40 MG tablet, Take 1 tablet by mouth Daily., Disp: 90 tablet, Rfl: 0  •  metoprolol tartrate (LOPRESSOR) 25 MG tablet, Take 0.5 tablets by mouth Every 12 (Twelve) Hours., Disp: 90 tablet, Rfl: 0  •  Multiple Vitamin tablet, Take 1 tablet by mouth daily., Disp: , Rfl:   •  nitroglycerin (NITROSTAT) 0.4 MG SL tablet, Place 1 tablet under the tongue Every 5 (Five) Minutes As Needed for Chest Pain., Disp: 25 tablet, Rfl: 11  •  omeprazole (priLOSEC) 40 MG capsule, Take 1 capsule by mouth Daily., Disp: 90 capsule, Rfl: 3  •  potassium chloride (K-DUR,KLOR-CON) 10 MEQ CR tablet, TAKE 1 TABLET BY MOUTH DAILY AS NEEDED WITH LASIX ONLY, Disp: 90 tablet, Rfl: 0  •  rOPINIRole (REQUIP) 2 MG tablet, Take 1 tablet by mouth Every Night. Take 1 hour before bedtime., Disp: 30 tablet, Rfl: 2  •  terazosin (HYTRIN) 5 MG capsule, Take 1 capsule by mouth Every Night., Disp: 90 capsule, Rfl: 3  •  tiotropium bromide monohydrate (SPIRIVA RESPIMAT) 2.5 MCG/ACT aerosol solution inhaler, Inhale 2 puffs Daily., Disp: 2 inhaler, Rfl: 0    ROS    Review of Systems   Constitutional: Positive for fatigue. Negative for chills and fever.   HENT: Negative for congestion, postnasal drip, rhinorrhea and sore throat.    Respiratory: Positive for cough, shortness of breath  "and wheezing.    Cardiovascular: Positive for leg swelling. Negative for chest pain.   Gastrointestinal: Negative for abdominal pain, diarrhea, nausea and vomiting.   Musculoskeletal: Positive for arthralgias and myalgias.   Neurological: Positive for weakness.       Vitals:    02/20/20 0947   BP: 168/100   BP Location: Left arm   Patient Position: Sitting   Cuff Size: Adult   Pulse: 67   Temp: 98.3 °F (36.8 °C)   TempSrc: Temporal   SpO2: 94%   Weight: 112 kg (246 lb 9.6 oz)   Height: 165.1 cm (65\")     Body mass index is 41.04 kg/m².    Physical Exam     Physical Exam   Constitutional: He is oriented to person, place, and time. He appears well-developed and well-nourished. No distress.   HENT:   Head: Normocephalic and atraumatic.   Right Ear: External ear normal.   Left Ear: External ear normal.   Eyes: Conjunctivae and EOM are normal.   Cardiovascular: Normal rate and regular rhythm.   No murmur heard.  Pulmonary/Chest: Effort normal and breath sounds normal. No respiratory distress. He has no wheezes.   Abdominal: Soft. Bowel sounds are normal. He exhibits no distension. There is no tenderness.   Musculoskeletal: Normal range of motion. He exhibits edema (trace lower extremities bilaterally).   Neurological: He is alert and oriented to person, place, and time. No cranial nerve deficit.   Skin: Skin is warm and dry.   Psychiatric: He has a normal mood and affect.       Assessment/Plan    Problem List Items Addressed This Visit        Cardiovascular and Mediastinum    Essential hypertension - Primary     Uncontrolled in office.  I am concerned about medication compliance since BP seemed to be better controlled before lasix was added.  No change to medication today.  Will f/u for regular office visit next week.           Relevant Orders    Basic Metabolic Panel (Completed)    Diastolic heart failure (CMS/HCC)     Edema has improved with lasix.  Encouraged patient to continue medication.  Also encouraged patient to " make a sooner appt with his cardiologist.              Respiratory    Chronic obstructive pulmonary disease (CMS/HCC)     Stable.  Lungs are CTA today.  Continue spiriva and albuterol.             Other    Restless leg syndrome     Uncontrolled.  Will increased requip to 2mg nightly.          Relevant Medications    rOPINIRole (REQUIP) 2 MG tablet          New Medications Ordered This Visit   Medications   • rOPINIRole (REQUIP) 2 MG tablet     Sig: Take 1 tablet by mouth Every Night. Take 1 hour before bedtime.     Dispense:  30 tablet     Refill:  2       No orders of the defined types were placed in this encounter.      No follow-ups on file.    Radha Jean DO

## 2020-03-13 ENCOUNTER — OFFICE VISIT (OUTPATIENT)
Dept: INTERNAL MEDICINE | Facility: CLINIC | Age: 70
End: 2020-03-13

## 2020-03-13 ENCOUNTER — TELEPHONE (OUTPATIENT)
Dept: CARDIOLOGY | Facility: CLINIC | Age: 70
End: 2020-03-13

## 2020-03-13 VITALS
BODY MASS INDEX: 39.65 KG/M2 | DIASTOLIC BLOOD PRESSURE: 102 MMHG | WEIGHT: 238 LBS | OXYGEN SATURATION: 94 % | HEIGHT: 65 IN | SYSTOLIC BLOOD PRESSURE: 145 MMHG | TEMPERATURE: 99.3 F | HEART RATE: 62 BPM

## 2020-03-13 DIAGNOSIS — J43.1 PANLOBULAR EMPHYSEMA (HCC): ICD-10-CM

## 2020-03-13 DIAGNOSIS — R06.02 SHORTNESS OF BREATH: Primary | ICD-10-CM

## 2020-03-13 DIAGNOSIS — I10 ESSENTIAL HYPERTENSION: ICD-10-CM

## 2020-03-13 DIAGNOSIS — I50.32 CHRONIC DIASTOLIC HEART FAILURE (HCC): ICD-10-CM

## 2020-03-13 PROCEDURE — 99214 OFFICE O/P EST MOD 30 MIN: CPT | Performed by: FAMILY MEDICINE

## 2020-03-13 RX ORDER — POTASSIUM CHLORIDE 750 MG/1
10 TABLET, EXTENDED RELEASE ORAL 2 TIMES DAILY
Qty: 60 TABLET | Refills: 2 | Status: ON HOLD | OUTPATIENT
Start: 2020-03-13 | End: 2022-07-13 | Stop reason: SDUPTHER

## 2020-03-13 RX ORDER — FUROSEMIDE 20 MG/1
TABLET ORAL
Qty: 90 TABLET | Refills: 2 | Status: SHIPPED | OUTPATIENT
Start: 2020-03-13 | End: 2020-09-29 | Stop reason: SDUPTHER

## 2020-03-13 NOTE — PROGRESS NOTES
Robe Bryant is a 69 y.o. male.    Chief Complaint   Patient presents with   • Shortness of Breath       HPI   Patient presents today complaining of shortness of breath.   He reports he cannot do anything without feeling short of breath. He has COPD and CHF.  He is under the care of cardiology as well.  He did contact cardiology earlier today and was instructed to take a total of 60mg lasix a day, where he was previously taking 40mg daily.  His wife was advised to update the RN in cardiology on Monday in regards to the patient's condition.  Patient has been out of lasix for about 2 weeks. He is having a hard time getting medication from [facility] mail order and is requesting some medications, including eliquis, be sent to [facility] in [facility].  He has been taking spiriva for COPD without much response.  He continues to report cough and wheezing as well.  BP is also elevated today.     The following portions of the patient's history were reviewed and updated as appropriate: allergies, current medications, past family history, past medical history, past social history, past surgical history and problem list.     No Known Allergies      Current Outpatient Medications:   •  albuterol sulfate  (90 Base) MCG/ACT inhaler, Inhale 2 puffs Every 4 (Four) Hours As Needed for Wheezing or Shortness of Air., Disp: 3 inhaler, Rfl: 3  •  amLODIPine (NORVASC) 10 MG tablet, Take 1 tablet by mouth Daily., Disp: 30 tablet, Rfl: 1  •  apixaban (ELIQUIS) 5 MG tablet tablet, Take 1 tablet by mouth 2 (Two) Times a Day., Disp: 60 tablet, Rfl: 0  •  aspirin 81 MG chewable tablet, Chew 81 mg Daily., Disp: , Rfl:   •  atorvastatin (LIPITOR) 20 MG tablet, Take 1 tablet by mouth every night at bedtime., Disp: 90 tablet, Rfl: 0  •  Blood Glucose Monitoring Suppl (TRUE METRIX AIR GLUCOSE METER) w/Device kit, , Disp: , Rfl:   •  finasteride (PROSCAR) 5 MG tablet, TAKE 1 TABLET EVERY DAY, Disp: 90 tablet, Rfl: 0  •  FLUoxetine (PROzac) 20  MG capsule, Take 1 capsule by mouth Daily., Disp: 90 capsule, Rfl: 3  •  glipiZIDE (GLUCOTROL) 5 MG tablet, Take 1 tablet by mouth 2 (Two) Times a Day Before Meals., Disp: 180 tablet, Rfl: 3  •  glucose blood test strip, Use as instructed, E11.9, Disp: 100 each, Rfl: 3  •  glucose monitor monitoring kit, 1 each Daily. E11.9, Disp: 1 each, Rfl: 0  •  ipratropium-albuterol (DUO-NEB) 0.5-2.5 mg/3 ml nebulizer, Take 3 mL by nebulization 4 (Four) Times a Day., Disp: 1620 mL, Rfl: 2  •  Lancet Device misc, 1 each Daily. E11.9, Disp: 100 each, Rfl: 3  •  Lancets misc, 1 each 2 (Two) Times a Day., Disp: 200 each, Rfl: 5  •  lisinopril (PRINIVIL,ZESTRIL) 40 MG tablet, Take 1 tablet by mouth Daily., Disp: 90 tablet, Rfl: 0  •  metoprolol tartrate (LOPRESSOR) 25 MG tablet, Take 0.5 tablets by mouth Every 12 (Twelve) Hours., Disp: 90 tablet, Rfl: 0  •  Multiple Vitamin tablet, Take 1 tablet by mouth daily., Disp: , Rfl:   •  nitroglycerin (NITROSTAT) 0.4 MG SL tablet, Place 1 tablet under the tongue Every 5 (Five) Minutes As Needed for Chest Pain., Disp: 25 tablet, Rfl: 11  •  omeprazole (priLOSEC) 40 MG capsule, Take 1 capsule by mouth Daily., Disp: 90 capsule, Rfl: 3  •  potassium chloride (K-DUR,KLOR-CON) 10 MEQ CR tablet, Take 1 tablet by mouth 2 (Two) Times a Day., Disp: 60 tablet, Rfl: 2  •  rOPINIRole (REQUIP) 2 MG tablet, Take 1 tablet by mouth Every Night. Take 1 hour before bedtime., Disp: 30 tablet, Rfl: 2  •  terazosin (HYTRIN) 5 MG capsule, Take 1 capsule by mouth Every Night., Disp: 90 capsule, Rfl: 3  •  Fluticasone-Umeclidin-Vilant (Trelegy Ellipta) 100-62.5-25 MCG/INH aerosol powder , Inhale 1 puff Daily., Disp: 28 each, Rfl: 0  •  furosemide (LASIX) 20 MG tablet, Take 2 tablets by mouth every morning and 1 tablet by mouth every evening., Disp: 90 tablet, Rfl: 2    ROS    Review of Systems   Constitutional: Positive for fatigue. Negative for chills and fever.   HENT: Negative for congestion, postnasal drip,  "rhinorrhea and sore throat.    Respiratory: Positive for cough, shortness of breath and wheezing.    Cardiovascular: Positive for leg swelling. Negative for chest pain.   Gastrointestinal: Negative for abdominal pain, diarrhea, nausea and vomiting.   Neurological: Positive for weakness.       Vitals:    03/13/20 1450   BP: (!) 145/102   BP Location: Left arm   Patient Position: Sitting   Cuff Size: Adult   Pulse: 62   Temp: 99.3 °F (37.4 °C)   TempSrc: Temporal   SpO2: 94%   Weight: 108 kg (238 lb)   Height: 165.1 cm (65\")     Body mass index is 39.61 kg/m².    Physical Exam     Physical Exam   Constitutional: He is oriented to person, place, and time. He appears well-developed and well-nourished. No distress.   HENT:   Head: Normocephalic and atraumatic.   Right Ear: External ear normal.   Left Ear: External ear normal.   Eyes: Conjunctivae and EOM are normal.   Cardiovascular: Normal rate and regular rhythm.   No murmur heard.  Pulmonary/Chest: Effort normal. No respiratory distress. He has wheezes. He has rales (trace).   Abdominal: Soft. Bowel sounds are normal. He exhibits no distension. There is no tenderness.   Musculoskeletal: He exhibits edema (generalized, hands and lower extremities).   Neurological: He is alert and oriented to person, place, and time. No cranial nerve deficit.   Skin: Skin is warm and dry.   Psychiatric: He has a normal mood and affect.       Assessment/Plan    Problem List Items Addressed This Visit        Cardiovascular and Mediastinum    Essential hypertension    Relevant Medications    furosemide (LASIX) 20 MG tablet    Diastolic heart failure (CMS/HCC)       Respiratory    Chronic obstructive pulmonary disease (CMS/HCC)    Relevant Medications    Fluticasone-Umeclidin-Vilant (Trelegy Ellipta) 100-62.5-25 MCG/INH aerosol powder     Shortness of breath - Primary        Shortness of breath likely exacerbated by uncontrolled CHF.  Patient advised to take 40mg lasix in the am and 20mg in " the pm over the weekend.  New prescription sent the the pharmacy.  He is to continue potassium with Lasix.  He was encouraged to call cardiology Monday.  He is to notify the office if he has any decline in his condition and he should proceed to the ER if this occurs.  He has been provided with a sample of trelegy for COPD as it appears to be uncontrolled with spiriva alone.  He has frequent COPD exacerbations.  BP is uncontrolled.  Compliance and increased dose of lasix will likely improved HTN.      New Medications Ordered This Visit   Medications   • apixaban (ELIQUIS) 5 MG tablet tablet     Sig: Take 1 tablet by mouth 2 (Two) Times a Day.     Dispense:  60 tablet     Refill:  0   • furosemide (LASIX) 20 MG tablet     Sig: Take 2 tablets by mouth every morning and 1 tablet by mouth every evening.     Dispense:  90 tablet     Refill:  2   • potassium chloride (K-DUR,KLOR-CON) 10 MEQ CR tablet     Sig: Take 1 tablet by mouth 2 (Two) Times a Day.     Dispense:  60 tablet     Refill:  2     **Patient requests 90 days supply**   • Fluticasone-Umeclidin-Vilant (Trelegy Ellipta) 100-62.5-25 MCG/INH aerosol powder      Sig: Inhale 1 puff Daily.     Dispense:  28 each     Refill:  0       No orders of the defined types were placed in this encounter.      Return in about 2 months (around 5/13/2020) for Medicare Wellness.    Radha Jean DO

## 2020-03-13 NOTE — TELEPHONE ENCOUNTER
PT states that he is more SOA lately- his PCP started him on lasix 40 mg daily and potassium 10 meq daily in February-     He has been seeing his PC about this the past few weeks-     Instructed his wife to have him take an extra 20 mg of lasix and 10 meq of potassium through the weekend and update me Monday- she verbalized understanding-

## 2020-03-26 RX ORDER — FINASTERIDE 5 MG/1
TABLET, FILM COATED ORAL
Qty: 90 TABLET | Refills: 0 | Status: SHIPPED | OUTPATIENT
Start: 2020-03-26 | End: 2020-10-01

## 2020-03-26 RX ORDER — LISINOPRIL 10 MG/1
TABLET ORAL
Qty: 180 TABLET | Refills: 2 | Status: SHIPPED | OUTPATIENT
Start: 2020-03-26 | End: 2020-07-29 | Stop reason: ALTCHOICE

## 2020-04-14 ENCOUNTER — CLINICAL SUPPORT NO REQUIREMENTS (OUTPATIENT)
Dept: CARDIOLOGY | Facility: CLINIC | Age: 70
End: 2020-04-14

## 2020-04-14 DIAGNOSIS — I50.32 CHRONIC DIASTOLIC HEART FAILURE (HCC): Primary | ICD-10-CM

## 2020-04-14 PROCEDURE — 93296 REM INTERROG EVL PM/IDS: CPT | Performed by: INTERNAL MEDICINE

## 2020-04-14 PROCEDURE — 93294 REM INTERROG EVL PM/LDLS PM: CPT | Performed by: INTERNAL MEDICINE

## 2020-05-01 ENCOUNTER — TELEPHONE (OUTPATIENT)
Dept: INTERNAL MEDICINE | Facility: CLINIC | Age: 70
End: 2020-05-01

## 2020-05-01 DIAGNOSIS — G25.81 RESTLESS LEG SYNDROME: ICD-10-CM

## 2020-05-01 RX ORDER — ROPINIROLE 3 MG/1
3 TABLET, FILM COATED ORAL NIGHTLY
Qty: 90 TABLET | Refills: 1 | Status: SHIPPED | OUTPATIENT
Start: 2020-05-01 | End: 2020-05-29 | Stop reason: ALTCHOICE

## 2020-05-01 NOTE — TELEPHONE ENCOUNTER
PATIENT REQUESTING REFILL ON HIS   rOPINIRole (REQUIP) 2 MG tablet  BUT STATES THE MEDICATION ONLY HELPS FOR A COUPLE OF HOURS AND THEN HIS LEGS HURT SO BADLY THAT HE CANNOT SLEEP.  HE IS REQUESTING A HIGHER DOSAGE OR SOMETHING DIFFERENT.    PHARMACY VERIFIED AS:  [id number] [place], [place] - [address] AT [facility] [address] - [phone] PH - [phone] FX     PATIENT IS OUT OF MEDICATION    PLEASE CALL PATIENT AND ADVISE  [phone]

## 2020-05-29 ENCOUNTER — OFFICE VISIT (OUTPATIENT)
Dept: INTERNAL MEDICINE | Facility: CLINIC | Age: 70
End: 2020-05-29

## 2020-05-29 VITALS
WEIGHT: 234.1 LBS | HEIGHT: 65 IN | TEMPERATURE: 99.1 F | HEART RATE: 59 BPM | OXYGEN SATURATION: 97 % | DIASTOLIC BLOOD PRESSURE: 88 MMHG | SYSTOLIC BLOOD PRESSURE: 138 MMHG | BODY MASS INDEX: 39 KG/M2

## 2020-05-29 DIAGNOSIS — E11.22 TYPE 2 DIABETES MELLITUS WITH STAGE 3 CHRONIC KIDNEY DISEASE, WITHOUT LONG-TERM CURRENT USE OF INSULIN (HCC): ICD-10-CM

## 2020-05-29 DIAGNOSIS — N18.30 TYPE 2 DIABETES MELLITUS WITH STAGE 3 CHRONIC KIDNEY DISEASE, WITHOUT LONG-TERM CURRENT USE OF INSULIN (HCC): ICD-10-CM

## 2020-05-29 DIAGNOSIS — J43.1 PANLOBULAR EMPHYSEMA (HCC): ICD-10-CM

## 2020-05-29 DIAGNOSIS — Z00.00 MEDICARE ANNUAL WELLNESS VISIT, SUBSEQUENT: Primary | ICD-10-CM

## 2020-05-29 DIAGNOSIS — G25.81 RESTLESS LEG SYNDROME: ICD-10-CM

## 2020-05-29 DIAGNOSIS — I10 ESSENTIAL HYPERTENSION: ICD-10-CM

## 2020-05-29 DIAGNOSIS — I48.0 PAROXYSMAL ATRIAL FIBRILLATION (HCC): ICD-10-CM

## 2020-05-29 DIAGNOSIS — I50.32 CHRONIC DIASTOLIC HEART FAILURE (HCC): ICD-10-CM

## 2020-05-29 PROCEDURE — G0439 PPPS, SUBSEQ VISIT: HCPCS | Performed by: FAMILY MEDICINE

## 2020-05-29 PROCEDURE — 99397 PER PM REEVAL EST PAT 65+ YR: CPT | Performed by: FAMILY MEDICINE

## 2020-05-29 PROCEDURE — 96160 PT-FOCUSED HLTH RISK ASSMT: CPT | Performed by: FAMILY MEDICINE

## 2020-05-29 RX ORDER — BACLOFEN 10 MG/1
10 TABLET ORAL 3 TIMES DAILY PRN
Qty: 90 TABLET | Refills: 2 | Status: SHIPPED | OUTPATIENT
Start: 2020-05-29 | End: 2020-11-18 | Stop reason: SDUPTHER

## 2020-05-29 RX ORDER — PRAMIPEXOLE DIHYDROCHLORIDE 0.25 MG/1
0.25 TABLET ORAL 3 TIMES DAILY
Qty: 270 TABLET | Refills: 1 | Status: SHIPPED | OUTPATIENT
Start: 2020-05-29 | End: 2021-01-29 | Stop reason: SDUPTHER

## 2020-05-29 RX ORDER — ATORVASTATIN CALCIUM 20 MG/1
20 TABLET, FILM COATED ORAL
Qty: 90 TABLET | Refills: 3 | Status: SHIPPED | OUTPATIENT
Start: 2020-05-29 | End: 2020-10-02

## 2020-05-29 NOTE — PATIENT INSTRUCTIONS
Preventing Heart Failure  Heart failure is a condition in which the heart has trouble pumping blood. This may mean that the heart cannot pump enough blood out to the body, or that the heart does not fill up with enough blood. Either of those problems can lead to symptoms such as fatigue, trouble breathing, and swelling throughout the body.  This is a common medical condition that affects not only the heart, but the entire body. Making certain nutrition and lifestyle changes can help you prevent heart failure and avoid serious health problems.  What nutrition changes can be made?    · If you are overweight or obese, reduce how many calories you eat each day so that you lose weight. Work with your health care provider or a diet and nutrition specialist (dietitian) to determine how many calories you need each day.  · Eat foods that are low in salt (sodium). Avoid adding extra salt to foods.  · Eat a well-balanced diet that includes a lot of:  ? Fresh fruits and vegetables.  ? Whole grains.  ? Lean meats.  ? Beans.  ? Fat-free or low-fat dairy products.  · Avoid foods that contain a lot of:  ? Trans fats.  ? Saturated fats.  ? Sugar.  ? Cholesterol.  What lifestyle changes can be made?    · Do not use any products that contain nicotine or tobacco, such as cigarettes and e-cigarettes. If you need help quitting or reducing how much you smoke, ask your health care provider.  · Stop using alcohol, or limit alcohol intake to no more than 1 drink a day for nonpregnant women and 2 drinks a day for men. One drink equals 12 oz of beer, 5 oz of wine, or 1½ oz of hard liquor.  · Exercise for at least 150 minutes each week, or as much as told by your health care provider.  ? Do moderate-intensity exercise, such as brisk walking, bicycling, or water aerobics.  ? Ask your health care provider which activities are safe for you.  · See a health care provider regularly for screening and wellness checks. Know your heart health  indicators, such as:  ? Blood pressure.  ? Cholesterol levels.  ? Blood sugar (glucose) levels.  ? Weight and BMI.  · If you have diabetes, manage your condition and follow your treatment plan as instructed.  · Try to get 7-9 hours of sleep each night. To help with sleep:  ? Keep your bedroom cool and dark.  ? Do not eat a heavy meal during the hour before you go to bed.  ? Do not drink alcohol or caffeinated drinks before bed.  ? Avoid screen time before bedtime. This means avoiding television, computers, tablets, and cell phones.  · Find ways to relax and manage stress. These may include:  ? Breathing exercises.  ? Meditation.  ? Yoga.  ? Listening to music.  Why are these changes important?  · A well-balanced diet with the appropriate amount of calories can keep your body weight at a healthy level, which reduces strain on your heart.  · A low-sodium diet can help keep your blood pressure in a normal range and keep your blood vessels working properly.  · Quitting smoking and limiting alcohol intake can reduce harmful effects that these substances have on your heart and blood vessels.  · Regular exercise can keep your heart strong so it can pump blood normally.  · Managing diabetes helps your blood circulate and can help you maintain a healthy weight.  · Managing stress helps to reduce the risk of high blood pressure and heart problems.  What can happen if changes are not made?  Heart failure can cause very serious problems that may get worse over time, such as:  · Extreme fatigue during normal physical activities.  · Shortness of breath or trouble breathing.  · Swelling in your abdomen, legs, ankles, feet, or neck.  · Fluid buildup throughout the body.  · Weight gain.  · Cough.  · Frequent urination.  What can I do to lower my risk?  You may be able to lower your risk of heart failure by:  · Losing weight or keeping your weight under control.  · Working with your health care provider to manage  your:  ? Cholesterol.  ? Blood pressure.  ? Diabetes, if this applies.  · Eating a healthy diet.  · Exercising regularly.  · Avoiding unhealthy habits, such as smoking, drinking, or using drugs.  · Getting plenty of sleep.  · Managing your stress.  How is this treated?  Heart failure cannot be cured except by heart transplant, but treatment can help to improve your quality of life. Treatment may include:  · Medicines to help:  ? Lower blood pressure.  ? Remove excess sodium from your body.  ? Relax blood vessels.  ? Improve heart function.  ? Control other symptoms of heart failure.  · Surgery to open blocked coronary arteries or repair damaged heart valves.  · Implantation of a biventricular pacemaker to improve heart muscle function (cardiac resynchronization therapy). This device paces both the right ventricle and left ventricle.  · Implantation of a device to treat serious abnormal heart rhythms (implantable cardioverter defibrillator, ICD).  · Implantation of a mechanical heart pump to improve the pumping ability of your heart (left ventricular assist device, LVAD).  · Heart transplant. This treatment is considered for certain people who do not improve with other treatments.  Where to find more information  · National [facility] [place]: www.nhlbi.nih.gov/health/health-topics/topics/hf  · Centers for Disease Control and Prevention: www.cdc.gov/dhdsp/data_statistics/fact_sheets/fs_heart_failure.htm  · [facility]: [facility].gov/heartfailure/heartfailuredefined/01.html  · American Heart Association: www.heart.org/HEARTORG/Conditions/HeartFailure/Heart-Failure_UCM_002019_SubHomePage.jsp  Contact a health care provider if:  · You have rapid weight gain.  · You have increasing shortness of breath that is unusual for you.  · You tire easily, or you are unable to participate in your usual activities.  · You cough more than normal, especially with physical activity.  · You have any swelling or  more swelling in areas such as your hands, feet, ankles, or abdomen.  Summary  · Heart failure can be prevented by making changes to your diet and your lifestyle.  · It is important to eat a healthy diet, manage your weight, exercise regularly, manage stress, avoid drugs and alcohol, and keep your cholesterol and blood pressure under control.  · Heart failure can cause very serious problems over time.  This information is not intended to replace advice given to you by your health care provider. Make sure you discuss any questions you have with your health care provider.  Document Released: 08/08/2017 Document Revised: 04/10/2020 Document Reviewed: 08/08/2017  [employer] Patient Education © 2020 [employer]    Fat and Cholesterol Restricted Eating Plan  Getting too much fat and cholesterol in your diet may cause health problems. Choosing the right foods helps keep your fat and cholesterol at normal levels. This can keep you from getting certain diseases.  Your doctor may recommend an eating plan that includes:  · Total fat: ______% or less of total calories a day.  · Saturated fat: ______% or less of total calories a day.  · Cholesterol: less than _________mg a day.  · Fiber: ______g a day.  What are tips for following this plan?  Meal planning  · At meals, divide your plate into four equal parts:  ? Fill one-half of your plate with vegetables and green salads.  ? Fill one-fourth of your plate with whole grains.  ? Fill one-fourth of your plate with low-fat (lean) protein foods.  · Eat fish that is high in omega-3 fats at least two times a week. This includes mackerel, tuna, sardines, and salmon.  · Eat foods that are high in fiber, such as whole grains, beans, apples, broccoli, carrots, peas, and barley.  General tips    · Work with your doctor to lose weight if you need to.  · Avoid:  ? Foods with added sugar.  ? Fried foods.  ? Foods with partially hydrogenated oils.  · Limit alcohol intake to no more than 1 drink  "a day for nonpregnant women and 2 drinks a day for men. One drink equals 12 oz of beer, 5 oz of wine, or 1½ oz of hard liquor.  Reading food labels  · Check food labels for:  ? Trans fats.  ? Partially hydrogenated oils.  ? Saturated fat (g) in each serving.  ? Cholesterol (mg) in each serving.  ? Fiber (g) in each serving.  · Choose foods with healthy fats, such as:  ? Monounsaturated fats.  ? Polyunsaturated fats.  ? Omega-3 fats.  · Choose grain products that have whole grains. Look for the word \"whole\" as the first word in the ingredient list.  Cooking  · Cook foods using low-fat methods. These include baking, boiling, grilling, and broiling.  · Eat more home-cooked foods. Eat at restaurants and buffets less often.  · Avoid cooking using saturated fats, such as butter, cream, palm oil, palm kernel oil, and coconut oil.  Recommended foods    Fruits  · All fresh, canned (in natural juice), or frozen fruits.  Vegetables  · Fresh or frozen vegetables (raw, steamed, roasted, or grilled). Green salads.  Grains  · Whole grains, such as whole wheat or whole grain breads, crackers, cereals, and pasta. Unsweetened oatmeal, bulgur, barley, quinoa, or brown rice. Corn or whole wheat flour tortillas.  Meats and other protein foods  · Ground beef (85% or leaner), grass-fed beef, or beef trimmed of fat. Skinless chicken or turkey. Ground chicken or turkey. Pork trimmed of fat. All fish and seafood. Egg whites. Dried beans, peas, or lentils. Unsalted nuts or seeds. Unsalted canned beans. Nut butters without added sugar or oil.  Dairy  · Low-fat or nonfat dairy products, such as skim or 1% milk, 2% or reduced-fat cheeses, low-fat and fat-free ricotta or cottage cheese, or plain low-fat and nonfat yogurt.  Fats and oils  · Tub margarine without trans fats. Light or reduced-fat mayonnaise and salad dressings. Avocado. Olive, canola, sesame, or safflower oils.  The items listed above may not be a complete list of foods and " beverages you can eat. Contact a dietitian for more information.  Foods to avoid  Fruits  · Canned fruit in heavy syrup. Fruit in cream or butter sauce. Fried fruit.  Vegetables  · Vegetables cooked in cheese, cream, or butter sauce. Fried vegetables.  Grains  · White bread. White pasta. White rice. Cornbread. Bagels, pastries, and croissants. Crackers and snack foods that contain trans fat and hydrogenated oils.  Meats and other protein foods  · Fatty cuts of meat. Ribs, chicken wings, tom, sausage, bologna, salami, chitterlings, fatback, hot dogs, bratwurst, and packaged lunch meats. Liver and organ meats. Whole eggs and egg yolks. Chicken and turkey with skin. Fried meat.  Dairy  · Whole or 2% milk, cream, half-and-half, and cream cheese. Whole milk cheeses. Whole-fat or sweetened yogurt. Full-fat cheeses. Nondairy creamers and whipped toppings. Processed cheese, cheese spreads, and cheese curds.  Beverages  · Alcohol. Sugar-sweetened drinks such as sodas, lemonade, and fruit drinks.  Fats and oils  · Butter, stick margarine, lard, shortening, ghee, or tom fat. Coconut, palm kernel, and palm oils.  Sweets and desserts  · Corn syrup, sugars, honey, and molasses. Candy. Jam and jelly. Syrup. Sweetened cereals. Cookies, pies, cakes, donuts, muffins, and ice cream.  The items listed above may not be a complete list of foods and beverages you should avoid. Contact a dietitian for more information.  Summary  · Choosing the right foods helps keep your fat and cholesterol at normal levels. This can keep you from getting certain diseases.  · At meals, fill one-half of your plate with vegetables and green salads.  · Eat high-fiber foods, like whole grains, beans, apples, carrots, peas, and barley.  · Limit added sugar, saturated fats, alcohol, and fried foods.  This information is not intended to replace advice given to you by your health care provider. Make sure you discuss any questions you have with your health care  provider.  Document Released: 06/18/2013 Document Revised: 08/21/2019 Document Reviewed: 09/04/2018  [employer] Patient Education © 2020 [employer]    Exercising to Lose Weight  Exercise is structured, repetitive physical activity to improve fitness and health. Getting regular exercise is important for everyone. It is especially important if you are overweight. Being overweight increases your risk of heart disease, stroke, diabetes, high blood pressure, and several types of cancer. Reducing your calorie intake and exercising can help you lose weight.  Exercise is usually categorized as moderate or vigorous intensity. To lose weight, most people need to do a certain amount of moderate-intensity or vigorous-intensity exercise each week.  Moderate-intensity exercise    Moderate-intensity exercise is any activity that gets you moving enough to burn at least three times more energy (calories) than if you were sitting.  Examples of moderate exercise include:  · Walking a mile in 15 minutes.  · Doing light yard work.  · Biking at an easy pace.  Most people should get at least 150 minutes (2 hours and 30 minutes) a week of moderate-intensity exercise to maintain their body weight.  Vigorous-intensity exercise  Vigorous-intensity exercise is any activity that gets you moving enough to burn at least six times more calories than if you were sitting. When you exercise at this intensity, you should be working hard enough that you are not able to carry on a conversation.  Examples of vigorous exercise include:  · Running.  · Playing a team sport, such as football, basketball, and soccer.  · Jumping rope.  Most people should get at least 75 minutes (1 hour and 15 minutes) a week of vigorous-intensity exercise to maintain their body weight.  How can exercise affect me?  When you exercise enough to burn more calories than you eat, you lose weight. Exercise also reduces body fat and builds muscle. The more muscle you have, the more  calories you burn. Exercise also:  · Improves mood.  · Reduces stress and tension.  · Improves your overall fitness, flexibility, and endurance.  · Increases bone strength.  The amount of exercise you need to lose weight depends on:  · Your age.  · The type of exercise.  · Any health conditions you have.  · Your overall physical ability.  Talk to your health care provider about how much exercise you need and what types of activities are safe for you.  What actions can I take to lose weight?  Nutrition    · Make changes to your diet as told by your health care provider or diet and nutrition specialist (dietitian). This may include:  ? Eating fewer calories.  ? Eating more protein.  ? Eating less unhealthy fats.  ? Eating a diet that includes fresh fruits and vegetables, whole grains, low-fat dairy products, and lean protein.  ? Avoiding foods with added fat, salt, and sugar.  · Drink plenty of water while you exercise to prevent dehydration or heat stroke.  Activity  · Choose an activity that you enjoy and set realistic goals. Your health care provider can help you make an exercise plan that works for you.  · Exercise at a moderate or vigorous intensity most days of the week.  ? The intensity of exercise may vary from person to person. You can tell how intense a workout is for you by paying attention to your breathing and heartbeat. Most people will notice their breathing and heartbeat get faster with more intense exercise.  · Do resistance training twice each week, such as:  ? Push-ups.  ? Sit-ups.  ? Lifting weights.  ? Using resistance bands.  · Getting short amounts of exercise can be just as helpful as long structured periods of exercise. If you have trouble finding time to exercise, try to include exercise in your daily routine.  ? Get up, stretch, and walk around every 30 minutes throughout the day.  ? Go for a walk during your lunch break.  ? Park your car farther away from your destination.  ? If you take  public transportation, get off one stop early and walk the rest of the way.  ? Make phone calls while standing up and walking around.  ? Take the stairs instead of elevators or escalators.  · Wear comfortable clothes and shoes with good support.  · Do not exercise so much that you hurt yourself, feel dizzy, or get very short of breath.  Where to find more information  · U.S. Department of Health and Human Services: www.hhs.gov  · Centers for Disease Control and Prevention (CDC): www.cdc.gov  Contact a health care provider:  · Before starting a new exercise program.  · If you have questions or concerns about your weight.  · If you have a medical problem that keeps you from exercising.  Get help right away if you have any of the following while exercising:  · Injury.  · Dizziness.  · Difficulty breathing or shortness of breath that does not go away when you stop exercising.  · Chest pain.  · Rapid heartbeat.  Summary  · Being overweight increases your risk of heart disease, stroke, diabetes, high blood pressure, and several types of cancer.  · Losing weight happens when you burn more calories than you eat.  · Reducing the amount of calories you eat in addition to getting regular moderate or vigorous exercise each week helps you lose weight.  This information is not intended to replace advice given to you by your health care provider. Make sure you discuss any questions you have with your health care provider.  Document Released: 01/20/2012 Document Revised: 12/31/2018 Document Reviewed: 12/31/2018  [employer] Patient Education © 2020 [employer]    Advance Directive    Advance directives are legal documents that let you make choices ahead of time about your health care and medical treatment in case you become unable to communicate for yourself. Advance directives are a way for you to communicate your wishes to family, friends, and health care providers. This can help convey your decisions about end-of-life care if you  become unable to communicate.  Discussing and writing advance directives should happen over time rather than all at once. Advance directives can be changed depending on your situation and what you want, even after you have signed the advance directives.  If you do not have an advance directive, some states assign family decision makers to act on your behalf based on how closely you are related to them. Each state has its own laws regarding advance directives. You may want to check with your health care provider, , or state representative about the laws in your state. There are different types of advance directives, such as:  · Medical power of .  · Living will.  · Do not resuscitate (DNR) or do not attempt resuscitation (DNAR) order.  Health care proxy and medical power of   A health care proxy, also called a health care agent, is a person who is appointed to make medical decisions for you in cases in which you are unable to make the decisions yourself. Generally, people choose someone they know well and trust to represent their preferences. Make sure to ask this person for an agreement to act as your proxy. A proxy may have to exercise judgment in the event of a medical decision for which your wishes are not known.  A medical power of  is a legal document that names your health care proxy. Depending on the laws in your state, after the document is written, it may also need to be:  · Signed.  · Notarized.  · Dated.  · Copied.  · Witnessed.  · Incorporated into your medical record.  You may also want to appoint someone to manage your financial affairs in a situation in which you are unable to do so. This is called a durable power of  for finances. It is a separate legal document from the durable power of  for health care. You may choose the same person or someone different from your health care proxy to act as your agent in financial matters.  If you do not appoint a  proxy, or if there is a concern that the proxy is not acting in your best interests, a court-appointed guardian may be designated to act on your behalf.  Living will  A living will is a set of instructions documenting your wishes about medical care when you cannot express them yourself. Health care providers should keep a copy of your living will in your medical record. You may want to give a copy to family members or friends. To alert caregivers in case of an emergency, you can place a card in your wallet to let them know that you have a living will and where they can find it. A living will is used if you become:  · Terminally ill.  · Incapacitated.  · Unable to communicate or make decisions.  Items to consider in your living will include:  · The use or non-use of life-sustaining equipment, such as dialysis machines and breathing machines (ventilators).  · A DNR or DNAR order, which is the instruction not to use cardiopulmonary resuscitation (CPR) if breathing or heartbeat stops.  · The use or non-use of tube feeding.  · Withholding of food and fluids.  · Comfort (palliative) care when the goal becomes comfort rather than a cure.  · Organ and tissue donation.  A living will does not give instructions for distributing your money and property if you should pass away. It is recommended that you seek the advice of a  when writing a will. Decisions about taxes, beneficiaries, and asset distribution will be legally binding. This process can relieve your family and friends of any concerns surrounding disputes or questions that may come up about the distribution of your assets.  DNR or DNAR  A DNR or DNAR order is a request not to have CPR in the event that your heart stops beating or you stop breathing. If a DNR or DNAR order has not been made and shared, a health care provider will try to help any patient whose heart has stopped or who has stopped breathing. If you plan to have surgery, talk with your health care  provider about how your DNR or DNAR order will be followed if problems occur.  Summary  · Advance directives are the legal documents that allow you to make choices ahead of time about your health care and medical treatment in case you become unable to communicate for yourself.  · The process of discussing and writing advance directives should happen over time. You can change the advance directives, even after you have signed them.  · Advance directives include DNR or DNAR orders, living darby, and designating an agent as your medical power of .  This information is not intended to replace advice given to you by your health care provider. Make sure you discuss any questions you have with your health care provider.  Document Released: 2009 Document Revised: 2020 Document Reviewed: 2017  [employer] Patient Education ©  [employer]      Medicare Wellness  Personal Prevention Plan of Service     Date of Office Visit:  2020  Encounter Provider:  Radha Jean DO  Place of Service:  [facility]  Patient Name: Robe Braynt  :  1950    As part of the Medicare Wellness portion of your visit today, we are providing you with this personalized preventive plan of services (PPPS). This plan is based upon recommendations of the [place] Preventive Services Task Force (USPSTF) and the Advisory Committee on Immunization Practices (ACIP).    This lists the preventive care services that should be considered, and provides dates of when you are due. Items listed as completed are up-to-date and do not require any further intervention.    Health Maintenance   Topic Date Due   • TDAP/TD VACCINES (1 - Tdap) 1960   • ZOSTER VACCINE (2 of 2) 2015   • DIABETIC FOOT EXAM  2016   • URINE MICROALBUMIN  2017   • MEDICARE ANNUAL WELLNESS  2019   • HEMOGLOBIN A1C  2020   • INFLUENZA VACCINE  2020   • DIABETIC EYE EXAM   09/20/2020   • LIPID PANEL  09/26/2020   • COLONOSCOPY  08/25/2025   • PNEUMOCOCCAL VACCINE (65+ HIGH RISK)  Completed   • HEPATITIS C SCREENING  Completed   • AAA SCREEN (ONE-TIME)  Completed       No orders of the defined types were placed in this encounter.      No follow-ups on file.

## 2020-07-29 ENCOUNTER — OFFICE VISIT (OUTPATIENT)
Dept: INTERNAL MEDICINE | Facility: CLINIC | Age: 70
End: 2020-07-29

## 2020-07-29 VITALS
HEIGHT: 65 IN | OXYGEN SATURATION: 95 % | HEART RATE: 65 BPM | SYSTOLIC BLOOD PRESSURE: 120 MMHG | DIASTOLIC BLOOD PRESSURE: 82 MMHG | TEMPERATURE: 97.9 F | BODY MASS INDEX: 39.95 KG/M2 | WEIGHT: 239.8 LBS

## 2020-07-29 DIAGNOSIS — M25.561 CHRONIC PAIN OF BOTH KNEES: Primary | ICD-10-CM

## 2020-07-29 DIAGNOSIS — M25.562 CHRONIC PAIN OF BOTH KNEES: Primary | ICD-10-CM

## 2020-07-29 DIAGNOSIS — I10 ESSENTIAL HYPERTENSION: ICD-10-CM

## 2020-07-29 DIAGNOSIS — E11.22 TYPE 2 DIABETES MELLITUS WITH STAGE 3 CHRONIC KIDNEY DISEASE, WITHOUT LONG-TERM CURRENT USE OF INSULIN (HCC): ICD-10-CM

## 2020-07-29 DIAGNOSIS — G89.29 CHRONIC PAIN OF BOTH KNEES: Primary | ICD-10-CM

## 2020-07-29 DIAGNOSIS — H10.9 BACTERIAL CONJUNCTIVITIS OF BOTH EYES: ICD-10-CM

## 2020-07-29 DIAGNOSIS — B96.89 BACTERIAL CONJUNCTIVITIS OF BOTH EYES: ICD-10-CM

## 2020-07-29 DIAGNOSIS — N18.30 TYPE 2 DIABETES MELLITUS WITH STAGE 3 CHRONIC KIDNEY DISEASE, WITHOUT LONG-TERM CURRENT USE OF INSULIN (HCC): ICD-10-CM

## 2020-07-29 PROCEDURE — 99214 OFFICE O/P EST MOD 30 MIN: CPT | Performed by: FAMILY MEDICINE

## 2020-07-29 RX ORDER — DIPHENHYDRAMINE HCL 25 MG
1 TABLET ORAL 2 TIMES DAILY
Qty: 1 KIT | Refills: 0 | Status: SHIPPED | OUTPATIENT
Start: 2020-07-29

## 2020-07-29 RX ORDER — GENTAMICIN SULFATE 3 MG/ML
1 SOLUTION/ DROPS OPHTHALMIC EVERY 4 HOURS
Qty: 5 ML | Refills: 0 | Status: SHIPPED | OUTPATIENT
Start: 2020-07-29 | End: 2020-10-30

## 2020-07-29 NOTE — ASSESSMENT & PLAN NOTE
Controlled on current medication.  Patient is to continue Lasix, amlodipine, and lisinopril.  Patient was previously on metoprolol.  I am unsure as if he is actually taking this medication.  It was removed from his medication list by another office.

## 2020-07-29 NOTE — ASSESSMENT & PLAN NOTE
Patient advised to discuss gel injections with orthopedic physician as he does not want to proceed with surgery at this time.  Patient has also been encouraged to  Voltaren gel over-the-counter if not covered by insurance to use on knees up to 4 times a day for pain.

## 2020-07-29 NOTE — PROGRESS NOTES
Robe Bryant is a 69 y.o. male.    Chief Complaint   Patient presents with   • Diabetes   • Hypertension   • COPD       HPI   Patient has had diabetes for the past few years. He has been compliant with the medications and denies any side effects from it. He has been monitoring fingersticks.  his fingerstick range is between .  He has not had hypoglycemic symptoms. He has been following a diabetic diet and has been active.  his last eye exam was less than a year ago .     Patient has hypertension.  They are taking lisinopril (Prinivil), amlodipine and furosemide.  They have been compliant with medications.  The patient denies any side effects to the medication.  Blood pressure is controlled in the office today.  Blood pressure has been running good at home.  They are following a low salt diet.  They are active.  He does raise a garden and works outside a lot.     Patient reports yellow/green discharge from eye for about a week.  Denies any eye pain or itching. He does have red eyes and eye lashes are matted in the morning.     Patient also complains of bilateral knee pain.  He reports that he has seen orthopedics for her knees.  He has had x-rays done and reports that knees are bone-on-bone.  He states that orthopedics has informed him that he needs knee surgery.  He did do a knee injection without any improvement.  They have not tried gel injections.  Patient is not currently using anything for pain.    The following portions of the patient's history were reviewed and updated as appropriate: allergies, current medications, past family history, past medical history, past social history, past surgical history and problem list.     No Known Allergies      Current Outpatient Medications:   •  amLODIPine (NORVASC) 10 MG tablet, Take 1 tablet by mouth Daily., Disp: 30 tablet, Rfl: 1  •  apixaban (ELIQUIS) 5 MG tablet tablet, Take 1 tablet by mouth 2 (Two) Times a Day., Disp: 60 tablet, Rfl: 0  •  aspirin 81  MG chewable tablet, Chew 81 mg Daily., Disp: , Rfl:   •  atorvastatin (LIPITOR) 20 MG tablet, Take 1 tablet by mouth every night at bedtime., Disp: 90 tablet, Rfl: 3  •  baclofen (LIORESAL) 10 MG tablet, Take 1 tablet by mouth 3 (Three) Times a Day As Needed for Muscle Spasms., Disp: 90 tablet, Rfl: 2  •  Blood Glucose Monitoring Suppl (TRUE METRIX AIR GLUCOSE METER) w/Device kit, 1 each by In Vitro route 2 (two) times a day. E11.9, Disp: 1 kit, Rfl: 0  •  finasteride (PROSCAR) 5 MG tablet, TAKE 1 TABLET EVERY DAY, Disp: 90 tablet, Rfl: 0  •  FLUoxetine (PROzac) 20 MG capsule, Take 1 capsule by mouth Daily., Disp: 90 capsule, Rfl: 3  •  Fluticasone-Umeclidin-Vilant (Trelegy Ellipta) 100-62.5-25 MCG/INH aerosol powder , Inhale 1 puff Daily. Sept 2021, Lot 479Y, Disp: 14 each, Rfl: 0  •  furosemide (LASIX) 20 MG tablet, Take 2 tablets by mouth every morning and 1 tablet by mouth every evening., Disp: 90 tablet, Rfl: 2  •  glipiZIDE (GLUCOTROL) 5 MG tablet, Take 1 tablet by mouth 2 (Two) Times a Day Before Meals., Disp: 180 tablet, Rfl: 3  •  glucose blood test strip, Use as instructed, E11.9, Disp: 100 each, Rfl: 3  •  glucose monitor monitoring kit, 1 each Daily. E11.9, Disp: 1 each, Rfl: 0  •  ipratropium-albuterol (DUO-NEB) 0.5-2.5 mg/3 ml nebulizer, Take 3 mL by nebulization 4 (Four) Times a Day., Disp: 1620 mL, Rfl: 2  •  Lancet Device misc, 1 each Daily. E11.9, Disp: 100 each, Rfl: 3  •  Lancets misc, 1 each 2 (Two) Times a Day., Disp: 200 each, Rfl: 5  •  lisinopril (PRINIVIL,ZESTRIL) 40 MG tablet, Take 1 tablet by mouth Daily., Disp: 90 tablet, Rfl: 0  •  Multiple Vitamin tablet, Take 1 tablet by mouth daily., Disp: , Rfl:   •  nitroglycerin (NITROSTAT) 0.4 MG SL tablet, Place 1 tablet under the tongue Every 5 (Five) Minutes As Needed for Chest Pain., Disp: 25 tablet, Rfl: 11  •  omeprazole (priLOSEC) 40 MG capsule, Take 1 capsule by mouth Daily., Disp: 90 capsule, Rfl: 3  •  potassium chloride  "(K-DUR,KLOR-CON) 10 MEQ CR tablet, Take 1 tablet by mouth 2 (Two) Times a Day., Disp: 60 tablet, Rfl: 2  •  pramipexole (MIRAPEX) 0.25 MG tablet, Take 1 tablet by mouth 3 (Three) Times a Day., Disp: 270 tablet, Rfl: 1  •  terazosin (HYTRIN) 5 MG capsule, Take 1 capsule by mouth Every Night., Disp: 90 capsule, Rfl: 3  •  diclofenac (VOLTAREN) 1 % gel gel, Apply 4 g topically to the appropriate area as directed 4 (Four) Times a Day As Needed (pain)., Disp: 100 g, Rfl: 5  •  gentamicin (GARAMYCIN) 0.3 % ophthalmic solution, Administer 1 drop to both eyes Every 4 (Four) Hours., Disp: 5 mL, Rfl: 0    ROS    Review of Systems   Constitutional: Negative for chills, fatigue and fever.   HENT: Negative for congestion, postnasal drip and sore throat.    Eyes: Positive for discharge. Negative for pain and itching.   Respiratory: Positive for shortness of breath (on exertion). Negative for cough and wheezing.    Cardiovascular: Negative for chest pain and leg swelling.   Gastrointestinal: Negative for abdominal pain, constipation, diarrhea, nausea and vomiting.   Musculoskeletal: Positive for arthralgias. Negative for back pain.   Neurological: Negative for weakness, numbness and headache.   Psychiatric/Behavioral: Negative for depressed mood. The patient is not nervous/anxious.        Vitals:    07/29/20 1106   BP: 120/82   BP Location: Left arm   Patient Position: Sitting   Cuff Size: Adult   Pulse: 65   Temp: 97.9 °F (36.6 °C)   TempSrc: Temporal   SpO2: 95%   Weight: 109 kg (239 lb 12.8 oz)   Height: 165.1 cm (65\")     Body mass index is 39.9 kg/m².    Physical Exam     Physical Exam   Constitutional: He is oriented to person, place, and time. He appears well-developed and well-nourished. No distress.   HENT:   Head: Normocephalic and atraumatic.   Right Ear: External ear normal.   Left Ear: External ear normal.   Eyes: EOM are normal. Right conjunctiva is injected. Left conjunctiva is injected.   Cardiovascular: Normal " rate and regular rhythm.   No murmur heard.  Pulmonary/Chest: Effort normal and breath sounds normal. No respiratory distress. He has no wheezes.   Abdominal: Soft. Bowel sounds are normal. He exhibits no distension. There is no tenderness.   Musculoskeletal: He exhibits no edema.   Neurological: He is alert and oriented to person, place, and time. No cranial nerve deficit.   Skin: Skin is warm and dry.   Psychiatric: He has a normal mood and affect. His behavior is normal.       Assessment/Plan    Problem List Items Addressed This Visit        Cardiovascular and Mediastinum    Essential hypertension     Controlled on current medication.  Patient is to continue Lasix, amlodipine, and lisinopril.  Patient was previously on metoprolol.  I am unsure as if he is actually taking this medication.  It was removed from his medication list by another office.            Endocrine    Type 2 diabetes mellitus, without long-term current use of insulin (CMS/[facility])     Controlled on current medication.  Patient is to continue glipizide.  Will continue to monitor A1c and microalbumin every few months.  Patient is on an ACE inhibitor, statin, and baby aspirin.  Patient has been encouraged to have laboratory studies done on his way out that were ordered a few months ago.            Musculoskeletal and Integument    Chronic pain of both knees - Primary     Patient advised to discuss gel injections with orthopedic physician as he does not want to proceed with surgery at this time.  Patient has also been encouraged to  Voltaren gel over-the-counter if not covered by insurance to use on knees up to 4 times a day for pain.            Other    Bacterial conjunctivitis of both eyes     Patient has been encouraged to continue using Visine eyedrops as he has been doing.  We will add in gentamicin drops as well.         Relevant Medications    gentamicin (GARAMYCIN) 0.3 % ophthalmic solution          New Medications Ordered This Visit    Medications   • Blood Glucose Monitoring Suppl (TRUE METRIX AIR GLUCOSE METER) w/Device kit     Si each by In Vitro route 2 (two) times a day. E11.9     Dispense:  1 kit     Refill:  0   • diclofenac (VOLTAREN) 1 % gel gel     Sig: Apply 4 g topically to the appropriate area as directed 4 (Four) Times a Day As Needed (pain).     Dispense:  100 g     Refill:  5   • gentamicin (GARAMYCIN) 0.3 % ophthalmic solution     Sig: Administer 1 drop to both eyes Every 4 (Four) Hours.     Dispense:  5 mL     Refill:  0       No orders of the defined types were placed in this encounter.      Return in about 3 months (around 10/29/2020) for HTN, Diabetes, GERD.    Radha Jean DO

## 2020-07-29 NOTE — ASSESSMENT & PLAN NOTE
Controlled on current medication.  Patient is to continue glipizide.  Will continue to monitor A1c and microalbumin every few months.  Patient is on an ACE inhibitor, statin, and baby aspirin.  Patient has been encouraged to have laboratory studies done on his way out that were ordered a few months ago.

## 2020-07-29 NOTE — ASSESSMENT & PLAN NOTE
Patient has been encouraged to continue using Visine eyedrops as he has been doing.  We will add in gentamicin drops as well.

## 2020-07-30 LAB
ALBUMIN SERPL-MCNC: 4.1 G/DL (ref 3.5–5.2)
ALBUMIN/GLOB SERPL: 2.1 G/DL
ALP SERPL-CCNC: 68 U/L (ref 39–117)
ALT SERPL-CCNC: 16 U/L (ref 1–41)
AST SERPL-CCNC: 22 U/L (ref 1–40)
BASOPHILS # BLD AUTO: 0.05 10*3/MM3 (ref 0–0.2)
BASOPHILS NFR BLD AUTO: 0.8 % (ref 0–1.5)
BILIRUB SERPL-MCNC: 0.5 MG/DL (ref 0–1.2)
BUN SERPL-MCNC: 15 MG/DL (ref 8–23)
BUN/CREAT SERPL: 12.4 (ref 7–25)
CALCIUM SERPL-MCNC: 9.5 MG/DL (ref 8.6–10.5)
CHLORIDE SERPL-SCNC: 98 MMOL/L (ref 98–107)
CHOLEST SERPL-MCNC: 124 MG/DL (ref 0–200)
CO2 SERPL-SCNC: 31.7 MMOL/L (ref 22–29)
CREAT SERPL-MCNC: 1.21 MG/DL (ref 0.76–1.27)
EOSINOPHIL # BLD AUTO: 0.18 10*3/MM3 (ref 0–0.4)
EOSINOPHIL NFR BLD AUTO: 2.8 % (ref 0.3–6.2)
ERYTHROCYTE [DISTWIDTH] IN BLOOD BY AUTOMATED COUNT: 12.9 % (ref 12.3–15.4)
GLOBULIN SER CALC-MCNC: 2 GM/DL
GLUCOSE SERPL-MCNC: 93 MG/DL (ref 65–99)
HBA1C MFR BLD: 6 % (ref 4.8–5.6)
HCT VFR BLD AUTO: 41 % (ref 37.5–51)
HDLC SERPL-MCNC: 39 MG/DL (ref 40–60)
HGB BLD-MCNC: 13.4 G/DL (ref 13–17.7)
IMM GRANULOCYTES # BLD AUTO: 0.04 10*3/MM3 (ref 0–0.05)
IMM GRANULOCYTES NFR BLD AUTO: 0.6 % (ref 0–0.5)
LDLC SERPL CALC-MCNC: 52 MG/DL (ref 0–100)
LYMPHOCYTES # BLD AUTO: 1.35 10*3/MM3 (ref 0.7–3.1)
LYMPHOCYTES NFR BLD AUTO: 21 % (ref 19.6–45.3)
MCH RBC QN AUTO: 29.5 PG (ref 26.6–33)
MCHC RBC AUTO-ENTMCNC: 32.7 G/DL (ref 31.5–35.7)
MCV RBC AUTO: 90.1 FL (ref 79–97)
MONOCYTES # BLD AUTO: 0.7 10*3/MM3 (ref 0.1–0.9)
MONOCYTES NFR BLD AUTO: 10.9 % (ref 5–12)
NEUTROPHILS # BLD AUTO: 4.12 10*3/MM3 (ref 1.7–7)
NEUTROPHILS NFR BLD AUTO: 63.9 % (ref 42.7–76)
NRBC BLD AUTO-RTO: 0 /100 WBC (ref 0–0.2)
PLATELET # BLD AUTO: 189 10*3/MM3 (ref 140–450)
POTASSIUM SERPL-SCNC: 3.8 MMOL/L (ref 3.5–5.2)
PROT SERPL-MCNC: 6.1 G/DL (ref 6–8.5)
RBC # BLD AUTO: 4.55 10*6/MM3 (ref 4.14–5.8)
SODIUM SERPL-SCNC: 137 MMOL/L (ref 136–145)
TRIGL SERPL-MCNC: 163 MG/DL (ref 0–150)
VLDLC SERPL CALC-MCNC: 32.6 MG/DL (ref 5–40)
WBC # BLD AUTO: 6.44 10*3/MM3 (ref 3.4–10.8)

## 2020-09-02 DIAGNOSIS — G25.81 RESTLESS LEG SYNDROME: ICD-10-CM

## 2020-09-02 RX ORDER — ROPINIROLE 3 MG/1
TABLET, FILM COATED ORAL
Qty: 90 TABLET | Refills: 1 | OUTPATIENT
Start: 2020-09-02

## 2020-09-02 NOTE — TELEPHONE ENCOUNTER
Patient informed. They are going to call [facility] and see if they still have it because they couldn't pick it up in may due to money issues.

## 2020-09-02 NOTE — TELEPHONE ENCOUNTER
Caller: BISI GARNETT    Relationship: Emergency Contact    Best call back number: [phone]    Medication needed:   Requested Prescriptions     Pending Prescriptions Disp Refills   • rOPINIRole (REQUIP) 3 MG tablet [Pharmacy Med Name: ROPINIROLE 3MG TABLETS] 90 tablet 1     Sig: TAKE 1 TABLET BY MOUTH EVERY NIGHT 1 HOUR BEFORE BEDTIME       When do you need the refill by: 09/02/2020    What details did the patient provide when requesting the medication: Patients wife states that the patient is completely out of the medication. They would like to know if something could be called in that is a little stronger to help with the patients issues. She states the patient started the medication at 1 mg and has since been increased to 3 mg. The patient has been taking more than what the prescription advises to take.     Does the patient have less than a 3 day supply:  [x] Yes  [] No    What is the patient's preferred pharmacy: [id number] [place], [place] - [facility] [address] AT [facility] [address] - [phone]  - [phone] FX

## 2020-09-02 NOTE — TELEPHONE ENCOUNTER
Medication was stopped in May and was replaced with mirapex.  He should not have been taking both.

## 2020-09-11 ENCOUNTER — OFFICE VISIT (OUTPATIENT)
Dept: INTERNAL MEDICINE | Facility: CLINIC | Age: 70
End: 2020-09-11

## 2020-09-11 VITALS
HEIGHT: 65 IN | SYSTOLIC BLOOD PRESSURE: 140 MMHG | TEMPERATURE: 98 F | HEART RATE: 68 BPM | DIASTOLIC BLOOD PRESSURE: 88 MMHG | BODY MASS INDEX: 39.49 KG/M2 | WEIGHT: 237 LBS | OXYGEN SATURATION: 94 %

## 2020-09-11 DIAGNOSIS — G89.29 CHRONIC PAIN OF BOTH KNEES: Primary | ICD-10-CM

## 2020-09-11 DIAGNOSIS — M25.561 CHRONIC PAIN OF BOTH KNEES: Primary | ICD-10-CM

## 2020-09-11 DIAGNOSIS — M25.562 CHRONIC PAIN OF BOTH KNEES: Primary | ICD-10-CM

## 2020-09-11 DIAGNOSIS — Z23 NEED FOR IMMUNIZATION AGAINST INFLUENZA: ICD-10-CM

## 2020-09-11 PROCEDURE — G0008 ADMIN INFLUENZA VIRUS VAC: HCPCS | Performed by: FAMILY MEDICINE

## 2020-09-11 PROCEDURE — 99213 OFFICE O/P EST LOW 20 MIN: CPT | Performed by: FAMILY MEDICINE

## 2020-09-11 PROCEDURE — 90694 VACC AIIV4 NO PRSRV 0.5ML IM: CPT | Performed by: FAMILY MEDICINE

## 2020-09-11 RX ORDER — ROPINIROLE 3 MG/1
TABLET, FILM COATED ORAL
COMMUNITY
Start: 2020-07-25 | End: 2020-09-11

## 2020-09-11 NOTE — PROGRESS NOTES
Robe Bryant is a 69 y.o. male.    Chief Complaint   Patient presents with   • Knee Pain   • Leg Pain       HPI   Patient present today complaining of pain in knees. Patient report 9/10 pain in knees bilaterally.  Pain keeps him awake at night. He is seeing orthopedics for this issue.  Steroid injections helped briefly.  He has been applying voltaren gel 4 times a day and taking tylenol without good response.  Ortho has not discussed knee replacements at this time.  He is not able to take NSAIDs due to age and A.fib as it would put him at an increased risk of stroke.  He had seen a pain clinic a few years ago and did well.  He reports he was not discharged, but had to quit going due to moving away for a brief period of time.  His previous PCP years ago also wrote opioids for him.        The following portions of the patient's history were reviewed and updated as appropriate: allergies, current medications, past family history, past medical history, past social history, past surgical history and problem list.     No Known Allergies      Current Outpatient Medications:   •  amLODIPine (NORVASC) 10 MG tablet, Take 1 tablet by mouth Daily., Disp: 30 tablet, Rfl: 1  •  apixaban (ELIQUIS) 5 MG tablet tablet, Take 1 tablet by mouth 2 (Two) Times a Day., Disp: 60 tablet, Rfl: 0  •  aspirin 81 MG chewable tablet, Chew 81 mg Daily., Disp: , Rfl:   •  atorvastatin (LIPITOR) 20 MG tablet, Take 1 tablet by mouth every night at bedtime., Disp: 90 tablet, Rfl: 3  •  baclofen (LIORESAL) 10 MG tablet, Take 1 tablet by mouth 3 (Three) Times a Day As Needed for Muscle Spasms., Disp: 90 tablet, Rfl: 2  •  Blood Glucose Monitoring Suppl (TRUE METRIX AIR GLUCOSE METER) w/Device kit, 1 each by In Vitro route 2 (two) times a day. E11.9, Disp: 1 kit, Rfl: 0  •  diclofenac (VOLTAREN) 1 % gel gel, Apply 4 g topically to the appropriate area as directed 4 (Four) Times a Day As Needed (pain)., Disp: 100 g, Rfl: 5  •  finasteride  (PROSCAR) 5 MG tablet, TAKE 1 TABLET EVERY DAY, Disp: 90 tablet, Rfl: 0  •  FLUoxetine (PROzac) 20 MG capsule, Take 1 capsule by mouth Daily., Disp: 90 capsule, Rfl: 3  •  Fluticasone-Umeclidin-Vilant (Trelegy Ellipta) 100-62.5-25 MCG/INH aerosol powder , Inhale 1 puff Daily. Sept 2021, Lot 479Y, Disp: 14 each, Rfl: 0  •  furosemide (LASIX) 20 MG tablet, Take 2 tablets by mouth every morning and 1 tablet by mouth every evening., Disp: 90 tablet, Rfl: 2  •  gentamicin (GARAMYCIN) 0.3 % ophthalmic solution, Administer 1 drop to both eyes Every 4 (Four) Hours., Disp: 5 mL, Rfl: 0  •  glipiZIDE (GLUCOTROL) 5 MG tablet, Take 1 tablet by mouth 2 (Two) Times a Day Before Meals., Disp: 180 tablet, Rfl: 3  •  glucose blood test strip, Use as instructed, E11.9, Disp: 100 each, Rfl: 3  •  glucose monitor monitoring kit, 1 each Daily. E11.9, Disp: 1 each, Rfl: 0  •  ipratropium-albuterol (DUO-NEB) 0.5-2.5 mg/3 ml nebulizer, Take 3 mL by nebulization 4 (Four) Times a Day., Disp: 1620 mL, Rfl: 2  •  Lancet Device misc, 1 each Daily. E11.9, Disp: 100 each, Rfl: 3  •  Lancets misc, 1 each 2 (Two) Times a Day., Disp: 200 each, Rfl: 5  •  lisinopril (PRINIVIL,ZESTRIL) 40 MG tablet, Take 1 tablet by mouth Daily., Disp: 90 tablet, Rfl: 0  •  Multiple Vitamin tablet, Take 1 tablet by mouth daily., Disp: , Rfl:   •  nitroglycerin (NITROSTAT) 0.4 MG SL tablet, Place 1 tablet under the tongue Every 5 (Five) Minutes As Needed for Chest Pain., Disp: 25 tablet, Rfl: 11  •  omeprazole (priLOSEC) 40 MG capsule, Take 1 capsule by mouth Daily., Disp: 90 capsule, Rfl: 3  •  potassium chloride (K-DUR,KLOR-CON) 10 MEQ CR tablet, Take 1 tablet by mouth 2 (Two) Times a Day., Disp: 60 tablet, Rfl: 2  •  pramipexole (MIRAPEX) 0.25 MG tablet, Take 1 tablet by mouth 3 (Three) Times a Day., Disp: 270 tablet, Rfl: 1  •  terazosin (HYTRIN) 5 MG capsule, Take 1 capsule by mouth Every Night., Disp: 90 capsule, Rfl: 3    ROS    Review of Systems  "  Constitutional: Negative for chills and fever.   Respiratory: Negative for cough, shortness of breath and wheezing.    Cardiovascular: Negative for chest pain and leg swelling.   Gastrointestinal: Negative for abdominal pain, constipation, diarrhea, nausea and vomiting.   Musculoskeletal: Positive for arthralgias (bilateral knee pain).   Neurological: Negative for numbness.       Vitals:    09/11/20 0818   BP: 140/88   BP Location: Left arm   Patient Position: Sitting   Cuff Size: Adult   Pulse: 68   Temp: 98 °F (36.7 °C)   TempSrc: Temporal   SpO2: 94%   Weight: 108 kg (237 lb)   Height: 165.1 cm (65\")     Body mass index is 39.44 kg/m².    Physical Exam     Physical Exam   Constitutional: He is oriented to person, place, and time. He appears well-developed and well-nourished. No distress.   HENT:   Head: Normocephalic and atraumatic.   Right Ear: External ear normal.   Left Ear: External ear normal.   Mouth/Throat: Oropharynx is clear and moist.   Eyes: Conjunctivae are normal.   Neck: Normal range of motion. Neck supple.   Cardiovascular: Normal rate and regular rhythm.   No murmur heard.  Pulmonary/Chest: Effort normal and breath sounds normal. No respiratory distress. He has no wheezes.   Abdominal: Soft. Bowel sounds are normal. He exhibits no distension. There is no abdominal tenderness.   Musculoskeletal:      Right knee: He exhibits decreased range of motion. Tenderness found.      Left knee: He exhibits decreased range of motion. Tenderness found.      Comments: Minimal crepitus to knees bilaterally   Lymphadenopathy:     He has no cervical adenopathy.   Neurological: He is alert and oriented to person, place, and time. No cranial nerve deficit.   Skin: Skin is warm and dry.       Assessment/Plan    Problem List Items Addressed This Visit        Musculoskeletal and Integument    Chronic pain of both knees - Primary    Relevant Orders    Ambulatory Referral to Pain Management      Other Visit Diagnoses  "    Need for immunization against influenza        Relevant Orders    Fluad Quad 65+ yrs ([id number]) (Completed)        Patient advised I do not manage chronic pain.  He may require knee replacements in the near future if the steroid injections do not last very long.  In the meantime, will refer to pain management for further treatment.      No orders of the defined types were placed in this encounter.      Orders Placed This Encounter   Procedures   • Fluad Quad 65+ yrs ([id number])       No follow-ups on file.    Radha Jean,

## 2020-09-17 ENCOUNTER — TELEPHONE (OUTPATIENT)
Dept: INTERNAL MEDICINE | Facility: CLINIC | Age: 70
End: 2020-09-17

## 2020-09-17 NOTE — TELEPHONE ENCOUNTER
Patient contacted office stating he woke up this morning and has right arm pain that radiates to his back, weakness, dizziness, and a headache. Patient denies nausea, vomiting, or slurred speech.     I advised patient to have wife take him to ER or call 911 if she was unable to drive. Patient expressed understanding and will go to ER for further evaluation

## 2020-09-20 NOTE — TELEPHONE ENCOUNTER
Per patient's chart, CMA spoke with them and advise to go to ER.  This telephone encounter was created by PAR and no documentation was done without routing the message.

## 2020-09-29 ENCOUNTER — HOSPITAL ENCOUNTER (EMERGENCY)
Facility: HOSPITAL | Age: 70
Discharge: HOME | End: 2020-09-29
Attending: EMERGENCY MEDICINE | Admitting: EMERGENCY MEDICINE

## 2020-09-29 ENCOUNTER — APPOINTMENT (OUTPATIENT)
Dept: GENERAL RADIOLOGY | Facility: HOSPITAL | Age: 70
End: 2020-09-29

## 2020-09-29 VITALS
SYSTOLIC BLOOD PRESSURE: 162 MMHG | DIASTOLIC BLOOD PRESSURE: 87 MMHG | RESPIRATION RATE: 18 BRPM | BODY MASS INDEX: 33.91 KG/M2 | TEMPERATURE: 98.3 F | WEIGHT: 211 LBS | HEART RATE: 60 BPM | OXYGEN SATURATION: 96 % | HEIGHT: 66 IN

## 2020-09-29 DIAGNOSIS — J44.1 COPD WITH ACUTE EXACERBATION (HCC): ICD-10-CM

## 2020-09-29 DIAGNOSIS — R06.02 SHORTNESS OF BREATH: Primary | ICD-10-CM

## 2020-09-29 LAB
ALBUMIN SERPL-MCNC: 3.6 G/DL (ref 3.5–5.2)
ALBUMIN/GLOB SERPL: 1.5 G/DL
ALP SERPL-CCNC: 79 U/L (ref 39–117)
ALT SERPL W P-5'-P-CCNC: 18 U/L (ref 1–41)
ANION GAP SERPL CALCULATED.3IONS-SCNC: 8.5 MMOL/L (ref 5–15)
AST SERPL-CCNC: 23 U/L (ref 1–40)
BASOPHILS # BLD AUTO: 0.08 10*3/MM3 (ref 0–0.2)
BASOPHILS NFR BLD AUTO: 0.8 % (ref 0–1.5)
BILIRUB SERPL-MCNC: 0.6 MG/DL (ref 0–1.2)
BUN SERPL-MCNC: 15 MG/DL (ref 8–23)
BUN/CREAT SERPL: 12.9 (ref 7–25)
CALCIUM SPEC-SCNC: 9 MG/DL (ref 8.6–10.5)
CHLORIDE SERPL-SCNC: 101 MMOL/L (ref 98–107)
CO2 SERPL-SCNC: 29.5 MMOL/L (ref 22–29)
CREAT SERPL-MCNC: 1.16 MG/DL (ref 0.76–1.27)
DEPRECATED RDW RBC AUTO: 41.8 FL (ref 37–54)
EOSINOPHIL # BLD AUTO: 0.48 10*3/MM3 (ref 0–0.4)
EOSINOPHIL NFR BLD AUTO: 5 % (ref 0.3–6.2)
ERYTHROCYTE [DISTWIDTH] IN BLOOD BY AUTOMATED COUNT: 12.7 % (ref 12.3–15.4)
GFR SERPL CREATININE-BSD FRML MDRD: 62 ML/MIN/1.73
GLOBULIN UR ELPH-MCNC: 2.4 GM/DL
GLUCOSE SERPL-MCNC: 97 MG/DL (ref 65–99)
HCT VFR BLD AUTO: 42.4 % (ref 37.5–51)
HGB BLD-MCNC: 14 G/DL (ref 13–17.7)
HOLD SPECIMEN: NORMAL
HOLD SPECIMEN: NORMAL
IMM GRANULOCYTES # BLD AUTO: 0.04 10*3/MM3 (ref 0–0.05)
IMM GRANULOCYTES NFR BLD AUTO: 0.4 % (ref 0–0.5)
LYMPHOCYTES # BLD AUTO: 1.75 10*3/MM3 (ref 0.7–3.1)
LYMPHOCYTES NFR BLD AUTO: 18.4 % (ref 19.6–45.3)
MCH RBC QN AUTO: 30 PG (ref 26.6–33)
MCHC RBC AUTO-ENTMCNC: 33 G/DL (ref 31.5–35.7)
MCV RBC AUTO: 90.8 FL (ref 79–97)
MONOCYTES # BLD AUTO: 0.97 10*3/MM3 (ref 0.1–0.9)
MONOCYTES NFR BLD AUTO: 10.2 % (ref 5–12)
NEUTROPHILS NFR BLD AUTO: 6.19 10*3/MM3 (ref 1.7–7)
NEUTROPHILS NFR BLD AUTO: 65.2 % (ref 42.7–76)
NRBC BLD AUTO-RTO: 0 /100 WBC (ref 0–0.2)
NT-PROBNP SERPL-MCNC: 2928 PG/ML (ref 0–900)
PLATELET # BLD AUTO: 167 10*3/MM3 (ref 140–450)
PMV BLD AUTO: 10.8 FL (ref 6–12)
POTASSIUM SERPL-SCNC: 4.3 MMOL/L (ref 3.5–5.2)
PROT SERPL-MCNC: 6 G/DL (ref 6–8.5)
RBC # BLD AUTO: 4.67 10*6/MM3 (ref 4.14–5.8)
SODIUM SERPL-SCNC: 139 MMOL/L (ref 136–145)
TROPONIN T SERPL-MCNC: <0.01 NG/ML (ref 0–0.03)
WBC # BLD AUTO: 9.51 10*3/MM3 (ref 3.4–10.8)
WHOLE BLOOD HOLD SPECIMEN: NORMAL
WHOLE BLOOD HOLD SPECIMEN: NORMAL

## 2020-09-29 PROCEDURE — 93005 ELECTROCARDIOGRAM TRACING: CPT | Performed by: EMERGENCY MEDICINE

## 2020-09-29 PROCEDURE — 25010000002 METHYLPREDNISOLONE PER 125 MG: Performed by: PHYSICIAN ASSISTANT

## 2020-09-29 PROCEDURE — 93005 ELECTROCARDIOGRAM TRACING: CPT

## 2020-09-29 PROCEDURE — 83880 ASSAY OF NATRIURETIC PEPTIDE: CPT | Performed by: EMERGENCY MEDICINE

## 2020-09-29 PROCEDURE — 94799 UNLISTED PULMONARY SVC/PX: CPT

## 2020-09-29 PROCEDURE — 25010000002 MAGNESIUM SULFATE 2 GM/50ML SOLUTION: Performed by: PHYSICIAN ASSISTANT

## 2020-09-29 PROCEDURE — 84484 ASSAY OF TROPONIN QUANT: CPT | Performed by: EMERGENCY MEDICINE

## 2020-09-29 PROCEDURE — 85025 COMPLETE CBC W/AUTO DIFF WBC: CPT | Performed by: EMERGENCY MEDICINE

## 2020-09-29 PROCEDURE — 94640 AIRWAY INHALATION TREATMENT: CPT

## 2020-09-29 PROCEDURE — 80053 COMPREHEN METABOLIC PANEL: CPT | Performed by: EMERGENCY MEDICINE

## 2020-09-29 PROCEDURE — 99284 EMERGENCY DEPT VISIT MOD MDM: CPT

## 2020-09-29 PROCEDURE — 96365 THER/PROPH/DIAG IV INF INIT: CPT

## 2020-09-29 PROCEDURE — 71045 X-RAY EXAM CHEST 1 VIEW: CPT

## 2020-09-29 PROCEDURE — 96375 TX/PRO/DX INJ NEW DRUG ADDON: CPT

## 2020-09-29 RX ORDER — METHYLPREDNISOLONE SODIUM SUCCINATE 125 MG/2ML
125 INJECTION, POWDER, LYOPHILIZED, FOR SOLUTION INTRAMUSCULAR; INTRAVENOUS ONCE
Status: COMPLETED | OUTPATIENT
Start: 2020-09-29 | End: 2020-09-29

## 2020-09-29 RX ORDER — FUROSEMIDE 20 MG/1
TABLET ORAL
Qty: 7 TABLET | Refills: 0 | Status: SHIPPED | OUTPATIENT
Start: 2020-09-29 | End: 2020-10-05 | Stop reason: SDUPTHER

## 2020-09-29 RX ORDER — MAGNESIUM SULFATE HEPTAHYDRATE 40 MG/ML
2 INJECTION, SOLUTION INTRAVENOUS ONCE
Status: COMPLETED | OUTPATIENT
Start: 2020-09-29 | End: 2020-09-29

## 2020-09-29 RX ORDER — SODIUM CHLORIDE 0.9 % (FLUSH) 0.9 %
10 SYRINGE (ML) INJECTION AS NEEDED
Status: DISCONTINUED | OUTPATIENT
Start: 2020-09-29 | End: 2020-09-29 | Stop reason: HOSPADM

## 2020-09-29 RX ORDER — IPRATROPIUM BROMIDE AND ALBUTEROL SULFATE 2.5; .5 MG/3ML; MG/3ML
3 SOLUTION RESPIRATORY (INHALATION) ONCE
Status: COMPLETED | OUTPATIENT
Start: 2020-09-29 | End: 2020-09-29

## 2020-09-29 RX ORDER — AZITHROMYCIN 250 MG/1
TABLET, FILM COATED ORAL
Qty: 6 TABLET | Refills: 0 | Status: SHIPPED | OUTPATIENT
Start: 2020-09-29 | End: 2020-10-30

## 2020-09-29 RX ORDER — PREDNISONE 20 MG/1
40 TABLET ORAL DAILY
Qty: 10 TABLET | Refills: 0 | Status: SHIPPED | OUTPATIENT
Start: 2020-09-29 | End: 2020-10-04

## 2020-09-29 RX ADMIN — MAGNESIUM SULFATE IN WATER 2 G: 40 INJECTION, SOLUTION INTRAVENOUS at 18:47

## 2020-09-29 RX ADMIN — IPRATROPIUM BROMIDE AND ALBUTEROL SULFATE 3 ML: .5; 3 SOLUTION RESPIRATORY (INHALATION) at 18:37

## 2020-09-29 RX ADMIN — METHYLPREDNISOLONE SODIUM SUCCINATE 125 MG: 125 INJECTION, POWDER, FOR SOLUTION INTRAMUSCULAR; INTRAVENOUS at 18:47

## 2020-09-30 ENCOUNTER — EPISODE CHANGES (OUTPATIENT)
Dept: CASE MANAGEMENT | Facility: OTHER | Age: 70
End: 2020-09-30

## 2020-09-30 ENCOUNTER — PATIENT OUTREACH (OUTPATIENT)
Dept: CASE MANAGEMENT | Facility: OTHER | Age: 70
End: 2020-09-30

## 2020-09-30 DIAGNOSIS — N18.30 TYPE 2 DIABETES MELLITUS WITH STAGE 3 CHRONIC KIDNEY DISEASE, WITHOUT LONG-TERM CURRENT USE OF INSULIN (HCC): ICD-10-CM

## 2020-09-30 DIAGNOSIS — E11.22 TYPE 2 DIABETES MELLITUS WITH STAGE 3 CHRONIC KIDNEY DISEASE, WITHOUT LONG-TERM CURRENT USE OF INSULIN (HCC): ICD-10-CM

## 2020-09-30 DIAGNOSIS — E11.21 TYPE 2 DIABETES MELLITUS WITH DIABETIC NEPHROPATHY, WITHOUT LONG-TERM CURRENT USE OF INSULIN (HCC): ICD-10-CM

## 2020-09-30 NOTE — OUTREACH NOTE
Patient Outreach Note    Called patient in follow up to ED visit 9-29-20 with shortness of breath and cough/ COPD exasc.  Explained role of Ambulatory  and contact information given.  Discussed ED discharge recommendations/ AVS.  Patient said he is feeling a little better, is breathing better.  Voiced compliance with medications prescribed from the ED; compliance with all daily medications.  Said he has a Nebulizer and takes breathing treatments as needed, per directions; said they do help him.  Reported he needs to get refill on his breathing treatment medicine as he used his last one today.  Patient said he would call his wife and let her call PCP office about that refill.  Patient reported he is independent with mobility, not using any assistive device at this time- has a Cane if needed.  Reported he just got off the phone with Shabana MA nurse.  Encouraged patient to keep in touch with [employer] as they can offer resources to help him at home, if he lets them know of his needs.  Patient voiced understanding.  Noted, AWV is up to date. Discussed importance of close f/u with Dr. Jean.  Offered to call PCP office to get patient a f/u appt;  patient agreed.  Will call patient back with a f/u appt.      Shruti Grossman RN  Ambulatory     9/30/2020, 15:51 EDT

## 2020-09-30 NOTE — OUTREACH NOTE
Care Coordination Note    Called patient's PCP office, Dr. Radha Jean, spoke with Nicholas.  Informed them of ED visit 9-29-20 with SOA with cough/ COPD exasc.; need for a f/u appt with Dr. Jean.  Received an appointment for patient to see Dr. Jean on Monday, 10-5-20, at 2:30pm.      Shruti Grossman RN  Ambulatory     9/30/2020, 16:08 EDT

## 2020-09-30 NOTE — OUTREACH NOTE
Patient Outreach Note    Called patient back and informed him of his ED follow up appt, Monday, 10-5-20, at 2:30pm, with Dr. Jean.  He said he wrote it down; voiced understanding and agreement.    Shruti Grossman RN  Ambulatory     9/30/2020, 16:11 EDT

## 2020-10-01 RX ORDER — GLIPIZIDE 5 MG/1
TABLET ORAL
Qty: 180 TABLET | Refills: 3 | Status: SHIPPED | OUTPATIENT
Start: 2020-10-01 | End: 2022-01-05

## 2020-10-01 RX ORDER — IPRATROPIUM BROMIDE AND ALBUTEROL SULFATE 2.5; .5 MG/3ML; MG/3ML
3 SOLUTION RESPIRATORY (INHALATION)
Qty: 1620 ML | Refills: 2 | Status: SHIPPED | OUTPATIENT
Start: 2020-10-01

## 2020-10-01 RX ORDER — FINASTERIDE 5 MG/1
TABLET, FILM COATED ORAL
Qty: 90 TABLET | Refills: 0 | Status: SHIPPED | OUTPATIENT
Start: 2020-10-01 | End: 2021-01-29 | Stop reason: SDUPTHER

## 2020-10-01 RX ORDER — APIXABAN 5 MG/1
TABLET, FILM COATED ORAL
Qty: 180 TABLET | Refills: 0 | Status: SHIPPED | OUTPATIENT
Start: 2020-10-01 | End: 2020-10-05 | Stop reason: SDUPTHER

## 2020-10-01 RX ORDER — CALCIUM CITRATE/VITAMIN D3 200MG-6.25
TABLET ORAL
Qty: 100 EACH | Refills: 5 | Status: SHIPPED | OUTPATIENT
Start: 2020-10-01 | End: 2023-03-19

## 2020-10-01 RX ORDER — CETIRIZINE HYDROCHLORIDE 10 MG/1
TABLET ORAL
Qty: 90 TABLET | Refills: 5 | Status: SHIPPED | OUTPATIENT
Start: 2020-10-01 | End: 2021-04-20 | Stop reason: SDUPTHER

## 2020-10-01 RX ORDER — GLUCOSAM/CHON-MSM1/C/MANG/BOSW 500-416.6
1 TABLET ORAL DAILY
Qty: 100 EACH | Refills: 5 | Status: SHIPPED | OUTPATIENT
Start: 2020-10-01

## 2020-10-01 RX ORDER — ATORVASTATIN CALCIUM 20 MG/1
TABLET, FILM COATED ORAL
Qty: 90 TABLET | Refills: 3 | OUTPATIENT
Start: 2020-10-01

## 2020-10-02 RX ORDER — ATORVASTATIN CALCIUM 20 MG/1
TABLET, FILM COATED ORAL
Qty: 90 TABLET | Refills: 3 | Status: SHIPPED | OUTPATIENT
Start: 2020-10-02 | End: 2021-01-29 | Stop reason: SDUPTHER

## 2020-10-05 ENCOUNTER — OFFICE VISIT (OUTPATIENT)
Dept: INTERNAL MEDICINE | Facility: CLINIC | Age: 70
End: 2020-10-05

## 2020-10-05 VITALS
BODY MASS INDEX: 40.79 KG/M2 | HEIGHT: 65 IN | OXYGEN SATURATION: 96 % | WEIGHT: 244.8 LBS | SYSTOLIC BLOOD PRESSURE: 140 MMHG | HEART RATE: 60 BPM | DIASTOLIC BLOOD PRESSURE: 80 MMHG | TEMPERATURE: 97.8 F

## 2020-10-05 DIAGNOSIS — J44.1 COPD WITH EXACERBATION (HCC): Primary | ICD-10-CM

## 2020-10-05 DIAGNOSIS — I48.0 PAROXYSMAL ATRIAL FIBRILLATION (HCC): ICD-10-CM

## 2020-10-05 DIAGNOSIS — I50.33 ACUTE ON CHRONIC DIASTOLIC HEART FAILURE (HCC): ICD-10-CM

## 2020-10-05 PROCEDURE — 96372 THER/PROPH/DIAG INJ SC/IM: CPT | Performed by: FAMILY MEDICINE

## 2020-10-05 PROCEDURE — 99214 OFFICE O/P EST MOD 30 MIN: CPT | Performed by: FAMILY MEDICINE

## 2020-10-05 RX ORDER — FUROSEMIDE 20 MG/1
TABLET ORAL
Qty: 60 TABLET | Refills: 2 | Status: SHIPPED | OUTPATIENT
Start: 2020-10-05 | End: 2021-09-30 | Stop reason: SDUPTHER

## 2020-10-05 RX ORDER — DOXYCYCLINE 100 MG/1
100 CAPSULE ORAL 2 TIMES DAILY
Qty: 20 CAPSULE | Refills: 0 | Status: SHIPPED | OUTPATIENT
Start: 2020-10-05 | End: 2020-10-30

## 2020-10-05 RX ORDER — METHYLPREDNISOLONE SODIUM SUCCINATE 125 MG/2ML
125 INJECTION, POWDER, LYOPHILIZED, FOR SOLUTION INTRAMUSCULAR; INTRAVENOUS ONCE
Status: COMPLETED | OUTPATIENT
Start: 2020-10-05 | End: 2020-10-05

## 2020-10-05 RX ORDER — BENZONATATE 200 MG/1
200 CAPSULE ORAL 3 TIMES DAILY PRN
Qty: 30 CAPSULE | Refills: 0 | Status: SHIPPED | OUTPATIENT
Start: 2020-10-05 | End: 2020-12-01

## 2020-10-05 RX ADMIN — METHYLPREDNISOLONE SODIUM SUCCINATE 125 MG: 125 INJECTION, POWDER, LYOPHILIZED, FOR SOLUTION INTRAMUSCULAR; INTRAVENOUS at 15:13

## 2020-10-05 NOTE — ASSESSMENT & PLAN NOTE
Patient will be continued on Lasix daily.  He has been advised to take an additional dose of Lasix as needed for increased swelling in his legs.  He has been encouraged to set up a follow-up appointment with his cardiologist as he is missed the last couple of appointments.

## 2020-10-05 NOTE — ASSESSMENT & PLAN NOTE
Patient has been given 4-week supply of Eliquis samples.  He has been given a phone number to contact to discuss financial assistance with the medication.

## 2020-10-05 NOTE — ASSESSMENT & PLAN NOTE
Very minimal improvement with Zithromax and short course of steroids.  Patient has been administered a Solu-Medrol injection in the office today.  Will start on a 10-day course of doxycycline.  He may take Tessalon Perles as needed for cough.  He is to continue nebulizer treatments as well.

## 2020-10-05 NOTE — PROGRESS NOTES
Robe Bryant is a 69 y.o. male.    Chief Complaint   Patient presents with   • COPD   • Congestive Heart Failure       HPI   Patient was recently evaluated in the emergency department for COPD exacerbation and mild exacerbation of CHF.  He reports a little over a week ago he had increased mucus production in his lungs with coughing up yellow/green sputum.  He reports shortness of breath that have worsened as well.  He admits to increased swelling in his legs.  During ER visit, he was not tested for COVID.  Chest x-ray was unremarkable.  He was sent home with a short course of prednisone and azithromycin.  He has been using nebulizer treatments with some improvement.  However, he does not feel all that much better than he did a week ago.  He was given a 7-day course of low-dose of Lasix as well.    In addition, patient does have atrial fibrillation.  He is on Eliquis for prophylaxis.  He is having a hard time affording the medication and is requesting assistance.    The following portions of the patient's history were reviewed and updated as appropriate: allergies, current medications, past family history, past medical history, past social history, past surgical history and problem list.     No Known Allergies      Current Outpatient Medications:   •  amLODIPine (NORVASC) 10 MG tablet, Take 1 tablet by mouth Daily., Disp: 30 tablet, Rfl: 1  •  apixaban (Eliquis) 5 MG tablet tablet, Take 1 tablet by mouth 2 (Two) Times a Day for 28 days., Disp: 56 tablet, Rfl: 0  •  aspirin 81 MG chewable tablet, Chew 81 mg Daily., Disp: , Rfl:   •  atorvastatin (LIPITOR) 20 MG tablet, TAKE 1 TABLET BY MOUTH EVERY NIGHT., Disp: 90 tablet, Rfl: 3  •  azithromycin (Zithromax Z-Pa) 250 MG tablet, Take 2 tablets the first day, then 1 tablet daily for 4 days., Disp: 6 tablet, Rfl: 0  •  baclofen (LIORESAL) 10 MG tablet, Take 1 tablet by mouth 3 (Three) Times a Day As Needed for Muscle Spasms., Disp: 90 tablet, Rfl: 2  •  Blood  Glucose Monitoring Suppl (TRUE METRIX AIR GLUCOSE METER) w/Device kit, 1 each by In Vitro route 2 (two) times a day. E11.9, Disp: 1 kit, Rfl: 0  •  cetirizine (zyrTEC) 10 MG tablet, TAKE 1 TABLET EVERY DAY, Disp: 90 tablet, Rfl: 5  •  diclofenac (VOLTAREN) 1 % gel gel, Apply 4 g topically to the appropriate area as directed 4 (Four) Times a Day As Needed (pain)., Disp: 100 g, Rfl: 5  •  finasteride (PROSCAR) 5 MG tablet, TAKE 1 TABLET EVERY DAY, Disp: 90 tablet, Rfl: 0  •  FLUoxetine (PROzac) 20 MG capsule, Take 1 capsule by mouth Daily., Disp: 90 capsule, Rfl: 3  •  Fluticasone-Umeclidin-Vilant (Trelegy Ellipta) 100-62.5-25 MCG/INH aerosol powder , Inhale 1 puff Daily. Sept 2021, Lot [id number], Disp: 14 each, Rfl: 0  •  furosemide (Lasix) 20 MG tablet, Take 1 tablet by mouth every morning; may take second dose as needed for increased swelling, Disp: 60 tablet, Rfl: 2  •  gentamicin (GARAMYCIN) 0.3 % ophthalmic solution, Administer 1 drop to both eyes Every 4 (Four) Hours., Disp: 5 mL, Rfl: 0  •  glipizide (GLUCOTROL) 5 MG tablet, TAKE 1 TABLET BY MOUTH 2 TIMES A DAY BEFORE MEALS., Disp: 180 tablet, Rfl: 3  •  glucose blood (True Metrix Blood Glucose Test) test strip, Daily testing E11.9, Disp: 100 each, Rfl: 5  •  glucose monitor monitoring kit, 1 each Daily. E11.9, Disp: 1 each, Rfl: 0  •  ipratropium-albuterol (DUO-NEB) 0.5-2.5 mg/3 ml nebulizer, Take 3 mL by nebulization 4 (Four) Times a Day., Disp: 1620 mL, Rfl: 2  •  Lancet Device misc, 1 each Daily. E11.9, Disp: 100 each, Rfl: 3  •  lisinopril (PRINIVIL,ZESTRIL) 40 MG tablet, Take 1 tablet by mouth Daily., Disp: 90 tablet, Rfl: 0  •  Multiple Vitamin tablet, Take 1 tablet by mouth daily., Disp: , Rfl:   •  nitroglycerin (NITROSTAT) 0.4 MG SL tablet, Place 1 tablet under the tongue Every 5 (Five) Minutes As Needed for Chest Pain., Disp: 25 tablet, Rfl: 11  •  omeprazole (priLOSEC) 40 MG capsule, Take 1 capsule by mouth Daily., Disp: 90 capsule, Rfl: 3  •  potassium  "chloride (K-DUR,KLOR-CON) 10 MEQ CR tablet, Take 1 tablet by mouth 2 (Two) Times a Day., Disp: 60 tablet, Rfl: 2  •  pramipexole (MIRAPEX) 0.25 MG tablet, Take 1 tablet by mouth 3 (Three) Times a Day., Disp: 270 tablet, Rfl: 1  •  terazosin (HYTRIN) 5 MG capsule, Take 1 capsule by mouth Every Night., Disp: 90 capsule, Rfl: 3  •  TRUEplus Lancets 33G misc, 1 each by Other route Daily. E11.9, Disp: 100 each, Rfl: 5  •  benzonatate (TESSALON) 200 MG capsule, Take 1 capsule by mouth 3 (Three) Times a Day As Needed for Cough., Disp: 30 capsule, Rfl: 0  •  doxycycline (MONODOX) 100 MG capsule, Take 1 capsule by mouth 2 (Two) Times a Day., Disp: 20 capsule, Rfl: 0  No current facility-administered medications for this visit.     ROS    Review of Systems   Constitutional: Positive for fatigue. Negative for chills and fever.   Respiratory: Positive for cough, shortness of breath and wheezing.    Cardiovascular: Positive for chest pain and leg swelling.   Gastrointestinal: Negative for abdominal pain, constipation, diarrhea, nausea and vomiting.   Musculoskeletal: Positive for arthralgias.   Neurological: Positive for weakness.       Vitals:    10/05/20 1428   BP: 140/80   BP Location: Left arm   Patient Position: Sitting   Cuff Size: Adult   Pulse: 60   Temp: 97.8 °F (36.6 °C)   TempSrc: Temporal   SpO2: 96%   Weight: 111 kg (244 lb 12.8 oz)   Height: 165.1 cm (65\")     Body mass index is 40.74 kg/m².    Physical Exam     Physical Exam  Constitutional:       General: He is not in acute distress.     Appearance: He is well-developed.   HENT:      Head: Normocephalic and atraumatic.      Right Ear: External ear normal.      Left Ear: External ear normal.   Eyes:      Extraocular Movements: Extraocular movements intact.      Conjunctiva/sclera: Conjunctivae normal.   Neck:      Musculoskeletal: Normal range of motion and neck supple.   Cardiovascular:      Rate and Rhythm: Normal rate and regular rhythm.      Heart sounds: No " murmur.   Pulmonary:      Effort: Pulmonary effort is normal. No respiratory distress.      Breath sounds: Examination of the left-upper field reveals rhonchi. Rhonchi present. No wheezing or rales.   Chest:      Chest wall: Tenderness present.       Abdominal:      General: Bowel sounds are normal. There is no distension.      Palpations: Abdomen is soft.      Tenderness: There is no abdominal tenderness.   Musculoskeletal: Normal range of motion.      Right lower leg: Edema (Trace) present.      Left lower leg: Edema (Trace) present.   Lymphadenopathy:      Cervical: No cervical adenopathy.   Skin:     General: Skin is warm and dry.   Neurological:      Mental Status: He is alert and oriented to person, place, and time.      Cranial Nerves: No cranial nerve deficit.         Assessment/Plan    Problem List Items Addressed This Visit        Cardiovascular and Mediastinum    Paroxysmal atrial fibrillation (CMS/HCC)     Patient has been given 4-week supply of Eliquis samples.  He has been given a phone number to contact to discuss financial assistance with the medication.         Diastolic heart failure (CMS/HCC)     Patient will be continued on Lasix daily.  He has been advised to take an additional dose of Lasix as needed for increased swelling in his legs.  He has been encouraged to set up a follow-up appointment with his cardiologist as he is missed the last couple of appointments.            Respiratory    COPD with exacerbation (CMS/HCC) - Primary     Very minimal improvement with Zithromax and short course of steroids.  Patient has been administered a Solu-Medrol injection in the office today.  Will start on a 10-day course of doxycycline.  He may take Tessalon Perles as needed for cough.  He is to continue nebulizer treatments as well.         Relevant Medications    methylPREDNISolone sodium succinate (SOLU-Medrol) injection 125 mg (Completed)    benzonatate (TESSALON) 200 MG capsule          New Medications  Ordered This Visit   Medications   • methylPREDNISolone sodium succinate (SOLU-Medrol) injection 125 mg   • doxycycline (MONODOX) 100 MG capsule     Sig: Take 1 capsule by mouth 2 (Two) Times a Day.     Dispense:  20 capsule     Refill:  0   • furosemide (Lasix) 20 MG tablet     Sig: Take 1 tablet by mouth every morning; may take second dose as needed for increased swelling     Dispense:  60 tablet     Refill:  2   • benzonatate (TESSALON) 200 MG capsule     Sig: Take 1 capsule by mouth 3 (Three) Times a Day As Needed for Cough.     Dispense:  30 capsule     Refill:  0   • apixaban (Eliquis) 5 MG tablet tablet     Sig: Take 1 tablet by mouth 2 (Two) Times a Day for 28 days.     Dispense:  56 tablet     Refill:  0       No orders of the defined types were placed in this encounter.      No follow-ups on file.    Radha Jean DO

## 2020-10-29 ENCOUNTER — EPISODE CHANGES (OUTPATIENT)
Dept: CASE MANAGEMENT | Facility: OTHER | Age: 70
End: 2020-10-29

## 2020-10-30 ENCOUNTER — OFFICE VISIT (OUTPATIENT)
Dept: INTERNAL MEDICINE | Facility: CLINIC | Age: 70
End: 2020-10-30

## 2020-10-30 VITALS
HEART RATE: 92 BPM | HEIGHT: 65 IN | BODY MASS INDEX: 41.12 KG/M2 | WEIGHT: 246.8 LBS | SYSTOLIC BLOOD PRESSURE: 146 MMHG | OXYGEN SATURATION: 92 % | DIASTOLIC BLOOD PRESSURE: 102 MMHG | TEMPERATURE: 97.8 F

## 2020-10-30 DIAGNOSIS — I10 ESSENTIAL HYPERTENSION: ICD-10-CM

## 2020-10-30 DIAGNOSIS — H10.13 ALLERGIC CONJUNCTIVITIS OF BOTH EYES: ICD-10-CM

## 2020-10-30 DIAGNOSIS — J43.1 PANLOBULAR EMPHYSEMA (HCC): ICD-10-CM

## 2020-10-30 DIAGNOSIS — I50.33 ACUTE ON CHRONIC DIASTOLIC HEART FAILURE (HCC): Primary | ICD-10-CM

## 2020-10-30 PROCEDURE — 99214 OFFICE O/P EST MOD 30 MIN: CPT | Performed by: FAMILY MEDICINE

## 2020-10-30 RX ORDER — SPIRONOLACTONE 50 MG/1
50 TABLET, FILM COATED ORAL DAILY
Qty: 90 TABLET | Refills: 1 | Status: SHIPPED | OUTPATIENT
Start: 2020-10-30 | End: 2021-05-03 | Stop reason: SDUPTHER

## 2020-11-15 PROBLEM — H10.13 ALLERGIC CONJUNCTIVITIS OF BOTH EYES: Status: ACTIVE | Noted: 2020-11-15

## 2020-11-16 NOTE — ASSESSMENT & PLAN NOTE
Uncontrolled on current medication.  Patient is to continue Lasix, amlodipine, hytrin and lisinopril. Will add in spironolactone as well.

## 2020-11-16 NOTE — ASSESSMENT & PLAN NOTE
Stable.  We do not have samples of trelegy today.  May continue duonebs and albuterol inhaler prn.

## 2020-11-18 ENCOUNTER — TELEPHONE (OUTPATIENT)
Dept: INTERNAL MEDICINE | Facility: CLINIC | Age: 70
End: 2020-11-18

## 2020-11-18 ENCOUNTER — OFFICE VISIT (OUTPATIENT)
Dept: INTERNAL MEDICINE | Facility: CLINIC | Age: 70
End: 2020-11-18

## 2020-11-18 VITALS
HEIGHT: 65 IN | OXYGEN SATURATION: 98 % | TEMPERATURE: 97.8 F | SYSTOLIC BLOOD PRESSURE: 145 MMHG | WEIGHT: 243.2 LBS | BODY MASS INDEX: 40.52 KG/M2 | HEART RATE: 62 BPM | DIASTOLIC BLOOD PRESSURE: 82 MMHG

## 2020-11-18 DIAGNOSIS — M62.838 MUSCLE SPASM OF BOTH LOWER LEGS: ICD-10-CM

## 2020-11-18 DIAGNOSIS — R25.2 HAND OR FOOT SPASMS: ICD-10-CM

## 2020-11-18 DIAGNOSIS — R06.02 SHORTNESS OF BREATH: Primary | ICD-10-CM

## 2020-11-18 DIAGNOSIS — I50.33 ACUTE ON CHRONIC DIASTOLIC HEART FAILURE (HCC): ICD-10-CM

## 2020-11-18 PROCEDURE — 99214 OFFICE O/P EST MOD 30 MIN: CPT | Performed by: FAMILY MEDICINE

## 2020-11-18 RX ORDER — BACLOFEN 10 MG/1
10 TABLET ORAL 3 TIMES DAILY PRN
Qty: 90 TABLET | Refills: 2 | Status: SHIPPED | OUTPATIENT
Start: 2020-11-18 | End: 2022-02-03 | Stop reason: SDUPTHER

## 2020-11-18 RX ORDER — OMEPRAZOLE 40 MG/1
40 CAPSULE, DELAYED RELEASE ORAL DAILY
Qty: 90 CAPSULE | Refills: 3 | Status: SHIPPED | OUTPATIENT
Start: 2020-11-18 | End: 2022-02-03 | Stop reason: SDUPTHER

## 2020-11-18 RX ORDER — LISINOPRIL 40 MG/1
40 TABLET ORAL DAILY
Qty: 90 TABLET | Refills: 0 | Status: SHIPPED | OUTPATIENT
Start: 2020-11-18 | End: 2021-01-29 | Stop reason: SDUPTHER

## 2020-11-18 RX ORDER — TERAZOSIN 5 MG/1
5 CAPSULE ORAL NIGHTLY
Qty: 90 CAPSULE | Refills: 3 | Status: SHIPPED | OUTPATIENT
Start: 2020-11-18 | End: 2020-12-17 | Stop reason: DRUGHIGH

## 2020-11-18 NOTE — TELEPHONE ENCOUNTER
Please call [facility] and make sure they have sent all medications we have on file for him.  He is out of lipitor and we sent it last month.

## 2020-11-18 NOTE — PROGRESS NOTES
Robe Bryant is a 69 y.o. male.    Chief Complaint   Patient presents with   • Hand Pain     both pain   • Hip Pain       HPI   Patient reports hand pain for the last several days.  Fingers and toes have been drawing up.  He reports muscle cramps in his thighs as well.  He is on several diuretics and takes potassium.  He was also started on spironolactone at his last office visit due to increased swelling.  Patient reports that he is due for refills on several medications.  He did bring his bottles with him on medications that are due for refill.  Does not appear that the patient has had Terazosin, which may be contributing to his elevated blood pressure and increased swelling.  It also appears that he has been out of lisinopril.  His bottle states that he should be taking 20 mg, but 40 mg with the last dosage that was sent in.  Patient does have congestive heart failure admits to increased shortness of breath.  In addition, he has had to use nebulizer treatments more frequently.  He has not been able to afford his inhalers for COPD.  He does report increased fatigue and weakness.    The following portions of the patient's history were reviewed and updated as appropriate: allergies, current medications, past family history, past medical history, past social history, past surgical history and problem list.     No Known Allergies      Current Outpatient Medications:   •  amLODIPine (NORVASC) 10 MG tablet, Take 1 tablet by mouth Daily., Disp: 30 tablet, Rfl: 1  •  aspirin 81 MG chewable tablet, Chew 81 mg Daily., Disp: , Rfl:   •  atorvastatin (LIPITOR) 20 MG tablet, TAKE 1 TABLET BY MOUTH EVERY NIGHT., Disp: 90 tablet, Rfl: 3  •  baclofen (LIORESAL) 10 MG tablet, Take 1 tablet by mouth 3 (Three) Times a Day As Needed for Muscle Spasms., Disp: 90 tablet, Rfl: 2  •  benzonatate (TESSALON) 200 MG capsule, Take 1 capsule by mouth 3 (Three) Times a Day As Needed for Cough., Disp: 30 capsule, Rfl: 0  •  Blood  Glucose Monitoring Suppl (TRUE METRIX AIR GLUCOSE METER) w/Device kit, 1 each by In Vitro route 2 (two) times a day. E11.9, Disp: 1 kit, Rfl: 0  •  cetirizine (zyrTEC) 10 MG tablet, TAKE 1 TABLET EVERY DAY, Disp: 90 tablet, Rfl: 5  •  diclofenac (VOLTAREN) 1 % gel gel, Apply 4 g topically to the appropriate area as directed 4 (Four) Times a Day As Needed (pain)., Disp: 100 g, Rfl: 5  •  finasteride (PROSCAR) 5 MG tablet, TAKE 1 TABLET EVERY DAY, Disp: 90 tablet, Rfl: 0  •  FLUoxetine (PROzac) 20 MG capsule, Take 1 capsule by mouth Daily., Disp: 90 capsule, Rfl: 3  •  furosemide (Lasix) 20 MG tablet, Take 1 tablet by mouth every morning; may take second dose as needed for increased swelling, Disp: 60 tablet, Rfl: 2  •  glipizide (GLUCOTROL) 5 MG tablet, TAKE 1 TABLET BY MOUTH 2 TIMES A DAY BEFORE MEALS., Disp: 180 tablet, Rfl: 3  •  glucose blood (True Metrix Blood Glucose Test) test strip, Daily testing E11.9, Disp: 100 each, Rfl: 5  •  glucose monitor monitoring kit, 1 each Daily. E11.9, Disp: 1 each, Rfl: 0  •  ipratropium-albuterol (DUO-NEB) 0.5-2.5 mg/3 ml nebulizer, Take 3 mL by nebulization 4 (Four) Times a Day., Disp: 1620 mL, Rfl: 2  •  Lancet Device misc, 1 each Daily. E11.9, Disp: 100 each, Rfl: 3  •  lisinopril (PRINIVIL,ZESTRIL) 40 MG tablet, Take 1 tablet by mouth Daily., Disp: 90 tablet, Rfl: 0  •  Multiple Vitamin tablet, Take 1 tablet by mouth daily., Disp: , Rfl:   •  nitroglycerin (NITROSTAT) 0.4 MG SL tablet, Place 1 tablet under the tongue Every 5 (Five) Minutes As Needed for Chest Pain., Disp: 25 tablet, Rfl: 11  •  Olopatadine HCl 0.7 % solution, Apply 1 drop to eye(s) as directed by provider Daily., Disp: 2.5 mL, Rfl: 5  •  omeprazole (priLOSEC) 40 MG capsule, Take 1 capsule by mouth Daily., Disp: 90 capsule, Rfl: 3  •  potassium chloride (K-DUR,KLOR-CON) 10 MEQ CR tablet, Take 1 tablet by mouth 2 (Two) Times a Day., Disp: 60 tablet, Rfl: 2  •  pramipexole (MIRAPEX) 0.25 MG tablet, Take 1  "tablet by mouth 3 (Three) Times a Day., Disp: 270 tablet, Rfl: 1  •  spironolactone (Aldactone) 50 MG tablet, Take 1 tablet by mouth Daily., Disp: 90 tablet, Rfl: 1  •  terazosin (HYTRIN) 5 MG capsule, Take 1 capsule by mouth Every Night., Disp: 90 capsule, Rfl: 3  •  TRUEplus Lancets 33G misc, 1 each by Other route Daily. E11.9, Disp: 100 each, Rfl: 5  •  Budeson-Glycopyrrol-Formoterol (Breztri Aerosphere) 160-9-4.8 MCG/ACT aerosol inhaler, Inhale 2 puffs 2 (Two) Times a Day., Disp: 1 each, Rfl: 0    ROS    Review of Systems   Constitutional: Negative for chills and fever.   Respiratory: Positive for shortness of breath and wheezing. Negative for cough.    Cardiovascular: Positive for chest pain and leg swelling.   Gastrointestinal: Negative for constipation, diarrhea, nausea and vomiting.   Musculoskeletal: Positive for arthralgias, joint swelling and myalgias.       Vitals:    11/18/20 0933   BP: 145/82   BP Location: Left arm   Patient Position: Sitting   Cuff Size: Adult   Pulse: 62   Temp: 97.8 °F (36.6 °C)   TempSrc: Temporal   SpO2: 98%   Weight: 110 kg (243 lb 3.2 oz)   Height: 165.1 cm (65\")     Body mass index is 40.47 kg/m².    Physical Exam     Physical Exam  Constitutional:       General: He is not in acute distress.     Appearance: He is well-developed.   HENT:      Head: Normocephalic and atraumatic.      Right Ear: External ear normal.      Left Ear: External ear normal.   Eyes:      Extraocular Movements: Extraocular movements intact.      Conjunctiva/sclera: Conjunctivae normal.   Cardiovascular:      Rate and Rhythm: Normal rate and regular rhythm.      Heart sounds: No murmur.   Pulmonary:      Effort: Pulmonary effort is normal. No respiratory distress.      Breath sounds: Normal breath sounds. No wheezing.   Abdominal:      General: Bowel sounds are normal. There is no distension.      Palpations: Abdomen is soft.      Tenderness: There is no abdominal tenderness.   Musculoskeletal:         " General: Swelling (hands bilaterally) present.      Right lower leg: Edema present.      Left lower leg: Edema present.   Skin:     General: Skin is warm and dry.   Neurological:      Mental Status: He is alert and oriented to person, place, and time.      Cranial Nerves: No cranial nerve deficit.   Psychiatric:         Mood and Affect: Mood normal.         Behavior: Behavior normal.         Assessment/Plan    Problems Addressed this Visit        Cardiovascular and Mediastinum    Diastolic heart failure (CMS/HCC)    Relevant Orders    BNP       Respiratory    Shortness of breath - Primary    Relevant Orders    BNP      Other Visit Diagnoses     Muscle spasm of both lower legs        Relevant Orders    CBC & Differential    Comprehensive Metabolic Panel    Magnesium    Vitamin B12    Vitamin D 25 Hydroxy    Folate    Hand or foot spasms        Relevant Orders    CBC & Differential    Comprehensive Metabolic Panel    Magnesium    Vitamin B12    Vitamin D 25 Hydroxy    Folate    Body mass index (BMI)30.0-30.9, adult         Relevant Orders    Vitamin D 25 Hydroxy        Muscle cramps and spasms may be secondary to electrolyte imbalance.  Discussed with patient that he may not need additional potassium given the fact that he started spironolactone within the last couple weeks, which increase his potassium levels.  He does have increased shortness of breath, which is difficult to determine whether it is due to uncontrolled COPD or worsened CHF.  Will obtain labs for further evaluation.  He has been given a sample of a new breathing medication today for COPD.  He may continue nebs and albuterol as needed.  All medications due for refills have been refilled today, including Terazosin and lisinopril.  Hopefully blood pressure will be well controlled once back on these medications.    New Medications Ordered This Visit   Medications   • baclofen (LIORESAL) 10 MG tablet     Sig: Take 1 tablet by mouth 3 (Three) Times a Day As  Needed for Muscle Spasms.     Dispense:  90 tablet     Refill:  2   • omeprazole (priLOSEC) 40 MG capsule     Sig: Take 1 capsule by mouth Daily.     Dispense:  90 capsule     Refill:  3   • lisinopril (PRINIVIL,ZESTRIL) 40 MG tablet     Sig: Take 1 tablet by mouth Daily.     Dispense:  90 tablet     Refill:  0   • terazosin (HYTRIN) 5 MG capsule     Sig: Take 1 capsule by mouth Every Night.     Dispense:  90 capsule     Refill:  3   • Budeson-Glycopyrrol-Formoterol (Breztri Aerosphere) 160-9-4.8 MCG/ACT aerosol inhaler     Sig: Inhale 2 puffs 2 (Two) Times a Day.     Dispense:  1 each     Refill:  0       No orders of the defined types were placed in this encounter.      No follow-ups on file.    Radha Jean DO

## 2020-11-19 DIAGNOSIS — J43.1 PANLOBULAR EMPHYSEMA (HCC): ICD-10-CM

## 2020-11-19 DIAGNOSIS — G47.33 OSA (OBSTRUCTIVE SLEEP APNEA): Primary | ICD-10-CM

## 2020-12-01 ENCOUNTER — OFFICE VISIT (OUTPATIENT)
Dept: INTERNAL MEDICINE | Facility: CLINIC | Age: 70
End: 2020-12-01

## 2020-12-01 VITALS
SYSTOLIC BLOOD PRESSURE: 132 MMHG | HEART RATE: 73 BPM | HEIGHT: 65 IN | DIASTOLIC BLOOD PRESSURE: 85 MMHG | TEMPERATURE: 97.6 F | WEIGHT: 252 LBS | OXYGEN SATURATION: 95 % | BODY MASS INDEX: 41.99 KG/M2

## 2020-12-01 DIAGNOSIS — B96.89 BACTERIAL CONJUNCTIVITIS OF BOTH EYES: ICD-10-CM

## 2020-12-01 DIAGNOSIS — J43.1 PANLOBULAR EMPHYSEMA (HCC): Primary | ICD-10-CM

## 2020-12-01 DIAGNOSIS — I50.33 ACUTE ON CHRONIC DIASTOLIC HEART FAILURE (HCC): ICD-10-CM

## 2020-12-01 DIAGNOSIS — H10.9 BACTERIAL CONJUNCTIVITIS OF BOTH EYES: ICD-10-CM

## 2020-12-01 DIAGNOSIS — I10 ESSENTIAL HYPERTENSION: ICD-10-CM

## 2020-12-01 DIAGNOSIS — N18.30 TYPE 2 DIABETES MELLITUS WITH STAGE 3 CHRONIC KIDNEY DISEASE, WITHOUT LONG-TERM CURRENT USE OF INSULIN, UNSPECIFIED WHETHER STAGE 3A OR 3B CKD (HCC): ICD-10-CM

## 2020-12-01 DIAGNOSIS — E11.22 TYPE 2 DIABETES MELLITUS WITH STAGE 3 CHRONIC KIDNEY DISEASE, WITHOUT LONG-TERM CURRENT USE OF INSULIN, UNSPECIFIED WHETHER STAGE 3A OR 3B CKD (HCC): ICD-10-CM

## 2020-12-01 PROBLEM — R06.02 SHORTNESS OF BREATH: Status: RESOLVED | Noted: 2020-02-13 | Resolved: 2020-12-01

## 2020-12-01 PROBLEM — Z00.00 MEDICARE ANNUAL WELLNESS VISIT, SUBSEQUENT: Status: RESOLVED | Noted: 2020-05-29 | Resolved: 2020-12-01

## 2020-12-01 PROBLEM — J18.9 PNEUMONIA DUE TO INFECTIOUS ORGANISM: Status: RESOLVED | Noted: 2019-10-20 | Resolved: 2020-12-01

## 2020-12-01 PROBLEM — H10.13 ALLERGIC CONJUNCTIVITIS OF BOTH EYES: Status: RESOLVED | Noted: 2020-11-15 | Resolved: 2020-12-01

## 2020-12-01 PROBLEM — J44.1 COPD WITH EXACERBATION: Status: RESOLVED | Noted: 2019-10-08 | Resolved: 2020-12-01

## 2020-12-01 PROCEDURE — 99214 OFFICE O/P EST MOD 30 MIN: CPT | Performed by: FAMILY MEDICINE

## 2020-12-01 RX ORDER — OFLOXACIN 3 MG/ML
1 SOLUTION/ DROPS OPHTHALMIC 4 TIMES DAILY
Qty: 10 ML | Refills: 0 | Status: ON HOLD | OUTPATIENT
Start: 2020-12-01 | End: 2021-01-25

## 2020-12-01 NOTE — PROGRESS NOTES
Robe Bryant is a 70 y.o. male.    Chief Complaint   Patient presents with   • Shortness of Breath       HPI   Patient has COPD . He is not on oxygen. He admits to to some dyspnea with exertion.  He admits to on and off cough, SOB and wheezing that does respond to treatment. He uses the albuterol 4 times a day (nebs and rescue). He is on Breztri samples with good response.  Patient has not been able to afford Anoro at this time.    Patient has hypertension.  They are taking amlodipine, lisinopril, Terazosin, spironolactone, and Lasix.  They have been compliant with medications.  The patient denies any side effects to the medication.  Blood pressure is controlled in the office today.  Patient does watch his salt.     Patient has had diabetes for the past few years. He has been compliant with the medications and denies any side effects from it. He has been monitoring fingersticks.  his fingerstick range is between .  He has not had hypoglycemic symptoms. He has been following a diabetic diet and has been active walking a dog.      Patient does have congestive heart failure as well.  He does see cardiology regularly.  As above, he does admit to shortness of breath, worse on exertion.  Denies any current swelling in his legs.    Patient does continue to have bilateral eye redness with draining and matting of the eyelashes.  He admits to some soreness as well.  He had been prescribed allergy eyedrops without any relief.  He does have an appointment with an eye doctor    The following portions of the patient's history were reviewed and updated as appropriate: allergies, current medications, past family history, past medical history, past social history, past surgical history and problem list.     No Known Allergies      Current Outpatient Medications:   •  amLODIPine (NORVASC) 10 MG tablet, Take 1 tablet by mouth Daily., Disp: 30 tablet, Rfl: 1  •  aspirin 81 MG chewable tablet, Chew 81 mg Daily., Disp: ,  Rfl:   •  atorvastatin (LIPITOR) 20 MG tablet, TAKE 1 TABLET BY MOUTH EVERY NIGHT., Disp: 90 tablet, Rfl: 3  •  baclofen (LIORESAL) 10 MG tablet, Take 1 tablet by mouth 3 (Three) Times a Day As Needed for Muscle Spasms., Disp: 90 tablet, Rfl: 2  •  Blood Glucose Monitoring Suppl (TRUE METRIX AIR GLUCOSE METER) w/Device kit, 1 each by In Vitro route 2 (two) times a day. E11.9, Disp: 1 kit, Rfl: 0  •  Budeson-Glycopyrrol-Formoterol (Breztri Aerosphere) 160-9-4.8 MCG/ACT aerosol inhaler, Inhale 2 puffs 2 (Two) Times a Day., Disp: 1 each, Rfl: 0  •  cetirizine (zyrTEC) 10 MG tablet, TAKE 1 TABLET EVERY DAY, Disp: 90 tablet, Rfl: 5  •  diclofenac (VOLTAREN) 1 % gel gel, Apply 4 g topically to the appropriate area as directed 4 (Four) Times a Day As Needed (pain)., Disp: 100 g, Rfl: 5  •  finasteride (PROSCAR) 5 MG tablet, TAKE 1 TABLET EVERY DAY, Disp: 90 tablet, Rfl: 0  •  FLUoxetine (PROzac) 20 MG capsule, Take 1 capsule by mouth Daily., Disp: 90 capsule, Rfl: 3  •  furosemide (Lasix) 20 MG tablet, Take 1 tablet by mouth every morning; may take second dose as needed for increased swelling, Disp: 60 tablet, Rfl: 2  •  glipizide (GLUCOTROL) 5 MG tablet, TAKE 1 TABLET BY MOUTH 2 TIMES A DAY BEFORE MEALS., Disp: 180 tablet, Rfl: 3  •  glucose blood (True Metrix Blood Glucose Test) test strip, Daily testing E11.9, Disp: 100 each, Rfl: 5  •  glucose monitor monitoring kit, 1 each Daily. E11.9, Disp: 1 each, Rfl: 0  •  ipratropium-albuterol (DUO-NEB) 0.5-2.5 mg/3 ml nebulizer, Take 3 mL by nebulization 4 (Four) Times a Day., Disp: 1620 mL, Rfl: 2  •  Lancet Device misc, 1 each Daily. E11.9, Disp: 100 each, Rfl: 3  •  lisinopril (PRINIVIL,ZESTRIL) 40 MG tablet, Take 1 tablet by mouth Daily., Disp: 90 tablet, Rfl: 0  •  Multiple Vitamin tablet, Take 1 tablet by mouth daily., Disp: , Rfl:   •  nitroglycerin (NITROSTAT) 0.4 MG SL tablet, Place 1 tablet under the tongue Every 5 (Five) Minutes As Needed for Chest Pain., Disp: 25  "tablet, Rfl: 11  •  Olopatadine HCl 0.7 % solution, Apply 1 drop to eye(s) as directed by provider Daily., Disp: 2.5 mL, Rfl: 5  •  omeprazole (priLOSEC) 40 MG capsule, Take 1 capsule by mouth Daily., Disp: 90 capsule, Rfl: 3  •  potassium chloride (K-DUR,KLOR-CON) 10 MEQ CR tablet, Take 1 tablet by mouth 2 (Two) Times a Day., Disp: 60 tablet, Rfl: 2  •  pramipexole (MIRAPEX) 0.25 MG tablet, Take 1 tablet by mouth 3 (Three) Times a Day., Disp: 270 tablet, Rfl: 1  •  spironolactone (Aldactone) 50 MG tablet, Take 1 tablet by mouth Daily., Disp: 90 tablet, Rfl: 1  •  terazosin (HYTRIN) 5 MG capsule, Take 1 capsule by mouth Every Night., Disp: 90 capsule, Rfl: 3  •  TRUEplus Lancets 33G misc, 1 each by Other route Daily. E11.9, Disp: 100 each, Rfl: 5  •  Cholecalciferol (Vitamin D-3) 25 MCG (1000 UT) capsule, Take 1,000 Units by mouth Daily., Disp: 90 capsule, Rfl: 3  •  ofloxacin (Ocuflox) 0.3 % ophthalmic solution, Administer 1 drop to both eyes 4 (Four) Times a Day., Disp: 10 mL, Rfl: 0    ROS    Review of Systems   Constitutional: Positive for fatigue. Negative for chills and fever.   HENT: Negative for congestion and postnasal drip.    Eyes: Positive for pain, discharge, redness and itching.   Respiratory: Positive for cough, shortness of breath and wheezing.    Cardiovascular: Negative for chest pain and leg swelling.   Gastrointestinal: Negative for abdominal pain, constipation, diarrhea, nausea and vomiting.   Musculoskeletal: Positive for arthralgias.   Neurological: Negative for weakness and headache.   Hematological: Does not bruise/bleed easily.   Psychiatric/Behavioral: Negative for depressed mood. The patient is not nervous/anxious.        Vitals:    12/01/20 1126   BP: 132/85   BP Location: Left arm   Patient Position: Sitting   Cuff Size: Adult   Pulse: 73   Temp: 97.6 °F (36.4 °C)   TempSrc: Temporal   SpO2: 95%   Weight: 114 kg (252 lb)   Height: 165.1 cm (65\")     Body mass index is 41.93 " kg/m².    Physical Exam     Physical Exam  Constitutional:       General: He is not in acute distress.     Appearance: He is well-developed.   HENT:      Head: Normocephalic and atraumatic.      Right Ear: External ear normal.      Left Ear: External ear normal.   Eyes:      Extraocular Movements: Extraocular movements intact.      Conjunctiva/sclera:      Right eye: Right conjunctiva is injected.      Left eye: Left conjunctiva is injected.   Cardiovascular:      Rate and Rhythm: Normal rate and regular rhythm.      Heart sounds: No murmur.   Pulmonary:      Effort: Pulmonary effort is normal. No respiratory distress.      Breath sounds: Normal breath sounds. No wheezing.   Abdominal:      General: Bowel sounds are normal. There is no distension.      Palpations: Abdomen is soft.      Tenderness: There is no abdominal tenderness.   Musculoskeletal:      Right lower leg: No edema.      Left lower leg: No edema.   Skin:     General: Skin is warm and dry.   Neurological:      Mental Status: He is alert and oriented to person, place, and time.      Cranial Nerves: No cranial nerve deficit.   Psychiatric:         Mood and Affect: Mood normal.         Behavior: Behavior normal.         Assessment/Plan    Problems Addressed this Visit        Cardiovascular and Mediastinum    Essential hypertension     Controlled on current medication.  Patient will continue lisinopril, amlodipine, spironolactone, Terazosin, and Lasix.         Diastolic heart failure (CMS/HCC)     Patient is symptomatic, but may be more secondary to uncontrolled COPD.  Will obtain a BNP to rule out acute exacerbation of CHF.  Patient is to continue diuretics.  He will follow-up with cardiology as scheduled.            Respiratory    Chronic obstructive pulmonary disease (CMS/HCC) - Primary     Uncontrolled.  No samples have been given today.  Hopefully patient's Anoro will be covered after the first of the year again.  He may use albuterol as needed.               Endocrine    Type 2 diabetes mellitus, without long-term current use of insulin (CMS/[facility])     Controlled on current medication.  Patient is to continue glipizide.  Will continue to monitor A1c and microalbumin every few months.  Patient is on an ACE inhibitor, statin, and baby aspirin.              Other    Bacterial conjunctivitis of both eyes     Will treat with ofloxacin eyedrops.  Patient encouraged to follow-up with ophthalmology if no resolution.         Relevant Medications    ofloxacin (Ocuflox) 0.3 % ophthalmic solution        New Medications Ordered This Visit   Medications   • ofloxacin (Ocuflox) 0.3 % ophthalmic solution     Sig: Administer 1 drop to both eyes 4 (Four) Times a Day.     Dispense:  10 mL     Refill:  0       No orders of the defined types were placed in this encounter.      Return in about 3 months (around 3/1/2021) for diabetes,  HTN, copd.    Radha Jean DO

## 2020-12-02 LAB
25(OH)D3+25(OH)D2 SERPL-MCNC: 25.8 NG/ML (ref 30–100)
ALBUMIN SERPL-MCNC: 3.7 G/DL (ref 3.5–5.2)
ALBUMIN/GLOB SERPL: 1.9 G/DL
ALP SERPL-CCNC: 69 U/L (ref 39–117)
ALT SERPL-CCNC: 22 U/L (ref 1–41)
AST SERPL-CCNC: 24 U/L (ref 1–40)
BASOPHILS # BLD AUTO: 0.06 10*3/MM3 (ref 0–0.2)
BASOPHILS NFR BLD AUTO: 0.9 % (ref 0–1.5)
BILIRUB SERPL-MCNC: 0.4 MG/DL (ref 0–1.2)
BNP SERPL-MCNC: 67.6 PG/ML (ref 0–100)
BUN SERPL-MCNC: 21 MG/DL (ref 8–23)
BUN/CREAT SERPL: 16.8 (ref 7–25)
CALCIUM SERPL-MCNC: 8.8 MG/DL (ref 8.6–10.5)
CHLORIDE SERPL-SCNC: 105 MMOL/L (ref 98–107)
CO2 SERPL-SCNC: 28.3 MMOL/L (ref 22–29)
CREAT SERPL-MCNC: 1.25 MG/DL (ref 0.76–1.27)
EOSINOPHIL # BLD AUTO: 0.22 10*3/MM3 (ref 0–0.4)
EOSINOPHIL NFR BLD AUTO: 3.3 % (ref 0.3–6.2)
ERYTHROCYTE [DISTWIDTH] IN BLOOD BY AUTOMATED COUNT: 13.4 % (ref 12.3–15.4)
FOLATE SERPL-MCNC: >20 NG/ML (ref 4.78–24.2)
GLOBULIN SER CALC-MCNC: 1.9 GM/DL
GLUCOSE SERPL-MCNC: 115 MG/DL (ref 65–99)
HCT VFR BLD AUTO: 42.3 % (ref 37.5–51)
HGB BLD-MCNC: 13.7 G/DL (ref 13–17.7)
IMM GRANULOCYTES # BLD AUTO: 0.04 10*3/MM3 (ref 0–0.05)
IMM GRANULOCYTES NFR BLD AUTO: 0.6 % (ref 0–0.5)
LYMPHOCYTES # BLD AUTO: 1.33 10*3/MM3 (ref 0.7–3.1)
LYMPHOCYTES NFR BLD AUTO: 20.2 % (ref 19.6–45.3)
MAGNESIUM SERPL-MCNC: 1.9 MG/DL (ref 1.6–2.4)
MCH RBC QN AUTO: 28.3 PG (ref 26.6–33)
MCHC RBC AUTO-ENTMCNC: 32.4 G/DL (ref 31.5–35.7)
MCV RBC AUTO: 87.4 FL (ref 79–97)
MONOCYTES # BLD AUTO: 0.74 10*3/MM3 (ref 0.1–0.9)
MONOCYTES NFR BLD AUTO: 11.3 % (ref 5–12)
NEUTROPHILS # BLD AUTO: 4.18 10*3/MM3 (ref 1.7–7)
NEUTROPHILS NFR BLD AUTO: 63.7 % (ref 42.7–76)
NRBC BLD AUTO-RTO: 0 /100 WBC (ref 0–0.2)
PLATELET # BLD AUTO: 173 10*3/MM3 (ref 140–450)
POTASSIUM SERPL-SCNC: 4.5 MMOL/L (ref 3.5–5.2)
PROT SERPL-MCNC: 5.6 G/DL (ref 6–8.5)
RBC # BLD AUTO: 4.84 10*6/MM3 (ref 4.14–5.8)
SODIUM SERPL-SCNC: 140 MMOL/L (ref 136–145)
VIT B12 SERPL-MCNC: 519 PG/ML (ref 211–946)
WBC # BLD AUTO: 6.57 10*3/MM3 (ref 3.4–10.8)

## 2020-12-10 RX ORDER — CHOLECALCIFEROL (VITAMIN D3) 25 MCG
1000 CAPSULE ORAL DAILY
Qty: 90 CAPSULE | Refills: 3 | Status: SHIPPED | OUTPATIENT
Start: 2020-12-10 | End: 2021-11-03

## 2020-12-14 NOTE — ASSESSMENT & PLAN NOTE
Will treat with ofloxacin eyedrops.  Patient encouraged to follow-up with ophthalmology if no resolution.

## 2020-12-14 NOTE — ASSESSMENT & PLAN NOTE
Controlled on current medication.  Patient will continue lisinopril, amlodipine, spironolactone, Terazosin, and Lasix.

## 2020-12-14 NOTE — ASSESSMENT & PLAN NOTE
Uncontrolled.  No samples have been given today.  Hopefully patient's Anoro will be covered after the first of the year again.  He may use albuterol as needed.

## 2020-12-14 NOTE — ASSESSMENT & PLAN NOTE
Patient is symptomatic, but may be more secondary to uncontrolled COPD.  Will obtain a BNP to rule out acute exacerbation of CHF.  Patient is to continue diuretics.  He will follow-up with cardiology as scheduled.

## 2020-12-17 ENCOUNTER — OFFICE VISIT (OUTPATIENT)
Dept: CARDIOLOGY | Facility: CLINIC | Age: 70
End: 2020-12-17

## 2020-12-17 VITALS
WEIGHT: 257 LBS | DIASTOLIC BLOOD PRESSURE: 88 MMHG | HEIGHT: 65 IN | HEART RATE: 72 BPM | SYSTOLIC BLOOD PRESSURE: 160 MMHG | BODY MASS INDEX: 42.82 KG/M2

## 2020-12-17 DIAGNOSIS — I48.0 PAROXYSMAL ATRIAL FIBRILLATION (HCC): ICD-10-CM

## 2020-12-17 DIAGNOSIS — I25.10 CORONARY ARTERY DISEASE INVOLVING NATIVE CORONARY ARTERY OF NATIVE HEART WITHOUT ANGINA PECTORIS: Primary | ICD-10-CM

## 2020-12-17 DIAGNOSIS — Z95.0 PRESENCE OF CARDIAC PACEMAKER: ICD-10-CM

## 2020-12-17 DIAGNOSIS — E78.2 MIXED HYPERLIPIDEMIA: ICD-10-CM

## 2020-12-17 DIAGNOSIS — I10 ESSENTIAL HYPERTENSION: ICD-10-CM

## 2020-12-17 PROCEDURE — 99214 OFFICE O/P EST MOD 30 MIN: CPT | Performed by: INTERNAL MEDICINE

## 2020-12-17 RX ORDER — TERAZOSIN 10 MG/1
10 CAPSULE ORAL NIGHTLY
Qty: 90 CAPSULE | Refills: 3 | Status: SHIPPED | OUTPATIENT
Start: 2020-12-17 | End: 2021-04-20 | Stop reason: SDUPTHER

## 2020-12-17 NOTE — PROGRESS NOTES
[facility]    Patient ID: Robe Bryant is a 70 y.o. male.  : 1950   Contact: [phone]    Encounter date: 2020    PCP: Radha Jean DO      Chief complaint:   Chief Complaint   Patient presents with   • Essential hypertension       Problem List:  1. Coronary artery disease:  a. [facility], 2009, Dr. Vasquez: Placement of a 3.0 x 23 mm Xience LATOYA to the ramus, 3.0 x 12 mm LATOYA to the mid-circumflex.  b. [facility], 10/11/2010: EF 45%, LVEDP 28.   c. [facility], 2011: 2.25 x 13 mm Cypher LATOYA placement to the distal circumflex.   d. [facility], 01/10/2012, PW: Noncritical CAD, patient ramus and left circumflex stents, LVH with near cavity obliteration.  e. Medical management.  f. Abnormal Cardiolite, 2013, [facility], showing moderate to severe perfusion defect of the basolateral wall of the LV.  g. [facility], 03/15/2013, [facility]: Widely patent stents of the LCx and ramus intermedius coronary arteries, no significant disease is seen in any territory.   h. Diastolic heart failure with hospital admission, May 2013, at [facility] in [place].  i. Recurrent anginal symptoms, 2014; initiation of Imdur therapy, 2014.  j. [facility], 09/15/2014, [facility]: Noncritical CAD with widely patent stents in the LCx and ramus intermedius arteries. Other sources for the patient’s chest discomfort should be considered.  k. [facility], 06/10/2016: EF Normal, widely patent ramus intermedius stent; no significant disease within LAD, LCx, RCA.  l. [facility], 2019: Noncritical CAD with patent stents noted. Aggressive risk factor modification which will include better control of the patient's excessively high blood pressure  2. Diastolic heart failure:  a. Echocardiogram, 2013, Hermes Vargas MD: Hyperdynamic LV with EF 75%, mild TR, trace MI.  b. Complaints of dyspnea at the time of office visit, 2013, with O2 saturation in the 91% to 95% range with  ambulation.  c. Echocardiogram, 09/15/2014: Moderate LVH with LVEF 60% to 65%. Mild MR and mild TR.   3. Hypertension, uncontrolled at the time of office visit, 05/22/2013.  4. Symptomatic bradycardia:  a. Continued symptoms of presyncope in the setting of hypotension and bradycardia, 02/16/2012.  b. PPM implantation, Dr. Tucker, 02/21/2012, [employer] RFDR, model #2210.  c. Hospitalization with acute dyspnea, 02/27/2013, at [facility] felt secondary to pacemaker-induced tachycardia.  d. Tilt Table (6/22/2017): Positive for orthostatic syncope. Patient developed abrupt symptomatic hypotension at 9 minutes, with any corresponding increase in heart rate. Consistent with vasodepressive response.  5. Paroxysmal atrial fibrillation:  a. Coumadin therapy followed by [facility].   b. CHADS-VASc score = 5. (Age 65-74, HTN, DM, HF, TIA?)  c. Admission to [facility], 06/13/2015 through 06/16/2015, with DC cardioversion and return to sinus rhythm and subsequent metoprolol dosage increase.  6. Recent symptoms of left-sided facial numbness and occasional left arm weakness.  7. Nonsustained VT per device interrogation, 09/04/2014.  8. Brief episodes of atrial tachycardia at the time of device interrogation, 05/03/2012.  9. Hyperlipidemia.  10. Type 2 diabetes mellitus.  11. Obstructive sleep apnea with CPAP therapy.  12. Questionable transient ischemic attack, January 2010, without workup:  a. Carotid duplex, 09/15/2014: Noncritical coronary artery disease with widely patent bilateral carotid arteries. Velocity is all within normal limits.   13. History of tobacco use with cessation x7 years.   14. Gastroesophageal reflux disease.  15. Hernia repair x3.   16. Chronic kidney disease with a creatinine of 2.1 during hospitalization, 05/17/2013.    No Known Allergies    Current Medications:    Current Outpatient Medications:   •  amLODIPine (NORVASC) 10 MG tablet, Take 1 tablet by mouth Daily., Disp: 30 tablet, Rfl: 1  •   apixaban (ELIQUIS) 5 MG tablet tablet, Take 5 mg by mouth 2 (Two) Times a Day., Disp: , Rfl:   •  aspirin 81 MG chewable tablet, Chew 81 mg Daily., Disp: , Rfl:   •  atorvastatin (LIPITOR) 20 MG tablet, TAKE 1 TABLET BY MOUTH EVERY NIGHT., Disp: 90 tablet, Rfl: 3  •  baclofen (LIORESAL) 10 MG tablet, Take 1 tablet by mouth 3 (Three) Times a Day As Needed for Muscle Spasms., Disp: 90 tablet, Rfl: 2  •  Blood Glucose Monitoring Suppl (TRUE METRIX AIR GLUCOSE METER) w/Device kit, 1 each by In Vitro route 2 (two) times a day. E11.9, Disp: 1 kit, Rfl: 0  •  Budeson-Glycopyrrol-Formoterol (Breztri Aerosphere) 160-9-4.8 MCG/ACT aerosol inhaler, Inhale 2 puffs 2 (Two) Times a Day., Disp: 1 each, Rfl: 0  •  cetirizine (zyrTEC) 10 MG tablet, TAKE 1 TABLET EVERY DAY, Disp: 90 tablet, Rfl: 5  •  Cholecalciferol (Vitamin D-3) 25 MCG (1000 UT) capsule, Take 1,000 Units by mouth Daily., Disp: 90 capsule, Rfl: 3  •  diclofenac (VOLTAREN) 1 % gel gel, Apply 4 g topically to the appropriate area as directed 4 (Four) Times a Day As Needed (pain)., Disp: 100 g, Rfl: 5  •  finasteride (PROSCAR) 5 MG tablet, TAKE 1 TABLET EVERY DAY, Disp: 90 tablet, Rfl: 0  •  FLUoxetine (PROzac) 20 MG capsule, Take 1 capsule by mouth Daily., Disp: 90 capsule, Rfl: 3  •  furosemide (Lasix) 20 MG tablet, Take 1 tablet by mouth every morning; may take second dose as needed for increased swelling, Disp: 60 tablet, Rfl: 2  •  glipizide (GLUCOTROL) 5 MG tablet, TAKE 1 TABLET BY MOUTH 2 TIMES A DAY BEFORE MEALS., Disp: 180 tablet, Rfl: 3  •  glucose blood (True Metrix Blood Glucose Test) test strip, Daily testing E11.9, Disp: 100 each, Rfl: 5  •  glucose monitor monitoring kit, 1 each Daily. E11.9, Disp: 1 each, Rfl: 0  •  ipratropium-albuterol (DUO-NEB) 0.5-2.5 mg/3 ml nebulizer, Take 3 mL by nebulization 4 (Four) Times a Day., Disp: 1620 mL, Rfl: 2  •  Lancet Device misc, 1 each Daily. E11.9, Disp: 100 each, Rfl: 3  •  lisinopril (PRINIVIL,ZESTRIL) 40 MG  tablet, Take 1 tablet by mouth Daily., Disp: 90 tablet, Rfl: 0  •  Multiple Vitamin tablet, Take 1 tablet by mouth daily., Disp: , Rfl:   •  nitroglycerin (NITROSTAT) 0.4 MG SL tablet, Place 1 tablet under the tongue Every 5 (Five) Minutes As Needed for Chest Pain., Disp: 25 tablet, Rfl: 11  •  ofloxacin (Ocuflox) 0.3 % ophthalmic solution, Administer 1 drop to both eyes 4 (Four) Times a Day., Disp: 10 mL, Rfl: 0  •  Olopatadine HCl 0.7 % solution, Apply 1 drop to eye(s) as directed by provider Daily., Disp: 2.5 mL, Rfl: 5  •  omeprazole (priLOSEC) 40 MG capsule, Take 1 capsule by mouth Daily., Disp: 90 capsule, Rfl: 3  •  potassium chloride (K-DUR,KLOR-CON) 10 MEQ CR tablet, Take 1 tablet by mouth 2 (Two) Times a Day., Disp: 60 tablet, Rfl: 2  •  pramipexole (MIRAPEX) 0.25 MG tablet, Take 1 tablet by mouth 3 (Three) Times a Day., Disp: 270 tablet, Rfl: 1  •  spironolactone (Aldactone) 50 MG tablet, Take 1 tablet by mouth Daily., Disp: 90 tablet, Rfl: 1  •  terazosin (HYTRIN) 5 MG capsule, Take 1 capsule by mouth Every Night., Disp: 90 capsule, Rfl: 3  •  TRUEplus Lancets 33G misc, 1 each by Other route Daily. E11.9, Disp: 100 each, Rfl: 5    HPI    Robe Bryant is a 70 y.o. male who presents today for a follow up of CAD, PAF, symptomatic bradycardia, pacemaker, and cardiac risk factors. Since last visit, he has mostly been feeling well from a cardiovascular standpoint. He reports his blood pressure has been elevated recently, averaging 170/90. He also acknowledge he has gained weight since last visit. He reports he had an episode of chest pain this past weekend with associated fast palpitations and high blood pressure. This episode resolved with Nitroglycerin. He feels this may have been an episode of atrial fibrillation.       The following portions of the patient's history were reviewed and updated as appropriate: allergies, current medications and problem list.    Pertinent positives as listed in the  "HPI.  All other systems reviewed are negative.         Vitals:    12/17/20 1337   BP: 160/88   BP Location: Left arm   Patient Position: Sitting   Pulse: 72   Weight: 117 kg (257 lb)   Height: 165.1 cm (65\")       Physical Exam:  General: Alert and oriented.  Neck: Jugular venous pressure is within normal limits. Carotids have normal upstrokes without bruits.   Cardiovascular: Heart has a nondisplaced focal PMI. Regular rate and rhythm. No gallop or rub. 1-2/6 MAGDA at RUSB.  Lungs: Clear, no rales or wheezes. Equal expansion is noted.   Extremities: Show trace edema bilaterally.  Skin: Warm and dry.  Neurologic: Nonfocal.     Diagnostic Data (reviewed with patient):  Lab Results   Component Value Date     (H) 12/01/2020    CALCIUM 8.8 12/01/2020     12/01/2020    K 4.5 12/01/2020    CO2 28.3 12/01/2020     12/01/2020    BUN 21 12/01/2020    CREATININE 1.25 12/01/2020    EGFRIFAFRI 69 12/01/2020    EGFRIFNONA 57 (L) 12/01/2020    BCR 16.8 12/01/2020    ANIONGAP 8.5 09/29/2020      Lab Results   Component Value Date    GLUCOSE 97 09/29/2020    BUN 21 12/01/2020    CREATININE 1.25 12/01/2020    EGFRIFNONA 57 (L) 12/01/2020    EGFRIFAFRI 69 12/01/2020    BCR 16.8 12/01/2020     12/01/2020    K 4.5 12/01/2020     12/01/2020    CO2 28.3 12/01/2020    CALCIUM 8.8 12/01/2020    ALBUMIN 3.70 12/01/2020    ALKPHOS 69 12/01/2020    AST 24 12/01/2020    ALT 22 12/01/2020     Lab Results   Component Value Date    CHLPL 124 07/29/2020    TRIG 163 (H) 07/29/2020    HDL 39 (L) 07/29/2020    LDL 52 07/29/2020      Lab Results   Component Value Date    WBC 6.57 12/01/2020    RBC 4.84 12/01/2020    HGB 13.7 12/01/2020    HCT 42.3 12/01/2020    MCV 87.4 12/01/2020     12/01/2020      Lab Results   Component Value Date    TSH 0.378 (L) 01/11/2018        Procedures      Assessment:    ICD-10-CM ICD-9-CM   1. Coronary artery disease involving native coronary artery of native heart without angina " pectoris  I25.10 414.01   2. Paroxysmal atrial fibrillation (CMS/HCC)  I48.0 427.31   3. Presence of cardiac pacemaker  Z95.0 V45.01   4. Essential hypertension  I10 401.9   5. Mixed hyperlipidemia  E78.2 272.2         Plan:  1. Increase Terazosin to 10 mg daily for better control of hypertension. Continue amlodipine, lisinopril, and metoprolol.  2. Metoprolol for rate control.  3. Encouraged CPAP compliance.  4. Continue aspirin for CAD s/p stents and NTG as needed for CP.  5. Continue Eliquis for stroke prophylaxis.  6. Continue atorvastatin for hyperlipidemia.  7. Continue Lasix for fluid retention.  8. Continue all other current medications.  9. F/up in 6 months, sooner if needed.    I, Gordo Dc, attest that this documentation has been prepared under the direction and in the presence of Arelis Tucker MD 12/17/2020    I Arelis Tucker MD personally performed the services described in this documentation as scribed by the above individual in my presence, and it is both accurate and complete.    Arelis Tucker MD, FACC

## 2021-01-25 ENCOUNTER — APPOINTMENT (OUTPATIENT)
Dept: GENERAL RADIOLOGY | Facility: HOSPITAL | Age: 71
End: 2021-01-25

## 2021-01-25 ENCOUNTER — TELEPHONE (OUTPATIENT)
Dept: CARDIOLOGY | Facility: CLINIC | Age: 71
End: 2021-01-25

## 2021-01-25 ENCOUNTER — TELEPHONE (OUTPATIENT)
Dept: INTERNAL MEDICINE | Facility: CLINIC | Age: 71
End: 2021-01-25

## 2021-01-25 ENCOUNTER — HOSPITAL ENCOUNTER (INPATIENT)
Facility: HOSPITAL | Age: 71
LOS: 1 days | Discharge: HOME | End: 2021-01-26
Attending: EMERGENCY MEDICINE | Admitting: EMERGENCY MEDICINE

## 2021-01-25 DIAGNOSIS — I48.91 ATRIAL FIBRILLATION WITH RVR (HCC): Primary | ICD-10-CM

## 2021-01-25 PROBLEM — E66.01 MORBID OBESITY: Status: ACTIVE | Noted: 2018-03-13

## 2021-01-25 LAB
ALBUMIN SERPL-MCNC: 3.5 G/DL (ref 3.5–5.2)
ALBUMIN/GLOB SERPL: 1.3 G/DL
ALP SERPL-CCNC: 80 U/L (ref 39–117)
ALT SERPL W P-5'-P-CCNC: 15 U/L (ref 1–41)
ANION GAP SERPL CALCULATED.3IONS-SCNC: 10.2 MMOL/L (ref 5–15)
AST SERPL-CCNC: 17 U/L (ref 1–40)
BASOPHILS # BLD AUTO: 0.07 10*3/MM3 (ref 0–0.2)
BASOPHILS NFR BLD AUTO: 0.7 % (ref 0–1.5)
BILIRUB SERPL-MCNC: 0.3 MG/DL (ref 0–1.2)
BUN SERPL-MCNC: 23 MG/DL (ref 8–23)
BUN/CREAT SERPL: 14.4 (ref 7–25)
CALCIUM SPEC-SCNC: 8.6 MG/DL (ref 8.6–10.5)
CHLORIDE SERPL-SCNC: 102 MMOL/L (ref 98–107)
CO2 SERPL-SCNC: 27.8 MMOL/L (ref 22–29)
CREAT SERPL-MCNC: 1.6 MG/DL (ref 0.76–1.27)
DEPRECATED RDW RBC AUTO: 42 FL (ref 37–54)
EOSINOPHIL # BLD AUTO: 0.24 10*3/MM3 (ref 0–0.4)
EOSINOPHIL NFR BLD AUTO: 2.4 % (ref 0.3–6.2)
ERYTHROCYTE [DISTWIDTH] IN BLOOD BY AUTOMATED COUNT: 12.8 % (ref 12.3–15.4)
GFR SERPL CREATININE-BSD FRML MDRD: 43 ML/MIN/1.73
GLOBULIN UR ELPH-MCNC: 2.8 GM/DL
GLUCOSE BLDC GLUCOMTR-MCNC: 193 MG/DL (ref 70–130)
GLUCOSE SERPL-MCNC: 156 MG/DL (ref 65–99)
HBA1C MFR BLD: 6.2 % (ref 4.8–5.6)
HCT VFR BLD AUTO: 45.3 % (ref 37.5–51)
HGB BLD-MCNC: 14.5 G/DL (ref 13–17.7)
HOLD SPECIMEN: NORMAL
IMM GRANULOCYTES # BLD AUTO: 0.11 10*3/MM3 (ref 0–0.05)
IMM GRANULOCYTES NFR BLD AUTO: 1.1 % (ref 0–0.5)
LYMPHOCYTES # BLD AUTO: 1.64 10*3/MM3 (ref 0.7–3.1)
LYMPHOCYTES NFR BLD AUTO: 16.1 % (ref 19.6–45.3)
MAGNESIUM SERPL-MCNC: 1.8 MG/DL (ref 1.6–2.4)
MCH RBC QN AUTO: 28.7 PG (ref 26.6–33)
MCHC RBC AUTO-ENTMCNC: 32 G/DL (ref 31.5–35.7)
MCV RBC AUTO: 89.5 FL (ref 79–97)
MONOCYTES # BLD AUTO: 0.85 10*3/MM3 (ref 0.1–0.9)
MONOCYTES NFR BLD AUTO: 8.4 % (ref 5–12)
NEUTROPHILS NFR BLD AUTO: 7.26 10*3/MM3 (ref 1.7–7)
NEUTROPHILS NFR BLD AUTO: 71.3 % (ref 42.7–76)
NRBC BLD AUTO-RTO: 0 /100 WBC (ref 0–0.2)
PLATELET # BLD AUTO: 235 10*3/MM3 (ref 140–450)
PMV BLD AUTO: 11.2 FL (ref 6–12)
POTASSIUM SERPL-SCNC: 4 MMOL/L (ref 3.5–5.2)
PROT SERPL-MCNC: 6.3 G/DL (ref 6–8.5)
RBC # BLD AUTO: 5.06 10*6/MM3 (ref 4.14–5.8)
SODIUM SERPL-SCNC: 140 MMOL/L (ref 136–145)
TROPONIN T SERPL-MCNC: 0.04 NG/ML (ref 0–0.03)
TROPONIN T SERPL-MCNC: 0.04 NG/ML (ref 0–0.03)
TSH SERPL DL<=0.05 MIU/L-ACNC: 0.73 UIU/ML (ref 0.27–4.2)
WBC # BLD AUTO: 10.17 10*3/MM3 (ref 3.4–10.8)
WHOLE BLOOD HOLD SPECIMEN: NORMAL
WHOLE BLOOD HOLD SPECIMEN: NORMAL

## 2021-01-25 PROCEDURE — 85025 COMPLETE CBC W/AUTO DIFF WBC: CPT | Performed by: PHYSICIAN ASSISTANT

## 2021-01-25 PROCEDURE — 80053 COMPREHEN METABOLIC PANEL: CPT | Performed by: PHYSICIAN ASSISTANT

## 2021-01-25 PROCEDURE — 25010000002 FUROSEMIDE PER 20 MG: Performed by: EMERGENCY MEDICINE

## 2021-01-25 PROCEDURE — 83036 HEMOGLOBIN GLYCOSYLATED A1C: CPT | Performed by: EMERGENCY MEDICINE

## 2021-01-25 PROCEDURE — 84443 ASSAY THYROID STIM HORMONE: CPT | Performed by: EMERGENCY MEDICINE

## 2021-01-25 PROCEDURE — 84484 ASSAY OF TROPONIN QUANT: CPT | Performed by: EMERGENCY MEDICINE

## 2021-01-25 PROCEDURE — 94660 CPAP INITIATION&MGMT: CPT

## 2021-01-25 PROCEDURE — 99223 1ST HOSP IP/OBS HIGH 75: CPT | Performed by: EMERGENCY MEDICINE

## 2021-01-25 PROCEDURE — 93005 ELECTROCARDIOGRAM TRACING: CPT | Performed by: EMERGENCY MEDICINE

## 2021-01-25 PROCEDURE — 25010000002 MAGNESIUM SULFATE IN D5W 1G/100ML (PREMIX) 1-5 GM/100ML-% SOLUTION: Performed by: EMERGENCY MEDICINE

## 2021-01-25 PROCEDURE — 94640 AIRWAY INHALATION TREATMENT: CPT

## 2021-01-25 PROCEDURE — 63710000001 INSULIN DETEMIR PER 5 UNITS: Performed by: EMERGENCY MEDICINE

## 2021-01-25 PROCEDURE — 84484 ASSAY OF TROPONIN QUANT: CPT | Performed by: PHYSICIAN ASSISTANT

## 2021-01-25 PROCEDURE — 94799 UNLISTED PULMONARY SVC/PX: CPT

## 2021-01-25 PROCEDURE — 82962 GLUCOSE BLOOD TEST: CPT

## 2021-01-25 PROCEDURE — 83735 ASSAY OF MAGNESIUM: CPT | Performed by: PHYSICIAN ASSISTANT

## 2021-01-25 PROCEDURE — 71045 X-RAY EXAM CHEST 1 VIEW: CPT

## 2021-01-25 PROCEDURE — 99284 EMERGENCY DEPT VISIT MOD MDM: CPT

## 2021-01-25 PROCEDURE — 93005 ELECTROCARDIOGRAM TRACING: CPT | Performed by: PHYSICIAN ASSISTANT

## 2021-01-25 RX ORDER — PANTOPRAZOLE SODIUM 40 MG/1
40 TABLET, DELAYED RELEASE ORAL EVERY MORNING
Status: DISCONTINUED | OUTPATIENT
Start: 2021-01-26 | End: 2021-01-26 | Stop reason: HOSPADM

## 2021-01-25 RX ORDER — SODIUM CHLORIDE 0.9 % (FLUSH) 0.9 %
10 SYRINGE (ML) INJECTION AS NEEDED
Status: DISCONTINUED | OUTPATIENT
Start: 2021-01-25 | End: 2021-01-26 | Stop reason: HOSPADM

## 2021-01-25 RX ORDER — ACETAMINOPHEN 650 MG/1
650 SUPPOSITORY RECTAL EVERY 4 HOURS PRN
Status: DISCONTINUED | OUTPATIENT
Start: 2021-01-25 | End: 2021-01-26 | Stop reason: HOSPADM

## 2021-01-25 RX ORDER — FLUOXETINE HYDROCHLORIDE 20 MG/1
20 CAPSULE ORAL DAILY
Status: DISCONTINUED | OUTPATIENT
Start: 2021-01-25 | End: 2021-01-26 | Stop reason: HOSPADM

## 2021-01-25 RX ORDER — ACETAMINOPHEN 325 MG/1
650 TABLET ORAL EVERY 6 HOURS PRN
Status: DISCONTINUED | OUTPATIENT
Start: 2021-01-25 | End: 2021-01-26 | Stop reason: HOSPADM

## 2021-01-25 RX ORDER — ACETAMINOPHEN 160 MG/5ML
650 SOLUTION ORAL EVERY 4 HOURS PRN
Status: DISCONTINUED | OUTPATIENT
Start: 2021-01-25 | End: 2021-01-26 | Stop reason: HOSPADM

## 2021-01-25 RX ORDER — DILTIAZEM HYDROCHLORIDE 5 MG/ML
10 INJECTION INTRAVENOUS ONCE
Status: COMPLETED | OUTPATIENT
Start: 2021-01-25 | End: 2021-01-25

## 2021-01-25 RX ORDER — FUROSEMIDE 10 MG/ML
40 INJECTION INTRAMUSCULAR; INTRAVENOUS EVERY 6 HOURS
Status: COMPLETED | OUTPATIENT
Start: 2021-01-25 | End: 2021-01-26

## 2021-01-25 RX ORDER — CETIRIZINE HYDROCHLORIDE 10 MG/1
5 TABLET ORAL DAILY
Status: DISCONTINUED | OUTPATIENT
Start: 2021-01-26 | End: 2021-01-26 | Stop reason: HOSPADM

## 2021-01-25 RX ORDER — ATORVASTATIN CALCIUM 20 MG/1
20 TABLET, FILM COATED ORAL NIGHTLY
Status: DISCONTINUED | OUTPATIENT
Start: 2021-01-25 | End: 2021-01-26 | Stop reason: HOSPADM

## 2021-01-25 RX ORDER — BUDESONIDE AND FORMOTEROL FUMARATE DIHYDRATE 160; 4.5 UG/1; UG/1
2 AEROSOL RESPIRATORY (INHALATION)
Status: DISCONTINUED | OUTPATIENT
Start: 2021-01-25 | End: 2021-01-26 | Stop reason: HOSPADM

## 2021-01-25 RX ORDER — METOPROLOL TARTRATE 50 MG/1
50 TABLET, FILM COATED ORAL EVERY 12 HOURS SCHEDULED
Status: DISCONTINUED | OUTPATIENT
Start: 2021-01-25 | End: 2021-01-26

## 2021-01-25 RX ORDER — TRAMADOL HYDROCHLORIDE 50 MG/1
50 TABLET ORAL EVERY 6 HOURS PRN
COMMUNITY
Start: 2021-01-15 | End: 2021-05-17

## 2021-01-25 RX ORDER — NITROGLYCERIN 0.4 MG/1
0.4 TABLET SUBLINGUAL
Status: DISCONTINUED | OUTPATIENT
Start: 2021-01-25 | End: 2021-01-26 | Stop reason: HOSPADM

## 2021-01-25 RX ORDER — METOPROLOL TARTRATE 5 MG/5ML
5 INJECTION INTRAVENOUS ONCE
Status: DISCONTINUED | OUTPATIENT
Start: 2021-01-25 | End: 2021-01-25

## 2021-01-25 RX ORDER — ONDANSETRON 2 MG/ML
4 INJECTION INTRAMUSCULAR; INTRAVENOUS EVERY 6 HOURS PRN
Status: DISCONTINUED | OUTPATIENT
Start: 2021-01-25 | End: 2021-01-26 | Stop reason: HOSPADM

## 2021-01-25 RX ORDER — BACLOFEN 10 MG/1
10 TABLET ORAL 3 TIMES DAILY PRN
Status: DISCONTINUED | OUTPATIENT
Start: 2021-01-25 | End: 2021-01-26 | Stop reason: HOSPADM

## 2021-01-25 RX ORDER — ASPIRIN 81 MG/1
81 TABLET, CHEWABLE ORAL DAILY
Status: DISCONTINUED | OUTPATIENT
Start: 2021-01-25 | End: 2021-01-26 | Stop reason: HOSPADM

## 2021-01-25 RX ORDER — ACETAMINOPHEN 325 MG/1
650 TABLET ORAL EVERY 4 HOURS PRN
Status: DISCONTINUED | OUTPATIENT
Start: 2021-01-25 | End: 2021-01-26 | Stop reason: HOSPADM

## 2021-01-25 RX ORDER — SODIUM CHLORIDE 0.9 % (FLUSH) 0.9 %
10 SYRINGE (ML) INJECTION EVERY 12 HOURS SCHEDULED
Status: DISCONTINUED | OUTPATIENT
Start: 2021-01-25 | End: 2021-01-26 | Stop reason: HOSPADM

## 2021-01-25 RX ORDER — DEXTROSE MONOHYDRATE 25 G/50ML
25 INJECTION, SOLUTION INTRAVENOUS
Status: DISCONTINUED | OUTPATIENT
Start: 2021-01-25 | End: 2021-01-26 | Stop reason: HOSPADM

## 2021-01-25 RX ORDER — MAGNESIUM SULFATE 1 G/100ML
1 INJECTION INTRAVENOUS ONCE
Status: COMPLETED | OUTPATIENT
Start: 2021-01-25 | End: 2021-01-25

## 2021-01-25 RX ORDER — TERAZOSIN 5 MG/1
10 CAPSULE ORAL NIGHTLY
Status: DISCONTINUED | OUTPATIENT
Start: 2021-01-25 | End: 2021-01-26 | Stop reason: HOSPADM

## 2021-01-25 RX ORDER — NICOTINE POLACRILEX 4 MG
1 LOZENGE BUCCAL
Status: DISCONTINUED | OUTPATIENT
Start: 2021-01-25 | End: 2021-01-26 | Stop reason: HOSPADM

## 2021-01-25 RX ADMIN — MAGNESIUM SULFATE HEPTAHYDRATE 1 G: 1 INJECTION, SOLUTION INTRAVENOUS at 18:18

## 2021-01-25 RX ADMIN — DILTIAZEM HYDROCHLORIDE 10 MG: 5 INJECTION, SOLUTION INTRAVENOUS at 15:49

## 2021-01-25 RX ADMIN — INSULIN DETEMIR 10 UNITS: 100 INJECTION, SOLUTION SUBCUTANEOUS at 22:21

## 2021-01-25 RX ADMIN — APIXABAN 5 MG: 5 TABLET, FILM COATED ORAL at 20:19

## 2021-01-25 RX ADMIN — FUROSEMIDE 40 MG: 10 INJECTION, SOLUTION INTRAMUSCULAR; INTRAVENOUS at 20:20

## 2021-01-25 RX ADMIN — DILTIAZEM HYDROCHLORIDE 5 MG/HR: 100 INJECTION, POWDER, LYOPHILIZED, FOR SOLUTION INTRAVENOUS at 17:15

## 2021-01-25 RX ADMIN — METOPROLOL TARTRATE 50 MG: 50 TABLET, FILM COATED ORAL at 20:20

## 2021-01-25 RX ADMIN — ATORVASTATIN CALCIUM 20 MG: 20 TABLET, FILM COATED ORAL at 20:19

## 2021-01-25 RX ADMIN — ACETAMINOPHEN 650 MG: 325 TABLET, FILM COATED ORAL at 21:09

## 2021-01-25 RX ADMIN — SODIUM CHLORIDE, PRESERVATIVE FREE 10 ML: 5 INJECTION INTRAVENOUS at 23:51

## 2021-01-25 RX ADMIN — TERAZOSIN HYDROCHLORIDE 10 MG: 5 CAPSULE ORAL at 20:19

## 2021-01-25 RX ADMIN — BUDESONIDE AND FORMOTEROL FUMARATE DIHYDRATE 2 PUFF: 160; 4.5 AEROSOL RESPIRATORY (INHALATION) at 21:14

## 2021-01-25 RX ADMIN — SODIUM CHLORIDE, PRESERVATIVE FREE 10 ML: 5 INJECTION INTRAVENOUS at 20:20

## 2021-01-25 NOTE — TELEPHONE ENCOUNTER
Per remote monitor transmission, ongoing Afib since 1/22/2021 @ 10:12AM, Max V-rate 199. See report in [facility].    Spoke with patient, he reports:     1)  Increased shortness of breath for 2-3 weeks, some increase in swelling to feet/ankles, increased fatigue.    2)  Additionally, patient states approximately one week ago, while sitting & playing cards, experienced an episode of chest pain, radiating to Lt arm accompanied by diaphoresis, N/V. States he rested in a recliner chair and chest pain resolved with 2 nitroglycerin tablets. Pt denies further episodes since then. Pt states he is taking all medications as prescribed. Strongly advised patient to seek medical treatment at the closest ER if he experiences further chest pain. Pt verbalized understanding.

## 2021-01-26 ENCOUNTER — APPOINTMENT (OUTPATIENT)
Dept: CARDIOLOGY | Facility: HOSPITAL | Age: 71
End: 2021-01-26

## 2021-01-26 ENCOUNTER — READMISSION MANAGEMENT (OUTPATIENT)
Dept: CALL CENTER | Facility: HOSPITAL | Age: 71
End: 2021-01-26

## 2021-01-26 VITALS
DIASTOLIC BLOOD PRESSURE: 89 MMHG | WEIGHT: 246.03 LBS | OXYGEN SATURATION: 93 % | HEART RATE: 101 BPM | RESPIRATION RATE: 19 BRPM | SYSTOLIC BLOOD PRESSURE: 115 MMHG | HEIGHT: 65 IN | TEMPERATURE: 97.7 F | BODY MASS INDEX: 40.99 KG/M2

## 2021-01-26 LAB
BH CV ECHO MEAS - % IVS THICK: 47.9 %
BH CV ECHO MEAS - % LVPW THICK: 16.6 %
BH CV ECHO MEAS - AO MAX PG (FULL): 0.64 MMHG
BH CV ECHO MEAS - AO MAX PG: 4 MMHG
BH CV ECHO MEAS - AO MEAN PG (FULL): 0 MMHG
BH CV ECHO MEAS - AO MEAN PG: 2 MMHG
BH CV ECHO MEAS - AO ROOT AREA (BSA CORRECTED): 1.6
BH CV ECHO MEAS - AO ROOT AREA: 9.8 CM^2
BH CV ECHO MEAS - AO ROOT DIAM: 3.5 CM
BH CV ECHO MEAS - AO V2 MAX: 106 CM/SEC
BH CV ECHO MEAS - AO V2 MEAN: 71.9 CM/SEC
BH CV ECHO MEAS - AO V2 VTI: 16.8 CM
BH CV ECHO MEAS - AVA(I,A): 2.3 CM^2
BH CV ECHO MEAS - AVA(I,D): 2.3 CM^2
BH CV ECHO MEAS - AVA(V,A): 2.2 CM^2
BH CV ECHO MEAS - AVA(V,D): 2.2 CM^2
BH CV ECHO MEAS - BSA(HAYCOCK): 2.3 M^2
BH CV ECHO MEAS - BSA: 2.2 M^2
BH CV ECHO MEAS - BZI_BMI: 40.9 KILOGRAMS/M^2
BH CV ECHO MEAS - BZI_METRIC_HEIGHT: 165.1 CM
BH CV ECHO MEAS - BZI_METRIC_WEIGHT: 111.6 KG
BH CV ECHO MEAS - EDV(CUBED): 83.5 ML
BH CV ECHO MEAS - EDV(MOD-SP2): 74.1 ML
BH CV ECHO MEAS - EDV(MOD-SP4): 80 ML
BH CV ECHO MEAS - EDV(TEICH): 86.3 ML
BH CV ECHO MEAS - EF(CUBED): 78.2 %
BH CV ECHO MEAS - EF(MOD-BP): 54.7 %
BH CV ECHO MEAS - EF(MOD-SP2): 49.4 %
BH CV ECHO MEAS - EF(MOD-SP4): 57.5 %
BH CV ECHO MEAS - EF(TEICH): 70.7 %
BH CV ECHO MEAS - ESV(CUBED): 18.2 ML
BH CV ECHO MEAS - ESV(MOD-SP2): 37.5 ML
BH CV ECHO MEAS - ESV(MOD-SP4): 34 ML
BH CV ECHO MEAS - ESV(TEICH): 25.3 ML
BH CV ECHO MEAS - FS: 39.8 %
BH CV ECHO MEAS - IVS/LVPW: 0.91
BH CV ECHO MEAS - IVSD: 1.7 CM
BH CV ECHO MEAS - IVSS: 2.4 CM
BH CV ECHO MEAS - LA DIMENSION: 4.7 CM
BH CV ECHO MEAS - LA/AO: 1.3
BH CV ECHO MEAS - LAD MAJOR: 6.2 CM
BH CV ECHO MEAS - LAT PEAK E' VEL: 6.6 CM/SEC
BH CV ECHO MEAS - LATERAL E/E' RATIO: 7
BH CV ECHO MEAS - LV DIASTOLIC VOL/BSA (35-75): 37 ML/M^2
BH CV ECHO MEAS - LV MASS(C)D: 330.6 GRAMS
BH CV ECHO MEAS - LV MASS(C)DI: 153 GRAMS/M^2
BH CV ECHO MEAS - LV MASS(C)S: 293.4 GRAMS
BH CV ECHO MEAS - LV MASS(C)SI: 135.8 GRAMS/M^2
BH CV ECHO MEAS - LV MAX PG: 3.4 MMHG
BH CV ECHO MEAS - LV MEAN PG: 2 MMHG
BH CV ECHO MEAS - LV SYSTOLIC VOL/BSA (12-30): 15.7 ML/M^2
BH CV ECHO MEAS - LV V1 MAX: 91.7 CM/SEC
BH CV ECHO MEAS - LV V1 MEAN: 62.3 CM/SEC
BH CV ECHO MEAS - LV V1 VTI: 15.3 CM
BH CV ECHO MEAS - LVIDD: 4.4 CM
BH CV ECHO MEAS - LVIDS: 2.6 CM
BH CV ECHO MEAS - LVLD AP2: 6.7 CM
BH CV ECHO MEAS - LVLD AP4: 7 CM
BH CV ECHO MEAS - LVLS AP2: 6 CM
BH CV ECHO MEAS - LVLS AP4: 5.9 CM
BH CV ECHO MEAS - LVOT AREA (M): 2.5 CM^2
BH CV ECHO MEAS - LVOT AREA: 2.5 CM^2
BH CV ECHO MEAS - LVOT DIAM: 1.8 CM
BH CV ECHO MEAS - LVPWD: 1.8 CM
BH CV ECHO MEAS - LVPWS: 2.1 CM
BH CV ECHO MEAS - MED PEAK E' VEL: 3.8 CM/SEC
BH CV ECHO MEAS - MEDIAL E/E' RATIO: 12.3
BH CV ECHO MEAS - MV A MAX VEL: 94.7 CM/SEC
BH CV ECHO MEAS - MV DEC TIME: 0.03 SEC
BH CV ECHO MEAS - MV E MAX VEL: 46.7 CM/SEC
BH CV ECHO MEAS - MV E/A: 0.49
BH CV ECHO MEAS - PA ACC TIME: 0.1 SEC
BH CV ECHO MEAS - PA MAX PG (FULL): 2.7 MMHG
BH CV ECHO MEAS - PA MAX PG: 3.9 MMHG
BH CV ECHO MEAS - PA MEAN PG (FULL): 1 MMHG
BH CV ECHO MEAS - PA MEAN PG: 2 MMHG
BH CV ECHO MEAS - PA PR(ACCEL): 36.3 MMHG
BH CV ECHO MEAS - PA V2 MAX: 99 CM/SEC
BH CV ECHO MEAS - PA V2 MEAN: 71.5 CM/SEC
BH CV ECHO MEAS - PA V2 VTI: 21.2 CM
BH CV ECHO MEAS - RAP SYSTOLE: 3 MMHG
BH CV ECHO MEAS - RV MAX PG: 1.3 MMHG
BH CV ECHO MEAS - RV MEAN PG: 1 MMHG
BH CV ECHO MEAS - RV V1 MAX: 56.2 CM/SEC
BH CV ECHO MEAS - RV V1 MEAN: 33.5 CM/SEC
BH CV ECHO MEAS - RV V1 VTI: 9.4 CM
BH CV ECHO MEAS - SI(AO): 76.1 ML/M^2
BH CV ECHO MEAS - SI(CUBED): 30.2 ML/M^2
BH CV ECHO MEAS - SI(LVOT): 18 ML/M^2
BH CV ECHO MEAS - SI(MOD-SP2): 16.9 ML/M^2
BH CV ECHO MEAS - SI(MOD-SP4): 21.3 ML/M^2
BH CV ECHO MEAS - SI(TEICH): 28.2 ML/M^2
BH CV ECHO MEAS - SV(AO): 164.4 ML
BH CV ECHO MEAS - SV(CUBED): 65.3 ML
BH CV ECHO MEAS - SV(LVOT): 38.9 ML
BH CV ECHO MEAS - SV(MOD-SP2): 36.6 ML
BH CV ECHO MEAS - SV(MOD-SP4): 46 ML
BH CV ECHO MEAS - SV(TEICH): 61 ML
BH CV ECHO MEAS - TAPSE (>1.6): 2.1 CM
BH CV ECHO MEASUREMENTS AVERAGE E/E' RATIO: 8.98
BH CV XLRA - RV BASE: 4.7 CM
BH CV XLRA - RV LENGTH: 7.2 CM
BH CV XLRA - RV MID: 4.1 CM
BH CV XLRA - TDI S': 6.6 CM/SEC
GLUCOSE BLDC GLUCOMTR-MCNC: 145 MG/DL (ref 70–130)
GLUCOSE BLDC GLUCOMTR-MCNC: 177 MG/DL (ref 70–130)
LEFT ATRIUM VOLUME INDEX: 33.3 ML/M^2
LEFT ATRIUM VOLUME: 71.9 ML
LV EF 2D ECHO EST: 57 %
MAXIMAL PREDICTED HEART RATE: 149 BPM
QT INTERVAL: 356 MS
QTC INTERVAL: 479 MS
STRESS TARGET HR: 127 BPM

## 2021-01-26 PROCEDURE — 63710000001 INSULIN ASPART PER 5 UNITS: Performed by: EMERGENCY MEDICINE

## 2021-01-26 PROCEDURE — 93306 TTE W/DOPPLER COMPLETE: CPT

## 2021-01-26 PROCEDURE — 94660 CPAP INITIATION&MGMT: CPT

## 2021-01-26 PROCEDURE — 82962 GLUCOSE BLOOD TEST: CPT

## 2021-01-26 PROCEDURE — 94799 UNLISTED PULMONARY SVC/PX: CPT

## 2021-01-26 PROCEDURE — 99222 1ST HOSP IP/OBS MODERATE 55: CPT | Performed by: INTERNAL MEDICINE

## 2021-01-26 PROCEDURE — 93306 TTE W/DOPPLER COMPLETE: CPT | Performed by: INTERNAL MEDICINE

## 2021-01-26 PROCEDURE — 25010000002 FUROSEMIDE PER 20 MG: Performed by: EMERGENCY MEDICINE

## 2021-01-26 PROCEDURE — 99238 HOSP IP/OBS DSCHRG MGMT 30/<: CPT | Performed by: NURSE PRACTITIONER

## 2021-01-26 RX ORDER — METOPROLOL TARTRATE 100 MG/1
100 TABLET ORAL EVERY 12 HOURS SCHEDULED
Qty: 60 TABLET | Refills: 0 | Status: SHIPPED | OUTPATIENT
Start: 2021-01-26 | End: 2021-01-26

## 2021-01-26 RX ORDER — METOPROLOL TARTRATE 50 MG/1
100 TABLET, FILM COATED ORAL EVERY 12 HOURS SCHEDULED
Status: DISCONTINUED | OUTPATIENT
Start: 2021-01-26 | End: 2021-01-26 | Stop reason: HOSPADM

## 2021-01-26 RX ORDER — AMIODARONE HYDROCHLORIDE 200 MG/1
200 TABLET ORAL
Status: DISCONTINUED | OUTPATIENT
Start: 2021-01-26 | End: 2021-01-26 | Stop reason: HOSPADM

## 2021-01-26 RX ORDER — METOPROLOL TARTRATE 100 MG/1
50 TABLET ORAL EVERY 12 HOURS SCHEDULED
Qty: 60 TABLET | Refills: 0 | Status: SHIPPED | OUTPATIENT
Start: 2021-01-26 | End: 2021-01-26 | Stop reason: HOSPADM

## 2021-01-26 RX ORDER — METOPROLOL SUCCINATE 50 MG/1
50 TABLET, EXTENDED RELEASE ORAL DAILY
Qty: 30 TABLET | Refills: 0 | Status: SHIPPED | OUTPATIENT
Start: 2021-01-26

## 2021-01-26 RX ORDER — AMIODARONE HYDROCHLORIDE 200 MG/1
200 TABLET ORAL
Qty: 30 TABLET | Refills: 0 | Status: SHIPPED | OUTPATIENT
Start: 2021-01-27

## 2021-01-26 RX ADMIN — DILTIAZEM HYDROCHLORIDE 5 MG/HR: 100 INJECTION, POWDER, LYOPHILIZED, FOR SOLUTION INTRAVENOUS at 05:37

## 2021-01-26 RX ADMIN — INSULIN ASPART 2 UNITS: 100 INJECTION, SOLUTION INTRAVENOUS; SUBCUTANEOUS at 12:07

## 2021-01-26 RX ADMIN — PANTOPRAZOLE SODIUM 40 MG: 40 TABLET, DELAYED RELEASE ORAL at 06:16

## 2021-01-26 RX ADMIN — FUROSEMIDE 40 MG: 10 INJECTION, SOLUTION INTRAMUSCULAR; INTRAVENOUS at 02:09

## 2021-01-26 RX ADMIN — CETIRIZINE HYDROCHLORIDE 5 MG: 10 TABLET, FILM COATED ORAL at 09:30

## 2021-01-26 RX ADMIN — FUROSEMIDE 40 MG: 10 INJECTION, SOLUTION INTRAMUSCULAR; INTRAVENOUS at 10:13

## 2021-01-26 RX ADMIN — METOPROLOL TARTRATE 50 MG: 50 TABLET, FILM COATED ORAL at 09:25

## 2021-01-26 RX ADMIN — FLUOXETINE 20 MG: 20 CAPSULE ORAL at 10:00

## 2021-01-26 RX ADMIN — ASPIRIN 81 MG CHEWABLE TABLET 81 MG: 81 TABLET CHEWABLE at 09:13

## 2021-01-26 RX ADMIN — APIXABAN 5 MG: 5 TABLET, FILM COATED ORAL at 09:00

## 2021-01-26 RX ADMIN — BUDESONIDE AND FORMOTEROL FUMARATE DIHYDRATE 2 PUFF: 160; 4.5 AEROSOL RESPIRATORY (INHALATION) at 07:33

## 2021-01-26 RX ADMIN — AMIODARONE HYDROCHLORIDE 200 MG: 200 TABLET ORAL at 11:14

## 2021-01-27 ENCOUNTER — TRANSITIONAL CARE MANAGEMENT TELEPHONE ENCOUNTER (OUTPATIENT)
Dept: CALL CENTER | Facility: HOSPITAL | Age: 71
End: 2021-01-27

## 2021-01-27 NOTE — TELEPHONE ENCOUNTER
Would increase terazosin to 5 mg twice daily if his blood pressure is running higher than 130 systolic.  Could increase Lasix to 40 mg daily.  We need to check a BMP in 2 weeks.

## 2021-01-27 NOTE — OUTREACH NOTE
Call Center TCM Note      Responses   [facility] [place] patient discharged from?  [facility]   Does the patient have one of the following disease processes/diagnoses(primary or secondary)?  Other   TCM attempt successful?  Yes   Call start time  1146   Call end time  1149   Discharge diagnosis  Atrial fibrillation with RVR    Is patient permission given to speak with other caregiver?  No   Meds reviewed with patient/caregiver?  Yes   Is the patient having any side effects they believe may be caused by any medication additions or changes?  No   Does the patient have all medications ordered at discharge?  Yes   Is the patient taking all medications as directed (includes completed medication regime)?  Yes   Does the patient have a primary care provider?   Yes   Does the patient have an appointment with their PCP within 7 days of discharge?  Yes   Comments regarding PCP  Radha Jean, DO PCP. Hospital follow up scheduled for Friday Jan 29, 2021 @11:15 am   Has the patient kept scheduled appointments due by today?  N/A   Has home health visited the patient within 72 hours of discharge?  N/A   Psychosocial issues?  No   Comments  Patient keeping b/p, HR log to bring to appts.    Did the patient receive a copy of their discharge instructions?  Yes   Nursing interventions  Reviewed instructions with patient   What is the patient's perception of their health status since discharge?  Improving   Is the patient/caregiver able to teach back signs and symptoms related to disease process for when to call PCP?  Yes   Is the patient/caregiver able to teach back signs and symptoms related to disease process for when to call 911?  Yes   Is the patient/caregiver able to teach back the hierarchy of who to call/visit for symptoms/problems? PCP, Specialist, Home health nurse, Urgent Care, ED, 911  Yes   If the patient is a current smoker, are they able to teach back resources for cessation?  Not a smoker   TCM call completed?  Yes           Vera James RN    1/27/2021, 11:49 EST

## 2021-01-29 ENCOUNTER — OFFICE VISIT (OUTPATIENT)
Dept: INTERNAL MEDICINE | Facility: CLINIC | Age: 71
End: 2021-01-29

## 2021-01-29 ENCOUNTER — TELEPHONE (OUTPATIENT)
Dept: INTERNAL MEDICINE | Facility: CLINIC | Age: 71
End: 2021-01-29

## 2021-01-29 VITALS
HEIGHT: 65 IN | HEART RATE: 89 BPM | SYSTOLIC BLOOD PRESSURE: 124 MMHG | BODY MASS INDEX: 41.29 KG/M2 | WEIGHT: 247.8 LBS | DIASTOLIC BLOOD PRESSURE: 80 MMHG | TEMPERATURE: 96.9 F | OXYGEN SATURATION: 98 %

## 2021-01-29 DIAGNOSIS — Z95.0 PACEMAKER: ICD-10-CM

## 2021-01-29 DIAGNOSIS — I50.33 ACUTE ON CHRONIC DIASTOLIC HEART FAILURE (HCC): ICD-10-CM

## 2021-01-29 DIAGNOSIS — I48.91 ATRIAL FIBRILLATION WITH RVR (HCC): ICD-10-CM

## 2021-01-29 DIAGNOSIS — J43.1 PANLOBULAR EMPHYSEMA (HCC): ICD-10-CM

## 2021-01-29 DIAGNOSIS — I10 ESSENTIAL HYPERTENSION: Primary | ICD-10-CM

## 2021-01-29 PROCEDURE — 99495 TRANSJ CARE MGMT MOD F2F 14D: CPT | Performed by: FAMILY MEDICINE

## 2021-01-29 RX ORDER — LISINOPRIL 40 MG/1
40 TABLET ORAL DAILY
Qty: 90 TABLET | Refills: 3 | Status: SHIPPED | OUTPATIENT
Start: 2021-01-29 | End: 2021-04-20 | Stop reason: SDUPTHER

## 2021-01-29 RX ORDER — FLUOXETINE HYDROCHLORIDE 20 MG/1
20 CAPSULE ORAL DAILY
Qty: 90 CAPSULE | Refills: 3 | Status: SHIPPED | OUTPATIENT
Start: 2021-01-29 | End: 2021-04-20 | Stop reason: SDUPTHER

## 2021-01-29 RX ORDER — PRAMIPEXOLE DIHYDROCHLORIDE 0.25 MG/1
0.25 TABLET ORAL 3 TIMES DAILY
Qty: 270 TABLET | Refills: 3 | Status: SHIPPED | OUTPATIENT
Start: 2021-01-29 | End: 2021-02-25 | Stop reason: SDUPTHER

## 2021-01-29 RX ORDER — ATORVASTATIN CALCIUM 20 MG/1
20 TABLET, FILM COATED ORAL NIGHTLY
Qty: 90 TABLET | Refills: 3 | Status: SHIPPED | OUTPATIENT
Start: 2021-01-29 | End: 2021-04-20 | Stop reason: SDUPTHER

## 2021-01-29 RX ORDER — FINASTERIDE 5 MG/1
5 TABLET, FILM COATED ORAL DAILY
Qty: 90 TABLET | Refills: 3 | Status: SHIPPED | OUTPATIENT
Start: 2021-01-29 | End: 2021-04-20 | Stop reason: SDUPTHER

## 2021-01-29 NOTE — TELEPHONE ENCOUNTER
PHARMACY STATES apixaban (ELIQUIS) 5 MG tablet tablet IS USUALLY BID BUT DIRECTIONS SAY ONCE DAILY.     PLEASE CLARIFY [phone]

## 2021-02-02 ENCOUNTER — READMISSION MANAGEMENT (OUTPATIENT)
Dept: CALL CENTER | Facility: HOSPITAL | Age: 71
End: 2021-02-02

## 2021-02-02 NOTE — OUTREACH NOTE
Medical Week 2 Survey      Responses   [facility] patient discharged from?  [facility]   Does the patient have one of the following disease processes/diagnoses(primary or secondary)?  Other   Week 2 attempt successful?  Yes   Call start time  1701   Discharge diagnosis  Atrial fibrillation with RVR    Call end time  1707   Is patient permission given to speak with other caregiver?  Yes   List who call center can speak with  spouse, Berny Bryant   Person spoke with today (if not patient) and relationship  spouse and patient    Meds reviewed with patient/caregiver?  Yes   Is the patient having any side effects they believe may be caused by any medication additions or changes?  No   Does the patient have all medications ordered at discharge?  Yes   Is the patient taking all medications as directed (includes completed medication regime)?  Yes   Does the patient have a primary care provider?   Yes   Does the patient have an appointment with their PCP within 7 days of discharge?  Yes   Comments regarding PCP  Radha Jean, DO PCP. Patient has seen since discharge.    Has the patient kept scheduled appointments due by today?  Yes   Has home health visited the patient within 72 hours of discharge?  N/A   Psychosocial issues?  No   Did the patient receive a copy of their discharge instructions?  Yes   Nursing interventions  Reviewed instructions with patient   What is the patient's perception of their health status since discharge?  Improving   Is the patient/caregiver able to teach back signs and symptoms related to disease process for when to call PCP?  Yes   Is the patient/caregiver able to teach back signs and symptoms related to disease process for when to call 911?  Yes   Is the patient/caregiver able to teach back the hierarchy of who to call/visit for symptoms/problems? PCP, Specialist, Home health nurse, Urgent Care, ED, 911  Yes   If the patient is a current smoker, are they able to teach back resources  for cessation?  Not a smoker   Week 2 Call Completed?  Yes          Vera James RN

## 2021-02-08 ENCOUNTER — READMISSION MANAGEMENT (OUTPATIENT)
Dept: CALL CENTER | Facility: HOSPITAL | Age: 71
End: 2021-02-08

## 2021-02-08 NOTE — OUTREACH NOTE
Medical Week 3 Survey      Responses   [facility] patient discharged from?  [facility]   Does the patient have one of the following disease processes/diagnoses(primary or secondary)?  Other   Week 3 attempt successful?  No   Unsuccessful attempts  Attempt 1          Zakia Walker RN

## 2021-02-09 ENCOUNTER — READMISSION MANAGEMENT (OUTPATIENT)
Dept: CALL CENTER | Facility: HOSPITAL | Age: 71
End: 2021-02-09

## 2021-02-09 NOTE — OUTREACH NOTE
Medical Week 3 Survey      Responses   [facility] patient discharged from?  [facility]   Does the patient have one of the following disease processes/diagnoses(primary or secondary)?  Other   Week 3 attempt successful?  No   Unsuccessful attempts  Attempt 2          Shruti Burnett RN

## 2021-02-17 ENCOUNTER — OFFICE VISIT (OUTPATIENT)
Dept: PULMONOLOGY | Facility: CLINIC | Age: 71
End: 2021-02-17

## 2021-02-17 VITALS
RESPIRATION RATE: 20 BRPM | BODY MASS INDEX: 35.82 KG/M2 | SYSTOLIC BLOOD PRESSURE: 150 MMHG | DIASTOLIC BLOOD PRESSURE: 78 MMHG | HEIGHT: 65 IN | HEART RATE: 60 BPM | OXYGEN SATURATION: 97 % | TEMPERATURE: 98.8 F | WEIGHT: 215 LBS

## 2021-02-17 DIAGNOSIS — G47.19 EXCESSIVE DAYTIME SLEEPINESS: ICD-10-CM

## 2021-02-17 DIAGNOSIS — J44.9 CHRONIC OBSTRUCTIVE PULMONARY DISEASE, UNSPECIFIED COPD TYPE (HCC): ICD-10-CM

## 2021-02-17 DIAGNOSIS — G47.33 OBSTRUCTIVE SLEEP APNEA: ICD-10-CM

## 2021-02-17 DIAGNOSIS — R06.02 SHORTNESS OF BREATH: Primary | ICD-10-CM

## 2021-02-17 DIAGNOSIS — J41.0 CHRONIC BRONCHITIS, SIMPLE (HCC): ICD-10-CM

## 2021-02-17 DIAGNOSIS — R06.02 SOB (SHORTNESS OF BREATH): Primary | ICD-10-CM

## 2021-02-17 DIAGNOSIS — Z87.891 PERSONAL HISTORY OF TOBACCO USE, PRESENTING HAZARDS TO HEALTH: ICD-10-CM

## 2021-02-17 PROCEDURE — 99204 OFFICE O/P NEW MOD 45 MIN: CPT | Performed by: INTERNAL MEDICINE

## 2021-02-17 PROCEDURE — 94726 PLETHYSMOGRAPHY LUNG VOLUMES: CPT | Performed by: INTERNAL MEDICINE

## 2021-02-17 PROCEDURE — 95012 NITRIC OXIDE EXP GAS DETER: CPT | Performed by: INTERNAL MEDICINE

## 2021-02-17 PROCEDURE — 94729 DIFFUSING CAPACITY: CPT | Performed by: INTERNAL MEDICINE

## 2021-02-17 PROCEDURE — 94060 EVALUATION OF WHEEZING: CPT | Performed by: INTERNAL MEDICINE

## 2021-02-17 RX ORDER — SOFT LENS DISINFECTANT
1 SOLUTION, NON-ORAL MISCELLANEOUS TAKE AS DIRECTED
Qty: 1 EACH | Refills: 0 | Status: SHIPPED | OUTPATIENT
Start: 2021-02-17 | End: 2021-05-03 | Stop reason: SDUPTHER
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
## 2021-02-21 PROBLEM — B96.89 BACTERIAL CONJUNCTIVITIS OF BOTH EYES: Status: RESOLVED | Noted: 2020-07-29 | Resolved: 2021-02-21

## 2021-02-21 PROBLEM — H10.9 BACTERIAL CONJUNCTIVITIS OF BOTH EYES: Status: RESOLVED | Noted: 2020-07-29 | Resolved: 2021-02-21

## 2021-02-25 ENCOUNTER — TELEPHONE (OUTPATIENT)
Dept: PULMONOLOGY | Facility: CLINIC | Age: 71
End: 2021-02-25

## 2021-02-25 RX ORDER — PRAMIPEXOLE DIHYDROCHLORIDE 0.25 MG/1
0.25 TABLET ORAL 3 TIMES DAILY
Qty: 270 TABLET | Refills: 3 | Status: SHIPPED | OUTPATIENT
Start: 2021-02-25 | End: 2021-03-09 | Stop reason: SDUPTHER
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
## 2021-02-25 NOTE — TELEPHONE ENCOUNTER
Caller: Robe Bryant    Relationship: Self    Best call back number:[phone]     Medication needed:   Requested Prescriptions     Pending Prescriptions Disp Refills   • pramipexole (MIRAPEX) 0.25 MG tablet 270 tablet 3     Sig: Take 1 tablet by mouth 3 (Three) Times a Day.       When do you need the refill by: TODAY    What details did the patient provide when requesting the medication: PATIENT IS OUT OF MEDICATION    Does the patient have less than a 3 day supply:  [x] Yes  [] No    What is the patient's preferred pharmacy: [id number] [place] [id number] [address] AT [facility] [address] - [phone] PH - [phone] FX

## 2021-03-01 RX ORDER — LISINOPRIL 40 MG/1
TABLET ORAL
Qty: 90 TABLET | Refills: 3 | OUTPATIENT
Start: 2021-03-01

## 2021-03-02 NOTE — TELEPHONE ENCOUNTER
Please call pharmacy and verify they have refills on this medication.  It was sent in by Dr. Chan last month with 5 refills.

## 2021-03-04 ENCOUNTER — OFFICE VISIT (OUTPATIENT)
Dept: CARDIOLOGY | Facility: CLINIC | Age: 71
End: 2021-03-04

## 2021-03-04 VITALS — BODY MASS INDEX: 35.78 KG/M2 | HEIGHT: 65 IN

## 2021-03-04 DIAGNOSIS — Z95.0 PACEMAKER: ICD-10-CM

## 2021-03-04 DIAGNOSIS — I25.10 CHRONIC CORONARY ARTERY DISEASE: ICD-10-CM

## 2021-03-04 DIAGNOSIS — I48.0 PAROXYSMAL ATRIAL FIBRILLATION (HCC): Primary | ICD-10-CM

## 2021-03-04 DIAGNOSIS — I10 ESSENTIAL HYPERTENSION: ICD-10-CM

## 2021-03-04 DIAGNOSIS — E78.00 HYPERCHOLESTEROLEMIA: ICD-10-CM

## 2021-03-04 PROCEDURE — 99441 PR PHYS/QHP TELEPHONE EVALUATION 5-10 MIN: CPT | Performed by: INTERNAL MEDICINE

## 2021-03-04 NOTE — PROGRESS NOTES
[facility] Cardiology    Patient ID: Robe Bryant is a 70 y.o. male.  : 1950   Contact: [phone]    Encounter date: 2021    PCP: Radha Jean DO      Chief complaint:   Chief Complaint   Patient presents with   • Coronary Artery Disease       Problem List:  1. Coronary artery disease:  a. [facility], 2009, Dr. Vasquez: Placement of a 3.0 x 23 mm Xience LATOYA to the ramus, 3.0 x 12 mm LATOYA to the mid-circumflex.  b. [facility], 10/11/2010: EF 45%, LVEDP 28.   c. [facility], 2011: 2.25 x 13 mm Cypher LATOYA placement to the distal circumflex.   d. [facility], 01/10/2012, [facility]: Noncritical CAD, patient ramus and left circumflex stents, LVH with near cavity obliteration.  e. Medical management.  f. Abnormal Cardiolite, 2013, [facility], showing moderate to severe perfusion defect of the basolateral wall of the LV.  g. [facility], 03/15/2013, [facility]: Widely patent stents of the LCx and ramus intermedius coronary arteries, no significant disease is seen in any territory.   h. Diastolic heart failure with hospital admission, May 2013, at [facility] in [place].  i. Recurrent anginal symptoms, 2014; initiation of Imdur therapy, 2014.  j. [facility], 09/15/2014, [facility]: Noncritical CAD with widely patent stents in the LCx and ramus intermedius arteries. Other sources for the patient’s chest discomfort should be considered.  k. [facility], 06/10/2016: EF Normal, widely patent ramus intermedius stent; no significant disease within LAD, LCx, RCA.  l. [facility], 2019: Noncritical CAD with patent stents noted. Aggressive risk factor modification which will include better control of the patient's excessively high blood pressure  m. Echo, 2021: LVEF: 57%, Left ventricular diastolic function is consistent with grade 1 impaired relaxation. Left atrial volume is mildly increased.   2. Diastolic heart failure:  a. Echocardiogram, 2013, Hemres Vargas MD: Hyperdynamic LV with EF  75%, mild TR, trace MI.  b. Complaints of dyspnea at the time of office visit, 05/22/2013, with O2 saturation in the 91% to 95% range with ambulation.  c. Echocardiogram, 09/15/2014: Moderate LVH with LVEF 60% to 65%. Mild MR and mild TR.   3. Hypertension, uncontrolled at the time of office visit, 05/22/2013.  4. Symptomatic bradycardia:  a. Continued symptoms of presyncope in the setting of hypotension and bradycardia, 02/16/2012.  b. PPM implantation, Dr. Tucker, 02/21/2012, [employer] RFDR, model #2210.  c. Hospitalization with acute dyspnea, 02/27/2013, at [facility] felt secondary to pacemaker-induced tachycardia.  d. Tilt Table (6/22/2017): Positive for orthostatic syncope. Patient developed abrupt symptomatic hypotension at 9 minutes, with any corresponding increase in heart rate. Consistent with vasodepressive response.  5. Paroxysmal atrial fibrillation:  a. Coumadin therapy followed by [facility].   b. CHADS-VASc score = 5. (Age 65-74, HTN, DM, HF, TIA?)  c. Admission to [facility], 06/13/2015 through 06/16/2015, with DC cardioversion and return to sinus rhythm and subsequent metoprolol dosage increase.  6. Recent symptoms of left-sided facial numbness and occasional left arm weakness.  7. Nonsustained VT per device interrogation, 09/04/2014.  8. Brief episodes of atrial tachycardia at the time of device interrogation, 05/03/2012.  9. Hyperlipidemia.  10. Type 2 diabetes mellitus.  11. Obstructive sleep apnea with CPAP therapy.  12. Questionable transient ischemic attack, January 2010, without workup:  a. Carotid duplex, 09/15/2014: Noncritical coronary artery disease with widely patent bilateral carotid arteries. Velocity is all within normal limits.   13. History of tobacco use with cessation x7 years.   14. Gastroesophageal reflux disease.  15. Hernia repair x3.   16. Chronic kidney disease with a creatinine of 2.1 during hospitalization, 05/17/2013.    No Known Allergies    Current  Medications:    Current Outpatient Medications:   •  amiodarone (PACERONE) 200 MG tablet, Take 1 tablet by mouth Daily., Disp: 30 tablet, Rfl: 0  •  amLODIPine (NORVASC) 10 MG tablet, Take 1 tablet by mouth Daily., Disp: 30 tablet, Rfl: 1  •  apixaban (ELIQUIS) 5 MG tablet tablet, Take 1 tablet by mouth 2 (two) times a day., Disp: 180 tablet, Rfl: 3  •  aspirin 81 MG chewable tablet, Chew 81 mg Daily., Disp: , Rfl:   •  atorvastatin (LIPITOR) 20 MG tablet, Take 1 tablet by mouth Every Night., Disp: 90 tablet, Rfl: 3  •  baclofen (LIORESAL) 10 MG tablet, Take 1 tablet by mouth 3 (Three) Times a Day As Needed for Muscle Spasms., Disp: 90 tablet, Rfl: 2  •  Blood Glucose Monitoring Suppl (TRUE METRIX AIR GLUCOSE METER) w/Device kit, 1 each by In Vitro route 2 (two) times a day. E11.9, Disp: 1 kit, Rfl: 0  •  cetirizine (zyrTEC) 10 MG tablet, TAKE 1 TABLET EVERY DAY, Disp: 90 tablet, Rfl: 5  •  Cholecalciferol (Vitamin D-3) 25 MCG (1000 UT) capsule, Take 1,000 Units by mouth Daily., Disp: 90 capsule, Rfl: 3  •  diclofenac (VOLTAREN) 1 % gel gel, Apply 4 g topically to the appropriate area as directed 4 (Four) Times a Day As Needed (pain)., Disp: 100 g, Rfl: 5  •  finasteride (PROSCAR) 5 MG tablet, Take 1 tablet by mouth Daily., Disp: 90 tablet, Rfl: 3  •  FLUoxetine (PROzac) 20 MG capsule, Take 1 capsule by mouth Daily., Disp: 90 capsule, Rfl: 3  •  Fluticasone-Umeclidin-Vilant (Trelegy Ellipta) 100-62.5-25 MCG/INH aerosol powder , Inhale 1 each Daily. Rinse mouth with water after use., Disp: 1 each, Rfl: 5  •  furosemide (Lasix) 20 MG tablet, Take 1 tablet by mouth every morning; may take second dose as needed for increased swelling, Disp: 60 tablet, Rfl: 2  •  glipizide (GLUCOTROL) 5 MG tablet, TAKE 1 TABLET BY MOUTH 2 TIMES A DAY BEFORE MEALS., Disp: 180 tablet, Rfl: 3  •  glucose blood (True Metrix Blood Glucose Test) test strip, Daily testing E11.9, Disp: 100 each, Rfl: 5  •  glucose monitor monitoring kit, 1 each  Daily. E11.9, Disp: 1 each, Rfl: 0  •  ipratropium-albuterol (DUO-NEB) 0.5-2.5 mg/3 ml nebulizer, Take 3 mL by nebulization 4 (Four) Times a Day., Disp: 1620 mL, Rfl: 2  •  Lancet Device misc, 1 each Daily. E11.9, Disp: 100 each, Rfl: 3  •  lisinopril (PRINIVIL,ZESTRIL) 40 MG tablet, Take 1 tablet by mouth Daily., Disp: 90 tablet, Rfl: 3  •  metoprolol succinate XL (Toprol XL) 50 MG 24 hr tablet, Take 1 tablet by mouth Daily., Disp: 30 tablet, Rfl: 0  •  Multiple Vitamin tablet, Take 1 tablet by mouth daily., Disp: , Rfl:   •  Nebulizer device, 1 Device Take As Directed., Disp: 1 each, Rfl: 0  •  nitroglycerin (NITROSTAT) 0.4 MG SL tablet, Place 1 tablet under the tongue Every 5 (Five) Minutes As Needed for Chest Pain., Disp: 25 tablet, Rfl: 11  •  Olopatadine HCl 0.7 % solution, Apply 1 drop to eye(s) as directed by provider Daily., Disp: 2.5 mL, Rfl: 5  •  omeprazole (priLOSEC) 40 MG capsule, Take 1 capsule by mouth Daily., Disp: 90 capsule, Rfl: 3  •  potassium chloride (K-DUR,KLOR-CON) 10 MEQ CR tablet, Take 1 tablet by mouth 2 (Two) Times a Day., Disp: 60 tablet, Rfl: 2  •  pramipexole (MIRAPEX) 0.25 MG tablet, Take 1 tablet by mouth 3 (Three) Times a Day., Disp: 270 tablet, Rfl: 3  •  spironolactone (Aldactone) 50 MG tablet, Take 1 tablet by mouth Daily., Disp: 90 tablet, Rfl: 1  •  terazosin (HYTRIN) 10 MG capsule, Take 1 capsule by mouth Every Night., Disp: 90 capsule, Rfl: 3  •  traMADol (ULTRAM) 50 MG tablet, Take 50 mg by mouth Every 6 (Six) Hours As Needed. for pain, Disp: , Rfl:   •  TRUEplus Lancets 33G misc, 1 each by Other route Daily. E11.9, Disp: 100 each, Rfl: 5    HPI    Robe Bryant is a 70 y.o. male who presents today for a telephone follow up of CAD, PAF, presence of cardiac pacemaker, and cardiac risk factors. Since last visit, Robe was admitted to the hospital overnight [facility] for atrial fibrillation.  His metoprolol was increased and he thinks he has been in sinus rhythm  "since that time.  He is otherwise feeling well from a cardiac standpoint.  He has no chest pain and he has no lower extremity edema.  He walks his dog and works his arms and legs but does no heavy routine exercise.  He is not sure what triggered the atrial fibrillation but thinks it may have been related to a death in his family.      The following portions of the patient's history were reviewed and updated as appropriate: allergies, current medications and problem list.    Pertinent positives as listed in the HPI.  All other systems reviewed are negative.         Vitals:    03/04/21 1120   Height: 165.1 cm (65\")       Physical Exam:      Oriented to time place and person   Recent and remote memory is intact  Hearing is normal  Respiratory effort is normal  Judgment and insight is normal  Appropriate mood and affect      Diagnostic Data (reviewed with patient):  Lab Results   Component Value Date    GLUCOSE 156 (H) 01/25/2021    BUN 23 01/25/2021    CREATININE 1.60 (H) 01/25/2021    EGFRIFNONA 43 (L) 01/25/2021    EGFRIFAFRI 69 12/01/2020    BCR 14.4 01/25/2021     01/25/2021    K 4.0 01/25/2021     01/25/2021    CO2 27.8 01/25/2021    CALCIUM 8.6 01/25/2021    ALBUMIN 3.50 01/25/2021    ALKPHOS 80 01/25/2021    AST 17 01/25/2021    ALT 15 01/25/2021     Lab Results   Component Value Date    CHLPL 124 07/29/2020    TRIG 163 (H) 07/29/2020    HDL 39 (L) 07/29/2020    LDL 52 07/29/2020      Lab Results   Component Value Date    WBC 10.17 01/25/2021    RBC 5.06 01/25/2021    HGB 14.5 01/25/2021    HCT 45.3 01/25/2021    MCV 89.5 01/25/2021     01/25/2021      Lab Results   Component Value Date    TSH 0.730 01/25/2021        Procedures    Advance Care Planning   ACP discussion was held with the patient during this visit. Patient does not have an advance directive, declines further assistance.    Notes from the patient's recent hospital stay were reviewed as well as medication changes.  These were " discussed with the patient as well.      Assessment:  No diagnosis found.      Plan:  1. Continue amiodarone and metoprolol for atrial fibrillation  2. Continue amlodipine metoprolol terazosin and lisinopril for hypertension  3. Continue atorvastatin for coronary disease and hyperlipidemia  4. Continue Eliquis for paroxysmal atrial fibrillation  5. Continue all other current medications.  6. F/up in 6 months with a [employer] [facility] pacemaker check at that time., sooner if needed.      This patient has consented to a telehealth visit via telephone. The visit was scheduled as a follow-up visit to comply with patient safety concerns in accordance with CDC recommendations.  All vitals recorded within this visit are reported by the patient.  I spent 12 minutes in total including but not limited to the 5 minutes spent in direct conversation with this patient.        Arelis Tucker MD, FACC

## 2021-03-09 ENCOUNTER — OFFICE VISIT (OUTPATIENT)
Dept: INTERNAL MEDICINE | Facility: CLINIC | Age: 71
End: 2021-03-09

## 2021-03-09 VITALS
TEMPERATURE: 97.8 F | WEIGHT: 252.2 LBS | DIASTOLIC BLOOD PRESSURE: 88 MMHG | HEART RATE: 60 BPM | OXYGEN SATURATION: 95 % | HEIGHT: 65 IN | BODY MASS INDEX: 42.02 KG/M2 | SYSTOLIC BLOOD PRESSURE: 148 MMHG

## 2021-03-09 DIAGNOSIS — E11.22 TYPE 2 DIABETES MELLITUS WITH STAGE 3 CHRONIC KIDNEY DISEASE, WITHOUT LONG-TERM CURRENT USE OF INSULIN, UNSPECIFIED WHETHER STAGE 3A OR 3B CKD (HCC): Primary | ICD-10-CM

## 2021-03-09 DIAGNOSIS — G25.81 RESTLESS LEG SYNDROME: ICD-10-CM

## 2021-03-09 DIAGNOSIS — N18.30 TYPE 2 DIABETES MELLITUS WITH STAGE 3 CHRONIC KIDNEY DISEASE, WITHOUT LONG-TERM CURRENT USE OF INSULIN, UNSPECIFIED WHETHER STAGE 3A OR 3B CKD (HCC): Primary | ICD-10-CM

## 2021-03-09 DIAGNOSIS — J43.1 PANLOBULAR EMPHYSEMA (HCC): ICD-10-CM

## 2021-03-09 DIAGNOSIS — I10 ESSENTIAL HYPERTENSION: ICD-10-CM

## 2021-03-09 PROCEDURE — 99214 OFFICE O/P EST MOD 30 MIN: CPT | Performed by: FAMILY MEDICINE

## 2021-03-09 RX ORDER — PRAMIPEXOLE DIHYDROCHLORIDE 0.5 MG/1
0.5 TABLET ORAL 3 TIMES DAILY
Qty: 270 TABLET | Refills: 3 | Status: SHIPPED | OUTPATIENT
Start: 2021-03-09 | End: 2021-06-22 | Stop reason: SDUPTHER

## 2021-03-09 RX ORDER — TIOTROPIUM BROMIDE AND OLODATEROL 3.124; 2.736 UG/1; UG/1
2 SPRAY, METERED RESPIRATORY (INHALATION)
Qty: 4 G | Refills: 0 | COMMUNITY
Start: 2021-03-09 | End: 2021-05-24 | Stop reason: SDUPTHER

## 2021-03-09 NOTE — PROGRESS NOTES
Robe Bryant is a 70 y.o. male.    Chief Complaint   Patient presents with   • Hypertension       HPI   Patient has hypertension.  They are taking lisinopril (Prinivil), Metoprolol, amlodipine and furosemide, aldactone and terasozin.  They have been compliant with medications.  The patient denies any side effects to the medication.  Blood pressure is not controlled in the office today.  Blood pressure has been running good at home.  They are following a low salt diet.  They are active.  Saw his cardiologist last week and states BP was good then.     Patient has had diabetes for the past few years. He has been compliant with the medications and denies any side effects from it. He has been monitoring fingersticks.  his fingerstick range is between .  He has not had hypoglycemic symptoms. He has been following a diabetic diet and has been active.     Patient has RLS.  He complains of his legs still jerking at night.  Mirapex helps some.      Patient has COPD . He is not on oxygen. He admits to to some dyspnea with exertion.  He admits to on and off cough, SOB and wheezing that does respond to treatment. He uses the albuterol frequently. He is on trelegy with good response.  However, he has been out of medication for 1 week. He has hd a difficult time over the last several months being able to purchase trelegy.  He has been given various substitution inhalers.    The following portions of the patient's history were reviewed and updated as appropriate: allergies, current medications, past family history, past medical history, past social history, past surgical history and problem list.     No Known Allergies      Current Outpatient Medications:   •  amiodarone (PACERONE) 200 MG tablet, Take 1 tablet by mouth Daily., Disp: 30 tablet, Rfl: 0  •  amLODIPine (NORVASC) 10 MG tablet, Take 1 tablet by mouth Daily., Disp: 30 tablet, Rfl: 1  •  apixaban (ELIQUIS) 5 MG tablet tablet, Take 1 tablet by mouth 2 (two)  times a day., Disp: 180 tablet, Rfl: 3  •  aspirin 81 MG chewable tablet, Chew 81 mg Daily., Disp: , Rfl:   •  atorvastatin (LIPITOR) 20 MG tablet, Take 1 tablet by mouth Every Night., Disp: 90 tablet, Rfl: 3  •  baclofen (LIORESAL) 10 MG tablet, Take 1 tablet by mouth 3 (Three) Times a Day As Needed for Muscle Spasms., Disp: 90 tablet, Rfl: 2  •  Blood Glucose Monitoring Suppl (TRUE METRIX AIR GLUCOSE METER) w/Device kit, 1 each by In Vitro route 2 (two) times a day. E11.9, Disp: 1 kit, Rfl: 0  •  cetirizine (zyrTEC) 10 MG tablet, TAKE 1 TABLET EVERY DAY, Disp: 90 tablet, Rfl: 5  •  Cholecalciferol (Vitamin D-3) 25 MCG (1000 UT) capsule, Take 1,000 Units by mouth Daily., Disp: 90 capsule, Rfl: 3  •  diclofenac (VOLTAREN) 1 % gel gel, Apply 4 g topically to the appropriate area as directed 4 (Four) Times a Day As Needed (pain)., Disp: 100 g, Rfl: 5  •  finasteride (PROSCAR) 5 MG tablet, Take 1 tablet by mouth Daily., Disp: 90 tablet, Rfl: 3  •  FLUoxetine (PROzac) 20 MG capsule, Take 1 capsule by mouth Daily., Disp: 90 capsule, Rfl: 3  •  Fluticasone-Umeclidin-Vilant (Trelegy Ellipta) 100-62.5-25 MCG/INH aerosol powder , Inhale 1 each Daily. Rinse mouth with water after use., Disp: 1 each, Rfl: 5  •  furosemide (Lasix) 20 MG tablet, Take 1 tablet by mouth every morning; may take second dose as needed for increased swelling, Disp: 60 tablet, Rfl: 2  •  glipizide (GLUCOTROL) 5 MG tablet, TAKE 1 TABLET BY MOUTH 2 TIMES A DAY BEFORE MEALS., Disp: 180 tablet, Rfl: 3  •  glucose blood (True Metrix Blood Glucose Test) test strip, Daily testing E11.9, Disp: 100 each, Rfl: 5  •  glucose monitor monitoring kit, 1 each Daily. E11.9, Disp: 1 each, Rfl: 0  •  ipratropium-albuterol (DUO-NEB) 0.5-2.5 mg/3 ml nebulizer, Take 3 mL by nebulization 4 (Four) Times a Day., Disp: 1620 mL, Rfl: 2  •  Lancet Device misc, 1 each Daily. E11.9, Disp: 100 each, Rfl: 3  •  lisinopril (PRINIVIL,ZESTRIL) 40 MG tablet, Take 1 tablet by mouth  Daily., Disp: 90 tablet, Rfl: 3  •  metoprolol succinate XL (Toprol XL) 50 MG 24 hr tablet, Take 1 tablet by mouth Daily., Disp: 30 tablet, Rfl: 0  •  Multiple Vitamin tablet, Take 1 tablet by mouth daily., Disp: , Rfl:   •  Nebulizer device, 1 Device Take As Directed., Disp: 1 each, Rfl: 0  •  nitroglycerin (NITROSTAT) 0.4 MG SL tablet, Place 1 tablet under the tongue Every 5 (Five) Minutes As Needed for Chest Pain., Disp: 25 tablet, Rfl: 11  •  Olopatadine HCl 0.7 % solution, Apply 1 drop to eye(s) as directed by provider Daily., Disp: 2.5 mL, Rfl: 5  •  omeprazole (priLOSEC) 40 MG capsule, Take 1 capsule by mouth Daily., Disp: 90 capsule, Rfl: 3  •  potassium chloride (K-DUR,KLOR-CON) 10 MEQ CR tablet, Take 1 tablet by mouth 2 (Two) Times a Day., Disp: 60 tablet, Rfl: 2  •  pramipexole (MIRAPEX) 0.5 MG tablet, Take 1 tablet by mouth 3 (Three) Times a Day., Disp: 270 tablet, Rfl: 3  •  spironolactone (Aldactone) 50 MG tablet, Take 1 tablet by mouth Daily., Disp: 90 tablet, Rfl: 1  •  terazosin (HYTRIN) 10 MG capsule, Take 1 capsule by mouth Every Night., Disp: 90 capsule, Rfl: 3  •  traMADol (ULTRAM) 50 MG tablet, Take 50 mg by mouth Every 6 (Six) Hours As Needed. for pain, Disp: , Rfl:   •  TRUEplus Lancets 33G misc, 1 each by Other route Daily. E11.9, Disp: 100 each, Rfl: 5  •  cefdinir (OMNICEF) 300 MG capsule, Take 1 capsule by mouth 2 (Two) Times a Day for 10 days., Disp: 20 capsule, Rfl: 0  •  methylPREDNISolone (MEDROL) 4 MG dose pack, Take as directed on package instructions., Disp: 21 each, Rfl: 0  •  Misc. Devices misc, Bipap tubing., Disp: 1 each, Rfl: 0  •  Nebulizer misc, 1 each Every 4 (Four) Hours As Needed (shortness of breath)., Disp: 1 each, Rfl: 0  •  tiotropium bromide-olodaterol (Stiolto Respimat) 2.5-2.5 MCG/ACT aerosol solution inhaler, Inhale 2 puffs Daily. Until able to get trelegy approved., Disp: 4 g, Rfl: 0    ROS    Review of Systems   Constitutional: Negative for chills, fatigue and  "fever.   Respiratory: Positive for shortness of breath and wheezing. Negative for cough.    Cardiovascular: Positive for leg swelling. Negative for chest pain and palpitations.   Gastrointestinal: Negative for abdominal pain, constipation, diarrhea, nausea and vomiting.       Vitals:    03/09/21 1009   BP: 148/88   BP Location: Left arm   Patient Position: Sitting   Cuff Size: Adult   Pulse: 60   Temp: 97.8 °F (36.6 °C)   TempSrc: Temporal   SpO2: 95%   Weight: 114 kg (252 lb 3.2 oz)   Height: 165.1 cm (65\")     Body mass index is 41.97 kg/m².    Physical Exam     Physical Exam  Constitutional:       General: He is not in acute distress.     Appearance: He is well-developed. He is obese.   HENT:      Head: Normocephalic and atraumatic.      Right Ear: External ear normal.      Left Ear: External ear normal.   Eyes:      General:         Right eye: Discharge present.         Left eye: Discharge present.     Extraocular Movements: Extraocular movements intact.      Conjunctiva/sclera:      Right eye: Right conjunctiva is injected.      Left eye: Left conjunctiva is injected.   Cardiovascular:      Rate and Rhythm: Normal rate and regular rhythm.      Heart sounds: No murmur heard.     Pulmonary:      Effort: Pulmonary effort is normal. No respiratory distress.      Breath sounds: Normal breath sounds. No wheezing.   Abdominal:      General: Bowel sounds are normal. There is no distension.      Palpations: Abdomen is soft.      Tenderness: There is no abdominal tenderness.   Musculoskeletal:      Right lower leg: Edema present.      Left lower leg: Edema present.   Feet:      Comments: Diabetic foot exam:   Left: Filament test present   Pulses Dorsalis Pedis:  present       Right: Filament test present   Pulses present  Neurological:      Mental Status: He is alert and oriented to person, place, and time.      Cranial Nerves: No cranial nerve deficit.         Assessment/Plan    Problems Addressed this Visit        " Cardiac and Vasculature    Essential hypertension     Uncontrolled in office.  BP does fluctuate at times.  No changes to medication as it is mildly elevated.  He has an upcoming appt with cardiology.  Patient will continue lisinopril, amlodipine, spironolactone, Terazosin, and Lasix.            Endocrine and Metabolic    Type 2 diabetes mellitus, without long-term current use of insulin (CMS/HCC) - Primary     Controlled on current medication.  Patient is to continue glipizide.  Will continue to monitor A1c and microalbumin every few months.  Patient is on an ACE inhibitor, statin, and baby aspirin.              Neuro    Restless leg syndrome     Improved with mirapex.  Will increase to 0.5mg TID.             Pulmonary and Pneumonias    Chronic obstructive pulmonary disease (CMS/HCC)     Uncontrolled due to being out of his medication.  We do not have samples of trelegy.  However, samples of stiolto have been provided today.   He may use albuterol as needed.           Relevant Medications    tiotropium bromide-olodaterol (Stiolto Respimat) 2.5-2.5 MCG/ACT aerosol solution inhaler          New Medications Ordered This Visit   Medications   • tiotropium bromide-olodaterol (Stiolto Respimat) 2.5-2.5 MCG/ACT aerosol solution inhaler     Sig: Inhale 2 puffs Daily. Until able to get trelegy approved.     Dispense:  4 g     Refill:  0   • pramipexole (MIRAPEX) 0.5 MG tablet     Sig: Take 1 tablet by mouth 3 (Three) Times a Day.     Dispense:  270 tablet     Refill:  3       No orders of the defined types were placed in this encounter.      Return in about 3 months (around 6/9/2021) for diabetes, HTN, HPL.    Radha Jean DO

## 2021-03-23 ENCOUNTER — HOSPITAL ENCOUNTER (OUTPATIENT)
Dept: GENERAL RADIOLOGY | Facility: HOSPITAL | Age: 71
Discharge: HOME | End: 2021-03-23
Admitting: FAMILY MEDICINE

## 2021-03-23 ENCOUNTER — OFFICE VISIT (OUTPATIENT)
Dept: INTERNAL MEDICINE | Facility: CLINIC | Age: 71
End: 2021-03-23

## 2021-03-23 VITALS
OXYGEN SATURATION: 97 % | SYSTOLIC BLOOD PRESSURE: 150 MMHG | HEIGHT: 65 IN | HEART RATE: 96 BPM | TEMPERATURE: 98.4 F | BODY MASS INDEX: 41.65 KG/M2 | DIASTOLIC BLOOD PRESSURE: 80 MMHG | WEIGHT: 250 LBS

## 2021-03-23 DIAGNOSIS — G47.33 OSA (OBSTRUCTIVE SLEEP APNEA): Primary | ICD-10-CM

## 2021-03-23 DIAGNOSIS — J44.1 COPD WITH EXACERBATION (HCC): ICD-10-CM

## 2021-03-23 PROCEDURE — 96372 THER/PROPH/DIAG INJ SC/IM: CPT | Performed by: FAMILY MEDICINE

## 2021-03-23 PROCEDURE — 71046 X-RAY EXAM CHEST 2 VIEWS: CPT

## 2021-03-23 PROCEDURE — 99214 OFFICE O/P EST MOD 30 MIN: CPT | Performed by: FAMILY MEDICINE

## 2021-03-23 RX ORDER — METHYLPREDNISOLONE 4 MG/1
TABLET ORAL
Qty: 21 EACH | Refills: 0 | Status: SHIPPED | OUTPATIENT
Start: 2021-03-23 | End: 2021-04-20 | Stop reason: SDUPTHER

## 2021-03-23 RX ORDER — CEFTRIAXONE 1 G/1
1 INJECTION, POWDER, FOR SOLUTION INTRAMUSCULAR; INTRAVENOUS EVERY 24 HOURS
Status: COMPLETED | OUTPATIENT
Start: 2021-03-23 | End: 2021-03-23

## 2021-03-23 RX ORDER — CEFDINIR 300 MG/1
300 CAPSULE ORAL 2 TIMES DAILY
Qty: 20 CAPSULE | Refills: 0 | Status: SHIPPED | OUTPATIENT
Start: 2021-03-23 | End: 2021-04-02

## 2021-03-23 RX ORDER — METHYLPREDNISOLONE SODIUM SUCCINATE 125 MG/2ML
125 INJECTION, POWDER, LYOPHILIZED, FOR SOLUTION INTRAMUSCULAR; INTRAVENOUS ONCE
Status: COMPLETED | OUTPATIENT
Start: 2021-03-23 | End: 2021-03-23

## 2021-03-23 RX ADMIN — CEFTRIAXONE 1 G: 1 INJECTION, POWDER, FOR SOLUTION INTRAMUSCULAR; INTRAVENOUS at 11:52

## 2021-03-23 RX ADMIN — METHYLPREDNISOLONE SODIUM SUCCINATE 125 MG: 125 INJECTION, POWDER, LYOPHILIZED, FOR SOLUTION INTRAMUSCULAR; INTRAVENOUS at 11:53

## 2021-03-23 NOTE — PROGRESS NOTES
Robe Bryant is a 70 y.o. male.    Chief Complaint   Patient presents with   • Cough   • Nasal Congestion       HPI   Patient reports they have not been feeling well for 1week(s). He admits to nasal congestion, sore throat, productive cough with green sputum, dyspnea, wheezing.  He denies fever.  They have tried routine breathing medications for this issue without good response.  He has COPD.  He has not been able to use his nebulizer machine as it not functional.      Patient also has sleep apnea.  He does follow with pulmonology and sleep medicine.  He has also not been able to use his BiPap due to worn tubing.      The following portions of the patient's history were reviewed and updated as appropriate: allergies, current medications, past family history, past medical history, past social history, past surgical history and problem list.     No Known Allergies      Current Outpatient Medications:   •  amiodarone (PACERONE) 200 MG tablet, Take 1 tablet by mouth Daily., Disp: 30 tablet, Rfl: 0  •  amLODIPine (NORVASC) 10 MG tablet, Take 1 tablet by mouth Daily., Disp: 30 tablet, Rfl: 1  •  apixaban (ELIQUIS) 5 MG tablet tablet, Take 1 tablet by mouth 2 (two) times a day., Disp: 180 tablet, Rfl: 3  •  aspirin 81 MG chewable tablet, Chew 81 mg Daily., Disp: , Rfl:   •  atorvastatin (LIPITOR) 20 MG tablet, Take 1 tablet by mouth Every Night., Disp: 90 tablet, Rfl: 3  •  baclofen (LIORESAL) 10 MG tablet, Take 1 tablet by mouth 3 (Three) Times a Day As Needed for Muscle Spasms., Disp: 90 tablet, Rfl: 2  •  Blood Glucose Monitoring Suppl (TRUE METRIX AIR GLUCOSE METER) w/Device kit, 1 each by In Vitro route 2 (two) times a day. E11.9, Disp: 1 kit, Rfl: 0  •  cetirizine (zyrTEC) 10 MG tablet, TAKE 1 TABLET EVERY DAY, Disp: 90 tablet, Rfl: 5  •  Cholecalciferol (Vitamin D-3) 25 MCG (1000 UT) capsule, Take 1,000 Units by mouth Daily., Disp: 90 capsule, Rfl: 3  •  diclofenac (VOLTAREN) 1 % gel gel, Apply 4 g topically  to the appropriate area as directed 4 (Four) Times a Day As Needed (pain)., Disp: 100 g, Rfl: 5  •  finasteride (PROSCAR) 5 MG tablet, Take 1 tablet by mouth Daily., Disp: 90 tablet, Rfl: 3  •  FLUoxetine (PROzac) 20 MG capsule, Take 1 capsule by mouth Daily., Disp: 90 capsule, Rfl: 3  •  Fluticasone-Umeclidin-Vilant (Trelegy Ellipta) 100-62.5-25 MCG/INH aerosol powder , Inhale 1 each Daily. Rinse mouth with water after use., Disp: 1 each, Rfl: 5  •  furosemide (Lasix) 20 MG tablet, Take 1 tablet by mouth every morning; may take second dose as needed for increased swelling, Disp: 60 tablet, Rfl: 2  •  glipizide (GLUCOTROL) 5 MG tablet, TAKE 1 TABLET BY MOUTH 2 TIMES A DAY BEFORE MEALS., Disp: 180 tablet, Rfl: 3  •  glucose blood (True Metrix Blood Glucose Test) test strip, Daily testing E11.9, Disp: 100 each, Rfl: 5  •  glucose monitor monitoring kit, 1 each Daily. E11.9, Disp: 1 each, Rfl: 0  •  ipratropium-albuterol (DUO-NEB) 0.5-2.5 mg/3 ml nebulizer, Take 3 mL by nebulization 4 (Four) Times a Day., Disp: 1620 mL, Rfl: 2  •  Lancet Device misc, 1 each Daily. E11.9, Disp: 100 each, Rfl: 3  •  lisinopril (PRINIVIL,ZESTRIL) 40 MG tablet, Take 1 tablet by mouth Daily., Disp: 90 tablet, Rfl: 3  •  metoprolol succinate XL (Toprol XL) 50 MG 24 hr tablet, Take 1 tablet by mouth Daily., Disp: 30 tablet, Rfl: 0  •  Multiple Vitamin tablet, Take 1 tablet by mouth daily., Disp: , Rfl:   •  Nebulizer device, 1 Device Take As Directed., Disp: 1 each, Rfl: 0  •  nitroglycerin (NITROSTAT) 0.4 MG SL tablet, Place 1 tablet under the tongue Every 5 (Five) Minutes As Needed for Chest Pain., Disp: 25 tablet, Rfl: 11  •  Olopatadine HCl 0.7 % solution, Apply 1 drop to eye(s) as directed by provider Daily., Disp: 2.5 mL, Rfl: 5  •  omeprazole (priLOSEC) 40 MG capsule, Take 1 capsule by mouth Daily., Disp: 90 capsule, Rfl: 3  •  potassium chloride (K-DUR,KLOR-CON) 10 MEQ CR tablet, Take 1 tablet by mouth 2 (Two) Times a Day., Disp: 60  "tablet, Rfl: 2  •  pramipexole (MIRAPEX) 0.5 MG tablet, Take 1 tablet by mouth 3 (Three) Times a Day., Disp: 270 tablet, Rfl: 3  •  spironolactone (Aldactone) 50 MG tablet, Take 1 tablet by mouth Daily., Disp: 90 tablet, Rfl: 1  •  terazosin (HYTRIN) 10 MG capsule, Take 1 capsule by mouth Every Night., Disp: 90 capsule, Rfl: 3  •  tiotropium bromide-olodaterol (Stiolto Respimat) 2.5-2.5 MCG/ACT aerosol solution inhaler, Inhale 2 puffs Daily. Until able to get trelegy approved., Disp: 4 g, Rfl: 0  •  traMADol (ULTRAM) 50 MG tablet, Take 50 mg by mouth Every 6 (Six) Hours As Needed. for pain, Disp: , Rfl:   •  TRUEplus Lancets 33G misc, 1 each by Other route Daily. E11.9, Disp: 100 each, Rfl: 5  •  cefdinir (OMNICEF) 300 MG capsule, Take 1 capsule by mouth 2 (Two) Times a Day for 10 days., Disp: 20 capsule, Rfl: 0  •  methylPREDNISolone (MEDROL) 4 MG dose pack, Take as directed on package instructions., Disp: 21 each, Rfl: 0  •  Misc. Devices misc, Bipap tubing., Disp: 1 each, Rfl: 0  •  Nebulizer misc, 1 each Every 4 (Four) Hours As Needed (shortness of breath)., Disp: 1 each, Rfl: 0  No current facility-administered medications for this visit.    ROS    Review of Systems   Constitutional: Positive for fatigue. Negative for chills and fever.   Respiratory: Positive for cough, shortness of breath and wheezing.    Cardiovascular: Negative for chest pain and leg swelling.   Gastrointestinal: Negative for diarrhea, nausea and vomiting.   Musculoskeletal: Positive for arthralgias.       Vitals:    03/23/21 1101   BP: 150/80   BP Location: Left arm   Patient Position: Sitting   Cuff Size: Adult   Pulse: 96   Temp: 98.4 °F (36.9 °C)   TempSrc: Temporal   SpO2: 97%   Weight: 113 kg (250 lb)   Height: 165.1 cm (65\")     Body mass index is 41.6 kg/m².    Physical Exam     Physical Exam  Constitutional:       General: He is not in acute distress.     Appearance: He is well-developed. He is ill-appearing.   HENT:      Head: " Normocephalic and atraumatic.      Right Ear: External ear normal.      Left Ear: External ear normal.   Eyes:      Extraocular Movements: Extraocular movements intact.      Conjunctiva/sclera: Conjunctivae normal.   Cardiovascular:      Rate and Rhythm: Normal rate and regular rhythm.      Heart sounds: No murmur heard.     Pulmonary:      Effort: Pulmonary effort is normal. No respiratory distress.      Breath sounds: Wheezing and rhonchi present.   Abdominal:      General: Bowel sounds are normal. There is no distension.      Palpations: Abdomen is soft.      Tenderness: There is no abdominal tenderness.   Musculoskeletal:         General: Normal range of motion.   Skin:     General: Skin is warm and dry.   Neurological:      Mental Status: He is alert and oriented to person, place, and time.      Cranial Nerves: No cranial nerve deficit.         Assessment/Plan    Problems Addressed this Visit     None      Visit Diagnoses     ECHO (obstructive sleep apnea)    -  Primary    Relevant Medications    Misc. Devices misc    COPD with exacerbation (CMS/[facility])        Relevant Medications    Nebulizer misc    methylPREDNISolone sodium succinate (SOLU-Medrol) injection 125 mg (Completed)    cefTRIAXone (ROCEPHIN) injection 1 g (Completed)    methylPREDNISolone (MEDROL) 4 MG dose pack    Other Relevant Orders    XR Chest PA & Lateral (Completed)          Patient has received a steroid and rocephin injection in the office.  He will start a round of oral steroids and antibiotics at home tomorrow.  He has been given prescriptions for both a nebulizer machine and BiPap tubing.  He inability to utilize his nebulizer and BiPap has likely contributed to his current COPD flare.  He has had difficulty obtaining coverage on his routine daily inhalers as well.  He is currently using samples.     New Medications Ordered This Visit   Medications   • Nebulizer misc     Si each Every 4 (Four) Hours As Needed (shortness of breath).      Dispense:  1 each     Refill:  0   • Misc. Devices misc     Sig: Bipap tubing.     Dispense:  1 each     Refill:  0   • methylPREDNISolone sodium succinate (SOLU-Medrol) injection 125 mg   • cefTRIAXone (ROCEPHIN) injection 1 g   • cefdinir (OMNICEF) 300 MG capsule     Sig: Take 1 capsule by mouth 2 (Two) Times a Day for 10 days.     Dispense:  20 capsule     Refill:  0   • methylPREDNISolone (MEDROL) 4 MG dose pack     Sig: Take as directed on package instructions.     Dispense:  21 each     Refill:  0       No orders of the defined types were placed in this encounter.      No follow-ups on file.    Radha Jean DO

## 2021-03-28 NOTE — ASSESSMENT & PLAN NOTE
Uncontrolled in office.  BP does fluctuate at times.  No changes to medication as it is mildly elevated.  He has an upcoming appt with cardiology.  Patient will continue lisinopril, amlodipine, spironolactone, Terazosin, and Lasix.

## 2021-03-28 NOTE — ASSESSMENT & PLAN NOTE
Uncontrolled due to being out of his medication.  We do not have samples of trelegy.  However, samples of stiolto have been provided today.   He may use albuterol as needed.

## 2021-04-20 ENCOUNTER — OFFICE VISIT (OUTPATIENT)
Dept: INTERNAL MEDICINE | Facility: CLINIC | Age: 71
End: 2021-04-20

## 2021-04-20 VITALS
BODY MASS INDEX: 41.99 KG/M2 | HEART RATE: 68 BPM | TEMPERATURE: 98.7 F | OXYGEN SATURATION: 97 % | HEIGHT: 65 IN | SYSTOLIC BLOOD PRESSURE: 138 MMHG | WEIGHT: 252 LBS | DIASTOLIC BLOOD PRESSURE: 82 MMHG

## 2021-04-20 DIAGNOSIS — R05.3 PERSISTENT COUGH: ICD-10-CM

## 2021-04-20 DIAGNOSIS — J44.1 COPD WITH EXACERBATION (HCC): Primary | ICD-10-CM

## 2021-04-20 PROCEDURE — 99214 OFFICE O/P EST MOD 30 MIN: CPT | Performed by: FAMILY MEDICINE

## 2021-04-20 PROCEDURE — 96372 THER/PROPH/DIAG INJ SC/IM: CPT | Performed by: FAMILY MEDICINE

## 2021-04-20 RX ORDER — METHYLPREDNISOLONE SODIUM SUCCINATE 125 MG/2ML
125 INJECTION, POWDER, LYOPHILIZED, FOR SOLUTION INTRAMUSCULAR; INTRAVENOUS EVERY 6 HOURS
Status: DISCONTINUED | OUTPATIENT
Start: 2021-04-20 | End: 2021-05-07 | Stop reason: HOSPADM

## 2021-04-20 RX ORDER — FLUTICASONE PROPIONATE 50 MCG
2 SPRAY, SUSPENSION (ML) NASAL DAILY
Qty: 18.2 ML | Refills: 5 | Status: SHIPPED | OUTPATIENT
Start: 2021-04-20 | End: 2022-01-05

## 2021-04-20 RX ORDER — LISINOPRIL 40 MG/1
40 TABLET ORAL DAILY
Qty: 90 TABLET | Refills: 3 | Status: SHIPPED | OUTPATIENT
Start: 2021-04-20

## 2021-04-20 RX ORDER — METHYLPREDNISOLONE 4 MG/1
TABLET ORAL
Qty: 21 EACH | Refills: 0 | Status: SHIPPED | OUTPATIENT
Start: 2021-04-20 | End: 2021-05-03

## 2021-04-20 RX ORDER — FINASTERIDE 5 MG/1
5 TABLET, FILM COATED ORAL DAILY
Qty: 90 TABLET | Refills: 3 | Status: SHIPPED | OUTPATIENT
Start: 2021-04-20 | End: 2022-02-14

## 2021-04-20 RX ORDER — FLUOXETINE HYDROCHLORIDE 20 MG/1
20 CAPSULE ORAL DAILY
Qty: 90 CAPSULE | Refills: 3 | Status: SHIPPED | OUTPATIENT
Start: 2021-04-20

## 2021-04-20 RX ORDER — TERAZOSIN 10 MG/1
10 CAPSULE ORAL NIGHTLY
Qty: 90 CAPSULE | Refills: 3 | Status: SHIPPED | OUTPATIENT
Start: 2021-04-20 | End: 2022-02-14

## 2021-04-20 RX ORDER — ATORVASTATIN CALCIUM 20 MG/1
20 TABLET, FILM COATED ORAL NIGHTLY
Qty: 90 TABLET | Refills: 3 | Status: SHIPPED | OUTPATIENT
Start: 2021-04-20

## 2021-04-20 RX ORDER — CEFTRIAXONE 1 G/1
1 INJECTION, POWDER, FOR SOLUTION INTRAMUSCULAR; INTRAVENOUS EVERY 24 HOURS
Status: COMPLETED | OUTPATIENT
Start: 2021-04-20 | End: 2021-04-20

## 2021-04-20 RX ORDER — CEFDINIR 300 MG/1
300 CAPSULE ORAL 2 TIMES DAILY
Qty: 20 CAPSULE | Refills: 0 | Status: SHIPPED | OUTPATIENT
Start: 2021-04-20 | End: 2021-05-03

## 2021-04-20 RX ORDER — SPIRONOLACTONE 50 MG/1
50 TABLET, FILM COATED ORAL DAILY
Qty: 90 TABLET | Refills: 1 | Status: SHIPPED | OUTPATIENT
Start: 2021-04-20 | End: 2021-09-30 | Stop reason: SDUPTHER

## 2021-04-20 RX ORDER — CETIRIZINE HYDROCHLORIDE 10 MG/1
10 TABLET ORAL DAILY
Qty: 90 TABLET | Refills: 3 | Status: SHIPPED | OUTPATIENT
Start: 2021-04-20 | End: 2022-07-13 | Stop reason: HOSPADM

## 2021-04-20 RX ADMIN — METHYLPREDNISOLONE SODIUM SUCCINATE 125 MG: 125 INJECTION, POWDER, LYOPHILIZED, FOR SOLUTION INTRAMUSCULAR; INTRAVENOUS at 16:59

## 2021-04-20 RX ADMIN — CEFTRIAXONE 1 G: 1 INJECTION, POWDER, FOR SOLUTION INTRAMUSCULAR; INTRAVENOUS at 16:58

## 2021-04-20 NOTE — PROGRESS NOTES
Robe Bryant is a 70 y.o. male.    Chief Complaint   Patient presents with   • COPD       HPI   Patient reports they have not been feeling well for 1week(s). He admits to nasal congestion, drainage, wheezing, shortness of breath, increased swelling.  He denies fever.  They have tried routine breathing medication for this issue without good response. He has been out of daily inhaler for the last 3 days, but symptoms started before then.  He states he felt better after his last visit, but it keeps coming back.  CT of the chest was ordered by pulmonology at his last visit for low dose scan.  However, this has not been set up.      The following portions of the patient's history were reviewed and updated as appropriate: allergies, current medications, past family history, past medical history, past social history, past surgical history and problem list.     No Known Allergies      Current Outpatient Medications:   •  amiodarone (PACERONE) 200 MG tablet, Take 1 tablet by mouth Daily., Disp: 30 tablet, Rfl: 0  •  amLODIPine (NORVASC) 10 MG tablet, Take 1 tablet by mouth Daily., Disp: 30 tablet, Rfl: 1  •  apixaban (ELIQUIS) 5 MG tablet tablet, Take 1 tablet by mouth 2 (two) times a day., Disp: 180 tablet, Rfl: 3  •  aspirin 81 MG chewable tablet, Chew 81 mg Daily., Disp: , Rfl:   •  atorvastatin (LIPITOR) 20 MG tablet, Take 1 tablet by mouth Every Night., Disp: 90 tablet, Rfl: 3  •  baclofen (LIORESAL) 10 MG tablet, Take 1 tablet by mouth 3 (Three) Times a Day As Needed for Muscle Spasms., Disp: 90 tablet, Rfl: 2  •  Blood Glucose Monitoring Suppl (TRUE METRIX AIR GLUCOSE METER) w/Device kit, 1 each by In Vitro route 2 (two) times a day. E11.9, Disp: 1 kit, Rfl: 0  •  cetirizine (zyrTEC) 10 MG tablet, Take 1 tablet by mouth Daily., Disp: 90 tablet, Rfl: 3  •  Cholecalciferol (Vitamin D-3) 25 MCG (1000 UT) capsule, Take 1,000 Units by mouth Daily., Disp: 90 capsule, Rfl: 3  •  diclofenac (VOLTAREN) 1 % gel gel,  Apply 4 g topically to the appropriate area as directed 4 (Four) Times a Day As Needed (pain)., Disp: 100 g, Rfl: 5  •  finasteride (PROSCAR) 5 MG tablet, Take 1 tablet by mouth Daily., Disp: 90 tablet, Rfl: 3  •  FLUoxetine (PROzac) 20 MG capsule, Take 1 capsule by mouth Daily., Disp: 90 capsule, Rfl: 3  •  Fluticasone-Umeclidin-Vilant (Trelegy Ellipta) 100-62.5-25 MCG/INH aerosol powder , Inhale 1 each Daily. Rinse mouth with water after use., Disp: 1 each, Rfl: 5  •  furosemide (Lasix) 20 MG tablet, Take 1 tablet by mouth every morning; may take second dose as needed for increased swelling, Disp: 60 tablet, Rfl: 2  •  glipizide (GLUCOTROL) 5 MG tablet, TAKE 1 TABLET BY MOUTH 2 TIMES A DAY BEFORE MEALS., Disp: 180 tablet, Rfl: 3  •  glucose blood (True Metrix Blood Glucose Test) test strip, Daily testing E11.9, Disp: 100 each, Rfl: 5  •  glucose monitor monitoring kit, 1 each Daily. E11.9, Disp: 1 each, Rfl: 0  •  ipratropium-albuterol (DUO-NEB) 0.5-2.5 mg/3 ml nebulizer, Take 3 mL by nebulization 4 (Four) Times a Day., Disp: 1620 mL, Rfl: 2  •  Lancet Device misc, 1 each Daily. E11.9, Disp: 100 each, Rfl: 3  •  lisinopril (PRINIVIL,ZESTRIL) 40 MG tablet, Take 1 tablet by mouth Daily., Disp: 90 tablet, Rfl: 3  •  methylPREDNISolone (MEDROL) 4 MG dose pack, Take as directed on package instructions., Disp: 21 each, Rfl: 0  •  metoprolol succinate XL (Toprol XL) 50 MG 24 hr tablet, Take 1 tablet by mouth Daily., Disp: 30 tablet, Rfl: 0  •  Misc. Devices misc, Bipap tubing., Disp: 1 each, Rfl: 0  •  Multiple Vitamin tablet, Take 1 tablet by mouth daily., Disp: , Rfl:   •  Nebulizer device, 1 Device Take As Directed., Disp: 1 each, Rfl: 0  •  Nebulizer misc, 1 each Every 4 (Four) Hours As Needed (shortness of breath)., Disp: 1 each, Rfl: 0  •  nitroglycerin (NITROSTAT) 0.4 MG SL tablet, Place 1 tablet under the tongue Every 5 (Five) Minutes As Needed for Chest Pain., Disp: 25 tablet, Rfl: 11  •  Olopatadine HCl 0.7 %  solution, Apply 1 drop to eye(s) as directed by provider Daily., Disp: 2.5 mL, Rfl: 5  •  omeprazole (priLOSEC) 40 MG capsule, Take 1 capsule by mouth Daily., Disp: 90 capsule, Rfl: 3  •  potassium chloride (K-DUR,KLOR-CON) 10 MEQ CR tablet, Take 1 tablet by mouth 2 (Two) Times a Day., Disp: 60 tablet, Rfl: 2  •  pramipexole (MIRAPEX) 0.5 MG tablet, Take 1 tablet by mouth 3 (Three) Times a Day., Disp: 270 tablet, Rfl: 3  •  spironolactone (Aldactone) 50 MG tablet, Take 1 tablet by mouth Daily., Disp: 90 tablet, Rfl: 1  •  terazosin (HYTRIN) 10 MG capsule, Take 1 capsule by mouth Every Night., Disp: 90 capsule, Rfl: 3  •  tiotropium bromide-olodaterol (Stiolto Respimat) 2.5-2.5 MCG/ACT aerosol solution inhaler, Inhale 2 puffs Daily. Until able to get trelegy approved., Disp: 4 g, Rfl: 0  •  traMADol (ULTRAM) 50 MG tablet, Take 50 mg by mouth Every 6 (Six) Hours As Needed. for pain, Disp: , Rfl:   •  TRUEplus Lancets 33G misc, 1 each by Other route Daily. E11.9, Disp: 100 each, Rfl: 5  •  cefdinir (OMNICEF) 300 MG capsule, Take 1 capsule by mouth 2 (Two) Times a Day., Disp: 20 capsule, Rfl: 0  •  fluticasone (Flonase) 50 MCG/ACT nasal spray, 2 sprays into the nostril(s) as directed by provider Daily., Disp: 18.2 mL, Rfl: 5  •  spironolactone (Aldactone) 50 MG tablet, Take 1 tablet by mouth Daily., Disp: 90 tablet, Rfl: 1    Current Facility-Administered Medications:   •  methylPREDNISolone sodium succinate (SOLU-Medrol) injection 125 mg, 125 mg, Intravenous, Q6H, Radha Jean DO, 125 mg at 04/20/21 1659    ROS    Review of Systems   Constitutional: Positive for fatigue. Negative for chills and fever.   HENT: Positive for congestion and postnasal drip.    Respiratory: Positive for cough, shortness of breath and wheezing.    Cardiovascular: Positive for chest pain and leg swelling. Negative for palpitations.   Gastrointestinal: Negative for abdominal pain, constipation, diarrhea, nausea and vomiting.  "      Vitals:    04/20/21 1610   BP: 138/82   BP Location: Left arm   Patient Position: Sitting   Cuff Size: Adult   Pulse: 68   Temp: 98.7 °F (37.1 °C)   TempSrc: Temporal   SpO2: 97%   Weight: 114 kg (252 lb)   Height: 165.1 cm (65\")     Body mass index is 41.93 kg/m².    Physical Exam     Physical Exam  Constitutional:       General: He is not in acute distress.     Appearance: He is well-developed. He is ill-appearing.   HENT:      Head: Normocephalic and atraumatic.      Right Ear: External ear normal.      Left Ear: External ear normal.   Eyes:      Extraocular Movements: Extraocular movements intact.      Conjunctiva/sclera: Conjunctivae normal.   Cardiovascular:      Rate and Rhythm: Normal rate and regular rhythm.      Heart sounds: No murmur heard.     Pulmonary:      Effort: Pulmonary effort is normal. No respiratory distress.      Breath sounds: Wheezing present.   Abdominal:      General: Bowel sounds are normal. There is no distension.      Palpations: Abdomen is soft.      Tenderness: There is abdominal tenderness.   Musculoskeletal:      Right lower leg: Edema (1+) present.      Left lower leg: Edema (1+) present.   Neurological:      Mental Status: He is alert and oriented to person, place, and time.      Cranial Nerves: No cranial nerve deficit.   Psychiatric:         Mood and Affect: Mood normal.         Behavior: Behavior normal.         Assessment/Plan    Problems Addressed this Visit     None      Visit Diagnoses     COPD with exacerbation (CMS/[facility])    -  Primary    Relevant Medications    fluticasone (Flonase) 50 MCG/ACT nasal spray    cetirizine (zyrTEC) 10 MG tablet    methylPREDNISolone (MEDROL) 4 MG dose pack    cefTRIAXone (ROCEPHIN) injection 1 g (Completed)    methylPREDNISolone sodium succinate (SOLU-Medrol) injection 125 mg    Other Relevant Orders    CT Chest Without Contrast    Persistent cough        Relevant Orders    CT Chest Without Contrast        Patient is being treat for " COPD exacerbation with steroid and antibiotic injections and by mouth.  May continue nebs.  He has been provided with samples of stiolto today.  Due to persistent flares, will order CT of the chest.  X-rays have been unremarkable.  Unable to tolerate IV dye due to renal function.     New Medications Ordered This Visit   Medications   • fluticasone (Flonase) 50 MCG/ACT nasal spray     Si sprays into the nostril(s) as directed by provider Daily.     Dispense:  18.2 mL     Refill:  5   • spironolactone (Aldactone) 50 MG tablet     Sig: Take 1 tablet by mouth Daily.     Dispense:  90 tablet     Refill:  1   • atorvastatin (LIPITOR) 20 MG tablet     Sig: Take 1 tablet by mouth Every Night.     Dispense:  90 tablet     Refill:  3   • apixaban (ELIQUIS) 5 MG tablet tablet     Sig: Take 1 tablet by mouth 2 (two) times a day.     Dispense:  180 tablet     Refill:  3   • lisinopril (PRINIVIL,ZESTRIL) 40 MG tablet     Sig: Take 1 tablet by mouth Daily.     Dispense:  90 tablet     Refill:  3   • terazosin (HYTRIN) 10 MG capsule     Sig: Take 1 capsule by mouth Every Night.     Dispense:  90 capsule     Refill:  3   • cetirizine (zyrTEC) 10 MG tablet     Sig: Take 1 tablet by mouth Daily.     Dispense:  90 tablet     Refill:  3   • FLUoxetine (PROzac) 20 MG capsule     Sig: Take 1 capsule by mouth Daily.     Dispense:  90 capsule     Refill:  3   • finasteride (PROSCAR) 5 MG tablet     Sig: Take 1 tablet by mouth Daily.     Dispense:  90 tablet     Refill:  3   • methylPREDNISolone (MEDROL) 4 MG dose pack     Sig: Take as directed on package instructions.     Dispense:  21 each     Refill:  0   • cefTRIAXone (ROCEPHIN) injection 1 g   • methylPREDNISolone sodium succinate (SOLU-Medrol) injection 125 mg   • cefdinir (OMNICEF) 300 MG capsule     Sig: Take 1 capsule by mouth 2 (Two) Times a Day.     Dispense:  20 capsule     Refill:  0       No orders of the defined types were placed in this encounter.      No follow-ups on  file.    Radha Jean, DO

## 2021-04-30 ENCOUNTER — HOSPITAL ENCOUNTER (OUTPATIENT)
Dept: CT IMAGING | Facility: HOSPITAL | Age: 71
Discharge: HOME | End: 2021-04-30
Admitting: FAMILY MEDICINE

## 2021-04-30 PROCEDURE — 71250 CT THORAX DX C-: CPT

## 2021-05-03 ENCOUNTER — APPOINTMENT (OUTPATIENT)
Dept: CT IMAGING | Facility: HOSPITAL | Age: 71
End: 2021-05-03

## 2021-05-03 ENCOUNTER — APPOINTMENT (OUTPATIENT)
Dept: GENERAL RADIOLOGY | Facility: HOSPITAL | Age: 71
End: 2021-05-03

## 2021-05-03 ENCOUNTER — HOSPITAL ENCOUNTER (OUTPATIENT)
Facility: HOSPITAL | Age: 71
Setting detail: OBSERVATION
Discharge: ANOTHER HOSPITAL | End: 2021-05-07
Attending: EMERGENCY MEDICINE | Admitting: INTERNAL MEDICINE

## 2021-05-03 ENCOUNTER — OFFICE VISIT (OUTPATIENT)
Dept: INTERNAL MEDICINE | Facility: CLINIC | Age: 71
End: 2021-05-03

## 2021-05-03 VITALS
HEART RATE: 82 BPM | RESPIRATION RATE: 16 BRPM | TEMPERATURE: 98.4 F | DIASTOLIC BLOOD PRESSURE: 78 MMHG | WEIGHT: 252 LBS | OXYGEN SATURATION: 95 % | BODY MASS INDEX: 41.99 KG/M2 | HEIGHT: 65 IN | SYSTOLIC BLOOD PRESSURE: 142 MMHG

## 2021-05-03 DIAGNOSIS — R53.83 FATIGUE, UNSPECIFIED TYPE: ICD-10-CM

## 2021-05-03 DIAGNOSIS — R60.0 LOWER LEG EDEMA: ICD-10-CM

## 2021-05-03 DIAGNOSIS — E55.9 VITAMIN D DEFICIENCY: ICD-10-CM

## 2021-05-03 DIAGNOSIS — R06.02 SHORTNESS OF BREATH: Primary | ICD-10-CM

## 2021-05-03 DIAGNOSIS — R63.0 DECREASED APPETITE: ICD-10-CM

## 2021-05-03 DIAGNOSIS — R10.10 PAIN OF UPPER ABDOMEN: ICD-10-CM

## 2021-05-03 DIAGNOSIS — R00.2 PALPITATIONS: ICD-10-CM

## 2021-05-03 DIAGNOSIS — I50.32 CHRONIC DIASTOLIC HEART FAILURE (HCC): ICD-10-CM

## 2021-05-03 DIAGNOSIS — R07.9 CHEST PAIN, UNSPECIFIED TYPE: Primary | ICD-10-CM

## 2021-05-03 PROBLEM — I50.33 ACUTE ON CHRONIC DIASTOLIC HEART FAILURE: Status: ACTIVE | Noted: 2021-05-03

## 2021-05-03 PROBLEM — J44.1 COPD EXACERBATION (HCC): Status: ACTIVE | Noted: 2021-05-03

## 2021-05-03 LAB
ALBUMIN SERPL-MCNC: 3.7 G/DL (ref 3.5–5.2)
ALBUMIN/GLOB SERPL: 1.5 G/DL
ALP SERPL-CCNC: 65 U/L (ref 39–117)
ALT SERPL W P-5'-P-CCNC: 17 U/L (ref 1–41)
ANION GAP SERPL CALCULATED.3IONS-SCNC: 7.3 MMOL/L (ref 5–15)
APTT PPP: 23.7 SECONDS (ref 70–100)
APTT PPP: 38.3 SECONDS (ref 70–100)
AST SERPL-CCNC: 21 U/L (ref 1–40)
BASOPHILS # BLD AUTO: 0.04 10*3/MM3 (ref 0–0.2)
BASOPHILS NFR BLD AUTO: 0.5 % (ref 0–1.5)
BILIRUB SERPL-MCNC: 0.6 MG/DL (ref 0–1.2)
BUN SERPL-MCNC: 15 MG/DL (ref 8–23)
BUN/CREAT SERPL: 10.7 (ref 7–25)
CALCIUM SPEC-SCNC: 8.8 MG/DL (ref 8.6–10.5)
CHLORIDE SERPL-SCNC: 101 MMOL/L (ref 98–107)
CO2 SERPL-SCNC: 30.7 MMOL/L (ref 22–29)
CREAT SERPL-MCNC: 1.4 MG/DL (ref 0.76–1.27)
DEPRECATED RDW RBC AUTO: 48.5 FL (ref 37–54)
EOSINOPHIL # BLD AUTO: 0.26 10*3/MM3 (ref 0–0.4)
EOSINOPHIL NFR BLD AUTO: 3.1 % (ref 0.3–6.2)
ERYTHROCYTE [DISTWIDTH] IN BLOOD BY AUTOMATED COUNT: 14.6 % (ref 12.3–15.4)
GFR SERPL CREATININE-BSD FRML MDRD: 50 ML/MIN/1.73
GLOBULIN UR ELPH-MCNC: 2.5 GM/DL
GLUCOSE SERPL-MCNC: 68 MG/DL (ref 65–99)
HCT VFR BLD AUTO: 41.9 % (ref 37.5–51)
HGB BLD-MCNC: 13.4 G/DL (ref 13–17.7)
HOLD SPECIMEN: NORMAL
IMM GRANULOCYTES # BLD AUTO: 0.05 10*3/MM3 (ref 0–0.05)
IMM GRANULOCYTES NFR BLD AUTO: 0.6 % (ref 0–0.5)
INR PPP: 1.06 (ref 0.9–1.1)
LYMPHOCYTES # BLD AUTO: 1.29 10*3/MM3 (ref 0.7–3.1)
LYMPHOCYTES NFR BLD AUTO: 15.4 % (ref 19.6–45.3)
MCH RBC QN AUTO: 28.8 PG (ref 26.6–33)
MCHC RBC AUTO-ENTMCNC: 32 G/DL (ref 31.5–35.7)
MCV RBC AUTO: 90.1 FL (ref 79–97)
MONOCYTES # BLD AUTO: 1.02 10*3/MM3 (ref 0.1–0.9)
MONOCYTES NFR BLD AUTO: 12.2 % (ref 5–12)
NEUTROPHILS NFR BLD AUTO: 5.71 10*3/MM3 (ref 1.7–7)
NEUTROPHILS NFR BLD AUTO: 68.2 % (ref 42.7–76)
NRBC BLD AUTO-RTO: 0 /100 WBC (ref 0–0.2)
NT-PROBNP SERPL-MCNC: 1052 PG/ML (ref 0–900)
PLATELET # BLD AUTO: 162 10*3/MM3 (ref 140–450)
PMV BLD AUTO: 10.5 FL (ref 6–12)
POTASSIUM SERPL-SCNC: 3.6 MMOL/L (ref 3.5–5.2)
PROCALCITONIN SERPL-MCNC: 0.08 NG/ML (ref 0–0.25)
PROT SERPL-MCNC: 6.2 G/DL (ref 6–8.5)
PROTHROMBIN TIME: 14.3 SECONDS (ref 12–15.1)
RBC # BLD AUTO: 4.65 10*6/MM3 (ref 4.14–5.8)
SODIUM SERPL-SCNC: 139 MMOL/L (ref 136–145)
TROPONIN T SERPL-MCNC: 0.01 NG/ML (ref 0–0.03)
TROPONIN T SERPL-MCNC: 0.02 NG/ML (ref 0–0.03)
TROPONIN T SERPL-MCNC: <0.01 NG/ML (ref 0–0.03)
WBC # BLD AUTO: 8.37 10*3/MM3 (ref 3.4–10.8)
WHOLE BLOOD HOLD SPECIMEN: NORMAL
WHOLE BLOOD HOLD SPECIMEN: NORMAL

## 2021-05-03 PROCEDURE — 71250 CT THORAX DX C-: CPT

## 2021-05-03 PROCEDURE — 93005 ELECTROCARDIOGRAM TRACING: CPT | Performed by: EMERGENCY MEDICINE

## 2021-05-03 PROCEDURE — 85610 PROTHROMBIN TIME: CPT | Performed by: EMERGENCY MEDICINE

## 2021-05-03 PROCEDURE — 96366 THER/PROPH/DIAG IV INF ADDON: CPT

## 2021-05-03 PROCEDURE — G0378 HOSPITAL OBSERVATION PER HR: HCPCS

## 2021-05-03 PROCEDURE — 99214 OFFICE O/P EST MOD 30 MIN: CPT | Performed by: FAMILY MEDICINE

## 2021-05-03 PROCEDURE — 25010000002 HEPARIN (PORCINE) PER 1000 UNITS: Performed by: EMERGENCY MEDICINE

## 2021-05-03 PROCEDURE — 80053 COMPREHEN METABOLIC PANEL: CPT | Performed by: EMERGENCY MEDICINE

## 2021-05-03 PROCEDURE — 96365 THER/PROPH/DIAG IV INF INIT: CPT

## 2021-05-03 PROCEDURE — 85025 COMPLETE CBC W/AUTO DIFF WBC: CPT | Performed by: EMERGENCY MEDICINE

## 2021-05-03 PROCEDURE — 84145 PROCALCITONIN (PCT): CPT | Performed by: EMERGENCY MEDICINE

## 2021-05-03 PROCEDURE — 84484 ASSAY OF TROPONIN QUANT: CPT | Performed by: INTERNAL MEDICINE

## 2021-05-03 PROCEDURE — 85730 THROMBOPLASTIN TIME PARTIAL: CPT | Performed by: EMERGENCY MEDICINE

## 2021-05-03 PROCEDURE — 94640 AIRWAY INHALATION TREATMENT: CPT

## 2021-05-03 PROCEDURE — 99284 EMERGENCY DEPT VISIT MOD MDM: CPT

## 2021-05-03 PROCEDURE — 99220 PR INITIAL OBSERVATION CARE/DAY 70 MINUTES: CPT | Performed by: INTERNAL MEDICINE

## 2021-05-03 PROCEDURE — 84484 ASSAY OF TROPONIN QUANT: CPT | Performed by: EMERGENCY MEDICINE

## 2021-05-03 PROCEDURE — 93000 ELECTROCARDIOGRAM COMPLETE: CPT | Performed by: FAMILY MEDICINE

## 2021-05-03 PROCEDURE — 83880 ASSAY OF NATRIURETIC PEPTIDE: CPT | Performed by: EMERGENCY MEDICINE

## 2021-05-03 PROCEDURE — 85730 THROMBOPLASTIN TIME PARTIAL: CPT | Performed by: INTERNAL MEDICINE

## 2021-05-03 PROCEDURE — 71045 X-RAY EXAM CHEST 1 VIEW: CPT

## 2021-05-03 PROCEDURE — 25010000002 HEPARIN (PORCINE) PER 1000 UNITS: Performed by: INTERNAL MEDICINE

## 2021-05-03 RX ORDER — PANTOPRAZOLE SODIUM 40 MG/1
40 TABLET, DELAYED RELEASE ORAL EVERY MORNING
Status: DISCONTINUED | OUTPATIENT
Start: 2021-05-04 | End: 2021-05-07 | Stop reason: HOSPADM

## 2021-05-03 RX ORDER — SODIUM CHLORIDE 0.9 % (FLUSH) 0.9 %
3 SYRINGE (ML) INJECTION EVERY 12 HOURS SCHEDULED
Status: DISCONTINUED | OUTPATIENT
Start: 2021-05-03 | End: 2021-05-07 | Stop reason: HOSPADM

## 2021-05-03 RX ORDER — HEPARIN SODIUM 1000 [USP'U]/ML
5000 INJECTION, SOLUTION INTRAVENOUS; SUBCUTANEOUS ONCE
Status: DISCONTINUED | OUTPATIENT
Start: 2021-05-03 | End: 2021-05-03

## 2021-05-03 RX ORDER — ONDANSETRON 4 MG/1
4 TABLET, ORALLY DISINTEGRATING ORAL EVERY 8 HOURS PRN
Qty: 20 TABLET | Refills: 0 | Status: SHIPPED | OUTPATIENT
Start: 2021-05-03 | End: 2022-07-13 | Stop reason: HOSPADM

## 2021-05-03 RX ORDER — HEPARIN SODIUM 10000 [USP'U]/100ML
1000 INJECTION, SOLUTION INTRAVENOUS
Status: DISCONTINUED | OUTPATIENT
Start: 2021-05-03 | End: 2021-05-04

## 2021-05-03 RX ORDER — ACETAMINOPHEN 325 MG/1
650 TABLET ORAL EVERY 4 HOURS PRN
Status: DISCONTINUED | OUTPATIENT
Start: 2021-05-03 | End: 2021-05-07 | Stop reason: HOSPADM

## 2021-05-03 RX ORDER — GUAIFENESIN 600 MG/1
600 TABLET, EXTENDED RELEASE ORAL EVERY 12 HOURS SCHEDULED
Status: DISCONTINUED | OUTPATIENT
Start: 2021-05-03 | End: 2021-05-07 | Stop reason: HOSPADM

## 2021-05-03 RX ORDER — LISINOPRIL 20 MG/1
40 TABLET ORAL DAILY
Status: DISCONTINUED | OUTPATIENT
Start: 2021-05-04 | End: 2021-05-07 | Stop reason: HOSPADM

## 2021-05-03 RX ORDER — TRAMADOL HYDROCHLORIDE 50 MG/1
50 TABLET ORAL EVERY 6 HOURS PRN
Status: DISCONTINUED | OUTPATIENT
Start: 2021-05-03 | End: 2021-05-07 | Stop reason: HOSPADM

## 2021-05-03 RX ORDER — IPRATROPIUM BROMIDE AND ALBUTEROL SULFATE 2.5; .5 MG/3ML; MG/3ML
3 SOLUTION RESPIRATORY (INHALATION)
Status: DISCONTINUED | OUTPATIENT
Start: 2021-05-03 | End: 2021-05-07 | Stop reason: HOSPADM

## 2021-05-03 RX ORDER — TERAZOSIN 5 MG/1
10 CAPSULE ORAL NIGHTLY
Status: DISCONTINUED | OUTPATIENT
Start: 2021-05-03 | End: 2021-05-07 | Stop reason: HOSPADM

## 2021-05-03 RX ORDER — ONDANSETRON 2 MG/ML
4 INJECTION INTRAMUSCULAR; INTRAVENOUS EVERY 6 HOURS PRN
Status: DISCONTINUED | OUTPATIENT
Start: 2021-05-03 | End: 2021-05-07 | Stop reason: HOSPADM

## 2021-05-03 RX ORDER — HEPARIN SODIUM 1000 [USP'U]/ML
5000 INJECTION INTRAVENOUS; SUBCUTANEOUS AS NEEDED
Status: DISCONTINUED | OUTPATIENT
Start: 2021-05-03 | End: 2021-05-04

## 2021-05-03 RX ORDER — PREDNISONE 20 MG/1
40 TABLET ORAL
Status: DISCONTINUED | OUTPATIENT
Start: 2021-05-04 | End: 2021-05-07 | Stop reason: HOSPADM

## 2021-05-03 RX ORDER — HEPARIN SODIUM 1000 [USP'U]/ML
30 INJECTION INTRAVENOUS; SUBCUTANEOUS AS NEEDED
Status: DISCONTINUED | OUTPATIENT
Start: 2021-05-03 | End: 2021-05-04

## 2021-05-03 RX ORDER — ATORVASTATIN CALCIUM 20 MG/1
20 TABLET, FILM COATED ORAL NIGHTLY
Status: DISCONTINUED | OUTPATIENT
Start: 2021-05-03 | End: 2021-05-07 | Stop reason: HOSPADM

## 2021-05-03 RX ORDER — BUDESONIDE 0.5 MG/2ML
0.5 INHALANT ORAL
Status: DISCONTINUED | OUTPATIENT
Start: 2021-05-03 | End: 2021-05-07 | Stop reason: HOSPADM

## 2021-05-03 RX ORDER — AMLODIPINE BESYLATE 5 MG/1
10 TABLET ORAL DAILY
Status: DISCONTINUED | OUTPATIENT
Start: 2021-05-04 | End: 2021-05-07 | Stop reason: HOSPADM

## 2021-05-03 RX ORDER — BACLOFEN 10 MG/1
10 TABLET ORAL 3 TIMES DAILY PRN
Status: DISCONTINUED | OUTPATIENT
Start: 2021-05-03 | End: 2021-05-07 | Stop reason: HOSPADM

## 2021-05-03 RX ORDER — METOPROLOL SUCCINATE 50 MG/1
50 TABLET, EXTENDED RELEASE ORAL DAILY
Status: DISCONTINUED | OUTPATIENT
Start: 2021-05-04 | End: 2021-05-07 | Stop reason: HOSPADM

## 2021-05-03 RX ORDER — SODIUM CHLORIDE 0.9 % (FLUSH) 0.9 %
3-10 SYRINGE (ML) INJECTION AS NEEDED
Status: DISCONTINUED | OUTPATIENT
Start: 2021-05-03 | End: 2021-05-07 | Stop reason: HOSPADM

## 2021-05-03 RX ORDER — IPRATROPIUM BROMIDE AND ALBUTEROL SULFATE 2.5; .5 MG/3ML; MG/3ML
3 SOLUTION RESPIRATORY (INHALATION) EVERY 4 HOURS PRN
Status: DISCONTINUED | OUTPATIENT
Start: 2021-05-03 | End: 2021-05-07 | Stop reason: HOSPADM

## 2021-05-03 RX ORDER — ASPIRIN 81 MG/1
81 TABLET, CHEWABLE ORAL DAILY
Status: DISCONTINUED | OUTPATIENT
Start: 2021-05-04 | End: 2021-05-07 | Stop reason: HOSPADM

## 2021-05-03 RX ORDER — ACETAMINOPHEN 160 MG/5ML
650 SOLUTION ORAL EVERY 4 HOURS PRN
Status: DISCONTINUED | OUTPATIENT
Start: 2021-05-03 | End: 2021-05-07 | Stop reason: HOSPADM

## 2021-05-03 RX ORDER — AMIODARONE HYDROCHLORIDE 200 MG/1
200 TABLET ORAL
Status: DISCONTINUED | OUTPATIENT
Start: 2021-05-04 | End: 2021-05-07 | Stop reason: HOSPADM

## 2021-05-03 RX ORDER — ACETAMINOPHEN 650 MG/1
650 SUPPOSITORY RECTAL EVERY 4 HOURS PRN
Status: DISCONTINUED | OUTPATIENT
Start: 2021-05-03 | End: 2021-05-07 | Stop reason: HOSPADM

## 2021-05-03 RX ORDER — SODIUM CHLORIDE 0.9 % (FLUSH) 0.9 %
10 SYRINGE (ML) INJECTION AS NEEDED
Status: DISCONTINUED | OUTPATIENT
Start: 2021-05-03 | End: 2021-05-07 | Stop reason: HOSPADM

## 2021-05-03 RX ORDER — FINASTERIDE 5 MG/1
5 TABLET, FILM COATED ORAL DAILY
Status: DISCONTINUED | OUTPATIENT
Start: 2021-05-04 | End: 2021-05-07 | Stop reason: HOSPADM

## 2021-05-03 RX ADMIN — IPRATROPIUM BROMIDE AND ALBUTEROL SULFATE 3 ML: .5; 3 SOLUTION RESPIRATORY (INHALATION) at 19:15

## 2021-05-03 RX ADMIN — BACLOFEN 10 MG: 10 TABLET ORAL at 23:06

## 2021-05-03 RX ADMIN — BUDESONIDE 0.5 MG: 0.5 INHALANT RESPIRATORY (INHALATION) at 19:15

## 2021-05-03 RX ADMIN — SODIUM CHLORIDE, PRESERVATIVE FREE 3 ML: 5 INJECTION INTRAVENOUS at 20:22

## 2021-05-03 RX ADMIN — GUAIFENESIN 600 MG: 600 TABLET, EXTENDED RELEASE ORAL at 20:21

## 2021-05-03 RX ADMIN — HEPARIN SODIUM AND DEXTROSE 1000 UNITS/HR: 10000; 5 INJECTION INTRAVENOUS at 17:41

## 2021-05-03 RX ADMIN — ATORVASTATIN CALCIUM 20 MG: 20 TABLET, FILM COATED ORAL at 20:21

## 2021-05-03 RX ADMIN — TERAZOSIN HYDROCHLORIDE 10 MG: 5 CAPSULE ORAL at 20:21

## 2021-05-03 NOTE — PROGRESS NOTES
Robe Bryant is a 70 y.o. male.    Chief Complaint   Patient presents with   • Cough     pt states he is no better since his last visit        HPI   Patient presents today with cough, shortness of breath and lower extremity edema Patient continues to use breathing medication.  He is using bipap at night and taking diuretic.  He reports no energy and feels like he is smothering. He does admit to abdominal pain and swelling for the last couple of days.  Pain is aching.  He admits to nausea off and on.  He has decreased appetite.  He ate a piece of sausage biscuit this morning.  He didn't eat dinner last night. He has had a recent CT of the chest due to persistent cough.  This showed adenopathy without signs of mass or pneumonia.  He does have A.fib with chronic diastolic HF.  Report recently scanned into chart shows pacemaker battery has 1 month remaining.     The following portions of the patient's history were reviewed and updated as appropriate: allergies, current medications, past family history, past medical history, past social history, past surgical history and problem list.     No Known Allergies      Current Outpatient Medications:   •  amiodarone (PACERONE) 200 MG tablet, Take 1 tablet by mouth Daily., Disp: 30 tablet, Rfl: 0  •  amLODIPine (NORVASC) 10 MG tablet, Take 1 tablet by mouth Daily., Disp: 30 tablet, Rfl: 1  •  apixaban (ELIQUIS) 5 MG tablet tablet, Take 1 tablet by mouth 2 (two) times a day., Disp: 180 tablet, Rfl: 3  •  aspirin 81 MG chewable tablet, Chew 81 mg Daily., Disp: , Rfl:   •  atorvastatin (LIPITOR) 20 MG tablet, Take 1 tablet by mouth Every Night., Disp: 90 tablet, Rfl: 3  •  baclofen (LIORESAL) 10 MG tablet, Take 1 tablet by mouth 3 (Three) Times a Day As Needed for Muscle Spasms., Disp: 90 tablet, Rfl: 2  •  Blood Glucose Monitoring Suppl (TRUE METRIX AIR GLUCOSE METER) w/Device kit, 1 each by In Vitro route 2 (two) times a day. E11.9, Disp: 1 kit, Rfl: 0  •  cetirizine  (zyrTEC) 10 MG tablet, Take 1 tablet by mouth Daily., Disp: 90 tablet, Rfl: 3  •  Cholecalciferol (Vitamin D-3) 25 MCG (1000 UT) capsule, Take 1,000 Units by mouth Daily., Disp: 90 capsule, Rfl: 3  •  diclofenac (VOLTAREN) 1 % gel gel, Apply 4 g topically to the appropriate area as directed 4 (Four) Times a Day As Needed (pain)., Disp: 100 g, Rfl: 5  •  finasteride (PROSCAR) 5 MG tablet, Take 1 tablet by mouth Daily., Disp: 90 tablet, Rfl: 3  •  FLUoxetine (PROzac) 20 MG capsule, Take 1 capsule by mouth Daily., Disp: 90 capsule, Rfl: 3  •  fluticasone (Flonase) 50 MCG/ACT nasal spray, 2 sprays into the nostril(s) as directed by provider Daily., Disp: 18.2 mL, Rfl: 5  •  Fluticasone-Umeclidin-Vilant (Trelegy Ellipta) 100-62.5-25 MCG/INH aerosol powder , Inhale 1 each Daily. Rinse mouth with water after use., Disp: 1 each, Rfl: 5  •  furosemide (Lasix) 20 MG tablet, Take 1 tablet by mouth every morning; may take second dose as needed for increased swelling, Disp: 60 tablet, Rfl: 2  •  glipizide (GLUCOTROL) 5 MG tablet, TAKE 1 TABLET BY MOUTH 2 TIMES A DAY BEFORE MEALS., Disp: 180 tablet, Rfl: 3  •  glucose blood (True Metrix Blood Glucose Test) test strip, Daily testing E11.9, Disp: 100 each, Rfl: 5  •  glucose monitor monitoring kit, 1 each Daily. E11.9, Disp: 1 each, Rfl: 0  •  ipratropium-albuterol (DUO-NEB) 0.5-2.5 mg/3 ml nebulizer, Take 3 mL by nebulization 4 (Four) Times a Day., Disp: 1620 mL, Rfl: 2  •  Lancet Device misc, 1 each Daily. E11.9, Disp: 100 each, Rfl: 3  •  lisinopril (PRINIVIL,ZESTRIL) 40 MG tablet, Take 1 tablet by mouth Daily., Disp: 90 tablet, Rfl: 3  •  metoprolol succinate XL (Toprol XL) 50 MG 24 hr tablet, Take 1 tablet by mouth Daily., Disp: 30 tablet, Rfl: 0  •  Misc. Devices misc, Bipap tubing., Disp: 1 each, Rfl: 0  •  Multiple Vitamin tablet, Take 1 tablet by mouth daily., Disp: , Rfl:   •  Nebulizer misc, 1 each Every 4 (Four) Hours As Needed (shortness of breath)., Disp: 1 each, Rfl:  0  •  nitroglycerin (NITROSTAT) 0.4 MG SL tablet, Place 1 tablet under the tongue Every 5 (Five) Minutes As Needed for Chest Pain., Disp: 25 tablet, Rfl: 11  •  Olopatadine HCl 0.7 % solution, Apply 1 drop to eye(s) as directed by provider Daily., Disp: 2.5 mL, Rfl: 5  •  omeprazole (priLOSEC) 40 MG capsule, Take 1 capsule by mouth Daily., Disp: 90 capsule, Rfl: 3  •  potassium chloride (K-DUR,KLOR-CON) 10 MEQ CR tablet, Take 1 tablet by mouth 2 (Two) Times a Day., Disp: 60 tablet, Rfl: 2  •  pramipexole (MIRAPEX) 0.5 MG tablet, Take 1 tablet by mouth 3 (Three) Times a Day., Disp: 270 tablet, Rfl: 3  •  tiotropium bromide-olodaterol (Stiolto Respimat) 2.5-2.5 MCG/ACT aerosol solution inhaler, Inhale 2 puffs Daily. Until able to get trelegy approved., Disp: 4 g, Rfl: 0  •  traMADol (ULTRAM) 50 MG tablet, Take 50 mg by mouth Every 6 (Six) Hours As Needed. for pain, Disp: , Rfl:   •  TRUEplus Lancets 33G misc, 1 each by Other route Daily. E11.9, Disp: 100 each, Rfl: 5  •  ondansetron ODT (Zofran ODT) 4 MG disintegrating tablet, Place 1 tablet on the tongue Every 8 (Eight) Hours As Needed for Nausea or Vomiting., Disp: 20 tablet, Rfl: 0  •  spironolactone (Aldactone) 50 MG tablet, Take 1 tablet by mouth Daily., Disp: 90 tablet, Rfl: 1  •  terazosin (HYTRIN) 10 MG capsule, Take 1 capsule by mouth Every Night., Disp: 90 capsule, Rfl: 3    Current Facility-Administered Medications:   •  methylPREDNISolone sodium succinate (SOLU-Medrol) injection 125 mg, 125 mg, Intravenous, Q6H, Radha Jean DO, 125 mg at 04/20/21 1659    ROS    Review of Systems   Constitutional: Positive for fatigue. Negative for chills and fever.   Respiratory: Positive for cough, shortness of breath and wheezing.    Cardiovascular: Positive for palpitations. Negative for chest pain.   Gastrointestinal: Positive for diarrhea. Negative for abdominal pain, constipation, nausea and vomiting.   Neurological: Positive for weakness.       Vitals:     "05/03/21 1145   BP: 142/78   BP Location: Right arm   Patient Position: Sitting   Cuff Size: Adult   Pulse: 82   Resp: 16   Temp: 98.4 °F (36.9 °C)   TempSrc: Temporal   SpO2: 95%   Weight: 114 kg (252 lb)   Height: 165.1 cm (65\")   PainSc:   6   PainLoc: Throat     Body mass index is 41.93 kg/m².    Physical Exam     Physical Exam  Constitutional:       General: He is not in acute distress.     Appearance: He is well-developed. He is obese. He is ill-appearing.   HENT:      Head: Normocephalic and atraumatic.      Right Ear: External ear normal.      Left Ear: External ear normal.   Eyes:      Extraocular Movements: Extraocular movements intact.      Conjunctiva/sclera: Conjunctivae normal.   Cardiovascular:      Rate and Rhythm: Normal rate and regular rhythm.      Heart sounds: No murmur heard.     Pulmonary:      Effort: Pulmonary effort is normal. No respiratory distress.      Breath sounds: Normal breath sounds. No wheezing.   Abdominal:      General: Bowel sounds are normal. There is no distension.      Palpations: Abdomen is soft.      Tenderness: There is abdominal tenderness (upper abdomen).   Musculoskeletal:      Right lower leg: Edema (3+) present.      Left lower leg: Edema (3+) present.   Skin:     General: Skin is warm and dry.   Neurological:      Mental Status: He is alert and oriented to person, place, and time.      Cranial Nerves: No cranial nerve deficit.           Assessment/Plan    ECG 12 Lead    Date/Time: 5/3/2021 12:27 PM  Performed by: Radha Jean DO  Authorized by: Radha Jean DO   Comparison: compared with previous ECG from 9/29/2020  Comparison to previous ECG: Inverted T-waves in V5 and V6 that are new  Rhythm: sinus rhythm  Rate: normal  Conduction: conduction normal  ST Segments: ST segments normal  T Waves: T waves normal  T inversion: V5 and V6  QRS axis: left  Other: no other findings    Clinical impression: abnormal EKG  Clinical impression comment: with " concern for ischemia            Problems Addressed this Visit        Cardiac and Vasculature    Diastolic heart failure (CMS/HCC)    Relevant Orders    BNP      Other Visit Diagnoses     Shortness of breath    -  Primary    Relevant Orders    BNP    Lower leg edema        Relevant Orders    BNP    Palpitations        Relevant Orders    BNP    Fatigue, unspecified type        Relevant Orders    CBC & Differential    Comprehensive Metabolic Panel    Folate    Vitamin B12    Vitamin D 25 Hydroxy    TSH    T4, Free    Decreased appetite        Pain of upper abdomen        Relevant Orders    Amylase    Lipase    Vitamin D deficiency        Relevant Orders    Vitamin D 25 Hydroxy        Labs have been ordered.  However, once EKG obtained patient was advised to proceed to the ER for his complaints as he does have new EKG abnormalities that was not seen on EKG 4 months ago or 8 months ago.  He is not currently in A.fib.  However, he now has inverted T-waves in V5 and V6.  This is certainly concerning for ischemia.  Patient does have a cardiology and regularly follows with Dr. Tucker.  He is not scheduled to see cardiology for another 4 months. Patient sent to ED with copy of EKG in office.     New Medications Ordered This Visit   Medications   • ondansetron ODT (Zofran ODT) 4 MG disintegrating tablet     Sig: Place 1 tablet on the tongue Every 8 (Eight) Hours As Needed for Nausea or Vomiting.     Dispense:  20 tablet     Refill:  0       No orders of the defined types were placed in this encounter.      Return for Next scheduled follow up.    Radha Jean DO

## 2021-05-04 LAB
ANION GAP SERPL CALCULATED.3IONS-SCNC: 7.5 MMOL/L (ref 5–15)
APTT PPP: 159.1 SECONDS (ref 70–100)
APTT PPP: 58.9 SECONDS (ref 70–100)
BASOPHILS # BLD AUTO: 0.06 10*3/MM3 (ref 0–0.2)
BASOPHILS NFR BLD AUTO: 0.7 % (ref 0–1.5)
BUN SERPL-MCNC: 15 MG/DL (ref 8–23)
BUN/CREAT SERPL: 9.5 (ref 7–25)
CALCIUM SPEC-SCNC: 8.7 MG/DL (ref 8.6–10.5)
CHLORIDE SERPL-SCNC: 105 MMOL/L (ref 98–107)
CHOLEST SERPL-MCNC: 104 MG/DL (ref 0–200)
CO2 SERPL-SCNC: 30.5 MMOL/L (ref 22–29)
CREAT SERPL-MCNC: 1.58 MG/DL (ref 0.76–1.27)
DEPRECATED RDW RBC AUTO: 48.1 FL (ref 37–54)
EOSINOPHIL # BLD AUTO: 0.3 10*3/MM3 (ref 0–0.4)
EOSINOPHIL NFR BLD AUTO: 3.5 % (ref 0.3–6.2)
ERYTHROCYTE [DISTWIDTH] IN BLOOD BY AUTOMATED COUNT: 14.5 % (ref 12.3–15.4)
GFR SERPL CREATININE-BSD FRML MDRD: 43 ML/MIN/1.73
GLUCOSE SERPL-MCNC: 115 MG/DL (ref 65–99)
HCT VFR BLD AUTO: 40.9 % (ref 37.5–51)
HDLC SERPL-MCNC: 37 MG/DL (ref 40–60)
HGB BLD-MCNC: 13 G/DL (ref 13–17.7)
IMM GRANULOCYTES # BLD AUTO: 0.05 10*3/MM3 (ref 0–0.05)
IMM GRANULOCYTES NFR BLD AUTO: 0.6 % (ref 0–0.5)
LDLC SERPL CALC-MCNC: 50 MG/DL (ref 0–100)
LDLC/HDLC SERPL: 1.35 {RATIO}
LYMPHOCYTES # BLD AUTO: 1.37 10*3/MM3 (ref 0.7–3.1)
LYMPHOCYTES NFR BLD AUTO: 16 % (ref 19.6–45.3)
MCH RBC QN AUTO: 28.8 PG (ref 26.6–33)
MCHC RBC AUTO-ENTMCNC: 31.8 G/DL (ref 31.5–35.7)
MCV RBC AUTO: 90.5 FL (ref 79–97)
MONOCYTES # BLD AUTO: 0.99 10*3/MM3 (ref 0.1–0.9)
MONOCYTES NFR BLD AUTO: 11.5 % (ref 5–12)
NEUTROPHILS NFR BLD AUTO: 5.81 10*3/MM3 (ref 1.7–7)
NEUTROPHILS NFR BLD AUTO: 67.7 % (ref 42.7–76)
NRBC BLD AUTO-RTO: 0 /100 WBC (ref 0–0.2)
PLATELET # BLD AUTO: 145 10*3/MM3 (ref 140–450)
PMV BLD AUTO: 11.2 FL (ref 6–12)
POTASSIUM SERPL-SCNC: 3.8 MMOL/L (ref 3.5–5.2)
RBC # BLD AUTO: 4.52 10*6/MM3 (ref 4.14–5.8)
SODIUM SERPL-SCNC: 143 MMOL/L (ref 136–145)
TRIGL SERPL-MCNC: 86 MG/DL (ref 0–150)
TROPONIN T SERPL-MCNC: 0.01 NG/ML (ref 0–0.03)
TSH SERPL DL<=0.05 MIU/L-ACNC: 0.53 UIU/ML (ref 0.27–4.2)
VLDLC SERPL-MCNC: 17 MG/DL (ref 5–40)
WBC # BLD AUTO: 8.58 10*3/MM3 (ref 3.4–10.8)

## 2021-05-04 PROCEDURE — 96376 TX/PRO/DX INJ SAME DRUG ADON: CPT

## 2021-05-04 PROCEDURE — 99225 PR SBSQ OBSERVATION CARE/DAY 25 MINUTES: CPT | Performed by: INTERNAL MEDICINE

## 2021-05-04 PROCEDURE — 96366 THER/PROPH/DIAG IV INF ADDON: CPT

## 2021-05-04 PROCEDURE — 85025 COMPLETE CBC W/AUTO DIFF WBC: CPT | Performed by: INTERNAL MEDICINE

## 2021-05-04 PROCEDURE — G0378 HOSPITAL OBSERVATION PER HR: HCPCS

## 2021-05-04 PROCEDURE — 63710000001 PREDNISONE PER 1 MG: Performed by: INTERNAL MEDICINE

## 2021-05-04 PROCEDURE — 80061 LIPID PANEL: CPT | Performed by: INTERNAL MEDICINE

## 2021-05-04 PROCEDURE — 85730 THROMBOPLASTIN TIME PARTIAL: CPT | Performed by: INTERNAL MEDICINE

## 2021-05-04 PROCEDURE — 99214 OFFICE O/P EST MOD 30 MIN: CPT | Performed by: INTERNAL MEDICINE

## 2021-05-04 PROCEDURE — 84443 ASSAY THYROID STIM HORMONE: CPT | Performed by: INTERNAL MEDICINE

## 2021-05-04 PROCEDURE — 25010000002 HEPARIN (PORCINE) PER 1000 UNITS: Performed by: INTERNAL MEDICINE

## 2021-05-04 PROCEDURE — 94799 UNLISTED PULMONARY SVC/PX: CPT

## 2021-05-04 PROCEDURE — 80048 BASIC METABOLIC PNL TOTAL CA: CPT | Performed by: INTERNAL MEDICINE

## 2021-05-04 PROCEDURE — 84484 ASSAY OF TROPONIN QUANT: CPT | Performed by: INTERNAL MEDICINE

## 2021-05-04 RX ORDER — PREDNISONE 20 MG/1
40 TABLET ORAL
Start: 2021-05-05 | End: 2021-05-07 | Stop reason: HOSPADM

## 2021-05-04 RX ADMIN — HEPARIN SODIUM 5000 UNITS: 1000 INJECTION INTRAVENOUS; SUBCUTANEOUS at 01:43

## 2021-05-04 RX ADMIN — TRAMADOL HYDROCHLORIDE 50 MG: 50 TABLET, FILM COATED ORAL at 15:24

## 2021-05-04 RX ADMIN — BACLOFEN 10 MG: 10 TABLET ORAL at 23:30

## 2021-05-04 RX ADMIN — BACLOFEN 10 MG: 10 TABLET ORAL at 15:24

## 2021-05-04 RX ADMIN — IPRATROPIUM BROMIDE AND ALBUTEROL SULFATE 3 ML: .5; 3 SOLUTION RESPIRATORY (INHALATION) at 19:40

## 2021-05-04 RX ADMIN — FINASTERIDE 5 MG: 5 TABLET, FILM COATED ORAL at 09:10

## 2021-05-04 RX ADMIN — PREDNISONE 40 MG: 20 TABLET ORAL at 09:10

## 2021-05-04 RX ADMIN — GUAIFENESIN 600 MG: 600 TABLET, EXTENDED RELEASE ORAL at 09:10

## 2021-05-04 RX ADMIN — IPRATROPIUM BROMIDE AND ALBUTEROL SULFATE 3 ML: .5; 3 SOLUTION RESPIRATORY (INHALATION) at 13:35

## 2021-05-04 RX ADMIN — SODIUM CHLORIDE, PRESERVATIVE FREE 3 ML: 5 INJECTION INTRAVENOUS at 09:11

## 2021-05-04 RX ADMIN — SODIUM CHLORIDE, PRESERVATIVE FREE 3 ML: 5 INJECTION INTRAVENOUS at 20:13

## 2021-05-04 RX ADMIN — METOPROLOL SUCCINATE 50 MG: 50 TABLET, EXTENDED RELEASE ORAL at 09:10

## 2021-05-04 RX ADMIN — AMLODIPINE BESYLATE 10 MG: 5 TABLET ORAL at 09:10

## 2021-05-04 RX ADMIN — IPRATROPIUM BROMIDE AND ALBUTEROL SULFATE 3 ML: .5; 3 SOLUTION RESPIRATORY (INHALATION) at 07:11

## 2021-05-04 RX ADMIN — BUDESONIDE 0.5 MG: 0.5 INHALANT RESPIRATORY (INHALATION) at 19:40

## 2021-05-04 RX ADMIN — ATORVASTATIN CALCIUM 20 MG: 20 TABLET, FILM COATED ORAL at 20:12

## 2021-05-04 RX ADMIN — TRAMADOL HYDROCHLORIDE 50 MG: 50 TABLET, FILM COATED ORAL at 22:46

## 2021-05-04 RX ADMIN — AMIODARONE HYDROCHLORIDE 200 MG: 200 TABLET ORAL at 09:11

## 2021-05-04 RX ADMIN — GUAIFENESIN 600 MG: 600 TABLET, EXTENDED RELEASE ORAL at 20:12

## 2021-05-04 RX ADMIN — ASPIRIN 81 MG CHEWABLE TABLET 81 MG: 81 TABLET CHEWABLE at 09:10

## 2021-05-04 RX ADMIN — LISINOPRIL 40 MG: 20 TABLET ORAL at 09:10

## 2021-05-04 RX ADMIN — IPRATROPIUM BROMIDE AND ALBUTEROL SULFATE 3 ML: .5; 3 SOLUTION RESPIRATORY (INHALATION) at 00:19

## 2021-05-04 RX ADMIN — TERAZOSIN HYDROCHLORIDE 10 MG: 5 CAPSULE ORAL at 20:12

## 2021-05-04 RX ADMIN — BUDESONIDE 0.5 MG: 0.5 INHALANT RESPIRATORY (INHALATION) at 07:11

## 2021-05-05 LAB
ANION GAP SERPL CALCULATED.3IONS-SCNC: 6.9 MMOL/L (ref 5–15)
BUN SERPL-MCNC: 17 MG/DL (ref 8–23)
BUN/CREAT SERPL: 11.8 (ref 7–25)
CALCIUM SPEC-SCNC: 8.8 MG/DL (ref 8.6–10.5)
CHLORIDE SERPL-SCNC: 104 MMOL/L (ref 98–107)
CO2 SERPL-SCNC: 28.1 MMOL/L (ref 22–29)
CREAT SERPL-MCNC: 1.44 MG/DL (ref 0.76–1.27)
DEPRECATED RDW RBC AUTO: 47.8 FL (ref 37–54)
ERYTHROCYTE [DISTWIDTH] IN BLOOD BY AUTOMATED COUNT: 14.3 % (ref 12.3–15.4)
GFR SERPL CREATININE-BSD FRML MDRD: 48 ML/MIN/1.73
GLUCOSE SERPL-MCNC: 133 MG/DL (ref 65–99)
HCT VFR BLD AUTO: 39.1 % (ref 37.5–51)
HGB BLD-MCNC: 12.6 G/DL (ref 13–17.7)
MCH RBC QN AUTO: 29.2 PG (ref 26.6–33)
MCHC RBC AUTO-ENTMCNC: 32.2 G/DL (ref 31.5–35.7)
MCV RBC AUTO: 90.5 FL (ref 79–97)
PLATELET # BLD AUTO: 174 10*3/MM3 (ref 140–450)
PMV BLD AUTO: 10.8 FL (ref 6–12)
POTASSIUM SERPL-SCNC: 3.9 MMOL/L (ref 3.5–5.2)
RBC # BLD AUTO: 4.32 10*6/MM3 (ref 4.14–5.8)
SODIUM SERPL-SCNC: 139 MMOL/L (ref 136–145)
WBC # BLD AUTO: 11.32 10*3/MM3 (ref 3.4–10.8)

## 2021-05-05 PROCEDURE — 80048 BASIC METABOLIC PNL TOTAL CA: CPT | Performed by: INTERNAL MEDICINE

## 2021-05-05 PROCEDURE — 99225 PR SBSQ OBSERVATION CARE/DAY 25 MINUTES: CPT | Performed by: INTERNAL MEDICINE

## 2021-05-05 PROCEDURE — 63710000001 PREDNISONE PER 1 MG: Performed by: INTERNAL MEDICINE

## 2021-05-05 PROCEDURE — 85027 COMPLETE CBC AUTOMATED: CPT | Performed by: INTERNAL MEDICINE

## 2021-05-05 PROCEDURE — 94799 UNLISTED PULMONARY SVC/PX: CPT

## 2021-05-05 PROCEDURE — G0378 HOSPITAL OBSERVATION PER HR: HCPCS

## 2021-05-05 RX ORDER — PRAMIPEXOLE DIHYDROCHLORIDE 0.25 MG/1
0.5 TABLET ORAL 3 TIMES DAILY PRN
Status: DISCONTINUED | OUTPATIENT
Start: 2021-05-05 | End: 2021-05-07 | Stop reason: HOSPADM

## 2021-05-05 RX ADMIN — IPRATROPIUM BROMIDE AND ALBUTEROL SULFATE 3 ML: .5; 3 SOLUTION RESPIRATORY (INHALATION) at 06:57

## 2021-05-05 RX ADMIN — AMIODARONE HYDROCHLORIDE 200 MG: 200 TABLET ORAL at 08:51

## 2021-05-05 RX ADMIN — FINASTERIDE 5 MG: 5 TABLET, FILM COATED ORAL at 08:51

## 2021-05-05 RX ADMIN — TRAMADOL HYDROCHLORIDE 50 MG: 50 TABLET, FILM COATED ORAL at 20:35

## 2021-05-05 RX ADMIN — SODIUM CHLORIDE, PRESERVATIVE FREE 3 ML: 5 INJECTION INTRAVENOUS at 20:35

## 2021-05-05 RX ADMIN — IPRATROPIUM BROMIDE AND ALBUTEROL SULFATE 3 ML: .5; 3 SOLUTION RESPIRATORY (INHALATION) at 20:55

## 2021-05-05 RX ADMIN — TERAZOSIN HYDROCHLORIDE 10 MG: 5 CAPSULE ORAL at 20:34

## 2021-05-05 RX ADMIN — PREDNISONE 40 MG: 20 TABLET ORAL at 08:51

## 2021-05-05 RX ADMIN — TRAMADOL HYDROCHLORIDE 50 MG: 50 TABLET, FILM COATED ORAL at 11:57

## 2021-05-05 RX ADMIN — METOPROLOL SUCCINATE 50 MG: 50 TABLET, EXTENDED RELEASE ORAL at 08:51

## 2021-05-05 RX ADMIN — GUAIFENESIN 600 MG: 600 TABLET, EXTENDED RELEASE ORAL at 08:51

## 2021-05-05 RX ADMIN — ASPIRIN 81 MG CHEWABLE TABLET 81 MG: 81 TABLET CHEWABLE at 08:51

## 2021-05-05 RX ADMIN — BUDESONIDE 0.5 MG: 0.5 INHALANT RESPIRATORY (INHALATION) at 20:55

## 2021-05-05 RX ADMIN — SODIUM CHLORIDE, PRESERVATIVE FREE 3 ML: 5 INJECTION INTRAVENOUS at 08:51

## 2021-05-05 RX ADMIN — PRAMIPEXOLE DIHYDROCHLORIDE 0.5 MG: 0.25 TABLET ORAL at 20:35

## 2021-05-05 RX ADMIN — BUDESONIDE 0.5 MG: 0.5 INHALANT RESPIRATORY (INHALATION) at 06:58

## 2021-05-05 RX ADMIN — LISINOPRIL 40 MG: 20 TABLET ORAL at 08:51

## 2021-05-05 RX ADMIN — ACETAMINOPHEN 650 MG: 325 TABLET ORAL at 00:24

## 2021-05-05 RX ADMIN — IPRATROPIUM BROMIDE AND ALBUTEROL SULFATE 3 ML: .5; 3 SOLUTION RESPIRATORY (INHALATION) at 12:17

## 2021-05-05 RX ADMIN — GUAIFENESIN 600 MG: 600 TABLET, EXTENDED RELEASE ORAL at 20:35

## 2021-05-05 RX ADMIN — AMLODIPINE BESYLATE 10 MG: 5 TABLET ORAL at 08:51

## 2021-05-05 RX ADMIN — ATORVASTATIN CALCIUM 20 MG: 20 TABLET, FILM COATED ORAL at 20:34

## 2021-05-05 RX ADMIN — PRAMIPEXOLE DIHYDROCHLORIDE 0.5 MG: 0.25 TABLET ORAL at 01:00

## 2021-05-05 RX ADMIN — PANTOPRAZOLE SODIUM 40 MG: 40 TABLET, DELAYED RELEASE ORAL at 05:52

## 2021-05-06 PROCEDURE — 94799 UNLISTED PULMONARY SVC/PX: CPT

## 2021-05-06 PROCEDURE — 94660 CPAP INITIATION&MGMT: CPT

## 2021-05-06 PROCEDURE — G0378 HOSPITAL OBSERVATION PER HR: HCPCS

## 2021-05-06 PROCEDURE — 99225 PR SBSQ OBSERVATION CARE/DAY 25 MINUTES: CPT | Performed by: INTERNAL MEDICINE

## 2021-05-06 PROCEDURE — 63710000001 PREDNISONE PER 1 MG: Performed by: INTERNAL MEDICINE

## 2021-05-06 RX ADMIN — FINASTERIDE 5 MG: 5 TABLET, FILM COATED ORAL at 08:33

## 2021-05-06 RX ADMIN — AMLODIPINE BESYLATE 10 MG: 5 TABLET ORAL at 08:32

## 2021-05-06 RX ADMIN — IPRATROPIUM BROMIDE AND ALBUTEROL SULFATE 3 ML: .5; 3 SOLUTION RESPIRATORY (INHALATION) at 06:42

## 2021-05-06 RX ADMIN — BUDESONIDE 0.5 MG: 0.5 INHALANT RESPIRATORY (INHALATION) at 06:42

## 2021-05-06 RX ADMIN — AMIODARONE HYDROCHLORIDE 200 MG: 200 TABLET ORAL at 08:33

## 2021-05-06 RX ADMIN — GUAIFENESIN 600 MG: 600 TABLET, EXTENDED RELEASE ORAL at 20:17

## 2021-05-06 RX ADMIN — PREDNISONE 40 MG: 20 TABLET ORAL at 08:32

## 2021-05-06 RX ADMIN — BACLOFEN 10 MG: 10 TABLET ORAL at 14:49

## 2021-05-06 RX ADMIN — PRAMIPEXOLE DIHYDROCHLORIDE 0.5 MG: 0.25 TABLET ORAL at 14:49

## 2021-05-06 RX ADMIN — IPRATROPIUM BROMIDE AND ALBUTEROL SULFATE 3 ML: .5; 3 SOLUTION RESPIRATORY (INHALATION) at 12:20

## 2021-05-06 RX ADMIN — PRAMIPEXOLE DIHYDROCHLORIDE 0.5 MG: 0.25 TABLET ORAL at 23:35

## 2021-05-06 RX ADMIN — IPRATROPIUM BROMIDE AND ALBUTEROL SULFATE 3 ML: .5; 3 SOLUTION RESPIRATORY (INHALATION) at 19:33

## 2021-05-06 RX ADMIN — BUDESONIDE 0.5 MG: 0.5 INHALANT RESPIRATORY (INHALATION) at 19:33

## 2021-05-06 RX ADMIN — TRAMADOL HYDROCHLORIDE 50 MG: 50 TABLET, FILM COATED ORAL at 12:41

## 2021-05-06 RX ADMIN — PANTOPRAZOLE SODIUM 40 MG: 40 TABLET, DELAYED RELEASE ORAL at 05:34

## 2021-05-06 RX ADMIN — ATORVASTATIN CALCIUM 20 MG: 20 TABLET, FILM COATED ORAL at 20:17

## 2021-05-06 RX ADMIN — SODIUM CHLORIDE, PRESERVATIVE FREE 3 ML: 5 INJECTION INTRAVENOUS at 08:33

## 2021-05-06 RX ADMIN — GUAIFENESIN 600 MG: 600 TABLET, EXTENDED RELEASE ORAL at 08:33

## 2021-05-06 RX ADMIN — IPRATROPIUM BROMIDE AND ALBUTEROL SULFATE 3 ML: .5; 3 SOLUTION RESPIRATORY (INHALATION) at 00:06

## 2021-05-06 RX ADMIN — METOPROLOL SUCCINATE 50 MG: 50 TABLET, EXTENDED RELEASE ORAL at 08:32

## 2021-05-06 RX ADMIN — TRAMADOL HYDROCHLORIDE 50 MG: 50 TABLET, FILM COATED ORAL at 20:17

## 2021-05-06 RX ADMIN — ASPIRIN 81 MG CHEWABLE TABLET 81 MG: 81 TABLET CHEWABLE at 08:32

## 2021-05-06 RX ADMIN — TERAZOSIN HYDROCHLORIDE 10 MG: 5 CAPSULE ORAL at 20:17

## 2021-05-06 RX ADMIN — BACLOFEN 10 MG: 10 TABLET ORAL at 23:35

## 2021-05-06 RX ADMIN — SODIUM CHLORIDE, PRESERVATIVE FREE 3 ML: 5 INJECTION INTRAVENOUS at 20:17

## 2021-05-06 RX ADMIN — LISINOPRIL 40 MG: 20 TABLET ORAL at 08:33

## 2021-05-07 ENCOUNTER — HOSPITAL ENCOUNTER (OUTPATIENT)
Facility: HOSPITAL | Age: 71
Discharge: HOME | End: 2021-05-10
Attending: INTERNAL MEDICINE | Admitting: INTERNAL MEDICINE

## 2021-05-07 VITALS
WEIGHT: 243.17 LBS | TEMPERATURE: 97.7 F | OXYGEN SATURATION: 100 % | RESPIRATION RATE: 18 BRPM | BODY MASS INDEX: 40.51 KG/M2 | SYSTOLIC BLOOD PRESSURE: 125 MMHG | HEIGHT: 65 IN | HEART RATE: 87 BPM | DIASTOLIC BLOOD PRESSURE: 67 MMHG

## 2021-05-07 DIAGNOSIS — Z45.010 PACEMAKER AT END OF BATTERY LIFE: Primary | ICD-10-CM

## 2021-05-07 LAB
GLUCOSE BLDC GLUCOMTR-MCNC: 194 MG/DL (ref 70–130)
GLUCOSE BLDC GLUCOMTR-MCNC: 202 MG/DL (ref 70–130)

## 2021-05-07 PROCEDURE — G0378 HOSPITAL OBSERVATION PER HR: HCPCS

## 2021-05-07 PROCEDURE — 82962 GLUCOSE BLOOD TEST: CPT

## 2021-05-07 PROCEDURE — 94799 UNLISTED PULMONARY SVC/PX: CPT

## 2021-05-07 PROCEDURE — 63710000001 INSULIN LISPRO (HUMAN) PER 5 UNITS: Performed by: NURSE PRACTITIONER

## 2021-05-07 PROCEDURE — 99222 1ST HOSP IP/OBS MODERATE 55: CPT | Performed by: INTERNAL MEDICINE

## 2021-05-07 PROCEDURE — 94660 CPAP INITIATION&MGMT: CPT

## 2021-05-07 PROCEDURE — 94640 AIRWAY INHALATION TREATMENT: CPT

## 2021-05-07 PROCEDURE — 63710000001 PREDNISONE PER 1 MG: Performed by: INTERNAL MEDICINE

## 2021-05-07 PROCEDURE — 99217 PR OBSERVATION CARE DISCHARGE MANAGEMENT: CPT | Performed by: INTERNAL MEDICINE

## 2021-05-07 RX ORDER — AMLODIPINE BESYLATE 10 MG/1
10 TABLET ORAL DAILY
Status: DISCONTINUED | OUTPATIENT
Start: 2021-05-08 | End: 2021-05-10 | Stop reason: HOSPADM

## 2021-05-07 RX ORDER — SODIUM CHLORIDE 0.9 % (FLUSH) 0.9 %
10 SYRINGE (ML) INJECTION AS NEEDED
Status: DISCONTINUED | OUTPATIENT
Start: 2021-05-07 | End: 2021-05-10 | Stop reason: HOSPADM

## 2021-05-07 RX ORDER — IPRATROPIUM BROMIDE AND ALBUTEROL SULFATE 2.5; .5 MG/3ML; MG/3ML
3 SOLUTION RESPIRATORY (INHALATION)
Status: DISCONTINUED | OUTPATIENT
Start: 2021-05-07 | End: 2021-05-09

## 2021-05-07 RX ORDER — NICOTINE POLACRILEX 4 MG
15 LOZENGE BUCCAL
Status: DISCONTINUED | OUTPATIENT
Start: 2021-05-07 | End: 2021-05-10 | Stop reason: HOSPADM

## 2021-05-07 RX ORDER — LISINOPRIL 40 MG/1
40 TABLET ORAL DAILY
Status: DISCONTINUED | OUTPATIENT
Start: 2021-05-08 | End: 2021-05-10 | Stop reason: HOSPADM

## 2021-05-07 RX ORDER — TERAZOSIN 5 MG/1
10 CAPSULE ORAL NIGHTLY
Status: DISCONTINUED | OUTPATIENT
Start: 2021-05-07 | End: 2021-05-10 | Stop reason: HOSPADM

## 2021-05-07 RX ORDER — PREDNISONE 20 MG/1
40 TABLET ORAL DAILY
Status: COMPLETED | OUTPATIENT
Start: 2021-05-08 | End: 2021-05-08

## 2021-05-07 RX ORDER — ONDANSETRON 4 MG/1
4 TABLET, FILM COATED ORAL EVERY 8 HOURS PRN
Status: DISCONTINUED | OUTPATIENT
Start: 2021-05-07 | End: 2021-05-10 | Stop reason: HOSPADM

## 2021-05-07 RX ORDER — DEXTROSE MONOHYDRATE 25 G/50ML
25 INJECTION, SOLUTION INTRAVENOUS
Status: DISCONTINUED | OUTPATIENT
Start: 2021-05-07 | End: 2021-05-10 | Stop reason: HOSPADM

## 2021-05-07 RX ORDER — AMIODARONE HYDROCHLORIDE 200 MG/1
200 TABLET ORAL
Status: DISCONTINUED | OUTPATIENT
Start: 2021-05-08 | End: 2021-05-10 | Stop reason: HOSPADM

## 2021-05-07 RX ORDER — METOPROLOL SUCCINATE 50 MG/1
50 TABLET, EXTENDED RELEASE ORAL DAILY
Status: DISCONTINUED | OUTPATIENT
Start: 2021-05-08 | End: 2021-05-10 | Stop reason: HOSPADM

## 2021-05-07 RX ORDER — NITROGLYCERIN 0.4 MG/1
0.4 TABLET SUBLINGUAL
Status: DISCONTINUED | OUTPATIENT
Start: 2021-05-07 | End: 2021-05-10 | Stop reason: HOSPADM

## 2021-05-07 RX ORDER — BACLOFEN 10 MG/1
10 TABLET ORAL 3 TIMES DAILY PRN
Status: DISCONTINUED | OUTPATIENT
Start: 2021-05-07 | End: 2021-05-10 | Stop reason: HOSPADM

## 2021-05-07 RX ORDER — FUROSEMIDE 40 MG/1
40 TABLET ORAL DAILY
Status: DISCONTINUED | OUTPATIENT
Start: 2021-05-07 | End: 2021-05-08

## 2021-05-07 RX ORDER — GLIPIZIDE 5 MG/1
5 TABLET ORAL
Status: DISCONTINUED | OUTPATIENT
Start: 2021-05-07 | End: 2021-05-07

## 2021-05-07 RX ORDER — ASPIRIN 81 MG/1
81 TABLET, CHEWABLE ORAL DAILY
Status: DISCONTINUED | OUTPATIENT
Start: 2021-05-08 | End: 2021-05-10 | Stop reason: HOSPADM

## 2021-05-07 RX ORDER — ATORVASTATIN CALCIUM 20 MG/1
20 TABLET, FILM COATED ORAL NIGHTLY
Status: DISCONTINUED | OUTPATIENT
Start: 2021-05-07 | End: 2021-05-10 | Stop reason: HOSPADM

## 2021-05-07 RX ORDER — SODIUM CHLORIDE 0.9 % (FLUSH) 0.9 %
10 SYRINGE (ML) INJECTION EVERY 12 HOURS SCHEDULED
Status: DISCONTINUED | OUTPATIENT
Start: 2021-05-07 | End: 2021-05-10 | Stop reason: HOSPADM

## 2021-05-07 RX ORDER — PANTOPRAZOLE SODIUM 40 MG/1
40 TABLET, DELAYED RELEASE ORAL EVERY MORNING
Refills: 3 | Status: DISCONTINUED | OUTPATIENT
Start: 2021-05-08 | End: 2021-05-10 | Stop reason: HOSPADM

## 2021-05-07 RX ORDER — CETIRIZINE HYDROCHLORIDE 10 MG/1
10 TABLET ORAL DAILY
Status: DISCONTINUED | OUTPATIENT
Start: 2021-05-08 | End: 2021-05-10 | Stop reason: HOSPADM

## 2021-05-07 RX ORDER — FLUOXETINE HYDROCHLORIDE 20 MG/1
20 CAPSULE ORAL DAILY
Status: DISCONTINUED | OUTPATIENT
Start: 2021-05-08 | End: 2021-05-10 | Stop reason: HOSPADM

## 2021-05-07 RX ORDER — FINASTERIDE 5 MG/1
5 TABLET, FILM COATED ORAL DAILY
Status: DISCONTINUED | OUTPATIENT
Start: 2021-05-08 | End: 2021-05-10 | Stop reason: HOSPADM

## 2021-05-07 RX ADMIN — SODIUM CHLORIDE, PRESERVATIVE FREE 3 ML: 5 INJECTION INTRAVENOUS at 08:16

## 2021-05-07 RX ADMIN — BUDESONIDE 0.5 MG: 0.5 INHALANT RESPIRATORY (INHALATION) at 06:39

## 2021-05-07 RX ADMIN — IPRATROPIUM BROMIDE AND ALBUTEROL SULFATE 3 ML: .5; 3 SOLUTION RESPIRATORY (INHALATION) at 06:39

## 2021-05-07 RX ADMIN — LISINOPRIL 40 MG: 20 TABLET ORAL at 08:17

## 2021-05-07 RX ADMIN — TRAMADOL HYDROCHLORIDE 50 MG: 50 TABLET, FILM COATED ORAL at 08:17

## 2021-05-07 RX ADMIN — AMIODARONE HYDROCHLORIDE 200 MG: 200 TABLET ORAL at 08:17

## 2021-05-07 RX ADMIN — GUAIFENESIN 600 MG: 600 TABLET, EXTENDED RELEASE ORAL at 08:16

## 2021-05-07 RX ADMIN — ATORVASTATIN CALCIUM 20 MG: 20 TABLET, FILM COATED ORAL at 20:00

## 2021-05-07 RX ADMIN — PANTOPRAZOLE SODIUM 40 MG: 40 TABLET, DELAYED RELEASE ORAL at 05:47

## 2021-05-07 RX ADMIN — INSULIN LISPRO 3 UNITS: 100 INJECTION, SOLUTION INTRAVENOUS; SUBCUTANEOUS at 17:43

## 2021-05-07 RX ADMIN — SODIUM CHLORIDE, PRESERVATIVE FREE 10 ML: 5 INJECTION INTRAVENOUS at 20:00

## 2021-05-07 RX ADMIN — METOPROLOL SUCCINATE 50 MG: 50 TABLET, EXTENDED RELEASE ORAL at 08:17

## 2021-05-07 RX ADMIN — AMLODIPINE BESYLATE 10 MG: 5 TABLET ORAL at 08:17

## 2021-05-07 RX ADMIN — ASPIRIN 81 MG CHEWABLE TABLET 81 MG: 81 TABLET CHEWABLE at 08:16

## 2021-05-07 RX ADMIN — IPRATROPIUM BROMIDE AND ALBUTEROL SULFATE 3 ML: 2.5; .5 SOLUTION RESPIRATORY (INHALATION) at 15:59

## 2021-05-07 RX ADMIN — FINASTERIDE 5 MG: 5 TABLET, FILM COATED ORAL at 08:17

## 2021-05-07 RX ADMIN — TERAZOSIN HYDROCHLORIDE 10 MG: 5 CAPSULE ORAL at 19:59

## 2021-05-07 RX ADMIN — FUROSEMIDE 40 MG: 40 TABLET ORAL at 18:40

## 2021-05-07 RX ADMIN — PREDNISONE 40 MG: 20 TABLET ORAL at 08:17

## 2021-05-07 RX ADMIN — IPRATROPIUM BROMIDE AND ALBUTEROL SULFATE 3 ML: 2.5; .5 SOLUTION RESPIRATORY (INHALATION) at 20:15

## 2021-05-08 LAB
ANION GAP SERPL CALCULATED.3IONS-SCNC: 7 MMOL/L (ref 5–15)
BUN SERPL-MCNC: 31 MG/DL (ref 8–23)
BUN/CREAT SERPL: 19.6 (ref 7–25)
CALCIUM SPEC-SCNC: 9.3 MG/DL (ref 8.6–10.5)
CHLORIDE SERPL-SCNC: 100 MMOL/L (ref 98–107)
CO2 SERPL-SCNC: 29 MMOL/L (ref 22–29)
CREAT SERPL-MCNC: 1.58 MG/DL (ref 0.76–1.27)
DEPRECATED RDW RBC AUTO: 48.3 FL (ref 37–54)
ERYTHROCYTE [DISTWIDTH] IN BLOOD BY AUTOMATED COUNT: 14.2 % (ref 12.3–15.4)
GFR SERPL CREATININE-BSD FRML MDRD: 43 ML/MIN/1.73
GLUCOSE BLDC GLUCOMTR-MCNC: 144 MG/DL (ref 70–130)
GLUCOSE BLDC GLUCOMTR-MCNC: 159 MG/DL (ref 70–130)
GLUCOSE BLDC GLUCOMTR-MCNC: 174 MG/DL (ref 70–130)
GLUCOSE BLDC GLUCOMTR-MCNC: 270 MG/DL (ref 70–130)
GLUCOSE BLDC GLUCOMTR-MCNC: 318 MG/DL (ref 70–130)
GLUCOSE SERPL-MCNC: 141 MG/DL (ref 65–99)
HCT VFR BLD AUTO: 43.6 % (ref 37.5–51)
HGB BLD-MCNC: 13.5 G/DL (ref 13–17.7)
MCH RBC QN AUTO: 28.5 PG (ref 26.6–33)
MCHC RBC AUTO-ENTMCNC: 31 G/DL (ref 31.5–35.7)
MCV RBC AUTO: 92 FL (ref 79–97)
PLATELET # BLD AUTO: 200 10*3/MM3 (ref 140–450)
PMV BLD AUTO: 11.3 FL (ref 6–12)
POTASSIUM SERPL-SCNC: 4.6 MMOL/L (ref 3.5–5.2)
RBC # BLD AUTO: 4.74 10*6/MM3 (ref 4.14–5.8)
SODIUM SERPL-SCNC: 136 MMOL/L (ref 136–145)
WBC # BLD AUTO: 10.87 10*3/MM3 (ref 3.4–10.8)

## 2021-05-08 PROCEDURE — 80048 BASIC METABOLIC PNL TOTAL CA: CPT | Performed by: NURSE PRACTITIONER

## 2021-05-08 PROCEDURE — 94799 UNLISTED PULMONARY SVC/PX: CPT

## 2021-05-08 PROCEDURE — G0378 HOSPITAL OBSERVATION PER HR: HCPCS

## 2021-05-08 PROCEDURE — 82962 GLUCOSE BLOOD TEST: CPT

## 2021-05-08 PROCEDURE — 63710000001 INSULIN LISPRO (HUMAN) PER 5 UNITS: Performed by: NURSE PRACTITIONER

## 2021-05-08 PROCEDURE — 99232 SBSQ HOSP IP/OBS MODERATE 35: CPT | Performed by: INTERNAL MEDICINE

## 2021-05-08 PROCEDURE — 85027 COMPLETE CBC AUTOMATED: CPT | Performed by: NURSE PRACTITIONER

## 2021-05-08 PROCEDURE — 63710000001 PREDNISONE PER 1 MG: Performed by: INTERNAL MEDICINE

## 2021-05-08 RX ORDER — FUROSEMIDE 20 MG/1
20 TABLET ORAL DAILY
Status: DISCONTINUED | OUTPATIENT
Start: 2021-05-09 | End: 2021-05-10 | Stop reason: HOSPADM

## 2021-05-08 RX ADMIN — SODIUM CHLORIDE, PRESERVATIVE FREE 10 ML: 5 INJECTION INTRAVENOUS at 20:20

## 2021-05-08 RX ADMIN — FINASTERIDE 5 MG: 5 TABLET, FILM COATED ORAL at 08:57

## 2021-05-08 RX ADMIN — TERAZOSIN HYDROCHLORIDE 10 MG: 5 CAPSULE ORAL at 20:19

## 2021-05-08 RX ADMIN — METOPROLOL SUCCINATE 50 MG: 50 TABLET, EXTENDED RELEASE ORAL at 08:57

## 2021-05-08 RX ADMIN — FLUOXETINE HYDROCHLORIDE 20 MG: 20 CAPSULE ORAL at 08:57

## 2021-05-08 RX ADMIN — IPRATROPIUM BROMIDE AND ALBUTEROL SULFATE 3 ML: 2.5; .5 SOLUTION RESPIRATORY (INHALATION) at 16:23

## 2021-05-08 RX ADMIN — IPRATROPIUM BROMIDE AND ALBUTEROL SULFATE 3 ML: 2.5; .5 SOLUTION RESPIRATORY (INHALATION) at 19:48

## 2021-05-08 RX ADMIN — PANTOPRAZOLE SODIUM 40 MG: 40 TABLET, DELAYED RELEASE ORAL at 05:13

## 2021-05-08 RX ADMIN — CETIRIZINE HYDROCHLORIDE 10 MG: 10 TABLET, FILM COATED ORAL at 08:58

## 2021-05-08 RX ADMIN — IPRATROPIUM BROMIDE AND ALBUTEROL SULFATE 3 ML: 2.5; .5 SOLUTION RESPIRATORY (INHALATION) at 12:09

## 2021-05-08 RX ADMIN — INSULIN LISPRO 2 UNITS: 100 INJECTION, SOLUTION INTRAVENOUS; SUBCUTANEOUS at 17:28

## 2021-05-08 RX ADMIN — AMLODIPINE BESYLATE 10 MG: 10 TABLET ORAL at 08:57

## 2021-05-08 RX ADMIN — PREDNISONE 40 MG: 20 TABLET ORAL at 08:57

## 2021-05-08 RX ADMIN — FUROSEMIDE 40 MG: 40 TABLET ORAL at 08:57

## 2021-05-08 RX ADMIN — ASPIRIN 81 MG: 81 TABLET, CHEWABLE ORAL at 08:57

## 2021-05-08 RX ADMIN — INSULIN LISPRO 2 UNITS: 100 INJECTION, SOLUTION INTRAVENOUS; SUBCUTANEOUS at 12:20

## 2021-05-08 RX ADMIN — ATORVASTATIN CALCIUM 20 MG: 20 TABLET, FILM COATED ORAL at 20:19

## 2021-05-08 RX ADMIN — AMIODARONE HYDROCHLORIDE 200 MG: 200 TABLET ORAL at 08:57

## 2021-05-08 RX ADMIN — LISINOPRIL 40 MG: 40 TABLET ORAL at 08:57

## 2021-05-08 RX ADMIN — BACLOFEN 10 MG: 10 TABLET ORAL at 20:19

## 2021-05-09 LAB
GLUCOSE BLDC GLUCOMTR-MCNC: 122 MG/DL (ref 70–130)
GLUCOSE BLDC GLUCOMTR-MCNC: 148 MG/DL (ref 70–130)
GLUCOSE BLDC GLUCOMTR-MCNC: 156 MG/DL (ref 70–130)
GLUCOSE BLDC GLUCOMTR-MCNC: 221 MG/DL (ref 70–130)

## 2021-05-09 PROCEDURE — 82962 GLUCOSE BLOOD TEST: CPT

## 2021-05-09 PROCEDURE — G0378 HOSPITAL OBSERVATION PER HR: HCPCS

## 2021-05-09 PROCEDURE — 94660 CPAP INITIATION&MGMT: CPT

## 2021-05-09 PROCEDURE — 94799 UNLISTED PULMONARY SVC/PX: CPT

## 2021-05-09 PROCEDURE — 99024 POSTOP FOLLOW-UP VISIT: CPT | Performed by: INTERNAL MEDICINE

## 2021-05-09 PROCEDURE — 63710000001 INSULIN LISPRO (HUMAN) PER 5 UNITS: Performed by: NURSE PRACTITIONER

## 2021-05-09 RX ORDER — IPRATROPIUM BROMIDE AND ALBUTEROL SULFATE 2.5; .5 MG/3ML; MG/3ML
3 SOLUTION RESPIRATORY (INHALATION) EVERY 4 HOURS PRN
Status: DISCONTINUED | OUTPATIENT
Start: 2021-05-09 | End: 2021-05-10 | Stop reason: HOSPADM

## 2021-05-09 RX ORDER — TRAMADOL HYDROCHLORIDE 50 MG/1
50 TABLET ORAL EVERY 6 HOURS PRN
Status: DISCONTINUED | OUTPATIENT
Start: 2021-05-09 | End: 2021-05-10 | Stop reason: HOSPADM

## 2021-05-09 RX ADMIN — PANTOPRAZOLE SODIUM 40 MG: 40 TABLET, DELAYED RELEASE ORAL at 09:54

## 2021-05-09 RX ADMIN — TRAMADOL HYDROCHLORIDE 50 MG: 50 TABLET, FILM COATED ORAL at 21:16

## 2021-05-09 RX ADMIN — SODIUM CHLORIDE, PRESERVATIVE FREE 10 ML: 5 INJECTION INTRAVENOUS at 09:56

## 2021-05-09 RX ADMIN — METOPROLOL SUCCINATE 50 MG: 50 TABLET, EXTENDED RELEASE ORAL at 13:17

## 2021-05-09 RX ADMIN — FINASTERIDE 5 MG: 5 TABLET, FILM COATED ORAL at 09:54

## 2021-05-09 RX ADMIN — ASPIRIN 81 MG: 81 TABLET, CHEWABLE ORAL at 09:53

## 2021-05-09 RX ADMIN — INSULIN LISPRO 3 UNITS: 100 INJECTION, SOLUTION INTRAVENOUS; SUBCUTANEOUS at 17:39

## 2021-05-09 RX ADMIN — IPRATROPIUM BROMIDE AND ALBUTEROL SULFATE 3 ML: 2.5; .5 SOLUTION RESPIRATORY (INHALATION) at 12:21

## 2021-05-09 RX ADMIN — LISINOPRIL 40 MG: 40 TABLET ORAL at 09:54

## 2021-05-09 RX ADMIN — TERAZOSIN HYDROCHLORIDE 10 MG: 5 CAPSULE ORAL at 21:10

## 2021-05-09 RX ADMIN — CETIRIZINE HYDROCHLORIDE 10 MG: 10 TABLET, FILM COATED ORAL at 09:54

## 2021-05-09 RX ADMIN — FUROSEMIDE 20 MG: 20 TABLET ORAL at 09:54

## 2021-05-09 RX ADMIN — IPRATROPIUM BROMIDE AND ALBUTEROL SULFATE 3 ML: 2.5; .5 SOLUTION RESPIRATORY (INHALATION) at 15:44

## 2021-05-09 RX ADMIN — FLUOXETINE HYDROCHLORIDE 20 MG: 20 CAPSULE ORAL at 09:54

## 2021-05-09 RX ADMIN — AMLODIPINE BESYLATE 10 MG: 10 TABLET ORAL at 09:54

## 2021-05-09 RX ADMIN — AMIODARONE HYDROCHLORIDE 200 MG: 200 TABLET ORAL at 09:54

## 2021-05-09 RX ADMIN — IPRATROPIUM BROMIDE AND ALBUTEROL SULFATE 3 ML: 2.5; .5 SOLUTION RESPIRATORY (INHALATION) at 07:43

## 2021-05-09 RX ADMIN — TRAMADOL HYDROCHLORIDE 50 MG: 50 TABLET, FILM COATED ORAL at 14:48

## 2021-05-09 RX ADMIN — ATORVASTATIN CALCIUM 20 MG: 20 TABLET, FILM COATED ORAL at 21:10

## 2021-05-10 ENCOUNTER — READMISSION MANAGEMENT (OUTPATIENT)
Dept: CALL CENTER | Facility: HOSPITAL | Age: 71
End: 2021-05-10

## 2021-05-10 VITALS
WEIGHT: 234 LBS | BODY MASS INDEX: 38.99 KG/M2 | OXYGEN SATURATION: 91 % | DIASTOLIC BLOOD PRESSURE: 81 MMHG | HEIGHT: 65 IN | TEMPERATURE: 97.9 F | SYSTOLIC BLOOD PRESSURE: 111 MMHG | HEART RATE: 65 BPM | RESPIRATION RATE: 16 BRPM

## 2021-05-10 LAB
ANION GAP SERPL CALCULATED.3IONS-SCNC: 7 MMOL/L (ref 5–15)
BUN SERPL-MCNC: 37 MG/DL (ref 8–23)
BUN/CREAT SERPL: 21.8 (ref 7–25)
CALCIUM SPEC-SCNC: 9 MG/DL (ref 8.6–10.5)
CHLORIDE SERPL-SCNC: 103 MMOL/L (ref 98–107)
CO2 SERPL-SCNC: 29 MMOL/L (ref 22–29)
CREAT SERPL-MCNC: 1.7 MG/DL (ref 0.76–1.27)
GFR SERPL CREATININE-BSD FRML MDRD: 40 ML/MIN/1.73
GLUCOSE BLDC GLUCOMTR-MCNC: 102 MG/DL (ref 70–130)
GLUCOSE SERPL-MCNC: 111 MG/DL (ref 65–99)
POTASSIUM SERPL-SCNC: 3.9 MMOL/L (ref 3.5–5.2)
SARS-COV-2 RDRP RESP QL NAA+PROBE: NORMAL
SODIUM SERPL-SCNC: 139 MMOL/L (ref 136–145)

## 2021-05-10 PROCEDURE — 80048 BASIC METABOLIC PNL TOTAL CA: CPT | Performed by: NURSE PRACTITIONER

## 2021-05-10 PROCEDURE — G0378 HOSPITAL OBSERVATION PER HR: HCPCS

## 2021-05-10 PROCEDURE — 33228 REMV&REPLC PM GEN DUAL LEAD: CPT | Performed by: INTERNAL MEDICINE

## 2021-05-10 PROCEDURE — 25010000003 CEFAZOLIN IN DEXTROSE 2-4 GM/100ML-% SOLUTION: Performed by: NURSE PRACTITIONER

## 2021-05-10 PROCEDURE — 82962 GLUCOSE BLOOD TEST: CPT

## 2021-05-10 PROCEDURE — 99152 MOD SED SAME PHYS/QHP 5/>YRS: CPT | Performed by: INTERNAL MEDICINE

## 2021-05-10 PROCEDURE — 94660 CPAP INITIATION&MGMT: CPT

## 2021-05-10 PROCEDURE — 99024 POSTOP FOLLOW-UP VISIT: CPT | Performed by: INTERNAL MEDICINE

## 2021-05-10 PROCEDURE — 99153 MOD SED SAME PHYS/QHP EA: CPT | Performed by: INTERNAL MEDICINE

## 2021-05-10 PROCEDURE — C1785 PMKR, DUAL, RATE-RESP: HCPCS | Performed by: INTERNAL MEDICINE

## 2021-05-10 PROCEDURE — S0260 H&P FOR SURGERY: HCPCS | Performed by: NURSE PRACTITIONER

## 2021-05-10 PROCEDURE — 94799 UNLISTED PULMONARY SVC/PX: CPT

## 2021-05-10 PROCEDURE — 87635 SARS-COV-2 COVID-19 AMP PRB: CPT | Performed by: NURSE PRACTITIONER

## 2021-05-10 PROCEDURE — 25010000002 MIDAZOLAM PER 1 MG: Performed by: INTERNAL MEDICINE

## 2021-05-10 PROCEDURE — 25010000002 FENTANYL CITRATE (PF) 100 MCG/2ML SOLUTION: Performed by: INTERNAL MEDICINE

## 2021-05-10 DEVICE — GEN PM ASSURITY MRI DR RF PM2272: Type: IMPLANTABLE DEVICE | Status: FUNCTIONAL

## 2021-05-10 RX ORDER — LIDOCAINE HYDROCHLORIDE 10 MG/ML
INJECTION, SOLUTION EPIDURAL; INFILTRATION; INTRACAUDAL; PERINEURAL AS NEEDED
Status: DISCONTINUED | OUTPATIENT
Start: 2021-05-10 | End: 2021-05-10 | Stop reason: HOSPADM

## 2021-05-10 RX ORDER — CEFAZOLIN SODIUM 2 G/100ML
2 INJECTION, SOLUTION INTRAVENOUS
Status: COMPLETED | OUTPATIENT
Start: 2021-05-11 | End: 2021-05-10

## 2021-05-10 RX ORDER — SODIUM CHLORIDE 9 MG/ML
INJECTION, SOLUTION INTRAVENOUS CONTINUOUS PRN
Status: COMPLETED | OUTPATIENT
Start: 2021-05-10 | End: 2021-05-10

## 2021-05-10 RX ORDER — MIDAZOLAM HYDROCHLORIDE 1 MG/ML
INJECTION INTRAMUSCULAR; INTRAVENOUS AS NEEDED
Status: DISCONTINUED | OUTPATIENT
Start: 2021-05-10 | End: 2021-05-10 | Stop reason: HOSPADM

## 2021-05-10 RX ORDER — FENTANYL CITRATE 50 UG/ML
INJECTION, SOLUTION INTRAMUSCULAR; INTRAVENOUS AS NEEDED
Status: DISCONTINUED | OUTPATIENT
Start: 2021-05-10 | End: 2021-05-10 | Stop reason: HOSPADM

## 2021-05-10 RX ADMIN — ASPIRIN 81 MG: 81 TABLET, CHEWABLE ORAL at 08:05

## 2021-05-10 RX ADMIN — TRAMADOL HYDROCHLORIDE 50 MG: 50 TABLET, FILM COATED ORAL at 08:21

## 2021-05-10 RX ADMIN — BACLOFEN 10 MG: 10 TABLET ORAL at 08:21

## 2021-05-10 RX ADMIN — AMLODIPINE BESYLATE 10 MG: 10 TABLET ORAL at 08:05

## 2021-05-10 RX ADMIN — TRAMADOL HYDROCHLORIDE 50 MG: 50 TABLET, FILM COATED ORAL at 03:03

## 2021-05-10 RX ADMIN — AMIODARONE HYDROCHLORIDE 200 MG: 200 TABLET ORAL at 08:05

## 2021-05-10 RX ADMIN — CETIRIZINE HYDROCHLORIDE 10 MG: 10 TABLET, FILM COATED ORAL at 08:05

## 2021-05-10 RX ADMIN — LISINOPRIL 40 MG: 40 TABLET ORAL at 08:05

## 2021-05-10 RX ADMIN — FINASTERIDE 5 MG: 5 TABLET, FILM COATED ORAL at 08:05

## 2021-05-10 RX ADMIN — SODIUM CHLORIDE, PRESERVATIVE FREE 10 ML: 5 INJECTION INTRAVENOUS at 08:06

## 2021-05-10 RX ADMIN — FUROSEMIDE 20 MG: 20 TABLET ORAL at 08:06

## 2021-05-10 RX ADMIN — FLUOXETINE HYDROCHLORIDE 20 MG: 20 CAPSULE ORAL at 08:05

## 2021-05-10 RX ADMIN — CEFAZOLIN SODIUM 2 G: 2 INJECTION, SOLUTION INTRAVENOUS at 10:29

## 2021-05-10 RX ADMIN — METOPROLOL SUCCINATE 50 MG: 50 TABLET, EXTENDED RELEASE ORAL at 08:05

## 2021-05-11 ENCOUNTER — TRANSITIONAL CARE MANAGEMENT TELEPHONE ENCOUNTER (OUTPATIENT)
Dept: CALL CENTER | Facility: HOSPITAL | Age: 71
End: 2021-05-11

## 2021-05-11 PROCEDURE — 25010000003 CEFAZOLIN IN DEXTROSE 2-4 GM/100ML-% SOLUTION: Performed by: NURSE PRACTITIONER

## 2021-05-11 NOTE — OUTREACH NOTE
Prep Survey      Responses   [facility] facility patient discharged from?  [place]   Is LACE score < 7 ?  No   Emergency Room discharge w/ pulse ox?  No   Eligibility  [facility]   Date of Admission  05/07/21   Date of Discharge  05/10/21   Discharge Disposition  Home or Self Care   Discharge diagnosis  PPM battery change   Does the patient have one of the following disease processes/diagnoses(primary or secondary)?  Other   Does the patient have Home health ordered?  No   Is there a DME ordered?  No   Prep survey completed?  Yes          Carmen Heaton RN

## 2021-05-11 NOTE — OUTREACH NOTE
Call Center TCM Note      Responses   [facility] patient discharged from?  [place]   Does the patient have one of the following disease processes/diagnoses(primary or secondary)?  Other   TCM attempt successful?  Yes   Call start time  1547   Call end time  1550   Discharge diagnosis  PPM battery change   Meds reviewed with patient/caregiver?  Yes   Is the patient having any side effects they believe may be caused by any medication additions or changes?  No   Does the patient have all medications ordered at discharge?  N/A   Prescription comments  PATIENT HAS STOPPED HIS ELIQUIS   Is the patient taking all medications as directed (includes completed medication regime)?  Yes   Does the patient have a primary care provider?   Yes   Does the patient have an appointment with their PCP within 7 days of discharge?  Yes   Comments regarding PCP  PCP APPOINTMENT IS TOMORROW 5/12/21   Has the patient kept scheduled appointments due by today?  N/A   Has home health visited the patient within 72 hours of discharge?  N/A   Psychosocial issues?  No   Did the patient receive a copy of their discharge instructions?  Yes   Nursing interventions  Reviewed instructions with patient   What is the patient's perception of their health status since discharge?  Improving   Is the patient/caregiver able to teach back signs and symptoms related to disease process for when to call PCP?  Yes   Is the patient/caregiver able to teach back signs and symptoms related to disease process for when to call 911?  Yes   Is the patient/caregiver able to teach back the hierarchy of who to call/visit for symptoms/problems? PCP, Specialist, Home health nurse, Urgent Care, ED, 911  Yes   If the patient is a current smoker, are they able to teach back resources for cessation?  Not a smoker   TCM call completed?  Yes          Frieda Peña LPN    5/11/2021, 15:51 EDT

## 2021-05-17 ENCOUNTER — OFFICE VISIT (OUTPATIENT)
Dept: INTERNAL MEDICINE | Facility: CLINIC | Age: 71
End: 2021-05-17

## 2021-05-17 VITALS
OXYGEN SATURATION: 95 % | WEIGHT: 240 LBS | SYSTOLIC BLOOD PRESSURE: 112 MMHG | TEMPERATURE: 97.5 F | BODY MASS INDEX: 39.99 KG/M2 | HEART RATE: 65 BPM | RESPIRATION RATE: 16 BRPM | DIASTOLIC BLOOD PRESSURE: 58 MMHG | HEIGHT: 65 IN

## 2021-05-17 DIAGNOSIS — R07.9 CHEST PAIN, UNSPECIFIED TYPE: ICD-10-CM

## 2021-05-17 DIAGNOSIS — I48.0 PAROXYSMAL ATRIAL FIBRILLATION (HCC): ICD-10-CM

## 2021-05-17 DIAGNOSIS — I25.119 CORONARY ARTERY DISEASE INVOLVING NATIVE CORONARY ARTERY OF NATIVE HEART WITH ANGINA PECTORIS (HCC): ICD-10-CM

## 2021-05-17 DIAGNOSIS — J44.1 COPD EXACERBATION (HCC): ICD-10-CM

## 2021-05-17 DIAGNOSIS — Z45.010 PACEMAKER AT END OF BATTERY LIFE: ICD-10-CM

## 2021-05-17 DIAGNOSIS — I50.33 ACUTE ON CHRONIC DIASTOLIC HEART FAILURE (HCC): Primary | ICD-10-CM

## 2021-05-17 PROCEDURE — 99496 TRANSJ CARE MGMT HIGH F2F 7D: CPT | Performed by: FAMILY MEDICINE

## 2021-05-17 PROCEDURE — 1111F DSCHRG MED/CURRENT MED MERGE: CPT | Performed by: FAMILY MEDICINE

## 2021-05-18 ENCOUNTER — OFFICE VISIT (OUTPATIENT)
Dept: CARDIOLOGY | Facility: CLINIC | Age: 71
End: 2021-05-18

## 2021-05-18 VITALS
BODY MASS INDEX: 40.97 KG/M2 | WEIGHT: 240 LBS | OXYGEN SATURATION: 96 % | HEIGHT: 64 IN | HEART RATE: 68 BPM | SYSTOLIC BLOOD PRESSURE: 160 MMHG | DIASTOLIC BLOOD PRESSURE: 80 MMHG

## 2021-05-18 DIAGNOSIS — Z45.010 PACEMAKER AT END OF BATTERY LIFE: Primary | ICD-10-CM

## 2021-05-18 PROCEDURE — 99024 POSTOP FOLLOW-UP VISIT: CPT | Performed by: INTERNAL MEDICINE

## 2021-05-18 RX ORDER — DOXYCYCLINE HYCLATE 100 MG
100 TABLET ORAL 2 TIMES DAILY
Qty: 20 TABLET | Refills: 0 | Status: SHIPPED | OUTPATIENT
Start: 2021-05-18 | End: 2021-06-22

## 2021-05-18 NOTE — PROGRESS NOTES
[facility] Cardiology    Patient ID: Robe Bryant is a 70 y.o. male.  : 1950   Contact: [phone]    Encounter date: 2021    PCP: Radha Jean DO      Chief complaint:   Chief Complaint   Patient presents with   • Wound Check       Problem List:  1.   Coronary artery disease:  a. [facility], 2009, Dr. Vasquez: Placement of a 3.0 x 23 mm Xience LATOYA to the ramus, 3.0 x 12 mm LATOYA to the mid-circumflex.  b. [facility], 10/11/2010: EF 45%, LVEDP 28.   c. [facility], 2011: 2.25 x 13 mm Cypher LATOYA placement to the distal circumflex.   d. [facility], 01/10/2012, PW: Noncritical CAD, patient ramus and left circumflex stents, LVH with near cavity obliteration.  e. Medical management.  f. Abnormal Cardiolite, 2013, [facility], showing moderate to severe perfusion defect of the basolateral wall of the LV.  g. [facility], 03/15/2013, PW: Widely patent stents of the LCx and ramus intermedius coronary arteries, no significant disease is seen in any territory.   h. Diastolic heart failure with hospital admission, May 2013, at [facility] in [place].  i. Recurrent anginal symptoms, 2014; initiation of Imdur therapy, 2014.  j. [facility], 09/15/2014, PW: Noncritical CAD with widely patent stents in the LCx and ramus intermedius arteries. Other sources for the patient’s chest discomfort should be considered.  k. [facility], 06/10/2016: EF Normal, widely patent ramus intermedius stent; no significant disease within LAD, LCx, RCA.  l. [facility], 2019: Noncritical CAD with patent stents noted. Aggressive risk factor modification which will include better control of the patient's excessively high blood pressure  m. Echo, 2021: LVEF: 57%, Left ventricular diastolic function is consistent with grade 1 impaired relaxation. Left atrial volume is mildly increased.   2. Diastolic heart failure:  a. Echocardiogram, 2013, Hermes Vargas MD: Hyperdynamic LV with EF 75%, mild TR,  trace MI.  b. Complaints of dyspnea at the time of office visit, 05/22/2013, with O2 saturation in the 91% to 95% range with ambulation.  c. Echocardiogram, 09/15/2014: Moderate LVH with LVEF 60% to 65%. Mild MR and mild TR.   3. Hypertension, uncontrolled at the time of office visit, 05/22/2013.  4. Symptomatic bradycardia:  a. Continued symptoms of presyncope in the setting of hypotension and bradycardia, 02/16/2012.  b. PPM implantation, Dr. Tucker, 02/21/2012, [employer] RFDR, model #[id number].  c. Hospitalization with acute dyspnea, 02/27/2013, at [facility] felt secondary to pacemaker-induced tachycardia.  d. Tilt Table (6/22/2017): Positive for orthostatic syncope. Patient developed abrupt symptomatic hypotension at 9 minutes, with any corresponding increase in heart rate. Consistent with vasodepressive response.  e. Implantation of [employer]  [employer]urity MRI PM 2272 serial #[id number] end-of-life on previous generator 5/10/2021  5. Paroxysmal atrial fibrillation:  a. Coumadin therapy followed by [facility].   b. CHADS-VASc score = 5. (Age 65-74, HTN, DM, HF, TIA?)  c. Admission to [facility], 06/13/2015 through 06/16/2015, with DC cardioversion and return to sinus rhythm and subsequent metoprolol dosage increase.  6. Recent symptoms of left-sided facial numbness and occasional left arm weakness.  7. Nonsustained VT per device interrogation, 09/04/2014.  8. Brief episodes of atrial tachycardia at the time of device interrogation, 05/03/2012.  9. Hyperlipidemia.  10. Type 2 diabetes mellitus.  11. Obstructive sleep apnea with CPAP therapy.  12. Questionable transient ischemic attack, January 2010, without workup:  a. Carotid duplex, 09/15/2014: Noncritical coronary artery disease with widely patent bilateral carotid arteries. Velocity is all within normal limits.   13. History of tobacco use with cessation x7 years.   14. Gastroesophageal reflux disease.  15. Hernia repair x3.   16. Chronic kidney disease  with a creatinine of 2.1 during hospitalization, 05/17/2013.    No Known Allergies    Current Medications:    Current Outpatient Medications:   •  amiodarone (PACERONE) 200 MG tablet, Take 1 tablet by mouth Daily., Disp: 30 tablet, Rfl: 0  •  amLODIPine (NORVASC) 10 MG tablet, Take 1 tablet by mouth Daily., Disp: 30 tablet, Rfl: 1  •  aspirin 81 MG chewable tablet, Chew 81 mg Daily., Disp: , Rfl:   •  atorvastatin (LIPITOR) 20 MG tablet, Take 1 tablet by mouth Every Night., Disp: 90 tablet, Rfl: 3  •  baclofen (LIORESAL) 10 MG tablet, Take 1 tablet by mouth 3 (Three) Times a Day As Needed for Muscle Spasms., Disp: 90 tablet, Rfl: 2  •  Blood Glucose Monitoring Suppl (TRUE METRIX AIR GLUCOSE METER) w/Device kit, 1 each by In Vitro route 2 (two) times a day. E11.9, Disp: 1 kit, Rfl: 0  •  cetirizine (zyrTEC) 10 MG tablet, Take 1 tablet by mouth Daily., Disp: 90 tablet, Rfl: 3  •  Cholecalciferol (Vitamin D-3) 25 MCG (1000 UT) capsule, Take 1,000 Units by mouth Daily., Disp: 90 capsule, Rfl: 3  •  diclofenac (VOLTAREN) 1 % gel gel, Apply 4 g topically to the appropriate area as directed 4 (Four) Times a Day As Needed (pain)., Disp: 100 g, Rfl: 5  •  finasteride (PROSCAR) 5 MG tablet, Take 1 tablet by mouth Daily., Disp: 90 tablet, Rfl: 3  •  FLUoxetine (PROzac) 20 MG capsule, Take 1 capsule by mouth Daily., Disp: 90 capsule, Rfl: 3  •  fluticasone (Flonase) 50 MCG/ACT nasal spray, 2 sprays into the nostril(s) as directed by provider Daily., Disp: 18.2 mL, Rfl: 5  •  Fluticasone-Umeclidin-Vilant (Trelegy Ellipta) 100-62.5-25 MCG/INH aerosol powder , Inhale 1 each Daily. Rinse mouth with water after use., Disp: 1 each, Rfl: 5  •  furosemide (Lasix) 20 MG tablet, Take 1 tablet by mouth every morning; may take second dose as needed for increased swelling, Disp: 60 tablet, Rfl: 2  •  glipizide (GLUCOTROL) 5 MG tablet, TAKE 1 TABLET BY MOUTH 2 TIMES A DAY BEFORE MEALS., Disp: 180 tablet, Rfl: 3  •  glucose blood (True Metrix  Blood Glucose Test) test strip, Daily testing E11.9, Disp: 100 each, Rfl: 5  •  glucose monitor monitoring kit, 1 each Daily. E11.9, Disp: 1 each, Rfl: 0  •  ipratropium-albuterol (DUO-NEB) 0.5-2.5 mg/3 ml nebulizer, Take 3 mL by nebulization 4 (Four) Times a Day., Disp: 1620 mL, Rfl: 2  •  Lancet Device misc, 1 each Daily. E11.9, Disp: 100 each, Rfl: 3  •  lisinopril (PRINIVIL,ZESTRIL) 40 MG tablet, Take 1 tablet by mouth Daily., Disp: 90 tablet, Rfl: 3  •  metoprolol succinate XL (Toprol XL) 50 MG 24 hr tablet, Take 1 tablet by mouth Daily., Disp: 30 tablet, Rfl: 0  •  Misc. Devices misc, Bipap tubing., Disp: 1 each, Rfl: 0  •  Multiple Vitamin tablet, Take 1 tablet by mouth daily., Disp: , Rfl:   •  Nebulizer misc, 1 each Every 4 (Four) Hours As Needed (shortness of breath)., Disp: 1 each, Rfl: 0  •  nitroglycerin (NITROSTAT) 0.4 MG SL tablet, Place 1 tablet under the tongue Every 5 (Five) Minutes As Needed for Chest Pain., Disp: 25 tablet, Rfl: 11  •  Olopatadine HCl 0.7 % solution, Apply 1 drop to eye(s) as directed by provider Daily., Disp: 2.5 mL, Rfl: 5  •  omeprazole (priLOSEC) 40 MG capsule, Take 1 capsule by mouth Daily., Disp: 90 capsule, Rfl: 3  •  ondansetron ODT (Zofran ODT) 4 MG disintegrating tablet, Place 1 tablet on the tongue Every 8 (Eight) Hours As Needed for Nausea or Vomiting., Disp: 20 tablet, Rfl: 0  •  potassium chloride (K-DUR,KLOR-CON) 10 MEQ CR tablet, Take 1 tablet by mouth 2 (Two) Times a Day., Disp: 60 tablet, Rfl: 2  •  pramipexole (MIRAPEX) 0.5 MG tablet, Take 1 tablet by mouth 3 (Three) Times a Day., Disp: 270 tablet, Rfl: 3  •  spironolactone (Aldactone) 50 MG tablet, Take 1 tablet by mouth Daily., Disp: 90 tablet, Rfl: 1  •  terazosin (HYTRIN) 10 MG capsule, Take 1 capsule by mouth Every Night., Disp: 90 capsule, Rfl: 3  •  tiotropium bromide-olodaterol (Stiolto Respimat) 2.5-2.5 MCG/ACT aerosol solution inhaler, Inhale 2 puffs Daily. Until able to get trelegy approved., Disp: 4  "g, Rfl: 0  •  TRUEplus Lancets 33G misc, 1 each by Other route Daily. E11.9, Disp: 100 each, Rfl: 5  •  doxycycline (VIBRAMYICN) 100 MG tablet, Take 1 tablet by mouth 2 (Two) Times a Day., Disp: 20 tablet, Rfl: 0    HPI    Robe Bryant is a 70 y.o. male who presents today for wound check following pacemaker generator change.  He said no issues with the site.  Remains clean and dry.      The following portions of the patient's history were reviewed and updated as appropriate: allergies, current medications and problem list.    Pertinent positives as listed in the HPI.  All other systems reviewed are negative.         Vitals:    05/18/21 1510   BP: 160/80   BP Location: Right arm   Patient Position: Sitting   Pulse: 68   SpO2: 96%   Weight: 109 kg (240 lb)   Height: 162.6 cm (64\")       Physical Exam:    The bandage was removed.  Old bloody secretions were present.  There were 2 areas of less than optimal edge apposition.  Steri-Strips were applied.  There is also a 1 cm area of erythema at one edge of the dressing which appears to be due to adhesive irritation.    Diagnostic Data (reviewed with patient):  Lab Results   Component Value Date    GLUCOSE 111 (H) 05/10/2021    CALCIUM 9.0 05/10/2021     05/10/2021    K 3.9 05/10/2021    CO2 29.0 05/10/2021     05/10/2021    BUN 37 (H) 05/10/2021    CREATININE 1.70 (H) 05/10/2021    EGFRIFAFRI 69 12/01/2020    EGFRIFNONA 40 (L) 05/10/2021    BCR 21.8 05/10/2021    ANIONGAP 7.0 05/10/2021      Lab Results   Component Value Date    GLUCOSE 111 (H) 05/10/2021    BUN 37 (H) 05/10/2021    CREATININE 1.70 (H) 05/10/2021    EGFRIFNONA 40 (L) 05/10/2021    EGFRIFAFRI 69 12/01/2020    BCR 21.8 05/10/2021     05/10/2021    K 3.9 05/10/2021     05/10/2021    CO2 29.0 05/10/2021    CALCIUM 9.0 05/10/2021    ALBUMIN 3.70 05/03/2021    ALKPHOS 65 05/03/2021    AST 21 05/03/2021    ALT 17 05/03/2021     Lab Results   Component Value Date    CHOL 104 " 05/04/2021    CHLPL 124 07/29/2020    TRIG 86 05/04/2021    HDL 37 (L) 05/04/2021    LDL 50 05/04/2021      Lab Results   Component Value Date    WBC 10.87 (H) 05/08/2021    RBC 4.74 05/08/2021    HGB 13.5 05/08/2021    HCT 43.6 05/08/2021    MCV 92.0 05/08/2021     05/08/2021      Lab Results   Component Value Date    TSH 0.525 05/04/2021        Procedures      Assessment:    ICD-10-CM ICD-9-CM   1. Pacemaker at end of battery life now status post generator change Z45.010 V53.31         Plan:  1. Leave Steri-Strips on until they fall off  2. Doxycycline 100 mg p.o. twice daily x10 days  3. Do not began Eliquis until next Monday  4. Follow-up with me in 1 month for another wound check.      Arelis Tucker MD, FACC

## 2021-05-20 ENCOUNTER — READMISSION MANAGEMENT (OUTPATIENT)
Dept: CALL CENTER | Facility: HOSPITAL | Age: 71
End: 2021-05-20

## 2021-05-20 NOTE — OUTREACH NOTE
Medical Week 2 Survey      Responses   [facility] patient discharged from?  [place]   Does the patient have one of the following disease processes/diagnoses(primary or secondary)?  Other   Week 2 attempt successful?  Yes   Call start time  1149   Discharge diagnosis  PPM battery change   Call end time  1150   Is the patient taking all medications as directed (includes completed medication regime)?  Yes   Has the patient kept scheduled appointments due by today?  Yes   Psychosocial issues?  No   What is the patient's perception of their health status since discharge?  Improving   Is the patient/caregiver able to teach back the hierarchy of who to call/visit for symptoms/problems? PCP, Specialist, Home health nurse, Urgent Care, ED, 911  Yes   Week 2 Call Completed?  Yes   Graduated  Yes   Is the patient interested in additional calls from an ambulatory ?  NOTE:  applies to high risk patients requiring additional follow-up.  No   Did the patient feel the follow up calls were helpful during their recovery period?  Yes   Was the number of calls appropriate?  Yes   Graduated/Revoked comments  Goals met, patient is doing well.          Clarisse Bryant RN

## 2021-05-24 ENCOUNTER — OFFICE VISIT (OUTPATIENT)
Dept: PULMONOLOGY | Facility: CLINIC | Age: 71
End: 2021-05-24

## 2021-05-24 VITALS
WEIGHT: 244 LBS | DIASTOLIC BLOOD PRESSURE: 72 MMHG | HEIGHT: 64 IN | RESPIRATION RATE: 18 BRPM | SYSTOLIC BLOOD PRESSURE: 118 MMHG | BODY MASS INDEX: 41.66 KG/M2 | OXYGEN SATURATION: 96 % | HEART RATE: 61 BPM

## 2021-05-24 DIAGNOSIS — J44.9 CHRONIC OBSTRUCTIVE PULMONARY DISEASE, UNSPECIFIED COPD TYPE (HCC): ICD-10-CM

## 2021-05-24 DIAGNOSIS — G47.33 OBSTRUCTIVE SLEEP APNEA: ICD-10-CM

## 2021-05-24 DIAGNOSIS — Z87.891 PERSONAL HISTORY OF TOBACCO USE, PRESENTING HAZARDS TO HEALTH: ICD-10-CM

## 2021-05-24 DIAGNOSIS — R06.02 SOB (SHORTNESS OF BREATH): Primary | ICD-10-CM

## 2021-05-24 DIAGNOSIS — R09.02 EXERCISE HYPOXEMIA: ICD-10-CM

## 2021-05-24 PROCEDURE — 99214 OFFICE O/P EST MOD 30 MIN: CPT | Performed by: NURSE PRACTITIONER

## 2021-05-24 RX ORDER — TIOTROPIUM BROMIDE AND OLODATEROL 3.124; 2.736 UG/1; UG/1
2 SPRAY, METERED RESPIRATORY (INHALATION)
Qty: 4 G | Refills: 5 | Status: SHIPPED | OUTPATIENT
Start: 2021-05-24 | End: 2021-10-13

## 2021-05-24 NOTE — PROGRESS NOTES
"Chief Complaint   Patient presents with   • Follow-up   • Shortness of Breath         Subjective   Robe Bryant is a 70 y.o. male.     History of Present Illness   Patient comes today for follow up of shortness of breath and COPD.     Symptoms have been stable since the last clinic visit. Patient reports no recent exacerbations.     He has been using Stiolto daily.  He was given samples by his PCP.  He uses the DuoNeb's in the nebulizer 2-3 times a day.    He uses Flonase daily.    He uses BiPAP at a pressure of 14/5.  He was in the hospital about 8 days and did not use the machine during this time.  He was in the hospital due to needing a battery replacement in his pacemaker.    Exercise tolerance has also remained stable.     Quit smoking finally about 16 years ago. He had previously quit but then began smoking 3-4 cigarettes a day again.     The following portions of the patient's history were reviewed and updated as appropriate: allergies, current medications, past family history, past medical history, past social history and past surgical history.    Review of Systems   HENT: Negative for sinus pressure, sneezing and sore throat.    Respiratory: Positive for cough, shortness of breath and wheezing. Negative for chest tightness.        Objective   Visit Vitals  /72   Pulse 61   Resp 18   Ht 162.6 cm (64.02\")   Wt 111 kg (244 lb)   SpO2 96%   BMI 41.86 kg/m²     Physical Exam  Vitals reviewed.   HENT:      Head: Atraumatic.      Mouth/Throat:      Mouth: Mucous membranes are moist.      Comments: Crowded oropharynx.   Eyes:      Extraocular Movements: Extraocular movements intact.   Cardiovascular:      Rate and Rhythm: Normal rate and regular rhythm.   Pulmonary:      Effort: Pulmonary effort is normal. No respiratory distress.   Abdominal:      Comments: Obese abdomen.   Musculoskeletal:      Comments: Gait normal.   Skin:     General: Skin is warm.   Neurological:      Mental Status: He is alert " and oriented to person, place, and time.             Assessment/Plan   Problems Addressed this Visit     None      Visit Diagnoses     SOB (shortness of breath)    -  Primary    Chronic obstructive pulmonary disease, unspecified COPD type (CMS/HCC)        Relevant Medications    tiotropium bromide-olodaterol (Stiolto Respimat) 2.5-2.5 MCG/ACT aerosol solution inhaler    Other Relevant Orders    Oxygen Therapy    Overnight Sleep Oximetry Study    Obstructive sleep apnea        Relevant Orders    BIPAP / CPAP Adjustment    Overnight Sleep Oximetry Study    Personal history of tobacco use, presenting hazards to health        Exercise hypoxemia          Diagnoses       Codes Comments    SOB (shortness of breath)    -  Primary ICD-10-CM: R06.02  ICD-9-CM: 786.05     Chronic obstructive pulmonary disease, unspecified COPD type (CMS/HCC)     ICD-10-CM: J44.9  ICD-9-CM: 496     Obstructive sleep apnea     ICD-10-CM: G47.33  ICD-9-CM: 327.23     Personal history of tobacco use, presenting hazards to health     ICD-10-CM: Z87.891  ICD-9-CM: V15.82     Exercise hypoxemia     ICD-10-CM: R09.02  ICD-9-CM: 799.02              Return for keep appt in september, Overnight P ox.    DISCUSSION (if any):  On 6-minute walk, his oxygen saturation decreased to 88% on room air and 2 L of oxygen were added.  His oxygen saturation increased to 92% on 2 L of oxygen.  His total walk distance 360 m.    His insurance did not cover Trelegy so he was given samples of Stiolto and has been using that medication.  I will send a prescription of Stiolto to the pharmacy as it seems to be covered by his insurance.    He should continue using the rescue medication as directed.    Compliance with medications stressed.     Side effects of prescribed medications discussed with the patient.    Due to his smoking history, he will need to have an annual low-dose CT which will be due in May 2022.  He has only quit smoking 16 years ago.  His last low-dose CT was  denied because the patient stated he quit smoking more than 20 years ago.  However he had a CT done in the emergency room on May 3 which does not show any suspicious nodules.    I will order an overnight pulse oximetry to be done on BiPAP only.  The patient has been made aware that Medicare may require him to have a titration study if oxygen is needed with BiPAP.    In the meantime he will need to continue using BiPAP at the current pressure of 14/5 on a nightly basis.  He is needing new supplies so an order will be sent to his [email or web address] company.    Dictated utilizing Dragon dictation.    This document was electronically signed by BROOKE Crouch May 24, 2021  15:40 EDT

## 2021-06-22 ENCOUNTER — OFFICE VISIT (OUTPATIENT)
Dept: INTERNAL MEDICINE | Facility: CLINIC | Age: 71
End: 2021-06-22

## 2021-06-22 VITALS
BODY MASS INDEX: 41.79 KG/M2 | TEMPERATURE: 98.4 F | DIASTOLIC BLOOD PRESSURE: 80 MMHG | HEART RATE: 65 BPM | RESPIRATION RATE: 16 BRPM | WEIGHT: 244.8 LBS | HEIGHT: 64 IN | SYSTOLIC BLOOD PRESSURE: 142 MMHG | OXYGEN SATURATION: 98 %

## 2021-06-22 DIAGNOSIS — Z00.00 MEDICARE ANNUAL WELLNESS VISIT, SUBSEQUENT: Primary | ICD-10-CM

## 2021-06-22 DIAGNOSIS — I25.119 CORONARY ARTERY DISEASE INVOLVING NATIVE CORONARY ARTERY OF NATIVE HEART WITH ANGINA PECTORIS (HCC): ICD-10-CM

## 2021-06-22 DIAGNOSIS — M15.8 OTHER OSTEOARTHRITIS INVOLVING MULTIPLE JOINTS: ICD-10-CM

## 2021-06-22 DIAGNOSIS — I50.32 CHRONIC DIASTOLIC HEART FAILURE (HCC): ICD-10-CM

## 2021-06-22 DIAGNOSIS — K21.9 GASTROESOPHAGEAL REFLUX DISEASE WITHOUT ESOPHAGITIS: ICD-10-CM

## 2021-06-22 DIAGNOSIS — E78.00 HYPERCHOLESTEROLEMIA: ICD-10-CM

## 2021-06-22 DIAGNOSIS — E11.22 TYPE 2 DIABETES MELLITUS WITH STAGE 3 CHRONIC KIDNEY DISEASE, WITHOUT LONG-TERM CURRENT USE OF INSULIN, UNSPECIFIED WHETHER STAGE 3A OR 3B CKD (HCC): ICD-10-CM

## 2021-06-22 DIAGNOSIS — G47.33 OBSTRUCTIVE SLEEP APNEA OF ADULT: ICD-10-CM

## 2021-06-22 DIAGNOSIS — I48.0 PAROXYSMAL ATRIAL FIBRILLATION (HCC): ICD-10-CM

## 2021-06-22 DIAGNOSIS — I48.91 ATRIAL FIBRILLATION WITH RVR (HCC): ICD-10-CM

## 2021-06-22 DIAGNOSIS — I10 ESSENTIAL HYPERTENSION: ICD-10-CM

## 2021-06-22 DIAGNOSIS — N18.30 TYPE 2 DIABETES MELLITUS WITH STAGE 3 CHRONIC KIDNEY DISEASE, WITHOUT LONG-TERM CURRENT USE OF INSULIN, UNSPECIFIED WHETHER STAGE 3A OR 3B CKD (HCC): ICD-10-CM

## 2021-06-22 DIAGNOSIS — G25.81 RESTLESS LEG SYNDROME: ICD-10-CM

## 2021-06-22 DIAGNOSIS — N18.30 STAGE 3 CHRONIC KIDNEY DISEASE, UNSPECIFIED WHETHER STAGE 3A OR 3B CKD (HCC): ICD-10-CM

## 2021-06-22 DIAGNOSIS — F33.1 MODERATE EPISODE OF RECURRENT MAJOR DEPRESSIVE DISORDER (HCC): ICD-10-CM

## 2021-06-22 DIAGNOSIS — N40.0 BENIGN PROSTATIC HYPERPLASIA WITHOUT LOWER URINARY TRACT SYMPTOMS: ICD-10-CM

## 2021-06-22 PROCEDURE — 1159F MED LIST DOCD IN RCRD: CPT | Performed by: FAMILY MEDICINE

## 2021-06-22 PROCEDURE — 99397 PER PM REEVAL EST PAT 65+ YR: CPT | Performed by: FAMILY MEDICINE

## 2021-06-22 PROCEDURE — 1170F FXNL STATUS ASSESSED: CPT | Performed by: FAMILY MEDICINE

## 2021-06-22 PROCEDURE — 96160 PT-FOCUSED HLTH RISK ASSMT: CPT | Performed by: FAMILY MEDICINE

## 2021-06-22 PROCEDURE — G0439 PPPS, SUBSEQ VISIT: HCPCS | Performed by: FAMILY MEDICINE

## 2021-06-22 PROCEDURE — 1125F AMNT PAIN NOTED PAIN PRSNT: CPT | Performed by: FAMILY MEDICINE

## 2021-06-22 RX ORDER — PRAMIPEXOLE DIHYDROCHLORIDE 0.5 MG/1
0.5 TABLET ORAL 3 TIMES DAILY
Qty: 270 TABLET | Refills: 3 | Status: SHIPPED | OUTPATIENT
Start: 2021-06-22 | End: 2022-07-13 | Stop reason: HOSPADM

## 2021-06-22 RX ORDER — METHYLPREDNISOLONE 4 MG/1
TABLET ORAL
Qty: 21 EACH | Refills: 0 | Status: SHIPPED | OUTPATIENT
Start: 2021-06-22 | End: 2021-07-28

## 2021-06-22 NOTE — PATIENT INSTRUCTIONS
Advance Directive    Advance directives are legal documents that let you make choices ahead of time about your health care and medical treatment in case you become unable to communicate for yourself. Advance directives are a way for you to make known your wishes to family, friends, and health care providers. This can let others know about your end-of-life care if you become unable to communicate.  Discussing and writing advance directives should happen over time rather than all at once. Advance directives can be changed depending on your situation and what you want, even after you have signed the advance directives.  There are different types of advance directives, such as:  · Medical power of .  · Living will.  · Do not resuscitate (DNR) or do not attempt resuscitation (DNAR) order.  Health care proxy and medical power of   A health care proxy is also called a health care agent. This is a person who is appointed to make medical decisions for you in cases where you are unable to make the decisions yourself. Generally, people choose someone they know well and trust to represent their preferences. Make sure to ask this person for an agreement to act as your proxy. A proxy may have to exercise judgment in the event of a medical decision for which your wishes are not known.  A medical power of  is a legal document that names your health care proxy. Depending on the laws in your state, after the document is written, it may also need to be:  · Signed.  · Notarized.  · Dated.  · Copied.  · Witnessed.  · Incorporated into your medical record.  You may also want to appoint someone to manage your money in a situation in which you are unable to do so. This is called a durable power of  for finances. It is a separate legal document from the durable power of  for health care. You may choose the same person or someone different from your health care proxy to act as your agent in money  matters.  If you do not appoint a proxy, or if there is a concern that the proxy is not acting in your best interests, a court may appoint a guardian to act on your behalf.  Living will  A living will is a set of instructions that state your wishes about medical care when you cannot express them yourself. Health care providers should keep a copy of your living will in your medical record. You may want to give a copy to family members or friends. To alert caregivers in case of an emergency, you can place a card in your wallet to let them know that you have a living will and where they can find it. A living will is used if you become:  · Terminally ill.  · Disabled.  · Unable to communicate or make decisions.  Items to consider in your living will include:  · To use or not to use life-support equipment, such as dialysis machines and breathing machines (ventilators).  · A DNR or DNAR order. This tells health care providers not to use cardiopulmonary resuscitation (CPR) if breathing or heartbeat stops.  · To use or not to use tube feeding.  · To be given or not to be given food and fluids.  · Comfort (palliative) care when the goal becomes comfort rather than a cure.  · Donation of organs and tissues.  A living will does not give instructions for distributing your money and property if you should pass away.  DNR or DNAR  A DNR or DNAR order is a request not to have CPR in the event that your heart stops beating or you stop breathing. If a DNR or DNAR order has not been made and shared, a health care provider will try to help any patient whose heart has stopped or who has stopped breathing. If you plan to have surgery, talk with your health care provider about how your DNR or DNAR order will be followed if problems occur.  What if I do not have an advance directive?  If you do not have an advance directive, some states assign family decision makers to act on your behalf based on how closely you are related to them. Each  state has its own laws about advance directives. You may want to check with your health care provider, , or state representative about the laws in your state.  Summary  · Advance directives are the legal documents that allow you to make choices ahead of time about your health care and medical treatment in case you become unable to tell others about your care.  · The process of discussing and writing advance directives should happen over time. You can change the advance directives, even after you have signed them.  · Advance directives include DNR or DNAR orders, living darby, and designating an agent as your medical power of .  This information is not intended to replace advice given to you by your health care provider. Make sure you discuss any questions you have with your health care provider.  Document Revised: 2021 Document Reviewed: 2020  [employer] Patient Education ©  [employer]      Medicare Wellness  Personal Prevention Plan of Service     Date of Office Visit:  2021  Encounter Provider:  Radha Jean DO  Place of Service:  [facility]  Patient Name: Robe Bryant  :  1950    As part of the Medicare Wellness portion of your visit today, we are providing you with this personalized preventive plan of services (PPPS). This plan is based upon recommendations of the [place] Preventive Services Task Force (USPSTF) and the Advisory Committee on Immunization Practices (ACIP).    This lists the preventive care services that should be considered, and provides dates of when you are due. Items listed as completed are up-to-date and do not require any further intervention.    Health Maintenance   Topic Date Due   • COVID-19 Vaccine (1) Never done   • TDAP/TD VACCINES (1 - Tdap) Never done   • ZOSTER VACCINE (2 of 2) 2015   • URINE MICROALBUMIN  2017   • DIABETIC EYE EXAM  2020   • ANNUAL WELLNESS VISIT   2021   • HEMOGLOBIN A1C  2021   • INFLUENZA VACCINE  2021   • DIABETIC FOOT EXAM  2022   • LIPID PANEL  2022   • COLORECTAL CANCER SCREENING  2025   • Pneumococcal Vaccine 65+  Completed   • AAA SCREEN (ONE-TIME)  Completed   • HEPATITIS C SCREENING  Addressed       No orders of the defined types were placed in this encounter.      No follow-ups on file.          Medicare Wellness  Personal Prevention Plan of Service     Date of Office Visit:  2021  Encounter Provider:  Radha Jean DO  Place of Service:  [facility]  Patient Name: Robe Bryant  :  1950    As part of the Medicare Wellness portion of your visit today, we are providing you with this personalized preventive plan of services (PPPS). This plan is based upon recommendations of the [place] Preventive Services Task Force (USPSTF) and the Advisory Committee on Immunization Practices (ACIP).    This lists the preventive care services that should be considered, and provides dates of when you are due. Items listed as completed are up-to-date and do not require any further intervention.    Health Maintenance   Topic Date Due   • COVID-19 Vaccine (1) Never done   • TDAP/TD VACCINES (1 - Tdap) Never done   • ZOSTER VACCINE (2 of 2) 2015   • URINE MICROALBUMIN  2017   • DIABETIC EYE EXAM  2020   • ANNUAL WELLNESS VISIT  2021   • HEMOGLOBIN A1C  2021   • INFLUENZA VACCINE  2021   • DIABETIC FOOT EXAM  2022   • LIPID PANEL  2022   • COLORECTAL CANCER SCREENING  2025   • Pneumococcal Vaccine 65+  Completed   • AAA SCREEN (ONE-TIME)  Completed   • HEPATITIS C SCREENING  Addressed       No orders of the defined types were placed in this encounter.      No follow-ups on file.          Understanding Your Risk for Falls  Each year, millions of people have serious injuries from falls. It is important to understand your  risk for falling. Talk with your health care provider about your risk and what you can do to lower it. There are actions you can take at home to lower your risk.  If you do have a serious fall, make sure you tell your health care provider. Falling once raises your risk for falling again.  How can falls affect me?  Serious injuries from falls are common. These include:  · Broken bones, such as hip fractures.  · Head injuries, such as traumatic brain injuries (TBI).  Fear of falling can also cause you to avoid activities and stay at home. This can make your muscles weaker and actually raise your risk for a fall.  What can increase my risk?  There are a number of risk factors that increase your risk for falling. The more risk factors you have, the higher your risk for falling. Serious injuries from a fall most often happen to people older than age 65. Children and young adults ages 15-29 are also at higher risk.  Common risk factors include:  · Weakness in the lower body.  · Lack (deficiency) of vitamin D.  · Being generally weak or confused due to long-term (chronic) illness.  · Dizziness or balance problems.  · Poor vision.  · Medicines that cause dizziness or drowsiness. These can include medicines for your blood pressure, heart, anxiety, insomnia, or edema, as well as pain medicines and muscle relaxants.  Other risk factors include:  · Drinking alcohol.  · Having had a fall in the past.  · Having depression.  · Foot pain or improper footwear.  · Working at a dangerous job.  · Having any of the following in your home:  ? Tripping hazards, such as floor clutter or loose rugs.  ? Poor lighting.  ? Pets or clutter.  · Dementia or memory loss.  What actions can I take to lower my risk of falling?         Physical activity  Maintain physical fitness. Do strength and balance exercises. Consider taking a regular class to build strength and balance. Yoga and celeste chi are good options.  Vision  Have your eyes checked every  year and your vision prescription updated as needed.  Walking aids and footwear  · Wear nonskid shoes. Do not wear high heels.  · Do not walk around the house in socks or slippers.  · Use a cane or walker as told by your health care provider.  Home safety  · Attach secure railings on both sides of your stairs.  · Install grab bars for your tub, shower, and toilet. Use a bath mat in your tub or shower.  · Use good lighting in all rooms. Keep a flashlight near your bed.  · Make sure there is a clear path from your bed to the bathroom. Use night-lights.  · Do not use throw rugs. Make sure all carpeting is taped or tacked down securely.  · Remove all clutter from walkways and stairways, including extension cords.  · Repair uneven or broken steps.  · Avoid walking on icy or slippery surfaces. Walk on the grass instead of on icy or slick sidewalks. Where you can, use ice melt to get rid of ice on walkways.  · Use a cordless phone.  Questions to ask your health care provider  · Can you help me check my risk for a fall?  · Do any of my medicines make me more likely to fall?  · Should I take a vitamin D supplement?  · What exercises can I do to improve my strength and balance?  · Should I make an appointment to have my vision checked?  · Do I need a bone density test to check for weak bones or osteoporosis?  · Would it help to use a cane or a walker?  Where to find more information  · Centers for Disease Control and Prevention, [place]: www.cdc.gov  · Community-Based Fall Prevention Programs: www.cdc.gov  · National [place] on Aging: jr1ylfc.todd.nih.gov  Contact a health care provider if:  · You fall at home.  · You are afraid of falling at home.  · You feel weak, drowsy, or dizzy.  Summary  · People 65 and older are at high risk for falling. However, older people are not the only ones injured in falls. Children and young adults have a higher-than-normal risk too.  · Talk with your health care provider about your risks for  falling and how to lower those risks.  · Taking certain precautions at home can lower your risk for falling.  · If you fall, always tell your health care provider.  This information is not intended to replace advice given to you by your health care provider. Make sure you discuss any questions you have with your health care provider.  Document Revised: 06/24/2020 Document Reviewed: 06/24/2020  [employer] Patient Education © 2021 [employer]    Heart-Healthy Eating Plan  Many factors influence your heart (coronary) health, including eating and exercise habits. Coronary risk increases with abnormal blood fat (lipid) levels. Heart-healthy meal planning includes limiting unhealthy fats, increasing healthy fats, and making other diet and lifestyle changes.  What is my plan?  Your health care provider may recommend that you:  · Limit your fat intake to _________% or less of your total calories each day.  · Limit your saturated fat intake to _________% or less of your total calories each day.  · Limit the amount of cholesterol in your diet to less than _________ mg per day.  What are tips for following this plan?  Cooking  Cook foods using methods other than frying. Baking, boiling, grilling, and broiling are all good options. Other ways to reduce fat include:  · Removing the skin from poultry.  · Removing all visible fats from meats.  · Steaming vegetables in water or broth.  Meal planning    · At meals, imagine dividing your plate into fourths:  ? Fill one-half of your plate with vegetables and green salads.  ? Fill one-fourth of your plate with whole grains.  ? Fill one-fourth of your plate with lean protein foods.  · Eat 4-5 servings of vegetables per day. One serving equals 1 cup raw or cooked vegetable, or 2 cups raw leafy greens.  · Eat 4-5 servings of fruit per day. One serving equals 1 medium whole fruit, ¼ cup dried fruit, ½ cup fresh, frozen, or canned fruit, or ½ cup 100% fruit juice.  · Eat more foods that  contain soluble fiber. Examples include apples, broccoli, carrots, beans, peas, and barley. Aim to get 25-30 g of fiber per day.  · Increase your consumption of legumes, nuts, and seeds to 4-5 servings per week. One serving of dried beans or legumes equals ½ cup cooked, 1 serving of nuts is ¼ cup, and 1 serving of seeds equals 1 tablespoon.  Fats  · Choose healthy fats more often. Choose monounsaturated and polyunsaturated fats, such as olive and canola oils, flaxseeds, walnuts, almonds, and seeds.  · Eat more omega-3 fats. Choose salmon, mackerel, sardines, tuna, flaxseed oil, and ground flaxseeds. Aim to eat fish at least 2 times each week.  · Check food labels carefully to identify foods with trans fats or high amounts of saturated fat.  · Limit saturated fats. These are found in animal products, such as meats, butter, and cream. Plant sources of saturated fats include palm oil, palm kernel oil, and coconut oil.  · Avoid foods with partially hydrogenated oils in them. These contain trans fats. Examples are stick margarine, some tub margarines, cookies, crackers, and other baked goods.  · Avoid fried foods.  General information  · Eat more home-cooked food and less restaurant, buffet, and fast food.  · Limit or avoid alcohol.  · Limit foods that are high in starch and sugar.  · Lose weight if you are overweight. Losing just 5-10% of your body weight can help your overall health and prevent diseases such as diabetes and heart disease.  · Monitor your salt (sodium) intake, especially if you have high blood pressure. Talk with your health care provider about your sodium intake.  · Try to incorporate more vegetarian meals weekly.  What foods can I eat?  Fruits  All fresh, canned (in natural juice), or frozen fruits.  Vegetables  Fresh or frozen vegetables (raw, steamed, roasted, or grilled). Green salads.  Grains  Most grains. Choose whole wheat and whole grains most of the time. Rice and pasta, including brown rice  and pastas made with whole wheat.  Meats and other proteins  Lean, well-trimmed beef, veal, pork, and lamb. Chicken and turkey without skin. All fish and shellfish. Wild duck, rabbit, pheasant, and venison. Egg whites or low-cholesterol egg substitutes. Dried beans, peas, lentils, and tofu. Seeds and most nuts.  Dairy  Low-fat or nonfat cheeses, including ricotta and mozzarella. Skim or 1% milk (liquid, powdered, or evaporated). Buttermilk made with low-fat milk. Nonfat or low-fat yogurt.  Fats and oils  Non-hydrogenated (trans-free) margarines. Vegetable oils, including soybean, sesame, sunflower, olive, peanut, safflower, corn, canola, and cottonseed. Salad dressings or mayonnaise made with a vegetable oil.  Beverages  Water (mineral or sparkling). Coffee and tea. Diet carbonated beverages.  Sweets and desserts  Sherbet, gelatin, and fruit ice. Small amounts of dark chocolate.  Limit all sweets and desserts.  Seasonings and condiments  All seasonings and condiments.  The items listed above may not be a complete list of foods and beverages you can eat. Contact a dietitian for more options.  What foods are not recommended?  Fruits  Canned fruit in heavy syrup. Fruit in cream or butter sauce. Fried fruit. Limit coconut.  Vegetables  Vegetables cooked in cheese, cream, or butter sauce. Fried vegetables.  Grains  Breads made with saturated or trans fats, oils, or whole milk. Croissants. Sweet rolls. Donuts. High-fat crackers, such as cheese crackers.  Meats and other proteins  Fatty meats, such as hot dogs, ribs, sausage, tom, rib-eye roast or steak. High-fat deli meats, such as salami and bologna. Caviar. Domestic duck and goose. Organ meats, such as liver.  Dairy  Cream, sour cream, cream cheese, and creamed cottage cheese. Whole milk cheeses. Whole or 2% milk (liquid, evaporated, or condensed). Whole buttermilk. Cream sauce or high-fat cheese sauce. Whole-milk yogurt.  Fats and oils  Meat fat, or shortening. Cocoa  butter, hydrogenated oils, palm oil, coconut oil, palm kernel oil. Solid fats and shortenings, including tom fat, salt pork, lard, and butter. Nondairy cream substitutes. Salad dressings with cheese or sour cream.  Beverages  Regular sodas and any drinks with added sugar.  Sweets and desserts  Frosting. Pudding. Cookies. Cakes. Pies. Milk chocolate or white chocolate. Buttered syrups. Full-fat ice cream or ice cream drinks.  The items listed above may not be a complete list of foods and beverages to avoid. Contact a dietitian for more information.  Summary  · Heart-healthy meal planning includes limiting unhealthy fats, increasing healthy fats, and making other diet and lifestyle changes.  · Lose weight if you are overweight. Losing just 5-10% of your body weight can help your overall health and prevent diseases such as diabetes and heart disease.  · Focus on eating a balance of foods, including fruits and vegetables, low-fat or nonfat dairy, lean protein, nuts and legumes, whole grains, and heart-healthy oils and fats.  This information is not intended to replace advice given to you by your health care provider. Make sure you discuss any questions you have with your health care provider.  Document Revised: 01/25/2019 Document Reviewed: 01/25/2019  [employer] Patient Education © 2021 [employer]    Preventing Heart Failure  Heart failure is a condition in which the heart has trouble pumping blood because it has become weak or stiff. This may mean that the heart cannot pump enough blood out to the body or that the heart does not fill up with enough blood. Either of those problems can lead to symptoms such as tiredness (fatigue), trouble breathing, and swelling throughout the body.  This is a common medical condition that affects not only the heart, but the entire body. Making certain nutrition and lifestyle changes can help you prevent heart failure and avoid serious health problems.  How can this condition affect  me?  Heart failure can cause very serious problems that may get worse over time, such as:  · Extreme fatigue during normal physical activities.  · Shortness of breath or trouble breathing.  · Swelling in your abdomen, legs, ankles, feet, or neck.  · Fluid buildup throughout the body.  · Weight gain.  · Cough.  · Frequent urination.  What can increase my risk?  The risk of heart failure increases as a person ages. The following factors may also make you more likely to develop this condition:  · Being overweight.  · Being male.  · Smoking or chewing tobacco.  · Abusing alcohol or drugs.  · Having taken medicines that can damage the heart, such as chemotherapy drugs.  · Having any of these medical conditions:  ? Diabetes.  ? Abnormal heart rhythms.  ? Thyroid problems.  ? Low blood counts (anemia).  What actions can I take to prevent heart failure?         Nutrition  · If you are overweight or obese, reduce how many calories you eat each day so that you lose weight. Work with your health care provider or a diet and nutrition specialist (dietitian) to determine how many calories you need each day.  · Eat foods that are low in salt (sodium). Avoid adding extra salt to foods. This can help keep your blood pressure in a normal range.  · Eat a well-balanced diet that includes a lot of:  ? Fresh fruits and vegetables.  ? Whole grains.  ? Lean meats.  ? Beans.  ? Fat-free or low-fat dairy products.  · Avoid foods that contain a lot of:  ? Trans fats.  ? Saturated fats.  ? Sugar.  ? Cholesterol.  Alcohol  · Do not drink alcohol if:  ? Your health care provider tells you not to drink.  ? You are pregnant, may be pregnant, or are planning to become pregnant.  · If you drink alcohol:  ? Limit how much you use to:  § 0-1 drink a day for women.  § 0-2 drinks a day for men.  ? Be aware of how much alcohol is in your drink. In the [place] one drink equals one 12 oz bottle of beer (355 mL), one 5 oz glass of wine (148 mL), or one 1½ oz  glass of hard liquor (44 mL).  Lifestyle  · Do not use any products that contain nicotine or tobacco, such as cigarettes, e-cigarettes, and chewing tobacco. If you need help quitting, ask your health care provider.  · Exercise for at least 150 minutes each week, or as much as told by your health care provider.  ? Do moderate-intensity exercise, such as brisk walking, bicycling, or water aerobics.  ? Ask your health care provider which activities are safe for you.  · Try to get 7-9 hours of sleep each night. To help with sleep:  ? Keep your bedroom cool and dark.  ? Do not eat a heavy meal during the hour before you go to bed.  ? Do not drink alcohol or caffeinated drinks before bed.  ? Avoid screen time before bedtime. This means avoiding television, computers, tablets, and mobile phones.  · Find ways to relax and manage stress. These may include:  ? Breathing exercises.  ? Meditation.  ? Yoga.  ? Listening to music.  General instructions  · See a health care provider regularly for screening and wellness checks. Work with your health care provider to manage your:  ? Blood pressure.  ? Cholesterol levels.  ? Blood sugar (glucose) levels.  ? Weight and BMI.  · If you have diabetes, manage your condition and follow your treatment plan as instructed.  Where to find more information  · National Heart, Lung, and Blood [place]: www.nhlbi.nih.gov  · Centers for Disease Control and Prevention: www.cdc.gov  · National [place] on Aging: www.todd.nih.gov  · American Heart Association: www.heart.org  Contact a health care provider if:  · You have rapid weight gain.  · You have increasing shortness of breath that is unusual for you.  · You tire easily or you are unable to participate in your usual activities.  · You cough more than normal, especially with physical activity.  · You have any swelling or more swelling in areas such as your hands, feet, ankles, or abdomen.  Get help right away if you have:  · Trouble  breathing.  · Pain or discomfort in your chest.  · An episode of fainting.  These symptoms may represent a serious problem that is an emergency. Do not wait to see if the symptoms will go away. Get medical help right away. Call your local emergency services (911 in the [place] Do not drive yourself to the hospital.  Summary  · Heart failure can cause very serious problems over time.  · Heart failure can be prevented by making changes to your diet and your lifestyle.  · It is important to eat a healthy diet, manage your weight, exercise regularly, manage stress, avoid drugs and alcohol, and keep your cholesterol and blood pressure under control.  This information is not intended to replace advice given to you by your health care provider. Make sure you discuss any questions you have with your health care provider.  Document Revised: 07/17/2020 Document Reviewed: 07/17/2020  [employer] Patient Education © 2021 Elsevier Inc.  Zoster Vaccine, Recombinant injection  What is this medicine?  ZOSTER VACCINE (ZOS ter vak SEEN) is a vaccine used to reduce the risk of getting shingles. This vaccine is not used to treat shingles or nerve pain from shingles.  This medicine may be used for other purposes; ask your health care provider or pharmacist if you have questions.  COMMON BRAND NAME(S): SHINGRIX  What should I tell my health care provider before I take this medicine?  They need to know if you have any of these conditions:  · cancer  · immune system problems  · an unusual or allergic reaction to Zoster vaccine, other medications, foods, dyes, or preservatives  · pregnant or trying to get pregnant  · breast-feeding  How should I use this medicine?  This vaccine is injected into a muscle. It is given by a health care provider.  A copy of Vaccine Information Statements will be given before each vaccination. Be sure to read this information carefully each time. This sheet may change often.  Talk to your health care provider about  the use of this vaccine in children. This vaccine is not approved for use in children.  Overdosage: If you think you have taken too much of this medicine contact a poison control center or emergency room at once.  NOTE: This medicine is only for you. Do not share this medicine with others.  What if I miss a dose?  Keep appointments for follow-up (booster) doses. It is important not to miss your dose. Call your health care provider if you are unable to keep an appointment.  What may interact with this medicine?  · medicines that suppress your immune system  · medicines to treat cancer  · steroid medicines like prednisone or cortisone  This list may not describe all possible interactions. Give your health care provider a list of all the medicines, herbs, non-prescription drugs, or dietary supplements you use. Also tell them if you smoke, drink alcohol, or use illegal drugs. Some items may interact with your medicine.  What should I watch for while using this medicine?  Visit your health care provider regularly.  This vaccine, like all vaccines, may not fully protect everyone.  What side effects may I notice from receiving this medicine?  Side effects that you should report to your doctor or health care professional as soon as possible:  · allergic reactions (skin rash, itching or hives; swelling of the face, lips, or tongue)  · trouble breathing  Side effects that usually do not require medical attention (report these to your doctor or health care professional if they continue or are bothersome):  · chills  · headache  · fever  · nausea  · pain, redness, or irritation at site where injected  · tiredness  · vomiting  This list may not describe all possible side effects. Call your doctor for medical advice about side effects. You may report side effects to FDA at 5-657-FDA-2956.  Where should I keep my medicine?  This vaccine is only given by a health care provider. It will not be stored at home.  NOTE: This sheet is a  "summary. It may not cover all possible information. If you have questions about this medicine, talk to your doctor, pharmacist, or health care provider.  ©  Elsevier/Gold Standard (2021 16:23:07)  https://www.cdc.gov/vaccines/hcp/vis/vis-statements/tdap.pdf\">   Tdap (Tetanus, Diphtheria, Pertussis) Vaccine: What You Need to Know  1. Why get vaccinated?  Tdap vaccine can prevent tetanus, diphtheria, and pertussis.  Diphtheria and pertussis spread from person to person. Tetanus enters the body through cuts or wounds.  · TETANUS (T) causes painful stiffening of the muscles. Tetanus can lead to serious health problems, including being unable to open the mouth, having trouble swallowing and breathing, or death.  · DIPHTHERIA (D) can lead to difficulty breathing, heart failure, paralysis, or death.  · PERTUSSIS (aP), also known as \"whooping cough,\" can cause uncontrollable, violent coughing which makes it hard to breathe, eat, or drink. Pertussis can be extremely serious in babies and young children, causing pneumonia, convulsions, brain damage, or death. In teens and adults, it can cause weight loss, loss of bladder control, passing out, and rib fractures from severe coughing.  2. Tdap vaccine  Tdap is only for children 7 years and older, adolescents, and adults.   Adolescents should receive a single dose of Tdap, preferably at age 11 or 12 years.  Pregnant women should get a dose of Tdap during every pregnancy, to protect the  from pertussis. Infants are most at risk for severe, life-threatening complications from pertussis.  Adults who have never received Tdap should get a dose of Tdap.  Also, adults should receive a booster dose every 10 years, or earlier in the case of a severe and dirty wound or burn. Booster doses can be either Tdap or Td (a different vaccine that protects against tetanus and diphtheria but not pertussis).  Tdap may be given at the same time as other vaccines.  3. Talk with your " health care provider  Tell your vaccine provider if the person getting the vaccine:  · Has had an allergic reaction after a previous dose of any vaccine that protects against tetanus, diphtheria, or pertussis, or has any severe, life-threatening allergies.  · Has had a coma, decreased level of consciousness, or prolonged seizures within 7 days after a previous dose of any pertussis vaccine (DTP, DTaP, or Tdap).  · Has seizures or another nervous system problem.  · Has ever had Guillain-Barré Syndrome (also called GBS).  · Has had severe pain or swelling after a previous dose of any vaccine that protects against tetanus or diphtheria.  In some cases, your health care provider may decide to postpone Tdap vaccination to a future visit.   People with minor illnesses, such as a cold, may be vaccinated. People who are moderately or severely ill should usually wait until they recover before getting Tdap vaccine.   Your health care provider can give you more information.  4. Risks of a vaccine reaction  · Pain, redness, or swelling where the shot was given, mild fever, headache, feeling tired, and nausea, vomiting, diarrhea, or stomachache sometimes happen after Tdap vaccine.  People sometimes faint after medical procedures, including vaccination. Tell your provider if you feel dizzy or have vision changes or ringing in the ears.   As with any medicine, there is a very remote chance of a vaccine causing a severe allergic reaction, other serious injury, or death.  5. What if there is a serious problem?  An allergic reaction could occur after the vaccinated person leaves the clinic. If you see signs of a severe allergic reaction (hives, swelling of the face and throat, difficulty breathing, a fast heartbeat, dizziness, or weakness), call 9-1-1 and get the person to the nearest hospital.  For other signs that concern you, call your health care provider.   Adverse reactions should be reported to the Vaccine Adverse Event  Reporting System (VAERS). Your health care provider will usually file this report, or you can do it yourself. Visit the VAERS website at www.vaers.[facility].gov or call 1-320.336.7132. VAERS is only for reporting reactions, and VAERS staff do not give medical advice.  6. The National Vaccine Injury Compensation Program  The National Vaccine Injury Compensation Program (VICP) is a federal program that was created to compensate people who may have been injured by certain vaccines. Visit the VICP website at www.[facility].gov/vaccinecompensation or call 1-601.850.2560 to learn about the program and about filing a claim. There is a time limit to file a claim for compensation.  7. How can I learn more?  · Ask your health care provider.  · Call your local or state health department.  · Contact the Centers for Disease Control and Prevention (CDC):  ? Call 1-879.763.8610 (8-911-LEI-INFO) or  ? Visit CDC's website at www.cdc.gov/vaccines  Vaccine Information Statement Tdap (Tetanus, Diphtheria, Pertussis) Vaccine (04/01/2020)  This information is not intended to replace advice given to you by your health care provider. Make sure you discuss any questions you have with your health care provider.  Document Revised: 04/10/2020 Document Reviewed: 04/13/2020  Elsevier Patient Education © 2021 Elsevier Inc.

## 2021-06-22 NOTE — PROGRESS NOTES
The ABCs of the Annual Wellness Visit  Subsequent Medicare Wellness Visit    Chief Complaint   Patient presents with   • Medicare Wellness-subsequent       Subjective   History of Present Illness:  Robe Bryant is a 70 y.o. male who presents for a Subsequent Medicare Wellness Visit.  Patient has multiple chronic medical problems that require surveillance by cardiology and pulmonology.     HEALTH RISK ASSESSMENT    Recent Hospitalizations:  Recently treated at the following:  [facility]    Current Medical Providers:  Patient Care Team:  Radha Jean DO as PCP - General (Family Medicine)  Arelis Tucker MD as Consulting Physician (Cardiology)  Osiel Heaton MD as Consulting Physician (Urology)  Chace Vasquez MD, BRISA as Consulting Physician (Nephrology)  Blayne Whitman MD as Consulting Physician (Ophthalmology)  Jose Hargrove MD as Consulting Physician (Pulmonary Disease)  John Solorzano MD as Consulting Physician (Urology)    Smoking Status:  Social History     Tobacco Use   Smoking Status Former Smoker   • Packs/day: 1.00   • Years: 30.00   • Pack years: 30.00   • Types: Cigarettes   • Quit date: 8/15/2001   • Years since quittin.8   Smokeless Tobacco Never Used   Tobacco Comment    quit 16 years ago       Alcohol Consumption:  Social History     Substance and Sexual Activity   Alcohol Use No       Depression Screen:   PHQ-2/PHQ-9 Depression Screening 2021   Little interest or pleasure in doing things 0   Feeling down, depressed, or hopeless 0   Trouble falling or staying asleep, or sleeping too much -   Feeling tired or having little energy -   Poor appetite or overeating -   Feeling bad about yourself - or that you are a failure or have let yourself or your family down -   Trouble concentrating on things, such as reading the newspaper or watching television -   Moving or speaking so slowly that other people could have noticed. Or the opposite -  being so fidgety or restless that you have been moving around a lot more than usual -   Thoughts that you would be better off dead, or of hurting yourself in some way -   Total Score 0   If you checked off any problems, how difficult have these problems made it for you to do your work, take care of things at home, or get along with other people? -       Fall Risk Screen:  ECTOR Fall Risk Assessment was completed, and patient is at LOW risk for falls.Assessment completed on:6/22/2021    Health Habits and Functional and Cognitive Screening:  Functional & Cognitive Status 6/22/2021   Do you have difficulty preparing food and eating? No   Do you have difficulty bathing yourself, getting dressed or grooming yourself? No   Do you have difficulty using the toilet? No   Do you have difficulty moving around from place to place? No   Do you have trouble with steps or getting out of a bed or a chair? No   Current Diet Well Balanced Diet   Dental Exam Not up to date   Eye Exam Up to date   Exercise (times per week) 5 times per week   Current Exercises Include Yard Work   Current Exercise Activities Include -   Do you need help using the phone?  No   Are you deaf or do you have serious difficulty hearing?  No   Do you need help with transportation? No   Do you need help shopping? No   Do you need help preparing meals?  No   Do you need help with housework?  No   Do you need help with laundry? No   Do you need help taking your medications? No   Do you need help managing money? No   Do you ever drive or ride in a car without wearing a seat belt? No   Have you felt unusual stress, anger or loneliness in the last month? No   Who do you live with? Spouse   If you need help, do you have trouble finding someone available to you? No   Have you been bothered in the last four weeks by sexual problems? No   Do you have difficulty concentrating, remembering or making decisions? No         Does the patient have evidence of cognitive  impairment? No    Asprin use counseling:Taking ASA appropriately as indicated    Age-appropriate Screening Schedule:  Refer to the list below for future screening recommendations based on patient's age, sex and/or medical conditions. Orders for these recommended tests are listed in the plan section. The patient has been provided with a written plan.    Health Maintenance   Topic Date Due   • TDAP/TD VACCINES (1 - Tdap) Never done   • ZOSTER VACCINE (2 of 2) 02/26/2015   • URINE MICROALBUMIN  09/13/2017   • DIABETIC EYE EXAM  09/20/2020   • HEMOGLOBIN A1C  07/25/2021   • INFLUENZA VACCINE  08/01/2021   • DIABETIC FOOT EXAM  03/09/2022   • LIPID PANEL  05/04/2022          The following portions of the patient's history were reviewed and updated as appropriate: allergies, current medications, past family history, past medical history, past social history, past surgical history and problem list.    Outpatient Medications Prior to Visit   Medication Sig Dispense Refill   • amiodarone (PACERONE) 200 MG tablet Take 1 tablet by mouth Daily. 30 tablet 0   • amLODIPine (NORVASC) 10 MG tablet Take 1 tablet by mouth Daily. 30 tablet 1   • aspirin 81 MG chewable tablet Chew 81 mg Daily.     • atorvastatin (LIPITOR) 20 MG tablet Take 1 tablet by mouth Every Night. 90 tablet 3   • baclofen (LIORESAL) 10 MG tablet Take 1 tablet by mouth 3 (Three) Times a Day As Needed for Muscle Spasms. 90 tablet 2   • Blood Glucose Monitoring Suppl (TRUE METRIX AIR GLUCOSE METER) w/Device kit 1 each by In Vitro route 2 (two) times a day. E11.9 1 kit 0   • cetirizine (zyrTEC) 10 MG tablet Take 1 tablet by mouth Daily. 90 tablet 3   • Cholecalciferol (Vitamin D-3) 25 MCG (1000 UT) capsule Take 1,000 Units by mouth Daily. 90 capsule 3   • diclofenac (VOLTAREN) 1 % gel gel Apply 4 g topically to the appropriate area as directed 4 (Four) Times a Day As Needed (pain). 100 g 5   • finasteride (PROSCAR) 5 MG tablet Take 1 tablet by mouth Daily. 90 tablet  3   • FLUoxetine (PROzac) 20 MG capsule Take 1 capsule by mouth Daily. 90 capsule 3   • fluticasone (Flonase) 50 MCG/ACT nasal spray 2 sprays into the nostril(s) as directed by provider Daily. 18.2 mL 5   • furosemide (Lasix) 20 MG tablet Take 1 tablet by mouth every morning; may take second dose as needed for increased swelling 60 tablet 2   • glipizide (GLUCOTROL) 5 MG tablet TAKE 1 TABLET BY MOUTH 2 TIMES A DAY BEFORE MEALS. 180 tablet 3   • glucose blood (True Metrix Blood Glucose Test) test strip Daily testing E11.9 100 each 5   • glucose monitor monitoring kit 1 each Daily. E11.9 1 each 0   • ipratropium-albuterol (DUO-NEB) 0.5-2.5 mg/3 ml nebulizer Take 3 mL by nebulization 4 (Four) Times a Day. 1620 mL 2   • Lancet Device misc 1 each Daily. E11.9 100 each 3   • lisinopril (PRINIVIL,ZESTRIL) 40 MG tablet Take 1 tablet by mouth Daily. 90 tablet 3   • metoprolol succinate XL (Toprol XL) 50 MG 24 hr tablet Take 1 tablet by mouth Daily. 30 tablet 0   • Misc. Devices misc Bipap tubing. 1 each 0   • Multiple Vitamin tablet Take 1 tablet by mouth daily.     • Nebulizer misc 1 each Every 4 (Four) Hours As Needed (shortness of breath). 1 each 0   • nitroglycerin (NITROSTAT) 0.4 MG SL tablet Place 1 tablet under the tongue Every 5 (Five) Minutes As Needed for Chest Pain. 25 tablet 11   • O2 (OXYGEN) Inhale 2 L/min Continuous As Needed (With activity).     • Olopatadine HCl 0.7 % solution Apply 1 drop to eye(s) as directed by provider Daily. 2.5 mL 5   • omeprazole (priLOSEC) 40 MG capsule Take 1 capsule by mouth Daily. 90 capsule 3   • ondansetron ODT (Zofran ODT) 4 MG disintegrating tablet Place 1 tablet on the tongue Every 8 (Eight) Hours As Needed for Nausea or Vomiting. 20 tablet 0   • potassium chloride (K-DUR,KLOR-CON) 10 MEQ CR tablet Take 1 tablet by mouth 2 (Two) Times a Day. 60 tablet 2   • spironolactone (Aldactone) 50 MG tablet Take 1 tablet by mouth Daily. 90 tablet 1   • terazosin (HYTRIN) 10 MG capsule  Take 1 capsule by mouth Every Night. 90 capsule 3   • tiotropium bromide-olodaterol (Stiolto Respimat) 2.5-2.5 MCG/ACT aerosol solution inhaler Inhale 2 puffs Daily. Until able to get trelegy approved. 4 g 5   • TRUEplus Lancets 33G misc 1 each by Other route Daily. E11.9 100 each 5   • pramipexole (MIRAPEX) 0.5 MG tablet Take 1 tablet by mouth 3 (Three) Times a Day. 270 tablet 3   • doxycycline (VIBRAMYICN) 100 MG tablet Take 1 tablet by mouth 2 (Two) Times a Day. 20 tablet 0     No facility-administered medications prior to visit.       Patient Active Problem List   Diagnosis   • Pacemaker   • Neck pain   • Chronic low back pain   • Chronic kidney disease, stage III (moderate) (CMS/[facility])   • Essential hypertension   • Seborrheic dermatitis   • Benign prostatic hyperplasia   • Paroxysmal atrial fibrillation (CMS/HCC)   • Hypercholesterolemia   • Diastolic heart failure (CMS/HCC)   • Coronary artery disease involving native coronary artery of native heart with angina pectoris (CMS/[facility])   • Gastroesophageal reflux disease   • Chronic shoulder pain   • Obstructive sleep apnea of adult   • Dysphagia   • Type 2 diabetes mellitus, without long-term current use of insulin (CMS/HCC)   • Chronic chest pain   • Restless leg syndrome   • Osteoarthritis of multiple joints   • Multilevel degenerative disc disease   • Moderate episode of recurrent major depressive disorder (CMS/HCC)   • Chronic pain syndrome   • DDD (degenerative disc disease), cervical   • BPH (benign prostatic hypertrophy) with urinary retention   • Morbid obesity (CMS/HCC)   • Unstable angina (CMS/HCC)   • Leg cramps   • Chronic pain of both knees   • Atrial fibrillation with RVR (CMS/HCC)   • COPD exacerbation (CMS/[facility])   • Pacemaker at end of battery life       Advanced Care Planning:  ACP discussion was held with the patient during this visit. Patient does not have an advance directive, information provided.    Review of Systems   Constitutional: Negative  "for chills, fatigue and fever.   HENT: Positive for rhinorrhea. Negative for congestion and sore throat.    Eyes: Positive for discharge. Negative for pain and itching.   Respiratory: Positive for cough, shortness of breath and wheezing.    Cardiovascular: Positive for leg swelling (off and on, none today). Negative for chest pain.   Gastrointestinal: Negative for abdominal pain, constipation, diarrhea, nausea and vomiting.   Endocrine: Negative for cold intolerance and heat intolerance.   Genitourinary: Negative for difficulty urinating and dysuria.   Musculoskeletal: Positive for arthralgias (left leg pain from lower leg to hip).   Skin: Negative for color change and rash.   Allergic/Immunologic: Positive for environmental allergies.   Neurological: Positive for weakness (legs) and numbness. Negative for headaches.   Hematological: Does not bruise/bleed easily.   Psychiatric/Behavioral: Negative for dysphoric mood. The patient is not nervous/anxious.        Compared to one year ago, the patient feels his physical health is worse.  Compared to one year ago, the patient feels his mental health is the same.    Reviewed chart for potential of high risk medication in the elderly: yes  Reviewed chart for potential of harmful drug interactions in the elderly:yes    Objective         Vitals:    06/22/21 1103   BP: 142/80   BP Location: Left arm   Patient Position: Sitting   Cuff Size: Adult   Pulse: 65   Resp: 16   Temp: 98.4 °F (36.9 °C)   TempSrc: Temporal   SpO2: 98%   Weight: 111 kg (244 lb 12.8 oz)   Height: 162.6 cm (64.02\")   PainSc:   6   PainLoc: Hip  Comment: left sided       Body mass index is 41.99 kg/m².  Discussed the patient's BMI with him. The BMI is above average; BMI management plan is completed.    Physical Exam  Constitutional:       General: He is not in acute distress.     Appearance: He is well-developed. He is obese.   HENT:      Head: Normocephalic and atraumatic.      Right Ear: Tympanic membrane " and external ear normal.      Left Ear: Tympanic membrane and external ear normal.   Eyes:      Extraocular Movements: Extraocular movements intact.      Conjunctiva/sclera: Conjunctivae normal.      Pupils: Pupils are equal, round, and reactive to light.   Neck:      Vascular: No carotid bruit.   Cardiovascular:      Rate and Rhythm: Normal rate and regular rhythm.      Heart sounds: No murmur heard.     Pulmonary:      Effort: Pulmonary effort is normal. No respiratory distress.      Breath sounds: Normal breath sounds. No wheezing.   Abdominal:      General: Bowel sounds are normal. There is no distension.      Palpations: Abdomen is soft.      Tenderness: There is no abdominal tenderness.   Musculoskeletal:      Cervical back: Normal range of motion and neck supple.      Right lower leg: No edema.      Left lower leg: No edema.   Lymphadenopathy:      Cervical: No cervical adenopathy.   Skin:     General: Skin is warm and dry.   Neurological:      Mental Status: He is alert and oriented to person, place, and time.      Cranial Nerves: No cranial nerve deficit.      Deep Tendon Reflexes: Reflexes normal.   Psychiatric:         Mood and Affect: Mood normal.         Behavior: Behavior normal.         Lab Results   Component Value Date    TRIG 86 05/04/2021    HDL 37 (L) 05/04/2021    LDL 50 05/04/2021    VLDL 17 05/04/2021        Assessment/Plan   Medicare Risks and Personalized Health Plan  CMS Preventative Services Quick Reference  Advance Directive Discussion  Cardiovascular risk  Colon Cancer Screening  Fall Risk  Immunizations Discussed/Encouraged (specific immunizations; Tdap, Shingrix and COVID19 )  Obesity/Overweight     The above risks/problems have been discussed with the patient.  Pertinent information has been shared with the patient in the After Visit Summary.  Follow up plans and orders are seen below in the Assessment/Plan Section.    Diagnoses and all orders for this visit:    1. Medicare annual  wellness visit, subsequent (Primary)    2. Type 2 diabetes mellitus with stage 3 chronic kidney disease, without long-term current use of insulin, unspecified whether stage 3a or 3b CKD (CMS/HCC)    3. Restless leg syndrome    4. Paroxysmal atrial fibrillation (CMS/HCC)    5. Other osteoarthritis involving multiple joints    6. Obstructive sleep apnea of adult    7. Moderate episode of recurrent major depressive disorder (CMS/HCC)    8. Chronic diastolic heart failure (CMS/HCC)    9. Hypercholesterolemia    10. Gastroesophageal reflux disease without esophagitis    11. Essential hypertension    12. Coronary artery disease involving native coronary artery of native heart with angina pectoris (CMS/HCC)    13. Atrial fibrillation with RVR (CMS/[facility])    14. Benign prostatic hyperplasia without lower urinary tract symptoms    15. Stage 3 chronic kidney disease, unspecified whether stage 3a or 3b CKD (CMS/HCC)    Other orders  -     pramipexole (MIRAPEX) 0.5 MG tablet; Take 1 tablet by mouth 3 (Three) Times a Day.  Dispense: 270 tablet; Refill: 3  -     methylPREDNISolone (MEDROL) 4 MG dose pack; Take as directed on package instructions.  Dispense: 21 each; Refill: 0      Patient doing much better than earlier in the year.  He reports being up to date with diabetic eye exam.  Will obtain records from Dr. Luna in [place].  He has had the COVID vaccines.  Will obtain vaccine record from [facility].  He has had the flu and pneumonia vaccines. Encouraged to have tdap and shingrix at local pharmacy.  Up to date on routine labs.  Will plan to obtain microalbumin at next routine follow up.  Up to date on colonoscopy and AAA screen as well.     Follow Up:  Return in about 1 month (around 7/22/2021) for Diabetes, HTN, COPD, HPL.     An After Visit Summary and PPPS were given to the patient.

## 2021-06-27 PROBLEM — R07.9 CHEST PAIN: Status: RESOLVED | Noted: 2021-05-03 | Resolved: 2021-06-27

## 2021-06-27 PROBLEM — I50.33 ACUTE ON CHRONIC DIASTOLIC HEART FAILURE (HCC): Status: RESOLVED | Noted: 2021-05-03 | Resolved: 2021-06-27

## 2021-06-29 ENCOUNTER — OFFICE VISIT (OUTPATIENT)
Dept: INTERNAL MEDICINE | Facility: CLINIC | Age: 71
End: 2021-06-29

## 2021-06-29 VITALS
TEMPERATURE: 97.5 F | SYSTOLIC BLOOD PRESSURE: 138 MMHG | BODY MASS INDEX: 42.43 KG/M2 | WEIGHT: 248.5 LBS | RESPIRATION RATE: 20 BRPM | DIASTOLIC BLOOD PRESSURE: 80 MMHG | HEART RATE: 75 BPM | OXYGEN SATURATION: 95 % | HEIGHT: 64 IN

## 2021-06-29 DIAGNOSIS — M53.3 SACROILIAC JOINT DYSFUNCTION OF LEFT SIDE: Primary | ICD-10-CM

## 2021-06-29 DIAGNOSIS — M54.32 SCIATICA OF LEFT SIDE: ICD-10-CM

## 2021-06-29 PROCEDURE — 20610 DRAIN/INJ JOINT/BURSA W/O US: CPT | Performed by: FAMILY MEDICINE

## 2021-06-29 PROCEDURE — 99213 OFFICE O/P EST LOW 20 MIN: CPT | Performed by: FAMILY MEDICINE

## 2021-06-29 RX ORDER — TRIAMCINOLONE ACETONIDE 40 MG/ML
40 INJECTION, SUSPENSION INTRA-ARTICULAR; INTRAMUSCULAR
Status: COMPLETED | OUTPATIENT
Start: 2021-06-29 | End: 2021-06-29

## 2021-06-29 RX ORDER — HYDROCODONE BITARTRATE AND ACETAMINOPHEN 10; 325 MG/1; MG/1
1 TABLET ORAL EVERY 6 HOURS PRN
Qty: 12 TABLET | Refills: 0 | Status: SHIPPED | OUTPATIENT
Start: 2021-06-29 | End: 2022-01-21

## 2021-06-29 RX ADMIN — TRIAMCINOLONE ACETONIDE 40 MG: 40 INJECTION, SUSPENSION INTRA-ARTICULAR; INTRAMUSCULAR at 17:55

## 2021-06-29 NOTE — PROGRESS NOTES
Robe Bryant is a 70 y.o. male.    Chief Complaint   Patient presents with   • Hip Pain     Pt states that his left hip and leg is hurting. States this is worse than it was when he was in last week. C/O trouble sleeping and walking due to the pain.        HPI   Patient presents today complaining of severe left hip and leg pain.  This is been ongoing for the last 2 to 3 weeks.  He was given a round of steroids recently at Medicare wellness visit.  He has completed this course of steroids and pain is no better.  He has been referred to pain management several months ago and has not been able to schedule appointment just yet.  He is having difficulty ambulating and resting due to the severity of the pain.      Arthrocentesis    Date/Time: 6/29/2021 5:55 PM  Performed by: Radha Jean DO  Authorized by: Radha Jean DO   Consent: Verbal consent obtained. Written consent obtained.  Consent given by: patient  Patient understanding: patient states understanding of the procedure being performed  Patient consent: the patient's understanding of the procedure matches consent given  Site marked: the operative site was marked  Indications: pain   Location: SI joint (L)  Local anesthesia used: yes    Anesthesia:  Local anesthesia used: yes  Local Anesthetic: lidocaine 1% without epinephrine  Anesthetic total: 2 mL  Preparation: Patient was prepped and draped in the usual sterile fashion.  Needle size: 22 G  Patient tolerance: Patient tolerated the procedure well with no immediate complications            The following portions of the patient's history were reviewed and updated as appropriate: allergies, current medications, past family history, past medical history, past social history, past surgical history and problem list.     No Known Allergies      Current Outpatient Medications:   •  amiodarone (PACERONE) 200 MG tablet, Take 1 tablet by mouth Daily., Disp: 30 tablet, Rfl: 0  •  amLODIPine  (NORVASC) 10 MG tablet, Take 1 tablet by mouth Daily., Disp: 30 tablet, Rfl: 1  •  aspirin 81 MG chewable tablet, Chew 81 mg Daily., Disp: , Rfl:   •  atorvastatin (LIPITOR) 20 MG tablet, Take 1 tablet by mouth Every Night., Disp: 90 tablet, Rfl: 3  •  baclofen (LIORESAL) 10 MG tablet, Take 1 tablet by mouth 3 (Three) Times a Day As Needed for Muscle Spasms., Disp: 90 tablet, Rfl: 2  •  Blood Glucose Monitoring Suppl (TRUE METRIX AIR GLUCOSE METER) w/Device kit, 1 each by In Vitro route 2 (two) times a day. E11.9, Disp: 1 kit, Rfl: 0  •  cetirizine (zyrTEC) 10 MG tablet, Take 1 tablet by mouth Daily., Disp: 90 tablet, Rfl: 3  •  Cholecalciferol (Vitamin D-3) 25 MCG (1000 UT) capsule, Take 1,000 Units by mouth Daily., Disp: 90 capsule, Rfl: 3  •  diclofenac (VOLTAREN) 1 % gel gel, Apply 4 g topically to the appropriate area as directed 4 (Four) Times a Day As Needed (pain)., Disp: 100 g, Rfl: 5  •  finasteride (PROSCAR) 5 MG tablet, Take 1 tablet by mouth Daily., Disp: 90 tablet, Rfl: 3  •  FLUoxetine (PROzac) 20 MG capsule, Take 1 capsule by mouth Daily., Disp: 90 capsule, Rfl: 3  •  fluticasone (Flonase) 50 MCG/ACT nasal spray, 2 sprays into the nostril(s) as directed by provider Daily., Disp: 18.2 mL, Rfl: 5  •  furosemide (Lasix) 20 MG tablet, Take 1 tablet by mouth every morning; may take second dose as needed for increased swelling, Disp: 60 tablet, Rfl: 2  •  glipizide (GLUCOTROL) 5 MG tablet, TAKE 1 TABLET BY MOUTH 2 TIMES A DAY BEFORE MEALS., Disp: 180 tablet, Rfl: 3  •  glucose blood (True Metrix Blood Glucose Test) test strip, Daily testing E11.9, Disp: 100 each, Rfl: 5  •  glucose monitor monitoring kit, 1 each Daily. E11.9, Disp: 1 each, Rfl: 0  •  ipratropium-albuterol (DUO-NEB) 0.5-2.5 mg/3 ml nebulizer, Take 3 mL by nebulization 4 (Four) Times a Day., Disp: 1620 mL, Rfl: 2  •  Lancet Device misc, 1 each Daily. E11.9, Disp: 100 each, Rfl: 3  •  lisinopril (PRINIVIL,ZESTRIL) 40 MG tablet, Take 1 tablet  by mouth Daily., Disp: 90 tablet, Rfl: 3  •  methylPREDNISolone (MEDROL) 4 MG dose pack, Take as directed on package instructions., Disp: 21 each, Rfl: 0  •  metoprolol succinate XL (Toprol XL) 50 MG 24 hr tablet, Take 1 tablet by mouth Daily., Disp: 30 tablet, Rfl: 0  •  Misc. Devices misc, Bipap tubing., Disp: 1 each, Rfl: 0  •  Multiple Vitamin tablet, Take 1 tablet by mouth daily., Disp: , Rfl:   •  Nebulizer misc, 1 each Every 4 (Four) Hours As Needed (shortness of breath)., Disp: 1 each, Rfl: 0  •  nitroglycerin (NITROSTAT) 0.4 MG SL tablet, Place 1 tablet under the tongue Every 5 (Five) Minutes As Needed for Chest Pain., Disp: 25 tablet, Rfl: 11  •  O2 (OXYGEN), Inhale 2 L/min Continuous As Needed (With activity)., Disp: , Rfl:   •  Olopatadine HCl 0.7 % solution, Apply 1 drop to eye(s) as directed by provider Daily., Disp: 2.5 mL, Rfl: 5  •  omeprazole (priLOSEC) 40 MG capsule, Take 1 capsule by mouth Daily., Disp: 90 capsule, Rfl: 3  •  ondansetron ODT (Zofran ODT) 4 MG disintegrating tablet, Place 1 tablet on the tongue Every 8 (Eight) Hours As Needed for Nausea or Vomiting., Disp: 20 tablet, Rfl: 0  •  potassium chloride (K-DUR,KLOR-CON) 10 MEQ CR tablet, Take 1 tablet by mouth 2 (Two) Times a Day., Disp: 60 tablet, Rfl: 2  •  pramipexole (MIRAPEX) 0.5 MG tablet, Take 1 tablet by mouth 3 (Three) Times a Day., Disp: 270 tablet, Rfl: 3  •  spironolactone (Aldactone) 50 MG tablet, Take 1 tablet by mouth Daily., Disp: 90 tablet, Rfl: 1  •  terazosin (HYTRIN) 10 MG capsule, Take 1 capsule by mouth Every Night., Disp: 90 capsule, Rfl: 3  •  tiotropium bromide-olodaterol (Stiolto Respimat) 2.5-2.5 MCG/ACT aerosol solution inhaler, Inhale 2 puffs Daily. Until able to get trelegy approved., Disp: 4 g, Rfl: 5  •  TRUEplus Lancets 33G misc, 1 each by Other route Daily. E11.9, Disp: 100 each, Rfl: 5  •  HYDROcodone-acetaminophen (Norco)  MG per tablet, Take 1 tablet by mouth Every 6 (Six) Hours As Needed for  "Severe Pain ., Disp: 12 tablet, Rfl: 0    ROS    Review of Systems   Constitutional: Negative for chills and fever.   Respiratory: Positive for shortness of breath.    Cardiovascular: Negative for chest pain.   Musculoskeletal: Positive for arthralgias, back pain and gait problem.       Vitals:    06/29/21 1559   BP: 138/80   BP Location: Right arm   Patient Position: Sitting   Cuff Size: Adult   Pulse: 75   Resp: 20   Temp: 97.5 °F (36.4 °C)   TempSrc: Temporal   SpO2: 95%   Weight: 113 kg (248 lb 8 oz)   Height: 162.6 cm (64.02\")   PainSc:   8   PainLoc: Hip     Body mass index is 42.63 kg/m².    Physical Exam     Physical Exam  Constitutional:       Appearance: Normal appearance. He is well-developed.      Comments: Appears in pain   HENT:      Head: Normocephalic and atraumatic.      Right Ear: External ear normal.      Left Ear: External ear normal.   Eyes:      Extraocular Movements: Extraocular movements intact.      Conjunctiva/sclera: Conjunctivae normal.   Cardiovascular:      Rate and Rhythm: Normal rate.   Pulmonary:      Effort: Pulmonary effort is normal. No respiratory distress.   Abdominal:      General: There is no distension.   Musculoskeletal:      Comments: Tenderness to bilateral SI joints, left greater than right.  Patient ambulating with a cane today.  He has decreased range of motion to his left hip and lumbar spine.   Skin:     General: Skin is warm and dry.   Neurological:      Mental Status: He is alert and oriented to person, place, and time.      Cranial Nerves: No cranial nerve deficit.         Assessment/Plan    Problems Addressed this Visit     None      Visit Diagnoses     Sacroiliac joint dysfunction of left side    -  Primary    Relevant Orders    Arthrocentesis    Sciatica of left side        Relevant Orders    Arthrocentesis      Diagnoses       Codes Comments    Sacroiliac joint dysfunction of left side    -  Primary ICD-10-CM: M53.3  ICD-9-CM: 724.6     Sciatica of left side    "  ICD-10-CM: M54.32  ICD-9-CM: 724.3         Patient received a left SI injection in office today.  He tolerated the procedure well without any complications.  Discussed potential side effects of the injection.  Encouraged to monitor glucose closely.  Advised he may ice the area and take medication for pain as needed.  Short course of pain medication has been provided today.  He has been encouraged to call pain management to set up an initial appointment.  Patient agrees call.    New Medications Ordered This Visit   Medications   • HYDROcodone-acetaminophen (Norco)  MG per tablet     Sig: Take 1 tablet by mouth Every 6 (Six) Hours As Needed for Severe Pain .     Dispense:  12 tablet     Refill:  0       Orders Placed This Encounter   Procedures   • Arthrocentesis       No follow-ups on file.    Radha Jean DO

## 2021-07-19 ENCOUNTER — TELEPHONE (OUTPATIENT)
Dept: INTERNAL MEDICINE | Facility: CLINIC | Age: 71
End: 2021-07-19

## 2021-07-19 NOTE — TELEPHONE ENCOUNTER
PATIENT STATES THE OTHER NIGHT HIS LEFT FOOT TURNED INWARD ON ITS OWN AND HE COULDN'T TURN IT. IT HAS SINCE WENT BACK TO NORMAL. PATIENT WOULD LIKE TO KNOW IF DR SCHMIDT THINKS HE SHOULD COME IN EARLIER (DR SCHMIDT BOOKED TILL MIDDLE OF NEXT WEEK) OR WHAT HE SHOULD DO.    PLEASE ADVISE.

## 2021-07-19 NOTE — TELEPHONE ENCOUNTER
Advised pt to call and schedule same day visit if needed, he reports that his foot is doing okay at the time being but he will reach out if he is unable to wait until 27th.

## 2021-07-19 NOTE — TELEPHONE ENCOUNTER
Turned inwards like a muscle spasm or he rolled it inwards.  If doing okay right now it can wait.  If he would like to have it addressed sooner we can do a same day.

## 2021-07-24 PROCEDURE — 93294 REM INTERROG EVL PM/LDLS PM: CPT | Performed by: INTERNAL MEDICINE

## 2021-07-24 PROCEDURE — 93296 REM INTERROG EVL PM/IDS: CPT | Performed by: INTERNAL MEDICINE

## 2021-07-27 DIAGNOSIS — J44.9 CHRONIC OBSTRUCTIVE PULMONARY DISEASE, UNSPECIFIED COPD TYPE (HCC): Primary | ICD-10-CM

## 2021-07-28 ENCOUNTER — OFFICE VISIT (OUTPATIENT)
Dept: INTERNAL MEDICINE | Facility: CLINIC | Age: 71
End: 2021-07-28

## 2021-07-28 VITALS
RESPIRATION RATE: 16 BRPM | DIASTOLIC BLOOD PRESSURE: 80 MMHG | WEIGHT: 245.8 LBS | OXYGEN SATURATION: 96 % | HEART RATE: 60 BPM | TEMPERATURE: 98.6 F | BODY MASS INDEX: 41.96 KG/M2 | SYSTOLIC BLOOD PRESSURE: 122 MMHG | HEIGHT: 64 IN

## 2021-07-28 DIAGNOSIS — G89.29 CHRONIC LOW BACK PAIN, UNSPECIFIED BACK PAIN LATERALITY, UNSPECIFIED WHETHER SCIATICA PRESENT: ICD-10-CM

## 2021-07-28 DIAGNOSIS — N18.30 TYPE 2 DIABETES MELLITUS WITH STAGE 3 CHRONIC KIDNEY DISEASE, WITHOUT LONG-TERM CURRENT USE OF INSULIN, UNSPECIFIED WHETHER STAGE 3A OR 3B CKD (HCC): Primary | ICD-10-CM

## 2021-07-28 DIAGNOSIS — I10 ESSENTIAL HYPERTENSION: ICD-10-CM

## 2021-07-28 DIAGNOSIS — E78.00 HYPERCHOLESTEROLEMIA: ICD-10-CM

## 2021-07-28 DIAGNOSIS — M54.50 CHRONIC LOW BACK PAIN, UNSPECIFIED BACK PAIN LATERALITY, UNSPECIFIED WHETHER SCIATICA PRESENT: ICD-10-CM

## 2021-07-28 DIAGNOSIS — E11.22 TYPE 2 DIABETES MELLITUS WITH STAGE 3 CHRONIC KIDNEY DISEASE, WITHOUT LONG-TERM CURRENT USE OF INSULIN, UNSPECIFIED WHETHER STAGE 3A OR 3B CKD (HCC): Primary | ICD-10-CM

## 2021-07-28 PROBLEM — J43.1 PANLOBULAR EMPHYSEMA: Status: ACTIVE | Noted: 2021-05-03

## 2021-07-28 PROCEDURE — 99214 OFFICE O/P EST MOD 30 MIN: CPT | Performed by: FAMILY MEDICINE

## 2021-07-28 RX ORDER — BUDESONIDE, GLYCOPYRROLATE, AND FORMOTEROL FUMARATE 160; 9; 4.8 UG/1; UG/1; UG/1
2 AEROSOL, METERED RESPIRATORY (INHALATION) 2 TIMES DAILY
Qty: 15.9 G | Refills: 0 | COMMUNITY
Start: 2021-07-28 | End: 2021-10-13 | Stop reason: SDUPTHER

## 2021-07-28 NOTE — ASSESSMENT & PLAN NOTE
Controlled on current medication. Patient will continue lisinopril, amlodipine, metoprolol, spironolactone, Terazosin, and Lasix. Will obtain labs.  He does have increased swelling. May need to adjust diuretics.

## 2021-07-28 NOTE — ASSESSMENT & PLAN NOTE
Uncontrolled.  Unable to get stiolto sent in by pulmonology.  Sample of Breztri given today as that was our only sample inhaler in the office.  He has been encouraged to call pulmonology regarding BiPap and medication.  Paul may need a PA.

## 2021-08-02 DIAGNOSIS — G47.33 OSA (OBSTRUCTIVE SLEEP APNEA): Primary | ICD-10-CM

## 2021-08-10 ENCOUNTER — OFFICE VISIT (OUTPATIENT)
Dept: INTERNAL MEDICINE | Facility: CLINIC | Age: 71
End: 2021-08-10

## 2021-08-10 VITALS
HEART RATE: 61 BPM | DIASTOLIC BLOOD PRESSURE: 70 MMHG | RESPIRATION RATE: 18 BRPM | HEIGHT: 64 IN | WEIGHT: 249 LBS | BODY MASS INDEX: 42.51 KG/M2 | OXYGEN SATURATION: 95 % | SYSTOLIC BLOOD PRESSURE: 155 MMHG | TEMPERATURE: 98 F

## 2021-08-10 DIAGNOSIS — T21.22XA PARTIAL THICKNESS BURN OF ABDOMEN, INITIAL ENCOUNTER: ICD-10-CM

## 2021-08-10 DIAGNOSIS — T22.239A: Primary | ICD-10-CM

## 2021-08-10 PROCEDURE — 99213 OFFICE O/P EST LOW 20 MIN: CPT | Performed by: FAMILY MEDICINE

## 2021-08-10 RX ORDER — TERAZOSIN 10 MG/1
10 CAPSULE ORAL NIGHTLY
Qty: 90 CAPSULE | Refills: 3 | Status: CANCELLED | OUTPATIENT
Start: 2021-08-10

## 2021-08-10 RX ORDER — NITROGLYCERIN 0.4 MG/1
0.4 TABLET SUBLINGUAL
Qty: 25 TABLET | Refills: 11 | Status: SHIPPED | OUTPATIENT
Start: 2021-08-10 | End: 2021-09-30 | Stop reason: SDUPTHER

## 2021-08-10 RX ORDER — FINASTERIDE 5 MG/1
5 TABLET, FILM COATED ORAL DAILY
Qty: 90 TABLET | Refills: 3 | Status: CANCELLED | OUTPATIENT
Start: 2021-08-10

## 2021-08-10 RX ORDER — LISINOPRIL 40 MG/1
40 TABLET ORAL DAILY
Qty: 90 TABLET | Refills: 3 | Status: CANCELLED | OUTPATIENT
Start: 2021-08-10

## 2021-08-10 RX ORDER — OMEPRAZOLE 40 MG/1
40 CAPSULE, DELAYED RELEASE ORAL DAILY
Qty: 90 CAPSULE | Refills: 3 | Status: CANCELLED | OUTPATIENT
Start: 2021-08-10

## 2021-08-19 ENCOUNTER — OFFICE VISIT (OUTPATIENT)
Dept: CARDIOLOGY | Facility: CLINIC | Age: 71
End: 2021-08-19

## 2021-08-19 VITALS
SYSTOLIC BLOOD PRESSURE: 144 MMHG | HEIGHT: 64 IN | DIASTOLIC BLOOD PRESSURE: 96 MMHG | WEIGHT: 248 LBS | HEART RATE: 50 BPM | BODY MASS INDEX: 42.34 KG/M2

## 2021-08-19 DIAGNOSIS — I25.119 CORONARY ARTERY DISEASE INVOLVING NATIVE CORONARY ARTERY OF NATIVE HEART WITH ANGINA PECTORIS (HCC): ICD-10-CM

## 2021-08-19 DIAGNOSIS — I48.0 PAROXYSMAL ATRIAL FIBRILLATION (HCC): Primary | ICD-10-CM

## 2021-08-19 DIAGNOSIS — I50.32 CHRONIC DIASTOLIC HEART FAILURE (HCC): ICD-10-CM

## 2021-08-19 PROCEDURE — 93280 PM DEVICE PROGR EVAL DUAL: CPT | Performed by: NURSE PRACTITIONER

## 2021-08-19 PROCEDURE — 99213 OFFICE O/P EST LOW 20 MIN: CPT | Performed by: NURSE PRACTITIONER

## 2021-08-19 NOTE — PROGRESS NOTES
[facility] Cardiology    Patient ID: Robe Bryant is a 70 y.o. male.  : 1950   Contact: [phone]    Encounter date: 2021    PCP: Radha Jean DO      Chief complaint:   Chief Complaint   Patient presents with   • Paroxysmal atrial fibrillation (CMS/HCC)   • Pacemaker at end of battery life     Problem List:  1. Coronary artery disease:  a. [facility], 2009, Dr. Vasquez: Placement of a 3.0 x 23 mm Xience LATOYA to the ramus, 3.0 x 12 mm LATOYA to the mid-circumflex.  b. [facility], 10/11/2010: EF 45%, LVEDP 28.   c. [facility], 2011: 2.25 x 13 mm Cypher LATOYA placement to the distal circumflex.   d. [facility], 01/10/2012, [facility]: Noncritical CAD, patient ramus and left circumflex stents, LVH with near cavity obliteration.  e. Medical management.  f. Abnormal Cardiolite, 2013, [facility], showing moderate to severe perfusion defect of the basolateral wall of the LV.  g. [facility], 03/15/2013, [facility]: Widely patent stents of the LCx and ramus intermedius coronary arteries, no significant disease is seen in any territory.   h. Diastolic heart failure with hospital admission, May 2013, at [facility] in [place].  i. Recurrent anginal symptoms, 2014; initiation of Imdur therapy, 2014.  j. [facility], 09/15/2014, PW: Noncritical CAD with widely patent stents in the LCx and ramus intermedius arteries. Other sources for the patient’s chest discomfort should be considered.  k. [facility], 06/10/2016: EF Normal, widely patent ramus intermedius stent; no significant disease within LAD, LCx, RCA.  l. [facility], 2019: Noncritical CAD with patent stents noted. Aggressive risk factor modification which will include better control of the patient's excessively high blood pressure  m. Echo, 2021: LVEF: 57%, Left ventricular diastolic function is consistent with grade 1 impaired relaxation. Left atrial volume is mildly increased.   2. Diastolic heart failure:  a. Echocardiogram,  05/16/2013, Hermes Vargas MD: Hyperdynamic LV with EF 75%, mild TR, trace MI.  b. Complaints of dyspnea at the time of office visit, 05/22/2013, with O2 saturation in the 91% to 95% range with ambulation.  c. Echocardiogram, 09/15/2014: Moderate LVH with LVEF 60% to 65%. Mild MR and mild TR.   3. Hypertension.  4. Symptomatic bradycardia:  a. Continued symptoms of presyncope in the setting of hypotension and bradycardia, 02/16/2012.  b. PPM implantation, Dr. Tucker, 02/21/2012, [employer] RFDR, model #2210.  c. Hospitalization with acute dyspnea, 02/27/2013, at [facility] felt secondary to pacemaker-induced tachycardia.  d. Tilt Table (6/22/2017): Positive for orthostatic syncope. Patient developed abrupt symptomatic hypotension at 9 minutes, with any corresponding increase in heart rate. Consistent with vasodepressive response.  e. Implantation of [employer]  Assurity MRI PM 2272 serial #[id number] end-of-life on previous generator 5/10/2021  5. Paroxysmal atrial fibrillation:  a. Coumadin therapy followed by [facility].   b. CHADS-VASc score = 5. (Age 65-74, HTN, DM, HF, TIA?)  c. Admission to [facility], 06/13/2015 through 06/16/2015, with DC cardioversion and return to sinus rhythm and subsequent metoprolol dosage increase.  6. Recent symptoms of left-sided facial numbness and occasional left arm weakness.  7. Nonsustained VT per device interrogation, 09/04/2014.  8. Brief episodes of atrial tachycardia at the time of device interrogation, 05/03/2012.  9. Hyperlipidemia.  10. Type 2 diabetes mellitus.  11. Obstructive sleep apnea with CPAP therapy.  12. Questionable transient ischemic attack, January 2010, without workup:  a. Carotid duplex, 09/15/2014: Noncritical coronary artery disease with widely patent bilateral carotid arteries. Velocity is all within normal limits.   13. History of tobacco use with cessation x7 years.   14. Gastroesophageal reflux disease.  15. Hernia repair x3.   16. Chronic  kidney disease with a creatinine of 2.1 during hospitalization, 05/17/2013.    No Known Allergies    Current Medications:    Current Outpatient Medications:   •  amiodarone (PACERONE) 200 MG tablet, Take 1 tablet by mouth Daily., Disp: 30 tablet, Rfl: 0  •  amLODIPine (NORVASC) 10 MG tablet, Take 1 tablet by mouth Daily., Disp: 30 tablet, Rfl: 1  •  apixaban (ELIQUIS) 5 MG tablet tablet, Take 5 mg by mouth 2 (Two) Times a Day., Disp: , Rfl:   •  aspirin 81 MG chewable tablet, Chew 81 mg Daily., Disp: , Rfl:   •  atorvastatin (LIPITOR) 20 MG tablet, Take 1 tablet by mouth Every Night., Disp: 90 tablet, Rfl: 3  •  baclofen (LIORESAL) 10 MG tablet, Take 1 tablet by mouth 3 (Three) Times a Day As Needed for Muscle Spasms., Disp: 90 tablet, Rfl: 2  •  Blood Glucose Monitoring Suppl (TRUE METRIX AIR GLUCOSE METER) w/Device kit, 1 each by In Vitro route 2 (two) times a day. E11.9, Disp: 1 kit, Rfl: 0  •  Budeson-Glycopyrrol-Formoterol (Breztri Aerosphere) 160-9-4.8 MCG/ACT aerosol inhaler, Inhale 2 puffs 2 (Two) Times a Day., Disp: 15.9 g, Rfl: 0  •  cetirizine (zyrTEC) 10 MG tablet, Take 1 tablet by mouth Daily., Disp: 90 tablet, Rfl: 3  •  Cholecalciferol (Vitamin D-3) 25 MCG (1000 UT) capsule, Take 1,000 Units by mouth Daily., Disp: 90 capsule, Rfl: 3  •  diclofenac (VOLTAREN) 1 % gel gel, Apply 4 g topically to the appropriate area as directed 4 (Four) Times a Day As Needed (pain)., Disp: 100 g, Rfl: 5  •  finasteride (PROSCAR) 5 MG tablet, Take 1 tablet by mouth Daily., Disp: 90 tablet, Rfl: 3  •  FLUoxetine (PROzac) 20 MG capsule, Take 1 capsule by mouth Daily., Disp: 90 capsule, Rfl: 3  •  fluticasone (Flonase) 50 MCG/ACT nasal spray, 2 sprays into the nostril(s) as directed by provider Daily., Disp: 18.2 mL, Rfl: 5  •  furosemide (Lasix) 20 MG tablet, Take 1 tablet by mouth every morning; may take second dose as needed for increased swelling, Disp: 60 tablet, Rfl: 2  •  glipizide (GLUCOTROL) 5 MG tablet, TAKE 1  TABLET BY MOUTH 2 TIMES A DAY BEFORE MEALS., Disp: 180 tablet, Rfl: 3  •  glucose blood (True Metrix Blood Glucose Test) test strip, Daily testing E11.9, Disp: 100 each, Rfl: 5  •  glucose monitor monitoring kit, 1 each Daily. E11.9, Disp: 1 each, Rfl: 0  •  HYDROcodone-acetaminophen (Norco)  MG per tablet, Take 1 tablet by mouth Every 6 (Six) Hours As Needed for Severe Pain ., Disp: 12 tablet, Rfl: 0  •  ipratropium-albuterol (DUO-NEB) 0.5-2.5 mg/3 ml nebulizer, Take 3 mL by nebulization 4 (Four) Times a Day., Disp: 1620 mL, Rfl: 2  •  Lancet Device misc, 1 each Daily. E11.9, Disp: 100 each, Rfl: 3  •  lisinopril (PRINIVIL,ZESTRIL) 40 MG tablet, Take 1 tablet by mouth Daily., Disp: 90 tablet, Rfl: 3  •  metoprolol succinate XL (Toprol XL) 50 MG 24 hr tablet, Take 1 tablet by mouth Daily., Disp: 30 tablet, Rfl: 0  •  Misc. Devices misc, Bipap tubing., Disp: 1 each, Rfl: 0  •  Multiple Vitamin tablet, Take 1 tablet by mouth daily., Disp: , Rfl:   •  Nebulizer misc, 1 each Every 4 (Four) Hours As Needed (shortness of breath)., Disp: 1 each, Rfl: 0  •  nitroglycerin (Nitrostat) 0.4 MG SL tablet, Place 1 tablet under the tongue Every 5 (Five) Minutes As Needed for Chest Pain., Disp: 25 tablet, Rfl: 11  •  O2 (OXYGEN), Inhale 2 L/min Continuous As Needed (With activity)., Disp: , Rfl:   •  Olopatadine HCl 0.7 % solution, Apply 1 drop to eye(s) as directed by provider Daily., Disp: 2.5 mL, Rfl: 5  •  omeprazole (priLOSEC) 40 MG capsule, Take 1 capsule by mouth Daily., Disp: 90 capsule, Rfl: 3  •  ondansetron ODT (Zofran ODT) 4 MG disintegrating tablet, Place 1 tablet on the tongue Every 8 (Eight) Hours As Needed for Nausea or Vomiting., Disp: 20 tablet, Rfl: 0  •  potassium chloride (K-DUR,KLOR-CON) 10 MEQ CR tablet, Take 1 tablet by mouth 2 (Two) Times a Day., Disp: 60 tablet, Rfl: 2  •  pramipexole (MIRAPEX) 0.5 MG tablet, Take 1 tablet by mouth 3 (Three) Times a Day., Disp: 270 tablet, Rfl: 3  •  silver  "sulfadiazine (Silvadene) 1 % cream, Apply  topically to the appropriate area as directed 2 (Two) Times a Day., Disp: 400 g, Rfl: 0  •  spironolactone (Aldactone) 50 MG tablet, Take 1 tablet by mouth Daily., Disp: 90 tablet, Rfl: 1  •  terazosin (HYTRIN) 10 MG capsule, Take 1 capsule by mouth Every Night., Disp: 90 capsule, Rfl: 3  •  tiotropium bromide-olodaterol (Stiolto Respimat) 2.5-2.5 MCG/ACT aerosol solution inhaler, Inhale 2 puffs Daily. Until able to get trelegy approved., Disp: 4 g, Rfl: 5  •  TRUEplus Lancets 33G misc, 1 each by Other route Daily. E11.9, Disp: 100 each, Rfl: 5    HPI    Robe Bryant is a 70 y.o. male who presents today for a one month follow up of symptomatic bradycardia s/p recent pacemaker generator change and cardiac risk factors. Since last visit, patient has done well overall. He denies chest pain, shortness of breath, PND, orthopnea. Some lower extremity edema.     The following portions of the patient's history were reviewed and updated as appropriate: allergies, current medications and problem list.    Pertinent positives as listed in the HPI.  All other systems reviewed are negative.         Vitals:    08/19/21 1116   BP: 144/96   BP Location: Left arm   Patient Position: Sitting   Pulse: 50   Weight: 112 kg (248 lb)   Height: 162.6 cm (64\")       Physical Exam:  General: Alert and oriented.  Neck: Jugular venous pressure is within normal limits. Carotids have normal upstrokes without bruits.   Cardiovascular: Heart has a nondisplaced focal PMI. Regular rate and rhythm. No murmur, gallop or rub.  Lungs: Clear, no rales or wheezes. Equal expansion is noted.   Extremities: Show no edema.  Skin: Warm and dry.  Neurologic: Nonfocal.     Diagnostic Data (reviewed with patient):     Lab Results   Component Value Date    GLUCOSE 111 (H) 05/10/2021    BUN 37 (H) 05/10/2021    CREATININE 1.70 (H) 05/10/2021    EGFRIFNONA 40 (L) 05/10/2021    EGFRIFAFRI 69 12/01/2020    BCR 21.8 " 05/10/2021     05/10/2021    K 3.9 05/10/2021     05/10/2021    CO2 29.0 05/10/2021    CALCIUM 9.0 05/10/2021    ALBUMIN 3.70 05/03/2021    ALKPHOS 65 05/03/2021    AST 21 05/03/2021    ALT 17 05/03/2021     Lab Results   Component Value Date    CHOL 104 05/04/2021    TRIG 86 05/04/2021    HDL 37 (L) 05/04/2021    LDL 50 05/04/2021      Lab Results   Component Value Date    WBC 10.87 (H) 05/08/2021    RBC 4.74 05/08/2021    HGB 13.5 05/08/2021    HCT 43.6 05/08/2021    MCV 92.0 05/08/2021     05/08/2021      Lab Results   Component Value Date    TSH 0.525 05/04/2021      Procedures  Device check 8/19/21: RA 68%, RV < 1%, normal threshold and impedance, battery life 5-5.7 years, currently in Afib. Longest AMS 8 hours, total < 1%. Turned off A cap (amplitude increasing to 5 in setting of PAF). Atrial pulse amplitude changed from 5.0v to 2.5 V.       Assessment:    ICD-10-CM ICD-9-CM   1. Paroxysmal atrial fibrillation (CMS/[facility])  I48.0 427.31   2. Coronary artery disease involving native coronary artery of native heart with angina pectoris (CMS/[facility])  I25.119 414.01     413.9   3. Chronic diastolic heart failure (CMS/[facility])  I50.32 428.32         Plan:  1. Continue amiodarone and Eliquis for PAF.   2. Continue ASA, statin for CAD  3. Continue Norvasc, Lisinopril, metoprolol for HTN.   4. Continue all other current medications.  5. F/up in 6 months, sooner if needed.      Electronically signed by BROOKE Romero, 08/19/21, 11:44 AM EDT.
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
## 2021-09-30 ENCOUNTER — HOSPITAL ENCOUNTER (OUTPATIENT)
Dept: GENERAL RADIOLOGY | Facility: HOSPITAL | Age: 71
Discharge: HOME | End: 2021-09-30
Admitting: FAMILY MEDICINE

## 2021-09-30 ENCOUNTER — OFFICE VISIT (OUTPATIENT)
Dept: INTERNAL MEDICINE | Facility: CLINIC | Age: 71
End: 2021-09-30

## 2021-09-30 VITALS
HEART RATE: 62 BPM | TEMPERATURE: 98.6 F | WEIGHT: 253.6 LBS | DIASTOLIC BLOOD PRESSURE: 68 MMHG | HEIGHT: 64 IN | SYSTOLIC BLOOD PRESSURE: 138 MMHG | BODY MASS INDEX: 43.29 KG/M2 | OXYGEN SATURATION: 98 %

## 2021-09-30 DIAGNOSIS — J44.1 COPD WITH EXACERBATION (HCC): Primary | ICD-10-CM

## 2021-09-30 DIAGNOSIS — I50.9 ACUTE ON CHRONIC CONGESTIVE HEART FAILURE, UNSPECIFIED HEART FAILURE TYPE (HCC): ICD-10-CM

## 2021-09-30 DIAGNOSIS — R06.02 SHORTNESS OF BREATH: ICD-10-CM

## 2021-09-30 DIAGNOSIS — R05.9 COUGH: ICD-10-CM

## 2021-09-30 PROCEDURE — U0004 COV-19 TEST NON-CDC HGH THRU: HCPCS | Performed by: FAMILY MEDICINE

## 2021-09-30 PROCEDURE — 99214 OFFICE O/P EST MOD 30 MIN: CPT | Performed by: FAMILY MEDICINE

## 2021-09-30 PROCEDURE — 71046 X-RAY EXAM CHEST 2 VIEWS: CPT

## 2021-09-30 PROCEDURE — 96372 THER/PROPH/DIAG INJ SC/IM: CPT | Performed by: FAMILY MEDICINE

## 2021-09-30 RX ORDER — NITROGLYCERIN 0.4 MG/1
0.4 TABLET SUBLINGUAL
Qty: 25 TABLET | Refills: 11 | Status: SHIPPED | OUTPATIENT
Start: 2021-09-30 | End: 2022-07-13 | Stop reason: HOSPADM

## 2021-09-30 RX ORDER — CEFDINIR 300 MG/1
300 CAPSULE ORAL 2 TIMES DAILY
Qty: 20 CAPSULE | Refills: 0 | Status: SHIPPED | OUTPATIENT
Start: 2021-09-30 | End: 2021-10-10

## 2021-09-30 RX ORDER — SPIRONOLACTONE 50 MG/1
50 TABLET, FILM COATED ORAL DAILY
Qty: 90 TABLET | Refills: 1 | Status: SHIPPED | OUTPATIENT
Start: 2021-09-30 | End: 2022-01-05

## 2021-09-30 RX ORDER — METHYLPREDNISOLONE SODIUM SUCCINATE 125 MG/2ML
125 INJECTION, POWDER, LYOPHILIZED, FOR SOLUTION INTRAMUSCULAR; INTRAVENOUS ONCE
Status: COMPLETED | OUTPATIENT
Start: 2021-09-30 | End: 2021-09-30

## 2021-09-30 RX ORDER — FUROSEMIDE 20 MG/1
TABLET ORAL
Qty: 60 TABLET | Refills: 2 | Status: SHIPPED | OUTPATIENT
Start: 2021-09-30 | End: 2022-01-18

## 2021-09-30 RX ORDER — CEFTRIAXONE 1 G/1
1 INJECTION, POWDER, FOR SOLUTION INTRAMUSCULAR; INTRAVENOUS ONCE
Status: COMPLETED | OUTPATIENT
Start: 2021-09-30 | End: 2021-09-30

## 2021-09-30 RX ADMIN — CEFTRIAXONE 1 G: 1 INJECTION, POWDER, FOR SOLUTION INTRAMUSCULAR; INTRAVENOUS at 12:33

## 2021-09-30 RX ADMIN — METHYLPREDNISOLONE SODIUM SUCCINATE 125 MG: 125 INJECTION, POWDER, LYOPHILIZED, FOR SOLUTION INTRAMUSCULAR; INTRAVENOUS at 12:35

## 2021-10-01 LAB — SARS-COV-2 RNA NOSE QL NAA+PROBE: NOT DETECTED

## 2021-10-13 ENCOUNTER — OFFICE VISIT (OUTPATIENT)
Dept: PULMONOLOGY | Facility: CLINIC | Age: 71
End: 2021-10-13

## 2021-10-13 VITALS
WEIGHT: 259 LBS | BODY MASS INDEX: 44.22 KG/M2 | OXYGEN SATURATION: 96 % | HEART RATE: 59 BPM | HEIGHT: 64 IN | DIASTOLIC BLOOD PRESSURE: 78 MMHG | SYSTOLIC BLOOD PRESSURE: 124 MMHG | RESPIRATION RATE: 18 BRPM

## 2021-10-13 DIAGNOSIS — R09.02 EXERCISE HYPOXEMIA: ICD-10-CM

## 2021-10-13 DIAGNOSIS — R06.02 SOB (SHORTNESS OF BREATH): Primary | ICD-10-CM

## 2021-10-13 DIAGNOSIS — G47.33 OBSTRUCTIVE SLEEP APNEA: ICD-10-CM

## 2021-10-13 DIAGNOSIS — J44.1 ACUTE EXACERBATION OF CHRONIC OBSTRUCTIVE PULMONARY DISEASE (COPD) (HCC): ICD-10-CM

## 2021-10-13 DIAGNOSIS — R60.9 EDEMA, UNSPECIFIED TYPE: ICD-10-CM

## 2021-10-13 DIAGNOSIS — J44.9 CHRONIC OBSTRUCTIVE PULMONARY DISEASE, UNSPECIFIED COPD TYPE (HCC): ICD-10-CM

## 2021-10-13 DIAGNOSIS — J44.9 ASTHMA WITH COPD (HCC): ICD-10-CM

## 2021-10-13 DIAGNOSIS — Z87.891 PERSONAL HISTORY OF TOBACCO USE, PRESENTING HAZARDS TO HEALTH: ICD-10-CM

## 2021-10-13 PROCEDURE — 95012 NITRIC OXIDE EXP GAS DETER: CPT | Performed by: INTERNAL MEDICINE

## 2021-10-13 PROCEDURE — 99214 OFFICE O/P EST MOD 30 MIN: CPT | Performed by: INTERNAL MEDICINE

## 2021-10-13 RX ORDER — BUDESONIDE, GLYCOPYRROLATE, AND FORMOTEROL FUMARATE 160; 9; 4.8 UG/1; UG/1; UG/1
2 AEROSOL, METERED RESPIRATORY (INHALATION) 2 TIMES DAILY
Qty: 15.9 G | Refills: 5 | Status: SHIPPED | OUTPATIENT
Start: 2021-10-13 | End: 2022-02-03 | Stop reason: SDUPTHER

## 2021-10-13 RX ORDER — PREDNISONE 20 MG/1
TABLET ORAL
Qty: 10 TABLET | Refills: 0 | Status: SHIPPED | OUTPATIENT
Start: 2021-10-13 | End: 2021-10-18 | Stop reason: SDUPTHER

## 2021-10-13 RX ORDER — METHYLPREDNISOLONE ACETATE 80 MG/ML
80 INJECTION, SUSPENSION INTRA-ARTICULAR; INTRALESIONAL; INTRAMUSCULAR; SOFT TISSUE ONCE
Status: DISCONTINUED | OUTPATIENT
Start: 2021-10-13 | End: 2021-10-13

## 2021-10-13 RX ORDER — BUDESONIDE, GLYCOPYRROLATE, AND FORMOTEROL FUMARATE 160; 9; 4.8 UG/1; UG/1; UG/1
2 AEROSOL, METERED RESPIRATORY (INHALATION) 2 TIMES DAILY
Qty: 15.9 G | Refills: 5 | COMMUNITY
Start: 2021-10-13 | End: 2021-10-13

## 2021-10-13 NOTE — PROGRESS NOTES
"  Chief Complaint   Patient presents with   • Follow-up   • Shortness of Breath       Subjective   Robe Bryant is a 70 y.o. male.     History of Present Illness   Patient was evaluated today complaining of shortness of breath, cough and phlegm production which has been worse for the past few days.    he is not complaining of subjective chills.  The patient also denies fever.    The patient says that his sputum is mostly nonproductive but occasionally productive of thick yellowish sputum.     Patient is using rescue inhaler/nebulizer more than usual with some relief.    He was actually recently given antibiotics and steroids, 2 weeks ago, by his PCP.       The following portions of the patient's history were reviewed and updated as appropriate: allergies, current medications, past family history, past medical history, past social history and past surgical history.    Review of Systems   HENT: Negative for sinus pressure, sneezing and sore throat.    Respiratory: Positive for cough, chest tightness, shortness of breath and wheezing.        Objective   Visit Vitals  /78   Pulse 59   Resp 18   Ht 162.6 cm (64.02\")   Wt 117 kg (259 lb)   SpO2 96%   BMI 44.44 kg/m²       Physical Exam  Vitals reviewed.   Constitutional:       Appearance: He is well-developed.   Neck:      Thyroid: No thyromegaly.      Vascular: No JVD.   Cardiovascular:      Rate and Rhythm: Normal rate.   Pulmonary:      Effort: Respiratory distress present.      Breath sounds: Wheezing present.   Musculoskeletal:         General: Normal range of motion.      Cervical back: Normal range of motion.      Right lower leg: Edema present.      Left lower leg: Edema present.      Comments: Gait was slow.   Skin:     General: Skin is warm and dry.   Neurological:      Mental Status: He is alert and oriented to person, place, and time.   Psychiatric:         Behavior: Behavior normal.           Assessment/Plan   Diagnoses and all orders for this " visit:    1. SOB (shortness of breath) (Primary)  -     Basic Metabolic Panel; Future  -     Nitric Oxide  -     Peak Flow    2. Chronic obstructive pulmonary disease, unspecified COPD type (HCC)  -     Basic Metabolic Panel; Future  -     Nitric Oxide  -     Peak Flow    3. Personal history of tobacco use, presenting hazards to health    4. Exercise hypoxemia    5. Acute exacerbation of chronic obstructive pulmonary disease (COPD) (HCC)  -     Discontinue: methylPREDNISolone acetate (DEPO-medrol) injection 80 mg    6. Asthma with COPD (HCC)    7. Obstructive sleep apnea    8. Edema, unspecified type    Other orders  -     Discontinue: Budeson-Glycopyrrol-Formoterol (Breztri Aerosphere) 160-9-4.8 MCG/ACT aerosol inhaler; Inhale 2 puffs 2 (Two) Times a Day.  Dispense: 15.9 g; Refill: 5  -     Budeson-Glycopyrrol-Formoterol (Breztri Aerosphere) 160-9-4.8 MCG/ACT aerosol inhaler; Inhale 2 puffs 2 (Two) Times a Day.  Dispense: 15.9 g; Refill: 5  -     predniSONE (DELTASONE) 20 MG tablet; Take 2 tablets daily for 5 days.  Dispense: 10 tablet; Refill: 0           Return in about 12 weeks (around 1/5/2022) for Recheck, For Marilee Aguilar), ....Also 6-7 mths w/ Dr. Chan.    DISCUSSION (if any):  Last chest x-ray was reviewed personally and the results were shared with the patient.  Images reviewed personally.   Results for orders placed in visit on 09/30/21    XR Chest PA & Lateral    Narrative  EXAMINATION: XR CHEST PA AND LATERAL - 09/30/2021    INDICATION: R06.02-Shortness of breath; R05-Cough; J44.1-Chronic  obstructive pulmonary disease with (acute) exacerbation; I50.9-Heart  failure, unspecified. Cough, wheezing and shortness of breath.    COMPARISON: 03/23/2021    FINDINGS: PA and lateral views of the chest reveal heart to be  borderline enlarged. Pacer leads in satisfactory position with  degenerative changes seen within the spine. No pleural effusion or  pneumothorax. No focal parenchymal opacification  present.    Impression  Heart is enlarged with no evidence of acute parenchymal  disease.    DICTATED:   10/01/2021  EDITED/ls :   10/01/2021    This report was finalized on 10/4/2021 9:02 AM by Dr. Silvia Burrows MD.      Last CT scan was reviewed in great detail with the patient. Images reviewed personally.   Results for orders placed during the hospital encounter of 05/03/21    CT Chest Without Contrast Diagnostic    Narrative  PROCEDURE: CT CHEST WO CONTRAST DIAGNOSTIC-    HISTORY: soa, leg swelling, general malaise    COMPARISON:  None .    PROCEDURE:  Multiple axial CT images were obtained from the thoracic  inlet through the upper abdomen without the use of contrast.    FINDINGS:  Soft tissue windows reveal no axillary, hilar or mediastinal adenopathy.  Heart size is enlarged. The aorta is normal in caliber. There are no  pleural or pericardial effusions. Lung windows demonstrate no suspicious  infiltrate or nodules . The visualized upper abdomen is unremarkable.  Bone windows reveal no acute osseous abnormalities.    Impression  No acute process.    640 mGy.cm      This study was performed with techniques to keep radiation doses as low  as reasonably achievable (ALARA). Individualized dose reduction  techniques using automated exposure control or adjustment of mA and/or  kV according to the patient size were employed.    This report was finalized on 5/3/2021 2:28 PM by Avery Pryor M.D..      I also reviewed his last echocardiogram and shared the results with him.   Results for orders placed during the hospital encounter of 01/25/21    Adult Transthoracic Echo Complete W/ Cont if Necessary Per Protocol    Interpretation Summary  · Left ventricular wall thickness is consistent with concentric hypertrophy.  · Estimated left ventricular EF = 57% Left ventricular ejection fraction appears to be 56 - 60%. Left ventricular systolic function is normal.  · Left ventricular diastolic function is consistent  with (grade I) impaired relaxation.  · Left atrial volume is mildly increased.      I have also reviewed laboratory data.  Lab Results   Component Value Date    EOSABS 0.30 05/04/2021    EOSABS 0.26 05/03/2021    EOSABS 0.24 01/25/2021    &    Lab Results   Component Value Date    CO2 29.0 05/10/2021     ===========================  ===========================    FeNO level was 8 today.    Peak flow was 210 LPM today.    BMP today.     For the symptoms of acute exacerbation of COPD/asthma, he was prescribed oral steroids.    The patient was asked to start using his rescue medications on a more regular basis for the next few days.    Side effects have been discussed in detail.    Patient was asked to report to the emergency room if symptoms do not improve.    All other medications will remain the same.    The patient was advised to continue oxygen with exercise & at night.    Continue to try to be compliant with BiPAP, especially since he recently received the device.     His current issues with BiPAP are likely due to ongoing issues with COPD exacerbation.    Based on his symptoms it appears that he has asthma with COPD syndrome.  This may have at least some impact on his management in the future.    If his symptoms continue to suggest moderate to severe poorly controlled asthma, and/or if he has documented exacerbations, then he may need to be considered for newer therapies.     If BMP appears to be WNL, we will ask him to double his lasix for 5 days, given presence of edema.       Dictated utilizing Dragon dictation.    This document was electronically signed by Aminta Chan MD on 10/13/21 at 14:01 EDT

## 2021-10-18 ENCOUNTER — OFFICE VISIT (OUTPATIENT)
Dept: INTERNAL MEDICINE | Facility: CLINIC | Age: 71
End: 2021-10-18

## 2021-10-18 VITALS
HEIGHT: 64 IN | DIASTOLIC BLOOD PRESSURE: 68 MMHG | SYSTOLIC BLOOD PRESSURE: 142 MMHG | WEIGHT: 261 LBS | TEMPERATURE: 98 F | HEART RATE: 58 BPM | OXYGEN SATURATION: 100 % | BODY MASS INDEX: 44.56 KG/M2

## 2021-10-18 DIAGNOSIS — M25.50 ARTHRALGIA, UNSPECIFIED JOINT: ICD-10-CM

## 2021-10-18 DIAGNOSIS — Z23 NEED FOR IMMUNIZATION AGAINST INFLUENZA: ICD-10-CM

## 2021-10-18 DIAGNOSIS — M25.40 JOINT SWELLING: ICD-10-CM

## 2021-10-18 DIAGNOSIS — R06.02 SHORTNESS OF BREATH: ICD-10-CM

## 2021-10-18 DIAGNOSIS — R53.83 FATIGUE, UNSPECIFIED TYPE: Primary | ICD-10-CM

## 2021-10-18 DIAGNOSIS — R60.1 GENERALIZED EDEMA: ICD-10-CM

## 2021-10-18 PROBLEM — I50.9 ACUTE ON CHRONIC CONGESTIVE HEART FAILURE (HCC): Status: ACTIVE | Noted: 2021-10-18

## 2021-10-18 PROCEDURE — 99214 OFFICE O/P EST MOD 30 MIN: CPT | Performed by: FAMILY MEDICINE

## 2021-10-18 PROCEDURE — G0008 ADMIN INFLUENZA VIRUS VAC: HCPCS | Performed by: FAMILY MEDICINE

## 2021-10-18 PROCEDURE — 90662 IIV NO PRSV INCREASED AG IM: CPT | Performed by: FAMILY MEDICINE

## 2021-10-18 RX ORDER — ALBUTEROL SULFATE 90 UG/1
2 AEROSOL, METERED RESPIRATORY (INHALATION) EVERY 4 HOURS PRN
COMMUNITY
End: 2022-02-03 | Stop reason: SDUPTHER

## 2021-10-18 RX ORDER — PREDNISONE 20 MG/1
TABLET ORAL
Qty: 10 TABLET | Refills: 0 | Status: SHIPPED | OUTPATIENT
Start: 2021-10-18 | End: 2021-11-03

## 2021-10-21 ENCOUNTER — TELEPHONE (OUTPATIENT)
Dept: INTERNAL MEDICINE | Facility: CLINIC | Age: 71
End: 2021-10-21

## 2021-10-21 DIAGNOSIS — E34.9 TESTOSTERONE DEFICIENCY: Primary | ICD-10-CM

## 2021-10-21 LAB
ANA SER QL: NEGATIVE
CCP IGA+IGG SERPL IA-ACNC: 4 UNITS (ref 0–19)
FOLATE SERPL-MCNC: 15.4 NG/ML (ref 4.78–24.2)
RHEUMATOID FACT SERPL-ACNC: <10 IU/ML (ref 0–13.9)
TESTOST FREE SERPL-MCNC: 2.9 PG/ML (ref 6.6–18.1)
TESTOST SERPL-MCNC: 116 NG/DL (ref 264–916)
VIT B12 SERPL-MCNC: 684 PG/ML (ref 211–946)

## 2021-10-21 NOTE — TELEPHONE ENCOUNTER
Caller: Robe Bryant    Relationship: Self    Best call back number: [phone]    What was the call regarding: PATIENT STATES THAT HE WOULD LIKE A CALL FROM THE NURSE ABOUT HIS REFERRAL.    Do you require a callback: YES

## 2021-10-23 PROCEDURE — 93294 REM INTERROG EVL PM/LDLS PM: CPT | Performed by: INTERNAL MEDICINE

## 2021-10-23 PROCEDURE — 93296 REM INTERROG EVL PM/IDS: CPT | Performed by: INTERNAL MEDICINE

## 2021-10-26 LAB
25(OH)D3+25(OH)D2 SERPL-MCNC: 26.4 NG/ML (ref 30–100)
ALBUMIN SERPL-MCNC: 3.6 G/DL (ref 3.5–5.2)
ALBUMIN/CREAT UR: <2 MG/G CREAT (ref 0–29)
ALBUMIN/GLOB SERPL: 2 G/DL
ALP SERPL-CCNC: 61 U/L (ref 39–117)
ALT SERPL-CCNC: 20 U/L (ref 1–41)
AMYLASE SERPL-CCNC: 80 U/L (ref 31–110)
AST SERPL-CCNC: 20 U/L (ref 1–40)
BASOPHILS # BLD AUTO: 0.05 10*3/MM3 (ref 0–0.2)
BASOPHILS NFR BLD AUTO: 0.7 % (ref 0–1.5)
BILIRUB SERPL-MCNC: 0.4 MG/DL (ref 0–1.2)
BNP SERPL-MCNC: 68.6 PG/ML (ref 0–100)
BUN SERPL-MCNC: 23 MG/DL (ref 8–23)
BUN/CREAT SERPL: 15.3 (ref 7–25)
CALCIUM SERPL-MCNC: 8.8 MG/DL (ref 8.6–10.5)
CHLORIDE SERPL-SCNC: 104 MMOL/L (ref 98–107)
CHOLEST SERPL-MCNC: 118 MG/DL (ref 0–200)
CO2 SERPL-SCNC: 27.1 MMOL/L (ref 22–29)
CREAT SERPL-MCNC: 1.5 MG/DL (ref 0.76–1.27)
CREAT UR-MCNC: 125.1 MG/DL
EOSINOPHIL # BLD AUTO: 0.29 10*3/MM3 (ref 0–0.4)
EOSINOPHIL NFR BLD AUTO: 3.9 % (ref 0.3–6.2)
ERYTHROCYTE [DISTWIDTH] IN BLOOD BY AUTOMATED COUNT: 12.2 % (ref 12.3–15.4)
FOLATE SERPL-MCNC: 9.1 NG/ML
GLOBULIN SER CALC-MCNC: 1.8 GM/DL
GLUCOSE SERPL-MCNC: 101 MG/DL (ref 65–99)
HBA1C MFR BLD: 7 % (ref 4.8–5.6)
HCT VFR BLD AUTO: 37 % (ref 37.5–51)
HDLC SERPL-MCNC: 38 MG/DL (ref 40–60)
HGB BLD-MCNC: 11.8 G/DL (ref 13–17.7)
IMM GRANULOCYTES # BLD AUTO: 0.06 10*3/MM3 (ref 0–0.05)
IMM GRANULOCYTES NFR BLD AUTO: 0.8 % (ref 0–0.5)
LDLC SERPL CALC-MCNC: 56 MG/DL (ref 0–100)
LIPASE SERPL-CCNC: 28 U/L (ref 13–78)
LYMPHOCYTES # BLD AUTO: 1.24 10*3/MM3 (ref 0.7–3.1)
LYMPHOCYTES NFR BLD AUTO: 16.9 % (ref 19.6–45.3)
MCH RBC QN AUTO: 30.3 PG (ref 26.6–33)
MCHC RBC AUTO-ENTMCNC: 31.9 G/DL (ref 31.5–35.7)
MCV RBC AUTO: 94.9 FL (ref 79–97)
MICROALBUMIN UR-MCNC: <3 UG/ML
MONOCYTES # BLD AUTO: 0.85 10*3/MM3 (ref 0.1–0.9)
MONOCYTES NFR BLD AUTO: 11.6 % (ref 5–12)
NEUTROPHILS # BLD AUTO: 4.86 10*3/MM3 (ref 1.7–7)
NEUTROPHILS NFR BLD AUTO: 66.1 % (ref 42.7–76)
NRBC BLD AUTO-RTO: 0 /100 WBC (ref 0–0.2)
PLATELET # BLD AUTO: 152 10*3/MM3 (ref 140–450)
POTASSIUM SERPL-SCNC: 4.6 MMOL/L (ref 3.5–5.2)
PROT SERPL-MCNC: 5.4 G/DL (ref 6–8.5)
RBC # BLD AUTO: 3.9 10*6/MM3 (ref 4.14–5.8)
SODIUM SERPL-SCNC: 142 MMOL/L (ref 136–145)
T4 FREE SERPL-MCNC: 1.33 NG/DL (ref 0.82–1.77)
TRIGL SERPL-MCNC: 134 MG/DL (ref 0–150)
TSH SERPL DL<=0.005 MIU/L-ACNC: 0.79 UIU/ML (ref 0.45–4.5)
VLDLC SERPL CALC-MCNC: 24 MG/DL (ref 5–40)
WBC # BLD AUTO: 7.35 10*3/MM3 (ref 3.4–10.8)

## 2021-11-03 ENCOUNTER — OFFICE VISIT (OUTPATIENT)
Dept: INTERNAL MEDICINE | Facility: CLINIC | Age: 71
End: 2021-11-03

## 2021-11-03 VITALS
TEMPERATURE: 98 F | HEART RATE: 71 BPM | SYSTOLIC BLOOD PRESSURE: 128 MMHG | BODY MASS INDEX: 44.32 KG/M2 | HEIGHT: 64 IN | OXYGEN SATURATION: 98 % | WEIGHT: 259.6 LBS | DIASTOLIC BLOOD PRESSURE: 68 MMHG

## 2021-11-03 DIAGNOSIS — I50.32 CHRONIC DIASTOLIC HEART FAILURE (HCC): ICD-10-CM

## 2021-11-03 DIAGNOSIS — N18.30 TYPE 2 DIABETES MELLITUS WITH STAGE 3 CHRONIC KIDNEY DISEASE, WITHOUT LONG-TERM CURRENT USE OF INSULIN, UNSPECIFIED WHETHER STAGE 3A OR 3B CKD (HCC): Primary | ICD-10-CM

## 2021-11-03 DIAGNOSIS — N18.30 ANEMIA DUE TO STAGE 3 CHRONIC KIDNEY DISEASE, UNSPECIFIED WHETHER STAGE 3A OR 3B CKD (HCC): ICD-10-CM

## 2021-11-03 DIAGNOSIS — E34.9 TESTOSTERONE DEFICIENCY: ICD-10-CM

## 2021-11-03 DIAGNOSIS — G47.33 OBSTRUCTIVE SLEEP APNEA OF ADULT: ICD-10-CM

## 2021-11-03 DIAGNOSIS — M25.40 JOINT SWELLING: ICD-10-CM

## 2021-11-03 DIAGNOSIS — M62.838 MUSCLE SPASM: ICD-10-CM

## 2021-11-03 DIAGNOSIS — E55.9 VITAMIN D DEFICIENCY: ICD-10-CM

## 2021-11-03 DIAGNOSIS — E11.22 TYPE 2 DIABETES MELLITUS WITH STAGE 3 CHRONIC KIDNEY DISEASE, WITHOUT LONG-TERM CURRENT USE OF INSULIN, UNSPECIFIED WHETHER STAGE 3A OR 3B CKD (HCC): Primary | ICD-10-CM

## 2021-11-03 DIAGNOSIS — J43.1 PANLOBULAR EMPHYSEMA (HCC): ICD-10-CM

## 2021-11-03 DIAGNOSIS — D63.1 ANEMIA DUE TO STAGE 3 CHRONIC KIDNEY DISEASE, UNSPECIFIED WHETHER STAGE 3A OR 3B CKD (HCC): ICD-10-CM

## 2021-11-03 DIAGNOSIS — M19.90 ARTHRITIS: ICD-10-CM

## 2021-11-03 PROCEDURE — 99214 OFFICE O/P EST MOD 30 MIN: CPT | Performed by: FAMILY MEDICINE

## 2021-11-03 RX ORDER — MULTIVIT-MIN/IRON/FOLIC ACID/K 18-600-40
1 CAPSULE ORAL DAILY
Qty: 90 CAPSULE | Refills: 3 | Status: SHIPPED | OUTPATIENT
Start: 2021-11-03 | End: 2023-03-19

## 2021-11-03 NOTE — PROGRESS NOTES
Robe Bryant is a 70 y.o. male.    Chief Complaint   Patient presents with   • Diabetes   • Knee Pain     Bilateral x 3 months   • Fatigue   • Shortness of Breath       HPI   Patient has had diabetes for the past few years. He has been compliant with the medications and denies any side effects from it. He has been monitoring fingersticks.  his fingerstick range is between  fasting.  She has not had hypoglycemic symptoms. He has been following a diabetic diet and has not been active.  his last eye exam was greater than a year ago .     Patient continues to complains of fatigue and daytime sleepiness.  He does have low testosterone and slight anemia likely due to CKD.  Also has low Vitamin D.  He feels more short of breath despite taking Breztri regularly.  He is on Oxygen at 2L via NC.  He also uses a bipap for ECHO.  He has both CHF and COPD.     He complains of leg, knee, hand pain and cramps.  He states his muscle draw.      The following portions of the patient's history were reviewed and updated as appropriate: allergies, current medications, past family history, past medical history, past social history, past surgical history and problem list.     No Known Allergies      Current Outpatient Medications:   •  albuterol sulfate  (90 Base) MCG/ACT inhaler, Inhale 2 puffs Every 4 (Four) Hours As Needed for Wheezing., Disp: , Rfl:   •  amiodarone (PACERONE) 200 MG tablet, Take 1 tablet by mouth Daily., Disp: 30 tablet, Rfl: 0  •  amLODIPine (NORVASC) 10 MG tablet, Take 1 tablet by mouth Daily., Disp: 30 tablet, Rfl: 1  •  apixaban (ELIQUIS) 5 MG tablet tablet, Take 5 mg by mouth 2 (Two) Times a Day., Disp: , Rfl:   •  aspirin 81 MG chewable tablet, Chew 81 mg Daily., Disp: , Rfl:   •  atorvastatin (LIPITOR) 20 MG tablet, Take 1 tablet by mouth Every Night., Disp: 90 tablet, Rfl: 3  •  baclofen (LIORESAL) 10 MG tablet, Take 1 tablet by mouth 3 (Three) Times a Day As Needed for Muscle Spasms., Disp:  90 tablet, Rfl: 2  •  Blood Glucose Monitoring Suppl (TRUE METRIX AIR GLUCOSE METER) w/Device kit, 1 each by In Vitro route 2 (two) times a day. E11.9, Disp: 1 kit, Rfl: 0  •  Budeson-Glycopyrrol-Formoterol (Breztri Aerosphere) 160-9-4.8 MCG/ACT aerosol inhaler, Inhale 2 puffs 2 (Two) Times a Day., Disp: 15.9 g, Rfl: 5  •  cetirizine (zyrTEC) 10 MG tablet, Take 1 tablet by mouth Daily., Disp: 90 tablet, Rfl: 3  •  diclofenac (VOLTAREN) 1 % gel gel, Apply 4 g topically to the appropriate area as directed 4 (Four) Times a Day As Needed (pain)., Disp: 100 g, Rfl: 5  •  finasteride (PROSCAR) 5 MG tablet, Take 1 tablet by mouth Daily., Disp: 90 tablet, Rfl: 3  •  FLUoxetine (PROzac) 20 MG capsule, Take 1 capsule by mouth Daily., Disp: 90 capsule, Rfl: 3  •  fluticasone (Flonase) 50 MCG/ACT nasal spray, 2 sprays into the nostril(s) as directed by provider Daily., Disp: 18.2 mL, Rfl: 5  •  furosemide (Lasix) 20 MG tablet, Take 1 tablet by mouth every morning; may take second dose as needed for increased swelling, Disp: 60 tablet, Rfl: 2  •  glipizide (GLUCOTROL) 5 MG tablet, TAKE 1 TABLET BY MOUTH 2 TIMES A DAY BEFORE MEALS., Disp: 180 tablet, Rfl: 3  •  glucose blood (True Metrix Blood Glucose Test) test strip, Daily testing E11.9, Disp: 100 each, Rfl: 5  •  glucose monitor monitoring kit, 1 each Daily. E11.9, Disp: 1 each, Rfl: 0  •  HYDROcodone-acetaminophen (Norco)  MG per tablet, Take 1 tablet by mouth Every 6 (Six) Hours As Needed for Severe Pain ., Disp: 12 tablet, Rfl: 0  •  ipratropium-albuterol (DUO-NEB) 0.5-2.5 mg/3 ml nebulizer, Take 3 mL by nebulization 4 (Four) Times a Day., Disp: 1620 mL, Rfl: 2  •  Lancet Device misc, 1 each Daily. E11.9, Disp: 100 each, Rfl: 3  •  lisinopril (PRINIVIL,ZESTRIL) 40 MG tablet, Take 1 tablet by mouth Daily., Disp: 90 tablet, Rfl: 3  •  metoprolol succinate XL (Toprol XL) 50 MG 24 hr tablet, Take 1 tablet by mouth Daily., Disp: 30 tablet, Rfl: 0  •  Misc. Devices misc,  Bipap tubing., Disp: 1 each, Rfl: 0  •  Multiple Vitamin tablet, Take 1 tablet by mouth daily., Disp: , Rfl:   •  Nebulizer misc, 1 each Every 4 (Four) Hours As Needed (shortness of breath)., Disp: 1 each, Rfl: 0  •  nitroglycerin (Nitrostat) 0.4 MG SL tablet, Place 1 tablet under the tongue Every 5 (Five) Minutes As Needed for Chest Pain., Disp: 25 tablet, Rfl: 11  •  O2 (OXYGEN), Inhale 2 L/min Continuous As Needed (With activity)., Disp: , Rfl:   •  omeprazole (priLOSEC) 40 MG capsule, Take 1 capsule by mouth Daily., Disp: 90 capsule, Rfl: 3  •  ondansetron ODT (Zofran ODT) 4 MG disintegrating tablet, Place 1 tablet on the tongue Every 8 (Eight) Hours As Needed for Nausea or Vomiting., Disp: 20 tablet, Rfl: 0  •  potassium chloride (K-DUR,KLOR-CON) 10 MEQ CR tablet, Take 1 tablet by mouth 2 (Two) Times a Day., Disp: 60 tablet, Rfl: 2  •  pramipexole (MIRAPEX) 0.5 MG tablet, Take 1 tablet by mouth 3 (Three) Times a Day., Disp: 270 tablet, Rfl: 3  •  spironolactone (Aldactone) 50 MG tablet, Take 1 tablet by mouth Daily., Disp: 90 tablet, Rfl: 1  •  terazosin (HYTRIN) 10 MG capsule, Take 1 capsule by mouth Every Night., Disp: 90 capsule, Rfl: 3  •  TRUEplus Lancets 33G misc, 1 each by Other route Daily. E11.9, Disp: 100 each, Rfl: 5  •  Vitamin D, Cholecalciferol, 50 MCG (2000 UT) capsule, Take 1 capsule by mouth Daily., Disp: 90 capsule, Rfl: 3    ROS    Review of Systems   Constitutional: Positive for appetite change (decreased) and fatigue. Negative for chills, fever and unexpected weight gain.   Respiratory: Positive for cough and shortness of breath.    Cardiovascular: Positive for leg swelling. Negative for chest pain.   Gastrointestinal: Positive for diarrhea (yesterday). Negative for abdominal pain, nausea and vomiting.   Musculoskeletal: Positive for arthralgias, joint swelling and myalgias.   Neurological: Positive for weakness.       Vitals:    11/03/21 0929   BP: 128/68   Pulse: 71   Temp: 98 °F (36.7  "°C)   SpO2: 98%   Weight: 118 kg (259 lb 9.6 oz)   Height: 162.6 cm (64\")   PainSc:   8   PainLoc: Knee     Body mass index is 44.56 kg/m².    Physical Exam     Physical Exam  Constitutional:       General: He is not in acute distress.     Appearance: He is well-developed. He is obese.   HENT:      Head: Normocephalic and atraumatic.      Right Ear: External ear normal.      Left Ear: External ear normal.   Eyes:      Extraocular Movements: Extraocular movements intact.      Conjunctiva/sclera: Conjunctivae normal.   Cardiovascular:      Rate and Rhythm: Normal rate and regular rhythm.      Heart sounds: No murmur heard.      Pulmonary:      Effort: Pulmonary effort is normal. No respiratory distress.      Breath sounds: Wheezing present.   Abdominal:      General: Bowel sounds are normal. There is no distension.      Palpations: Abdomen is soft.      Tenderness: There is no abdominal tenderness.   Musculoskeletal:      Right lower leg: Edema (1+) present.      Left lower leg: Edema (2+) present.   Skin:     General: Skin is warm and dry.   Neurological:      Mental Status: He is alert and oriented to person, place, and time.      Cranial Nerves: No cranial nerve deficit.   Psychiatric:         Mood and Affect: Mood normal.         Behavior: Behavior normal.         Assessment/Plan    Problems Addressed this Visit        Cardiac and Vasculature    Diastolic heart failure (HCC)       Endocrine and Metabolic    Type 2 diabetes mellitus, without long-term current use of insulin (HCC) - Primary       Pulmonary and Pneumonias    Panlobular emphysema (HCC)       Sleep    Obstructive sleep apnea of adult      Other Visit Diagnoses     Arthritis        Relevant Orders    Ambulatory Referral to Rheumatology (Completed)    Joint swelling        Relevant Orders    Ambulatory Referral to Rheumatology (Completed)    Muscle spasm        Relevant Orders    Ambulatory Referral to Rheumatology (Completed)    Testosterone deficiency  "       Anemia due to stage 3 chronic kidney disease, unspecified whether stage 3a or 3b CKD (HCC)        Vitamin D deficiency            Diabetes is controlled with recent A1C of 7.0.  Patient to continue oral medication. Will monitor A1C and microalbumin every few months.  He is on a statin and ACEI.  Also takes ASA daily.     Discussed with patient in regards to his fatigue I believe it is multifactoral. Discussed lab results with patient.  Will star 2000units of vitamin D.  Encouraged to contact Dr. Aden's office to schedule an appt to discuss testosterone.  Advised he is high risk to receive treatment and I would feel more comfortable with him seeing urology for this issue.      Advised will place a referral to rheumatology to discuss muscle aches and arthritis.  SOURAV was negative and electrolytes are within normal range.  However, I'm not sure what is the cause of his muscle pain.     New Medications Ordered This Visit   Medications   • Vitamin D, Cholecalciferol, 50 MCG (2000 UT) capsule     Sig: Take 1 capsule by mouth Daily.     Dispense:  90 capsule     Refill:  3       No orders of the defined types were placed in this encounter.      Return in about 3 months (around 2/3/2022) for Diabetes, HTN, COPD, A.fib.    Radha Jean,

## 2021-11-17 ENCOUNTER — OFFICE VISIT (OUTPATIENT)
Dept: INTERNAL MEDICINE | Facility: CLINIC | Age: 71
End: 2021-11-17

## 2021-11-17 VITALS
HEIGHT: 64 IN | TEMPERATURE: 97.5 F | RESPIRATION RATE: 17 BRPM | BODY MASS INDEX: 44.05 KG/M2 | OXYGEN SATURATION: 100 % | WEIGHT: 258 LBS | DIASTOLIC BLOOD PRESSURE: 69 MMHG | SYSTOLIC BLOOD PRESSURE: 133 MMHG | HEART RATE: 60 BPM

## 2021-11-17 DIAGNOSIS — J44.1 COPD WITH EXACERBATION (HCC): Primary | ICD-10-CM

## 2021-11-17 PROCEDURE — 99214 OFFICE O/P EST MOD 30 MIN: CPT | Performed by: INTERNAL MEDICINE

## 2021-11-17 RX ORDER — AMOXICILLIN AND CLAVULANATE POTASSIUM 875; 125 MG/1; MG/1
1 TABLET, FILM COATED ORAL EVERY 12 HOURS SCHEDULED
Qty: 14 TABLET | Refills: 0 | Status: SHIPPED | OUTPATIENT
Start: 2021-11-17 | End: 2022-01-21

## 2021-11-17 RX ORDER — PREDNISONE 10 MG/1
TABLET ORAL
Qty: 48 EACH | Refills: 0 | Status: SHIPPED | OUTPATIENT
Start: 2021-11-17 | End: 2022-01-21

## 2021-11-17 NOTE — PROGRESS NOTES
Chief Complaint   Patient presents with   • Fatigue     Dr Jean has been treating, -not feeling any better   • Generalized Body Aches     with head aches   • Cough     per pt eating cough drops       Subjective     History of Present Illness   Robe Bryant is a 70 y.o. male with history of COPD who presents with acute shortness of breath with wheezing over the last 4 days.  Patient has tried a shot of steroids and antibiotics given when he saw Dr. Jean 2 weeks ago.  Patient has associated productive sputum with yellow discoloration.  He denies fever or chills but has headaches and wheezing.  It is notable that he just started oxygen with pulmonology about a month ago.      The following portions of the patient's history were reviewed and updated as appropriate: allergies, current medications, past family history, past medical history, past social history, past surgical history and problem list.    Review of Systems   Constitutional: Positive for fatigue. Negative for chills and fever.   HENT: Negative for congestion, ear pain, rhinorrhea, sinus pressure and sore throat.    Eyes: Negative for visual disturbance.   Respiratory: Positive for cough, chest tightness and shortness of breath. Negative for wheezing.    Cardiovascular: Negative for chest pain, palpitations and leg swelling.   Gastrointestinal: Negative for abdominal pain, blood in stool, constipation, diarrhea, nausea and vomiting.   Endocrine: Negative for polydipsia and polyuria.   Genitourinary: Negative for dysuria and hematuria.   Musculoskeletal: Negative for arthralgias and back pain.   Skin: Negative for rash.   Neurological: Negative for dizziness, light-headedness, numbness and headaches.   Psychiatric/Behavioral: Negative for dysphoric mood and sleep disturbance. The patient is not nervous/anxious.        No Known Allergies    Past Medical History:   Diagnosis Date   • Anxiety and depression    • Arthritis    • Backache     20  years   • Bladder trauma    • Cervicalgia    • Chronic kidney disease     Cr up to 2.1   • Diabetes mellitus (HCC)     15 years--   • Emphysema of lung (HCC)    • Eye exam, routine    • FH: colonic polyps    • Gout     for 10 years--   • History of echocardiogram 09/15/2014   • History of tobacco use     with cessation x7 years   • Hyperlipidemia    • Hypertension    • Migraine    • Myocardial infarction (HCC)     h/o coronary artery stenting--   • Restless leg syndrome     20 years   • Sleep apnea     12 years; uses a Bi-Pap   • Stroke (HCC)    • Syncope 2017   • Warfarin anticoagulation 2016       Social History     Socioeconomic History   • Marital status:    Tobacco Use   • Smoking status: Former Smoker     Packs/day: 1.00     Years: 30.00     Pack years: 30.00     Types: Cigarettes     Quit date: 8/15/2001     Years since quittin.2   • Smokeless tobacco: Never Used   • Tobacco comment: quit 16 years ago   Vaping Use   • Vaping Use: Never used   Substance and Sexual Activity   • Alcohol use: No   • Drug use: No   • Sexual activity: Defer        Past Surgical History:   Procedure Laterality Date   • BLADDER SURGERY     • CARDIAC CATHETERIZATION  03/15/2013    revealing widely patent stents of the left circumflex and ramus intermedius coronary arteries, no significant disease is seen in any territory   • CARDIAC CATHETERIZATION Left 2019    Procedure: Left Heart Cath;  Surgeon: Arelis Tucker MD;  Location:  [facility];  Service: Cardiology   • CARDIAC ELECTROPHYSIOLOGY PROCEDURE N/A 5/10/2021    Procedure: PPM battery change;  Surgeon: Arelis Tucker MD;  Location:  [facility];  Service: Cardiology;  Laterality: N/A;   • CARDIAC PACEMAKER PLACEMENT     • CATARACT EXTRACTION     • COLONOSCOPY      No family history of colon cancer.     • COLONOSCOPY     • HERNIA REPAIR      Type unknown   • PACEMAKER IMPLANTATION     •  PROSTATE SURGERY         Family History   Problem Relation Age of Onset   • Cancer Mother    • Diabetes Mother    • Hypertension Mother    • Stroke Mother    • Stomach cancer Mother    • Heart disease Mother    • Stomach cancer Father    • Cancer Father    • Lung cancer Father          Current Outpatient Medications:   •  albuterol sulfate  (90 Base) MCG/ACT inhaler, Inhale 2 puffs Every 4 (Four) Hours As Needed for Wheezing., Disp: , Rfl:   •  amiodarone (PACERONE) 200 MG tablet, Take 1 tablet by mouth Daily., Disp: 30 tablet, Rfl: 0  •  amLODIPine (NORVASC) 10 MG tablet, Take 1 tablet by mouth Daily., Disp: 30 tablet, Rfl: 1  •  apixaban (ELIQUIS) 5 MG tablet tablet, Take 5 mg by mouth 2 (Two) Times a Day., Disp: , Rfl:   •  aspirin 81 MG chewable tablet, Chew 81 mg Daily., Disp: , Rfl:   •  atorvastatin (LIPITOR) 20 MG tablet, Take 1 tablet by mouth Every Night., Disp: 90 tablet, Rfl: 3  •  baclofen (LIORESAL) 10 MG tablet, Take 1 tablet by mouth 3 (Three) Times a Day As Needed for Muscle Spasms., Disp: 90 tablet, Rfl: 2  •  Blood Glucose Monitoring Suppl (TRUE METRIX AIR GLUCOSE METER) w/Device kit, 1 each by In Vitro route 2 (two) times a day. E11.9, Disp: 1 kit, Rfl: 0  •  Budeson-Glycopyrrol-Formoterol (Breztri Aerosphere) 160-9-4.8 MCG/ACT aerosol inhaler, Inhale 2 puffs 2 (Two) Times a Day., Disp: 15.9 g, Rfl: 5  •  cetirizine (zyrTEC) 10 MG tablet, Take 1 tablet by mouth Daily., Disp: 90 tablet, Rfl: 3  •  diclofenac (VOLTAREN) 1 % gel gel, Apply 4 g topically to the appropriate area as directed 4 (Four) Times a Day As Needed (pain)., Disp: 100 g, Rfl: 5  •  finasteride (PROSCAR) 5 MG tablet, Take 1 tablet by mouth Daily., Disp: 90 tablet, Rfl: 3  •  FLUoxetine (PROzac) 20 MG capsule, Take 1 capsule by mouth Daily., Disp: 90 capsule, Rfl: 3  •  fluticasone (Flonase) 50 MCG/ACT nasal spray, 2 sprays into the nostril(s) as directed by provider Daily., Disp: 18.2 mL, Rfl: 5  •  furosemide (Lasix) 20 MG  tablet, Take 1 tablet by mouth every morning; may take second dose as needed for increased swelling, Disp: 60 tablet, Rfl: 2  •  glipizide (GLUCOTROL) 5 MG tablet, TAKE 1 TABLET BY MOUTH 2 TIMES A DAY BEFORE MEALS., Disp: 180 tablet, Rfl: 3  •  glucose blood (True Metrix Blood Glucose Test) test strip, Daily testing E11.9, Disp: 100 each, Rfl: 5  •  glucose monitor monitoring kit, 1 each Daily. E11.9, Disp: 1 each, Rfl: 0  •  HYDROcodone-acetaminophen (Norco)  MG per tablet, Take 1 tablet by mouth Every 6 (Six) Hours As Needed for Severe Pain ., Disp: 12 tablet, Rfl: 0  •  ipratropium-albuterol (DUO-NEB) 0.5-2.5 mg/3 ml nebulizer, Take 3 mL by nebulization 4 (Four) Times a Day., Disp: 1620 mL, Rfl: 2  •  Lancet Device misc, 1 each Daily. E11.9, Disp: 100 each, Rfl: 3  •  lisinopril (PRINIVIL,ZESTRIL) 40 MG tablet, Take 1 tablet by mouth Daily., Disp: 90 tablet, Rfl: 3  •  metoprolol succinate XL (Toprol XL) 50 MG 24 hr tablet, Take 1 tablet by mouth Daily., Disp: 30 tablet, Rfl: 0  •  Misc. Devices misc, Bipap tubing., Disp: 1 each, Rfl: 0  •  Multiple Vitamin tablet, Take 1 tablet by mouth daily., Disp: , Rfl:   •  Nebulizer misc, 1 each Every 4 (Four) Hours As Needed (shortness of breath)., Disp: 1 each, Rfl: 0  •  nitroglycerin (Nitrostat) 0.4 MG SL tablet, Place 1 tablet under the tongue Every 5 (Five) Minutes As Needed for Chest Pain., Disp: 25 tablet, Rfl: 11  •  O2 (OXYGEN), Inhale 2 L/min Continuous As Needed (With activity)., Disp: , Rfl:   •  omeprazole (priLOSEC) 40 MG capsule, Take 1 capsule by mouth Daily., Disp: 90 capsule, Rfl: 3  •  ondansetron ODT (Zofran ODT) 4 MG disintegrating tablet, Place 1 tablet on the tongue Every 8 (Eight) Hours As Needed for Nausea or Vomiting., Disp: 20 tablet, Rfl: 0  •  potassium chloride (K-DUR,KLOR-CON) 10 MEQ CR tablet, Take 1 tablet by mouth 2 (Two) Times a Day., Disp: 60 tablet, Rfl: 2  •  pramipexole (MIRAPEX) 0.5 MG tablet, Take 1 tablet by mouth 3 (Three)  "Times a Day., Disp: 270 tablet, Rfl: 3  •  spironolactone (Aldactone) 50 MG tablet, Take 1 tablet by mouth Daily., Disp: 90 tablet, Rfl: 1  •  terazosin (HYTRIN) 10 MG capsule, Take 1 capsule by mouth Every Night., Disp: 90 capsule, Rfl: 3  •  TRUEplus Lancets 33G misc, 1 each by Other route Daily. E11.9, Disp: 100 each, Rfl: 5  •  Vitamin D, Cholecalciferol, 50 MCG (2000 UT) capsule, Take 1 capsule by mouth Daily., Disp: 90 capsule, Rfl: 3  •  amoxicillin-clavulanate (Augmentin) 875-125 MG per tablet, Take 1 tablet by mouth Every 12 (Twelve) Hours., Disp: 14 tablet, Rfl: 0  •  predniSONE (DELTASONE) 10 MG (48) dose pack, Tablet pack, take as directed., Disp: 48 each, Rfl: 0    Objective   /69   Pulse 60   Temp 97.5 °F (36.4 °C)   Resp 17   Ht 162.6 cm (64\")   Wt 117 kg (258 lb)   SpO2 100% Comment: on 3L  BMI 44.29 kg/m²     Physical Exam  Vitals and nursing note reviewed.   Constitutional:       Appearance: Normal appearance. He is well-developed.   HENT:      Head: Normocephalic and atraumatic.   Eyes:      Extraocular Movements: Extraocular movements intact.      Conjunctiva/sclera: Conjunctivae normal.   Cardiovascular:      Rate and Rhythm: Normal rate and regular rhythm.   Pulmonary:      Effort: Pulmonary effort is normal.      Breath sounds: Wheezing present.   Musculoskeletal:      Cervical back: Normal range of motion and neck supple.   Skin:     General: Skin is warm and dry.      Findings: No rash.   Neurological:      General: No focal deficit present.      Mental Status: He is alert and oriented to person, place, and time.   Psychiatric:         Mood and Affect: Mood normal.         Behavior: Behavior normal.         Assessment/Plan   Diagnoses and all orders for this visit:    1. COPD with exacerbation (HCC) (Primary)  -     amoxicillin-clavulanate (Augmentin) 875-125 MG per tablet; Take 1 tablet by mouth Every 12 (Twelve) Hours.  Dispense: 14 tablet; Refill: 0  -     predniSONE " (DELTASONE) 10 MG (48) dose pack; Tablet pack, take as directed.  Dispense: 48 each; Refill: 0          Discussion Summary:    Patient is a 70 y.o. male presenting for COPD exacerbation.     1. COPD exacerbation  - patient started on Augmentin with extended steroid taper to better control symptoms.    - patient may continue home inhalers and nebs.  Should symptoms worsen patient has instructions to contact the clinic or present to the ER.     2.  T2DM - pt warned about risk for high glucose with steroids.  He will take additional caution with diet.     Follow up:  Return if symptoms worsen or fail to improve.     Patient Instructions:  Patient instructions were provided.

## 2021-11-17 NOTE — PATIENT INSTRUCTIONS
Chronic Obstructive Pulmonary Disease Exacerbation    Chronic obstructive pulmonary disease (COPD) is a long-term (chronic) condition that affects the lungs. COPD is a general term that can be used to describe many different lung problems that cause lung swelling (inflammation) and limit airflow, including chronic bronchitis and emphysema. COPD exacerbations are episodes when breathing symptoms become much worse and require extra treatment.  COPD exacerbations are usually caused by infections. Without treatment, COPD exacerbations can be severe and even life threatening. Frequent COPD exacerbations can cause further damage to the lungs.  What are the causes?  This condition may be caused by:  · Respiratory infections, including viral and bacterial infections.  · Exposure to smoke.  · Exposure to air pollution, chemical fumes, or dust.  · Things that give you an allergic reaction (allergens).  · Not taking your usual COPD medicines as directed.  · Underlying medical problems, such as congestive heart failure or infections not involving the lungs.  In many cases, the cause (trigger) of this condition is not known.  What increases the risk?  The following factors may make you more likely to develop this condition:  · Smoking cigarettes.  · Old age.  · Frequent prior COPD exacerbations.  What are the signs or symptoms?  Symptoms of this condition include:  · Increased coughing.  · Increased production of mucus from your lungs (sputum).  · Increased wheezing.  · Increased shortness of breath.  · Rapid or labored breathing.  · Chest tightness.  · Less energy than usual.  · Sleep disruption from symptoms.  · Confusion or increased sleepiness.  Often these symptoms happen or get worse even with the use of medicines.  How is this diagnosed?  This condition is diagnosed based on:  · Your medical history.  · A physical exam.  You may also have tests, including:  · A chest X-ray.  · Blood tests.  · Lung (pulmonary) function  tests.  How is this treated?  Treatment for this condition depends on the severity and cause of the symptoms. You may need to be admitted to a hospital for treatment. Some of the treatments commonly used to treat COPD exacerbations are:  · Antibiotic medicines. These may be used for severe exacerbations caused by a lung infection, such as pneumonia.  · Bronchodilators. These are inhaled medicines that expand the air passages and allow increased airflow.  · Steroid medicines. These act to reduce inflammation in the airways. They may be given with an inhaler, taken by mouth, or given through an IV tube inserted into one of your veins.  · Supplemental oxygen therapy.  · Airway clearing techniques, such as noninvasive ventilation (NIV) and positive expiratory pressure (PEP). These provide respiratory support through a mask or other noninvasive device. An example of this would be using a continuous positive airway pressure (CPAP) machine to improve delivery of oxygen into your lungs.  Follow these instructions at home:  Medicines  · Take over-the-counter and prescription medicines only as told by your health care provider. It is important to use correct technique with inhaled medicines.  · If you were prescribed an antibiotic medicine or oral steroid, take it as told by your health care provider. Do not stop taking the medicine even if you start to feel better.  Lifestyle  · Eat a healthy diet.  · Exercise regularly.  · Get plenty of sleep.  · Avoid exposure to all substances that irritate the airway, especially to tobacco smoke.  · Wash your hands often with soap and water to reduce the risk of infection. If soap and water are not available, use hand .  · During flu season, avoid enclosed spaces that are crowded with people.  General instructions  · Drink enough fluid to keep your urine clear or pale yellow (unless you have a medical condition that requires fluid restriction).  · Use a cool mist vaporizer. This  humidifies the air and makes it easier for you to clear your chest when you cough.  · If you have a home nebulizer and oxygen, continue to use them as told by your health care provider.  · Keep all follow-up visits as told by your health care provider. This is important.  How is this prevented?  · Stay up-to-date on pneumococcal and influenza (flu) vaccines. A flu shot is recommended every year to help prevent exacerbations.  · Do not use any products that contain nicotine or tobacco, such as cigarettes and e-cigarettes. Quitting smoking is very important in preventing COPD from getting worse and in preventing exacerbations from happening as often. If you need help quitting, ask your health care provider.  · Follow all instructions for pulmonary rehabilitation after a recent exacerbation. This can help prevent future exacerbations.  · Work with your health care provider to develop and follow an action plan. This tells you what steps to take when you experience certain symptoms.  Contact a health care provider if:  · You have a worsening of your regular COPD symptoms.  Get help right away if:  · You have worsening shortness of breath, even when resting.  · You have trouble talking.  · You have severe chest pain.  · You cough up blood.  · You have a fever.  · You have weakness, vomit repeatedly, or faint.  · You feel confused.  · You are not able to sleep because of your symptoms.  · You have trouble doing daily activities.  Summary  · COPD exacerbations are episodes when breathing symptoms become much worse and require extra treatment above your normal treatment.  · Exacerbations can be severe and even life threatening. Frequent COPD exacerbations can cause further damage to your lungs.  · COPD exacerbations are usually triggered by infections such as the flu, colds, and even pneumonia.  · Treatment for this condition depends on the severity and cause of the symptoms. You may need to be admitted to a hospital for  treatment.  · Quitting smoking is very important to prevent COPD from getting worse and to prevent exacerbations from happening as often.  This information is not intended to replace advice given to you by your health care provider. Make sure you discuss any questions you have with your health care provider.  Document Revised: 11/30/2018 Document Reviewed: 01/22/2018  [employer] Patient Education © 2021 [employer]

## 2021-11-22 ENCOUNTER — APPOINTMENT (OUTPATIENT)
Dept: CT IMAGING | Facility: HOSPITAL | Age: 71
End: 2021-11-22

## 2021-11-22 ENCOUNTER — HOSPITAL ENCOUNTER (EMERGENCY)
Facility: HOSPITAL | Age: 71
Discharge: HOME | End: 2021-11-23
Attending: EMERGENCY MEDICINE | Admitting: EMERGENCY MEDICINE

## 2021-11-22 ENCOUNTER — APPOINTMENT (OUTPATIENT)
Dept: GENERAL RADIOLOGY | Facility: HOSPITAL | Age: 71
End: 2021-11-22

## 2021-11-22 DIAGNOSIS — R06.2 WHEEZING: ICD-10-CM

## 2021-11-22 DIAGNOSIS — J98.8 VIRAL RESPIRATORY ILLNESS: ICD-10-CM

## 2021-11-22 DIAGNOSIS — R05.9 COUGH: Primary | ICD-10-CM

## 2021-11-22 DIAGNOSIS — B97.89 VIRAL RESPIRATORY ILLNESS: ICD-10-CM

## 2021-11-22 LAB
ALBUMIN SERPL-MCNC: 3.9 G/DL (ref 3.5–5.2)
ALBUMIN/GLOB SERPL: 1.5 G/DL
ALP SERPL-CCNC: 68 U/L (ref 39–117)
ALT SERPL W P-5'-P-CCNC: 15 U/L (ref 1–41)
ANION GAP SERPL CALCULATED.3IONS-SCNC: 8.4 MMOL/L (ref 5–15)
AST SERPL-CCNC: 15 U/L (ref 1–40)
B PARAPERT DNA SPEC QL NAA+PROBE: NOT DETECTED
B PERT DNA SPEC QL NAA+PROBE: NOT DETECTED
BASOPHILS # BLD AUTO: 0.07 10*3/MM3 (ref 0–0.2)
BASOPHILS NFR BLD AUTO: 0.7 % (ref 0–1.5)
BILIRUB SERPL-MCNC: 0.2 MG/DL (ref 0–1.2)
BUN SERPL-MCNC: 31 MG/DL (ref 8–23)
BUN/CREAT SERPL: 19.1 (ref 7–25)
C PNEUM DNA NPH QL NAA+NON-PROBE: NOT DETECTED
CALCIUM SPEC-SCNC: 8.8 MG/DL (ref 8.6–10.5)
CHLORIDE SERPL-SCNC: 99 MMOL/L (ref 98–107)
CO2 SERPL-SCNC: 25.6 MMOL/L (ref 22–29)
CREAT SERPL-MCNC: 1.62 MG/DL (ref 0.76–1.27)
D-LACTATE SERPL-SCNC: 2.2 MMOL/L (ref 0.5–2)
DEPRECATED RDW RBC AUTO: 43 FL (ref 37–54)
EOSINOPHIL # BLD AUTO: 0.03 10*3/MM3 (ref 0–0.4)
EOSINOPHIL NFR BLD AUTO: 0.3 % (ref 0.3–6.2)
ERYTHROCYTE [DISTWIDTH] IN BLOOD BY AUTOMATED COUNT: 12.6 % (ref 12.3–15.4)
FLUAV SUBTYP SPEC NAA+PROBE: NOT DETECTED
FLUBV RNA ISLT QL NAA+PROBE: NOT DETECTED
GFR SERPL CREATININE-BSD FRML MDRD: 42 ML/MIN/1.73
GLOBULIN UR ELPH-MCNC: 2.6 GM/DL
GLUCOSE SERPL-MCNC: 289 MG/DL (ref 65–99)
HADV DNA SPEC NAA+PROBE: NOT DETECTED
HCOV 229E RNA SPEC QL NAA+PROBE: NOT DETECTED
HCOV HKU1 RNA SPEC QL NAA+PROBE: NOT DETECTED
HCOV NL63 RNA SPEC QL NAA+PROBE: NOT DETECTED
HCOV OC43 RNA SPEC QL NAA+PROBE: NOT DETECTED
HCT VFR BLD AUTO: 38.4 % (ref 37.5–51)
HGB BLD-MCNC: 12.4 G/DL (ref 13–17.7)
HMPV RNA NPH QL NAA+NON-PROBE: NOT DETECTED
HOLD SPECIMEN: NORMAL
HOLD SPECIMEN: NORMAL
HPIV1 RNA ISLT QL NAA+PROBE: NOT DETECTED
HPIV2 RNA SPEC QL NAA+PROBE: NOT DETECTED
HPIV3 RNA NPH QL NAA+PROBE: NOT DETECTED
HPIV4 P GENE NPH QL NAA+PROBE: NOT DETECTED
IMM GRANULOCYTES # BLD AUTO: 0.21 10*3/MM3 (ref 0–0.05)
IMM GRANULOCYTES NFR BLD AUTO: 2 % (ref 0–0.5)
LYMPHOCYTES # BLD AUTO: 1.34 10*3/MM3 (ref 0.7–3.1)
LYMPHOCYTES NFR BLD AUTO: 12.8 % (ref 19.6–45.3)
M PNEUMO IGG SER IA-ACNC: NOT DETECTED
MCH RBC QN AUTO: 30.2 PG (ref 26.6–33)
MCHC RBC AUTO-ENTMCNC: 32.3 G/DL (ref 31.5–35.7)
MCV RBC AUTO: 93.7 FL (ref 79–97)
MONOCYTES # BLD AUTO: 0.98 10*3/MM3 (ref 0.1–0.9)
MONOCYTES NFR BLD AUTO: 9.3 % (ref 5–12)
NEUTROPHILS NFR BLD AUTO: 7.87 10*3/MM3 (ref 1.7–7)
NEUTROPHILS NFR BLD AUTO: 74.9 % (ref 42.7–76)
NRBC BLD AUTO-RTO: 0 /100 WBC (ref 0–0.2)
NT-PROBNP SERPL-MCNC: 884 PG/ML (ref 0–900)
PLATELET # BLD AUTO: 196 10*3/MM3 (ref 140–450)
PMV BLD AUTO: 10.8 FL (ref 6–12)
POTASSIUM SERPL-SCNC: 4.4 MMOL/L (ref 3.5–5.2)
PROCALCITONIN SERPL-MCNC: 0.05 NG/ML (ref 0–0.25)
PROT SERPL-MCNC: 6.5 G/DL (ref 6–8.5)
RBC # BLD AUTO: 4.1 10*6/MM3 (ref 4.14–5.8)
RHINOVIRUS RNA SPEC NAA+PROBE: DETECTED
RSV RNA NPH QL NAA+NON-PROBE: NOT DETECTED
SARS-COV-2 RNA NPH QL NAA+NON-PROBE: NOT DETECTED
SODIUM SERPL-SCNC: 133 MMOL/L (ref 136–145)
TROPONIN T SERPL-MCNC: <0.01 NG/ML (ref 0–0.03)
WBC NRBC COR # BLD: 10.5 10*3/MM3 (ref 3.4–10.8)
WHOLE BLOOD HOLD SPECIMEN: NORMAL
WHOLE BLOOD HOLD SPECIMEN: NORMAL

## 2021-11-22 PROCEDURE — 25010000002 METHYLPREDNISOLONE PER 125 MG: Performed by: EMERGENCY MEDICINE

## 2021-11-22 PROCEDURE — 83605 ASSAY OF LACTIC ACID: CPT | Performed by: EMERGENCY MEDICINE

## 2021-11-22 PROCEDURE — 0202U NFCT DS 22 TRGT SARS-COV-2: CPT | Performed by: EMERGENCY MEDICINE

## 2021-11-22 PROCEDURE — 93005 ELECTROCARDIOGRAM TRACING: CPT | Performed by: EMERGENCY MEDICINE

## 2021-11-22 PROCEDURE — 99283 EMERGENCY DEPT VISIT LOW MDM: CPT

## 2021-11-22 PROCEDURE — 71250 CT THORAX DX C-: CPT

## 2021-11-22 PROCEDURE — 84145 PROCALCITONIN (PCT): CPT | Performed by: EMERGENCY MEDICINE

## 2021-11-22 PROCEDURE — 87040 BLOOD CULTURE FOR BACTERIA: CPT | Performed by: EMERGENCY MEDICINE

## 2021-11-22 PROCEDURE — 80053 COMPREHEN METABOLIC PANEL: CPT | Performed by: EMERGENCY MEDICINE

## 2021-11-22 PROCEDURE — 83880 ASSAY OF NATRIURETIC PEPTIDE: CPT | Performed by: EMERGENCY MEDICINE

## 2021-11-22 PROCEDURE — 84484 ASSAY OF TROPONIN QUANT: CPT | Performed by: EMERGENCY MEDICINE

## 2021-11-22 PROCEDURE — 71045 X-RAY EXAM CHEST 1 VIEW: CPT

## 2021-11-22 PROCEDURE — 85025 COMPLETE CBC W/AUTO DIFF WBC: CPT | Performed by: EMERGENCY MEDICINE

## 2021-11-22 PROCEDURE — 96375 TX/PRO/DX INJ NEW DRUG ADDON: CPT

## 2021-11-22 RX ORDER — METHYLPREDNISOLONE SODIUM SUCCINATE 125 MG/2ML
125 INJECTION, POWDER, LYOPHILIZED, FOR SOLUTION INTRAMUSCULAR; INTRAVENOUS ONCE
Status: COMPLETED | OUTPATIENT
Start: 2021-11-22 | End: 2021-11-22

## 2021-11-22 RX ORDER — SODIUM CHLORIDE 0.9 % (FLUSH) 0.9 %
10 SYRINGE (ML) INJECTION AS NEEDED
Status: DISCONTINUED | OUTPATIENT
Start: 2021-11-22 | End: 2021-11-23 | Stop reason: HOSPADM

## 2021-11-22 RX ADMIN — METHYLPREDNISOLONE SODIUM SUCCINATE 125 MG: 125 INJECTION, POWDER, FOR SOLUTION INTRAMUSCULAR; INTRAVENOUS at 22:43

## 2021-11-23 VITALS
HEART RATE: 60 BPM | HEIGHT: 69 IN | RESPIRATION RATE: 18 BRPM | DIASTOLIC BLOOD PRESSURE: 79 MMHG | TEMPERATURE: 97.5 F | BODY MASS INDEX: 38.36 KG/M2 | SYSTOLIC BLOOD PRESSURE: 142 MMHG | WEIGHT: 259 LBS | OXYGEN SATURATION: 95 %

## 2021-11-23 PROCEDURE — 25010000002 MAGNESIUM SULFATE 2 GM/50ML SOLUTION: Performed by: EMERGENCY MEDICINE

## 2021-11-23 PROCEDURE — 94799 UNLISTED PULMONARY SVC/PX: CPT

## 2021-11-23 PROCEDURE — 96365 THER/PROPH/DIAG IV INF INIT: CPT

## 2021-11-23 RX ORDER — IPRATROPIUM BROMIDE AND ALBUTEROL SULFATE 2.5; .5 MG/3ML; MG/3ML
3 SOLUTION RESPIRATORY (INHALATION) ONCE
Status: COMPLETED | OUTPATIENT
Start: 2021-11-23 | End: 2021-11-23

## 2021-11-23 RX ORDER — PREDNISONE 20 MG/1
TABLET ORAL
Qty: 18 TABLET | Refills: 0 | Status: SHIPPED | OUTPATIENT
Start: 2021-11-23 | End: 2021-12-02

## 2021-11-23 RX ORDER — MAGNESIUM SULFATE HEPTAHYDRATE 40 MG/ML
2 INJECTION, SOLUTION INTRAVENOUS ONCE
Status: COMPLETED | OUTPATIENT
Start: 2021-11-23 | End: 2021-11-23

## 2021-11-23 RX ADMIN — MAGNESIUM SULFATE HEPTAHYDRATE 2 G: 40 INJECTION, SOLUTION INTRAVENOUS at 00:44

## 2021-11-23 RX ADMIN — IPRATROPIUM BROMIDE AND ALBUTEROL SULFATE 3 ML: .5; 3 SOLUTION RESPIRATORY (INHALATION) at 00:43

## 2021-11-23 NOTE — ED PROVIDER NOTES
TRIAGE CHIEF COMPLAINT:     Nursing and triage notes reviewed    Chief Complaint   Patient presents with   • Shortness of Breath      HPI: Robe Bryant is a 70 y.o. male who presents to the emergency department complaining of an approximate 1 month history of shortness of breath, cough, wheezing, sputum production.  Patient states that he has been coughing up yellow-colored sputum during this time.  He describes wheezes as well as frequent coughing.  He states he does not feel very well.  He has tightness in his chest with coughing.  He is seen his primary care physician has been on antibiotics 2 times and states his symptoms have not improved.  He is also been on steroids.  Patient does have a history of COPD but does not use oxygen at home.    REVIEW OF SYSTEMS: All other systems reviewed and are negative     PAST MEDICAL HISTORY:   Past Medical History:   Diagnosis Date   • Anxiety and depression    • Arthritis    • Backache     20 years   • Bladder trauma    • Cervicalgia    • Chronic kidney disease     Cr up to 2.1   • Diabetes mellitus (HCC)     15 years--   • Emphysema of lung (HCC)    • Eye exam, routine 2015   • FH: colonic polyps    • Gout     for 10 years--   • History of echocardiogram 09/15/2014   • History of tobacco use     with cessation x7 years   • Hyperlipidemia    • Hypertension    • Migraine    • Myocardial infarction (HCC)     h/o coronary artery stenting--   • Restless leg syndrome     20 years   • Sleep apnea     12 years; uses a Bi-Pap   • Stroke (HCC)    • Syncope 4/28/2017   • Warfarin anticoagulation 9/14/2016        FAMILY HISTORY:   Family History   Problem Relation Age of Onset   • Cancer Mother    • Diabetes Mother    • Hypertension Mother    • Stroke Mother    • Stomach cancer Mother    • Heart disease Mother    • Stomach cancer Father    • Cancer Father    • Lung cancer Father         SOCIAL HISTORY:   Social History     Socioeconomic History   • Marital status:     Tobacco Use   • Smoking status: Former Smoker     Packs/day: 1.00     Years: 30.00     Pack years: 30.00     Types: Cigarettes     Quit date: 8/15/2001     Years since quittin.2   • Smokeless tobacco: Never Used   • Tobacco comment: quit 16 years ago   Vaping Use   • Vaping Use: Never used   Substance and Sexual Activity   • Alcohol use: No   • Drug use: No   • Sexual activity: Defer        SURGICAL HISTORY:   Past Surgical History:   Procedure Laterality Date   • BLADDER SURGERY     • CARDIAC CATHETERIZATION  03/15/2013    revealing widely patent stents of the left circumflex and ramus intermedius coronary arteries, no significant disease is seen in any territory   • CARDIAC CATHETERIZATION Left 2019    Procedure: Left Heart Cath;  Surgeon: Arelis Tucker MD;  Location:  [facility];  Service: Cardiology   • CARDIAC ELECTROPHYSIOLOGY PROCEDURE N/A 5/10/2021    Procedure: PPM battery change;  Surgeon: Arelis Tucker MD;  Location:  [facility];  Service: Cardiology;  Laterality: N/A;   • CARDIAC PACEMAKER PLACEMENT     • CATARACT EXTRACTION     • COLONOSCOPY      No family history of colon cancer.     • COLONOSCOPY     • HERNIA REPAIR      Type unknown   • PACEMAKER IMPLANTATION     • PROSTATE SURGERY          CURRENT MEDICATIONS:      Medication List      CONTINUE taking these medications    glucose monitor monitoring kit  1 each Daily. E11.9     Lancet Device misc  1 each Daily. E11.9     Misc. Devices misc  Bipap tubing.     Nebulizer misc  1 each Every 4 (Four) Hours As Needed (shortness of breath).     True Metrix Air Glucose Meter w/Device kit  1 each by In Vitro route 2 (two) times a day. E11.9     TRUEplus Lancets 33G misc  1 each by Other route Daily. E11.9        ASK your doctor about these medications    albuterol sulfate  (90 Base) MCG/ACT inhaler  Commonly known as: PROVENTIL HFA;VENTOLIN HFA;PROAIR HFA     amiodarone 200  MG tablet  Commonly known as: PACERONE  Take 1 tablet by mouth Daily.     amLODIPine 10 MG tablet  Commonly known as: NORVASC  Take 1 tablet by mouth Daily.     amoxicillin-clavulanate 875-125 MG per tablet  Commonly known as: Augmentin  Take 1 tablet by mouth Every 12 (Twelve) Hours.     apixaban 5 MG tablet tablet  Commonly known as: ELIQUIS     aspirin 81 MG chewable tablet     atorvastatin 20 MG tablet  Commonly known as: LIPITOR  Take 1 tablet by mouth Every Night.     baclofen 10 MG tablet  Commonly known as: LIORESAL  Take 1 tablet by mouth 3 (Three) Times a Day As Needed for Muscle Spasms.     Breztri Aerosphere 160-9-4.8 MCG/ACT aerosol inhaler  Generic drug: Budeson-Glycopyrrol-Formoterol  Inhale 2 puffs 2 (Two) Times a Day.     cetirizine 10 MG tablet  Commonly known as: zyrTEC  Take 1 tablet by mouth Daily.     diclofenac 1 % gel gel  Commonly known as: VOLTAREN  Apply 4 g topically to the appropriate area as directed 4 (Four) Times a Day As Needed (pain).     finasteride 5 MG tablet  Commonly known as: PROSCAR  Take 1 tablet by mouth Daily.     FLUoxetine 20 MG capsule  Commonly known as: PROzac  Take 1 capsule by mouth Daily.     fluticasone 50 MCG/ACT nasal spray  Commonly known as: Flonase  2 sprays into the nostril(s) as directed by provider Daily.     furosemide 20 MG tablet  Commonly known as: Lasix  Take 1 tablet by mouth every morning; may take second dose as needed for increased swelling     glipizide 5 MG tablet  Commonly known as: GLUCOTROL  TAKE 1 TABLET BY MOUTH 2 TIMES A DAY BEFORE MEALS.     HYDROcodone-acetaminophen  MG per tablet  Commonly known as: Norco  Take 1 tablet by mouth Every 6 (Six) Hours As Needed for Severe Pain .     ipratropium-albuterol 0.5-2.5 mg/3 ml nebulizer  Commonly known as: DUO-NEB  Take 3 mL by nebulization 4 (Four) Times a Day.     lisinopril 40 MG tablet  Commonly known as: PRINIVIL,ZESTRIL  Take 1 tablet by mouth Daily.     metoprolol succinate XL 50 MG  24 hr tablet  Commonly known as: Toprol XL  Take 1 tablet by mouth Daily.     multivitamin tablet tablet  Commonly known as: THERAGRAN     nitroglycerin 0.4 MG SL tablet  Commonly known as: Nitrostat  Place 1 tablet under the tongue Every 5 (Five) Minutes As Needed for Chest Pain.     O2  Commonly known as: OXYGEN     omeprazole 40 MG capsule  Commonly known as: priLOSEC  Take 1 capsule by mouth Daily.     ondansetron ODT 4 MG disintegrating tablet  Commonly known as: Zofran ODT  Place 1 tablet on the tongue Every 8 (Eight) Hours As Needed for Nausea or Vomiting.     potassium chloride 10 MEQ CR tablet  Commonly known as: K-DUR,KLOR-CON  Take 1 tablet by mouth 2 (Two) Times a Day.     pramipexole 0.5 MG tablet  Commonly known as: MIRAPEX  Take 1 tablet by mouth 3 (Three) Times a Day.     predniSONE 10 MG (48) dose pack  Commonly known as: DELTASONE  Tablet pack, take as directed.     spironolactone 50 MG tablet  Commonly known as: Aldactone  Take 1 tablet by mouth Daily.     terazosin 10 MG capsule  Commonly known as: HYTRIN  Take 1 capsule by mouth Every Night.     True Metrix Blood Glucose Test test strip  Generic drug: glucose blood  Daily testing E11.9     Vitamin D (Cholecalciferol) 50 MCG (2000 UT) capsule  Take 1 capsule by mouth Daily.             ALLERGIES: Patient has no known allergies.     PHYSICAL EXAM:   VITAL SIGNS:   Vitals:    11/22/21 2143   BP: (!) 187/96   Pulse: 62   Resp: 18   Temp: 97.4 °F (36.3 °C)   SpO2: 97%      CONSTITUTIONAL: Awake, oriented, appears nontoxic   HENT: Atraumatic, normocephalic, oral mucosa pink and moist, airway patent. Nares patent without drainage. External ears normal.   EYES: Conjunctivae clear   NECK: Trachea midline, nontender, supple   CARDIOVASCULAR: Normal heart rate, Normal rhythm, No murmurs, rubs, gallops   PULMONARY/CHEST: Patient has diffuse scattered wheezes in all lung fields.  No increased work of breathing or retractions.  Frequent coughing  noted.  ABDOMINAL: Nondistended, soft, nontender - no rebound or guarding.  NEUROLOGIC: Nonfocal, moving all four extremities, no gross sensory or motor deficits.   EXTREMITIES: No clubbing, cyanosis, or edema   SKIN: Warm, Dry, No erythema, No rash     ED COURSE / MEDICAL DECISION MAKING:   Robe Bryant is a 70 y.o. male who presents to the emergency department for evaluation of shortness of breath, cough.  Patient appears nondistressed on arrival in the emergency department.  Vital signs are stable.  Patient breathing comfortably on room air.  He does have scattered wheezes and diminished breath sounds throughout on auscultation.  Will obtain labs and imaging and an EKG for further evaluation of his symptoms.    EKG interpreted by me reveals an electronic atrial pacemaker with a rate of 60 bpm.  There are low QRS voltage in chest leads.  This is an atypical appearing EKG.    Chest x-ray per my interpretation reveals some chronic findings but no obvious acute infiltrate.    Obtained a CT scan of the chest which was interpreted by the radiologist.  This does not reveal any obvious acute process per report.    Laboratory testing revealed a normal white blood cell count.  Patient's creatinine elevated at 1.6, this is around his baseline.  Cardiac enzymes and proBNP within the normal range.  Procalcitonin within normal range.  Minimal elevation of lactic acid.    Respiratory panel was positive for the human rhinovirus.  I suspect this is likely the cause of patient's symptoms given no improvement with antibiotic therapy.    Patient was treated with IV steroids, magnesium, breathing treatments.  Patient had significant improvement in his symptoms with these treatments.  Repeat examination reveals fewer wheezes.    Given patient is breathing comfortably on room air and appears to be improving will discharge with steroid taper.  Patient comfortable with this plan and discharged in good condition.    DECISION TO  DISCHARGE/ADMIT: see ED care timeline     FINAL IMPRESSION:   1 --cough  2 --wheezing  3 --viral respiratory infection    Electronically signed by: Mar Lyn MD, 11/22/2021 22:24 Mar Osborne MD  11/23/21 0153

## 2021-11-28 LAB
BACTERIA SPEC AEROBE CULT: NORMAL
BACTERIA SPEC AEROBE CULT: NORMAL

## 2021-12-06 ENCOUNTER — HOSPITAL ENCOUNTER (EMERGENCY)
Facility: HOSPITAL | Age: 71
Discharge: LEFT BEFORE DISCHARGE | End: 2021-12-06
Attending: EMERGENCY MEDICINE

## 2021-12-06 VITALS
RESPIRATION RATE: 20 BRPM | HEART RATE: 68 BPM | TEMPERATURE: 97.7 F | SYSTOLIC BLOOD PRESSURE: 140 MMHG | OXYGEN SATURATION: 95 % | DIASTOLIC BLOOD PRESSURE: 72 MMHG | BODY MASS INDEX: 43.12 KG/M2 | WEIGHT: 258.8 LBS | HEIGHT: 65 IN

## 2021-12-06 PROCEDURE — 99211 OFF/OP EST MAY X REQ PHY/QHP: CPT | Performed by: EMERGENCY MEDICINE

## 2021-12-25 ENCOUNTER — APPOINTMENT (OUTPATIENT)
Dept: CT IMAGING | Facility: HOSPITAL | Age: 71
End: 2021-12-25

## 2021-12-25 ENCOUNTER — HOSPITAL ENCOUNTER (EMERGENCY)
Facility: HOSPITAL | Age: 71
Discharge: HOME | End: 2021-12-25
Attending: EMERGENCY MEDICINE | Admitting: EMERGENCY MEDICINE

## 2021-12-25 VITALS
OXYGEN SATURATION: 97 % | SYSTOLIC BLOOD PRESSURE: 150 MMHG | WEIGHT: 250 LBS | HEIGHT: 65 IN | DIASTOLIC BLOOD PRESSURE: 84 MMHG | BODY MASS INDEX: 41.65 KG/M2 | HEART RATE: 72 BPM | RESPIRATION RATE: 18 BRPM | TEMPERATURE: 99 F

## 2021-12-25 DIAGNOSIS — S13.9XXA NECK SPRAIN, INITIAL ENCOUNTER: Primary | ICD-10-CM

## 2021-12-25 PROCEDURE — 25010000002 DIAZEPAM PER 5 MG: Performed by: EMERGENCY MEDICINE

## 2021-12-25 PROCEDURE — 96374 THER/PROPH/DIAG INJ IV PUSH: CPT

## 2021-12-25 PROCEDURE — 72125 CT NECK SPINE W/O DYE: CPT

## 2021-12-25 PROCEDURE — 25010000002 HYDROMORPHONE 1 MG/ML SOLUTION: Performed by: EMERGENCY MEDICINE

## 2021-12-25 PROCEDURE — 99283 EMERGENCY DEPT VISIT LOW MDM: CPT

## 2021-12-25 PROCEDURE — 25010000002 METHYLPREDNISOLONE PER 125 MG: Performed by: EMERGENCY MEDICINE

## 2021-12-25 PROCEDURE — 96375 TX/PRO/DX INJ NEW DRUG ADDON: CPT

## 2021-12-25 RX ORDER — OXYCODONE HYDROCHLORIDE AND ACETAMINOPHEN 5; 325 MG/1; MG/1
1 TABLET ORAL EVERY 6 HOURS PRN
Qty: 12 TABLET | Refills: 0 | Status: SHIPPED | OUTPATIENT
Start: 2021-12-25 | End: 2022-01-21

## 2021-12-25 RX ORDER — CYCLOBENZAPRINE HCL 10 MG
10 TABLET ORAL ONCE
Status: COMPLETED | OUTPATIENT
Start: 2021-12-25 | End: 2021-12-25

## 2021-12-25 RX ORDER — DIAZEPAM 5 MG/ML
5 INJECTION, SOLUTION INTRAMUSCULAR; INTRAVENOUS ONCE
Status: COMPLETED | OUTPATIENT
Start: 2021-12-25 | End: 2021-12-25

## 2021-12-25 RX ORDER — METHYLPREDNISOLONE SODIUM SUCCINATE 125 MG/2ML
125 INJECTION, POWDER, LYOPHILIZED, FOR SOLUTION INTRAMUSCULAR; INTRAVENOUS ONCE
Status: COMPLETED | OUTPATIENT
Start: 2021-12-25 | End: 2021-12-25

## 2021-12-25 RX ORDER — OXYCODONE HYDROCHLORIDE AND ACETAMINOPHEN 5; 325 MG/1; MG/1
1 TABLET ORAL ONCE
Status: COMPLETED | OUTPATIENT
Start: 2021-12-25 | End: 2021-12-25

## 2021-12-25 RX ORDER — CYCLOBENZAPRINE HCL 10 MG
10 TABLET ORAL 3 TIMES DAILY PRN
Qty: 12 TABLET | Refills: 0 | Status: SHIPPED | OUTPATIENT
Start: 2021-12-25 | End: 2022-01-21

## 2021-12-25 RX ADMIN — METHYLPREDNISOLONE SODIUM SUCCINATE 125 MG: 125 INJECTION, POWDER, FOR SOLUTION INTRAMUSCULAR; INTRAVENOUS at 20:23

## 2021-12-25 RX ADMIN — OXYCODONE HYDROCHLORIDE AND ACETAMINOPHEN 1 TABLET: 5; 325 TABLET ORAL at 18:01

## 2021-12-25 RX ADMIN — DIAZEPAM 5 MG: 5 INJECTION, SOLUTION INTRAMUSCULAR; INTRAVENOUS at 20:24

## 2021-12-25 RX ADMIN — CYCLOBENZAPRINE 10 MG: 10 TABLET, FILM COATED ORAL at 19:51

## 2021-12-25 RX ADMIN — HYDROMORPHONE HYDROCHLORIDE 1 MG: 1 INJECTION, SOLUTION INTRAMUSCULAR; INTRAVENOUS; SUBCUTANEOUS at 20:27

## 2021-12-25 NOTE — ED PROVIDER NOTES
Subjective   71-year-old male presents with right sided neck pain.  He woke up yesterday morning with right-sided neck pain, it hurts with turning to the right or left and looking up or down.  The pain shoots up the side of his head and down into her shoulder area.  No known injury, he thinks he may have slept funny.  No fever chills no chest pain or shortness of breath      History provided by:  Patient   used: No        Review of Systems   Musculoskeletal:        Right-sided neck pain   All other systems reviewed and are negative.      Past Medical History:   Diagnosis Date   • Anxiety and depression    • Arthritis    • Backache     20 years   • Bladder trauma    • Cervicalgia    • Chronic kidney disease     Cr up to 2.1   • Diabetes mellitus (HCC)     15 years--   • Emphysema of lung ([facility])    • Eye exam, routine 2015   • FH: colonic polyps    • Gout     for 10 years--   • History of echocardiogram 09/15/2014   • History of tobacco use     with cessation x7 years   • Hyperlipidemia    • Hypertension    • Migraine    • Myocardial infarction ([facility])     h/o coronary artery stenting--   • Restless leg syndrome     20 years   • Sleep apnea     12 years; uses a Bi-Pap   • Stroke ([facility])    • Syncope 4/28/2017   • Warfarin anticoagulation 9/14/2016       No Known Allergies    Past Surgical History:   Procedure Laterality Date   • BLADDER SURGERY     • CARDIAC CATHETERIZATION  03/15/2013    revealing widely patent stents of the left circumflex and ramus intermedius coronary arteries, no significant disease is seen in any territory   • CARDIAC CATHETERIZATION Left 9/30/2019    Procedure: Left Heart Cath;  Surgeon: Arelis Tucker MD;  Location:  [facility] CATH INVASIVE LOCATION;  Service: Cardiology   • CARDIAC ELECTROPHYSIOLOGY PROCEDURE N/A 5/10/2021    Procedure: PPM battery change;  Surgeon: Arelis Tucker MD;  Location: [facility] CATH INVASIVE LOCATION;  Service: Cardiology;  Laterality:  N/A;   • CARDIAC PACEMAKER PLACEMENT     • CATARACT EXTRACTION     • COLONOSCOPY      No family history of colon cancer.     • COLONOSCOPY     • HERNIA REPAIR      Type unknown   • PACEMAKER IMPLANTATION     • PROSTATE SURGERY         Family History   Problem Relation Age of Onset   • Cancer Mother    • Diabetes Mother    • Hypertension Mother    • Stroke Mother    • Stomach cancer Mother    • Heart disease Mother    • Stomach cancer Father    • Cancer Father    • Lung cancer Father        Social History     Socioeconomic History   • Marital status:    Tobacco Use   • Smoking status: Former Smoker     Packs/day: 1.00     Years: 30.00     Pack years: 30.00     Types: Cigarettes     Quit date: 8/15/2001     Years since quittin.3   • Smokeless tobacco: Never Used   • Tobacco comment: quit 16 years ago   Vaping Use   • Vaping Use: Never used   Substance and Sexual Activity   • Alcohol use: No   • Drug use: No   • Sexual activity: Defer           Objective   Physical Exam  Vitals and nursing note reviewed.   Constitutional:       Appearance: He is well-developed.   HENT:      Head: Normocephalic and atraumatic.        Comments: Tenderness to palpation right lateral neck, pain with rotation to the right more than left, as well as flexion.  Cardiovascular:      Rate and Rhythm: Normal rate and regular rhythm.   Pulmonary:      Effort: Pulmonary effort is normal.   Musculoskeletal:         General: Tenderness present.      Cervical back: Normal range of motion.   Skin:     General: Skin is warm and dry.   Neurological:      Mental Status: He is alert and oriented to person, place, and time.   Psychiatric:         Behavior: Behavior normal.         Thought Content: Thought content normal.         Judgment: Judgment normal.         Procedures           ED Course                                                 MDM  Number of Diagnoses or Management Options  Neck sprain, initial encounter: new and  requires workup     Amount and/or Complexity of Data Reviewed  Tests in the radiology section of CPT®: reviewed  Discuss the patient with other providers: yes    Risk of Complications, Morbidity, and/or Mortality  Presenting problems: low  Diagnostic procedures: minimal  Management options: low    Patient Progress  Patient progress: stable      Final diagnoses:   Neck sprain, initial encounter       ED Disposition  ED Disposition     ED Disposition Condition Comment    Discharge Stable           [facility]  [address] [address] [phone]    If symptoms worsen         Medication List      New Prescriptions    cyclobenzaprine 10 MG tablet  Commonly known as: FLEXERIL  Take 1 tablet by mouth 3 (Three) Times a Day As Needed for Muscle Spasms.           Where to Get Your Medications      These medications were sent to [facility]/pharmacy #[id number] - [place], [place] - [address] - [phone]  - [phone]   [address] [address]    Phone: [phone]   · cyclobenzaprine 10 MG tablet          Charles Sorto Jr., OSCAR  12/25/21 2129

## 2021-12-27 ENCOUNTER — HOSPITAL ENCOUNTER (EMERGENCY)
Facility: HOSPITAL | Age: 71
Discharge: LEFT BEFORE DISCHARGE | End: 2021-12-27
Attending: EMERGENCY MEDICINE

## 2021-12-27 VITALS
HEART RATE: 71 BPM | WEIGHT: 249 LBS | DIASTOLIC BLOOD PRESSURE: 94 MMHG | HEIGHT: 65 IN | SYSTOLIC BLOOD PRESSURE: 173 MMHG | BODY MASS INDEX: 41.48 KG/M2 | OXYGEN SATURATION: 98 % | RESPIRATION RATE: 20 BRPM | TEMPERATURE: 98.7 F

## 2021-12-27 PROCEDURE — 99211 OFF/OP EST MAY X REQ PHY/QHP: CPT

## 2021-12-28 NOTE — ED PROVIDER NOTES
Subjective       Past Medical History:   Diagnosis Date   • Anxiety and depression    • Arthritis    • Backache     20 years   • Bladder trauma    • Cervicalgia    • Chronic kidney disease     Cr up to 2.1   • Diabetes mellitus (HCC)     15 years--   • Emphysema of lung (HCC)    • Eye exam, routine 2015   • FH: colonic polyps    • Gout     for 10 years--   • History of echocardiogram 09/15/2014   • History of tobacco use     with cessation x7 years   • Hyperlipidemia    • Hypertension    • Migraine    • Myocardial infarction ([facility])     h/o coronary artery stenting--   • Restless leg syndrome     20 years   • Sleep apnea     12 years; uses a Bi-Pap   • Stroke ([facility])    • Syncope 4/28/2017   • Warfarin anticoagulation 9/14/2016       No Known Allergies    Past Surgical History:   Procedure Laterality Date   • BLADDER SURGERY     • CARDIAC CATHETERIZATION  03/15/2013    revealing widely patent stents of the left circumflex and ramus intermedius coronary arteries, no significant disease is seen in any territory   • CARDIAC CATHETERIZATION Left 9/30/2019    Procedure: Left Heart Cath;  Surgeon: Arelis Tucker MD;  Location:  [facility] LOC[facility];  Service: Cardiology   • CARDIAC ELECTROPHYSIOLOGY PROCEDURE N/A 5/10/2021    Procedure: PPM battery change;  Surgeon: Arelis Tucker MD;  Location:  [facility] LOCATION;  Service: Cardiology;  Laterality: N/A;   • CARDIAC PACEMAKER PLACEMENT  2012   • CATARACT EXTRACTION     • COLONOSCOPY  2004    No family history of colon cancer.     • COLONOSCOPY  2015   • HERNIA REPAIR      Type unknown   • PACEMAKER IMPLANTATION     • PROSTATE SURGERY         Family History   Problem Relation Age of Onset   • Cancer Mother    • Diabetes Mother    • Hypertension Mother    • Stroke Mother    • Stomach cancer Mother    • Heart disease Mother    • Stomach cancer Father    • Cancer Father    • Lung cancer Father        Social History     Socioeconomic History    • Marital status:    Tobacco Use   • Smoking status: Former Smoker     Packs/day: 1.00     Years: 30.00     Pack years: 30.00     Types: Cigarettes     Quit date: 8/15/2001     Years since quittin.3   • Smokeless tobacco: Never Used   • Tobacco comment: quit 16 years ago   Vaping Use   • Vaping Use: Never used   Substance and Sexual Activity   • Alcohol use: No   • Drug use: No   • Sexual activity: Defer           Objective            ED Course                                                 MDM  Left without being seen    Final diagnoses:   None       ED Disposition  ED Disposition     ED Disposition Condition Comment    LWBS after Triage            No follow-up provider specified.       Medication List      No changes were made to your prescriptions during this visit.          Shakira Vicente, APRN  21

## 2022-01-04 DIAGNOSIS — E11.22 TYPE 2 DIABETES MELLITUS WITH STAGE 3 CHRONIC KIDNEY DISEASE, WITHOUT LONG-TERM CURRENT USE OF INSULIN: ICD-10-CM

## 2022-01-04 DIAGNOSIS — N18.30 TYPE 2 DIABETES MELLITUS WITH STAGE 3 CHRONIC KIDNEY DISEASE, WITHOUT LONG-TERM CURRENT USE OF INSULIN: ICD-10-CM

## 2022-01-05 RX ORDER — SPIRONOLACTONE 50 MG/1
TABLET, FILM COATED ORAL
Qty: 90 TABLET | Refills: 1 | Status: SHIPPED | OUTPATIENT
Start: 2022-01-05

## 2022-01-05 RX ORDER — FLUTICASONE PROPIONATE 50 MCG
SPRAY, SUSPENSION (ML) NASAL
Qty: 48 G | Refills: 3 | Status: SHIPPED | OUTPATIENT
Start: 2022-01-05 | End: 2022-07-13 | Stop reason: HOSPADM

## 2022-01-05 RX ORDER — GLIPIZIDE 5 MG/1
TABLET ORAL
Qty: 180 TABLET | Refills: 3 | Status: SHIPPED | OUTPATIENT
Start: 2022-01-05

## 2022-01-18 RX ORDER — FUROSEMIDE 20 MG/1
TABLET ORAL
Qty: 60 TABLET | Refills: 2 | Status: SHIPPED | OUTPATIENT
Start: 2022-01-18

## 2022-01-21 ENCOUNTER — OFFICE VISIT (OUTPATIENT)
Dept: INTERNAL MEDICINE | Facility: CLINIC | Age: 72
End: 2022-01-21

## 2022-01-21 VITALS
OXYGEN SATURATION: 97 % | HEART RATE: 83 BPM | HEIGHT: 65 IN | DIASTOLIC BLOOD PRESSURE: 70 MMHG | BODY MASS INDEX: 42.15 KG/M2 | SYSTOLIC BLOOD PRESSURE: 138 MMHG | WEIGHT: 253 LBS | RESPIRATION RATE: 16 BRPM | TEMPERATURE: 98.4 F

## 2022-01-21 DIAGNOSIS — R25.2 LEG CRAMPS: ICD-10-CM

## 2022-01-21 DIAGNOSIS — M54.2 NECK PAIN: Primary | ICD-10-CM

## 2022-01-21 DIAGNOSIS — M79.10 MYALGIA: ICD-10-CM

## 2022-01-21 DIAGNOSIS — N18.30 STAGE 3 CHRONIC KIDNEY DISEASE, UNSPECIFIED WHETHER STAGE 3A OR 3B CKD: ICD-10-CM

## 2022-01-21 DIAGNOSIS — M19.90 ARTHRITIS: ICD-10-CM

## 2022-01-21 PROCEDURE — 99214 OFFICE O/P EST MOD 30 MIN: CPT | Performed by: INTERNAL MEDICINE

## 2022-01-21 RX ORDER — DIMENHYDRINATE 50 MG
200 TABLET ORAL DAILY
Qty: 60 CAPSULE | Refills: 11 | Status: SHIPPED | OUTPATIENT
Start: 2022-01-21 | End: 2022-07-13 | Stop reason: HOSPADM

## 2022-01-21 NOTE — PROGRESS NOTES
Subjective     Patient ID: Robe Bryant is a 71 y.o. male. Patient is here for management of multiple medical problems.     Chief Complaint   Patient presents with   • Headache   • Shoulder Pain     History of Present Illness   Pt woke 2-3 weeks ago with neck pain  Moved couch maybe the day before.   Progressively worsening.   Taking care of a larg dog that frequently pulles right arm.  (Ramiro)            The following portions of the patient's history were reviewed and updated as appropriate: allergies, current medications, past family history, past medical history, past social history, past surgical history and problem list.    Review of Systems    Current Outpatient Medications:   •  albuterol sulfate  (90 Base) MCG/ACT inhaler, Inhale 2 puffs Every 4 (Four) Hours As Needed for Wheezing., Disp: , Rfl:   •  amiodarone (PACERONE) 200 MG tablet, Take 1 tablet by mouth Daily., Disp: 30 tablet, Rfl: 0  •  amLODIPine (NORVASC) 10 MG tablet, Take 1 tablet by mouth Daily., Disp: 30 tablet, Rfl: 1  •  apixaban (ELIQUIS) 5 MG tablet tablet, Take 5 mg by mouth 2 (Two) Times a Day., Disp: , Rfl:   •  aspirin 81 MG chewable tablet, Chew 81 mg Daily., Disp: , Rfl:   •  atorvastatin (LIPITOR) 20 MG tablet, Take 1 tablet by mouth Every Night., Disp: 90 tablet, Rfl: 3  •  baclofen (LIORESAL) 10 MG tablet, Take 1 tablet by mouth 3 (Three) Times a Day As Needed for Muscle Spasms., Disp: 90 tablet, Rfl: 2  •  Blood Glucose Monitoring Suppl (TRUE METRIX AIR GLUCOSE METER) w/Device kit, 1 each by In Vitro route 2 (two) times a day. E11.9, Disp: 1 kit, Rfl: 0  •  Budeson-Glycopyrrol-Formoterol (Breztri Aerosphere) 160-9-4.8 MCG/ACT aerosol inhaler, Inhale 2 puffs 2 (Two) Times a Day., Disp: 15.9 g, Rfl: 5  •  cetirizine (zyrTEC) 10 MG tablet, Take 1 tablet by mouth Daily., Disp: 90 tablet, Rfl: 3  •  Coenzyme Q10 (Co Q-10) 100 MG capsule, Take 200 mg by mouth Daily., Disp: 60 capsule, Rfl: 11  •  diclofenac (VOLTAREN) 1  % gel gel, Apply 4 g topically to the appropriate area as directed 4 (Four) Times a Day As Needed (pain)., Disp: 100 g, Rfl: 5  •  finasteride (PROSCAR) 5 MG tablet, Take 1 tablet by mouth Daily., Disp: 90 tablet, Rfl: 3  •  FLUoxetine (PROzac) 20 MG capsule, Take 1 capsule by mouth Daily., Disp: 90 capsule, Rfl: 3  •  fluticasone (FLONASE) 50 MCG/ACT nasal spray, USE 2 SPRAYS IN EACH NOSTRIL EVERY DAY AS DIRECTED  BY  MD, Disp: 48 g, Rfl: 3  •  furosemide (LASIX) 20 MG tablet, TAKE 1 TABLET BY MOUTH EVERY MORNING, MAY TAKE SECOND DOSE AS NEEDED FOR INCREASED SWELLING, Disp: 60 tablet, Rfl: 2  •  glipizide (GLUCOTROL) 5 MG tablet, TAKE 1 TABLET TWICE DAILY BEFORE MEALS, Disp: 180 tablet, Rfl: 3  •  glucose blood (True Metrix Blood Glucose Test) test strip, Daily testing E11.9, Disp: 100 each, Rfl: 5  •  glucose monitor monitoring kit, 1 each Daily. E11.9, Disp: 1 each, Rfl: 0  •  ipratropium-albuterol (DUO-NEB) 0.5-2.5 mg/3 ml nebulizer, Take 3 mL by nebulization 4 (Four) Times a Day., Disp: 1620 mL, Rfl: 2  •  Lancet Device misc, 1 each Daily. E11.9, Disp: 100 each, Rfl: 3  •  lisinopril (PRINIVIL,ZESTRIL) 40 MG tablet, Take 1 tablet by mouth Daily., Disp: 90 tablet, Rfl: 3  •  metoprolol succinate XL (Toprol XL) 50 MG 24 hr tablet, Take 1 tablet by mouth Daily., Disp: 30 tablet, Rfl: 0  •  Misc. Devices misc, Bipap tubing., Disp: 1 each, Rfl: 0  •  Multiple Vitamin tablet, Take 1 tablet by mouth daily., Disp: , Rfl:   •  Nebulizer misc, 1 each Every 4 (Four) Hours As Needed (shortness of breath)., Disp: 1 each, Rfl: 0  •  nitroglycerin (Nitrostat) 0.4 MG SL tablet, Place 1 tablet under the tongue Every 5 (Five) Minutes As Needed for Chest Pain., Disp: 25 tablet, Rfl: 11  •  O2 (OXYGEN), Inhale 2 L/min Continuous As Needed (With activity)., Disp: , Rfl:   •  omeprazole (priLOSEC) 40 MG capsule, Take 1 capsule by mouth Daily., Disp: 90 capsule, Rfl: 3  •  ondansetron ODT (Zofran ODT) 4 MG disintegrating tablet,  "Place 1 tablet on the tongue Every 8 (Eight) Hours As Needed for Nausea or Vomiting., Disp: 20 tablet, Rfl: 0  •  potassium chloride (K-DUR,KLOR-CON) 10 MEQ CR tablet, Take 1 tablet by mouth 2 (Two) Times a Day., Disp: 60 tablet, Rfl: 2  •  pramipexole (MIRAPEX) 0.5 MG tablet, Take 1 tablet by mouth 3 (Three) Times a Day., Disp: 270 tablet, Rfl: 3  •  spironolactone (ALDACTONE) 50 MG tablet, TAKE 1 TABLET EVERY DAY, Disp: 90 tablet, Rfl: 1  •  terazosin (HYTRIN) 10 MG capsule, Take 1 capsule by mouth Every Night., Disp: 90 capsule, Rfl: 3  •  TRUEplus Lancets 33G misc, 1 each by Other route Daily. E11.9, Disp: 100 each, Rfl: 5  •  Vitamin D, Cholecalciferol, 50 MCG (2000 UT) capsule, Take 1 capsule by mouth Daily., Disp: 90 capsule, Rfl: 3    Objective      Blood pressure 138/70, pulse 83, temperature 98.4 °F (36.9 °C), resp. rate 16, height 165.1 cm (65\"), weight 115 kg (253 lb), SpO2 97 %.    Physical Exam     General Appearance:    Alert, cooperative, no distress, appears stated age   Head:    Normocephalic, without obvious abnormality, atraumatic   Eyes:    PERRL, conjunctiva/corneas clear, EOM's intact   Ears:    Normal TM's and external ear canals, both ears   Nose:   Nares normal, septum midline, mucosa normal, no drainage   or sinus tenderness   Throat:   Lips, mucosa, and tongue normal; teeth and gums normal   Neck:   Supple, symmetrical, trachea midline, no adenopathy;        thyroid:  No enlargement/tenderness/nodules; no carotid    bruit or JVD   Back:     Symmetric, no curvature, ROM normal, no CVA tenderness   Lungs:     wheezing to auscultation bilaterally, respirations unlabored   Chest wall:   tenderness right trap or deformity   Heart:    Regular rate and rhythm, S1 and S2 normal, no murmur,        rub or gallop   Abdomen:     Soft, non-tender, bowel sounds active all four quadrants,     no masses, no organomegaly   Extremities:   Extremities normal, atraumatic, no cyanosis or edema   Pulses:   2+ " and symmetric all extremities   Skin:   Skin color, texture, turgor normal, no rashes or lesions   Lymph nodes:   Cervical, supraclavicular, and axillary nodes normal   Neurologic:   CNII-XII intact. Normal strength, sensation and reflexes       throughout      Results for orders placed or performed during the hospital encounter of 11/22/21   Respiratory Panel PCR w/COVID-19(SARS-CoV-2) [facility]O/[facility]/YULIET/PAD/COR/MAD/ISAURA In-House, NP Swab in UTM/VTM, 3-4 HR TAT - Swab, Nasopharynx    Specimen: Nasopharynx; Swab   Result Value Ref Range    ADENOVIRUS, PCR Not Detected Not Detected    Coronavirus 229E Not Detected Not Detected    Coronavirus HKU1 Not Detected Not Detected    Coronavirus NL63 Not Detected Not Detected    Coronavirus OC43 Not Detected Not Detected    COVID19 Not Detected Not Detected - Ref. Range    Human Metapneumovirus Not Detected Not Detected    Human Rhinovirus/Enterovirus Detected (A) Not Detected    Influenza A PCR Not Detected Not Detected    Influenza B PCR Not Detected Not Detected    Parainfluenza Virus 1 Not Detected Not Detected    Parainfluenza Virus 2 Not Detected Not Detected    Parainfluenza Virus 3 Not Detected Not Detected    Parainfluenza Virus 4 Not Detected Not Detected    RSV, PCR Not Detected Not Detected    Bordetella pertussis pcr Not Detected Not Detected    Bordetella parapertussis PCR Not Detected Not Detected    Chlamydophila pneumoniae PCR Not Detected Not Detected    Mycoplasma pneumo by PCR Not Detected Not Detected   Blood Culture - Blood, Arm, Right    Specimen: Arm, Right; Blood   Result Value Ref Range    Blood Culture No growth at 5 days    Blood Culture - Blood, Arm, Left    Specimen: Arm, Left; Blood   Result Value Ref Range    Blood Culture No growth at 5 days    Comprehensive Metabolic Panel    Specimen: Blood   Result Value Ref Range    Glucose 289 (H) 65 - 99 mg/dL    BUN 31 (H) 8 - 23 mg/dL    Creatinine 1.62 (H) 0.76 - 1.27 mg/dL    Sodium 133 (L) 136 - 145 mmol/L     Potassium 4.4 3.5 - 5.2 mmol/L    Chloride 99 98 - 107 mmol/L    CO2 25.6 22.0 - 29.0 mmol/L    Calcium 8.8 8.6 - 10.5 mg/dL    Total Protein 6.5 6.0 - 8.5 g/dL    Albumin 3.90 3.50 - 5.20 g/dL    ALT (SGPT) 15 1 - 41 U/L    AST (SGOT) 15 1 - 40 U/L    Alkaline Phosphatase 68 39 - 117 U/L    Total Bilirubin 0.2 0.0 - 1.2 mg/dL    eGFR Non African Amer 42 (L) >60 mL/min/1.73    Globulin 2.6 gm/dL    A/G Ratio 1.5 g/dL    BUN/Creatinine Ratio 19.1 7.0 - 25.0    Anion Gap 8.4 5.0 - 15.0 mmol/L   Troponin    Specimen: Blood   Result Value Ref Range    Troponin T <0.010 0.000 - 0.030 ng/mL   CBC Auto Differential    Specimen: Blood   Result Value Ref Range    WBC 10.50 3.40 - 10.80 10*3/mm3    RBC 4.10 (L) 4.14 - 5.80 10*6/mm3    Hemoglobin 12.4 (L) 13.0 - 17.7 g/dL    Hematocrit 38.4 37.5 - 51.0 %    MCV 93.7 79.0 - 97.0 fL    MCH 30.2 26.6 - 33.0 pg    MCHC 32.3 31.5 - 35.7 g/dL    RDW 12.6 12.3 - 15.4 %    RDW-SD 43.0 37.0 - 54.0 fl    MPV 10.8 6.0 - 12.0 fL    Platelets 196 140 - 450 10*3/mm3    Neutrophil % 74.9 42.7 - 76.0 %    Lymphocyte % 12.8 (L) 19.6 - 45.3 %    Monocyte % 9.3 5.0 - 12.0 %    Eosinophil % 0.3 0.3 - 6.2 %    Basophil % 0.7 0.0 - 1.5 %    Immature Grans % 2.0 (H) 0.0 - 0.5 %    Neutrophils, Absolute 7.87 (H) 1.70 - 7.00 10*3/mm3    Lymphocytes, Absolute 1.34 0.70 - 3.10 10*3/mm3    Monocytes, Absolute 0.98 (H) 0.10 - 0.90 10*3/mm3    Eosinophils, Absolute 0.03 0.00 - 0.40 10*3/mm3    Basophils, Absolute 0.07 0.00 - 0.20 10*3/mm3    Immature Grans, Absolute 0.21 (H) 0.00 - 0.05 10*3/mm3    nRBC 0.0 0.0 - 0.2 /100 WBC   Lactic Acid, Plasma    Specimen: Blood   Result Value Ref Range    Lactate 2.2 (C) 0.5 - 2.0 mmol/L   Procalcitonin    Specimen: Blood   Result Value Ref Range    Procalcitonin 0.05 0.00 - 0.25 ng/mL   BNP    Specimen: Blood   Result Value Ref Range    proBNP 884.0 0.0 - 900.0 pg/mL   Green Top (Gel)   Result Value Ref Range    Extra Tube Hold for add-ons.    Lavender Top    Result Value Ref Range    Extra Tube hold for add-on    Gold Top - SST   Result Value Ref Range    Extra Tube Hold for add-ons.    Light Blue Top   Result Value Ref Range    Extra Tube hold for add-on          Assessment/Plan   Pt hurts at base line was offered shots at [facility].  Limited treatment options.    No Nsaids due to eliquis and CRI  Muscles hurt. Pt on atorvastatin 20 and norvasc.       Topical volteran gel.    glenroy on bipap.      Consider ct chest for possible pancoast tumor if not better soon. Stopped smoking 24 yrs ago.        Will hold statin and start co q 10 to see if we can get muscle pain better.  If Myoglobin elevated (as anticipatied) stopping lipitor may help this go down and that may improve renal function.    Consider other statins or praulant/ repatha if needed.      Diagnoses and all orders for this visit:    1. Neck pain (Primary)  -     CBC & Differential  -     Vitamin B12  -     Comprehensive Metabolic Panel  -     TSH  -     T4, Free  -     Glia(IgA / G) & TTG(IgA / G)  -     Endomysial Antibody, IgA / IGG Titer  -     CK  -     Myoglobin, Serum  -     Aldolase  -     [place] Spotted Fever, IgM  -     [place] Spotted Fever, IgG  -     Ehrlichia Antibody Panel  -     Lyme Disease, PCR - , Arm, Right  -     Cyclic Citrul Peptide Antibody, IgG / IgA  -     Rheumatoid Factor  -     Sedimentation Rate  -     Uric Acid  -     C-reactive Protein  -     Antistreptolysin O Titer    2. Stage 3 chronic kidney disease, unspecified whether stage 3a or 3b CKD (HCC)  -     CBC & Differential  -     Vitamin B12  -     Comprehensive Metabolic Panel  -     TSH  -     T4, Free  -     Glia(IgA / G) & TTG(IgA / G)  -     Endomysial Antibody, IgA / IGG Titer  -     CK  -     Myoglobin, Serum  -     Aldolase  -     [place] Spotted Fever, IgM  -     [place] Spotted Fever, IgG  -     [place]lich[place] Antibody Panel  -     Lyme Disease, PCR - , Arm, Right  -     Cyclic Citrul Peptide Antibody, IgG / IgA  -      Rheumatoid Factor  -     Sedimentation Rate  -     Uric Acid  -     C-reactive Protein  -     Antistreptolysin O Titer    3. Myalgia  -     CBC & Differential  -     Vitamin B12  -     Comprehensive Metabolic Panel  -     TSH  -     T4, Free  -     Glia(IgA / G) & TTG(IgA / G)  -     Endomysial Antibody, IgA / IGG Titer  -     CK  -     Myoglobin, Serum  -     Aldolase  -     [place] Spotted Fever, IgM  -     [place] Spotted Fever, IgG  -     E[place]lich[place] Antibody Panel  -     Lyme Disease, PCR - , Arm, Right  -     Cyclic Citrul Peptide Antibody, IgG / IgA  -     Rheumatoid Factor  -     Sedimentation Rate  -     Uric Acid  -     C-reactive Protein  -     Antistreptolysin O Titer    4. Leg cramps  -     CBC & Differential  -     Vitamin B12  -     Comprehensive Metabolic Panel  -     TSH  -     T4, Free  -     Glia(IgA / G) & TTG(IgA / G)  -     Endomysial Antibody, IgA / IGG Titer  -     CK  -     Myoglobin, Serum  -     Aldolase  -     [place] Spotted Fever, IgM  -     [place] Spotted Fever, IgG  -     E[place]lich[place] Antibody Panel  -     Lyme Disease, PCR - , Arm, Right  -     Cyclic Citrul Peptide Antibody, IgG / IgA  -     Rheumatoid Factor  -     Sedimentation Rate  -     Uric Acid  -     C-reactive Protein  -     Antistreptolysin O Titer    5. Arthritis  -     CBC & Differential  -     Vitamin B12  -     Comprehensive Metabolic Panel  -     TSH  -     T4, Free  -     Glia(IgA / G) & TTG(IgA / G)  -     Endomysial Antibody, IgA / IGG Titer  -     CK  -     Myoglobin, Serum  -     Aldolase  -     [place] Spotted Fever, IgM  -     [place] Spotted Fever, IgG  -     E[place]lich[place] Antibody Panel  -     Lyme Disease, PCR - , Arm, Right  -     Cyclic Citrul Peptide Antibody, IgG / IgA  -     Rheumatoid Factor  -     Sedimentation Rate  -     Uric Acid  -     C-reactive Protein  -     Antistreptolysin O Titer    Other orders  -     Coenzyme Q10 (Co Q-10) 100 MG capsule; Take 200 mg by mouth Daily.   Dispense: 60 capsule; Refill: 11      No follow-ups on file.          There are no Patient Instructions on file for this visit.     Ronny Monzon MD    Assessment/Plan

## 2022-01-22 PROCEDURE — 93294 REM INTERROG EVL PM/LDLS PM: CPT | Performed by: INTERNAL MEDICINE

## 2022-01-22 PROCEDURE — 93296 REM INTERROG EVL PM/IDS: CPT | Performed by: INTERNAL MEDICINE

## 2022-01-24 ENCOUNTER — PATIENT ROUNDING (BHMG ONLY) (OUTPATIENT)
Dept: UROLOGY | Facility: CLINIC | Age: 72
End: 2022-01-24

## 2022-01-24 ENCOUNTER — OFFICE VISIT (OUTPATIENT)
Dept: UROLOGY | Facility: CLINIC | Age: 72
End: 2022-01-24

## 2022-01-24 ENCOUNTER — LAB (OUTPATIENT)
Dept: LAB | Facility: HOSPITAL | Age: 72
End: 2022-01-24

## 2022-01-24 VITALS
HEIGHT: 65 IN | OXYGEN SATURATION: 93 % | HEART RATE: 76 BPM | WEIGHT: 253.53 LBS | BODY MASS INDEX: 42.24 KG/M2 | SYSTOLIC BLOOD PRESSURE: 128 MMHG | TEMPERATURE: 97.8 F | DIASTOLIC BLOOD PRESSURE: 72 MMHG

## 2022-01-24 DIAGNOSIS — N48.1 BALANITIS: ICD-10-CM

## 2022-01-24 DIAGNOSIS — N40.1 BENIGN PROSTATIC HYPERPLASIA WITH URINARY RETENTION: Primary | ICD-10-CM

## 2022-01-24 DIAGNOSIS — N50.812 LEFT TESTICULAR PAIN: ICD-10-CM

## 2022-01-24 DIAGNOSIS — R33.8 BENIGN PROSTATIC HYPERPLASIA WITH URINARY RETENTION: Primary | ICD-10-CM

## 2022-01-24 LAB — PSA SERPL-MCNC: 0.35 NG/ML (ref 0–4)

## 2022-01-24 PROCEDURE — 84153 ASSAY OF PSA TOTAL: CPT | Performed by: PHYSICIAN ASSISTANT

## 2022-01-24 PROCEDURE — 99204 OFFICE O/P NEW MOD 45 MIN: CPT | Performed by: PHYSICIAN ASSISTANT

## 2022-01-24 PROCEDURE — 36415 COLL VENOUS BLD VENIPUNCTURE: CPT | Performed by: PHYSICIAN ASSISTANT

## 2022-01-24 RX ORDER — CLOTRIMAZOLE AND BETAMETHASONE DIPROPIONATE 10; .64 MG/G; MG/G
CREAM TOPICAL
Qty: 15 G | Refills: 1 | Status: SHIPPED | OUTPATIENT
Start: 2022-01-24 | End: 2022-07-13 | Stop reason: HOSPADM

## 2022-01-24 NOTE — PROGRESS NOTES
Manny has some bad arthritis. Many labs still pending.  Uric acid up a lot. I have been treating  5.7 and up with good  (less arthritis).  RF elevated. This is nonspecific. May reflect other underlying problem. Those labs pending.

## 2022-01-24 NOTE — PROGRESS NOTES
Chief Complaint   Patient presents with   • Advice Only     new pt- low testosterone        HPI  Mr. Bryant is a 71 y.o. male with history of *** who presents for follow up.     At this visit ***    Past Medical History:   Diagnosis Date   • Anxiety and depression    • Arthritis    • Backache     20 years   • Benign prostatic hyperplasia    • Bladder trauma    • Cervicalgia    • Chronic kidney disease     Cr up to 2.1   • Coronary artery disease    • Diabetes mellitus (HCC)     15 years--   • Emphysema of lung (HCC)    • Erectile dysfunction    • Eye exam, routine 2015   • FH: colonic polyps    • Gout     for 10 years--   • History of echocardiogram 09/15/2014   • History of tobacco use     with cessation x7 years   • Hyperlipidemia    • Hypertension    • Migraine    • Myocardial infarction (HCC)     h/o coronary artery stenting--   • Prostate cancer (HCC)    • Restless leg syndrome     20 years   • Sleep apnea     12 years; uses a Bi-Pap   • Stroke (HCC)    • Syncope 4/28/2017   • Warfarin anticoagulation 9/14/2016       Past Surgical History:   Procedure Laterality Date   • BLADDER SURGERY     • CARDIAC CATHETERIZATION  03/15/2013    revealing widely patent stents of the left circumflex and ramus intermedius coronary arteries, no significant disease is seen in any territory   • CARDIAC CATHETERIZATION Left 9/30/2019    Procedure: Left Heart Cath;  Surgeon: Arelis Tucker MD;  Location:  [facility];  Service: Cardiology   • CARDIAC ELECTROPHYSIOLOGY PROCEDURE N/A 5/10/2021    Procedure: PPM battery change;  Surgeon: Arelis Tucker MD;  Location:  [facility];  Service: Cardiology;  Laterality: N/A;   • CARDIAC PACEMAKER PLACEMENT  2012   • CATARACT EXTRACTION     • COLONOSCOPY  2004    No family history of colon cancer.     • COLONOSCOPY  2015   • HERNIA REPAIR      Type unknown   • PACEMAKER IMPLANTATION     • PROSTATE SURGERY           Current Outpatient  Medications:   •  albuterol sulfate  (90 Base) MCG/ACT inhaler, Inhale 2 puffs Every 4 (Four) Hours As Needed for Wheezing., Disp: , Rfl:   •  amiodarone (PACERONE) 200 MG tablet, Take 1 tablet by mouth Daily., Disp: 30 tablet, Rfl: 0  •  amLODIPine (NORVASC) 10 MG tablet, Take 1 tablet by mouth Daily., Disp: 30 tablet, Rfl: 1  •  apixaban (ELIQUIS) 5 MG tablet tablet, Take 5 mg by mouth 2 (Two) Times a Day., Disp: , Rfl:   •  aspirin 81 MG chewable tablet, Chew 81 mg Daily., Disp: , Rfl:   •  atorvastatin (LIPITOR) 20 MG tablet, Take 1 tablet by mouth Every Night., Disp: 90 tablet, Rfl: 3  •  baclofen (LIORESAL) 10 MG tablet, Take 1 tablet by mouth 3 (Three) Times a Day As Needed for Muscle Spasms., Disp: 90 tablet, Rfl: 2  •  Blood Glucose Monitoring Suppl (TRUE METRIX AIR GLUCOSE METER) w/Device kit, 1 each by In Vitro route 2 (two) times a day. E11.9, Disp: 1 kit, Rfl: 0  •  Budeson-Glycopyrrol-Formoterol (Breztri Aerosphere) 160-9-4.8 MCG/ACT aerosol inhaler, Inhale 2 puffs 2 (Two) Times a Day., Disp: 15.9 g, Rfl: 5  •  cetirizine (zyrTEC) 10 MG tablet, Take 1 tablet by mouth Daily., Disp: 90 tablet, Rfl: 3  •  Coenzyme Q10 (Co Q-10) 100 MG capsule, Take 200 mg by mouth Daily., Disp: 60 capsule, Rfl: 11  •  diclofenac (VOLTAREN) 1 % gel gel, Apply 4 g topically to the appropriate area as directed 4 (Four) Times a Day As Needed (pain)., Disp: 100 g, Rfl: 5  •  finasteride (PROSCAR) 5 MG tablet, Take 1 tablet by mouth Daily., Disp: 90 tablet, Rfl: 3  •  FLUoxetine (PROzac) 20 MG capsule, Take 1 capsule by mouth Daily., Disp: 90 capsule, Rfl: 3  •  fluticasone (FLONASE) 50 MCG/ACT nasal spray, USE 2 SPRAYS IN EACH NOSTRIL EVERY DAY AS DIRECTED  BY  MD, Disp: 48 g, Rfl: 3  •  furosemide (LASIX) 20 MG tablet, TAKE 1 TABLET BY MOUTH EVERY MORNING, MAY TAKE SECOND DOSE AS NEEDED FOR INCREASED SWELLING, Disp: 60 tablet, Rfl: 2  •  glipizide (GLUCOTROL) 5 MG tablet, TAKE 1 TABLET TWICE DAILY BEFORE MEALS, Disp:  180 tablet, Rfl: 3  •  glucose blood (True Metrix Blood Glucose Test) test strip, Daily testing E11.9, Disp: 100 each, Rfl: 5  •  glucose monitor monitoring kit, 1 each Daily. E11.9, Disp: 1 each, Rfl: 0  •  ipratropium-albuterol (DUO-NEB) 0.5-2.5 mg/3 ml nebulizer, Take 3 mL by nebulization 4 (Four) Times a Day., Disp: 1620 mL, Rfl: 2  •  Lancet Device misc, 1 each Daily. E11.9, Disp: 100 each, Rfl: 3  •  lisinopril (PRINIVIL,ZESTRIL) 40 MG tablet, Take 1 tablet by mouth Daily., Disp: 90 tablet, Rfl: 3  •  metoprolol succinate XL (Toprol XL) 50 MG 24 hr tablet, Take 1 tablet by mouth Daily., Disp: 30 tablet, Rfl: 0  •  Misc. Devices misc, Bipap tubing., Disp: 1 each, Rfl: 0  •  Multiple Vitamin tablet, Take 1 tablet by mouth daily., Disp: , Rfl:   •  Nebulizer misc, 1 each Every 4 (Four) Hours As Needed (shortness of breath)., Disp: 1 each, Rfl: 0  •  nitroglycerin (Nitrostat) 0.4 MG SL tablet, Place 1 tablet under the tongue Every 5 (Five) Minutes As Needed for Chest Pain., Disp: 25 tablet, Rfl: 11  •  O2 (OXYGEN), Inhale 2 L/min Continuous As Needed (With activity)., Disp: , Rfl:   •  omeprazole (priLOSEC) 40 MG capsule, Take 1 capsule by mouth Daily., Disp: 90 capsule, Rfl: 3  •  potassium chloride (K-DUR,KLOR-CON) 10 MEQ CR tablet, Take 1 tablet by mouth 2 (Two) Times a Day., Disp: 60 tablet, Rfl: 2  •  pramipexole (MIRAPEX) 0.5 MG tablet, Take 1 tablet by mouth 3 (Three) Times a Day., Disp: 270 tablet, Rfl: 3  •  spironolactone (ALDACTONE) 50 MG tablet, TAKE 1 TABLET EVERY DAY, Disp: 90 tablet, Rfl: 1  •  terazosin (HYTRIN) 10 MG capsule, Take 1 capsule by mouth Every Night., Disp: 90 capsule, Rfl: 3  •  Vitamin D, Cholecalciferol, 50 MCG (2000 UT) capsule, Take 1 capsule by mouth Daily., Disp: 90 capsule, Rfl: 3  •  ondansetron ODT (Zofran ODT) 4 MG disintegrating tablet, Place 1 tablet on the tongue Every 8 (Eight) Hours As Needed for Nausea or Vomiting., Disp: 20 tablet, Rfl: 0  •  TRUEplus Lancets 33G  "misc, 1 each by Other route Daily. E11.9, Disp: 100 each, Rfl: 5     Physical Exam  Visit Vitals  /72   Pulse 76   Temp 97.8 °F (36.6 °C)   Ht 165.1 cm (65\")   Wt 115 kg (253 lb 8.5 oz)   SpO2 93%   BMI 42.19 kg/m²       Labs  Brief Urine Lab Results  (Last result in the past 365 days)      Color   Clarity   Blood   Leuk Est   Nitrite   Protein   CREAT   Urine HCG        10/25/21 1016             125.1               Lab Results   Component Value Date    GLUCOSE 157 (H) 01/21/2022    CALCIUM 9.4 01/21/2022     01/21/2022    K 4.6 01/21/2022    CO2 22 01/21/2022     01/21/2022    BUN 17 01/21/2022    CREATININE 1.46 (H) 01/21/2022    EGFRIFAFRI 55 (L) 01/21/2022    EGFRIFNONA 47 (L) 01/21/2022    BCR 12 01/21/2022    ANIONGAP 8.4 11/22/2021       Lab Results   Component Value Date    WBC 5.9 01/21/2022    HGB 12.6 (L) 01/21/2022    HCT 38.5 01/21/2022    MCV 91 01/21/2022     01/21/2022            Lab Results   Component Value Date    PSA 0.306 02/10/2020    PSA 0.8 01/10/2017       No results found for: TESTOSTERONE         Radiographic Studies  CT Chest Without Contrast Diagnostic    Result Date: 11/22/2021  Unremarkable. Authenticated by Hua Bailey M.D. on 11/22/2021 11:32:28 PM    CT Cervical Spine Without Contrast    Result Date: 12/25/2021  Significant degenerative disease.  No acute abnormality. Authenticated by Hua Bailey M.D. on 12/25/2021 06:30:26 PM    XR Chest 1 View    Result Date: 11/23/2021  No acute cardiopulmonary process.   Images were reviewed, interpreted, and dictated by Dr. Avery Pryor M.D. Transcribed by Suly Newman PA-C.  This report was finalized on 11/23/2021 12:54 PM by Avery Pryor M.D..    XR Chest PA & Lateral    Result Date: 10/4/2021  Heart is enlarged with no evidence of acute parenchymal disease.  DICTATED:   10/01/2021 EDITED/ls :   10/01/2021  This report was finalized on 10/4/2021 9:02 AM by Dr. Silvia Burrows MD.        PVR  Post-void " residual performed with ultrasound scanner by staff and interpreted by me - ***    I have reviewed above labs and imaging.     Assessment  71 y.o. male with ***    Plan  1. ***

## 2022-01-24 NOTE — PROGRESS NOTES
"Chief Complaint   Patient presents with   • Advice Only     new pt- low testosterone        HPI  Mr. Bryant is a 71 y.o. male with history of multiple medical comorbidities, CAD, heart failure, hypertension, CKD, type 2 diabetes, ECHO who presents as a new patient for reportedly low testosterone.  The patient denies any knowledge of low testosterone and states that he is having left-sided scrotal pain with occasional dysuria, as well as trouble emptying his bladder.  He states that the scrotal pain happens when he has too much \"fluid\" on his body and resolves with diuresis.  His last episode of this with accompanying dysuria was 2 to 3 months ago.  He denies ever having associated hematuria.  His last prostate exam was many years ago, though he reports it was normal.    Regarding urologic history he reports having a prostate surgery, but he is unsure of what type or what was done, states it was several years ago.  He additionally reports trauma to the genitals requiring a suprapubic tube for 1 year.  He is unable to provide further history on this, states again that it was several years ago. He states he used to see Dr. Solorzano in [place], but has not seen him for years.     His greatest urologic concerns are incomplete emptying of the bladder, intermittency and weak stream, straining to urinate.    IPSS Questionnaire (AUA-7):  Over the past month…    1)  Incomplete Emptying  How often have you had a sensation of not emptying your bladder?  4 - More than half the time   2)  Frequency  How often have you had to urinate less than every two hours? 0 - Not at all   3)  Intermittency  How often have you found you stopped and started again several times when you urinated?  5 - Almost always   4) Urgency  How often have you found it difficult to postpone urination?  1 - Less than 1 time in 5   5) Weak Stream  How often have you had a weak urinary stream?  5 - Almost always   6) Straining  How often have you had to push or " strain to begin urination?  5 - Almost always   7) Nocturia  How many times did you typically get up at night to urinate?  0 - None   Total Score:  20       Quality of life due to urinary symptoms:  If you were to spend the rest of your life with your urinary condition the way it is now, how would you feel about that? 5-Unhappy   Urine Leakage (Incontinence) 0-No Leakage       Past Medical History:   Diagnosis Date   • Anxiety and depression    • Arthritis    • Backache     20 years   • Benign prostatic hyperplasia    • Bladder trauma    • Cervicalgia    • Chronic kidney disease     Cr up to 2.1   • Coronary artery disease    • Diabetes mellitus (HCC)     15 years--   • Emphysema of lung (HCC)    • Erectile dysfunction    • Eye exam, routine 2015   • FH: colonic polyps    • Gout     for 10 years--   • History of echocardiogram 09/15/2014   • History of tobacco use     with cessation x7 years   • Hyperlipidemia    • Hypertension    • Migraine    • Myocardial infarction ([facility])     h/o coronary artery stenting--   • Prostate cancer ([facility])    • Restless leg syndrome     20 years   • Sleep apnea     12 years; uses a Bi-Pap   • Stroke ([facility])    • Syncope 4/28/2017   • Warfarin anticoagulation 9/14/2016       Past Surgical History:   Procedure Laterality Date   • BLADDER SURGERY     • CARDIAC CATHETERIZATION  03/15/2013    revealing widely patent stents of the left circumflex and ramus intermedius coronary arteries, no significant disease is seen in any territory   • CARDIAC CATHETERIZATION Left 9/30/2019    Procedure: Left Heart Cath;  Surgeon: Arelis Tucker MD;  Location:  [facility] LOCATION;  Service: Cardiology   • CARDIAC ELECTROPHYSIOLOGY PROCEDURE N/A 5/10/2021    Procedure: PPM battery change;  Surgeon: Arelis Tucker MD;  Location:  [facility] LOCATION;  Service: Cardiology;  Laterality: N/A;   • CARDIAC PACEMAKER PLACEMENT  2012   • CATARACT EXTRACTION     • COLONOSCOPY  2004     No family history of colon cancer.     • COLONOSCOPY  2015   • HERNIA REPAIR      Type unknown   • PACEMAKER IMPLANTATION     • PROSTATE SURGERY           Current Outpatient Medications:   •  albuterol sulfate  (90 Base) MCG/ACT inhaler, Inhale 2 puffs Every 4 (Four) Hours As Needed for Wheezing., Disp: , Rfl:   •  amiodarone (PACERONE) 200 MG tablet, Take 1 tablet by mouth Daily., Disp: 30 tablet, Rfl: 0  •  amLODIPine (NORVASC) 10 MG tablet, Take 1 tablet by mouth Daily., Disp: 30 tablet, Rfl: 1  •  apixaban (ELIQUIS) 5 MG tablet tablet, Take 5 mg by mouth 2 (Two) Times a Day., Disp: , Rfl:   •  aspirin 81 MG chewable tablet, Chew 81 mg Daily., Disp: , Rfl:   •  atorvastatin (LIPITOR) 20 MG tablet, Take 1 tablet by mouth Every Night., Disp: 90 tablet, Rfl: 3  •  baclofen (LIORESAL) 10 MG tablet, Take 1 tablet by mouth 3 (Three) Times a Day As Needed for Muscle Spasms., Disp: 90 tablet, Rfl: 2  •  Blood Glucose Monitoring Suppl (TRUE METRIX AIR GLUCOSE METER) w/Device kit, 1 each by In Vitro route 2 (two) times a day. E11.9, Disp: 1 kit, Rfl: 0  •  Budeson-Glycopyrrol-Formoterol (Breztri Aerosphere) 160-9-4.8 MCG/ACT aerosol inhaler, Inhale 2 puffs 2 (Two) Times a Day., Disp: 15.9 g, Rfl: 5  •  cetirizine (zyrTEC) 10 MG tablet, Take 1 tablet by mouth Daily., Disp: 90 tablet, Rfl: 3  •  Coenzyme Q10 (Co Q-10) 100 MG capsule, Take 200 mg by mouth Daily., Disp: 60 capsule, Rfl: 11  •  diclofenac (VOLTAREN) 1 % gel gel, Apply 4 g topically to the appropriate area as directed 4 (Four) Times a Day As Needed (pain)., Disp: 100 g, Rfl: 5  •  finasteride (PROSCAR) 5 MG tablet, Take 1 tablet by mouth Daily., Disp: 90 tablet, Rfl: 3  •  FLUoxetine (PROzac) 20 MG capsule, Take 1 capsule by mouth Daily., Disp: 90 capsule, Rfl: 3  •  fluticasone (FLONASE) 50 MCG/ACT nasal spray, USE 2 SPRAYS IN EACH NOSTRIL EVERY DAY AS DIRECTED  BY  MD, Disp: 48 g, Rfl: 3  •  furosemide (LASIX) 20 MG tablet, TAKE 1 TABLET BY MOUTH EVERY  MORNING, MAY TAKE SECOND DOSE AS NEEDED FOR INCREASED SWELLING, Disp: 60 tablet, Rfl: 2  •  glipizide (GLUCOTROL) 5 MG tablet, TAKE 1 TABLET TWICE DAILY BEFORE MEALS, Disp: 180 tablet, Rfl: 3  •  glucose blood (True Metrix Blood Glucose Test) test strip, Daily testing E11.9, Disp: 100 each, Rfl: 5  •  glucose monitor monitoring kit, 1 each Daily. E11.9, Disp: 1 each, Rfl: 0  •  ipratropium-albuterol (DUO-NEB) 0.5-2.5 mg/3 ml nebulizer, Take 3 mL by nebulization 4 (Four) Times a Day., Disp: 1620 mL, Rfl: 2  •  Lancet Device misc, 1 each Daily. E11.9, Disp: 100 each, Rfl: 3  •  lisinopril (PRINIVIL,ZESTRIL) 40 MG tablet, Take 1 tablet by mouth Daily., Disp: 90 tablet, Rfl: 3  •  metoprolol succinate XL (Toprol XL) 50 MG 24 hr tablet, Take 1 tablet by mouth Daily., Disp: 30 tablet, Rfl: 0  •  Misc. Devices misc, Bipap tubing., Disp: 1 each, Rfl: 0  •  Multiple Vitamin tablet, Take 1 tablet by mouth daily., Disp: , Rfl:   •  Nebulizer misc, 1 each Every 4 (Four) Hours As Needed (shortness of breath)., Disp: 1 each, Rfl: 0  •  nitroglycerin (Nitrostat) 0.4 MG SL tablet, Place 1 tablet under the tongue Every 5 (Five) Minutes As Needed for Chest Pain., Disp: 25 tablet, Rfl: 11  •  O2 (OXYGEN), Inhale 2 L/min Continuous As Needed (With activity)., Disp: , Rfl:   •  omeprazole (priLOSEC) 40 MG capsule, Take 1 capsule by mouth Daily., Disp: 90 capsule, Rfl: 3  •  potassium chloride (K-DUR,KLOR-CON) 10 MEQ CR tablet, Take 1 tablet by mouth 2 (Two) Times a Day., Disp: 60 tablet, Rfl: 2  •  pramipexole (MIRAPEX) 0.5 MG tablet, Take 1 tablet by mouth 3 (Three) Times a Day., Disp: 270 tablet, Rfl: 3  •  spironolactone (ALDACTONE) 50 MG tablet, TAKE 1 TABLET EVERY DAY, Disp: 90 tablet, Rfl: 1  •  terazosin (HYTRIN) 10 MG capsule, Take 1 capsule by mouth Every Night., Disp: 90 capsule, Rfl: 3  •  Vitamin D, Cholecalciferol, 50 MCG (2000 UT) capsule, Take 1 capsule by mouth Daily., Disp: 90 capsule, Rfl: 3  •   "clotrimazole-betamethasone (Lotrisone) 1-0.05 % cream, Apply to affected area 1 times daily, Disp: 15 g, Rfl: 1  •  ondansetron ODT (Zofran ODT) 4 MG disintegrating tablet, Place 1 tablet on the tongue Every 8 (Eight) Hours As Needed for Nausea or Vomiting., Disp: 20 tablet, Rfl: 0  •  TRUEplus Lancets 33G misc, 1 each by Other route Daily. E11.9, Disp: 100 each, Rfl: 5     Physical Exam  Visit Vitals  /72   Pulse 76   Temp 97.8 °F (36.6 °C)   Ht 165.1 cm (65\")   Wt 115 kg (253 lb 8.5 oz)   SpO2 93%   BMI 42.19 kg/m²     General: Chronically ill-appearing, BMI 42, wearing nasal cannula  : Penis is circumcised.  Meatus is patent.  The corona is partially buried by skin of the shaft of the penis due to fatty deposition of the mons.  Erythema with mild fissures and smegma present along the corona of the penis.  Mild tenderness of the left spermatic cord and left inguinal ring without appreciable hernia.  Otherwise testicular exam is normal.    Labs  Brief Urine Lab Results  (Last result in the past 365 days)      Color   Clarity   Blood   Leuk Est   Nitrite   Protein   CREAT   Urine HCG        10/25/21 1016             125.1               Lab Results   Component Value Date    GLUCOSE 157 (H) 01/21/2022    CALCIUM 9.4 01/21/2022     01/21/2022    K 4.6 01/21/2022    CO2 22 01/21/2022     01/21/2022    BUN 17 01/21/2022    CREATININE 1.46 (H) 01/21/2022    EGFRIFAFRI 55 (L) 01/21/2022    EGFRIFNONA 47 (L) 01/21/2022    BCR 12 01/21/2022    ANIONGAP 8.4 11/22/2021       Lab Results   Component Value Date    WBC 5.9 01/21/2022    HGB 12.6 (L) 01/21/2022    HCT 38.5 01/21/2022    MCV 91 01/21/2022     01/21/2022            Lab Results   Component Value Date    PSA 0.355 01/24/2022    PSA 0.306 02/10/2020    PSA 0.8 01/10/2017           Radiographic Studies  CT Chest Without Contrast Diagnostic    Result Date: 11/22/2021  Unremarkable. Authenticated by Hua Bailey M.D. on 11/22/2021 11:32:28 PM    CT " Cervical Spine Without Contrast    Result Date: 12/25/2021  Significant degenerative disease.  No acute abnormality. Authenticated by Hua Bailey M.D. on 12/25/2021 06:30:26 PM    XR Chest 1 View    Result Date: 11/23/2021  No acute cardiopulmonary process.   Images were reviewed, interpreted, and dictated by Dr. Avery Pryor M.D. Transcribed by Suly Newman PA-C.  This report was finalized on 11/23/2021 12:54 PM by Avery Pryor M.D..    XR Chest PA & Lateral    Result Date: 10/4/2021  Heart is enlarged with no evidence of acute parenchymal disease.  DICTATED:   10/01/2021 EDITED/ls :   10/01/2021  This report was finalized on 10/4/2021 9:02 AM by Dr. Silvia Burrows MD.        PVR  Post-void residual performed with ultrasound scanner by staff and interpreted by me -52 cc    I have reviewed above labs and imaging.     Assessment  71 y.o. male with history of BPH s/p unknown prostate surgery, bladder trauma with history of suprapubic tube, reversed several years ago, as well as many serious medical comorbidities as above in HPI presenting as a new patient.  His referral was for low testosterone, with a level 116.  The patient is however mostly concerned about his urinary difficulties, rather than starting testosterone therapy.  His IPSS is 20.  He is unable to produce a urine sample today and has 52 cc in the bladder.  He has notable obstructive symptoms as above and IPSS.  Given his symptoms and history we will need to obtain a further work-up with cystoscopy.  I have looked through the EMR back to 2015 and can find no previous diagnosis or documentation of suprapubic tube or prostate surgeries.  He was referred to Dr. Heaton multiple times and did not show up to those appointments.      Plan  1.  LUTS with likely BPH   -Complicated urologic history as above with unknown prostate surgery and previous history of SP tube, reported  trauma   -Obtain PSA today   -IPSS is 20   -Already taking Proscar  and Terazosin   -Perform cystoscopy to further evaluate obstructive symptoms    2.  Intermittent testicular pain with left inguinal pain present today   -Obtain scrotal ultrasound    3.  Balanitis   -Start Lotrisone    Follow-up in about 3 weeks for ongoing work-up as above

## 2022-01-27 NOTE — PROGRESS NOTES
January 27, 2022    Hello, may I speak with Robe Bryant?    My name is Catalina Lundberg    I am  with [facility]  [address]  [address]  [facility] [address] [phone].    Before we get started may I verify your date of birth? 1950 correct.    I am calling to officially welcome you to our practice and ask about your recent visit. Is this a good time to talk? yes    Tell me about your visit with us. What things went well? things was good, the lady thinks I need some test and to follow up with the doctor to finish those so I will be back in a few weeks.       We're always looking for ways to make our patients' experiences even better. Do you have recommendations on ways we may improve?  no    Overall were you satisfied with your first visit to our practice? yes       I appreciate you taking the time to speak with me today. Is there anything else I can do for you? no      Thank you, and have a great day.

## 2022-01-28 ENCOUNTER — TELEPHONE (OUTPATIENT)
Dept: INTERNAL MEDICINE | Facility: CLINIC | Age: 72
End: 2022-01-28

## 2022-01-28 LAB
A PHAGOCYTOPH IGG TITR SER IF: NEGATIVE {TITER}
A PHAGOCYTOPH IGM TITR SER IF: NEGATIVE {TITER}
ALBUMIN SERPL-MCNC: 3.7 G/DL (ref 3.7–4.7)
ALBUMIN/GLOB SERPL: 1.6 {RATIO} (ref 1.2–2.2)
ALDOLASE SERPL-CCNC: 5.9 U/L (ref 3.3–10.3)
ALP SERPL-CCNC: 69 IU/L (ref 44–121)
ALT SERPL-CCNC: 18 IU/L (ref 0–44)
ASO AB SERPL-ACNC: 64.7 IU/ML (ref 0–200)
AST SERPL-CCNC: 24 IU/L (ref 0–40)
B BURGDOR DNA SPEC QL NAA+PROBE: NEGATIVE
BASOPHILS # BLD AUTO: 0.1 X10E3/UL (ref 0–0.2)
BASOPHILS NFR BLD AUTO: 1 %
BILIRUB SERPL-MCNC: 0.3 MG/DL (ref 0–1.2)
BUN SERPL-MCNC: 17 MG/DL (ref 8–27)
BUN/CREAT SERPL: 12 (ref 10–24)
CALCIUM SERPL-MCNC: 9.4 MG/DL (ref 8.6–10.2)
CCP IGA+IGG SERPL IA-ACNC: 3 UNITS (ref 0–19)
CHLORIDE SERPL-SCNC: 102 MMOL/L (ref 96–106)
CK SERPL-CCNC: 96 U/L (ref 41–331)
CO2 SERPL-SCNC: 22 MMOL/L (ref 20–29)
CREAT SERPL-MCNC: 1.46 MG/DL (ref 0.76–1.27)
CRP SERPL-MCNC: 2 MG/L (ref 0–10)
E CHAFFEENSIS IGG TITR SER IF: NEGATIVE {TITER}
E CHAFFEENSIS IGM TITR SER IF: NEGATIVE {TITER}
ENDOMYSIAL ANTIBODY TITER IGA: NORMAL TITER
ENDOMYSIAL ANTIBODY TITER IGG: NORMAL TITER
EOSINOPHIL # BLD AUTO: 0.3 X10E3/UL (ref 0–0.4)
EOSINOPHIL NFR BLD AUTO: 5 %
ERYTHROCYTE [DISTWIDTH] IN BLOOD BY AUTOMATED COUNT: 12.2 % (ref 11.6–15.4)
ERYTHROCYTE [SEDIMENTATION RATE] IN BLOOD BY WESTERGREN METHOD: 17 MM/HR (ref 0–30)
GLIADIN PEPTIDE IGA SER-ACNC: 8 UNITS (ref 0–19)
GLIADIN PEPTIDE IGG SER-ACNC: 2 UNITS (ref 0–19)
GLOBULIN SER CALC-MCNC: 2.3 G/DL (ref 1.5–4.5)
GLUCOSE SERPL-MCNC: 157 MG/DL (ref 65–99)
HCT VFR BLD AUTO: 38.5 % (ref 37.5–51)
HGB BLD-MCNC: 12.6 G/DL (ref 13–17.7)
IMM GRANULOCYTES # BLD AUTO: 0 X10E3/UL (ref 0–0.1)
IMM GRANULOCYTES NFR BLD AUTO: 0 %
LYMPHOCYTES # BLD AUTO: 1.4 X10E3/UL (ref 0.7–3.1)
LYMPHOCYTES NFR BLD AUTO: 23 %
MCH RBC QN AUTO: 29.9 PG (ref 26.6–33)
MCHC RBC AUTO-ENTMCNC: 32.7 G/DL (ref 31.5–35.7)
MCV RBC AUTO: 91 FL (ref 79–97)
MONOCYTES # BLD AUTO: 0.6 X10E3/UL (ref 0.1–0.9)
MONOCYTES NFR BLD AUTO: 10 %
MYOGLOBIN SERPL-MCNC: 81 NG/ML (ref 28–72)
NEUTROPHILS # BLD AUTO: 3.6 X10E3/UL (ref 1.4–7)
NEUTROPHILS NFR BLD AUTO: 61 %
PLATELET # BLD AUTO: 203 X10E3/UL (ref 150–450)
POTASSIUM SERPL-SCNC: 4.6 MMOL/L (ref 3.5–5.2)
PROT SERPL-MCNC: 6 G/DL (ref 6–8.5)
R RICKETTSI IGG SER QL IA: POSITIVE
R RICKETTSI IGG TITR SER IF: ABNORMAL {TITER}
R RICKETTSI IGM SER-ACNC: 0.48 INDEX (ref 0–0.89)
RBC # BLD AUTO: 4.22 X10E6/UL (ref 4.14–5.8)
RHEUMATOID FACT SERPL-ACNC: <10 IU/ML
SODIUM SERPL-SCNC: 139 MMOL/L (ref 134–144)
T4 FREE SERPL-MCNC: 1.07 NG/DL (ref 0.82–1.77)
TSH SERPL-ACNC: 0.52 UIU/ML (ref 0.45–4.5)
TTG IGA SER-ACNC: <2 U/ML (ref 0–3)
TTG IGG SER-ACNC: <2 U/ML (ref 0–5)
URATE SERPL-MCNC: 8.1 MG/DL (ref 3.8–8.4)
VIT B12 SERPL-MCNC: 676 PG/ML (ref 232–1245)
WBC # BLD AUTO: 5.9 X10E3/UL (ref 3.4–10.8)

## 2022-01-28 RX ORDER — DOXYCYCLINE HYCLATE 100 MG/1
100 CAPSULE ORAL 2 TIMES DAILY
Qty: 20 CAPSULE | Refills: 0 | Status: SHIPPED | OUTPATIENT
Start: 2022-01-28 | End: 2022-04-18 | Stop reason: HOSPADM

## 2022-01-28 NOTE — TELEPHONE ENCOUNTER
Caller: Robe Bryant    Relationship: Self    Best call back number: [phone]     What medication are you requesting: SOMETHING FOR THE PAIN FROM THE RUDY MOUNTAIN SPOTTED FEVER    What are your current symptoms: PAIN NECK, HEAD AND SHOULDERS    How long have you been experiencing symptoms: OVER 1 WEEK  Have you had these symptoms before:    [] Yes  [x] No    Have you been treated for these symptoms before:   [] Yes  [x] No    If a prescription is needed, what is your preferred pharmacy and phone number: [id number] [place], [place] - [facility] [address] [facility] [address] - [phone]  - [phone]      Additional notes:  PLEASE CALL IF ANY QUESTIONS -[phone]

## 2022-01-28 NOTE — TELEPHONE ENCOUNTER
Informed via     Hub and front office ok to give message below    Doxycycline sent to pharmacy.  He has taken the medication in the past for respiratory illnesses.  He does need to follow up in office to review multiple labs.  He had an appointment with me on 2/3/22, but has been bumped up to 2/1/22 with Odalis.  I will be happy to see him for this. Just call to get in with me

## 2022-01-28 NOTE — TELEPHONE ENCOUNTER
Doxycycline sent to pharmacy.  He has taken the medication in the past for respiratory illnesses.  He does need to follow up in office to review multiple labs.  He had an appointment with me on 2/3/22, but has been bumped up to 2/1/22 with Odalis.  I will be happy to see him for this.

## 2022-01-28 NOTE — TELEPHONE ENCOUNTER
Patient saw Dr. 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  He would like to know what the next step is.  Please advise.  Phone number verified.

## 2022-02-02 ENCOUNTER — TELEPHONE (OUTPATIENT)
Dept: INTERNAL MEDICINE | Facility: CLINIC | Age: 72
End: 2022-02-02

## 2022-02-02 NOTE — TELEPHONE ENCOUNTER
Patient has been rescheduled due to Odalis being out of the office this week.  Please call and see if he would like to be seen this week and I will work him in. May be placed in a same day slot if needed.  With the bad weather coming he may want to wait, but I don't mind to see him sooner.

## 2022-02-03 ENCOUNTER — OFFICE VISIT (OUTPATIENT)
Dept: INTERNAL MEDICINE | Facility: CLINIC | Age: 72
End: 2022-02-03

## 2022-02-03 VITALS
DIASTOLIC BLOOD PRESSURE: 90 MMHG | SYSTOLIC BLOOD PRESSURE: 160 MMHG | HEART RATE: 66 BPM | WEIGHT: 255 LBS | BODY MASS INDEX: 42.49 KG/M2 | OXYGEN SATURATION: 95 % | TEMPERATURE: 97.8 F | HEIGHT: 65 IN

## 2022-02-03 DIAGNOSIS — J43.1 PANLOBULAR EMPHYSEMA: ICD-10-CM

## 2022-02-03 DIAGNOSIS — G89.4 CHRONIC PAIN SYNDROME: ICD-10-CM

## 2022-02-03 DIAGNOSIS — G89.29 CHRONIC SHOULDER PAIN, UNSPECIFIED LATERALITY: ICD-10-CM

## 2022-02-03 DIAGNOSIS — M25.50 ARTHRALGIA, UNSPECIFIED JOINT: ICD-10-CM

## 2022-02-03 DIAGNOSIS — I50.32 CHRONIC DIASTOLIC HEART FAILURE: ICD-10-CM

## 2022-02-03 DIAGNOSIS — E79.0 ELEVATED URIC ACID IN BLOOD: ICD-10-CM

## 2022-02-03 DIAGNOSIS — M25.519 CHRONIC SHOULDER PAIN, UNSPECIFIED LATERALITY: ICD-10-CM

## 2022-02-03 DIAGNOSIS — A77.0 RMSF (ROCKY MOUNTAIN SPOTTED FEVER): Primary | ICD-10-CM

## 2022-02-03 LAB — URATE SERPL-MCNC: 7.9 MG/DL (ref 3.4–7)

## 2022-02-03 PROCEDURE — 99214 OFFICE O/P EST MOD 30 MIN: CPT | Performed by: FAMILY MEDICINE

## 2022-02-03 RX ORDER — BACLOFEN 10 MG/1
10 TABLET ORAL 3 TIMES DAILY PRN
Qty: 90 TABLET | Refills: 2 | Status: SHIPPED | OUTPATIENT
Start: 2022-02-03 | End: 2022-02-03 | Stop reason: SDUPTHER

## 2022-02-03 RX ORDER — BUDESONIDE, GLYCOPYRROLATE, AND FORMOTEROL FUMARATE 160; 9; 4.8 UG/1; UG/1; UG/1
2 AEROSOL, METERED RESPIRATORY (INHALATION) 2 TIMES DAILY
Qty: 15.9 G | Refills: 5 | Status: SHIPPED | OUTPATIENT
Start: 2022-02-03 | End: 2022-05-17 | Stop reason: SDUPTHER

## 2022-02-03 RX ORDER — OMEPRAZOLE 40 MG/1
40 CAPSULE, DELAYED RELEASE ORAL DAILY
Qty: 90 CAPSULE | Refills: 3 | Status: SHIPPED | OUTPATIENT
Start: 2022-02-03 | End: 2022-02-03 | Stop reason: SDUPTHER

## 2022-02-03 RX ORDER — ALBUTEROL SULFATE 90 UG/1
2 AEROSOL, METERED RESPIRATORY (INHALATION) EVERY 4 HOURS PRN
Qty: 18 G | Refills: 3 | Status: SHIPPED | OUTPATIENT
Start: 2022-02-03

## 2022-02-03 RX ORDER — BACLOFEN 10 MG/1
10 TABLET ORAL 3 TIMES DAILY PRN
Qty: 90 TABLET | Refills: 2 | Status: SHIPPED | OUTPATIENT
Start: 2022-02-03 | End: 2022-07-13 | Stop reason: HOSPADM

## 2022-02-03 RX ORDER — OMEPRAZOLE 40 MG/1
40 CAPSULE, DELAYED RELEASE ORAL DAILY
Qty: 90 CAPSULE | Refills: 3 | Status: SHIPPED | OUTPATIENT
Start: 2022-02-03

## 2022-02-03 NOTE — PROGRESS NOTES
Robe Bryant is a 71 y.o. male.    Chief Complaint   Patient presents with   • Abstract     spotted juice mountain, tick bite.    • Med Refill     stomach pill       HPI   Patient presents today to discuss recent results of labs.  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  He states he took care of another dog that has been poorly cared for a while back.  He reports it has been about 3 months ago.  He reports never seen a tick on the dog, but the dog he cares for now is very well groomed. He has not seen any ticks on his dog either.   He continues to have body aches and pains, particularly neck and shoulder pain on the right.  He is under the care of ortho and has received steroid injections into the shoulder with improvement.  He has not re-established care with Dr. Deluca for pain management, though referral has been placed well over a year ago.  He was given contact information to set up an appoiintment.  He has been taking tylenol for pain and aspirin without improvement.      In addition to RMSF being positive, uric acid was elevated.  He reports he has been told in the past he has gout.  Denies drinking alcohol.  Drinks only water, soda, and coffee.  Admits to eating steak a lot.  Denies eating much cheese.  Has not been on preventative medication for gout in the past.      Patient has CHF and COPD and continues to complain of shortness of breath, particularly on exertion.  He uses 3L of oxygen typically, but did not wear oxygen into the clinic today.  He uses a bipap at home.  He has been out of his inhalers for a couple of weeks.      The following portions of the patient's history were reviewed and updated as appropriate: allergies, current medications, past family history, past medical history, past social history, past surgical history and problem list.     No Known Allergies      Current Outpatient Medications:   •  albuterol sulfate  (90 Base) MCG/ACT inhaler, Inhale 2  puffs Every 4 (Four) Hours As Needed for Wheezing or Shortness of Air., Disp: 18 g, Rfl: 3  •  amiodarone (PACERONE) 200 MG tablet, Take 1 tablet by mouth Daily., Disp: 30 tablet, Rfl: 0  •  amLODIPine (NORVASC) 10 MG tablet, Take 1 tablet by mouth Daily., Disp: 30 tablet, Rfl: 1  •  apixaban (ELIQUIS) 5 MG tablet tablet, Take 5 mg by mouth 2 (Two) Times a Day., Disp: , Rfl:   •  aspirin 81 MG chewable tablet, Chew 81 mg Daily., Disp: , Rfl:   •  atorvastatin (LIPITOR) 20 MG tablet, Take 1 tablet by mouth Every Night., Disp: 90 tablet, Rfl: 3  •  baclofen (LIORESAL) 10 MG tablet, Take 1 tablet by mouth 3 (Three) Times a Day As Needed for Muscle Spasms., Disp: 90 tablet, Rfl: 2  •  Blood Glucose Monitoring Suppl (TRUE METRIX AIR GLUCOSE METER) w/Device kit, 1 each by In Vitro route 2 (two) times a day. E11.9, Disp: 1 kit, Rfl: 0  •  Budeson-Glycopyrrol-Formoterol (Breztri Aerosphere) 160-9-4.8 MCG/ACT aerosol inhaler, Inhale 2 puffs 2 (Two) Times a Day., Disp: 15.9 g, Rfl: 5  •  cetirizine (zyrTEC) 10 MG tablet, Take 1 tablet by mouth Daily., Disp: 90 tablet, Rfl: 3  •  clotrimazole-betamethasone (Lotrisone) 1-0.05 % cream, Apply to affected area 1 times daily, Disp: 15 g, Rfl: 1  •  Coenzyme Q10 (Co Q-10) 100 MG capsule, Take 200 mg by mouth Daily., Disp: 60 capsule, Rfl: 11  •  diclofenac (VOLTAREN) 1 % gel gel, Apply 4 g topically to the appropriate area as directed 4 (Four) Times a Day As Needed (pain)., Disp: 100 g, Rfl: 5  •  doxycycline (VIBRAMYCIN) 100 MG capsule, Take 1 capsule by mouth 2 (Two) Times a Day., Disp: 20 capsule, Rfl: 0  •  finasteride (PROSCAR) 5 MG tablet, Take 1 tablet by mouth Daily., Disp: 90 tablet, Rfl: 3  •  FLUoxetine (PROzac) 20 MG capsule, Take 1 capsule by mouth Daily., Disp: 90 capsule, Rfl: 3  •  fluticasone (FLONASE) 50 MCG/ACT nasal spray, USE 2 SPRAYS IN EACH NOSTRIL EVERY DAY AS DIRECTED  BY  MD, Disp: 48 g, Rfl: 3  •  furosemide (LASIX) 20 MG tablet, TAKE 1 TABLET BY MOUTH  EVERY MORNING, MAY TAKE SECOND DOSE AS NEEDED FOR INCREASED SWELLING, Disp: 60 tablet, Rfl: 2  •  glipizide (GLUCOTROL) 5 MG tablet, TAKE 1 TABLET TWICE DAILY BEFORE MEALS, Disp: 180 tablet, Rfl: 3  •  glucose blood (True Metrix Blood Glucose Test) test strip, Daily testing E11.9, Disp: 100 each, Rfl: 5  •  glucose monitor monitoring kit, 1 each Daily. E11.9, Disp: 1 each, Rfl: 0  •  ipratropium-albuterol (DUO-NEB) 0.5-2.5 mg/3 ml nebulizer, Take 3 mL by nebulization 4 (Four) Times a Day., Disp: 1620 mL, Rfl: 2  •  Lancet Device misc, 1 each Daily. E11.9, Disp: 100 each, Rfl: 3  •  lisinopril (PRINIVIL,ZESTRIL) 40 MG tablet, Take 1 tablet by mouth Daily., Disp: 90 tablet, Rfl: 3  •  metoprolol succinate XL (Toprol XL) 50 MG 24 hr tablet, Take 1 tablet by mouth Daily., Disp: 30 tablet, Rfl: 0  •  Misc. Devices misc, Bipap tubing., Disp: 1 each, Rfl: 0  •  Multiple Vitamin tablet, Take 1 tablet by mouth daily., Disp: , Rfl:   •  Nebulizer misc, 1 each Every 4 (Four) Hours As Needed (shortness of breath)., Disp: 1 each, Rfl: 0  •  nitroglycerin (Nitrostat) 0.4 MG SL tablet, Place 1 tablet under the tongue Every 5 (Five) Minutes As Needed for Chest Pain., Disp: 25 tablet, Rfl: 11  •  O2 (OXYGEN), Inhale 2 L/min Continuous As Needed (With activity)., Disp: , Rfl:   •  omeprazole (priLOSEC) 40 MG capsule, Take 1 capsule by mouth Daily., Disp: 90 capsule, Rfl: 3  •  ondansetron ODT (Zofran ODT) 4 MG disintegrating tablet, Place 1 tablet on the tongue Every 8 (Eight) Hours As Needed for Nausea or Vomiting., Disp: 20 tablet, Rfl: 0  •  potassium chloride (K-DUR,KLOR-CON) 10 MEQ CR tablet, Take 1 tablet by mouth 2 (Two) Times a Day., Disp: 60 tablet, Rfl: 2  •  pramipexole (MIRAPEX) 0.5 MG tablet, Take 1 tablet by mouth 3 (Three) Times a Day., Disp: 270 tablet, Rfl: 3  •  spironolactone (ALDACTONE) 50 MG tablet, TAKE 1 TABLET EVERY DAY, Disp: 90 tablet, Rfl: 1  •  terazosin (HYTRIN) 10 MG capsule, Take 1 capsule by mouth  "Every Night., Disp: 90 capsule, Rfl: 3  •  TRUEplus Lancets 33G misc, 1 each by Other route Daily. E11.9, Disp: 100 each, Rfl: 5  •  Vitamin D, Cholecalciferol, 50 MCG (2000 UT) capsule, Take 1 capsule by mouth Daily., Disp: 90 capsule, Rfl: 3  •  Melatonin 5 MG capsule, Take 1 each by mouth Every Night. For sleep, Disp: 90 each, Rfl: 3    ROS    Review of Systems   Constitutional: Positive for fatigue. Negative for chills and fever.   Respiratory: Positive for shortness of breath and wheezing. Negative for cough.    Cardiovascular: Negative for chest pain.   Musculoskeletal: Positive for arthralgias.   Psychiatric/Behavioral: Positive for sleep disturbance (due to uncontrolled shoulder pain).       Vitals:    02/03/22 1133   BP: 160/90   Pulse: 66   Temp: 97.8 °F (36.6 °C)   SpO2: 95%   Weight: 116 kg (255 lb)   Height: 165.1 cm (65\")   PainSc:   8     Body mass index is 42.43 kg/m².    Physical Exam     Physical Exam  Constitutional:       General: He is not in acute distress.     Appearance: He is well-developed. He is obese.   HENT:      Head: Normocephalic and atraumatic.      Right Ear: External ear normal.      Left Ear: External ear normal.   Eyes:      Extraocular Movements: Extraocular movements intact.      Conjunctiva/sclera: Conjunctivae normal.   Cardiovascular:      Rate and Rhythm: Normal rate and regular rhythm.      Heart sounds: No murmur heard.      Pulmonary:      Effort: Pulmonary effort is normal. No respiratory distress.      Breath sounds: Normal breath sounds. No wheezing.   Abdominal:      General: Bowel sounds are normal. There is no distension.      Palpations: Abdomen is soft.      Tenderness: There is no abdominal tenderness.   Musculoskeletal:      Right shoulder: Bony tenderness (at AC joint) present.      Right lower leg: Edema present.      Left lower leg: Edema present.   Skin:     General: Skin is warm and dry.   Neurological:      Mental Status: He is alert and oriented to " person, place, and time.      Cranial Nerves: No cranial nerve deficit.   Psychiatric:         Mood and Affect: Mood normal.         Behavior: Behavior normal.         Assessment/Plan    Problems Addressed this Visit        Cardiac and Vasculature    Diastolic heart failure (HCC)       Musculoskeletal and Injuries    Chronic shoulder pain    Elevated uric acid in blood    Relevant Orders    Uric Acid (Completed)    Arthralgia    Relevant Orders    Ambulatory Referral to Pain Management    Uric Acid (Completed)       Neuro    Chronic pain syndrome    Relevant Orders    Ambulatory Referral to Pain Management       Pulmonary and Pneumonias    Panlobular emphysema (HCC)    Relevant Medications    albuterol sulfate  (90 Base) MCG/ACT inhaler    Budeson-Glycopyrrol-Formoterol (Breztri Aerosphere) 160-9-4.8 MCG/ACT aerosol inhaler       Other    RMSF (Randal Mountain spotted fever) - Primary        Patient will complete round of doxycycline to treat RMSF.  Encouraged to follow up with ortho for shoulder pain.  Pain clinic referral placed today for different provider.  He does have an upcoming rheumatology appointment.  Will repeat uric acid.  If still elevated will plan to start allopurinol or uloric.  Enoucraged to take melatonin for sleep, but may not help much if sleep impeded by pain.  Discussed SOA is likely multifactoral.  Encouraged compliance with Breztri and increase O2 to 4L while ambulating.  Refills provided on inhalers.     New Medications Ordered This Visit   Medications   • Melatonin 5 MG capsule     Sig: Take 1 each by mouth Every Night. For sleep     Dispense:  90 each     Refill:  3   • albuterol sulfate  (90 Base) MCG/ACT inhaler     Sig: Inhale 2 puffs Every 4 (Four) Hours As Needed for Wheezing or Shortness of Air.     Dispense:  18 g     Refill:  3   • Budeson-Glycopyrrol-Formoterol (Breztri Aerosphere) 160-9-4.8 MCG/ACT aerosol inhaler     Sig: Inhale 2 puffs 2 (Two) Times a Day.      Dispense:  15.9 g     Refill:  5   • baclofen (LIORESAL) 10 MG tablet     Sig: Take 1 tablet by mouth 3 (Three) Times a Day As Needed for Muscle Spasms.     Dispense:  90 tablet     Refill:  2   • omeprazole (priLOSEC) 40 MG capsule     Sig: Take 1 capsule by mouth Daily.     Dispense:  90 capsule     Refill:  3       No orders of the defined types were placed in this encounter.      Return in about 1 month (around 3/3/2022) for COPD, HTN.    Radha Jean, DO

## 2022-02-03 NOTE — PATIENT INSTRUCTIONS
Rheumatology appointment on 2/23/22 at 245 PM w Dr. Tracy  [address] [address] [phone]    Pain Management:  Dr. Puentes   [phone]  [address], [facility] [place] [address]

## 2022-02-06 ENCOUNTER — APPOINTMENT (OUTPATIENT)
Dept: CT IMAGING | Facility: HOSPITAL | Age: 72
End: 2022-02-06

## 2022-02-06 ENCOUNTER — HOSPITAL ENCOUNTER (EMERGENCY)
Facility: HOSPITAL | Age: 72
Discharge: HOME | End: 2022-02-06
Attending: EMERGENCY MEDICINE | Admitting: EMERGENCY MEDICINE

## 2022-02-06 ENCOUNTER — APPOINTMENT (OUTPATIENT)
Dept: GENERAL RADIOLOGY | Facility: HOSPITAL | Age: 72
End: 2022-02-06

## 2022-02-06 VITALS
DIASTOLIC BLOOD PRESSURE: 56 MMHG | RESPIRATION RATE: 20 BRPM | OXYGEN SATURATION: 98 % | HEART RATE: 60 BPM | TEMPERATURE: 98.2 F | BODY MASS INDEX: 40.18 KG/M2 | SYSTOLIC BLOOD PRESSURE: 130 MMHG | HEIGHT: 66 IN | WEIGHT: 250 LBS

## 2022-02-06 DIAGNOSIS — M54.2 NECK PAIN: Primary | ICD-10-CM

## 2022-02-06 DIAGNOSIS — R06.02 SHORTNESS OF BREATH: ICD-10-CM

## 2022-02-06 LAB
A-A DO2: ABNORMAL
ALBUMIN SERPL-MCNC: 4.1 G/DL (ref 3.5–5.2)
ALBUMIN/GLOB SERPL: 1.4 G/DL
ALP SERPL-CCNC: 75 U/L (ref 39–117)
ALT SERPL W P-5'-P-CCNC: 18 U/L (ref 1–41)
ANION GAP SERPL CALCULATED.3IONS-SCNC: 13.3 MMOL/L (ref 5–15)
ARTERIAL PATENCY WRIST A: POSITIVE
AST SERPL-CCNC: 32 U/L (ref 1–40)
ATMOSPHERIC PRESS: 740 MMHG
B PARAPERT DNA SPEC QL NAA+PROBE: NOT DETECTED
B PERT DNA SPEC QL NAA+PROBE: NOT DETECTED
BASE EXCESS BLDA CALC-SCNC: 3.6 MMOL/L (ref 0–2)
BASOPHILS # BLD AUTO: 0.08 10*3/MM3 (ref 0–0.2)
BASOPHILS NFR BLD AUTO: 0.8 % (ref 0–1.5)
BDY SITE: ABNORMAL
BILIRUB SERPL-MCNC: 0.3 MG/DL (ref 0–1.2)
BUN SERPL-MCNC: 27 MG/DL (ref 8–23)
BUN/CREAT SERPL: 16.2 (ref 7–25)
C PNEUM DNA NPH QL NAA+NON-PROBE: NOT DETECTED
CALCIUM SPEC-SCNC: 9.9 MG/DL (ref 8.6–10.5)
CHLORIDE SERPL-SCNC: 97 MMOL/L (ref 98–107)
CO2 SERPL-SCNC: 24.7 MMOL/L (ref 22–29)
COHGB MFR BLD: 0.9 % (ref 0–2)
CREAT SERPL-MCNC: 1.67 MG/DL (ref 0.76–1.27)
DEPRECATED RDW RBC AUTO: 43.7 FL (ref 37–54)
EOSINOPHIL # BLD AUTO: 0.1 10*3/MM3 (ref 0–0.4)
EOSINOPHIL NFR BLD AUTO: 0.9 % (ref 0.3–6.2)
ERYTHROCYTE [DISTWIDTH] IN BLOOD BY AUTOMATED COUNT: 12.9 % (ref 12.3–15.4)
FLUAV SUBTYP SPEC NAA+PROBE: NOT DETECTED
FLUBV RNA ISLT QL NAA+PROBE: NOT DETECTED
GAS FLOW AIRWAY: 2 LPM
GFR SERPL CREATININE-BSD FRML MDRD: 41 ML/MIN/1.73
GLOBULIN UR ELPH-MCNC: 2.9 GM/DL
GLUCOSE SERPL-MCNC: 137 MG/DL (ref 65–99)
HADV DNA SPEC NAA+PROBE: NOT DETECTED
HCO3 BLDA-SCNC: 29.3 MMOL/L (ref 22–28)
HCOV 229E RNA SPEC QL NAA+PROBE: NOT DETECTED
HCOV HKU1 RNA SPEC QL NAA+PROBE: NOT DETECTED
HCOV NL63 RNA SPEC QL NAA+PROBE: NOT DETECTED
HCOV OC43 RNA SPEC QL NAA+PROBE: NOT DETECTED
HCT VFR BLD AUTO: 44.2 % (ref 37.5–51)
HCT VFR BLD CALC: 44.8 %
HGB BLD-MCNC: 14.1 G/DL (ref 13–17.7)
HMPV RNA NPH QL NAA+NON-PROBE: NOT DETECTED
HOLD SPECIMEN: NORMAL
HPIV1 RNA ISLT QL NAA+PROBE: NOT DETECTED
HPIV2 RNA SPEC QL NAA+PROBE: NOT DETECTED
HPIV3 RNA NPH QL NAA+PROBE: NOT DETECTED
HPIV4 P GENE NPH QL NAA+PROBE: NOT DETECTED
IMM GRANULOCYTES # BLD AUTO: 0.05 10*3/MM3 (ref 0–0.05)
IMM GRANULOCYTES NFR BLD AUTO: 0.5 % (ref 0–0.5)
LYMPHOCYTES # BLD AUTO: 2.02 10*3/MM3 (ref 0.7–3.1)
LYMPHOCYTES NFR BLD AUTO: 19 % (ref 19.6–45.3)
Lab: ABNORMAL
M PNEUMO IGG SER IA-ACNC: NOT DETECTED
MCH RBC QN AUTO: 29.4 PG (ref 26.6–33)
MCHC RBC AUTO-ENTMCNC: 31.9 G/DL (ref 31.5–35.7)
MCV RBC AUTO: 92.1 FL (ref 79–97)
METHGB BLD QL: 0.3 % (ref 0–1.5)
MODALITY: ABNORMAL
MONOCYTES # BLD AUTO: 0.82 10*3/MM3 (ref 0.1–0.9)
MONOCYTES NFR BLD AUTO: 7.7 % (ref 5–12)
NEUTROPHILS NFR BLD AUTO: 7.55 10*3/MM3 (ref 1.7–7)
NEUTROPHILS NFR BLD AUTO: 71.1 % (ref 42.7–76)
NOTE: ABNORMAL
NRBC BLD AUTO-RTO: 0 /100 WBC (ref 0–0.2)
NT-PROBNP SERPL-MCNC: 198 PG/ML (ref 0–900)
OXYHGB MFR BLDV: 98.6 % (ref 94–99)
PCO2 BLDA: 47.1 MM HG (ref 35–45)
PCO2 TEMP ADJ BLD: ABNORMAL MM[HG]
PH BLDA: 7.4 PH UNITS (ref 7.35–7.45)
PH, TEMP CORRECTED: ABNORMAL
PLATELET # BLD AUTO: 257 10*3/MM3 (ref 140–450)
PMV BLD AUTO: 11 FL (ref 6–12)
PO2 BLDA: 149 MM HG (ref 75–100)
PO2 TEMP ADJ BLD: ABNORMAL MM[HG]
POTASSIUM SERPL-SCNC: 4.1 MMOL/L (ref 3.5–5.2)
POTASSIUM SERPL-SCNC: 5.5 MMOL/L (ref 3.5–5.2)
PROCALCITONIN SERPL-MCNC: 0.04 NG/ML (ref 0–0.25)
PROT SERPL-MCNC: 7 G/DL (ref 6–8.5)
RBC # BLD AUTO: 4.8 10*6/MM3 (ref 4.14–5.8)
RHINOVIRUS RNA SPEC NAA+PROBE: NOT DETECTED
RSV RNA NPH QL NAA+NON-PROBE: NOT DETECTED
SAO2 % BLDCOA: 99.8 % (ref 94–100)
SARS-COV-2 RNA NPH QL NAA+NON-PROBE: NOT DETECTED
SODIUM SERPL-SCNC: 135 MMOL/L (ref 136–145)
TROPONIN T SERPL-MCNC: <0.01 NG/ML (ref 0–0.03)
VENTILATOR MODE: ABNORMAL
WBC NRBC COR # BLD: 10.62 10*3/MM3 (ref 3.4–10.8)
WHOLE BLOOD HOLD SPECIMEN: NORMAL
WHOLE BLOOD HOLD SPECIMEN: NORMAL

## 2022-02-06 PROCEDURE — 70450 CT HEAD/BRAIN W/O DYE: CPT

## 2022-02-06 PROCEDURE — 25010000002 MORPHINE PER 10 MG: Performed by: EMERGENCY MEDICINE

## 2022-02-06 PROCEDURE — 94640 AIRWAY INHALATION TREATMENT: CPT

## 2022-02-06 PROCEDURE — 25010000002 ORPHENADRINE CITRATE PER 60 MG: Performed by: EMERGENCY MEDICINE

## 2022-02-06 PROCEDURE — 84145 PROCALCITONIN (PCT): CPT | Performed by: EMERGENCY MEDICINE

## 2022-02-06 PROCEDURE — 96374 THER/PROPH/DIAG INJ IV PUSH: CPT

## 2022-02-06 PROCEDURE — 0202U NFCT DS 22 TRGT SARS-COV-2: CPT

## 2022-02-06 PROCEDURE — 72125 CT NECK SPINE W/O DYE: CPT

## 2022-02-06 PROCEDURE — 80053 COMPREHEN METABOLIC PANEL: CPT | Performed by: EMERGENCY MEDICINE

## 2022-02-06 PROCEDURE — 25010000002 FUROSEMIDE PER 20 MG: Performed by: EMERGENCY MEDICINE

## 2022-02-06 PROCEDURE — 36415 COLL VENOUS BLD VENIPUNCTURE: CPT

## 2022-02-06 PROCEDURE — 84484 ASSAY OF TROPONIN QUANT: CPT | Performed by: EMERGENCY MEDICINE

## 2022-02-06 PROCEDURE — 96375 TX/PRO/DX INJ NEW DRUG ADDON: CPT

## 2022-02-06 PROCEDURE — 71045 X-RAY EXAM CHEST 1 VIEW: CPT

## 2022-02-06 PROCEDURE — 82375 ASSAY CARBOXYHB QUANT: CPT

## 2022-02-06 PROCEDURE — 93005 ELECTROCARDIOGRAM TRACING: CPT | Performed by: EMERGENCY MEDICINE

## 2022-02-06 PROCEDURE — 36600 WITHDRAWAL OF ARTERIAL BLOOD: CPT

## 2022-02-06 PROCEDURE — 82805 BLOOD GASES W/O2 SATURATION: CPT

## 2022-02-06 PROCEDURE — 83050 HGB METHEMOGLOBIN QUAN: CPT

## 2022-02-06 PROCEDURE — 85025 COMPLETE CBC W/AUTO DIFF WBC: CPT | Performed by: EMERGENCY MEDICINE

## 2022-02-06 PROCEDURE — 99284 EMERGENCY DEPT VISIT MOD MDM: CPT

## 2022-02-06 PROCEDURE — 84132 ASSAY OF SERUM POTASSIUM: CPT | Performed by: EMERGENCY MEDICINE

## 2022-02-06 PROCEDURE — 83880 ASSAY OF NATRIURETIC PEPTIDE: CPT | Performed by: EMERGENCY MEDICINE

## 2022-02-06 RX ORDER — FUROSEMIDE 10 MG/ML
40 INJECTION INTRAMUSCULAR; INTRAVENOUS ONCE
Status: COMPLETED | OUTPATIENT
Start: 2022-02-06 | End: 2022-02-06

## 2022-02-06 RX ORDER — CYCLOBENZAPRINE HCL 10 MG
10 TABLET ORAL 3 TIMES DAILY PRN
Qty: 15 TABLET | Refills: 0 | Status: SHIPPED | OUTPATIENT
Start: 2022-02-06 | End: 2022-07-13 | Stop reason: HOSPADM

## 2022-02-06 RX ORDER — HYDROCODONE BITARTRATE AND ACETAMINOPHEN 5; 325 MG/1; MG/1
1 TABLET ORAL ONCE
Status: COMPLETED | OUTPATIENT
Start: 2022-02-06 | End: 2022-02-06

## 2022-02-06 RX ORDER — FUROSEMIDE 20 MG/1
20 TABLET ORAL DAILY
Qty: 10 TABLET | Refills: 0 | Status: SHIPPED | OUTPATIENT
Start: 2022-02-06 | End: 2022-04-18 | Stop reason: HOSPADM

## 2022-02-06 RX ORDER — MORPHINE SULFATE 4 MG/ML
4 INJECTION, SOLUTION INTRAMUSCULAR; INTRAVENOUS ONCE
Status: COMPLETED | OUTPATIENT
Start: 2022-02-06 | End: 2022-02-06

## 2022-02-06 RX ORDER — HYDROCODONE BITARTRATE AND ACETAMINOPHEN 5; 325 MG/1; MG/1
1 TABLET ORAL EVERY 6 HOURS PRN
Qty: 10 TABLET | Refills: 0 | Status: SHIPPED | OUTPATIENT
Start: 2022-02-06 | End: 2022-04-18 | Stop reason: HOSPADM

## 2022-02-06 RX ORDER — LIDOCAINE 50 MG/G
1 PATCH TOPICAL EVERY 24 HOURS
Qty: 6 EACH | Refills: 0 | Status: SHIPPED | OUTPATIENT
Start: 2022-02-06 | End: 2022-07-13 | Stop reason: HOSPADM

## 2022-02-06 RX ORDER — LIDOCAINE 50 MG/G
1 PATCH TOPICAL ONCE
Status: DISCONTINUED | OUTPATIENT
Start: 2022-02-06 | End: 2022-02-06 | Stop reason: HOSPADM

## 2022-02-06 RX ORDER — ORPHENADRINE CITRATE 30 MG/ML
60 INJECTION INTRAMUSCULAR; INTRAVENOUS ONCE
Status: COMPLETED | OUTPATIENT
Start: 2022-02-06 | End: 2022-02-06

## 2022-02-06 RX ORDER — IPRATROPIUM BROMIDE AND ALBUTEROL SULFATE 2.5; .5 MG/3ML; MG/3ML
3 SOLUTION RESPIRATORY (INHALATION) ONCE
Status: COMPLETED | OUTPATIENT
Start: 2022-02-06 | End: 2022-02-06

## 2022-02-06 RX ORDER — SODIUM CHLORIDE 0.9 % (FLUSH) 0.9 %
10 SYRINGE (ML) INJECTION AS NEEDED
Status: DISCONTINUED | OUTPATIENT
Start: 2022-02-06 | End: 2022-02-06 | Stop reason: HOSPADM

## 2022-02-06 RX ORDER — CIPROFLOXACIN 500 MG/1
500 TABLET, FILM COATED ORAL 2 TIMES DAILY
Qty: 20 TABLET | Refills: 0 | Status: SHIPPED | OUTPATIENT
Start: 2022-02-06 | End: 2022-04-18 | Stop reason: HOSPADM

## 2022-02-06 RX ADMIN — LIDOCAINE 1 PATCH: 50 PATCH CUTANEOUS at 20:00

## 2022-02-06 RX ADMIN — MORPHINE SULFATE 4 MG: 4 INJECTION, SOLUTION INTRAMUSCULAR; INTRAVENOUS at 20:00

## 2022-02-06 RX ADMIN — HYDROCODONE BITARTRATE AND ACETAMINOPHEN 1 TABLET: 5; 325 TABLET ORAL at 18:40

## 2022-02-06 RX ADMIN — ORPHENADRINE CITRATE 60 MG: 60 INJECTION INTRAMUSCULAR; INTRAVENOUS at 19:59

## 2022-02-06 RX ADMIN — IPRATROPIUM BROMIDE AND ALBUTEROL SULFATE 3 ML: .5; 2.5 SOLUTION RESPIRATORY (INHALATION) at 19:50

## 2022-02-06 RX ADMIN — FUROSEMIDE 40 MG: 10 INJECTION, SOLUTION INTRAVENOUS at 18:39

## 2022-02-06 NOTE — ED PROVIDER NOTES
Subjective   History of Present Illness    Chief Complaint: Right-sided occipital headache  History of Present Illness: 71-year-old male chronic anticoagulation presents with right-sided occipital headache starts at the base of the skull radiating upwards.  He has had prescription for muscle relaxers without relief.  Also reported some shortness of breath.  Primary issue is his headache at this time  Onset: 2 weeks  Duration: Persistent  Exacerbating / Alleviating factors: Prescribed medications without relief  Associated symptoms: None      Nurses Notes reviewed and agree, including vitals, allergies, social history and prior medical history.     REVIEW OF SYSTEMS: All systems reviewed and not pertinent unless noted.    Positive for: Right-sided septal headache, shortness of breath    Negative for: Fever cough hemoptysis syncope chest pain palpitations  Review of Systems    Past Medical History:   Diagnosis Date   • Anxiety and depression    • Arthritis    • Backache     20 years   • Benign prostatic hyperplasia    • Bladder trauma    • Cervicalgia    • Chronic kidney disease     Cr up to 2.1   • Coronary artery disease    • Diabetes mellitus (HCC)     15 years--   • Emphysema of lung (HCC)    • Erectile dysfunction    • Eye exam, routine 2015   • FH: colonic polyps    • Gout     for 10 years--   • History of echocardiogram 09/15/2014   • History of tobacco use     with cessation x7 years   • Hyperlipidemia    • Hypertension    • Migraine    • Myocardial infarction (HCC)     h/o coronary artery stenting--   • Prostate cancer (HCC)    • Restless leg syndrome     20 years   • Sleep apnea     12 years; uses a Bi-Pap   • Stroke (HCC)    • Syncope 4/28/2017   • Warfarin anticoagulation 9/14/2016       No Known Allergies    Past Surgical History:   Procedure Laterality Date   • BLADDER SURGERY     • CARDIAC CATHETERIZATION  03/15/2013    revealing widely patent stents of the left circumflex and ramus intermedius coronary  arteries, no significant disease is seen in any territory   • CARDIAC CATHETERIZATION Left 2019    Procedure: Left Heart Cath;  Surgeon: Arelis Tucker MD;  Location:  [facility];  Service: Cardiology   • CARDIAC ELECTROPHYSIOLOGY PROCEDURE N/A 5/10/2021    Procedure: PPM battery change;  Surgeon: Arelis Tucker MD;  Location:  [facility];  Service: Cardiology;  Laterality: N/A;   • CARDIAC PACEMAKER PLACEMENT     • CATARACT EXTRACTION     • COLONOSCOPY      No family history of colon cancer.     • COLONOSCOPY     • HERNIA REPAIR      Type unknown   • PACEMAKER IMPLANTATION     • PROSTATE SURGERY         Family History   Problem Relation Age of Onset   • Cancer Mother    • Diabetes Mother    • Hypertension Mother    • Stroke Mother    • Stomach cancer Mother    • Heart disease Mother    • Stomach cancer Father    • Cancer Father    • Lung cancer Father        Social History     Socioeconomic History   • Marital status:    Tobacco Use   • Smoking status: Former Smoker     Packs/day: 1.00     Years: 30.00     Pack years: 30.00     Types: Cigarettes     Quit date: 8/15/2001     Years since quittin.5   • Smokeless tobacco: Never Used   • Tobacco comment: quit 16 years ago   Vaping Use   • Vaping Use: Never used   Substance and Sexual Activity   • Alcohol use: No   • Drug use: No   • Sexual activity: Defer           Objective   Physical Exam    CONSTITUTIONAL: Well developed, obese nontoxic 71-year-old  male,  in moderate discomfort.  VITAL SIGNS: per nursing, reviewed and noted  SKIN: exposed skin with no rashes, ulcerations or petechiae.  No vesicular lesions to the scalp  EYES: perrla. EOMI.  ENT: Normal voice.  Patient maintained wearing a mask throughout patient encounter due to coronavirus pandemic  RESPIRATORY:  No increased work of breathing. No retractions.  Chest clear to auscultation bilaterally  CARDIOVASCULAR:   regular rate and rhythm, no murmurs.  Good Peripheral pulses. Good cap refill to extremities.   GI: Abdomen soft, nontender, normal bowel sounds. No hernia. No ascites.  MUSCULOSKELETAL: Right suboccipital tenderness to palpation.. Full ROM. Strength and tone grossly normal.  no spasms. no neck or back tenderness or spasm.   NEUROLOGIC: Alert, oriented x 3. No gross deficits. GCS 15.   PSYCH: appropriate affect.  : no bladder tenderness or distention, no CVA tenderness             ED Course  ED Course as of 02/18/22 0702   Sun Feb 06, 2022 1830 EKG interpreted by me reveals atrial paced rhythm at a rate of 60.  No ectopy no ischemic changes [PF]   1857 ADENOVIRUS, PCR: Not Detected [PF]   1857 Coronavirus 229E: Not Detected [PF]   1857 Coronavirus HKU1: Not Detected [PF]   1857 Coronavirus NL63: Not Detected [PF]   1857 Coronavirus OC43: Not Detected [PF]   1857 COVID19: Not Detected [PF]   1857 Human Metapneumovirus: Not Detected [PF]   1857 Human Rhinovirus/Enterovirus: Not Detected [PF]   1858 Influenza A PCR: Not Detected [PF]   1858 Influenza B PCR: Not Detected [PF]   1858 Parainfluenza Virus 1: Not Detected [PF]   1858 Parainfluenza Virus 2: Not Detected [PF]   1858 Parainfluenza Virus 3: Not Detected [PF]   1858 Parainfluenza Virus 4: Not Detected [PF]   1858 WBC: 10.62 [PF]   1858 Hemoglobin: 14.1 [PF]   1858 Hematocrit: 44.2 [PF]   1858 Platelets: 257 [PF]   1858 proBNP: 198.0 [PF]   1858 Troponin T: <0.010 [PF]   1858 Glucose(!): 137 [PF]   1858 BUN(!): 27 [PF]   1858 Creatinine(!): 1.67 [PF]   1858 Sodium(!): 135 [PF]   1858 Potassium(!): 5.5 [PF]   1858 Chloride(!): 97 [PF]   1858 CO2: 24.7 [PF]   1858 Calcium: 9.9 [PF]   1858 Total Protein: 7.0 [PF]   1858 Albumin: 4.10 [PF]   1858 ALT (SGPT): 18 [PF]   1858 AST (SGOT): 32 [PF]   1858 Alkaline Phosphatase: 75 [PF]   1858 Total Bilirubin: 0.3 [PF]   1858 eGFR Non  Am(!): 41 [PF]   1858 Globulin: 2.9 [PF]      ED Course User Index  [PF]  Nain Ortega, DO                                                 MDM     71-year-old male presents with right-sided occipital headache.  Suspect occipital neuralgia.  Will CT head given his anticoagulation history.  Neurovascularly intact GCS of 15 with a nonfocal neurological exam.  Patient complained of shortness of breath with possible left-sided effusion on chest x-ray however oxygen saturations are 99%.  Does take Lasix 20 mg daily, treated with additional 40 mg IV here while awaiting work-up.  Respiratory panel negative for any positive findings.  Patient be signed out to oncoming physician for final disposition regarding CT.   Final diagnoses:   Neck pain   Shortness of breath       ED Disposition  ED Disposition     ED Disposition Condition Comment    Discharge Stable           Raj Sullivan MD  [address] [id number] [phone]    Schedule an appointment as soon as possible for a visit in 1 week      Frankie Manrique MD  [address] [facility] [address]  [facility] [place] [address] [phone]    Schedule an appointment as soon as possible for a visit in 1 week      Gerardo Maldonado MD  [address] [id number] [phone]    Schedule an appointment as soon as possible for a visit in 1 week           Medication List      New Prescriptions    ciprofloxacin 500 MG tablet  Commonly known as: CIPRO  Take 1 tablet by mouth 2 (Two) Times a Day.     cyclobenzaprine 10 MG tablet  Commonly known as: FLEXERIL  Take 1 tablet by mouth 3 (Three) Times a Day As Needed for Muscle Spasms.     HYDROcodone-acetaminophen 5-325 MG per tablet  Commonly known as: NORCO  Take 1 tablet by mouth Every 6 (Six) Hours As Needed for Severe Pain .     lidocaine 5 %  Commonly known as: LIDODERM  Place 1 patch on the skin as directed by provider Daily. Remove & Discard patch within 12 hours or as directed by MD        Changed    * furosemide 20 MG tablet  Commonly known as: LASIX  TAKE 1 TABLET BY  MOUTH EVERY MORNING, MAY TAKE SECOND DOSE AS NEEDED FOR INCREASED SWELLING  What changed: Another medication with the same name was added. Make sure you understand how and when to take each.     * furosemide 20 MG tablet  Commonly known as: LASIX  Take 1 tablet by mouth Daily. This is in addition to your normal dose, this means you should be taking it twice daily for the next 10 days.  What changed: You were already taking a medication with the same name, and this prescription was added. Make sure you understand how and when to take each.         * This list has 2 medication(s) that are the same as other medications prescribed for you. Read the directions carefully, and ask your doctor or other care provider to review them with you.               Where to Get Your Medications      These medications were sent to [id number] - [place], [place] - [address] AT [facility] [address] - [phone]  - [phone] FX  [address], [place] [address]    Phone: [phone]   · ciprofloxacin 500 MG tablet  · cyclobenzaprine 10 MG tablet  · furosemide 20 MG tablet  · HYDROcodone-acetaminophen 5-325 MG per tablet  · lidocaine 5 %         I assumed care of patient from previous physician.  At that time awaiting CT results.  I went and introduced myself and examined patient.  They describe primarily right-sided neck discomfort that has been present for months that radiates up into his head and down his right arm.  Symptoms are worse with movement.  Description of symptoms sound suspicious for a pinched nerve in patient's neck.  Will add CT scan of patient's cervical spine.  Will provide further pain medication.    CT scan of the cervical spine and CT scan of the head per radiology interpretation reveals some evidence of possible mastoiditis of the right skull but otherwise only degenerative changes. On exam patient does not have any significant swelling or tenderness in this area. Minimal  erythema of the tympanic membrane. Given findings will add antipseudomonal coverage to antibiotic therapy patient is currently taking. His pain resolved with pain medicine in the emergency department. Chest x-ray per radiology interpretation reveals some vascular congestion. Will increase patient's diuretic dose for the time being. We will have his labs rechecked in the near future. Will refer to orthopedic spine for further evaluation of patient request.     Mar Lyn MD  02/06/22 2117       Mar Lyn MD  02/18/22 0720

## 2022-02-07 ENCOUNTER — HOSPITAL ENCOUNTER (OUTPATIENT)
Dept: ULTRASOUND IMAGING | Facility: HOSPITAL | Age: 72
Discharge: HOME | End: 2022-02-07
Admitting: PHYSICIAN ASSISTANT

## 2022-02-07 DIAGNOSIS — N50.812 LEFT TESTICULAR PAIN: ICD-10-CM

## 2022-02-07 PROCEDURE — 76870 US EXAM SCROTUM: CPT

## 2022-02-14 ENCOUNTER — OFFICE VISIT (OUTPATIENT)
Dept: UROLOGY | Facility: CLINIC | Age: 72
End: 2022-02-14

## 2022-02-14 DIAGNOSIS — R39.9 LOWER URINARY TRACT SYMPTOMS (LUTS): Primary | ICD-10-CM

## 2022-02-14 PROCEDURE — 99214 OFFICE O/P EST MOD 30 MIN: CPT | Performed by: UROLOGY

## 2022-02-14 NOTE — PROGRESS NOTES
No chief complaint on file.       HPI  Ms. Bryant is a 71 y.o. male with history below in assessment, who presents for follow up.     At this visit he is here for prostate and urethral evaluation.    Past Medical History:   Diagnosis Date   • Anxiety and depression    • Arthritis    • Backache     20 years   • Benign prostatic hyperplasia    • Bladder trauma    • Cervicalgia    • Chronic kidney disease     Cr up to 2.1   • Coronary artery disease    • Diabetes mellitus (HCC)     15 years--   • Emphysema of lung (HCC)    • Erectile dysfunction    • Eye exam, routine 2015   • FH: colonic polyps    • Gout     for 10 years--   • History of echocardiogram 09/15/2014   • History of tobacco use     with cessation x7 years   • Hyperlipidemia    • Hypertension    • Migraine    • Myocardial infarction (HCC)     h/o coronary artery stenting--   • Prostate cancer (HCC)    • Restless leg syndrome     20 years   • Sleep apnea     12 years; uses a Bi-Pap   • Stroke (HCC)    • Syncope 4/28/2017   • Warfarin anticoagulation 9/14/2016       Past Surgical History:   Procedure Laterality Date   • BLADDER SURGERY     • CARDIAC CATHETERIZATION  03/15/2013    revealing widely patent stents of the left circumflex and ramus intermedius coronary arteries, no significant disease is seen in any territory   • CARDIAC CATHETERIZATION Left 9/30/2019    Procedure: Left Heart Cath;  Surgeon: Arelis Tucker MD;  Location:  [facility];  Service: Cardiology   • CARDIAC ELECTROPHYSIOLOGY PROCEDURE N/A 5/10/2021    Procedure: PPM battery change;  Surgeon: Arelis Tucker MD;  Location:  [facility];  Service: Cardiology;  Laterality: N/A;   • CARDIAC PACEMAKER PLACEMENT  2012   • CATARACT EXTRACTION     • COLONOSCOPY  2004    No family history of colon cancer.     • COLONOSCOPY  2015   • HERNIA REPAIR      Type unknown   • PACEMAKER IMPLANTATION     • PROSTATE SURGERY           Current Outpatient  Medications:   •  albuterol sulfate  (90 Base) MCG/ACT inhaler, Inhale 2 puffs Every 4 (Four) Hours As Needed for Wheezing or Shortness of Air., Disp: 18 g, Rfl: 3  •  amiodarone (PACERONE) 200 MG tablet, Take 1 tablet by mouth Daily., Disp: 30 tablet, Rfl: 0  •  amLODIPine (NORVASC) 10 MG tablet, Take 1 tablet by mouth Daily., Disp: 30 tablet, Rfl: 1  •  apixaban (ELIQUIS) 5 MG tablet tablet, Take 5 mg by mouth 2 (Two) Times a Day., Disp: , Rfl:   •  aspirin 81 MG chewable tablet, Chew 81 mg Daily., Disp: , Rfl:   •  atorvastatin (LIPITOR) 20 MG tablet, Take 1 tablet by mouth Every Night., Disp: 90 tablet, Rfl: 3  •  baclofen (LIORESAL) 10 MG tablet, Take 1 tablet by mouth 3 (Three) Times a Day As Needed for Muscle Spasms., Disp: 90 tablet, Rfl: 2  •  Blood Glucose Monitoring Suppl (TRUE METRIX AIR GLUCOSE METER) w/Device kit, 1 each by In Vitro route 2 (two) times a day. E11.9, Disp: 1 kit, Rfl: 0  •  Budeson-Glycopyrrol-Formoterol (Breztri Aerosphere) 160-9-4.8 MCG/ACT aerosol inhaler, Inhale 2 puffs 2 (Two) Times a Day., Disp: 15.9 g, Rfl: 5  •  cetirizine (zyrTEC) 10 MG tablet, Take 1 tablet by mouth Daily., Disp: 90 tablet, Rfl: 3  •  ciprofloxacin (CIPRO) 500 MG tablet, Take 1 tablet by mouth 2 (Two) Times a Day., Disp: 20 tablet, Rfl: 0  •  clotrimazole-betamethasone (Lotrisone) 1-0.05 % cream, Apply to affected area 1 times daily, Disp: 15 g, Rfl: 1  •  Coenzyme Q10 (Co Q-10) 100 MG capsule, Take 200 mg by mouth Daily., Disp: 60 capsule, Rfl: 11  •  cyclobenzaprine (FLEXERIL) 10 MG tablet, Take 1 tablet by mouth 3 (Three) Times a Day As Needed for Muscle Spasms., Disp: 15 tablet, Rfl: 0  •  diclofenac (VOLTAREN) 1 % gel gel, Apply 4 g topically to the appropriate area as directed 4 (Four) Times a Day As Needed (pain)., Disp: 100 g, Rfl: 5  •  doxycycline (VIBRAMYCIN) 100 MG capsule, Take 1 capsule by mouth 2 (Two) Times a Day., Disp: 20 capsule, Rfl: 0  •  finasteride (PROSCAR) 5 MG tablet, Take 1  tablet by mouth Daily., Disp: 90 tablet, Rfl: 3  •  FLUoxetine (PROzac) 20 MG capsule, Take 1 capsule by mouth Daily., Disp: 90 capsule, Rfl: 3  •  fluticasone (FLONASE) 50 MCG/ACT nasal spray, USE 2 SPRAYS IN EACH NOSTRIL EVERY DAY AS DIRECTED  BY  MD, Disp: 48 g, Rfl: 3  •  furosemide (LASIX) 20 MG tablet, TAKE 1 TABLET BY MOUTH EVERY MORNING, MAY TAKE SECOND DOSE AS NEEDED FOR INCREASED SWELLING, Disp: 60 tablet, Rfl: 2  •  furosemide (LASIX) 20 MG tablet, Take 1 tablet by mouth Daily. This is in addition to your normal dose, this means you should be taking it twice daily for the next 10 days., Disp: 10 tablet, Rfl: 0  •  glipizide (GLUCOTROL) 5 MG tablet, TAKE 1 TABLET TWICE DAILY BEFORE MEALS, Disp: 180 tablet, Rfl: 3  •  glucose blood (True Metrix Blood Glucose Test) test strip, Daily testing E11.9, Disp: 100 each, Rfl: 5  •  glucose monitor monitoring kit, 1 each Daily. E11.9, Disp: 1 each, Rfl: 0  •  HYDROcodone-acetaminophen (NORCO) 5-325 MG per tablet, Take 1 tablet by mouth Every 6 (Six) Hours As Needed for Severe Pain ., Disp: 10 tablet, Rfl: 0  •  ipratropium-albuterol (DUO-NEB) 0.5-2.5 mg/3 ml nebulizer, Take 3 mL by nebulization 4 (Four) Times a Day., Disp: 1620 mL, Rfl: 2  •  Lancet Device misc, 1 each Daily. E11.9, Disp: 100 each, Rfl: 3  •  lidocaine (LIDODERM) 5 %, Place 1 patch on the skin as directed by provider Daily. Remove & Discard patch within 12 hours or as directed by MD, Disp: 6 each, Rfl: 0  •  lisinopril (PRINIVIL,ZESTRIL) 40 MG tablet, Take 1 tablet by mouth Daily., Disp: 90 tablet, Rfl: 3  •  Melatonin 5 MG capsule, Take 1 each by mouth Every Night. For sleep, Disp: 90 each, Rfl: 3  •  metoprolol succinate XL (Toprol XL) 50 MG 24 hr tablet, Take 1 tablet by mouth Daily., Disp: 30 tablet, Rfl: 0  •  Misc. Devices misc, Bipap tubing., Disp: 1 each, Rfl: 0  •  Multiple Vitamin tablet, Take 1 tablet by mouth daily., Disp: , Rfl:   •  Nebulizer misc, 1 each Every 4 (Four) Hours As Needed  (shortness of breath)., Disp: 1 each, Rfl: 0  •  nitroglycerin (Nitrostat) 0.4 MG SL tablet, Place 1 tablet under the tongue Every 5 (Five) Minutes As Needed for Chest Pain., Disp: 25 tablet, Rfl: 11  •  O2 (OXYGEN), Inhale 2 L/min Continuous As Needed (With activity)., Disp: , Rfl:   •  omeprazole (priLOSEC) 40 MG capsule, Take 1 capsule by mouth Daily., Disp: 90 capsule, Rfl: 3  •  ondansetron ODT (Zofran ODT) 4 MG disintegrating tablet, Place 1 tablet on the tongue Every 8 (Eight) Hours As Needed for Nausea or Vomiting., Disp: 20 tablet, Rfl: 0  •  potassium chloride (K-DUR,KLOR-CON) 10 MEQ CR tablet, Take 1 tablet by mouth 2 (Two) Times a Day., Disp: 60 tablet, Rfl: 2  •  pramipexole (MIRAPEX) 0.5 MG tablet, Take 1 tablet by mouth 3 (Three) Times a Day., Disp: 270 tablet, Rfl: 3  •  spironolactone (ALDACTONE) 50 MG tablet, TAKE 1 TABLET EVERY DAY, Disp: 90 tablet, Rfl: 1  •  terazosin (HYTRIN) 10 MG capsule, Take 1 capsule by mouth Every Night., Disp: 90 capsule, Rfl: 3  •  TRUEplus Lancets 33G misc, 1 each by Other route Daily. E11.9, Disp: 100 each, Rfl: 5  •  Vitamin D, Cholecalciferol, 50 MCG (2000 UT) capsule, Take 1 capsule by mouth Daily., Disp: 90 capsule, Rfl: 3     Physical Exam  There were no vitals taken for this visit.    Labs  Brief Urine Lab Results  (Last result in the past 365 days)      Color   Clarity   Blood   Leuk Est   Nitrite   Protein   CREAT   Urine HCG        10/25/21 1016             125.1               Lab Results   Component Value Date    GLUCOSE 137 (H) 02/06/2022    CALCIUM 9.9 02/06/2022     (L) 02/06/2022    K 4.1 02/06/2022    CO2 24.7 02/06/2022    CL 97 (L) 02/06/2022    BUN 27 (H) 02/06/2022    CREATININE 1.67 (H) 02/06/2022    EGFRIFAFRI 55 (L) 01/21/2022    EGFRIFNONA 41 (L) 02/06/2022    BCR 16.2 02/06/2022    ANIONGAP 13.3 02/06/2022       Lab Results   Component Value Date    WBC 10.62 02/06/2022    HGB 14.1 02/06/2022    HCT 44.2 02/06/2022    MCV 92.1 02/06/2022      02/06/2022            Lab Results   Component Value Date    PSA 0.355 01/24/2022    PSA 0.306 02/10/2020    PSA 0.8 01/10/2017       Lab Results   Component Value Date    TESTFRE 2.9 (L) 10/18/2021    TESTOSTEROTT 116 (L) 10/18/2021      Lab Results   Component Value Date    HGBA1C 7.00 (H) 10/25/2021       Radiographic Studies  CT Head Without Contrast    Result Date: 2/6/2022  Right mastoid air cell effusion, correlate with symptoms of mastoiditis.  Atrophy and chronic changes. Authenticated by Glenn Ferro MD on 02/06/2022 07:26:56 PM    CT Chest Without Contrast Diagnostic    Result Date: 11/22/2021  Unremarkable. Authenticated by Hua Bailey M.D. on 11/22/2021 11:32:28 PM    CT Cervical Spine Without Contrast    Result Date: 2/6/2022  No acute fracture or malalignment of the cervical spine. Authenticated by Glenn Ferro MD on 02/06/2022 08:35:24 PM    CT Cervical Spine Without Contrast    Result Date: 12/25/2021  Significant degenerative disease.  No acute abnormality. Authenticated by Hau Bailey M.D. on 12/25/2021 06:30:26 PM    US Scrotum & Testicles    Result Date: 2/8/2022  Bilateral testicular lesions with benign morphology. However, recommend continued sonographic surveillance in 6-12 months.  This report was finalized on 2/8/2022 10:16 AM by Fran Rojas MD.    XR Chest 1 View    Result Date: 2/6/2022  Pulmonary venous congestion without overt edema. Authenticated by Glenn Ferro MD on 02/06/2022 07:56:41 PM    XR Chest 1 View    Result Date: 11/23/2021  No acute cardiopulmonary process.   Images were reviewed, interpreted, and dictated by Dr. Avery Pryor M.D. Transcribed by Suly Newman PA-C.  This report was finalized on 11/23/2021 12:54 PM by Avery Pryor M.D..    Preprocedure diagnosis  LUTS    Postprocedure diagnosis  LUTS, prostate open    Procedure  Flexible Cystourethroscopy    Attending surgeon  Zay Guerra MD    Anesthesia  2% lidocaine jelly  intraurethrally    Complications  None    Indications  71 y.o. male undergoing a flexible cystoscopy for the above mentioned indications.  Informed consent was obtained.      Findings  Cystoscopic findings included one right and left ureteral orifice in the normal anatomic position with normal bladder mucosa and no tumors, masses or stones. The urethral urothelium was within normal limits with no strictures.  There was not a prominent median lobe.  The lateral lobes not obstructive in appearance.  Evidence of prior TURP    Procedure  The patient was placed in supine position and prepped and draped in sterile fashion with lidocaine jelly per urethra for anesthesia.  A timeout was performed.  The 14F flexible cystoscope was lubricated and gently placed through the penile urethra and into the bladder.  The bladder was completely visualized.  The cystoscope was retroflexed and the bladder neck and prostate visualized.  The cystoscope was slowly withdrawn while visualizing the urethra and the procedure terminated.  The patient tolerated the procedure well.      Uroflow:  Peak flow rate - 11 mL/sec  Average flow rate - 5.9 mL/sec  Flow curve - 4 intervals, jagged  Voided volume - 320 mL    I personally reviewed  and interpreted this study.       I have reviewed above labs and imaging.     Assessment  71 y.o. male with Hx of bladder trauma and prior SP tube, with likely TURP by [facility] many years ago. Prostate still open, no scar tissue on cysto today. Uroflow peak flow borderline. I suspect he likely has some detrusor underactivity from DM and DDD. No evidence of obstruction though.     In a separate room in separate encounter we discussed his cystoscopy and uroflow findings.    Plan  1. Increase water intake  2. Stop terazosin and finasteride. FU with my PA Ms avendaño in 3 mo to make sure no major change in LUTS, then FU PRN

## 2022-02-16 RX ORDER — ALLOPURINOL 100 MG/1
100 TABLET ORAL DAILY
Qty: 90 TABLET | Refills: 1 | Status: SHIPPED | OUTPATIENT
Start: 2022-02-16 | End: 2022-07-13 | Stop reason: HOSPADM

## 2022-03-24 RX ORDER — BACLOFEN 10 MG/1
TABLET ORAL
Qty: 90 TABLET | Refills: 2 | OUTPATIENT
Start: 2022-03-24

## 2022-03-27 ENCOUNTER — APPOINTMENT (OUTPATIENT)
Dept: GENERAL RADIOLOGY | Facility: HOSPITAL | Age: 72
End: 2022-03-27

## 2022-03-27 ENCOUNTER — HOSPITAL ENCOUNTER (EMERGENCY)
Facility: HOSPITAL | Age: 72
Discharge: HOME | End: 2022-03-27
Attending: EMERGENCY MEDICINE | Admitting: EMERGENCY MEDICINE

## 2022-03-27 VITALS
WEIGHT: 240 LBS | RESPIRATION RATE: 20 BRPM | TEMPERATURE: 97.4 F | BODY MASS INDEX: 40.97 KG/M2 | OXYGEN SATURATION: 99 % | SYSTOLIC BLOOD PRESSURE: 194 MMHG | HEIGHT: 64 IN | DIASTOLIC BLOOD PRESSURE: 87 MMHG | HEART RATE: 74 BPM

## 2022-03-27 DIAGNOSIS — R06.02 SHORTNESS OF BREATH: Primary | ICD-10-CM

## 2022-03-27 DIAGNOSIS — M79.641 BILATERAL HAND PAIN: ICD-10-CM

## 2022-03-27 DIAGNOSIS — M79.642 BILATERAL HAND PAIN: ICD-10-CM

## 2022-03-27 DIAGNOSIS — R60.9 PITTING EDEMA: ICD-10-CM

## 2022-03-27 LAB
ALBUMIN SERPL-MCNC: 3.4 G/DL (ref 3.5–5.2)
ALBUMIN/GLOB SERPL: 1.4 G/DL
ALP SERPL-CCNC: 81 U/L (ref 39–117)
ALT SERPL W P-5'-P-CCNC: 28 U/L (ref 1–41)
ANION GAP SERPL CALCULATED.3IONS-SCNC: 11.1 MMOL/L (ref 5–15)
AST SERPL-CCNC: 17 U/L (ref 1–40)
BASOPHILS # BLD AUTO: 0.04 10*3/MM3 (ref 0–0.2)
BASOPHILS NFR BLD AUTO: 0.4 % (ref 0–1.5)
BILIRUB SERPL-MCNC: 0.5 MG/DL (ref 0–1.2)
BUN SERPL-MCNC: 28 MG/DL (ref 8–23)
BUN/CREAT SERPL: 19.3 (ref 7–25)
CALCIUM SPEC-SCNC: 8.5 MG/DL (ref 8.6–10.5)
CHLORIDE SERPL-SCNC: 95 MMOL/L (ref 98–107)
CO2 SERPL-SCNC: 23.9 MMOL/L (ref 22–29)
CREAT SERPL-MCNC: 1.45 MG/DL (ref 0.76–1.27)
DEPRECATED RDW RBC AUTO: 44.9 FL (ref 37–54)
EGFRCR SERPLBLD CKD-EPI 2021: 51.2 ML/MIN/1.73
EOSINOPHIL # BLD AUTO: 0.21 10*3/MM3 (ref 0–0.4)
EOSINOPHIL NFR BLD AUTO: 2.1 % (ref 0.3–6.2)
ERYTHROCYTE [DISTWIDTH] IN BLOOD BY AUTOMATED COUNT: 13.4 % (ref 12.3–15.4)
GLOBULIN UR ELPH-MCNC: 2.5 GM/DL
GLUCOSE SERPL-MCNC: 375 MG/DL (ref 65–99)
HCT VFR BLD AUTO: 41.4 % (ref 37.5–51)
HGB BLD-MCNC: 13.2 G/DL (ref 13–17.7)
HOLD SPECIMEN: NORMAL
HOLD SPECIMEN: NORMAL
IMM GRANULOCYTES # BLD AUTO: 0.08 10*3/MM3 (ref 0–0.05)
IMM GRANULOCYTES NFR BLD AUTO: 0.8 % (ref 0–0.5)
LYMPHOCYTES # BLD AUTO: 1.16 10*3/MM3 (ref 0.7–3.1)
LYMPHOCYTES NFR BLD AUTO: 11.5 % (ref 19.6–45.3)
MAGNESIUM SERPL-MCNC: 2 MG/DL (ref 1.6–2.4)
MCH RBC QN AUTO: 28.9 PG (ref 26.6–33)
MCHC RBC AUTO-ENTMCNC: 31.9 G/DL (ref 31.5–35.7)
MCV RBC AUTO: 90.8 FL (ref 79–97)
MONOCYTES # BLD AUTO: 0.79 10*3/MM3 (ref 0.1–0.9)
MONOCYTES NFR BLD AUTO: 7.9 % (ref 5–12)
NEUTROPHILS NFR BLD AUTO: 7.77 10*3/MM3 (ref 1.7–7)
NEUTROPHILS NFR BLD AUTO: 77.3 % (ref 42.7–76)
NRBC BLD AUTO-RTO: 0 /100 WBC (ref 0–0.2)
NT-PROBNP SERPL-MCNC: 379.9 PG/ML (ref 0–900)
PLATELET # BLD AUTO: 134 10*3/MM3 (ref 140–450)
PMV BLD AUTO: 10.8 FL (ref 6–12)
POTASSIUM SERPL-SCNC: 4.8 MMOL/L (ref 3.5–5.2)
PROCALCITONIN SERPL-MCNC: 0.04 NG/ML (ref 0–0.25)
PROT SERPL-MCNC: 5.9 G/DL (ref 6–8.5)
RBC # BLD AUTO: 4.56 10*6/MM3 (ref 4.14–5.8)
SODIUM SERPL-SCNC: 130 MMOL/L (ref 136–145)
TROPONIN T SERPL-MCNC: <0.01 NG/ML (ref 0–0.03)
WBC NRBC COR # BLD: 10.05 10*3/MM3 (ref 3.4–10.8)
WHOLE BLOOD HOLD SPECIMEN: NORMAL
WHOLE BLOOD HOLD SPECIMEN: NORMAL

## 2022-03-27 PROCEDURE — 84145 PROCALCITONIN (PCT): CPT | Performed by: PHYSICIAN ASSISTANT

## 2022-03-27 PROCEDURE — 99284 EMERGENCY DEPT VISIT MOD MDM: CPT

## 2022-03-27 PROCEDURE — 80053 COMPREHEN METABOLIC PANEL: CPT | Performed by: EMERGENCY MEDICINE

## 2022-03-27 PROCEDURE — 93005 ELECTROCARDIOGRAM TRACING: CPT | Performed by: EMERGENCY MEDICINE

## 2022-03-27 PROCEDURE — 83735 ASSAY OF MAGNESIUM: CPT | Performed by: PHYSICIAN ASSISTANT

## 2022-03-27 PROCEDURE — 25010000002 FUROSEMIDE PER 20 MG: Performed by: PHYSICIAN ASSISTANT

## 2022-03-27 PROCEDURE — 85025 COMPLETE CBC W/AUTO DIFF WBC: CPT | Performed by: EMERGENCY MEDICINE

## 2022-03-27 PROCEDURE — 71045 X-RAY EXAM CHEST 1 VIEW: CPT

## 2022-03-27 PROCEDURE — 96375 TX/PRO/DX INJ NEW DRUG ADDON: CPT

## 2022-03-27 PROCEDURE — 96374 THER/PROPH/DIAG INJ IV PUSH: CPT

## 2022-03-27 PROCEDURE — 25010000002 DIAZEPAM PER 5 MG: Performed by: EMERGENCY MEDICINE

## 2022-03-27 PROCEDURE — 84484 ASSAY OF TROPONIN QUANT: CPT | Performed by: EMERGENCY MEDICINE

## 2022-03-27 PROCEDURE — 83880 ASSAY OF NATRIURETIC PEPTIDE: CPT | Performed by: EMERGENCY MEDICINE

## 2022-03-27 RX ORDER — ACETAMINOPHEN 500 MG
1000 TABLET ORAL ONCE
Status: COMPLETED | OUTPATIENT
Start: 2022-03-27 | End: 2022-03-27

## 2022-03-27 RX ORDER — DIAZEPAM 5 MG/ML
5 INJECTION, SOLUTION INTRAMUSCULAR; INTRAVENOUS EVERY 4 HOURS PRN
Status: DISCONTINUED | OUTPATIENT
Start: 2022-03-27 | End: 2022-03-28 | Stop reason: HOSPADM

## 2022-03-27 RX ORDER — FUROSEMIDE 10 MG/ML
40 INJECTION INTRAMUSCULAR; INTRAVENOUS ONCE
Status: COMPLETED | OUTPATIENT
Start: 2022-03-27 | End: 2022-03-27

## 2022-03-27 RX ORDER — SODIUM CHLORIDE 0.9 % (FLUSH) 0.9 %
10 SYRINGE (ML) INJECTION AS NEEDED
Status: DISCONTINUED | OUTPATIENT
Start: 2022-03-27 | End: 2022-03-28 | Stop reason: HOSPADM

## 2022-03-27 RX ADMIN — ACETAMINOPHEN 1000 MG: 500 TABLET ORAL at 23:34

## 2022-03-27 RX ADMIN — FUROSEMIDE 40 MG: 10 INJECTION, SOLUTION INTRAVENOUS at 23:35

## 2022-03-27 RX ADMIN — DIAZEPAM 5 MG: 5 INJECTION, SOLUTION INTRAMUSCULAR; INTRAVENOUS at 22:36

## 2022-03-28 NOTE — ED PROVIDER NOTES
Subjective   History of Present Illness   Patient is a 71-year-old male with history of multiple comorbidities presenting to the ER with complaints of his hands drawing up and pain shooting from his hands to his neck.  Patient complains of shortness of breath and edema to his bilateral legs as well.  Patient is on Lasix and does appear to have a history of heart disease as well his chronic kidney disease.  He denies additional symptoms or complaints at this time.  He states symptoms have been going on for 3 to 4 days.    Review of Systems   Respiratory: Positive for shortness of breath.    Cardiovascular: Positive for leg swelling.   Musculoskeletal:        Hands cramping/drawing up   All other systems reviewed and are negative.      Past Medical History:   Diagnosis Date   • Anxiety and depression    • Arthritis    • Backache     20 years   • Benign prostatic hyperplasia    • Bladder trauma    • Cervicalgia    • Chronic kidney disease     Cr up to 2.1   • Coronary artery disease    • Diabetes mellitus (HCC)     15 years--   • Emphysema of lung (HCC)    • Erectile dysfunction    • Eye exam, routine 2015   • FH: colonic polyps    • Gout     for 10 years--   • History of echocardiogram 09/15/2014   • History of tobacco use     with cessation x7 years   • Hyperlipidemia    • Hypertension    • Migraine    • Myocardial infarction (HCC)     h/o coronary artery stenting--   • Prostate cancer (HCC)    • Restless leg syndrome     20 years   • Sleep apnea     12 years; uses a Bi-Pap   • Stroke ([facility])    • Syncope 4/28/2017   • Warfarin anticoagulation 9/14/2016       No Known Allergies    Past Surgical History:   Procedure Laterality Date   • BLADDER SURGERY     • CARDIAC CATHETERIZATION  03/15/2013    revealing widely patent stents of the left circumflex and ramus intermedius coronary arteries, no significant disease is seen in any territory   • CARDIAC CATHETERIZATION Left 9/30/2019    Procedure: Left Heart Cath;  Surgeon:  Arelis Tucker MD;  Location:  [facility] CATH [facility]VAS[facility] LOCATION;  Service: Cardiology   • CARDIAC ELECTROPHYSIOLOGY PROCEDURE N/A 5/10/2021    Procedure: PPM battery change;  Surgeon: Arelis Tucker MD;  Location:  PREM CATH INVASIVE LOCATION;  Service: Cardiology;  Laterality: N/A;   • CARDIAC PACEMAKER PLACEMENT     • CATARACT EXTRACTION     • COLONOSCOPY      No family history of colon cancer.     • COLONOSCOPY     • HERNIA REPAIR      Type unknown   • PACEMAKER IMPLANTATION     • PROSTATE SURGERY         Family History   Problem Relation Age of Onset   • Cancer Mother    • Diabetes Mother    • Hypertension Mother    • Stroke Mother    • Stomach cancer Mother    • Heart disease Mother    • Stomach cancer Father    • Cancer Father    • Lung cancer Father        Social History     Socioeconomic History   • Marital status:    Tobacco Use   • Smoking status: Former Smoker     Packs/day: 1.00     Years: 30.00     Pack years: 30.00     Types: Cigarettes     Quit date: 8/15/2001     Years since quittin.6   • Smokeless tobacco: Never Used   • Tobacco comment: quit 16 years ago   Vaping Use   • Vaping Use: Never used   Substance and Sexual Activity   • Alcohol use: No   • Drug use: No   • Sexual activity: Defer           Objective   Physical Exam  Vitals and nursing note reviewed.   Constitutional:       General: He is not in acute distress.     Appearance: He is not toxic-appearing.   HENT:      Head: Normocephalic and atraumatic.      Right Ear: External ear normal.      Left Ear: External ear normal.      Nose: Nose normal.   Eyes:      Extraocular Movements: Extraocular movements intact.      Conjunctiva/sclera: Conjunctivae normal.   Cardiovascular:      Rate and Rhythm: Normal rate.      Heart sounds: Normal heart sounds.   Pulmonary:      Effort: Pulmonary effort is normal.      Breath sounds: Wheezing present.   Abdominal:      Palpations: Abdomen is soft.      Tenderness:  There is no abdominal tenderness.   Musculoskeletal:         General: Normal range of motion.      Cervical back: Normal range of motion and neck supple.   Skin:     General: Skin is warm and dry.   Neurological:      General: No focal deficit present.      Mental Status: He is alert and oriented to person, place, and time.   Psychiatric:         Mood and Affect: Mood normal.         Behavior: Behavior normal.         Procedures           ED Course  ED Course as of 03/27/22 2257   Sun Mar 27, 2022   2145 EKG interpreted by me: Sinus rhythm, normal rate, no acute ST changes, some nonspecific T waves, this is an abnormal EKG, this appears similar to previous [MP]   2243 Magnesium: 2.0 [AP]   2243 BNP [AP]   2244 Troponin T: <0.010 [AP]   2244 Procalcitonin: 0.04 [AP]   2244 WBC: 10.05 [AP]   2244 RBC: 4.56 [AP]   2244 Hemoglobin: 13.2 [AP]   2244 Hematocrit: 41.4 [AP]   2244 Platelets(!): 134 [AP]   2244 Glucose(!): 375 [AP]   2244 BUN(!): 28 [AP]   2244 Creatinine(!): 1.45 [AP]   2244 Sodium(!): 130 [AP]   2244 Potassium: 4.8 [AP]   2244 Chloride(!): 95 [AP]   2244 CO2: 23.9 [AP]   2244 Calcium(!): 8.5 [AP]   2244 Total Protein(!): 5.9 [AP]   2244 Albumin(!): 3.40 [AP]   2244 ALT (SGPT): 28 [AP]   2244 AST (SGOT): 17 [AP]   2244 Alkaline Phosphatase: 81 [AP]   2244 Total Bilirubin: 0.5 [AP]   2244 Globulin: 2.5 [AP]   2244 A/G Ratio: 1.4 [AP]   2244 BUN/Creatinine Ratio: 19.3 [AP]   2244 Anion Gap: 11.1 [AP]   2244 eGFR(!): 51.2 [AP]      ED Course User Index  [AP] Dalia Holguin PA-C  [MP] Ronny Cardona MD                                                 [facility]   Patient was evaluated in the ER for shortness of breath and cramping to bilateral hands as well as lower extremity edema.  Patient is hemodynamically stable, nontoxic-appearing on exam.  Labs are unremarkable other than hyperglycemia and elevated creatinine consistent with previous labs due to chronic kidney disease. Patient not currently having  chest pain.  Patient was given 5 mg Valium and 40 mg IV Lasix.  He states that he has Lasix at home and can take it as needed.  He was advised to take this as prescribed.  He is agreeable with plan for discharge.  He was advised to follow up with his PCP, Dr. Sullivan, as soon as possible for further outpatient evaluation.  Precautions were given for return to the ER for any new or worsening symptoms.    Final diagnoses:   Shortness of breath   Bilateral hand pain   Pitting edema       ED Disposition  ED Disposition     ED Disposition   Discharge    Condition   Stable    Comment   --             Raj Sullivan MD  [address] [address] [phone]      for further outpatient evaluation of today's complaints    [facility] Emergency Department  [address] [address] [phone]  Go to   As needed, If symptoms worsen         Medication List      No changes were made to your prescriptions during this visit.          Dalia Holguin PA-C  03/27/22 [id number]

## 2022-03-28 NOTE — DISCHARGE INSTRUCTIONS
Continue taking Lasix as prescribed.  Follow up with your PCP as soon as possible for further outpatient evaluation of today's complaints.  Return to the ER for new or worsening symptoms.

## 2022-04-16 ENCOUNTER — HOSPITAL ENCOUNTER (OUTPATIENT)
Facility: HOSPITAL | Age: 72
Setting detail: OBSERVATION
Discharge: HOME | End: 2022-04-18
Attending: EMERGENCY MEDICINE | Admitting: STUDENT IN AN ORGANIZED HEALTH CARE EDUCATION/TRAINING PROGRAM

## 2022-04-16 ENCOUNTER — APPOINTMENT (OUTPATIENT)
Dept: GENERAL RADIOLOGY | Facility: HOSPITAL | Age: 72
End: 2022-04-16

## 2022-04-16 ENCOUNTER — APPOINTMENT (OUTPATIENT)
Dept: CT IMAGING | Facility: HOSPITAL | Age: 72
End: 2022-04-16

## 2022-04-16 DIAGNOSIS — J18.9 PNEUMONIA OF RIGHT MIDDLE LOBE DUE TO INFECTIOUS ORGANISM: Primary | ICD-10-CM

## 2022-04-16 DIAGNOSIS — R53.1 GENERAL WEAKNESS: ICD-10-CM

## 2022-04-16 PROBLEM — J96.11 CHRONIC RESPIRATORY FAILURE WITH HYPOXIA (HCC): Status: ACTIVE | Noted: 2022-04-16

## 2022-04-16 LAB
ALBUMIN SERPL-MCNC: 3.4 G/DL (ref 3.5–5.2)
ALBUMIN/GLOB SERPL: 1.4 G/DL
ALP SERPL-CCNC: 63 U/L (ref 39–117)
ALT SERPL W P-5'-P-CCNC: 24 U/L (ref 1–41)
ANION GAP SERPL CALCULATED.3IONS-SCNC: 9.4 MMOL/L (ref 5–15)
AST SERPL-CCNC: 21 U/L (ref 1–40)
B PARAPERT DNA SPEC QL NAA+PROBE: NOT DETECTED
B PERT DNA SPEC QL NAA+PROBE: NOT DETECTED
BASOPHILS # BLD AUTO: 0.07 10*3/MM3 (ref 0–0.2)
BASOPHILS NFR BLD AUTO: 0.3 % (ref 0–1.5)
BILIRUB SERPL-MCNC: 0.7 MG/DL (ref 0–1.2)
BILIRUB UR QL STRIP: NEGATIVE
BUN SERPL-MCNC: 40 MG/DL (ref 8–23)
BUN/CREAT SERPL: 22.6 (ref 7–25)
C PNEUM DNA NPH QL NAA+NON-PROBE: NOT DETECTED
CALCIUM SPEC-SCNC: 9.2 MG/DL (ref 8.6–10.5)
CHLORIDE SERPL-SCNC: 102 MMOL/L (ref 98–107)
CLARITY UR: CLEAR
CO2 SERPL-SCNC: 26.6 MMOL/L (ref 22–29)
COLOR UR: YELLOW
CREAT SERPL-MCNC: 1.77 MG/DL (ref 0.76–1.27)
D-LACTATE SERPL-SCNC: 1.9 MMOL/L (ref 0.5–2)
DEPRECATED RDW RBC AUTO: 45.6 FL (ref 37–54)
EGFRCR SERPLBLD CKD-EPI 2021: 40.3 ML/MIN/1.73
EOSINOPHIL # BLD AUTO: 0.08 10*3/MM3 (ref 0–0.4)
EOSINOPHIL NFR BLD AUTO: 0.4 % (ref 0.3–6.2)
ERYTHROCYTE [DISTWIDTH] IN BLOOD BY AUTOMATED COUNT: 13.7 % (ref 12.3–15.4)
FLUAV SUBTYP SPEC NAA+PROBE: NOT DETECTED
FLUBV RNA ISLT QL NAA+PROBE: NOT DETECTED
GLOBULIN UR ELPH-MCNC: 2.5 GM/DL
GLUCOSE BLDC GLUCOMTR-MCNC: 234 MG/DL (ref 70–130)
GLUCOSE SERPL-MCNC: 225 MG/DL (ref 65–99)
GLUCOSE UR STRIP-MCNC: ABNORMAL MG/DL
HADV DNA SPEC NAA+PROBE: NOT DETECTED
HBA1C MFR BLD: 8.9 % (ref 4.8–5.6)
HCOV 229E RNA SPEC QL NAA+PROBE: NOT DETECTED
HCOV HKU1 RNA SPEC QL NAA+PROBE: NOT DETECTED
HCOV NL63 RNA SPEC QL NAA+PROBE: NOT DETECTED
HCOV OC43 RNA SPEC QL NAA+PROBE: NOT DETECTED
HCT VFR BLD AUTO: 39.4 % (ref 37.5–51)
HGB BLD-MCNC: 12.9 G/DL (ref 13–17.7)
HGB UR QL STRIP.AUTO: NEGATIVE
HMPV RNA NPH QL NAA+NON-PROBE: NOT DETECTED
HOLD SPECIMEN: NORMAL
HOLD SPECIMEN: NORMAL
HPIV1 RNA ISLT QL NAA+PROBE: NOT DETECTED
HPIV2 RNA SPEC QL NAA+PROBE: NOT DETECTED
HPIV3 RNA NPH QL NAA+PROBE: NOT DETECTED
HPIV4 P GENE NPH QL NAA+PROBE: NOT DETECTED
IMM GRANULOCYTES # BLD AUTO: 0.14 10*3/MM3 (ref 0–0.05)
IMM GRANULOCYTES NFR BLD AUTO: 0.7 % (ref 0–0.5)
KETONES UR QL STRIP: NEGATIVE
LEUKOCYTE ESTERASE UR QL STRIP.AUTO: NEGATIVE
LYMPHOCYTES # BLD AUTO: 1.27 10*3/MM3 (ref 0.7–3.1)
LYMPHOCYTES NFR BLD AUTO: 6.1 % (ref 19.6–45.3)
M PNEUMO IGG SER IA-ACNC: NOT DETECTED
MAGNESIUM SERPL-MCNC: 1.7 MG/DL (ref 1.6–2.4)
MCH RBC QN AUTO: 29.7 PG (ref 26.6–33)
MCHC RBC AUTO-ENTMCNC: 32.7 G/DL (ref 31.5–35.7)
MCV RBC AUTO: 90.6 FL (ref 79–97)
MONOCYTES # BLD AUTO: 1.59 10*3/MM3 (ref 0.1–0.9)
MONOCYTES NFR BLD AUTO: 7.6 % (ref 5–12)
NEUTROPHILS NFR BLD AUTO: 17.72 10*3/MM3 (ref 1.7–7)
NEUTROPHILS NFR BLD AUTO: 84.9 % (ref 42.7–76)
NITRITE UR QL STRIP: NEGATIVE
NRBC BLD AUTO-RTO: 0 /100 WBC (ref 0–0.2)
PH UR STRIP.AUTO: <=5 [PH] (ref 5–8)
PLATELET # BLD AUTO: 182 10*3/MM3 (ref 140–450)
PMV BLD AUTO: 10.5 FL (ref 6–12)
POTASSIUM SERPL-SCNC: 5.2 MMOL/L (ref 3.5–5.2)
PROCALCITONIN SERPL-MCNC: 1.72 NG/ML (ref 0–0.25)
PROT SERPL-MCNC: 5.9 G/DL (ref 6–8.5)
PROT UR QL STRIP: NEGATIVE
RBC # BLD AUTO: 4.35 10*6/MM3 (ref 4.14–5.8)
RHINOVIRUS RNA SPEC NAA+PROBE: NOT DETECTED
RSV RNA NPH QL NAA+NON-PROBE: NOT DETECTED
SARS-COV-2 RNA NPH QL NAA+NON-PROBE: NOT DETECTED
SODIUM SERPL-SCNC: 138 MMOL/L (ref 136–145)
SP GR UR STRIP: 1.02 (ref 1–1.03)
TROPONIN T SERPL-MCNC: 0.02 NG/ML (ref 0–0.03)
UROBILINOGEN UR QL STRIP: ABNORMAL
WBC NRBC COR # BLD: 20.87 10*3/MM3 (ref 3.4–10.8)
WHOLE BLOOD HOLD SPECIMEN: NORMAL
WHOLE BLOOD HOLD SPECIMEN: NORMAL

## 2022-04-16 PROCEDURE — 70450 CT HEAD/BRAIN W/O DYE: CPT

## 2022-04-16 PROCEDURE — 83605 ASSAY OF LACTIC ACID: CPT

## 2022-04-16 PROCEDURE — 87147 CULTURE TYPE IMMUNOLOGIC: CPT

## 2022-04-16 PROCEDURE — G0378 HOSPITAL OBSERVATION PER HR: HCPCS

## 2022-04-16 PROCEDURE — 81003 URINALYSIS AUTO W/O SCOPE: CPT | Performed by: FAMILY MEDICINE

## 2022-04-16 PROCEDURE — 87040 BLOOD CULTURE FOR BACTERIA: CPT

## 2022-04-16 PROCEDURE — 99285 EMERGENCY DEPT VISIT HI MDM: CPT

## 2022-04-16 PROCEDURE — 25010000002 AZITHROMYCIN PER 500 MG

## 2022-04-16 PROCEDURE — 96365 THER/PROPH/DIAG IV INF INIT: CPT

## 2022-04-16 PROCEDURE — 99220 PR INITIAL OBSERVATION CARE/DAY 70 MINUTES: CPT | Performed by: FAMILY MEDICINE

## 2022-04-16 PROCEDURE — 36415 COLL VENOUS BLD VENIPUNCTURE: CPT

## 2022-04-16 PROCEDURE — 82962 GLUCOSE BLOOD TEST: CPT

## 2022-04-16 PROCEDURE — 93005 ELECTROCARDIOGRAM TRACING: CPT | Performed by: EMERGENCY MEDICINE

## 2022-04-16 PROCEDURE — 84145 PROCALCITONIN (PCT): CPT

## 2022-04-16 PROCEDURE — 96375 TX/PRO/DX INJ NEW DRUG ADDON: CPT

## 2022-04-16 PROCEDURE — 80053 COMPREHEN METABOLIC PANEL: CPT | Performed by: EMERGENCY MEDICINE

## 2022-04-16 PROCEDURE — P9612 CATHETERIZE FOR URINE SPEC: HCPCS

## 2022-04-16 PROCEDURE — 83735 ASSAY OF MAGNESIUM: CPT | Performed by: EMERGENCY MEDICINE

## 2022-04-16 PROCEDURE — 84484 ASSAY OF TROPONIN QUANT: CPT | Performed by: EMERGENCY MEDICINE

## 2022-04-16 PROCEDURE — 85025 COMPLETE CBC W/AUTO DIFF WBC: CPT | Performed by: EMERGENCY MEDICINE

## 2022-04-16 PROCEDURE — 87150 DNA/RNA AMPLIFIED PROBE: CPT

## 2022-04-16 PROCEDURE — 96367 TX/PROPH/DG ADDL SEQ IV INF: CPT

## 2022-04-16 PROCEDURE — 25010000002 METHYLPREDNISOLONE PER 125 MG

## 2022-04-16 PROCEDURE — 71045 X-RAY EXAM CHEST 1 VIEW: CPT

## 2022-04-16 PROCEDURE — 83036 HEMOGLOBIN GLYCOSYLATED A1C: CPT | Performed by: FAMILY MEDICINE

## 2022-04-16 PROCEDURE — 25010000002 CEFTRIAXONE SODIUM-DEXTROSE 1-3.74 GM-%(50ML) RECONSTITUTED SOLUTION

## 2022-04-16 PROCEDURE — 0202U NFCT DS 22 TRGT SARS-COV-2: CPT

## 2022-04-16 RX ORDER — HYDROCODONE BITARTRATE AND ACETAMINOPHEN 5; 325 MG/1; MG/1
1 TABLET ORAL EVERY 6 HOURS PRN
Status: DISCONTINUED | OUTPATIENT
Start: 2022-04-16 | End: 2022-04-18 | Stop reason: HOSPADM

## 2022-04-16 RX ORDER — CHOLECALCIFEROL (VITAMIN D3) 125 MCG
5 CAPSULE ORAL NIGHTLY PRN
Status: DISCONTINUED | OUTPATIENT
Start: 2022-04-16 | End: 2022-04-18 | Stop reason: HOSPADM

## 2022-04-16 RX ORDER — IPRATROPIUM BROMIDE AND ALBUTEROL SULFATE 2.5; .5 MG/3ML; MG/3ML
3 SOLUTION RESPIRATORY (INHALATION) ONCE
Status: DISCONTINUED | OUTPATIENT
Start: 2022-04-16 | End: 2022-04-18 | Stop reason: HOSPADM

## 2022-04-16 RX ORDER — GABAPENTIN 400 MG/1
400 CAPSULE ORAL 3 TIMES DAILY
COMMUNITY
End: 2023-02-16 | Stop reason: DRUGHIGH

## 2022-04-16 RX ORDER — SPIRONOLACTONE 25 MG/1
50 TABLET ORAL DAILY
Status: DISCONTINUED | OUTPATIENT
Start: 2022-04-17 | End: 2022-04-18 | Stop reason: HOSPADM

## 2022-04-16 RX ORDER — SODIUM CHLORIDE 0.9 % (FLUSH) 0.9 %
10 SYRINGE (ML) INJECTION AS NEEDED
Status: DISCONTINUED | OUTPATIENT
Start: 2022-04-16 | End: 2022-04-18 | Stop reason: HOSPADM

## 2022-04-16 RX ORDER — BUDESONIDE AND FORMOTEROL FUMARATE DIHYDRATE 160; 4.5 UG/1; UG/1
2 AEROSOL RESPIRATORY (INHALATION)
Refills: 5 | Status: DISCONTINUED | OUTPATIENT
Start: 2022-04-16 | End: 2022-04-18 | Stop reason: HOSPADM

## 2022-04-16 RX ORDER — CETIRIZINE HYDROCHLORIDE 10 MG/1
10 TABLET ORAL DAILY
Status: DISCONTINUED | OUTPATIENT
Start: 2022-04-17 | End: 2022-04-18 | Stop reason: HOSPADM

## 2022-04-16 RX ORDER — DEXTROSE MONOHYDRATE 25 G/50ML
25 INJECTION, SOLUTION INTRAVENOUS
Status: DISCONTINUED | OUTPATIENT
Start: 2022-04-16 | End: 2022-04-18 | Stop reason: HOSPADM

## 2022-04-16 RX ORDER — LISINOPRIL 20 MG/1
40 TABLET ORAL DAILY
Status: DISCONTINUED | OUTPATIENT
Start: 2022-04-17 | End: 2022-04-18 | Stop reason: HOSPADM

## 2022-04-16 RX ORDER — PRAMIPEXOLE DIHYDROCHLORIDE 0.25 MG/1
0.5 TABLET ORAL 3 TIMES DAILY
Status: DISCONTINUED | OUTPATIENT
Start: 2022-04-16 | End: 2022-04-18 | Stop reason: HOSPADM

## 2022-04-16 RX ORDER — IPRATROPIUM BROMIDE AND ALBUTEROL SULFATE 2.5; .5 MG/3ML; MG/3ML
3 SOLUTION RESPIRATORY (INHALATION)
Status: DISCONTINUED | OUTPATIENT
Start: 2022-04-16 | End: 2022-04-16

## 2022-04-16 RX ORDER — CEFTRIAXONE 1 G/50ML
1 INJECTION, SOLUTION INTRAVENOUS EVERY 24 HOURS
Status: DISCONTINUED | OUTPATIENT
Start: 2022-04-17 | End: 2022-04-18 | Stop reason: HOSPADM

## 2022-04-16 RX ORDER — METOPROLOL SUCCINATE 50 MG/1
50 TABLET, EXTENDED RELEASE ORAL DAILY
Status: DISCONTINUED | OUTPATIENT
Start: 2022-04-17 | End: 2022-04-18 | Stop reason: HOSPADM

## 2022-04-16 RX ORDER — AMIODARONE HYDROCHLORIDE 200 MG/1
200 TABLET ORAL
Status: DISCONTINUED | OUTPATIENT
Start: 2022-04-17 | End: 2022-04-18 | Stop reason: HOSPADM

## 2022-04-16 RX ORDER — METHYLPREDNISOLONE SODIUM SUCCINATE 125 MG/2ML
125 INJECTION, POWDER, LYOPHILIZED, FOR SOLUTION INTRAMUSCULAR; INTRAVENOUS ONCE
Status: COMPLETED | OUTPATIENT
Start: 2022-04-16 | End: 2022-04-16

## 2022-04-16 RX ORDER — IPRATROPIUM BROMIDE AND ALBUTEROL SULFATE 2.5; .5 MG/3ML; MG/3ML
3 SOLUTION RESPIRATORY (INHALATION)
Status: DISCONTINUED | OUTPATIENT
Start: 2022-04-16 | End: 2022-04-18 | Stop reason: HOSPADM

## 2022-04-16 RX ORDER — FLUTICASONE PROPIONATE 50 MCG
1 SPRAY, SUSPENSION (ML) NASAL DAILY
Status: DISCONTINUED | OUTPATIENT
Start: 2022-04-17 | End: 2022-04-18 | Stop reason: HOSPADM

## 2022-04-16 RX ORDER — ERYTHROMYCIN 5 MG/G
OINTMENT OPHTHALMIC EVERY 12 HOURS
Status: DISCONTINUED | OUTPATIENT
Start: 2022-04-16 | End: 2022-04-18 | Stop reason: HOSPADM

## 2022-04-16 RX ORDER — ERYTHROMYCIN 5 MG/G
1 OINTMENT OPHTHALMIC ONCE
Status: COMPLETED | OUTPATIENT
Start: 2022-04-16 | End: 2022-04-16

## 2022-04-16 RX ORDER — IPRATROPIUM BROMIDE AND ALBUTEROL SULFATE 2.5; .5 MG/3ML; MG/3ML
3 SOLUTION RESPIRATORY (INHALATION) EVERY 4 HOURS PRN
Status: DISCONTINUED | OUTPATIENT
Start: 2022-04-16 | End: 2022-04-18 | Stop reason: HOSPADM

## 2022-04-16 RX ORDER — GUAIFENESIN 600 MG/1
600 TABLET, EXTENDED RELEASE ORAL EVERY 12 HOURS SCHEDULED
Status: DISCONTINUED | OUTPATIENT
Start: 2022-04-16 | End: 2022-04-18 | Stop reason: HOSPADM

## 2022-04-16 RX ORDER — CEFTRIAXONE 1 G/50ML
1 INJECTION, SOLUTION INTRAVENOUS ONCE
Status: COMPLETED | OUTPATIENT
Start: 2022-04-16 | End: 2022-04-16

## 2022-04-16 RX ORDER — ATORVASTATIN CALCIUM 20 MG/1
20 TABLET, FILM COATED ORAL NIGHTLY
Status: DISCONTINUED | OUTPATIENT
Start: 2022-04-16 | End: 2022-04-18 | Stop reason: HOSPADM

## 2022-04-16 RX ORDER — ALBUTEROL SULFATE 2.5 MG/3ML
2.5 SOLUTION RESPIRATORY (INHALATION) EVERY 6 HOURS PRN
Refills: 3 | Status: DISCONTINUED | OUTPATIENT
Start: 2022-04-16 | End: 2022-04-18 | Stop reason: HOSPADM

## 2022-04-16 RX ORDER — NICOTINE POLACRILEX 4 MG
1 LOZENGE BUCCAL
Status: DISCONTINUED | OUTPATIENT
Start: 2022-04-16 | End: 2022-04-18 | Stop reason: HOSPADM

## 2022-04-16 RX ORDER — ALLOPURINOL 100 MG/1
100 TABLET ORAL DAILY
Status: DISCONTINUED | OUTPATIENT
Start: 2022-04-17 | End: 2022-04-18 | Stop reason: HOSPADM

## 2022-04-16 RX ORDER — AZITHROMYCIN 250 MG/1
250 TABLET, FILM COATED ORAL
Status: DISCONTINUED | OUTPATIENT
Start: 2022-04-17 | End: 2022-04-18 | Stop reason: HOSPADM

## 2022-04-16 RX ORDER — AMLODIPINE BESYLATE 5 MG/1
10 TABLET ORAL DAILY
Status: DISCONTINUED | OUTPATIENT
Start: 2022-04-17 | End: 2022-04-18 | Stop reason: HOSPADM

## 2022-04-16 RX ORDER — CYCLOBENZAPRINE HCL 10 MG
10 TABLET ORAL 3 TIMES DAILY PRN
Status: DISCONTINUED | OUTPATIENT
Start: 2022-04-16 | End: 2022-04-18 | Stop reason: HOSPADM

## 2022-04-16 RX ORDER — ACETAMINOPHEN 500 MG
500 TABLET ORAL ONCE
Status: COMPLETED | OUTPATIENT
Start: 2022-04-16 | End: 2022-04-16

## 2022-04-16 RX ORDER — FLUOXETINE HYDROCHLORIDE 20 MG/1
20 CAPSULE ORAL DAILY
Status: DISCONTINUED | OUTPATIENT
Start: 2022-04-17 | End: 2022-04-18 | Stop reason: HOSPADM

## 2022-04-16 RX ORDER — ASPIRIN 81 MG/1
81 TABLET, CHEWABLE ORAL DAILY
Status: DISCONTINUED | OUTPATIENT
Start: 2022-04-17 | End: 2022-04-18 | Stop reason: HOSPADM

## 2022-04-16 RX ORDER — FUROSEMIDE 20 MG/1
20 TABLET ORAL DAILY
Status: DISCONTINUED | OUTPATIENT
Start: 2022-04-17 | End: 2022-04-18 | Stop reason: HOSPADM

## 2022-04-16 RX ORDER — PANTOPRAZOLE SODIUM 40 MG/1
40 TABLET, DELAYED RELEASE ORAL EVERY MORNING
Refills: 3 | Status: DISCONTINUED | OUTPATIENT
Start: 2022-04-17 | End: 2022-04-18 | Stop reason: HOSPADM

## 2022-04-16 RX ORDER — GABAPENTIN 400 MG/1
400 CAPSULE ORAL 3 TIMES DAILY
Status: DISCONTINUED | OUTPATIENT
Start: 2022-04-16 | End: 2022-04-18 | Stop reason: HOSPADM

## 2022-04-16 RX ADMIN — GABAPENTIN 400 MG: 400 CAPSULE ORAL at 23:27

## 2022-04-16 RX ADMIN — GUAIFENESIN 600 MG: 600 TABLET ORAL at 23:27

## 2022-04-16 RX ADMIN — SODIUM CHLORIDE 500 ML: 9 INJECTION, SOLUTION INTRAVENOUS at 16:57

## 2022-04-16 RX ADMIN — ERYTHROMYCIN 1 APPLICATION: 5 OINTMENT OPHTHALMIC at 17:39

## 2022-04-16 RX ADMIN — ACETAMINOPHEN 500 MG: 500 TABLET ORAL at 16:58

## 2022-04-16 RX ADMIN — ATORVASTATIN CALCIUM 20 MG: 20 TABLET, FILM COATED ORAL at 23:27

## 2022-04-16 RX ADMIN — PRAMIPEXOLE DIHYDROCHLORIDE 0.5 MG: 0.25 TABLET ORAL at 23:27

## 2022-04-16 RX ADMIN — METHYLPREDNISOLONE SODIUM SUCCINATE 125 MG: 125 INJECTION, POWDER, FOR SOLUTION INTRAMUSCULAR; INTRAVENOUS at 16:58

## 2022-04-16 RX ADMIN — APIXABAN 5 MG: 5 TABLET, FILM COATED ORAL at 23:27

## 2022-04-16 RX ADMIN — CEFTRIAXONE 1 G: 1 INJECTION, SOLUTION INTRAVENOUS at 17:39

## 2022-04-16 NOTE — ED PROVIDER NOTES
Subjective   Patient is a 71-year-old male who is here today for generalized weakness and headache.  He was brought in by [facility].  He reports that his headache started 3 days ago and was intermittent, which is now persistent today.  It is in the frontal region, causing light sensitivity, and neck pain, but he denies nausea, vomiting, or visual changes.  Has not felt good in general, notes that he is having some mid abdominal pain, with 3 episodes of diarrhea today, more swelling to his ankles than usual, and feeling as if he has had a fever.  EMS reports a temperature of 100.5 during transport.  Patient notes cough, but has a history of COPD and wears 2 L of oxygen regularly.  No worsening shortness of breath or cough.  No chest pain.  His other major medical history includes diabetes, COPD, chronic kidney disease, and heart disease.        Review of Systems   Constitutional: Positive for fatigue and fever. Negative for chills.   HENT: Negative for congestion, ear pain, sinus pressure, sinus pain and sore throat.    Respiratory: Positive for cough. Negative for chest tightness and shortness of breath.    Cardiovascular: Negative for chest pain, palpitations and leg swelling.   Gastrointestinal: Positive for abdominal pain and diarrhea. Negative for nausea and vomiting.   Genitourinary: Negative for dysuria and flank pain.   Musculoskeletal: Positive for back pain and neck pain. Negative for neck stiffness.   Skin: Negative for color change.   Neurological: Positive for weakness and headaches. Negative for dizziness and light-headedness.       Past Medical History:   Diagnosis Date   • Anxiety and depression    • Arthritis    • Backache     20 years   • Benign prostatic hyperplasia    • Bladder trauma    • Cervicalgia    • Chronic kidney disease     Cr up to 2.1   • Coronary artery disease    • Diabetes mellitus (HCC)     15 years--   • Emphysema of lung (HCC)    • Erectile dysfunction    • Eye exam,  routine    • FH: colonic polyps    • Gout     for 10 years--   • History of echocardiogram 09/15/2014   • History of tobacco use     with cessation x7 years   • Hyperlipidemia    • Hypertension    • Migraine    • Myocardial infarction (HCC)     h/o coronary artery stenting--   • Prostate cancer (HCC)    • Restless leg syndrome     20 years   • Sleep apnea     12 years; uses a Bi-Pap   • Stroke (HCC)    • Syncope 2017   • Warfarin anticoagulation 2016       No Known Allergies    Past Surgical History:   Procedure Laterality Date   • BLADDER SURGERY     • CARDIAC CATHETERIZATION  03/15/2013    revealing widely patent stents of the left circumflex and ramus intermedius coronary arteries, no significant disease is seen in any territory   • CARDIAC CATHETERIZATION Left 2019    Procedure: Left Heart Cath;  Surgeon: Arelis Tucker MD;  Location:  [facility] CATH [facility] LOCATION;  Service: Cardiology   • CARDIAC ELECTROPHYSIOLOGY PROCEDURE N/A 5/10/2021    Procedure: PPM battery change;  Surgeon: Arelis Tucker MD;  Location:  [facility] CATH [facility]VASIVE LOCATION;  Service: Cardiology;  Laterality: N/A;   • CARDIAC PACEMAKER PLACEMENT     • CATARACT EXTRACTION     • COLONOSCOPY      No family history of colon cancer.     • COLONOSCOPY     • HERNIA REPAIR      Type unknown   • PACEMAKER IMPLANTATION     • PROSTATE SURGERY         Family History   Problem Relation Age of Onset   • Cancer Mother    • Diabetes Mother    • Hypertension Mother    • Stroke Mother    • Stomach cancer Mother    • Heart disease Mother    • Stomach cancer Father    • Cancer Father    • Lung cancer Father        Social History     Socioeconomic History   • Marital status:    Tobacco Use   • Smoking status: Former Smoker     Packs/day: 1.00     Years: 30.00     Pack years: 30.00     Types: Cigarettes     Quit date: 8/15/2001     Years since quittin.6   • Smokeless tobacco: Never Used   • Tobacco comment:  quit 16 years ago   Vaping Use   • Vaping Use: Never used   Substance and Sexual Activity   • Alcohol use: No   • Drug use: No   • Sexual activity: Defer           Objective   Physical Exam  Vitals and nursing note reviewed.   Constitutional:       General: He is not in acute distress.     Appearance: Normal appearance. He is not ill-appearing.   HENT:      Head: Normocephalic and atraumatic.      Right Ear: Tympanic membrane, ear canal and external ear normal.      Left Ear: Tympanic membrane, ear canal and external ear normal.      Nose: Nose normal.      Mouth/Throat:      Mouth: Mucous membranes are moist.      Pharynx: Oropharynx is clear.   Eyes:      Extraocular Movements: Extraocular movements intact.      Conjunctiva/sclera: Conjunctivae normal.      Pupils: Pupils are equal, round, and reactive to light.   Neck:      Vascular: No carotid bruit.      Trachea: Trachea normal.   Cardiovascular:      Rate and Rhythm: Normal rate and regular rhythm.      Pulses: Normal pulses.      Heart sounds: Normal heart sounds.      Comments: Generalized bilateral ankle and feet edema  Pulmonary:      Effort: Pulmonary effort is normal.      Breath sounds: Wheezing present.   Abdominal:      General: Abdomen is flat. Bowel sounds are normal. There is no distension.      Palpations: Abdomen is soft.      Tenderness: There is generalized abdominal tenderness.   Musculoskeletal:      Cervical back: Neck supple. Tenderness present. No rigidity.      Right lower leg: Edema present.      Left lower leg: Edema present.   Lymphadenopathy:      Cervical: No cervical adenopathy.   Skin:     General: Skin is warm and dry.      Capillary Refill: Capillary refill takes less than 2 seconds.   Neurological:      General: No focal deficit present.      Mental Status: He is alert and oriented to person, place, and time.   Psychiatric:         Mood and Affect: Mood normal.         Behavior: Behavior normal. Behavior is cooperative.          Procedures           ED Course  ED Course as of 04/16/22 2025   Sat Apr 16, 2022   1621 EKG interpreted by me: Sinus rhythm, normal rate, leftward axis, some nonspecific T wave changes, no acute ST elevations, this is an abnormal EKG [MP]   1659 WBC(!): 20.87 [TA]   1659 Creatinine(!): 1.77 [TA]   1659 eGFR(!): 40.3 [TA]   1700 XR Chest 1 View [TA]   1710 Procalcitonin(!): 1.72 [TA]      ED Course User Index  [MP] Ronny Cardona MD  [TA] Kofi Oropeza, APRN                                                 MDM  Number of Diagnoses or Management Options  Diagnosis management comments: Patient is a 71-year-old male here for generalized weakness, headache, and general malaise.  He is hemodynamically stable on his 2 L nasal cannula with an SPO2 of 98 to 99%.  He does not appear to be in acute distress, but does appear to be in pain due to his headache and light sensitivity.  Wheezing noted throughout on exam.  Short of air protocol placed by nursing staff and will provide patient with DuoNeb and Solu-Medrol.    Chest x-ray shows right-sided pneumonia, white blood count is 20.8, procalcitonin is 1.72, and he has his baseline kidney function.  Still maintaining his O2 saturation on his 2 L nasal cannula.  However, family is concerned about taking patient home due to his current generalized weakness and the wife being unable to assist him if necessary.  Dr. Willis was notified and agrees to admit the patient to observation telemetry.       Amount and/or Complexity of Data Reviewed  Clinical lab tests: reviewed  Tests in the radiology section of CPT®: reviewed  Discussion of test results with the performing providers: yes  Decide to obtain previous medical records or to obtain history from someone other than the patient: yes  Discuss the patient with other providers: yes  Independent visualization of images, tracings, or specimens: yes    Patient Progress  Patient progress: stable      Final diagnoses:    Pneumonia of right middle lobe due to infectious organism   General weakness       ED Disposition  ED Disposition     ED Disposition   Decision to Admit    Condition   --    Comment   Level of Care: Telemetry [5]   Diagnosis: Pneumonia of right middle lobe due to infectious organism [[id number]]   Admitting Physician: DARIUS PORTER [[id number]]   Attending Physician: DARIUS PORTER [[id number]]               No follow-up provider specified.       Medication List      No changes were made to your prescriptions during this visit.          Kofi Oropeza, APRN  04/16/22 2025

## 2022-04-17 LAB
ANION GAP SERPL CALCULATED.3IONS-SCNC: 9.4 MMOL/L (ref 5–15)
BACTERIA BLD CULT: ABNORMAL
BASOPHILS # BLD AUTO: 0.01 10*3/MM3 (ref 0–0.2)
BASOPHILS NFR BLD AUTO: 0.1 % (ref 0–1.5)
BUN SERPL-MCNC: 40 MG/DL (ref 8–23)
BUN/CREAT SERPL: 27.2 (ref 7–25)
CALCIUM SPEC-SCNC: 9 MG/DL (ref 8.6–10.5)
CHLORIDE SERPL-SCNC: 103 MMOL/L (ref 98–107)
CO2 SERPL-SCNC: 23.6 MMOL/L (ref 22–29)
CREAT SERPL-MCNC: 1.47 MG/DL (ref 0.76–1.27)
DEPRECATED RDW RBC AUTO: 46.5 FL (ref 37–54)
EGFRCR SERPLBLD CKD-EPI 2021: 50.4 ML/MIN/1.73
EOSINOPHIL # BLD AUTO: 0 10*3/MM3 (ref 0–0.4)
EOSINOPHIL NFR BLD AUTO: 0 % (ref 0.3–6.2)
ERYTHROCYTE [DISTWIDTH] IN BLOOD BY AUTOMATED COUNT: 13.6 % (ref 12.3–15.4)
GLUCOSE BLDC GLUCOMTR-MCNC: 171 MG/DL (ref 70–130)
GLUCOSE BLDC GLUCOMTR-MCNC: 184 MG/DL (ref 70–130)
GLUCOSE BLDC GLUCOMTR-MCNC: 190 MG/DL (ref 70–130)
GLUCOSE BLDC GLUCOMTR-MCNC: 249 MG/DL (ref 70–130)
GLUCOSE SERPL-MCNC: 255 MG/DL (ref 65–99)
HCT VFR BLD AUTO: 40.3 % (ref 37.5–51)
HGB BLD-MCNC: 12.8 G/DL (ref 13–17.7)
IMM GRANULOCYTES # BLD AUTO: 0.09 10*3/MM3 (ref 0–0.05)
IMM GRANULOCYTES NFR BLD AUTO: 0.6 % (ref 0–0.5)
LYMPHOCYTES # BLD AUTO: 0.62 10*3/MM3 (ref 0.7–3.1)
LYMPHOCYTES NFR BLD AUTO: 4 % (ref 19.6–45.3)
MCH RBC QN AUTO: 29 PG (ref 26.6–33)
MCHC RBC AUTO-ENTMCNC: 31.8 G/DL (ref 31.5–35.7)
MCV RBC AUTO: 91.4 FL (ref 79–97)
MONOCYTES # BLD AUTO: 0.25 10*3/MM3 (ref 0.1–0.9)
MONOCYTES NFR BLD AUTO: 1.6 % (ref 5–12)
NEUTROPHILS NFR BLD AUTO: 14.49 10*3/MM3 (ref 1.7–7)
NEUTROPHILS NFR BLD AUTO: 93.7 % (ref 42.7–76)
NRBC BLD AUTO-RTO: 0 /100 WBC (ref 0–0.2)
NT-PROBNP SERPL-MCNC: 692.8 PG/ML (ref 0–900)
PLATELET # BLD AUTO: 180 10*3/MM3 (ref 140–450)
PMV BLD AUTO: 11 FL (ref 6–12)
POTASSIUM SERPL-SCNC: 5.1 MMOL/L (ref 3.5–5.2)
RBC # BLD AUTO: 4.41 10*6/MM3 (ref 4.14–5.8)
SODIUM SERPL-SCNC: 136 MMOL/L (ref 136–145)
WBC NRBC COR # BLD: 15.46 10*3/MM3 (ref 3.4–10.8)

## 2022-04-17 PROCEDURE — 85025 COMPLETE CBC W/AUTO DIFF WBC: CPT | Performed by: FAMILY MEDICINE

## 2022-04-17 PROCEDURE — 51798 US URINE CAPACITY MEASURE: CPT

## 2022-04-17 PROCEDURE — 96366 THER/PROPH/DIAG IV INF ADDON: CPT

## 2022-04-17 PROCEDURE — 94664 DEMO&/EVAL PT USE INHALER: CPT

## 2022-04-17 PROCEDURE — 80048 BASIC METABOLIC PNL TOTAL CA: CPT | Performed by: FAMILY MEDICINE

## 2022-04-17 PROCEDURE — 99225 PR SBSQ OBSERVATION CARE/DAY 25 MINUTES: CPT | Performed by: NURSE PRACTITIONER

## 2022-04-17 PROCEDURE — 94799 UNLISTED PULMONARY SVC/PX: CPT

## 2022-04-17 PROCEDURE — 94761 N-INVAS EAR/PLS OXIMETRY MLT: CPT

## 2022-04-17 PROCEDURE — 94640 AIRWAY INHALATION TREATMENT: CPT

## 2022-04-17 PROCEDURE — 25010000002 CEFTRIAXONE SODIUM-DEXTROSE 1-3.74 GM-%(50ML) RECONSTITUTED SOLUTION: Performed by: FAMILY MEDICINE

## 2022-04-17 PROCEDURE — 97161 PT EVAL LOW COMPLEX 20 MIN: CPT

## 2022-04-17 PROCEDURE — 63710000001 INSULIN ASPART PER 5 UNITS: Performed by: FAMILY MEDICINE

## 2022-04-17 PROCEDURE — 83880 ASSAY OF NATRIURETIC PEPTIDE: CPT | Performed by: NURSE PRACTITIONER

## 2022-04-17 PROCEDURE — 82962 GLUCOSE BLOOD TEST: CPT

## 2022-04-17 PROCEDURE — G0378 HOSPITAL OBSERVATION PER HR: HCPCS

## 2022-04-17 PROCEDURE — 87040 BLOOD CULTURE FOR BACTERIA: CPT | Performed by: NURSE PRACTITIONER

## 2022-04-17 RX ADMIN — ALLOPURINOL 100 MG: 100 TABLET ORAL at 08:38

## 2022-04-17 RX ADMIN — CEFTRIAXONE 1 G: 1 INJECTION, SOLUTION INTRAVENOUS at 18:19

## 2022-04-17 RX ADMIN — AMIODARONE HYDROCHLORIDE 200 MG: 200 TABLET ORAL at 08:38

## 2022-04-17 RX ADMIN — PRAMIPEXOLE DIHYDROCHLORIDE 0.5 MG: 0.25 TABLET ORAL at 20:52

## 2022-04-17 RX ADMIN — PRAMIPEXOLE DIHYDROCHLORIDE 0.5 MG: 0.25 TABLET ORAL at 18:19

## 2022-04-17 RX ADMIN — INSULIN ASPART 2 UNITS: 100 INJECTION, SOLUTION INTRAVENOUS; SUBCUTANEOUS at 18:20

## 2022-04-17 RX ADMIN — SPIRONOLACTONE 50 MG: 25 TABLET ORAL at 08:38

## 2022-04-17 RX ADMIN — FUROSEMIDE 20 MG: 20 TABLET ORAL at 08:38

## 2022-04-17 RX ADMIN — CETIRIZINE HYDROCHLORIDE 10 MG: 10 TABLET, FILM COATED ORAL at 08:39

## 2022-04-17 RX ADMIN — PANTOPRAZOLE SODIUM 40 MG: 40 TABLET, DELAYED RELEASE ORAL at 06:54

## 2022-04-17 RX ADMIN — IPRATROPIUM BROMIDE AND ALBUTEROL SULFATE 3 ML: 2.5; .5 SOLUTION RESPIRATORY (INHALATION) at 06:50

## 2022-04-17 RX ADMIN — FLUTICASONE PROPIONATE 1 SPRAY: 50 SPRAY, METERED NASAL at 08:44

## 2022-04-17 RX ADMIN — BUDESONIDE AND FORMOTEROL FUMARATE DIHYDRATE 2 PUFF: 160; 4.5 AEROSOL RESPIRATORY (INHALATION) at 20:08

## 2022-04-17 RX ADMIN — BUDESONIDE AND FORMOTEROL FUMARATE DIHYDRATE 2 PUFF: 160; 4.5 AEROSOL RESPIRATORY (INHALATION) at 06:50

## 2022-04-17 RX ADMIN — AMLODIPINE BESYLATE 10 MG: 5 TABLET ORAL at 08:37

## 2022-04-17 RX ADMIN — GUAIFENESIN 600 MG: 600 TABLET ORAL at 08:39

## 2022-04-17 RX ADMIN — ASPIRIN 81 MG: 81 TABLET, CHEWABLE ORAL at 08:38

## 2022-04-17 RX ADMIN — IPRATROPIUM BROMIDE AND ALBUTEROL SULFATE 3 ML: 2.5; .5 SOLUTION RESPIRATORY (INHALATION) at 12:38

## 2022-04-17 RX ADMIN — APIXABAN 5 MG: 5 TABLET, FILM COATED ORAL at 20:52

## 2022-04-17 RX ADMIN — AZITHROMYCIN MONOHYDRATE 250 MG: 250 TABLET ORAL at 08:39

## 2022-04-17 RX ADMIN — INSULIN ASPART 4 UNITS: 100 INJECTION, SOLUTION INTRAVENOUS; SUBCUTANEOUS at 06:54

## 2022-04-17 RX ADMIN — ERYTHROMYCIN: 5 OINTMENT OPHTHALMIC at 11:39

## 2022-04-17 RX ADMIN — PRAMIPEXOLE DIHYDROCHLORIDE 0.5 MG: 0.25 TABLET ORAL at 08:38

## 2022-04-17 RX ADMIN — ATORVASTATIN CALCIUM 20 MG: 20 TABLET, FILM COATED ORAL at 20:52

## 2022-04-17 RX ADMIN — GABAPENTIN 400 MG: 400 CAPSULE ORAL at 08:39

## 2022-04-17 RX ADMIN — LISINOPRIL 40 MG: 20 TABLET ORAL at 08:38

## 2022-04-17 RX ADMIN — Medication 10 ML: at 08:39

## 2022-04-17 RX ADMIN — IPRATROPIUM BROMIDE AND ALBUTEROL SULFATE 3 ML: 2.5; .5 SOLUTION RESPIRATORY (INHALATION) at 20:09

## 2022-04-17 RX ADMIN — APIXABAN 5 MG: 5 TABLET, FILM COATED ORAL at 08:38

## 2022-04-17 RX ADMIN — METOPROLOL SUCCINATE 50 MG: 50 TABLET, EXTENDED RELEASE ORAL at 08:39

## 2022-04-17 RX ADMIN — INSULIN ASPART 2 UNITS: 100 INJECTION, SOLUTION INTRAVENOUS; SUBCUTANEOUS at 11:39

## 2022-04-17 RX ADMIN — GUAIFENESIN 600 MG: 600 TABLET ORAL at 20:52

## 2022-04-17 RX ADMIN — GABAPENTIN 400 MG: 400 CAPSULE ORAL at 18:19

## 2022-04-17 RX ADMIN — GABAPENTIN 400 MG: 400 CAPSULE ORAL at 20:52

## 2022-04-17 RX ADMIN — FLUOXETINE 20 MG: 20 CAPSULE ORAL at 08:38

## 2022-04-17 NOTE — H&P
[facility]   HISTORY AND PHYSICAL      Name:  Robe Bryant   Age:  71 y.o.  Sex:  male  :  1950  MRN:  [id number]   Visit Number:  [id number]  Admission Date:  2022  Date Of Service:  22  Primary Care Physician:  Raj Sullivan MD    Chief Complaint:     Generalized weakness    History Of Presenting Illness:      Patient is a 71 years old male with a past medical diabetes mellitus type 2, COPD on 2 L O2 at baseline, hyperlipidemia, hypertension, CKD, CAD, history of MI and stroke, sleep apnea and chronic anticoagulation with Eliquis who presented to the ER with multiple chief complaints including generalized weakness, worsening shortness of breath, headaches and fever.  Patient reporting history of 3 days of intermittent bifrontal headaches and feeling fatigued and generally weak.  Patient reporting worsening chronic cough with sputum production over the past few days.  Patient also having bilateral eye redness, clear discharge and light sensitivity however denies any vision changes.  Patient is on 2 L of oxygen at home at baseline with chronic COPD.  Patient started having fever today and his temperature was 100.5 on EMS arrival.  Per his family report, patient has been hard to wake up at home however was awake and alert on EMS arrival.  Patient denies any nausea, vomiting, chest pain or abdominal pain. Wife at bedside.     On ER evaluation, vitals were stable and was afebrile on 2 L NC which is his baseline oxygen requirement.  Labs were significant for troponin of 0.018, glucose 225, creatinine 1.77 (baseline 1.45), BUN 40, pro-Royce 1.72, WBC 20.8.  Upper respiratory panel negative.  Chest x-ray with right lung infiltrates consistent with pneumonia per my read.  CT head with No acute intracranial hemorrhage or large acute cortical infarct.  Patient received azithromycin and Rocephin along with Solu-Medrol 125 mg and half liter of normal saline bolus.   Patient was also treated with erythromycin ophthalmic ointment.  Hospitalist was consulted for admission, further evaluation treatment.    Review Of Systems:    All systems were reviewed and negative except as mentioned in history of presenting illness, assessment and plan.    Past Medical History: Patient  has a past medical history of Anxiety and depression, Arthritis, Backache, Benign prostatic hyperplasia, Bladder trauma, Cervicalgia, Chronic kidney disease, Coronary artery disease, Diabetes mellitus (HCC), Emphysema of lung (HCC), Erectile dysfunction, Eye exam, routine (2015), FH: colonic polyps, Gout, History of echocardiogram (09/15/2014), History of tobacco use, Hyperlipidemia, Hypertension, Migraine, Myocardial infarction (HCC), Prostate cancer (HCC), Restless leg syndrome, Sleep apnea, Stroke (HCC), Syncope (4/28/2017), and Warfarin anticoagulation (9/14/2016).    Past Surgical History: Patient  has a past surgical history that includes Colonoscopy (2004); Bladder surgery; Cataract extraction; Hernia repair; Cardiac pacemaker placement (2012); Prostate surgery; Cardiac catheterization (03/15/2013); Colonoscopy (2015); Cardiac catheterization (Left, 9/30/2019); Pacemaker Implantation; and Cardiac electrophysiology procedure (N/A, 5/10/2021).    Social History: Patient  reports that he quit smoking about 20 years ago. His smoking use included cigarettes. He has a 30.00 pack-year smoking history. He has never used smokeless tobacco. He reports that he does not drink alcohol and does not use drugs.    Family History: Patient's family history includes Cancer in his father and mother; Diabetes in his mother; Heart disease in his mother; Hypertension in his mother; Lung cancer in his father; Stomach cancer in his father and mother; Stroke in his mother.    Allergies:      Patient has no known allergies.    Home Medications:    Prior to Admission Medications     Prescriptions Last Dose Informant Patient Reported?  Taking?    albuterol sulfate  (90 Base) MCG/ACT inhaler   No No    Inhale 2 puffs Every 4 (Four) Hours As Needed for Wheezing or Shortness of Air.    allopurinol (Zyloprim) 100 MG tablet   No No    Take 1 tablet by mouth Daily.    amiodarone (PACERONE) 200 MG tablet   No No    Take 1 tablet by mouth Daily.    amLODIPine (NORVASC) 10 MG tablet   No No    Take 1 tablet by mouth Daily.    apixaban (ELIQUIS) 5 MG tablet tablet   Yes No    Take 5 mg by mouth 2 (Two) Times a Day.    aspirin 81 MG chewable tablet   Yes No    Chew 81 mg Daily.    atorvastatin (LIPITOR) 20 MG tablet   No No    Take 1 tablet by mouth Every Night.    baclofen (LIORESAL) 10 MG tablet   No No    Take 1 tablet by mouth 3 (Three) Times a Day As Needed for Muscle Spasms.    Blood Glucose Monitoring Suppl (TRUE METRIX AIR GLUCOSE METER) w/Device kit   No No    1 each by In Vitro route 2 (two) times a day. E11.9    Budeson-Glycopyrrol-Formoterol (Breztri Aerosphere) 160-9-4.8 MCG/ACT aerosol inhaler   No No    Inhale 2 puffs 2 (Two) Times a Day.    cetirizine (zyrTEC) 10 MG tablet   No No    Take 1 tablet by mouth Daily.    ciprofloxacin (CIPRO) 500 MG tablet   No No    Take 1 tablet by mouth 2 (Two) Times a Day.    clotrimazole-betamethasone (Lotrisone) 1-0.05 % cream   No No    Apply to affected area 1 times daily    Coenzyme Q10 (Co Q-10) 100 MG capsule   No No    Take 200 mg by mouth Daily.    cyclobenzaprine (FLEXERIL) 10 MG tablet   No No    Take 1 tablet by mouth 3 (Three) Times a Day As Needed for Muscle Spasms.    diclofenac (VOLTAREN) 1 % gel gel   No No    Apply 4 g topically to the appropriate area as directed 4 (Four) Times a Day As Needed (pain).    doxycycline (VIBRAMYCIN) 100 MG capsule   No No    Take 1 capsule by mouth 2 (Two) Times a Day.    FLUoxetine (PROzac) 20 MG capsule   No No    Take 1 capsule by mouth Daily.    fluticasone (FLONASE) 50 MCG/ACT nasal spray   No No    USE 2 SPRAYS IN EACH NOSTRIL EVERY DAY AS DIRECTED   BY  MD    furosemide (LASIX) 20 MG tablet   No No    TAKE 1 TABLET BY MOUTH EVERY MORNING, MAY TAKE SECOND DOSE AS NEEDED FOR INCREASED SWELLING    furosemide (LASIX) 20 MG tablet   No No    Take 1 tablet by mouth Daily. This is in addition to your normal dose, this means you should be taking it twice daily for the next 10 days.    gabapentin (NEURONTIN) 400 MG capsule   Yes No    Take 400 mg by mouth 3 (Three) Times a Day.    glipizide (GLUCOTROL) 5 MG tablet   No No    TAKE 1 TABLET TWICE DAILY BEFORE MEALS    glucose blood (True Metrix Blood Glucose Test) test strip   No No    Daily testing E11.9    glucose monitor monitoring kit   No No    1 each Daily. E11.9    HYDROcodone-acetaminophen (NORCO) 5-325 MG per tablet   No No    Take 1 tablet by mouth Every 6 (Six) Hours As Needed for Severe Pain .    ipratropium-albuterol (DUO-NEB) 0.5-2.5 mg/3 ml nebulizer   No No    Take 3 mL by nebulization 4 (Four) Times a Day.    Lancet Device misc   No No    1 each Daily. E11.9    lidocaine (LIDODERM) 5 %   No No    Place 1 patch on the skin as directed by provider Daily. Remove & Discard patch within 12 hours or as directed by MD    lisinopril (PRINIVIL,ZESTRIL) 40 MG tablet   No No    Take 1 tablet by mouth Daily.    Melatonin 5 MG capsule   No No    Take 1 each by mouth Every Night. For sleep    metoprolol succinate XL (Toprol XL) 50 MG 24 hr tablet   No No    Take 1 tablet by mouth Daily.    Misc. Devices misc   No No    Bipap tubing.    Multiple Vitamin tablet   Yes No    Take 1 tablet by mouth daily.    Nebulizer misc   No No    1 each Every 4 (Four) Hours As Needed (shortness of breath).    nitroglycerin (Nitrostat) 0.4 MG SL tablet   No No    Place 1 tablet under the tongue Every 5 (Five) Minutes As Needed for Chest Pain.    O2 (OXYGEN)  Self Yes No    Inhale 2 L/min Continuous As Needed (With activity).    omeprazole (priLOSEC) 40 MG capsule   No No    Take 1 capsule by mouth Daily.    ondansetron ODT (Zofran ODT) 4  "MG disintegrating tablet   No No    Place 1 tablet on the tongue Every 8 (Eight) Hours As Needed for Nausea or Vomiting.    potassium chloride (K-DUR,KLOR-CON) 10 MEQ CR tablet   No No    Take 1 tablet by mouth 2 (Two) Times a Day.    pramipexole (MIRAPEX) 0.5 MG tablet   No No    Take 1 tablet by mouth 3 (Three) Times a Day.    spironolactone (ALDACTONE) 50 MG tablet   No No    TAKE 1 TABLET EVERY DAY    TRUEplus Lancets 33G misc   No No    1 each by Other route Daily. E11.9    Vitamin D, Cholecalciferol, 50 MCG (2000 UT) capsule   No No    Take 1 capsule by mouth Daily.        ED Medications:    Medications   sodium chloride 0.9 % flush 10 mL (has no administration in time range)   ipratropium-albuterol (DUO-NEB) nebulizer solution 3 mL (0 mL Nebulization Hold 4/16/22 1642)   sodium chloride 0.9 % bolus 500 mL (0 mL Intravenous Stopped 4/16/22 1806)   methylPREDNISolone sodium succinate (SOLU-Medrol) injection 125 mg (125 mg Intravenous Given 4/16/22 1658)   acetaminophen (TYLENOL) tablet 500 mg (500 mg Oral Given 4/16/22 1658)   azithromycin (ZITHROMAX) 500 mg 0.9% NaCl (Add-vantage) 250 mL (0 mg Intravenous Stopped 4/16/22 1909)   cefTRIAXone (ROCEPHIN) IVPB 1 g/50ml dextrose (premix) (0 g Intravenous Stopped 4/16/22 1802)   erythromycin (ROMYCIN) ophthalmic ointment 1 application (1 application Both Eyes Given 4/16/22 1739)     Vital Signs:  Temp:  [98.4 °F (36.9 °C)-98.9 °F (37.2 °C)] 98.4 °F (36.9 °C)  Heart Rate:  [69-80] 69  Resp:  [20] 20  BP: (116-139)/(54-73) 116/58        04/16/22  1608 04/16/22 2128   Weight: 109 kg (240 lb) 118 kg (260 lb 9.3 oz)     Body mass index is 43.36 kg/m².    Physical Exam:     Most recent vital Signs: /58 (BP Location: Left arm, Patient Position: Lying)   Pulse 69   Temp 98.4 °F (36.9 °C) (Oral)   Resp 20   Ht 165.1 cm (65\")   Wt 118 kg (260 lb 9.3 oz)   SpO2 94%   BMI 43.36 kg/m²     Physical Exam  Vitals and nursing note reviewed.   Constitutional:       " General: He is not in acute distress.     Appearance: He is obese.      Comments: Chronically ill-appearing.   HENT:      Head: Normocephalic and atraumatic.      Right Ear: External ear normal.      Left Ear: External ear normal.      Nose: Nose normal.      Mouth/Throat:      Mouth: Mucous membranes are moist.   Eyes:      General: No visual field deficit.        Right eye: Discharge present.         Left eye: Discharge present.     Extraocular Movements: Extraocular movements intact.      Conjunctiva/sclera:      Right eye: Right conjunctiva is injected.      Left eye: Left conjunctiva is injected.      Pupils: Pupils are equal, round, and reactive to light.   Cardiovascular:      Rate and Rhythm: Normal rate and regular rhythm.      Pulses: Normal pulses.      Heart sounds: Normal heart sounds.   Pulmonary:      Effort: Pulmonary effort is normal. Prolonged expiration present. No tachypnea.      Breath sounds: Decreased air movement present.      Comments: Bibasilar crackles heard R>L.  Abdominal:      General: Bowel sounds are normal. There is no distension.      Palpations: Abdomen is soft.      Tenderness: There is no abdominal tenderness. There is no guarding or rebound.   Musculoskeletal:         General: Normal range of motion.      Cervical back: Normal range of motion and neck supple.      Right lower leg: No tenderness. 1+ Edema present.      Left lower leg: No tenderness. 1+ Edema present.   Skin:     General: Skin is warm and dry.      Findings: No rash.   Neurological:      General: No focal deficit present.      Mental Status: He is alert and oriented to person, place, and time. Mental status is at baseline.      Cranial Nerves: No cranial nerve deficit.      Sensory: No sensory deficit.      Motor: No weakness.   Psychiatric:         Mood and Affect: Mood normal.         Behavior: Behavior normal.         Thought Content: Thought content normal.         Laboratory data:    I have reviewed the labs  done in the emergency room.    Results from last 7 days   Lab Units 04/16/22  1616   SODIUM mmol/L 138   POTASSIUM mmol/L 5.2   CHLORIDE mmol/L 102   CO2 mmol/L 26.6   BUN mg/dL 40*   CREATININE mg/dL 1.77*   CALCIUM mg/dL 9.2   BILIRUBIN mg/dL 0.7   ALK PHOS U/L 63   ALT (SGPT) U/L 24   AST (SGOT) U/L 21   GLUCOSE mg/dL 225*     Results from last 7 days   Lab Units 04/16/22  1616   WBC 10*3/mm3 20.87*   HEMOGLOBIN g/dL 12.9*   HEMATOCRIT % 39.4   PLATELETS 10*3/mm3 182         Results from last 7 days   Lab Units 04/16/22  1616   TROPONIN T ng/mL 0.018                           Invalid input(s): USDES,  BLOODU, NITRITITE, BACT, EP    Pain Management Panel     Pain Management Panel Latest Ref Rng & Units 10/25/2021 9/13/2016    CREATININE UR Not Estab. mg/dL 125.1 56.7          EKG:      Sinus rhythm, heart rate 79, nonspecific ST/T wave changes.    Radiology:    CT Head Without Contrast    Result Date: 4/16/2022  FINAL REPORT TECHNIQUE: Multiple axial CT images were performed from the foramen magnum to the vertex. This study was performed with techniques to keep radiation doses as low as reasonably achievable (ALARA). Individualized dose reduction techniques using automated exposure control or adjustment of mA and/or kV according to the patient's size were employed. CLINICAL HISTORY: Headache FINDINGS: No acute intracranial hemorrhage or large acute cortical infarct.  The brain volume is normal for patient's age. Ventricles are normal in size and configuration.  No midline shift.  The basal cisterns are patent.  No skull fracture.  The visualized paranasal sinuses and mastoid air cells are clear.     No acute intracranial hemorrhage or large acute cortical infarct. Authenticated by Glenn Ferro MD on 04/16/2022 05:31:11 PM      Assessment:    1. Community-acquired pneumonia, right lung, POA  2. Chronic COPD without exacerbation, POA  3. Bilateral conjunctivitis, POA  4. Diabetes mellitus type  2  5. Hyperlipidemia  6. Hypertension  7. CKD  8. CAD  9. History of MI and stroke  10. Sleep apnea  11. Chronic anticoagulation with Eliquis     Plan:    Patient is admitted for observation on telemetry and further evaluation and treatment.    Community-acquired pneumonia, right lung  Continue antibiotics with Rocephin and azithromycin.  Duo nebs and Mucinex ordered.  Continue oxygen to keep saturation above 90%, patient is currently on baseline oxygen requirements.  We will hold on further steroids as patient has no wheezing.  Monitor with a.m. labs.    Bilateral conjunctivitis  Erythromycin ordered.    Diabetes mellitus type 2  Sliding scale insulin  Hold oral hypoglycemics  Hemoglobin A1c ordered.    Hyperlipidemia  On statin    Hypertension  Continue home meds    CKD  Continue to monitor creatinine    Risk Assessment: Moderate to high  DVT Prophylaxis: On Eliquis  Code Status: Full  Diet:     Advance Care Planning   ACP discussion was held with the patient during this visit. Patient does not have an advance directive, information provided.           Jessenia Willis MD  04/16/22  21:45 EDT    Dictated utilizing Dragon dictation.

## 2022-04-17 NOTE — PLAN OF CARE
Goal Outcome Evaluation:  Plan of Care Reviewed With: patient        Progress: no change  Outcome Evaluation: VSS,  NEW ADMISSION,   REC'D ANTIBIOTICS IN ED,  CURRENTLY USING O2 AT 3 LITERS TO MAINTAIN SAT >905

## 2022-04-17 NOTE — PLAN OF CARE
Goal Outcome Evaluation:      No acute events this shift. VSS this shift. Patient had no issue voiding on his own this shift. Family at bedside.      Progress: improving

## 2022-04-17 NOTE — THERAPY EVALUATION
Patient Name: Robe Bryant  : 1950    MRN: [id number]                              Today's Date: 2022       Admit Date: 2022    Visit Dx:     ICD-10-CM ICD-9-CM   1. Pneumonia of right middle lobe due to infectious organism  J18.9 486   2. General weakness  R53.1 780.79     Patient Active Problem List   Diagnosis   • Pacemaker   • Neck pain   • Chronic low back pain   • Chronic kidney disease, stage III (moderate) (CMS/Formerly [facility])   • Essential hypertension   • Seborrheic dermatitis   • Benign prostatic hyperplasia   • Paroxysmal atrial fibrillation (Formerly [facility])   • Hypercholesterolemia   • Diastolic heart failure (Formerly [facility])   • Coronary artery disease involving native coronary artery of native heart with angina pectoris (Formerly [facility])   • Gastroesophageal reflux disease   • Chronic shoulder pain   • Obstructive sleep apnea of adult   • Dysphagia   • Type 2 diabetes mellitus, without long-term current use of insulin (Formerly [facility])   • Chronic chest pain   • Restless leg syndrome   • Osteoarthritis of multiple joints   • Multilevel degenerative disc disease   • Moderate episode of recurrent major depressive disorder (Formerly [facility])   • Chronic pain syndrome   • DDD (degenerative disc disease), cervical   • BPH (benign prostatic hypertrophy) with urinary retention   • Morbid obesity (Formerly [facility])   • Unstable angina (Formerly [facility])   • Panlobular emphysema (Formerly [facility])   • Leg cramps   • Chronic pain of both knees   • Atrial fibrillation with RVR (Formerly [facility])   • Pacemaker at end of battery life   • Acute on chronic congestive heart failure (Formerly [facility])   • RMSF ([place] spotted fever)   • Elevated uric acid in blood   • Arthralgia   • Pneumonia of right middle lobe due to infectious organism   • Chronic respiratory failure with hypoxia (Formerly [facility])     Past Medical History:   Diagnosis Date   • Anxiety and depression    • Arthritis    • Backache     20 years   • Benign prostatic hyperplasia    • Bladder trauma    • Cervicalgia    • Chronic kidney disease     Cr up to 2.1   •  Coronary artery disease    • Diabetes mellitus (HCC)     15 years--   • Emphysema of lung (HCC)    • Erectile dysfunction    • Eye exam, routine 2015   • FH: colonic polyps    • Gout     for 10 years--   • History of echocardiogram 09/15/2014   • History of tobacco use     with cessation x7 years   • Hyperlipidemia    • Hypertension    • Migraine    • Myocardial infarction (HCC)     h/o coronary artery stenting--   • Prostate cancer ([facility])    • Restless leg syndrome     20 years   • Sleep apnea     12 years; uses a Bi-Pap   • Stroke ([facility])    • Syncope 4/28/2017   • Warfarin anticoagulation 9/14/2016     Past Surgical History:   Procedure Laterality Date   • BLADDER SURGERY     • CARDIAC CATHETERIZATION  03/15/2013    revealing widely patent stents of the left circumflex and ramus intermedius coronary arteries, no significant disease is seen in any territory   • CARDIAC CATHETERIZATION Left 9/30/2019    Procedure: Left Heart Cath;  Surgeon: Arelis Tucker MD;  Location:  [facility];  Service: Cardiology   • CARDIAC ELECTROPHYSIOLOGY PROCEDURE N/A 5/10/2021    Procedure: PPM battery change;  Surgeon: Arelis Tucker MD;  Location:  [facility] LOCATION;  Service: Cardiology;  Laterality: N/A;   • CARDIAC PACEMAKER PLACEMENT  2012   • CATARACT EXTRACTION     • COLONOSCOPY  2004    No family history of colon cancer.     • COLONOSCOPY  2015   • HERNIA REPAIR      Type unknown   • PACEMAKER IMPLANTATION     • PROSTATE SURGERY        General Information     Row Name 04/17/22 1013          Physical Therapy Time and Intention    Document Type evaluation  -TW     Mode of Treatment physical therapy  -TW     Row Name 04/17/22 1013          General Information    Patient Profile Reviewed yes  -TW     Prior Level of Function independent:;all household mobility;community mobility  pt uses a st cane for all mobility and 2 L home NC O2  -TW     Existing Precautions/Restrictions fall;oxygen  therapy device and L/min  -TW     Barriers to Rehab none identified  -TW     Row Name 04/17/22 1013          Living Environment    People in Home spouse  -TW     Row Name 04/17/22 1013          Home Main Entrance    Number of Stairs, Main Entrance other (see comments)  -TW     Stair Railings, Main Entrance none  -TW     Row Name 04/17/22 1013          Stairs Within Home, Primary    Stairs, Within Home, Primary One step onto porch walk across porch then one step into the home.  -TW     Number of Stairs, Within Home, Primary none  -TW     Row Name 04/17/22 1013          Cognition    Orientation Status (Cognition) oriented x 4  -TW     Row Name 04/17/22 1013          Safety Issues, Functional Mobility    Safety Issues Affecting Function (Mobility) safety precaution awareness;safety precautions follow-through/compliance  -TW     Impairments Affecting Function (Mobility) balance;shortness of breath;endurance/activity tolerance  -TW     Comment, Safety Issues/Impairments (Mobility) Pt education about call for assist prior to moving about room and pt verbalized understanding.  -TW           User Key  (r) = Recorded By, (t) = Taken By, (c) = Cosigned By    Initials Name Provider Type    TW Elba Gonsalves, PT Physical Therapist               Mobility     Row Name 04/17/22 1013          Bed Mobility    Bed Mobility supine-sit  -TW     Supine-Sit Wise (Bed Mobility) supervision  -TW     Assistive Device (Bed Mobility) head of bed elevated;bed rails  -TW     Comment, (Bed Mobility) Pt wasa able to come to EOB by himself with extended time, using rail and with HOB raised.  -TW     Row Name 04/17/22 1013          Transfers    Comment, (Transfers) Cues to watch for O2 tubing  -TW     Row Name 04/17/22 1013          Bed-Chair Transfer    Bed-Chair Wise (Transfers) verbal cues;contact guard  -TW     Row Name 04/17/22 1013          Sit-Stand Transfer    Sit-Stand Wise (Transfers) standby assist  -TW      "Assistive Device (Sit-Stand Transfers) cane, straight  -TW     Row Name 04/17/22 1013          Gait/Stairs (Locomotion)    [place] Level (Gait) contact guard;verbal cues  -TW     Assistive Device (Gait) cane, straight  -TW     Distance in Feet (Gait) 16 ft  -TW     Deviations/Abnormal Patterns (Gait) stride length decreased;base of support, wide;gait speed decreased  -TW     Bilateral Gait Deviations heel strike decreased  -TW     Comment, (Gait/Stairs) Pt does verbalize that he feels \"weak\" when ambulating.  -TW           User Key  (r) = Recorded By, (t) = Taken By, (c) = Cosigned By    Initials Name Provider Type     Elba Gonsalves, PT Physical Therapist               Obj/Interventions     Row Name 04/17/22 1013          Range of Motion Comprehensive    Comment, General Range of Motion BLE grossly WFLs  -TW     Row Name 04/17/22 1013          Strength Comprehensive (MMT)    Comment, General Manual Muscle Testing (MMT) Assessment BLE grossly WFLs  -TW     Row Name 04/17/22 1013          Balance    Balance Assessment sitting static balance;sitting dynamic balance;standing static balance;standing dynamic balance  -TW     Static Sitting Balance independent  -TW     Dynamic Sitting Balance independent  -TW     Position, Sitting Balance unsupported;sitting edge of bed  -TW     Static Standing Balance contact guard  -TW     Dynamic Standing Balance contact guard  -TW     Position/Device Used, Standing Balance cane, straight  -TW           User Key  (r) = Recorded By, (t) = Taken By, (c) = Cosigned By    Initials Name Provider Type    TW Elba Gonsalves, PT Physical Therapist               Goals/Plan     Row Name 04/17/22 1013          Bed Mobility Goal 1 (PT)    Activity/Assistive Device (Bed Mobility Goal 1, PT) bed mobility activities, all  -TW     [place] Level/Cues Needed (Bed Mobility Goal 1, PT) independent  -TW     Time Frame (Bed Mobility Goal 1, PT) short term goal (STG);3 days  -TW     " Progress/Outcomes (Bed Mobility Goal 1, PT) goal ongoing  -TW     Row Name 04/17/22 1013          Transfer Goal 1 (PT)    Activity/Assistive Device (Transfer Goal 1, PT) sit-to-stand/stand-to-sit;bed-to-chair/chair-to-bed;toilet;other (see comments)  -TW     [place] Level/Cues Needed (Transfer Goal 1, PT) modified independence  -TW     Time Frame (Transfer Goal 1, PT) 1 week  -TW     Strategies/Barriers (Transfers Goal 1, PT) st cane  -TW     Progress/Outcome (Transfer Goal 1, PT) goal ongoing  -TW     Row Name 04/17/22 1013          Gait Training Goal 1 (PT)    Activity/Assistive Device (Gait Training Goal 1, PT) gait (walking locomotion);cane, straight;decrease fall risk;increase endurance/gait distance;increase energy conservation  -TW     [place] Level (Gait Training Goal 1, PT) supervision required  -TW     Distance (Gait Training Goal 1, PT) 200 ft  -TW     Time Frame (Gait Training Goal 1, PT) 1 week  -TW     Strategies/Barriers (Gait Training Goal 1, PT) with O2 sat above 90%  -TW     Progress/Outcome (Gait Training Goal 1, PT) goal ongoing  -TW     Row Name 04/17/22 1013          ROM Goal 1 (PT)    Time Frame (ROM Goal 1, PT) 1 week  -TW     Progress/Outcome (ROM Goal 1, PT) goal ongoing  -TW     Row Name 04/17/22 1013          Therapy Assessment/Plan (PT)    Planned Therapy Interventions (PT) balance training;bed mobility training;gait training;patient/family education;transfer training  -TW           User Key  (r) = Recorded By, (t) = Taken By, (c) = Cosigned By    Initials Name Provider Type    TW Elba Gonsalves, PT Physical Therapist               Clinical Impression     Row Name 04/17/22 1013          Pain    Pretreatment Pain Rating 0/10 - no pain  -TW     Posttreatment Pain Rating 0/10 - no pain  -TW     Row Name 04/17/22 1013          Plan of Care Review    Plan of Care Reviewed With patient  -TW     Outcome Evaluation PT evaluation completed with pt oriented x 4 and cooperative throughout.  "Pt was able to come to EOB himself with extended time using bed rail. Pt needed SBA for sit to stand and CG for transfers and gait using st cane. Pt ambulated 16 ft with c/o \"feeling weak\" during ambulation. O2 sats remained above 92% throughout while pt on 2 L NC O2. Pt presents with deficits in endurance and balance. Pt is expected to improve his functional mobility with continued PT services prior to d/c.  -TW     Row Name 04/17/22 1013          Therapy Assessment/Plan (PT)    Patient/Family Therapy Goals Statement (PT) To return home with his wife. Pt's wife in w/c and not able to assist pt physically.  -TW     Rehab Potential (PT) good, to achieve stated therapy goals  -TW     Criteria for Skilled Interventions Met (PT) yes;meets criteria  -TW     Therapy Frequency (PT) daily  -TW     Predicted Duration of Therapy Intervention (PT) 1 wk  -TW     Row Name 04/17/22 1013          Vital Signs    Pretreatment Heart Rate (beats/min) 72  -TW     Intratreatment Heart Rate (beats/min) 76  -TW     Posttreatment Heart Rate (beats/min) 68  -TW     Pre SpO2 (%) 96  -TW     O2 Delivery Pre Treatment supplemental O2  2 L  -TW     Intra SpO2 (%) 92  -TW     O2 Delivery Intra Treatment supplemental O2  2 L  -TW     Post SpO2 (%) 94  -TW     O2 Delivery Post Treatment supplemental O2  2 L  -TW     Pre Patient Position Supine  -TW     Intra Patient Position Standing  -TW     Post Patient Position Sitting  -TW     Row Name 04/17/22 1013          Positioning and Restraints    Pre-Treatment Position in bed  -TW     Post Treatment Position chair  -TW     In Chair notified nsg;reclined;call light within reach;encouraged to call for assist;with other staff;legs elevated  -TW           User Key  (r) = Recorded By, (t) = Taken By, (c) = Cosigned By    Initials Name Provider Type    TW Elba Gonsalves, PT Physical Therapist               Outcome Measures     Row Name 04/17/22 1013          How much help from another person do you currently " need...    Turning from your back to your side while in flat bed without using bedrails? 4  -TW     Moving from lying on back to sitting on the side of a flat bed without bedrails? 3  -TW     Moving to and from a bed to a chair (including a wheelchair)? 3  -TW     Standing up from a chair using your arms (e.g., wheelchair, bedside chair)? 4  -TW     Climbing 3-5 steps with a railing? 3  -TW     To walk in hospital room? 3  -TW     AM-PAC 6 Clicks Score (PT) 20  -TW     Row Name 04/17/22 1013          Functional Assessment    Outcome Measure Options AM-PAC 6 Clicks Basic Mobility (PT)  -TW           User Key  (r) = Recorded By, (t) = Taken By, (c) = Cosigned By    Initials Name Provider Type    TW Elba Gonsalves, PT Physical Therapist                             Physical Therapy Education                 Title: PT OT SLP Therapies (In Progress)     Topic: Physical Therapy (In Progress)     Point: Mobility training (Done)     Learning Progress Summary           Patient Acceptance, E,D, VU,DU by  at 4/17/2022 1013    Comment: Pt education for hospital fall precautions and calling for assist prior to getting up alone and pt verbalized understanding. Pt education for safety during gait due to O2 tubing.                   Point: Home exercise program (Not Started)     Learner Progress:  Not documented in this visit.          Point: Body mechanics (Not Started)     Learner Progress:  Not documented in this visit.          Point: Precautions (Done)     Learning Progress Summary           Patient Acceptance, E,D, VU,DU by TW at 4/17/2022 1013    Comment: Pt education for hospital fall precautions and calling for assist prior to getting up alone and pt verbalized understanding. Pt education for safety during gait due to O2 tubing.                               User Key     Initials Effective Dates Name Provider Type Discipline     06/16/21 -  Elba Gonsalves, PT Physical Therapist PT              PT Recommendation and  "Plan  Planned Therapy Interventions (PT): balance training, bed mobility training, gait training, patient/family education, transfer training  Plan of Care Reviewed With: patient  Outcome Evaluation: PT evaluation completed with pt oriented x 4 and cooperative throughout. Pt was able to come to EOB himself with extended time using bed rail. Pt needed SBA for sit to stand and CG for transfers and gait using st cane. Pt ambulated 16 ft with c/o \"feeling weak\" during ambulation. O2 sats remained above 92% throughout while pt on 2 L NC O2. Pt presents with deficits in endurance and balance. Pt is expected to improve his functional mobility with continued PT services prior to d/c.     Time Calculation:    PT Charges     Row Name 04/17/22 1013             Time Calculation    Stop Time 1013  -TW      PT Received On 04/17/22 -TW      PT Goal Re-Cert Due Date 04/24/22 -TW            User Key  (r) = Recorded By, (t) = Taken By, (c) = Cosigned By    Initials Name Provider Type    TW Elba Gonsalves PT Physical Therapist              Therapy Charges for Today     Code Description Service Date Service Provider Modifiers Qty    [id number]  PT EVAL LOW COMPLEXITY 3 4/17/2022 Elba Gonsalves PT GP 1          PT G-Codes  Outcome Measure Options: AM-PAC 6 Clicks Basic Mobility (PT)  AM-PAC 6 Clicks Score (PT): 20    Elba Gonsalves PT  4/17/2022    "

## 2022-04-17 NOTE — PLAN OF CARE
"Goal Outcome Evaluation:  Plan of Care Reviewed With: patient           Outcome Evaluation: PT evaluation completed with pt oriented x 4 and cooperative throughout. Pt was able to come to EOB himself with extended time using bed rail. Pt needed SBA for sit to stand and CG for transfers and gait using st cane. Pt ambulated 16 ft with c/o \"feeling weak\" during ambulation. O2 sats remained above 92% throughout while pt on 2 L NC O2. Pt presents with deficits in endurance and balance. Pt is expected to improve his functional mobility with continued PT services prior to d/c.  "
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
## 2022-04-17 NOTE — PROGRESS NOTES
Pharmacokinetic Consult - Azithromycin and Ceftriaxone Dosing    Robe Bryant is a 71 y.o. male who has been consulted to dose azithromycin and ceftriaxone for pneumonia.    Current Antimicrobial Therapy    Anti-Infectives (From admission, onward)      Ordered     Dose/Rate Route Frequency Start Stop    04/16/22 2213  cefTRIAXone (ROCEPHIN) IVPB 1 g/50ml dextrose (premix)        Ordering Provider: Jessenia Willis MD    1 g  100 mL/hr over 30 Minutes Intravenous Every 24 Hours 04/17/22 1700 04/22/22 1659    04/16/22 2213  azithromycin (ZITHROMAX) tablet 250 mg        Ordering Provider: Jessenia Willis MD    250 mg Oral Every 24 Hours Scheduled 04/17/22 0900 04/21/22 0859    04/16/22 1659  cefTRIAXone (ROCEPHIN) IVPB 1 g/50ml dextrose (premix)        Ordering Provider: Kofi Oropeza, APRN    1 g  100 mL/hr over 30 Minutes Intravenous Once 04/16/22 1701 04/16/22 1802    04/16/22 1659  azithromycin (ZITHROMAX) 500 mg 0.9% NaCl (Add-vantage) 250 mL        Ordering Provider: Kofi Oropeza APRN    500 mg  over 60 Minutes Intravenous Once 04/16/22 1700 04/16/22 1909            Allergies    Patient has no known allergies.    Relevant clinical data and objective history reviewed:    Creatinine   Date Value Ref Range Status   04/16/2022 1.77 (H) 0.76 - 1.27 mg/dL Final     Estimated Creatinine Clearance: 44.9 mL/min (A) (by C-G formula based on SCr of 1.77 mg/dL (H)).    I/O last 3 completed shifts:  In: 550 [IV Piggyback:550]  Out: -     Patient weight: 118 kg (260 lb 9.3 oz)    Asessment/Plan    Initiate ceftriaxone 1 g  IV every 24 hours and azithromycin 250 mg by mouth once daily  Pharmacy will monitor Mr. Bryant's renal function and clinical status and adjust the ceftriaxone and azithromycin dose and/or frequency as needed.    Thank you,    Corinne Mata [facility], PharmD  4/17/2022  00:03 EDT

## 2022-04-18 ENCOUNTER — READMISSION MANAGEMENT (OUTPATIENT)
Dept: CALL CENTER | Facility: HOSPITAL | Age: 72
End: 2022-04-18

## 2022-04-18 VITALS
HEIGHT: 65 IN | DIASTOLIC BLOOD PRESSURE: 76 MMHG | BODY MASS INDEX: 43.16 KG/M2 | WEIGHT: 259.04 LBS | OXYGEN SATURATION: 94 % | HEART RATE: 67 BPM | RESPIRATION RATE: 17 BRPM | TEMPERATURE: 98 F | SYSTOLIC BLOOD PRESSURE: 128 MMHG

## 2022-04-18 LAB
ANION GAP SERPL CALCULATED.3IONS-SCNC: 4.6 MMOL/L (ref 5–15)
BACTERIA SPEC AEROBE CULT: ABNORMAL
BUN SERPL-MCNC: 37 MG/DL (ref 8–23)
BUN/CREAT SERPL: 23 (ref 7–25)
CALCIUM SPEC-SCNC: 9.1 MG/DL (ref 8.6–10.5)
CHLORIDE SERPL-SCNC: 102 MMOL/L (ref 98–107)
CO2 SERPL-SCNC: 28.4 MMOL/L (ref 22–29)
CREAT SERPL-MCNC: 1.61 MG/DL (ref 0.76–1.27)
DEPRECATED RDW RBC AUTO: 46.5 FL (ref 37–54)
EGFRCR SERPLBLD CKD-EPI 2021: 45.2 ML/MIN/1.73
ERYTHROCYTE [DISTWIDTH] IN BLOOD BY AUTOMATED COUNT: 13.8 % (ref 12.3–15.4)
GLUCOSE BLDC GLUCOMTR-MCNC: 151 MG/DL (ref 70–130)
GLUCOSE BLDC GLUCOMTR-MCNC: 157 MG/DL (ref 70–130)
GLUCOSE SERPL-MCNC: 184 MG/DL (ref 65–99)
GRAM STN SPEC: ABNORMAL
HCT VFR BLD AUTO: 39.2 % (ref 37.5–51)
HGB BLD-MCNC: 12.4 G/DL (ref 13–17.7)
ISOLATED FROM: ABNORMAL
MCH RBC QN AUTO: 29 PG (ref 26.6–33)
MCHC RBC AUTO-ENTMCNC: 31.6 G/DL (ref 31.5–35.7)
MCV RBC AUTO: 91.8 FL (ref 79–97)
PLATELET # BLD AUTO: 182 10*3/MM3 (ref 140–450)
PMV BLD AUTO: 10.8 FL (ref 6–12)
POTASSIUM SERPL-SCNC: 4.6 MMOL/L (ref 3.5–5.2)
PROCALCITONIN SERPL-MCNC: 0.65 NG/ML (ref 0–0.25)
RBC # BLD AUTO: 4.27 10*6/MM3 (ref 4.14–5.8)
SODIUM SERPL-SCNC: 135 MMOL/L (ref 136–145)
WBC NRBC COR # BLD: 14.2 10*3/MM3 (ref 3.4–10.8)

## 2022-04-18 PROCEDURE — 94799 UNLISTED PULMONARY SVC/PX: CPT

## 2022-04-18 PROCEDURE — 84145 PROCALCITONIN (PCT): CPT | Performed by: NURSE PRACTITIONER

## 2022-04-18 PROCEDURE — G0378 HOSPITAL OBSERVATION PER HR: HCPCS

## 2022-04-18 PROCEDURE — 94761 N-INVAS EAR/PLS OXIMETRY MLT: CPT

## 2022-04-18 PROCEDURE — 80048 BASIC METABOLIC PNL TOTAL CA: CPT | Performed by: NURSE PRACTITIONER

## 2022-04-18 PROCEDURE — 94664 DEMO&/EVAL PT USE INHALER: CPT

## 2022-04-18 PROCEDURE — 85027 COMPLETE CBC AUTOMATED: CPT | Performed by: NURSE PRACTITIONER

## 2022-04-18 PROCEDURE — 82962 GLUCOSE BLOOD TEST: CPT

## 2022-04-18 PROCEDURE — 99217 PR OBSERVATION CARE DISCHARGE MANAGEMENT: CPT | Performed by: NURSE PRACTITIONER

## 2022-04-18 PROCEDURE — 63710000001 INSULIN ASPART PER 5 UNITS: Performed by: FAMILY MEDICINE

## 2022-04-18 PROCEDURE — 94660 CPAP INITIATION&MGMT: CPT

## 2022-04-18 RX ORDER — AZITHROMYCIN 250 MG/1
TABLET, FILM COATED ORAL
Qty: 2 TABLET | Refills: 0 | Status: SHIPPED | OUTPATIENT
Start: 2022-04-19 | End: 2022-07-13 | Stop reason: HOSPADM

## 2022-04-18 RX ORDER — CEFDINIR 300 MG/1
300 CAPSULE ORAL 2 TIMES DAILY
Qty: 6 CAPSULE | Refills: 0 | Status: SHIPPED | OUTPATIENT
Start: 2022-04-18 | End: 2022-04-21

## 2022-04-18 RX ADMIN — GUAIFENESIN 600 MG: 600 TABLET ORAL at 08:38

## 2022-04-18 RX ADMIN — ERYTHROMYCIN: 5 OINTMENT OPHTHALMIC at 11:27

## 2022-04-18 RX ADMIN — ERYTHROMYCIN: 5 OINTMENT OPHTHALMIC at 00:00

## 2022-04-18 RX ADMIN — GABAPENTIN 400 MG: 400 CAPSULE ORAL at 08:38

## 2022-04-18 RX ADMIN — INSULIN ASPART 2 UNITS: 100 INJECTION, SOLUTION INTRAVENOUS; SUBCUTANEOUS at 06:59

## 2022-04-18 RX ADMIN — FLUTICASONE PROPIONATE 1 SPRAY: 50 SPRAY, METERED NASAL at 08:39

## 2022-04-18 RX ADMIN — APIXABAN 5 MG: 5 TABLET, FILM COATED ORAL at 08:38

## 2022-04-18 RX ADMIN — FUROSEMIDE 20 MG: 20 TABLET ORAL at 08:39

## 2022-04-18 RX ADMIN — FLUOXETINE 20 MG: 20 CAPSULE ORAL at 08:38

## 2022-04-18 RX ADMIN — SPIRONOLACTONE 50 MG: 25 TABLET ORAL at 08:38

## 2022-04-18 RX ADMIN — BUDESONIDE AND FORMOTEROL FUMARATE DIHYDRATE 2 PUFF: 160; 4.5 AEROSOL RESPIRATORY (INHALATION) at 06:55

## 2022-04-18 RX ADMIN — ALLOPURINOL 100 MG: 100 TABLET ORAL at 08:38

## 2022-04-18 RX ADMIN — METOPROLOL SUCCINATE 50 MG: 50 TABLET, EXTENDED RELEASE ORAL at 08:38

## 2022-04-18 RX ADMIN — CETIRIZINE HYDROCHLORIDE 10 MG: 10 TABLET, FILM COATED ORAL at 08:38

## 2022-04-18 RX ADMIN — AMIODARONE HYDROCHLORIDE 200 MG: 200 TABLET ORAL at 08:38

## 2022-04-18 RX ADMIN — IPRATROPIUM BROMIDE AND ALBUTEROL SULFATE 3 ML: 2.5; .5 SOLUTION RESPIRATORY (INHALATION) at 06:55

## 2022-04-18 RX ADMIN — ASPIRIN 81 MG: 81 TABLET, CHEWABLE ORAL at 08:38

## 2022-04-18 RX ADMIN — AMLODIPINE BESYLATE 10 MG: 5 TABLET ORAL at 08:38

## 2022-04-18 RX ADMIN — PRAMIPEXOLE DIHYDROCHLORIDE 0.5 MG: 0.25 TABLET ORAL at 08:39

## 2022-04-18 RX ADMIN — LISINOPRIL 40 MG: 20 TABLET ORAL at 08:39

## 2022-04-18 RX ADMIN — AZITHROMYCIN MONOHYDRATE 250 MG: 250 TABLET ORAL at 08:38

## 2022-04-18 RX ADMIN — PANTOPRAZOLE SODIUM 40 MG: 40 TABLET, DELAYED RELEASE ORAL at 07:00

## 2022-04-18 NOTE — PROGRESS NOTES
RD mailed DM education materials including Heart Healthy and Consistent Carbohydrate Nutrition Therapy packet from [facility], Diabetes Basics from [facility], and RD contact information due to recent discharge.

## 2022-04-18 NOTE — PLAN OF CARE
Goal Outcome Evaluation:      Discharged home with family.  Vitals unremarkable.  Eating well.  Ambulation in room.  SAT mid 90s on room air.

## 2022-04-18 NOTE — THERAPY DISCHARGE NOTE
Patient reports no OT needs; states he has been ind with functional mobility and ADLs, has all necessary DME at home and is discharging home today.

## 2022-04-18 NOTE — DISCHARGE SUMMARY
[facility]      Name:  Robe Bryant   Age:  71 y.o.  Sex:  male  :  1950  MRN:  [id number]   Visit Number:  [id number]    Admission Date:  2022  Date of Discharge:  2022  Primary Care Physician:  Raj Sullivan MD    Important issues to note:    - Admitted with CAP- Received Azithromycin and Rocephin  for total 5 days.  - Remained on baseline oxygen of 2 liters NC  - Has appropriate DME and oxygen at home  - Follow with PCP this week  - Strict return precautions advised  - A1c 8.9  - No s/s of fluid overload    Discharge Diagnoses:     1. Community-acquired pneumonia, right lung, POA  2. Chronic COPD without exacerbation, POA  3. Bilateral conjunctivitis, POA  4. Urinary Retention  5. Chronic Diastolic Heart Failure  6. Chronic Atrial Fibrillation on Eliquis  7. Diabetes mellitus type 2  8. Hyperlipidemia  9. Hypertension  10. CKD  11. CAD  12. History of MI and stroke  13. Sleep apnea  14. Chronic anticoagulation with Eliquis       Problem List:     Active Hospital Problems    Diagnosis  POA   • **Pneumonia of right middle lobe due to infectious organism [J18.9]  Yes   • Chronic respiratory failure with hypoxia (HCC) [J96.11]  Yes   • Chronic kidney disease, stage III (moderate) (CMS/HCC) [N18.30]  Yes   • Essential hypertension [I10]  Yes   • Diastolic heart failure (HCC) [I50.30]  Yes   • Coronary artery disease involving native coronary artery of native heart with angina pectoris (HCC) [I25.119]  Yes   • Obstructive sleep apnea of adult [G47.33]  Yes      Resolved Hospital Problems   No resolved problems to display.     Presenting Problem:    Chief Complaint   Patient presents with   • Weakness - Generalized   • Fever      Consults:     Consulting Physician(s)             None          Procedures Performed:        History of presenting illness/Hospital Course:    Patient is a 71 years old male with a past medical diabetes mellitus  type 2, COPD on 2 L O2 at baseline, hyperlipidemia, hypertension, CKD, CAD, history of MI and stroke, sleep apnea, Chronic Diastolic CHF, and chronic Atrial Fibrillation on Eliquis who presented to the ER with multiple chief complaints including generalized weakness, worsening shortness of breath, headaches and fever.  Patient also having bilateral eye redness, clear discharge and light sensitivity however denied any vision changes.  Patient is on 2 L of oxygen at home at baseline with chronic COPD. He does use a walker at home.    On ER evaluation, vitals were stable and was afebrile on 2 L NC which is his baseline oxygen requirement.  Labs were significant for troponin of 0.018, glucose 225, creatinine 1.77 (baseline 1.45), BUN 40, pro-Royce 1.72, WBC 20.8.  Upper respiratory panel negative.  Chest x-ray with right lung infiltrates consistent with pneumonia.  CT head with no acute intracranial hemorrhage or large acute cortical infarct.  Patient received azithromycin and Rocephin along with Solu-Medrol 125 mg and half liter of normal saline bolus.  Patient was also treated with erythromycin ophthalmic ointment for conjunctivitis.  Hospitalist was consulted for admission, further evaluation treatment. PT/OT/Case Management also followed. Patient continued to receive azithromycin and Rocephin for community-acquired pneumonia.  Patient remained on 2 L nasal cannula throughout his hospitalization.  WBC and procalcitonin improved. Patient remained afebrile.  Repeat blood cultures currently pending with contamination noted with initial.  Patient agreeable to follow-up with PCP this week.  Patient discharged home with 2 further days of azithromycin and Rocephin x3 additional days for total of 5 days of treatment.  Strict return precautions given.  Patient to follow-up with Dr. Guerra as scheduled for history of urinary retention.    Vital Signs:    Temp:  [97.4 °F (36.3 °C)-98.2 °F (36.8 °C)] 97.8 °F (36.6 °C)  Heart Rate:   [54-66] 63  Resp:  [16-20] 16  BP: (115-134)/(56-79) 134/79    Physical Exam:    General Appearance:  Alert and cooperative. Chronically ill appearing. Obesity noted.   Head:  Atraumatic and normocephalic.   Eyes: Conjunctivae and sclerae normal, no icterus. No pallor.   Ears:  Ears with no abnormalities noted.   Throat: No oral lesions, no thrush, oral mucosa moist.   Neck: Supple, trachea midline, no thyromegaly.   Back:   No kyphoscoliosis present. No tenderness to palpation.   Lungs:   Breath sounds heard bilaterally equally.  No crackles or wheezing. Mild wheezing throughout.   Heart:  Normal S1 and S2, no murmur, no gallop, no rub. No JVD.   Abdomen:   Normal bowel sounds, no masses, no organomegaly. Soft, nontender, nondistended, no rebound tenderness.   Extremities: Supple, trace BLE edema.   Pulses: Pulses palpable bilaterally.   Skin: No bleeding or rash.   Neurologic: Alert and oriented x 3. No facial asymmetry. Moves all four limbs. No tremors. Generalized weakness noted.     Pertinent Lab Results:     Results from last 7 days   Lab Units 04/18/22  0511 04/17/22  0505 04/16/22  1616   SODIUM mmol/L 135* 136 138   POTASSIUM mmol/L 4.6 5.1 5.2   CHLORIDE mmol/L 102 103 102   CO2 mmol/L 28.4 23.6 26.6   BUN mg/dL 37* 40* 40*   CREATININE mg/dL 1.61* 1.47* 1.77*   CALCIUM mg/dL 9.1 9.0 9.2   BILIRUBIN mg/dL  --   --  0.7   ALK PHOS U/L  --   --  63   ALT (SGPT) U/L  --   --  24   AST (SGOT) U/L  --   --  21   GLUCOSE mg/dL 184* 255* 225*     Results from last 7 days   Lab Units 04/18/22  0511 04/17/22  0505 04/16/22  1616   WBC 10*3/mm3 14.20* 15.46* 20.87*   HEMOGLOBIN g/dL 12.4* 12.8* 12.9*   HEMATOCRIT % 39.2 40.3 39.4   PLATELETS 10*3/mm3 182 180 182         Results from last 7 days   Lab Units 04/16/22  1616   TROPONIN T ng/mL 0.018     Results from last 7 days   Lab Units 04/17/22  0505   PROBNP pg/mL 692.8                 Results from last 7 days   Lab Units 04/17/22  1315 04/17/22  1314  04/16/22  1707   BLOODCX  No growth at less than 24 hours No growth at less than 24 hours No growth at 24 hours  Abnormal Stain*       Pertinent Radiology Results:    Imaging Results (All)     Procedure Component Value Units Date/Time    XR Chest 1 View [[id number]] Collected: 04/17/22 0903     Updated: 04/17/22 0906    Narrative:      PROCEDURE: XR CHEST 1 VW-     HISTORY: Weak/Dizzy/AMS triage protocol     COMPARISON: 03/27/2022.     FINDINGS: Left-sided pacemaker. The heart is enlarged. Patchy opacities  in the right lung, may be secondary to pneumonia versus edema. No  significant effusion. There is no pneumothorax.  There are no acute  osseous abnormalities.       Impression:      Patchy opacities in the right lung, may be secondary to  pneumonia versus edema. No significant effusion.     Continued followup is recommended.     This report was signed and finalized on 4/17/2022 9:04 AM by Lawrence Trevizo DO.    CT Head Without Contrast [[id number]] Collected: 04/16/22 1731     Updated: 04/16/22 1732    Narrative:      FINAL REPORT    TECHNIQUE:  Multiple axial CT images were performed from the foramen magnum  to the vertex. This study was performed with techniques to keep  radiation doses as low as reasonably achievable (ALARA).  Individualized dose reduction techniques using automated  exposure control or adjustment of mA and/or kV according to the  patient's size were employed.    CLINICAL HISTORY:  Headache    FINDINGS:  No acute intracranial hemorrhage or large acute cortical  infarct.  The brain volume is normal for patient's age.  Ventricles are normal in size and configuration.  No midline  shift.  The basal cisterns are patent.  No skull fracture.  The  visualized paranasal sinuses and mastoid air cells are clear.      Impression:      No acute intracranial hemorrhage or large acute cortical infarct.    Authenticated by Glenn Ferro MD on 04/16/2022 05:31:11 PM          Echo:    Results for orders placed  during the hospital encounter of 01/25/21    Adult Transthoracic Echo Complete W/ Cont if Necessary Per Protocol    Interpretation Summary  · Left ventricular wall thickness is consistent with concentric hypertrophy.  · Estimated left ventricular EF = 57% Left ventricular ejection fraction appears to be 56 - 60%. Left ventricular systolic function is normal.  · Left ventricular diastolic function is consistent with (grade I) impaired relaxation.  · Left atrial volume is mildly increased.    Condition on Discharge:      Stable.    Code status during the hospital stay:    Code Status and Medical Interventions:   Ordered at: 04/16/22 8305     Code Status (Patient has no pulse and is not breathing):    CPR (Attempt to Resuscitate)     Medical Interventions (Patient has pulse or is breathing):    Full Support     Discharge Disposition:    Home or Self Care    Discharge Medications:       Discharge Medications      New Medications      Instructions Start Date   azithromycin 250 MG tablet  Commonly known as: ZITHROMAX   1 tab po daily x 2 days   Start Date: April 19, 2022     cefdinir 300 MG capsule  Commonly known as: OMNICEF   300 mg, Oral, 2 Times Daily         Changes to Medications      Instructions Start Date   furosemide 20 MG tablet  Commonly known as: LASIX  What changed: Another medication with the same name was removed. Continue taking this medication, and follow the directions you see here.   TAKE 1 TABLET BY MOUTH EVERY MORNING, MAY TAKE SECOND DOSE AS NEEDED FOR INCREASED SWELLING         Continue These Medications      Instructions Start Date   albuterol sulfate  (90 Base) MCG/ACT inhaler  Commonly known as: PROVENTIL HFA;VENTOLIN HFA;PROAIR HFA   2 puffs, Inhalation, Every 4 Hours PRN      allopurinol 100 MG tablet  Commonly known as: Zyloprim   100 mg, Oral, Daily      amiodarone 200 MG tablet  Commonly known as: PACERONE   200 mg, Oral, Every 24 Hours Scheduled      amLODIPine 10 MG  tablet  Commonly known as: NORVASC   10 mg, Oral, Daily      apixaban 5 MG tablet tablet  Commonly known as: ELIQUIS   5 mg, Oral, 2 Times Daily      aspirin 81 MG chewable tablet   81 mg, Oral, Daily      atorvastatin 20 MG tablet  Commonly known as: LIPITOR   20 mg, Oral, Nightly      baclofen 10 MG tablet  Commonly known as: LIORESAL   10 mg, Oral, 3 Times Daily PRN      Breztri Aerosphere 160-9-4.8 MCG/ACT aerosol inhaler  Generic drug: Budeson-Glycopyrrol-Formoterol   2 puffs, Inhalation, 2 Times Daily      cetirizine 10 MG tablet  Commonly known as: zyrTEC   10 mg, Oral, Daily      clotrimazole-betamethasone 1-0.05 % cream  Commonly known as: Lotrisone   Apply to affected area 1 times daily      Co Q-10 100 MG capsule   200 mg, Oral, Daily      cyclobenzaprine 10 MG tablet  Commonly known as: FLEXERIL   10 mg, Oral, 3 Times Daily PRN      FLUoxetine 20 MG capsule  Commonly known as: PROzac   20 mg, Oral, Daily      fluticasone 50 MCG/ACT nasal spray  Commonly known as: FLONASE   USE 2 SPRAYS IN EACH NOSTRIL EVERY DAY AS DIRECTED  BY  MD      gabapentin 400 MG capsule  Commonly known as: NEURONTIN   400 mg, Oral, 3 Times Daily      glipizide 5 MG tablet  Commonly known as: GLUCOTROL   TAKE 1 TABLET TWICE DAILY BEFORE MEALS      glucose monitor monitoring kit   1 each, Does not apply, Daily, E11.9      ipratropium-albuterol 0.5-2.5 mg/3 ml nebulizer  Commonly known as: DUO-NEB   3 mL, Nebulization, 4 Times Daily - RT      Lancet Device misc   1 each, Does not apply, Daily, E11.9      lidocaine 5 %  Commonly known as: LIDODERM   1 patch, Transdermal, Every 24 Hours, Remove & Discard patch within 12 hours or as directed by MD      Misc. Devices misc   Bipap tubing.      multivitamin tablet tablet  Commonly known as: THERAGRAN   1 tablet, Oral, Daily      Nebulizer misc   1 each, Does not apply, Every 4 Hours PRN      nitroglycerin 0.4 MG SL tablet  Commonly known as: Nitrostat   0.4 mg, Sublingual, Every 5 Minutes  PRN      O2  Commonly known as: OXYGEN   2 L/min, Inhalation, Continuous PRN      ondansetron ODT 4 MG disintegrating tablet  Commonly known as: Zofran ODT   4 mg, Translingual, Every 8 Hours PRN      potassium chloride 10 MEQ CR tablet  Commonly known as: K-DUR,KLOR-CON   10 mEq, Oral, 2 Times Daily      True Metrix Air Glucose Meter w/Device kit   1 each, In Vitro, 2 times daily, E11.9      True Metrix Blood Glucose Test test strip  Generic drug: glucose blood   Daily testing E11.9      TRUEplus Lancets 33G misc   1 each, Other, Daily, E11.9      Vitamin D (Cholecalciferol) 50 MCG (2000 UT) capsule   50 mcg, Oral, Daily         Stop These Medications    ciprofloxacin 500 MG tablet  Commonly known as: CIPRO     diclofenac 1 % gel gel  Commonly known as: VOLTAREN     doxycycline 100 MG capsule  Commonly known as: VIBRAMYCIN     HYDROcodone-acetaminophen 5-325 MG per tablet  Commonly known as: NORCO        ASK your doctor about these medications      Instructions Start Date   lisinopril 40 MG tablet  Commonly known as: PRINIVIL,ZESTRIL   40 mg, Oral, Daily      Melatonin 5 MG capsule   1 each, Oral, Nightly, For sleep      metoprolol succinate XL 50 MG 24 hr tablet  Commonly known as: Toprol XL   50 mg, Oral, Daily      omeprazole 40 MG capsule  Commonly known as: priLOSEC   40 mg, Oral, Daily      pramipexole 0.5 MG tablet  Commonly known as: MIRAPEX   0.5 mg, Oral, 3 Times Daily      spironolactone 50 MG tablet  Commonly known as: ALDACTONE   TAKE 1 TABLET EVERY DAY           Discharge Diet:     Diet Instructions     Diet: Consistent Carbohydrate, Cardiac      Discharge Diet:  Consistent Carbohydrate  Cardiac       Fluid Restriction per day: 1500 mL Fluid        Activity at Discharge:     Activity Instructions     Activity as Tolerated          Follow-up Appointments:     Follow-up Information     Raj Sullivan MD .    Specialty: Internal Medicine  Contact information:  [address] [place] [address]  [address]  [phone]                       Future Appointments   Date Time Provider Department Center   5/16/2022 10:00 AM Cecily Miranda PA-C [facility]   5/17/2022 11:30 AM Aminta Chan MD [facility]     Test Results Pending at Discharge:    Pending Labs     Order Current Status    Blood Culture - Blood, Arm, Left Preliminary result    Blood Culture - Blood, Blood, Central Line Preliminary result    Blood Culture With JENNIFER - Blood, Arm, Left Preliminary result    Blood Culture With JENNIFER - Blood, Arm, Right Preliminary result             Kat Villagomez, APRN  04/18/22  10:22 EDT    Time: I spent > 30 minutes on this discharge activity which included: face-to-face encounter with the patient, reviewing the data in the system, coordination of the care with the nursing staff as well as consultants, documentation, and entering orders.     Dictated utilizing Dragon dictation.

## 2022-04-18 NOTE — CASE MANAGEMENT/SOCIAL WORK
Discharge Planning Assessment  [facility]     Patient Name: Robe Bryant  MRN: [id number]  Today's Date: 4/18/2022    Admit Date: 4/16/2022     Discharge Needs Assessment     Row Name 04/18/22 0952       Living Environment    People in Home spouse    Current Living Arrangements home    Primary Care Provided by self    Provides Primary Care For no one, unable/limited ability to care for self    Family Caregiver if Needed spouse    Quality of Family Relationships involved       Resource/Environmental Concerns    Transportation Concerns no car       Transition Planning    Patient/Family Anticipates Transition to home with family    Transportation Anticipated car, drives self;family or friend will provide       Discharge Needs Assessment    Equipment Currently Used at Home oxygen;bipap;cane, quad tip    Concerns to be Addressed discharge planning               Discharge Plan     Row Name 04/18/22 1002       Plan    Plan Spoke to pt regarding discharge plans .HE was sitting in the chair ,Confirmed address and phone number .HE reports  he still drives .He has  oxygen 2-3 LNC continuously and a BIPAP uses [facility] care confirmed Uses a Cane He reports he still drives  Does not  have a Health Surrogate  Information given .He reports that he is independent with ADLS  attempted to call wife to confirm no answer              Continued Care and Services - Admitted Since 4/16/2022    Coordination has not been started for this encounter.          Demographic Summary     Row Name 04/18/22 0950       General Information    Admission Type observation    Required Notices Provided Observation Status Notice    Referral Source admission list               Functional Status     Row Name 04/18/22 0951       Functional Status    Usual Activity Tolerance good       Functional Status, IADL    Medications independent    Meal Preparation assistive person    Housekeeping assistive person    Laundry assistive person    Shopping assistive  person       Mental Status    General Appearance WDL WDL               Psychosocial    No documentation.                Abuse/Neglect    No documentation.                Legal    No documentation.                Substance Abuse    No documentation.                Patient Forms    No documentation.                   Alem Recinos RN

## 2022-04-18 NOTE — DISCHARGE INSTRUCTIONS
Patient to be discharged home today.  Patient to continue Omnicef this evening and azithromycin continued for the next 2 days starting tomorrow.  Continue nasal cannula at 2 L which is baseline.  Patient to follow-up with PCP this week.  Strict return precautions explained such as fever greater than 100.4, worsening cough, or worsening weakness or other concerns.

## 2022-04-18 NOTE — NURSING NOTE
Referral received for Phase II Cardiac Rehab.  Medical chart reviewed, and at this time Pt does not have a qualifying diagnosis for Phase 2 Cardiac Rehab.

## 2022-04-19 NOTE — OUTREACH NOTE
Prep Survey    Flowsheet Row Responses   [facility] discharged from? [facility]   Is LACE score < 7 ? No   Emergency Room discharge w/ pulse ox? No   Eligibility Readm Mgmt   Discharge diagnosis CAP, chronic COPD, chronic CHF, A-fib, T2DM, CKD   Does the patient have one of the following disease processes/diagnoses(primary or secondary)? COPD/Pneumonia   Does the patient have Home health ordered? No   Is there a DME ordered? No   Prep survey completed? Yes          ABELINO NELSON - Registered Nurse

## 2022-04-21 ENCOUNTER — READMISSION MANAGEMENT (OUTPATIENT)
Dept: CALL CENTER | Facility: HOSPITAL | Age: 72
End: 2022-04-21

## 2022-04-21 LAB — BACTERIA SPEC AEROBE CULT: NORMAL

## 2022-04-21 NOTE — OUTREACH NOTE
COPD/PN Week 1 Survey    Flowsheet Row Responses   [facility] patient discharged from? [facility]   Does the patient have one of the following disease processes/diagnoses(primary or secondary)? COPD/Pneumonia   Was the primary reason for admission: Pneumonia   Week 1 attempt successful? Yes   Call start time 1502   Call end time 1506   Discharge diagnosis CAP, chronic COPD, chronic CHF, A-fib, T2DM, CKD   Is patient permission given to speak with other caregiver? Yes   List who call center can speak with spouse, Berny Gama reviewed with patient/caregiver? Yes   Is the patient having any side effects they believe may be caused by any medication additions or changes? No   Does the patient have all medications ordered at discharge? Yes   Is the patient taking all medications as directed (includes completed medication regime)? Yes   Does the patient have a primary care provider?  Yes   Does the patient have an appointment with their PCP or specialist within 7 days of discharge? Yes   Has the patient kept scheduled appointments due by today? N/A   Comments PCP Dr Sullivan 04/22/2022   Has home health visited the patient within 72 hours of discharge? N/A   DME comments Wears O2 all the time   Pulse Ox monitoring None   Psychosocial issues? No   Is the patient/caregiver able to teach back signs and symptoms of worsening condition: Fever/chills, Shortness of breath, Chest pain   Is the patient/caregiver able to teach back importance of completing antibiotic course of treatment? Yes   Week 1 call completed? Yes          KELVIN BOLAÑOS - Registered Nurse

## 2022-04-22 LAB
BACTERIA SPEC AEROBE CULT: NORMAL
BACTERIA SPEC AEROBE CULT: NORMAL

## 2022-04-28 ENCOUNTER — READMISSION MANAGEMENT (OUTPATIENT)
Dept: CALL CENTER | Facility: HOSPITAL | Age: 72
End: 2022-04-28

## 2022-04-28 NOTE — OUTREACH NOTE
COPD/PN Week 2 Survey    Flowsheet Row Responses   [facility] patient discharged from? [facility]   Does the patient have one of the following disease processes/diagnoses(primary or secondary)? COPD/Pneumonia   Was the primary reason for admission: Pneumonia   Week 2 attempt successful? Yes   Call start time 1643   Call end time 1652   Meds reviewed with patient/caregiver? Yes   Is the patient having any side effects they believe may be caused by any medication additions or changes? No   Does the patient have all medications ordered at discharge? Yes   Is the patient taking all medications as directed (includes completed medication regime)? No   What is preventing the patient from taking all medications as directed? Other   Medication comments States has run out of albuterol inhaler, and has only taken one nebulizer treatment today.   Does the patient have a primary care provider?  Yes   Has the patient kept scheduled appointments due by today? Yes   DME comments Continues with home O2 at 2L continuous.   Pulse Ox monitoring None  [States has pulse ox. Encouraged patient to check O2 sats, and return to ER if O2 sats remain below 90% on home O2.]   Psychosocial issues? No   What is the patient's perception of their health status since discharge? Worsening   Nursing Interventions Advised patient to call provider, Nurse provided patient education  [Advised to go to urgent care or ER if unable to reach PCP.]   Is the patient/caregiver able to teach back the hierarchy of who to call/visit for symptoms/problems? PCP, Specialist, Home health nurse, Urgent Care, ED, 911 Yes   Is the patient/caregiver able to teach back signs and symptoms of worsening condition: Fever/chills, Shortness of breath, Chest pain   Is the patient/caregiver able to teach back importance of completing antibiotic course of treatment? Yes   Week 2 call completed? Yes   Wrap up additional comments States is feeling worse today with audible wheezing  during conversation. States has run out of albuterol inhaler, and has only done one breathing tx this morning. Advised to do breathing tx as directed, monitor O2 sat. Advised to call PCP immediately for evaluation or go to urgent care if wheezing not relieved by breathing tx. States will go to ER if any worsening in condition.          GABRIEL SCHOFIELD - Registered Nurse

## 2022-05-05 ENCOUNTER — READMISSION MANAGEMENT (OUTPATIENT)
Dept: CALL CENTER | Facility: HOSPITAL | Age: 72
End: 2022-05-05

## 2022-05-05 NOTE — OUTREACH NOTE
COPD/PN Week 3 Survey    Flowsheet Row Responses   [facility] patient discharged from? [facility]   Does the patient have one of the following disease processes/diagnoses(primary or secondary)? COPD/Pneumonia   Was the primary reason for admission: Pneumonia   Week 3 attempt successful? No   Unsuccessful attempts Attempt 1          GABRIEL SCHOFIELD - Registered Nurse

## 2022-05-09 ENCOUNTER — READMISSION MANAGEMENT (OUTPATIENT)
Dept: CALL CENTER | Facility: HOSPITAL | Age: 72
End: 2022-05-09

## 2022-05-09 NOTE — OUTREACH NOTE
COPD/PN Week 3 Survey    Flowsheet Row Responses   [facility] patient discharged from? [facility]   Does the patient have one of the following disease processes/diagnoses(primary or secondary)? COPD/Pneumonia   Was the primary reason for admission: Pneumonia   Week 3 attempt successful? Yes   Call start time 1405   Call end time 1410   Discharge diagnosis CAP, chronic COPD, chronic CHF, A-fib, T2DM, CKD   Person spoke with today (if not patient) and relationship patient   Meds reviewed with patient/caregiver? Yes   Is the patient having any side effects they believe may be caused by any medication additions or changes? No   Does the patient have all medications ordered at discharge? Yes   Is the patient taking all medications as directed (includes completed medication regime)? Yes   Does the patient have a primary care provider?  Yes   Does the patient have an appointment with their PCP or specialist within 7 days of discharge? Yes   Comments regarding PCP PCP APPOINTMENT IS TOMORROW 5/12/21  Pt    Has the patient kept scheduled appointments due by today? Yes   DME comments Continues with home O2 at 2L continuous.   Pulse Ox monitoring None   Did the patient receive a copy of their discharge instructions? Yes   Nursing interventions Reviewed instructions with patient   What is the patient's perception of their health status since discharge? Improving   If the patient is a current smoker, are they able to teach back resources for cessation? Not a smoker   Is the patient/caregiver able to teach back the hierarchy of who to call/visit for symptoms/problems? PCP, Specialist, Home health nurse, Urgent Care, ED, 911 Yes   Is the patient/caregiver able to teach back signs and symptoms of worsening condition: Fever/chills, Shortness of breath, Chest pain   Is the patient/caregiver able to teach back importance of completing antibiotic course of treatment? Yes   Week 3 call completed? Yes   Wrap up additional comments Pt  states he is very tired.  He is using oxygen continuous.  Denies fever, just primarily fatigue.  Advised to call PCP to seek further instruction.            ANGELICA VALDOVINOS - Registered Nurse

## 2022-05-16 ENCOUNTER — OFFICE VISIT (OUTPATIENT)
Dept: UROLOGY | Facility: CLINIC | Age: 72
End: 2022-05-16

## 2022-05-16 VITALS
TEMPERATURE: 97.3 F | OXYGEN SATURATION: 100 % | BODY MASS INDEX: 43.15 KG/M2 | WEIGHT: 259 LBS | DIASTOLIC BLOOD PRESSURE: 66 MMHG | HEART RATE: 72 BPM | SYSTOLIC BLOOD PRESSURE: 149 MMHG | HEIGHT: 65 IN

## 2022-05-16 DIAGNOSIS — R39.9 LOWER URINARY TRACT SYMPTOMS (LUTS): Primary | ICD-10-CM

## 2022-05-16 DIAGNOSIS — N50.9 TESTICULAR LESION: ICD-10-CM

## 2022-05-16 PROCEDURE — 99214 OFFICE O/P EST MOD 30 MIN: CPT | Performed by: PHYSICIAN ASSISTANT

## 2022-05-16 NOTE — PROGRESS NOTES
Chief Complaint   Patient presents with   • Follow-up     3 month fu for LUTS        HPI  Mr. Bryant is a 71 y.o. male with history of BPH s/p likely TURP, bladder trauma s/p SP tube placement and removal, balanitis who presents for follow up.     At this visit, he states his urinary symptoms have continued to improve since stopping finasteride and terazosin per Dr. Guerra.  His balanitis has resolved since starting Lotrisone.  He had a scrotal ultrasound obtained in February as ordered.  He admits to some intermittent bilateral testicular pain that is not associated with swelling or anasarca.  It has not been present for over 3 weeks.  He had dysuria at the time as well, that resolved without any treatment.    Past Medical History:   Diagnosis Date   • Anxiety and depression    • Arthritis    • Backache     20 years   • Benign prostatic hyperplasia    • Bladder trauma    • Cervicalgia    • Chronic kidney disease     Cr up to 2.1   • Coronary artery disease    • Diabetes mellitus (HCC)     15 years--   • Emphysema of lung (HCC)    • Erectile dysfunction    • Eye exam, routine 2015   • FH: colonic polyps    • Gout     for 10 years--   • History of echocardiogram 09/15/2014   • History of tobacco use     with cessation x7 years   • Hyperlipidemia    • Hypertension    • Migraine    • Myocardial infarction (HCC)     h/o coronary artery stenting--   • Prostate cancer (HCC)    • Restless leg syndrome     20 years   • Sleep apnea     12 years; uses a Bi-Pap   • Stroke (HCC)    • Syncope 4/28/2017   • Warfarin anticoagulation 9/14/2016       Past Surgical History:   Procedure Laterality Date   • BLADDER SURGERY     • CARDIAC CATHETERIZATION  03/15/2013    revealing widely patent stents of the left circumflex and ramus intermedius coronary arteries, no significant disease is seen in any territory   • CARDIAC CATHETERIZATION Left 9/30/2019    Procedure: Left Heart Cath;  Surgeon: Arelis Tucker MD;  Location: [facility]  CATH INVASIVE LOCATION;  Service: Cardiology   • CARDIAC ELECTROPHYSIOLOGY PROCEDURE N/A 5/10/2021    Procedure: PPM battery change;  Surgeon: Arelis Tucker MD;  Location: [facility] CATH INVASIVE LOCATION;  Service: Cardiology;  Laterality: N/A;   • CARDIAC PACEMAKER PLACEMENT  2012   • CATARACT EXTRACTION     • COLONOSCOPY  2004    No family history of colon cancer.     • COLONOSCOPY  2015   • HERNIA REPAIR      Type unknown   • PACEMAKER IMPLANTATION     • PROSTATE SURGERY           Current Outpatient Medications:   •  albuterol sulfate  (90 Base) MCG/ACT inhaler, Inhale 2 puffs Every 4 (Four) Hours As Needed for Wheezing or Shortness of Air., Disp: 18 g, Rfl: 3  •  allopurinol (Zyloprim) 100 MG tablet, Take 1 tablet by mouth Daily., Disp: 90 tablet, Rfl: 1  •  amiodarone (PACERONE) 200 MG tablet, Take 1 tablet by mouth Daily., Disp: 30 tablet, Rfl: 0  •  amLODIPine (NORVASC) 10 MG tablet, Take 1 tablet by mouth Daily., Disp: 30 tablet, Rfl: 1  •  apixaban (ELIQUIS) 5 MG tablet tablet, Take 5 mg by mouth 2 (Two) Times a Day., Disp: , Rfl:   •  aspirin 81 MG chewable tablet, Chew 81 mg Daily., Disp: , Rfl:   •  atorvastatin (LIPITOR) 20 MG tablet, Take 1 tablet by mouth Every Night., Disp: 90 tablet, Rfl: 3  •  azithromycin (ZITHROMAX) 250 MG tablet, Take one tablet by mouth once daily for 2 days  Indications: Pneumonia (Patient taking differently: Take one tablet by mouth once daily for 2 days  Indications: Pneumonia  Indications: Pneumonia), Disp: 2 tablet, Rfl: 0  •  baclofen (LIORESAL) 10 MG tablet, Take 1 tablet by mouth 3 (Three) Times a Day As Needed for Muscle Spasms., Disp: 90 tablet, Rfl: 2  •  Blood Glucose Monitoring Suppl (TRUE METRIX AIR GLUCOSE METER) w/Device kit, 1 each by In Vitro route 2 (two) times a day. E11.9, Disp: 1 kit, Rfl: 0  •  Budeson-Glycopyrrol-Formoterol (Breztri Aerosphere) 160-9-4.8 MCG/ACT aerosol inhaler, Inhale 2 puffs 2 (Two) Times a Day., Disp: 15.9 g, Rfl: 5  •   cetirizine (zyrTEC) 10 MG tablet, Take 1 tablet by mouth Daily., Disp: 90 tablet, Rfl: 3  •  clotrimazole-betamethasone (Lotrisone) 1-0.05 % cream, Apply to affected area 1 times daily, Disp: 15 g, Rfl: 1  •  Coenzyme Q10 (Co Q-10) 100 MG capsule, Take 200 mg by mouth Daily., Disp: 60 capsule, Rfl: 11  •  cyclobenzaprine (FLEXERIL) 10 MG tablet, Take 1 tablet by mouth 3 (Three) Times a Day As Needed for Muscle Spasms., Disp: 15 tablet, Rfl: 0  •  FLUoxetine (PROzac) 20 MG capsule, Take 1 capsule by mouth Daily., Disp: 90 capsule, Rfl: 3  •  fluticasone (FLONASE) 50 MCG/ACT nasal spray, USE 2 SPRAYS IN EACH NOSTRIL EVERY DAY AS DIRECTED  BY  MD, Disp: 48 g, Rfl: 3  •  furosemide (LASIX) 20 MG tablet, TAKE 1 TABLET BY MOUTH EVERY MORNING, MAY TAKE SECOND DOSE AS NEEDED FOR INCREASED SWELLING, Disp: 60 tablet, Rfl: 2  •  gabapentin (NEURONTIN) 400 MG capsule, Take 400 mg by mouth 3 (Three) Times a Day., Disp: , Rfl:   •  glipizide (GLUCOTROL) 5 MG tablet, TAKE 1 TABLET TWICE DAILY BEFORE MEALS, Disp: 180 tablet, Rfl: 3  •  glucose blood (True Metrix Blood Glucose Test) test strip, Daily testing E11.9, Disp: 100 each, Rfl: 5  •  glucose monitor monitoring kit, 1 each Daily. E11.9, Disp: 1 each, Rfl: 0  •  ipratropium-albuterol (DUO-NEB) 0.5-2.5 mg/3 ml nebulizer, Take 3 mL by nebulization 4 (Four) Times a Day., Disp: 1620 mL, Rfl: 2  •  Lancet Device misc, 1 each Daily. E11.9, Disp: 100 each, Rfl: 3  •  lidocaine (LIDODERM) 5 %, Place 1 patch on the skin as directed by provider Daily. Remove & Discard patch within 12 hours or as directed by MD, Disp: 6 each, Rfl: 0  •  lisinopril (PRINIVIL,ZESTRIL) 40 MG tablet, Take 1 tablet by mouth Daily., Disp: 90 tablet, Rfl: 3  •  metoprolol succinate XL (Toprol XL) 50 MG 24 hr tablet, Take 1 tablet by mouth Daily., Disp: 30 tablet, Rfl: 0  •  Misc. Devices misc, Bipap tubing., Disp: 1 each, Rfl: 0  •  Multiple Vitamin tablet, Take 1 tablet by mouth daily., Disp: , Rfl:   •   "Nebulizer misc, 1 each Every 4 (Four) Hours As Needed (shortness of breath)., Disp: 1 each, Rfl: 0  •  nitroglycerin (Nitrostat) 0.4 MG SL tablet, Place 1 tablet under the tongue Every 5 (Five) Minutes As Needed for Chest Pain., Disp: 25 tablet, Rfl: 11  •  O2 (OXYGEN), Inhale 2 L/min Continuous As Needed (With activity)., Disp: , Rfl:   •  omeprazole (priLOSEC) 40 MG capsule, Take 1 capsule by mouth Daily., Disp: 90 capsule, Rfl: 3  •  ondansetron ODT (Zofran ODT) 4 MG disintegrating tablet, Place 1 tablet on the tongue Every 8 (Eight) Hours As Needed for Nausea or Vomiting., Disp: 20 tablet, Rfl: 0  •  potassium chloride (K-DUR,KLOR-CON) 10 MEQ CR tablet, Take 1 tablet by mouth 2 (Two) Times a Day., Disp: 60 tablet, Rfl: 2  •  pramipexole (MIRAPEX) 0.5 MG tablet, Take 1 tablet by mouth 3 (Three) Times a Day., Disp: 270 tablet, Rfl: 3  •  spironolactone (ALDACTONE) 50 MG tablet, TAKE 1 TABLET EVERY DAY, Disp: 90 tablet, Rfl: 1  •  TRUEplus Lancets 33G misc, 1 each by Other route Daily. E11.9, Disp: 100 each, Rfl: 5  •  Vitamin D, Cholecalciferol, 50 MCG (2000 UT) capsule, Take 1 capsule by mouth Daily., Disp: 90 capsule, Rfl: 3     Physical Exam  Visit Vitals  /66 (BP Location: Right arm, Patient Position: Sitting, Cuff Size: Adult)   Pulse 72   Temp 97.3 °F (36.3 °C) (Temporal)   Ht 165.1 cm (65\")   Wt 117 kg (259 lb)   SpO2 100%   BMI 43.10 kg/m²       Labs  Brief Urine Lab Results  (Last result in the past 365 days)      Color   Clarity   Blood   Leuk Est   Nitrite   Protein   CREAT   Urine HCG        04/16/22 2211 Yellow   Clear   Negative   Negative   Negative   Negative                 Lab Results   Component Value Date    GLUCOSE 184 (H) 04/18/2022    CALCIUM 9.1 04/18/2022     (L) 04/18/2022    K 4.6 04/18/2022    CO2 28.4 04/18/2022     04/18/2022    BUN 37 (H) 04/18/2022    CREATININE 1.61 (H) 04/18/2022    EGFRIFAFRI 55 (L) 01/21/2022    EGFRIFNONA 41 (L) 02/06/2022    BCR 23.0 " 04/18/2022    ANIONGAP 4.6 (L) 04/18/2022       Lab Results   Component Value Date    WBC 14.20 (H) 04/18/2022    HGB 12.4 (L) 04/18/2022    HCT 39.2 04/18/2022    MCV 91.8 04/18/2022     04/18/2022            Lab Results   Component Value Date    PSA 0.355 01/24/2022    PSA 0.306 02/10/2020    PSA 0.8 01/10/2017       No results found for: TESTOSTERONE         Radiographic Studies  US Scrotum & Testicles    Result Date: 2/8/2022  Bilateral testicular lesions with benign morphology. However, recommend continued sonographic surveillance in 6-12 months.  This report was finalized on 2/8/2022 10:16 AM by Fran Rojas MD.        Assessment  71 y.o. male with history as above.  Since I last saw the patient, he underwent cystoscopy with Dr. Guerra.  Relevant cystoscopic findings include changes from likely previous TURP with no obstruction.  Per Dr. Guerra, his uroflow was borderline and most concerning for neurogenic changes to the bladder, likely related to patient's DM.  His PSA was obtained as part of work-up and stable, quite low at 0.355.  Given his lack of family history of prostate cancer, his ethnicity, and no previous elevated PSA or need for prostate biopsy, he is low risk and does not require further prostate cancer screening per AUA guidelines, unless the patient should choose to continue this.    His ultrasound obtained in February revealed bilateral intratesticular lesions.  The morphology was favored to be benign by radiology.  I discussed these findings with the patient who denies any pain today.  His previous testicular exam was not concerning.  His  exam was consistent with balanitis which he reports has improved.     Plan  1.  LUTS, history of BPH s/p TURP, history of SP tube and reversal   -Underwent cystoscopy with Dr. Guerra that revealed no obstruction   -Underwent TURP with Jeanine many years ago, does not recall exactly    -Stopped terazosin and finasteride, is not concerned with LUTS at this  time    2.  Intermittent testicular pain    -Ultrasound revealed bilateral intratesticular lesions that were both less than 10 mm, favored to be benign by radiology   -Obtain surveillance repeat ultrasound at 6-month window    3.  Balanitis   -Reportedly resolved after treatment course of Lotrisone      Follow-up in 6 months or sooner for new or worsening symptoms

## 2022-05-17 ENCOUNTER — OFFICE VISIT (OUTPATIENT)
Dept: PULMONOLOGY | Facility: CLINIC | Age: 72
End: 2022-05-17

## 2022-05-17 VITALS
HEIGHT: 65 IN | TEMPERATURE: 97.5 F | OXYGEN SATURATION: 95 % | DIASTOLIC BLOOD PRESSURE: 78 MMHG | BODY MASS INDEX: 44.48 KG/M2 | SYSTOLIC BLOOD PRESSURE: 124 MMHG | WEIGHT: 267 LBS | RESPIRATION RATE: 18 BRPM | HEART RATE: 78 BPM

## 2022-05-17 DIAGNOSIS — E66.01 MORBID OBESITY, UNSPECIFIED OBESITY TYPE: ICD-10-CM

## 2022-05-17 DIAGNOSIS — J44.9 CHRONIC OBSTRUCTIVE PULMONARY DISEASE, UNSPECIFIED COPD TYPE: Primary | ICD-10-CM

## 2022-05-17 DIAGNOSIS — Z87.891 PERSONAL HISTORY OF TOBACCO USE, PRESENTING HAZARDS TO HEALTH: ICD-10-CM

## 2022-05-17 DIAGNOSIS — J44.9 ASTHMA WITH COPD: ICD-10-CM

## 2022-05-17 DIAGNOSIS — G47.33 OBSTRUCTIVE SLEEP APNEA: ICD-10-CM

## 2022-05-17 PROCEDURE — 99214 OFFICE O/P EST MOD 30 MIN: CPT | Performed by: INTERNAL MEDICINE

## 2022-05-17 RX ORDER — BUDESONIDE, GLYCOPYRROLATE, AND FORMOTEROL FUMARATE 160; 9; 4.8 UG/1; UG/1; UG/1
2 AEROSOL, METERED RESPIRATORY (INHALATION) 2 TIMES DAILY
Qty: 15.9 G | Refills: 5 | Status: SHIPPED | OUTPATIENT
Start: 2022-05-17 | End: 2022-07-13 | Stop reason: HOSPADM

## 2022-05-17 NOTE — PROGRESS NOTES
"  Chief Complaint   Patient presents with   • Breathing Problem   • Sleeping Problem       Subjective   Robe Bryant is a 71 y.o. male.     History of Present Illness   Patient was evaluated today to follow-up after he was recently admitted to the hospital with community-acquired pneumonia.      The patient actually no showed his last appointment with our office.     The patient has not been able to using Breztri, although it was prescribed the last time he was seen in this office.    He is currently using nebulized treatments 2-3 times per day.    Quit smoking 25 years ago.    He does continue to complain of shortness of breath.    The patient does have obstructive sleep apnea.  He is using his BiPAP regularly but continues to complain of significant daytime sleepiness and tiredness.        The following portions of the patient's history were reviewed and updated as appropriate: allergies, current medications, past family history, past medical history, past social history and past surgical history.    Review of Systems   Constitutional: Negative for chills.   HENT: Negative for sore throat.    Respiratory: Positive for cough and wheezing.    Psychiatric/Behavioral: Negative for sleep disturbance.       Objective   Visit Vitals  /78 (BP Location: Left arm, Patient Position: Sitting, Cuff Size: Adult)   Pulse 78   Temp 97.5 °F (36.4 °C)   Resp 18   Ht 165.1 cm (65\")   Wt 121 kg (267 lb)   SpO2 95%   BMI 44.43 kg/m²       Physical Exam  Vitals reviewed.   Constitutional:       Appearance: He is well-developed.   HENT:      Head: Normocephalic and atraumatic.      Mouth/Throat:      Comments: Oropharynx was crowded.  Eyes:      Extraocular Movements: Extraocular movements intact.   Neck:      Comments: Increased neck circumference noted.  Cardiovascular:      Rate and Rhythm: Normal rate.   Pulmonary:      Comments: Somewhat hyperresonant to percussion.  Somewhat decreased air entry.  No obvious wheezing " noted.   Musculoskeletal:      Cervical back: Neck supple.      Right lower leg: Edema present.      Left lower leg: Edema present.      Comments: Gait was slow.   Neurological:      Mental Status: He is alert and oriented to person, place, and time.           Assessment & Plan   Diagnoses and all orders for this visit:    1. Chronic obstructive pulmonary disease, unspecified COPD type (HCC) (Primary)  -     Pulmonary Function Test; Future  -     Polysomnography 4 or More Parameters With CPAP; Future  -     COVID PRE-OP / PRE-PROCEDURE SCREENING ORDER (NO ISOLATION) - Swab, Nasopharynx; Future    2. Personal history of tobacco use, presenting hazards to health    3. Asthma with COPD ([facility])    4. Obstructive sleep apnea  -     Polysomnography 4 or More Parameters With CPAP; Future  -     COVID PRE-OP / PRE-PROCEDURE SCREENING ORDER (NO ISOLATION) - Swab, Nasopharynx; Future    5. Morbid obesity, unspecified obesity type ([facility])    Other orders  -     Budeson-Glycopyrrol-Formoterol (Breztri Aerosphere) 160-9-4.8 MCG/ACT aerosol inhaler; Inhale 2 puffs 2 (Two) Times a Day.  Dispense: 15.9 g; Refill: 5           Return in about 3 months (around 8/31/2022) for Recheck, PFT F/U, Sleep study, For Marilee Aguilar), Overbook, ....Also 7-8 mths w/ Dr. Chan.    DISCUSSION (if any):  I reviewed the patient's discharge summary that mentioned community-acquired pneumonia, chronic COPD without exacerbation, chronic diastolic heart failure and chronic atrial fibrillation.  It also listed sleep apnea and coronary artery disease.    Last chest x-ray was reviewed personally and the results were shared with the patient.  Images reviewed personally.   Results for orders placed during the hospital encounter of 04/16/22    XR Chest 1 View    Narrative  PROCEDURE: XR CHEST 1 VW-    HISTORY: Weak/Dizzy/AMS triage protocol    COMPARISON: 03/27/2022.    FINDINGS: Left-sided pacemaker. The heart is enlarged. Patchy opacities  in the right lung,  may be secondary to pneumonia versus edema. No  significant effusion. There is no pneumothorax.  There are no acute  osseous abnormalities.    Impression  Patchy opacities in the right lung, may be secondary to  pneumonia versus edema. No significant effusion.    Continued followup is recommended.    This report was signed and finalized on 4/17/2022 9:04 AM by Lawrence Trevizo DO.    I also reviewed his last echocardiogram and shared the results with him.   Results for orders placed during the hospital encounter of 01/25/21    Adult Transthoracic Echo Complete W/ Cont if Necessary Per Protocol    Interpretation Summary  · Left ventricular wall thickness is consistent with concentric hypertrophy.  · Estimated left ventricular EF = 57% Left ventricular ejection fraction appears to be 56 - 60%. Left ventricular systolic function is normal.  · Left ventricular diastolic function is consistent with (grade I) impaired relaxation.  · Left atrial volume is mildly increased.      I have also reviewed laboratory data.  Lab Results   Component Value Date    EOSABS 0.00 04/17/2022    EOSABS 0.08 04/16/2022    EOSABS 0.21 03/27/2022    &   Lab Results   Component Value Date    CO2 28.4 04/18/2022       Lab Results   Component Value Date    PHART 7.402 02/06/2022    LHA2MVQ 47.1 (H) 02/06/2022    PO2ART 149.0 (H) 02/06/2022    K4JFRNNA 99.8 02/06/2022    CARBOXYHGB 0.9 02/06/2022     ===========================  ===========================    Last PFTs showed moderate obstruction with mild restriction.  These were performed in Feb 2021.    PFTs will be ordered to be done upon follow up.    Due to symptoms which are suggestive of poorly controlled obstructive lung disease, I have decided to add Breztri.    Compliance with medications stressed.     Side effects of prescribed medications were discussed with the patient    I reviewed the results of last in-lab sleep study in detail. It was performed in 2010. I informed him that the apnea  hypopnea index was 47.5 / hr.     I told the patient that his symptoms are consistent with poorly controlled sleep apnea.    Since the patient is having significant issues, the only realistic option is for him to undergo a full night titration study.    Patient was advised to continue using PAP for at least 4 hours every night.    Patient was advised to call this office with any issues.    If he continues to have hypersomnia despite being compliant with CPAP/BiPAP (after titration), he will be given a trial of Provigil or Nuvigil.      Dictated utilizing Dragon dictation.    This document was electronically signed by Aminta Chan MD on 05/17/22 at 12:05 EDT

## 2022-05-21 ENCOUNTER — HOSPITAL ENCOUNTER (EMERGENCY)
Facility: HOSPITAL | Age: 72
Discharge: HOME | End: 2022-05-22
Attending: EMERGENCY MEDICINE | Admitting: EMERGENCY MEDICINE

## 2022-05-21 DIAGNOSIS — M54.50 LOW BACK PAIN, UNSPECIFIED BACK PAIN LATERALITY, UNSPECIFIED CHRONICITY, UNSPECIFIED WHETHER SCIATICA PRESENT: Primary | ICD-10-CM

## 2022-05-21 PROCEDURE — 96375 TX/PRO/DX INJ NEW DRUG ADDON: CPT

## 2022-05-21 PROCEDURE — 96374 THER/PROPH/DIAG INJ IV PUSH: CPT

## 2022-05-21 PROCEDURE — P9612 CATHETERIZE FOR URINE SPEC: HCPCS

## 2022-05-21 PROCEDURE — 99284 EMERGENCY DEPT VISIT MOD MDM: CPT

## 2022-05-22 ENCOUNTER — APPOINTMENT (OUTPATIENT)
Dept: CT IMAGING | Facility: HOSPITAL | Age: 72
End: 2022-05-22

## 2022-05-22 ENCOUNTER — APPOINTMENT (OUTPATIENT)
Dept: GENERAL RADIOLOGY | Facility: HOSPITAL | Age: 72
End: 2022-05-22

## 2022-05-22 VITALS
HEART RATE: 60 BPM | TEMPERATURE: 98.6 F | DIASTOLIC BLOOD PRESSURE: 60 MMHG | BODY MASS INDEX: 43.32 KG/M2 | WEIGHT: 260 LBS | SYSTOLIC BLOOD PRESSURE: 112 MMHG | OXYGEN SATURATION: 98 % | RESPIRATION RATE: 18 BRPM | HEIGHT: 65 IN

## 2022-05-22 LAB
ALBUMIN SERPL-MCNC: 3.7 G/DL (ref 3.5–5.2)
ALBUMIN/GLOB SERPL: 1.4 G/DL
ALP SERPL-CCNC: 59 U/L (ref 39–117)
ALT SERPL W P-5'-P-CCNC: 11 U/L (ref 1–41)
ANION GAP SERPL CALCULATED.3IONS-SCNC: 7.1 MMOL/L (ref 5–15)
AST SERPL-CCNC: 21 U/L (ref 1–40)
B PARAPERT DNA SPEC QL NAA+PROBE: NOT DETECTED
B PERT DNA SPEC QL NAA+PROBE: NOT DETECTED
BASOPHILS # BLD AUTO: 0.06 10*3/MM3 (ref 0–0.2)
BASOPHILS NFR BLD AUTO: 0.6 % (ref 0–1.5)
BILIRUB SERPL-MCNC: 0.4 MG/DL (ref 0–1.2)
BILIRUB UR QL STRIP: NEGATIVE
BUN SERPL-MCNC: 26 MG/DL (ref 8–23)
BUN/CREAT SERPL: 14.1 (ref 7–25)
C PNEUM DNA NPH QL NAA+NON-PROBE: NOT DETECTED
CALCIUM SPEC-SCNC: 9.4 MG/DL (ref 8.6–10.5)
CHLORIDE SERPL-SCNC: 104 MMOL/L (ref 98–107)
CLARITY UR: CLEAR
CO2 SERPL-SCNC: 26.9 MMOL/L (ref 22–29)
COLOR UR: YELLOW
CREAT SERPL-MCNC: 1.85 MG/DL (ref 0.76–1.27)
DEPRECATED RDW RBC AUTO: 47.7 FL (ref 37–54)
EGFRCR SERPLBLD CKD-EPI 2021: 38.2 ML/MIN/1.73
EOSINOPHIL # BLD AUTO: 0.27 10*3/MM3 (ref 0–0.4)
EOSINOPHIL NFR BLD AUTO: 2.9 % (ref 0.3–6.2)
ERYTHROCYTE [DISTWIDTH] IN BLOOD BY AUTOMATED COUNT: 14.2 % (ref 12.3–15.4)
FLUAV SUBTYP SPEC NAA+PROBE: NOT DETECTED
FLUBV RNA ISLT QL NAA+PROBE: NOT DETECTED
GLOBULIN UR ELPH-MCNC: 2.6 GM/DL
GLUCOSE SERPL-MCNC: 89 MG/DL (ref 65–99)
GLUCOSE UR STRIP-MCNC: NEGATIVE MG/DL
HADV DNA SPEC NAA+PROBE: NOT DETECTED
HCOV 229E RNA SPEC QL NAA+PROBE: NOT DETECTED
HCOV HKU1 RNA SPEC QL NAA+PROBE: NOT DETECTED
HCOV NL63 RNA SPEC QL NAA+PROBE: NOT DETECTED
HCOV OC43 RNA SPEC QL NAA+PROBE: NOT DETECTED
HCT VFR BLD AUTO: 34.8 % (ref 37.5–51)
HGB BLD-MCNC: 11.5 G/DL (ref 13–17.7)
HGB UR QL STRIP.AUTO: NEGATIVE
HMPV RNA NPH QL NAA+NON-PROBE: NOT DETECTED
HPIV1 RNA ISLT QL NAA+PROBE: NOT DETECTED
HPIV2 RNA SPEC QL NAA+PROBE: NOT DETECTED
HPIV3 RNA NPH QL NAA+PROBE: NOT DETECTED
HPIV4 P GENE NPH QL NAA+PROBE: NOT DETECTED
IMM GRANULOCYTES # BLD AUTO: 0.06 10*3/MM3 (ref 0–0.05)
IMM GRANULOCYTES NFR BLD AUTO: 0.6 % (ref 0–0.5)
KETONES UR QL STRIP: NEGATIVE
LEUKOCYTE ESTERASE UR QL STRIP.AUTO: NEGATIVE
LYMPHOCYTES # BLD AUTO: 1.51 10*3/MM3 (ref 0.7–3.1)
LYMPHOCYTES NFR BLD AUTO: 16.2 % (ref 19.6–45.3)
M PNEUMO IGG SER IA-ACNC: NOT DETECTED
MAGNESIUM SERPL-MCNC: 1.8 MG/DL (ref 1.6–2.4)
MCH RBC QN AUTO: 30.3 PG (ref 26.6–33)
MCHC RBC AUTO-ENTMCNC: 33 G/DL (ref 31.5–35.7)
MCV RBC AUTO: 91.8 FL (ref 79–97)
MONOCYTES # BLD AUTO: 1.13 10*3/MM3 (ref 0.1–0.9)
MONOCYTES NFR BLD AUTO: 12.1 % (ref 5–12)
NEUTROPHILS NFR BLD AUTO: 6.29 10*3/MM3 (ref 1.7–7)
NEUTROPHILS NFR BLD AUTO: 67.6 % (ref 42.7–76)
NITRITE UR QL STRIP: NEGATIVE
NRBC BLD AUTO-RTO: 0 /100 WBC (ref 0–0.2)
NT-PROBNP SERPL-MCNC: 574.4 PG/ML (ref 0–900)
PH UR STRIP.AUTO: <=5 [PH] (ref 5–8)
PLATELET # BLD AUTO: 186 10*3/MM3 (ref 140–450)
PMV BLD AUTO: 9.9 FL (ref 6–12)
POTASSIUM SERPL-SCNC: 4.7 MMOL/L (ref 3.5–5.2)
PROT SERPL-MCNC: 6.3 G/DL (ref 6–8.5)
PROT UR QL STRIP: NEGATIVE
RBC # BLD AUTO: 3.79 10*6/MM3 (ref 4.14–5.8)
RHINOVIRUS RNA SPEC NAA+PROBE: NOT DETECTED
RSV RNA NPH QL NAA+NON-PROBE: NOT DETECTED
SARS-COV-2 RNA NPH QL NAA+NON-PROBE: NOT DETECTED
SODIUM SERPL-SCNC: 138 MMOL/L (ref 136–145)
SP GR UR STRIP: 1.01 (ref 1–1.03)
TROPONIN T SERPL-MCNC: 0.02 NG/ML (ref 0–0.03)
UROBILINOGEN UR QL STRIP: NORMAL
WBC NRBC COR # BLD: 9.32 10*3/MM3 (ref 3.4–10.8)

## 2022-05-22 PROCEDURE — 81003 URINALYSIS AUTO W/O SCOPE: CPT | Performed by: EMERGENCY MEDICINE

## 2022-05-22 PROCEDURE — 83880 ASSAY OF NATRIURETIC PEPTIDE: CPT | Performed by: EMERGENCY MEDICINE

## 2022-05-22 PROCEDURE — 84484 ASSAY OF TROPONIN QUANT: CPT | Performed by: EMERGENCY MEDICINE

## 2022-05-22 PROCEDURE — 85025 COMPLETE CBC W/AUTO DIFF WBC: CPT | Performed by: EMERGENCY MEDICINE

## 2022-05-22 PROCEDURE — 71045 X-RAY EXAM CHEST 1 VIEW: CPT

## 2022-05-22 PROCEDURE — 96375 TX/PRO/DX INJ NEW DRUG ADDON: CPT

## 2022-05-22 PROCEDURE — 0202U NFCT DS 22 TRGT SARS-COV-2: CPT | Performed by: EMERGENCY MEDICINE

## 2022-05-22 PROCEDURE — 96374 THER/PROPH/DIAG INJ IV PUSH: CPT

## 2022-05-22 PROCEDURE — 25010000002 MORPHINE PER 10 MG: Performed by: EMERGENCY MEDICINE

## 2022-05-22 PROCEDURE — 93005 ELECTROCARDIOGRAM TRACING: CPT | Performed by: EMERGENCY MEDICINE

## 2022-05-22 PROCEDURE — 83735 ASSAY OF MAGNESIUM: CPT | Performed by: EMERGENCY MEDICINE

## 2022-05-22 PROCEDURE — 25010000002 HYDROMORPHONE 1 MG/ML SOLUTION: Performed by: EMERGENCY MEDICINE

## 2022-05-22 PROCEDURE — 74176 CT ABD & PELVIS W/O CONTRAST: CPT

## 2022-05-22 PROCEDURE — 25010000002 ONDANSETRON PER 1 MG: Performed by: EMERGENCY MEDICINE

## 2022-05-22 PROCEDURE — 72131 CT LUMBAR SPINE W/O DYE: CPT

## 2022-05-22 PROCEDURE — 80053 COMPREHEN METABOLIC PANEL: CPT | Performed by: EMERGENCY MEDICINE

## 2022-05-22 RX ORDER — PREDNISONE 20 MG/1
20 TABLET ORAL 2 TIMES DAILY
Qty: 10 TABLET | Refills: 0 | Status: SHIPPED | OUTPATIENT
Start: 2022-05-22 | End: 2022-07-13 | Stop reason: HOSPADM

## 2022-05-22 RX ORDER — HYDROCODONE BITARTRATE AND ACETAMINOPHEN 5; 325 MG/1; MG/1
1 TABLET ORAL EVERY 6 HOURS PRN
Qty: 10 TABLET | Refills: 0 | Status: SHIPPED | OUTPATIENT
Start: 2022-05-22

## 2022-05-22 RX ORDER — ONDANSETRON 2 MG/ML
4 INJECTION INTRAMUSCULAR; INTRAVENOUS ONCE
Status: COMPLETED | OUTPATIENT
Start: 2022-05-22 | End: 2022-05-22

## 2022-05-22 RX ORDER — LIDOCAINE 50 MG/G
1 PATCH TOPICAL EVERY 24 HOURS
Qty: 6 EACH | Refills: 0 | Status: SHIPPED | OUTPATIENT
Start: 2022-05-22 | End: 2022-07-13 | Stop reason: HOSPADM

## 2022-05-22 RX ORDER — MORPHINE SULFATE 4 MG/ML
4 INJECTION, SOLUTION INTRAMUSCULAR; INTRAVENOUS ONCE
Status: COMPLETED | OUTPATIENT
Start: 2022-05-22 | End: 2022-05-22

## 2022-05-22 RX ADMIN — MORPHINE SULFATE 4 MG: 4 INJECTION, SOLUTION INTRAMUSCULAR; INTRAVENOUS at 00:57

## 2022-05-22 RX ADMIN — HYDROMORPHONE HYDROCHLORIDE 1 MG: 1 INJECTION, SOLUTION INTRAMUSCULAR; INTRAVENOUS; SUBCUTANEOUS at 02:16

## 2022-05-22 RX ADMIN — ONDANSETRON 4 MG: 2 INJECTION INTRAMUSCULAR; INTRAVENOUS at 03:06

## 2022-05-22 NOTE — ED PROVIDER NOTES
TRIAGE CHIEF COMPLAINT:     Nursing and triage notes reviewed    Chief Complaint   Patient presents with   • Hip Pain      HPI: Robe Bryant is a 71 y.o. male who presents to the emergency department complaining of right lower back and hip discomfort.  Symptoms have been ongoing for the past several days.  Patient had fallen out of his recliner prior to the onset of the symptoms.  He describes a sharp stabbing pain that radiates into his butt and his hip.  Pain is worse with movement.  States it feels like he is having spasms as well.  Occasional radiation into his abdomen.  Family also states patient has had some coughing and wheezing over the past few days.  Patient denies significant shortness of breath.  No fevers or chills.  No pain or burning with urination.    REVIEW OF SYSTEMS: All other systems reviewed and are negative     PAST MEDICAL HISTORY:   Past Medical History:   Diagnosis Date   • Anxiety and depression    • Arthritis    • Backache     20 years   • Benign prostatic hyperplasia    • Bladder trauma    • Cervicalgia    • Chronic kidney disease     Cr up to 2.1   • Coronary artery disease    • Diabetes mellitus (HCC)     15 years--   • Emphysema of lung (HCC)    • Erectile dysfunction    • Eye exam, routine 2015   • FH: colonic polyps    • Gout     for 10 years--   • History of echocardiogram 09/15/2014   • History of tobacco use     with cessation x7 years   • Hyperlipidemia    • Hypertension    • Migraine    • Myocardial infarction (HCC)     h/o coronary artery stenting--   • Prostate cancer (HCC)    • Restless leg syndrome     20 years   • Sleep apnea     12 years; uses a Bi-Pap   • Stroke (HCC)    • Syncope 4/28/2017   • Warfarin anticoagulation 9/14/2016        FAMILY HISTORY:   Family History   Problem Relation Age of Onset   • Cancer Mother    • Diabetes Mother    • Hypertension Mother    • Stroke Mother    • Stomach cancer Mother    • Heart disease Mother    • Stomach cancer Father    •  Cancer Father    • Lung cancer Father         SOCIAL HISTORY:   Social History     Socioeconomic History   • Marital status:    Tobacco Use   • Smoking status: Former Smoker     Packs/day: 1.00     Years: 30.00     Pack years: 30.00     Types: Cigarettes     Quit date: 8/15/2001     Years since quittin.7   • Smokeless tobacco: Never Used   • Tobacco comment: quit 16 years ago   Vaping Use   • Vaping Use: Never used   Substance and Sexual Activity   • Alcohol use: No   • Drug use: No   • Sexual activity: Defer        SURGICAL HISTORY:   Past Surgical History:   Procedure Laterality Date   • BLADDER SURGERY     • CARDIAC CATHETERIZATION  03/15/2013    revealing widely patent stents of the left circumflex and ramus intermedius coronary arteries, no significant disease is seen in any territory   • CARDIAC CATHETERIZATION Left 2019    Procedure: Left Heart Cath;  Surgeon: Arelis Tucker MD;  Location:  [facility];  Service: Cardiology   • CARDIAC ELECTROPHYSIOLOGY PROCEDURE N/A 5/10/2021    Procedure: PPM battery change;  Surgeon: Arelis Tucker MD;  Location:  [facility];  Service: Cardiology;  Laterality: N/A;   • CARDIAC PACEMAKER PLACEMENT     • CATARACT EXTRACTION     • COLONOSCOPY      No family history of colon cancer.     • COLONOSCOPY     • HERNIA REPAIR      Type unknown   • PACEMAKER IMPLANTATION     • PROSTATE SURGERY          CURRENT MEDICATIONS:      Medication List      CONTINUE taking these medications    glucose monitor monitoring kit  1 each Daily. E11.9     Lancet Device misc  1 each Daily. E11.9     Misc. Devices misc  Bipap tubing.     Nebulizer misc  1 each Every 4 (Four) Hours As Needed (shortness of breath).     True Metrix Air Glucose Meter w/Device kit  1 each by In Vitro route 2 (two) times a day. E11.9     TRUEplus Lancets 33G misc  1 each by Other route Daily. E11.9        ASK your doctor about these  medications    albuterol sulfate  (90 Base) MCG/ACT inhaler  Commonly known as: PROVENTIL HFA;VENTOLIN HFA;PROAIR HFA  Inhale 2 puffs Every 4 (Four) Hours As Needed for Wheezing or Shortness of Air.     allopurinol 100 MG tablet  Commonly known as: Zyloprim  Take 1 tablet by mouth Daily.     amiodarone 200 MG tablet  Commonly known as: PACERONE  Take 1 tablet by mouth Daily.     amLODIPine 10 MG tablet  Commonly known as: NORVASC  Take 1 tablet by mouth Daily.     apixaban 5 MG tablet tablet  Commonly known as: ELIQUIS     aspirin 81 MG chewable tablet     atorvastatin 20 MG tablet  Commonly known as: LIPITOR  Take 1 tablet by mouth Every Night.     azithromycin 250 MG tablet  Commonly known as: ZITHROMAX  Take one tablet by mouth once daily for 2 days  Indications: Pneumonia     baclofen 10 MG tablet  Commonly known as: LIORESAL  Take 1 tablet by mouth 3 (Three) Times a Day As Needed for Muscle Spasms.     Breztri Aerosphere 160-9-4.8 MCG/ACT aerosol inhaler  Generic drug: Budeson-Glycopyrrol-Formoterol  Inhale 2 puffs 2 (Two) Times a Day.     cetirizine 10 MG tablet  Commonly known as: zyrTEC  Take 1 tablet by mouth Daily.     clotrimazole-betamethasone 1-0.05 % cream  Commonly known as: Lotrisone  Apply to affected area 1 times daily     Co Q-10 100 MG capsule  Take 200 mg by mouth Daily.     cyclobenzaprine 10 MG tablet  Commonly known as: FLEXERIL  Take 1 tablet by mouth 3 (Three) Times a Day As Needed for Muscle Spasms.     FLUoxetine 20 MG capsule  Commonly known as: PROzac  Take 1 capsule by mouth Daily.     fluticasone 50 MCG/ACT nasal spray  Commonly known as: FLONASE  USE 2 SPRAYS IN EACH NOSTRIL EVERY DAY AS DIRECTED  BY  MD     furosemide 20 MG tablet  Commonly known as: LASIX  TAKE 1 TABLET BY MOUTH EVERY MORNING, MAY TAKE SECOND DOSE AS NEEDED FOR INCREASED SWELLING     gabapentin 400 MG capsule  Commonly known as: NEURONTIN     glipizide 5 MG tablet  Commonly known as: GLUCOTROL  TAKE 1 TABLET  TWICE DAILY BEFORE MEALS     ipratropium-albuterol 0.5-2.5 mg/3 ml nebulizer  Commonly known as: DUO-NEB  Take 3 mL by nebulization 4 (Four) Times a Day.     lidocaine 5 %  Commonly known as: LIDODERM  Place 1 patch on the skin as directed by provider Daily. Remove & Discard patch within 12 hours or as directed by MD     lisinopril 40 MG tablet  Commonly known as: PRINIVIL,ZESTRIL  Take 1 tablet by mouth Daily.     metoprolol succinate XL 50 MG 24 hr tablet  Commonly known as: Toprol XL  Take 1 tablet by mouth Daily.     multivitamin tablet tablet  Commonly known as: THERAGRAN     nitroglycerin 0.4 MG SL tablet  Commonly known as: Nitrostat  Place 1 tablet under the tongue Every 5 (Five) Minutes As Needed for Chest Pain.     O2  Commonly known as: OXYGEN     omeprazole 40 MG capsule  Commonly known as: priLOSEC  Take 1 capsule by mouth Daily.     ondansetron ODT 4 MG disintegrating tablet  Commonly known as: Zofran ODT  Place 1 tablet on the tongue Every 8 (Eight) Hours As Needed for Nausea or Vomiting.     potassium chloride 10 MEQ CR tablet  Commonly known as: K-DUR,KLOR-CON  Take 1 tablet by mouth 2 (Two) Times a Day.     pramipexole 0.5 MG tablet  Commonly known as: MIRAPEX  Take 1 tablet by mouth 3 (Three) Times a Day.     spironolactone 50 MG tablet  Commonly known as: ALDACTONE  TAKE 1 TABLET EVERY DAY     True Metrix Blood Glucose Test test strip  Generic drug: glucose blood  Daily testing E11.9     Vitamin D (Cholecalciferol) 50 MCG (2000 UT) capsule  Take 1 capsule by mouth Daily.             ALLERGIES: Patient has no known allergies.     PHYSICAL EXAM:   VITAL SIGNS:   Vitals:    05/21/22 2353   BP: 150/93   Pulse: 63   Resp: 18   Temp: 98.6 °F (37 °C)   SpO2: 97%      CONSTITUTIONAL: Awake, oriented, appears nontoxic but uncomfortable  HENT: Atraumatic, normocephalic, oral mucosa pink and moist, airway patent. Nares patent without drainage. External ears normal.   EYES: Conjunctivae clear   NECK: Trachea  midline, nontender, supple   CARDIOVASCULAR: Normal heart rate, Normal rhythm, No murmurs, rubs, gallops   PULMONARY/CHEST: Few scattered wheezes, overall good air movement.  No increased work of breathing or respiratory distress.  ABDOMINAL: Nondistended, soft, nontender - no rebound or guarding.  BACK: Tenderness in the right lower back and over the right buttock.  NEUROLOGIC: Nonfocal, moving all four extremities, no gross sensory or motor deficits.   EXTREMITIES: No clubbing, cyanosis, or edema.  Tenderness over the right hip.  Pain with range of motion.  No hip shortening or rotation.  SKIN: Warm, Dry, No erythema, No rash     ED COURSE / MEDICAL DECISION MAKING:   Robe Bryant is a 71 y.o. male who presents to the emergency department for evaluation of Right-sided lower back discomfort.  Patient does appear uncomfortable on arrival but is nontoxic.  Vital signs are stable on arrival.  Examination reveals tenderness in the right lower back extending down to the right hip.  Patient also has complained of a cough.  Will obtain labs, imaging, EKG for further evaluation of his presentation.    EKG interpreted by me reveals sinus rhythm with rate of 61 bpm.  There are few nonspecific findings but no acute ischemic changes.  This is an atypical appearing EKG.    Chest x-ray per my interpretation reveals some vascular congestion with possible left-sided pleural effusion.    Laboratory testing reveals negative respiratory panel.  Urinalysis not indicative of infection.  Cardiac enzymes within normal range.  Renal function is around patient's baseline.  BNP at 574.  White blood cell count within the normal range.    CT scan of the lumbar spine per radiology interpretation reveals severe degenerative changes.  CT scan of the abdomen pelvis per radiology interpretation does not reveal any obvious acute process.    Patient had improvement in his symptoms with pain medication in the emergency department.  We will  continue symptomatic therapy on his back with outpatient orthopedic referral.    Will have patient increase his home Lasix as I suspect there is some mild fluid overload.  Patient's vital signs have remained stable, I believe he is safe for discharged home at this time.    DECISION TO DISCHARGE/ADMIT: see ED care timeline     FINAL IMPRESSION:   1 --low back pain  2 --   3 --     Electronically signed by: Mar Lyn MD, 5/22/2022 01:07 Mar Chau MD  05/22/22 0611

## 2022-05-25 ENCOUNTER — LAB (OUTPATIENT)
Dept: LAB | Facility: HOSPITAL | Age: 72
End: 2022-05-25

## 2022-05-25 DIAGNOSIS — G47.33 OBSTRUCTIVE SLEEP APNEA: ICD-10-CM

## 2022-05-25 DIAGNOSIS — J44.9 CHRONIC OBSTRUCTIVE PULMONARY DISEASE, UNSPECIFIED COPD TYPE: ICD-10-CM

## 2022-05-25 LAB — SARS-COV-2 RNA NOSE QL NAA+PROBE: NOT DETECTED

## 2022-05-25 PROCEDURE — U0004 COV-19 TEST NON-CDC HGH THRU: HCPCS

## 2022-05-25 PROCEDURE — C9803 HOPD COVID-19 SPEC COLLECT: HCPCS

## 2022-05-27 ENCOUNTER — HOSPITAL ENCOUNTER (OUTPATIENT)
Dept: SLEEP MEDICINE | Facility: HOSPITAL | Age: 72
Discharge: HOME | End: 2022-05-27
Admitting: INTERNAL MEDICINE

## 2022-05-27 DIAGNOSIS — J44.9 CHRONIC OBSTRUCTIVE PULMONARY DISEASE, UNSPECIFIED COPD TYPE: ICD-10-CM

## 2022-05-27 DIAGNOSIS — G47.33 OBSTRUCTIVE SLEEP APNEA: ICD-10-CM

## 2022-05-27 PROCEDURE — 95811 POLYSOM 6/>YRS CPAP 4/> PARM: CPT | Performed by: INTERNAL MEDICINE

## 2022-05-27 PROCEDURE — 95811 POLYSOM 6/>YRS CPAP 4/> PARM: CPT

## 2022-05-29 NOTE — ASSESSMENT & PLAN NOTE
Controlled on current medication.  Patient is to continue glipizide.  Will continue to monitor A1c and microalbumin every few months.  Patient is on an ACE inhibitor, statin, and baby aspirin.     I am willing to give them a call. There are limits to what I can do by phone since I do not have an [place] license and cannot do telemedicine except in the context of a state license where the patient resides.     PFC

## 2022-06-06 DIAGNOSIS — G47.33 OBSTRUCTIVE SLEEP APNEA: Primary | ICD-10-CM

## 2022-06-17 DIAGNOSIS — R06.02 SOB (SHORTNESS OF BREATH): Primary | ICD-10-CM

## 2022-06-22 ENCOUNTER — HOSPITAL ENCOUNTER (OUTPATIENT)
Dept: ULTRASOUND IMAGING | Facility: HOSPITAL | Age: 72
End: 2022-06-22

## 2022-07-08 NOTE — ASSESSMENT & PLAN NOTE
Ellen Li MD Consult Note - General Surgery     Referring Provider: Provider, No Known    Patient Care Team:  Suellen Damico DO as PCP - General  Suellen Damico DO as PCP - Family Medicine    Chief complaint abdominal pain     Subjective .     History of present illness:  Ms. Downing is a 60 year old female well known to me s/p laparoscopic cholecystectomy on 10/28/21for biliary colic. This morning she developed severe abdominal pain located in the epigastrium that radiates to her RUQ and back. This is new. It is a sharp pain. She has a history of kidney stones but this pain is different. She is nauseated but has not had any emesis. She denies fevers, chest pain, shortness of breath.     Review of Systems  All systems were reviewed and negative except for: abdominal pain, nausea    Constitution:chills, fevers, night sweats and weight loss, weight gain  Eyes:  double vision, blurriness and loss of vision  ENT:  earaches, hearing loss and hoarseness  Respiratory:  cough, dry, cough, productive, hemoptysis and shortness of air  Cardiovascular:  chest pressure / pain, at rest, chest pressure / pain, on exertion, irregular pulse and palpitations  Gastrointestinal: bright red blood per rectum, change in bowel habits, constipation, diarrhea, heartburn, hematemesis, melena, nausea, pain and vomiting  Genitourinary:  difficulty / inability to void, pain, blood in urine and painful urination  Integument:  itching, rash, redness and swelling  Breast:  lump / mass, nipple discharge and pain  Hematologic / Lymphatic: easy bruising and lymphadenopathy  Musculoskeletal: joint pain, muscle pain and muscle weakness  Neurological: dizziness, loss of consciousness, numbness, vertigo and weakness  Behavioral/Psych: anxiety and depression  Endocrine: diabetes, thyroid disorder      History  Past Medical History:   Diagnosis Date   • Elevated cholesterol    • Family history of colon cancer    • Family history  Uncontrolled.  Will add in spironolactone for additional diuresis.     of colonic polyps    • History of colon polyps    • Hypercholesterolemia    • Hypertension    • Hypothyroid    • Kidney stone    ,   Past Surgical History:   Procedure Laterality Date   • CHOLECYSTECTOMY     • CHOLECYSTECTOMY WITH INTRAOPERATIVE CHOLANGIOGRAM N/A 10/28/2021    Procedure: LAPAROSCOPIC CHOLECYSTECTOMY;  Surgeon: Ellen Li MD;  Location: [facility] OR;  Service: General;  Laterality: N/A;   • COLONOSCOPY  11/29/2011    One 3mm hyperplastic polyp in the ascending colon; The examination was otherwise normal; Repeat 5 years   • COLONOSCOPY N/A 02/15/2017    The entire examined colon is normal on direct and retroflexion views; No specimens collected; Repeat 5 years   • COLONOSCOPY N/A 06/02/2022    Procedure: COLONOSCOPY WITH ANESTHESIA;  Surgeon: Mina Flores MD;  Location: [facility] ENDOSCOPY;  Service: Gastroenterology;  Laterality: N/A;  Pre: hx polyps  Post: polyp  Suellen Damico,    • CYSTOSCOPY BLADDER STONE LITHOTRIPSY N/A    • HYSTERECTOMY     • MEDIASTINOSCOPY     • OOPHORECTOMY     • TONSILLECTOMY     ,   Family History   Problem Relation Age of Onset   • Colon polyps Mother    • Heart attack Father    • No Known Problems Sister    • No Known Problems Brother    • No Known Problems Daughter    • No Known Problems Son    • No Known Problems Maternal Aunt    • No Known Problems Paternal Aunt    • Ovarian cancer Maternal Grandmother    • Colon cancer Maternal Grandfather    • Stomach cancer Paternal Grandmother    • BRCA 1/2 Neg Hx    • Breast cancer Neg Hx    • Endometrial cancer Neg Hx    ,   Social History     Tobacco Use   • Smoking status: Never Smoker   • Smokeless tobacco: Never Used   Vaping Use   • Vaping Use: Never used   Substance Use Topics   • Alcohol use: No   • Drug use: No   , (Not in a hospital admission)   and Allergies:  Hydrochlorothiazide, Morphine and related, and Penicillins    Current Facility-Administered Medications:   •  [COMPLETED] Insert peripheral IV,  , , Once **AND** sodium chloride 0.9 % flush 10 mL, 10 mL, Intravenous, PRN, Rosalba Meade, APRN  •  sucralfate (CARAFATE) 1 GM/10ML suspension 1 g, 1 g, Oral, Once, Rosalba Meade, CARRI    Current Outpatient Medications:   •  acetaminophen (Tylenol) 325 MG tablet, Take 3 tablets by mouth Every 8 (Eight) Hours. Take every 8 hours for 3 days then take prn as needed., Disp: 100 tablet, Rfl: 2  •  ibuprofen (Motrin IB) 200 MG tablet, Take 3 tablets by mouth Every 8 (Eight) Hours. Take every 8 hours for three days then take as needed., Disp: 100 tablet, Rfl: 2  •  levothyroxine (SYNTHROID, LEVOTHROID) 75 MCG tablet, Take 75 mcg by mouth Daily., Disp: , Rfl:   •  lisinopril (PRINIVIL,ZESTRIL) 10 MG tablet, Take 10 mg by mouth Daily., Disp: , Rfl:   •  ondansetron (Zofran) 4 MG tablet, Take 1 tablet by mouth Every 8 (Eight) Hours As Needed for Nausea., Disp: 15 tablet, Rfl: 0    Objective     Vital Signs   Temp:  [98 °F (36.7 °C)] 98 °F (36.7 °C)  Heart Rate:  [64-69] 69  Resp:  [16] 16  BP: (150-160)/() 150/83    Physical Exam:  General appearance - alert, well appearing, and in moderate distress  Mental status - alert, oriented to person, place, and time  Eyes - sclera anicteric  Chest - no tachypnea, retractions or cyanosis  Heart - regular rate   Abdomen - soft, non-distended, severe tenderness to palpation in the epigastrium   Neurological - alert, oriented, normal speech, no focal findings or movement disorder noted    Results Review:    Lab Results (last 24 hours)     Procedure Component Value Units Date/Time    Troponin [[id number]]  (Normal) Collected: 07/08/22 1122    Specimen: Blood Updated: 07/08/22 1332     Troponin T <0.010 ng/mL     Narrative:      Troponin T Reference Range:  <= 0.03 ng/mL-   Negative for AMI  >0.03 ng/mL-     Abnormal for myocardial necrosis.  Clinicians would have to utilize clinical acumen, EKG, Troponin and serial changes to determine if it is an Acute Myocardial  Infarction or myocardial injury due to an underlying chronic condition.       Results may be falsely decreased if patient taking Biotin.      [place] Draw [[id number]] Collected: 07/08/22 1327    Specimen: Blood Updated: 07/08/22 1331    Narrative:      The following orders were created for panel order [place] Draw.  Procedure                               Abnormality         Status                     ---------                               -----------         ------                     Green Top (Gel)[[id number]]                                  In process                   Please view results for these tests on the individual orders.    Green Top (Gel) [[id number]] Collected: 07/08/22 1327    Specimen: Blood Updated: 07/08/22 1331    Urinalysis With Microscopic If Indicated (No Culture) - Urine, Clean Catch [[id number]]  (Abnormal) Collected: 07/08/22 1137    Specimen: Urine, Clean Catch Updated: 07/08/22 1156     Color, UA Yellow     Appearance, UA Clear     pH, UA 6.0     Specific Gravity, UA 1.012     Glucose, UA Negative     Ketones, UA Negative     Bilirubin, UA Negative     Blood, UA Negative     Protein, UA Negative     Leuk Esterase, UA Negative     Nitrite, UA Negative     Urobilinogen, UA 2.0 E.U./dL    Narrative:      Urine microscopic not indicated.    Comprehensive Metabolic Panel [[id number]]  (Abnormal) Collected: 07/08/22 1122    Specimen: Blood Updated: 07/08/22 1155     Glucose 113 mg/dL      BUN 12 mg/dL      Creatinine 0.66 mg/dL      Sodium 140 mmol/L      Potassium 4.4 mmol/L      Chloride 106 mmol/L      CO2 25.0 mmol/L      Calcium 9.6 mg/dL      Total Protein 7.3 g/dL      Albumin 4.30 g/dL      ALT (SGPT) 251 U/L      AST (SGOT) 306 U/L      Alkaline Phosphatase 205 U/L      Total Bilirubin 1.1 mg/dL      Globulin 3.0 gm/dL      A/G Ratio 1.4 g/dL      BUN/Creatinine Ratio 18.2     Anion Gap 9.0 mmol/L      eGFR 99.9 mL/min/1.73      Comment: National Kidney Foundation and American Society of  Nephrology (ASN) Task Force recommended calculation based on the Chronic Kidney Disease Epidemiology Collaboration (CKD-EPI) equation refit without adjustment for race.       Narrative:      GFR Normal >60  Chronic Kidney Disease <60  Kidney Failure <15      Lipase [[id number]]  (Normal) Collected: 07/08/22 1122    Specimen: Blood Updated: 07/08/22 1150     Lipase 44 U/L     CBC & Differential [[id number]]  (Abnormal) Collected: 07/08/22 1122    Specimen: Blood Updated: 07/08/22 1137    Narrative:      The following orders were created for panel order CBC & Differential.  Procedure                               Abnormality         Status                     ---------                               -----------         ------                     CBC Auto Differential[[id number]]        Abnormal            Final result                 Please view results for these tests on the individual orders.    CBC Auto Differential [[id number]]  (Abnormal) Collected: 07/08/22 1122    Specimen: Blood Updated: 07/08/22 1137     WBC 7.60 10*3/mm3      RBC 4.71 10*6/mm3      Hemoglobin 14.1 g/dL      Hematocrit 43.0 %      MCV 91.3 fL      MCH 29.9 pg      MCHC 32.8 g/dL      RDW 12.6 %      RDW-SD 42.1 fl      MPV 8.6 fL      Platelets 288 10*3/mm3      Neutrophil % 76.8 %      Lymphocyte % 16.1 %      Monocyte % 5.9 %      Eosinophil % 0.4 %      Basophil % 0.4 %      Immature Grans % 0.4 %      Neutrophils, Absolute 5.84 10*3/mm3      Lymphocytes, Absolute 1.22 10*3/mm3      Monocytes, Absolute 0.45 10*3/mm3      Eosinophils, Absolute 0.03 10*3/mm3      Basophils, Absolute 0.03 10*3/mm3      Immature Grans, Absolute 0.03 10*3/mm3      nRBC 0.0 /100 WBC         Imaging Results (Last 24 Hours)     Procedure Component Value Units Date/Time    CT Abdomen Pelvis With Contrast [[id number]] Collected: 07/08/22 1238     Updated: 07/08/22 1245    Narrative:      EXAMINATION: CT ABDOMEN PELVIS W CONTRAST-      7/8/2022 12:27 PM CDT     HISTORY:  abdominal pain     In order to have a CT radiation dose as low as reasonably achievable  Automated Exposure Control was utilized for adjustment of the mA and/or  KV according to patient size.     DLP in mGycm= 684.     Abdomen/pelvis CT with IV contrast.  Axial, sagittal, and coronal sequences.     Normal heart size.  Clear lung bases.     Cholecystectomy clips.  Normal liver, pancreas, and spleen.  Spleen = 96 x 46 x 79 mm.     Normal and symmetric adrenal glands and kidneys.  No hydronephrosis or pyelonephritis.  No renal or ureteral stone.     Normal size aorta and IVC.  No bowel dilation or free fluid.     The uterus is absent.     Summary:  1. No mass, fluid collection, or inflammatory process.                                   This report was finalized on 07/08/2022 12:42 by Dr. Horace Fernández MD.                Assessment & Plan       Ms. Downing is a 60 year old female s/p lap lisa in 10/2021 who presents with severe epigastric pain. She has a normal WBC count with a slight left shift. Her T bili is normal at 1.1 and her lipase is normal at 44. Her alk phos is 205, , . She has a history of elevated LFTs with prior hepatitis work up. Her creatinine is normal. Her EKG has some abnormalities in the anterior leads however troponin as negative. Her UA is negative. I personally reviewed her CT scan with Dr. Fernández which shows no mass, no fluid collection, no inflammatory process. No fluid in the gallbladder fossa, no dilated ducts, no choledocholithiasis seen. I also reviewed her RUQ US shows no abnormality with no biliary dilation. I do not know the etiology of her pain at this time. I have offered admission for serial labs and pain control vs. Follow up labs on Monday morning. She has elected to discharge home. I have sent in Zofran and carafate to her pharmacy. I have ordered CBC, CMP, Lipase, and UA for Monday. I have also placed a gastroenterology referral to evaluate her for peptic ulcer  disease/gastritis. She is in agreement with this plan.     Ellen Li MD  07/08/22  13:58 CDT

## 2022-07-11 ENCOUNTER — HOSPITAL ENCOUNTER (OUTPATIENT)
Facility: HOSPITAL | Age: 72
Setting detail: OBSERVATION
Discharge: HOME | End: 2022-07-13
Attending: EMERGENCY MEDICINE | Admitting: INTERNAL MEDICINE

## 2022-07-11 ENCOUNTER — APPOINTMENT (OUTPATIENT)
Dept: GENERAL RADIOLOGY | Facility: HOSPITAL | Age: 72
End: 2022-07-11

## 2022-07-11 ENCOUNTER — APPOINTMENT (OUTPATIENT)
Dept: CT IMAGING | Facility: HOSPITAL | Age: 72
End: 2022-07-11

## 2022-07-11 DIAGNOSIS — R09.02 HYPOXIA: ICD-10-CM

## 2022-07-11 DIAGNOSIS — R33.9 URINARY RETENTION: ICD-10-CM

## 2022-07-11 DIAGNOSIS — I50.9 ACUTE ON CHRONIC CONGESTIVE HEART FAILURE, UNSPECIFIED HEART FAILURE TYPE: Primary | ICD-10-CM

## 2022-07-11 PROBLEM — J96.21 ACUTE ON CHRONIC RESPIRATORY FAILURE WITH HYPOXIA (HCC): Status: ACTIVE | Noted: 2022-07-11

## 2022-07-11 LAB
A-A DO2: 22.8 MMHG
ALBUMIN SERPL-MCNC: 3.7 G/DL (ref 3.5–5.2)
ALBUMIN/GLOB SERPL: 1.7 G/DL
ALP SERPL-CCNC: 65 U/L (ref 39–117)
ALT SERPL W P-5'-P-CCNC: 17 U/L (ref 1–41)
ANION GAP SERPL CALCULATED.3IONS-SCNC: 10.6 MMOL/L (ref 5–15)
ARTERIAL PATENCY WRIST A: POSITIVE
AST SERPL-CCNC: 26 U/L (ref 1–40)
ATMOSPHERIC PRESS: 733 MMHG
B PARAPERT DNA SPEC QL NAA+PROBE: NOT DETECTED
B PERT DNA SPEC QL NAA+PROBE: NOT DETECTED
BACTERIA UR QL AUTO: ABNORMAL /HPF
BASE EXCESS BLDA CALC-SCNC: 4.9 MMOL/L (ref 0–2)
BASOPHILS # BLD AUTO: 0.07 10*3/MM3 (ref 0–0.2)
BASOPHILS NFR BLD AUTO: 0.9 % (ref 0–1.5)
BDY SITE: ABNORMAL
BILIRUB SERPL-MCNC: 0.4 MG/DL (ref 0–1.2)
BILIRUB UR QL STRIP: NEGATIVE
BUN SERPL-MCNC: 14 MG/DL (ref 8–23)
BUN/CREAT SERPL: 10.1 (ref 7–25)
C PNEUM DNA NPH QL NAA+NON-PROBE: NOT DETECTED
CALCIUM SPEC-SCNC: 8.7 MG/DL (ref 8.6–10.5)
CHLORIDE SERPL-SCNC: 104 MMOL/L (ref 98–107)
CLARITY UR: CLEAR
CO2 SERPL-SCNC: 27.4 MMOL/L (ref 22–29)
COHGB MFR BLD: 1.4 % (ref 0–2)
COLOR UR: YELLOW
CREAT SERPL-MCNC: 1.38 MG/DL (ref 0.76–1.27)
D DIMER PPP FEU-MCNC: 1.43 MCGFEU/ML (ref 0–0.57)
DEPRECATED RDW RBC AUTO: 44.7 FL (ref 37–54)
EGFRCR SERPLBLD CKD-EPI 2021: 54.3 ML/MIN/1.73
EOSINOPHIL # BLD AUTO: 0.22 10*3/MM3 (ref 0–0.4)
EOSINOPHIL NFR BLD AUTO: 2.8 % (ref 0.3–6.2)
ERYTHROCYTE [DISTWIDTH] IN BLOOD BY AUTOMATED COUNT: 13.2 % (ref 12.3–15.4)
FLUAV SUBTYP SPEC NAA+PROBE: NOT DETECTED
FLUBV RNA ISLT QL NAA+PROBE: NOT DETECTED
GLOBULIN UR ELPH-MCNC: 2.2 GM/DL
GLUCOSE BLDC GLUCOMTR-MCNC: 208 MG/DL (ref 70–130)
GLUCOSE SERPL-MCNC: 95 MG/DL (ref 65–99)
GLUCOSE UR STRIP-MCNC: ABNORMAL MG/DL
HADV DNA SPEC NAA+PROBE: NOT DETECTED
HBA1C MFR BLD: 6.2 % (ref 4.8–5.6)
HCO3 BLDA-SCNC: 30.6 MMOL/L (ref 22–28)
HCOV 229E RNA SPEC QL NAA+PROBE: NOT DETECTED
HCOV HKU1 RNA SPEC QL NAA+PROBE: NOT DETECTED
HCOV NL63 RNA SPEC QL NAA+PROBE: NOT DETECTED
HCOV OC43 RNA SPEC QL NAA+PROBE: NOT DETECTED
HCT VFR BLD AUTO: 36.5 % (ref 37.5–51)
HCT VFR BLD CALC: 37.7 %
HGB BLD-MCNC: 11.8 G/DL (ref 13–17.7)
HGB UR QL STRIP.AUTO: ABNORMAL
HMPV RNA NPH QL NAA+NON-PROBE: NOT DETECTED
HOLD SPECIMEN: NORMAL
HOLD SPECIMEN: NORMAL
HPIV1 RNA ISLT QL NAA+PROBE: NOT DETECTED
HPIV2 RNA SPEC QL NAA+PROBE: NOT DETECTED
HPIV3 RNA NPH QL NAA+PROBE: NOT DETECTED
HPIV4 P GENE NPH QL NAA+PROBE: NOT DETECTED
HYALINE CASTS UR QL AUTO: ABNORMAL /LPF
IMM GRANULOCYTES # BLD AUTO: 0.03 10*3/MM3 (ref 0–0.05)
IMM GRANULOCYTES NFR BLD AUTO: 0.4 % (ref 0–0.5)
INHALED O2 CONCENTRATION: 21 %
KETONES UR QL STRIP: NEGATIVE
LEUKOCYTE ESTERASE UR QL STRIP.AUTO: NEGATIVE
LYMPHOCYTES # BLD AUTO: 1.41 10*3/MM3 (ref 0.7–3.1)
LYMPHOCYTES NFR BLD AUTO: 17.8 % (ref 19.6–45.3)
M PNEUMO IGG SER IA-ACNC: NOT DETECTED
MCH RBC QN AUTO: 29.7 PG (ref 26.6–33)
MCHC RBC AUTO-ENTMCNC: 32.3 G/DL (ref 31.5–35.7)
MCV RBC AUTO: 91.9 FL (ref 79–97)
METHGB BLD QL: 0.3 % (ref 0–1.5)
MODALITY: ABNORMAL
MONOCYTES # BLD AUTO: 0.86 10*3/MM3 (ref 0.1–0.9)
MONOCYTES NFR BLD AUTO: 10.9 % (ref 5–12)
NEUTROPHILS NFR BLD AUTO: 5.33 10*3/MM3 (ref 1.7–7)
NEUTROPHILS NFR BLD AUTO: 67.2 % (ref 42.7–76)
NITRITE UR QL STRIP: NEGATIVE
NOTE: ABNORMAL
NRBC BLD AUTO-RTO: 0 /100 WBC (ref 0–0.2)
NT-PROBNP SERPL-MCNC: 762.8 PG/ML (ref 0–900)
OXYHGB MFR BLDV: 91.8 % (ref 94–99)
PCO2 BLDA: 48.9 MM HG (ref 35–45)
PCO2 TEMP ADJ BLD: ABNORMAL MM[HG]
PH BLDA: 7.41 PH UNITS (ref 7.35–7.45)
PH UR STRIP.AUTO: <=5 [PH] (ref 5–8)
PH, TEMP CORRECTED: ABNORMAL
PLATELET # BLD AUTO: 183 10*3/MM3 (ref 140–450)
PMV BLD AUTO: 10.9 FL (ref 6–12)
PO2 BLDA: 66 MM HG (ref 75–100)
PO2 TEMP ADJ BLD: ABNORMAL MM[HG]
POTASSIUM SERPL-SCNC: 3.6 MMOL/L (ref 3.5–5.2)
PROCALCITONIN SERPL-MCNC: 0.06 NG/ML (ref 0–0.25)
PROT SERPL-MCNC: 5.9 G/DL (ref 6–8.5)
PROT UR QL STRIP: NEGATIVE
RBC # BLD AUTO: 3.97 10*6/MM3 (ref 4.14–5.8)
RBC # UR STRIP: ABNORMAL /HPF
REF LAB TEST METHOD: ABNORMAL
RHINOVIRUS RNA SPEC NAA+PROBE: NOT DETECTED
RSV RNA NPH QL NAA+NON-PROBE: NOT DETECTED
SAO2 % BLDCOA: 93.4 % (ref 94–100)
SARS-COV-2 RNA NPH QL NAA+NON-PROBE: NOT DETECTED
SODIUM SERPL-SCNC: 142 MMOL/L (ref 136–145)
SP GR UR STRIP: 1.02 (ref 1–1.03)
SQUAMOUS #/AREA URNS HPF: ABNORMAL /HPF
TROPONIN T SERPL-MCNC: 0.01 NG/ML (ref 0–0.03)
UROBILINOGEN UR QL STRIP: ABNORMAL
VENTILATOR MODE: ABNORMAL
WBC # UR STRIP: ABNORMAL /HPF
WBC NRBC COR # BLD: 7.92 10*3/MM3 (ref 3.4–10.8)
WHOLE BLOOD HOLD COAG: NORMAL
WHOLE BLOOD HOLD SPECIMEN: NORMAL

## 2022-07-11 PROCEDURE — 94761 N-INVAS EAR/PLS OXIMETRY MLT: CPT

## 2022-07-11 PROCEDURE — 0202U NFCT DS 22 TRGT SARS-COV-2: CPT | Performed by: EMERGENCY MEDICINE

## 2022-07-11 PROCEDURE — 83880 ASSAY OF NATRIURETIC PEPTIDE: CPT | Performed by: EMERGENCY MEDICINE

## 2022-07-11 PROCEDURE — 36600 WITHDRAWAL OF ARTERIAL BLOOD: CPT

## 2022-07-11 PROCEDURE — 83050 HGB METHEMOGLOBIN QUAN: CPT

## 2022-07-11 PROCEDURE — 84145 PROCALCITONIN (PCT): CPT | Performed by: EMERGENCY MEDICINE

## 2022-07-11 PROCEDURE — G0378 HOSPITAL OBSERVATION PER HR: HCPCS

## 2022-07-11 PROCEDURE — 71275 CT ANGIOGRAPHY CHEST: CPT

## 2022-07-11 PROCEDURE — 71045 X-RAY EXAM CHEST 1 VIEW: CPT

## 2022-07-11 PROCEDURE — 82805 BLOOD GASES W/O2 SATURATION: CPT

## 2022-07-11 PROCEDURE — 99285 EMERGENCY DEPT VISIT HI MDM: CPT

## 2022-07-11 PROCEDURE — 80053 COMPREHEN METABOLIC PANEL: CPT | Performed by: EMERGENCY MEDICINE

## 2022-07-11 PROCEDURE — 94799 UNLISTED PULMONARY SVC/PX: CPT

## 2022-07-11 PROCEDURE — 25010000002 IOPAMIDOL 61 % SOLUTION: Performed by: EMERGENCY MEDICINE

## 2022-07-11 PROCEDURE — 94640 AIRWAY INHALATION TREATMENT: CPT

## 2022-07-11 PROCEDURE — 82962 GLUCOSE BLOOD TEST: CPT

## 2022-07-11 PROCEDURE — 81001 URINALYSIS AUTO W/SCOPE: CPT | Performed by: FAMILY MEDICINE

## 2022-07-11 PROCEDURE — 99220 PR INITIAL OBSERVATION CARE/DAY 70 MINUTES: CPT | Performed by: FAMILY MEDICINE

## 2022-07-11 PROCEDURE — 85025 COMPLETE CBC W/AUTO DIFF WBC: CPT | Performed by: EMERGENCY MEDICINE

## 2022-07-11 PROCEDURE — 96374 THER/PROPH/DIAG INJ IV PUSH: CPT

## 2022-07-11 PROCEDURE — 84484 ASSAY OF TROPONIN QUANT: CPT | Performed by: EMERGENCY MEDICINE

## 2022-07-11 PROCEDURE — 96375 TX/PRO/DX INJ NEW DRUG ADDON: CPT

## 2022-07-11 PROCEDURE — 82375 ASSAY CARBOXYHB QUANT: CPT

## 2022-07-11 PROCEDURE — 83036 HEMOGLOBIN GLYCOSYLATED A1C: CPT | Performed by: FAMILY MEDICINE

## 2022-07-11 PROCEDURE — 25010000002 METHYLPREDNISOLONE PER 125 MG: Performed by: EMERGENCY MEDICINE

## 2022-07-11 PROCEDURE — 25010000002 FUROSEMIDE PER 20 MG: Performed by: EMERGENCY MEDICINE

## 2022-07-11 PROCEDURE — 85379 FIBRIN DEGRADATION QUANT: CPT | Performed by: EMERGENCY MEDICINE

## 2022-07-11 PROCEDURE — 25010000002 FENTANYL CITRATE (PF) 50 MCG/ML SOLUTION: Performed by: EMERGENCY MEDICINE

## 2022-07-11 PROCEDURE — 25010000002 HYDRALAZINE PER 20 MG: Performed by: FAMILY MEDICINE

## 2022-07-11 RX ORDER — ASPIRIN 325 MG
325 TABLET ORAL ONCE
Status: COMPLETED | OUTPATIENT
Start: 2022-07-11 | End: 2022-07-11

## 2022-07-11 RX ORDER — FLUOXETINE HYDROCHLORIDE 20 MG/1
20 CAPSULE ORAL DAILY
Status: DISCONTINUED | OUTPATIENT
Start: 2022-07-12 | End: 2022-07-13 | Stop reason: HOSPADM

## 2022-07-11 RX ORDER — SODIUM CHLORIDE 0.9 % (FLUSH) 0.9 %
10 SYRINGE (ML) INJECTION AS NEEDED
Status: DISCONTINUED | OUTPATIENT
Start: 2022-07-11 | End: 2022-07-13 | Stop reason: HOSPADM

## 2022-07-11 RX ORDER — ALBUTEROL SULFATE 2.5 MG/3ML
2.5 SOLUTION RESPIRATORY (INHALATION)
Refills: 3 | Status: DISCONTINUED | OUTPATIENT
Start: 2022-07-11 | End: 2022-07-11

## 2022-07-11 RX ORDER — FLUTICASONE PROPIONATE 50 MCG
1 SPRAY, SUSPENSION (ML) NASAL DAILY
Status: DISCONTINUED | OUTPATIENT
Start: 2022-07-12 | End: 2022-07-13 | Stop reason: HOSPADM

## 2022-07-11 RX ORDER — ASPIRIN 81 MG/1
81 TABLET, CHEWABLE ORAL DAILY
Status: DISCONTINUED | OUTPATIENT
Start: 2022-07-12 | End: 2022-07-13 | Stop reason: HOSPADM

## 2022-07-11 RX ORDER — ACETAMINOPHEN 160 MG/5ML
650 SOLUTION ORAL EVERY 4 HOURS PRN
Status: DISCONTINUED | OUTPATIENT
Start: 2022-07-11 | End: 2022-07-13 | Stop reason: HOSPADM

## 2022-07-11 RX ORDER — FENTANYL CITRATE 50 UG/ML
50 INJECTION, SOLUTION INTRAMUSCULAR; INTRAVENOUS ONCE
Status: COMPLETED | OUTPATIENT
Start: 2022-07-11 | End: 2022-07-11

## 2022-07-11 RX ORDER — CETIRIZINE HYDROCHLORIDE 10 MG/1
10 TABLET ORAL DAILY
Status: DISCONTINUED | OUTPATIENT
Start: 2022-07-12 | End: 2022-07-13 | Stop reason: HOSPADM

## 2022-07-11 RX ORDER — AMIODARONE HYDROCHLORIDE 200 MG/1
200 TABLET ORAL
Status: DISCONTINUED | OUTPATIENT
Start: 2022-07-12 | End: 2022-07-13 | Stop reason: HOSPADM

## 2022-07-11 RX ORDER — ATORVASTATIN CALCIUM 20 MG/1
20 TABLET, FILM COATED ORAL NIGHTLY
Status: DISCONTINUED | OUTPATIENT
Start: 2022-07-11 | End: 2022-07-13 | Stop reason: HOSPADM

## 2022-07-11 RX ORDER — ALBUTEROL SULFATE 2.5 MG/3ML
2.5 SOLUTION RESPIRATORY (INHALATION) EVERY 6 HOURS PRN
Status: DISCONTINUED | OUTPATIENT
Start: 2022-07-11 | End: 2022-07-13 | Stop reason: HOSPADM

## 2022-07-11 RX ORDER — ALBUTEROL SULFATE 90 UG/1
6 AEROSOL, METERED RESPIRATORY (INHALATION) ONCE
Status: COMPLETED | OUTPATIENT
Start: 2022-07-11 | End: 2022-07-11

## 2022-07-11 RX ORDER — PANTOPRAZOLE SODIUM 40 MG/1
40 TABLET, DELAYED RELEASE ORAL EVERY MORNING
Refills: 3 | Status: DISCONTINUED | OUTPATIENT
Start: 2022-07-12 | End: 2022-07-13 | Stop reason: HOSPADM

## 2022-07-11 RX ORDER — HYDRALAZINE HYDROCHLORIDE 20 MG/ML
10 INJECTION INTRAMUSCULAR; INTRAVENOUS EVERY 6 HOURS PRN
Status: DISCONTINUED | OUTPATIENT
Start: 2022-07-11 | End: 2022-07-13 | Stop reason: HOSPADM

## 2022-07-11 RX ORDER — LISINOPRIL 20 MG/1
40 TABLET ORAL DAILY
Status: DISCONTINUED | OUTPATIENT
Start: 2022-07-12 | End: 2022-07-13 | Stop reason: HOSPADM

## 2022-07-11 RX ORDER — PRAMIPEXOLE DIHYDROCHLORIDE 0.25 MG/1
0.5 TABLET ORAL 3 TIMES DAILY
Status: DISCONTINUED | OUTPATIENT
Start: 2022-07-11 | End: 2022-07-13 | Stop reason: HOSPADM

## 2022-07-11 RX ORDER — DEXTROSE MONOHYDRATE 25 G/50ML
25 INJECTION, SOLUTION INTRAVENOUS
Status: DISCONTINUED | OUTPATIENT
Start: 2022-07-11 | End: 2022-07-13 | Stop reason: HOSPADM

## 2022-07-11 RX ORDER — AMLODIPINE BESYLATE 5 MG/1
10 TABLET ORAL DAILY
Status: DISCONTINUED | OUTPATIENT
Start: 2022-07-12 | End: 2022-07-13 | Stop reason: HOSPADM

## 2022-07-11 RX ORDER — SODIUM CHLORIDE 0.9 % (FLUSH) 0.9 %
10 SYRINGE (ML) INJECTION EVERY 12 HOURS SCHEDULED
Status: DISCONTINUED | OUTPATIENT
Start: 2022-07-11 | End: 2022-07-13 | Stop reason: HOSPADM

## 2022-07-11 RX ORDER — ALLOPURINOL 100 MG/1
100 TABLET ORAL DAILY
Status: DISCONTINUED | OUTPATIENT
Start: 2022-07-12 | End: 2022-07-13 | Stop reason: HOSPADM

## 2022-07-11 RX ORDER — FUROSEMIDE 10 MG/ML
80 INJECTION INTRAMUSCULAR; INTRAVENOUS ONCE
Status: COMPLETED | OUTPATIENT
Start: 2022-07-11 | End: 2022-07-11

## 2022-07-11 RX ORDER — METHYLPREDNISOLONE SODIUM SUCCINATE 125 MG/2ML
125 INJECTION, POWDER, LYOPHILIZED, FOR SOLUTION INTRAMUSCULAR; INTRAVENOUS ONCE
Status: COMPLETED | OUTPATIENT
Start: 2022-07-11 | End: 2022-07-11

## 2022-07-11 RX ORDER — METOPROLOL SUCCINATE 50 MG/1
50 TABLET, EXTENDED RELEASE ORAL DAILY
Status: DISCONTINUED | OUTPATIENT
Start: 2022-07-12 | End: 2022-07-13 | Stop reason: HOSPADM

## 2022-07-11 RX ORDER — CYCLOBENZAPRINE HCL 10 MG
10 TABLET ORAL 3 TIMES DAILY PRN
Status: DISCONTINUED | OUTPATIENT
Start: 2022-07-11 | End: 2022-07-13 | Stop reason: HOSPADM

## 2022-07-11 RX ORDER — NICOTINE POLACRILEX 4 MG
1 LOZENGE BUCCAL
Status: DISCONTINUED | OUTPATIENT
Start: 2022-07-11 | End: 2022-07-13 | Stop reason: HOSPADM

## 2022-07-11 RX ORDER — SPIRONOLACTONE 25 MG/1
50 TABLET ORAL DAILY
Status: DISCONTINUED | OUTPATIENT
Start: 2022-07-12 | End: 2022-07-13 | Stop reason: HOSPADM

## 2022-07-11 RX ORDER — GABAPENTIN 400 MG/1
400 CAPSULE ORAL 3 TIMES DAILY
Status: DISCONTINUED | OUTPATIENT
Start: 2022-07-11 | End: 2022-07-13 | Stop reason: HOSPADM

## 2022-07-11 RX ORDER — INSULIN ASPART 100 [IU]/ML
0-9 INJECTION, SOLUTION INTRAVENOUS; SUBCUTANEOUS
Status: DISCONTINUED | OUTPATIENT
Start: 2022-07-12 | End: 2022-07-13 | Stop reason: HOSPADM

## 2022-07-11 RX ORDER — ACETAMINOPHEN 325 MG/1
650 TABLET ORAL EVERY 4 HOURS PRN
Status: DISCONTINUED | OUTPATIENT
Start: 2022-07-11 | End: 2022-07-13 | Stop reason: HOSPADM

## 2022-07-11 RX ORDER — ACETAMINOPHEN 650 MG/1
650 SUPPOSITORY RECTAL EVERY 4 HOURS PRN
Status: DISCONTINUED | OUTPATIENT
Start: 2022-07-11 | End: 2022-07-13 | Stop reason: HOSPADM

## 2022-07-11 RX ORDER — HYDROCODONE BITARTRATE AND ACETAMINOPHEN 5; 325 MG/1; MG/1
1 TABLET ORAL EVERY 6 HOURS PRN
Status: DISCONTINUED | OUTPATIENT
Start: 2022-07-11 | End: 2022-07-13 | Stop reason: HOSPADM

## 2022-07-11 RX ORDER — IPRATROPIUM BROMIDE AND ALBUTEROL SULFATE 2.5; .5 MG/3ML; MG/3ML
3 SOLUTION RESPIRATORY (INHALATION)
Status: DISCONTINUED | OUTPATIENT
Start: 2022-07-11 | End: 2022-07-13 | Stop reason: HOSPADM

## 2022-07-11 RX ORDER — FUROSEMIDE 10 MG/ML
40 INJECTION INTRAMUSCULAR; INTRAVENOUS EVERY 12 HOURS
Status: DISCONTINUED | OUTPATIENT
Start: 2022-07-12 | End: 2022-07-13 | Stop reason: HOSPADM

## 2022-07-11 RX ORDER — ONDANSETRON 2 MG/ML
4 INJECTION INTRAMUSCULAR; INTRAVENOUS EVERY 6 HOURS PRN
Status: DISCONTINUED | OUTPATIENT
Start: 2022-07-11 | End: 2022-07-13 | Stop reason: HOSPADM

## 2022-07-11 RX ADMIN — METHYLPREDNISOLONE SODIUM SUCCINATE 125 MG: 125 INJECTION, POWDER, FOR SOLUTION INTRAMUSCULAR; INTRAVENOUS at 15:15

## 2022-07-11 RX ADMIN — ATORVASTATIN CALCIUM 20 MG: 20 TABLET, FILM COATED ORAL at 21:06

## 2022-07-11 RX ADMIN — HYDRALAZINE HYDROCHLORIDE 10 MG: 20 INJECTION INTRAMUSCULAR; INTRAVENOUS at 21:59

## 2022-07-11 RX ADMIN — ALBUTEROL SULFATE 6 PUFF: 90 AEROSOL, METERED RESPIRATORY (INHALATION) at 15:15

## 2022-07-11 RX ADMIN — IOPAMIDOL 100 ML: 612 INJECTION, SOLUTION INTRAVENOUS at 17:37

## 2022-07-11 RX ADMIN — GABAPENTIN 400 MG: 400 CAPSULE ORAL at 21:06

## 2022-07-11 RX ADMIN — PRAMIPEXOLE DIHYDROCHLORIDE 0.5 MG: 0.25 TABLET ORAL at 21:06

## 2022-07-11 RX ADMIN — ASPIRIN 325 MG ORAL TABLET 325 MG: 325 PILL ORAL at 15:14

## 2022-07-11 RX ADMIN — HYDROCODONE BITARTRATE AND ACETAMINOPHEN 1 TABLET: 5; 325 TABLET ORAL at 22:39

## 2022-07-11 RX ADMIN — FUROSEMIDE 80 MG: 10 INJECTION, SOLUTION INTRAMUSCULAR; INTRAVENOUS at 16:53

## 2022-07-11 RX ADMIN — APIXABAN 5 MG: 5 TABLET, FILM COATED ORAL at 21:06

## 2022-07-11 RX ADMIN — Medication 10 ML: at 22:13

## 2022-07-11 RX ADMIN — FENTANYL CITRATE 50 MCG: 50 INJECTION INTRAMUSCULAR; INTRAVENOUS at 16:52

## 2022-07-11 RX ADMIN — IPRATROPIUM BROMIDE AND ALBUTEROL SULFATE 3 ML: 2.5; .5 SOLUTION RESPIRATORY (INHALATION) at 21:33

## 2022-07-11 NOTE — ED PROVIDER NOTES
Subjective   71-year-old male presenting with shortness of breath and chest pain.  He states that yesterday started having some diffuse anterior chest pain.  This pain does not radiate.  It is intermittent.  Associated with some shortness of breath.  No real increased cough.  No vomiting, diarrhea, abdominal pain.  No particular alleviating or aggravating factors to his symptoms.  He does have a history of COPD.  He also notes his wife was positive for COVID a few weeks ago, he has not been tested.          Review of Systems   Constitutional: Negative.    HENT: Negative.    Eyes: Negative.    Respiratory: Positive for shortness of breath.    Cardiovascular: Positive for chest pain.   Gastrointestinal: Negative.    Genitourinary: Negative.    Musculoskeletal: Negative.    Skin: Negative.    Neurological: Negative.    Psychiatric/Behavioral: Negative.        Past Medical History:   Diagnosis Date   • Anxiety and depression    • Arthritis    • Backache     20 years   • Benign prostatic hyperplasia    • Bladder trauma    • Cervicalgia    • Chronic kidney disease     Cr up to 2.1   • Coronary artery disease    • Diabetes mellitus (HCC)     15 years--   • Emphysema of lung ([facility])    • Erectile dysfunction    • Eye exam, routine 2015   • FH: colonic polyps    • Gout     for 10 years--   • History of echocardiogram 09/15/2014   • History of tobacco use     with cessation x7 years   • Hyperlipidemia    • Hypertension    • Migraine    • Myocardial infarction (HCC)     h/o coronary artery stenting--   • Prostate cancer ([facility])    • Restless leg syndrome     20 years   • Sleep apnea     12 years; uses a Bi-Pap   • Stroke ([facility])    • Syncope 4/28/2017   • Warfarin anticoagulation 9/14/2016       No Known Allergies    Past Surgical History:   Procedure Laterality Date   • BLADDER SURGERY     • CARDIAC CATHETERIZATION  03/15/2013    revealing widely patent stents of the left circumflex and ramus intermedius coronary arteries, no  significant disease is seen in any territory   • CARDIAC CATHETERIZATION Left 2019    Procedure: Left Heart Cath;  Surgeon: Arelis Tucker MD;  Location:  PREM CATH INVASIVE LOCATION;  Service: Cardiology   • CARDIAC ELECTROPHYSIOLOGY PROCEDURE N/A 5/10/2021    Procedure: PPM battery change;  Surgeon: Arelis Tucker MD;  Location:  PREM CATH INVASIVE LOCATION;  Service: Cardiology;  Laterality: N/A;   • CARDIAC PACEMAKER PLACEMENT     • CATARACT EXTRACTION     • COLONOSCOPY      No family history of colon cancer.     • COLONOSCOPY     • HERNIA REPAIR      Type unknown   • PACEMAKER IMPLANTATION     • PROSTATE SURGERY         Family History   Problem Relation Age of Onset   • Cancer Mother    • Diabetes Mother    • Hypertension Mother    • Stroke Mother    • Stomach cancer Mother    • Heart disease Mother    • Stomach cancer Father    • Cancer Father    • Lung cancer Father        Social History     Socioeconomic History   • Marital status:    Tobacco Use   • Smoking status: Former Smoker     Packs/day: 1.00     Years: 30.00     Pack years: 30.00     Types: Cigarettes     Quit date: 8/15/2001     Years since quittin.9   • Smokeless tobacco: Never Used   • Tobacco comment: quit 16 years ago   Vaping Use   • Vaping Use: Never used   Substance and Sexual Activity   • Alcohol use: No   • Drug use: No   • Sexual activity: Defer           Objective   Physical Exam  Vitals reviewed.   Constitutional:       General: He is not in acute distress.     Appearance: Normal appearance. He is not ill-appearing, toxic-appearing or diaphoretic.   HENT:      Head: Normocephalic and atraumatic.      Right Ear: External ear normal.      Left Ear: External ear normal.      Nose: Nose normal.      Mouth/Throat:      Mouth: Mucous membranes are moist.      Pharynx: Oropharynx is clear.   Eyes:      Extraocular Movements: Extraocular movements intact.      Conjunctiva/sclera: Conjunctivae  normal.      Pupils: Pupils are equal, round, and reactive to light.   Cardiovascular:      Rate and Rhythm: Normal rate and regular rhythm.      Pulses: Normal pulses.      Heart sounds: Normal heart sounds.   Pulmonary:      Effort: Pulmonary effort is normal. No respiratory distress.      Comments: Scattered expiratory wheezing  Abdominal:      General: Bowel sounds are normal. There is no distension.      Tenderness: There is no abdominal tenderness.   Musculoskeletal:         General: No tenderness or deformity. Normal range of motion.      Cervical back: Normal range of motion and neck supple.      Comments: Peripheral edema   Skin:     General: Skin is warm and dry.      Capillary Refill: Capillary refill takes less than 2 seconds.      Findings: No rash.   Neurological:      General: No focal deficit present.      Mental Status: He is alert and oriented to person, place, and time.   Psychiatric:         Mood and Affect: Mood normal.         Behavior: Behavior normal.         Procedures           ED Course                                           MDM  Number of Diagnoses or Management Options  Acute on chronic congestive heart failure, unspecified heart failure type (HCC)  Hypoxia  Urinary retention  Diagnosis management comments: 71-year-old male with shortness of breath and chest pain.  Well-developed, well-nourished elderly man in no distress with exam as above.  His vital signs are notable for hypertension.  His exam is notable for wheezing and pitting edema throughout all 4 extremities.  Will obtain labs, EKG, chest x-ray, viral panel.  Will give symptomatic treatment.  Disposition pending work-up and response to therapy.    DDx: Viral illness, pneumonia, COPD, CHF    EKG interpreted by me: Atrial paced rhythm, normal rate, no acute ST changes, some nonspecific T waves, this is an abnormal EKG    Work-up is overall consistent with CHF exacerbation.  Patient has had some hypoxia here in the ER.  He also  had some urinary retention and the Devi catheter was placed.  He had output of about 1300 mL of urine.  He is feeling somewhat improved but still short of breath with any exertion.  Discussed with Dr. Christina for admission.       Amount and/or Complexity of Data Reviewed  Decide to obtain previous medical records or to obtain history from someone other than the patient: yes        Final diagnoses:   Acute on chronic congestive heart failure, unspecified heart failure type (HCC)   Hypoxia   Urinary retention        Ronny Cardona MD  07/11/22 3823

## 2022-07-12 ENCOUNTER — APPOINTMENT (OUTPATIENT)
Dept: CARDIOLOGY | Facility: HOSPITAL | Age: 72
End: 2022-07-12

## 2022-07-12 LAB
ANION GAP SERPL CALCULATED.3IONS-SCNC: 12.3 MMOL/L (ref 5–15)
BH CV ECHO MEAS - AO MAX PG: 8.8 MMHG
BH CV ECHO MEAS - AO MEAN PG: 4 MMHG
BH CV ECHO MEAS - AO ROOT DIAM: 2.6 CM
BH CV ECHO MEAS - AO V2 MAX: 148 CM/SEC
BH CV ECHO MEAS - AO V2 VTI: 29 CM
BH CV ECHO MEAS - AVA(I,D): 1.36 CM2
BH CV ECHO MEAS - EDV(CUBED): 175.6 ML
BH CV ECHO MEAS - EDV(MOD-SP2): 95.1 ML
BH CV ECHO MEAS - EDV(MOD-SP4): 101 ML
BH CV ECHO MEAS - EF(MOD-BP): 63.6 %
BH CV ECHO MEAS - EF(MOD-SP2): 69.9 %
BH CV ECHO MEAS - EF(MOD-SP4): 61.5 %
BH CV ECHO MEAS - ESV(CUBED): 70.4 ML
BH CV ECHO MEAS - ESV(MOD-SP2): 28.6 ML
BH CV ECHO MEAS - ESV(MOD-SP4): 38.9 ML
BH CV ECHO MEAS - FS: 26.3 %
BH CV ECHO MEAS - IVS/LVPW: 0.83 CM
BH CV ECHO MEAS - IVSD: 1.1 CM
BH CV ECHO MEAS - LAT PEAK E' VEL: 5.3 CM/SEC
BH CV ECHO MEAS - LV MASS(C)D: 285.3 GRAMS
BH CV ECHO MEAS - LV MAX PG: 5 MMHG
BH CV ECHO MEAS - LV MEAN PG: 3 MMHG
BH CV ECHO MEAS - LV V1 MAX: 112 CM/SEC
BH CV ECHO MEAS - LV V1 VTI: 24.6 CM
BH CV ECHO MEAS - LVIDD: 5.6 CM
BH CV ECHO MEAS - LVIDS: 4.1 CM
BH CV ECHO MEAS - LVOT AREA: 1.61 CM2
BH CV ECHO MEAS - LVOT DIAM: 1.43 CM
BH CV ECHO MEAS - LVPWD: 1.33 CM
BH CV ECHO MEAS - MED PEAK E' VEL: 6.4 CM/SEC
BH CV ECHO MEAS - MV A MAX VEL: 122 CM/SEC
BH CV ECHO MEAS - MV DEC SLOPE: 356.5 CM/SEC2
BH CV ECHO MEAS - MV DEC TIME: 0.28 MSEC
BH CV ECHO MEAS - MV E MAX VEL: 88.8 CM/SEC
BH CV ECHO MEAS - MV E/A: 0.73
BH CV ECHO MEAS - MV MAX PG: 5.3 MMHG
BH CV ECHO MEAS - MV MEAN PG: 2 MMHG
BH CV ECHO MEAS - MV P1/2T: 94.5 MSEC
BH CV ECHO MEAS - MV V2 VTI: 39 CM
BH CV ECHO MEAS - MVA(P1/2T): 2.33 CM2
BH CV ECHO MEAS - MVA(VTI): 1.01 CM2
BH CV ECHO MEAS - PA ACC TIME: 0.12 SEC
BH CV ECHO MEAS - PA PR(ACCEL): 25 MMHG
BH CV ECHO MEAS - PA V2 MAX: 136 CM/SEC
BH CV ECHO MEAS - RV MAX PG: 2.37 MMHG
BH CV ECHO MEAS - RV V1 MAX: 76.9 CM/SEC
BH CV ECHO MEAS - RV V1 VTI: 15.5 CM
BH CV ECHO MEAS - SV(LVOT): 39.5 ML
BH CV ECHO MEAS - SV(MOD-SP2): 66.5 ML
BH CV ECHO MEAS - SV(MOD-SP4): 62.1 ML
BH CV ECHO MEAS - TAPSE (>1.6): 2.7 CM
BH CV ECHO MEASUREMENTS AVERAGE E/E' RATIO: 15.18
BH CV XLRA - TDI S': 15.6 CM/SEC
BUN SERPL-MCNC: 20 MG/DL (ref 8–23)
BUN/CREAT SERPL: 12 (ref 7–25)
CALCIUM SPEC-SCNC: 8.8 MG/DL (ref 8.6–10.5)
CHLORIDE SERPL-SCNC: 103 MMOL/L (ref 98–107)
CO2 SERPL-SCNC: 25.7 MMOL/L (ref 22–29)
CREAT SERPL-MCNC: 1.66 MG/DL (ref 0.76–1.27)
EGFRCR SERPLBLD CKD-EPI 2021: 43.5 ML/MIN/1.73
GLUCOSE BLDC GLUCOMTR-MCNC: 132 MG/DL (ref 70–130)
GLUCOSE BLDC GLUCOMTR-MCNC: 169 MG/DL (ref 70–130)
GLUCOSE BLDC GLUCOMTR-MCNC: 194 MG/DL (ref 70–130)
GLUCOSE BLDC GLUCOMTR-MCNC: 260 MG/DL (ref 70–130)
GLUCOSE SERPL-MCNC: 325 MG/DL (ref 65–99)
LEFT ATRIUM VOLUME INDEX: 23.5 ML/M2
MAGNESIUM SERPL-MCNC: 2 MG/DL (ref 1.6–2.4)
MAXIMAL PREDICTED HEART RATE: 148 BPM
POTASSIUM SERPL-SCNC: 4.1 MMOL/L (ref 3.5–5.2)
SODIUM SERPL-SCNC: 141 MMOL/L (ref 136–145)
STRESS TARGET HR: 126 BPM
TROPONIN T SERPL-MCNC: <0.01 NG/ML (ref 0–0.03)

## 2022-07-12 PROCEDURE — 97165 OT EVAL LOW COMPLEX 30 MIN: CPT

## 2022-07-12 PROCEDURE — 93306 TTE W/DOPPLER COMPLETE: CPT

## 2022-07-12 PROCEDURE — 96376 TX/PRO/DX INJ SAME DRUG ADON: CPT

## 2022-07-12 PROCEDURE — 82962 GLUCOSE BLOOD TEST: CPT

## 2022-07-12 PROCEDURE — 25010000002 SULFUR HEXAFLUORIDE MICROSPH 60.7-25 MG RECONSTITUTED SUSPENSION: Performed by: FAMILY MEDICINE

## 2022-07-12 PROCEDURE — 94799 UNLISTED PULMONARY SVC/PX: CPT

## 2022-07-12 PROCEDURE — 97161 PT EVAL LOW COMPLEX 20 MIN: CPT

## 2022-07-12 PROCEDURE — 93306 TTE W/DOPPLER COMPLETE: CPT | Performed by: INTERNAL MEDICINE

## 2022-07-12 PROCEDURE — 80048 BASIC METABOLIC PNL TOTAL CA: CPT | Performed by: FAMILY MEDICINE

## 2022-07-12 PROCEDURE — 94761 N-INVAS EAR/PLS OXIMETRY MLT: CPT

## 2022-07-12 PROCEDURE — 84484 ASSAY OF TROPONIN QUANT: CPT | Performed by: FAMILY MEDICINE

## 2022-07-12 PROCEDURE — 94664 DEMO&/EVAL PT USE INHALER: CPT

## 2022-07-12 PROCEDURE — G0378 HOSPITAL OBSERVATION PER HR: HCPCS

## 2022-07-12 PROCEDURE — 63710000001 INSULIN ASPART PER 5 UNITS: Performed by: FAMILY MEDICINE

## 2022-07-12 PROCEDURE — 25010000002 FUROSEMIDE PER 20 MG: Performed by: FAMILY MEDICINE

## 2022-07-12 PROCEDURE — 99213 OFFICE O/P EST LOW 20 MIN: CPT | Performed by: PHYSICIAN ASSISTANT

## 2022-07-12 PROCEDURE — 83735 ASSAY OF MAGNESIUM: CPT | Performed by: INTERNAL MEDICINE

## 2022-07-12 RX ORDER — GENTAMICIN SULFATE 3 MG/ML
2 SOLUTION/ DROPS OPHTHALMIC
Status: DISCONTINUED | OUTPATIENT
Start: 2022-07-12 | End: 2022-07-13 | Stop reason: HOSPADM

## 2022-07-12 RX ADMIN — GABAPENTIN 400 MG: 400 CAPSULE ORAL at 17:12

## 2022-07-12 RX ADMIN — INSULIN ASPART 2 UNITS: 100 INJECTION, SOLUTION INTRAVENOUS; SUBCUTANEOUS at 12:10

## 2022-07-12 RX ADMIN — Medication 10 ML: at 20:31

## 2022-07-12 RX ADMIN — SULFUR HEXAFLUORIDE 7 ML: KIT at 08:08

## 2022-07-12 RX ADMIN — FLUTICASONE PROPIONATE 1 SPRAY: 50 SPRAY, METERED NASAL at 09:31

## 2022-07-12 RX ADMIN — LISINOPRIL 40 MG: 20 TABLET ORAL at 09:30

## 2022-07-12 RX ADMIN — GENTAMICIN SULFATE 2 DROP: 3 SOLUTION OPHTHALMIC at 17:12

## 2022-07-12 RX ADMIN — PANTOPRAZOLE SODIUM 40 MG: 40 TABLET, DELAYED RELEASE ORAL at 06:24

## 2022-07-12 RX ADMIN — INSULIN ASPART 6 UNITS: 100 INJECTION, SOLUTION INTRAVENOUS; SUBCUTANEOUS at 07:14

## 2022-07-12 RX ADMIN — IPRATROPIUM BROMIDE AND ALBUTEROL SULFATE 3 ML: 2.5; .5 SOLUTION RESPIRATORY (INHALATION) at 20:19

## 2022-07-12 RX ADMIN — GABAPENTIN 400 MG: 400 CAPSULE ORAL at 20:31

## 2022-07-12 RX ADMIN — FUROSEMIDE 40 MG: 10 INJECTION, SOLUTION INTRAMUSCULAR; INTRAVENOUS at 04:33

## 2022-07-12 RX ADMIN — SPIRONOLACTONE 50 MG: 25 TABLET, FILM COATED ORAL at 09:30

## 2022-07-12 RX ADMIN — METOPROLOL SUCCINATE 50 MG: 50 TABLET, EXTENDED RELEASE ORAL at 09:30

## 2022-07-12 RX ADMIN — GABAPENTIN 400 MG: 400 CAPSULE ORAL at 09:30

## 2022-07-12 RX ADMIN — ATORVASTATIN CALCIUM 20 MG: 20 TABLET, FILM COATED ORAL at 20:31

## 2022-07-12 RX ADMIN — FUROSEMIDE 40 MG: 10 INJECTION, SOLUTION INTRAMUSCULAR; INTRAVENOUS at 17:12

## 2022-07-12 RX ADMIN — IPRATROPIUM BROMIDE AND ALBUTEROL SULFATE 3 ML: 2.5; .5 SOLUTION RESPIRATORY (INHALATION) at 12:46

## 2022-07-12 RX ADMIN — ALLOPURINOL 100 MG: 100 TABLET ORAL at 09:30

## 2022-07-12 RX ADMIN — APIXABAN 5 MG: 5 TABLET, FILM COATED ORAL at 20:31

## 2022-07-12 RX ADMIN — APIXABAN 5 MG: 5 TABLET, FILM COATED ORAL at 09:30

## 2022-07-12 RX ADMIN — PRAMIPEXOLE DIHYDROCHLORIDE 0.5 MG: 0.25 TABLET ORAL at 20:31

## 2022-07-12 RX ADMIN — AMIODARONE HYDROCHLORIDE 200 MG: 200 TABLET ORAL at 09:30

## 2022-07-12 RX ADMIN — ASPIRIN 81 MG: 81 TABLET, CHEWABLE ORAL at 09:30

## 2022-07-12 RX ADMIN — FLUOXETINE 20 MG: 20 CAPSULE ORAL at 09:30

## 2022-07-12 RX ADMIN — CETIRIZINE HYDROCHLORIDE 10 MG: 10 TABLET, FILM COATED ORAL at 09:30

## 2022-07-12 RX ADMIN — HYDROCODONE BITARTRATE AND ACETAMINOPHEN 1 TABLET: 5; 325 TABLET ORAL at 20:31

## 2022-07-12 RX ADMIN — GENTAMICIN SULFATE 2 DROP: 3 SOLUTION OPHTHALMIC at 20:53

## 2022-07-12 RX ADMIN — AMLODIPINE BESYLATE 10 MG: 5 TABLET ORAL at 09:30

## 2022-07-12 RX ADMIN — IPRATROPIUM BROMIDE AND ALBUTEROL SULFATE 3 ML: 2.5; .5 SOLUTION RESPIRATORY (INHALATION) at 17:22

## 2022-07-12 RX ADMIN — PRAMIPEXOLE DIHYDROCHLORIDE 0.5 MG: 0.25 TABLET ORAL at 17:12

## 2022-07-12 RX ADMIN — IPRATROPIUM BROMIDE AND ALBUTEROL SULFATE 3 ML: 2.5; .5 SOLUTION RESPIRATORY (INHALATION) at 06:55

## 2022-07-12 RX ADMIN — Medication 10 ML: at 09:31

## 2022-07-12 RX ADMIN — PRAMIPEXOLE DIHYDROCHLORIDE 0.5 MG: 0.25 TABLET ORAL at 09:30

## 2022-07-12 NOTE — THERAPY EVALUATION
Patient Name: Robe Bryant  : 1950    MRN: [id number]                              Today's Date: 2022       Admit Date: 2022    Visit Dx:     ICD-10-CM ICD-9-CM   1. Acute on chronic congestive heart failure, unspecified heart failure type ([facility])  I50.9 428.0   2. Hypoxia  R09.02 799.02   3. Urinary retention  R33.9 788.20     Patient Active Problem List   Diagnosis   • Pacemaker   • Neck pain   • Chronic low back pain   • Chronic kidney disease, stage III (moderate) (CMS/[facility])   • Emphysema of lung ([facility])   • Essential hypertension   • Seborrheic dermatitis   • Benign prostatic hyperplasia   • Paroxysmal atrial fibrillation ([facility])   • Hypercholesterolemia   • Diastolic heart failure ([facility])   • Coronary artery disease involving native coronary artery of native heart with angina pectoris ([facility])   • Gastroesophageal reflux disease   • Chronic shoulder pain   • Obstructive sleep apnea of adult   • Dysphagia   • Type 2 diabetes mellitus, without long-term current use of insulin ([facility])   • Chronic chest pain   • Restless leg syndrome   • Osteoarthritis of multiple joints   • Multilevel degenerative disc disease   • Moderate episode of recurrent major depressive disorder ([facility])   • Chronic pain syndrome   • DDD (degenerative disc disease), cervical   • BPH (benign prostatic hypertrophy) with urinary retention   • Morbid obesity ([facility])   • Unstable angina ([facility])   • Panlobular emphysema ([facility])   • Leg cramps   • Chronic pain of both knees   • Atrial fibrillation with RVR ([facility])   • Pacemaker at end of battery life   • Acute on chronic congestive heart failure ([facility])   • RMSF ([place] spotted fever)   • Elevated uric acid in blood   • Arthralgia   • Pneumonia of right middle lobe due to infectious organism   • Chronic respiratory failure with hypoxia ([facility])   • Acute on chronic respiratory failure with hypoxia ([facility])     Past Medical History:   Diagnosis Date   • Anxiety and depression    • Arthritis    •  Backache     20 years   • Benign prostatic hyperplasia    • Bladder trauma    • Cervicalgia    • Chronic kidney disease     Cr up to 2.1   • Coronary artery disease    • Diabetes mellitus (HCC)     15 years--   • Emphysema of lung (HCC)    • Erectile dysfunction    • Eye exam, routine 2015   • FH: colonic polyps    • Gout     for 10 years--   • History of echocardiogram 09/15/2014   • History of tobacco use     with cessation x7 years   • Hyperlipidemia    • Hypertension    • Migraine    • Myocardial infarction (HCC)     h/o coronary artery stenting--   • Prostate cancer (HCC)    • Restless leg syndrome     20 years   • Sleep apnea     12 years; uses a Bi-Pap   • Stroke (HCC)    • Syncope 4/28/2017   • Warfarin anticoagulation 9/14/2016     Past Surgical History:   Procedure Laterality Date   • BLADDER SURGERY     • CARDIAC CATHETERIZATION  03/15/2013    revealing widely patent stents of the left circumflex and ramus intermedius coronary arteries, no significant disease is seen in any territory   • CARDIAC CATHETERIZATION Left 9/30/2019    Procedure: Left Heart Cath;  Surgeon: Arelis Tucker MD;  Location:  PREM CATH INVASIVE LOCATION;  Service: Cardiology   • CARDIAC ELECTROPHYSIOLOGY PROCEDURE N/A 5/10/2021    Procedure: PPM battery change;  Surgeon: Arelis Tucker MD;  Location:  PREM CATH INVASIVE LOCATION;  Service: Cardiology;  Laterality: N/A;   • CARDIAC PACEMAKER PLACEMENT  2012   • CATARACT EXTRACTION     • COLONOSCOPY  2004    No family history of colon cancer.     • COLONOSCOPY  2015   • HERNIA REPAIR      Type unknown   • PACEMAKER IMPLANTATION     • PROSTATE SURGERY        General Information     Row Name 07/12/22 1523          OT Time and Intention    Document Type evaluation  -AH     Mode of Treatment occupational therapy  -     Row Name 07/12/22 1523          General Information    Patient Profile Reviewed yes  -AH     Prior Level of Function independent:;all household  mobility;ADL's  -     Barriers to Rehab medically complex  -[facility] Name 07/12/22 1523          Occupational Profile    Reason for Services/Referral (Occupational Profile) ADL decline  -[facility] Name 07/12/22 1523          Living Environment    People in Home spouse  -[facility] Name 07/12/22 1523          Home Main Entrance    Number of Stairs, Main Entrance one  -     Stair Railings, Main Entrance none  -AH     Row Name 07/12/22 1523          Stairs Within Home, Primary    Number of Stairs, Within Home, Primary none  -[facility] Name 07/12/22 1523          Cognition    Orientation Status (Cognition) oriented x 4  -[facility] Name 07/12/22 1523          Safety Issues, Functional Mobility    Safety Issues Affecting Function (Mobility) awareness of need for assistance;insight into deficits/self-awareness  -     Impairments Affecting Function (Mobility) endurance/activity tolerance;shortness of breath  -           User Key  (r) = Recorded By, (t) = Taken By, (c) = Cosigned By    Initials Name Provider Type     Kimberly Hart Occupational Therapist                 Mobility/ADL's     Row Name 07/12/22 1527          Bed Mobility    Comment, (Bed Mobility) pt sitting up in his chair  -AH     Row Name 07/12/22 1527          Transfers    Transfers sit-stand transfer  -     Sit-Stand [place] (Transfers) standby assist  -AH     Row Name 07/12/22 1527          Sit-Stand Transfer    Assistive Device (Sit-Stand Transfers) walker, front-wheeled  -[facility] Name 07/12/22 1527          Functional Mobility    Functional Mobility- Ind. Level contact guard assist  -     Functional Mobility- Device walker, front-wheeled  -     Functional Mobility-Distance (Feet) 120  -     Functional Mobility- Safety Issues supplemental O2  -AH     Row Name 07/12/22 1527          Activities of Daily Living    BADL Assessment/Intervention bathing;upper body dressing;lower body dressing;grooming;feeding;toileting  -[facility]  Name 07/12/22 1527          Bathing Assessment/Intervention    [place] Level (Bathing) minimum assist (75% patient effort)  -[facility] Name 07/12/22 1527          Upper Body Dressing Assessment/Training    [place] Level (Upper Body Dressing) independent  -[facility] Name 07/12/22 1527          Lower Body Dressing Assessment/Training    [place] Level (Lower Body Dressing) minimum assist (75% patient effort)  -[facility] Name 07/12/22 1527          Grooming Assessment/Training    [place] Level (Grooming) set up  -[facility] Name 07/12/22 1527          Self-Feeding Assessment/Training    [place] Level (Feeding) independent  -AH     Row Name 07/12/22 1527          Toileting Assessment/Training    [place] Level (Toileting) minimum assist (75% patient effort)  -           User Key  (r) = Recorded By, (t) = Taken By, (c) = Cosigned By    Initials Name Provider Type    Kimberly Roca Occupational Therapist               Obj/Interventions     [facility] Name 07/12/22 1533          Sensory Assessment (Somatosensory)    Sensory Assessment (Somatosensory) sensation intact  -AH     Row Name 07/12/22 1533          Vision Assessment/Intervention    Visual Impairment/Limitations L  [facility] Name 07/12/22 1533          Range of Motion Comprehensive    General Range of Motion bilateral upper extremity ROM [facility] Name 07/12/22 1533          Strength Comprehensive (MMT)    Comment, General Manual Muscle Testing (MMT) Assessment [facility]           User Key  (r) = Recorded By, (t) = Taken By, (c) = Cosigned By    Initials Name Provider Type    Kimberly Roca Occupational Therapist               Goals/Plan     [facility] Name 07/12/22 1540          Bed Mobility Goal 1 (OT)    Activity/Assistive Device (Bed Mobility Goal 1, OT) bed mobility activities, all  -     [place] Level/Cues Needed (Bed Mobility Goal 1, OT) independent  -     Time Frame (Bed Mobility Goal 1, OT) by discharge  [facility]      Progress/Outcomes (Bed Mobility Goal 1, OT) goal ongoing  -AH     Row Name 07/12/22 1540          Transfer Goal 1 (OT)    Activity/Assistive Device (Transfer Goal 1, OT) sit-to-stand/stand-to-sit;walker, rolling  -     [place] Level/Cues Needed (Transfer Goal 1, OT) modified independence  -     Time Frame (Transfer Goal 1, OT) by discharge  -     Progress/Outcome (Transfer Goal 1, OT) goal ongoing  -AH     Row Name 07/12/22 1540          Dressing Goal 1 (OT)    Activity/Device (Dressing Goal 1, OT) dressing skills, all  -     [place]/Cues Needed (Dressing Goal 1, OT) supervision required  -     Time Frame (Dressing Goal 1, OT) long term goal (LTG);5 days  -     Progress/Outcome (Dressing Goal 1, OT) goal ongoing  -AH     Row Name 07/12/22 1540          Toileting Goal 1 (OT)    Activity/Device (Toileting Goal 1, OT) toileting skills, all  -     [place] Level/Cues Needed (Toileting Goal 1, OT) independent  -     Time Frame (Toileting Goal 1, OT) long term goal (LTG);5 days  -     Progress/Outcome (Toileting Goal 1, OT) goal ongoing  -AH     Row Name 07/12/22 1540          Therapy Assessment/Plan (OT)    Planned Therapy Interventions (OT) activity tolerance training;BADL retraining;patient/caregiver education/training;transfer/mobility retraining;strengthening exercise  -           User Key  (r) = Recorded By, (t) = Taken By, (c) = Cosigned By    Initials Name Provider Type     Kimberly Hart Occupational Therapist               Clinical Impression     Row Name 07/12/22 1224          Pain Assessment    Pretreatment Pain Rating 0/10 - no pain  -     Posttreatment Pain Rating 0/10 - no pain  -     Pain Intervention(s) Repositioned;Ambulation/increased activity  -AH     Row Name 07/12/22 0192          Plan of Care Review    Plan of Care Reviewed With patient  -     Progress no change  -     Outcome Evaluation Pt seen for OT evaluation today.  Pt stood with sba and walked  120' with cga using RW.  Pt min assist for self care tasks.  Pt is expected to benefit from skilled OT to improve his  strength and independence with ADL tasks.  -[facility] Name 07/12/22 1534          Therapy Assessment/Plan (OT)    Patient/Family Therapy Goal Statement (OT) d/c home  -     Rehab Potential (OT) good, to achieve stated therapy goals  -     Criteria for Skilled Therapeutic Interventions Met (OT) yes;skilled treatment is necessary  -     Therapy Frequency (OT) 3 times/wk  -[facility] Name 07/12/22 1534          Therapy Plan Review/Discharge Plan (OT)    Anticipated Discharge Disposition (OT) home;home with home health  -[facility] Name 07/12/22 1534          Vital Signs    Pretreatment Heart Rate (beats/min) 60  -AH     Intratreatment Heart Rate (beats/min) 135  -AH     Posttreatment Heart Rate (beats/min) 60  -AH     Pre SpO2 (%) 97  -AH     O2 Delivery Pre Treatment supplemental O2  2L  -AH     Intra SpO2 (%) 94  -AH     O2 Delivery Intra Treatment supplemental O2  -     Post SpO2 (%) 96  -AH     O2 Delivery Post Treatment supplemental O2  -AH     [facility] Name 07/12/22 1534          Positioning and Restraints    Pre-Treatment Position sitting in chair/recliner  -     Post Treatment Position chair  -     In Chair sitting;notified nsg;call light within reach;encouraged to call for assist;with family/caregiver  -           User Key  (r) = Recorded By, (t) = Taken By, (c) = Cosigned By    Initials Name Provider Type    Kimberly Roca Occupational Therapist               Outcome Measures     Row Name 07/12/22 1541          How much help from another is currently needed...    Putting on and taking off regular lower body clothing? 3  -AH     Bathing (including washing, rinsing, and drying) 3  -AH     Toileting (which includes using toilet bed pan or urinal) 3  -AH     Putting on and taking off regular upper body clothing 4  -AH     Taking care of personal grooming (such as brushing teeth) 4   -     Eating meals 4  -     AM-PAC 6 Clicks Score (OT) 21  -     Row Name 07/12/22 1440          How much help from another person do you currently need...    Turning from your back to your side while in flat bed without using bedrails? 3  -JR (r) MK (t) JR (c)     Moving from lying on back to sitting on the side of a flat bed without bedrails? 3  -JR (r) MK (t) JR (c)     Moving to and from a bed to a chair (including a wheelchair)? 3  -JR (r) MK (t) JR (c)     Standing up from a chair using your arms (e.g., wheelchair, bedside chair)? 3  -JR (r) MK (t) JR (c)     Climbing 3-5 steps with a railing? 2  -JR (r) MK (t) JR (c)     To walk in hospital room? 3  -JR (r) MK (t) JR (c)     AM-PAC 6 Clicks Score (PT) 17  -JR (r) MK (t)     Highest level of mobility 5 --> Static standing  -JR (r) MK (t)     Row Name 07/12/22 1541 07/12/22 1440       Functional Assessment    Outcome Measure Options AM-PAC 6 Clicks Daily Activity (OT)  - AM-PAC 6 Clicks Basic Mobility (PT)  -JR (r) MK (t) JR (c)          User Key  (r) = Recorded By, (t) = Taken By, (c) = Cosigned By    Initials Name Provider Type     Kimberly Hart Occupational Therapist    Ariadna Ortiz, PT Physical Therapist    Zaki Evans, PT Student PT Student                Occupational Therapy Education                 Title: PT OT SLP Therapies (In Progress)     Topic: Occupational Therapy (In Progress)     Point: ADL training (Done)     Description:   Instruct learner(s) on proper safety adaptation and remediation techniques during self care or transfers.   Instruct in proper use of assistive devices.              Learning Progress Summary           Patient Acceptance, E,TB, VU by  at 7/12/2022 3391    Comment: Role of OT/POC                   Point: Home exercise program (Not Started)     Description:   Instruct learner(s) on appropriate technique for monitoring, assisting and/or progressing therapeutic exercises/activities.              Learner  Progress:  Not documented in this visit.          Point: Precautions (Not Started)     Description:   Instruct learner(s) on prescribed precautions during self-care and functional transfers.              Learner Progress:  Not documented in this visit.          Point: Body mechanics (Not Started)     Description:   Instruct learner(s) on proper positioning and spine alignment during self-care, functional mobility activities and/or exercises.              Learner Progress:  Not documented in this visit.                      User Key     Initials Effective Dates Name Provider Type Discipline     06/16/21 -  Kimberly Hart Occupational Therapist OT              OT Recommendation and Plan  Planned Therapy Interventions (OT): activity tolerance training, BADL retraining, patient/caregiver education/training, transfer/mobility retraining, strengthening exercise  Therapy Frequency (OT): 3 times/wk  Plan of Care Review  Plan of Care Reviewed With: patient  Progress: no change  Outcome Evaluation: Pt seen for OT evaluation today.  Pt stood with sba and walked 120' with cga using RW.  Pt min assist for self care tasks.  Pt is expected to benefit from skilled OT to improve his  strength and independence with ADL tasks.     Time Calculation:    Time Calculation- OT     Row Name 07/12/22 1543             Time Calculation- OT    OT Start Time 1300  -AH      OT Received On 07/12/22  -      OT Goal Re-Cert Due Date 07/22/22  -              Untimed Charges    OT Eval/Re-eval Minutes 40  -AH              Total Minutes    Untimed Charges Total Minutes 40  -AH       Total Minutes 40  -AH            User Key  (r) = Recorded By, (t) = Taken By, (c) = Cosigned By    Initials Name Provider Type     Kimberly Hart Occupational Therapist              Therapy Charges for Today     Code Description Service Date Service Provider Modifiers Qty    [id number]  OT EVAL LOW COMPLEXITY 3 7/12/2022 Kimberly Hart GO 1               Kimberly   Kinsman  7/12/2022

## 2022-07-12 NOTE — CASE MANAGEMENT/SOCIAL WORK
Discharge Planning Assessment  [facility]     Patient Name: Robe Bryant  MRN: [id number]  Today's Date: 2022    Admit Date: 2022     Discharge Needs Assessment     Row Name 22 1045       Living Environment    People in Home spouse    Name(s) of People in Home Berny Bryant    Current Living Arrangements home    Potentially Unsafe Housing Conditions unable to assess    Primary Care Provided by self    Provides Primary Care For no one    Family Caregiver if Needed spouse    Family Caregiver Names Berny Bryant    Quality of Family Relationships helpful;involved    Able to Return to Prior Arrangements yes       Transition Planning    Patient/Family Anticipates Transition to home    Patient/Family Anticipated Services at Transition --  Agreeable to STR or HH if needed    Transportation Anticipated family or friend will provide       Discharge Needs Assessment    Readmission Within the Last 30 Days no previous admission in last 30 days    Equipment Currently Used at Home oxygen;bipap;nebulizer  Oxygen provided by Emily    Concerns to be Addressed no discharge needs identified    Equipment Needed After Discharge none    Provided Post Acute Provider List? N/A    Provided Post Acute Provider Quality & Resource List? N/A               Discharge Plan     Row Name 22 1047       Plan    Plan Pt confirmed /Address/PCP. States [facility] currently provides [facility]gen needs. Pt is agreeable to STR or HH if either are needed after discharge. Has had HH in the past but is unsure of the HH provider.  States he wants to get better and is willing to do whatever is needed.  Lives with his spouse, Berny Bryant, who will provide transport home.  At this time pt did not identify any barriers to discharge.    Patient/Family in Agreement with Plan yes    Provided Post Acute Provider List? N/A    Provided Post Acute Provider Quality & Resource List? N/A              Continued Care and Services - Admitted Since  7/11/2022    Coordination has not been started for this encounter.       Expected Discharge Date and Time     Expected Discharge Date Expected Discharge Time    Jul 13, 2022          Demographic Summary     Row Name 07/12/22 1043       General Information    Admission Type observation    Arrived From home    Required Notices Provided Observation Status Notice    Referral Source emergency department    Reason for Consult discharge planning    Preferred Language English       Contact Information    Permission Granted to Share Info With     Contact Information Obtained for                Functional Status     Row Name 07/12/22 1043       Functional Status    Usual Activity Tolerance good    Current Activity Tolerance good       Assessment of Health Literacy    How often do you have someone help you read hospital materials? Never    How often do you have problems learning about your medical condition because of difficulty understanding written information? Never    How often do you have a problem understanding what is told to you about your medical condition? Never    How confident are you filling out medical forms by yourself? Extremely    Health Literacy Excellent       Functional Status, IADL    Medications independent    Meal Preparation independent    Housekeeping independent    Laundry independent    Shopping independent       Mental Status    General Appearance WDL WDL       Employment/    Employment Status retired               Psychosocial     Row Name 07/12/22 1044       Values/Beliefs    Spiritual, Cultural Beliefs, Yarsanism Practices, Values that Affect Care no       Behavior WDL    Behavior WDL WDL       Emotion Mood WDL    Emotion/Mood/Affect WDL WDL       Speech WDL    Speech WDL WDL       Perceptual State WDL    Perceptual State WDL WDL       Thought Process WDL    Thought Process WDL WDL       Intellectual Performance WDL    Intellectual Performance WDL WDL        Coping/Stress    Major Change/Loss/Stressor none    Patient Personal Strengths able to adapt;strong support system    Sources of Support adult child(roman);spouse    Reaction to Health Status accepting;adjusting;motivated    Understanding of Condition and Treatment adequate understanding of medical condition       Developmental Stage (Eriksson's)    Developmental Stage Stage 8 (64 years-death/Late Adulthood) Integrity vs. Despair       C-SSRS (Recent)    Q1 Wished to be Dead (Past Month) no    Q2 Suicidal Thoughts (Past Month) no    Q6 Suicide Behavior (Lifetime) no       Violence Risk    Feels Like Hurting Others no    Previous Attempt to Harm Others no               Abuse/Neglect    No documentation.                Legal    No documentation.                Substance Abuse    No documentation.                Patient Forms    No documentation.                   [facility]ERS

## 2022-07-12 NOTE — NURSING NOTE
Pt arrived to unit on tele with ER nurse, Shruti.  Report received prior to arrival.  Pt's belongs sent with wife prior to transfer.  Cell phone with patient.  Pt placed on bedside monitoring, denies anyh pain or needs at this time. Assessment completed, pt oriented to room, awaiting orders

## 2022-07-12 NOTE — PROGRESS NOTES
"Adult Nutrition  Assessment/PES    Patient Name:  Robe Bryant  YOB: 1950  MRN: [id number]  Admit Date:  7/11/2022    Assessment Date:  7/12/2022    Comments:      Recommend:    1. Continue current diet as medically appropriate and tolerated.   2. Consider SLP eval d/t hx of difficulty swallowing.   3. Continue to encourage PO intake as appropriate. Pt PO intake of 100% x 1 meal.  4. RD ordered Arginaid BID.  5. Consider renal MVI with minerals daily.   6. Continue to monitor and replace electrolytes PRN.    RD to follow pt and available PRN.      Reason for Assessment     Row Name 07/12/22 0951          Reason for Assessment    Reason For Assessment identified at risk by screening criteria;diagnosis/disease state;per organizational policy     Diagnosis diabetes diagnosis/complications;cardiac disease;pulmonary disease;renal disease  A/C CHF, A/C respiratory failure, HTN, CAD, T2DM, CKD, difficulty swallowing, COPD     Identified At Risk by Screening Criteria BMI;need for education;difficulty chewing/swallowing                  Anthropometrics     Row Name 07/12/22 0954 07/12/22 0807       Anthropometrics    Height -- 162.6 cm (64\")    Weight -- 114 kg (251 lb)    Age for Calculations 71 --    Height for Calculation 1.626 m (5' 4\") --    Weight for Calculation 112 kg (246 lb)  est dry bw --    Row Name 07/12/22 0320          Anthropometrics    Weight 114 kg (251 lb 12.3 oz)                Labs/Tests/Procedures/Meds     Row Name 07/12/22 0952          Labs/Procedures/Meds    Lab Results Reviewed reviewed, pertinent     Lab Results Comments high: glu, A1c, Cr            Medications    Pertinent Medications Reviewed reviewed, pertinent     Pertinent Medications Comments lipitor, lasix, novolog, NaCl                Physical Findings     Row Name 07/12/22 0953          Physical Findings    Overall Physical Appearance obesity, SOB, on O2 therapy, missing teeth, generalized edema, right gluteal " "wound                Estimated/Assessed Needs - Anthropometrics     Row Name 07/12/22 0954 07/12/22 0807       Anthropometrics    Height -- 162.6 cm (64\")    Weight -- 114 kg (251 lb)    Age for Calculations 71 --    Height for Calculation 1.626 m (5' 4\") --    Weight for Calculation 112 kg (246 lb)  est dry bw --       Estimated/Assessed Needs    Additional Documentation Fluid Requirements (Group);[place]-St. Jeor Equation (Group);Estimated Calorie Needs (Group);KCAL/KG (Group);Protein Requirements (Group) --       Estimated Calorie Needs    Estimated Calorie Requirement (kcal/day) 2790  25 kcal/kg --    Estimated Calorie Need Method kcal/kg --       KCAL/KG    KCAL/KG 25 Kcal/Kg (kcal) --    25 Kcal/Kg (kcal) 2789.625 --       [place]-St. Jeor Equation    RMR ([place]-St. Jeor Equation) 1776.85 --    [place]-St. Jeor Activity Factors 1.4 - 1.5 --    Activity Factors ([place]-St. Jeor) 2487.59 - 2665.275 --       Protein Requirements    Weight Used For Protein Calculations 112 kg (246 lb)  est dry bw --    Est Protein Requirement Amount (gms/kg) 1.2 gm protein  112-134 --    Estimated Protein Requirements (gms/day) 133.9 --       Fluid Requirements    Fluid Requirements (mL/day) 1500 --    Estimated Fluid Requirement Method other (see comments)  fld restriction per MD --    RDA Method (mL) 1500 --    Row Name 07/12/22 0320          Anthropometrics    Weight 114 kg (251 lb 12.3 oz)                Nutrition Prescription Ordered     Row Name 07/12/22 0955          Nutrition Prescription PO    Current PO Diet Regular     Common Modifiers Cardiac;Consistent Carbohydrate;Fluid Restriction;Low Sodium     Fluid Restriction mL per Day 1500 mL                Evaluation of Received Nutrient/Fluid Intake     Row Name 07/12/22 0956          PO Evaluation    Number of Days PO Intake Evaluated Insufficient Data     Number of Meals 1     % PO Intake 100                     Problem/Interventions:   Problem 1     Row Name 07/12/22 " 0956          Nutrition Diagnoses Problem 1    Problem 1 Increased Nutrient Needs     Macronutrient Kcal;Fluid;Protein     Micronutrient Mineral;Vitamin     Etiology (related to) Medical Diagnosis     Skin Skin breakdown     Signs/Symptoms (evidenced by) Other (comment)  right gluteal wound                      Intervention Goal     Row Name 07/12/22 0957          Intervention Goal    General Maintain nutrition;Disease management/therapy;Provide information regarding MNT for treatment/condition;Reduce/improve symptoms;Improved nutrition related lab(s);Meet nutritional needs for age/condition     PO Meet estimated needs;Establish PO;Tolerate PO;Maintain intake     Weight No significant weight loss                Nutrition Intervention     Row Name 07/12/22 0957          Nutrition Intervention    RD/Tech Action Care plan reviewd;Follow Tx progress;Encourage intake;Recommend/ordered     Recommended/Ordered Supplement;Snack                Nutrition Prescription     Row Name 07/12/22 0957          Nutrition Prescription PO    PO Prescription Begin/change supplement;Other (comment)  Continue current diet as medically appropriate and tolerated     Supplement --  Arginaid     Supplement Frequency 2 times a day     Common Modifiers Cardiac;Consistent Carbohydrate;Fluid Restriction;Low Sodium     Fluid Restriction mL per Day 1500 mL     Low Sodium Details 1,500 mg Sodium     New PO Prescription Ordered? No, recommended            Other Orders    Obtain Weight Daily     Obtain Weight Ordered? No, recommended     Supplement Vitamin mineral supplement  renal MVI     Supplement Ordered? No, recommended     Other Continue to monitor and replace electrolytes PRN                Education/Evaluation     Row Name 07/12/22 0952          Education    Education Will Instruct as appropriate            Monitor/Evaluation    Monitor Per protocol;I&O;PO intake;Supplement intake;Pertinent labs;Weight;Skin status;Symptoms                  Electronically signed by:  Clau Guerra RD  07/12/22 09:59 EDT

## 2022-07-12 NOTE — THERAPY EVALUATION
Patient Name: Robe Bryant  : 1950    MRN: [id number]                              Today's Date: 2022       Admit Date: 2022    Visit Dx:     ICD-10-CM ICD-9-CM   1. Acute on chronic congestive heart failure, unspecified heart failure type ([facility])  I50.9 428.0   2. Hypoxia  R09.02 799.02   3. Urinary retention  R33.9 788.20     Patient Active Problem List   Diagnosis   • Pacemaker   • Neck pain   • Chronic low back pain   • Chronic kidney disease, stage III (moderate) (CMS/[facility])   • Emphysema of lung ([facility])   • Essential hypertension   • Seborrheic dermatitis   • Benign prostatic hyperplasia   • Paroxysmal atrial fibrillation ([facility])   • Hypercholesterolemia   • Diastolic heart failure ([facility])   • Coronary artery disease involving native coronary artery of native heart with angina pectoris ([facility])   • Gastroesophageal reflux disease   • Chronic shoulder pain   • Obstructive sleep apnea of adult   • Dysphagia   • Type 2 diabetes mellitus, without long-term current use of insulin ([facility])   • Chronic chest pain   • Restless leg syndrome   • Osteoarthritis of multiple joints   • Multilevel degenerative disc disease   • Moderate episode of recurrent major depressive disorder ([facility])   • Chronic pain syndrome   • DDD (degenerative disc disease), cervical   • BPH (benign prostatic hypertrophy) with urinary retention   • Morbid obesity ([facility])   • Unstable angina ([facility])   • Panlobular emphysema ([facility])   • Leg cramps   • Chronic pain of both knees   • Atrial fibrillation with RVR ([facility])   • Pacemaker at end of battery life   • Acute on chronic congestive heart failure ([facility])   • RMSF ([place] spotted fever)   • Elevated uric acid in blood   • Arthralgia   • Pneumonia of right middle lobe due to infectious organism   • Chronic respiratory failure with hypoxia ([facility])   • Acute on chronic respiratory failure with hypoxia ([facility])     Past Medical History:   Diagnosis Date   • Anxiety and depression    • Arthritis    •  Backache     20 years   • Benign prostatic hyperplasia    • Bladder trauma    • Cervicalgia    • Chronic kidney disease     Cr up to 2.1   • Coronary artery disease    • Diabetes mellitus (HCC)     15 years--   • Emphysema of lung (HCC)    • Erectile dysfunction    • Eye exam, routine 2015   • FH: colonic polyps    • Gout     for 10 years--   • History of echocardiogram 09/15/2014   • History of tobacco use     with cessation x7 years   • Hyperlipidemia    • Hypertension    • Migraine    • Myocardial infarction (HCC)     h/o coronary artery stenting--   • Prostate cancer (HCC)    • Restless leg syndrome     20 years   • Sleep apnea     12 years; uses a Bi-Pap   • Stroke (HCC)    • Syncope 4/28/2017   • Warfarin anticoagulation 9/14/2016     Past Surgical History:   Procedure Laterality Date   • BLADDER SURGERY     • CARDIAC CATHETERIZATION  03/15/2013    revealing widely patent stents of the left circumflex and ramus intermedius coronary arteries, no significant disease is seen in any territory   • CARDIAC CATHETERIZATION Left 9/30/2019    Procedure: Left Heart Cath;  Surgeon: Arelis Tucker MD;  Location:  [facility] CATH [facility]AS[facility] LOCATION;  Service: Cardiology   • CARDIAC ELECTROPHYSIOLOGY PROCEDURE N/A 5/10/2021    Procedure: PPM battery change;  Surgeon: Arelis Tucker MD;  Location:  PREM CATH INVASIVE LOCATION;  Service: Cardiology;  Laterality: N/A;   • CARDIAC PACEMAKER PLACEMENT  2012   • CATARACT EXTRACTION     • COLONOSCOPY  2004    No family history of colon cancer.     • COLONOSCOPY  2015   • HERNIA REPAIR      Type unknown   • PACEMAKER IMPLANTATION     • PROSTATE SURGERY        General Information     Row Name 07/12/22 1433          Physical Therapy Time and Intention    Document Type evaluation (P)   -MK     Mode of Treatment physical therapy (P)   -     Row Name 07/12/22 1433          General Information    Patient Profile Reviewed yes (P)   -MK     Prior Level of Function  independent:;all household mobility;community mobility;ADL's (P)   -     Existing Precautions/Restrictions no known precautions/restrictions (P)   -MK     Row Name 07/12/22 1433          Living Environment    People in Home spouse (P)   -MK     Row Name 07/12/22 1433          Home Main Entrance    Number of Stairs, Main Entrance one (P)   -     Stair Railings, Main Entrance none (P)   -MK     Row Name 07/12/22 1433          Stairs Within Home, Primary    Number of Stairs, Within Home, Primary none (P)   -MK     Row Name 07/12/22 1433          Cognition    Orientation Status (Cognition) oriented x 4 (P)   -MK     Row Name 07/12/22 1433          Safety Issues, Functional Mobility    Safety Issues Affecting Function (Mobility) awareness of need for assistance;insight into deficits/self-awareness (P)   -     Impairments Affecting Function (Mobility) endurance/activity tolerance;shortness of breath (P)   -           User Key  (r) = Recorded By, (t) = Taken By, (c) = Cosigned By    Initials Name Provider Type    Zaki Evans, PT Student PT Student               Mobility     Row Name 07/12/22 1434          Sit-Stand Transfer    Sit-Stand [place] (Transfers) standby assist (P)   -     Assistive Device (Sit-Stand Transfers) walker, front-wheeled (P)   -MK     Row Name 07/12/22 1434          Gait/Stairs (Locomotion)    [place] Level (Gait) standby assist (P)   -     Assistive Device (Gait) walker, front-wheeled (P)   -     Distance in Feet (Gait) 120 ft (P)   -           User Key  (r) = Recorded By, (t) = Taken By, (c) = Cosigned By    Initials Name Provider Type    Zaki Evans, PT Student PT Student               Obj/Interventions     [facility] Name 07/12/22 1435          Strength Comprehensive (MMT)    Comment, General Manual Muscle Testing (MMT) Assessment grossly 3+/5 BLE strength (P)   -MK     Row Name 07/12/22 1435          Balance    Balance Assessment sit to stand dynamic balance;standing  static balance;standing dynamic balance (P)   -MK     Sit to Stand Dynamic Balance standby assist (P)   -MK     Static Standing Balance standby assist (P)   -MK     Dynamic Standing Balance standby assist (P)   -MK     Position/Device Used, Standing Balance walker, front-wheeled (P)   -MK     Balance Interventions standing;sit to stand (P)   -MK           User Key  (r) = Recorded By, (t) = Taken By, (c) = Cosigned By    Initials Name Provider Type    Zaki Evans, PT Student PT Student               Goals/Plan     Row Name 07/12/22 1440          Bed Mobility Goal 1 (PT)    Activity/Assistive Device (Bed Mobility Goal 1, PT) sit to supine (P)   -MK     [place] Level/Cues Needed (Bed Mobility Goal 1, PT) independent (P)   -MK     Time Frame (Bed Mobility Goal 1, PT) short term goal (STG) (P)   -     Row Name 07/12/22 1440          Transfer Goal 1 (PT)    Activity/Assistive Device (Transfer Goal 1, PT) sit-to-stand/stand-to-sit (P)   -MK     [place] Level/Cues Needed (Transfer Goal 1, PT) independent (P)   -MK     Time Frame (Transfer Goal 1, PT) short term goal (STG) (P)   -     Row Name 07/12/22 1440          Gait Training Goal 1 (PT)    Activity/Assistive Device (Gait Training Goal 1, PT) gait (walking locomotion) (P)   -MK     [place] Level (Gait Training Goal 1, PT) independent (P)   -MK     Distance (Gait Training Goal 1, PT) 200 ft (P)   -MK     Time Frame (Gait Training Goal 1, PT) short term goal (STG) (P)   -     Row Name 07/12/22 1440          Patient Education Goal (PT)    Activity (Patient Education Goal, PT) Pt to complete BLE ex x15 reps (P)   -MK     [place]/Cues/Accuracy (Memory Goal 2, PT) demonstrates adequately (P)   -MK     Time Frame (Patient Education Goal, PT) 10 days (P)   -     Row Name 07/12/22 1440          Therapy Assessment/Plan (PT)    Planned Therapy Interventions (PT) balance training;bed mobility training;gait training;home exercise program;strengthening  (P)   -           User Key  (r) = Recorded By, (t) = Taken By, (c) = Cosigned By    Initials Name Provider Type    Zaki Evans, PT Student PT Student               Clinical Impression     Row Name 07/12/22 1436          Pain    Pretreatment Pain Rating 0/10 - no pain (P)   -MK     Posttreatment Pain Rating 0/10 - no pain (P)   -MK     Additional Documentation Pain Scale: Numbers Pre/Post-Treatment (Group) (P)   -     Row Name 07/12/22 1436          Plan of Care Review    Plan of Care Reviewed With patient (P)   -     Progress no change (P)   -MK     Outcome Evaluation Pt participated well in PT evaluation today. Pt was sitting in chair upon arrival with wife. Pt was able to stand from the chair SBA and ambulated 120 ft SBA using RW. Pt HR increased to 135 bpm during ambulation. Pt returned to chair and nursing was notified of progress and that pt needed to have a BM.  Pt expected to benefit from skilled PT during this inpatient stay. (P)   -     Row Name 07/12/22 1436          Therapy Assessment/Plan (PT)    Patient/Family Therapy Goals Statement (PT) Pt would like to return home (P)   -     Rehab Potential (PT) good, to achieve stated therapy goals (P)   -     Criteria for Skilled Interventions Met (PT) yes;skilled treatment is necessary (P)   -     Therapy Frequency (PT) 5 times/wk (P)   -     Row Name 07/12/22 1436          Vital Signs    Pretreatment Heart Rate (beats/min) 60 (P)   -MK     Intratreatment Heart Rate (beats/min) 135 (P)   -MK     Posttreatment Heart Rate (beats/min) 60 (P)   -MK     Pre SpO2 (%) 97 (P)   -MK     O2 Delivery Pre Treatment supplemental O2 (P)   2 L  -MK     Intra SpO2 (%) 94 (P)   -MK     O2 Delivery Intra Treatment supplemental O2 (P)   2 L  -MK     Post SpO2 (%) 96 (P)   -MK     O2 Delivery Post Treatment supplemental O2 (P)   2 L  -MK     Pre Patient Position Sitting (P)   -MK     Intra Patient Position Standing (P)   -MK     Post Patient Position Sitting (P)    -     Row Name 07/12/22 1436          Positioning and Restraints    Pre-Treatment Position sitting in chair/recliner (P)   -MK     Post Treatment Position chair (P)   -MK     In Chair notified nsg;sitting;call light within reach;encouraged to call for assist;with family/caregiver (P)   -           User Key  (r) = Recorded By, (t) = Taken By, (c) = Cosigned By    Initials Name Provider Type    Zaki Evans, PT Student PT Student               Outcome Measures     Row Name 07/12/22 1440          How much help from another person do you currently need...    Turning from your back to your side while in flat bed without using bedrails? 3 (P)   -MK     Moving from lying on back to sitting on the side of a flat bed without bedrails? 3 (P)   -MK     Moving to and from a bed to a chair (including a wheelchair)? 3 (P)   -MK     Standing up from a chair using your arms (e.g., wheelchair, bedside chair)? 3 (P)   -MK     Climbing 3-5 steps with a railing? 2 (P)   -MK     To walk in hospital room? 3 (P)   -     AM-PAC 6 Clicks Score (PT) 17 (P)   -     Highest level of mobility 5 --> Static standing (P)   -     Row Name 07/12/22 1440          Functional Assessment    Outcome Measure Options AM-PAC 6 Clicks Basic Mobility (PT) (P)   -           User Key  (r) = Recorded By, (t) = Taken By, (c) = Cosigned By    Initials Name Provider Type    Zaki Evans, PT Student PT Student                             Physical Therapy Education                 Title: PT OT SLP Therapies (In Progress)     Topic: Physical Therapy (In Progress)     Point: Mobility training (Done)     Learning Progress Summary           Patient Acceptance, E, VU by CAROLYN at 7/12/2022 1441                   Point: Home exercise program (Not Started)     Learner Progress:  Not documented in this visit.          Point: Body mechanics (Not Started)     Learner Progress:  Not documented in this visit.          Point: Precautions (Not Started)      Learner Progress:  Not documented in this visit.                      User Key     Initials Effective Dates Name Provider Type Discipline     05/11/22 -  Zaki Richmond PT Student PT Student PT              PT Recommendation and Plan  Planned Therapy Interventions (PT): (P) balance training, bed mobility training, gait training, home exercise program, strengthening  Plan of Care Reviewed With: (P) patient  Progress: (P) no change  Outcome Evaluation: (P) Pt participated well in PT evaluation today. Pt was sitting in chair upon arrival with wife. Pt was able to stand from the chair SBA and ambulated 120 ft SBA using RW. Pt HR increased to 135 bpm during ambulation. Pt returned to chair and nursing was notified of progress and that pt needed to have a BM.  Pt expected to benefit from skilled PT during this inpatient stay.     Time Calculation:    PT Charges     Row Name 07/12/22 1441             Time Calculation    Start Time 1407 (P)   -      PT Received On 07/12/22 (P)   -      PT Goal Re-Cert Due Date 07/22/22 (P)   -              Untimed Charges    PT Eval/Re-eval Minutes 38 (P)   -              Total Minutes    Untimed Charges Total Minutes 38 (P)   -       Total Minutes 38 (P)   -            User Key  (r) = Recorded By, (t) = Taken By, (c) = Cosigned By    Initials Name Provider Type     Zaki Richmond PT Student PT Student              Therapy Charges for Today     Code Description Service Date Service Provider Modifiers Qty    36831097882 HC PT EVAL LOW COMPLEXITY 3 7/12/2022 Zaki Richmond PT Student GP 1          PT G-Codes  Outcome Measure Options: (P) AM-PAC 6 Clicks Basic Mobility (PT)  AM-PAC 6 Clicks Score (PT): (P) 17    LYNDON Rueda  7/12/2022

## 2022-07-12 NOTE — PLAN OF CARE
Goal Outcome Evaluation:  Plan of Care Reviewed With: (P) patient        Progress: (P) no change  Outcome Evaluation: (P) Pt participated well in PT evaluation today. Pt was sitting in chair upon arrival with wife. Pt was able to stand from the chair SBA and ambulated 120 ft SBA using RW. Pt HR increased to 135 bpm during ambulation. Pt returned to chair and nursing was notified of progress and that pt needed to have a BM.  Pt expected to benefit from skilled PT during this inpatient stay.

## 2022-07-12 NOTE — PLAN OF CARE
Goal Outcome Evaluation:  Plan of Care Reviewed With: patient        Progress: no change  Outcome Evaluation: Pt seen for OT evaluation today.  Pt stood with sba and walked 120' with cga using RW.  Pt min assist for self care tasks.  Pt is expected to benefit from skilled OT to improve his  strength and independence with ADL tasks.

## 2022-07-12 NOTE — PROGRESS NOTES
[facility]    Name:  Robe Bryant   Age:  71 y.o.  Sex:  male  :  1950  MRN:  [id number]   Visit Number:  [id number]  Admission Date:  2022  Date Of Service:  22  Primary Care Physician:  Raj Sullivan MD     LOS: 0 days :  Patient Care Team:  Raj Sullivan MD as PCP - General (Internal Medicine)  Arelis Tucker MD as Consulting Physician (Cardiology)  Osiel Heaton MD as Consulting Physician (Urology)  Chace Vasquez MD as Consulting Physician (Nephrology)  Blayne Whitman MD as Consulting Physician (Ophthalmology)  Jose Hargrove MD as Consulting Physician (Pulmonary Disease)  John Solorzano MD as Consulting Physician (Urology):      Subjective / Interval History:     71-year-old patient was been admitted because of CHF exacerbation.  He was found to be in volume overload and short of breath.  He was hypoxic started on oxygen and IV diuresis and has diuresed 2800 in the negative balance.  He is feeling better and no chest pain at present.  Besides that his leg swelling is slightly better.      Vital Signs:    Temp:  [97.6 °F (36.4 °C)-98.6 °F (37 °C)] 98.4 °F (36.9 °C)  Heart Rate:  [60-94] 62  Resp:  [16-22] 16  BP: (115-196)/() 115/53    Intake and output:    I/O last 3 completed shifts:  In: 240 [P.O.:240]  Out: 2825 [Urine:2825]  No intake/output data recorded.    Physical Examination:    General Appearance:    Alert and cooperative, not in any acute distress.   Head:    Atraumatic and normocephalic, without obvious abnormality.   Eyes:            PERRLA,  No pallor. Extraocular movements are within normal limits.   Neck:   Supple,  No lymph glands, no bruit   Lungs:     Chest shape is normal. Breath sounds heard bilaterally equally.  No crackles or wheezing.     Heart:    Normal S1 and S2, no murmur,  No JVD   Abdomen:     Normal bowel sounds, no masses, no organomegaly. Soft     nontender, no  guarding, no rebound tenderness   Extremities:   Moves all extremities well, bilateral pitting edema, no cyanosis,    Skin:   No  bruising or rash.   Neurologic:   Grossly nonfocal and moves all extremities.      Laboratory results:  Results from last 7 days   Lab Units 07/12/22  0425 07/11/22  1349   SODIUM mmol/L 141 142   POTASSIUM mmol/L 4.1 3.6   CHLORIDE mmol/L 103 104   CO2 mmol/L 25.7 27.4   BUN mg/dL 20 14   CREATININE mg/dL 1.66* 1.38*   CALCIUM mg/dL 8.8 8.7   BILIRUBIN mg/dL  --  0.4   ALK PHOS U/L  --  65   ALT (SGPT) U/L  --  17   AST (SGOT) U/L  --  26   GLUCOSE mg/dL 325* 95     Results from last 7 days   Lab Units 07/11/22  1349   WBC 10*3/mm3 7.92   HEMOGLOBIN g/dL 11.8*   HEMATOCRIT % 36.5*   PLATELETS 10*3/mm3 183         Results from last 7 days   Lab Units 07/12/22  0425 07/11/22  1349   TROPONIN T ng/mL <0.010 0.010           Radiology results:    Imaging Results (Last 24 Hours)     Procedure Component Value Units Date/Time    CT Angiogram Chest Pulmonary Embolism [[id number]] Collected: 07/11/22 1816     Updated: 07/11/22 1921    Narrative:      FINAL REPORT    TECHNIQUE:  Multiple axial CT images were obtained through the chest  following IV contrast using a CTA/PE protocol.  3D/MIP  reconstruction images were also performed. This study was  performed with techniques to keep radiation doses as low as  reasonably achievable (ALARA). Individualized dose reduction  techniques using automated exposure control or adjustment of mA  and/or kV according to the patient's size were employed.    CLINICAL HISTORY:  sob, elevated dimer    FINDINGS:  PAs and aorta: The pulmonary arteries are enlarged.  No  pulmonary embolus.  Thoracic aorta is unremarkable.   There is  coronary artery disease.  Heart/mediastinum: No evidence for  right heart strain.  No pericardial effusion.    There is  cardiomegaly.  Lungs: There are mild diffuse groundglass  opacities with interlobular septal thickening and  bronchial  cuffing.  Lymph nodes: No pathologically enlarged thoracic lymph  nodes.  Pleura: No pneumothorax or pleural effusion.  Chest  Wall: No chest wall contusion.  Bones: No acute fracture.  Upper  abdomen: No acute findings in the upper abdomen.      Impression:      No pulmonary embolus.   Findings are most compatible with  cardiogenic pulmonary edema and probable pulmonary arterial  hypertension.  Cardiomegaly and coronary artery disease.    Authenticated and Electronically Signed by Glenn Ferro MD on  07/11/2022 06:16:36 PM    XR Chest 1 View [[id number]] Collected: 07/11/22 1514     Updated: 07/11/22 1517    Narrative:      PROCEDURE: XR CHEST 1 VW-        HISTORY: Chest Pain Triage Protocol     COMPARISON: None.     FINDINGS: The heart is enlarged. There is pulmonary vascular congestion.  There is mild hypoinflation.  There is no pneumothorax. There are no  acute osseous abnormalities. A pacemaker overlies the left chest.           Impression:      Mild CHF.                       Images were reviewed, interpreted, and dictated by Dr. Fran Rojas MD  Transcribed by Suly Newman PA-C.     This report was signed and finalized on 7/11/2022 3:15 PM by Fran Rojas MD.          I have reviewed the patient's radiology reports.    Medication Review:     I have reviewed the patients active and prn medications.     Assessment:      Acute on chronic congestive heart failure (HCC)    Acute on chronic respiratory failure with hypoxia (HCC)    Pacemaker    Chronic low back pain    Emphysema of lung (HCC)    Essential hypertension    Benign prostatic hyperplasia    Coronary artery disease involving native coronary artery of native heart with angina pectoris (HCC)    Type 2 diabetes mellitus, without long-term current use of insulin (HCC)    BPH (benign prostatic hypertrophy) with urinary retention    Morbid obesity (HCC)          Plan:    Acute on chronic respiratory failure with hypoxia  Acute pulmonary  edema  Acute on chronic diastolic heart failure  Daily weight, strict ins and outs, low-sodium and fluid restriction.   He has mild diastolic dysfunction in the past and today's echo is pending   2D echo results awaiting  Continue spironolactone, switch Lasix to 40 mg IV twice daily.  Continue O2 supplements to keep saturation above 90%.     CKD 3  Creatinine appears to be slightly worse  Avoid nephrotoxic drugs.  Continue to monitor kidney function while on diuretics.     Chronic urinary retention  Devi catheter placed in the ER with good urine output.  Will check urinalysis.   Consult urology     Hypertension, uncontrolled  Hyperlipidemia  CAD  Continue home meds.  Will add hydralazine for as needed high blood pressure.     Chronic atrial fibrillation  On amiodarone, metoprolol and Eliquis.     Sleep apnea  CPAP at night.      Raj Sullivan MD  07/12/22  08:21 EDT      Please note that portions of this note were completed with a voice recognition program.

## 2022-07-13 ENCOUNTER — READMISSION MANAGEMENT (OUTPATIENT)
Dept: CALL CENTER | Facility: HOSPITAL | Age: 72
End: 2022-07-13

## 2022-07-13 VITALS
DIASTOLIC BLOOD PRESSURE: 72 MMHG | HEART RATE: 60 BPM | BODY MASS INDEX: 42.72 KG/M2 | HEIGHT: 64 IN | RESPIRATION RATE: 18 BRPM | TEMPERATURE: 97.7 F | WEIGHT: 250.22 LBS | SYSTOLIC BLOOD PRESSURE: 128 MMHG | OXYGEN SATURATION: 96 %

## 2022-07-13 LAB
ALBUMIN SERPL-MCNC: 3.7 G/DL (ref 3.5–5.2)
ALBUMIN/GLOB SERPL: 1.5 G/DL
ALP SERPL-CCNC: 69 U/L (ref 39–117)
ALT SERPL W P-5'-P-CCNC: 20 U/L (ref 1–41)
ANION GAP SERPL CALCULATED.3IONS-SCNC: 10.6 MMOL/L (ref 5–15)
AST SERPL-CCNC: 24 U/L (ref 1–40)
BILIRUB SERPL-MCNC: 0.4 MG/DL (ref 0–1.2)
BUN SERPL-MCNC: 24 MG/DL (ref 8–23)
BUN/CREAT SERPL: 15.4 (ref 7–25)
CALCIUM SPEC-SCNC: 9.6 MG/DL (ref 8.6–10.5)
CHLORIDE SERPL-SCNC: 98 MMOL/L (ref 98–107)
CO2 SERPL-SCNC: 31.4 MMOL/L (ref 22–29)
CREAT SERPL-MCNC: 1.56 MG/DL (ref 0.76–1.27)
DEPRECATED RDW RBC AUTO: 45.5 FL (ref 37–54)
EGFRCR SERPLBLD CKD-EPI 2021: 46.9 ML/MIN/1.73
ERYTHROCYTE [DISTWIDTH] IN BLOOD BY AUTOMATED COUNT: 13.4 % (ref 12.3–15.4)
GLOBULIN UR ELPH-MCNC: 2.4 GM/DL
GLUCOSE BLDC GLUCOMTR-MCNC: 126 MG/DL (ref 70–130)
GLUCOSE BLDC GLUCOMTR-MCNC: 153 MG/DL (ref 70–130)
GLUCOSE BLDC GLUCOMTR-MCNC: 199 MG/DL (ref 70–130)
GLUCOSE SERPL-MCNC: 179 MG/DL (ref 65–99)
HCT VFR BLD AUTO: 42.9 % (ref 37.5–51)
HGB BLD-MCNC: 13.5 G/DL (ref 13–17.7)
MCH RBC QN AUTO: 29.3 PG (ref 26.6–33)
MCHC RBC AUTO-ENTMCNC: 31.5 G/DL (ref 31.5–35.7)
MCV RBC AUTO: 93.3 FL (ref 79–97)
PLATELET # BLD AUTO: 240 10*3/MM3 (ref 140–450)
PMV BLD AUTO: 11 FL (ref 6–12)
POTASSIUM SERPL-SCNC: 3.9 MMOL/L (ref 3.5–5.2)
PROT SERPL-MCNC: 6.1 G/DL (ref 6–8.5)
RBC # BLD AUTO: 4.6 10*6/MM3 (ref 4.14–5.8)
SODIUM SERPL-SCNC: 140 MMOL/L (ref 136–145)
WBC NRBC COR # BLD: 11.38 10*3/MM3 (ref 3.4–10.8)

## 2022-07-13 PROCEDURE — 25010000002 FUROSEMIDE PER 20 MG: Performed by: FAMILY MEDICINE

## 2022-07-13 PROCEDURE — 94660 CPAP INITIATION&MGMT: CPT

## 2022-07-13 PROCEDURE — 94799 UNLISTED PULMONARY SVC/PX: CPT

## 2022-07-13 PROCEDURE — 82962 GLUCOSE BLOOD TEST: CPT

## 2022-07-13 PROCEDURE — 94664 DEMO&/EVAL PT USE INHALER: CPT

## 2022-07-13 PROCEDURE — 96376 TX/PRO/DX INJ SAME DRUG ADON: CPT

## 2022-07-13 PROCEDURE — 94760 N-INVAS EAR/PLS OXIMETRY 1: CPT

## 2022-07-13 PROCEDURE — 63710000001 INSULIN ASPART PER 5 UNITS: Performed by: FAMILY MEDICINE

## 2022-07-13 PROCEDURE — 94618 PULMONARY STRESS TESTING: CPT

## 2022-07-13 PROCEDURE — G0378 HOSPITAL OBSERVATION PER HR: HCPCS

## 2022-07-13 PROCEDURE — 85027 COMPLETE CBC AUTOMATED: CPT | Performed by: INTERNAL MEDICINE

## 2022-07-13 PROCEDURE — 80053 COMPREHEN METABOLIC PANEL: CPT | Performed by: INTERNAL MEDICINE

## 2022-07-13 RX ORDER — POTASSIUM CHLORIDE 20 MEQ/1
20 TABLET, EXTENDED RELEASE ORAL DAILY
Qty: 30 TABLET | Refills: 2 | Status: SHIPPED | OUTPATIENT
Start: 2022-07-13 | End: 2023-03-21 | Stop reason: HOSPADM

## 2022-07-13 RX ORDER — GENTAMICIN SULFATE 3 MG/ML
2 SOLUTION/ DROPS OPHTHALMIC
Qty: 15 ML | Refills: 0 | Status: ON HOLD | OUTPATIENT
Start: 2022-07-13 | End: 2023-03-19

## 2022-07-13 RX ADMIN — METOPROLOL SUCCINATE 50 MG: 50 TABLET, EXTENDED RELEASE ORAL at 09:23

## 2022-07-13 RX ADMIN — INSULIN ASPART 2 UNITS: 100 INJECTION, SOLUTION INTRAVENOUS; SUBCUTANEOUS at 12:18

## 2022-07-13 RX ADMIN — GENTAMICIN SULFATE 2 DROP: 3 SOLUTION OPHTHALMIC at 04:41

## 2022-07-13 RX ADMIN — PRAMIPEXOLE DIHYDROCHLORIDE 0.5 MG: 0.25 TABLET ORAL at 09:23

## 2022-07-13 RX ADMIN — FUROSEMIDE 40 MG: 10 INJECTION, SOLUTION INTRAMUSCULAR; INTRAVENOUS at 04:41

## 2022-07-13 RX ADMIN — LISINOPRIL 40 MG: 20 TABLET ORAL at 09:23

## 2022-07-13 RX ADMIN — GENTAMICIN SULFATE 2 DROP: 3 SOLUTION OPHTHALMIC at 12:18

## 2022-07-13 RX ADMIN — APIXABAN 5 MG: 5 TABLET, FILM COATED ORAL at 09:23

## 2022-07-13 RX ADMIN — GENTAMICIN SULFATE 2 DROP: 3 SOLUTION OPHTHALMIC at 17:13

## 2022-07-13 RX ADMIN — PRAMIPEXOLE DIHYDROCHLORIDE 0.5 MG: 0.25 TABLET ORAL at 17:11

## 2022-07-13 RX ADMIN — PANTOPRAZOLE SODIUM 40 MG: 40 TABLET, DELAYED RELEASE ORAL at 06:36

## 2022-07-13 RX ADMIN — CETIRIZINE HYDROCHLORIDE 10 MG: 10 TABLET, FILM COATED ORAL at 09:23

## 2022-07-13 RX ADMIN — ALLOPURINOL 100 MG: 100 TABLET ORAL at 09:23

## 2022-07-13 RX ADMIN — IPRATROPIUM BROMIDE AND ALBUTEROL SULFATE 3 ML: 2.5; .5 SOLUTION RESPIRATORY (INHALATION) at 07:18

## 2022-07-13 RX ADMIN — GABAPENTIN 400 MG: 400 CAPSULE ORAL at 17:12

## 2022-07-13 RX ADMIN — ASPIRIN 81 MG: 81 TABLET, CHEWABLE ORAL at 09:22

## 2022-07-13 RX ADMIN — GENTAMICIN SULFATE 2 DROP: 3 SOLUTION OPHTHALMIC at 09:23

## 2022-07-13 RX ADMIN — INSULIN ASPART 2 UNITS: 100 INJECTION, SOLUTION INTRAVENOUS; SUBCUTANEOUS at 06:36

## 2022-07-13 RX ADMIN — GENTAMICIN SULFATE 2 DROP: 3 SOLUTION OPHTHALMIC at 00:25

## 2022-07-13 RX ADMIN — Medication 10 ML: at 09:23

## 2022-07-13 RX ADMIN — FLUTICASONE PROPIONATE 1 SPRAY: 50 SPRAY, METERED NASAL at 12:18

## 2022-07-13 RX ADMIN — AMIODARONE HYDROCHLORIDE 200 MG: 200 TABLET ORAL at 09:22

## 2022-07-13 RX ADMIN — SPIRONOLACTONE 50 MG: 25 TABLET, FILM COATED ORAL at 09:22

## 2022-07-13 RX ADMIN — AMLODIPINE BESYLATE 10 MG: 5 TABLET ORAL at 09:22

## 2022-07-13 RX ADMIN — FLUOXETINE 20 MG: 20 CAPSULE ORAL at 09:22

## 2022-07-13 RX ADMIN — GABAPENTIN 400 MG: 400 CAPSULE ORAL at 09:23

## 2022-07-13 NOTE — CASE MANAGEMENT/SOCIAL WORK
Cm spoke with pt at bedside regarding plans to DC home today. He states    [employer] supplies his home oxygen  and he wears it as needed. He states his wife will transport him home today and is aware to bring his oxygen tank for transport home.

## 2022-07-13 NOTE — PLAN OF CARE
Goal Outcome Evaluation:  Plan of Care Reviewed With: patient        Progress: no change  Outcome Evaluation: VSS.  Pt c/o pain that was controlled with PRN meds.  No change in pt condition to report.  Will continue to monitor.

## 2022-07-13 NOTE — PROGRESS NOTES
Exercise Oximetry    Patient Name:Robe Bryant   MRN: [id number]   Date: 07/13/22             ROOM AIR BASELINE   SpO2% 94   Heart Rate 67   Blood Pressure      EXERCISE ON ROOM AIR SpO2% EXERCISE ON O2 @ 2 LPM SpO2%   1 MINUTE 87 1 MINUTE 93   2 MINUTES  2 MINUTES 96   3 MINUTES  3 MINUTES 95   4 MINUTES  4 MINUTES 94   5 MINUTES  5 MINUTES 94   6 MINUTES  6 MINUTES 95              Distance Walked   Distance Walked   Dyspnea (William Scale)   Dyspnea (William Scale)   Fatigue (William Scale)   Fatigue (William Scale)   SpO2% Post Exercise   SpO2% Post Exercise  97   HR Post Exercise   HR Post Exercise  69   Time to Recovery   Time to Recovery     Comments: Patient requires 2 liters of oxygen while ambulating only.

## 2022-07-13 NOTE — DISCHARGE SUMMARY
[facility]   DISCHARGE SUMMARY      Name:  Robe Bryant   Age:  71 y.o.  Sex:  male  :  1950  MRN:  [id number]   Visit Number:  [id number]  Primary Care Physician:  Raj Sullivan MD  Date of Discharge:  2022  Admission Date:  2022      Discharge Diagnosis:         Acute on chronic congestive heart failure (HCC)    Acute on chronic respiratory failure with hypoxia (HCC)    Pacemaker    Chronic low back pain    Emphysema of lung (HCC)    Essential hypertension    Benign prostatic hyperplasia    Coronary artery disease involving native coronary artery of native heart with angina pectoris (HCC)    Type 2 diabetes mellitus, without long-term current use of insulin (HCC)    BPH (benign prostatic hypertrophy) with urinary retention    Morbid obesity (HCC)          Consults:     Consults     Date and Time Order Name Status Description    2022  8:37 AM Inpatient Urology Consult Completed           Procedures Performed:                 Hospital Course:   The patient was admitted on 2022  Please see H&P for details on admission HPI and ROS.  Patient is a 71 years old male with a past medical history of COPD on 2 L as needed at baseline, obstructive sleep apnea on CPAP at night, CHF, CAD, diabetes mellitus type 2, chronic kidney disease stage III, history of MI, restless leg syndrome, history of stroke, chronic A. fib on Eliquis who presented to the ER with a chief complaint of shortness of breath and associated chest pain.  Patient reports that his been having worsening increased shortness of breath over the past couple days, he had an episode of chest pain/tightness yesterday that is now resolved, attributing patient is his chest tightness to worsening respiratory status/dyspnea, and he currently denies any chest pain.  Patient also reporting increased swelling in his bilateral lower extremities over the past few days.  Patient has a chronic urinary  retention which has been worsening over the past couple days.  Patient denies any abdominal pain, nausea, vomiting, diarrhea, headaches, fevers or chills.  Patient is supposed to be on 2 L of oxygen as needed however he does not wear it at home at baseline.  Patient also on CPAP at night for his sleep apnea.      On ER evaluation, Patient was found to be hypertensive with a blood pressure of 180s over 90s, afebrile, patient was placed on 2 L of oxygen to keep her saturation above 90%.  His labs were significant for troponin of 0.010, proBNP of 762, creatinine 1.38, D-dimer of 1.43, hemoglobin of 11.8.  Pro-Royce WNL.  ABG with a pH of 7.405/PCO2 48/PO2 66/HCO3 30.6/saturation 93% on room air.  Respiratory panel negative chest x-ray with mild CHF.  CTA chest showed No pulmonary embolus.   Findings are most compatible with  cardiogenic pulmonary edema and probable pulmonary arterial hypertension.  Cardiomegaly and coronary artery disease.  Patient was given Lasix, it was noted that patient was having urinary retention after Lasix was given in the ER, Devi catheter was placed and had 1500 mL of urine output.  Patient also received fentanyl, aspirin and Solu-Medrol by the ER.  Hospitalist consulted for admission, further evaluation treatment.    After admission patient was started on IV diuretic and in the last 48 hours he has diuresed around 6 L.  His shortness of breath has improved room air saturation is around 90%.  He is hypoxic on ambulation and will do a 6-minute walk and continue with his 2 L oxygen at home with ambulation.  Patient has improved with his blood pressure with the current medications and is not had any abnormal arrhythmias.  Echocardiogram was done and it showed normal systolic function with ejection fraction of about 60 to 65%.  No other valve abnormalities.  Surgery has preserved ejection fraction with heart failure more possible diastolic.      Vital Signs:     Temp:  [97.7 °F (36.5 °C)-98.9 °F  (37.2 °C)] 97.7 °F (36.5 °C)  Heart Rate:  [60-64] 60  Resp:  [15-21] 18  BP: (122-165)/(63-89) 128/72    Physical Exam:     General Appearance:    Alert and cooperative, not in any acute distress.   Head:    Atraumatic and normocephalic, without obvious abnormality.   Eyes:            PERRLA,  No pallor. Extraocular movements are within normal limits.   Neck:   Supple,  No lymph glands, no bruit   Lungs:     Chest shape is normal. Breath sounds heard bilaterally equally.  No crackles or wheezing.     Heart:    Normal S1 and S2, no murmur,  No JVD   Abdomen:     Normal bowel sounds, no masses, no organomegaly. Soft     nontender, no guarding, no rebound tenderness   Extremities:   Moves all extremities well, no edema, no cyanosis,    Skin:   No  bruising or rash.   Neurologic:   Grossly nonfocal and moves all extremities.        Pertinent Lab Results:     Results from last 7 days   Lab Units 07/12/22  0425 07/11/22  1349   SODIUM mmol/L 141 142   POTASSIUM mmol/L 4.1 3.6   CHLORIDE mmol/L 103 104   CO2 mmol/L 25.7 27.4   BUN mg/dL 20 14   CREATININE mg/dL 1.66* 1.38*   CALCIUM mg/dL 8.8 8.7   BILIRUBIN mg/dL  --  0.4   ALK PHOS U/L  --  65   ALT (SGPT) U/L  --  17   AST (SGOT) U/L  --  26   GLUCOSE mg/dL 325* 95     Results from last 7 days   Lab Units 07/11/22  1349   WBC 10*3/mm3 7.92   HEMOGLOBIN g/dL 11.8*   HEMATOCRIT % 36.5*   PLATELETS 10*3/mm3 183         No results found for: BLOODCX, URINECX, WOUNDCX, MRSACX    Pertinent Radiology Results:    Imaging Results (Most Recent)     Procedure Component Value Units Date/Time    CT Angiogram Chest Pulmonary Embolism [[id number]] Collected: 07/11/22 1816     Updated: 07/11/22 1921    Narrative:      FINAL REPORT    TECHNIQUE:  Multiple axial CT images were obtained through the chest  following IV contrast using a CTA/PE protocol.  3D/MIP  reconstruction images were also performed. This study was  performed with techniques to keep radiation doses as low as  reasonably  achievable (ALARA). Individualized dose reduction  techniques using automated exposure control or adjustment of mA  and/or kV according to the patient's size were employed.    CLINICAL HISTORY:  sob, elevated dimer    FINDINGS:  PAs and aorta: The pulmonary arteries are enlarged.  No  pulmonary embolus.  Thoracic aorta is unremarkable.   There is  coronary artery disease.  Heart/mediastinum: No evidence for  right heart strain.  No pericardial effusion.    There is  cardiomegaly.  Lungs: There are mild diffuse groundglass  opacities with interlobular septal thickening and bronchial  cuffing.  Lymph nodes: No pathologically enlarged thoracic lymph  nodes.  Pleura: No pneumothorax or pleural effusion.  Chest  Wall: No chest wall contusion.  Bones: No acute fracture.  Upper  abdomen: No acute findings in the upper abdomen.      Impression:      No pulmonary embolus.   Findings are most compatible with  cardiogenic pulmonary edema and probable pulmonary arterial  hypertension.  Cardiomegaly and coronary artery disease.    Authenticated and Electronically Signed by Glenn Ferro MD on  07/11/2022 06:16:36 PM    XR Chest 1 View [[id number]] Collected: 07/11/22 1514     Updated: 07/11/22 1517    Narrative:      PROCEDURE: XR CHEST 1 VW-        HISTORY: Chest Pain Triage Protocol     COMPARISON: None.     FINDINGS: The heart is enlarged. There is pulmonary vascular congestion.  There is mild hypoinflation.  There is no pneumothorax. There are no  acute osseous abnormalities. A pacemaker overlies the left chest.           Impression:      Mild CHF.                       Images were reviewed, interpreted, and dictated by Dr. Fran Rojas MD  Transcribed by Suly Newman PA-C.     This report was signed and finalized on 7/11/2022 3:15 PM by Fran Rojas MD.                  Discharge Disposition:      Home or Self Care    Discharge Medication:         Discharge Medications      New Medications      Instructions Start Date    gentamicin 0.3 % ophthalmic solution  Commonly known as: GARAMYCIN   2 drops, Both Eyes, Every 4 Hours Scheduled         Changes to Medications      Instructions Start Date   potassium chloride 10 MEQ CR tablet  Commonly known as: K-DUR,KLOR-CON  What changed:   · how much to take  · when to take this   20 mEq, Oral, Daily         Continue These Medications      Instructions Start Date   albuterol sulfate  (90 Base) MCG/ACT inhaler  Commonly known as: PROVENTIL HFA;VENTOLIN HFA;PROAIR HFA   2 puffs, Inhalation, Every 4 Hours PRN      amiodarone 200 MG tablet  Commonly known as: PACERONE   200 mg, Oral, Every 24 Hours Scheduled      amLODIPine 10 MG tablet  Commonly known as: NORVASC   10 mg, Oral, Daily      apixaban 5 MG tablet tablet  Commonly known as: ELIQUIS   5 mg, Oral, 2 Times Daily      aspirin 81 MG chewable tablet   81 mg, Oral, Daily      atorvastatin 20 MG tablet  Commonly known as: LIPITOR   20 mg, Oral, Nightly      FLUoxetine 20 MG capsule  Commonly known as: PROzac   20 mg, Oral, Daily      furosemide 20 MG tablet  Commonly known as: LASIX   TAKE 1 TABLET BY MOUTH EVERY MORNING, MAY TAKE SECOND DOSE AS NEEDED FOR INCREASED SWELLING      gabapentin 400 MG capsule  Commonly known as: NEURONTIN   400 mg, Oral, 3 Times Daily      glipizide 5 MG tablet  Commonly known as: GLUCOTROL   TAKE 1 TABLET TWICE DAILY BEFORE MEALS      glucose monitor monitoring kit   1 each, Does not apply, Daily, E11.9      HYDROcodone-acetaminophen 5-325 MG per tablet  Commonly known as: NORCO   1 tablet, Oral, Every 6 Hours PRN      ipratropium-albuterol 0.5-2.5 mg/3 ml nebulizer  Commonly known as: DUO-NEB   3 mL, Nebulization, 4 Times Daily - RT      Lancet Device misc   1 each, Does not apply, Daily, E11.9      lisinopril 40 MG tablet  Commonly known as: PRINIVIL,ZESTRIL   40 mg, Oral, Daily      metoprolol succinate XL 50 MG 24 hr tablet  Commonly known as: Toprol XL   50 mg, Oral, Daily      Misc. Devices misc    Bipap tubing.      multivitamin tablet tablet  Commonly known as: THERAGRAN   1 tablet, Oral, Daily      Nebulizer misc   1 each, Does not apply, Every 4 Hours PRN      O2  Commonly known as: OXYGEN   2 L/min, Inhalation, Continuous PRN      omeprazole 40 MG capsule  Commonly known as: priLOSEC   40 mg, Oral, Daily      spironolactone 50 MG tablet  Commonly known as: ALDACTONE   TAKE 1 TABLET EVERY DAY      True Metrix Air Glucose Meter w/Device kit   1 each, In Vitro, 2 times daily, E11.9      True Metrix Blood Glucose Test test strip  Generic drug: glucose blood   Daily testing E11.9      TRUEplus Lancets 33G misc   1 each, Other, Daily, E11.9      Vitamin D (Cholecalciferol) 50 MCG (2000 UT) capsule   50 mcg, Oral, Daily         Stop These Medications    allopurinol 100 MG tablet  Commonly known as: Zyloprim     azithromycin 250 MG tablet  Commonly known as: ZITHROMAX     baclofen 10 MG tablet  Commonly known as: LIORESAL     Breztri Aerosphere 160-9-4.8 MCG/ACT aerosol inhaler  Generic drug: Budeson-Glycopyrrol-Formoterol     cetirizine 10 MG tablet  Commonly known as: zyrTEC     clotrimazole-betamethasone 1-0.05 % cream  Commonly known as: Lotrisone     Co Q-10 100 MG capsule     cyclobenzaprine 10 MG tablet  Commonly known as: FLEXERIL     fluticasone 50 MCG/ACT nasal spray  Commonly known as: FLONASE     lidocaine 5 %  Commonly known as: LIDODERM     nitroglycerin 0.4 MG SL tablet  Commonly known as: Nitrostat     ondansetron ODT 4 MG disintegrating tablet  Commonly known as: Zofran ODT     pramipexole 0.5 MG tablet  Commonly known as: MIRAPEX     predniSONE 20 MG tablet  Commonly known as: DELTASONE            Discharge Diet:       Healthy heart diet and consistent carb      Follow-up Appointments:      Future Appointments   Date Time Provider Department Center   7/29/2022  1:30 PM Cecily Miranda PA-C [facility]   8/17/2022 10:30 AM [facility]   8/17/2022  11:00 AM Marilee Brock APRN [facility]   11/16/2022 10:30 AM Cecily Miranda PA-C [facility]   12/19/2022 11:00 AM Aminta Chan MD [facility]     Follow-up with me in the office next Tuesday    Test Results Pending at Discharge:      Pending Labs     Order Current Status    CBC (No Diff) Collected (07/13/22 0838)    Comprehensive Metabolic Panel Collected (07/13/22 0838)             Raj Sullivan MD  07/13/22  08:41 EDT    Time:  Discharge 36 min    Please note that portions of this note were completed with a voice recognition program.

## 2022-07-14 NOTE — OUTREACH NOTE
Prep Survey    Flowsheet Row Responses   [facility] facility [facility]? [facility]   Is LACE score < 7 ? No   Emergency Room discharge w/ pulse ox? No   Eligibility Readm Mgmt   Discharge diagnosis Acute on chronic congestive heart failure    Does the patient have one of the following disease processes/diagnoses(primary or secondary)? CHF   Does the patient have Home health ordered? No   Is there a DME ordered? No   Prep survey completed? Yes          CAROL NERI - Registered Nurse

## 2022-07-15 NOTE — PROGRESS NOTES
Nutrition Services    Patient Name:  Robe Bryant  YOB: 1950  MRN: [id number]  Admit Date:  7/11/2022    RD mailed CHF and DM education materials including Heart Healthy and Consistent Carbohydrate Nutrition Therapy packet from [facility], Diabetes Basics from [facility], and RD contact information due to recent discharge.      Electronically signed by:  Clau Guerra RD  07/15/22 09:24 EDT

## 2022-07-15 NOTE — CASE MANAGEMENT/SOCIAL WORK
Case Management Discharge Note    Provided Post Acute Provider List?: N/A  Provided Post Acute Provider Quality & Resource List?: N/A    Selected Continued Care - Discharged on 7/13/2022 Admission date: 7/11/2022 - Discharge disposition: Home or Self Care    Destination    No services have been selected for the patient.              Durable Medical Equipment Coordination complete.    Service Provider Selected Services Address Phone Fax Patient Preferred    [facility] [address], [place] [address] [phone] --       Internal Comment last updated by Michelle Wan, APRN 7/15/2022 1240    Established O2 provider                            Transportation Services  Private: Car    Final Discharge Disposition Code: 01 - home or self-care

## 2022-07-22 ENCOUNTER — READMISSION MANAGEMENT (OUTPATIENT)
Dept: CALL CENTER | Facility: HOSPITAL | Age: 72
End: 2022-07-22

## 2022-07-22 NOTE — OUTREACH NOTE
CHF Week 2 Survey    Flowsheet Row Responses   [facility] facility [facility] discharged from? [facility]   Does the patient have one of the following disease processes/diagnoses(primary or secondary)? CHF   Week 2 attempt successful? No   Unsuccessful attempts Attempt 1          ESTELA RASHID - Registered Nurse

## 2022-07-26 ENCOUNTER — READMISSION MANAGEMENT (OUTPATIENT)
Dept: CALL CENTER | Facility: HOSPITAL | Age: 72
End: 2022-07-26

## 2022-07-26 NOTE — OUTREACH NOTE
CHF Week 2 Survey    Flowsheet Row Responses   [facility] facility [facility] discharged from? [facility]   Does the patient have one of the following disease processes/diagnoses(primary or secondary)? CHF   Week 2 attempt successful? No   Unsuccessful attempts Attempt 2          MELLY JADE - Registered Nurse

## 2022-08-03 ENCOUNTER — READMISSION MANAGEMENT (OUTPATIENT)
Dept: CALL CENTER | Facility: HOSPITAL | Age: 72
End: 2022-08-03

## 2022-08-03 NOTE — OUTREACH NOTE
CHF Week 3 Survey    Flowsheet Row Responses   [facility] facility [facility] discharged from? [facility]   Does the patient have one of the following disease processes/diagnoses(primary or secondary)? CHF   Week 3 attempt successful? No   Unsuccessful attempts Attempt 1          KENNEDY CERON - Registered Nurse

## 2022-09-30 ENCOUNTER — APPOINTMENT (OUTPATIENT)
Dept: CT IMAGING | Facility: HOSPITAL | Age: 72
End: 2022-09-30

## 2022-09-30 ENCOUNTER — HOSPITAL ENCOUNTER (EMERGENCY)
Facility: HOSPITAL | Age: 72
Discharge: HOME | End: 2022-10-01
Attending: EMERGENCY MEDICINE | Admitting: EMERGENCY MEDICINE

## 2022-09-30 DIAGNOSIS — M51.26 HERNIATION OF INTERVERTEBRAL DISC BETWEEN L4 AND L5: Primary | ICD-10-CM

## 2022-09-30 PROCEDURE — 99283 EMERGENCY DEPT VISIT LOW MDM: CPT

## 2022-09-30 PROCEDURE — 72131 CT LUMBAR SPINE W/O DYE: CPT

## 2022-09-30 PROCEDURE — 72192 CT PELVIS W/O DYE: CPT

## 2022-09-30 RX ORDER — LIDOCAINE 50 MG/G
1 PATCH TOPICAL ONCE
Status: DISCONTINUED | OUTPATIENT
Start: 2022-09-30 | End: 2022-10-01 | Stop reason: HOSPADM

## 2022-09-30 RX ORDER — HYDROCODONE BITARTRATE AND ACETAMINOPHEN 7.5; 325 MG/1; MG/1
1 TABLET ORAL ONCE
Status: COMPLETED | OUTPATIENT
Start: 2022-09-30 | End: 2022-09-30

## 2022-09-30 RX ORDER — OXYCODONE AND ACETAMINOPHEN 10; 325 MG/1; MG/1
1 TABLET ORAL ONCE
Status: COMPLETED | OUTPATIENT
Start: 2022-09-30 | End: 2022-09-30

## 2022-09-30 RX ORDER — METHOCARBAMOL 500 MG/1
750 TABLET, FILM COATED ORAL ONCE
Status: COMPLETED | OUTPATIENT
Start: 2022-09-30 | End: 2022-09-30

## 2022-09-30 RX ADMIN — OXYCODONE HYDROCHLORIDE AND ACETAMINOPHEN 1 TABLET: 10; 325 TABLET ORAL at 23:35

## 2022-09-30 RX ADMIN — METHOCARBAMOL 750 MG: 500 TABLET ORAL at 21:46

## 2022-09-30 RX ADMIN — HYDROCODONE BITARTRATE AND ACETAMINOPHEN 1 TABLET: 7.5; 325 TABLET ORAL at 21:47

## 2022-09-30 RX ADMIN — LIDOCAINE 1 PATCH: 50 PATCH CUTANEOUS at 22:49

## 2022-10-01 VITALS
OXYGEN SATURATION: 93 % | HEART RATE: 60 BPM | DIASTOLIC BLOOD PRESSURE: 78 MMHG | SYSTOLIC BLOOD PRESSURE: 131 MMHG | RESPIRATION RATE: 20 BRPM | TEMPERATURE: 98.6 F | BODY MASS INDEX: 34.99 KG/M2 | WEIGHT: 210 LBS | HEIGHT: 65 IN

## 2022-10-01 RX ORDER — OXYCODONE AND ACETAMINOPHEN 10; 325 MG/1; MG/1
1 TABLET ORAL EVERY 6 HOURS PRN
Qty: 12 TABLET | Refills: 0 | Status: SHIPPED | OUTPATIENT
Start: 2022-10-01 | End: 2023-03-19

## 2022-10-01 NOTE — ED PROVIDER NOTES
Date of Service: 03/24/2022    HISTORY OF PRESENT ILLNESS:   The patient comes in today for followup after a L5-S1 laminectomy/noninstrumented fusion.  She has improved since the last visit, but still complains of some back pain.  She is denying any leg pain at this point.    PHYSICAL EXAMINATION:    MUSCULOSKELETAL: Incision is healing nicely with no erythema or drainage.  She is walking independently with a normal heel-toe gait pattern.    IMAGING:    X-rays of the lumbar spine show the laminectomy without any evidence of instability.    IMPRESSION AND PLAN:    Status post L5-S1 laminectomy and noninstrumented fusion. At this point I recommended she start physical therapy and a referral was given.  I will obtain repeat x-rays in 4 months.      Dictated By: Breanna Harmon MD  Signing Provider: Breanna Harmon MD    PS/mook ([id number])   DD: 03/24/2022 3:55:11 PM TD: 03/25/2022 3:20:33 PM      Copy Sent To:  Malina Rogers MD   Subjective   History of Present Illness  This is a 71-year-old male who comes in today complaining of lower back pain and right hip pain.  He reports he had a slip and fall proximately a week ago and the pain has become more severe.  He denies any other injuries.  He denies any loss of bowel or bladder.  Review of Systems   Constitutional: Negative.    HENT: Negative.    Eyes: Negative.    Respiratory: Negative.    Cardiovascular: Negative.    Gastrointestinal: Negative.    Genitourinary: Negative.    Musculoskeletal: Positive for arthralgias and back pain.   Skin: Negative.    Neurological: Negative.    Psychiatric/Behavioral: Negative.        Past Medical History:   Diagnosis Date   • Anxiety and depression    • Arthritis    • Backache     20 years   • Benign prostatic hyperplasia    • Bladder trauma    • Cervicalgia    • Chronic kidney disease     Cr up to 2.1   • Coronary artery disease    • Diabetes mellitus (HCC)     15 years--   • Emphysema of lung (HCC)    • Erectile dysfunction    • Eye exam, routine 2015   • FH: colonic polyps    • Gout     for 10 years--   • History of echocardiogram 09/15/2014   • History of tobacco use     with cessation x7 years   • Hyperlipidemia    • Hypertension    • Migraine    • Myocardial infarction (HCC)     h/o coronary artery stenting--   • Prostate cancer (HCC)    • Restless leg syndrome     20 years   • Sleep apnea     12 years; uses a Bi-Pap   • Stroke (HCC)    • Syncope 4/28/2017   • Warfarin anticoagulation 9/14/2016       No Known Allergies    Past Surgical History:   Procedure Laterality Date   • BLADDER SURGERY     • CARDIAC CATHETERIZATION  03/15/2013    revealing widely patent stents of the left circumflex and ramus intermedius coronary arteries, no significant disease is seen in any territory   • CARDIAC CATHETERIZATION Left 9/30/2019    Procedure: Left Heart Cath;  Surgeon: Arelis Tucker MD;  [facility];  Service: Cardiology    • CARDIAC ELECTROPHYSIOLOGY PROCEDURE N/A 5/10/2021    Procedure: PPM battery change;  Surgeon: Arelis Tucker MD;  Location: [facility] INVASIVE LOCATION;  Service: Cardiology;  Laterality: N/A;   • CARDIAC PACEMAKER PLACEMENT     • CATARACT EXTRACTION     • COLONOSCOPY      No family history of colon cancer.     • COLONOSCOPY     • HERNIA REPAIR      Type unknown   • PACEMAKER IMPLANTATION     • PROSTATE SURGERY         Family History   Problem Relation Age of Onset   • Cancer Mother    • Diabetes Mother    • Hypertension Mother    • Stroke Mother    • Stomach cancer Mother    • Heart disease Mother    • Stomach cancer Father    • Cancer Father    • Lung cancer Father        Social History     Socioeconomic History   • Marital status:    Tobacco Use   • Smoking status: Former Smoker     Packs/day: 1.00     Years: 30.00     Pack years: 30.00     Types: Cigarettes     Quit date: 8/15/2001     Years since quittin.1   • Smokeless tobacco: Never Used   • Tobacco comment: quit 16 years ago   Vaping Use   • Vaping Use: Never used   Substance and Sexual Activity   • Alcohol use: No   • Drug use: No   • Sexual activity: Defer           Objective   Physical Exam  Vitals and nursing note reviewed.   Constitutional:       Appearance: Normal appearance. He is obese.   Neurological:      Mental Status: He is alert.     Primary survey  Airway patent  Bilateral breath sounds  Strong radial and femoral pulses  GCS 15    Secondary survey  general: patient appears uncomfortable, nontoxic  Head: Atraumatic, normocephalic  Eyes: Pupils equal round reactive to light, extra ocular movements are intact, vision grossly normal  ENT: No facial step-offs, no dental malocclusion  Neck: Nontender to palpation of the C-spine, full range of motion, trachea midline  Chest: Nontender to palpation  Cardiovascular: Regular rate  Lungs: Bilateral breath sounds, clear to auscultation bilaterally  Back: T are  nontender to palpation, no step offs   and L-spines with tenderness midline, right hip tenderness with limited range of motion due to pain.  No step-offs noted.  Abdomen: Soft, nontender, nondistended  Pelvis: Stable  Extremities: Full range of motion in all 4 extremities, no evidence of gross deformity or laceration  Neuro: Sensation grossly intact to light touch in all 4 extremities        Procedures           ED Course  ED Course as of 10/01/22 0016   Fri Sep 30, 2022   2249 Complaining of pain without relief after the hydrocodone.  Lidocaine patch ordered. [TW]   2327 Still having increase in pain. I have discussed the findings with the patient. I have advised him to follow up with Dr. Manrique his orthopedic surgeon. I have given him return to care instructions and he is agreeable to this plan of care.  [TW]   2335 Continue to complain of pain, oxycodone given. [TW]   Sat Oct 01, 2022   0013 Patient reports some relief.  Able to move.  States he feels confident going home.  He states he has muscle relaxers at home.  We will give him oxycodone to use over the weekend in place of his hydrocodone which was not working.  I have given him strict return to care precautions and he will follow-up with his orthopedic surgeon on Monday. [TW]      ED Course User Index  [TW] Kim Tellez, APRN                                           MDM  Number of Diagnoses or Management Options     Amount and/or Complexity of Data Reviewed  Tests in the radiology section of CPT®: ordered and reviewed  Review and summarize past medical records: yes  Discuss the patient with other providers: yes    Risk of Complications, Morbidity, and/or Mortality  Presenting problems: moderate  Diagnostic procedures: moderate  Management options: moderate        Final diagnoses:   Herniation of intervertebral disc between L4 and L5       ED Disposition  ED Disposition     ED Disposition   Discharge    Condition   Stable    Comment   --              Raj Sullivan MD  [address] [facility] [address] [phone]    Schedule an appointment as soon as possible for a visit       Frankie Manrique MD  [address] [facility] [address]  [place] [id number] [phone]    Schedule an appointment as soon as possible for a visit            Medication List      New Prescriptions    oxyCODONE-acetaminophen  MG per tablet  Commonly known as: PERCOCET  Take 1 tablet by mouth Every 6 (Six) Hours As Needed for Severe Pain.           Where to Get Your Medications      These medications were sent to [id number] - [place], [place] - [address] AT [facility] [address] - [phone] PH - [phone] FX  [address], [facility] [address]    Phone: [phone]   · oxyCODONE-acetaminophen  MG per tablet          Kim Tellez, APRN  10/01/22 0016

## 2022-10-22 PROCEDURE — 93296 REM INTERROG EVL PM/IDS: CPT | Performed by: INTERNAL MEDICINE

## 2022-10-22 PROCEDURE — 93294 REM INTERROG EVL PM/LDLS PM: CPT | Performed by: INTERNAL MEDICINE

## 2022-11-14 ENCOUNTER — HOSPITAL ENCOUNTER (OUTPATIENT)
Dept: GENERAL RADIOLOGY | Facility: HOSPITAL | Age: 72
Discharge: HOME | End: 2022-11-14
Admitting: INTERNAL MEDICINE

## 2022-11-14 ENCOUNTER — TRANSCRIBE ORDERS (OUTPATIENT)
Dept: GENERAL RADIOLOGY | Facility: HOSPITAL | Age: 72
End: 2022-11-14

## 2022-11-14 DIAGNOSIS — M25.551 RIGHT HIP PAIN: ICD-10-CM

## 2022-11-14 DIAGNOSIS — M25.551 RIGHT HIP PAIN: Primary | ICD-10-CM

## 2022-11-14 PROCEDURE — 73502 X-RAY EXAM HIP UNI 2-3 VIEWS: CPT

## 2022-11-15 ENCOUNTER — TELEPHONE (OUTPATIENT)
Dept: UROLOGY | Facility: CLINIC | Age: 72
End: 2022-11-15

## 2023-01-21 PROCEDURE — 93296 REM INTERROG EVL PM/IDS: CPT | Performed by: INTERNAL MEDICINE

## 2023-01-21 PROCEDURE — 93294 REM INTERROG EVL PM/LDLS PM: CPT | Performed by: INTERNAL MEDICINE

## 2023-02-16 ENCOUNTER — OFFICE VISIT (OUTPATIENT)
Dept: CARDIOLOGY | Facility: CLINIC | Age: 73
End: 2023-02-16
Payer: MEDICARE

## 2023-02-16 ENCOUNTER — HOSPITAL ENCOUNTER (OUTPATIENT)
Facility: HOSPITAL | Age: 73
Discharge: HOME | End: 2023-02-16
Payer: MEDICARE

## 2023-02-16 VITALS
SYSTOLIC BLOOD PRESSURE: 124 MMHG | BODY MASS INDEX: 39.49 KG/M2 | HEIGHT: 65 IN | OXYGEN SATURATION: 92 % | DIASTOLIC BLOOD PRESSURE: 58 MMHG | WEIGHT: 237 LBS | HEART RATE: 61 BPM

## 2023-02-16 DIAGNOSIS — I48.0 PAROXYSMAL ATRIAL FIBRILLATION: Primary | ICD-10-CM

## 2023-02-16 DIAGNOSIS — I50.32 CHRONIC DIASTOLIC HEART FAILURE: ICD-10-CM

## 2023-02-16 DIAGNOSIS — I25.10 CORONARY ARTERY DISEASE INVOLVING NATIVE CORONARY ARTERY OF NATIVE HEART WITHOUT ANGINA PECTORIS: ICD-10-CM

## 2023-02-16 DIAGNOSIS — I10 ESSENTIAL HYPERTENSION: ICD-10-CM

## 2023-02-16 DIAGNOSIS — E78.00 HYPERCHOLESTEROLEMIA: ICD-10-CM

## 2023-02-16 PROCEDURE — 93280 PM DEVICE PROGR EVAL DUAL: CPT | Performed by: INTERNAL MEDICINE

## 2023-02-16 PROCEDURE — 93005 ELECTROCARDIOGRAM TRACING: CPT

## 2023-02-16 PROCEDURE — 99214 OFFICE O/P EST MOD 30 MIN: CPT | Performed by: INTERNAL MEDICINE

## 2023-02-16 PROCEDURE — 93000 ELECTROCARDIOGRAM COMPLETE: CPT | Performed by: INTERNAL MEDICINE

## 2023-02-16 RX ORDER — GABAPENTIN 800 MG/1
800 TABLET ORAL
COMMUNITY
Start: 2022-11-11

## 2023-02-16 RX ORDER — UMECLIDINIUM BROMIDE AND VILANTEROL TRIFENATATE 62.5; 25 UG/1; UG/1
1 POWDER RESPIRATORY (INHALATION) DAILY
Status: ON HOLD | COMMUNITY
Start: 2022-11-11 | End: 2023-03-19

## 2023-02-16 NOTE — PROGRESS NOTES
[facility] Cardiology    Patient ID: Robe Bryant is a 72 y.o. male.  : 1950   Contact: [phone]    Encounter date: 2023    PCP: Raj Sullivan MD      Chief complaint:   Chief Complaint   Patient presents with   • Paroxysmal atrial fibrillation (CMS/HCC)       Problem List:  1. Coronary artery disease:  a. [facility], 2009, Dr. Vasquez: Placement of a 3.0 x 23 mm Xience LATOYA to the ramus, 3.0 x 12 mm LATOYA to the mid-circumflex.  b. [facility], 10/11/2010: EF 45%, LVEDP 28.   c. [facility], 2011: 2.25 x 13 mm Cypher LATOYA placement to the distal circumflex.   d. [facility], 01/10/2012, [facility]: Noncritical CAD, patient ramus and left circumflex stents, LVH with near cavity obliteration.  e. Medical management.  f. Abnormal Cardiolite, 2013, Dunia Rosado, showing moderate to severe perfusion defect of the basolateral wall of the LV.  g. [facility], 03/15/2013, PW: Widely patent stents of the LCx and ramus intermedius coronary arteries, no significant disease is seen in any territory.   h. Diastolic heart failure with hospital admission, May 2013, at [facility] in [place].  i. Recurrent anginal symptoms, 2014; initiation of Imdur therapy, 2014.  j. [facility], 09/15/2014, PW: Noncritical CAD with widely patent stents in the LCx and ramus intermedius arteries. Other sources for the patient’s chest discomfort should be considered.  k. [facility], 06/10/2016: EF Normal, widely patent ramus intermedius stent; no significant disease within LAD, LCx, RCA.  l. [facility], 2019: Noncritical CAD with patent stents noted. Aggressive risk factor modification which will include better control of the patient's excessively high blood pressure  m. Echo, 2021: LVEF: 57%, Left ventricular diastolic function is consistent with grade 1 impaired relaxation. Left atrial volume is mildly increased.   2. Diastolic heart failure:  a. Echocardiogram, 2013, Hermes Vargas MD: Hyperdynamic LV  with EF 75%, mild TR, trace MI.  b. Complaints of dyspnea at the time of office visit, 05/22/2013, with O2 saturation in the 91% to 95% range with ambulation.  c. Echocardiogram, 09/15/2014: Moderate LVH with LVEF 60% to 65%. Mild MR and mild TR.   d. Echocardiogram, 07/11/2022; EF 60-65%. Normal echocardiogram.   3. Hypertension.  4. Symptomatic bradycardia:  a. Continued symptoms of presyncope in the setting of hypotension and bradycardia, 02/16/2012.  b. PPM implantation, Dr. Tucker, 02/21/2012, [employer] RFDR, model #2210.  c. Hospitalization with acute dyspnea, 02/27/2013, at [facility] secondary to pacemaker-induced tachycardia.  d. Tilt Table (6/22/2017): Positive for orthostatic syncope. Patient developed abrupt symptomatic hypotension at 9 minutes, with any corresponding increase in heart rate. Consistent with vasodepressive response.  e. Implantation of [employer]urity MRI PM 2272 serial #[id number] end-of-life on previous generator 5/10/2021  5. Paroxysmal atrial fibrillation:  a. Coumadin therapy followed by [facility].   b. CHADS-VASc score = 5. (Age 65-74, HTN, DM, HF, TIA?)  c. Admission to [facility], 06/13/2015 through 06/16/2015, with DC cardioversion and return to sinus rhythm and subsequent metoprolol dosage increase.  6. Recent symptoms of left-sided facial numbness and occasional left arm weakness.  7. Nonsustained VT per device interrogation, 09/04/2014.  8. Brief episodes of atrial tachycardia at the time of device interrogation, 05/03/2012.  9. Hyperlipidemia.  10. Type 2 diabetes mellitus.  11. Obstructive sleep apnea with CPAP therapy.  12. Questionable transient ischemic attack, January 2010, without workup:  a. Carotid duplex, 09/15/2014: Noncritical coronary artery disease with widely patent bilateral carotid arteries. Velocity is all within normal limits.   13. History of tobacco use with cessation x7 years.   14. Gastroesophageal reflux disease.  15. Hernia  repair x3.   16. Chronic kidney disease with a creatinine of 2.1 during hospitalization, 05/17/2013.    No Known Allergies    Current Medications:    Current Outpatient Medications:   •  albuterol sulfate  (90 Base) MCG/ACT inhaler, Inhale 2 puffs Every 4 (Four) Hours As Needed for Wheezing or Shortness of Air., Disp: 18 g, Rfl: 3  •  amiodarone (PACERONE) 200 MG tablet, Take 1 tablet by mouth Daily., Disp: 30 tablet, Rfl: 0  •  amLODIPine (NORVASC) 10 MG tablet, Take 1 tablet by mouth Daily., Disp: 30 tablet, Rfl: 1  •  Anoro Ellipta 62.5-25 MCG/ACT aerosol powder  inhaler, Inhale 1 puff Daily., Disp: , Rfl:   •  apixaban (ELIQUIS) 5 MG tablet tablet, Take 5 mg by mouth 2 (Two) Times a Day., Disp: , Rfl:   •  aspirin 81 MG chewable tablet, Chew 81 mg Daily., Disp: , Rfl:   •  atorvastatin (LIPITOR) 20 MG tablet, Take 1 tablet by mouth Every Night., Disp: 90 tablet, Rfl: 3  •  Blood Glucose Monitoring Suppl (TRUE METRIX AIR GLUCOSE METER) w/Device kit, 1 each by In Vitro route 2 (two) times a day. E11.9, Disp: 1 kit, Rfl: 0  •  FLUoxetine (PROzac) 20 MG capsule, Take 1 capsule by mouth Daily., Disp: 90 capsule, Rfl: 3  •  furosemide (LASIX) 20 MG tablet, TAKE 1 TABLET BY MOUTH EVERY MORNING, MAY TAKE SECOND DOSE AS NEEDED FOR INCREASED SWELLING, Disp: 60 tablet, Rfl: 2  •  gabapentin (NEURONTIN) 800 MG tablet, Take 800 mg by mouth every night at bedtime., Disp: , Rfl:   •  gentamicin (GARAMYCIN) 0.3 % ophthalmic solution, Administer 2 drops to both eyes Every 4 (Four) Hours., Disp: 15 mL, Rfl: 0  •  glipizide (GLUCOTROL) 5 MG tablet, TAKE 1 TABLET TWICE DAILY BEFORE MEALS, Disp: 180 tablet, Rfl: 3  •  glucose blood (True Metrix Blood Glucose Test) test strip, Daily testing E11.9, Disp: 100 each, Rfl: 5  •  glucose monitor monitoring kit, 1 each Daily. E11.9, Disp: 1 each, Rfl: 0  •  HYDROcodone-acetaminophen (NORCO) 5-325 MG per tablet, Take 1 tablet by mouth Every 6 (Six) Hours As Needed for Severe Pain .,  Disp: 10 tablet, Rfl: 0  •  ipratropium-albuterol (DUO-NEB) 0.5-2.5 mg/3 ml nebulizer, Take 3 mL by nebulization 4 (Four) Times a Day., Disp: 1620 mL, Rfl: 2  •  Lancet Device misc, 1 each Daily. E11.9, Disp: 100 each, Rfl: 3  •  lisinopril (PRINIVIL,ZESTRIL) 40 MG tablet, Take 1 tablet by mouth Daily., Disp: 90 tablet, Rfl: 3  •  metFORMIN (GLUCOPHAGE) 500 MG tablet, Take 1 tablet by mouth Every 12 (Twelve) Hours., Disp: , Rfl:   •  metoprolol succinate XL (Toprol XL) 50 MG 24 hr tablet, Take 1 tablet by mouth Daily., Disp: 30 tablet, Rfl: 0  •  Misc. Devices misc, Bipap tubing., Disp: 1 each, Rfl: 0  •  Multiple Vitamin tablet, Take 1 tablet by mouth daily., Disp: , Rfl:   •  Nebulizer misc, 1 each Every 4 (Four) Hours As Needed (shortness of breath)., Disp: 1 each, Rfl: 0  •  O2 (OXYGEN), Inhale 2 L/min Continuous As Needed (With activity)., Disp: , Rfl:   •  omeprazole (priLOSEC) 40 MG capsule, Take 1 capsule by mouth Daily., Disp: 90 capsule, Rfl: 3  •  oxyCODONE-acetaminophen (PERCOCET)  MG per tablet, Take 1 tablet by mouth Every 6 (Six) Hours As Needed for Severe Pain., Disp: 12 tablet, Rfl: 0  •  potassium chloride (K-DUR,KLOR-CON) 20 MEQ CR tablet, Take 1 tablet by mouth Daily., Disp: 30 tablet, Rfl: 2  •  spironolactone (ALDACTONE) 50 MG tablet, TAKE 1 TABLET EVERY DAY, Disp: 90 tablet, Rfl: 1  •  TRUEplus Lancets 33G misc, 1 each by Other route Daily. E11.9, Disp: 100 each, Rfl: 5  •  Vitamin D, Cholecalciferol, 50 MCG (2000 UT) capsule, Take 1 capsule by mouth Daily., Disp: 90 capsule, Rfl: 3    HPI    Robe Bryant is a 72 y.o. male who presents today for a follow up of coronary artery disease, diastolic heart failure, paroxysmal atrial fibrillation, symptomatic bradycardia, cardiac risk factors. Since last visit, the patient has been doing well overall from a cardiovascular standpoint. Patient notes that he did just get a new eye exam and new glasses. He has not had recent lab work  "completed due to being on amiodarone. He does not weigh himself daily to check for weight gain related to fluid retention although when he notices excess edema, he does take extra diuretics. He notes that he will occasionally have a flashing light in the outside corner of his eyes. He did see an optometrist in [place] who fitted him for glasses. He visited another ophthalmologist who did a full vision exam with dilation of his eyes and imaging and notes the exam was normal. Patient denies chest pain, shortness of breath, orthopnea, palpitations, edema, dizziness, and syncope.      The following portions of the patient's history were reviewed and updated as appropriate: allergies, current medications and problem list.    Pertinent positives as listed in the HPI.  All other systems reviewed are negative.         Vitals:    02/16/23 1111   BP: 124/58   BP Location: Left arm   Patient Position: Sitting   Pulse: 61   SpO2: 92%   Weight: 108 kg (237 lb)   Height: 165.1 cm (65\")       Physical Exam:  General: Alert and oriented.  Neck: Jugular venous pressure is within normal limits. Carotids have normal upstrokes without bruits.   Cardiovascular: Heart has a nondisplaced focal PMI. Regular rate and rhythm. No murmur, gallop or rub.  Lungs: Clear, no rales or wheezes. Equal expansion is noted.   Extremities: Trace VIJAY, bilaterally.   Skin: Warm and dry.  Neurologic: Nonfocal.     Diagnostic Data (reviewed with patient):  Lab Results   Component Value Date    GLUCOSE 179 (H) 07/13/2022    BUN 24 (H) 07/13/2022    CREATININE 1.56 (H) 07/13/2022    EGFRIFNONA 46.9 (L) 07/13/2022    EGFRIFAFRI 55 (L) 01/21/2022    BCR 15.4 07/13/2022     07/13/2022    K 3.9 07/13/2022    CL 98 07/13/2022    CO2 31.4 (H) 07/13/2022    CALCIUM 9.6 07/13/2022    ALBUMIN 3.70 07/13/2022    ALKPHOS 69 07/13/2022    AST 24 07/13/2022    ALT 20 07/13/2022     Lab Results   Component Value Date    CHLPL 118 10/25/2021    TRIG 134 10/25/2021    HDL " 38 (L) 10/25/2021    LDL 56 10/25/2021      Lab Results   Component Value Date    WBC 11.38 (H) 07/13/2022    RBC 4.60 07/13/2022    HGB 13.5 07/13/2022    HCT 42.9 07/13/2022    MCV 93.3 07/13/2022     07/13/2022      Lab Results   Component Value Date    TSH 0.524 01/21/2022          ECG 12 Lead    Date/Time: 2/16/2023 11:38 AM  Performed by: Arelis Tucker MD  Authorized by: Arelis Tucker MD   Comparison: compared with previous ECG from 5/3/2021  Comparison to previous ECG: SR changed to atrial paced   Rhythm: paced  Pacing capture: atrial paced rhythm.  Clinical impression: abnormal EKG  Comments: Prolonged AV condition             DEVICE INTERROGATION:  [employer]/2016, PPM: RA pacing 59%, RV pacing <1%. P wave is 0@40 mV with a threshold of 0.75 V at 0.5 msec and an impedance of 390 ohms. R wave is 5.1 mV with a threshold of 1.12 V at 0.5 msec and an impedance of 440 ohms. Battery voltage is 8.3 years. Events: <  1 years AMS; 22 hr longest episode. HVR x 4. Updates: 0       Assessment:    ICD-10-CM ICD-9-CM   1. Paroxysmal atrial fibrillation (HCC)  I48.0 427.31   2. Coronary artery disease involving native coronary artery of native heart without angina pectoris  I25.10 414.01   3. Chronic diastolic heart failure (HCC)  I50.32 428.32   4. Essential hypertension  I10 401.9   5. Hypercholesterolemia  E78.00 272.0         Plan:  1. CMP, FLP, and TSH to be completed to reassess lipid levels and thyroid function as patient is on amiodarone.   2. Continue on amiodarone 200 mg for rhythm control.    3. Continue on amlodipine 10 mg, lisinopril 40 mg, metoprolol 50 mg daily for hypertension.   4. Continue on Eliquis 5 mg BID for stroke prophylaxis.   5. Continue on aspirin 81 mg daily for antiplatelet therapy.   6. Continue on atorvastatin 20 mg nightly for hyperlipidemia.   7. Continue on Lasix 20 mg daily for fluid retention. Patient was advised he can take an extra Lasix PRN.   8. Continue  all other current medications.  9. F/up in 12 months with device check, [employer], sooner if needed.      Scribed for Arelis Tucker MD by Susan Henderson. 2/16/2023 11:31 EST       I Arelis Tucker MD personally performed the services described in this documentation as scribed by the above individual in my presence, and it is both accurate and complete.    Arelis Tucker MD, FACC

## 2023-03-18 PROCEDURE — 87040 BLOOD CULTURE FOR BACTERIA: CPT | Performed by: INTERNAL MEDICINE

## 2023-03-19 ENCOUNTER — APPOINTMENT (OUTPATIENT)
Dept: CT IMAGING | Facility: HOSPITAL | Age: 73
DRG: 291 | End: 2023-03-19
Payer: MEDICARE

## 2023-03-19 ENCOUNTER — HOSPITAL ENCOUNTER (INPATIENT)
Facility: HOSPITAL | Age: 73
LOS: 2 days | Discharge: HOME | DRG: 291 | End: 2023-03-21
Attending: EMERGENCY MEDICINE | Admitting: INTERNAL MEDICINE
Payer: MEDICARE

## 2023-03-19 ENCOUNTER — APPOINTMENT (OUTPATIENT)
Dept: GENERAL RADIOLOGY | Facility: HOSPITAL | Age: 73
DRG: 291 | End: 2023-03-19
Payer: MEDICARE

## 2023-03-19 DIAGNOSIS — J96.01 ACUTE RESPIRATORY FAILURE WITH HYPOXIA: Primary | ICD-10-CM

## 2023-03-19 DIAGNOSIS — J96.11 CHRONIC RESPIRATORY FAILURE WITH HYPOXIA: ICD-10-CM

## 2023-03-19 DIAGNOSIS — J96.21 ACUTE ON CHRONIC RESPIRATORY FAILURE WITH HYPOXIA: ICD-10-CM

## 2023-03-19 LAB
ALBUMIN SERPL-MCNC: 3.6 G/DL (ref 3.5–5.2)
ALBUMIN/GLOB SERPL: 1.2 G/DL
ALP SERPL-CCNC: 78 U/L (ref 39–117)
ALT SERPL W P-5'-P-CCNC: 16 U/L (ref 1–41)
ANION GAP SERPL CALCULATED.3IONS-SCNC: 6.2 MMOL/L (ref 5–15)
AST SERPL-CCNC: 25 U/L (ref 1–40)
BASOPHILS # BLD AUTO: 0.06 10*3/MM3 (ref 0–0.2)
BASOPHILS NFR BLD AUTO: 0.7 % (ref 0–1.5)
BILIRUB SERPL-MCNC: 0.5 MG/DL (ref 0–1.2)
BUN SERPL-MCNC: 16 MG/DL (ref 8–23)
BUN/CREAT SERPL: 11.7 (ref 7–25)
CALCIUM SPEC-SCNC: 9.2 MG/DL (ref 8.6–10.5)
CHLORIDE SERPL-SCNC: 101 MMOL/L (ref 98–107)
CO2 SERPL-SCNC: 28.8 MMOL/L (ref 22–29)
CREAT SERPL-MCNC: 1.37 MG/DL (ref 0.76–1.27)
D-LACTATE SERPL-SCNC: 1.4 MMOL/L (ref 0.5–2)
DEPRECATED RDW RBC AUTO: 44.8 FL (ref 37–54)
EGFRCR SERPLBLD CKD-EPI 2021: 54.5 ML/MIN/1.73
EOSINOPHIL # BLD AUTO: 0.17 10*3/MM3 (ref 0–0.4)
EOSINOPHIL NFR BLD AUTO: 1.9 % (ref 0.3–6.2)
ERYTHROCYTE [DISTWIDTH] IN BLOOD BY AUTOMATED COUNT: 13.2 % (ref 12.3–15.4)
FLUAV SUBTYP SPEC NAA+PROBE: NOT DETECTED
FLUBV RNA ISLT QL NAA+PROBE: NOT DETECTED
GLOBULIN UR ELPH-MCNC: 2.9 GM/DL
GLUCOSE SERPL-MCNC: 122 MG/DL (ref 65–99)
HCT VFR BLD AUTO: 42.3 % (ref 37.5–51)
HGB BLD-MCNC: 13.6 G/DL (ref 13–17.7)
HOLD SPECIMEN: NORMAL
IMM GRANULOCYTES # BLD AUTO: 0.05 10*3/MM3 (ref 0–0.05)
IMM GRANULOCYTES NFR BLD AUTO: 0.6 % (ref 0–0.5)
LYMPHOCYTES # BLD AUTO: 1.24 10*3/MM3 (ref 0.7–3.1)
LYMPHOCYTES NFR BLD AUTO: 14 % (ref 19.6–45.3)
MCH RBC QN AUTO: 29.6 PG (ref 26.6–33)
MCHC RBC AUTO-ENTMCNC: 32.2 G/DL (ref 31.5–35.7)
MCV RBC AUTO: 92 FL (ref 79–97)
MONOCYTES # BLD AUTO: 0.77 10*3/MM3 (ref 0.1–0.9)
MONOCYTES NFR BLD AUTO: 8.7 % (ref 5–12)
NEUTROPHILS NFR BLD AUTO: 6.59 10*3/MM3 (ref 1.7–7)
NEUTROPHILS NFR BLD AUTO: 74.1 % (ref 42.7–76)
NRBC BLD AUTO-RTO: 0 /100 WBC (ref 0–0.2)
NT-PROBNP SERPL-MCNC: 1359 PG/ML (ref 0–900)
PLATELET # BLD AUTO: 177 10*3/MM3 (ref 140–450)
PMV BLD AUTO: 10.6 FL (ref 6–12)
POTASSIUM SERPL-SCNC: 5 MMOL/L (ref 3.5–5.2)
PROCALCITONIN SERPL-MCNC: 0.06 NG/ML (ref 0–0.25)
PROT SERPL-MCNC: 6.5 G/DL (ref 6–8.5)
RBC # BLD AUTO: 4.6 10*6/MM3 (ref 4.14–5.8)
SARS-COV-2 RNA RESP QL NAA+PROBE: NOT DETECTED
SODIUM SERPL-SCNC: 136 MMOL/L (ref 136–145)
TROPONIN T SERPL HS-MCNC: 16 NG/L
TROPONIN T SERPL HS-MCNC: 22 NG/L
WBC NRBC COR # BLD: 8.88 10*3/MM3 (ref 3.4–10.8)
WHOLE BLOOD HOLD COAG: NORMAL
WHOLE BLOOD HOLD SPECIMEN: NORMAL

## 2023-03-19 PROCEDURE — 84484 ASSAY OF TROPONIN QUANT: CPT

## 2023-03-19 PROCEDURE — 84145 PROCALCITONIN (PCT): CPT

## 2023-03-19 PROCEDURE — 94799 UNLISTED PULMONARY SVC/PX: CPT

## 2023-03-19 PROCEDURE — 93005 ELECTROCARDIOGRAM TRACING: CPT | Performed by: EMERGENCY MEDICINE

## 2023-03-19 PROCEDURE — 87040 BLOOD CULTURE FOR BACTERIA: CPT | Performed by: INTERNAL MEDICINE

## 2023-03-19 PROCEDURE — 99285 EMERGENCY DEPT VISIT HI MDM: CPT

## 2023-03-19 PROCEDURE — 87636 SARSCOV2 & INF A&B AMP PRB: CPT | Performed by: EMERGENCY MEDICINE

## 2023-03-19 PROCEDURE — 94640 AIRWAY INHALATION TREATMENT: CPT

## 2023-03-19 PROCEDURE — 25010000002 CEFTRIAXONE SODIUM-DEXTROSE 1-3.74 GM-%(50ML) RECONSTITUTED SOLUTION

## 2023-03-19 PROCEDURE — 94761 N-INVAS EAR/PLS OXIMETRY MLT: CPT

## 2023-03-19 PROCEDURE — 84484 ASSAY OF TROPONIN QUANT: CPT | Performed by: EMERGENCY MEDICINE

## 2023-03-19 PROCEDURE — 83880 ASSAY OF NATRIURETIC PEPTIDE: CPT | Performed by: EMERGENCY MEDICINE

## 2023-03-19 PROCEDURE — 25010000002 METHYLPREDNISOLONE PER 125 MG

## 2023-03-19 PROCEDURE — 85025 COMPLETE CBC W/AUTO DIFF WBC: CPT | Performed by: EMERGENCY MEDICINE

## 2023-03-19 PROCEDURE — 71275 CT ANGIOGRAPHY CHEST: CPT

## 2023-03-19 PROCEDURE — 83605 ASSAY OF LACTIC ACID: CPT | Performed by: INTERNAL MEDICINE

## 2023-03-19 PROCEDURE — 25510000001 IOPAMIDOL 61 % SOLUTION: Performed by: EMERGENCY MEDICINE

## 2023-03-19 PROCEDURE — 25010000002 AZITHROMYCIN PER 500 MG: Performed by: INTERNAL MEDICINE

## 2023-03-19 PROCEDURE — 25010000002 FUROSEMIDE PER 20 MG

## 2023-03-19 PROCEDURE — 71045 X-RAY EXAM CHEST 1 VIEW: CPT

## 2023-03-19 PROCEDURE — 80053 COMPREHEN METABOLIC PANEL: CPT | Performed by: EMERGENCY MEDICINE

## 2023-03-19 RX ORDER — ATORVASTATIN CALCIUM 20 MG/1
20 TABLET, FILM COATED ORAL DAILY
Status: DISCONTINUED | OUTPATIENT
Start: 2023-03-20 | End: 2023-03-21 | Stop reason: HOSPADM

## 2023-03-19 RX ORDER — PANTOPRAZOLE SODIUM 40 MG/1
40 TABLET, DELAYED RELEASE ORAL
Status: DISCONTINUED | OUTPATIENT
Start: 2023-03-20 | End: 2023-03-21 | Stop reason: HOSPADM

## 2023-03-19 RX ORDER — IPRATROPIUM BROMIDE AND ALBUTEROL SULFATE 2.5; .5 MG/3ML; MG/3ML
3 SOLUTION RESPIRATORY (INHALATION) ONCE
Status: COMPLETED | OUTPATIENT
Start: 2023-03-19 | End: 2023-03-19

## 2023-03-19 RX ORDER — HYDROCODONE BITARTRATE AND ACETAMINOPHEN 5; 325 MG/1; MG/1
1 TABLET ORAL EVERY 6 HOURS PRN
Status: DISCONTINUED | OUTPATIENT
Start: 2023-03-19 | End: 2023-03-19

## 2023-03-19 RX ORDER — FUROSEMIDE 20 MG/1
20 TABLET ORAL DAILY
Status: DISCONTINUED | OUTPATIENT
Start: 2023-03-19 | End: 2023-03-20

## 2023-03-19 RX ORDER — FUROSEMIDE 10 MG/ML
80 INJECTION INTRAMUSCULAR; INTRAVENOUS ONCE
Status: COMPLETED | OUTPATIENT
Start: 2023-03-19 | End: 2023-03-19

## 2023-03-19 RX ORDER — POTASSIUM CHLORIDE 20 MEQ/1
20 TABLET, EXTENDED RELEASE ORAL DAILY
Status: DISCONTINUED | OUTPATIENT
Start: 2023-03-20 | End: 2023-03-19

## 2023-03-19 RX ORDER — AMIODARONE HYDROCHLORIDE 200 MG/1
200 TABLET ORAL
Status: DISCONTINUED | OUTPATIENT
Start: 2023-03-20 | End: 2023-03-21 | Stop reason: HOSPADM

## 2023-03-19 RX ORDER — IPRATROPIUM BROMIDE AND ALBUTEROL SULFATE 2.5; .5 MG/3ML; MG/3ML
3 SOLUTION RESPIRATORY (INHALATION)
Status: DISCONTINUED | OUTPATIENT
Start: 2023-03-19 | End: 2023-03-21 | Stop reason: HOSPADM

## 2023-03-19 RX ORDER — METOPROLOL SUCCINATE 50 MG/1
50 TABLET, EXTENDED RELEASE ORAL DAILY
Status: DISCONTINUED | OUTPATIENT
Start: 2023-03-20 | End: 2023-03-21 | Stop reason: HOSPADM

## 2023-03-19 RX ORDER — GABAPENTIN 400 MG/1
800 CAPSULE ORAL NIGHTLY
Status: DISCONTINUED | OUTPATIENT
Start: 2023-03-19 | End: 2023-03-21 | Stop reason: HOSPADM

## 2023-03-19 RX ORDER — LISINOPRIL 20 MG/1
40 TABLET ORAL DAILY
Status: DISCONTINUED | OUTPATIENT
Start: 2023-03-20 | End: 2023-03-19

## 2023-03-19 RX ORDER — CEFTRIAXONE 1 G/50ML
1 INJECTION, SOLUTION INTRAVENOUS ONCE
Status: COMPLETED | OUTPATIENT
Start: 2023-03-19 | End: 2023-03-19

## 2023-03-19 RX ORDER — CLONIDINE HYDROCHLORIDE 0.2 MG/1
0.1 TABLET ORAL 2 TIMES DAILY PRN
Status: DISCONTINUED | OUTPATIENT
Start: 2023-03-19 | End: 2023-03-21 | Stop reason: HOSPADM

## 2023-03-19 RX ORDER — GLIPIZIDE 5 MG/1
5 TABLET ORAL
Status: DISCONTINUED | OUTPATIENT
Start: 2023-03-20 | End: 2023-03-21 | Stop reason: HOSPADM

## 2023-03-19 RX ORDER — LISINOPRIL 20 MG/1
40 TABLET ORAL DAILY
Status: DISCONTINUED | OUTPATIENT
Start: 2023-03-19 | End: 2023-03-21 | Stop reason: HOSPADM

## 2023-03-19 RX ORDER — CEFTRIAXONE 1 G/50ML
1 INJECTION, SOLUTION INTRAVENOUS EVERY 24 HOURS
Status: DISCONTINUED | OUTPATIENT
Start: 2023-03-20 | End: 2023-03-21 | Stop reason: HOSPADM

## 2023-03-19 RX ORDER — HYDROCODONE BITARTRATE AND ACETAMINOPHEN 7.5; 325 MG/1; MG/1
1 TABLET ORAL EVERY 6 HOURS PRN
Status: DISCONTINUED | OUTPATIENT
Start: 2023-03-19 | End: 2023-03-21 | Stop reason: HOSPADM

## 2023-03-19 RX ORDER — FLUOXETINE HYDROCHLORIDE 20 MG/1
20 CAPSULE ORAL DAILY
Status: DISCONTINUED | OUTPATIENT
Start: 2023-03-20 | End: 2023-03-21 | Stop reason: HOSPADM

## 2023-03-19 RX ORDER — AMLODIPINE BESYLATE 5 MG/1
10 TABLET ORAL DAILY
Status: DISCONTINUED | OUTPATIENT
Start: 2023-03-19 | End: 2023-03-21 | Stop reason: HOSPADM

## 2023-03-19 RX ORDER — SODIUM CHLORIDE 0.9 % (FLUSH) 0.9 %
10 SYRINGE (ML) INJECTION AS NEEDED
Status: DISCONTINUED | OUTPATIENT
Start: 2023-03-19 | End: 2023-03-21 | Stop reason: HOSPADM

## 2023-03-19 RX ORDER — ASPIRIN 81 MG/1
81 TABLET, CHEWABLE ORAL DAILY
Status: DISCONTINUED | OUTPATIENT
Start: 2023-03-20 | End: 2023-03-21 | Stop reason: HOSPADM

## 2023-03-19 RX ORDER — FLUOXETINE HYDROCHLORIDE 20 MG/1
20 CAPSULE ORAL DAILY
Status: DISCONTINUED | OUTPATIENT
Start: 2023-03-19 | End: 2023-03-19

## 2023-03-19 RX ORDER — METHYLPREDNISOLONE SODIUM SUCCINATE 125 MG/2ML
125 INJECTION, POWDER, LYOPHILIZED, FOR SOLUTION INTRAMUSCULAR; INTRAVENOUS ONCE
Status: COMPLETED | OUTPATIENT
Start: 2023-03-19 | End: 2023-03-19

## 2023-03-19 RX ORDER — ATORVASTATIN CALCIUM 20 MG/1
20 TABLET, FILM COATED ORAL NIGHTLY
Status: DISCONTINUED | OUTPATIENT
Start: 2023-03-19 | End: 2023-03-19

## 2023-03-19 RX ADMIN — SODIUM CHLORIDE 500 MG: 900 INJECTION, SOLUTION INTRAVENOUS at 20:46

## 2023-03-19 RX ADMIN — GABAPENTIN 800 MG: 400 CAPSULE ORAL at 22:12

## 2023-03-19 RX ADMIN — IPRATROPIUM BROMIDE AND ALBUTEROL SULFATE 3 ML: 2.5; .5 SOLUTION RESPIRATORY (INHALATION) at 19:45

## 2023-03-19 RX ADMIN — HYDROCODONE BITARTRATE AND ACETAMINOPHEN 1 TABLET: 5; 325 TABLET ORAL at 20:47

## 2023-03-19 RX ADMIN — IPRATROPIUM BROMIDE AND ALBUTEROL SULFATE 3 ML: .5; 3 SOLUTION RESPIRATORY (INHALATION) at 13:52

## 2023-03-19 RX ADMIN — FUROSEMIDE 80 MG: 10 INJECTION, SOLUTION INTRAMUSCULAR; INTRAVENOUS at 14:49

## 2023-03-19 RX ADMIN — APIXABAN 5 MG: 5 TABLET, FILM COATED ORAL at 20:47

## 2023-03-19 RX ADMIN — IOPAMIDOL 100 ML: 612 INJECTION, SOLUTION INTRAVENOUS at 16:04

## 2023-03-19 RX ADMIN — FUROSEMIDE 20 MG: 20 TABLET ORAL at 19:10

## 2023-03-19 RX ADMIN — METFORMIN HYDROCHLORIDE 500 MG: 500 TABLET, FILM COATED ORAL at 20:46

## 2023-03-19 RX ADMIN — CEFTRIAXONE 1 G: 1 INJECTION, SOLUTION INTRAVENOUS at 15:28

## 2023-03-19 RX ADMIN — AMLODIPINE BESYLATE 10 MG: 5 TABLET ORAL at 19:10

## 2023-03-19 RX ADMIN — METHYLPREDNISOLONE SODIUM SUCCINATE 125 MG: 125 INJECTION, POWDER, FOR SOLUTION INTRAMUSCULAR; INTRAVENOUS at 13:25

## 2023-03-19 RX ADMIN — LISINOPRIL 40 MG: 20 TABLET ORAL at 19:10

## 2023-03-19 NOTE — ED PROVIDER NOTES
Subjective  History of Present Illness:    Patient is a 72-year-old male here today with shortness of breath.  He states that he started feeling bad last night with a cough that is intermittently productive.  Will describe some yellow-tinged sputum.  Feels short of breath, and on arrival to the emergency department he was found to have an O2 saturation of 83% on room air.  Was placed on 4 L nasal cannula by nursing staff which increased his SPO2 up to 97%.  Patient states that he does not normally wear oxygen at home, but does have a history of COPD and pneumonia in the past.  Denies chest pain, does note pain with deep breathing and points to substernal region.  Denies fever, but did have chills.  Was using his inhalers and nebs at home but did not note any improvement.  Has noted some increased swelling in his ankles and lower legs his other significant history includes heart failure, diabetes, BPH, chronic kidney disease, cardiac disease including cardiac caths, stents, and an MI    Nurses Notes reviewed and agree, including vitals, allergies, social history and prior medical history.     REVIEW OF SYSTEMS: All systems reviewed and not pertinent unless noted.  Review of Systems    Past Medical History:   Diagnosis Date   • Anxiety and depression    • Arthritis    • Backache     20 years   • Benign prostatic hyperplasia    • Bladder trauma    • Cervicalgia    • Chronic kidney disease     Cr up to 2.1   • Coronary artery disease    • Diabetes mellitus (HCC)     15 years--   • Emphysema of lung (HCC)    • Erectile dysfunction    • Eye exam, routine 2015   • FH: colonic polyps    • Gout     for 10 years--   • History of echocardiogram 09/15/2014   • History of tobacco use     with cessation x7 years   • Hyperlipidemia    • Hypertension    • Migraine    • Myocardial infarction (HCC)     h/o coronary artery stenting--   • Prostate cancer (HCC)    • Restless leg syndrome     20 years   • Sleep apnea     12 years; uses a  "Bi-Pap   • Stroke (HCC)    • Syncope 2017   • Warfarin anticoagulation 2016       Allergies:    Patient has no known allergies.      Past Surgical History:   Procedure Laterality Date   • BLADDER SURGERY     • CARDIAC CATHETERIZATION  03/15/2013    revealing widely patent stents of the left circumflex and ramus intermedius coronary arteries, no significant disease is seen in any territory   • CARDIAC CATHETERIZATION Left 2019    Procedure: Left Heart Cath;  Surgeon: Arelis Tucker MD;  Location:  [facility];  Service: Cardiology   • CARDIAC ELECTROPHYSIOLOGY PROCEDURE N/A 5/10/2021    Procedure: PPM battery change;  Surgeon: Arelis Tucker MD;  Location:  [facility];  Service: Cardiology;  Laterality: N/A;   • CARDIAC PACEMAKER PLACEMENT     • CATARACT EXTRACTION     • COLONOSCOPY      No family history of colon cancer.     • COLONOSCOPY     • HERNIA REPAIR      Type unknown   • PACEMAKER IMPLANTATION     • PROSTATE SURGERY           Social History     Socioeconomic History   • Marital status:    Tobacco Use   • Smoking status: Former     Packs/day: 1.00     Years: 30.00     Pack years: 30.00     Types: Cigarettes     Quit date: 8/15/2001     Years since quittin.6   • Smokeless tobacco: Never   • Tobacco comments:     quit 16 years ago   Vaping Use   • Vaping Use: Never used   Substance and Sexual Activity   • Alcohol use: No   • Drug use: No   • Sexual activity: Defer         Family History   Problem Relation Age of Onset   • Cancer Mother    • Diabetes Mother    • Hypertension Mother    • Stroke Mother    • Stomach cancer Mother    • Heart disease Mother    • Stomach cancer Father    • Cancer Father    • Lung cancer Father        Objective  Physical Exam:  BP (!) 198/77   Pulse 65   Temp 97.9 °F (36.6 °C) (Oral)   Resp 17   Ht 162.6 cm (64\")   Wt 108 kg (237 lb)   SpO2 93%   BMI 40.68 kg/m²      Physical " Exam  Vitals and nursing note reviewed.   Constitutional:       Appearance: Normal appearance. He is obese.   HENT:      Head: Normocephalic and atraumatic.      Right Ear: Tympanic membrane, ear canal and external ear normal.      Left Ear: Tympanic membrane, ear canal and external ear normal.      Nose: Nose normal.      Mouth/Throat:      Mouth: Mucous membranes are moist.      Pharynx: Oropharynx is clear.   Eyes:      Extraocular Movements: Extraocular movements intact.      Conjunctiva/sclera: Conjunctivae normal.      Pupils: Pupils are equal, round, and reactive to light.   Neck:      Vascular: No carotid bruit.   Cardiovascular:      Rate and Rhythm: Normal rate and regular rhythm.      Pulses: Normal pulses.      Heart sounds: Normal heart sounds.   Pulmonary:      Effort: Pulmonary effort is normal. Tachypnea present.      Breath sounds: Wheezing and rhonchi present.   Abdominal:      General: Abdomen is flat. Bowel sounds are normal. There is no distension.      Palpations: Abdomen is soft.      Tenderness: There is no abdominal tenderness.   Musculoskeletal:      Cervical back: Neck supple. No tenderness.      Right lower le+ Pitting Edema present.      Left lower le+ Pitting Edema present.   Lymphadenopathy:      Cervical: No cervical adenopathy.   Skin:     General: Skin is warm and dry.      Capillary Refill: Capillary refill takes less than 2 seconds.   Neurological:      General: No focal deficit present.      Mental Status: He is alert and oriented to person, place, and time.   Psychiatric:         Mood and Affect: Mood normal.         Behavior: Behavior normal.         Procedures    ED Course:    ED Course as of 23 1801   Sun Mar 19, 2023   1644 EKG interpreted by me reveals sinus rhythm rate 65.  Nonspecific T wave changes no ectopy no ischemic changes [PF]      ED Course User Index  [PF] Nain Ortega DO       Lab Results (last 24 hours)     Procedure Component Value Units  Date/Time    CBC & Differential [[id number]]  (Abnormal) Collected: 03/19/23 1314    Specimen: Blood Updated: 03/19/23 1321    Narrative:      The following orders were created for panel order CBC & Differential.  Procedure                               Abnormality         Status                     ---------                               -----------         ------                     CBC Auto Differential[[id number]]        Abnormal            Final result                 Please view results for these tests on the individual orders.    Comprehensive Metabolic Panel [[id number]]  (Abnormal) Collected: 03/19/23 1314    Specimen: Blood Updated: 03/19/23 1344     Glucose 122 mg/dL      BUN 16 mg/dL      Creatinine 1.37 mg/dL      Sodium 136 mmol/L      Potassium 5.0 mmol/L      Comment: Slight hemolysis detected by analyzer. Results may be affected.        Chloride 101 mmol/L      CO2 28.8 mmol/L      Calcium 9.2 mg/dL      Total Protein 6.5 g/dL      Albumin 3.6 g/dL      ALT (SGPT) 16 U/L      AST (SGOT) 25 U/L      Comment: Slight hemolysis detected by analyzer. Results may be affected.        Alkaline Phosphatase 78 U/L      Total Bilirubin 0.5 mg/dL      Globulin 2.9 gm/dL      A/G Ratio 1.2 g/dL      BUN/Creatinine Ratio 11.7     Anion Gap 6.2 mmol/L      eGFR 54.5 mL/min/1.73     Narrative:      GFR Normal >60  Chronic Kidney Disease <60  Kidney Failure <15    The GFR formula is only valid for adults with stable renal function between ages 18 and 70.    BNP [[id number]]  (Abnormal) Collected: 03/19/23 1314    Specimen: Blood Updated: 03/19/23 1341     proBNP 1,359.0 pg/mL     Narrative:      Among patients with dyspnea, NT-proBNP is highly sensitive for the detection of acute congestive heart failure. In addition NT-proBNP of <300 pg/ml effectively rules out acute congestive heart failure with 99% negative predictive value.      Single High Sensitivity Troponin T [[id number]]  (Abnormal) Collected: 03/19/23 1314     Specimen: Blood Updated: 03/19/23 1340     HS Troponin T 22 ng/L     Narrative:      High Sensitive Troponin T Reference Range:  <10.0 ng/L- Negative Female for AMI  <15.0 ng/L- Negative Male for AMI  >=10 - Abnormal Female indicating possible myocardial injury.  >=15 - Abnormal Male indicating possible myocardial injury.   Clinicians would have to utilize clinical acumen, EKG, Troponin, and serial changes to determine if it is an Acute Myocardial Infarction or myocardial injury due to an underlying chronic condition.         CBC Auto Differential [[id number]]  (Abnormal) Collected: 03/19/23 1314    Specimen: Blood Updated: 03/19/23 1321     WBC 8.88 10*3/mm3      RBC 4.60 10*6/mm3      Hemoglobin 13.6 g/dL      Hematocrit 42.3 %      MCV 92.0 fL      MCH 29.6 pg      MCHC 32.2 g/dL      RDW 13.2 %      RDW-SD 44.8 fl      MPV 10.6 fL      Platelets 177 10*3/mm3      Neutrophil % 74.1 %      Lymphocyte % 14.0 %      Monocyte % 8.7 %      Eosinophil % 1.9 %      Basophil % 0.7 %      Immature Grans % 0.6 %      Neutrophils, Absolute 6.59 10*3/mm3      Lymphocytes, Absolute 1.24 10*3/mm3      Monocytes, Absolute 0.77 10*3/mm3      Eosinophils, Absolute 0.17 10*3/mm3      Basophils, Absolute 0.06 10*3/mm3      Immature Grans, Absolute 0.05 10*3/mm3      nRBC 0.0 /100 WBC     COVID-19 and FLU A/B PCR - Swab, Nasopharynx [[id number]]  (Normal) Collected: 03/19/23 1316    Specimen: Swab from Nasopharynx Updated: 03/19/23 1339     COVID19 Not Detected     Influenza A PCR Not Detected     Influenza B PCR Not Detected    Narrative:      Fact sheet for providers: https://www.fda.gov/media/334898/download    Fact sheet for patients: https://www.fda.gov/media/965871/download    Test performed by PCR.    Procalcitonin [[id number]]  (Normal) Collected: 03/19/23 1447    Specimen: Blood Updated: 03/19/23 1518     Procalcitonin 0.06 ng/mL     Narrative:      As a Marker for Sepsis (Non-Neonates):    1. <0.5 ng/mL represents a low risk  "of severe sepsis and/or septic shock.  2. >2 ng/mL represents a high risk of severe sepsis and/or septic shock.    As a Marker for Lower Respiratory Tract Infections that require antibiotic therapy:    PCT on Admission    Antibiotic Therapy       6-12 Hrs later    >0.5                Strongly Recommended  >0.25 - <0.5        Recommended   0.1 - 0.25          Discouraged              Remeasure/reassess PCT  <0.1                Strongly Discouraged     Remeasure/reassess PCT    As 28 day mortality risk marker: \"Change in Procalcitonin Result\" (>80% or <=80%) if Day 0 (or Day 1) and Day 4 values are available. Refer to http://www.[facility] [place]-pct-calculator.com    Change in PCT <=80%  A decrease of PCT levels below or equal to 80% defines a positive change in PCT test result representing a higher risk for 28-day all-cause mortality of patients diagnosed with severe sepsis for septic shock.    Change in PCT >80%  A decrease of PCT levels of more than 80% defines a negative change in PCT result representing a lower risk for 28-day all-cause mortality of patients diagnosed with severe sepsis or septic shock.       Single High Sensitivity Troponin T [[id number]]  (Abnormal) Collected: 03/19/23 1709    Specimen: Blood Updated: 03/19/23 1737     HS Troponin T 16 ng/L     Narrative:      High Sensitive Troponin T Reference Range:  <10.0 ng/L- Negative Female for AMI  <15.0 ng/L- Negative Male for AMI  >=10 - Abnormal Female indicating possible myocardial injury.  >=15 - Abnormal Male indicating possible myocardial injury.   Clinicians would have to utilize clinical acumen, EKG, Troponin, and serial changes to determine if it is an Acute Myocardial Infarction or myocardial injury due to an underlying chronic condition.                XR Chest 1 View    Result Date: 3/19/2023  PROCEDURE: XR CHEST 1 VW-  HISTORY: [facility] Triage Protocol  COMPARISON: July 11, 2022.  FINDINGS: The heart is mildly enlarged, stable. 2-lead pacemaker again " noted.. Pulmonary vascular congestion again noted. No area of consolidation identified.. The mediastinum is unremarkable. There is no pneumothorax.  There are no acute osseous abnormalities.      Impression: Mild cardiomegaly and pulmonary vascular congestion similar to prior exam.    This report was signed and finalized on 3/19/2023 1:29 PM by Marie Brown MD.    CT Angiogram Chest Pulmonary Embolism    Result Date: 3/19/2023  PROCEDURE: CT ANGIOGRAM CHEST PULMONARY EMBOLISM-  HISTORY: Pulmonary embolism (PE) suspected, unknown D-dimer  COMPARISON: July 11, 2022  TECHNIQUE: The patient was injected with  IV contrast. Axial images were obtained through the chest in a CTA/ PE protocol. 3 D Reconstruction images were also performed. This study was performed with techniques to keep radiation doses as low as reasonably achievable, (ALARA). Individualized dose reduction techniques using automated exposure control or adjustment of mA and/or kV according to the patient size were employed.  FINDINGS: There is no axillary adenopathy. There is no hilar or mediastinal adenopathy.  The heart is proper size. There is no pericardial or pleural effusion. There is suboptimal timing of the contrast bolus. No central pulmonary embolism is identified. Ascending aorta is upper limits of normal at 40 mm; recommend one-year follow-up. Vascular calcifications noted. Dissection cannot be excluded due to timing of the contrast bolus. Limited images of the upper abdomen are unremarkable. There is either new area of focal pneumonitis or new 8 mm solid nodule anteriorly in the right upper lobe compared to the prior. This is too small to be evaluated on PET or undergo CT-guided biopsy. Recommend 8-12 week follow-up to document resolution. Pacemaker noted. No bony destructive lesion seen. Calcified granulomas noted.      Impression: Suboptimal contrast bolus; no central pulmonary below some identified.  New 8 mm focal pneumonitis versus mass  right upper lobe; recommend 8-12 week follow-up to document resolution or stability.  Upper limits of normal ascending aorta, recommend one-year follow-up.  This report was signed and finalized on 3/19/2023 4:16 PM by aMrie Brown MD.         MDM    Initial impression of presenting illness: Shortness of breath, hypoxemia    DDX: includes but is not limited to: Pneumonia, COPD exacerbation, CHF exacerbation, pulmonary embolism, viral illness    Patient arrives initially hypoxic at 83% on room air.  Once placed on nasal cannula, increased to 97% on 4 L, with vitals interpreted by myself.     Pertinent features from physical exam: Wheezing and rhonchi throughout, mild pitting edema noted to lower extremities.    Initial diagnostic plan: IV, labs, chest x-ray, EKG, Solu-Medrol and DuoNeb.    Results from initial plan were reviewed and interpreted by me revealing a creatinine of 1.37 and GFR 54.5.  This is an improvement of his baseline renal function based on labs from 8 months ago.  White blood count is 8.88, BNP is 1000.  59, initial troponin is 22, and procalcitonin is 0.06.  Negative for COVID and flu.  Radiologist report on the chest x-ray does not show any acute process.    Diagnostic information from other sources: Medical record    Interventions / Re-evaluation: Patient had some improvement in his breathing with the DuoNeb and Solu-Medrol.  After his BNP resulted, provided him with a dose of Lasix 80 mg.  Attempted to perform a room air trial on the patient and once he was sitting on the side of the bed he quickly dropped to 76% on room air.  Was quickly placed back on 4 L nasal cannula and quickly improved.    Results/clinical rationale were discussed with Dr. Ortega    Consultations/Discussion of results with other physicians: Spoke with Dr. Sullivan about the patient for possible admission. Requested CT chest for a further evaluation.    Dr. Sullivan notified about CT results showing no evidence of PEs, questionable  nodule in the right upper lobe.  Accepts patient for full admission with telemetry for respiratory failure with hypoxia.    Disposition plan: Patient admitted to the hospital for further care and management.  -----    Final diagnoses:   Acute respiratory failure with hypoxia (HCC)        Kofi Oropeza, APRN  03/19/23 1804

## 2023-03-19 NOTE — H&P
[facility]   HISTORY AND PHYSICAL      Name:  Robe Bryant   Age:  72 y.o.  Sex:  male  :  1950  MRN:  [id number]   Visit Number:  [id number]  Admission Date:  3/19/2023  Date Of Service:  23  Primary Care Physician:  Raj Sullivan MD     Admitting diagnosis:      Acute on chronic respiratory failure with hypoxia (HCC)    COPD with acute exacerbation (HCC)    Acute on chronic diastolic heart failure (HCC)    Pacemaker    Emphysema of lung (HCC)    Essential hypertension    Benign prostatic hyperplasia    Paroxysmal A-fib (HCC)    Hypercholesterolemia    Gastroesophageal reflux disease    Obstructive sleep apnea of adult    Diabetes mellitus (HCC)    Multilevel degenerative disc disease        History Of Presenting Illness:      72-year-old patient with past medical history of COPD, hypertension, BPH, paroxysmal A-fib on anticoagulation, hypercholesterolemia, obstructive sleep apnea, diabetes, chronic back pain, diastolic heart failure, comes into the ER because of shortness of breath and dyspnea over the last few days  He was noted to be hypoxic on room air and needed to use 4 L oxygen.  Work-up done showed he had acute vascular pulmonary congestion and signs of pulmonary edema on the chest x-ray.  Normal white count but there was mild elevation of BNP.  He did get 80 of Lasix along with Rocephin and Zithromax and Solu-Medrol in the ER.  After that there was diuresis of more than 500 mL and patient did feel better.  Patient had a CT scan done and PE was ruled out.  There was 8 mm possible focal pneumonitis versus mass which was recommended to be followed up in 8 to 12 weeks.  His oxygen was tapered down and he again desaturated in the 86 range so he was put on 2 L oxygen and is being admitted for her hypoxic respiratory failure with acute pulmonary edema.  Patient states there has been no chest pain but he has been coughing last few days.  He has chronic leg  pain which has been there along with edema though edema is slightly better.  Patient has been compliant with his medications.  His sugar has still not been well controlled.    Review Of Systems:     The following systems were reviewed and negative;  constitution, eyes, ENT, respiratory, cardiovascular, gastrointestinal, genitourinary, musculoskeletal, neurological and behavioral/psych,  Skin except as above.     Past Medical History:    Past Medical History:   Diagnosis Date   • Anxiety and depression    • Arthritis    • Backache     20 years   • Benign prostatic hyperplasia    • Bladder trauma    • Cervicalgia    • Chronic kidney disease     Cr up to 2.1   • Coronary artery disease    • Diabetes mellitus (HCC)     15 years--   • Emphysema of lung (HCC)    • Erectile dysfunction    • Eye exam, routine 2015   • FH: colonic polyps    • Gout     for 10 years--   • History of echocardiogram 09/15/2014   • History of tobacco use     with cessation x7 years   • Hyperlipidemia    • Hypertension    • Migraine    • Myocardial infarction (HCC)     h/o coronary artery stenting--   • Prostate cancer ([facility])    • Restless leg syndrome     20 years   • Sleep apnea     12 years; uses a Bi-Pap   • Stroke ([facility])    • Syncope 4/28/2017   • Warfarin anticoagulation 9/14/2016       Past Surgical history:    Past Surgical History:   Procedure Laterality Date   • BLADDER SURGERY     • CARDIAC CATHETERIZATION  03/15/2013    revealing widely patent stents of the left circumflex and ramus intermedius coronary arteries, no significant disease is seen in any territory   • CARDIAC CATHETERIZATION Left 9/30/2019    Procedure: Left Heart Cath;  Surgeon: Arelis Tucker MD;  Location:  [facility];  Service: Cardiology   • CARDIAC ELECTROPHYSIOLOGY PROCEDURE N/A 5/10/2021    Procedure: PPM battery change;  Surgeon: Arelis Tucker MD;  Location:  [facility];  Service: Cardiology;  Laterality: N/A;    • CARDIAC PACEMAKER PLACEMENT  2012   • CATARACT EXTRACTION     • COLONOSCOPY      No family history of colon cancer.     • COLONOSCOPY     • HERNIA REPAIR      Type unknown   • PACEMAKER IMPLANTATION     • PROSTATE SURGERY         Social History:    Social History     Socioeconomic History   • Marital status:    Tobacco Use   • Smoking status: Former     Packs/day: 1.00     Years: 30.00     Pack years: 30.00     Types: Cigarettes     Quit date: 8/15/2001     Years since quittin.6   • Smokeless tobacco: Never   • Tobacco comments:     quit 16 years ago   Vaping Use   • Vaping Use: Never used   Substance and Sexual Activity   • Alcohol use: No   • Drug use: No   • Sexual activity: Defer       Family History:    Family History   Problem Relation Age of Onset   • Cancer Mother    • Diabetes Mother    • Hypertension Mother    • Stroke Mother    • Stomach cancer Mother    • Heart disease Mother    • Stomach cancer Father    • Cancer Father    • Lung cancer Father          Allergies:      Patient has no known allergies.    Home Medications:    Prior to Admission Medications     Prescriptions Last Dose Informant Patient Reported? Taking?    albuterol sulfate  (90 Base) MCG/ACT inhaler   No No    Inhale 2 puffs Every 4 (Four) Hours As Needed for Wheezing or Shortness of Air.    amiodarone (PACERONE) 200 MG tablet   No No    Take 1 tablet by mouth Daily.    amLODIPine (NORVASC) 10 MG tablet   No No    Take 1 tablet by mouth Daily.    Anoro Ellipta 62.5-25 MCG/ACT aerosol powder  inhaler   Yes No    Inhale 1 puff Daily.    apixaban (ELIQUIS) 5 MG tablet tablet   Yes No    Take 5 mg by mouth 2 (Two) Times a Day.    aspirin 81 MG chewable tablet   Yes No    Chew 81 mg Daily.    atorvastatin (LIPITOR) 20 MG tablet   No No    Take 1 tablet by mouth Every Night.    Blood Glucose Monitoring Suppl (TRUE METRIX AIR GLUCOSE METER) w/Device kit   No No    1 each by In Vitro route 2 (two) times a day. E11.9     FLUoxetine (PROzac) 20 MG capsule   No No    Take 1 capsule by mouth Daily.    furosemide (LASIX) 20 MG tablet   No No    TAKE 1 TABLET BY MOUTH EVERY MORNING, MAY TAKE SECOND DOSE AS NEEDED FOR INCREASED SWELLING    gabapentin (NEURONTIN) 800 MG tablet   Yes No    Take 800 mg by mouth every night at bedtime.    gentamicin (GARAMYCIN) 0.3 % ophthalmic solution   No No    Administer 2 drops to both eyes Every 4 (Four) Hours.    glipizide (GLUCOTROL) 5 MG tablet   No No    TAKE 1 TABLET TWICE DAILY BEFORE MEALS    glucose monitor monitoring kit   No No    1 each Daily. E11.9    HYDROcodone-acetaminophen (NORCO) 5-325 MG per tablet   No No    Take 1 tablet by mouth Every 6 (Six) Hours As Needed for Severe Pain .    ipratropium-albuterol (DUO-NEB) 0.5-2.5 mg/3 ml nebulizer   No No    Take 3 mL by nebulization 4 (Four) Times a Day.    Lancet Device misc   No No    1 each Daily. E11.9    lisinopril (PRINIVIL,ZESTRIL) 40 MG tablet   No No    Take 1 tablet by mouth Daily.    metFORMIN (GLUCOPHAGE) 500 MG tablet   Yes No    Take 1 tablet by mouth Every 12 (Twelve) Hours.    metoprolol succinate XL (Toprol XL) 50 MG 24 hr tablet   No No    Take 1 tablet by mouth Daily.    Misc. Devices misc   No No    Bipap tubing.    Multiple Vitamin tablet   Yes No    Take 1 tablet by mouth daily.    Nebulizer misc   No No    1 each Every 4 (Four) Hours As Needed (shortness of breath).    O2 (OXYGEN)  Self Yes No    Inhale 2 L/min Continuous As Needed (With activity).    omeprazole (priLOSEC) 40 MG capsule   No No    Take 1 capsule by mouth Daily.    potassium chloride (K-DUR,KLOR-CON) 20 MEQ CR tablet   No No    Take 1 tablet by mouth Daily.    spironolactone (ALDACTONE) 50 MG tablet   No No    TAKE 1 TABLET EVERY DAY    TRUEplus Lancets 33G misc   No No    1 each by Other route Daily. E11.9                 Vital Signs:    Temp:  [97.9 °F (36.6 °C)] 97.9 °F (36.6 °C)  Heart Rate:  [60-79] 65  Resp:  [15-17] 17  BP: (162-198)/(68-97)  165/80        03/19/23  1309   Weight: 108 kg (237 lb)       Body mass index is 40.68 kg/m².    Physical Exam:      General Appearance:    Alert and cooperative,  And lying comfortably in bed   Head:    Atraumatic and normocephalic, without obvious abnormality.   Eyes:            PERRLA, conjunctivae and sclerae normal, no Icterus. No pallor. Extraocular movements are within normal limits.   Ears:    Ears appear intact with no abnormalities noted.   Throat:   No oral lesions, no thrush, oral mucosa moist.   Neck:   Supple, trachea midline, no thyromegaly, no carotid bruit, no lymphadenopathy   Lungs:    Breath sounds heard bilaterally equally.  Fine crepitations on both bases       Heart:    Normal S1 and S2, no murmur, no gallop, no rub. No JVD   Abdomen:     Normal bowel sounds, no masses, no organomegaly. Soft   nontender, nondistended, no guarding, no rebound    tenderness   Extremities:   Moves all extremities well, bilateral pitting lower extremity edema, no cyanosis, no          clubbing   Skin:   No  bruising or rash   Neurologic:   Cranial nerves 2 - 12 grossly intact, sensation intact, Motor power is normal and equal bilaterally.       EKG:      Sinus rhythm but no acute ischemic findings    Labs:    Lab Results (last 24 hours)     Procedure Component Value Units Date/Time    Single High Sensitivity Troponin T [[id number]]  (Abnormal) Collected: 03/19/23 1709    Specimen: Blood Updated: 03/19/23 1737     HS Troponin T 16 ng/L     Narrative:      High Sensitive Troponin T Reference Range:  <10.0 ng/L- Negative Female for AMI  <15.0 ng/L- Negative Male for AMI  >=10 - Abnormal Female indicating possible myocardial injury.  >=15 - Abnormal Male indicating possible myocardial injury.   Clinicians would have to utilize clinical acumen, EKG, Troponin, and serial changes to determine if it is an Acute Myocardial Infarction or myocardial injury due to an underlying chronic condition.         Procalcitonin  "[[id number]]  (Normal) Collected: 03/19/23 1447    Specimen: Blood Updated: 03/19/23 1518     Procalcitonin 0.06 ng/mL     Narrative:      As a Marker for Sepsis (Non-Neonates):    1. <0.5 ng/mL represents a low risk of severe sepsis and/or septic shock.  2. >2 ng/mL represents a high risk of severe sepsis and/or septic shock.    As a Marker for Lower Respiratory Tract Infections that require antibiotic therapy:    PCT on Admission    Antibiotic Therapy       6-12 Hrs later    >0.5                Strongly Recommended  >0.25 - <0.5        Recommended   0.1 - 0.25          Discouraged              Remeasure/reassess PCT  <0.1                Strongly Discouraged     Remeasure/reassess PCT    As 28 day mortality risk marker: \"Change in Procalcitonin Result\" (>80% or <=80%) if Day 0 (or Day 1) and Day 4 values are available. Refer to http://www.[email or web address] [email or web address]-pct-calculator.[email or web address]    Change in PCT <=80%  A decrease of PCT levels below or equal to 80% defines a positive change in PCT test result representing a higher risk for 28-day all-cause mortality of patients diagnosed with severe sepsis for septic shock.    Change in PCT >80%  A decrease of PCT levels of more than 80% defines a negative change in PCT result representing a lower risk for 28-day all-cause mortality of patients diagnosed with severe sepsis or septic shock.       [facility] [[id number]] Collected: 03/19/23 1314    Specimen: Blood Updated: 03/19/23 1415    Narrative:      The following orders were created for panel order [facility].  Procedure                               Abnormality         Status                     ---------                               -----------         ------                     Green Top (Gel)[[id number]]                                  Final result               Lavender Top[[id number]]                                     Final result               Gold Top - [facility][[id number]]                                   In process               "   Light Blue Top[[id number]]                                   Final result                 Please view results for these tests on the individual orders.    Green Top (Gel) [[id number]] Collected: 03/19/23 1314    Specimen: Blood Updated: 03/19/23 1415     Extra Tube Hold for add-ons.     Comment: Auto resulted.       Lavender Top [[id number]] Collected: 03/19/23 1314    Specimen: Blood Updated: 03/19/23 1415     Extra Tube hold for add-on     Comment: Auto resulted       Light Blue Top [[id number]] Collected: 03/19/23 1314    Specimen: Blood Updated: 03/19/23 1415     Extra Tube Hold for add-ons.     Comment: Auto resulted       Comprehensive Metabolic Panel [[id number]]  (Abnormal) Collected: 03/19/23 1314    Specimen: Blood Updated: 03/19/23 1344     Glucose 122 mg/dL      BUN 16 mg/dL      Creatinine 1.37 mg/dL      Sodium 136 mmol/L      Potassium 5.0 mmol/L      Comment: Slight hemolysis detected by analyzer. Results may be affected.        Chloride 101 mmol/L      CO2 28.8 mmol/L      Calcium 9.2 mg/dL      Total Protein 6.5 g/dL      Albumin 3.6 g/dL      ALT (SGPT) 16 U/L      AST (SGOT) 25 U/L      Comment: Slight hemolysis detected by analyzer. Results may be affected.        Alkaline Phosphatase 78 U/L      Total Bilirubin 0.5 mg/dL      Globulin 2.9 gm/dL      A/G Ratio 1.2 g/dL      BUN/Creatinine Ratio 11.7     Anion Gap 6.2 mmol/L      eGFR 54.5 mL/min/1.73     Narrative:      GFR Normal >60  Chronic Kidney Disease <60  Kidney Failure <15    The GFR formula is only valid for adults with stable renal function between ages 18 and 70.    BNP [[id number]]  (Abnormal) Collected: 03/19/23 1314    Specimen: Blood Updated: 03/19/23 1341     proBNP 1,359.0 pg/mL     Narrative:      Among patients with dyspnea, NT-proBNP is highly sensitive for the detection of acute congestive heart failure. In addition NT-proBNP of <300 pg/ml effectively rules out acute congestive heart failure with 99% negative predictive  value.      Single High Sensitivity Troponin T [[id number]]  (Abnormal) Collected: 03/19/23 1314    Specimen: Blood Updated: 03/19/23 1340     HS Troponin T 22 ng/L     Narrative:      High Sensitive Troponin T Reference Range:  <10.0 ng/L- Negative Female for AMI  <15.0 ng/L- Negative Male for AMI  >=10 - Abnormal Female indicating possible myocardial injury.  >=15 - Abnormal Male indicating possible myocardial injury.   Clinicians would have to utilize clinical acumen, EKG, Troponin, and serial changes to determine if it is an Acute Myocardial Infarction or myocardial injury due to an underlying chronic condition.         COVID-19 and FLU A/B PCR - Swab, Nasopharynx [[id number]]  (Normal) Collected: 03/19/23 1316    Specimen: Swab from Nasopharynx Updated: 03/19/23 1339     COVID19 Not Detected     Influenza A PCR Not Detected     Influenza B PCR Not Detected    Narrative:      Fact sheet for providers: https://www.fda.gov/media/162390/download    Fact sheet for patients: https://www.fda.gov/media/895133/download    Test performed by PCR.    CBC & Differential [[id number]]  (Abnormal) Collected: 03/19/23 1314    Specimen: Blood Updated: 03/19/23 1321    Narrative:      The following orders were created for panel order CBC & Differential.  Procedure                               Abnormality         Status                     ---------                               -----------         ------                     CBC Auto Differential[[id number]]        Abnormal            Final result                 Please view results for these tests on the individual orders.    CBC Auto Differential [[id number]]  (Abnormal) Collected: 03/19/23 1314    Specimen: Blood Updated: 03/19/23 1321     WBC 8.88 10*3/mm3      RBC 4.60 10*6/mm3      Hemoglobin 13.6 g/dL      Hematocrit 42.3 %      MCV 92.0 fL      MCH 29.6 pg      MCHC 32.2 g/dL      RDW 13.2 %      RDW-SD 44.8 fl      MPV 10.6 fL      Platelets 177 10*3/mm3      Neutrophil %  74.1 %      Lymphocyte % 14.0 %      Monocyte % 8.7 %      Eosinophil % 1.9 %      Basophil % 0.7 %      Immature Grans % 0.6 %      Neutrophils, Absolute 6.59 10*3/mm3      Lymphocytes, Absolute 1.24 10*3/mm3      Monocytes, Absolute 0.77 10*3/mm3      Eosinophils, Absolute 0.17 10*3/mm3      Basophils, Absolute 0.06 10*3/mm3      Immature Grans, Absolute 0.05 10*3/mm3      nRBC 0.0 /100 WBC     Gold Top - [facility] [[id number]] Collected: 03/19/23 1314    Specimen: Blood Updated: 03/19/23 1318          Radiology:    Imaging Results (Last 72 Hours)     Procedure Component Value Units Date/Time    CT Angiogram Chest Pulmonary Embolism [[id number]] Collected: 03/19/23 1609     Updated: 03/19/23 1618    Narrative:      PROCEDURE: CT ANGIOGRAM CHEST PULMONARY EMBOLISM-     HISTORY: Pulmonary embolism (PE) suspected, unknown D-dimer     COMPARISON: July 11, 2022     TECHNIQUE: The patient was injected with  IV contrast. Axial images were  obtained through the chest in a CTA/ PE protocol. 3 D Reconstruction  images were also performed. This study was performed with techniques to  keep radiation doses as low as reasonably achievable, (ALARA).  Individualized dose reduction techniques using automated exposure  control or adjustment of mA and/or kV according to the patient size were  employed.     FINDINGS: There is no axillary adenopathy. There is no hilar or  mediastinal adenopathy.  The heart is proper size. There is no  pericardial or pleural effusion. There is suboptimal timing of the  contrast bolus. No central pulmonary embolism is identified. Ascending  aorta is upper limits of normal at 40 mm; recommend one-year follow-up.  Vascular calcifications noted. Dissection cannot be excluded due to  timing of the contrast bolus. Limited images of the upper abdomen are  unremarkable. There is either new area of focal pneumonitis or new 8 mm  solid nodule anteriorly in the right upper lobe compared to the prior.  This is too small  to be evaluated on PET or undergo CT-guided biopsy.  Recommend 8-12 week follow-up to document resolution. Pacemaker noted.  No bony destructive lesion seen. Calcified granulomas noted.       Impression:      Suboptimal contrast bolus; no central pulmonary below some  identified.      New 8 mm focal pneumonitis versus mass right upper lobe; recommend 8-12  week follow-up to document resolution or stability.     Upper limits of normal ascending aorta, recommend one-year follow-up.     This report was signed and finalized on 3/19/2023 4:16 PM by Marie Brown MD.    XR Chest 1 View [[id number]] Collected: 03/19/23 1327     Updated: 03/19/23 1338    Narrative:      PROCEDURE: XR CHEST 1 VW-     HISTORY: [facility] Triage Protocol     COMPARISON: July 11, 2022.     FINDINGS: The heart is mildly enlarged, stable. 2-lead pacemaker again  noted.. Pulmonary vascular congestion again noted. No area of  consolidation identified.. The mediastinum is unremarkable. There is no  pneumothorax.  There are no acute osseous abnormalities.       Impression:      Mild cardiomegaly and pulmonary vascular congestion similar  to prior exam.           This report was signed and finalized on 3/19/2023 1:29 PM by Marie Brown MD.          Assessment:    Assessment & Plan       Acute on chronic respiratory failure with hypoxia (HCC)    COPD with acute exacerbation (HCC)    Acute on chronic diastolic heart failure (HCC)    Pacemaker    Emphysema of lung (HCC)    Essential hypertension    Benign prostatic hyperplasia    Paroxysmal A-fib (HCC)    Hypercholesterolemia    Gastroesophageal reflux disease    Obstructive sleep apnea of adult    Diabetes mellitus (HCC)    Multilevel degenerative disc disease        Plan:     1.  Acute on chronic respiratory failure with hypoxemia-she is probably combination due to acute pulmonary edema along with COPD exacerbation.  Treat the primary etiology and he may need oxygen upon discharge which will be reevaluated  based on symptomatology and treatment    2.  COPD exacerbation-there has been multifocal pneumonitis but based on white count being normal and procalcitonin normal it is clinically not suggestive of pneumonia but superimposed bacterial bronchitis which will be treated with Rocephin and Zithromax and nebulizers at present    3.  Acute on chronic diastolic heart failure-excess and symptoms suggestive of mild volume overload with acute pulmonary edema.  Lasix 80 mg has been given and he has diuresed more than 500 mL at present  We will repeat an echocardiogram in a.m.  Medical management as per orders  His cardiologist is Dr. Tucker and would want to follow-up as outpatient    4.-Diabetes-we will check hemoglobin A1c and continue his oral medication at present    5.  Paroxysmal A-fib-his rate is controlled and he is in sinus rhythm and anticoagulant will be continued    Goals of treatment and plan of care has been addressed with the patient    Raj Sullivan MD  03/19/23  18:16 EDT    Please note that portions of this note were completed with a voice recognition program.

## 2023-03-20 ENCOUNTER — APPOINTMENT (OUTPATIENT)
Dept: CARDIOLOGY | Facility: HOSPITAL | Age: 73
DRG: 291 | End: 2023-03-20
Payer: MEDICARE

## 2023-03-20 LAB
ALBUMIN SERPL-MCNC: 3.5 G/DL (ref 3.5–5.2)
ALBUMIN/GLOB SERPL: 1.3 G/DL
ALP SERPL-CCNC: 70 U/L (ref 39–117)
ALT SERPL W P-5'-P-CCNC: 12 U/L (ref 1–41)
ANION GAP SERPL CALCULATED.3IONS-SCNC: 10.1 MMOL/L (ref 5–15)
AST SERPL-CCNC: 18 U/L (ref 1–40)
BASOPHILS # BLD AUTO: 0.01 10*3/MM3 (ref 0–0.2)
BASOPHILS NFR BLD AUTO: 0.1 % (ref 0–1.5)
BH CV ECHO MEAS - AO MAX PG: 12.1 MMHG
BH CV ECHO MEAS - AO MEAN PG: 7 MMHG
BH CV ECHO MEAS - AO ROOT DIAM: 3.5 CM
BH CV ECHO MEAS - AO V2 MAX: 174 CM/SEC
BH CV ECHO MEAS - AO V2 VTI: 37.9 CM
BH CV ECHO MEAS - AVA(I,D): 2.6 CM2
BH CV ECHO MEAS - EDV(CUBED): 167.3 ML
BH CV ECHO MEAS - ESV(CUBED): 34.3 ML
BH CV ECHO MEAS - FS: 41 %
BH CV ECHO MEAS - IVS/LVPW: 0.84 CM
BH CV ECHO MEAS - IVSD: 1.12 CM
BH CV ECHO MEAS - LA DIMENSION: 4.4 CM
BH CV ECHO MEAS - LAT PEAK E' VEL: 4.5 CM/SEC
BH CV ECHO MEAS - LV MASS(C)D: 282.6 GRAMS
BH CV ECHO MEAS - LV MAX PG: 8.3 MMHG
BH CV ECHO MEAS - LV MEAN PG: 4 MMHG
BH CV ECHO MEAS - LV V1 MAX: 144 CM/SEC
BH CV ECHO MEAS - LV V1 VTI: 32.4 CM
BH CV ECHO MEAS - LVIDD: 5.5 CM
BH CV ECHO MEAS - LVIDS: 3.3 CM
BH CV ECHO MEAS - LVOT AREA: 3 CM2
BH CV ECHO MEAS - LVOT DIAM: 1.95 CM
BH CV ECHO MEAS - LVPWD: 1.34 CM
BH CV ECHO MEAS - MED PEAK E' VEL: 4.7 CM/SEC
BH CV ECHO MEAS - MV A MAX VEL: 107 CM/SEC
BH CV ECHO MEAS - MV DEC SLOPE: 359 CM/SEC2
BH CV ECHO MEAS - MV DEC TIME: 0.32 MSEC
BH CV ECHO MEAS - MV E MAX VEL: 116 CM/SEC
BH CV ECHO MEAS - MV E/A: 1.08
BH CV ECHO MEAS - MV MAX PG: 6.4 MMHG
BH CV ECHO MEAS - MV MEAN PG: 3 MMHG
BH CV ECHO MEAS - MV V2 VTI: 49.5 CM
BH CV ECHO MEAS - MVA(VTI): 1.95 CM2
BH CV ECHO MEAS - PA ACC TIME: 0.05 SEC
BH CV ECHO MEAS - PA PR(ACCEL): 55.2 MMHG
BH CV ECHO MEAS - PA V2 MAX: 106 CM/SEC
BH CV ECHO MEAS - RAP SYSTOLE: 8 MMHG
BH CV ECHO MEAS - RV MAX PG: 3.7 MMHG
BH CV ECHO MEAS - RV V1 MAX: 96.8 CM/SEC
BH CV ECHO MEAS - RV V1 VTI: 24.4 CM
BH CV ECHO MEAS - SV(LVOT): 96.8 ML
BH CV ECHO MEAS - TAPSE (>1.6): 2.19 CM
BH CV ECHO MEASUREMENTS AVERAGE E/E' RATIO: 25.22
BH CV XLRA - RV BASE: 3.7 CM
BH CV XLRA - TDI S': 15.9 CM/SEC
BILIRUB SERPL-MCNC: 0.4 MG/DL (ref 0–1.2)
BUN SERPL-MCNC: 24 MG/DL (ref 8–23)
BUN/CREAT SERPL: 15.9 (ref 7–25)
CALCIUM SPEC-SCNC: 9.1 MG/DL (ref 8.6–10.5)
CHLORIDE SERPL-SCNC: 97 MMOL/L (ref 98–107)
CO2 SERPL-SCNC: 29.9 MMOL/L (ref 22–29)
CREAT SERPL-MCNC: 1.51 MG/DL (ref 0.76–1.27)
DEPRECATED RDW RBC AUTO: 44.1 FL (ref 37–54)
EGFRCR SERPLBLD CKD-EPI 2021: 48.5 ML/MIN/1.73
EOSINOPHIL # BLD AUTO: 0.02 10*3/MM3 (ref 0–0.4)
EOSINOPHIL NFR BLD AUTO: 0.2 % (ref 0.3–6.2)
ERYTHROCYTE [DISTWIDTH] IN BLOOD BY AUTOMATED COUNT: 12.9 % (ref 12.3–15.4)
GLOBULIN UR ELPH-MCNC: 2.6 GM/DL
GLUCOSE SERPL-MCNC: 181 MG/DL (ref 65–99)
HCT VFR BLD AUTO: 40.4 % (ref 37.5–51)
HGB BLD-MCNC: 13.2 G/DL (ref 13–17.7)
IMM GRANULOCYTES # BLD AUTO: 0.05 10*3/MM3 (ref 0–0.05)
IMM GRANULOCYTES NFR BLD AUTO: 0.6 % (ref 0–0.5)
LYMPHOCYTES # BLD AUTO: 0.75 10*3/MM3 (ref 0.7–3.1)
LYMPHOCYTES NFR BLD AUTO: 8.9 % (ref 19.6–45.3)
MAXIMAL PREDICTED HEART RATE: 147 BPM
MCH RBC QN AUTO: 30.3 PG (ref 26.6–33)
MCHC RBC AUTO-ENTMCNC: 32.7 G/DL (ref 31.5–35.7)
MCV RBC AUTO: 92.7 FL (ref 79–97)
MONOCYTES # BLD AUTO: 0.35 10*3/MM3 (ref 0.1–0.9)
MONOCYTES NFR BLD AUTO: 4.2 % (ref 5–12)
NEUTROPHILS NFR BLD AUTO: 7.21 10*3/MM3 (ref 1.7–7)
NEUTROPHILS NFR BLD AUTO: 86 % (ref 42.7–76)
NRBC BLD AUTO-RTO: 0 /100 WBC (ref 0–0.2)
PLATELET # BLD AUTO: 184 10*3/MM3 (ref 140–450)
PMV BLD AUTO: 10.8 FL (ref 6–12)
POTASSIUM SERPL-SCNC: 4.1 MMOL/L (ref 3.5–5.2)
PROT SERPL-MCNC: 6.1 G/DL (ref 6–8.5)
RBC # BLD AUTO: 4.36 10*6/MM3 (ref 4.14–5.8)
SODIUM SERPL-SCNC: 137 MMOL/L (ref 136–145)
STRESS TARGET HR: 125 BPM
WBC NRBC COR # BLD: 8.39 10*3/MM3 (ref 3.4–10.8)

## 2023-03-20 PROCEDURE — 94799 UNLISTED PULMONARY SVC/PX: CPT

## 2023-03-20 PROCEDURE — 80053 COMPREHEN METABOLIC PANEL: CPT | Performed by: INTERNAL MEDICINE

## 2023-03-20 PROCEDURE — 25010000002 SULFUR HEXAFLUORIDE MICROSPH 60.7-25 MG RECONSTITUTED SUSPENSION: Performed by: INTERNAL MEDICINE

## 2023-03-20 PROCEDURE — 85025 COMPLETE CBC W/AUTO DIFF WBC: CPT | Performed by: INTERNAL MEDICINE

## 2023-03-20 PROCEDURE — 93306 TTE W/DOPPLER COMPLETE: CPT

## 2023-03-20 PROCEDURE — 93306 TTE W/DOPPLER COMPLETE: CPT | Performed by: INTERNAL MEDICINE

## 2023-03-20 PROCEDURE — 25010000002 CEFTRIAXONE SODIUM-DEXTROSE 1-3.74 GM-%(50ML) RECONSTITUTED SOLUTION: Performed by: INTERNAL MEDICINE

## 2023-03-20 PROCEDURE — 94761 N-INVAS EAR/PLS OXIMETRY MLT: CPT

## 2023-03-20 PROCEDURE — 94664 DEMO&/EVAL PT USE INHALER: CPT

## 2023-03-20 PROCEDURE — 25010000002 AZITHROMYCIN PER 500 MG: Performed by: INTERNAL MEDICINE

## 2023-03-20 RX ADMIN — SULFUR HEXAFLUORIDE 2 ML: KIT at 11:25

## 2023-03-20 RX ADMIN — GLIPIZIDE 5 MG: 5 TABLET ORAL at 18:01

## 2023-03-20 RX ADMIN — HYDROCODONE BITARTRATE AND ACETAMINOPHEN 1 TABLET: 7.5; 325 TABLET ORAL at 22:39

## 2023-03-20 RX ADMIN — IPRATROPIUM BROMIDE AND ALBUTEROL SULFATE 3 ML: 2.5; .5 SOLUTION RESPIRATORY (INHALATION) at 07:07

## 2023-03-20 RX ADMIN — METFORMIN HYDROCHLORIDE 500 MG: 500 TABLET, FILM COATED ORAL at 20:25

## 2023-03-20 RX ADMIN — PANTOPRAZOLE SODIUM 40 MG: 40 TABLET, DELAYED RELEASE ORAL at 06:46

## 2023-03-20 RX ADMIN — LISINOPRIL 40 MG: 20 TABLET ORAL at 08:11

## 2023-03-20 RX ADMIN — Medication 10 ML: at 08:12

## 2023-03-20 RX ADMIN — GABAPENTIN 800 MG: 400 CAPSULE ORAL at 20:25

## 2023-03-20 RX ADMIN — HYDROCODONE BITARTRATE AND ACETAMINOPHEN 1 TABLET: 7.5; 325 TABLET ORAL at 16:31

## 2023-03-20 RX ADMIN — APIXABAN 5 MG: 5 TABLET, FILM COATED ORAL at 08:11

## 2023-03-20 RX ADMIN — FLUOXETINE 20 MG: 20 CAPSULE ORAL at 08:12

## 2023-03-20 RX ADMIN — ASPIRIN 81 MG CHEWABLE TABLET 81 MG: 81 TABLET CHEWABLE at 08:11

## 2023-03-20 RX ADMIN — AMLODIPINE BESYLATE 10 MG: 5 TABLET ORAL at 08:11

## 2023-03-20 RX ADMIN — HYDROCODONE BITARTRATE AND ACETAMINOPHEN 1 TABLET: 7.5; 325 TABLET ORAL at 02:39

## 2023-03-20 RX ADMIN — METFORMIN HYDROCHLORIDE 500 MG: 500 TABLET, FILM COATED ORAL at 08:11

## 2023-03-20 RX ADMIN — METOPROLOL SUCCINATE 50 MG: 50 TABLET, EXTENDED RELEASE ORAL at 08:12

## 2023-03-20 RX ADMIN — SODIUM CHLORIDE 500 MG: 900 INJECTION, SOLUTION INTRAVENOUS at 20:25

## 2023-03-20 RX ADMIN — IPRATROPIUM BROMIDE AND ALBUTEROL SULFATE 3 ML: 2.5; .5 SOLUTION RESPIRATORY (INHALATION) at 17:03

## 2023-03-20 RX ADMIN — APIXABAN 5 MG: 5 TABLET, FILM COATED ORAL at 20:25

## 2023-03-20 RX ADMIN — IPRATROPIUM BROMIDE AND ALBUTEROL SULFATE 3 ML: 2.5; .5 SOLUTION RESPIRATORY (INHALATION) at 13:04

## 2023-03-20 RX ADMIN — ATORVASTATIN CALCIUM 20 MG: 20 TABLET, FILM COATED ORAL at 08:11

## 2023-03-20 RX ADMIN — CEFTRIAXONE 1 G: 1 INJECTION, SOLUTION INTRAVENOUS at 15:56

## 2023-03-20 RX ADMIN — FUROSEMIDE 20 MG: 20 TABLET ORAL at 08:12

## 2023-03-20 RX ADMIN — AMIODARONE HYDROCHLORIDE 200 MG: 200 TABLET ORAL at 08:11

## 2023-03-20 RX ADMIN — GLIPIZIDE 5 MG: 5 TABLET ORAL at 06:46

## 2023-03-20 NOTE — PROGRESS NOTES
[facility]  INTERNAL MEDICINE PROGRESS NOTE    Name:  Robe Bryant   Age:  72 y.o.  Sex:  male  :  1950  MRN:  [id number]   Visit Number:  [id number]  Admission Date:  3/19/2023  Date Of Service:  23  Primary Care Physician:  Raj Sullivan MD     LOS: 1 day :  Patient Care Team:  Raj Sullivan MD as PCP - General (Internal Medicine)  Arelis Tucker MD as Consulting Physician (Cardiology)  Osiel Heaton MD as Consulting Physician (Urology)  Chace Vasquez MD, BRISA as Consulting Physician (Nephrology)  Blayne Whitman MD as Consulting Physician (Ophthalmology)  Jose Hargrove MD as Consulting Physician (Pulmonary Disease)  John Solorzano MD as Consulting Physician (Urology):      Subjective / Interval History:     72-year-old patient with past medical history of COPD, hypertension, BPH, paroxysmal A-fib on anticoagulation, hypercholesterolemia, obstructive sleep apnea, diabetes, chronic back pain, diastolic heart failure, comes into the ER because of shortness of breath and dyspnea over the last few days  He was noted to be hypoxic on room air and needed to use 4 L oxygen.  Work-up done showed he had acute vascular pulmonary congestion and signs of pulmonary edema on the chest x-ray.  Normal white count but there was mild elevation of BNP.  He did get 80 of Lasix along with Rocephin and Zithromax and Solu-Medrol in the ER.  After that there was diuresis of more than 500 mL and patient did feel better.  Patient had a CT scan done and PE was ruled out.  There was 8 mm possible focal pneumonitis versus mass which was recommended to be followed up in 8 to 12 weeks.      After admission overnight he has been feeling better.  His oxygen has been in the 93 range on 2 L.  He is not having any chest pain.  He has diuresed yesterday and having still some leg swelling but not much.  He has some dry cough but tolerable    Vital Signs:    Temp:  [97.4 °F (36.3  °C)-98 °F (36.7 °C)] 98 °F (36.7 °C)  Heart Rate:  [60-82] 60  Resp:  [15-18] 18  BP: (116-198)/(64-98) 116/65    Intake and output:    I/O last 3 completed shifts:  In: 240 [P.O.:240]  Out: 200 [Urine:200]  I/O this shift:  In: 240 [P.O.:240]  Out: -     Physical Examination:    General Appearance:    Alert and cooperative, not in any acute distress.   Head:    Atraumatic and normocephalic, without obvious abnormality.   Eyes:            PERRLA,  No pallor. Extraocular movements are within normal limits.   Neck:   Supple,  No lymph glands, no bruit   Lungs:     Chest shape is normal.  Fine crepitations on both the bases but minimal    Heart:    Normal S1 and S2, no murmur,  No JVD   Abdomen:     Normal bowel sounds, no masses, no organomegaly. Soft     nontender, no guarding, no rebound tenderness   Extremities:   Moves all extremities well, no edema, no cyanosis,    Skin:   No  bruising or rash.   Neurologic:   Grossly nonfocal and moves all extremities.      Laboratory results:  Results from last 7 days   Lab Units 03/20/23  0525 03/19/23  1314   SODIUM mmol/L 137 136   POTASSIUM mmol/L 4.1 5.0   CHLORIDE mmol/L 97* 101   CO2 mmol/L 29.9* 28.8   BUN mg/dL 24* 16   CREATININE mg/dL 1.51* 1.37*   CALCIUM mg/dL 9.1 9.2   BILIRUBIN mg/dL 0.4 0.5   ALK PHOS U/L 70 78   ALT (SGPT) U/L 12 16   AST (SGOT) U/L 18 25   GLUCOSE mg/dL 181* 122*     Results from last 7 days   Lab Units 03/20/23  0525 03/19/23  1314   WBC 10*3/mm3 8.39 8.88   HEMOGLOBIN g/dL 13.2 13.6   HEMATOCRIT % 40.4 42.3   PLATELETS 10*3/mm3 184 177         Results from last 7 days   Lab Units 03/19/23  1709 03/19/23  1314   HSTROP T ng/L 16* 22*           Radiology results:    Imaging Results (Last 24 Hours)     Procedure Component Value Units Date/Time    CT Angiogram Chest Pulmonary Embolism [[id number]] Collected: 03/19/23 1609     Updated: 03/19/23 1618    Narrative:      PROCEDURE: CT ANGIOGRAM CHEST PULMONARY EMBOLISM-     HISTORY: Pulmonary  embolism (PE) suspected, unknown D-dimer     COMPARISON: July 11, 2022     TECHNIQUE: The patient was injected with  IV contrast. Axial images were  obtained through the chest in a CTA/ PE protocol. 3 D Reconstruction  images were also performed. This study was performed with techniques to  keep radiation doses as low as reasonably achievable, (ALARA).  Individualized dose reduction techniques using automated exposure  control or adjustment of mA and/or kV according to the patient size were  employed.     FINDINGS: There is no axillary adenopathy. There is no hilar or  mediastinal adenopathy.  The heart is proper size. There is no  pericardial or pleural effusion. There is suboptimal timing of the  contrast bolus. No central pulmonary embolism is identified. Ascending  aorta is upper limits of normal at 40 mm; recommend one-year follow-up.  Vascular calcifications noted. Dissection cannot be excluded due to  timing of the contrast bolus. Limited images of the upper abdomen are  unremarkable. There is either new area of focal pneumonitis or new 8 mm  solid nodule anteriorly in the right upper lobe compared to the prior.  This is too small to be evaluated on PET or undergo CT-guided biopsy.  Recommend 8-12 week follow-up to document resolution. Pacemaker noted.  No bony destructive lesion seen. Calcified granulomas noted.       Impression:      Suboptimal contrast bolus; no central pulmonary below some  identified.      New 8 mm focal pneumonitis versus mass right upper lobe; recommend 8-12  week follow-up to document resolution or stability.     Upper limits of normal ascending aorta, recommend one-year follow-up.     This report was signed and finalized on 3/19/2023 4:16 PM by Marie Brown MD.    XR Chest 1 View [[id number]] Collected: 03/19/23 1327     Updated: 03/19/23 1338    Narrative:      PROCEDURE: XR CHEST 1 VW-     HISTORY: SOA Triage Protocol     COMPARISON: July 11, 2022.     FINDINGS: The heart is mildly  enlarged, stable. 2-lead pacemaker again  noted.. Pulmonary vascular congestion again noted. No area of  consolidation identified.. The mediastinum is unremarkable. There is no  pneumothorax.  There are no acute osseous abnormalities.       Impression:      Mild cardiomegaly and pulmonary vascular congestion similar  to prior exam.           This report was signed and finalized on 3/19/2023 1:29 PM by Marie Brown MD.          I have reviewed the patient's radiology reports.    Medication Review:     I have reviewed the patients active and prn medications.     Assessment:      Acute on chronic respiratory failure with hypoxia (HCC)    COPD with acute exacerbation (HCC)    Acute on chronic diastolic heart failure (HCC)    Pacemaker    Emphysema of lung (HCC)    Essential hypertension    Benign prostatic hyperplasia    Paroxysmal A-fib (HCC)    Hypercholesterolemia    Gastroesophageal reflux disease    Obstructive sleep apnea of adult    Diabetes mellitus (HCC)    Multilevel degenerative disc disease          Plan:    1.  Acute on chronic respiratory failure with hypoxemia-she is probably combination due to acute pulmonary edema along with COPD exacerbation.  Treat the primary etiology and he may need oxygen upon discharge which will be reevaluated based on symptomatology and treatment.   Today he is on 2 L with a saturation in the mid 90s and will try to do room air saturation and 6-minute oximetry     2.  COPD exacerbation-there has been multifocal pneumonitis but based on white count being normal and procalcitonin normal it is clinically not suggestive of pneumonia but superimposed bacterial bronchitis which will be treated with Rocephin and Zithromax and nebulizers at present  He does not need any steroids at present and a dose was given yesterday upon admission     3.  Acute on chronic diastolic heart failure-excess and symptoms suggestive of mild volume overload with acute pulmonary edema.  Lasix 80 mg has been  given and he has diuresed more than 500 mL at present  Echocardiogram to be done today  Medical management as per orders  His cardiologist is Dr. Tucker and would want to follow-up as outpatient     4.-Diabetes-we will check hemoglobin A1c and continue his oral medication at present     5.  Paroxysmal A-fib-his rate is controlled and he is in sinus rhythm and anticoagulant will be continued     Goals of treatment and plan of care has been addressed with the patient    Raj Sullivan MD  03/20/23  09:21 EDT      Please note that portions of this note were completed with a voice recognition program.

## 2023-03-20 NOTE — PLAN OF CARE
Goal Outcome Evaluation:  Plan of Care Reviewed With: patient, spouse        Progress: improving  Outcome Evaluation: VSS. Pt up to bedside standby assist. continues 2L NC possible DC in am

## 2023-03-20 NOTE — CASE MANAGEMENT/SOCIAL WORK
Discharge Planning Assessment  [facility]     Patient Name: Robe Bryant  MRN: [id number]  Today's Date: 3/20/2023    Admit Date: 3/19/2023    Plan: Pt plans to discharge to home. Family to transport.  Pharmacy of choice is [facility]. No POA/LW/Guardian.  States he no longer has home oxygen.  Stated [facility] picked it up because they owed a bill and the company felt he no longer needed oxygen.  Independent with ADL's.   Discharge Needs Assessment     Row Name 03/20/23 1527       Living Environment    People in Home spouse    Current Living Arrangements home    Potentially Unsafe Housing Conditions unable to assess    Primary Care Provided by self    Provides Primary Care For no one    Family Caregiver if Needed none    Able to Return to Prior Arrangements yes       Food Insecurity    Within the past 12 months, you worried that your food would run out before you got the money to buy more. Never true    Within the past 12 months, the food you bought just didn't last and you didn't have money to get more. Never true       Transition Planning    Patient/Family Anticipates Transition to home    Patient/Family Anticipated Services at Transition none    Transportation Anticipated family or friend will provide       Discharge Needs Assessment    Equipment Currently Used at Home bipap  States [facility] picked up his oxygen.  Stated he no longer needed oxygen    Concerns to be Addressed no discharge needs identified    Anticipated Changes Related to Illness none    Equipment Needed After Discharge oxygen    Provided Post Acute Provider List? N/A    Provided Post Acute Provider Quality & Resource List? N/A               Discharge Plan     Row Name 03/20/23 1529       Plan    Plan Pt plans to discharge to home. Family to transport.  Pharmacy of choice is [facility]. No POA/LW/Guardian.  States he no longer has home oxygen.  Stated [facility] picked it up because they owed a bill and the company felt he no longer needed  oxygen.  Independent with ADL's.    Patient/Family in Agreement with Plan yes    Final Discharge Disposition Code 30 - still a patient              Continued Care and Services - Admitted Since 3/19/2023    Coordination has not been started for this encounter.       Expected Discharge Date and Time     Expected Discharge Date Expected Discharge Time    Mar 21, 2023          Demographic Summary     Row Name 03/20/23 1526       General Information    Admission Type inpatient    Arrived From home    Referral Source admission list    Reason for Consult discharge planning    Preferred Language English       Contact Information    Permission Granted to Share Info With     Contact Information Obtained for                Functional Status     Row Name 03/20/23 1526       Functional Status    Usual Activity Tolerance moderate    Current Activity Tolerance moderate       Functional Status, IADL    Medications independent    Meal Preparation assistive person    Housekeeping assistive person    Laundry assistive person    Shopping assistive person       Mental Status    General Appearance WDL WDL       Mental Status Summary    Recent Changes in Mental Status/Cognitive Functioning no changes       Employment/    Employment Status retired               Psychosocial     Row Name 03/20/23 1527       Values/Beliefs    Spiritual, Cultural Beliefs, Taoism Practices, Values that Affect Care no       Behavior WDL    Behavior WDL WDL       Emotion Mood WDL    Emotion/Mood/Affect WDL WDL       Speech WDL    Speech WDL WDL       Perceptual State WDL    Perceptual State WDL WDL       Thought Process WDL    Thought Process WDL WDL       Intellectual Performance WDL    Intellectual Performance WDL WDL       Coping/Stress    Major Change/Loss/Stressor none    Patient Personal Strengths strong support system    Sources of Support adult child(roman);spouse    Techniques to [place] with Loss/Stress/Change not  applicable    Reaction to Health Status realistic    Understanding of Condition and Treatment unable to assess       Developmental Stage (Beau's)    Developmental Stage Stage 8 (64 years-death/Late Adulthood) Integrity vs. Despair       C-SSRS (Recent)    Q1 Wished to be Dead (Past Month) no    Q2 Suicidal Thoughts (Past Month) no    Q6 Suicide Behavior (Lifetime) no       Violence Risk    Feels Like Hurting Others no    Previous Attempt to Harm Others no               Abuse/Neglect    No documentation.                Legal    No documentation.                Substance Abuse    No documentation.                Patient Forms    No documentation.                   Catalina Fermin

## 2023-03-20 NOTE — PLAN OF CARE
Goal Outcome Evaluation:  Plan of Care Reviewed With: patient        Progress: improving  Outcome Evaluation: VS noted on 2L nasal cannula and CPAP HS. No acute events during shift. Will continue current plan of care.

## 2023-03-20 NOTE — PAYOR COMM NOTE
"TO:HUMANA  FROM:RONAL HAWK RN PHONE [phone]  IN CLINICALS REF# [id number]    Todd Bryant (72 y.o. Male)     Date of Birth   1950    Social Security Number       Address   [address] [id number]    Home Phone   [phone]    MRN   [id number]       Scientologist   Shinto    Marital Status                               Admission Date   3/19/23    Admission Type   Emergency    Admitting Provider   Raj Sullivan MD    Attending Provider   Raj Sullivan MD    Department, Room/Bed   [facility] TELEMETRY 3, 325/1       Discharge Date       Discharge Disposition       Discharge Destination                               Attending Provider: Raj Sullivan MD    Allergies: No Known Allergies    Isolation: None   Infection: None   Code Status: CPR    Ht: 162.6 cm (64\")   Wt: 106 kg (232 lb 12.9 oz)    Admission Cmt: None   Principal Problem: Acute on chronic respiratory failure with hypoxia (HCC) [J96.21]                 Active Insurance as of 3/19/2023     Primary Coverage     Payor Plan Insurance Group Employer/Plan Group    HUMANA MEDICARE REPLACEMENT HUMANA MEDICARE REPLACEMENT [id number]     Payor Plan Address Payor Plan Phone Number Payor Plan Fax Number Effective Dates    [address] [address] [phone]  3/1/2023 - None Entered    Formerly [facility] [id number]       Subscriber Name Subscriber Birth Date Member ID       TODD BRYANT 1950 [id number]                 Emergency Contacts      (Rel.) Home Phone Work Phone Mobile Phone    BISI BRYANT (Spouse) -- -- [phone]    KennedyJanel (Daughter) [phone] -- --               History & Physical      Raj Sullivan MD at 23 1816              [facility] MEDICINE   HISTORY AND PHYSICAL      Name:  Todd Bryant   Age:  72 y.o.  Sex:  male  :  1950  MRN:  [id number]   Visit Number:  [id number]  Admission Date:  " 3/19/2023  Date Of Service:  03/19/23  Primary Care Physician:  Raj Sullivan MD     Admitting diagnosis:      Acute on chronic respiratory failure with hypoxia (HCC)    COPD with acute exacerbation (HCC)    Acute on chronic diastolic heart failure (HCC)    Pacemaker    Emphysema of lung (HCC)    Essential hypertension    Benign prostatic hyperplasia    Paroxysmal A-fib (HCC)    Hypercholesterolemia    Gastroesophageal reflux disease    Obstructive sleep apnea of adult    Diabetes mellitus (HCC)    Multilevel degenerative disc disease        History Of Presenting Illness:      72-year-old patient with past medical history of COPD, hypertension, BPH, paroxysmal A-fib on anticoagulation, hypercholesterolemia, obstructive sleep apnea, diabetes, chronic back pain, diastolic heart failure, comes into the ER because of shortness of breath and dyspnea over the last few days  He was noted to be hypoxic on room air and needed to use 4 L oxygen.  Work-up done showed he had acute vascular pulmonary congestion and signs of pulmonary edema on the chest x-ray.  Normal white count but there was mild elevation of BNP.  He did get 80 of Lasix along with Rocephin and Zithromax and Solu-Medrol in the ER.  After that there was diuresis of more than 500 mL and patient did feel better.  Patient had a CT scan done and PE was ruled out.  There was 8 mm possible focal pneumonitis versus mass which was recommended to be followed up in 8 to 12 weeks.  His oxygen was tapered down and he again desaturated in the 86 range so he was put on 2 L oxygen and is being admitted for her hypoxic respiratory failure with acute pulmonary edema.  Patient states there has been no chest pain but he has been coughing last few days.  He has chronic leg pain which has been there along with edema though edema is slightly better.  Patient has been compliant with his medications.  His sugar has still not been well controlled.    Review Of Systems:     The  following systems were reviewed and negative;  constitution, eyes, ENT, respiratory, cardiovascular, gastrointestinal, genitourinary, musculoskeletal, neurological and behavioral/psych,  Skin except as above.     Past Medical History:    Past Medical History:   Diagnosis Date   • Anxiety and depression    • Arthritis    • Backache     20 years   • Benign prostatic hyperplasia    • Bladder trauma    • Cervicalgia    • Chronic kidney disease     Cr up to 2.1   • Coronary artery disease    • Diabetes mellitus (HCC)     15 years--   • Emphysema of lung (HCC)    • Erectile dysfunction    • Eye exam, routine 2015   • FH: colonic polyps    • Gout     for 10 years--   • History of echocardiogram 09/15/2014   • History of tobacco use     with cessation x7 years   • Hyperlipidemia    • Hypertension    • Migraine    • Myocardial infarction (HCC)     h/o coronary artery stenting--   • Prostate cancer ([facility])    • Restless leg syndrome     20 years   • Sleep apnea     12 years; uses a Bi-Pap   • Stroke ([facility])    • Syncope 4/28/2017   • Warfarin anticoagulation 9/14/2016       Past Surgical history:    Past Surgical History:   Procedure Laterality Date   • BLADDER SURGERY     • CARDIAC CATHETERIZATION  03/15/2013    revealing widely patent stents of the left circumflex and ramus intermedius coronary arteries, no significant disease is seen in any territory   • CARDIAC CATHETERIZATION Left 9/30/2019    Procedure: Left Heart Cath;  Surgeon: Arelis Tucker MD;  Location:  PREM CATH INVASIVE LOCATION;  Service: Cardiology   • CARDIAC ELECTROPHYSIOLOGY PROCEDURE N/A 5/10/2021    Procedure: PPM battery change;  Surgeon: Arelis Tucker MD;  Location:  PREM CATH INVASIVE LOCATION;  Service: Cardiology;  Laterality: N/A;   • CARDIAC PACEMAKER PLACEMENT  2012   • CATARACT EXTRACTION     • COLONOSCOPY  2004    No family history of colon cancer.     • COLONOSCOPY  2015   • HERNIA REPAIR      Type unknown   • PACEMAKER  IMPLANTATION     • PROSTATE SURGERY         Social History:    Social History     Socioeconomic History   • Marital status:    Tobacco Use   • Smoking status: Former     Packs/day: 1.00     Years: 30.00     Pack years: 30.00     Types: Cigarettes     Quit date: 8/15/2001     Years since quittin.6   • Smokeless tobacco: Never   • Tobacco comments:     quit 16 years ago   Vaping Use   • Vaping Use: Never used   Substance and Sexual Activity   • Alcohol use: No   • Drug use: No   • Sexual activity: Defer       Family History:    Family History   Problem Relation Age of Onset   • Cancer Mother    • Diabetes Mother    • Hypertension Mother    • Stroke Mother    • Stomach cancer Mother    • Heart disease Mother    • Stomach cancer Father    • Cancer Father    • Lung cancer Father          Allergies:      Patient has no known allergies.    Home Medications:    Prior to Admission Medications     Prescriptions Last Dose Informant Patient Reported? Taking?    albuterol sulfate  (90 Base) MCG/ACT inhaler   No No    Inhale 2 puffs Every 4 (Four) Hours As Needed for Wheezing or Shortness of Air.    amiodarone (PACERONE) 200 MG tablet   No No    Take 1 tablet by mouth Daily.    amLODIPine (NORVASC) 10 MG tablet   No No    Take 1 tablet by mouth Daily.    Anoro Ellipta 62.5-25 MCG/ACT aerosol powder  inhaler   Yes No    Inhale 1 puff Daily.    apixaban (ELIQUIS) 5 MG tablet tablet   Yes No    Take 5 mg by mouth 2 (Two) Times a Day.    aspirin 81 MG chewable tablet   Yes No    Chew 81 mg Daily.    atorvastatin (LIPITOR) 20 MG tablet   No No    Take 1 tablet by mouth Every Night.    Blood Glucose Monitoring Suppl (TRUE METRIX AIR GLUCOSE METER) w/Device kit   No No    1 each by In Vitro route 2 (two) times a day. E11.9    FLUoxetine (PROzac) 20 MG capsule   No No    Take 1 capsule by mouth Daily.    furosemide (LASIX) 20 MG tablet   No No    TAKE 1 TABLET BY MOUTH EVERY MORNING, MAY TAKE SECOND DOSE AS NEEDED FOR  INCREASED SWELLING    gabapentin (NEURONTIN) 800 MG tablet   Yes No    Take 800 mg by mouth every night at bedtime.    gentamicin (GARAMYCIN) 0.3 % ophthalmic solution   No No    Administer 2 drops to both eyes Every 4 (Four) Hours.    glipizide (GLUCOTROL) 5 MG tablet   No No    TAKE 1 TABLET TWICE DAILY BEFORE MEALS    glucose monitor monitoring kit   No No    1 each Daily. E11.9    HYDROcodone-acetaminophen (NORCO) 5-325 MG per tablet   No No    Take 1 tablet by mouth Every 6 (Six) Hours As Needed for Severe Pain .    ipratropium-albuterol (DUO-NEB) 0.5-2.5 mg/3 ml nebulizer   No No    Take 3 mL by nebulization 4 (Four) Times a Day.    Lancet Device mis   No No    1 each Daily. E11.9    lisinopril (PRINIVIL,ZESTRIL) 40 MG tablet   No No    Take 1 tablet by mouth Daily.    metFORMIN (GLUCOPHAGE) 500 MG tablet   Yes No    Take 1 tablet by mouth Every 12 (Twelve) Hours.    metoprolol succinate XL (Toprol XL) 50 MG 24 hr tablet   No No    Take 1 tablet by mouth Daily.    Misc. Devices misc   No No    Bipap tubing.    Multiple Vitamin tablet   Yes No    Take 1 tablet by mouth daily.    Nebulizer misc   No No    1 each Every 4 (Four) Hours As Needed (shortness of breath).    O2 (OXYGEN)  Self Yes No    Inhale 2 L/min Continuous As Needed (With activity).    omeprazole (priLOSEC) 40 MG capsule   No No    Take 1 capsule by mouth Daily.    potassium chloride (K-DUR,KLOR-CON) 20 MEQ CR tablet   No No    Take 1 tablet by mouth Daily.    spironolactone (ALDACTONE) 50 MG tablet   No No    TAKE 1 TABLET EVERY DAY    TRUEplus Lancets 33G misc   No No    1 each by Other route Daily. E11.9                 Vital Signs:    Temp:  [97.9 °F (36.6 °C)] 97.9 °F (36.6 °C)  Heart Rate:  [60-79] 65  Resp:  [15-17] 17  BP: (162-198)/(68-97) 165/80        03/19/23  1309   Weight: 108 kg (237 lb)       Body mass index is 40.68 kg/m².    Physical Exam:      General Appearance:    Alert and cooperative,  And lying comfortably in bed   Head:     Atraumatic and normocephalic, without obvious abnormality.   Eyes:            PERRLA, conjunctivae and sclerae normal, no Icterus. No pallor. Extraocular movements are within normal limits.   Ears:    Ears appear intact with no abnormalities noted.   Throat:   No oral lesions, no thrush, oral mucosa moist.   Neck:   Supple, trachea midline, no thyromegaly, no carotid bruit, no lymphadenopathy   Lungs:    Breath sounds heard bilaterally equally.  Fine crepitations on both bases       Heart:    Normal S1 and S2, no murmur, no gallop, no rub. No JVD   Abdomen:     Normal bowel sounds, no masses, no organomegaly. Soft   nontender, nondistended, no guarding, no rebound    tenderness   Extremities:   Moves all extremities well, bilateral pitting lower extremity edema, no cyanosis, no          clubbing   Skin:   No  bruising or rash   Neurologic:   Cranial nerves 2 - 12 grossly intact, sensation intact, Motor power is normal and equal bilaterally.       EKG:      Sinus rhythm but no acute ischemic findings    Labs:    Lab Results (last 24 hours)     Procedure Component Value Units Date/Time    Single High Sensitivity Troponin T [[id number]]  (Abnormal) Collected: 03/19/23 1709    Specimen: Blood Updated: 03/19/23 1737     HS Troponin T 16 ng/L     Narrative:      High Sensitive Troponin T Reference Range:  <10.0 ng/L- Negative Female for AMI  <15.0 ng/L- Negative Male for AMI  >=10 - Abnormal Female indicating possible myocardial injury.  >=15 - Abnormal Male indicating possible myocardial injury.   Clinicians would have to utilize clinical acumen, EKG, Troponin, and serial changes to determine if it is an Acute Myocardial Infarction or myocardial injury due to an underlying chronic condition.         Procalcitonin [[id number]]  (Normal) Collected: 03/19/23 1447    Specimen: Blood Updated: 03/19/23 1518     Procalcitonin 0.06 ng/mL     Narrative:      As a Marker for Sepsis (Non-Neonates):    1. <0.5 ng/mL represents a low  "risk of severe sepsis and/or septic shock.  2. >2 ng/mL represents a high risk of severe sepsis and/or septic shock.    As a Marker for Lower Respiratory Tract Infections that require antibiotic therapy:    PCT on Admission    Antibiotic Therapy       6-12 Hrs later    >0.5                Strongly Recommended  >0.25 - <0.5        Recommended   0.1 - 0.25          Discouraged              Remeasure/reassess PCT  <0.1                Strongly Discouraged     Remeasure/reassess PCT    As 28 day mortality risk marker: \"Change in Procalcitonin Result\" (>80% or <=80%) if Day 0 (or Day 1) and Day 4 values are available. Refer to http://www.[facility]-pct-calculator.com    Change in PCT <=80%  A decrease of PCT levels below or equal to 80% defines a positive change in PCT test result representing a higher risk for 28-day all-cause mortality of patients diagnosed with severe sepsis for septic shock.    Change in PCT >80%  A decrease of PCT levels of more than 80% defines a negative change in PCT result representing a lower risk for 28-day all-cause mortality of patients diagnosed with severe sepsis or septic shock.       [place] Draw [[id number]] Collected: 03/19/23 1314    Specimen: Blood Updated: 03/19/23 1415    Narrative:      The following orders were created for panel order [place] Draw.  Procedure                               Abnormality         Status                     ---------                               -----------         ------                     Green Top (Gel)[[id number]]                                  Final result               Lavender Top[[id number]]                                     Final result               Gold Top - [facility][[id number]]                                   In process                 Light Blue Top[[id number]]                                   Final result                 Please view results for these tests on the individual orders.    Green Top (Gel) [[id number]] Collected: 03/19/23 1314    " Specimen: Blood Updated: 03/19/23 1415     Extra Tube Hold for add-ons.     Comment: Auto resulted.       Lavender Top [[id number]] Collected: 03/19/23 1314    Specimen: Blood Updated: 03/19/23 1415     Extra Tube hold for add-on     Comment: Auto resulted       Light Blue Top [[id number]] Collected: 03/19/23 1314    Specimen: Blood Updated: 03/19/23 1415     Extra Tube Hold for add-ons.     Comment: Auto resulted       Comprehensive Metabolic Panel [[id number]]  (Abnormal) Collected: 03/19/23 1314    Specimen: Blood Updated: 03/19/23 1344     Glucose 122 mg/dL      BUN 16 mg/dL      Creatinine 1.37 mg/dL      Sodium 136 mmol/L      Potassium 5.0 mmol/L      Comment: Slight hemolysis detected by analyzer. Results may be affected.        Chloride 101 mmol/L      CO2 28.8 mmol/L      Calcium 9.2 mg/dL      Total Protein 6.5 g/dL      Albumin 3.6 g/dL      ALT (SGPT) 16 U/L      AST (SGOT) 25 U/L      Comment: Slight hemolysis detected by analyzer. Results may be affected.        Alkaline Phosphatase 78 U/L      Total Bilirubin 0.5 mg/dL      Globulin 2.9 gm/dL      A/G Ratio 1.2 g/dL      BUN/Creatinine Ratio 11.7     Anion Gap 6.2 mmol/L      eGFR 54.5 mL/min/1.73     Narrative:      GFR Normal >60  Chronic Kidney Disease <60  Kidney Failure <15    The GFR formula is only valid for adults with stable renal function between ages 18 and 70.    BNP [[id number]]  (Abnormal) Collected: 03/19/23 1314    Specimen: Blood Updated: 03/19/23 1341     proBNP 1,359.0 pg/mL     Narrative:      Among patients with dyspnea, NT-proBNP is highly sensitive for the detection of acute congestive heart failure. In addition NT-proBNP of <300 pg/ml effectively rules out acute congestive heart failure with 99% negative predictive value.      Single High Sensitivity Troponin T [[id number]]  (Abnormal) Collected: 03/19/23 1314    Specimen: Blood Updated: 03/19/23 1340     HS Troponin T 22 ng/L     Narrative:      High Sensitive Troponin T Reference  Range:  <10.0 ng/L- Negative Female for AMI  <15.0 ng/L- Negative Male for AMI  >=10 - Abnormal Female indicating possible myocardial injury.  >=15 - Abnormal Male indicating possible myocardial injury.   Clinicians would have to utilize clinical acumen, EKG, Troponin, and serial changes to determine if it is an Acute Myocardial Infarction or myocardial injury due to an underlying chronic condition.         COVID-19 and FLU A/B PCR - Swab, Nasopharynx [[id number]]  (Normal) Collected: 03/19/23 1316    Specimen: Swab from Nasopharynx Updated: 03/19/23 1339     COVID19 Not Detected     Influenza A PCR Not Detected     Influenza B PCR Not Detected    Narrative:      Fact sheet for providers: https://www.fda.gov/media/856454/download    Fact sheet for patients: https://www.fda.gov/media/912034/download    Test performed by PCR.    CBC & Differential [[id number]]  (Abnormal) Collected: 03/19/23 1314    Specimen: Blood Updated: 03/19/23 1321    Narrative:      The following orders were created for panel order CBC & Differential.  Procedure                               Abnormality         Status                     ---------                               -----------         ------                     CBC Auto Differential[[id number]]        Abnormal            Final result                 Please view results for these tests on the individual orders.    CBC Auto Differential [[id number]]  (Abnormal) Collected: 03/19/23 1314    Specimen: Blood Updated: 03/19/23 1321     WBC 8.88 10*3/mm3      RBC 4.60 10*6/mm3      Hemoglobin 13.6 g/dL      Hematocrit 42.3 %      MCV 92.0 fL      MCH 29.6 pg      MCHC 32.2 g/dL      RDW 13.2 %      RDW-SD 44.8 fl      MPV 10.6 fL      Platelets 177 10*3/mm3      Neutrophil % 74.1 %      Lymphocyte % 14.0 %      Monocyte % 8.7 %      Eosinophil % 1.9 %      Basophil % 0.7 %      Immature Grans % 0.6 %      Neutrophils, Absolute 6.59 10*3/mm3      Lymphocytes, Absolute 1.24 10*3/mm3       Monocytes, Absolute 0.77 10*3/mm3      Eosinophils, Absolute 0.17 10*3/mm3      Basophils, Absolute 0.06 10*3/mm3      Immature Grans, Absolute 0.05 10*3/mm3      nRBC 0.0 /100 WBC     Gold Top - SST [[id number]] Collected: 03/19/23 1314    Specimen: Blood Updated: 03/19/23 1318          Radiology:    Imaging Results (Last 72 Hours)     Procedure Component Value Units Date/Time    CT Angiogram Chest Pulmonary Embolism [[id number]] Collected: 03/19/23 1609     Updated: 03/19/23 1618    Narrative:      PROCEDURE: CT ANGIOGRAM CHEST PULMONARY EMBOLISM-     HISTORY: Pulmonary embolism (PE) suspected, unknown D-dimer     COMPARISON: July 11, 2022     TECHNIQUE: The patient was injected with  IV contrast. Axial images were  obtained through the chest in a CTA/ PE protocol. 3 D Reconstruction  images were also performed. This study was performed with techniques to  keep radiation doses as low as reasonably achievable, (ALARA).  Individualized dose reduction techniques using automated exposure  control or adjustment of mA and/or kV according to the patient size were  employed.     FINDINGS: There is no axillary adenopathy. There is no hilar or  mediastinal adenopathy.  The heart is proper size. There is no  pericardial or pleural effusion. There is suboptimal timing of the  contrast bolus. No central pulmonary embolism is identified. Ascending  aorta is upper limits of normal at 40 mm; recommend one-year follow-up.  Vascular calcifications noted. Dissection cannot be excluded due to  timing of the contrast bolus. Limited images of the upper abdomen are  unremarkable. There is either new area of focal pneumonitis or new 8 mm  solid nodule anteriorly in the right upper lobe compared to the prior.  This is too small to be evaluated on PET or undergo CT-guided biopsy.  Recommend 8-12 week follow-up to document resolution. Pacemaker noted.  No bony destructive lesion seen. Calcified granulomas noted.       Impression:       Suboptimal contrast bolus; no central pulmonary below some  identified.      New 8 mm focal pneumonitis versus mass right upper lobe; recommend 8-12  week follow-up to document resolution or stability.     Upper limits of normal ascending aorta, recommend one-year follow-up.     This report was signed and finalized on 3/19/2023 4:16 PM by Marie Brown MD.    XR Chest 1 View [[id number]] Collected: 03/19/23 1327     Updated: 03/19/23 1338    Narrative:      PROCEDURE: XR CHEST 1 VW-     HISTORY: SOA Triage Protocol     COMPARISON: July 11, 2022.     FINDINGS: The heart is mildly enlarged, stable. 2-lead pacemaker again  noted.. Pulmonary vascular congestion again noted. No area of  consolidation identified.. The mediastinum is unremarkable. There is no  pneumothorax.  There are no acute osseous abnormalities.       Impression:      Mild cardiomegaly and pulmonary vascular congestion similar  to prior exam.           This report was signed and finalized on 3/19/2023 1:29 PM by Marie Brown MD.          Assessment:    Assessment & Plan       Acute on chronic respiratory failure with hypoxia (HCC)    COPD with acute exacerbation (HCC)    Acute on chronic diastolic heart failure (HCC)    Pacemaker    Emphysema of lung (HCC)    Essential hypertension    Benign prostatic hyperplasia    Paroxysmal A-fib (HCC)    Hypercholesterolemia    Gastroesophageal reflux disease    Obstructive sleep apnea of adult    Diabetes mellitus (HCC)    Multilevel degenerative disc disease        Plan:     1.  Acute on chronic respiratory failure with hypoxemia-she is probably combination due to acute pulmonary edema along with COPD exacerbation.  Treat the primary etiology and he may need oxygen upon discharge which will be reevaluated based on symptomatology and treatment    2.  COPD exacerbation-there has been multifocal pneumonitis but based on white count being normal and procalcitonin normal it is clinically not suggestive of pneumonia  but superimposed bacterial bronchitis which will be treated with Rocephin and Zithromax and nebulizers at present    3.  Acute on chronic diastolic heart failure-excess and symptoms suggestive of mild volume overload with acute pulmonary edema.  Lasix 80 mg has been given and he has diuresed more than 500 mL at present  We will repeat an echocardiogram in a.m.  Medical management as per orders  His cardiologist is Dr. Tucker and would want to follow-up as outpatient    4.-Diabetes-we will check hemoglobin A1c and continue his oral medication at present    5.  Paroxysmal A-fib-his rate is controlled and he is in sinus rhythm and anticoagulant will be continued    Goals of treatment and plan of care has been addressed with the patient    Raj Sullivan MD  03/19/23  18:16 EDT    Please note that portions of this note were completed with a voice recognition program.    Electronically signed by Raj Sullivan MD at 03/19/23 1822          Emergency Department Notes      Kofi Oropeza APRN at 03/19/23 1322          Subjective  History of Present Illness:    Patient is a 72-year-old male here today with shortness of breath.  He states that he started feeling bad last night with a cough that is intermittently productive.  Will describe some yellow-tinged sputum.  Feels short of breath, and on arrival to the emergency department he was found to have an O2 saturation of 83% on room air.  Was placed on 4 L nasal cannula by nursing staff which increased his SPO2 up to 97%.  Patient states that he does not normally wear oxygen at home, but does have a history of COPD and pneumonia in the past.  Denies chest pain, does note pain with deep breathing and points to substernal region.  Denies fever, but did have chills.  Was using his inhalers and nebs at home but did not note any improvement.  Has noted some increased swelling in his ankles and lower legs his other significant history includes heart failure, diabetes,  BPH, chronic kidney disease, cardiac disease including cardiac caths, stents, and an MI    Nurses Notes reviewed and agree, including vitals, allergies, social history and prior medical history.     REVIEW OF SYSTEMS: All systems reviewed and not pertinent unless noted.  Review of Systems    Past Medical History:   Diagnosis Date   • Anxiety and depression    • Arthritis    • Backache     20 years   • Benign prostatic hyperplasia    • Bladder trauma    • Cervicalgia    • Chronic kidney disease     Cr up to 2.1   • Coronary artery disease    • Diabetes mellitus (HCC)     15 years--   • Emphysema of lung (HCC)    • Erectile dysfunction    • Eye exam, routine 2015   • FH: colonic polyps    • Gout     for 10 years--   • History of echocardiogram 09/15/2014   • History of tobacco use     with cessation x7 years   • Hyperlipidemia    • Hypertension    • Migraine    • Myocardial infarction (HCC)     h/o coronary artery stenting--   • Prostate cancer (HCC)    • Restless leg syndrome     20 years   • Sleep apnea     12 years; uses a Bi-Pap   • Stroke ([facility])    • Syncope 4/28/2017   • Warfarin anticoagulation 9/14/2016       Allergies:    Patient has no known allergies.      Past Surgical History:   Procedure Laterality Date   • BLADDER SURGERY     • CARDIAC CATHETERIZATION  03/15/2013    revealing widely patent stents of the left circumflex and ramus intermedius coronary arteries, no significant disease is seen in any territory   • CARDIAC CATHETERIZATION Left 9/30/2019    Procedure: Left Heart Cath;  Surgeon: Arelis Tucker MD;  Location:  PREM CATH INVASIVE LOCATION;  Service: Cardiology   • CARDIAC ELECTROPHYSIOLOGY PROCEDURE N/A 5/10/2021    Procedure: PPM battery change;  Surgeon: Arelis Tucker MD;  Location:  PREM CATH INVASIVE LOCATION;  Service: Cardiology;  Laterality: N/A;   • CARDIAC PACEMAKER PLACEMENT  2012   • CATARACT EXTRACTION     • COLONOSCOPY  2004    No family history of colon cancer.   "   • COLONOSCOPY     • HERNIA REPAIR      Type unknown   • PACEMAKER IMPLANTATION     • PROSTATE SURGERY           Social History     Socioeconomic History   • Marital status:    Tobacco Use   • Smoking status: Former     Packs/day: 1.00     Years: 30.00     Pack years: 30.00     Types: Cigarettes     Quit date: 8/15/2001     Years since quittin.6   • Smokeless tobacco: Never   • Tobacco comments:     quit 16 years ago   Vaping Use   • Vaping Use: Never used   Substance and Sexual Activity   • Alcohol use: No   • Drug use: No   • Sexual activity: Defer         Family History   Problem Relation Age of Onset   • Cancer Mother    • Diabetes Mother    • Hypertension Mother    • Stroke Mother    • Stomach cancer Mother    • Heart disease Mother    • Stomach cancer Father    • Cancer Father    • Lung cancer Father        Objective  Physical Exam:  BP (!) 198/77   Pulse 65   Temp 97.9 °F (36.6 °C) (Oral)   Resp 17   Ht 162.6 cm (64\")   Wt 108 kg (237 lb)   SpO2 93%   BMI 40.68 kg/m²      Physical Exam  Vitals and nursing note reviewed.   Constitutional:       Appearance: Normal appearance. He is obese.   HENT:      Head: Normocephalic and atraumatic.      Right Ear: Tympanic membrane, ear canal and external ear normal.      Left Ear: Tympanic membrane, ear canal and external ear normal.      Nose: Nose normal.      Mouth/Throat:      Mouth: Mucous membranes are moist.      Pharynx: Oropharynx is clear.   Eyes:      Extraocular Movements: Extraocular movements intact.      Conjunctiva/sclera: Conjunctivae normal.      Pupils: Pupils are equal, round, and reactive to light.   Neck:      Vascular: No carotid bruit.   Cardiovascular:      Rate and Rhythm: Normal rate and regular rhythm.      Pulses: Normal pulses.      Heart sounds: Normal heart sounds.   Pulmonary:      Effort: Pulmonary effort is normal. Tachypnea present.      Breath sounds: Wheezing and rhonchi present.   Abdominal:      General: " Abdomen is flat. Bowel sounds are normal. There is no distension.      Palpations: Abdomen is soft.      Tenderness: There is no abdominal tenderness.   Musculoskeletal:      Cervical back: Neck supple. No tenderness.      Right lower le+ Pitting Edema present.      Left lower le+ Pitting Edema present.   Lymphadenopathy:      Cervical: No cervical adenopathy.   Skin:     General: Skin is warm and dry.      Capillary Refill: Capillary refill takes less than 2 seconds.   Neurological:      General: No focal deficit present.      Mental Status: He is alert and oriented to person, place, and time.   Psychiatric:         Mood and Affect: Mood normal.         Behavior: Behavior normal.         Procedures    ED Course:    ED Course as of 23 1801   Sun Mar 19, 2023   1644 EKG interpreted by me reveals sinus rhythm rate 65.  Nonspecific T wave changes no ectopy no ischemic changes [PF]      ED Course User Index  [PF] Nain Ortega,        Lab Results (last 24 hours)     Procedure Component Value Units Date/Time    CBC & Differential [[id number]]  (Abnormal) Collected: 23    Specimen: Blood Updated: 23 1321    Narrative:      The following orders were created for panel order CBC & Differential.  Procedure                               Abnormality         Status                     ---------                               -----------         ------                     CBC Auto Differential[[id number]]        Abnormal            Final result                 Please view results for these tests on the individual orders.    Comprehensive Metabolic Panel [[id number]]  (Abnormal) Collected: 23 1314    Specimen: Blood Updated: 23 1344     Glucose 122 mg/dL      BUN 16 mg/dL      Creatinine 1.37 mg/dL      Sodium 136 mmol/L      Potassium 5.0 mmol/L      Comment: Slight hemolysis detected by analyzer. Results may be affected.        Chloride 101 mmol/L      CO2 28.8 mmol/L      Calcium 9.2  mg/dL      Total Protein 6.5 g/dL      Albumin 3.6 g/dL      ALT (SGPT) 16 U/L      AST (SGOT) 25 U/L      Comment: Slight hemolysis detected by analyzer. Results may be affected.        Alkaline Phosphatase 78 U/L      Total Bilirubin 0.5 mg/dL      Globulin 2.9 gm/dL      A/G Ratio 1.2 g/dL      BUN/Creatinine Ratio 11.7     Anion Gap 6.2 mmol/L      eGFR 54.5 mL/min/1.73     Narrative:      GFR Normal >60  Chronic Kidney Disease <60  Kidney Failure <15    The GFR formula is only valid for adults with stable renal function between ages 18 and 70.    BNP [[id number]]  (Abnormal) Collected: 03/19/23 1314    Specimen: Blood Updated: 03/19/23 1341     proBNP 1,359.0 pg/mL     Narrative:      Among patients with dyspnea, NT-proBNP is highly sensitive for the detection of acute congestive heart failure. In addition NT-proBNP of <300 pg/ml effectively rules out acute congestive heart failure with 99% negative predictive value.      Single High Sensitivity Troponin T [[id number]]  (Abnormal) Collected: 03/19/23 1314    Specimen: Blood Updated: 03/19/23 1340     HS Troponin T 22 ng/L     Narrative:      High Sensitive Troponin T Reference Range:  <10.0 ng/L- Negative Female for AMI  <15.0 ng/L- Negative Male for AMI  >=10 - Abnormal Female indicating possible myocardial injury.  >=15 - Abnormal Male indicating possible myocardial injury.   Clinicians would have to utilize clinical acumen, EKG, Troponin, and serial changes to determine if it is an Acute Myocardial Infarction or myocardial injury due to an underlying chronic condition.         CBC Auto Differential [[id number]]  (Abnormal) Collected: 03/19/23 1314    Specimen: Blood Updated: 03/19/23 1321     WBC 8.88 10*3/mm3      RBC 4.60 10*6/mm3      Hemoglobin 13.6 g/dL      Hematocrit 42.3 %      MCV 92.0 fL      MCH 29.6 pg      MCHC 32.2 g/dL      RDW 13.2 %      RDW-SD 44.8 fl      MPV 10.6 fL      Platelets 177 10*3/mm3      Neutrophil % 74.1 %      Lymphocyte % 14.0  "%      Monocyte % 8.7 %      Eosinophil % 1.9 %      Basophil % 0.7 %      Immature Grans % 0.6 %      Neutrophils, Absolute 6.59 10*3/mm3      Lymphocytes, Absolute 1.24 10*3/mm3      Monocytes, Absolute 0.77 10*3/mm3      Eosinophils, Absolute 0.17 10*3/mm3      Basophils, Absolute 0.06 10*3/mm3      Immature Grans, Absolute 0.05 10*3/mm3      nRBC 0.0 /100 WBC     COVID-19 and FLU A/B PCR - Swab, Nasopharynx [[id number]]  (Normal) Collected: 03/19/23 1316    Specimen: Swab from Nasopharynx Updated: 03/19/23 1339     COVID19 Not Detected     Influenza A PCR Not Detected     Influenza B PCR Not Detected    Narrative:      Fact sheet for providers: https://www.fda.gov/media/823259/download    Fact sheet for patients: https://www.fda.gov/media/076568/download    Test performed by PCR.    Procalcitonin [[id number]]  (Normal) Collected: 03/19/23 1447    Specimen: Blood Updated: 03/19/23 1518     Procalcitonin 0.06 ng/mL     Narrative:      As a Marker for Sepsis (Non-Neonates):    1. <0.5 ng/mL represents a low risk of severe sepsis and/or septic shock.  2. >2 ng/mL represents a high risk of severe sepsis and/or septic shock.    As a Marker for Lower Respiratory Tract Infections that require antibiotic therapy:    PCT on Admission    Antibiotic Therapy       6-12 Hrs later    >0.5                Strongly Recommended  >0.25 - <0.5        Recommended   0.1 - 0.25          Discouraged              Remeasure/reassess PCT  <0.1                Strongly Discouraged     Remeasure/reassess PCT    As 28 day mortality risk marker: \"Change in Procalcitonin Result\" (>80% or <=80%) if Day 0 (or Day 1) and Day 4 values are available. Refer to http://www.[employer]-pct-calculator.com    Change in PCT <=80%  A decrease of PCT levels below or equal to 80% defines a positive change in PCT test result representing a higher risk for 28-day all-cause mortality of patients diagnosed with severe sepsis for septic shock.    Change in PCT >80%  A " decrease of PCT levels of more than 80% defines a negative change in PCT result representing a lower risk for 28-day all-cause mortality of patients diagnosed with severe sepsis or septic shock.       Single High Sensitivity Troponin T [[id number]]  (Abnormal) Collected: 03/19/23 1709    Specimen: Blood Updated: 03/19/23 1737     HS Troponin T 16 ng/L     Narrative:      High Sensitive Troponin T Reference Range:  <10.0 ng/L- Negative Female for AMI  <15.0 ng/L- Negative Male for AMI  >=10 - Abnormal Female indicating possible myocardial injury.  >=15 - Abnormal Male indicating possible myocardial injury.   Clinicians would have to utilize clinical acumen, EKG, Troponin, and serial changes to determine if it is an Acute Myocardial Infarction or myocardial injury due to an underlying chronic condition.                XR Chest 1 View    Result Date: 3/19/2023  PROCEDURE: XR CHEST 1 VW-  HISTORY: SOA Triage Protocol  COMPARISON: July 11, 2022.  FINDINGS: The heart is mildly enlarged, stable. 2-lead pacemaker again noted.. Pulmonary vascular congestion again noted. No area of consolidation identified.. The mediastinum is unremarkable. There is no pneumothorax.  There are no acute osseous abnormalities.      Impression: Mild cardiomegaly and pulmonary vascular congestion similar to prior exam.    This report was signed and finalized on 3/19/2023 1:29 PM by Marie Brown MD.    CT Angiogram Chest Pulmonary Embolism    Result Date: 3/19/2023  PROCEDURE: CT ANGIOGRAM CHEST PULMONARY EMBOLISM-  HISTORY: Pulmonary embolism (PE) suspected, unknown D-dimer  COMPARISON: July 11, 2022  TECHNIQUE: The patient was injected with  IV contrast. Axial images were obtained through the chest in a CTA/ PE protocol. 3 D Reconstruction images were also performed. This study was performed with techniques to keep radiation doses as low as reasonably achievable, (ALARA). Individualized dose reduction techniques using automated exposure control  or adjustment of mA and/or kV according to the patient size were employed.  FINDINGS: There is no axillary adenopathy. There is no hilar or mediastinal adenopathy.  The heart is proper size. There is no pericardial or pleural effusion. There is suboptimal timing of the contrast bolus. No central pulmonary embolism is identified. Ascending aorta is upper limits of normal at 40 mm; recommend one-year follow-up. Vascular calcifications noted. Dissection cannot be excluded due to timing of the contrast bolus. Limited images of the upper abdomen are unremarkable. There is either new area of focal pneumonitis or new 8 mm solid nodule anteriorly in the right upper lobe compared to the prior. This is too small to be evaluated on PET or undergo CT-guided biopsy. Recommend 8-12 week follow-up to document resolution. Pacemaker noted. No bony destructive lesion seen. Calcified granulomas noted.      Impression: Suboptimal contrast bolus; no central pulmonary below some identified.  New 8 mm focal pneumonitis versus mass right upper lobe; recommend 8-12 week follow-up to document resolution or stability.  Upper limits of normal ascending aorta, recommend one-year follow-up.  This report was signed and finalized on 3/19/2023 4:16 PM by Marie Brown MD.         MDM    Initial impression of presenting illness: Shortness of breath, hypoxemia    DDX: includes but is not limited to: Pneumonia, COPD exacerbation, CHF exacerbation, pulmonary embolism, viral illness    Patient arrives initially hypoxic at 83% on room air.  Once placed on nasal cannula, increased to 97% on 4 L, with vitals interpreted by myself.     Pertinent features from physical exam: Wheezing and rhonchi throughout, mild pitting edema noted to lower extremities.    Initial diagnostic plan: IV, labs, chest x-ray, EKG, Solu-Medrol and DuoNeb.    Results from initial plan were reviewed and interpreted by me revealing a creatinine of 1.37 and GFR 54.5.  This is an  improvement of his baseline renal function based on labs from 8 months ago.  White blood count is 8.88, BNP is 1000.  59, initial troponin is 22, and procalcitonin is 0.06.  Negative for COVID and flu.  Radiologist report on the chest x-ray does not show any acute process.    Diagnostic information from other sources: Medical record    Interventions / Re-evaluation: Patient had some improvement in his breathing with the DuoNeb and Solu-Medrol.  After his BNP resulted, provided him with a dose of Lasix 80 mg.  Attempted to perform a room air trial on the patient and once he was sitting on the side of the bed he quickly dropped to 76% on room air.  Was quickly placed back on 4 L nasal cannula and quickly improved.    Results/clinical rationale were discussed with Dr. Ortega    Consultations/Discussion of results with other physicians: Spoke with Dr. Sullivan about the patient for possible admission. Requested CT chest for a further evaluation.    Dr. Sullivan notified about CT results showing no evidence of PEs, questionable nodule in the right upper lobe.  Accepts patient for full admission with telemetry for respiratory failure with hypoxia.    Disposition plan: Patient admitted to the hospital for further care and management.  -----    Final diagnoses:   Acute respiratory failure with hypoxia (HCC)        Kofi Oropeza APRN  03/19/23 1801      Electronically signed by Kofi Oropeza APRN at 03/19/23 1801     Gabrielle Savage RN at 03/19/23 1524        Pt O2 dropped to 76% on room air, patient placed back on 3L NC O2 back to 96%. Provider aware    Electronically signed by Gabrielle Savage RN at 03/19/23 1525     Trice Alvarez at 03/19/23 1539        BROOKE Kirby requested to speak to . Awaiting call back.    Electronically signed by Trice Alvarez at 03/19/23 1550     Trice Alvarez at 03/19/23 1545        Dr. Sullivan called back at this time to speak to BROOKE Kirby. Call transferred.     Electronically signed  by Trice Alvarez at 03/19/23 1551     Trice Alvarez at 03/19/23 1734         paged at this time for BROOKE Kirby. Awaiting call back.    Electronically signed by Trice Alvarez at 03/19/23 1734     Trice Alvarez at 03/19/23 1735         called back at this time for BROOKE Kirby. Call transferred.     Electronically signed by Trice Alvarez at 03/19/23 1735       Vital Signs (last day)     Date/Time Temp Temp src Pulse Resp BP Patient Position SpO2    03/20/23 0707 -- -- -- 18 -- -- --    03/20/23 0418 97.9 (36.6) Temporal 60 18 120/66 Lying 93    03/19/23 2340 97.6 (36.4) Temporal 60 18 125/64 Lying 95    03/19/23 2010 97.4 (36.3) Temporal 82 18 174/98 Sitting 92    03/19/23 1955 -- -- -- 18 -- -- --    03/19/23 1945 -- -- -- 18 -- -- --    03/19/23 1845 97.7 (36.5) Temporal 64 18 180/88 Lying 98    03/19/23 1800 -- -- -- -- 165/80 -- 96    03/19/23 1730 -- -- 65 -- 198/77 -- 93    03/19/23 1700 -- -- -- -- 178/97 -- 95    03/19/23 1629 -- -- 65 -- 188/89 -- 96    03/19/23 1545 -- -- 79 -- -- -- 89    03/19/23 1530 -- -- 60 -- 172/88 -- 93    03/19/23 1500 -- -- 60 -- 175/87 -- 91    03/19/23 1430 -- -- 70 -- 162/68 -- 96    03/19/23 1352 -- -- -- 17 -- -- --    03/19/23 1330 -- -- 67 -- 179/81 -- 96    03/19/23 1311 -- -- -- -- -- -- 90    03/19/23 1309 97.9 (36.6) Oral 66 15 179/84 Sitting 83            Current Facility-Administered Medications   Medication Dose Route Frequency Provider Last Rate Last Admin   • amiodarone (PACERONE) tablet 200 mg  200 mg Oral Q24H Raj Sullivan MD   200 mg at 03/20/23 0811   • amLODIPine (NORVASC) tablet 10 mg  10 mg Oral Daily Raj Sullivan MD   10 mg at 03/20/23 0811   • apixaban (ELIQUIS) tablet 5 mg  5 mg Oral Q12H Raj Sullivan MD   5 mg at 03/20/23 0811   • aspirin chewable tablet 81 mg  81 mg Oral Daily Raj Sullivan MD   81 mg at 03/20/23 0811   • atorvastatin (LIPITOR) tablet 20 mg  20 mg Oral Daily Raj Sullivan MD   20  mg at 03/20/23 0811   • azithromycin (ZITHROMAX) 500 mg 0.9% NaCl (Add-vantage) 250 mL  500 mg Intravenous Q24H Raj Sullivan MD   500 mg at 03/19/23 2046   • cefTRIAXone (ROCEPHIN) IVPB 1 g/50ml dextrose (premix)  1 g Intravenous Q24H Raj Sullivan MD       • cloNIDine (CATAPRES) tablet 0.1 mg  0.1 mg Oral BID PRN Raj Sullivan MD       • FLUoxetine (PROzac) capsule 20 mg  20 mg Oral Daily Raj Sullivan MD   20 mg at 03/20/23 0812   • furosemide (LASIX) tablet 20 mg  20 mg Oral Daily Raj Sullivan MD   20 mg at 03/20/23 0812   • gabapentin (NEURONTIN) capsule 800 mg  800 mg Oral Nightly Raj Sullivan MD   800 mg at 03/19/23 2212   • glipizide (GLUCOTROL) tablet 5 mg  5 mg Oral BID AC Raj Sullivan MD   5 mg at 03/20/23 0646   • HYDROcodone-acetaminophen (NORCO) 7.5-325 MG per tablet 1 tablet  1 tablet Oral Q6H PRN Raj Sullivan MD   1 tablet at 03/20/23 0239   • ipratropium-albuterol (DUO-NEB) nebulizer solution 3 mL  3 mL Nebulization 4x Daily - RT Raj Sullivan MD   3 mL at 03/20/23 0707   • lisinopril (PRINIVIL,ZESTRIL) tablet 40 mg  40 mg Oral Daily Raj Sullivan MD   40 mg at 03/20/23 0811   • metFORMIN (GLUCOPHAGE) tablet 500 mg  500 mg Oral Q12H Raj Sullivan MD   500 mg at 03/20/23 0811   • metoprolol succinate XL (TOPROL-XL) 24 hr tablet 50 mg  50 mg Oral Daily Raj Sullivan MD   50 mg at 03/20/23 0812   • pantoprazole (PROTONIX) EC tablet 40 mg  40 mg Oral Q AM Raj Sullivan MD   40 mg at 03/20/23 0646   • sodium chloride 0.9 % flush 10 mL  10 mL Intravenous PRN Raj Sullivan MD   10 mL at 03/20/23 0812       Lab Results (last 24 hours)     Procedure Component Value Units Date/Time    Comprehensive Metabolic Panel [[id number]]  (Abnormal) Collected: 03/20/23 0525    Specimen: Blood Updated: 03/20/23 0616     Glucose 181 mg/dL      Comment: Glucose >180, Hemoglobin A1C recommended.        BUN 24 mg/dL      Creatinine 1.51 mg/dL      Sodium 137 mmol/L      Potassium  4.1 mmol/L      Chloride 97 mmol/L      CO2 29.9 mmol/L      Calcium 9.1 mg/dL      Total Protein 6.1 g/dL      Albumin 3.5 g/dL      ALT (SGPT) 12 U/L      AST (SGOT) 18 U/L      Alkaline Phosphatase 70 U/L      Total Bilirubin 0.4 mg/dL      Globulin 2.6 gm/dL      A/G Ratio 1.3 g/dL      BUN/Creatinine Ratio 15.9     Anion Gap 10.1 mmol/L      eGFR 48.5 mL/min/1.73     Narrative:      GFR Normal >60  Chronic Kidney Disease <60  Kidney Failure <15    The GFR formula is only valid for adults with stable renal function between ages 18 and 70.    CBC & Differential [[id number]]  (Abnormal) Collected: 03/20/23 0525    Specimen: Blood Updated: 03/20/23 0548    Narrative:      The following orders were created for panel order CBC & Differential.  Procedure                               Abnormality         Status                     ---------                               -----------         ------                     CBC Auto Differential[[id number]]        Abnormal            Final result                 Please view results for these tests on the individual orders.    CBC Auto Differential [[id number]]  (Abnormal) Collected: 03/20/23 0525    Specimen: Blood Updated: 03/20/23 0548     WBC 8.39 10*3/mm3      RBC 4.36 10*6/mm3      Hemoglobin 13.2 g/dL      Hematocrit 40.4 %      MCV 92.7 fL      MCH 30.3 pg      MCHC 32.7 g/dL      RDW 12.9 %      RDW-SD 44.1 fl      MPV 10.8 fL      Platelets 184 10*3/mm3      Neutrophil % 86.0 %      Lymphocyte % 8.9 %      Monocyte % 4.2 %      Eosinophil % 0.2 %      Basophil % 0.1 %      Immature Grans % 0.6 %      Neutrophils, Absolute 7.21 10*3/mm3      Lymphocytes, Absolute 0.75 10*3/mm3      Monocytes, Absolute 0.35 10*3/mm3      Eosinophils, Absolute 0.02 10*3/mm3      Basophils, Absolute 0.01 10*3/mm3      Immature Grans, Absolute 0.05 10*3/mm3      nRBC 0.0 /100 WBC     Lactic Acid, Plasma [[id number]]  (Normal) Collected: 03/19/23 1954    Specimen: Blood Updated: 03/19/23 2023  "    Lactate 1.4 mmol/L     Blood Culture - Blood, Hand, Left [[id number]] Collected: 03/18/23 1950    Specimen: Blood from Hand, Left Updated: 03/19/23 2000    Blood Culture - Blood, Arm, Left [[id number]] Collected: 03/19/23 1954    Specimen: Blood from Arm, Left Updated: 03/19/23 2000    Single High Sensitivity Troponin T [[id number]]  (Abnormal) Collected: 03/19/23 1709    Specimen: Blood Updated: 03/19/23 1737     HS Troponin T 16 ng/L     Narrative:      High Sensitive Troponin T Reference Range:  <10.0 ng/L- Negative Female for AMI  <15.0 ng/L- Negative Male for AMI  >=10 - Abnormal Female indicating possible myocardial injury.  >=15 - Abnormal Male indicating possible myocardial injury.   Clinicians would have to utilize clinical acumen, EKG, Troponin, and serial changes to determine if it is an Acute Myocardial Infarction or myocardial injury due to an underlying chronic condition.         Procalcitonin [[id number]]  (Normal) Collected: 03/19/23 1447    Specimen: Blood Updated: 03/19/23 1518     Procalcitonin 0.06 ng/mL     Narrative:      As a Marker for Sepsis (Non-Neonates):    1. <0.5 ng/mL represents a low risk of severe sepsis and/or septic shock.  2. >2 ng/mL represents a high risk of severe sepsis and/or septic shock.    As a Marker for Lower Respiratory Tract Infections that require antibiotic therapy:    PCT on Admission    Antibiotic Therapy       6-12 Hrs later    >0.5                Strongly Recommended  >0.25 - <0.5        Recommended   0.1 - 0.25          Discouraged              Remeasure/reassess PCT  <0.1                Strongly Discouraged     Remeasure/reassess PCT    As 28 day mortality risk marker: \"Change in Procalcitonin Result\" (>80% or <=80%) if Day 0 (or Day 1) and Day 4 values are available. Refer to http://www.[facility]-pct-calculator.com    Change in PCT <=80%  A decrease of PCT levels below or equal to 80% defines a positive change in PCT test result representing a higher risk for " 28-day all-cause mortality of patients diagnosed with severe sepsis for septic shock.    Change in PCT >80%  A decrease of PCT levels of more than 80% defines a negative change in PCT result representing a lower risk for 28-day all-cause mortality of patients diagnosed with severe sepsis or septic shock.       [place] [facility] [[id number]] Collected: 03/19/23 1314    Specimen: Blood Updated: 03/19/23 1415    Narrative:      The following orders were created for panel order [place] [facility].  Procedure                               Abnormality         Status                     ---------                               -----------         ------                     Green Top (Gel)[[id number]]                                  Final result               Lavender Top[[id number]]                                     Final result               Gold Top - SST[[id number]]                                   In process                 Light Blue Top[[id number]]                                   Final result                 Please view results for these tests on the individual orders.    Green Top (Gel) [[id number]] Collected: 03/19/23 1314    Specimen: Blood Updated: 03/19/23 1415     Extra Tube Hold for add-ons.     Comment: Auto resulted.       Lavender Top [[id number]] Collected: 03/19/23 1314    Specimen: Blood Updated: 03/19/23 1415     Extra Tube hold for add-on     Comment: Auto resulted       Light Blue Top [[id number]] Collected: 03/19/23 1314    Specimen: Blood Updated: 03/19/23 1415     Extra Tube Hold for add-ons.     Comment: Auto resulted       Comprehensive Metabolic Panel [[id number]]  (Abnormal) Collected: 03/19/23 1314    Specimen: Blood Updated: 03/19/23 1344     Glucose 122 mg/dL      BUN 16 mg/dL      Creatinine 1.37 mg/dL      Sodium 136 mmol/L      Potassium 5.0 mmol/L      Comment: Slight hemolysis detected by analyzer. Results may be affected.        Chloride 101 mmol/L      CO2 28.8 mmol/L      Calcium 9.2 mg/dL       Total Protein 6.5 g/dL      Albumin 3.6 g/dL      ALT (SGPT) 16 U/L      AST (SGOT) 25 U/L      Comment: Slight hemolysis detected by analyzer. Results may be affected.        Alkaline Phosphatase 78 U/L      Total Bilirubin 0.5 mg/dL      Globulin 2.9 gm/dL      A/G Ratio 1.2 g/dL      BUN/Creatinine Ratio 11.7     Anion Gap 6.2 mmol/L      eGFR 54.5 mL/min/1.73     Narrative:      GFR Normal >60  Chronic Kidney Disease <60  Kidney Failure <15    The GFR formula is only valid for adults with stable renal function between ages 18 and 70.    BNP [[id number]]  (Abnormal) Collected: 03/19/23 1314    Specimen: Blood Updated: 03/19/23 1341     proBNP 1,359.0 pg/mL     Narrative:      Among patients with dyspnea, NT-proBNP is highly sensitive for the detection of acute congestive heart failure. In addition NT-proBNP of <300 pg/ml effectively rules out acute congestive heart failure with 99% negative predictive value.      Single High Sensitivity Troponin T [[id number]]  (Abnormal) Collected: 03/19/23 1314    Specimen: Blood Updated: 03/19/23 1340     HS Troponin T 22 ng/L     Narrative:      High Sensitive Troponin T Reference Range:  <10.0 ng/L- Negative Female for AMI  <15.0 ng/L- Negative Male for AMI  >=10 - Abnormal Female indicating possible myocardial injury.  >=15 - Abnormal Male indicating possible myocardial injury.   Clinicians would have to utilize clinical acumen, EKG, Troponin, and serial changes to determine if it is an Acute Myocardial Infarction or myocardial injury due to an underlying chronic condition.         COVID-19 and FLU A/B PCR - Swab, Nasopharynx [[id number]]  (Normal) Collected: 03/19/23 1316    Specimen: Swab from Nasopharynx Updated: 03/19/23 1339     COVID19 Not Detected     Influenza A PCR Not Detected     Influenza B PCR Not Detected    Narrative:      Fact sheet for providers: https://www.fda.gov/media/081760/download    Fact sheet for patients:  https://www.fda.gov/media/560432/download    Test performed by PCR.    CBC & Differential [[id number]]  (Abnormal) Collected: 03/19/23 1314    Specimen: Blood Updated: 03/19/23 1321    Narrative:      The following orders were created for panel order CBC & Differential.  Procedure                               Abnormality         Status                     ---------                               -----------         ------                     CBC Auto Differential[[id number]]        Abnormal            Final result                 Please view results for these tests on the individual orders.    CBC Auto Differential [[id number]]  (Abnormal) Collected: 03/19/23 1314    Specimen: Blood Updated: 03/19/23 1321     WBC 8.88 10*3/mm3      RBC 4.60 10*6/mm3      Hemoglobin 13.6 g/dL      Hematocrit 42.3 %      MCV 92.0 fL      MCH 29.6 pg      MCHC 32.2 g/dL      RDW 13.2 %      RDW-SD 44.8 fl      MPV 10.6 fL      Platelets 177 10*3/mm3      Neutrophil % 74.1 %      Lymphocyte % 14.0 %      Monocyte % 8.7 %      Eosinophil % 1.9 %      Basophil % 0.7 %      Immature Grans % 0.6 %      Neutrophils, Absolute 6.59 10*3/mm3      Lymphocytes, Absolute 1.24 10*3/mm3      Monocytes, Absolute 0.77 10*3/mm3      Eosinophils, Absolute 0.17 10*3/mm3      Basophils, Absolute 0.06 10*3/mm3      Immature Grans, Absolute 0.05 10*3/mm3      nRBC 0.0 /100 WBC     Gold Top - SST [[id number]] Collected: 03/19/23 1314    Specimen: Blood Updated: 03/19/23 1318        Imaging Results (Last 24 Hours)     Procedure Component Value Units Date/Time    CT Angiogram Chest Pulmonary Embolism [[id number]] Collected: 03/19/23 1609     Updated: 03/19/23 1618    Narrative:      PROCEDURE: CT ANGIOGRAM CHEST PULMONARY EMBOLISM-     HISTORY: Pulmonary embolism (PE) suspected, unknown D-dimer     COMPARISON: July 11, 2022     TECHNIQUE: The patient was injected with  IV contrast. Axial images were  obtained through the chest in a CTA/ PE protocol. 3 D  Reconstruction  images were also performed. This study was performed with techniques to  keep radiation doses as low as reasonably achievable, (ALARA).  Individualized dose reduction techniques using automated exposure  control or adjustment of mA and/or kV according to the patient size were  employed.     FINDINGS: There is no axillary adenopathy. There is no hilar or  mediastinal adenopathy.  The heart is proper size. There is no  pericardial or pleural effusion. There is suboptimal timing of the  contrast bolus. No central pulmonary embolism is identified. Ascending  aorta is upper limits of normal at 40 mm; recommend one-year follow-up.  Vascular calcifications noted. Dissection cannot be excluded due to  timing of the contrast bolus. Limited images of the upper abdomen are  unremarkable. There is either new area of focal pneumonitis or new 8 mm  solid nodule anteriorly in the right upper lobe compared to the prior.  This is too small to be evaluated on PET or undergo CT-guided biopsy.  Recommend 8-12 week follow-up to document resolution. Pacemaker noted.  No bony destructive lesion seen. Calcified granulomas noted.       Impression:      Suboptimal contrast bolus; no central pulmonary below some  identified.      New 8 mm focal pneumonitis versus mass right upper lobe; recommend 8-12  week follow-up to document resolution or stability.     Upper limits of normal ascending aorta, recommend one-year follow-up.     This report was signed and finalized on 3/19/2023 4:16 PM by Marie Brown MD.    XR Chest 1 View [[id number]] Collected: 03/19/23 1327     Updated: 03/19/23 1338    Narrative:      PROCEDURE: XR CHEST 1 VW-     HISTORY: SOA Triage Protocol     COMPARISON: July 11, 2022.     FINDINGS: The heart is mildly enlarged, stable. 2-lead pacemaker again  noted.. Pulmonary vascular congestion again noted. No area of  consolidation identified.. The mediastinum is unremarkable. There is no  pneumothorax.  There are  no acute osseous abnormalities.       Impression:      Mild cardiomegaly and pulmonary vascular congestion similar  to prior exam.           This report was signed and finalized on 3/19/2023 1:29 PM by Marie Brown MD.        Physician Progress Notes (last 24 hours)  Notes from 03/19/23 0912 through 03/20/23 0912   No notes of this type exist for this encounter.

## 2023-03-21 ENCOUNTER — READMISSION MANAGEMENT (OUTPATIENT)
Dept: CALL CENTER | Facility: HOSPITAL | Age: 73
End: 2023-03-21
Payer: MEDICARE

## 2023-03-21 VITALS
SYSTOLIC BLOOD PRESSURE: 130 MMHG | BODY MASS INDEX: 40.69 KG/M2 | WEIGHT: 238.32 LBS | HEIGHT: 64 IN | OXYGEN SATURATION: 95 % | RESPIRATION RATE: 18 BRPM | DIASTOLIC BLOOD PRESSURE: 72 MMHG | HEART RATE: 64 BPM | TEMPERATURE: 97.4 F

## 2023-03-21 LAB
ALBUMIN SERPL-MCNC: 3.4 G/DL (ref 3.5–5.2)
ALBUMIN/GLOB SERPL: 1.5 G/DL
ALP SERPL-CCNC: 63 U/L (ref 39–117)
ALT SERPL W P-5'-P-CCNC: 15 U/L (ref 1–41)
ANION GAP SERPL CALCULATED.3IONS-SCNC: 8.7 MMOL/L (ref 5–15)
AST SERPL-CCNC: 16 U/L (ref 1–40)
BASOPHILS # BLD AUTO: 0.03 10*3/MM3 (ref 0–0.2)
BASOPHILS NFR BLD AUTO: 0.3 % (ref 0–1.5)
BILIRUB SERPL-MCNC: 0.3 MG/DL (ref 0–1.2)
BUN SERPL-MCNC: 34 MG/DL (ref 8–23)
BUN/CREAT SERPL: 19.2 (ref 7–25)
CALCIUM SPEC-SCNC: 8.9 MG/DL (ref 8.6–10.5)
CHLORIDE SERPL-SCNC: 100 MMOL/L (ref 98–107)
CO2 SERPL-SCNC: 31.3 MMOL/L (ref 22–29)
CREAT SERPL-MCNC: 1.77 MG/DL (ref 0.76–1.27)
DEPRECATED RDW RBC AUTO: 45.2 FL (ref 37–54)
EGFRCR SERPLBLD CKD-EPI 2021: 40.1 ML/MIN/1.73
EOSINOPHIL # BLD AUTO: 0.07 10*3/MM3 (ref 0–0.4)
EOSINOPHIL NFR BLD AUTO: 0.7 % (ref 0.3–6.2)
ERYTHROCYTE [DISTWIDTH] IN BLOOD BY AUTOMATED COUNT: 13.3 % (ref 12.3–15.4)
GLOBULIN UR ELPH-MCNC: 2.3 GM/DL
GLUCOSE BLDC GLUCOMTR-MCNC: 102 MG/DL (ref 70–130)
GLUCOSE SERPL-MCNC: 102 MG/DL (ref 65–99)
HBA1C MFR BLD: 6.3 % (ref 4.8–5.6)
HCT VFR BLD AUTO: 38.9 % (ref 37.5–51)
HGB BLD-MCNC: 12.3 G/DL (ref 13–17.7)
IMM GRANULOCYTES # BLD AUTO: 0.05 10*3/MM3 (ref 0–0.05)
IMM GRANULOCYTES NFR BLD AUTO: 0.5 % (ref 0–0.5)
LYMPHOCYTES # BLD AUTO: 1.94 10*3/MM3 (ref 0.7–3.1)
LYMPHOCYTES NFR BLD AUTO: 20.3 % (ref 19.6–45.3)
MCH RBC QN AUTO: 29.3 PG (ref 26.6–33)
MCHC RBC AUTO-ENTMCNC: 31.6 G/DL (ref 31.5–35.7)
MCV RBC AUTO: 92.6 FL (ref 79–97)
MONOCYTES # BLD AUTO: 0.93 10*3/MM3 (ref 0.1–0.9)
MONOCYTES NFR BLD AUTO: 9.7 % (ref 5–12)
NEUTROPHILS NFR BLD AUTO: 6.53 10*3/MM3 (ref 1.7–7)
NEUTROPHILS NFR BLD AUTO: 68.5 % (ref 42.7–76)
NRBC BLD AUTO-RTO: 0 /100 WBC (ref 0–0.2)
PLATELET # BLD AUTO: 181 10*3/MM3 (ref 140–450)
PMV BLD AUTO: 10.9 FL (ref 6–12)
POTASSIUM SERPL-SCNC: 4.3 MMOL/L (ref 3.5–5.2)
PROT SERPL-MCNC: 5.7 G/DL (ref 6–8.5)
RBC # BLD AUTO: 4.2 10*6/MM3 (ref 4.14–5.8)
SODIUM SERPL-SCNC: 140 MMOL/L (ref 136–145)
WBC NRBC COR # BLD: 9.55 10*3/MM3 (ref 3.4–10.8)

## 2023-03-21 PROCEDURE — 85025 COMPLETE CBC W/AUTO DIFF WBC: CPT | Performed by: INTERNAL MEDICINE

## 2023-03-21 PROCEDURE — 94799 UNLISTED PULMONARY SVC/PX: CPT

## 2023-03-21 PROCEDURE — 94664 DEMO&/EVAL PT USE INHALER: CPT

## 2023-03-21 PROCEDURE — 82962 GLUCOSE BLOOD TEST: CPT

## 2023-03-21 PROCEDURE — 83036 HEMOGLOBIN GLYCOSYLATED A1C: CPT | Performed by: INTERNAL MEDICINE

## 2023-03-21 PROCEDURE — 94660 CPAP INITIATION&MGMT: CPT

## 2023-03-21 PROCEDURE — 94618 PULMONARY STRESS TESTING: CPT

## 2023-03-21 PROCEDURE — 80053 COMPREHEN METABOLIC PANEL: CPT | Performed by: INTERNAL MEDICINE

## 2023-03-21 PROCEDURE — 94761 N-INVAS EAR/PLS OXIMETRY MLT: CPT

## 2023-03-21 RX ADMIN — ASPIRIN 81 MG CHEWABLE TABLET 81 MG: 81 TABLET CHEWABLE at 08:30

## 2023-03-21 RX ADMIN — Medication 10 ML: at 08:31

## 2023-03-21 RX ADMIN — APIXABAN 5 MG: 5 TABLET, FILM COATED ORAL at 08:31

## 2023-03-21 RX ADMIN — HYDROCODONE BITARTRATE AND ACETAMINOPHEN 1 TABLET: 7.5; 325 TABLET ORAL at 11:45

## 2023-03-21 RX ADMIN — AMIODARONE HYDROCHLORIDE 200 MG: 200 TABLET ORAL at 08:31

## 2023-03-21 RX ADMIN — PANTOPRAZOLE SODIUM 40 MG: 40 TABLET, DELAYED RELEASE ORAL at 06:10

## 2023-03-21 RX ADMIN — METFORMIN HYDROCHLORIDE 500 MG: 500 TABLET, FILM COATED ORAL at 08:30

## 2023-03-21 RX ADMIN — HYDROCODONE BITARTRATE AND ACETAMINOPHEN 1 TABLET: 7.5; 325 TABLET ORAL at 04:54

## 2023-03-21 RX ADMIN — METOPROLOL SUCCINATE 50 MG: 50 TABLET, EXTENDED RELEASE ORAL at 08:31

## 2023-03-21 RX ADMIN — ATORVASTATIN CALCIUM 20 MG: 20 TABLET, FILM COATED ORAL at 08:31

## 2023-03-21 RX ADMIN — LISINOPRIL 40 MG: 20 TABLET ORAL at 08:30

## 2023-03-21 RX ADMIN — IPRATROPIUM BROMIDE AND ALBUTEROL SULFATE 3 ML: 2.5; .5 SOLUTION RESPIRATORY (INHALATION) at 07:14

## 2023-03-21 RX ADMIN — FLUOXETINE 20 MG: 20 CAPSULE ORAL at 08:31

## 2023-03-21 RX ADMIN — AMLODIPINE BESYLATE 10 MG: 5 TABLET ORAL at 08:30

## 2023-03-21 RX ADMIN — GLIPIZIDE 5 MG: 5 TABLET ORAL at 06:10

## 2023-03-21 NOTE — DISCHARGE SUMMARY
[facility]   INTERNAL MEDICINE DISCHARGE SUMMARY      Name:  Robe Bryant   Age:  72 y.o.  Sex:  male  :  1950  MRN:  [id number]   Visit Number:  [id number]  Primary Care Physician:  Raj Sullivan MD  Date of Discharge:  3/21/2023  Admission Date:  3/19/2023      Discharge Diagnosis:         Acute on chronic respiratory failure with hypoxia (HCC)    COPD with acute exacerbation (HCC)    Acute on chronic diastolic heart failure (HCC)    Pacemaker    Emphysema of lung (HCC)    Essential hypertension    Benign prostatic hyperplasia    Paroxysmal A-fib (HCC)    Hypercholesterolemia    Gastroesophageal reflux disease    Obstructive sleep apnea of adult    Diabetes mellitus (HCC)    Multilevel degenerative disc disease          Consults:     Consults     No orders found from 2023 to 3/20/2023.          Procedures Performed:                 Hospital Course:   The patient was admitted on 3/19/2023  Please see H&P for details on admission HPI and ROS.  72-year-old patient with past medical history of COPD, hypertension, BPH, paroxysmal A-fib on anticoagulation, hypercholesterolemia, obstructive sleep apnea, diabetes, chronic back pain, diastolic heart failure, comes into the ER because of shortness of breath and dyspnea over the last few days  He was noted to be hypoxic on room air and needed to use 4 L oxygen.  Work-up done showed he had acute vascular pulmonary congestion and signs of pulmonary edema on the chest x-ray.  Normal white count but there was mild elevation of BNP.  He did get 80 of Lasix along with Rocephin and Zithromax and Solu-Medrol in the ER.  After that there was diuresis of more than 500 mL and patient did feel better.  Patient had a CT scan done and PE was ruled out.  There was 8 mm possible focal pneumonitis versus mass which was recommended to be followed up in 8 to 12 weeks.  His oxygen was tapered down and he again desaturated in the 86 range so he was put  on 2 L oxygen and is being admitted for her hypoxic respiratory failure with acute pulmonary edema.  After admission he was started on IV Rocephin and Zithromax for presumed small focal pneumonitis.  Clinically he has improved mainly with the diuresis and has improvement with his saturation.  On room air his saturation is 92 and will do a 6-minute walk to see if he desaturates and if he does need oxygen.  His medication adjustments have been done as below and he will be discharged with a follow-up in the office next Monday    Vital Signs:     Temp:  [96.5 °F (35.8 °C)-98.3 °F (36.8 °C)] 97.4 °F (36.3 °C)  Heart Rate:  [60-63] 61  Resp:  [16-18] 16  BP: (101-146)/(46-70) 146/70    Physical Exam:     General Appearance:    Alert and cooperative, not in any acute distress.   Head:    Atraumatic and normocephalic, without obvious abnormality.   Eyes:            PERRLA,  No pallor. Extraocular movements are within normal limits.   Neck:   Supple,  No lymph glands, no bruit   Lungs:     Chest shape is normal. Breath sounds heard bilaterally equally.  No crackles or wheezing.     Heart:    Normal S1 and S2, no murmur,  No JVD   Abdomen:     Normal bowel sounds, no masses, no organomegaly. Soft     nontender, no guarding, no rebound tenderness   Extremities:   Moves all extremities well, no edema, no cyanosis,    Skin:   No  bruising or rash.   Neurologic:   Grossly nonfocal and moves all extremities.        Pertinent Lab Results:     Results from last 7 days   Lab Units 03/21/23  0608 03/20/23  0525 03/19/23  1314   SODIUM mmol/L 140 137 136   POTASSIUM mmol/L 4.3 4.1 5.0   CHLORIDE mmol/L 100 97* 101   CO2 mmol/L 31.3* 29.9* 28.8   BUN mg/dL 34* 24* 16   CREATININE mg/dL 1.77* 1.51* 1.37*   CALCIUM mg/dL 8.9 9.1 9.2   BILIRUBIN mg/dL 0.3 0.4 0.5   ALK PHOS U/L 63 70 78   ALT (SGPT) U/L 15 12 16   AST (SGOT) U/L 16 18 25   GLUCOSE mg/dL 102* 181* 122*     Results from last 7 days   Lab Units 03/21/23  0608 03/20/23  0525  03/19/23  1314   WBC 10*3/mm3 9.55 8.39 8.88   HEMOGLOBIN g/dL 12.3* 13.2 13.6   HEMATOCRIT % 38.9 40.4 42.3   PLATELETS 10*3/mm3 181 184 177         Blood Culture   Date Value Ref Range Status   03/19/2023 No growth at 24 hours  Preliminary   03/18/2023 No growth at 24 hours  Preliminary       Pertinent Radiology Results:    Imaging Results (Most Recent)     Procedure Component Value Units Date/Time    CT Angiogram Chest Pulmonary Embolism [[id number]] Collected: 03/19/23 1609     Updated: 03/19/23 1618    Narrative:      PROCEDURE: CT ANGIOGRAM CHEST PULMONARY EMBOLISM-     HISTORY: Pulmonary embolism (PE) suspected, unknown D-dimer     COMPARISON: July 11, 2022     TECHNIQUE: The patient was injected with  IV contrast. Axial images were  obtained through the chest in a CTA/ PE protocol. 3 D Reconstruction  images were also performed. This study was performed with techniques to  keep radiation doses as low as reasonably achievable, (ALARA).  Individualized dose reduction techniques using automated exposure  control or adjustment of mA and/or kV according to the patient size were  employed.     FINDINGS: There is no axillary adenopathy. There is no hilar or  mediastinal adenopathy.  The heart is proper size. There is no  pericardial or pleural effusion. There is suboptimal timing of the  contrast bolus. No central pulmonary embolism is identified. Ascending  aorta is upper limits of normal at 40 mm; recommend one-year follow-up.  Vascular calcifications noted. Dissection cannot be excluded due to  timing of the contrast bolus. Limited images of the upper abdomen are  unremarkable. There is either new area of focal pneumonitis or new 8 mm  solid nodule anteriorly in the right upper lobe compared to the prior.  This is too small to be evaluated on PET or undergo CT-guided biopsy.  Recommend 8-12 week follow-up to document resolution. Pacemaker noted.  No bony destructive lesion seen. Calcified granulomas noted.        Impression:      Suboptimal contrast bolus; no central pulmonary below some  identified.      New 8 mm focal pneumonitis versus mass right upper lobe; recommend 8-12  week follow-up to document resolution or stability.     Upper limits of normal ascending aorta, recommend one-year follow-up.     This report was signed and finalized on 3/19/2023 4:16 PM by Marie Bronw MD.    XR Chest 1 View [[id number]] Collected: 03/19/23 1327     Updated: 03/19/23 1338    Narrative:      PROCEDURE: XR CHEST 1 VW-     HISTORY: SOA Triage Protocol     COMPARISON: July 11, 2022.     FINDINGS: The heart is mildly enlarged, stable. 2-lead pacemaker again  noted.. Pulmonary vascular congestion again noted. No area of  consolidation identified.. The mediastinum is unremarkable. There is no  pneumothorax.  There are no acute osseous abnormalities.       Impression:      Mild cardiomegaly and pulmonary vascular congestion similar  to prior exam.           This report was signed and finalized on 3/19/2023 1:29 PM by Marie Brown MD.                  Discharge Disposition:      Home or Self Care    Discharge Medication:         Discharge Medications      Continue These Medications      Instructions Start Date   albuterol sulfate  (90 Base) MCG/ACT inhaler  Commonly known as: PROVENTIL HFA;VENTOLIN HFA;PROAIR HFA   2 puffs, Inhalation, Every 4 Hours PRN      amiodarone 200 MG tablet  Commonly known as: PACERONE   200 mg, Oral, Every 24 Hours Scheduled      amLODIPine 10 MG tablet  Commonly known as: NORVASC   10 mg, Oral, Daily      apixaban 5 MG tablet tablet  Commonly known as: ELIQUIS   5 mg, Oral, 2 Times Daily      atorvastatin 20 MG tablet  Commonly known as: LIPITOR   20 mg, Oral, Nightly      FLUoxetine 20 MG capsule  Commonly known as: PROzac   20 mg, Oral, Daily      furosemide 20 MG tablet  Commonly known as: LASIX   TAKE 1 TABLET BY MOUTH EVERY MORNING, MAY TAKE SECOND DOSE AS NEEDED FOR INCREASED SWELLING      gabapentin  800 MG tablet  Commonly known as: NEURONTIN   800 mg, Oral, Every Night at Bedtime      glipizide 5 MG tablet  Commonly known as: GLUCOTROL   TAKE 1 TABLET TWICE DAILY BEFORE MEALS      glucose monitor monitoring kit   1 each, Does not apply, Daily, E11.9      HYDROcodone-acetaminophen 5-325 MG per tablet  Commonly known as: NORCO   1 tablet, Oral, Every 6 Hours PRN      ipratropium-albuterol 0.5-2.5 mg/3 ml nebulizer  Commonly known as: DUO-NEB   3 mL, Nebulization, 4 Times Daily - RT      Lancet Device misc   1 each, Does not apply, Daily, E11.9      lisinopril 40 MG tablet  Commonly known as: PRINIVIL,ZESTRIL   40 mg, Oral, Daily      metFORMIN 500 MG tablet  Commonly known as: GLUCOPHAGE   1 tablet, Oral, Every 12 Hours Scheduled      metoprolol succinate XL 50 MG 24 hr tablet  Commonly known as: Toprol XL   50 mg, Oral, Daily      Misc. Devices misc   Bipap tubing.      multivitamin tablet tablet  Commonly known as: THERAGRAN   1 tablet, Oral, Daily      Nebulizer misc   1 each, Does not apply, Every 4 Hours PRN      O2  Commonly known as: OXYGEN   2 L/min, Inhalation, Continuous PRN      omeprazole 40 MG capsule  Commonly known as: priLOSEC   40 mg, Oral, Daily      spironolactone 50 MG tablet  Commonly known as: ALDACTONE   TAKE 1 TABLET EVERY DAY      True Metrix Air Glucose Meter w/Device kit   1 each, In Vitro, 2 times daily, E11.9      TRUEplus Lancets 33G misc   1 each, Other, Daily, E11.9         Stop These Medications    aspirin 81 MG chewable tablet     potassium chloride 20 MEQ CR tablet  Commonly known as: K-DUR,KLOR-CON            Discharge Diet:             Follow-up Appointments:      Future Appointments   Date Time Provider Department Center   8/2/2023  2:30 PM Aminta Chan MD [facility]   3/7/2024 11:30 AM [facility]         Test Results Pending at Discharge:      Pending Labs     Order Current Status    Gold [facility] - [facility] In process    [place] Draw In  process    Blood Culture - Blood, Arm, Left Preliminary result    Blood Culture - Blood, Hand, Left Preliminary result             Raj Sullivan MD  03/21/23  09:46 EDT    Time:  Discharge 35 min    Please note that portions of this note were completed with a voice recognition program.

## 2023-03-21 NOTE — PROGRESS NOTES
Exercise Oximetry    Patient Name:Robe Bryant   MRN: [id number]   Date: 03/21/23             ROOM AIR BASELINE   SpO2% 85   Heart Rate 77   Blood Pressure 146/70     EXERCISE ON ROOM AIR SpO2% EXERCISE ON O2 @ 2  LPM SpO2%   1 MINUTE  1 MINUTE 92   2 MINUTES  2 MINUTES 93   3 MINUTES  3 MINUTES 93   4 MINUTES  4 MINUTES 94   5 MINUTES  5 MINUTES 95   6 MINUTES  6 MINUTES 94              Distance Walked   Distance Walked  200   Dyspnea (William Scale)   Dyspnea (William Scale)  6   Fatigue (William Scale)   Fatigue (William Scale)  6   SpO2% Post Exercise   SpO2% Post Exercise  95   HR Post Exercise   HR Post Exercise 79   Time to Recovery   Time to Recovery 0     Comments: Patient resting SpO2 was 85% placed patient on 2L of oxygen and SpO2 went to 95%.  Walked Patient on 2L of oxygen and SpO2 remained >93%.

## 2023-03-21 NOTE — CASE MANAGEMENT/SOCIAL WORK
Case Management/Social Work    Patient Name:  Robe Bryant  YOB: 1950  MRN: [id number]  Admit Date:  3/19/2023    [facility]. Angela states they will pull information and bring pt a portable to go home with.       Electronically signed by:  Catalina Fermin  03/21/23 11:25 EDT

## 2023-03-21 NOTE — PLAN OF CARE
Goal Outcome Evaluation:  Plan of Care Reviewed With: patient        Progress: improving  Outcome Evaluation: VS noted on 2L NC or CPAP @ 30% O2. No acute events noted during shift. Will continue current plan of care. Possible DC today.

## 2023-03-21 NOTE — PAYOR COMM NOTE
"To:  [employer]  From: Janel Teague RN  Phone: [phone]  Fax: [phone]  NPI: [id number]  TIN: [id number]  Member ID: [id number]  MRN: [id number]    Todd Garnett (72 y.o. Male)     Date of Birth   1950    Social Security Number       Address   [address] [address]    Home Phone   [phone]    MRN   [id number]       [place]   [employer]    Marital Status                               Admission Date   3/19/23    Admission Type   Emergency    Admitting Provider   Raj Sullivan MD    Attending Provider       Department, Room/Bed   [facility] TELEMETRY 3, 325/1       Discharge Date   3/21/2023    Discharge Disposition   Home or Self Care    Discharge Destination                               Attending Provider: (none)   Allergies: No Known Allergies    Isolation: None   Infection: None   Code Status: CPR    Ht: 162.6 cm (64\")   Wt: 108 kg (238 lb 5.1 oz)    Admission Cmt: None   Principal Problem: Acute on chronic respiratory failure with hypoxia (HCC) [J96.21]                 Active Insurance as of 3/19/2023     Primary Coverage     Payor Plan Insurance Group Employer/Plan Group    HUMANA MEDICARE REPLACEMENT HU[employer]NA MEDICARE REPLACEMENT [id number]     Payor Plan Address Payor Plan Phone Number Payor Plan Fax Number Effective Dates    [address] [address] [phone]  3/1/2023 - None Entered    [facility] [address]       Subscriber Name Subscriber Birth Date Member ID       TODD GARNETT 1950 [id number]                 Emergency Contacts      (Rel.) Home Phone Work Phone Mobile Phone    BISI GARNETT (Spouse) -- -- [phone]    KennedyJanel reyez (Daughter) [phone] -- --               Discharge Summary      Raj Sullivan MD at 23 0946              [facility]   INTERNAL MEDICINE DISCHARGE SUMMARY      Name:  Todd Garnett   Age:  72 y.o.  Sex:  male  :  1950  MRN:  [id number]   Visit " Number:  [id number]  Primary Care Physician:  Raj Sullivan MD  Date of Discharge:  3/21/2023  Admission Date:  3/19/2023      Discharge Diagnosis:         Acute on chronic respiratory failure with hypoxia (HCC)    COPD with acute exacerbation (HCC)    Acute on chronic diastolic heart failure (HCC)    Pacemaker    Emphysema of lung (HCC)    Essential hypertension    Benign prostatic hyperplasia    Paroxysmal A-fib (HCC)    Hypercholesterolemia    Gastroesophageal reflux disease    Obstructive sleep apnea of adult    Diabetes mellitus (HCC)    Multilevel degenerative disc disease          Consults:     Consults     No orders found from 2/18/2023 to 3/20/2023.          Procedures Performed:                 Hospital Course:   The patient was admitted on 3/19/2023  Please see H&P for details on admission HPI and ROS.  72-year-old patient with past medical history of COPD, hypertension, BPH, paroxysmal A-fib on anticoagulation, hypercholesterolemia, obstructive sleep apnea, diabetes, chronic back pain, diastolic heart failure, comes into the ER because of shortness of breath and dyspnea over the last few days  He was noted to be hypoxic on room air and needed to use 4 L oxygen.  Work-up done showed he had acute vascular pulmonary congestion and signs of pulmonary edema on the chest x-ray.  Normal white count but there was mild elevation of BNP.  He did get 80 of Lasix along with Rocephin and Zithromax and Solu-Medrol in the ER.  After that there was diuresis of more than 500 mL and patient did feel better.  Patient had a CT scan done and PE was ruled out.  There was 8 mm possible focal pneumonitis versus mass which was recommended to be followed up in 8 to 12 weeks.  His oxygen was tapered down and he again desaturated in the 86 range so he was put on 2 L oxygen and is being admitted for her hypoxic respiratory failure with acute pulmonary edema.  After admission he was started on IV Rocephin and Zithromax for  presumed small focal pneumonitis.  Clinically he has improved mainly with the diuresis and has improvement with his saturation.  On room air his saturation is 92 and will do a 6-minute walk to see if he desaturates and if he does need oxygen.  His medication adjustments have been done as below and he will be discharged with a follow-up in the office next Monday    Vital Signs:     Temp:  [96.5 °F (35.8 °C)-98.3 °F (36.8 °C)] 97.4 °F (36.3 °C)  Heart Rate:  [60-63] 61  Resp:  [16-18] 16  BP: (101-146)/(46-70) 146/70    Physical Exam:     General Appearance:    Alert and cooperative, not in any acute distress.   Head:    Atraumatic and normocephalic, without obvious abnormality.   Eyes:            PERRLA,  No pallor. Extraocular movements are within normal limits.   Neck:   Supple,  No lymph glands, no bruit   Lungs:     Chest shape is normal. Breath sounds heard bilaterally equally.  No crackles or wheezing.     Heart:    Normal S1 and S2, no murmur,  No JVD   Abdomen:     Normal bowel sounds, no masses, no organomegaly. Soft     nontender, no guarding, no rebound tenderness   Extremities:   Moves all extremities well, no edema, no cyanosis,    Skin:   No  bruising or rash.   Neurologic:   Grossly nonfocal and moves all extremities.        Pertinent Lab Results:     Results from last 7 days   Lab Units 03/21/23  0608 03/20/23  0525 03/19/23  1314   SODIUM mmol/L 140 137 136   POTASSIUM mmol/L 4.3 4.1 5.0   CHLORIDE mmol/L 100 97* 101   CO2 mmol/L 31.3* 29.9* 28.8   BUN mg/dL 34* 24* 16   CREATININE mg/dL 1.77* 1.51* 1.37*   CALCIUM mg/dL 8.9 9.1 9.2   BILIRUBIN mg/dL 0.3 0.4 0.5   ALK PHOS U/L 63 70 78   ALT (SGPT) U/L 15 12 16   AST (SGOT) U/L 16 18 25   GLUCOSE mg/dL 102* 181* 122*     Results from last 7 days   Lab Units 03/21/23  0608 03/20/23  0525 03/19/23  1314   WBC 10*3/mm3 9.55 8.39 8.88   HEMOGLOBIN g/dL 12.3* 13.2 13.6   HEMATOCRIT % 38.9 40.4 42.3   PLATELETS 10*3/mm3 181 184 177         Blood Culture    Date Value Ref Range Status   03/19/2023 No growth at 24 hours  Preliminary   03/18/2023 No growth at 24 hours  Preliminary       Pertinent Radiology Results:    Imaging Results (Most Recent)     Procedure Component Value Units Date/Time    CT Angiogram Chest Pulmonary Embolism [[id number]] Collected: 03/19/23 1609     Updated: 03/19/23 1618    Narrative:      PROCEDURE: CT ANGIOGRAM CHEST PULMONARY EMBOLISM-     HISTORY: Pulmonary embolism (PE) suspected, unknown D-dimer     COMPARISON: July 11, 2022     TECHNIQUE: The patient was injected with  IV contrast. Axial images were  obtained through the chest in a CTA/ PE protocol. 3 D Reconstruction  images were also performed. This study was performed with techniques to  keep radiation doses as low as reasonably achievable, (ALARA).  Individualized dose reduction techniques using automated exposure  control or adjustment of mA and/or kV according to the patient size were  employed.     FINDINGS: There is no axillary adenopathy. There is no hilar or  mediastinal adenopathy.  The heart is proper size. There is no  pericardial or pleural effusion. There is suboptimal timing of the  contrast bolus. No central pulmonary embolism is identified. Ascending  aorta is upper limits of normal at 40 mm; recommend one-year follow-up.  Vascular calcifications noted. Dissection cannot be excluded due to  timing of the contrast bolus. Limited images of the upper abdomen are  unremarkable. There is either new area of focal pneumonitis or new 8 mm  solid nodule anteriorly in the right upper lobe compared to the prior.  This is too small to be evaluated on PET or undergo CT-guided biopsy.  Recommend 8-12 week follow-up to document resolution. Pacemaker noted.  No bony destructive lesion seen. Calcified granulomas noted.       Impression:      Suboptimal contrast bolus; no central pulmonary below some  identified.      New 8 mm focal pneumonitis versus mass right upper lobe; recommend  8-12  week follow-up to document resolution or stability.     Upper limits of normal ascending aorta, recommend one-year follow-up.     This report was signed and finalized on 3/19/2023 4:16 PM by Marei Brown MD.    XR Chest 1 View [[id number]] Collected: 03/19/23 1327     Updated: 03/19/23 1338    Narrative:      PROCEDURE: XR CHEST 1 VW-     HISTORY: SOA Triage Protocol     COMPARISON: July 11, 2022.     FINDINGS: The heart is mildly enlarged, stable. 2-lead pacemaker again  noted.. Pulmonary vascular congestion again noted. No area of  consolidation identified.. The mediastinum is unremarkable. There is no  pneumothorax.  There are no acute osseous abnormalities.       Impression:      Mild cardiomegaly and pulmonary vascular congestion similar  to prior exam.           This report was signed and finalized on 3/19/2023 1:29 PM by Marie Brown MD.                  Discharge Disposition:      Home or Self Care    Discharge Medication:         Discharge Medications      Continue These Medications      Instructions Start Date   albuterol sulfate  (90 Base) MCG/ACT inhaler  Commonly known as: PROVENTIL HFA;VENTOLIN HFA;PROAIR HFA   2 puffs, Inhalation, Every 4 Hours PRN      amiodarone 200 MG tablet  Commonly known as: PACERONE   200 mg, Oral, Every 24 Hours Scheduled      amLODIPine 10 MG tablet  Commonly known as: NORVASC   10 mg, Oral, Daily      apixaban 5 MG tablet tablet  Commonly known as: ELIQUIS   5 mg, Oral, 2 Times Daily      atorvastatin 20 MG tablet  Commonly known as: LIPITOR   20 mg, Oral, Nightly      FLUoxetine 20 MG capsule  Commonly known as: PROzac   20 mg, Oral, Daily      furosemide 20 MG tablet  Commonly known as: LASIX   TAKE 1 TABLET BY MOUTH EVERY MORNING, MAY TAKE SECOND DOSE AS NEEDED FOR INCREASED SWELLING      gabapentin 800 MG tablet  Commonly known as: NEURONTIN   800 mg, Oral, Every Night at Bedtime      glipizide 5 MG tablet  Commonly known as: GLUCOTROL   TAKE 1 TABLET TWICE  DAILY BEFORE MEALS      glucose monitor monitoring kit   1 each, Does not apply, Daily, E11.9      HYDROcodone-acetaminophen 5-325 MG per tablet  Commonly known as: NORCO   1 tablet, Oral, Every 6 Hours PRN      ipratropium-albuterol 0.5-2.5 mg/3 ml nebulizer  Commonly known as: DUO-NEB   3 mL, Nebulization, 4 Times Daily - RT      Lancet Device misc   1 each, Does not apply, Daily, E11.9      lisinopril 40 MG tablet  Commonly known as: PRINIVIL,ZESTRIL   40 mg, Oral, Daily      metFORMIN 500 MG tablet  Commonly known as: GLUCOPHAGE   1 tablet, Oral, Every 12 Hours Scheduled      metoprolol succinate XL 50 MG 24 hr tablet  Commonly known as: Toprol XL   50 mg, Oral, Daily      Misc. Devices misc   Bipap tubing.      multivitamin tablet tablet  Commonly known as: THERAGRAN   1 tablet, Oral, Daily      Nebulizer misc   1 each, Does not apply, Every 4 Hours PRN      O2  Commonly known as: OXYGEN   2 L/min, Inhalation, Continuous PRN      omeprazole 40 MG capsule  Commonly known as: priLOSEC   40 mg, Oral, Daily      spironolactone 50 MG tablet  Commonly known as: ALDACTONE   TAKE 1 TABLET EVERY DAY      True Metrix Air Glucose Meter w/Device kit   1 each, In Vitro, 2 times daily, E11.9      TRUEplus Lancets 33G misc   1 each, Other, Daily, E11.9         Stop These Medications    aspirin 81 MG chewable tablet     potassium chloride 20 MEQ CR tablet  Commonly known as: K-DUR,KLOR-CON            Discharge Diet:             Follow-up Appointments:      Future Appointments   Date Time Provider Department Center   8/2/2023  2:30 PM Aminta Chan MD [facility]   3/7/2024 11:30 AM Arelis Tucker MD [facility]         Test Results Pending at Discharge:      Pending Labs     Order Current Status    Gold Top - SST In process    [place] Draw In process    Blood Culture - Blood, Arm, Left Preliminary result    Blood Culture - Blood, Hand, Left Preliminary result             Raj Sullivan,  MD  03/21/23  09:46 EDT    Time:  Discharge 35 min    Please note that portions of this note were completed with a voice recognition program.        Electronically signed by Raj Sullivan MD at 03/21/23 0037

## 2023-03-21 NOTE — CASE MANAGEMENT/SOCIAL WORK
Case Management Discharge Note           Provided Post Acute Provider List?: N/A  Provided Post Acute Provider Quality & Resource List?: N/A    Selected Continued Care - Admitted Since 3/19/2023     Destination    No services have been selected for the patient.              Durable Medical Equipment    No services have been selected for the patient.              Dialysis/Infusion    No services have been selected for the patient.              Home Medical Care    No services have been selected for the patient.              Therapy    No services have been selected for the patient.              Community Resources    No services have been selected for the patient.              Community & DME    No services have been selected for the patient.                  Transportation Services  Private: Car    Final Discharge Disposition Code: 01 - home or self-care

## 2023-03-22 NOTE — OUTREACH NOTE
Prep Survey    Flowsheet Row Responses   [facility] facility patient discharged from? [facility]   Is LACE score < 7 ? No   Eligibility Readm Mgmt   Discharge diagnosis Acute on chronic respiratory failure with hypoxia    Does the patient have one of the following disease processes/diagnoses(primary or secondary)? COPD   Does the patient have Home health ordered? No   Is there a DME ordered? Yes   What DME was ordered? ? Oxygen and Cane per [employer]    Prep survey completed? Yes          Marilee JADE - Registered Nurse

## 2023-03-24 LAB
BACTERIA SPEC AEROBE CULT: NORMAL
BACTERIA SPEC AEROBE CULT: NORMAL

## 2023-03-30 ENCOUNTER — READMISSION MANAGEMENT (OUTPATIENT)
Dept: CALL CENTER | Facility: HOSPITAL | Age: 73
End: 2023-03-30
Payer: MEDICARE

## 2023-03-30 NOTE — OUTREACH NOTE
COPD/PN Week 2 Survey    Flowsheet Row Responses   [facility] facility patient discharged from? [facility]   Does the patient have one of the following disease processes/diagnoses(primary or secondary)? COPD   Week 2 attempt successful? No   Unsuccessful attempts Attempt 1   Patient reports what zone on this call? [place]          Ca NELSON - Registered Nurse

## 2023-04-04 ENCOUNTER — READMISSION MANAGEMENT (OUTPATIENT)
Dept: CALL CENTER | Facility: HOSPITAL | Age: 73
End: 2023-04-04
Payer: MEDICARE

## 2023-04-04 NOTE — OUTREACH NOTE
COPD/PN Week 2 Survey    Flowsheet Row Responses   [facility] facility patient discharged from? [facility]   Does the patient have one of the following disease processes/diagnoses(primary or secondary)? COPD   Week 2 attempt successful? No   Unsuccessful attempts Attempt 2          Leonora NELSON - Registered Nurse

## 2023-04-13 ENCOUNTER — READMISSION MANAGEMENT (OUTPATIENT)
Dept: CALL CENTER | Facility: HOSPITAL | Age: 73
End: 2023-04-13
Payer: MEDICARE

## 2023-04-13 NOTE — OUTREACH NOTE
COPD/PN Week 3 Survey    Flowsheet Row Responses   [facility] facility patient discharged from? [facility]   Does the patient have one of the following disease processes/diagnoses(primary or secondary)? COPD   Week 3 attempt successful? No   Unsuccessful attempts Attempt 1   Revoke Decline to participate          Frieda NELSON - Licensed Nurse

## 2023-04-21 NOTE — TELEPHONE ENCOUNTER
Lab Results   Component Value Date    GLUCOSE 102 (H) 03/21/2023    CALCIUM 8.9 03/21/2023     03/21/2023    K 4.3 03/21/2023    CO2 31.3 (H) 03/21/2023     03/21/2023    BUN 34 (H) 03/21/2023    CREATININE 1.77 (H) 03/21/2023    EGFR 40.1 (L) 03/21/2023    BCR 19.2 03/21/2023    ANIONGAP 8.7 03/21/2023     72 years old  108 kg

## 2023-04-22 PROCEDURE — 93294 REM INTERROG EVL PM/LDLS PM: CPT | Performed by: INTERNAL MEDICINE

## 2023-04-22 PROCEDURE — 93296 REM INTERROG EVL PM/IDS: CPT | Performed by: INTERNAL MEDICINE

## 2023-04-25 ENCOUNTER — TELEPHONE (OUTPATIENT)
Dept: CARDIOLOGY | Facility: CLINIC | Age: 73
End: 2023-04-25
Payer: MEDICARE

## 2023-04-25 ENCOUNTER — LAB (OUTPATIENT)
Dept: LAB | Facility: HOSPITAL | Age: 73
End: 2023-04-25
Payer: MEDICARE

## 2023-04-25 DIAGNOSIS — I50.33 ACUTE ON CHRONIC DIASTOLIC HEART FAILURE: Primary | ICD-10-CM

## 2023-04-25 DIAGNOSIS — I50.33 ACUTE ON CHRONIC DIASTOLIC HEART FAILURE: ICD-10-CM

## 2023-04-25 DIAGNOSIS — I50.32 CHRONIC DIASTOLIC HEART FAILURE: ICD-10-CM

## 2023-04-25 DIAGNOSIS — R60.9 SWELLING: ICD-10-CM

## 2023-04-25 DIAGNOSIS — E78.00 HYPERCHOLESTEROLEMIA: ICD-10-CM

## 2023-04-25 LAB
ANION GAP SERPL CALCULATED.3IONS-SCNC: 7.5 MMOL/L (ref 5–15)
BUN SERPL-MCNC: 20 MG/DL (ref 8–23)
BUN/CREAT SERPL: 13.2 (ref 7–25)
CALCIUM SPEC-SCNC: 8.9 MG/DL (ref 8.6–10.5)
CHLORIDE SERPL-SCNC: 104 MMOL/L (ref 98–107)
CHOLEST SERPL-MCNC: 125 MG/DL (ref 0–200)
CO2 SERPL-SCNC: 28.5 MMOL/L (ref 22–29)
CREAT SERPL-MCNC: 1.51 MG/DL (ref 0.76–1.27)
EGFRCR SERPLBLD CKD-EPI 2021: 48.5 ML/MIN/1.73
GLUCOSE SERPL-MCNC: 67 MG/DL (ref 65–99)
HDLC SERPL-MCNC: 35 MG/DL (ref 40–60)
LDLC SERPL CALC-MCNC: 69 MG/DL (ref 0–100)
LDLC/HDLC SERPL: 1.92 {RATIO}
NT-PROBNP SERPL-MCNC: 1002 PG/ML (ref 0–900)
POTASSIUM SERPL-SCNC: 4.2 MMOL/L (ref 3.5–5.2)
SODIUM SERPL-SCNC: 140 MMOL/L (ref 136–145)
TRIGL SERPL-MCNC: 114 MG/DL (ref 0–150)
TSH SERPL DL<=0.05 MIU/L-ACNC: 1.35 UIU/ML (ref 0.27–4.2)
VLDLC SERPL-MCNC: 21 MG/DL (ref 5–40)

## 2023-04-25 PROCEDURE — 84443 ASSAY THYROID STIM HORMONE: CPT

## 2023-04-25 PROCEDURE — 80061 LIPID PANEL: CPT

## 2023-04-25 PROCEDURE — 80048 BASIC METABOLIC PNL TOTAL CA: CPT

## 2023-04-25 PROCEDURE — 83880 ASSAY OF NATRIURETIC PEPTIDE: CPT

## 2023-04-25 NOTE — TELEPHONE ENCOUNTER
Critical BNP called from [facility]- 1,200.     Better than last BNP 1,359.     Lab Results   Component Value Date    GLUCOSE 67 04/25/2023    CALCIUM 8.9 04/25/2023     04/25/2023    K 4.2 04/25/2023    CO2 28.5 04/25/2023     04/25/2023    BUN 20 04/25/2023    CREATININE 1.51 (H) 04/25/2023    EGFR 48.5 (L) 04/25/2023    BCR 13.2 04/25/2023    ANIONGAP 7.5 04/25/2023

## 2023-04-25 NOTE — PROGRESS NOTES
"Contacted by [facility] nurse and pt'a daughter Janel- pt is having swelling- he was recently in the hospital  Lab Results   Component Value Date    GLUCOSE 102 (H) 03/21/2023    CALCIUM 8.9 03/21/2023     03/21/2023    K 4.3 03/21/2023    CO2 31.3 (H) 03/21/2023     03/21/2023    BUN 34 (H) 03/21/2023    CREATININE 1.77 (H) 03/21/2023    EGFR 40.1 (L) 03/21/2023    BCR 19.2 03/21/2023    ANIONGAP 8.7 03/21/2023       He does not have scales at home to weigh daily. Daughter reports that his hands and legs are swelling \"pretty bad\". Given his elevated creatinine during hospital stay, will send for BNP and BMP stat for today.     Bp elevated 150'/90's HR 80-90. He is more short of breath and using his oxygen more than normal. Instructed to take 1 extra lasix 20 mg today and then further recommendations will depend on his lab work. He did follow with Dr. Vasquez )nephrology) but has not seen him for a while, according to the pt. Will most likely need to send back to nephrology.   "

## 2023-05-11 ENCOUNTER — HOSPITAL ENCOUNTER (EMERGENCY)
Facility: HOSPITAL | Age: 73
Discharge: HOME | End: 2023-05-11
Attending: EMERGENCY MEDICINE
Payer: MEDICARE

## 2023-05-11 VITALS
HEART RATE: 60 BPM | HEIGHT: 64 IN | DIASTOLIC BLOOD PRESSURE: 76 MMHG | SYSTOLIC BLOOD PRESSURE: 132 MMHG | TEMPERATURE: 98.3 F | RESPIRATION RATE: 18 BRPM | BODY MASS INDEX: 40.97 KG/M2 | OXYGEN SATURATION: 96 % | WEIGHT: 240 LBS

## 2023-05-11 DIAGNOSIS — T50.901A ACCIDENTAL OVERDOSE, INITIAL ENCOUNTER: Primary | ICD-10-CM

## 2023-05-11 LAB
ALBUMIN SERPL-MCNC: 3.6 G/DL (ref 3.5–5.2)
ALBUMIN/GLOB SERPL: 1.3 G/DL
ALP SERPL-CCNC: 77 U/L (ref 39–117)
ALT SERPL W P-5'-P-CCNC: 17 U/L (ref 1–41)
ANION GAP SERPL CALCULATED.3IONS-SCNC: 11.7 MMOL/L (ref 5–15)
AST SERPL-CCNC: 23 U/L (ref 1–40)
BASOPHILS # BLD AUTO: 0.04 10*3/MM3 (ref 0–0.2)
BASOPHILS NFR BLD AUTO: 0.5 % (ref 0–1.5)
BILIRUB SERPL-MCNC: 0.7 MG/DL (ref 0–1.2)
BUN SERPL-MCNC: 19 MG/DL (ref 8–23)
BUN/CREAT SERPL: 11.7 (ref 7–25)
CALCIUM SPEC-SCNC: 9 MG/DL (ref 8.6–10.5)
CHLORIDE SERPL-SCNC: 99 MMOL/L (ref 98–107)
CO2 SERPL-SCNC: 28.3 MMOL/L (ref 22–29)
CREAT SERPL-MCNC: 1.62 MG/DL (ref 0.76–1.27)
DEPRECATED RDW RBC AUTO: 42.5 FL (ref 37–54)
EGFRCR SERPLBLD CKD-EPI 2021: 44.5 ML/MIN/1.73
EOSINOPHIL # BLD AUTO: 0.17 10*3/MM3 (ref 0–0.4)
EOSINOPHIL NFR BLD AUTO: 2.2 % (ref 0.3–6.2)
ERYTHROCYTE [DISTWIDTH] IN BLOOD BY AUTOMATED COUNT: 13 % (ref 12.3–15.4)
GLOBULIN UR ELPH-MCNC: 2.8 GM/DL
GLUCOSE BLDC GLUCOMTR-MCNC: 75 MG/DL (ref 70–130)
GLUCOSE SERPL-MCNC: 86 MG/DL (ref 65–99)
HCT VFR BLD AUTO: 41.1 % (ref 37.5–51)
HGB BLD-MCNC: 13.5 G/DL (ref 13–17.7)
HOLD SPECIMEN: NORMAL
HOLD SPECIMEN: NORMAL
IMM GRANULOCYTES # BLD AUTO: 0.02 10*3/MM3 (ref 0–0.05)
IMM GRANULOCYTES NFR BLD AUTO: 0.3 % (ref 0–0.5)
LYMPHOCYTES # BLD AUTO: 0.98 10*3/MM3 (ref 0.7–3.1)
LYMPHOCYTES NFR BLD AUTO: 12.5 % (ref 19.6–45.3)
MCH RBC QN AUTO: 29.7 PG (ref 26.6–33)
MCHC RBC AUTO-ENTMCNC: 32.8 G/DL (ref 31.5–35.7)
MCV RBC AUTO: 90.3 FL (ref 79–97)
MONOCYTES # BLD AUTO: 0.86 10*3/MM3 (ref 0.1–0.9)
MONOCYTES NFR BLD AUTO: 10.9 % (ref 5–12)
NEUTROPHILS NFR BLD AUTO: 5.8 10*3/MM3 (ref 1.7–7)
NEUTROPHILS NFR BLD AUTO: 73.6 % (ref 42.7–76)
NRBC BLD AUTO-RTO: 0 /100 WBC (ref 0–0.2)
PLATELET # BLD AUTO: 202 10*3/MM3 (ref 140–450)
PMV BLD AUTO: 10.7 FL (ref 6–12)
POTASSIUM SERPL-SCNC: 4.1 MMOL/L (ref 3.5–5.2)
PROT SERPL-MCNC: 6.4 G/DL (ref 6–8.5)
RBC # BLD AUTO: 4.55 10*6/MM3 (ref 4.14–5.8)
SODIUM SERPL-SCNC: 139 MMOL/L (ref 136–145)
WBC NRBC COR # BLD: 7.87 10*3/MM3 (ref 3.4–10.8)
WHOLE BLOOD HOLD COAG: NORMAL
WHOLE BLOOD HOLD SPECIMEN: NORMAL

## 2023-05-11 PROCEDURE — 80053 COMPREHEN METABOLIC PANEL: CPT | Performed by: EMERGENCY MEDICINE

## 2023-05-11 PROCEDURE — 93005 ELECTROCARDIOGRAM TRACING: CPT | Performed by: EMERGENCY MEDICINE

## 2023-05-11 PROCEDURE — 82948 REAGENT STRIP/BLOOD GLUCOSE: CPT

## 2023-05-11 PROCEDURE — 85025 COMPLETE CBC W/AUTO DIFF WBC: CPT | Performed by: EMERGENCY MEDICINE

## 2023-05-11 PROCEDURE — 99283 EMERGENCY DEPT VISIT LOW MDM: CPT

## 2023-05-11 PROCEDURE — 99284 EMERGENCY DEPT VISIT MOD MDM: CPT

## 2023-05-11 RX ORDER — SODIUM CHLORIDE 0.9 % (FLUSH) 0.9 %
10 SYRINGE (ML) INJECTION AS NEEDED
Status: DISCONTINUED | OUTPATIENT
Start: 2023-05-11 | End: 2023-05-11 | Stop reason: HOSPADM

## 2023-05-11 NOTE — ED PROVIDER NOTES
Subjective  History of Present Illness:    Chief Complaint: Accidental overdose  History of Present Illness: 72-year-old male who per EMS, per report patient had accidentally received second dose of all his medications today.    Nurses Notes reviewed and agree, including vitals, allergies, social history and prior medical history.     REVIEW OF SYSTEMS: All systems reviewed and not pertinent unless noted.  Review of Systems      Positive for: Accidental overdose-prescription medications double dose    Negative for: Any complaints    Past Medical History:   Diagnosis Date   • Anxiety and depression    • Arthritis    • Backache     20 years   • Benign prostatic hyperplasia    • Bladder trauma    • Cervicalgia    • Chronic kidney disease     Cr up to 2.1   • Coronary artery disease    • Diabetes mellitus     15 years--   • Emphysema of lung    • Erectile dysfunction    • Eye exam, routine 2015   • FH: colonic polyps    • Gout     for 10 years--   • History of echocardiogram 09/15/2014   • History of tobacco use     with cessation x7 years   • Hyperlipidemia    • Hypertension    • Migraine    • Myocardial infarction     h/o coronary artery stenting--   • Prostate cancer    • Restless leg syndrome     20 years   • Sleep apnea     12 years; uses a Bi-Pap   • Stroke    • Syncope 4/28/2017   • Warfarin anticoagulation 9/14/2016       Allergies:    Patient has no known allergies.      Past Surgical History:   Procedure Laterality Date   • BLADDER SURGERY     • CARDIAC CATHETERIZATION  03/15/2013    revealing widely patent stents of the left circumflex and ramus intermedius coronary arteries, no significant disease is seen in any territory   • CARDIAC CATHETERIZATION Left 9/30/2019    Procedure: Left Heart Cath;  Surgeon: Arelis Tucker MD;  Location: [facility] CATH INVASIVE LOCATION;  Service: Cardiology   • CARDIAC ELECTROPHYSIOLOGY PROCEDURE N/A 5/10/2021    Procedure: PPM battery change;  Surgeon: Garrett  "Arelis JADE MD;  Location: [facility] INVASIVE LOCATION;  Service: Cardiology;  Laterality: N/A;   • CARDIAC PACEMAKER PLACEMENT     • CATARACT EXTRACTION     • COLONOSCOPY      No family history of colon cancer.     • COLONOSCOPY     • HERNIA REPAIR      Type unknown   • PACEMAKER IMPLANTATION     • PROSTATE SURGERY           Social History     Socioeconomic History   • Marital status:    Tobacco Use   • Smoking status: Former     Packs/day: 1.00     Years: 30.00     Pack years: 30.00     Types: Cigarettes     Quit date: 8/15/2001     Years since quittin.7   • Smokeless tobacco: Never   • Tobacco comments:     quit 16 years ago   Vaping Use   • Vaping Use: Never used   Substance and Sexual Activity   • Alcohol use: No   • Drug use: No   • Sexual activity: Defer         Family History   Problem Relation Age of Onset   • Cancer Mother    • Diabetes Mother    • Hypertension Mother    • Stroke Mother    • Stomach cancer Mother    • Heart disease Mother    • Stomach cancer Father    • Cancer Father    • Lung cancer Father        Objective  Physical Exam:  /59   Pulse 60   Temp 98.3 °F (36.8 °C) (Axillary)   Resp 18   Ht 162.6 cm (64\")   Wt 109 kg (240 lb)   SpO2 96%   BMI 41.20 kg/m²      Physical Exam    CONSTITUTIONAL: Well developed, nontoxic 72-year-old male,  in no acute distress.  VITAL SIGNS: per nursing, reviewed and noted  SKIN: exposed skin with no rashes, ulcerations or petechiae  EYES: Grossly EOMI, no icterus  ENT: Normal voice.  Moist mucous membranes   RESPIRATORY:  No increased work of breathing. No retractions.   CARDIOVASCULAR:   Extremities pink and warm.  Good cap refill to extremities.   GI: Abdomen without distention   MUSCULOSKELETAL: Age-appropriate bulk and tone, moves all 4 extremities  NEUROLOGIC: Alert, oriented x 3. No gross deficits. GCS 15.   PSYCH: appropriate affect.  : no bladder tenderness or distention, no CVA tenderness    Procedures    ED " Course:    Lab Results (last 24 hours)     Procedure Component Value Units Date/Time    CBC & Differential [[id number]]  (Abnormal) Collected: 05/11/23 1354    Specimen: Blood Updated: 05/11/23 1407    Narrative:      The following orders were created for panel order CBC & Differential.  Procedure                               Abnormality         Status                     ---------                               -----------         ------                     CBC Auto Differential[[id number]]        Abnormal            Final result                 Please view results for these tests on the individual orders.    Comprehensive Metabolic Panel [[id number]]  (Abnormal) Collected: 05/11/23 1354    Specimen: Blood Updated: 05/11/23 1430     Glucose 86 mg/dL      BUN 19 mg/dL      Creatinine 1.62 mg/dL      Sodium 139 mmol/L      Potassium 4.1 mmol/L      Chloride 99 mmol/L      CO2 28.3 mmol/L      Calcium 9.0 mg/dL      Total Protein 6.4 g/dL      Albumin 3.6 g/dL      ALT (SGPT) 17 U/L      AST (SGOT) 23 U/L      Alkaline Phosphatase 77 U/L      Total Bilirubin 0.7 mg/dL      Globulin 2.8 gm/dL      A/G Ratio 1.3 g/dL      BUN/Creatinine Ratio 11.7     Anion Gap 11.7 mmol/L      eGFR 44.5 mL/min/1.73     Narrative:      GFR Normal >60  Chronic Kidney Disease <60  Kidney Failure <15    The GFR formula is only valid for adults with stable renal function between ages 18 and 70.    CBC Auto Differential [[id number]]  (Abnormal) Collected: 05/11/23 1354    Specimen: Blood Updated: 05/11/23 1407     WBC 7.87 10*3/mm3      RBC 4.55 10*6/mm3      Hemoglobin 13.5 g/dL      Hematocrit 41.1 %      MCV 90.3 fL      MCH 29.7 pg      MCHC 32.8 g/dL      RDW 13.0 %      RDW-SD 42.5 fl      MPV 10.7 fL      Platelets 202 10*3/mm3      Neutrophil % 73.6 %      Lymphocyte % 12.5 %      Monocyte % 10.9 %      Eosinophil % 2.2 %      Basophil % 0.5 %      Immature Grans % 0.3 %      Neutrophils, Absolute 5.80 10*3/mm3      Lymphocytes, Absolute  0.98 10*3/mm3      Monocytes, Absolute 0.86 10*3/mm3      Eosinophils, Absolute 0.17 10*3/mm3      Basophils, Absolute 0.04 10*3/mm3      Immature Grans, Absolute 0.02 10*3/mm3      nRBC 0.0 /100 WBC     POC Glucose Once [[id number]]  (Normal) Collected: 05/11/23 1623    Specimen: Blood Updated: 05/11/23 1625     Glucose 75 mg/dL      Comment: Serial Number: [id number]:  [id number]       POC Glucose Once [[id number]]  (Normal) Collected: 05/11/23 1830    Specimen: Blood Updated: 05/11/23 1832     Glucose 75 mg/dL      Comment: Serial Number: [id number]:  [id number]       POC Glucose Once [[id number]]  (Normal) Collected: 05/11/23 1920    Specimen: Blood Updated: 05/11/23 1922     Glucose 75 mg/dL      Comment: Serial Number: [id number]:  [id number]              No radiology results from the last 24 hrs     [facility]    ED Course as of 05/11/23 2031   Thu May 11, 2023   1434 EKG interpreted by me reveals paced rhythm at a rate of 59.  No ischemic changes [PF]      ED Course User Index  [PF] Nain Ortega, DO       Medical Decision Making:    Initial impression of presenting illness: Patient presents with accidental overdose of home medications.  Received tomorrows medications today.    DDX: includes but is not limited to: Accidental overdose         Patient arrives normotensive afebrile no tachycardia oxygen saturations 96% on nasal cannula with vitals interpreted by myself.     Pertinent features from physical exam: Benign exam.    Initial diagnostic plan: Will check basic labs, monitor serial fingerstick glucoses    Results from initial plan were reviewed and interpreted by me revealing nonactionable CBC CMP stable CKD.  Glucose on serial measurements stable    Diagnostic information from other sources: Old record review    Interventions / Re-evaluation: Monitor in emergency department several hours    Results/clinical rationale were discussed with patient and family member at the  bedside    Consultations/Discussion of results with other physicians: None    Disposition plan: We will plan for discharge  Advised to hold off of nighttime bp meds and diabetic meds.     2030  Patient has been monitored for nearly 7 hours after ingestion.  His vital signs have remained stable.  Glucose remained stable.  He feels well and is ready to go home.  Advised him to hold nighttime medications.  Patient and wife agreeable with plan of care.  -----      Final diagnoses:   Accidental overdose, initial encounter        Ronny Cardona MD  05/11/23 2031

## 2023-06-06 ENCOUNTER — APPOINTMENT (OUTPATIENT)
Dept: GENERAL RADIOLOGY | Facility: HOSPITAL | Age: 73
End: 2023-06-06
Payer: MEDICARE

## 2023-06-06 ENCOUNTER — APPOINTMENT (OUTPATIENT)
Dept: CT IMAGING | Facility: HOSPITAL | Age: 73
End: 2023-06-06
Payer: MEDICARE

## 2023-06-06 ENCOUNTER — HOSPITAL ENCOUNTER (OUTPATIENT)
Facility: HOSPITAL | Age: 73
Setting detail: OBSERVATION
Discharge: HOME | End: 2023-06-08
Attending: STUDENT IN AN ORGANIZED HEALTH CARE EDUCATION/TRAINING PROGRAM | Admitting: INTERNAL MEDICINE
Payer: MEDICARE

## 2023-06-06 DIAGNOSIS — Z86.79 HISTORY OF CONGESTIVE HEART FAILURE: ICD-10-CM

## 2023-06-06 DIAGNOSIS — R55 SYNCOPE, UNSPECIFIED SYNCOPE TYPE: Primary | ICD-10-CM

## 2023-06-06 DIAGNOSIS — M53.9 MULTILEVEL DEGENERATIVE DISC DISEASE: ICD-10-CM

## 2023-06-06 DIAGNOSIS — G89.4 CHRONIC PAIN SYNDROME: ICD-10-CM

## 2023-06-06 LAB
ALBUMIN SERPL-MCNC: 3.6 G/DL (ref 3.5–5.2)
ALBUMIN/GLOB SERPL: 1.4 G/DL
ALP SERPL-CCNC: 83 U/L (ref 39–117)
ALT SERPL W P-5'-P-CCNC: 16 U/L (ref 1–41)
ANION GAP SERPL CALCULATED.3IONS-SCNC: 12.5 MMOL/L (ref 5–15)
AST SERPL-CCNC: 18 U/L (ref 1–40)
BASOPHILS # BLD AUTO: 0.05 10*3/MM3 (ref 0–0.2)
BASOPHILS NFR BLD AUTO: 0.9 % (ref 0–1.5)
BILIRUB SERPL-MCNC: 0.3 MG/DL (ref 0–1.2)
BILIRUB UR QL STRIP: NEGATIVE
BUN SERPL-MCNC: 17 MG/DL (ref 8–23)
BUN/CREAT SERPL: 12.2 (ref 7–25)
CALCIUM SPEC-SCNC: 9 MG/DL (ref 8.6–10.5)
CHLORIDE SERPL-SCNC: 102 MMOL/L (ref 98–107)
CLARITY UR: CLEAR
CO2 SERPL-SCNC: 24.5 MMOL/L (ref 22–29)
COLOR UR: YELLOW
CREAT SERPL-MCNC: 1.39 MG/DL (ref 0.76–1.27)
CRP SERPL-MCNC: <0.3 MG/DL (ref 0–0.5)
DEPRECATED RDW RBC AUTO: 42.9 FL (ref 37–54)
EGFRCR SERPLBLD CKD-EPI 2021: 53.5 ML/MIN/1.73
EOSINOPHIL # BLD AUTO: 0.31 10*3/MM3 (ref 0–0.4)
EOSINOPHIL NFR BLD AUTO: 5.5 % (ref 0.3–6.2)
ERYTHROCYTE [DISTWIDTH] IN BLOOD BY AUTOMATED COUNT: 13 % (ref 12.3–15.4)
FLUAV RNA RESP QL NAA+PROBE: NOT DETECTED
FLUBV RNA RESP QL NAA+PROBE: NOT DETECTED
GEN 5 2HR TROPONIN T REFLEX: 21 NG/L
GLOBULIN UR ELPH-MCNC: 2.6 GM/DL
GLUCOSE BLDC GLUCOMTR-MCNC: 123 MG/DL (ref 70–130)
GLUCOSE SERPL-MCNC: 85 MG/DL (ref 65–99)
GLUCOSE UR STRIP-MCNC: NEGATIVE MG/DL
HBA1C MFR BLD: 6.4 % (ref 4.8–5.6)
HCT VFR BLD AUTO: 39.5 % (ref 37.5–51)
HGB BLD-MCNC: 13 G/DL (ref 13–17.7)
HGB UR QL STRIP.AUTO: NEGATIVE
HOLD SPECIMEN: NORMAL
HOLD SPECIMEN: NORMAL
IMM GRANULOCYTES # BLD AUTO: 0.05 10*3/MM3 (ref 0–0.05)
IMM GRANULOCYTES NFR BLD AUTO: 0.9 % (ref 0–0.5)
KETONES UR QL STRIP: NEGATIVE
LEUKOCYTE ESTERASE UR QL STRIP.AUTO: NEGATIVE
LIPASE SERPL-CCNC: 47 U/L (ref 13–60)
LYMPHOCYTES # BLD AUTO: 1.66 10*3/MM3 (ref 0.7–3.1)
LYMPHOCYTES NFR BLD AUTO: 29.5 % (ref 19.6–45.3)
MAGNESIUM SERPL-MCNC: 1.5 MG/DL (ref 1.6–2.4)
MAGNESIUM SERPL-MCNC: 2.1 MG/DL (ref 1.6–2.4)
MCH RBC QN AUTO: 30.2 PG (ref 26.6–33)
MCHC RBC AUTO-ENTMCNC: 32.9 G/DL (ref 31.5–35.7)
MCV RBC AUTO: 91.9 FL (ref 79–97)
MONOCYTES # BLD AUTO: 0.64 10*3/MM3 (ref 0.1–0.9)
MONOCYTES NFR BLD AUTO: 11.4 % (ref 5–12)
NEUTROPHILS NFR BLD AUTO: 2.91 10*3/MM3 (ref 1.7–7)
NEUTROPHILS NFR BLD AUTO: 51.8 % (ref 42.7–76)
NITRITE UR QL STRIP: NEGATIVE
NRBC BLD AUTO-RTO: 0 /100 WBC (ref 0–0.2)
NT-PROBNP SERPL-MCNC: 608 PG/ML (ref 0–900)
PH UR STRIP.AUTO: <=5 [PH] (ref 5–8)
PHOSPHATE SERPL-MCNC: 2.9 MG/DL (ref 2.5–4.5)
PLATELET # BLD AUTO: 191 10*3/MM3 (ref 140–450)
PMV BLD AUTO: 10.7 FL (ref 6–12)
POTASSIUM SERPL-SCNC: 4.3 MMOL/L (ref 3.5–5.2)
PROCALCITONIN SERPL-MCNC: 0.05 NG/ML (ref 0–0.25)
PROT SERPL-MCNC: 6.2 G/DL (ref 6–8.5)
PROT UR QL STRIP: NEGATIVE
RBC # BLD AUTO: 4.3 10*6/MM3 (ref 4.14–5.8)
SARS-COV-2 RNA RESP QL NAA+PROBE: NOT DETECTED
SODIUM SERPL-SCNC: 139 MMOL/L (ref 136–145)
SP GR UR STRIP: 1.01 (ref 1–1.03)
TROPONIN T DELTA: 1 NG/L
TROPONIN T SERPL HS-MCNC: 20 NG/L
UROBILINOGEN UR QL STRIP: NORMAL
WBC NRBC COR # BLD: 5.62 10*3/MM3 (ref 3.4–10.8)
WHOLE BLOOD HOLD COAG: NORMAL
WHOLE BLOOD HOLD SPECIMEN: NORMAL

## 2023-06-06 PROCEDURE — 71045 X-RAY EXAM CHEST 1 VIEW: CPT

## 2023-06-06 PROCEDURE — 86140 C-REACTIVE PROTEIN: CPT | Performed by: STUDENT IN AN ORGANIZED HEALTH CARE EDUCATION/TRAINING PROGRAM

## 2023-06-06 PROCEDURE — 82948 REAGENT STRIP/BLOOD GLUCOSE: CPT

## 2023-06-06 PROCEDURE — 83735 ASSAY OF MAGNESIUM: CPT | Performed by: STUDENT IN AN ORGANIZED HEALTH CARE EDUCATION/TRAINING PROGRAM

## 2023-06-06 PROCEDURE — 96375 TX/PRO/DX INJ NEW DRUG ADDON: CPT

## 2023-06-06 PROCEDURE — 25010000002 FUROSEMIDE PER 20 MG: Performed by: STUDENT IN AN ORGANIZED HEALTH CARE EDUCATION/TRAINING PROGRAM

## 2023-06-06 PROCEDURE — 87636 SARSCOV2 & INF A&B AMP PRB: CPT | Performed by: STUDENT IN AN ORGANIZED HEALTH CARE EDUCATION/TRAINING PROGRAM

## 2023-06-06 PROCEDURE — 83880 ASSAY OF NATRIURETIC PEPTIDE: CPT | Performed by: STUDENT IN AN ORGANIZED HEALTH CARE EDUCATION/TRAINING PROGRAM

## 2023-06-06 PROCEDURE — 99285 EMERGENCY DEPT VISIT HI MDM: CPT

## 2023-06-06 PROCEDURE — 70450 CT HEAD/BRAIN W/O DYE: CPT

## 2023-06-06 PROCEDURE — 83735 ASSAY OF MAGNESIUM: CPT | Performed by: FAMILY MEDICINE

## 2023-06-06 PROCEDURE — 84100 ASSAY OF PHOSPHORUS: CPT | Performed by: STUDENT IN AN ORGANIZED HEALTH CARE EDUCATION/TRAINING PROGRAM

## 2023-06-06 PROCEDURE — 96365 THER/PROPH/DIAG IV INF INIT: CPT

## 2023-06-06 PROCEDURE — 83690 ASSAY OF LIPASE: CPT | Performed by: STUDENT IN AN ORGANIZED HEALTH CARE EDUCATION/TRAINING PROGRAM

## 2023-06-06 PROCEDURE — 83036 HEMOGLOBIN GLYCOSYLATED A1C: CPT | Performed by: FAMILY MEDICINE

## 2023-06-06 PROCEDURE — G0378 HOSPITAL OBSERVATION PER HR: HCPCS

## 2023-06-06 PROCEDURE — 25010000002 MAGNESIUM SULFATE 2 GM/50ML SOLUTION: Performed by: STUDENT IN AN ORGANIZED HEALTH CARE EDUCATION/TRAINING PROGRAM

## 2023-06-06 PROCEDURE — 85025 COMPLETE CBC W/AUTO DIFF WBC: CPT | Performed by: STUDENT IN AN ORGANIZED HEALTH CARE EDUCATION/TRAINING PROGRAM

## 2023-06-06 PROCEDURE — 93005 ELECTROCARDIOGRAM TRACING: CPT | Performed by: STUDENT IN AN ORGANIZED HEALTH CARE EDUCATION/TRAINING PROGRAM

## 2023-06-06 PROCEDURE — 36415 COLL VENOUS BLD VENIPUNCTURE: CPT

## 2023-06-06 PROCEDURE — 94640 AIRWAY INHALATION TREATMENT: CPT

## 2023-06-06 PROCEDURE — 84484 ASSAY OF TROPONIN QUANT: CPT | Performed by: STUDENT IN AN ORGANIZED HEALTH CARE EDUCATION/TRAINING PROGRAM

## 2023-06-06 PROCEDURE — 72131 CT LUMBAR SPINE W/O DYE: CPT

## 2023-06-06 PROCEDURE — 96366 THER/PROPH/DIAG IV INF ADDON: CPT

## 2023-06-06 PROCEDURE — 81003 URINALYSIS AUTO W/O SCOPE: CPT | Performed by: STUDENT IN AN ORGANIZED HEALTH CARE EDUCATION/TRAINING PROGRAM

## 2023-06-06 PROCEDURE — 84145 PROCALCITONIN (PCT): CPT | Performed by: STUDENT IN AN ORGANIZED HEALTH CARE EDUCATION/TRAINING PROGRAM

## 2023-06-06 PROCEDURE — 72125 CT NECK SPINE W/O DYE: CPT

## 2023-06-06 PROCEDURE — 80053 COMPREHEN METABOLIC PANEL: CPT | Performed by: STUDENT IN AN ORGANIZED HEALTH CARE EDUCATION/TRAINING PROGRAM

## 2023-06-06 PROCEDURE — 94799 UNLISTED PULMONARY SVC/PX: CPT

## 2023-06-06 PROCEDURE — 99222 1ST HOSP IP/OBS MODERATE 55: CPT | Performed by: FAMILY MEDICINE

## 2023-06-06 PROCEDURE — 51702 INSERT TEMP BLADDER CATH: CPT

## 2023-06-06 RX ORDER — NICOTINE POLACRILEX 4 MG
15 LOZENGE BUCCAL
Status: DISCONTINUED | OUTPATIENT
Start: 2023-06-06 | End: 2023-06-08 | Stop reason: HOSPADM

## 2023-06-06 RX ORDER — AMLODIPINE BESYLATE 5 MG/1
10 TABLET ORAL DAILY
Status: DISCONTINUED | OUTPATIENT
Start: 2023-06-07 | End: 2023-06-08 | Stop reason: HOSPADM

## 2023-06-06 RX ORDER — DEXTROSE MONOHYDRATE 25 G/50ML
10-50 INJECTION, SOLUTION INTRAVENOUS
Status: DISCONTINUED | OUTPATIENT
Start: 2023-06-06 | End: 2023-06-08 | Stop reason: HOSPADM

## 2023-06-06 RX ORDER — SODIUM CHLORIDE 0.9 % (FLUSH) 0.9 %
10 SYRINGE (ML) INJECTION AS NEEDED
Status: DISCONTINUED | OUTPATIENT
Start: 2023-06-06 | End: 2023-06-08 | Stop reason: HOSPADM

## 2023-06-06 RX ORDER — INSULIN ASPART 100 [IU]/ML
1-200 INJECTION, SOLUTION INTRAVENOUS; SUBCUTANEOUS AS NEEDED
Status: DISCONTINUED | OUTPATIENT
Start: 2023-06-06 | End: 2023-06-08 | Stop reason: HOSPADM

## 2023-06-06 RX ORDER — ALBUTEROL SULFATE 90 UG/1
2 AEROSOL, METERED RESPIRATORY (INHALATION) EVERY 4 HOURS PRN
Status: DISCONTINUED | OUTPATIENT
Start: 2023-06-06 | End: 2023-06-08 | Stop reason: HOSPADM

## 2023-06-06 RX ORDER — SODIUM CHLORIDE 0.9 % (FLUSH) 0.9 %
10 SYRINGE (ML) INJECTION EVERY 12 HOURS SCHEDULED
Status: DISCONTINUED | OUTPATIENT
Start: 2023-06-06 | End: 2023-06-08 | Stop reason: HOSPADM

## 2023-06-06 RX ORDER — ACETAMINOPHEN 650 MG/1
650 SUPPOSITORY RECTAL EVERY 4 HOURS PRN
Status: DISCONTINUED | OUTPATIENT
Start: 2023-06-06 | End: 2023-06-08 | Stop reason: HOSPADM

## 2023-06-06 RX ORDER — ACETAMINOPHEN 325 MG/1
650 TABLET ORAL EVERY 4 HOURS PRN
Status: DISCONTINUED | OUTPATIENT
Start: 2023-06-06 | End: 2023-06-08 | Stop reason: HOSPADM

## 2023-06-06 RX ORDER — SODIUM CHLORIDE 9 MG/ML
40 INJECTION, SOLUTION INTRAVENOUS AS NEEDED
Status: DISCONTINUED | OUTPATIENT
Start: 2023-06-06 | End: 2023-06-08 | Stop reason: HOSPADM

## 2023-06-06 RX ORDER — POLYETHYLENE GLYCOL 3350 17 G/17G
17 POWDER, FOR SOLUTION ORAL DAILY PRN
Status: DISCONTINUED | OUTPATIENT
Start: 2023-06-06 | End: 2023-06-08 | Stop reason: HOSPADM

## 2023-06-06 RX ORDER — AMOXICILLIN 250 MG
2 CAPSULE ORAL 2 TIMES DAILY
Status: DISCONTINUED | OUTPATIENT
Start: 2023-06-06 | End: 2023-06-08 | Stop reason: HOSPADM

## 2023-06-06 RX ORDER — AMIODARONE HYDROCHLORIDE 200 MG/1
200 TABLET ORAL
Status: DISCONTINUED | OUTPATIENT
Start: 2023-06-07 | End: 2023-06-08 | Stop reason: HOSPADM

## 2023-06-06 RX ORDER — BISACODYL 10 MG
10 SUPPOSITORY, RECTAL RECTAL DAILY PRN
Status: DISCONTINUED | OUTPATIENT
Start: 2023-06-06 | End: 2023-06-08 | Stop reason: HOSPADM

## 2023-06-06 RX ORDER — ONDANSETRON 2 MG/ML
4 INJECTION INTRAMUSCULAR; INTRAVENOUS EVERY 6 HOURS PRN
Status: DISCONTINUED | OUTPATIENT
Start: 2023-06-06 | End: 2023-06-08 | Stop reason: HOSPADM

## 2023-06-06 RX ORDER — FLUOXETINE HYDROCHLORIDE 20 MG/1
20 CAPSULE ORAL DAILY
Status: DISCONTINUED | OUTPATIENT
Start: 2023-06-07 | End: 2023-06-07

## 2023-06-06 RX ORDER — MAGNESIUM SULFATE HEPTAHYDRATE 40 MG/ML
2 INJECTION, SOLUTION INTRAVENOUS ONCE
Status: COMPLETED | OUTPATIENT
Start: 2023-06-06 | End: 2023-06-06

## 2023-06-06 RX ORDER — HYDROCODONE BITARTRATE AND ACETAMINOPHEN 5; 325 MG/1; MG/1
1 TABLET ORAL EVERY 6 HOURS PRN
Status: DISCONTINUED | OUTPATIENT
Start: 2023-06-06 | End: 2023-06-08 | Stop reason: HOSPADM

## 2023-06-06 RX ORDER — GABAPENTIN 300 MG/1
600 CAPSULE ORAL EVERY 8 HOURS SCHEDULED
Status: DISCONTINUED | OUTPATIENT
Start: 2023-06-06 | End: 2023-06-07

## 2023-06-06 RX ORDER — BUMETANIDE 1 MG/1
0.5 TABLET ORAL DAILY
Status: DISCONTINUED | OUTPATIENT
Start: 2023-06-07 | End: 2023-06-07

## 2023-06-06 RX ORDER — ACETAMINOPHEN 160 MG/5ML
650 SOLUTION ORAL EVERY 4 HOURS PRN
Status: DISCONTINUED | OUTPATIENT
Start: 2023-06-06 | End: 2023-06-08 | Stop reason: HOSPADM

## 2023-06-06 RX ORDER — FUROSEMIDE 10 MG/ML
80 INJECTION INTRAMUSCULAR; INTRAVENOUS ONCE
Status: COMPLETED | OUTPATIENT
Start: 2023-06-06 | End: 2023-06-06

## 2023-06-06 RX ORDER — METOPROLOL SUCCINATE 50 MG/1
50 TABLET, EXTENDED RELEASE ORAL DAILY
Status: DISCONTINUED | OUTPATIENT
Start: 2023-06-07 | End: 2023-06-08 | Stop reason: HOSPADM

## 2023-06-06 RX ORDER — PANTOPRAZOLE SODIUM 40 MG/1
40 TABLET, DELAYED RELEASE ORAL
Status: DISCONTINUED | OUTPATIENT
Start: 2023-06-07 | End: 2023-06-08 | Stop reason: HOSPADM

## 2023-06-06 RX ORDER — IPRATROPIUM BROMIDE AND ALBUTEROL SULFATE 2.5; .5 MG/3ML; MG/3ML
3 SOLUTION RESPIRATORY (INHALATION)
Status: DISCONTINUED | OUTPATIENT
Start: 2023-06-06 | End: 2023-06-07

## 2023-06-06 RX ORDER — CHOLECALCIFEROL (VITAMIN D3) 125 MCG
5 CAPSULE ORAL NIGHTLY PRN
Status: DISCONTINUED | OUTPATIENT
Start: 2023-06-06 | End: 2023-06-08 | Stop reason: HOSPADM

## 2023-06-06 RX ORDER — BISACODYL 5 MG/1
5 TABLET, DELAYED RELEASE ORAL DAILY PRN
Status: DISCONTINUED | OUTPATIENT
Start: 2023-06-06 | End: 2023-06-08 | Stop reason: HOSPADM

## 2023-06-06 RX ORDER — LISINOPRIL 20 MG/1
40 TABLET ORAL DAILY
Status: DISCONTINUED | OUTPATIENT
Start: 2023-06-07 | End: 2023-06-08 | Stop reason: HOSPADM

## 2023-06-06 RX ORDER — IBUPROFEN 600 MG/1
1 TABLET ORAL
Status: DISCONTINUED | OUTPATIENT
Start: 2023-06-06 | End: 2023-06-08 | Stop reason: HOSPADM

## 2023-06-06 RX ORDER — INSULIN ASPART 100 [IU]/ML
1-200 INJECTION, SOLUTION INTRAVENOUS; SUBCUTANEOUS
Status: DISCONTINUED | OUTPATIENT
Start: 2023-06-07 | End: 2023-06-08 | Stop reason: HOSPADM

## 2023-06-06 RX ORDER — ATORVASTATIN CALCIUM 20 MG/1
20 TABLET, FILM COATED ORAL NIGHTLY
Status: DISCONTINUED | OUTPATIENT
Start: 2023-06-06 | End: 2023-06-08 | Stop reason: HOSPADM

## 2023-06-06 RX ORDER — SPIRONOLACTONE 25 MG/1
50 TABLET ORAL DAILY
Status: DISCONTINUED | OUTPATIENT
Start: 2023-06-07 | End: 2023-06-08 | Stop reason: HOSPADM

## 2023-06-06 RX ADMIN — IPRATROPIUM BROMIDE AND ALBUTEROL SULFATE 3 ML: .5; 3 SOLUTION RESPIRATORY (INHALATION) at 23:07

## 2023-06-06 RX ADMIN — MAGNESIUM SULFATE HEPTAHYDRATE 2 G: 40 INJECTION, SOLUTION INTRAVENOUS at 17:00

## 2023-06-06 RX ADMIN — FUROSEMIDE 80 MG: 10 INJECTION, SOLUTION INTRAMUSCULAR; INTRAVENOUS at 17:00

## 2023-06-06 NOTE — ED PROVIDER NOTES
Subjective  History of Present Illness:    Patient is a 72-year-old male with history of anxiety, CAD, diabetes, gout, hypertension, hyperlipidemia, migraine, MI, CVA on Eliquis who presents today after syncopal episode.  Reports that he was having increased shortness of breath after starting Bumex and had a syncopal episode.  Was altered when he awoke, complaining of lumbar back pain.  Not back to his baseline per EMS.  Endorsing headache.  Denies any chest pain at this time.  No abdominal pain, nausea, vomiting, diarrhea.  Denies urinary symptoms.  No obvious deformities      Nurses Notes reviewed and agree, including vitals, allergies, social history and prior medical history.     REVIEW OF SYSTEMS: All systems reviewed and not pertinent unless noted.  Review of Systems   Constitutional:  Positive for activity change. Negative for appetite change, chills, fatigue and fever.   HENT:  Negative for congestion, sinus pressure, sneezing and trouble swallowing.    Eyes:  Negative for discharge and itching.   Respiratory:  Positive for cough and shortness of breath. Negative for wheezing.    Cardiovascular:  Negative for chest pain and palpitations.   Gastrointestinal:  Negative for abdominal distention and abdominal pain.   Endocrine: Negative for cold intolerance and heat intolerance.   Genitourinary:  Negative for decreased urine volume, dysuria and urgency.   Musculoskeletal:  Positive for back pain. Negative for gait problem, neck pain and neck stiffness.   Skin:  Negative for color change and rash.   Allergic/Immunologic: Negative for immunocompromised state.   Neurological:  Positive for syncope and headaches. Negative for facial asymmetry.   Hematological:  Negative for adenopathy.   Psychiatric/Behavioral:  Negative for self-injury and suicidal ideas.      Past Medical History:   Diagnosis Date    Anxiety and depression     Arthritis     Backache     20 years    Benign prostatic hyperplasia     Bladder trauma      Cervicalgia     Chronic kidney disease     Cr up to 2.1    Coronary artery disease     Diabetes mellitus     15 years--    Emphysema of lung     Erectile dysfunction     Eye exam, routine     FH: colonic polyps     Gout     for 10 years--    History of echocardiogram 09/15/2014    History of tobacco use     with cessation x7 years    Hyperlipidemia     Hypertension     Migraine     Myocardial infarction     h/o coronary artery stenting--    Prostate cancer     Restless leg syndrome     20 years    Sleep apnea     12 years; uses a Bi-Pap    Stroke     Syncope 2017    Warfarin anticoagulation 2016       Allergies:    Patient has no known allergies.      Past Surgical History:   Procedure Laterality Date    BLADDER SURGERY      CARDIAC CATHETERIZATION  03/15/2013    revealing widely patent stents of the left circumflex and ramus intermedius coronary arteries, no significant disease is seen in any territory    CARDIAC CATHETERIZATION Left 2019    Procedure: Left Heart Cath;  Surgeon: Arelis Tucker MD;  Location:  [facility];  Service: Cardiology    CARDIAC ELECTROPHYSIOLOGY PROCEDURE N/A 5/10/2021    Procedure: PPM battery change;  Surgeon: Arelis Tucker MD;  Location:  [facility];  Service: Cardiology;  Laterality: N/A;    CARDIAC PACEMAKER PLACEMENT      CATARACT EXTRACTION      COLONOSCOPY      No family history of colon cancer.      COLONOSCOPY      HERNIA REPAIR      Type unknown    PACEMAKER IMPLANTATION      PROSTATE SURGERY           Social History     Socioeconomic History    Marital status:    Tobacco Use    Smoking status: Former     Packs/day: 1.00     Years: 30.00     Pack years: 30.00     Types: Cigarettes     Quit date: 8/15/2001     Years since quittin.8    Smokeless tobacco: Never    Tobacco comments:     quit 16 years ago   Vaping Use    Vaping Use: Never used   Substance and Sexual Activity    Alcohol  "use: No    Drug use: No    Sexual activity: Defer         Family History   Problem Relation Age of Onset    Cancer Mother     Diabetes Mother     Hypertension Mother     Stroke Mother     Stomach cancer Mother     Heart disease Mother     Stomach cancer Father     Cancer Father     Lung cancer Father        Objective  Physical Exam:  /59   Pulse 60   Temp 98 °F (36.7 °C) (Oral)   Resp 14   Ht 162.6 cm (64\")   Wt 109 kg (240 lb)   SpO2 96%   BMI 41.20 kg/m²      Physical Exam  Constitutional:       General: He is in acute distress.      Appearance: Normal appearance. He is obese. He is not ill-appearing.   HENT:      Head: Normocephalic.      Nose: Nose normal. No congestion or rhinorrhea.      Mouth/Throat:      Mouth: Mucous membranes are moist.      Pharynx: Oropharynx is clear.   Eyes:      Extraocular Movements: Extraocular movements intact.      Conjunctiva/sclera: Conjunctivae normal.      Pupils: Pupils are equal, round, and reactive to light.   Cardiovascular:      Rate and Rhythm: Normal rate and regular rhythm.      Pulses: Normal pulses.   Pulmonary:      Effort: Pulmonary effort is normal. No respiratory distress.      Breath sounds: Normal breath sounds.   Abdominal:      General: Abdomen is flat. Bowel sounds are normal. There is no distension.      Palpations: Abdomen is soft.      Tenderness: There is no abdominal tenderness. There is no guarding or rebound.   Musculoskeletal:         General: No swelling or tenderness. Normal range of motion.      Cervical back: Normal range of motion and neck supple. No rigidity or tenderness.      Right lower leg: Edema present.      Left lower leg: Edema present.   Skin:     General: Skin is warm and dry.      Capillary Refill: Capillary refill takes less than 2 seconds.   Neurological:      Mental Status: He is alert. He is disoriented.      Cranial Nerves: No cranial nerve deficit.      Sensory: No sensory deficit.      Motor: No weakness. "   Psychiatric:         Mood and Affect: Mood normal.         Behavior: Behavior normal.         Thought Content: Thought content normal.         Judgment: Judgment normal.       Procedures    ED Course:    ED Course as of 06/06/23 2006 Tue Jun 06, 2023   1609 EKG interpreted by me, V-paced rhythm with no concerning changes noted, rate of 60, abnormal EKG [JE]      ED Course User Index  [JE] Juan Antonio Flores MD       Lab Results (last 24 hours)       Procedure Component Value Units Date/Time    CBC & Differential [[id number]]  (Abnormal) Collected: 06/06/23 1601    Specimen: Blood Updated: 06/06/23 1614    Narrative:      The following orders were created for panel order CBC & Differential.  Procedure                               Abnormality         Status                     ---------                               -----------         ------                     CBC Auto Differential[[id number]]        Abnormal            Final result                 Please view results for these tests on the individual orders.    Comprehensive Metabolic Panel [[id number]]  (Abnormal) Collected: 06/06/23 1601    Specimen: Blood Updated: 06/06/23 1632     Glucose 85 mg/dL      BUN 17 mg/dL      Creatinine 1.39 mg/dL      Sodium 139 mmol/L      Potassium 4.3 mmol/L      Chloride 102 mmol/L      CO2 24.5 mmol/L      Calcium 9.0 mg/dL      Total Protein 6.2 g/dL      Albumin 3.6 g/dL      ALT (SGPT) 16 U/L      AST (SGOT) 18 U/L      Alkaline Phosphatase 83 U/L      Total Bilirubin 0.3 mg/dL      Globulin 2.6 gm/dL      A/G Ratio 1.4 g/dL      BUN/Creatinine Ratio 12.2     Anion Gap 12.5 mmol/L      eGFR 53.5 mL/min/1.73     Narrative:      GFR Normal >60  Chronic Kidney Disease <60  Kidney Failure <15    The GFR formula is only valid for adults with stable renal function between ages 18 and 70.    Lipase [[id number]]  (Normal) Collected: 06/06/23 1601    Specimen: Blood Updated: 06/06/23 1632     Lipase 47 U/L     High Sensitivity  "Troponin T [[id number]]  (Abnormal) Collected: 06/06/23 1601    Specimen: Blood Updated: 06/06/23 1635     HS Troponin T 20 ng/L     Narrative:      High Sensitive Troponin T Reference Range:  <10.0 ng/L- Negative Female for AMI  <15.0 ng/L- Negative Male for AMI  >=10 - Abnormal Female indicating possible myocardial injury.  >=15 - Abnormal Male indicating possible myocardial injury.   Clinicians would have to utilize clinical acumen, EKG, Troponin, and serial changes to determine if it is an Acute Myocardial Infarction or myocardial injury due to an underlying chronic condition.         BNP [[id number]]  (Normal) Collected: 06/06/23 1601    Specimen: Blood Updated: 06/06/23 1635     proBNP 608.0 pg/mL     Narrative:      Among patients with dyspnea, NT-proBNP is highly sensitive for the detection of acute congestive heart failure. In addition NT-proBNP of <300 pg/ml effectively rules out acute congestive heart failure with 99% negative predictive value.    Results may be falsely decreased if patient taking Biotin.      C-reactive Protein [[id number]]  (Normal) Collected: 06/06/23 1601    Specimen: Blood Updated: 06/06/23 1645     C-Reactive Protein <0.30 mg/dL     Procalcitonin [[id number]]  (Normal) Collected: 06/06/23 1601    Specimen: Blood Updated: 06/06/23 1639     Procalcitonin 0.05 ng/mL     Narrative:      As a Marker for Sepsis (Non-Neonates):    1. <0.5 ng/mL represents a low risk of severe sepsis and/or septic shock.  2. >2 ng/mL represents a high risk of severe sepsis and/or septic shock.    As a Marker for Lower Respiratory Tract Infections that require antibiotic therapy:    PCT on Admission    Antibiotic Therapy       6-12 Hrs later    >0.5                Strongly Recommended  >0.25 - <0.5        Recommended   0.1 - 0.25          Discouraged              Remeasure/reassess PCT  <0.1                Strongly Discouraged     Remeasure/reassess PCT    As 28 day mortality risk marker: \"Change in " "Procalcitonin Result\" (>80% or <=80%) if Day 0 (or Day 1) and Day 4 values are available. Refer to http://www.[facility]-pct-calculator.com    Change in PCT <=80%  A decrease of PCT levels below or equal to 80% defines a positive change in PCT test result representing a higher risk for 28-day all-cause mortality of patients diagnosed with severe sepsis for septic shock.    Change in PCT >80%  A decrease of PCT levels of more than 80% defines a negative change in PCT result representing a lower risk for 28-day all-cause mortality of patients diagnosed with severe sepsis or septic shock.       Magnesium [[id number]]  (Abnormal) Collected: 06/06/23 1601    Specimen: Blood Updated: 06/06/23 1632     Magnesium 1.5 mg/dL     Phosphorus [[id number]]  (Normal) Collected: 06/06/23 1601    Specimen: Blood Updated: 06/06/23 1632     Phosphorus 2.9 mg/dL     CBC Auto Differential [[id number]]  (Abnormal) Collected: 06/06/23 1601    Specimen: Blood Updated: 06/06/23 1614     WBC 5.62 10*3/mm3      RBC 4.30 10*6/mm3      Hemoglobin 13.0 g/dL      Hematocrit 39.5 %      MCV 91.9 fL      MCH 30.2 pg      MCHC 32.9 g/dL      RDW 13.0 %      RDW-SD 42.9 fl      MPV 10.7 fL      Platelets 191 10*3/mm3      Neutrophil % 51.8 %      Lymphocyte % 29.5 %      Monocyte % 11.4 %      Eosinophil % 5.5 %      Basophil % 0.9 %      Immature Grans % 0.9 %      Neutrophils, Absolute 2.91 10*3/mm3      Lymphocytes, Absolute 1.66 10*3/mm3      Monocytes, Absolute 0.64 10*3/mm3      Eosinophils, Absolute 0.31 10*3/mm3      Basophils, Absolute 0.05 10*3/mm3      Immature Grans, Absolute 0.05 10*3/mm3      nRBC 0.0 /100 WBC     COVID-19 and FLU A/B PCR - Swab, Nasopharynx [[id number]]  (Normal) Collected: 06/06/23 1604    Specimen: Swab from Nasopharynx Updated: 06/06/23 1700     COVID19 Not Detected     Influenza A PCR Not Detected     Influenza B PCR Not Detected    Narrative:      Fact sheet for providers: https://www.fda.gov/media/096172/download    Fact " sheet for patients: https://www.fda.gov/media/037593/download    Test performed by PCR.    Urinalysis With Culture If Indicated - Indwelling Urethral Catheter [[id number]]  (Normal) Collected: 06/06/23 1620    Specimen: Urine from Indwelling Urethral Catheter Updated: 06/06/23 1630     Color, UA Yellow     Appearance, UA Clear     pH, UA <=5.0     Specific Gravity, UA 1.007     Glucose, UA Negative     Ketones, UA Negative     Bilirubin, UA Negative     Blood, UA Negative     Protein, UA Negative     Leuk Esterase, UA Negative     Nitrite, UA Negative     Urobilinogen, UA 0.2 E.U./dL    Narrative:      In absence of clinical symptoms, the presence of pyuria, bacteria, and/or nitrites on the urinalysis result does not correlate with infection.  Urine microscopic not indicated.    High Sensitivity Troponin T 2Hr [[id number]]  (Abnormal) Collected: 06/06/23 1908    Specimen: Blood Updated: 06/06/23 1929     HS Troponin T 21 ng/L      Troponin T Delta 1 ng/L     Narrative:      High Sensitive Troponin T Reference Range:  <10.0 ng/L- Negative Female for AMI  <15.0 ng/L- Negative Male for AMI  >=10 - Abnormal Female indicating possible myocardial injury.  >=15 - Abnormal Male indicating possible myocardial injury.   Clinicians would have to utilize clinical acumen, EKG, Troponin, and serial changes to determine if it is an Acute Myocardial Infarction or myocardial injury due to an underlying chronic condition.                  CT Head Without Contrast    Result Date: 6/6/2023  FINAL REPORT TECHNIQUE: Routine axial images through the head were obtained without contrast. CLINICAL HISTORY: Head trauma, minor (Age >= 65y) FINDINGS: The ventricles are normal.  There is no mass or other abnormal hypodensity.  There is no shift of midline structures.  There is no intracranial hemorrhage.  No acute sinus or osseous abnormality is seen.     Impression: Unremarkable. Authenticated and Electronically Signed by Hua Bailey M.D. on  06/06/2023 06:40:11 PM    CT Cervical Spine Without Contrast    Result Date: 6/6/2023  FINAL REPORT TECHNIQUE: Thin section axial images were obtained through the cervical spine without contrast.  Coronal and sagittal reconstructed images were then provided. CLINICAL HISTORY: Neck trauma (Age >= 65y) FINDINGS: There is normal alignment and curvature.  Prevertebral soft tissues are normal.  There is no fracture.  There are moderate changes of degenerative disc disease at multiple levels.     Impression: No acute abnormality. Authenticated and Electronically Signed by Hua Bailey M.D. on 06/06/2023 06:55:05 PM    CT Lumbar Spine Without Contrast    Result Date: 6/6/2023  FINAL REPORT TECHNIQUE: Thin section axial images were obtained through the lumbar spine without contrast.  Coronal and sagittal reconstructed images were then provided. CLINICAL HISTORY: Back trauma, no prior imaging (Age >= 16y) FINDINGS: There is normal alignment and curvature.  There is no fracture or other acute osseous abnormality.  there is a large Schmorl's node in the superior end plate of L4 There is moderately severe degenerative disc disease at multiple levels.     Impression: No acute abnormality. Authenticated and Electronically Signed by Hua Bailey M.D. on 06/06/2023 06:54:12 PM        MDM     Amount and/or Complexity of Data Reviewed  Decide to obtain previous medical records or to obtain history from someone other than the patient: yes        Initial impression of presenting illness: Syncope, fall    DDX: includes but is not limited to: Hemorrhage, C-spine fracture, ACS, MI    Patient arrives stable with vitals interpreted by myself.     Pertinent features from physical exam: Patient altered with no focal weakness, complaining of back pain, no obvious murmur, does appear fluid overloaded on exam.    Initial diagnostic plan: CBC, CMP, troponin, ECG, chest x-ray, CT head C-spine, CT lumbar spine, COVID swab    CT imaging is all without  acute findings.  Lab work notable for mildly elevated troponin.  Baseline renal insufficiency.  Patient resting comfortably.  Has started diurese after Lasix.  Given his history of CHF and recurrent syncope advised observation.  Patient and wife are agreeable.  Discussed with Dr. Oneill who graciously accepts patient for admission.  -----    Final diagnoses:   Syncope, unspecified syncope type   History of congestive heart failure          Ronny Cardona MD  06/06/23 2007

## 2023-06-07 LAB
ANION GAP SERPL CALCULATED.3IONS-SCNC: 9.8 MMOL/L (ref 5–15)
BUN SERPL-MCNC: 18 MG/DL (ref 8–23)
BUN/CREAT SERPL: 10.8 (ref 7–25)
CALCIUM SPEC-SCNC: 9.2 MG/DL (ref 8.6–10.5)
CHLORIDE SERPL-SCNC: 100 MMOL/L (ref 98–107)
CO2 SERPL-SCNC: 26.2 MMOL/L (ref 22–29)
CREAT SERPL-MCNC: 1.66 MG/DL (ref 0.76–1.27)
DEPRECATED RDW RBC AUTO: 42.4 FL (ref 37–54)
EGFRCR SERPLBLD CKD-EPI 2021: 43.3 ML/MIN/1.73
ERYTHROCYTE [DISTWIDTH] IN BLOOD BY AUTOMATED COUNT: 12.9 % (ref 12.3–15.4)
GLUCOSE BLDC GLUCOMTR-MCNC: 129 MG/DL (ref 70–130)
GLUCOSE BLDC GLUCOMTR-MCNC: 140 MG/DL (ref 70–130)
GLUCOSE BLDC GLUCOMTR-MCNC: 165 MG/DL (ref 70–130)
GLUCOSE BLDC GLUCOMTR-MCNC: 87 MG/DL (ref 70–130)
GLUCOSE SERPL-MCNC: 87 MG/DL (ref 65–99)
HCT VFR BLD AUTO: 39.7 % (ref 37.5–51)
HGB BLD-MCNC: 13.2 G/DL (ref 13–17.7)
MCH RBC QN AUTO: 29.8 PG (ref 26.6–33)
MCHC RBC AUTO-ENTMCNC: 33.2 G/DL (ref 31.5–35.7)
MCV RBC AUTO: 89.6 FL (ref 79–97)
PLATELET # BLD AUTO: 103 10*3/MM3 (ref 140–450)
PMV BLD AUTO: 11.2 FL (ref 6–12)
POTASSIUM SERPL-SCNC: 4.5 MMOL/L (ref 3.5–5.2)
RBC # BLD AUTO: 4.43 10*6/MM3 (ref 4.14–5.8)
SODIUM SERPL-SCNC: 136 MMOL/L (ref 136–145)
WBC NRBC COR # BLD: 6.23 10*3/MM3 (ref 3.4–10.8)

## 2023-06-07 PROCEDURE — 94799 UNLISTED PULMONARY SVC/PX: CPT

## 2023-06-07 PROCEDURE — 82948 REAGENT STRIP/BLOOD GLUCOSE: CPT

## 2023-06-07 PROCEDURE — 97166 OT EVAL MOD COMPLEX 45 MIN: CPT

## 2023-06-07 PROCEDURE — 99214 OFFICE O/P EST MOD 30 MIN: CPT | Performed by: INTERNAL MEDICINE

## 2023-06-07 PROCEDURE — 80048 BASIC METABOLIC PNL TOTAL CA: CPT | Performed by: FAMILY MEDICINE

## 2023-06-07 PROCEDURE — G0378 HOSPITAL OBSERVATION PER HR: HCPCS

## 2023-06-07 PROCEDURE — 63710000001 INSULIN ASPART PER 5 UNITS: Performed by: FAMILY MEDICINE

## 2023-06-07 PROCEDURE — 97162 PT EVAL MOD COMPLEX 30 MIN: CPT

## 2023-06-07 PROCEDURE — 85027 COMPLETE CBC AUTOMATED: CPT | Performed by: FAMILY MEDICINE

## 2023-06-07 PROCEDURE — 63710000001 INSULIN DETEMIR PER 5 UNITS: Performed by: FAMILY MEDICINE

## 2023-06-07 PROCEDURE — 94761 N-INVAS EAR/PLS OXIMETRY MLT: CPT

## 2023-06-07 RX ORDER — GABAPENTIN 400 MG/1
400 CAPSULE ORAL NIGHTLY
Status: DISCONTINUED | OUTPATIENT
Start: 2023-06-07 | End: 2023-06-08 | Stop reason: HOSPADM

## 2023-06-07 RX ORDER — IPRATROPIUM BROMIDE AND ALBUTEROL SULFATE 2.5; .5 MG/3ML; MG/3ML
3 SOLUTION RESPIRATORY (INHALATION)
Status: DISCONTINUED | OUTPATIENT
Start: 2023-06-07 | End: 2023-06-08 | Stop reason: HOSPADM

## 2023-06-07 RX ADMIN — GABAPENTIN 600 MG: 300 CAPSULE ORAL at 00:12

## 2023-06-07 RX ADMIN — Medication 10 ML: at 10:10

## 2023-06-07 RX ADMIN — METOPROLOL SUCCINATE 50 MG: 50 TABLET, EXTENDED RELEASE ORAL at 09:08

## 2023-06-07 RX ADMIN — APIXABAN 5 MG: 5 TABLET, FILM COATED ORAL at 00:12

## 2023-06-07 RX ADMIN — GABAPENTIN 600 MG: 300 CAPSULE ORAL at 05:51

## 2023-06-07 RX ADMIN — IPRATROPIUM BROMIDE AND ALBUTEROL SULFATE 3 ML: .5; 3 SOLUTION RESPIRATORY (INHALATION) at 19:05

## 2023-06-07 RX ADMIN — Medication 10 ML: at 00:17

## 2023-06-07 RX ADMIN — ATORVASTATIN CALCIUM 20 MG: 20 TABLET, FILM COATED ORAL at 20:34

## 2023-06-07 RX ADMIN — IPRATROPIUM BROMIDE AND ALBUTEROL SULFATE 3 ML: .5; 3 SOLUTION RESPIRATORY (INHALATION) at 07:26

## 2023-06-07 RX ADMIN — SPIRONOLACTONE 50 MG: 25 TABLET, FILM COATED ORAL at 09:07

## 2023-06-07 RX ADMIN — ACETAMINOPHEN 650 MG: 325 TABLET, FILM COATED ORAL at 07:13

## 2023-06-07 RX ADMIN — AMIODARONE HYDROCHLORIDE 200 MG: 200 TABLET ORAL at 09:09

## 2023-06-07 RX ADMIN — IPRATROPIUM BROMIDE AND ALBUTEROL SULFATE 3 ML: .5; 3 SOLUTION RESPIRATORY (INHALATION) at 12:30

## 2023-06-07 RX ADMIN — GABAPENTIN 400 MG: 400 CAPSULE ORAL at 20:34

## 2023-06-07 RX ADMIN — APIXABAN 5 MG: 5 TABLET, FILM COATED ORAL at 20:34

## 2023-06-07 RX ADMIN — FLUOXETINE 20 MG: 20 CAPSULE ORAL at 09:11

## 2023-06-07 RX ADMIN — Medication 10 ML: at 20:34

## 2023-06-07 RX ADMIN — INSULIN DETEMIR 14 UNITS: 100 INJECTION, SOLUTION SUBCUTANEOUS at 20:54

## 2023-06-07 RX ADMIN — AMLODIPINE BESYLATE 10 MG: 5 TABLET ORAL at 09:08

## 2023-06-07 RX ADMIN — INSULIN ASPART 6 UNITS: 100 INJECTION, SOLUTION INTRAVENOUS; SUBCUTANEOUS at 09:11

## 2023-06-07 RX ADMIN — Medication 5 MG: at 00:13

## 2023-06-07 RX ADMIN — BUMETANIDE 0.5 MG: 1 TABLET ORAL at 09:10

## 2023-06-07 RX ADMIN — SENNOSIDES AND DOCUSATE SODIUM 2 TABLET: 50; 8.6 TABLET ORAL at 00:12

## 2023-06-07 RX ADMIN — HYDROCODONE BITARTRATE AND ACETAMINOPHEN 1 TABLET: 5; 325 TABLET ORAL at 07:46

## 2023-06-07 RX ADMIN — APIXABAN 5 MG: 5 TABLET, FILM COATED ORAL at 09:08

## 2023-06-07 RX ADMIN — INSULIN DETEMIR 17 UNITS: 100 INJECTION, SOLUTION SUBCUTANEOUS at 00:13

## 2023-06-07 RX ADMIN — PANTOPRAZOLE SODIUM 40 MG: 40 TABLET, DELAYED RELEASE ORAL at 05:51

## 2023-06-07 RX ADMIN — LISINOPRIL 40 MG: 20 TABLET ORAL at 09:09

## 2023-06-07 RX ADMIN — ATORVASTATIN CALCIUM 20 MG: 20 TABLET, FILM COATED ORAL at 00:12

## 2023-06-07 RX ADMIN — INSULIN ASPART 6 UNITS: 100 INJECTION, SOLUTION INTRAVENOUS; SUBCUTANEOUS at 17:23

## 2023-06-07 NOTE — CONSULTS
"[facility] Consult Note    Referring Provider: Nate  Reason for Consultation: Syncope    Patient Care Team:  Raj Sullivan MD as PCP - General (Internal Medicine)  Arelis Tucker MD as Consulting Physician (Cardiology)  Osiel Heaton MD as Consulting Physician (Urology)  Blayne Whitman MD as Consulting Physician (Ophthalmology)  Jose Hargrove MD as Consulting Physician (Pulmonary Disease)  John Solorzano MD as Consulting Physician (Urology)  Chace Vasquez MD, BRISA as Consulting Physician (Nephrology)    Chief complaint : Syncope    Subjective:    History of present illness: This is a 72-year-old male patient who was admitted after a syncopal episode at home.  The patient indicates that he had just gotten up out of bed and walk to a different room when he became severely lightheaded and lost consciousness.  He is unaware how long he was unconscious.  He had no prodrome of chest discomfort, shortness of breath, chest tightness, flushing, nausea or diaphoresis.  He had no loss of bowel or bladder control.  There was no witnessed seizure activity.  There was no \"post-ictal state\".  He has no history of seizure disorder.  He indicates he has never passed out before.  He has a history of a dual-chamber rate responsive pacemaker which is functioning normally.  He is followed by Dr. Tucker in the outpatient cardiology clinic at the [facility] for several decades.  He reports a remote history of heart catheterization with stent placement.  He has no known congestive heart failure or valvular heart disease by echocardiogram performed earlier this year.  He is currently asymptomatic.  His twelve-lead electrocardiogram showed an atrial paced rhythm with normal ventricular conduction.  There was no ischemic ST-T wave changes or injury current.  N-terminal proBNP was normal.  Cardiac troponins were minimally elevated in a \"plateau-type\" pattern consistent with his loss of " consciousness and advanced chronic renal failure.  There is little to no suspicion of an acute coronary syndrome.  No arrhythmia has been noted on cardiac monitor.    Review of Systems   Review of Systems   Constitutional: Negative for chills, diaphoresis, fever, malaise/fatigue, weight gain and weight loss.   HENT:  Negative for ear discharge, hearing loss, hoarse voice and nosebleeds.    Eyes:  Negative for discharge, double vision, pain and photophobia.   Cardiovascular:  Positive for syncope. Negative for chest pain, claudication, cyanosis, dyspnea on exertion, irregular heartbeat, leg swelling, near-syncope, orthopnea, palpitations and paroxysmal nocturnal dyspnea.   Respiratory:  Negative for cough, hemoptysis, sputum production and wheezing.    Endocrine: Negative for cold intolerance, heat intolerance, polydipsia, polyphagia and polyuria.   Hematologic/Lymphatic: Negative for adenopathy and bleeding problem. Does not bruise/bleed easily.   Skin:  Negative for color change, flushing, itching and rash.   Musculoskeletal:  Negative for muscle cramps, muscle weakness, myalgias and stiffness.   Gastrointestinal:  Negative for abdominal pain, diarrhea, hematemesis, hematochezia, nausea and vomiting.   Genitourinary:  Negative for dysuria, frequency and nocturia.   Neurological:  Negative for focal weakness, loss of balance, numbness, paresthesias and seizures.   Psychiatric/Behavioral:  Negative for altered mental status, hallucinations and suicidal ideas.    Allergic/Immunologic: Negative for HIV exposure, hives and persistent infections.     History  Past Medical History:   Diagnosis Date    Anxiety and depression     Arthritis     Backache     20 years    Benign prostatic hyperplasia     Bladder trauma     Cervicalgia     Chronic kidney disease     Cr up to 2.1    Coronary artery disease     Diabetes mellitus     15 years--    Emphysema of lung     Erectile dysfunction     Eye exam, routine 2015    FH: colonic  polyps     Gout     for 10 years--    History of echocardiogram 09/15/2014    History of tobacco use     with cessation x7 years    Hyperlipidemia     Hypertension     Migraine     Myocardial infarction     h/o coronary artery stenting--    Prostate cancer     Restless leg syndrome     20 years    Sleep apnea     12 years; uses a Bi-Pap    Stroke     Syncope 2017    Warfarin anticoagulation 2016   ,   Past Surgical History:   Procedure Laterality Date    BLADDER SURGERY      CARDIAC CATHETERIZATION  03/15/2013    revealing widely patent stents of the left circumflex and ramus intermedius coronary arteries, no significant disease is seen in any territory    CARDIAC CATHETERIZATION Left 2019    Procedure: Left Heart Cath;  Surgeon: Arelis Tucker MD;  Location:  [facility];  Service: Cardiology    CARDIAC ELECTROPHYSIOLOGY PROCEDURE N/A 5/10/2021    Procedure: PPM battery change;  Surgeon: Arelis Tucker MD;  Location:  [facility];  Service: Cardiology;  Laterality: N/A;    CARDIAC PACEMAKER PLACEMENT      CATARACT EXTRACTION      COLONOSCOPY      No family history of colon cancer.      COLONOSCOPY      HERNIA REPAIR      Type unknown    PACEMAKER IMPLANTATION      PROSTATE SURGERY     ,   Family History   Problem Relation Age of Onset    Cancer Mother     Diabetes Mother     Hypertension Mother     Stroke Mother     Stomach cancer Mother     Heart disease Mother     Stomach cancer Father     Cancer Father     Lung cancer Father    ,   Social History     Tobacco Use    Smoking status: Former     Packs/day: 1.00     Years: 30.00     Pack years: 30.00     Types: Cigarettes     Quit date: 8/15/2001     Years since quittin.8    Smokeless tobacco: Never    Tobacco comments:     quit 16 years ago   Vaping Use    Vaping Use: Never used   Substance Use Topics    Alcohol use: No    Drug use: No   , (Not in a hospital admission)   and  "Allergies:  Patient has no known allergies.    Objective:    Vital Sign Min/Max for last 24 hours  Temp  Min: 98 °F (36.7 °C)  Max: 98 °F (36.7 °C)   BP  Min: 101/48  Max: 142/73   Pulse  Min: 59  Max: 106   Resp  Min: 10  Max: 18   SpO2  Min: 86 %  Max: 98 %   Flow (L/min)  Min: 2  Max: 2   Weight  Min: 109 kg (240 lb)  Max: 109 kg (240 lb)     Flowsheet Rows      Flowsheet Row First Filed Value   Admission Height 162.6 cm (64\") Documented at 06/06/2023 1555   Admission Weight 109 kg (240 lb) Documented at 06/06/2023 1555                 Echo EF Estimated  Lab Results   Component Value Date    ECHOEFEST 57 01/26/2021         Physical Exam:   Vitals and nursing note reviewed.   Constitutional:       Appearance: Healthy appearance. Not in distress.   Neck:      Vascular: No JVR. JVD normal.   Pulmonary:      Effort: Pulmonary effort is normal.      Breath sounds: Normal breath sounds. No wheezing. No rhonchi. No rales.   Chest:      Chest wall: Not tender to palpatation.   Cardiovascular:      PMI at left midclavicular line. Normal rate. Regular rhythm. Normal S1. Normal S2.       Murmurs: There is no murmur.      No gallop.  No click. No rub.   Pulses:     Intact distal pulses.   Edema:     Peripheral edema absent.   Abdominal:      General: Bowel sounds are normal.      Palpations: Abdomen is soft.      Tenderness: There is no abdominal tenderness.   Musculoskeletal: Normal range of motion.         General: No tenderness. Skin:     General: Skin is warm and dry.   Neurological:      General: No focal deficit present.      Mental Status: Alert and oriented to person, place and time.       Results Review:   I reviewed the patient's new clinical results.  Results from last 7 days   Lab Units 06/07/23  0621 06/06/23  1601   WBC 10*3/mm3 6.23 5.62   HEMOGLOBIN g/dL 13.2 13.0   HEMATOCRIT % 39.7 39.5   PLATELETS 10*3/mm3 103* 191     Results from last 7 days   Lab Units 06/07/23  0621 06/06/23  1601   SODIUM mmol/L 136 " 139   POTASSIUM mmol/L 4.5 4.3   CHLORIDE mmol/L 100 102   CO2 mmol/L 26.2 24.5   BUN mg/dL 18 17   CREATININE mg/dL 1.66* 1.39*   GLUCOSE mg/dL 87 85   CALCIUM mg/dL 9.2 9.0     Lab Results   Lab Value Date/Time    TROPONINT 21 (H) 06/06/2023 1908    TROPONINT 20 (H) 06/06/2023 1601    TROPONINT 16 (H) 03/19/2023 1709    TROPONINT 22 (H) 03/19/2023 1314    TROPONINT <0.010 07/12/2022 0425    TROPONINT 0.010 07/11/2022 1349    TROPONINT 0.016 05/22/2022 0055    TROPONINT 0.018 04/16/2022 1616    TROPONINT <0.010 03/27/2022 2145    TROPONINT <0.010 02/06/2022 1748    TROPONINT <0.010 11/22/2021 2201    TROPONINT 0.011 05/04/2021 0617    TROPONINT 0.012 05/03/2021 1845    TROPONINT <0.010 05/03/2021 1728    TROPONINT 0.017 05/03/2021 1307    TROPONINT 0.035 (C) 01/25/2021 1941    TROPONINT 0.038 (C) 01/25/2021 1548    TROPONINT <0.010 09/29/2020 1812             Assessment/Plan:      Syncope, unspecified syncope type    Pacemaker    Chronic low back pain    Chronic kidney disease, stage III (moderate) (CMS/HCC)    Emphysema of lung (HCC)    Essential hypertension    Benign prostatic hyperplasia    Paroxysmal A-fib (HCC)    Hypercholesterolemia    Diastolic heart failure    Gastroesophageal reflux disease    Obstructive sleep apnea of adult    Diabetes mellitus    Multilevel degenerative disc disease      Agree with plans for echocardiogram.  Outpatient follow-up with regular established cardiologist of many years duration-Dr. Tucker at the [facility] location.    I discussed the patient's findings and my recommendations with patient    Facundo Ortega MD  06/07/23  10:48 EDT    Low vocabulary

## 2023-06-07 NOTE — PLAN OF CARE
Goal Outcome Evaluation:  Plan of Care Reviewed With: patient        Progress: no change  Outcome Evaluation: Pt seen for OT evaluation today.  Pt presents with pain, weakness and decreased independence with self care and functional mobility tasks.  Pt able to sit eob with min assist, noted to have dizziness with positional changes.  Pt stood with cga and walked 2' to head of bed with cga.  Pts /68 supine, 109/65 sitting and 119/72 standing.  Pt is expected to benefit from skilled OT to improve his strength and independence with ADL tasks.

## 2023-06-07 NOTE — THERAPY EVALUATION
Patient Name: Robe Bryant  : 1950    MRN: [id number]                              Today's Date: 2023       Admit Date: 2023    Visit Dx:     ICD-10-CM ICD-9-CM   1. Syncope, unspecified syncope type  R55 780.2   2. History of congestive heart failure  Z86.79 V12.59     Patient Active Problem List   Diagnosis    Pacemaker    Neck pain    Chronic low back pain    Chronic kidney disease, stage III (moderate) (CMS/HCC)    Emphysema of lung (HCC)    Essential hypertension    Seborrheic dermatitis    Benign prostatic hyperplasia    Paroxysmal A-fib (HCC)    Hypercholesterolemia    Diastolic heart failure    COPD with acute exacerbation (HCC)    Coronary artery disease involving native coronary artery of native heart with angina pectoris    Gastroesophageal reflux disease    Chronic shoulder pain    Obstructive sleep apnea of adult    Dysphagia    Diabetes mellitus    Chronic chest pain    Restless leg syndrome    Osteoarthritis of multiple joints    Multilevel degenerative disc disease    Moderate episode of recurrent major depressive disorder    Chronic pain syndrome    DDD (degenerative disc disease), cervical    BPH (benign prostatic hypertrophy) with urinary retention    Morbid obesity (HCC)    Unstable angina    Panlobular emphysema (HCC)    Leg cramps    Chronic pain of both knees    Atrial fibrillation with RVR    Acute on chronic diastolic heart failure (HCC)    Pacemaker at end of battery life    Acute on chronic congestive heart failure    RMSF ([place] spotted fever)    Elevated uric acid in blood    Arthralgia    Pneumonia of right middle lobe due to infectious organism    Chronic respiratory failure with hypoxia    Acute on chronic respiratory failure with hypoxia    Acute on chronic respiratory failure with hypoxia    Syncope, unspecified syncope type     Past Medical History:   Diagnosis Date    Anxiety and depression     Arthritis     Backache     20 years    Benign  prostatic hyperplasia     Bladder trauma     Cervicalgia     Chronic kidney disease     Cr up to 2.1    Coronary artery disease     Diabetes mellitus     15 years--    Emphysema of lung     Erectile dysfunction     Eye exam, routine 2015    FH: colonic polyps     Gout     for 10 years--    History of echocardiogram 09/15/2014    History of tobacco use     with cessation x7 years    Hyperlipidemia     Hypertension     Migraine     Myocardial infarction     h/o coronary artery stenting--    Prostate cancer     Restless leg syndrome     20 years    Sleep apnea     12 years; uses a Bi-Pap    Stroke     Syncope 4/28/2017    Warfarin anticoagulation 9/14/2016     Past Surgical History:   Procedure Laterality Date    BLADDER SURGERY      CARDIAC CATHETERIZATION  03/15/2013    revealing widely patent stents of the left circumflex and ramus intermedius coronary arteries, no significant disease is seen in any territory    CARDIAC CATHETERIZATION Left 9/30/2019    Procedure: Left Heart Cath;  Surgeon: Arelis Tucker MD;  Location:  [facility] LOC[facility];  Service: Cardiology    CARDIAC ELECTROPHYSIOLOGY PROCEDURE N/A 5/10/2021    Procedure: PPM battery change;  Surgeon: Arelis Tucker MD;  Location:  [facility] LOC[facility];  Service: Cardiology;  Laterality: N/A;    CARDIAC PACEMAKER PLACEMENT  2012    CATARACT EXTRACTION      COLONOSCOPY  2004    No family history of colon cancer.      COLONOSCOPY  2015    HERNIA REPAIR      Type unknown    PACEMAKER IMPLANTATION      PROSTATE SURGERY        General Information       Row Name 06/07/23 1344          Physical Therapy Time and Intention    Document Type evaluation  -MS     Mode of Treatment physical therapy  -MS       Row Name 06/07/23 1344          General Information    Patient Profile Reviewed yes  -MS     Prior Level of Function independent:;all household mobility  -MS     Barriers to Rehab medically complex;previous functional deficit  -MS        Row Name 06/07/23 1344          Living Environment    People in Home spouse  -MS       Row Name 06/07/23 1344          Home Main Entrance    Number of Stairs, Main Entrance one  -MS     Stair Railings, Main Entrance none  -MS       Row Name 06/07/23 1344          Cognition    Orientation Status (Cognition) oriented x 4  -MS       Row Name 06/07/23 1344          Safety Issues, Functional Mobility    Safety Issues Affecting Function (Mobility) sequencing abilities;insight into deficits/self-awareness;safety precautions follow-through/compliance;awareness of need for assistance;safety precaution awareness  -MS     Impairments Affecting Function (Mobility) balance;endurance/activity tolerance;range of motion (ROM);shortness of breath;muscle tone abnormal;strength;pain  -MS               User Key  (r) = Recorded By, (t) = Taken By, (c) = Cosigned By      Initials Name Provider Type    Brian Patten PT Physical Therapist                   Mobility       Row Name 06/07/23 1345          Bed Mobility    Bed Mobility supine-sit;sit-supine  -MS     Supine-Sit [place] (Bed Mobility) contact guard  -MS     Sit-Supine [place] (Bed Mobility) minimum assist (75% patient effort)  -MS     Assistive Device (Bed Mobility) bed rails;draw sheet;head of bed elevated  -MS       Row Name 06/07/23 1345          Sit-Stand Transfer    Sit-Stand [place] (Transfers) standby assist  -MS     Assistive Device (Sit-Stand Transfers) other (see comments)  gait belt  -MS       Row Name 06/07/23 1345          Gait/Stairs (Locomotion)    [place] Level (Gait) contact guard  -MS     Assistive Device (Gait) other (see comments)  gait belt  -MS     Distance in Feet (Gait) 2  -MS     Deviations/Abnormal Patterns (Gait) festinating/shuffling;sulaiman decreased  -MS               User Key  (r) = Recorded By, (t) = Taken By, (c) = Cosigned By      Initials Name Provider Type    Brian Patten PT Physical Therapist                    Obj/Interventions       Row Name 06/07/23 1350          Range of Motion Comprehensive    General Range of Motion bilateral lower extremity ROM WFL  -MS       Row Name 06/07/23 1350          Strength Comprehensive (MMT)    General Manual Muscle Testing (MMT) Assessment lower extremity strength deficits identified  -MS     Comment, General Manual Muscle Testing (MMT) Assessment B LE 3+/5  -MS       Row Name 06/07/23 1350          Sensory Assessment (Somatosensory)    Sensory Assessment (Somatosensory) sensation intact  -MS               User Key  (r) = Recorded By, (t) = Taken By, (c) = Cosigned By      Initials Name Provider Type    Brian Patten, PT Physical Therapist                   Goals/Plan       Row Name 06/07/23 1357          Bed Mobility Goal 1 (PT)    Activity/Assistive Device (Bed Mobility Goal 1, PT) bed mobility activities, all  -MS     [place] Level/Cues Needed (Bed Mobility Goal 1, PT) modified independence  -MS     Time Frame (Bed Mobility Goal 1, PT) long term goal (LTG);2 weeks  -MS       Row Name 06/07/23 1357          Transfer Goal 1 (PT)    Activity/Assistive Device (Transfer Goal 1, PT) transfers, all;sit-to-stand/stand-to-sit  -MS     [place] Level/Cues Needed (Transfer Goal 1, PT) standby assist  -MS     Time Frame (Transfer Goal 1, PT) long term goal (LTG);2 weeks  -MS       [facility] Name 06/07/23 1354          Gait Training Goal 1 (PT)    Activity/Assistive Device (Gait Training Goal 1, PT) gait (walking locomotion);assistive device use  -MS     [place] Level (Gait Training Goal 1, PT) standby assist  -MS     Distance (Gait Training Goal 1, PT) 200 ft  -MS     Time Frame (Gait Training Goal 1, PT) long term goal (LTG);2 weeks  -MS       Row Name 06/07/23 135          Patient Education Goal (PT)    Activity (Patient Education Goal, PT) ther exer x15 reps ea  -MS     [place]/Cues/Accuracy (Memory Goal 2, PT) demonstrates adequately  -MS     Time Frame (Patient  Education Goal, PT) long term goal (LTG);2 weeks  -MS       Row Name 06/07/23 4201          Therapy Assessment/Plan (PT)    Planned Therapy Interventions (PT) balance training;bed mobility training;gait training;home exercise program;transfer training;ROM (range of motion);stair training;strengthening;patient/family education  -MS               User Key  (r) = Recorded By, (t) = Taken By, (c) = Cosigned By      Initials Name Provider Type    MS Brian Branham, PT Physical Therapist                   Clinical Impression       Row Name 06/07/23 1351          Pain    Pretreatment Pain Rating 8/10  -MS     Posttreatment Pain Rating 8/10  -MS     Pain Location - Side/Orientation Left  -MS     Pain Location lower  -MS     Pain Location - extremity  -MS     Pain Intervention(s) Repositioned  -MS       Row Name 06/07/23 1351          Plan of Care Review    Plan of Care Reviewed With patient  -MS     Progress no change  -MS     Outcome Evaluation Pt participated in PT eval this date. Pt required Summer for sup-sit transfer. Pt sat EOB and reported mild dizziness, instructed Pt to keep eyes open. Pt's BP was 109/65 in supine, then 109/65 in sitting. Pt then stood with CGA, /72. Pt is expected to benefit from skilled PTx during this inpatient stay.  -MS       Row Name 06/07/23 4452          Therapy Assessment/Plan (PT)    Patient/Family Therapy Goals Statement (PT) to go home  -MS     Rehab Potential (PT) good, to achieve stated therapy goals  -MS     Criteria for Skilled Interventions Met (PT) yes;meets criteria  -MS     Therapy Frequency (PT) 5 times/wk  -MS       Row Name 06/07/23 1351          Vital Signs    Pre Systolic BP Rehab 113  -MS     Pre Treatment Diastolic BP 68  -MS     Intra Systolic BP Rehab 109  -MS     Intra Treatment Diastolic BP 65  -MS     Post Systolic BP Rehab 119  -MS     Post Treatment Diastolic BP 72  -MS     Pre SpO2 (%) 95  -MS     O2 Delivery Pre Treatment supplemental O2  2L  -MS     O2  Delivery Intra Treatment supplemental O2  -MS     Post SpO2 (%) 94  -MS     O2 Delivery Post Treatment supplemental O2  2L  -MS     Pre Patient Position Supine  -MS     Intra Patient Position Standing  -MS     Post Patient Position Supine  -MS       Row Name 06/07/23 1350          Positioning and Restraints    Pre-Treatment Position in bed  -MS     Post Treatment Position bed  -MS     In Bed fowlers;encouraged to call for assist;call light within reach  -MS               User Key  (r) = Recorded By, (t) = Taken By, (c) = Cosigned By      Initials Name Provider Type    Brian Patten PT Physical Therapist                   Outcome Measures       Row Name 06/07/23 1400          How much help from another person do you currently need...    Turning from your back to your side while in flat bed without using bedrails? 3  -MS     Moving from lying on back to sitting on the side of a flat bed without bedrails? 3  -MS     Moving to and from a bed to a chair (including a wheelchair)? 3  -MS     Standing up from a chair using your arms (e.g., wheelchair, bedside chair)? 3  -MS     Climbing 3-5 steps with a railing? 3  -MS     To walk in hospital room? 3  -MS     AM-PAC 6 Clicks Score (PT) 18  -MS     Highest level of mobility 6 --> Walked 10 steps or more  -MS       Row Name 06/07/23 1400          Functional Assessment    Outcome Measure Options AM-PAC 6 Clicks Basic Mobility (PT)  -MS               User Key  (r) = Recorded By, (t) = Taken By, (c) = Cosigned By      Initials Name Provider Type    Brian Patten PT Physical Therapist                                 Physical Therapy Education       Title: PT OT SLP Therapies (In Progress)       Topic: Physical Therapy (In Progress)       Point: Mobility training (Done)       Learning Progress Summary             Patient Acceptance, E, VU by MS at 6/7/2023 1400    Comment: importance of mobility                         Point: Home exercise program (Done)        Learning Progress Summary             Patient Acceptance, E, VU by MS at 6/7/2023 1400    Comment: importance of mobility                         Point: Body mechanics (Not Started)       Learner Progress:  Not documented in this visit.              Point: Precautions (Not Started)       Learner Progress:  Not documented in this visit.                              User Key       Initials Effective Dates Name Provider Type Discipline    MS 07/01/22 -  Brian Branham PT Physical Therapist PT                  PT Recommendation and Plan  Planned Therapy Interventions (PT): balance training, bed mobility training, gait training, home exercise program, transfer training, ROM (range of motion), stair training, strengthening, patient/family education  Plan of Care Reviewed With: patient  Progress: no change  Outcome Evaluation: Pt participated in PT eval this date. Pt required Summer for sup-sit transfer. Pt sat EOB and reported mild dizziness, instructed Pt to keep eyes open. Pt's BP was 109/65 in supine, then 109/65 in sitting. Pt then stood with CGA, /72. Pt is expected to benefit from skilled PTx during this inpatient stay.     Time Calculation:    PT Charges       Row Name 06/07/23 1401             Time Calculation    Start Time 1038  -MS      PT Received On 06/07/23  -MS      PT Goal Re-Cert Due Date 06/17/23  -MS         Untimed Charges    PT Eval/Re-eval Minutes 45  -MS         Total Minutes    Untimed Charges Total Minutes 45  -MS       Total Minutes 45  -MS                User Key  (r) = Recorded By, (t) = Taken By, (c) = Cosigned By      Initials Name Provider Type    MS Brian Branham, PT Physical Therapist                  Therapy Charges for Today       Code Description Service Date Service Provider Modifiers Qty    [id number] HC PT EVAL MOD COMPLEXITY 3 6/7/2023 Brian Branham, PT GP 1            PT G-Codes  Outcome Measure Options: AM-PAC 6 Clicks Basic Mobility (PT)  AM-PAC 6 Clicks Score (PT):  18  PT Discharge Summary  Anticipated Discharge Disposition (PT): home with assist, home with home health, skilled nursing facility, inpatient rehabilitation facility    Brian Branham, PT  6/7/2023

## 2023-06-07 NOTE — CASE MANAGEMENT/SOCIAL WORK
Discharge Planning Assessment  [facility]     Patient Name: Robe Bryant  MRN: [id number]  Today's Date: 2023    Admit Date: 2023    Plan: Pt plans to return home at d/c with wife Berny.  Family will provide transport.   Discharge Needs Assessment       Row Name 23 0900       Living Environment    People in Home spouse    Name(s) of People in Home Lives with wife Berny    Current Living Arrangements home    Duration at Residence 3 years    Potentially Unsafe Housing Conditions none    Primary Care Provided by self;spouse/significant other    Provides Primary Care For no one, unable/limited ability to care for self    Family Caregiver if Needed spouse    Family Caregiver Names Maronne- wife    Able to Return to Prior Arrangements yes    Living Arrangement Comments Lives at home with wife Berny.  Spouse assist pt with ADLs.       Resource/Environmental Concerns    Transportation Concerns none       Transition Planning    Patient/Family Anticipates Transition to home with family    Patient/Family Anticipated Services at Transition none    Transportation Anticipated family or friend will provide       Discharge Needs Assessment    Readmission Within the Last 30 Days no previous admission in last 30 days    Equipment Currently Used at Home wheelchair;cane, quad;shower chair;oxygen;commode;glucometer;nebulizer    Concerns to be Addressed denies needs/concerns at this time    Equipment Needed After Discharge cane, straight                   Discharge Plan       Row Name 23 0902       Plan    Plan Pt plans to return home at d/c with wife Berny.  Family will provide transport.    Plan Comments Pt verified name, , address, and phone number.  Pt stated that he lives at home with wife Berny. They have lived at the current residence for 3 years. Pt advised he doesn't have any steps.  Pt denies LW/ POA or VA. PCP is Dr. Sullivan and pharmacy is [facility] in [place]. Pt on 1L o2, provided by  Emily.  DME: wc, quad cane, shower chair, bsc, glucometer, nebulizer.  Pt states his nebulizer and glucometer are new. Pt states all supplies are covered by insurance. Pt interested in obtaining a new cane on d/c.  Pt advised he has a quad cane but he doesn't feel as steady as his straight cane.  Pt recently left it in a shopping cart at the store.  Pt states that his wife Berny assistes with any ADLs that he is unable to do by himself.  Pt denies any other needs or questions at this time from CM.  CM will continue to follow until d\c.    Final Discharge Disposition Code 01 - home or self-care                  Continued Care and Services - Admitted Since 6/6/2023    Coordination has not been started for this encounter.          Demographic Summary       Row Name 06/07/23 0858       General Information    Admission Type observation    Arrived From emergency department    Referral Source emergency department    Reason for Consult discharge planning    Preferred Language English       Contact Information    Permission Granted to Share Info With family/designee    Contact Information Obtained for                    Functional Status       Row Name 06/07/23 0858       Functional Status    Usual Activity Tolerance fair    Current Activity Tolerance fair       Physical Activity    On average, how many days per week do you engage in moderate to strenuous exercise (like a brisk walk)? 0 days    On average, how many minutes do you engage in exercise at this level? 0 min    Number of minutes of exercise per week 0       Functional Status, IADL    Medications independent    Meal Preparation assistive person    Housekeeping assistive person    Laundry assistive person    Shopping assistive person;independent    IADL Comments Pt states that he does what he is capable of and his wife Berny helps with the rest.       Mental Status    General Appearance WDL WDL       Mental Status Summary    Recent Changes in Mental  Status/Cognitive Functioning no changes       Employment/    Employment Status disabled                   Psychosocial       Row Name 06/07/23 0859       Speech WDL    Speech WDL WDL       Coping/Stress    Major Change/Loss/Stressor none    Patient Personal Strengths able to adapt    Sources of Support spouse    Techniques to [place] with Loss/Stress/Change not applicable    Reaction to Health Status accepting    Understanding of Condition and Treatment adequate understanding of medical condition;adequate understanding of treatment       Developmental Stage (Eriksson's)    Developmental Stage Stage 8 (64 years-death/Late Adulthood) Integrity vs. Despair                   Abuse/Neglect    No documentation.                  Legal    No documentation.                  Substance Abuse    No documentation.                  Patient Forms    No documentation.                     Stephanie Duke

## 2023-06-07 NOTE — THERAPY EVALUATION
Patient Name: Robe Bryant  : 1950    MRN: [id number]                              Today's Date: 2023       Admit Date: 2023    Visit Dx:     ICD-10-CM ICD-9-CM   1. Syncope, unspecified syncope type  R55 780.2   2. History of congestive heart failure  Z86.79 V12.59     Patient Active Problem List   Diagnosis    Pacemaker    Neck pain    Chronic low back pain    Chronic kidney disease, stage III (moderate) (CMS/HCC)    Emphysema of lung (HCC)    Essential hypertension    Seborrheic dermatitis    Benign prostatic hyperplasia    Paroxysmal A-fib (HCC)    Hypercholesterolemia    Diastolic heart failure    COPD with acute exacerbation (HCC)    Coronary artery disease involving native coronary artery of native heart with angina pectoris    Gastroesophageal reflux disease    Chronic shoulder pain    Obstructive sleep apnea of adult    Dysphagia    Diabetes mellitus    Chronic chest pain    Restless leg syndrome    Osteoarthritis of multiple joints    Multilevel degenerative disc disease    Moderate episode of recurrent major depressive disorder    Chronic pain syndrome    DDD (degenerative disc disease), cervical    BPH (benign prostatic hypertrophy) with urinary retention    Morbid obesity (HCC)    Unstable angina    Panlobular emphysema (HCC)    Leg cramps    Chronic pain of both knees    Atrial fibrillation with RVR    Acute on chronic diastolic heart failure (HCC)    Pacemaker at end of battery life    Acute on chronic congestive heart failure    RMSF ([place] spotted fever)    Elevated uric acid in blood    Arthralgia    Pneumonia of right middle lobe due to infectious organism    Chronic respiratory failure with hypoxia    Acute on chronic respiratory failure with hypoxia    Acute on chronic respiratory failure with hypoxia    Syncope, unspecified syncope type     Past Medical History:   Diagnosis Date    Anxiety and depression     Arthritis     Backache     20 years    Benign  prostatic hyperplasia     Bladder trauma     Cervicalgia     Chronic kidney disease     Cr up to 2.1    Coronary artery disease     Diabetes mellitus     15 years--    Emphysema of lung     Erectile dysfunction     Eye exam, routine 2015    FH: colonic polyps     Gout     for 10 years--    History of echocardiogram 09/15/2014    History of tobacco use     with cessation x7 years    Hyperlipidemia     Hypertension     Migraine     Myocardial infarction     h/o coronary artery stenting--    Prostate cancer     Restless leg syndrome     20 years    Sleep apnea     12 years; uses a Bi-Pap    Stroke     Syncope 4/28/2017    Warfarin anticoagulation 9/14/2016     Past Surgical History:   Procedure Laterality Date    BLADDER SURGERY      CARDIAC CATHETERIZATION  03/15/2013    revealing widely patent stents of the left circumflex and ramus intermedius coronary arteries, no significant disease is seen in any territory    CARDIAC CATHETERIZATION Left 9/30/2019    Procedure: Left Heart Cath;  Surgeon: Arelis Tucker MD;  Location:  [facility];  Service: Cardiology    CARDIAC ELECTROPHYSIOLOGY PROCEDURE N/A 5/10/2021    Procedure: PPM battery change;  Surgeon: Arelis Tucker MD;  Location:  [facility];  Service: Cardiology;  Laterality: N/A;    CARDIAC PACEMAKER PLACEMENT  2012    CATARACT EXTRACTION      COLONOSCOPY  2004    No family history of colon cancer.      COLONOSCOPY  2015    HERNIA REPAIR      Type unknown    PACEMAKER IMPLANTATION      PROSTATE SURGERY        General Information       Row Name 06/07/23 1451          OT Time and Intention    Document Type evaluation  -     Mode of Treatment occupational therapy  -       Row Name 06/07/23 1451          General Information    Patient Profile Reviewed yes  -AH     Prior Level of Function independent:;ADL's;all household mobility  -     Existing Precautions/Restrictions fall;oxygen therapy device and L/min  -      Barriers to Rehab medically complex;previous functional deficit  -       Row Name 06/07/23 1451          Occupational Profile    Reason for Services/Referral (Occupational Profile) ADL decline  -[facility] Name 06/07/23 1451          Living Environment    People in Home spouse  -[facility] Name 06/07/23 1451          Home Main Entrance    Number of Stairs, Main Entrance one  -     Stair Railings, Main Entrance none  -       Row Name 06/07/23 1451          Cognition    Orientation Status (Cognition) oriented x 4  -[facility] Name 06/07/23 1451          Safety Issues, Functional Mobility    Safety Issues Affecting Function (Mobility) safety precaution awareness;safety precautions follow-through/compliance  -     Impairments Affecting Function (Mobility) balance;endurance/activity tolerance;range of motion (ROM);shortness of breath;muscle tone abnormal;strength;pain  -               User Key  (r) = Recorded By, (t) = Taken By, (c) = Cosigned By      Initials Name Provider Type     Kimberly Hart Occupational Therapist                     Mobility/ADL's       Row Name 06/07/23 1452          Bed Mobility    Bed Mobility supine-sit;sit-supine  -     Supine-Sit [place] (Bed Mobility) minimum assist (75% patient effort)  -     Sit-Supine [place] (Bed Mobility) contact guard  -     Assistive Device (Bed Mobility) bed rails;draw sheet;head of bed elevated  -[facility] Name 06/07/23 1452          Sit-Stand Transfer    Sit-Stand [place] (Transfers) standby assist;contact guard  [facility]     Assistive Device (Sit-Stand Transfers) other (see comments)  gait belt  -       Row Name 06/07/23 1452          Functional Mobility    Functional Mobility- Ind. Level contact guard assist  -     Functional Mobility- Device other (see comments)  gait belt  -     Functional Mobility-Distance (Feet) 2  -     Functional Mobility- Safety Issues supplemental O2  -       Row Name 06/07/23 1452           Activities of Daily Living    BADL Assessment/Intervention bathing;upper body dressing;lower body dressing;grooming;feeding;toileting  -[facility] Name 06/07/23 1452          Bathing Assessment/Intervention    [place] Level (Bathing) moderate assist (50% patient effort)  -[facility] Name 06/07/23 1452          Upper Body Dressing Assessment/Training    [place] Level (Upper Body Dressing) set up  -[facility] Name 06/07/23 1452          Lower Body Dressing Assessment/Training    [place] Level (Lower Body Dressing) moderate assist (50% patient effort)  -[facility] Name 06/07/23 1452          Grooming Assessment/Training    [place] Level (Grooming) set up  -[facility] Name 06/07/23 1452          Self-Feeding Assessment/Training    [place] Level (Feeding) set up  -[facility] Name 06/07/23 1452          Toileting Assessment/Training    [place] Level (Toileting) moderate assist (50% patient effort)  -     Comment, (Toileting) indwelling catheter  -               User Key  (r) = Recorded By, (t) = Taken By, (c) = Cosigned By      Initials Name Provider Type    Kimberly Roca Occupational Therapist                   Obj/Interventions       [facility] Name 06/07/23 1455          Sensory Assessment (Somatosensory)    Sensory Assessment (Somatosensory) sensation intact  -AH       Row Name 06/07/23 1455          Vision Assessment/Intervention    Visual Impairment/Limitations WFL;corrective lenses for reading  -[facility] Name 06/07/23 1455          Range of Motion Comprehensive    General Range of Motion bilateral upper extremity ROM L  -[facility] Name 06/07/23 1455          Strength Comprehensive (MMT)    Comment, General Manual Muscle Testing (MMT) Assessment BUE [facility]  -               User Key  (r) = Recorded By, (t) = Taken By, (c) = Cosigned By      Initials Name Provider Type    Kimberly Roca Occupational Therapist                   Goals/Plan       [facility] Name 06/07/23  1502          Bed Mobility Goal 1 (OT)    Activity/Assistive Device (Bed Mobility Goal 1, OT) bed mobility activities, all  -     [place] Level/Cues Needed (Bed Mobility Goal 1, OT) supervision required  -AH     Time Frame (Bed Mobility Goal 1, OT) by discharge  -     Progress/Outcomes (Bed Mobility Goal 1, OT) goal ongoing  -       Row Name 06/07/23 1502          Transfer Goal 1 (OT)    Activity/Assistive Device (Transfer Goal 1, OT) sit-to-stand/stand-to-sit;walker, rolling  -AH     [place] Level/Cues Needed (Transfer Goal 1, OT) standby assist  -AH     Time Frame (Transfer Goal 1, OT) by discharge  -     Progress/Outcome (Transfer Goal 1, OT) goal ongoing  -       Row Name 06/07/23 1502          Bathing Goal 1 (OT)    Activity/Device (Bathing Goal 1, OT) bathing skills, all  -     [place] Level/Cues Needed (Bathing Goal 1, OT) set-up required  -AH     Time Frame (Bathing Goal 1, OT) long term goal (LTG);5 days  -     Progress/Outcomes (Bathing Goal 1, OT) goal ongoing  -       Row Name 06/07/23 1502          Dressing Goal 1 (OT)    Activity/Device (Dressing Goal 1, OT) dressing skills, all  -AH     [place]/Cues Needed (Dressing Goal 1, OT) set-up required  -AH     Time Frame (Dressing Goal 1, OT) long term goal (LTG);5 days  -     Progress/Outcome (Dressing Goal 1, OT) goal ongoing  -       Row Name 06/07/23 1502          Strength Goal 1 (OT)    Strength Goal 1 (OT) Pt will perform UB strengthening ex using theraband for resistance.  -AH     Time Frame (Strength Goal 1, OT) by discharge  -     Progress/Outcome (Strength Goal 1, OT) goal ongoing  -       Row Name 06/07/23 1502          Therapy Assessment/Plan (OT)    Planned Therapy Interventions (OT) activity tolerance training;BADL retraining;patient/caregiver education/training;transfer/mobility retraining;strengthening exercise  -               User Key  (r) = Recorded By, (t) = Taken By, (c) = Cosigned By       Initials Name Provider Type     Kimberly Hart Occupational Therapist                   Clinical Impression       Row Name 06/07/23 1456          Pain Assessment    Pretreatment Pain Rating 8/10  -     Posttreatment Pain Rating 8/10  -     Pain Location - Side/Orientation Left  -     Pain Location lower  -     Pain Location - extremity  -     Pain Intervention(s) Repositioned;Ambulation/increased activity  -       Row Name 06/07/23 1456          Plan of Care Review    Plan of Care Reviewed With patient  -     Progress no change  -     Outcome Evaluation Pt seen for OT evaluation today.  Pt presents with pain, weakness and decreased independence with self care and functional mobility tasks.  Pt able to sit eob with min assist, noted to have dizziness with positional changes.  Pt stood with cga and walked 2' to head of bed with cga.  Pts /68 supine, 109/65 sitting and 119/72 standing.  Pt is expected to benefit from skilled OT to improve his strength and independence with ADL tasks.  -       Row Name 06/07/23 1456          Therapy Assessment/Plan (OT)    Patient/Family Therapy Goal Statement (OT) d/c home  -     Rehab Potential (OT) good, to achieve stated therapy goals  -     Criteria for Skilled Therapeutic Interventions Met (OT) yes;skilled treatment is necessary  -     Therapy Frequency (OT) 3 times/wk  -       Row Name 06/07/23 1456          Therapy Plan Review/Discharge Plan (OT)    Anticipated Discharge Disposition (OT) home with home health;inpatient rehabilitation facility  -       Row Name 06/07/23 1456          Vital Signs    Pre Systolic BP Rehab 113  -AH     Pre Treatment Diastolic BP 68  -AH     Intra Systolic BP Rehab 109  -AH     Intra Treatment Diastolic BP 65  -AH     Post Systolic BP Rehab 119  -AH     Post Treatment Diastolic BP 72  -AH     Pre SpO2 (%) 95  -AH     O2 Delivery Pre Treatment supplemental O2  2L  -AH     O2 Delivery Intra Treatment supplemental O2   -AH     Post SpO2 (%) 94  -AH     O2 Delivery Post Treatment supplemental O2  -AH     Pre Patient Position Supine  -AH     Intra Patient Position Sitting  -AH     Post Patient Position Standing  -AH       Row Name 06/07/23 1456          Positioning and Restraints    Pre-Treatment Position in bed  -AH     Post Treatment Position bed  -AH     In Bed fowlers;call light within reach;encouraged to call for assist  -               User Key  (r) = Recorded By, (t) = Taken By, (c) = Cosigned By      Initials Name Provider Type    Kimberly Roca Occupational Therapist                   Outcome Measures       Row Name 06/07/23 1503          How much help from another is currently needed...    Putting on and taking off regular lower body clothing? 2  -AH     Bathing (including washing, rinsing, and drying) 2  -AH     Toileting (which includes using toilet bed pan or urinal) 2  -AH     Putting on and taking off regular upper body clothing 3  -AH     Taking care of personal grooming (such as brushing teeth) 3  -AH     Eating meals 4  -     AM-PAC 6 Clicks Score (OT) 16  -AH       Row Name 06/07/23 1400          How much help from another person do you currently need...    Turning from your back to your side while in flat bed without using bedrails? 3  -MS     Moving from lying on back to sitting on the side of a flat bed without bedrails? 3  -MS     Moving to and from a bed to a chair (including a wheelchair)? 3  -MS     Standing up from a chair using your arms (e.g., wheelchair, bedside chair)? 3  -MS     Climbing 3-5 steps with a railing? 3  -MS     To walk in hospital room? 3  -MS     AM-PAC 6 Clicks Score (PT) 18  -MS     Highest level of mobility 6 --> Walked 10 steps or more  -MS       Row Name 06/07/23 1503 06/07/23 1400       Functional Assessment    Outcome Measure [facility] AM-PAC 6 Clicks Daily Activity (OT)  - AM-PAC 6 Clicks Basic Mobility (PT)  -MS              User Key  (r) = Recorded By, (t) = Taken By,  (c) = Cosigned By      Initials Name Provider Type     Kimberly Hart Occupational Therapist    Brian Patten, PT Physical Therapist                    Occupational Therapy Education       Title: PT OT SLP Therapies (In Progress)       Topic: Occupational Therapy (In Progress)       Point: ADL training (Done)       Description:   Instruct learner(s) on proper safety adaptation and remediation techniques during self care or transfers.   Instruct in proper use of assistive devices.                  Learning Progress Summary             Patient Acceptance, E,TB, VU by  at 6/7/2023 6984    Comment: Role of OT/POC                         Point: Home exercise program (Not Started)       Description:   Instruct learner(s) on appropriate technique for monitoring, assisting and/or progressing therapeutic exercises/activities.                  Learner Progress:  Not documented in this visit.              Point: Precautions (Not Started)       Description:   Instruct learner(s) on prescribed precautions during self-care and functional transfers.                  Learner Progress:  Not documented in this visit.              Point: Body mechanics (Not Started)       Description:   Instruct learner(s) on proper positioning and spine alignment during self-care, functional mobility activities and/or exercises.                  Learner Progress:  Not documented in this visit.                              User Key       Initials Effective Dates Name Provider Type Discipline     06/16/21 -  Kimberly Hart Occupational Therapist OT                  OT Recommendation and Plan  Planned Therapy Interventions (OT): activity tolerance training, BADL retraining, patient/caregiver education/training, transfer/mobility retraining, strengthening exercise  Therapy Frequency (OT): 3 times/wk  Plan of Care Review  Plan of Care Reviewed With: patient  Progress: no change  Outcome Evaluation: Pt seen for OT evaluation today.  Pt presents  with pain, weakness and decreased independence with self care and functional mobility tasks.  Pt able to sit eob with min assist, noted to have dizziness with positional changes.  Pt stood with cga and walked 2' to head of bed with cga.  Pts /68 supine, 109/65 sitting and 119/72 standing.  Pt is expected to benefit from skilled OT to improve his strength and independence with ADL tasks.     Time Calculation:    Time Calculation- OT       Row Name 06/07/23 1505             Time Calculation- OT    OT Start Time 1037  -      OT Received On 06/07/23  -      OT Goal Re-Cert Due Date 06/17/23  -         Untimed Charges    OT Eval/Re-eval Minutes 46  -AH         Total Minutes    Untimed Charges Total Minutes 46  -AH       Total Minutes 46  -AH                User Key  (r) = Recorded By, (t) = Taken By, (c) = Cosigned By      Initials Name Provider Type    Kimberly Roca Occupational Therapist                  Therapy Charges for Today       Code Description Service Date Service Provider Modifiers Qty    [id number] HC OT EVAL MOD COMPLEXITY 3 6/7/2023 Kimberly Hart GO 1                 Kimberly Hart  6/7/2023

## 2023-06-07 NOTE — H&P
"  [facility]      Name:  Robe Bryant   Age:  72 y.o.  Sex:  male  :  1950  MRN:  [id number]   Visit Number:  [id number]  Admission Date:  2023  Date Of Service:  23  Primary Care Physician:  Raj Sullivan MD    Chief Complaint:     Syncope    History Of Presenting Illness:      Patient is a 72 year-old with past medical history of COPD, hypertension, BPH, paroxysmal A-fib on anticoagulation, hypercholesterolemia, obstructive sleep apnea, diabetes, chronic back pain, diastolic heart failure, status post pacemaker, comes into the ER because of an episode of syncope earlier today.  Patient reports that he was walking to the kitchen when he all of a sudden blacked out, he does not member how exactly it happened, but denies any chest pain, shortness of breath, palpitations prior to the episode but he reports that his head was feeling \"funny\" the whole day yesterday.  Patient denies injuring himself and he does not believe that he hit his head.  Patient denies any recent sickness or illness.  Patient has a history of A-fib, CAD, CHF and has a pacemaker.  He currently denies chest pain, cough, shortness of breath, abdominal pain, diarrhea or vomiting or fever.    On ER evaluation, His vitals were stable and was afebrile.  His labs were significant for HS Troponin 20-21, creatinine 1.39, magnesium 1.5 otherwise within acceptable range.  COVID and flu negative.  Urinalysis negative.  Chest x-ray with chronic changes with no acute infiltrates per my read.  CT head unremarkable.  CT lumbar spine and cervical spine with no acute abnormality.  Patient received 80 mg of Lasix in addition to 2 g of magnesium.  Hospitalist consulted for admission to observation on telemetry for his syncopal episode.    Review Of Systems:    All systems were reviewed and negative except as mentioned in history of presenting illness, assessment and plan.    Past Medical " History: Patient  has a past medical history of Anxiety and depression, Arthritis, Backache, Benign prostatic hyperplasia, Bladder trauma, Cervicalgia, Chronic kidney disease, Coronary artery disease, Diabetes mellitus, Emphysema of lung, Erectile dysfunction, Eye exam, routine (2015), FH: colonic polyps, Gout, History of echocardiogram (09/15/2014), History of tobacco use, Hyperlipidemia, Hypertension, Migraine, Myocardial infarction, Prostate cancer, Restless leg syndrome, Sleep apnea, Stroke, Syncope (4/28/2017), and Warfarin anticoagulation (9/14/2016).    Past Surgical History: Patient  has a past surgical history that includes Colonoscopy (2004); Bladder surgery; Cataract extraction; Hernia repair; Cardiac pacemaker placement (2012); Prostate surgery; Cardiac catheterization (03/15/2013); Colonoscopy (2015); Cardiac catheterization (Left, 9/30/2019); Pacemaker Implantation; and Cardiac electrophysiology procedure (N/A, 5/10/2021).    Social History: Patient  reports that he quit smoking about 21 years ago. His smoking use included cigarettes. He has a 30.00 pack-year smoking history. He has never used smokeless tobacco. He reports that he does not drink alcohol and does not use drugs.    Family History:  Patient's family history has been reviewed and found to be noncontributory.     Allergies:      Patient has no known allergies.    Home Medications:    Prior to Admission Medications       Prescriptions Last Dose Informant Patient Reported? Taking?    albuterol sulfate  (90 Base) MCG/ACT inhaler   No No    Inhale 2 puffs Every 4 (Four) Hours As Needed for Wheezing or Shortness of Air.    amiodarone (PACERONE) 200 MG tablet   No No    Take 1 tablet by mouth Daily.    amLODIPine (NORVASC) 10 MG tablet   No No    Take 1 tablet by mouth Daily.    apixaban (ELIQUIS) 5 MG tablet tablet   No No    Take 1 tablet by mouth Every 12 (Twelve) Hours.    atorvastatin (LIPITOR) 20 MG tablet   No No    Take 1 tablet by  mouth Every Night.    Blood Glucose Monitoring Suppl (TRUE METRIX AIR GLUCOSE METER) w/Device kit   No No    1 each by In Vitro route 2 (two) times a day. E11.9    bumetanide (BUMEX) 0.5 MG tablet   No No    Take 1 tablet by mouth Daily. Stop lasix    FLUoxetine (PROzac) 20 MG capsule   No No    Take 1 capsule by mouth Daily.    furosemide (LASIX) 20 MG tablet   No No    TAKE 1 TABLET BY MOUTH EVERY MORNING, MAY TAKE SECOND DOSE AS NEEDED FOR INCREASED SWELLING    gabapentin (NEURONTIN) 800 MG tablet   Yes No    Take 1 tablet by mouth every night at bedtime.    glipizide (GLUCOTROL) 5 MG tablet   No No    TAKE 1 TABLET TWICE DAILY BEFORE MEALS    glucose monitor monitoring kit   No No    1 each Daily. E11.9    HYDROcodone-acetaminophen (NORCO) 5-325 MG per tablet   No No    Take 1 tablet by mouth Every 6 (Six) Hours As Needed for Severe Pain .    ipratropium-albuterol (DUO-NEB) 0.5-2.5 mg/3 ml nebulizer   No No    Take 3 mL by nebulization 4 (Four) Times a Day.    Lancet Device misc   No No    1 each Daily. E11.9    lisinopril (PRINIVIL,ZESTRIL) 40 MG tablet   No No    Take 1 tablet by mouth Daily.    metFORMIN (GLUCOPHAGE) 500 MG tablet   Yes No    Take 1 tablet by mouth Every 12 (Twelve) Hours.    metoprolol succinate XL (Toprol XL) 50 MG 24 hr tablet   No No    Take 1 tablet by mouth Daily.    Misc. Devices misc   No No    Bipap tubing.    Multiple Vitamin tablet   Yes No    Take 1 tablet by mouth daily.    Nebulizer misc   No No    1 each Every 4 (Four) Hours As Needed (shortness of breath).    O2 (OXYGEN)  Self Yes No    Inhale 2 L/min Continuous As Needed (With activity).    omeprazole (priLOSEC) 40 MG capsule   No No    Take 1 capsule by mouth Daily.    spironolactone (ALDACTONE) 50 MG tablet   No No    TAKE 1 TABLET EVERY DAY    TRUEplus Lancets 33G misc   No No    1 each by Other route Daily. E11.9          ED Medications:    Medications   sodium chloride 0.9 % flush 10 mL (has no administration in time  "range)   magnesium sulfate 2g/50 mL (PREMIX) infusion (0 g Intravenous Stopped 6/6/23 1901)   furosemide (LASIX) injection 80 mg (80 mg Intravenous Given 6/6/23 1700)     Vital Signs:  Temp:  [98 °F (36.7 °C)] 98 °F (36.7 °C)  Heart Rate:  [60] 60  Resp:  [14-18] 14  BP: (104-127)/(59-67) 112/59        06/06/23  1555   Weight: 109 kg (240 lb)     Body mass index is 41.2 kg/m².    Physical Exam:     Most recent vital Signs: /59   Pulse 60   Temp 98 °F (36.7 °C) (Oral)   Resp 14   Ht 162.6 cm (64\")   Wt 109 kg (240 lb)   SpO2 96%   BMI 41.20 kg/m²     Physical Exam  Vitals and nursing note reviewed.   Constitutional:       General: He is not in acute distress.     Appearance: He is obese.   HENT:      Head: Normocephalic and atraumatic.      Right Ear: External ear normal.      Left Ear: External ear normal.      Nose: Nose normal.      Mouth/Throat:      Mouth: Mucous membranes are moist.   Eyes:      Extraocular Movements: Extraocular movements intact.      Conjunctiva/sclera: Conjunctivae normal.      Pupils: Pupils are equal, round, and reactive to light.   Cardiovascular:      Rate and Rhythm: Normal rate and regular rhythm.      Pulses: Normal pulses.      Heart sounds: Normal heart sounds.   Pulmonary:      Effort: Pulmonary effort is normal. Prolonged expiration present. No respiratory distress.      Breath sounds: Normal breath sounds. Decreased air movement present. No wheezing or rhonchi.   Chest:      Comments: Pacemaker palpated over left upper chest wall  Abdominal:      General: Bowel sounds are normal. There is no distension.      Palpations: Abdomen is soft.      Tenderness: There is no abdominal tenderness.   Musculoskeletal:         General: Normal range of motion.      Cervical back: Normal range of motion and neck supple.      Right lower leg: No edema.      Left lower leg: No edema.   Skin:     General: Skin is warm and dry.      Findings: No rash.   Neurological:      General: No " focal deficit present.      Mental Status: He is alert and oriented to person, place, and time. Mental status is at baseline.   Psychiatric:         Mood and Affect: Mood normal.         Behavior: Behavior normal.       Laboratory data:    I have reviewed the labs done in the emergency room.    Results from last 7 days   Lab Units 06/06/23  1601   SODIUM mmol/L 139   POTASSIUM mmol/L 4.3   CHLORIDE mmol/L 102   CO2 mmol/L 24.5   BUN mg/dL 17   CREATININE mg/dL 1.39*   CALCIUM mg/dL 9.0   BILIRUBIN mg/dL 0.3   ALK PHOS U/L 83   ALT (SGPT) U/L 16   AST (SGOT) U/L 18   GLUCOSE mg/dL 85     Results from last 7 days   Lab Units 06/06/23  1601   WBC 10*3/mm3 5.62   HEMOGLOBIN g/dL 13.0   HEMATOCRIT % 39.5   PLATELETS 10*3/mm3 191         Results from last 7 days   Lab Units 06/06/23  1908 06/06/23  1601   HSTROP T ng/L 21* 20*     Results from last 7 days   Lab Units 06/06/23  1601   PROBNP pg/mL 608.0         Results from last 7 days   Lab Units 06/06/23  1601   LIPASE U/L 47         Results from last 7 days   Lab Units 06/06/23  1620   COLOR UA  Yellow   GLUCOSE UA  Negative   KETONES UA  Negative   LEUKOCYTES UA  Negative   PH, URINE  <=5.0   BILIRUBIN UA  Negative   UROBILINOGEN UA  0.2 E.U./dL       Pain Management Panel          Latest Ref Rng & Units 10/25/2021 9/13/2016   Pain Management Panel   Creatinine, Urine Not Estab. mg/dL 125.1  56.7        EKG:      Atrial paced rhythm, rate of 60, prolonged QT, nonspecific ST/T wave changes.    Radiology:    CT Head Without Contrast    Result Date: 6/6/2023  FINAL REPORT TECHNIQUE: Routine axial images through the head were obtained without contrast. CLINICAL HISTORY: Head trauma, minor (Age >= 65y) FINDINGS: The ventricles are normal.  There is no mass or other abnormal hypodensity.  There is no shift of midline structures.  There is no intracranial hemorrhage.  No acute sinus or osseous abnormality is seen.     Unremarkable. Authenticated and Electronically Signed by  Hua Bailey M.D. on 06/06/2023 06:40:11 PM    CT Cervical Spine Without Contrast    Result Date: 6/6/2023  FINAL REPORT TECHNIQUE: Thin section axial images were obtained through the cervical spine without contrast.  Coronal and sagittal reconstructed images were then provided. CLINICAL HISTORY: Neck trauma (Age >= 65y) FINDINGS: There is normal alignment and curvature.  Prevertebral soft tissues are normal.  There is no fracture.  There are moderate changes of degenerative disc disease at multiple levels.     No acute abnormality. Authenticated and Electronically Signed by Hua Bailey M.D. on 06/06/2023 06:55:05 PM    CT Lumbar Spine Without Contrast    Result Date: 6/6/2023  FINAL REPORT TECHNIQUE: Thin section axial images were obtained through the lumbar spine without contrast.  Coronal and sagittal reconstructed images were then provided. CLINICAL HISTORY: Back trauma, no prior imaging (Age >= 16y) FINDINGS: There is normal alignment and curvature.  There is no fracture or other acute osseous abnormality.  there is a large Schmorl's node in the superior end plate of L4 There is moderately severe degenerative disc disease at multiple levels.     No acute abnormality. Authenticated and Electronically Signed by Hua Bailey M.D. on 06/06/2023 06:54:12 PM     Assessment:    Syncope, prior to arrival  Hypomagnesemia, POA  Chronically elevated troponin  History of COPD/emphysema, no exacerbation  Hypertension  Paroxysmal A-fib on Eliquis  CAD  Status post pacemaker  Chronic kidney disease  Chronic diastolic heart failure  Obstructive sleep apnea  Diabetes mellitus type 2  Benign prostatic hyperplasia  Hypercholesterolemia  Gastroesophageal reflux disease  Multilevel degenerative disc disease  Morbid obesity, BMI 41    Plan:    Patient will be monitored on telemetry.  Cardiology consulted, appreciate recommendations.  Replace magnesium per protocol.  Another differential would be symptoms secondary to pain medicine and  gabapentin, will lower his Norco and gabapentin dosage. Will be placed on insulin coverage per Glucomander, check A1c.  Continue home meds including metoprolol, amiodarone and Eliquis.  Troponin appears to be chronically mildly elevated, likely demand ischemia, he denies any chest pain, low suspicion for ACS.  Creatinine appears to at baseline.  Fall precautions.  PT/OT consulted.  Case management consulted.    Risk Assessment: Moderate  DVT Prophylaxis: On Eliquis  Code Status: Full  Diet: Cardiac    Advance Care Planning   ACP discussion was held with the patient during this visit. Patient does not have an advance directive, information provided.         Jessenia Willis MD  06/06/23  20:06 EDT    Dictated utilizing Dragon dictation.

## 2023-06-07 NOTE — PLAN OF CARE
Goal Outcome Evaluation:   No reports of pain and no acute distress since admission.

## 2023-06-07 NOTE — PLAN OF CARE
Goal Outcome Evaluation:  Plan of Care Reviewed With: patient        Progress: no change  Outcome Evaluation: Pt participated in PT eval this date. Pt required Summer for sup-sit transfer. Pt sat EOB and reported mild dizziness, instructed Pt to keep eyes open. Pt's BP was 109/65 in supine, then 109/65 in sitting. Pt then stood with CGA, /72. Pt is expected to benefit from skilled PTx during this inpatient stay.

## 2023-06-07 NOTE — PROGRESS NOTES
"      [facility]  INTERNAL MEDICINE PROGRESS NOTE    Name:  Robe Bryant   Age:  72 y.o.  Sex:  male  :  1950  MRN:  [id number]   Visit Number:  [id number]  Admission Date:  2023  Date Of Service:  23  Primary Care Physician:  Raj Sullivan MD     LOS: 0 days :  Patient Care Team:  Raj Sullivan MD as PCP - General (Internal Medicine)  Arelis Tucker MD as Consulting Physician (Cardiology)  Osiel Heaton MD as Consulting Physician (Urology)  Blayne Whitman MD as Consulting Physician (Ophthalmology)  Jose Hargrove MD as Consulting Physician (Pulmonary Disease)  John Solorzano MD as Consulting Physician (Urology)  Chace Vasquez MD, BRISA as Consulting Physician (Nephrology):      Subjective / Interval History:     Patient is a 72 year-old with past medical history of COPD, hypertension, BPH, paroxysmal A-fib on anticoagulation, hypercholesterolemia, obstructive sleep apnea, diabetes, chronic back pain, diastolic heart failure, status post pacemaker, comes into the ER because of an episode of syncope earlier today.  Patient reports that he was walking to the kitchen when he all of a sudden blacked out, he does not member how exactly it happened, but denies any chest pain, shortness of breath, palpitations prior to the episode but he reports that his head was feeling \"funny\" the whole day yesterday.  Patient denies injuring himself and he does not believe that he hit his head.  Patient denies any recent sickness or illness.  Patient has a history of A-fib, CAD, CHF and has a pacemaker.  He currently denies chest pain, cough, shortness of breath, abdominal pain, diarrhea or vomiting or fever.     On ER evaluation, His vitals were stable and was afebrile.  His labs were significant for HS Troponin 20-21, creatinine 1.39, magnesium 1.5 otherwise within acceptable range.  COVID and flu negative.  Urinalysis negative.  Chest x-ray with chronic changes with " no acute infiltrates per my read.  CT head unremarkable.  CT lumbar spine and cervical spine with no acute abnormality.  Patient received 80 mg of Lasix in addition to 2 g of magnesium.      Today the patient has been seen on June 7.  He is feeling slightly better.  He was having some dizzy feeling in the head yesterday even before passing out but that has improved.  Telemetry has not shown any arrhythmias and he is currently paced with a heart rate of 60  Labs and other reports reviewed since admission    Vital Signs:    Temp:  [98 °F (36.7 °C)] 98 °F (36.7 °C)  Heart Rate:  [] 62  Resp:  [10-18] 14  BP: (101-142)/(48-95) 130/90    Intake and output:    I/O last 3 completed shifts:  In: 50 [I.V.:50]  Out: 2100 [Urine:2100]  No intake/output data recorded.    Physical Examination:    General Appearance:    Alert and cooperative, not in any acute distress.   Head:    Atraumatic and normocephalic, without obvious abnormality.   Eyes:            PERRLA,  No pallor. Extraocular movements are within normal limits.   Neck:   Supple,  No lymph glands, no bruit   Lungs:     Chest shape is normal. Breath sounds heard bilaterally equally.  No crackles or wheezing.     Heart:    Normal S1 and S2, no murmur,  No JVD   Abdomen:     Normal bowel sounds, no masses, no organomegaly. Soft     nontender, no guarding, no rebound tenderness   Extremities:   Moves all extremities well, no edema, no cyanosis,    Skin:   No  bruising or rash.   Neurologic:   Grossly nonfocal and moves all extremities.      Laboratory results:  Results from last 7 days   Lab Units 06/07/23  0621 06/06/23  1601   SODIUM mmol/L 136 139   POTASSIUM mmol/L 4.5 4.3   CHLORIDE mmol/L 100 102   CO2 mmol/L 26.2 24.5   BUN mg/dL 18 17   CREATININE mg/dL 1.66* 1.39*   CALCIUM mg/dL 9.2 9.0   BILIRUBIN mg/dL  --  0.3   ALK PHOS U/L  --  83   ALT (SGPT) U/L  --  16   AST (SGOT) U/L  --  18   GLUCOSE mg/dL 87 85     Results from last 7 days   Lab Units  06/07/23  0621 06/06/23  1601   WBC 10*3/mm3 6.23 5.62   HEMOGLOBIN g/dL 13.2 13.0   HEMATOCRIT % 39.7 39.5   PLATELETS 10*3/mm3 103* 191         Results from last 7 days   Lab Units 06/06/23  1908 06/06/23  1601   HSTROP T ng/L 21* 20*           Radiology results:    Imaging Results (Last 24 Hours)       Procedure Component Value Units Date/Time    XR Chest 1 View [[id number]] Collected: 06/07/23 0740     Updated: 06/07/23 0743    Narrative:      CLINICAL INDICATION:    Chest pain.     EXAMINATION TECHNIQUE:   XR CHEST 1 VW-     COMPARISON:  Radiographs 03/19/2023.     FINDINGS:  Left chest wall subclavian approach right atrioventricular cardiac pacer  in place. Cardiomegaly. Vascular engorgement without overt edema. No  pneumothorax or large pleural effusion. No acute osseous or soft tissue  abnormality. Multiple surgical clips in the left upper quadrant.       Impression:      Cardiomegaly. Vascular engorgement without overt edema.     Images personally reviewed, interpreted and dictated by EDENILSON Cobb.                This report was signed and finalized on 6/7/2023 7:41 AM by EDENILSON Cobb.    CT Cervical Spine Without Contrast [[id number]] Collected: 06/06/23 1855     Updated: 06/06/23 1856    Narrative:      FINAL REPORT    TECHNIQUE:  Thin section axial images were obtained through the cervical  spine without contrast.  Coronal and sagittal reconstructed  images were then provided.    CLINICAL HISTORY:  Neck trauma (Age >= 65y)    FINDINGS:  There is normal alignment and curvature.  Prevertebral soft  tissues are normal.  There is no fracture.  There are moderate  changes of degenerative disc disease at multiple levels.      Impression:      No acute abnormality.    Authenticated and Electronically Signed by Hua Bailey M.D. on  06/06/2023 06:55:05 PM    CT Lumbar Spine Without Contrast [[id number]] Collected: 06/06/23 1854     Updated: 06/06/23 1855    Narrative:      FINAL  REPORT    TECHNIQUE:  Thin section axial images were obtained through the lumbar spine  without contrast.  Coronal and sagittal reconstructed images  were then provided.    CLINICAL HISTORY:  Back trauma, no prior imaging (Age >= 16y)    FINDINGS:  There is normal alignment and curvature.  There is no fracture  or other acute osseous abnormality.  there is a large Schmorl's  node in the superior end plate of L4 There is moderately severe  degenerative disc disease at multiple levels.      Impression:      No acute abnormality.    Authenticated and Electronically Signed by Hua Bailey M.D. on  06/06/2023 06:54:12 PM    CT Head Without Contrast [[id number]] Collected: 06/06/23 1840     Updated: 06/06/23 1841    Narrative:      FINAL REPORT    TECHNIQUE:  Routine axial images through the head were obtained without  contrast.    CLINICAL HISTORY:  Head trauma, minor (Age >= 65y)    FINDINGS:  The ventricles are normal.  There is no mass or other abnormal  hypodensity.  There is no shift of midline structures.  There is  no intracranial hemorrhage.  No acute sinus or osseous  abnormality is seen.      Impression:      Unremarkable.    Authenticated and Electronically Signed by Hua Bailey M.D. on  06/06/2023 06:40:11 PM            I have reviewed the patient's radiology reports.    Medication Review:     I have reviewed the patients active and prn medications.     Assessment:      Syncope, unspecified syncope type    Chronic kidney disease, stage III (moderate) (CMS/HCC)    Pacemaker    Chronic low back pain    Emphysema of lung ([facility])    Essential hypertension    Benign prostatic hyperplasia    Paroxysmal A-fib ([facility])    Hypercholesterolemia    Diastolic heart failure    Gastroesophageal reflux disease    Obstructive sleep apnea of adult    Diabetes mellitus    Multilevel degenerative disc disease          Plan:    1.  Syncope-present no specific etiology it could be possibility of related to hypotensive episode and syncopal  episode could be vagal induced.  Continue with telemetry monitoring today and if unremarkable will be discharged and further work-up could be done outpatient  Awaiting cardiology evaluation and appreciate their input    2.  Chronic kidney disease-he does have slight worsening of his renal function probably because of aggressive diuresis and will back down on the diuretic as clinically he is euvolemic    3.  Diabetes-his A1c has been controlled and will continue home meds    4.  Somnolence he has been having days he does have sleep apnea and will try to cut down the gabapentin dose which was recommended this week earlier at the office visit and see rest as per orders    Patient has been explained the goals of treatment and see details as per orders    Raj Sullivan MD  06/07/23  09:06 EDT      Please note that portions of this note were completed with a voice recognition program.

## 2023-06-08 ENCOUNTER — READMISSION MANAGEMENT (OUTPATIENT)
Dept: CALL CENTER | Facility: HOSPITAL | Age: 73
End: 2023-06-08
Payer: MEDICARE

## 2023-06-08 VITALS
HEART RATE: 63 BPM | BODY MASS INDEX: 38.43 KG/M2 | DIASTOLIC BLOOD PRESSURE: 67 MMHG | SYSTOLIC BLOOD PRESSURE: 113 MMHG | RESPIRATION RATE: 18 BRPM | HEIGHT: 64 IN | TEMPERATURE: 97.7 F | WEIGHT: 225.09 LBS | OXYGEN SATURATION: 95 %

## 2023-06-08 LAB — GLUCOSE BLDC GLUCOMTR-MCNC: 109 MG/DL (ref 70–130)

## 2023-06-08 PROCEDURE — 94799 UNLISTED PULMONARY SVC/PX: CPT

## 2023-06-08 PROCEDURE — 94664 DEMO&/EVAL PT USE INHALER: CPT

## 2023-06-08 PROCEDURE — 94761 N-INVAS EAR/PLS OXIMETRY MLT: CPT

## 2023-06-08 PROCEDURE — G0378 HOSPITAL OBSERVATION PER HR: HCPCS

## 2023-06-08 PROCEDURE — 82948 REAGENT STRIP/BLOOD GLUCOSE: CPT

## 2023-06-08 RX ORDER — GABAPENTIN 400 MG/1
400 CAPSULE ORAL NIGHTLY
Start: 2023-06-08

## 2023-06-08 RX ADMIN — LISINOPRIL 40 MG: 20 TABLET ORAL at 08:26

## 2023-06-08 RX ADMIN — SENNOSIDES AND DOCUSATE SODIUM 2 TABLET: 50; 8.6 TABLET ORAL at 08:26

## 2023-06-08 RX ADMIN — AMLODIPINE BESYLATE 10 MG: 5 TABLET ORAL at 08:25

## 2023-06-08 RX ADMIN — PANTOPRAZOLE SODIUM 40 MG: 40 TABLET, DELAYED RELEASE ORAL at 05:45

## 2023-06-08 RX ADMIN — SPIRONOLACTONE 50 MG: 25 TABLET, FILM COATED ORAL at 08:25

## 2023-06-08 RX ADMIN — METOPROLOL SUCCINATE 50 MG: 50 TABLET, EXTENDED RELEASE ORAL at 08:26

## 2023-06-08 RX ADMIN — APIXABAN 5 MG: 5 TABLET, FILM COATED ORAL at 08:26

## 2023-06-08 RX ADMIN — Medication 10 ML: at 08:26

## 2023-06-08 RX ADMIN — IPRATROPIUM BROMIDE AND ALBUTEROL SULFATE 3 ML: .5; 3 SOLUTION RESPIRATORY (INHALATION) at 07:09

## 2023-06-08 RX ADMIN — HYDROCODONE BITARTRATE AND ACETAMINOPHEN 1 TABLET: 5; 325 TABLET ORAL at 05:45

## 2023-06-08 RX ADMIN — AMIODARONE HYDROCHLORIDE 200 MG: 200 TABLET ORAL at 08:26

## 2023-06-08 NOTE — DISCHARGE SUMMARY
"    [facility]   INTERNAL MEDICINE DISCHARGE SUMMARY      Name:  Robe Bryant   Age:  72 y.o.  Sex:  male  :  1950  MRN:  [id number]   Visit Number:  [id number]  Primary Care Physician:  Raj Sullivan MD  Date of Discharge:  2023  Admission Date:  2023      Discharge Diagnosis:         Syncope, unspecified syncope type    Chronic kidney disease, stage III (moderate) (CMS/HCC)    Pacemaker    Chronic low back pain    Emphysema of lung ([facility])    Essential hypertension    Benign prostatic hyperplasia    Paroxysmal A-fib ([facility])    Hypercholesterolemia    Diastolic heart failure    Gastroesophageal reflux disease    Obstructive sleep apnea of adult    Diabetes mellitus    Multilevel degenerative disc disease          Consults:     Consults       Date and Time Order Name Status Description    2023 12:34 AM Inpatient Cardiology Consult Completed             Procedures Performed:                 Hospital Course:   The patient was admitted on 2023  Please see H&P for details on admission HPI and ROS.  Patient is a 72 year-old with past medical history of COPD, hypertension, BPH, paroxysmal A-fib on anticoagulation, hypercholesterolemia, obstructive sleep apnea, diabetes, chronic back pain, diastolic heart failure, status post pacemaker, comes into the ER because of an episode of syncope earlier today.  Patient reports that he was walking to the kitchen when he all of a sudden blacked out, he does not member how exactly it happened, but denies any chest pain, shortness of breath, palpitations prior to the episode but he reports that his head was feeling \"funny\" the whole day yesterday.  Patient denies injuring himself and he does not believe that he hit his head.  Patient denies any recent sickness or illness.  Patient has a history of A-fib, CAD, CHF and has a pacemaker.  He currently denies chest pain, cough, shortness of breath, abdominal pain, diarrhea or vomiting or " fever.     On ER evaluation, His vitals were stable and was afebrile.  His labs were significant for HS Troponin 20-21, creatinine 1.39, magnesium 1.5 otherwise within acceptable range.  COVID and flu negative.  Urinalysis negative.  Chest x-ray with chronic changes with no acute infiltrates per my read.  CT head unremarkable.  CT lumbar spine and cervical spine with no acute abnormality.  Patient received 80 mg of Lasix in addition to 2 g of magnesium.        Today the patient has been seen on June 7.  He is feeling slightly better.  He was having some dizzy feeling in the head yesterday even before passing out but that has improved.  Telemetry has not shown any arrhythmias and he is currently paced with a heart rate of 60  Labs and other reports reviewed since admission  He is being seen today on June 8.  Patient has been asymptomatic patient has been on telemetry and has been on paced rhythm and no arrhythmias noted.  Cardiology was consulted and they recommend just outpatient follow-up.  Patient has been stable to be discharged and will be followed up in the office next Wednesday at 1 PM.  Likely etiology could be more of a vagal episode and if recurrent episodes then will need further electrophysiology consultation and work-up    Vital Signs:     Temp:  [97.7 °F (36.5 °C)-99.1 °F (37.3 °C)] 97.7 °F (36.5 °C)  Heart Rate:  [59-91] 63  Resp:  [16-18] 18  BP: ()/(54-74) 113/67    Physical Exam:     General Appearance:    Alert and cooperative, not in any acute distress.   Head:    Atraumatic and normocephalic, without obvious abnormality.   Eyes:            PERRLA,  No pallor. Extraocular movements are within normal limits.   Neck:   Supple,  No lymph glands, no bruit   Lungs:     Chest shape is normal. Breath sounds heard bilaterally equally.  No crackles or wheezing.     Heart:    Normal S1 and S2, no murmur,  No JVD   Abdomen:     Normal bowel sounds, no masses, no organomegaly. Soft     nontender, no  guarding, no rebound tenderness   Extremities:   Moves all extremities well, no edema, no cyanosis,    Skin:   No  bruising or rash.   Neurologic:   Grossly nonfocal and moves all extremities.        Pertinent Lab Results:     Results from last 7 days   Lab Units 06/07/23  0621 06/06/23  1601   SODIUM mmol/L 136 139   POTASSIUM mmol/L 4.5 4.3   CHLORIDE mmol/L 100 102   CO2 mmol/L 26.2 24.5   BUN mg/dL 18 17   CREATININE mg/dL 1.66* 1.39*   CALCIUM mg/dL 9.2 9.0   BILIRUBIN mg/dL  --  0.3   ALK PHOS U/L  --  83   ALT (SGPT) U/L  --  16   AST (SGOT) U/L  --  18   GLUCOSE mg/dL 87 85     Results from last 7 days   Lab Units 06/07/23  0621 06/06/23  1601   WBC 10*3/mm3 6.23 5.62   HEMOGLOBIN g/dL 13.2 13.0   HEMATOCRIT % 39.7 39.5   PLATELETS 10*3/mm3 103* 191         No results found for: BLOODCX, URINECX, WOUNDCX, MRSACX    Pertinent Radiology Results:    Imaging Results (Most Recent)       Procedure Component Value Units Date/Time    XR Chest 1 View [[id number]] Collected: 06/07/23 0740     Updated: 06/07/23 0743    Narrative:      CLINICAL INDICATION:    Chest pain.     EXAMINATION TECHNIQUE:   XR CHEST 1 VW-     COMPARISON:  Radiographs 03/19/2023.     FINDINGS:  Left chest wall subclavian approach right atrioventricular cardiac pacer  in place. Cardiomegaly. Vascular engorgement without overt edema. No  pneumothorax or large pleural effusion. No acute osseous or soft tissue  abnormality. Multiple surgical clips in the left upper quadrant.       Impression:      Cardiomegaly. Vascular engorgement without overt edema.     Images personally reviewed, interpreted and dictated by EDENILSON Cobb.                This report was signed and finalized on 6/7/2023 7:41 AM by EDENILSON Cobb.    CT Cervical Spine Without Contrast [[id number]] Collected: 06/06/23 1855     Updated: 06/06/23 1856    Narrative:      FINAL REPORT    TECHNIQUE:  Thin section axial images were obtained through the cervical  spine  without contrast.  Coronal and sagittal reconstructed  images were then provided.    CLINICAL HISTORY:  Neck trauma (Age >= 65y)    FINDINGS:  There is normal alignment and curvature.  Prevertebral soft  tissues are normal.  There is no fracture.  There are moderate  changes of degenerative disc disease at multiple levels.      Impression:      No acute abnormality.    Authenticated and Electronically Signed by Hua Bailey M.D. on  06/06/2023 06:55:05 PM    CT Lumbar Spine Without Contrast [[id number]] Collected: 06/06/23 1854     Updated: 06/06/23 1855    Narrative:      FINAL REPORT    TECHNIQUE:  Thin section axial images were obtained through the lumbar spine  without contrast.  Coronal and sagittal reconstructed images  were then provided.    CLINICAL HISTORY:  Back trauma, no prior imaging (Age >= 16y)    FINDINGS:  There is normal alignment and curvature.  There is no fracture  or other acute osseous abnormality.  there is a large Schmorl's  node in the superior end plate of L4 There is moderately severe  degenerative disc disease at multiple levels.      Impression:      No acute abnormality.    Authenticated and Electronically Signed by Hua Bailey M.D. on  06/06/2023 06:54:12 PM    CT Head Without Contrast [[id number]] Collected: 06/06/23 1840     Updated: 06/06/23 1841    Narrative:      FINAL REPORT    TECHNIQUE:  Routine axial images through the head were obtained without  contrast.    CLINICAL HISTORY:  Head trauma, minor (Age >= 65y)    FINDINGS:  The ventricles are normal.  There is no mass or other abnormal  hypodensity.  There is no shift of midline structures.  There is  no intracranial hemorrhage.  No acute sinus or osseous  abnormality is seen.      Impression:      Unremarkable.    Authenticated and Electronically Signed by Hua Bailey M.D. on  06/06/2023 06:40:11 PM                    Discharge Disposition:      Home or Self Care    Discharge Medication:         Discharge Medications        New  Medications        Instructions Start Date   gabapentin 400 MG capsule  Commonly known as: NEURONTIN  Replaces: gabapentin 800 MG tablet   400 mg, Oral, Nightly             Continue These Medications        Instructions Start Date   albuterol sulfate  (90 Base) MCG/ACT inhaler  Commonly known as: PROVENTIL HFA;VENTOLIN HFA;PROAIR HFA   2 puffs, Inhalation, Every 4 Hours PRN      amiodarone 200 MG tablet  Commonly known as: PACERONE   200 mg, Oral, Every 24 Hours Scheduled      amLODIPine 10 MG tablet  Commonly known as: NORVASC   10 mg, Oral, Daily      apixaban 5 MG tablet tablet  Commonly known as: ELIQUIS   5 mg, Oral, Every 12 Hours Scheduled      atorvastatin 20 MG tablet  Commonly known as: LIPITOR   20 mg, Oral, Nightly      bumetanide 0.5 MG tablet  Commonly known as: BUMEX   0.5 mg, Oral, Daily, Stop lasix      FLUoxetine 20 MG capsule  Commonly known as: PROzac   20 mg, Oral, Daily      glipizide 5 MG tablet  Commonly known as: GLUCOTROL   TAKE 1 TABLET TWICE DAILY BEFORE MEALS      glucose monitor monitoring kit   1 each, Does not apply, Daily, E11.9      HYDROcodone-acetaminophen 5-325 MG per tablet  Commonly known as: NORCO   1 tablet, Oral, Every 6 Hours PRN      ipratropium-albuterol 0.5-2.5 mg/3 ml nebulizer  Commonly known as: DUO-NEB   3 mL, Nebulization, 4 Times Daily - RT      Lancet Device misc   1 each, Does not apply, Daily, E11.9      lisinopril 40 MG tablet  Commonly known as: PRINIVIL,ZESTRIL   40 mg, Oral, Daily      metFORMIN 500 MG tablet  Commonly known as: GLUCOPHAGE   1 tablet, Oral, Every 12 Hours Scheduled      metoprolol succinate XL 50 MG 24 hr tablet  Commonly known as: Toprol XL   50 mg, Oral, Daily      Misc. Devices misc   Bipap tubing.      multivitamin tablet tablet  Commonly known as: THERAGRAN   1 tablet, Oral, Daily      Nebulizer misc   1 each, Does not apply, Every 4 Hours PRN      O2  Commonly known as: OXYGEN   2 L/min, Inhalation, Continuous PRN       omeprazole 40 MG capsule  Commonly known as: priLOSEC   40 mg, Oral, Daily      spironolactone 50 MG tablet  Commonly known as: ALDACTONE   TAKE 1 TABLET EVERY DAY      True Metrix Air Glucose Meter w/Device kit   1 each, In Vitro, 2 times daily, E11.9      TRUEplus Lancets 33G misc   1 each, Other, Daily, E11.9             Stop These Medications      furosemide 20 MG tablet  Commonly known as: LASIX     gabapentin 800 MG tablet  Commonly known as: NEURONTIN  Replaced by: gabapentin 400 MG capsule              Discharge Diet:             Follow-up Appointments:      Future Appointments   Date Time Provider Department Center   8/2/2023  2:30 PM Aminta Chan MD [facility]   3/7/2024 11:30 AM Arelis Tucker MD E [facility]         Test Results Pending at Discharge:             Raj Sullivan MD  06/08/23  09:12 EDT    Time:  Discharge 28 min    Please note that portions of this note were completed with a voice recognition program.

## 2023-06-08 NOTE — CASE MANAGEMENT/SOCIAL WORK
Case Management Discharge Note      Final Note: Pt. will be returning home. Pt. will have a portable tank delivered from [id number] prior to discharge. Pt. stated that his daughter would be providing discharge transport.         Selected Continued Care - Admitted Since 6/6/2023       Destination    No services have been selected for the patient.                Durable Medical Equipment Coordination complete.      Service Provider Selected Services Address Phone Fax Patient Preferred    [facility] [address] [place] [address] DR TAVAREZ [address] [place] [id number] [phone] --       Internal Comment last updated by Stephanie Duke 6/7/2023 0939    Pt report O2 supplied by [id number].                          Dialysis/Infusion    No services have been selected for the patient.                Home Medical Care    No services have been selected for the patient.                Therapy    No services have been selected for the patient.                Community Resources    No services have been selected for the patient.                Community & DME    No services have been selected for the patient.                    Transportation Services  Private: Car    Final Discharge Disposition Code: 01 - home or self-care

## 2023-06-08 NOTE — PLAN OF CARE
Goal Outcome Evaluation:  Plan of Care Reviewed With: patient        Progress: no change  Outcome Evaluation: Requiring NC 2L to maintaining o2 sats >90%, pt states did not sleep well but otherwise denied complaints.

## 2023-06-09 NOTE — OUTREACH NOTE
Prep Survey      Flowsheet Row Responses   [facility] facility patient discharged from? [facility]   Is LACE score < 7 ? No   Eligibility Readm Mgmt   Discharge diagnosis Syncope, unspecified syncope type    Chronic kidney disease, stage III (m   Does the patient have one of the following disease processes/diagnoses(primary or secondary)? Other   Does the patient have Home health ordered? No   Is there a DME ordered? Yes   What DME was ordered? portable tank delivered from [employer]   Prep survey completed? Yes            ENEDINA LAGOS - Registered Nurse

## 2023-06-11 ENCOUNTER — HOSPITAL ENCOUNTER (EMERGENCY)
Facility: HOSPITAL | Age: 73
Discharge: HOME | End: 2023-06-12
Attending: EMERGENCY MEDICINE
Payer: MEDICARE

## 2023-06-11 ENCOUNTER — APPOINTMENT (OUTPATIENT)
Dept: GENERAL RADIOLOGY | Facility: HOSPITAL | Age: 73
End: 2023-06-11
Payer: MEDICARE

## 2023-06-11 ENCOUNTER — APPOINTMENT (OUTPATIENT)
Dept: CT IMAGING | Facility: HOSPITAL | Age: 73
End: 2023-06-11
Payer: MEDICARE

## 2023-06-11 DIAGNOSIS — R41.0 CONFUSION: ICD-10-CM

## 2023-06-11 DIAGNOSIS — R55 SYNCOPE, UNSPECIFIED SYNCOPE TYPE: Primary | ICD-10-CM

## 2023-06-11 LAB
A-A DO2: 72.5 MMHG
ALBUMIN SERPL-MCNC: 3.6 G/DL (ref 3.5–5.2)
ALBUMIN/GLOB SERPL: 1.3 G/DL
ALP SERPL-CCNC: 82 U/L (ref 39–117)
ALT SERPL W P-5'-P-CCNC: 15 U/L (ref 1–41)
AMMONIA BLD-SCNC: 16 UMOL/L (ref 16–60)
AMPHET+METHAMPHET UR QL: NEGATIVE
AMPHETAMINES UR QL: NEGATIVE
ANION GAP SERPL CALCULATED.3IONS-SCNC: 9 MMOL/L (ref 5–15)
ARTERIAL PATENCY WRIST A: ABNORMAL
AST SERPL-CCNC: 16 U/L (ref 1–40)
ATMOSPHERIC PRESS: 725 MMHG
BARBITURATES UR QL SCN: NEGATIVE
BASE EXCESS BLDA CALC-SCNC: 2.2 MMOL/L (ref 0–2)
BASOPHILS # BLD AUTO: 0.05 10*3/MM3 (ref 0–0.2)
BASOPHILS NFR BLD AUTO: 0.8 % (ref 0–1.5)
BDY SITE: ABNORMAL
BENZODIAZ UR QL SCN: NEGATIVE
BILIRUB SERPL-MCNC: 0.3 MG/DL (ref 0–1.2)
BILIRUB UR QL STRIP: NEGATIVE
BUN SERPL-MCNC: 46 MG/DL (ref 8–23)
BUN/CREAT SERPL: 18.4 (ref 7–25)
BUPRENORPHINE SERPL-MCNC: NEGATIVE NG/ML
CALCIUM SPEC-SCNC: 8.9 MG/DL (ref 8.6–10.5)
CANNABINOIDS SERPL QL: NEGATIVE
CHLORIDE SERPL-SCNC: 99 MMOL/L (ref 98–107)
CLARITY UR: CLEAR
CO2 SERPL-SCNC: 27 MMOL/L (ref 22–29)
COCAINE UR QL: NEGATIVE
COHGB MFR BLD: 0.8 % (ref 0–2)
COLOR UR: YELLOW
CREAT SERPL-MCNC: 2.5 MG/DL (ref 0.76–1.27)
DEPRECATED RDW RBC AUTO: 43.4 FL (ref 37–54)
EGFRCR SERPLBLD CKD-EPI 2021: 26.5 ML/MIN/1.73
EOSINOPHIL # BLD AUTO: 0.35 10*3/MM3 (ref 0–0.4)
EOSINOPHIL NFR BLD AUTO: 5.3 % (ref 0.3–6.2)
ERYTHROCYTE [DISTWIDTH] IN BLOOD BY AUTOMATED COUNT: 13 % (ref 12.3–15.4)
GAS FLOW AIRWAY: 3 LPM
GLOBULIN UR ELPH-MCNC: 2.8 GM/DL
GLUCOSE SERPL-MCNC: 154 MG/DL (ref 65–99)
GLUCOSE UR STRIP-MCNC: NEGATIVE MG/DL
HCO3 BLDA-SCNC: 28.7 MMOL/L (ref 22–28)
HCT VFR BLD AUTO: 39.4 % (ref 37.5–51)
HCT VFR BLD CALC: 39.2 %
HGB BLD-MCNC: 12.7 G/DL (ref 13–17.7)
HGB UR QL STRIP.AUTO: NEGATIVE
HOLD SPECIMEN: NORMAL
HOLD SPECIMEN: NORMAL
IMM GRANULOCYTES # BLD AUTO: 0.03 10*3/MM3 (ref 0–0.05)
IMM GRANULOCYTES NFR BLD AUTO: 0.5 % (ref 0–0.5)
INHALED O2 CONCENTRATION: 32 %
KETONES UR QL STRIP: NEGATIVE
LEUKOCYTE ESTERASE UR QL STRIP.AUTO: NEGATIVE
LYMPHOCYTES # BLD AUTO: 1.39 10*3/MM3 (ref 0.7–3.1)
LYMPHOCYTES NFR BLD AUTO: 21.1 % (ref 19.6–45.3)
Lab: ABNORMAL
MAGNESIUM SERPL-MCNC: 2.3 MG/DL (ref 1.6–2.4)
MCH RBC QN AUTO: 29.5 PG (ref 26.6–33)
MCHC RBC AUTO-ENTMCNC: 32.2 G/DL (ref 31.5–35.7)
MCV RBC AUTO: 91.6 FL (ref 79–97)
METHADONE UR QL SCN: NEGATIVE
METHGB BLD QL: 0.4 % (ref 0–1.5)
MODALITY: ABNORMAL
MONOCYTES # BLD AUTO: 0.73 10*3/MM3 (ref 0.1–0.9)
MONOCYTES NFR BLD AUTO: 11.1 % (ref 5–12)
NEUTROPHILS NFR BLD AUTO: 4.04 10*3/MM3 (ref 1.7–7)
NEUTROPHILS NFR BLD AUTO: 61.2 % (ref 42.7–76)
NITRITE UR QL STRIP: NEGATIVE
NOTE: ABNORMAL
NRBC BLD AUTO-RTO: 0 /100 WBC (ref 0–0.2)
OPIATES UR QL: POSITIVE
OXYCODONE UR QL SCN: NEGATIVE
OXYHGB MFR BLDV: 95.5 % (ref 94–99)
PCO2 BLDA: 51.8 MM HG (ref 35–45)
PCO2 TEMP ADJ BLD: ABNORMAL MM[HG]
PCP UR QL SCN: NEGATIVE
PH BLDA: 7.35 PH UNITS (ref 7.35–7.45)
PH UR STRIP.AUTO: 5.5 [PH] (ref 5–8)
PH, TEMP CORRECTED: ABNORMAL
PLATELET # BLD AUTO: 198 10*3/MM3 (ref 140–450)
PMV BLD AUTO: 10.7 FL (ref 6–12)
PO2 BLDA: 86.8 MM HG (ref 75–100)
PO2 TEMP ADJ BLD: ABNORMAL MM[HG]
POTASSIUM SERPL-SCNC: 4.9 MMOL/L (ref 3.5–5.2)
PROPOXYPH UR QL: NEGATIVE
PROT SERPL-MCNC: 6.4 G/DL (ref 6–8.5)
PROT UR QL STRIP: NEGATIVE
RBC # BLD AUTO: 4.3 10*6/MM3 (ref 4.14–5.8)
SAO2 % BLDCOA: 96.8 % (ref 94–100)
SODIUM SERPL-SCNC: 135 MMOL/L (ref 136–145)
SP GR UR STRIP: 1.02 (ref 1–1.03)
TRICYCLICS UR QL SCN: NEGATIVE
TROPONIN T SERPL HS-MCNC: 24 NG/L
UROBILINOGEN UR QL STRIP: NORMAL
VENTILATOR MODE: ABNORMAL
WBC NRBC COR # BLD: 6.59 10*3/MM3 (ref 3.4–10.8)
WHOLE BLOOD HOLD COAG: NORMAL
WHOLE BLOOD HOLD SPECIMEN: NORMAL

## 2023-06-11 PROCEDURE — 85025 COMPLETE CBC W/AUTO DIFF WBC: CPT | Performed by: EMERGENCY MEDICINE

## 2023-06-11 PROCEDURE — 80053 COMPREHEN METABOLIC PANEL: CPT | Performed by: EMERGENCY MEDICINE

## 2023-06-11 PROCEDURE — 82375 ASSAY CARBOXYHB QUANT: CPT

## 2023-06-11 PROCEDURE — 71045 X-RAY EXAM CHEST 1 VIEW: CPT

## 2023-06-11 PROCEDURE — 80306 DRUG TEST PRSMV INSTRMNT: CPT | Performed by: EMERGENCY MEDICINE

## 2023-06-11 PROCEDURE — 81003 URINALYSIS AUTO W/O SCOPE: CPT | Performed by: EMERGENCY MEDICINE

## 2023-06-11 PROCEDURE — 36600 WITHDRAWAL OF ARTERIAL BLOOD: CPT

## 2023-06-11 PROCEDURE — 84484 ASSAY OF TROPONIN QUANT: CPT | Performed by: EMERGENCY MEDICINE

## 2023-06-11 PROCEDURE — 83050 HGB METHEMOGLOBIN QUAN: CPT

## 2023-06-11 PROCEDURE — 82140 ASSAY OF AMMONIA: CPT | Performed by: EMERGENCY MEDICINE

## 2023-06-11 PROCEDURE — 82805 BLOOD GASES W/O2 SATURATION: CPT

## 2023-06-11 PROCEDURE — 83735 ASSAY OF MAGNESIUM: CPT | Performed by: EMERGENCY MEDICINE

## 2023-06-11 PROCEDURE — 93005 ELECTROCARDIOGRAM TRACING: CPT | Performed by: EMERGENCY MEDICINE

## 2023-06-11 PROCEDURE — 70450 CT HEAD/BRAIN W/O DYE: CPT

## 2023-06-11 RX ORDER — SODIUM CHLORIDE 0.9 % (FLUSH) 0.9 %
10 SYRINGE (ML) INJECTION AS NEEDED
Status: DISCONTINUED | OUTPATIENT
Start: 2023-06-11 | End: 2023-06-12 | Stop reason: HOSPADM

## 2023-06-12 VITALS
TEMPERATURE: 98 F | OXYGEN SATURATION: 99 % | HEART RATE: 74 BPM | RESPIRATION RATE: 16 BRPM | DIASTOLIC BLOOD PRESSURE: 67 MMHG | HEIGHT: 64 IN | WEIGHT: 224.87 LBS | BODY MASS INDEX: 38.39 KG/M2 | SYSTOLIC BLOOD PRESSURE: 121 MMHG

## 2023-06-14 ENCOUNTER — READMISSION MANAGEMENT (OUTPATIENT)
Dept: CALL CENTER | Facility: HOSPITAL | Age: 73
End: 2023-06-14
Payer: MEDICARE

## 2023-06-14 NOTE — OUTREACH NOTE
Medical Week 1 Survey      Flowsheet Row Responses   [facility] facility patient discharged from? [facility]   Does the patient have one of the following disease processes/diagnoses(primary or secondary)? Other   Week 1 attempt successful? No   Unsuccessful attempts Attempt 1            Leonora NELSON - Registered Nurse

## 2023-06-19 ENCOUNTER — READMISSION MANAGEMENT (OUTPATIENT)
Dept: CALL CENTER | Facility: HOSPITAL | Age: 73
End: 2023-06-19
Payer: MEDICARE

## 2023-06-19 NOTE — OUTREACH NOTE
Medical Week 1 Survey      Flowsheet Row Responses   [facility] patient discharged from? [facility]   Does the patient have one of the following disease processes/diagnoses(primary or secondary)? Other   Week 1 attempt successful? Yes   Call start time 1316   Call end time 1320   Discharge diagnosis Syncope, unspecified syncope type    Chronic kidney disease, stage III (m   Person spoke with today (if not patient) and relationship Wife   Meds reviewed with patient/caregiver? Yes   Does the patient have a primary care provider?  Yes   Does the patient have an appointment with their PCP within 7 days of discharge? Yes   Has the patient kept scheduled appointments due by today? N/A   Has home health visited the patient within 72 hours of discharge? N/A   Psychosocial issues? No   What is the patient's perception of their health status since discharge? Same   Week 1 call completed? Yes            Isabelle NELSON - Registered Nurse

## 2023-06-20 NOTE — ED PROVIDER NOTES
Subjective   History of Present Illness  72-year-old male presents to the ED for chief complaint of altered mental status.  Patient was apparently confused.  Wife at bedside was able to provide further history and indicates that the patient may have had a syncopal event or a near syncopal event where he stood up felt lightheaded and then was confused when EMS initially evaluated.  On arrival to the ED the patient is awake alert oriented x3.  Denies complaints.  No chest pain or shortness of breath.  No cough or wheeze.  No other complaints at this time.    Review of Systems   Neurological:  Positive for syncope.   Psychiatric/Behavioral:  Positive for confusion.    All other systems reviewed and are negative.    Past Medical History:   Diagnosis Date    Anxiety and depression     Arthritis     Backache     20 years    Benign prostatic hyperplasia     Bladder trauma     Cervicalgia     Chronic kidney disease     Cr up to 2.1    Coronary artery disease     Diabetes mellitus     15 years--    Emphysema of lung     Erectile dysfunction     Eye exam, routine 2015    FH: colonic polyps     Gout     for 10 years--    History of echocardiogram 09/15/2014    History of tobacco use     with cessation x7 years    Hyperlipidemia     Hypertension     Migraine     Myocardial infarction     h/o coronary artery stenting--    Prostate cancer     Restless leg syndrome     20 years    Sleep apnea     12 years; uses a Bi-Pap    Stroke     Syncope 4/28/2017    Warfarin anticoagulation 9/14/2016       No Known Allergies    Past Surgical History:   Procedure Laterality Date    BLADDER SURGERY      CARDIAC CATHETERIZATION  03/15/2013    revealing widely patent stents of the left circumflex and ramus intermedius coronary arteries, no significant disease is seen in any territory    CARDIAC CATHETERIZATION Left 9/30/2019    Procedure: Left Heart Cath;  Surgeon: Arelis Tucker MD;  Location: [facility] CATH INVASIVE LOCATION;  Service:  Cardiology    CARDIAC ELECTROPHYSIOLOGY PROCEDURE N/A 5/10/2021    Procedure: PPM battery change;  Surgeon: Arelis Tucker MD;  Location: [facility] CATH INVASIVE LOCATION;  Service: Cardiology;  Laterality: N/A;    CARDIAC PACEMAKER PLACEMENT      CATARACT EXTRACTION      COLONOSCOPY      No family history of colon cancer.      COLONOSCOPY      HERNIA REPAIR      Type unknown    PACEMAKER IMPLANTATION      PROSTATE SURGERY         Family History   Problem Relation Age of Onset    Cancer Mother     Diabetes Mother     Hypertension Mother     Stroke Mother     Stomach cancer Mother     Heart disease Mother     Stomach cancer Father     Cancer Father     Lung cancer Father        Social History     Socioeconomic History    Marital status:    Tobacco Use    Smoking status: Former     Packs/day: 1.00     Years: 30.00     Pack years: 30.00     Types: Cigarettes     Quit date: 8/15/2001     Years since quittin.8    Smokeless tobacco: Never    Tobacco comments:     quit 16 years ago   Vaping Use    Vaping Use: Never used   Substance and Sexual Activity    Alcohol use: No    Drug use: No    Sexual activity: Defer           Objective   Physical Exam  Vitals and nursing note reviewed.   Constitutional:       Appearance: He is well-developed.   HENT:      Head: Normocephalic and atraumatic.   Cardiovascular:      Rate and Rhythm: Normal rate and regular rhythm.   Pulmonary:      Effort: Pulmonary effort is normal.   Abdominal:      Palpations: Abdomen is soft.      Tenderness: There is no abdominal tenderness.   Neurological:      Mental Status: He is alert and oriented to person, place, and time.      GCS: GCS eye subscore is 4. GCS verbal subscore is 5. GCS motor subscore is 6.      Cranial Nerves: No cranial nerve deficit.       Procedures           ED Course  ED Course as of 23 0102   Sun 2023   2150 EKG interpreted by me.  Sinus rhythm.  Rate of 60.  Prolonged QT interval.  No  obvious ST or T wave changes.  Abnormal EKG [CG]      ED Course User Index  [CG] Jose Terry B, DO                                           Medical Decision Making  Problems Addressed:  Confusion: complicated acute illness or injury  Syncope, unspecified syncope type: complicated acute illness or injury    Amount and/or Complexity of Data Reviewed  Labs: ordered.  Radiology: ordered.  ECG/medicine tests: ordered.    Chief complaint: Confusion, near syncopal event.    Differential diagnosis includes but not limited to: Syncope, STEMI, NSTEMI, infection, other    Patient arrives stable, afebrile, without respiratory distress with initial vitals interpreted by myself.  Pertinent initial vitals include within normal limits.    Plan: Basic labs, imaging, other    Results from initial plan were reviewed and interpreted by myself and include: UDS positive for opiates only.  He has prescribed these.  Urinalysis negative for infection.  ABG is reassuring.  CMP does show chronic kidney disease with mild increase in his creatinine.  He did receive IV fluid rehydration.  Further labs are reassuring.  CT head was negative for acute process.  There was questionable concern for mastoiditis but the patient has no pain with palpation of the mastoid or fever or redness overlying the mastoid.  High-sensitivity troponin indeterminate.  EKG was nonischemic.  No significant hypertension.    Consultations N/A    Reevaluation resting comfortably.  Awake alert and oriented x4.  Shared decision making with the family.  Recent had some changes to his medications likely secondary to bradycardia and orthostatic hypotension.  I did recommend decreasing his amlodipine to 5 mg and decreasing his diuretic until follow-up.  Wife and patient were agreeable to this plan.    Discussion: See results discussion above.  Diagnostic information from other sources includes: Review of previous visits, prior labs, prior imaging, available notes from prior  evaluations or visits with specialists, medication list, allergies, past medical history, past surgical history    Interventions in the ED included: IV fluid rehydration.  Continuous cardiac monitoring.    Disposition plan: Discharge.  Shared decision making with family.  Return precaution given.    Final diagnoses:   Syncope, unspecified syncope type   Confusion       ED Disposition  ED Disposition       ED Disposition   Discharge    Condition   Stable    Comment   --               Raj Sullivan MD  [address] [place] [address] [id number] [phone]               Medication List      No changes were made to your prescriptions during this visit.            Jose Terry, DO  06/20/23 0102

## 2023-08-27 NOTE — H&P
[facility]   HISTORY AND PHYSICAL      Name:  Robe Bryant   Age:  71 y.o.  Sex:  male  :  1950  MRN:  [id number]   Visit Number:  [id number]  Admission Date:  2022  Date Of Service:  22  Primary Care Physician:  Raj Sullivan MD    Chief Complaint:     Shortness of breath    History Of Presenting Illness:      Patient is a 71 years old male with a past medical history of COPD on 2 L as needed at baseline, obstructive sleep apnea on CPAP at night, CHF, CAD, diabetes mellitus type 2, chronic kidney disease stage III, history of MI, restless leg syndrome, history of stroke, chronic A. fib on Eliquis who presented to the ER with a chief complaint of shortness of breath and associated chest pain.  Patient reports that his been having worsening increased shortness of breath over the past couple days, he had an episode of chest pain/tightness yesterday that is now resolved, attributing patient is his chest tightness to worsening respiratory status/dyspnea, and he currently denies any chest pain.  Patient also reporting increased swelling in his bilateral lower extremities over the past few days.  Patient has a chronic urinary retention which has been worsening over the past couple days.  Patient denies any abdominal pain, nausea, vomiting, diarrhea, headaches, fevers or chills.  Patient is supposed to be on 2 L of oxygen as needed however he does not wear it at home at baseline.  Patient also on CPAP at night for his sleep apnea.     On ER evaluation, Patient was found to be hypertensive with a blood pressure of 180s over 90s, afebrile, patient was placed on 2 L of oxygen to keep her saturation above 90%.  His labs were significant for troponin of 0.010, proBNP of 762, creatinine 1.38, D-dimer of 1.43, hemoglobin of 11.8.  Pro-Royce WNL.  ABG with a pH of 7.405/PCO2 48/PO2 66/HCO3 30.6/saturation 93% on room air.  Respiratory panel negative chest x-ray with mild  Goal Outcome Evaluation:  Plan of Care Reviewed With: patient, family        Progress: improving  Outcome Evaluation: Pt is 84 y/o F admitted after a concerning dizzy spell. Pt is undergoing testing and thus far the only testing that is positive is for bradycardia. Her HR has been running from the mid 40's to the mid 50's and when pt is up walking in the room it does not get higher than 59 BPM. She did not demonstrate orthostatic hypotension or delayed BP drop. Her diastolic BP was in fact elevated after being upright on her feet for a couple of minutes. She lives alone but has good family support and appears able to go home with [facility] and use of a Rolator as long as her familly are able to assist her with some IADL. Famly present in the room state they already do this and they feel some concern for her taking her medications on time. [facility] PT could improve strangth, balance, and activty tolerance and [facility] OT could address safety concerns. It does appear pt could benefit from some type of HH aide but only if such a person is able to assist with taking medications. CM to address this question. Family state assisted living would not likely be a financial option.      Anticipated Discharge Disposition (OT): home with home health, home with assist   CHF.  CTA chest showed No pulmonary embolus.   Findings are most compatible with  cardiogenic pulmonary edema and probable pulmonary arterial hypertension.  Cardiomegaly and coronary artery disease.  Patient was given Lasix, it was noted that patient was having urinary retention after Lasix was given in the ER, Devi catheter was placed and had 1500 mL of urine output.  Patient also received fentanyl, aspirin and Solu-Medrol by the ER.  Hospitalist consulted for admission, further evaluation treatment.      Review Of Systems:    All systems were reviewed and negative except as mentioned in history of presenting illness, assessment and plan.    Past Medical History: Patient  has a past medical history of Anxiety and depression, Arthritis, Backache, Benign prostatic hyperplasia, Bladder trauma, Cervicalgia, Chronic kidney disease, Coronary artery disease, Diabetes mellitus (HCC), Emphysema of lung (HCC), Erectile dysfunction, Eye exam, routine (2015), FH: colonic polyps, Gout, History of echocardiogram (09/15/2014), History of tobacco use, Hyperlipidemia, Hypertension, Migraine, Myocardial infarction (HCC), Prostate cancer (HCC), Restless leg syndrome, Sleep apnea, Stroke (HCC), Syncope (4/28/2017), and Warfarin anticoagulation (9/14/2016).    Past Surgical History: Patient  has a past surgical history that includes Colonoscopy (2004); Bladder surgery; Cataract extraction; Hernia repair; Cardiac pacemaker placement (2012); Prostate surgery; Cardiac catheterization (03/15/2013); Colonoscopy (2015); Cardiac catheterization (Left, 9/30/2019); Pacemaker Implantation; and Cardiac electrophysiology procedure (N/A, 5/10/2021).    Social History: Patient  reports that he quit smoking about 20 years ago. His smoking use included cigarettes. He has a 30.00 pack-year smoking history. He has never used smokeless tobacco. He reports that he does not drink alcohol and does not use drugs.    Family History: Patient's family history  includes Cancer in his father and mother; Diabetes in his mother; Heart disease in his mother; Hypertension in his mother; Lung cancer in his father; Stomach cancer in his father and mother; Stroke in his mother.    Allergies:      Patient has no known allergies.    Home Medications:    Prior to Admission Medications     Prescriptions Last Dose Informant Patient Reported? Taking?    albuterol sulfate  (90 Base) MCG/ACT inhaler   No No    Inhale 2 puffs Every 4 (Four) Hours As Needed for Wheezing or Shortness of Air.    allopurinol (Zyloprim) 100 MG tablet   No No    Take 1 tablet by mouth Daily.    amiodarone (PACERONE) 200 MG tablet   No No    Take 1 tablet by mouth Daily.    amLODIPine (NORVASC) 10 MG tablet   No No    Take 1 tablet by mouth Daily.    apixaban (ELIQUIS) 5 MG tablet tablet   Yes No    Take 5 mg by mouth 2 (Two) Times a Day.    aspirin 81 MG chewable tablet   Yes No    Chew 81 mg Daily.    atorvastatin (LIPITOR) 20 MG tablet   No No    Take 1 tablet by mouth Every Night.    azithromycin (ZITHROMAX) 250 MG tablet   No No    Take one tablet by mouth once daily for 2 days  Indications: Pneumonia    Patient taking differently:  Take one tablet by mouth once daily for 2 days  Indications: Pneumonia  Indications: Pneumonia    baclofen (LIORESAL) 10 MG tablet   No No    Take 1 tablet by mouth 3 (Three) Times a Day As Needed for Muscle Spasms.    Blood Glucose Monitoring Suppl (TRUE METRIX AIR GLUCOSE METER) w/Device kit   No No    1 each by In Vitro route 2 (two) times a day. E11.9    Budeson-Glycopyrrol-Formoterol (Breztri Aerosphere) 160-9-4.8 MCG/ACT aerosol inhaler   No No    Inhale 2 puffs 2 (Two) Times a Day.    cetirizine (zyrTEC) 10 MG tablet   No No    Take 1 tablet by mouth Daily.    clotrimazole-betamethasone (Lotrisone) 1-0.05 % cream   No No    Apply to affected area 1 times daily    Coenzyme Q10 (Co Q-10) 100 MG capsule   No No    Take 200 mg by mouth Daily.    cyclobenzaprine  (FLEXERIL) 10 MG tablet   No No    Take 1 tablet by mouth 3 (Three) Times a Day As Needed for Muscle Spasms.    FLUoxetine (PROzac) 20 MG capsule   No No    Take 1 capsule by mouth Daily.    fluticasone (FLONASE) 50 MCG/ACT nasal spray   No No    USE 2 SPRAYS IN EACH NOSTRIL EVERY DAY AS DIRECTED  BY  MD    furosemide (LASIX) 20 MG tablet   No No    TAKE 1 TABLET BY MOUTH EVERY MORNING, MAY TAKE SECOND DOSE AS NEEDED FOR INCREASED SWELLING    gabapentin (NEURONTIN) 400 MG capsule   Yes No    Take 400 mg by mouth 3 (Three) Times a Day.    glipizide (GLUCOTROL) 5 MG tablet   No No    TAKE 1 TABLET TWICE DAILY BEFORE MEALS    glucose blood (True Metrix Blood Glucose Test) test strip   No No    Daily testing E11.9    glucose monitor monitoring kit   No No    1 each Daily. E11.9    HYDROcodone-acetaminophen (NORCO) 5-325 MG per tablet   No No    Take 1 tablet by mouth Every 6 (Six) Hours As Needed for Severe Pain .    ipratropium-albuterol (DUO-NEB) 0.5-2.5 mg/3 ml nebulizer   No No    Take 3 mL by nebulization 4 (Four) Times a Day.    Lancet Device misc   No No    1 each Daily. E11.9    lidocaine (LIDODERM) 5 %   No No    Place 1 patch on the skin as directed by provider Daily. Remove & Discard patch within 12 hours or as directed by MD    lidocaine (LIDODERM) 5 %   No No    Place 1 patch on the skin as directed by provider Daily. Remove & Discard patch within 12 hours or as directed by MD    lisinopril (PRINIVIL,ZESTRIL) 40 MG tablet   No No    Take 1 tablet by mouth Daily.    metoprolol succinate XL (Toprol XL) 50 MG 24 hr tablet   No No    Take 1 tablet by mouth Daily.    Misc. Devices misc   No No    Bipap tubing.    Multiple Vitamin tablet   Yes No    Take 1 tablet by mouth daily.    Nebulizer misc   No No    1 each Every 4 (Four) Hours As Needed (shortness of breath).    nitroglycerin (Nitrostat) 0.4 MG SL tablet   No No    Place 1 tablet under the tongue Every 5 (Five) Minutes As Needed for Chest Pain.    O2  "(OXYGEN)  Self Yes No    Inhale 2 L/min Continuous As Needed (With activity).    omeprazole (priLOSEC) 40 MG capsule   No No    Take 1 capsule by mouth Daily.    ondansetron ODT (Zofran ODT) 4 MG disintegrating tablet   No No    Place 1 tablet on the tongue Every 8 (Eight) Hours As Needed for Nausea or Vomiting.    potassium chloride (K-DUR,KLOR-CON) 10 MEQ CR tablet   No No    Take 1 tablet by mouth 2 (Two) Times a Day.    pramipexole (MIRAPEX) 0.5 MG tablet   No No    Take 1 tablet by mouth 3 (Three) Times a Day.    predniSONE (DELTASONE) 20 MG tablet   No No    Take 1 tablet by mouth 2 (Two) Times a Day.    spironolactone (ALDACTONE) 50 MG tablet   No No    TAKE 1 TABLET EVERY DAY    TRUEplus Lancets 33G misc   No No    1 each by Other route Daily. E11.9    Vitamin D, Cholecalciferol, 50 MCG (2000 UT) capsule   No No    Take 1 capsule by mouth Daily.        ED Medications:    Medications   sodium chloride 0.9 % flush 10 mL (has no administration in time range)   aspirin tablet 325 mg (325 mg Oral Given 7/11/22 1514)   methylPREDNISolone sodium succinate (SOLU-Medrol) injection 125 mg (125 mg Intravenous Given 7/11/22 1515)   albuterol sulfate HFA (PROVENTIL HFA;VENTOLIN HFA;PROAIR HFA) inhaler 6 puff (6 puffs Inhalation Given 7/11/22 1515)   furosemide (LASIX) injection 80 mg (80 mg Intravenous Given 7/11/22 1653)   fentaNYL citrate (PF) (SUBLIMAZE) injection 50 mcg (50 mcg Intravenous Given 7/11/22 1652)   iopamidol (ISOVUE-300) 61 % injection 100 mL (100 mL Intravenous Given 7/11/22 1737)     Vital Signs:  Temp:  [98.6 °F (37 °C)] 98.6 °F (37 °C)  Heart Rate:  [60-81] 71  Resp:  [22] 22  BP: (161-195)/() 195/103        07/11/22  1339   Weight: 118 kg (260 lb)     Body mass index is 44.63 kg/m².    Physical Exam:     Most recent vital Signs: BP (!) 195/103   Pulse 71   Temp 98.6 °F (37 °C) (Oral)   Resp 22   Ht 162.6 cm (64\")   Wt 118 kg (260 lb)   SpO2 (!) 89%   BMI 44.63 kg/m²     Physical " Exam  Vitals and nursing note reviewed.   Constitutional:       General: He is not in acute distress.     Appearance: He is obese.   HENT:      Head: Normocephalic and atraumatic.      Right Ear: External ear normal.      Left Ear: External ear normal.      Nose: Nose normal.      Mouth/Throat:      Mouth: Mucous membranes are moist.   Eyes:      Extraocular Movements: Extraocular movements intact.      Conjunctiva/sclera: Conjunctivae normal.      Pupils: Pupils are equal, round, and reactive to light.   Cardiovascular:      Rate and Rhythm: Normal rate and regular rhythm.      Pulses: Normal pulses.      Heart sounds: Normal heart sounds.   Pulmonary:      Effort: Tachypnea, accessory muscle usage and prolonged expiration present.      Breath sounds: Decreased air movement present. Rhonchi present. No wheezing.      Comments: Bilateral rhonchi heard.  Abdominal:      General: Bowel sounds are normal. There is no distension.      Palpations: Abdomen is soft.      Tenderness: There is no abdominal tenderness.   Musculoskeletal:         General: Normal range of motion.      Cervical back: Normal range of motion and neck supple.      Right lower leg: No tenderness. 1+ Edema present.      Left lower leg: No tenderness. 1+ Edema present.   Skin:     General: Skin is warm and dry.      Findings: No rash.   Neurological:      General: No focal deficit present.      Mental Status: He is alert and oriented to person, place, and time. Mental status is at baseline.      Motor: No weakness.   Psychiatric:         Mood and Affect: Mood normal.         Behavior: Behavior normal.         Thought Content: Thought content normal.         Laboratory data:    I have reviewed the labs done in the emergency room.    Results from last 7 days   Lab Units 07/11/22  1349   SODIUM mmol/L 142   POTASSIUM mmol/L 3.6   CHLORIDE mmol/L 104   CO2 mmol/L 27.4   BUN mg/dL 14   CREATININE mg/dL 1.38*   CALCIUM mg/dL 8.7   BILIRUBIN mg/dL 0.4   ALK  PHOS U/L 65   ALT (SGPT) U/L 17   AST (SGOT) U/L 26   GLUCOSE mg/dL 95     Results from last 7 days   Lab Units 07/11/22  1349   WBC 10*3/mm3 7.92   HEMOGLOBIN g/dL 11.8*   HEMATOCRIT % 36.5*   PLATELETS 10*3/mm3 183         Results from last 7 days   Lab Units 07/11/22  1349   TROPONIN T ng/mL 0.010     Results from last 7 days   Lab Units 07/11/22  1349   PROBNP pg/mL 762.8             Results from last 7 days   Lab Units 07/11/22  1436   PH, ARTERIAL pH units 7.405   PO2 ART mm Hg 66.0*   PCO2, ARTERIAL mm Hg 48.9*   HCO3 ART mmol/L 30.6*           Invalid input(s): USDES,  BLOODU, NITRITITE, BACT, EP    Pain Management Panel     Pain Management Panel Latest Ref Rng & Units 10/25/2021 9/13/2016    CREATININE UR Not Estab. mg/dL 125.1 56.7          EKG:      Atrial paced rhythm, heart rate 60, nonspecific ST/T wave changes.    Radiology:    XR Chest 1 View    Result Date: 7/11/2022  PROCEDURE: XR CHEST 1 VW-    HISTORY: Chest Pain Triage Protocol  COMPARISON: None.  FINDINGS: The heart is enlarged. There is pulmonary vascular congestion. There is mild hypoinflation.  There is no pneumothorax. There are no acute osseous abnormalities. A pacemaker overlies the left chest.       Mild CHF.        Images were reviewed, interpreted, and dictated by Dr. Fran Rojas MD Transcribed by Suly Newman PA-C.  This report was signed and finalized on 7/11/2022 3:15 PM by Fran Rojas MD.    CT Angiogram Chest Pulmonary Embolism    Result Date: 7/11/2022  FINAL REPORT TECHNIQUE: Multiple axial CT images were obtained through the chest following IV contrast using a CTA/PE protocol.  3D/MIP reconstruction images were also performed. This study was performed with techniques to keep radiation doses as low as reasonably achievable (ALARA). Individualized dose reduction techniques using automated exposure control or adjustment of mA and/or kV according to the patient's size were employed. CLINICAL HISTORY: sob, elevated dimer  FINDINGS: PAs and aorta: The pulmonary arteries are enlarged.  No pulmonary embolus.  Thoracic aorta is unremarkable.   There is coronary artery disease.  Heart/mediastinum: No evidence for right heart strain.  No pericardial effusion.    There is cardiomegaly.  Lungs: There are mild diffuse groundglass opacities with interlobular septal thickening and bronchial cuffing.  Lymph nodes: No pathologically enlarged thoracic lymph nodes.  Pleura: No pneumothorax or pleural effusion.  Chest Wall: No chest wall contusion.  Bones: No acute fracture.  Upper abdomen: No acute findings in the upper abdomen.     No pulmonary embolus.   Findings are most compatible with cardiogenic pulmonary edema and probable pulmonary arterial hypertension.  Cardiomegaly and coronary artery disease. Authenticated and Electronically Signed by Glenn Ferro MD on 07/11/2022 06:16:36 PM      Assessment:    1. Acute on chronic respiratory failure with hypoxia, POA  2. Acute pulmonary edema, POA  3. Acute on chronic Diastolic Heart Failure  4. Urinary Retention, chronic, POA   5. Chronic Atrial Fibrillation on Eliquis  6. Diabetes mellitus type 2  7. COPD on 2 liters PRN   8. Hyperlipidemia  9. Hypertension, uncontrolled  10. CKD 3  11. CAD  12. History of MI and stroke  13. Sleep apnea    Plan:    Patient is admitted to telemetry for further evaluation and treatment.    Acute on chronic respiratory failure with hypoxia  Acute pulmonary edema  Acute on chronic diastolic heart failure  Daily weight, strict ins and outs, low-sodium and fluid restriction.   Last 2D echo on file from January 2021 with EF of 57% and grade 1 diastolic dysfunction.    2D echo ordered for further evaluation.  Continue spironolactone, switch Lasix to 40 mg IV twice daily.  Continue O2 supplements to keep saturation above 90%.    CKD 3  Creatinine appears to be at baseline.   Avoid nephrotoxic drugs.  Continue to monitor kidney function while on diuretics.    Chronic urinary  retention  Devi catheter placed in the ER with good urine output.  Will check urinalysis.     Hypertension, uncontrolled  Hyperlipidemia  CAD  Continue home meds.  Will add hydralazine for as needed high blood pressure.    Chronic atrial fibrillation  On amiodarone, metoprolol and Eliquis.    Sleep apnea  CPAP at night.      Further orders as indicated per clinical course.    Risk Assessment: High  DVT Prophylaxis: On Eliquis  Code Status: Full  Diet: Cardiac/carbohydrate consistent/low-sodium    Advance Care Planning   ACP discussion was held with the patient during this visit. Patient does not have an advance directive, information provided.           Jessenia Willis MD  07/11/22  19:34 EDT    Dictated utilizing Dragon dictation.

## 2023-09-06 RX ORDER — PRAMIPEXOLE DIHYDROCHLORIDE 0.5 MG/1
TABLET ORAL
Qty: 270 TABLET | Refills: 3 | OUTPATIENT
Start: 2023-09-06

## 2023-11-16 RX ORDER — BUMETANIDE 0.5 MG/1
0.5 TABLET ORAL DAILY
Qty: 90 TABLET | Refills: 3 | Status: SHIPPED | OUTPATIENT
Start: 2023-11-16

## 2023-11-16 NOTE — TELEPHONE ENCOUNTER
Lab Results   Component Value Date    GLUCOSE 154 (H) 06/11/2023    BUN 46 (H) 06/11/2023    CREATININE 2.50 (H) 06/11/2023    EGFR 26.5 (L) 06/11/2023    BCR 18.4 06/11/2023    K 4.9 06/11/2023    CO2 27.0 06/11/2023    CALCIUM 8.9 06/11/2023    PROTENTOTREF 6.0 01/21/2022    ALBUMIN 3.6 06/11/2023    BILITOT 0.3 06/11/2023    AST 16 06/11/2023    ALT 15 06/11/2023

## 2023-12-19 ENCOUNTER — HOSPITAL ENCOUNTER (INPATIENT)
Facility: HOSPITAL | Age: 73
LOS: 3 days | Discharge: HOME | DRG: 190 | End: 2023-12-22
Attending: STUDENT IN AN ORGANIZED HEALTH CARE EDUCATION/TRAINING PROGRAM | Admitting: INTERNAL MEDICINE
Payer: MEDICARE

## 2023-12-19 ENCOUNTER — APPOINTMENT (OUTPATIENT)
Dept: GENERAL RADIOLOGY | Facility: HOSPITAL | Age: 73
DRG: 190 | End: 2023-12-19
Payer: MEDICARE

## 2023-12-19 DIAGNOSIS — J44.1 COPD EXACERBATION: Primary | ICD-10-CM

## 2023-12-19 LAB
A-A DO2: 127.3 MMHG
ALBUMIN SERPL-MCNC: 3.8 G/DL (ref 3.5–5.2)
ALBUMIN/GLOB SERPL: 1.4 G/DL
ALP SERPL-CCNC: 96 U/L (ref 39–117)
ALT SERPL W P-5'-P-CCNC: 13 U/L (ref 1–41)
ANION GAP SERPL CALCULATED.3IONS-SCNC: 7.9 MMOL/L (ref 5–15)
ARTERIAL PATENCY WRIST A: ABNORMAL
AST SERPL-CCNC: 18 U/L (ref 1–40)
ATMOSPHERIC PRESS: 743 MMHG
BASE EXCESS BLDA CALC-SCNC: 2.9 MMOL/L (ref 0–2)
BASOPHILS # BLD AUTO: 0.05 10*3/MM3 (ref 0–0.2)
BASOPHILS NFR BLD AUTO: 0.5 % (ref 0–1.5)
BDY SITE: ABNORMAL
BILIRUB SERPL-MCNC: 0.9 MG/DL (ref 0–1.2)
BUN SERPL-MCNC: 17 MG/DL (ref 8–23)
BUN/CREAT SERPL: 12.1 (ref 7–25)
CALCIUM SPEC-SCNC: 9.1 MG/DL (ref 8.6–10.5)
CHLORIDE SERPL-SCNC: 105 MMOL/L (ref 98–107)
CO2 SERPL-SCNC: 27.1 MMOL/L (ref 22–29)
COHGB MFR BLD: 2.2 % (ref 0–2)
CREAT SERPL-MCNC: 1.41 MG/DL (ref 0.76–1.27)
CRP SERPL-MCNC: 2.33 MG/DL (ref 0–0.5)
D-LACTATE SERPL-SCNC: 1.5 MMOL/L (ref 0.5–2)
DEPRECATED RDW RBC AUTO: 49.1 FL (ref 37–54)
EGFRCR SERPLBLD CKD-EPI 2021: 52.3 ML/MIN/1.73
EOSINOPHIL # BLD AUTO: 0.03 10*3/MM3 (ref 0–0.4)
EOSINOPHIL NFR BLD AUTO: 0.3 % (ref 0.3–6.2)
ERYTHROCYTE [DISTWIDTH] IN BLOOD BY AUTOMATED COUNT: 14.1 % (ref 12.3–15.4)
FLUAV SUBTYP SPEC NAA+PROBE: NOT DETECTED
FLUBV RNA ISLT QL NAA+PROBE: NOT DETECTED
GAS FLOW AIRWAY: 3 LPM
GLOBULIN UR ELPH-MCNC: 2.8 GM/DL
GLUCOSE SERPL-MCNC: 158 MG/DL (ref 65–99)
HCO3 BLDA-SCNC: 27.7 MMOL/L (ref 22–28)
HCT VFR BLD AUTO: 36.5 % (ref 37.5–51)
HCT VFR BLD CALC: 34.4 %
HGB BLD-MCNC: 11.6 G/DL (ref 13–17.7)
HOLD SPECIMEN: NORMAL
HOLD SPECIMEN: NORMAL
IMM GRANULOCYTES # BLD AUTO: 0.05 10*3/MM3 (ref 0–0.05)
IMM GRANULOCYTES NFR BLD AUTO: 0.5 % (ref 0–0.5)
INHALED O2 CONCENTRATION: 32 %
LYMPHOCYTES # BLD AUTO: 0.86 10*3/MM3 (ref 0.7–3.1)
LYMPHOCYTES NFR BLD AUTO: 8.9 % (ref 19.6–45.3)
Lab: ABNORMAL
Lab: ABNORMAL
MAGNESIUM SERPL-MCNC: 1.7 MG/DL (ref 1.6–2.4)
MCH RBC QN AUTO: 30.1 PG (ref 26.6–33)
MCHC RBC AUTO-ENTMCNC: 31.8 G/DL (ref 31.5–35.7)
MCV RBC AUTO: 94.6 FL (ref 79–97)
METHGB BLD QL: 0.4 % (ref 0–1.5)
MODALITY: ABNORMAL
MONOCYTES # BLD AUTO: 0.46 10*3/MM3 (ref 0.1–0.9)
MONOCYTES NFR BLD AUTO: 4.8 % (ref 5–12)
NEUTROPHILS NFR BLD AUTO: 8.2 10*3/MM3 (ref 1.7–7)
NEUTROPHILS NFR BLD AUTO: 85 % (ref 42.7–76)
NOTIFIED BY: ABNORMAL
NOTIFIED WHO: ABNORMAL
NRBC BLD AUTO-RTO: 0 /100 WBC (ref 0–0.2)
NT-PROBNP SERPL-MCNC: 1951 PG/ML (ref 0–900)
OXYHGB MFR BLDV: 83.3 % (ref 94–99)
PCO2 BLDA: 42.2 MM HG (ref 35–45)
PCO2 TEMP ADJ BLD: ABNORMAL MM[HG]
PH BLDA: 7.42 PH UNITS (ref 7.35–7.45)
PH, TEMP CORRECTED: ABNORMAL
PLATELET # BLD AUTO: 194 10*3/MM3 (ref 140–450)
PMV BLD AUTO: 11 FL (ref 6–12)
PO2 BLDA: 48.7 MM HG (ref 75–100)
PO2 TEMP ADJ BLD: ABNORMAL MM[HG]
POTASSIUM SERPL-SCNC: 4 MMOL/L (ref 3.5–5.2)
PROCALCITONIN SERPL-MCNC: 0.06 NG/ML (ref 0–0.25)
PROT SERPL-MCNC: 6.6 G/DL (ref 6–8.5)
RBC # BLD AUTO: 3.86 10*6/MM3 (ref 4.14–5.8)
SAO2 % BLDCOA: 85.5 % (ref 94–100)
SARS-COV-2 RNA RESP QL NAA+PROBE: NOT DETECTED
SODIUM SERPL-SCNC: 140 MMOL/L (ref 136–145)
TROPONIN T SERPL HS-MCNC: 20 NG/L
VENTILATOR MODE: ABNORMAL
WBC NRBC COR # BLD AUTO: 9.65 10*3/MM3 (ref 3.4–10.8)
WHOLE BLOOD HOLD COAG: NORMAL
WHOLE BLOOD HOLD SPECIMEN: NORMAL

## 2023-12-19 PROCEDURE — 25010000002 METHYLPREDNISOLONE PER 125 MG: Performed by: STUDENT IN AN ORGANIZED HEALTH CARE EDUCATION/TRAINING PROGRAM

## 2023-12-19 PROCEDURE — 82805 BLOOD GASES W/O2 SATURATION: CPT

## 2023-12-19 PROCEDURE — 87636 SARSCOV2 & INF A&B AMP PRB: CPT | Performed by: STUDENT IN AN ORGANIZED HEALTH CARE EDUCATION/TRAINING PROGRAM

## 2023-12-19 PROCEDURE — 80053 COMPREHEN METABOLIC PANEL: CPT | Performed by: STUDENT IN AN ORGANIZED HEALTH CARE EDUCATION/TRAINING PROGRAM

## 2023-12-19 PROCEDURE — 84439 ASSAY OF FREE THYROXINE: CPT | Performed by: INTERNAL MEDICINE

## 2023-12-19 PROCEDURE — 25010000002 MAGNESIUM SULFATE 2 GM/50ML SOLUTION: Performed by: STUDENT IN AN ORGANIZED HEALTH CARE EDUCATION/TRAINING PROGRAM

## 2023-12-19 PROCEDURE — 83050 HGB METHEMOGLOBIN QUAN: CPT

## 2023-12-19 PROCEDURE — 84145 PROCALCITONIN (PCT): CPT | Performed by: STUDENT IN AN ORGANIZED HEALTH CARE EDUCATION/TRAINING PROGRAM

## 2023-12-19 PROCEDURE — 84484 ASSAY OF TROPONIN QUANT: CPT | Performed by: STUDENT IN AN ORGANIZED HEALTH CARE EDUCATION/TRAINING PROGRAM

## 2023-12-19 PROCEDURE — 36600 WITHDRAWAL OF ARTERIAL BLOOD: CPT

## 2023-12-19 PROCEDURE — 83735 ASSAY OF MAGNESIUM: CPT | Performed by: STUDENT IN AN ORGANIZED HEALTH CARE EDUCATION/TRAINING PROGRAM

## 2023-12-19 PROCEDURE — 71045 X-RAY EXAM CHEST 1 VIEW: CPT

## 2023-12-19 PROCEDURE — 99291 CRITICAL CARE FIRST HOUR: CPT

## 2023-12-19 PROCEDURE — 94640 AIRWAY INHALATION TREATMENT: CPT

## 2023-12-19 PROCEDURE — 94799 UNLISTED PULMONARY SVC/PX: CPT

## 2023-12-19 PROCEDURE — 86140 C-REACTIVE PROTEIN: CPT | Performed by: STUDENT IN AN ORGANIZED HEALTH CARE EDUCATION/TRAINING PROGRAM

## 2023-12-19 PROCEDURE — 82375 ASSAY CARBOXYHB QUANT: CPT

## 2023-12-19 PROCEDURE — 85025 COMPLETE CBC W/AUTO DIFF WBC: CPT | Performed by: STUDENT IN AN ORGANIZED HEALTH CARE EDUCATION/TRAINING PROGRAM

## 2023-12-19 PROCEDURE — 93005 ELECTROCARDIOGRAM TRACING: CPT | Performed by: STUDENT IN AN ORGANIZED HEALTH CARE EDUCATION/TRAINING PROGRAM

## 2023-12-19 PROCEDURE — 83880 ASSAY OF NATRIURETIC PEPTIDE: CPT | Performed by: STUDENT IN AN ORGANIZED HEALTH CARE EDUCATION/TRAINING PROGRAM

## 2023-12-19 PROCEDURE — 94760 N-INVAS EAR/PLS OXIMETRY 1: CPT

## 2023-12-19 PROCEDURE — 83605 ASSAY OF LACTIC ACID: CPT | Performed by: STUDENT IN AN ORGANIZED HEALTH CARE EDUCATION/TRAINING PROGRAM

## 2023-12-19 RX ORDER — FUROSEMIDE 10 MG/ML
80 INJECTION INTRAMUSCULAR; INTRAVENOUS ONCE
Status: COMPLETED | OUTPATIENT
Start: 2023-12-19 | End: 2023-12-20

## 2023-12-19 RX ORDER — SODIUM CHLORIDE 0.9 % (FLUSH) 0.9 %
10 SYRINGE (ML) INJECTION AS NEEDED
Status: DISCONTINUED | OUTPATIENT
Start: 2023-12-19 | End: 2023-12-22 | Stop reason: HOSPADM

## 2023-12-19 RX ORDER — IPRATROPIUM BROMIDE AND ALBUTEROL SULFATE 2.5; .5 MG/3ML; MG/3ML
3 SOLUTION RESPIRATORY (INHALATION) ONCE
Status: COMPLETED | OUTPATIENT
Start: 2023-12-19 | End: 2023-12-19

## 2023-12-19 RX ORDER — MAGNESIUM SULFATE HEPTAHYDRATE 40 MG/ML
2 INJECTION, SOLUTION INTRAVENOUS ONCE
Status: COMPLETED | OUTPATIENT
Start: 2023-12-19 | End: 2023-12-19

## 2023-12-19 RX ORDER — METHYLPREDNISOLONE SODIUM SUCCINATE 125 MG/2ML
125 INJECTION, POWDER, LYOPHILIZED, FOR SOLUTION INTRAMUSCULAR; INTRAVENOUS ONCE
Status: COMPLETED | OUTPATIENT
Start: 2023-12-19 | End: 2023-12-19

## 2023-12-19 RX ADMIN — METHYLPREDNISOLONE SODIUM SUCCINATE 125 MG: 125 INJECTION, POWDER, FOR SOLUTION INTRAMUSCULAR; INTRAVENOUS at 21:43

## 2023-12-19 RX ADMIN — IPRATROPIUM BROMIDE AND ALBUTEROL SULFATE 3 ML: .5; 3 SOLUTION RESPIRATORY (INHALATION) at 22:15

## 2023-12-19 RX ADMIN — MAGNESIUM SULFATE HEPTAHYDRATE 2 G: 40 INJECTION, SOLUTION INTRAVENOUS at 21:43

## 2023-12-19 NOTE — Clinical Note
Level of Care: Telemetry [5]   Diagnosis: COPD exacerbation [009458]   Admitting Physician: MANA ADEN [[id number]]   Certification: I Certify That Inpatient Hospital Services Are Medically Necessary For Greater Than 2 Midnights

## 2023-12-20 LAB
ALBUMIN SERPL-MCNC: 3.4 G/DL (ref 3.5–5.2)
ALBUMIN/GLOB SERPL: 1.2 G/DL
ALP SERPL-CCNC: 91 U/L (ref 39–117)
ALT SERPL W P-5'-P-CCNC: 11 U/L (ref 1–41)
ANION GAP SERPL CALCULATED.3IONS-SCNC: 9.9 MMOL/L (ref 5–15)
AST SERPL-CCNC: 15 U/L (ref 1–40)
BASOPHILS # BLD AUTO: 0.01 10*3/MM3 (ref 0–0.2)
BASOPHILS NFR BLD AUTO: 0.2 % (ref 0–1.5)
BILIRUB SERPL-MCNC: 0.8 MG/DL (ref 0–1.2)
BUN SERPL-MCNC: 19 MG/DL (ref 8–23)
BUN/CREAT SERPL: 13.7 (ref 7–25)
CALCIUM SPEC-SCNC: 9.1 MG/DL (ref 8.6–10.5)
CHLORIDE SERPL-SCNC: 104 MMOL/L (ref 98–107)
CO2 SERPL-SCNC: 26.1 MMOL/L (ref 22–29)
CREAT SERPL-MCNC: 1.39 MG/DL (ref 0.76–1.27)
DEPRECATED RDW RBC AUTO: 48.8 FL (ref 37–54)
EGFRCR SERPLBLD CKD-EPI 2021: 53.2 ML/MIN/1.73
EOSINOPHIL # BLD AUTO: 0 10*3/MM3 (ref 0–0.4)
EOSINOPHIL NFR BLD AUTO: 0 % (ref 0.3–6.2)
ERYTHROCYTE [DISTWIDTH] IN BLOOD BY AUTOMATED COUNT: 13.9 % (ref 12.3–15.4)
GLOBULIN UR ELPH-MCNC: 2.8 GM/DL
GLUCOSE BLDC GLUCOMTR-MCNC: 193 MG/DL (ref 70–130)
GLUCOSE BLDC GLUCOMTR-MCNC: 248 MG/DL (ref 70–130)
GLUCOSE BLDC GLUCOMTR-MCNC: 303 MG/DL (ref 70–130)
GLUCOSE SERPL-MCNC: 174 MG/DL (ref 65–99)
HBA1C MFR BLD: 5.8 % (ref 4.8–5.6)
HCT VFR BLD AUTO: 37.7 % (ref 37.5–51)
HGB BLD-MCNC: 11.8 G/DL (ref 13–17.7)
IMM GRANULOCYTES # BLD AUTO: 0.05 10*3/MM3 (ref 0–0.05)
IMM GRANULOCYTES NFR BLD AUTO: 0.8 % (ref 0–0.5)
LYMPHOCYTES # BLD AUTO: 0.51 10*3/MM3 (ref 0.7–3.1)
LYMPHOCYTES NFR BLD AUTO: 8.5 % (ref 19.6–45.3)
MCH RBC QN AUTO: 30.3 PG (ref 26.6–33)
MCHC RBC AUTO-ENTMCNC: 31.3 G/DL (ref 31.5–35.7)
MCV RBC AUTO: 96.7 FL (ref 79–97)
MONOCYTES # BLD AUTO: 0.05 10*3/MM3 (ref 0.1–0.9)
MONOCYTES NFR BLD AUTO: 0.8 % (ref 5–12)
NEUTROPHILS NFR BLD AUTO: 5.4 10*3/MM3 (ref 1.7–7)
NEUTROPHILS NFR BLD AUTO: 89.7 % (ref 42.7–76)
NRBC BLD AUTO-RTO: 0 /100 WBC (ref 0–0.2)
PLATELET # BLD AUTO: 185 10*3/MM3 (ref 140–450)
PMV BLD AUTO: 11 FL (ref 6–12)
POTASSIUM SERPL-SCNC: 4.2 MMOL/L (ref 3.5–5.2)
PROT SERPL-MCNC: 6.2 G/DL (ref 6–8.5)
RBC # BLD AUTO: 3.9 10*6/MM3 (ref 4.14–5.8)
RBC MORPH BLD: NORMAL
SMALL PLATELETS BLD QL SMEAR: ADEQUATE
SODIUM SERPL-SCNC: 140 MMOL/L (ref 136–145)
T4 FREE SERPL-MCNC: 1.6 NG/DL (ref 0.93–1.7)
TSH SERPL DL<=0.05 MIU/L-ACNC: 0.63 UIU/ML (ref 0.27–4.2)
WBC MORPH BLD: NORMAL
WBC NRBC COR # BLD AUTO: 6.02 10*3/MM3 (ref 3.4–10.8)

## 2023-12-20 PROCEDURE — 85025 COMPLETE CBC W/AUTO DIFF WBC: CPT | Performed by: INTERNAL MEDICINE

## 2023-12-20 PROCEDURE — 63710000001 INSULIN ASPART PER 5 UNITS: Performed by: INTERNAL MEDICINE

## 2023-12-20 PROCEDURE — 80053 COMPREHEN METABOLIC PANEL: CPT | Performed by: INTERNAL MEDICINE

## 2023-12-20 PROCEDURE — 85007 BL SMEAR W/DIFF WBC COUNT: CPT | Performed by: INTERNAL MEDICINE

## 2023-12-20 PROCEDURE — 83036 HEMOGLOBIN GLYCOSYLATED A1C: CPT | Performed by: INTERNAL MEDICINE

## 2023-12-20 PROCEDURE — 94664 DEMO&/EVAL PT USE INHALER: CPT

## 2023-12-20 PROCEDURE — 94799 UNLISTED PULMONARY SVC/PX: CPT

## 2023-12-20 PROCEDURE — 25010000002 FUROSEMIDE PER 20 MG: Performed by: INTERNAL MEDICINE

## 2023-12-20 PROCEDURE — 94761 N-INVAS EAR/PLS OXIMETRY MLT: CPT

## 2023-12-20 PROCEDURE — 97161 PT EVAL LOW COMPLEX 20 MIN: CPT

## 2023-12-20 PROCEDURE — 25010000002 METHYLPREDNISOLONE PER 125 MG: Performed by: INTERNAL MEDICINE

## 2023-12-20 PROCEDURE — 82948 REAGENT STRIP/BLOOD GLUCOSE: CPT

## 2023-12-20 PROCEDURE — 84443 ASSAY THYROID STIM HORMONE: CPT | Performed by: INTERNAL MEDICINE

## 2023-12-20 PROCEDURE — 25010000002 CEFTRIAXONE SODIUM-DEXTROSE 2-2.22 GM-%(50ML) RECONSTITUTED SOLUTION: Performed by: INTERNAL MEDICINE

## 2023-12-20 RX ORDER — HYDROCODONE BITARTRATE AND ACETAMINOPHEN 5; 325 MG/1; MG/1
1 TABLET ORAL EVERY 6 HOURS PRN
Status: DISCONTINUED | OUTPATIENT
Start: 2023-12-20 | End: 2023-12-22 | Stop reason: HOSPADM

## 2023-12-20 RX ORDER — PANTOPRAZOLE SODIUM 40 MG/1
40 TABLET, DELAYED RELEASE ORAL
Status: DISCONTINUED | OUTPATIENT
Start: 2023-12-20 | End: 2023-12-22 | Stop reason: HOSPADM

## 2023-12-20 RX ORDER — IPRATROPIUM BROMIDE AND ALBUTEROL SULFATE 2.5; .5 MG/3ML; MG/3ML
3 SOLUTION RESPIRATORY (INHALATION)
Status: DISCONTINUED | OUTPATIENT
Start: 2023-12-20 | End: 2023-12-20

## 2023-12-20 RX ORDER — AMLODIPINE BESYLATE 5 MG/1
10 TABLET ORAL DAILY
Status: DISCONTINUED | OUTPATIENT
Start: 2023-12-20 | End: 2023-12-22 | Stop reason: HOSPADM

## 2023-12-20 RX ORDER — INSULIN ASPART 100 [IU]/ML
2-7 INJECTION, SOLUTION INTRAVENOUS; SUBCUTANEOUS
Status: DISCONTINUED | OUTPATIENT
Start: 2023-12-20 | End: 2023-12-22 | Stop reason: HOSPADM

## 2023-12-20 RX ORDER — SPIRONOLACTONE 25 MG/1
50 TABLET ORAL DAILY
Status: DISCONTINUED | OUTPATIENT
Start: 2023-12-20 | End: 2023-12-22 | Stop reason: HOSPADM

## 2023-12-20 RX ORDER — CEFTRIAXONE 2 G/50ML
2000 INJECTION, SOLUTION INTRAVENOUS EVERY 24 HOURS
Status: DISCONTINUED | OUTPATIENT
Start: 2023-12-20 | End: 2023-12-22 | Stop reason: HOSPADM

## 2023-12-20 RX ORDER — NICOTINE POLACRILEX 4 MG
15 LOZENGE BUCCAL
Status: DISCONTINUED | OUTPATIENT
Start: 2023-12-20 | End: 2023-12-22 | Stop reason: HOSPADM

## 2023-12-20 RX ORDER — DEXTROSE MONOHYDRATE 25 G/50ML
25 INJECTION, SOLUTION INTRAVENOUS
Status: DISCONTINUED | OUTPATIENT
Start: 2023-12-20 | End: 2023-12-22 | Stop reason: HOSPADM

## 2023-12-20 RX ORDER — METHYLPREDNISOLONE SODIUM SUCCINATE 125 MG/2ML
60 INJECTION, POWDER, LYOPHILIZED, FOR SOLUTION INTRAMUSCULAR; INTRAVENOUS EVERY 12 HOURS
Status: DISCONTINUED | OUTPATIENT
Start: 2023-12-20 | End: 2023-12-21

## 2023-12-20 RX ORDER — LISINOPRIL 20 MG/1
40 TABLET ORAL DAILY
Status: DISCONTINUED | OUTPATIENT
Start: 2023-12-20 | End: 2023-12-22 | Stop reason: HOSPADM

## 2023-12-20 RX ORDER — FUROSEMIDE 10 MG/ML
40 INJECTION INTRAMUSCULAR; INTRAVENOUS DAILY
Status: DISCONTINUED | OUTPATIENT
Start: 2023-12-21 | End: 2023-12-21

## 2023-12-20 RX ORDER — AMIODARONE HYDROCHLORIDE 200 MG/1
200 TABLET ORAL
Status: DISCONTINUED | OUTPATIENT
Start: 2023-12-20 | End: 2023-12-22 | Stop reason: HOSPADM

## 2023-12-20 RX ORDER — IPRATROPIUM BROMIDE AND ALBUTEROL SULFATE 2.5; .5 MG/3ML; MG/3ML
3 SOLUTION RESPIRATORY (INHALATION)
Status: DISCONTINUED | OUTPATIENT
Start: 2023-12-20 | End: 2023-12-22 | Stop reason: HOSPADM

## 2023-12-20 RX ADMIN — IPRATROPIUM BROMIDE AND ALBUTEROL SULFATE 3 ML: .5; 3 SOLUTION RESPIRATORY (INHALATION) at 19:59

## 2023-12-20 RX ADMIN — HYDROCODONE BITARTRATE AND ACETAMINOPHEN 1 TABLET: 5; 325 TABLET ORAL at 17:19

## 2023-12-20 RX ADMIN — INSULIN ASPART 5 UNITS: 100 INJECTION, SOLUTION INTRAVENOUS; SUBCUTANEOUS at 18:17

## 2023-12-20 RX ADMIN — CEFTRIAXONE 2000 MG: 2 INJECTION, SOLUTION INTRAVENOUS at 21:10

## 2023-12-20 RX ADMIN — APIXABAN 5 MG: 5 TABLET, FILM COATED ORAL at 08:41

## 2023-12-20 RX ADMIN — IPRATROPIUM BROMIDE AND ALBUTEROL SULFATE 3 ML: .5; 3 SOLUTION RESPIRATORY (INHALATION) at 06:52

## 2023-12-20 RX ADMIN — SPIRONOLACTONE 50 MG: 25 TABLET ORAL at 08:41

## 2023-12-20 RX ADMIN — FUROSEMIDE 80 MG: 10 INJECTION, SOLUTION INTRAMUSCULAR; INTRAVENOUS at 00:41

## 2023-12-20 RX ADMIN — LISINOPRIL 40 MG: 20 TABLET ORAL at 08:40

## 2023-12-20 RX ADMIN — APIXABAN 5 MG: 5 TABLET, FILM COATED ORAL at 21:10

## 2023-12-20 RX ADMIN — IPRATROPIUM BROMIDE AND ALBUTEROL SULFATE 3 ML: .5; 3 SOLUTION RESPIRATORY (INHALATION) at 12:58

## 2023-12-20 RX ADMIN — APIXABAN 5 MG: 5 TABLET, FILM COATED ORAL at 00:41

## 2023-12-20 RX ADMIN — METHYLPREDNISOLONE SODIUM SUCCINATE 60 MG: 125 INJECTION, POWDER, FOR SOLUTION INTRAMUSCULAR; INTRAVENOUS at 21:10

## 2023-12-20 RX ADMIN — AMLODIPINE BESYLATE 10 MG: 5 TABLET ORAL at 08:40

## 2023-12-20 RX ADMIN — HYDROCODONE BITARTRATE AND ACETAMINOPHEN 1 TABLET: 5; 325 TABLET ORAL at 23:38

## 2023-12-20 RX ADMIN — HYDROCODONE BITARTRATE AND ACETAMINOPHEN 1 TABLET: 5; 325 TABLET ORAL at 00:41

## 2023-12-20 RX ADMIN — METFORMIN HYDROCHLORIDE 500 MG: 500 TABLET, FILM COATED ORAL at 00:41

## 2023-12-20 RX ADMIN — PANTOPRAZOLE SODIUM 40 MG: 40 TABLET, DELAYED RELEASE ORAL at 05:26

## 2023-12-20 RX ADMIN — INSULIN ASPART 3 UNITS: 100 INJECTION, SOLUTION INTRAVENOUS; SUBCUTANEOUS at 21:10

## 2023-12-20 RX ADMIN — AMIODARONE HYDROCHLORIDE 200 MG: 200 TABLET ORAL at 08:41

## 2023-12-20 RX ADMIN — METHYLPREDNISOLONE SODIUM SUCCINATE 60 MG: 125 INJECTION, POWDER, FOR SOLUTION INTRAMUSCULAR; INTRAVENOUS at 08:41

## 2023-12-20 RX ADMIN — INSULIN ASPART 2 UNITS: 100 INJECTION, SOLUTION INTRAVENOUS; SUBCUTANEOUS at 12:41

## 2023-12-20 NOTE — CASE MANAGEMENT/SOCIAL WORK
Discharge Planning Assessment  [facility]     Patient Name: Robe Bryant  MRN: [id number]  Today's Date: 12/20/2023    Admit Date: 12/19/2023    Plan: Met with pt and his wife Berny for dcp. Pt has no AD or POA. Dr. Sullivan is his primary and [facility] is his pharmacy, enrolled in meds to bed. Home DME includes cane, shower chair, nebulizer, glucometer,bsc, O2 1L from [employer]. Pt drives, his wife and daughter sometimes provide transportation. Financial concerns for fixed income, food bag eligible. Pt verified demographics, stating they would be moving to [facility]. He plans on returning home at [place]. He states he has had HH services, he thought from [facility], his wife thought [facility]- unable to verify either in chart history. Will follow for needs.   Discharge Needs Assessment       Row Name 12/20/23 1625       Living Environment    People in Home spouse    Unique Family Situation pt will be moving in Jan-Feb to [facility]    Current Living Arrangements home    Duration at Residence 3 yrs    Potentially Unsafe Housing Conditions none    In the past 12 months has the electric, gas, oil, or water company threatened to shut off services in your home? No    Primary Care Provided by self;spouse/significant other    Family Caregiver if Needed spouse    Quality of Family Relationships helpful;involved    Able to Return to Prior Arrangements yes       Resource/Environmental Concerns    Resource/Environmental Concerns none    Transportation Concerns none       Food Insecurity    Within the past 12 months, you worried that your food would run out before you got the money to buy more. Sometimes    Within the past 12 months, the food you bought just didn't last and you didn't have money to get more. Never true       Transition Planning    Patient/Family Anticipates Transition to home    Patient/Family Anticipated Services at Transition none    Transportation Anticipated family or friend will provide       Discharge  Needs Assessment    Readmission Within the Last 30 Days no previous admission in last 30 days    Equipment Currently Used at Home shower chair;bath bench;nebulizer;glucometer;commode;oxygen    Concerns to be Addressed no discharge needs identified    Anticipated Changes Related to Illness none    Equipment Needed After Discharge none                   Discharge Plan       Row Name 12/20/23 0013       Plan    Plan Met with pt and his wife Berny for dcp. Pt has no AD or POA. Dr. Sullivan is his primary and [facility] is his pharmacy, enrolled in meds to [facility]. Home DME includes cane, shower chair, nebulizer, glucometer,bsc, O2 1L from [employer]. Pt drives, his wife and daughter sometimes provide transportation. Financial concerns for fixed income, food bag eligible. Pt verified demographics, stating they would be moving to [facility]. He plans on returning home at [place]. He states he has had  services, he thought from [facility], [place], his wife thought [facility]- unable to verify either in chart history. Will follow for needs.                  Continued Care and Services - Admitted Since 12/19/2023    Coordination has not been started for this encounter.          Demographic Summary       Row Name 12/20/23 1125       General Information    Admission Type inpatient    Arrived From emergency department    Required Notices Provided Important Message from Medicare    Referral Source admission list    Reason for Consult discharge planning    Preferred Language English       Contact Information    Permission Granted to Share Info With family/designee                   Functional Status       Row Name 12/20/23 6513       Functional Status    Usual Activity Tolerance fair    Current Activity Tolerance fair       Functional Status, IADL    Medications independent    Meal Preparation independent    Housekeeping assistive person    Laundry assistive person    Shopping assistive equipment and person    IADL Comments pt drives, uses a cane        Mental Status    General Appearance WDL WDL       Mental Status Summary    Recent Changes in Mental Status/Cognitive Functioning no changes       Employment/    Employment Status retired                   Psychosocial    No documentation.                  Abuse/Neglect    No documentation.                  Legal    No documentation.                  Substance Abuse    No documentation.                  Patient Forms    No documentation.                     Sabrina Terry RN

## 2023-12-20 NOTE — PAYOR COMM NOTE
"TO:HUMANA  FROM:RONAL HAWK RN PHONE [phone]  IN CLINICALS REF# [id number]    Todd Garnett (73 y.o. Male)       Date of Birth   1950    Social Security Number       Address   [address] [id number]    Home Phone   [phone]    MRN   [id number]       Jewish   Confucianism    Marital Status                               Admission Date   12/19/23    Admission Type   Emergency    Admitting Provider   Raj Sullivan MD    Attending Provider   Raj Sullivan MD    Department, Room/Bed   [facility] TELEMETRY SDS OVERFLOW, T02/01       Discharge Date       Discharge Disposition       Discharge Destination                                 Attending Provider: Raj Sullivan MD    Allergies: No Known Allergies    Isolation: None   Infection: COVID (rule out) (12/19/23)   Code Status: CPR    Ht: 165.1 cm (65\")   Wt: 101 kg (222 lb 3.6 oz)    Admission Cmt: None   Principal Problem: COPD exacerbation [J44.1]                   Active Insurance as of 12/19/2023       Primary Coverage       Payor Plan Insurance Group Employer/Plan Group    HUMANA MEDICARE REPLACEMENT HUMANA MEDICARE REPLACEMENT [id number]       Payor Plan Address Payor Plan Phone Number Payor Plan Fax Number Effective Dates    [address] [address] [phone]  3/1/2023 - None Entered    [facility] [id number]         Subscriber Name Subscriber Birth Date Member ID       TODD GARNETT 1950 [id number]                     Emergency Contacts        (Rel.) Home Phone Work Phone Mobile Phone    BISI GARNETT (Spouse) -- -- [phone]    MAICO Benavides (Daughter) [phone] -- --              History & Physical    No notes of this type exist for this encounter.          Emergency Department Notes        May Jordan at 12/19/23 2338          Received call from Dr. Sullivan, transferred call to Dr. Flores at this time.    Electronically signed by May Jordan " at 12/19/23 2338       May Jordan at 12/19/23 2330          Paged Dr. Sullivan, per Dr. Flores.  Waiting on return call at this time.    Electronically signed by May Jordan at 12/19/23 2338       Juan Antonio Flores MD at 12/19/23 2122        Procedure Orders    1. Critical Care [[id number]] ordered by Juan Antonio Flores MD                 Subjective:  History of Present Illness:    Patient is a 73-year-old male with history of emphysema, CKD, CAD, diabetes mellitus, obesity hypertension, hyperlipidemia, CVA presents today with increasing shortness of breath.  Wife reports patient's had increasing cough productive of thick sputum over the last 2 to 3 days.  Denies any fevers but has had chills.  Today, patient became acutely dyspneic, has been persistent and he now presents for evaluation.  Denies any abdominal pain nausea vomiting or diarrhea.  No unilateral leg swelling or leg pain.  Denies any history of PE/DVT.      Nurses Notes reviewed and agree, including vitals, allergies, social history and prior medical history.     REVIEW OF SYSTEMS: All systems reviewed and not pertinent unless noted.  Review of Systems   Constitutional:  Positive for activity change, chills and fatigue. Negative for appetite change and fever.   HENT:  Positive for congestion, rhinorrhea and sinus pressure. Negative for sneezing and trouble swallowing.    Eyes:  Negative for discharge and itching.   Respiratory:  Positive for cough and shortness of breath.    Cardiovascular:  Negative for chest pain and palpitations.   Gastrointestinal:  Negative for abdominal distention, abdominal pain, diarrhea, nausea and vomiting.   Endocrine: Negative for cold intolerance and heat intolerance.   Genitourinary:  Negative for decreased urine volume, dysuria and urgency.   Musculoskeletal:  Negative for gait problem, neck pain and neck stiffness.   Skin:  Negative for color change and rash.   Allergic/Immunologic: Negative for immunocompromised  state.   Neurological:  Negative for facial asymmetry and headaches.   Hematological:  Negative for adenopathy.   Psychiatric/Behavioral:  Negative for self-injury and suicidal ideas.        Past Medical History:   Diagnosis Date    Anxiety and depression     Arthritis     Backache     20 years    Benign prostatic hyperplasia     Bladder trauma     Cervicalgia     Chronic kidney disease     Cr up to 2.1    Coronary artery disease     Diabetes mellitus     15 years--    Emphysema of lung     Erectile dysfunction     Eye exam, routine 2015    FH: colonic polyps     Gout     for 10 years--    History of echocardiogram 09/15/2014    History of tobacco use     with cessation x7 years    Hyperlipidemia     Hypertension     Migraine     Myocardial infarction     h/o coronary artery stenting--    Prostate cancer     Restless leg syndrome     20 years    Sleep apnea     12 years; uses a Bi-Pap    Stroke     Syncope 4/28/2017    Warfarin anticoagulation 9/14/2016       Allergies:    Patient has no known allergies.      Past Surgical History:   Procedure Laterality Date    BLADDER SURGERY      CARDIAC CATHETERIZATION  03/15/2013    revealing widely patent stents of the left circumflex and ramus intermedius coronary arteries, no significant disease is seen in any territory    CARDIAC CATHETERIZATION Left 9/30/2019    Procedure: Left Heart Cath;  Surgeon: Arelis Tucker MD;  Location:  PREM CATH [facility]VASIVE LOCATION;  Service: Cardiology    CARDIAC ELECTROPHYSIOLOGY PROCEDURE N/A 5/10/2021    Procedure: PPM battery change;  Surgeon: Arelis Tucker MD;  Location:  PREM CATH INVASIVE LOCATION;  Service: Cardiology;  Laterality: N/A;    CARDIAC PACEMAKER PLACEMENT  2012    CATARACT EXTRACTION      COLONOSCOPY  2004    No family history of colon cancer.      COLONOSCOPY  2015    HERNIA REPAIR      Type unknown    PACEMAKER IMPLANTATION      PROSTATE SURGERY           Social History     Socioeconomic History     "Marital status:    Tobacco Use    Smoking status: Former     Packs/day: 1.00     Years: 30.00     Additional pack years: 0.00     Total pack years: 30.00     Types: Cigarettes     Quit date: 8/15/2001     Years since quittin.3    Smokeless tobacco: Never    Tobacco comments:     quit 16 years ago   Vaping Use    Vaping Use: Never used   Substance and Sexual Activity    Alcohol use: No    Drug use: No    Sexual activity: Defer         Family History   Problem Relation Age of Onset    Cancer Mother     Diabetes Mother     Hypertension Mother     Stroke Mother     Stomach cancer Mother     Heart disease Mother     Stomach cancer Father     Cancer Father     Lung cancer Father        Objective  Physical Exam:  /76 (BP Location: Right arm, Patient Position: Lying)   Pulse 60   Temp 99 °F (37.2 °C) (Oral)   Resp 20   Ht 165.1 cm (65\")   Wt 101 kg (222 lb 3.6 oz)   SpO2 93%   BMI 36.98 kg/m²      Physical Exam  Constitutional:       General: He is in acute distress.      Appearance: Normal appearance. He is obese. He is not ill-appearing.   HENT:      Head: Normocephalic and atraumatic.      Nose: Nose normal. No congestion or rhinorrhea.      Mouth/Throat:      Mouth: Mucous membranes are moist.      Pharynx: Oropharynx is clear.   Eyes:      Extraocular Movements: Extraocular movements intact.      Conjunctiva/sclera: Conjunctivae normal.      Pupils: Pupils are equal, round, and reactive to light.   Cardiovascular:      Rate and Rhythm: Normal rate and regular rhythm.      Pulses: Normal pulses.   Pulmonary:      Effort: Pulmonary effort is normal. No respiratory distress.      Breath sounds: Wheezing present.      Comments: Wheezing in bilateral upper lobes  Abdominal:      General: Abdomen is flat. Bowel sounds are normal. There is no distension.      Palpations: Abdomen is soft.      Tenderness: There is no abdominal tenderness.   Musculoskeletal:         General: No swelling or tenderness. " Normal range of motion.      Cervical back: Normal range of motion and neck supple. No rigidity or tenderness.   Skin:     General: Skin is warm and dry.      Capillary Refill: Capillary refill takes less than 2 seconds.   Neurological:      General: No focal deficit present.      Mental Status: He is alert and oriented to person, place, and time. Mental status is at baseline.      Cranial Nerves: No cranial nerve deficit.      Sensory: No sensory deficit.      Motor: No weakness.      Coordination: Coordination normal.   Psychiatric:         Mood and Affect: Mood normal.         Behavior: Behavior normal.         Thought Content: Thought content normal.         Judgment: Judgment normal.         Critical Care    Performed by: Juan Antonio Flores MD  Authorized by: Raj Sullivan MD    Critical care provider statement:     Critical care time (minutes):  30    Critical care was necessary to treat or prevent imminent or life-threatening deterioration of the following conditions:  Respiratory failure    Critical care was time spent personally by me on the following activities:  Blood draw for specimens, development of treatment plan with patient or surrogate, discussions with primary provider, evaluation of patient's response to treatment, examination of patient, obtaining history from patient or surrogate, ordering and performing treatments and interventions, ordering and review of laboratory studies, ordering and review of radiographic studies, pulse oximetry, re-evaluation of patient's condition and review of old charts    Care discussed with: admitting provider        ED Course:    ED Course as of 12/20/23 0059   Tue Dec 19, 2023   2149 EKG interpreted by me, atrial pacemaker with left axis deviation, no concerning ST changes noted, rate of 60, abnormal EKG [JE]      ED Course User Index  [JE] Juan Antonio Flores MD       Lab Results (last 24 hours)       Procedure Component Value Units Date/Time    CBC &  Differential [[id number]]  (Abnormal) Collected: 12/19/23 2121    Specimen: Blood Updated: 12/19/23 2129    Narrative:      The following orders were created for panel order CBC & Differential.  Procedure                               Abnormality         Status                     ---------                               -----------         ------                     CBC Auto Differential[[id number]]        Abnormal            Final result                 Please view results for these tests on the individual orders.    Comprehensive Metabolic Panel [[id number]]  (Abnormal) Collected: 12/19/23 2121    Specimen: Blood Updated: 12/19/23 2153     Glucose 158 mg/dL      BUN 17 mg/dL      Creatinine 1.41 mg/dL      Sodium 140 mmol/L      Potassium 4.0 mmol/L      Chloride 105 mmol/L      CO2 27.1 mmol/L      Calcium 9.1 mg/dL      Total Protein 6.6 g/dL      Albumin 3.8 g/dL      ALT (SGPT) 13 U/L      AST (SGOT) 18 U/L      Alkaline Phosphatase 96 U/L      Total Bilirubin 0.9 mg/dL      Globulin 2.8 gm/dL      A/G Ratio 1.4 g/dL      BUN/Creatinine Ratio 12.1     Anion Gap 7.9 mmol/L      eGFR 52.3 mL/min/1.73     Narrative:      GFR Normal >60  Chronic Kidney Disease <60  Kidney Failure <15    The GFR formula is only valid for adults with stable renal function between ages 18 and 70.    BNP [[id number]]  (Abnormal) Collected: 12/19/23 2121    Specimen: Blood Updated: 12/19/23 2155     proBNP 1,951.0 pg/mL     Narrative:      This assay is used as an aid in the diagnosis of individuals suspected of having heart failure. It can be used as an aid in the diagnosis of acute decompensated heart failure (ADHF) in patients presenting with signs and symptoms of ADHF to the emergency department (ED). In addition, NT-proBNP of <300 pg/mL indicates ADHF is not likely.    Age Range Result Interpretation  NT-proBNP Concentration (pg/mL:      <50             Positive            >450                   Gray                 300-450                     Negative             <300    50-75           Positive            >900                  Gray                300-900                  Negative            <300      >75             Positive            >1800                  Gray                300-1800                  Negative            <300    Single High Sensitivity Troponin T [[id number]]  (Normal) Collected: 12/19/23 2121    Specimen: Blood Updated: 12/19/23 2151     HS Troponin T 20 ng/L     Narrative:      High Sensitive Troponin T Reference Range:  <14.0 ng/L- Negative Female for AMI  <22.0 ng/L- Negative Male for AMI  >=14 - Abnormal Female indicating possible myocardial injury.  >=22 - Abnormal Male indicating possible myocardial injury.   Clinicians would have to utilize clinical acumen, EKG, Troponin, and serial changes to determine if it is an Acute Myocardial Infarction or myocardial injury due to an underlying chronic condition.         Lactic Acid, Plasma [[id number]]  (Normal) Collected: 12/19/23 2121    Specimen: Blood Updated: 12/19/23 2149     Lactate 1.5 mmol/L     CBC Auto Differential [[id number]]  (Abnormal) Collected: 12/19/23 2121    Specimen: Blood Updated: 12/19/23 2129     WBC 9.65 10*3/mm3      RBC 3.86 10*6/mm3      Hemoglobin 11.6 g/dL      Hematocrit 36.5 %      MCV 94.6 fL      MCH 30.1 pg      MCHC 31.8 g/dL      RDW 14.1 %      RDW-SD 49.1 fl      MPV 11.0 fL      Platelets 194 10*3/mm3      Neutrophil % 85.0 %      Lymphocyte % 8.9 %      Monocyte % 4.8 %      Eosinophil % 0.3 %      Basophil % 0.5 %      Immature Grans % 0.5 %      Neutrophils, Absolute 8.20 10*3/mm3      Lymphocytes, Absolute 0.86 10*3/mm3      Monocytes, Absolute 0.46 10*3/mm3      Eosinophils, Absolute 0.03 10*3/mm3      Basophils, Absolute 0.05 10*3/mm3      Immature Grans, Absolute 0.05 10*3/mm3      nRBC 0.0 /100 WBC     COVID-19 and FLU A/B PCR, 1 HR TAT - Swab, Nasopharynx [[id number]]  (Normal) Collected: 12/19/23 2121    Specimen: Swab from  "Nasopharynx Updated: 12/19/23 2209     COVID19 Not Detected     Influenza A PCR Not Detected     Influenza B PCR Not Detected    Narrative:      Fact sheet for providers: https://www.fda.gov/media/910164/download    Fact sheet for patients: https://www.fda.gov/media/478827/download    Test performed by PCR.    C-reactive Protein [[id number]]  (Abnormal) Collected: 12/19/23 2121    Specimen: Blood Updated: 12/19/23 2153     C-Reactive Protein 2.33 mg/dL     Procalcitonin [[id number]]  (Normal) Collected: 12/19/23 2121    Specimen: Blood Updated: 12/19/23 2156     Procalcitonin 0.06 ng/mL     Narrative:      As a Marker for Sepsis (Non-Neonates):    1. <0.5 ng/mL represents a low risk of severe sepsis and/or septic shock.  2. >2 ng/mL represents a high risk of severe sepsis and/or septic shock.    As a Marker for Lower Respiratory Tract Infections that require antibiotic therapy:    PCT on Admission    Antibiotic Therapy       6-12 Hrs later    >0.5                Strongly Recommended  >0.25 - <0.5        Recommended   0.1 - 0.25          Discouraged              Remeasure/reassess PCT  <0.1                Strongly Discouraged     Remeasure/reassess PCT    As 28 day mortality risk marker: \"Change in Procalcitonin Result\" (>80% or <=80%) if Day 0 (or Day 1) and Day 4 values are available. Refer to http://www.[employer]-pct-calculator.com    Change in PCT <=80%  A decrease of PCT levels below or equal to 80% defines a positive change in PCT test result representing a higher risk for 28-day all-cause mortality of patients diagnosed with severe sepsis for septic shock.    Change in PCT >80%  A decrease of PCT levels of more than 80% defines a negative change in PCT result representing a lower risk for 28-day all-cause mortality of patients diagnosed with severe sepsis or septic shock.       Magnesium [[id number]]  (Normal) Collected: 12/19/23 2121    Specimen: Blood Updated: 12/19/23 2153     Magnesium 1.7 mg/dL     T4, Free " [[id number]]  (Normal) Collected: 12/19/23 2121    Specimen: Blood Updated: 12/20/23 0052     Free T4 1.60 ng/dL     Narrative:      Results may be falsely increased if patient taking Biotin.      Blood Gas, Arterial With Co-Ox [[id number]]  (Abnormal) Collected: 12/19/23 2142    Specimen: Arterial Blood Updated: 12/19/23 2142     Site Right Brachial     Sudarshan's Test N/A     pH, Arterial 7.425 pH units      pCO2, Arterial 42.2 mm Hg      pO2, Arterial 48.7 mm Hg      Comment: 85 Value below critical limit        HCO3, Arterial 27.7 mmol/L      Comment: 83 Value above reference range        Base Excess, Arterial 2.9 mmol/L      Comment: 83 Value above reference range        O2 Saturation, Arterial 85.5 %      Comment: 84 Value below reference range        Hematocrit, Blood Gas 34.4 %      Comment: 84 Value below reference range        Oxyhemoglobin 83.3 %      Comment: 84 Value below reference range        Methemoglobin 0.40 %      Carboxyhemoglobin 2.2 %      A-a DO2 127.3 mmHg      Barometric Pressure for Blood Gas 743 mmHg      Modality Nasal Cannula     FIO2 32 %      Flow Rate 3.0 lpm      Ventilator Mode NA     Notified Who EDGE     Notified By [id number]     Notified Time 12/19/2023 22:52     Collected by HANK     Comment: Meter: [id number]     :  [id number]        pH, Temp Corrected --     pCO2, Temperature Corrected --     pO2, Temperature Corrected --             No radiology results from the last 24 hrs       MDM     Amount and/or Complexity of Data Reviewed  Decide to obtain previous medical records or to obtain history from someone other than the patient: yes  Independent visualization of images, tracings, or specimens: yes        Initial impression of presenting illness: Shortness of breath    DDX: includes but is not limited to: COPD exacerbation, bacterial pneumonia, viral URI, PE    Patient arrives stable with vitals interpreted by myself.     Pertinent features from physical exam: Patient  hypoxic on baseline 2 L nasal cannula, now on 6 L nasal cannula with stable O2 sat.    Initial diagnostic plan: CBC, CMP, troponin, BNP, ABG, CRP, [place]ce, EKG, chest x-ray    Results from initial plan were reviewed and interpreted by me revealing patient with no concern for bacterial pneumonia, does have some interstitial edema and elevated BNP    Diagnostic information from other sources: Reviewed past medical records    Interventions / Re-evaluation: Given concerns for COPD exacerbation given DuoNeb, magnesium, Solu-Medrol, given concern for possible contribution of CHF given Lasix in the emergency department    Results/clinical rationale were discussed with patient at bedside    Consultations/Discussion of results with other physicians: Given my concern for patient's acute hypoxic respiratory failure, discussed with Dr. Haskins who admit the patient to his service further management    Disposition plan: Admit  -----        Final diagnoses:   COPD exacerbation          Juan Antonio Flores MD  12/20/23 0059      Electronically signed by Juan Antonio Flores MD at 12/20/23 0059       Vital Signs (last day)       Date/Time Temp Temp src Pulse Resp BP Patient Position SpO2    12/20/23 0731 96.9 (36.1) Oral 60 16 122/65 Lying 96    12/20/23 0652 -- -- 60 18 -- -- 94    12/20/23 0400 98.7 (37.1) Oral 60 16 118/59 Lying 93    12/20/23 0015 99 (37.2) Oral 60 20 145/76 Lying 93    12/19/23 2343 -- -- -- -- -- -- 90    12/19/23 2340 -- -- 60 -- 128/71 -- --    12/19/23 2317 -- -- 60 18 135/71 Sitting 91    12/19/23 2300 -- -- 60 -- 107/74 -- 92    12/19/23 2240 -- -- 64 -- 127/62 -- 90    12/19/23 2215 -- -- -- 24 -- -- --    12/19/23 21:22:08 -- -- 60 20 142/94 -- 92    12/19/23 2111 98.3 (36.8) Oral 60 18 158/72 Sitting 78          Current Facility-Administered Medications   Medication Dose Route Frequency Provider Last Rate Last Admin    amiodarone (PACERONE) tablet 200 mg  200 mg Oral Q24H Raj Sullivan MD   200 mg at  12/20/23 0841    amLODIPine (NORVASC) tablet 10 mg  10 mg Oral Daily Raj Sullivan MD   10 mg at 12/20/23 0840    apixaban (ELIQUIS) tablet 5 mg  5 mg Oral Q12H Raj Sullivan MD   5 mg at 12/20/23 0841    HYDROcodone-acetaminophen (NORCO) 5-325 MG per tablet 1 tablet  1 tablet Oral Q6H PRN Raj Sullivan MD   1 tablet at 12/20/23 0041    ipratropium-albuterol (DUO-NEB) nebulizer solution 3 mL  3 mL Nebulization 4x Daily - RT Raj Sullivan MD   3 mL at 12/20/23 0652    lisinopril (PRINIVIL,ZESTRIL) tablet 40 mg  40 mg Oral Daily Raj Sullivan MD   40 mg at 12/20/23 0840    metFORMIN (GLUCOPHAGE) tablet 500 mg  500 mg Oral Q12H Raj Sullivan MD   500 mg at 12/20/23 0041    methylPREDNISolone sodium succinate (SOLU-Medrol) injection 60 mg  60 mg Intravenous Q12H Raj Sullivan MD   60 mg at 12/20/23 0841    pantoprazole (PROTONIX) EC tablet 40 mg  40 mg Oral Q AM Raj Sullivan MD   40 mg at 12/20/23 0526    sodium chloride 0.9 % flush 10 mL  10 mL Intravenous PRN Raj Sullivan MD        spironolactone (ALDACTONE) tablet 50 mg  50 mg Oral Daily Raj Sullivan MD   50 mg at 12/20/23 0841     Lab Results (last 24 hours)       Procedure Component Value Units Date/Time    CBC & Differential [[id number]]  (Abnormal) Collected: 12/20/23 0722    Specimen: Blood Updated: 12/20/23 0837    Narrative:      The following orders were created for panel order CBC & Differential.  Procedure                               Abnormality         Status                     ---------                               -----------         ------                     CBC Auto Differential[[id number]]        Abnormal            Final result               Scan Slide[[id number]]                                       Final result                 Please view results for these tests on the individual orders.    Scan Slide [[id number]] Collected: 12/20/23 0722    Specimen: Blood Updated: 12/20/23 0837     RBC Morphology Normal     WBC  Morphology Normal     Platelet Estimate Adequate    CBC Auto Differential [[id number]]  (Abnormal) Collected: 12/20/23 0722    Specimen: Blood Updated: 12/20/23 0837     WBC 6.02 10*3/mm3      RBC 3.90 10*6/mm3      Hemoglobin 11.8 g/dL      Hematocrit 37.7 %      MCV 96.7 fL      MCH 30.3 pg      MCHC 31.3 g/dL      RDW 13.9 %      RDW-SD 48.8 fl      MPV 11.0 fL      Platelets 185 10*3/mm3      Neutrophil % 89.7 %      Lymphocyte % 8.5 %      Monocyte % 0.8 %      Eosinophil % 0.0 %      Basophil % 0.2 %      Immature Grans % 0.8 %      Neutrophils, Absolute 5.40 10*3/mm3      Lymphocytes, Absolute 0.51 10*3/mm3      Monocytes, Absolute 0.05 10*3/mm3      Eosinophils, Absolute 0.00 10*3/mm3      Basophils, Absolute 0.01 10*3/mm3      Immature Grans, Absolute 0.05 10*3/mm3      nRBC 0.0 /100 WBC     TSH [[id number]]  (Normal) Collected: 12/20/23 0722    Specimen: Blood Updated: 12/20/23 0818     TSH 0.634 uIU/mL     Hemoglobin A1c [[id number]]  (Abnormal) Collected: 12/20/23 0722    Specimen: Blood Updated: 12/20/23 0809     Hemoglobin A1C 5.80 %     Narrative:      Hemoglobin A1C Ranges:    Increased Risk for Diabetes  5.7% to 6.4%  Diabetes                     >= 6.5%  Diabetic Goal                < 7.0%    Comprehensive Metabolic Panel [[id number]]  (Abnormal) Collected: 12/20/23 0722    Specimen: Blood Updated: 12/20/23 0805     Glucose 174 mg/dL      BUN 19 mg/dL      Creatinine 1.39 mg/dL      Sodium 140 mmol/L      Potassium 4.2 mmol/L      Chloride 104 mmol/L      CO2 26.1 mmol/L      Calcium 9.1 mg/dL      Total Protein 6.2 g/dL      Albumin 3.4 g/dL      ALT (SGPT) 11 U/L      AST (SGOT) 15 U/L      Alkaline Phosphatase 91 U/L      Total Bilirubin 0.8 mg/dL      Globulin 2.8 gm/dL      A/G Ratio 1.2 g/dL      BUN/Creatinine Ratio 13.7     Anion Gap 9.9 mmol/L      eGFR 53.2 mL/min/1.73     Narrative:      GFR Normal >60  Chronic Kidney Disease <60  Kidney Failure <15    The GFR formula is only valid for  adults with stable renal function between ages 18 and 70.    T4, Free [[id number]]  (Normal) Collected: 12/19/23 2121    Specimen: Blood Updated: 12/20/23 0052     Free T4 1.60 ng/dL     Narrative:      Results may be falsely increased if patient taking Biotin.      [place] Draw [[id number]] Collected: 12/19/23 2121    Specimen: Blood Updated: 12/19/23 2232    Narrative:      The following orders were created for panel order [place] Draw.  Procedure                               Abnormality         Status                     ---------                               -----------         ------                     Green Top (Gel)[[id number]]                                  Final result               Lavender Top[[id number]]                                     Final result               Gold Top - SST[[id number]]                                   Final result               Light Blue Top[[id number]]                                   Final result                 Please view results for these tests on the individual orders.    Green Top (Gel) [[id number]] Collected: 12/19/23 2121    Specimen: Blood Updated: 12/19/23 2232     Extra Tube Hold for add-ons.     Comment: Auto resulted.       Lavender Top [[id number]] Collected: 12/19/23 2121    Specimen: Blood Updated: 12/19/23 2232     Extra Tube hold for add-on     Comment: Auto resulted       Gold Top - SST [[id number]] Collected: 12/19/23 2121    Specimen: Blood Updated: 12/19/23 2232     Extra Tube Hold for add-ons.     Comment: Auto resulted.       Light Blue Top [[id number]] Collected: 12/19/23 2121    Specimen: Blood Updated: 12/19/23 2232     Extra Tube Hold for add-ons.     Comment: Auto resulted       COVID-19 and FLU A/B PCR, 1 HR TAT - Swab, Nasopharynx [[id number]]  (Normal) Collected: 12/19/23 2121    Specimen: Swab from Nasopharynx Updated: 12/19/23 2209     COVID19 Not Detected     Influenza A PCR Not Detected     Influenza B PCR Not Detected    Narrative:      Fact  "sheet for providers: https://www.fda.gov/media/724831/download    Fact sheet for patients: https://www.fda.gov/media/642666/download    Test performed by PCR.    Procalcitonin [[id number]]  (Normal) Collected: 12/19/23 2121    Specimen: Blood Updated: 12/19/23 2156     Procalcitonin 0.06 ng/mL     Narrative:      As a Marker for Sepsis (Non-Neonates):    1. <0.5 ng/mL represents a low risk of severe sepsis and/or septic shock.  2. >2 ng/mL represents a high risk of severe sepsis and/or septic shock.    As a Marker for Lower Respiratory Tract Infections that require antibiotic therapy:    PCT on Admission    Antibiotic Therapy       6-12 Hrs later    >0.5                Strongly Recommended  >0.25 - <0.5        Recommended   0.1 - 0.25          Discouraged              Remeasure/reassess PCT  <0.1                Strongly Discouraged     Remeasure/reassess PCT    As 28 day mortality risk marker: \"Change in Procalcitonin Result\" (>80% or <=80%) if Day 0 (or Day 1) and Day 4 values are available. Refer to http://www.[employer]-pct-calculator.com    Change in PCT <=80%  A decrease of PCT levels below or equal to 80% defines a positive change in PCT test result representing a higher risk for 28-day all-cause mortality of patients diagnosed with severe sepsis for septic shock.    Change in PCT >80%  A decrease of PCT levels of more than 80% defines a negative change in PCT result representing a lower risk for 28-day all-cause mortality of patients diagnosed with severe sepsis or septic shock.       BNP [[id number]]  (Abnormal) Collected: 12/19/23 2121    Specimen: Blood Updated: 12/19/23 2155     proBNP 1,951.0 pg/mL     Narrative:      This assay is used as an aid in the diagnosis of individuals suspected of having heart failure. It can be used as an aid in the diagnosis of acute decompensated heart failure (ADHF) in patients presenting with signs and symptoms of ADHF to the emergency department (ED). In addition, NT-proBNP of " <300 pg/mL indicates ADHF is not likely.    Age Range Result Interpretation  NT-proBNP Concentration (pg/mL:      <50             Positive            >450                   Gray                 300-450                    Negative             <300    50-75           Positive            >900                  Gray                300-900                  Negative            <300      >75             Positive            >1800                  Gray                300-1800                  Negative            <300    Magnesium [[id number]]  (Normal) Collected: 12/19/23 2121    Specimen: Blood Updated: 12/19/23 2153     Magnesium 1.7 mg/dL     Comprehensive Metabolic Panel [[id number]]  (Abnormal) Collected: 12/19/23 2121    Specimen: Blood Updated: 12/19/23 2153     Glucose 158 mg/dL      BUN 17 mg/dL      Creatinine 1.41 mg/dL      Sodium 140 mmol/L      Potassium 4.0 mmol/L      Chloride 105 mmol/L      CO2 27.1 mmol/L      Calcium 9.1 mg/dL      Total Protein 6.6 g/dL      Albumin 3.8 g/dL      ALT (SGPT) 13 U/L      AST (SGOT) 18 U/L      Alkaline Phosphatase 96 U/L      Total Bilirubin 0.9 mg/dL      Globulin 2.8 gm/dL      A/G Ratio 1.4 g/dL      BUN/Creatinine Ratio 12.1     Anion Gap 7.9 mmol/L      eGFR 52.3 mL/min/1.73     Narrative:      GFR Normal >60  Chronic Kidney Disease <60  Kidney Failure <15    The GFR formula is only valid for adults with stable renal function between ages 18 and 70.    C-reactive Protein [[id number]]  (Abnormal) Collected: 12/19/23 2121    Specimen: Blood Updated: 12/19/23 2153     C-Reactive Protein 2.33 mg/dL     Single High Sensitivity Troponin T [[id number]]  (Normal) Collected: 12/19/23 2121    Specimen: Blood Updated: 12/19/23 2151     HS Troponin T 20 ng/L     Narrative:      High Sensitive Troponin T Reference Range:  <14.0 ng/L- Negative Female for AMI  <22.0 ng/L- Negative Male for AMI  >=14 - Abnormal Female indicating possible myocardial injury.  >=22 - Abnormal Male indicating  possible myocardial injury.   Clinicians would have to utilize clinical acumen, EKG, Troponin, and serial changes to determine if it is an Acute Myocardial Infarction or myocardial injury due to an underlying chronic condition.         Lactic Acid, Plasma [[id number]]  (Normal) Collected: 12/19/23 2121    Specimen: Blood Updated: 12/19/23 2149     Lactate 1.5 mmol/L     Blood Gas, Arterial With Co-Ox [[id number]]  (Abnormal) Collected: 12/19/23 2142    Specimen: Arterial Blood Updated: 12/19/23 2142     Site Right Brachial     Sudarshan's Test N/A     pH, Arterial 7.425 pH units      pCO2, Arterial 42.2 mm Hg      pO2, Arterial 48.7 mm Hg      Comment: 85 Value below critical limit        HCO3, Arterial 27.7 mmol/L      Comment: 83 Value above reference range        Base Excess, Arterial 2.9 mmol/L      Comment: 83 Value above reference range        O2 Saturation, Arterial 85.5 %      Comment: 84 Value below reference range        Hematocrit, Blood Gas 34.4 %      Comment: 84 Value below reference range        Oxyhemoglobin 83.3 %      Comment: 84 Value below reference range        Methemoglobin 0.40 %      Carboxyhemoglobin 2.2 %      A-a DO2 127.3 mmHg      Barometric Pressure for Blood Gas 743 mmHg      Modality Nasal Cannula     FIO2 32 %      Flow Rate 3.0 lpm      Ventilator Mode NA     Notified Who EDGE     Notified By [id number]     Notified Time 12/19/2023 22:52     Collected by HANK     Comment: Meter: [id number]     :  [id number]        pH, Temp Corrected --     pCO2, Temperature Corrected --     pO2, Temperature Corrected --    CBC & Differential [[id number]]  (Abnormal) Collected: 12/19/23 2121    Specimen: Blood Updated: 12/19/23 2129    Narrative:      The following orders were created for panel order CBC & Differential.  Procedure                               Abnormality         Status                     ---------                               -----------         ------                     CBC Auto  Differential[[id number]]        Abnormal            Final result                 Please view results for these tests on the individual orders.    CBC Auto Differential [[id number]]  (Abnormal) Collected: 12/19/23 2121    Specimen: Blood Updated: 12/19/23 2129     WBC 9.65 10*3/mm3      RBC 3.86 10*6/mm3      Hemoglobin 11.6 g/dL      Hematocrit 36.5 %      MCV 94.6 fL      MCH 30.1 pg      MCHC 31.8 g/dL      RDW 14.1 %      RDW-SD 49.1 fl      MPV 11.0 fL      Platelets 194 10*3/mm3      Neutrophil % 85.0 %      Lymphocyte % 8.9 %      Monocyte % 4.8 %      Eosinophil % 0.3 %      Basophil % 0.5 %      Immature Grans % 0.5 %      Neutrophils, Absolute 8.20 10*3/mm3      Lymphocytes, Absolute 0.86 10*3/mm3      Monocytes, Absolute 0.46 10*3/mm3      Eosinophils, Absolute 0.03 10*3/mm3      Basophils, Absolute 0.05 10*3/mm3      Immature Grans, Absolute 0.05 10*3/mm3      nRBC 0.0 /100 WBC           Imaging Results (Last 24 Hours)       Procedure Component Value Units Date/Time    XR Chest 1 View [[id number]] Collected: 12/20/23 0738     Updated: 12/20/23 0742    Narrative:      CLINICAL INDICATION:    [facility]A Triage Protocol shortness of breath.     EXAMINATION TECHNIQUE:  XR CHEST 1 VW-     COMPARISON:  Radiograph 6/11/2023.     FINDINGS:  Left chest wall subclavian approach right atrioventricular cardiac pacer  in place. Stable mild cardiomegaly. Vascular engorgement, cephalization  and mild interstitial edema. Small bilateral pleural effusions.  Bibasilar opacities, likely atelectasis. No pneumothorax.       Impression:      Findings are concerning for mild congestive heart failure/cardiogenic  pulmonary edema. Bibasilar opacities, likely atelectasis.     Images personally reviewed, interpreted and dictated by EDENILSON Cobb.              This report was signed and finalized on 12/20/2023 7:40 AM by EDENILSON Cobb.             Physician Progress Notes (last 24 hours)  Notes from 12/19/23 1012 through  12/20/23 1012   No notes of this type exist for this encounter.       Consult Notes (last 24 hours)  Notes from 12/19/23 1012 through 12/20/23 1012   No notes of this type exist for this encounter.

## 2023-12-20 NOTE — PLAN OF CARE
Goal Outcome Evaluation:  Plan of Care Reviewed With: patient        Progress: no change  Outcome Evaluation: Patient participated well in PT evaluation and demonstrated decreased overall mobility.  Patient requires CGA for mobility tasks, including walking 3 feet with HHA.  He is short of breath and becomes fatigued quickly.  Patient is expected to benefit from additional PT services while hospitalized and upon discharge to home with home health care.      Anticipated Discharge Disposition (PT): home with home health

## 2023-12-20 NOTE — H&P
[facility]   HISTORY AND PHYSICAL      Name:  Robe Bryant   Age:  73 y.o.  Sex:  male  :  1950  MRN:  [id number]   Visit Number:  [id number]  Admission Date:  2023  Date Of Service:  23  Primary Care Physician:  Raj Sullivan MD     Admitting diagnosis:      COPD exacerbation    Chronic kidney disease, stage III (moderate)    Paroxysmal A-fib    Diastolic heart failure    Obstructive sleep apnea of adult    Diabetes mellitus    Restless leg syndrome    Osteoarthritis of multiple joints    Multilevel degenerative disc disease    Moderate episode of recurrent major depressive disorder    Morbid obesity    Panlobular emphysema    Chronic respiratory failure with hypoxia        History Of Presenting Illness:      73-year-old patient with history of COPD on 2 L oxygen, hypertension, BPH, paroxysmal A-fib on anticoagulation, chronic diastolic heart failure, sleep apnea, diabetes, s/p pacemaker, who comes into the ER because of worsening shortness of breath.    He stated that he has been gradually getting short of breath over the last 2 to 3 days and has been coughing which has been productive occasionally.  In the ER he was worked up found to have exacerbation of COPD.  He has been more hypoxic needing 6 L oxygen to maintain his saturation compared to the baseline of 2 L.  He did not have any infiltrate so no antibiotic was given but steroids was given and a dose of diuretic because of history of chronic diastolic heart failure.  He denies any chest pain or fever.  He is being admitted further inpatient for management of his COPD exacerbation with worsening respiratory failure    Review Of Systems:     The following systems were reviewed and negative;  constitution, eyes, ENT, respiratory, cardiovascular, gastrointestinal, genitourinary, musculoskeletal, neurological and behavioral/psych,  Skin except as above.     Past Medical History:    Past Medical  History:   Diagnosis Date    Anxiety and depression     Arthritis     Backache     20 years    Benign prostatic hyperplasia     Bladder trauma     Cervicalgia     Chronic kidney disease     Cr up to 2.1    Coronary artery disease     Diabetes mellitus     15 years--    Emphysema of lung     Erectile dysfunction     Eye exam, routine 2015    FH: colonic polyps     Gout     for 10 years--    History of echocardiogram 09/15/2014    History of tobacco use     with cessation x7 years    Hyperlipidemia     Hypertension     Migraine     Myocardial infarction     h/o coronary artery stenting--    Prostate cancer     Restless leg syndrome     20 years    Sleep apnea     12 years; uses a Bi-Pap    Stroke     Syncope 4/28/2017    Warfarin anticoagulation 9/14/2016       Past Surgical history:    Past Surgical History:   Procedure Laterality Date    BLADDER SURGERY      CARDIAC CATHETERIZATION  03/15/2013    revealing widely patent stents of the left circumflex and ramus intermedius coronary arteries, no significant disease is seen in any territory    CARDIAC CATHETERIZATION Left 9/30/2019    Procedure: Left Heart Cath;  Surgeon: Arelis Tucker MD;  Location:  [facility];  Service: Cardiology    CARDIAC ELECTROPHYSIOLOGY PROCEDURE N/A 5/10/2021    Procedure: PPM battery change;  Surgeon: Arelis Tucker MD;  Location:  [facility];  Service: Cardiology;  Laterality: N/A;    CARDIAC PACEMAKER PLACEMENT  2012    CATARACT EXTRACTION      COLONOSCOPY  2004    No family history of colon cancer.      COLONOSCOPY  2015    HERNIA REPAIR      Type unknown    PACEMAKER IMPLANTATION      PROSTATE SURGERY         Social History:    Social History     Socioeconomic History    Marital status:    Tobacco Use    Smoking status: Former     Packs/day: 1.00     Years: 30.00     Additional pack years: 0.00     Total pack years: 30.00     Types: Cigarettes     Quit date: 8/15/2001     Years  since quittin.3    Smokeless tobacco: Never    Tobacco comments:     quit 16 years ago   Vaping Use    Vaping Use: Never used   Substance and Sexual Activity    Alcohol use: No    Drug use: No    Sexual activity: Defer       Family History:    Family History   Problem Relation Age of Onset    Cancer Mother     Diabetes Mother     Hypertension Mother     Stroke Mother     Stomach cancer Mother     Heart disease Mother     Stomach cancer Father     Cancer Father     Lung cancer Father          Allergies:      Patient has no known allergies.    Home Medications:    Prior to Admission Medications       Prescriptions Last Dose Informant Patient Reported? Taking?    albuterol sulfate  (90 Base) MCG/ACT inhaler   No No    Inhale 2 puffs Every 4 (Four) Hours As Needed for Wheezing or Shortness of Air.    amiodarone (PACERONE) 200 MG tablet   No No    Take 1 tablet by mouth Daily.    amLODIPine (NORVASC) 10 MG tablet   No No    Take 1 tablet by mouth Daily.    apixaban (ELIQUIS) 5 MG tablet tablet   No No    Take 1 tablet by mouth Every 12 (Twelve) Hours.    atorvastatin (LIPITOR) 20 MG tablet   No No    Take 1 tablet by mouth Every Night.    Blood Glucose Monitoring Suppl (TRUE METRIX AIR GLUCOSE METER) w/Device kit   No No    1 each by In Vitro route 2 (two) times a day. E11.9    bumetanide (BUMEX) 0.5 MG tablet   No No    TAKE 1 TABLET BY MOUTH DAILY. STOP. LASIX    FLUoxetine (PROzac) 20 MG capsule   No No    Take 1 capsule by mouth Daily.    gabapentin (NEURONTIN) 400 MG capsule   No No    Take 1 capsule by mouth Every Night.    glipizide (GLUCOTROL) 5 MG tablet   No No    TAKE 1 TABLET TWICE DAILY BEFORE MEALS    glucose monitor monitoring kit   No No    1 each Daily. E11.9    HYDROcodone-acetaminophen (NORCO) 5-325 MG per tablet   No No    Take 1 tablet by mouth Every 6 (Six) Hours As Needed for Severe Pain .    ipratropium-albuterol (DUO-NEB) 0.5-2.5 mg/3 ml nebulizer   No No    Take 3 mL by nebulization  4 (Four) Times a Day.    Lancet Device misc   No No    1 each Daily. E11.9    lisinopril (PRINIVIL,ZESTRIL) 40 MG tablet   No No    Take 1 tablet by mouth Daily.    metFORMIN (GLUCOPHAGE) 500 MG tablet   Yes No    Take 1 tablet by mouth Every 12 (Twelve) Hours.    metoprolol succinate XL (Toprol XL) 50 MG 24 hr tablet   No No    Take 1 tablet by mouth Daily.    Misc. Devices misc   No No    Bipap tubing.    Multiple Vitamin tablet   Yes No    Take 1 tablet by mouth daily.    Nebulizer misc   No No    1 each Every 4 (Four) Hours As Needed (shortness of breath).    O2 (OXYGEN)  Self Yes No    Inhale 2 L/min Continuous As Needed (With activity).    omeprazole (priLOSEC) 40 MG capsule   No No    Take 1 capsule by mouth Daily.    spironolactone (ALDACTONE) 50 MG tablet   No No    TAKE 1 TABLET EVERY DAY    TRUEplus Lancets 33G misc   No No    1 each by Other route Daily. E11.9                   Vital Signs:    Temp:  [98.3 °F (36.8 °C)] 98.3 °F (36.8 °C)  Heart Rate:  [60-64] 60  Resp:  [18-24] 18  BP: (107-158)/(62-94) 128/71        12/19/23 2111   Weight: 92.1 kg (203 lb)       Body mass index is 31.79 kg/m².    Physical Exam:      General Appearance:    Alert and cooperative, he is slightly tachypneic mild wheezing and expiratory crepitations   Head:    Atraumatic and normocephalic, without obvious abnormality.   Eyes:            PERRLA, conjunctivae and sclerae normal, no Icterus. No pallor. Extraocular movements are within normal limits.   Ears:    Ears appear intact with no abnormalities noted.   Throat:   No oral lesions, no thrush, oral mucosa moist.   Neck:   Supple, trachea midline, no thyromegaly, no carotid bruit, no lymphadenopathy   Lungs:    Breath sounds heard bilaterally equally.  Mild wheezing noted and there has been basal crepitations       Heart:    Normal S1 and S2, no murmur, no gallop, no rub. No JVD   Abdomen:     Normal bowel sounds, no masses, no organomegaly. Soft   nontender, nondistended,  no guarding, no rebound    tenderness   Extremities:   Moves all extremities well, no edema, no cyanosis, no          clubbing   Skin:   No  bruising or rash   Neurologic:   Cranial nerves 2 - 12 grossly intact, sensation intact, Motor power is normal and equal bilaterally.       EKG:      Is currently paced rhythm    Labs:    Lab Results (last 24 hours)       Procedure Component Value Units Date/Time    [facility] Draw [[id number]] Collected: 12/19/23 2121    Specimen: Blood Updated: 12/19/23 2232    Narrative:      The following orders were created for panel order [facility] Draw.  Procedure                               Abnormality         Status                     ---------                               -----------         ------                     Green Top (Gel)[[id number]]                                  Final result               Lavender Top[[id number]]                                     Final result               Gold Top - SST[[id number]]                                   Final result               Light Blue Top[[id number]]                                   Final result                 Please view results for these tests on the individual orders.    Green Top (Gel) [[id number]] Collected: 12/19/23 2121    Specimen: Blood Updated: 12/19/23 2232     Extra Tube Hold for add-ons.     Comment: Auto resulted.       Lavender Top [[id number]] Collected: 12/19/23 2121    Specimen: Blood Updated: 12/19/23 2232     Extra Tube hold for add-on     Comment: Auto resulted       Gold Top - SST [[id number]] Collected: 12/19/23 2121    Specimen: Blood Updated: 12/19/23 2232     Extra Tube Hold for add-ons.     Comment: Auto resulted.       Light Blue Top [[id number]] Collected: 12/19/23 2121    Specimen: Blood Updated: 12/19/23 2232     Extra Tube Hold for add-ons.     Comment: Auto resulted       COVID-19 and FLU A/B PCR, 1 HR TAT - Swab, Nasopharynx [[id number]]  (Normal) Collected: 12/19/23 2121    Specimen: Swab from Nasopharynx  "Updated: 12/19/23 2209     COVID19 Not Detected     Influenza A PCR Not Detected     Influenza B PCR Not Detected    Narrative:      Fact sheet for providers: https://www.fda.gov/media/164481/download    Fact sheet for patients: https://www.fda.gov/media/765939/download    Test performed by PCR.    Procalcitonin [[id number]]  (Normal) Collected: 12/19/23 2121    Specimen: Blood Updated: 12/19/23 2156     Procalcitonin 0.06 ng/mL     Narrative:      As a Marker for Sepsis (Non-Neonates):    1. <0.5 ng/mL represents a low risk of severe sepsis and/or septic shock.  2. >2 ng/mL represents a high risk of severe sepsis and/or septic shock.    As a Marker for Lower Respiratory Tract Infections that require antibiotic therapy:    PCT on Admission    Antibiotic Therapy       6-12 Hrs later    >0.5                Strongly Recommended  >0.25 - <0.5        Recommended   0.1 - 0.25          Discouraged              Remeasure/reassess PCT  <0.1                Strongly Discouraged     Remeasure/reassess PCT    As 28 day mortality risk marker: \"Change in Procalcitonin Result\" (>80% or <=80%) if Day 0 (or Day 1) and Day 4 values are available. Refer to http://www.[employer]-pct-calculator.com    Change in PCT <=80%  A decrease of PCT levels below or equal to 80% defines a positive change in PCT test result representing a higher risk for 28-day all-cause mortality of patients diagnosed with severe sepsis for septic shock.    Change in PCT >80%  A decrease of PCT levels of more than 80% defines a negative change in PCT result representing a lower risk for 28-day all-cause mortality of patients diagnosed with severe sepsis or septic shock.       BNP [[id number]]  (Abnormal) Collected: 12/19/23 2121    Specimen: Blood Updated: 12/19/23 2155     proBNP 1,951.0 pg/mL     Narrative:      This assay is used as an aid in the diagnosis of individuals suspected of having heart failure. It can be used as an aid in the diagnosis of acute " decompensated heart failure (ADHF) in patients presenting with signs and symptoms of ADHF to the emergency department (ED). In addition, NT-proBNP of <300 pg/mL indicates ADHF is not likely.    Age Range Result Interpretation  NT-proBNP Concentration (pg/mL:      <50             Positive            >450                   Gray                 300-450                    Negative             <300    50-75           Positive            >900                  Gray                300-900                  Negative            <300      >75             Positive            >1800                  Gray                300-1800                  Negative            <300    Magnesium [[id number]]  (Normal) Collected: 12/19/23 2121    Specimen: Blood Updated: 12/19/23 2153     Magnesium 1.7 mg/dL     Comprehensive Metabolic Panel [[id number]]  (Abnormal) Collected: 12/19/23 2121    Specimen: Blood Updated: 12/19/23 2153     Glucose 158 mg/dL      BUN 17 mg/dL      Creatinine 1.41 mg/dL      Sodium 140 mmol/L      Potassium 4.0 mmol/L      Chloride 105 mmol/L      CO2 27.1 mmol/L      Calcium 9.1 mg/dL      Total Protein 6.6 g/dL      Albumin 3.8 g/dL      ALT (SGPT) 13 U/L      AST (SGOT) 18 U/L      Alkaline Phosphatase 96 U/L      Total Bilirubin 0.9 mg/dL      Globulin 2.8 gm/dL      A/G Ratio 1.4 g/dL      BUN/Creatinine Ratio 12.1     Anion Gap 7.9 mmol/L      eGFR 52.3 mL/min/1.73     Narrative:      GFR Normal >60  Chronic Kidney Disease <60  Kidney Failure <15    The GFR formula is only valid for adults with stable renal function between ages 18 and 70.    C-reactive Protein [[id number]]  (Abnormal) Collected: 12/19/23 2121    Specimen: Blood Updated: 12/19/23 2153     C-Reactive Protein 2.33 mg/dL     Single High Sensitivity Troponin T [[id number]]  (Normal) Collected: 12/19/23 2121    Specimen: Blood Updated: 12/19/23 2151     HS Troponin T 20 ng/L     Narrative:      High Sensitive Troponin T Reference Range:  <14.0 ng/L-  Negative Female for AMI  <22.0 ng/L- Negative Male for AMI  >=14 - Abnormal Female indicating possible myocardial injury.  >=22 - Abnormal Male indicating possible myocardial injury.   Clinicians would have to utilize clinical acumen, EKG, Troponin, and serial changes to determine if it is an Acute Myocardial Infarction or myocardial injury due to an underlying chronic condition.         Lactic Acid, Plasma [[id number]]  (Normal) Collected: 12/19/23 2121    Specimen: Blood Updated: 12/19/23 2149     Lactate 1.5 mmol/L     Blood Gas, Arterial With Co-Ox [[id number]]  (Abnormal) Collected: 12/19/23 2142    Specimen: Arterial Blood Updated: 12/19/23 2142     Site Right Brachial     Sudarshan's Test N/A     pH, Arterial 7.425 pH units      pCO2, Arterial 42.2 mm Hg      pO2, Arterial 48.7 mm Hg      Comment: 85 Value below critical limit        HCO3, Arterial 27.7 mmol/L      Comment: 83 Value above reference range        Base Excess, Arterial 2.9 mmol/L      Comment: 83 Value above reference range        O2 Saturation, Arterial 85.5 %      Comment: 84 Value below reference range        Hematocrit, Blood Gas 34.4 %      Comment: 84 Value below reference range        Oxyhemoglobin 83.3 %      Comment: 84 Value below reference range        Methemoglobin 0.40 %      Carboxyhemoglobin 2.2 %      A-a DO2 127.3 mmHg      Barometric Pressure for Blood Gas 743 mmHg      Modality Nasal Cannula     FIO2 32 %      Flow Rate 3.0 lpm      Ventilator Mode NA     Notified Who EDGE     Notified By [id number]     Notified Time 12/19/2023 22:52     Collected by HANK     Comment: Meter: [id number]     :  [id number]        pH, Temp Corrected --     pCO2, Temperature Corrected --     pO2, Temperature Corrected --    CBC & Differential [[id number]]  (Abnormal) Collected: 12/19/23 2121    Specimen: Blood Updated: 12/19/23 2129    Narrative:      The following orders were created for panel order CBC & Differential.  Procedure                                Abnormality         Status                     ---------                               -----------         ------                     CBC Auto Differential[[id number]]        Abnormal            Final result                 Please view results for these tests on the individual orders.    CBC Auto Differential [[id number]]  (Abnormal) Collected: 12/19/23 2121    Specimen: Blood Updated: 12/19/23 2129     WBC 9.65 10*3/mm3      RBC 3.86 10*6/mm3      Hemoglobin 11.6 g/dL      Hematocrit 36.5 %      MCV 94.6 fL      MCH 30.1 pg      MCHC 31.8 g/dL      RDW 14.1 %      RDW-SD 49.1 fl      MPV 11.0 fL      Platelets 194 10*3/mm3      Neutrophil % 85.0 %      Lymphocyte % 8.9 %      Monocyte % 4.8 %      Eosinophil % 0.3 %      Basophil % 0.5 %      Immature Grans % 0.5 %      Neutrophils, Absolute 8.20 10*3/mm3      Lymphocytes, Absolute 0.86 10*3/mm3      Monocytes, Absolute 0.46 10*3/mm3      Eosinophils, Absolute 0.03 10*3/mm3      Basophils, Absolute 0.05 10*3/mm3      Immature Grans, Absolute 0.05 10*3/mm3      nRBC 0.0 /100 WBC             Radiology:    Imaging Results (Last 72 Hours)       Procedure Component Value Units Date/Time    XR Chest 1 View [[id number]] Resulted: 12/19/23 2223     Updated: 12/19/23 2223            Assessment:    Assessment & Plan       COPD exacerbation    Chronic kidney disease, stage III (moderate)    Paroxysmal A-fib    Diastolic heart failure    Obstructive sleep apnea of adult    Diabetes mellitus    Restless leg syndrome    Osteoarthritis of multiple joints    Multilevel degenerative disc disease    Moderate episode of recurrent major depressive disorder    Morbid obesity    Panlobular emphysema    Chronic respiratory failure with hypoxia        Plan:     1.  Acute on chronic hypoxic and hypercapnic respiratory failure-will admit him on 6 L oxygen and treatment for his COPD and gradually titrated down to baseline 2 prior to discharge    2.  COPD exacerbation-she will be  started on IV steroids bronchodilators and in view of his productive cough he could have a lower respiratory infection no infiltrate noted.  Will give him Rocephin for 3 days    3.  Diastolic heart failure-he does have a history of diastolic heart failure and while in the hospital we will give IV diuretic instead of oral along with other medical management    4.  Diabetes-the will be on steroids and so will need insulin coverage and will hold the metformin if he needs to get CT of the chest if there is no improvement    Will continue rest of the medication for his other medical problems and goals of treatment plan of care has been addressed with the patient    Raj Sullivan MD  12/19/23  23:53 EST    Please note that portions of this note were completed with a voice recognition program.

## 2023-12-20 NOTE — THERAPY EVALUATION
Patient Name: Roeb Bryant  : 1950    MRN: [id number]                              Today's Date: 2023       Admit Date: 2023    Visit Dx:     ICD-10-CM ICD-9-CM   1. COPD exacerbation  J44.1 491.21     Patient Active Problem List   Diagnosis    Pacemaker    Neck pain    Chronic low back pain    Chronic kidney disease, stage III (moderate)    Emphysema of lung    Essential hypertension    Seborrheic dermatitis    Benign prostatic hyperplasia    Paroxysmal A-fib    Hypercholesterolemia    Diastolic heart failure    COPD with acute exacerbation    Coronary artery disease involving native coronary artery of native heart with angina pectoris    Gastroesophageal reflux disease    Chronic shoulder pain    Obstructive sleep apnea of adult    Dysphagia    Diabetes mellitus    Chronic chest pain    Restless leg syndrome    Osteoarthritis of multiple joints    Multilevel degenerative disc disease    Moderate episode of recurrent major depressive disorder    Chronic pain syndrome    DDD (degenerative disc disease), cervical    BPH (benign prostatic hypertrophy) with urinary retention    Morbid obesity    Unstable angina    Panlobular emphysema    Leg cramps    Chronic pain of both knees    Atrial fibrillation with RVR    Acute on chronic diastolic heart failure    Pacemaker at end of battery life    Acute on chronic congestive heart failure    RMSF ([place] spotted fever)    Elevated uric acid in blood    Arthralgia    Pneumonia of right middle lobe due to infectious organism    Chronic respiratory failure with hypoxia    Acute on chronic respiratory failure with hypoxia    Acute on chronic respiratory failure with hypoxia    Syncope, unspecified syncope type    COPD exacerbation     Past Medical History:   Diagnosis Date    Anxiety and depression     Arthritis     Backache     20 years    Benign prostatic hyperplasia     Bladder trauma     Cervicalgia     Chronic kidney disease     Cr up to  2.1    Coronary artery disease     Diabetes mellitus     15 years--    Emphysema of lung     Erectile dysfunction     Eye exam, routine 2015    FH: colonic polyps     Gout     for 10 years--    History of echocardiogram 09/15/2014    History of tobacco use     with cessation x7 years    Hyperlipidemia     Hypertension     Impaired functional mobility, balance, gait, and endurance     Migraine     Myocardial infarction     h/o coronary artery stenting--    Prostate cancer     Restless leg syndrome     20 years    Sleep apnea     12 years; uses a Bi-Pap    Stroke     Syncope 04/28/2017    Warfarin anticoagulation 09/14/2016     Past Surgical History:   Procedure Laterality Date    BLADDER SURGERY      CARDIAC CATHETERIZATION  03/15/2013    revealing widely patent stents of the left circumflex and ramus intermedius coronary arteries, no significant disease is seen in any territory    CARDIAC CATHETERIZATION Left 9/30/2019    Procedure: Left Heart Cath;  Surgeon: Arelis Tucker MD;  Location:  [facility];  Service: Cardiology    CARDIAC ELECTROPHYSIOLOGY PROCEDURE N/A 5/10/2021    Procedure: PPM battery change;  Surgeon: Arelis Tucker MD;  Location:  [facility];  Service: Cardiology;  Laterality: N/A;    CARDIAC PACEMAKER PLACEMENT  2012    CATARACT EXTRACTION      COLONOSCOPY  2004    No family history of colon cancer.      COLONOSCOPY  2015    HERNIA REPAIR      Type unknown    PACEMAKER IMPLANTATION      PROSTATE SURGERY        General Information       Row Name 12/20/23 3995          Physical Therapy Time and Intention    Document Type evaluation  -JR     Mode of Treatment physical therapy  -JR       Row Name 12/20/23 2879          General Information    Patient Profile Reviewed yes  -JR     Prior Level of Function independent:;all household mobility;community mobility  -JR     Existing Precautions/Restrictions oxygen therapy device and L/min  -JR     Barriers  to Rehab medically complex;previous functional deficit  -       Row Name 12/20/23 1331          Living Environment    People in Home spouse  -       Row Name 12/20/23 1331          Home Main Entrance    Number of Stairs, Main Entrance one  -JR       Row Name 12/20/23 1331          Stairs Within Home, Primary    Number of Stairs, Within Home, Primary none  -       Row Name 12/20/23 1331          Cognition    Orientation Status (Cognition) oriented x 4  -       Row Name 12/20/23 1331          Safety Issues, Functional Mobility    Safety Issues Affecting Function (Mobility) safety precautions follow-through/compliance;awareness of need for assistance;insight into deficits/self-awareness  -     Impairments Affecting Function (Mobility) strength;endurance/activity tolerance;shortness of breath  -               User Key  (r) = Recorded By, (t) = Taken By, (c) = Cosigned By      Initials Name Provider Type    Ariadna Ortiz, LYNDON Physical Therapist                   Mobility       Row Name 12/20/23 1331          Bed Mobility    Bed Mobility supine-sit  -JR     Supine-Sit [place] (Bed Mobility) supervision  -     Assistive Device (Bed Mobility) head of bed elevated;bed rails  -       Row Name 12/20/23 1331          Sit-Stand Transfer    Sit-Stand [place] (Transfers) contact guard  -     Assistive Device (Sit-Stand Transfers) other (see comments)  gait belt and HHA  -       Row Name 12/20/23 1331          Gait/Stairs (Locomotion)    [place] Level (Gait) contact guard  -     Assistive Device (Gait) walker, front-wheeled  -     Distance in Feet (Gait) 3  -JR     Deviations/Abnormal Patterns (Gait) base of support, narrow;festinating/shuffling;stride length decreased  -               User Key  (r) = Recorded By, (t) = Taken By, (c) = Cosigned By      Initials Name Provider Type    Ariadna Ortiz PT Physical Therapist                   Obj/Interventions       Row Name 12/20/23 1331           Range of Motion Comprehensive    General Range of Motion no range of motion deficits identified  -       Row Name 12/20/23 1331          Strength Comprehensive (MMT)    Comment, General Manual Muscle Testing (MMT) Assessment Grossly 4-/5  -JR       Row Name 12/20/23 1331          Balance    Balance Assessment sitting static balance;sitting dynamic balance;sit to stand dynamic balance;standing static balance;standing dynamic balance  -JR     Static Sitting Balance supervision  -JR     Dynamic Sitting Balance supervision  -JR     Position, Sitting Balance unsupported;sitting edge of mat  -JR     Sit to Stand Dynamic Balance contact guard  -JR     Static Standing Balance contact guard  -JR     Dynamic Standing Balance contact guard  -JR               User Key  (r) = Recorded By, (t) = Taken By, (c) = Cosigned By      Initials Name Provider Type    Ariadna Ortiz, PT Physical Therapist                   Goals/Plan       Row Name 12/20/23 1331          Bed Mobility Goal 1 (PT)    Activity/Assistive Device (Bed Mobility Goal 1, PT) bed mobility activities, all  -JR     [place] Level/Cues Needed (Bed Mobility Goal 1, PT) independent  -JR     Time Frame (Bed Mobility Goal 1, PT) short term goal (STG)  -JR     Progress/Outcomes (Bed Mobility Goal 1, PT) goal ongoing  -       Row Name 12/20/23 1331          Transfer Goal 1 (PT)    Activity/Assistive Device (Transfer Goal 1, PT) sit-to-stand/stand-to-sit;bed-to-chair/chair-to-bed  -JR     [place] Level/Cues Needed (Transfer Goal 1, PT) modified independence  -JR     Time Frame (Transfer Goal 1, PT) long term goal (LTG)  -JR     Progress/Outcome (Transfer Goal 1, PT) goal ongoing  -       Row Name 12/20/23 1331          Gait Training Goal 1 (PT)    Activity/Assistive Device (Gait Training Goal 1, PT) gait (walking locomotion);assistive device use;increase endurance/gait distance;decrease fall risk  -JR     [place] Level (Gait Training Goal 1, PT)  supervision required  -JR     Distance (Gait Training Goal 1, PT) 200  -JR     Time Frame (Gait Training Goal 1, PT) long term goal (LTG)  -JR     Progress/Outcome (Gait Training Goal 1, PT) goal ongoing  -JR       Row Name 12/20/23 1331          Patient Education Goal (PT)    Activity (Patient Education Goal, PT) Perform BLE exercises in standing x 10  -JR     [place]/Cues/Accuracy (Memory Goal 2, PT) demonstrates adequately  -JR     Time Frame (Patient Education Goal, PT) 4 days  -JR     Progress/Outcome (Patient Education Goal, PT) goal ongoing  -JR       Row Name 12/20/23 1331          Therapy Assessment/Plan (PT)    Planned Therapy Interventions (PT) strengthening;balance training;bed mobility training;transfer training;gait training;home exercise program;patient/family education  -JR               User Key  (r) = Recorded By, (t) = Taken By, (c) = Cosigned By      Initials Name Provider Type    JR Ariadna Gustafson, PT Physical Therapist                   Clinical Impression       Row Name 12/20/23 1331          Pain    Pretreatment Pain Rating 0/10 - no pain  -JR     Posttreatment Pain Rating 0/10 - no pain  -JR       Row Name 12/20/23 1331          Plan of Care Review    Plan of Care Reviewed With patient  -JR     Progress no change  -JR     Outcome Evaluation Patient participated well in PT evaluation and demonstrated decreased overall mobility.  Patient requires CGA for mobility tasks, including walking 3 feet with HHA.  He is short of breath and becomes fatigued quickly.  Patient is expected to benefit from additional PT services while hospitalized and upon discharge to home with home health care.  -JR       Row Name 12/20/23 1331          Therapy Assessment/Plan (PT)    Patient/Family Therapy Goals Statement (PT) Patient is eager to go home  -JR     Rehab Potential (PT) good, to achieve stated therapy goals  -JR     Criteria for Skilled Interventions Met (PT) yes;meets criteria;skilled treatment is  necessary  -JR     Therapy Frequency (PT) 5 times/wk  -       Row Name 12/20/23 1331          Positioning and Restraints    Pre-Treatment Position in bed  -JR     Post Treatment Position bed  -JR     In Bed sitting EOB;call light within reach;encouraged to call for assist;with family/caregiver;notified nsg  -               User Key  (r) = Recorded By, (t) = Taken By, (c) = Cosigned By      Initials Name Provider Type    Ariadna Ortiz, PT Physical Therapist                   Outcome Measures       Row Name 12/20/23 1331          How much help from another person do you currently need...    Turning from your back to your side while in flat bed without using bedrails? 4  -JR     Moving from lying on back to sitting on the side of a flat bed without bedrails? 4  -JR     Moving to and from a bed to a chair (including a wheelchair)? 4  -JR     Standing up from a chair using your arms (e.g., wheelchair, bedside chair)? 3  -JR     Climbing 3-5 steps with a railing? 3  -JR     To walk in hospital room? 3  -JR     AM-PAC 6 Clicks Score (PT) 21  -JR     Highest Level of Mobility Goal 6 --> Walk 10 steps or more  -       Row Name 12/20/23 1331          Functional Assessment    Outcome Measure Options AM-PAC 6 Clicks Basic Mobility (PT)  -               User Key  (r) = Recorded By, (t) = Taken By, (c) = Cosigned By      Initials Name Provider Type    Ariadna Ortiz, PT Physical Therapist                                 Physical Therapy Education       Title: PT OT SLP Therapies (In Progress)       Topic: Physical Therapy (In Progress)       Point: Mobility training (Done)       Learning Progress Summary             Patient Acceptance, E,TB, VU by  at 12/20/2023 0375    Comment: Role of PT and POC                         Point: Home exercise program (Not Started)       Learner Progress:  Not documented in this visit.              Point: Body mechanics (Not Started)       Learner Progress:  Not documented in this  visit.              Point: Precautions (Not Started)       Learner Progress:  Not documented in this visit.                              User Key       Initials Effective Dates Name Provider Type Discipline     08/22/23 -  Ariadna Gustafson PT Physical Therapist PT                  PT Recommendation and Plan  Planned Therapy Interventions (PT): strengthening, balance training, bed mobility training, transfer training, gait training, home exercise program, patient/family education  Plan of Care Reviewed With: patient  Progress: no change  Outcome Evaluation: Patient participated well in PT evaluation and demonstrated decreased overall mobility.  Patient requires CGA for mobility tasks, including walking 3 feet with HHA.  He is short of breath and becomes fatigued quickly.  Patient is expected to benefit from additional PT services while hospitalized and upon discharge to home with home health care.     Time Calculation:   PT Evaluation Complexity  History, PT Evaluation Complexity: 1-2 personal factors and/or comorbidities  Examination of Body Systems (PT Eval Complexity): 1-2 elements  Clinical Presentation (PT Evaluation Complexity): evolving  Clinical Decision Making (PT Evaluation Complexity): low complexity  Overall Complexity (PT Evaluation Complexity): low complexity     PT Charges       Row Name 12/20/23 1331             Time Calculation    Start Time 1331  -JR      PT Received On 12/20/23  -JR      PT Goal Re-Cert Due Date 12/30/23  -JR         Untimed Charges    PT Eval/Re-eval Minutes 55  -JR         Total Minutes    Untimed Charges Total Minutes 55  -JR       Total Minutes 55  -JR                User Key  (r) = Recorded By, (t) = Taken By, (c) = Cosigned By      Initials Name Provider Type     Araidna Gustafson PT Physical Therapist                  Therapy Charges for Today       Code Description Service Date Service Provider Modifiers Qty    [id number]  PT EVAL LOW COMPLEXITY 4 12/20/2023 Ariadna Gustafson,  PT GP 1            PT G-Codes  Outcome Measure Options: AM-PAC 6 Clicks Basic Mobility (PT)  AM-PAC 6 Clicks Score (PT): 21  PT Discharge Summary  Anticipated Discharge Disposition (PT): home with home health    Ariadna Gustafson, PT  12/20/2023

## 2023-12-20 NOTE — ED PROVIDER NOTES
Subjective:  History of Present Illness:    Patient is a 73-year-old male with history of emphysema, CKD, CAD, diabetes mellitus, obesity hypertension, hyperlipidemia, CVA presents today with increasing shortness of breath.  Wife reports patient's had increasing cough productive of thick sputum over the last 2 to 3 days.  Denies any fevers but has had chills.  Today, patient became acutely dyspneic, has been persistent and he now presents for evaluation.  Denies any abdominal pain nausea vomiting or diarrhea.  No unilateral leg swelling or leg pain.  Denies any history of PE/DVT.      Nurses Notes reviewed and agree, including vitals, allergies, social history and prior medical history.     REVIEW OF SYSTEMS: All systems reviewed and not pertinent unless noted.  Review of Systems   Constitutional:  Positive for activity change, chills and fatigue. Negative for appetite change and fever.   HENT:  Positive for congestion, rhinorrhea and sinus pressure. Negative for sneezing and trouble swallowing.    Eyes:  Negative for discharge and itching.   Respiratory:  Positive for cough and shortness of breath.    Cardiovascular:  Negative for chest pain and palpitations.   Gastrointestinal:  Negative for abdominal distention, abdominal pain, diarrhea, nausea and vomiting.   Endocrine: Negative for cold intolerance and heat intolerance.   Genitourinary:  Negative for decreased urine volume, dysuria and urgency.   Musculoskeletal:  Negative for gait problem, neck pain and neck stiffness.   Skin:  Negative for color change and rash.   Allergic/Immunologic: Negative for immunocompromised state.   Neurological:  Negative for facial asymmetry and headaches.   Hematological:  Negative for adenopathy.   Psychiatric/Behavioral:  Negative for self-injury and suicidal ideas.        Past Medical History:   Diagnosis Date    Anxiety and depression     Arthritis     Backache     20 years    Benign prostatic hyperplasia     Bladder trauma      Cervicalgia     Chronic kidney disease     Cr up to 2.1    Coronary artery disease     Diabetes mellitus     15 years--    Emphysema of lung     Erectile dysfunction     Eye exam, routine     FH: colonic polyps     Gout     for 10 years--    History of echocardiogram 09/15/2014    History of tobacco use     with cessation x7 years    Hyperlipidemia     Hypertension     Migraine     Myocardial infarction     h/o coronary artery stenting--    Prostate cancer     Restless leg syndrome     20 years    Sleep apnea     12 years; uses a Bi-Pap    Stroke     Syncope 2017    Warfarin anticoagulation 2016       Allergies:    Patient has no known allergies.      Past Surgical History:   Procedure Laterality Date    BLADDER SURGERY      CARDIAC CATHETERIZATION  03/15/2013    revealing widely patent stents of the left circumflex and ramus intermedius coronary arteries, no significant disease is seen in any territory    CARDIAC CATHETERIZATION Left 2019    Procedure: Left Heart Cath;  Surgeon: Arelis Tucker MD;  Location:  [facility];  Service: Cardiology    CARDIAC ELECTROPHYSIOLOGY PROCEDURE N/A 5/10/2021    Procedure: PPM battery change;  Surgeon: Arelis Tucker MD;  Location:  [facility];  Service: Cardiology;  Laterality: N/A;    CARDIAC PACEMAKER PLACEMENT      CATARACT EXTRACTION      COLONOSCOPY      No family history of colon cancer.      COLONOSCOPY      HERNIA REPAIR      Type unknown    PACEMAKER IMPLANTATION      PROSTATE SURGERY           Social History     Socioeconomic History    Marital status:    Tobacco Use    Smoking status: Former     Packs/day: 1.00     Years: 30.00     Additional pack years: 0.00     Total pack years: 30.00     Types: Cigarettes     Quit date: 8/15/2001     Years since quittin.3    Smokeless tobacco: Never    Tobacco comments:     quit 16 years ago   Vaping Use    Vaping Use: Never used  "  Substance and Sexual Activity    Alcohol use: No    Drug use: No    Sexual activity: Defer         Family History   Problem Relation Age of Onset    Cancer Mother     Diabetes Mother     Hypertension Mother     Stroke Mother     Stomach cancer Mother     Heart disease Mother     Stomach cancer Father     Cancer Father     Lung cancer Father        Objective  Physical Exam:  /76 (BP Location: Right arm, Patient Position: Lying)   Pulse 60   Temp 99 °F (37.2 °C) (Oral)   Resp 20   Ht 165.1 cm (65\")   Wt 101 kg (222 lb 3.6 oz)   SpO2 93%   BMI 36.98 kg/m²      Physical Exam  Constitutional:       General: He is in acute distress.      Appearance: Normal appearance. He is obese. He is not ill-appearing.   HENT:      Head: Normocephalic and atraumatic.      Nose: Nose normal. No congestion or rhinorrhea.      Mouth/Throat:      Mouth: Mucous membranes are moist.      Pharynx: Oropharynx is clear.   Eyes:      Extraocular Movements: Extraocular movements intact.      Conjunctiva/sclera: Conjunctivae normal.      Pupils: Pupils are equal, round, and reactive to light.   Cardiovascular:      Rate and Rhythm: Normal rate and regular rhythm.      Pulses: Normal pulses.   Pulmonary:      Effort: Pulmonary effort is normal. No respiratory distress.      Breath sounds: Wheezing present.      Comments: Wheezing in bilateral upper lobes  Abdominal:      General: Abdomen is flat. Bowel sounds are normal. There is no distension.      Palpations: Abdomen is soft.      Tenderness: There is no abdominal tenderness.   Musculoskeletal:         General: No swelling or tenderness. Normal range of motion.      Cervical back: Normal range of motion and neck supple. No rigidity or tenderness.   Skin:     General: Skin is warm and dry.      Capillary Refill: Capillary refill takes less than 2 seconds.   Neurological:      General: No focal deficit present.      Mental Status: He is alert and oriented to person, place, and time. " Mental status is at baseline.      Cranial Nerves: No cranial nerve deficit.      Sensory: No sensory deficit.      Motor: No weakness.      Coordination: Coordination normal.   Psychiatric:         Mood and Affect: Mood normal.         Behavior: Behavior normal.         Thought Content: Thought content normal.         Judgment: Judgment normal.         Critical Care    Performed by: Juan Antonio Flores MD  Authorized by: Raj Sullivan MD    Critical care provider statement:     Critical care time (minutes):  30    Critical care was necessary to treat or prevent imminent or life-threatening deterioration of the following conditions:  Respiratory failure    Critical care was time spent personally by me on the following activities:  Blood draw for specimens, development of treatment plan with patient or surrogate, discussions with primary provider, evaluation of patient's response to treatment, examination of patient, obtaining history from patient or surrogate, ordering and performing treatments and interventions, ordering and review of laboratory studies, ordering and review of radiographic studies, pulse oximetry, re-evaluation of patient's condition and review of old charts    Care discussed with: admitting provider        ED Course:    ED Course as of 12/20/23 0059   Tue Dec 19, 2023   2149 EKG interpreted by me, atrial pacemaker with left axis deviation, no concerning ST changes noted, rate of 60, abnormal EKG [JE]      ED Course User Index  [JE] Juan Antonio Flores MD       Lab Results (last 24 hours)       Procedure Component Value Units Date/Time    CBC & Differential [[id number]]  (Abnormal) Collected: 12/19/23 2121    Specimen: Blood Updated: 12/19/23 2129    Narrative:      The following orders were created for panel order CBC & Differential.  Procedure                               Abnormality         Status                     ---------                               -----------         ------                      CBC Auto Differential[[id number]]        Abnormal            Final result                 Please view results for these tests on the individual orders.    Comprehensive Metabolic Panel [[id number]]  (Abnormal) Collected: 12/19/23 2121    Specimen: Blood Updated: 12/19/23 2153     Glucose 158 mg/dL      BUN 17 mg/dL      Creatinine 1.41 mg/dL      Sodium 140 mmol/L      Potassium 4.0 mmol/L      Chloride 105 mmol/L      CO2 27.1 mmol/L      Calcium 9.1 mg/dL      Total Protein 6.6 g/dL      Albumin 3.8 g/dL      ALT (SGPT) 13 U/L      AST (SGOT) 18 U/L      Alkaline Phosphatase 96 U/L      Total Bilirubin 0.9 mg/dL      Globulin 2.8 gm/dL      A/G Ratio 1.4 g/dL      BUN/Creatinine Ratio 12.1     Anion Gap 7.9 mmol/L      eGFR 52.3 mL/min/1.73     Narrative:      GFR Normal >60  Chronic Kidney Disease <60  Kidney Failure <15    The GFR formula is only valid for adults with stable renal function between ages 18 and 70.    BNP [[id number]]  (Abnormal) Collected: 12/19/23 2121    Specimen: Blood Updated: 12/19/23 2155     proBNP 1,951.0 pg/mL     Narrative:      This assay is used as an aid in the diagnosis of individuals suspected of having heart failure. It can be used as an aid in the diagnosis of acute decompensated heart failure (ADHF) in patients presenting with signs and symptoms of ADHF to the emergency department (ED). In addition, NT-proBNP of <300 pg/mL indicates ADHF is not likely.    Age Range Result Interpretation  NT-proBNP Concentration (pg/mL:      <50             Positive            >450                   Gray                 300-450                    Negative             <300    50-75           Positive            >900                  Gray                300-900                  Negative            <300      >75             Positive            >1800                  Gray                300-1800                  Negative            <300    Single High Sensitivity Troponin T [[id number]]   (Normal) Collected: 12/19/23 2121    Specimen: Blood Updated: 12/19/23 2151     HS Troponin T 20 ng/L     Narrative:      High Sensitive Troponin T Reference Range:  <14.0 ng/L- Negative Female for AMI  <22.0 ng/L- Negative Male for AMI  >=14 - Abnormal Female indicating possible myocardial injury.  >=22 - Abnormal Male indicating possible myocardial injury.   Clinicians would have to utilize clinical acumen, EKG, Troponin, and serial changes to determine if it is an Acute Myocardial Infarction or myocardial injury due to an underlying chronic condition.         Lactic Acid, Plasma [[id number]]  (Normal) Collected: 12/19/23 2121    Specimen: Blood Updated: 12/19/23 2149     Lactate 1.5 mmol/L     CBC Auto Differential [[id number]]  (Abnormal) Collected: 12/19/23 2121    Specimen: Blood Updated: 12/19/23 2129     WBC 9.65 10*3/mm3      RBC 3.86 10*6/mm3      Hemoglobin 11.6 g/dL      Hematocrit 36.5 %      MCV 94.6 fL      MCH 30.1 pg      MCHC 31.8 g/dL      RDW 14.1 %      RDW-SD 49.1 fl      MPV 11.0 fL      Platelets 194 10*3/mm3      Neutrophil % 85.0 %      Lymphocyte % 8.9 %      Monocyte % 4.8 %      Eosinophil % 0.3 %      Basophil % 0.5 %      Immature Grans % 0.5 %      Neutrophils, Absolute 8.20 10*3/mm3      Lymphocytes, Absolute 0.86 10*3/mm3      Monocytes, Absolute 0.46 10*3/mm3      Eosinophils, Absolute 0.03 10*3/mm3      Basophils, Absolute 0.05 10*3/mm3      Immature Grans, Absolute 0.05 10*3/mm3      nRBC 0.0 /100 WBC     COVID-19 and FLU A/B PCR, 1 HR TAT - Swab, Nasopharynx [[id number]]  (Normal) Collected: 12/19/23 2121    Specimen: Swab from Nasopharynx Updated: 12/19/23 2209     COVID19 Not Detected     Influenza A PCR Not Detected     Influenza B PCR Not Detected    Narrative:      Fact sheet for providers: https://www.fda.gov/media/998032/download    Fact sheet for patients: https://www.fda.gov/media/747602/download    Test performed by PCR.    C-reactive Protein [[id number]]  (Abnormal)  "Collected: 12/19/23 2121    Specimen: Blood Updated: 12/19/23 2153     C-Reactive Protein 2.33 mg/dL     Procalcitonin [[id number]]  (Normal) Collected: 12/19/23 2121    Specimen: Blood Updated: 12/19/23 2156     Procalcitonin 0.06 ng/mL     Narrative:      As a Marker for Sepsis (Non-Neonates):    1. <0.5 ng/mL represents a low risk of severe sepsis and/or septic shock.  2. >2 ng/mL represents a high risk of severe sepsis and/or septic shock.    As a Marker for Lower Respiratory Tract Infections that require antibiotic therapy:    PCT on Admission    Antibiotic Therapy       6-12 Hrs later    >0.5                Strongly Recommended  >0.25 - <0.5        Recommended   0.1 - 0.25          Discouraged              Remeasure/reassess PCT  <0.1                Strongly Discouraged     Remeasure/reassess PCT    As 28 day mortality risk marker: \"Change in Procalcitonin Result\" (>80% or <=80%) if Day 0 (or Day 1) and Day 4 values are available. Refer to http://www.Reclip.ItO[facility] [place]-pct-calculator.com    Change in PCT <=80%  A decrease of PCT levels below or equal to 80% defines a positive change in PCT test result representing a higher risk for 28-day all-cause mortality of patients diagnosed with severe sepsis for septic shock.    Change in PCT >80%  A decrease of PCT levels of more than 80% defines a negative change in PCT result representing a lower risk for 28-day all-cause mortality of patients diagnosed with severe sepsis or septic shock.       Magnesium [[id number]]  (Normal) Collected: 12/19/23 2121    Specimen: Blood Updated: 12/19/23 2153     Magnesium 1.7 mg/dL     T4, Free [[id number]]  (Normal) Collected: 12/19/23 2121    Specimen: Blood Updated: 12/20/23 0052     Free T4 1.60 ng/dL     Narrative:      Results may be falsely increased if patient taking Biotin.      Blood Gas, Arterial With Co-Ox [[id number]]  (Abnormal) Collected: 12/19/23 2142    Specimen: Arterial Blood Updated: 12/19/23 2142     Site Right Brachial     " Sudarshan's Test N/A     pH, Arterial 7.425 pH units      pCO2, Arterial 42.2 mm Hg      pO2, Arterial 48.7 mm Hg      Comment: 85 Value below critical limit        HCO3, Arterial 27.7 mmol/L      Comment: 83 Value above reference range        Base Excess, Arterial 2.9 mmol/L      Comment: 83 Value above reference range        O2 Saturation, Arterial 85.5 %      Comment: 84 Value below reference range        Hematocrit, Blood Gas 34.4 %      Comment: 84 Value below reference range        Oxyhemoglobin 83.3 %      Comment: 84 Value below reference range        Methemoglobin 0.40 %      Carboxyhemoglobin 2.2 %      A-a DO2 127.3 mmHg      Barometric Pressure for Blood Gas 743 mmHg      Modality Nasal Cannula     FIO2 32 %      Flow Rate 3.0 lpm      Ventilator Mode NA     Notified Who EDGE     Notified By [id number]     Notified Time 12/19/2023 22:52     Collected by HANK     Comment: Meter: [id number]     :  [id number]        pH, Temp Corrected --     pCO2, Temperature Corrected --     pO2, Temperature Corrected --             No radiology results from the last 24 hrs       MDM     Amount and/or Complexity of Data Reviewed  Decide to obtain previous medical records or to obtain history from someone other than the patient: yes  Independent visualization of images, tracings, or specimens: yes        Initial impression of presenting illness: Shortness of breath    DDX: includes but is not limited to: COPD exacerbation, bacterial pneumonia, viral URI, PE    Patient arrives stable with vitals interpreted by myself.     Pertinent features from physical exam: Patient hypoxic on baseline 2 L nasal cannula, now on 6 L nasal cannula with stable O2 sat.    Initial diagnostic plan: CBC, CMP, troponin, BNP, ABG, CRP, Pro-Royce, EKG, chest x-ray    Results from initial plan were reviewed and interpreted by me revealing patient with no concern for bacterial pneumonia, does have some interstitial edema and elevated BNP    Diagnostic  information from other sources: Reviewed past medical records    Interventions / Re-evaluation: Given concerns for COPD exacerbation given DuoNeb, magnesium, Solu-Medrol, given concern for possible contribution of CHF given Lasix in the emergency department    Results/clinical rationale were discussed with patient at bedside    Consultations/Discussion of results with other physicians: Given my concern for patient's acute hypoxic respiratory failure, discussed with Dr. Haskins who admit the patient to his service further management    Disposition plan: Admit  -----        Final diagnoses:   COPD exacerbation          Juan Antonio Flores MD  12/20/23 0059

## 2023-12-21 LAB
ALBUMIN SERPL-MCNC: 3.4 G/DL (ref 3.5–5.2)
ALBUMIN/GLOB SERPL: 1.5 G/DL
ALP SERPL-CCNC: 76 U/L (ref 39–117)
ALT SERPL W P-5'-P-CCNC: 9 U/L (ref 1–41)
ANION GAP SERPL CALCULATED.3IONS-SCNC: 8.2 MMOL/L (ref 5–15)
AST SERPL-CCNC: 13 U/L (ref 1–40)
BILIRUB SERPL-MCNC: 0.3 MG/DL (ref 0–1.2)
BUN SERPL-MCNC: 37 MG/DL (ref 8–23)
BUN/CREAT SERPL: 19.6 (ref 7–25)
CALCIUM SPEC-SCNC: 9.1 MG/DL (ref 8.6–10.5)
CHLORIDE SERPL-SCNC: 102 MMOL/L (ref 98–107)
CO2 SERPL-SCNC: 25.8 MMOL/L (ref 22–29)
CREAT SERPL-MCNC: 1.89 MG/DL (ref 0.76–1.27)
DEPRECATED RDW RBC AUTO: 48.2 FL (ref 37–54)
EGFRCR SERPLBLD CKD-EPI 2021: 36.8 ML/MIN/1.73
ERYTHROCYTE [DISTWIDTH] IN BLOOD BY AUTOMATED COUNT: 14.1 % (ref 12.3–15.4)
GLOBULIN UR ELPH-MCNC: 2.2 GM/DL
GLUCOSE BLDC GLUCOMTR-MCNC: 180 MG/DL (ref 70–130)
GLUCOSE BLDC GLUCOMTR-MCNC: 209 MG/DL (ref 70–130)
GLUCOSE BLDC GLUCOMTR-MCNC: 263 MG/DL (ref 70–130)
GLUCOSE SERPL-MCNC: 202 MG/DL (ref 65–99)
HCT VFR BLD AUTO: 33.4 % (ref 37.5–51)
HGB BLD-MCNC: 10.6 G/DL (ref 13–17.7)
MCH RBC QN AUTO: 29.9 PG (ref 26.6–33)
MCHC RBC AUTO-ENTMCNC: 31.7 G/DL (ref 31.5–35.7)
MCV RBC AUTO: 94.4 FL (ref 79–97)
PLATELET # BLD AUTO: 196 10*3/MM3 (ref 140–450)
PMV BLD AUTO: 11.5 FL (ref 6–12)
POTASSIUM SERPL-SCNC: 4.5 MMOL/L (ref 3.5–5.2)
PROT SERPL-MCNC: 5.6 G/DL (ref 6–8.5)
RBC # BLD AUTO: 3.54 10*6/MM3 (ref 4.14–5.8)
SODIUM SERPL-SCNC: 136 MMOL/L (ref 136–145)
WBC NRBC COR # BLD AUTO: 12.91 10*3/MM3 (ref 3.4–10.8)

## 2023-12-21 PROCEDURE — 94761 N-INVAS EAR/PLS OXIMETRY MLT: CPT

## 2023-12-21 PROCEDURE — 63710000001 INSULIN ASPART PER 5 UNITS: Performed by: INTERNAL MEDICINE

## 2023-12-21 PROCEDURE — 85027 COMPLETE CBC AUTOMATED: CPT | Performed by: INTERNAL MEDICINE

## 2023-12-21 PROCEDURE — 94618 PULMONARY STRESS TESTING: CPT

## 2023-12-21 PROCEDURE — 94799 UNLISTED PULMONARY SVC/PX: CPT

## 2023-12-21 PROCEDURE — 25010000002 METHYLPREDNISOLONE PER 40 MG: Performed by: INTERNAL MEDICINE

## 2023-12-21 PROCEDURE — 25010000002 CEFTRIAXONE SODIUM-DEXTROSE 2-2.22 GM-%(50ML) RECONSTITUTED SOLUTION: Performed by: INTERNAL MEDICINE

## 2023-12-21 PROCEDURE — 80053 COMPREHEN METABOLIC PANEL: CPT | Performed by: INTERNAL MEDICINE

## 2023-12-21 PROCEDURE — 82948 REAGENT STRIP/BLOOD GLUCOSE: CPT

## 2023-12-21 PROCEDURE — 94760 N-INVAS EAR/PLS OXIMETRY 1: CPT

## 2023-12-21 PROCEDURE — 94664 DEMO&/EVAL PT USE INHALER: CPT

## 2023-12-21 RX ORDER — METHYLPREDNISOLONE SODIUM SUCCINATE 40 MG/ML
40 INJECTION, POWDER, LYOPHILIZED, FOR SOLUTION INTRAMUSCULAR; INTRAVENOUS EVERY 12 HOURS
Status: DISCONTINUED | OUTPATIENT
Start: 2023-12-21 | End: 2023-12-22 | Stop reason: HOSPADM

## 2023-12-21 RX ORDER — GABAPENTIN 400 MG/1
400 CAPSULE ORAL NIGHTLY
Status: DISCONTINUED | OUTPATIENT
Start: 2023-12-21 | End: 2023-12-22 | Stop reason: HOSPADM

## 2023-12-21 RX ADMIN — CEFTRIAXONE 2000 MG: 2 INJECTION, SOLUTION INTRAVENOUS at 21:13

## 2023-12-21 RX ADMIN — HYDROCODONE BITARTRATE AND ACETAMINOPHEN 1 TABLET: 5; 325 TABLET ORAL at 23:07

## 2023-12-21 RX ADMIN — LISINOPRIL 40 MG: 20 TABLET ORAL at 09:48

## 2023-12-21 RX ADMIN — APIXABAN 5 MG: 5 TABLET, FILM COATED ORAL at 20:17

## 2023-12-21 RX ADMIN — AMIODARONE HYDROCHLORIDE 200 MG: 200 TABLET ORAL at 09:48

## 2023-12-21 RX ADMIN — INSULIN ASPART 2 UNITS: 100 INJECTION, SOLUTION INTRAVENOUS; SUBCUTANEOUS at 09:47

## 2023-12-21 RX ADMIN — INSULIN ASPART 4 UNITS: 100 INJECTION, SOLUTION INTRAVENOUS; SUBCUTANEOUS at 17:24

## 2023-12-21 RX ADMIN — GABAPENTIN 400 MG: 400 CAPSULE ORAL at 20:17

## 2023-12-21 RX ADMIN — PANTOPRAZOLE SODIUM 40 MG: 40 TABLET, DELAYED RELEASE ORAL at 06:40

## 2023-12-21 RX ADMIN — Medication 10 ML: at 09:49

## 2023-12-21 RX ADMIN — INSULIN ASPART 2 UNITS: 100 INJECTION, SOLUTION INTRAVENOUS; SUBCUTANEOUS at 12:13

## 2023-12-21 RX ADMIN — IPRATROPIUM BROMIDE AND ALBUTEROL SULFATE 3 ML: .5; 3 SOLUTION RESPIRATORY (INHALATION) at 13:04

## 2023-12-21 RX ADMIN — AMLODIPINE BESYLATE 10 MG: 5 TABLET ORAL at 09:48

## 2023-12-21 RX ADMIN — METHYLPREDNISOLONE SODIUM SUCCINATE 40 MG: 40 INJECTION, POWDER, FOR SOLUTION INTRAMUSCULAR; INTRAVENOUS at 09:48

## 2023-12-21 RX ADMIN — HYDROCODONE BITARTRATE AND ACETAMINOPHEN 1 TABLET: 5; 325 TABLET ORAL at 16:48

## 2023-12-21 RX ADMIN — IPRATROPIUM BROMIDE AND ALBUTEROL SULFATE 3 ML: .5; 3 SOLUTION RESPIRATORY (INHALATION) at 19:03

## 2023-12-21 RX ADMIN — INSULIN ASPART 3 UNITS: 100 INJECTION, SOLUTION INTRAVENOUS; SUBCUTANEOUS at 21:12

## 2023-12-21 RX ADMIN — METHYLPREDNISOLONE SODIUM SUCCINATE 40 MG: 40 INJECTION, POWDER, FOR SOLUTION INTRAMUSCULAR; INTRAVENOUS at 23:07

## 2023-12-21 RX ADMIN — APIXABAN 5 MG: 5 TABLET, FILM COATED ORAL at 09:48

## 2023-12-21 RX ADMIN — SPIRONOLACTONE 50 MG: 25 TABLET ORAL at 09:48

## 2023-12-21 RX ADMIN — IPRATROPIUM BROMIDE AND ALBUTEROL SULFATE 3 ML: .5; 3 SOLUTION RESPIRATORY (INHALATION) at 06:49

## 2023-12-21 NOTE — PLAN OF CARE
Goal Outcome Evaluation:improving , oxygen down to 3 liters, plan home tomorrow

## 2023-12-21 NOTE — PROGRESS NOTES
Exercise Oximetry    Patient Name:Robe Bryant   MRN: [id number]   Date: 12/21/23             ROOM AIR BASELINE   SpO2% 88   Heart Rate 62   Blood Pressure      EXERCISE ON ROOM AIR SpO2% EXERCISE ON O2 @ 3LPM SpO2%   1 MINUTE  1 MINUTE  2 LPM 94   2 MINUTES  2 MINUTES 2LPM 90   3 MINUTES  3 MINUTES 2 lpm 87   4 MINUTES  4 MINUTES 3LPM  94   5 MINUTES  5 MINUTES  92   6 MINUTES  6 MINUTES 92              Distance Walked   Distance [id number]   Dyspnea (William Scale)   Dyspnea (William Scale)6   Fatigue (William Scale)   Fatigue (William Scale)  7   SpO2% Post Exercise   SpO2% Post Exercise 92   HR Post Exercise   HR Post Exercise 79   Time to Recovery   Time to Recovery     Comments:  Pt. RA sat is 88%, PT requires 2 LPM NC at rest and 3LPM NC on exertion.

## 2023-12-21 NOTE — PROGRESS NOTES
[facility]  INTERNAL MEDICINE PROGRESS NOTE    Name:  Robe Bryant   Age:  73 y.o.  Sex:  male  :  1950  MRN:  [id number]   Visit Number:  [id number]  Admission Date:  2023  Date Of Service:  23  Primary Care Physician:  Raj Sullivan MD     LOS: 2 days :  Patient Care Team:  Raj Sullivan MD as PCP - General (Internal Medicine)  Blayne Whitman MD as Consulting Physician (Ophthalmology):      Subjective / Interval History:       73-year-old patient with history of COPD on 2 L oxygen, hypertension, BPH, paroxysmal A-fib on anticoagulation, chronic diastolic heart failure, sleep apnea, diabetes, s/p pacemaker, who comes into the ER because of worsening shortness of breath.     He stated that he has been gradually getting short of breath over the last 2 to 3 days and has been coughing which has been productive occasionally.  In the ER he was worked up found to have exacerbation of COPD.  He has been more hypoxic needing 6 L oxygen to maintain his saturation compared to the baseline of 2 L.  He did not have any infiltrate so no antibiotic was given but steroids was given and a dose of diuretic because of history of chronic diastolic heart failure.  He denies any chest pain or fever.  He is being admitted further inpatient for management of his COPD exacerbation with worsening respiratory failure    Patient seen on . after admission he has been feeling some better.  He got diuresed but he is feeling better with the steroids bronchodilators.  His oxygen is 96% currently on 6 L.    Patient seen on .  He is feeling slightly better gradually over the course of last 24 hours his oxygen has been taken tapered down to 3 L and this morning his saturation is about 94%.  He has not got out of the bed and not walked.  His leg swelling seems to have improved      Vital Signs:    Temp:  [97.8 °F (36.6 °C)-98.7 °F (37.1 °C)] 98.6 °F (37 °C)  Heart Rate:   [60-62] 60  Resp:  [13-20] 18  BP: (101-133)/(59-99) 127/69    Intake and output:    I/O last 3 completed shifts:  In: 480 [P.O.:480]  Out: 2900 [Urine:2900]  No intake/output data recorded.    Physical Examination:    General Appearance:    Alert and cooperative, not in any acute distress.   Head:    Atraumatic and normocephalic, without obvious abnormality.   Eyes:            PERRLA,  No pallor. Extraocular movements are within normal limits.   Neck:   Supple,  No lymph glands, no bruit   Lungs:     Chest shape is normal.  Wheezing seems to have improved    Heart:    Normal S1 and S2, no murmur,  No JVD   Abdomen:     Normal bowel sounds, no masses, no organomegaly. Soft     nontender, no guarding, no rebound tenderness   Extremities:   Moves all extremities well, no edema, no cyanosis,    Skin:   No  bruising or rash.   Neurologic:   Grossly nonfocal and moves all extremities.      Laboratory results:  Results from last 7 days   Lab Units 12/21/23 0527 12/20/23 0722 12/19/23 2121   SODIUM mmol/L 136 140 140   POTASSIUM mmol/L 4.5 4.2 4.0   CHLORIDE mmol/L 102 104 105   CO2 mmol/L 25.8 26.1 27.1   BUN mg/dL 37* 19 17   CREATININE mg/dL 1.89* 1.39* 1.41*   CALCIUM mg/dL 9.1 9.1 9.1   BILIRUBIN mg/dL 0.3 0.8 0.9   ALK PHOS U/L 76 91 96   ALT (SGPT) U/L 9 11 13   AST (SGOT) U/L 13 15 18   GLUCOSE mg/dL 202* 174* 158*     Results from last 7 days   Lab Units 12/21/23  0527 12/20/23 0722 12/19/23 2121   WBC 10*3/mm3 12.91* 6.02 9.65   HEMOGLOBIN g/dL 10.6* 11.8* 11.6*   HEMATOCRIT % 33.4* 37.7 36.5*   PLATELETS 10*3/mm3 196 185 194         Results from last 7 days   Lab Units 12/19/23 2121   HSTROP T ng/L 20           Radiology results:    Imaging Results (Last 24 Hours)       ** No results found for the last 24 hours. **            I have reviewed the patient's radiology reports.    Medication Review:     I have reviewed the patients active and prn medications.     Assessment:      COPD exacerbation    Chronic  kidney disease, stage III (moderate)    Paroxysmal A-fib    Diastolic heart failure    Obstructive sleep apnea of adult    Diabetes mellitus    Restless leg syndrome    Osteoarthritis of multiple joints    Multilevel degenerative disc disease    Moderate episode of recurrent major depressive disorder    Morbid obesity    Panlobular emphysema    Chronic respiratory failure with hypoxia          Plan:    1.  Acute on chronic hypoxic and hypercapnic respiratory failure-will admit him on 6 L oxygen and treatment for his COPD and gradually titrated down to 3 L.  Will try to taper it down as tolerated to 2 if possible and likely discharge tomorrow     2.  COPD exacerbation-she will be started on IV steroids bronchodilators and in view of his productive cough he could have a lower respiratory infection no infiltrate noted.  Will give him Rocephin until tomorrow and then oral pills upon discharge for few more days     3.  Diastolic heart failure-he does have a history of diastolic heart failure and while in the hospital he did get IV dose of diuretic and will be held as he seems to have mild prerenal azotemia today     4.  Diabetes-the will be on steroids and so will need insulin coverage and will hold the metformin if he needs to get CT of the chest if there is no improvement     Try to encourage him to ambulate and see how much oxygen requirements he will need.  If he is coming down to his baseline he will be discharged tomorrow    Raj Sullivan MD  12/21/23  08:11 EST      Please note that portions of this note were completed with a voice recognition program.

## 2023-12-21 NOTE — CASE MANAGEMENT/SOCIAL WORK
Case Management/Social Work    Patient Name:  Robe Bryant  YOB: 1950  MRN: [id number]  Admit Date:  12/19/2023      Sw spoke to Patsy/Chencho regarding pt's home health needs.  Patsy states the last time pt was seen by [facility] was in August of this year.  Patsy states she does not see a reason why pt can not be readmitted for home health services. Will pull the referral from [facility].  Pt will need HH order at [place].        Electronically signed by:  Catalina Fermin  12/21/23 14:04 EST

## 2023-12-21 NOTE — H&P
[facility]  INTERNAL MEDICINE PROGRESS NOTE    Name:  Robe Bryant   Age:  73 y.o.  Sex:  male  :  1950  MRN:  [id number]   Visit Number:  [id number]  Admission Date:  2023  Date Of Service:  23  Primary Care Physician:  Raj Sullivan MD     LOS: 1 day :  Patient Care Team:  Raj Sullivan MD as PCP - General (Internal Medicine)  Blayne Whitman MD as Consulting Physician (Ophthalmology):      Subjective / Interval History:       73-year-old patient with history of COPD on 2 L oxygen, hypertension, BPH, paroxysmal A-fib on anticoagulation, chronic diastolic heart failure, sleep apnea, diabetes, s/p pacemaker, who comes into the ER because of worsening shortness of breath.     He stated that he has been gradually getting short of breath over the last 2 to 3 days and has been coughing which has been productive occasionally.  In the ER he was worked up found to have exacerbation of COPD.  He has been more hypoxic needing 6 L oxygen to maintain his saturation compared to the baseline of 2 L.  He did not have any infiltrate so no antibiotic was given but steroids was given and a dose of diuretic because of history of chronic diastolic heart failure.  He denies any chest pain or fever.  He is being admitted further inpatient for management of his COPD exacerbation with worsening respiratory failure    Patient seen on . after admission he has been feeling some better.  He got diuresed but he is feeling better with the steroids bronchodilators.  His oxygen is 96% currently on 6 L.      Vital Signs:    Temp:  [96.9 °F (36.1 °C)-99 °F (37.2 °C)] 98.2 °F (36.8 °C)  Heart Rate:  [60-64] 60  Resp:  [13-24] 18  BP: (101-158)/(59-99) 101/99    Intake and output:    I/O last 3 completed shifts:  In: 480 [P.O.:480]  Out: 2450 [Urine:2450]  No intake/output data recorded.    Physical Examination:    General Appearance:    Alert and cooperative, not in any acute  distress.   Head:    Atraumatic and normocephalic, without obvious abnormality.   Eyes:            PERRLA,  No pallor. Extraocular movements are within normal limits.   Neck:   Supple,  No lymph glands, no bruit   Lungs:     Chest shape is normal.  Wheezing seems to have improved    Heart:    Normal S1 and S2, no murmur,  No JVD   Abdomen:     Normal bowel sounds, no masses, no organomegaly. Soft     nontender, no guarding, no rebound tenderness   Extremities:   Moves all extremities well, no edema, no cyanosis,    Skin:   No  bruising or rash.   Neurologic:   Grossly nonfocal and moves all extremities.      Laboratory results:  Results from last 7 days   Lab Units 12/20/23  0722 12/19/23 2121   SODIUM mmol/L 140 140   POTASSIUM mmol/L 4.2 4.0   CHLORIDE mmol/L 104 105   CO2 mmol/L 26.1 27.1   BUN mg/dL 19 17   CREATININE mg/dL 1.39* 1.41*   CALCIUM mg/dL 9.1 9.1   BILIRUBIN mg/dL 0.8 0.9   ALK PHOS U/L 91 96   ALT (SGPT) U/L 11 13   AST (SGOT) U/L 15 18   GLUCOSE mg/dL 174* 158*     Results from last 7 days   Lab Units 12/20/23  0722 12/19/23 2121   WBC 10*3/mm3 6.02 9.65   HEMOGLOBIN g/dL 11.8* 11.6*   HEMATOCRIT % 37.7 36.5*   PLATELETS 10*3/mm3 185 194         Results from last 7 days   Lab Units 12/19/23  2121   HSTROP T ng/L 20           Radiology results:    Imaging Results (Last 24 Hours)       Procedure Component Value Units Date/Time    XR Chest 1 View [[id number]] Collected: 12/20/23 0738     Updated: 12/20/23 0742    Narrative:      CLINICAL INDICATION:    SOA Triage Protocol shortness of breath.     EXAMINATION TECHNIQUE:  XR CHEST 1 VW-     COMPARISON:  Radiograph 6/11/2023.     FINDINGS:  Left chest wall subclavian approach right atrioventricular cardiac pacer  in place. Stable mild cardiomegaly. Vascular engorgement, cephalization  and mild interstitial edema. Small bilateral pleural effusions.  Bibasilar opacities, likely atelectasis. No pneumothorax.       Impression:      Findings are concerning  for mild congestive heart failure/cardiogenic  pulmonary edema. Bibasilar opacities, likely atelectasis.     Images personally reviewed, interpreted and dictated by EDENILSON Cobb.              This report was signed and finalized on 12/20/2023 7:40 AM by EDENILSON Cobb.               I have reviewed the patient's radiology reports.    Medication Review:     I have reviewed the patients active and prn medications.     Assessment:      COPD exacerbation    Chronic kidney disease, stage III (moderate)    Paroxysmal A-fib    Diastolic heart failure    Obstructive sleep apnea of adult    Diabetes mellitus    Restless leg syndrome    Osteoarthritis of multiple joints    Multilevel degenerative disc disease    Moderate episode of recurrent major depressive disorder    Morbid obesity    Panlobular emphysema    Chronic respiratory failure with hypoxia          Plan:    1.  Acute on chronic hypoxic and hypercapnic respiratory failure-will admit him on 6 L oxygen and treatment for his COPD and gradually titrated down to baseline 2 prior to discharge     2.  COPD exacerbation-she will be started on IV steroids bronchodilators and in view of his productive cough he could have a lower respiratory infection no infiltrate noted.  Will give him Rocephin for 3 days     3.  Diastolic heart failure-he does have a history of diastolic heart failure and while in the hospital we will give IV diuretic instead of oral along with other medical management.  X-ray showing mild edema and will continue with the treatment as discussed with the patient and see rest as per orders     4.  Diabetes-the will be on steroids and so will need insulin coverage and will hold the metformin if he needs to get CT of the chest if there is no improvement       Raj Sullivan MD  12/20/23  19:03 EST      Please note that portions of this note were completed with a voice recognition program.

## 2023-12-22 ENCOUNTER — READMISSION MANAGEMENT (OUTPATIENT)
Dept: CALL CENTER | Facility: HOSPITAL | Age: 73
End: 2023-12-22
Payer: MEDICARE

## 2023-12-22 ENCOUNTER — APPOINTMENT (OUTPATIENT)
Dept: GENERAL RADIOLOGY | Facility: HOSPITAL | Age: 73
DRG: 190 | End: 2023-12-22
Payer: MEDICARE

## 2023-12-22 VITALS
HEIGHT: 65 IN | WEIGHT: 219.58 LBS | HEART RATE: 63 BPM | SYSTOLIC BLOOD PRESSURE: 128 MMHG | OXYGEN SATURATION: 98 % | TEMPERATURE: 97.5 F | RESPIRATION RATE: 18 BRPM | DIASTOLIC BLOOD PRESSURE: 57 MMHG | BODY MASS INDEX: 36.58 KG/M2

## 2023-12-22 LAB
ALBUMIN SERPL-MCNC: 3.4 G/DL (ref 3.5–5.2)
ALBUMIN/GLOB SERPL: 1.4 G/DL
ALP SERPL-CCNC: 75 U/L (ref 39–117)
ALT SERPL W P-5'-P-CCNC: 10 U/L (ref 1–41)
ANION GAP SERPL CALCULATED.3IONS-SCNC: 8.9 MMOL/L (ref 5–15)
AST SERPL-CCNC: 13 U/L (ref 1–40)
BASOPHILS # BLD AUTO: 0.01 10*3/MM3 (ref 0–0.2)
BASOPHILS NFR BLD AUTO: 0.1 % (ref 0–1.5)
BILIRUB SERPL-MCNC: 0.3 MG/DL (ref 0–1.2)
BUN SERPL-MCNC: 45 MG/DL (ref 8–23)
BUN/CREAT SERPL: 28 (ref 7–25)
CALCIUM SPEC-SCNC: 9 MG/DL (ref 8.6–10.5)
CHLORIDE SERPL-SCNC: 104 MMOL/L (ref 98–107)
CO2 SERPL-SCNC: 27.1 MMOL/L (ref 22–29)
CREAT SERPL-MCNC: 1.61 MG/DL (ref 0.76–1.27)
DEPRECATED RDW RBC AUTO: 48.8 FL (ref 37–54)
EGFRCR SERPLBLD CKD-EPI 2021: 44.6 ML/MIN/1.73
EOSINOPHIL # BLD AUTO: 0.01 10*3/MM3 (ref 0–0.4)
EOSINOPHIL NFR BLD AUTO: 0.1 % (ref 0.3–6.2)
ERYTHROCYTE [DISTWIDTH] IN BLOOD BY AUTOMATED COUNT: 14.2 % (ref 12.3–15.4)
GLOBULIN UR ELPH-MCNC: 2.5 GM/DL
GLUCOSE BLDC GLUCOMTR-MCNC: 298 MG/DL (ref 70–130)
GLUCOSE SERPL-MCNC: 188 MG/DL (ref 65–99)
HCT VFR BLD AUTO: 33.7 % (ref 37.5–51)
HGB BLD-MCNC: 10.8 G/DL (ref 13–17.7)
IMM GRANULOCYTES # BLD AUTO: 0.15 10*3/MM3 (ref 0–0.05)
IMM GRANULOCYTES NFR BLD AUTO: 1.3 % (ref 0–0.5)
LYMPHOCYTES # BLD AUTO: 0.61 10*3/MM3 (ref 0.7–3.1)
LYMPHOCYTES NFR BLD AUTO: 5.2 % (ref 19.6–45.3)
MCH RBC QN AUTO: 30.1 PG (ref 26.6–33)
MCHC RBC AUTO-ENTMCNC: 32 G/DL (ref 31.5–35.7)
MCV RBC AUTO: 93.9 FL (ref 79–97)
MONOCYTES # BLD AUTO: 0.35 10*3/MM3 (ref 0.1–0.9)
MONOCYTES NFR BLD AUTO: 3 % (ref 5–12)
NEUTROPHILS NFR BLD AUTO: 10.64 10*3/MM3 (ref 1.7–7)
NEUTROPHILS NFR BLD AUTO: 90.3 % (ref 42.7–76)
NRBC BLD AUTO-RTO: 0 /100 WBC (ref 0–0.2)
PLATELET # BLD AUTO: 210 10*3/MM3 (ref 140–450)
PMV BLD AUTO: 11.5 FL (ref 6–12)
POTASSIUM SERPL-SCNC: 4.5 MMOL/L (ref 3.5–5.2)
PROT SERPL-MCNC: 5.9 G/DL (ref 6–8.5)
RBC # BLD AUTO: 3.59 10*6/MM3 (ref 4.14–5.8)
SODIUM SERPL-SCNC: 140 MMOL/L (ref 136–145)
WBC NRBC COR # BLD AUTO: 11.77 10*3/MM3 (ref 3.4–10.8)

## 2023-12-22 PROCEDURE — 71046 X-RAY EXAM CHEST 2 VIEWS: CPT

## 2023-12-22 PROCEDURE — 94761 N-INVAS EAR/PLS OXIMETRY MLT: CPT

## 2023-12-22 PROCEDURE — 94664 DEMO&/EVAL PT USE INHALER: CPT

## 2023-12-22 PROCEDURE — 63710000001 INSULIN ASPART PER 5 UNITS: Performed by: INTERNAL MEDICINE

## 2023-12-22 PROCEDURE — 80053 COMPREHEN METABOLIC PANEL: CPT | Performed by: INTERNAL MEDICINE

## 2023-12-22 PROCEDURE — 85025 COMPLETE CBC W/AUTO DIFF WBC: CPT | Performed by: INTERNAL MEDICINE

## 2023-12-22 PROCEDURE — 82948 REAGENT STRIP/BLOOD GLUCOSE: CPT

## 2023-12-22 PROCEDURE — 94799 UNLISTED PULMONARY SVC/PX: CPT

## 2023-12-22 PROCEDURE — 25010000002 METHYLPREDNISOLONE PER 40 MG: Performed by: INTERNAL MEDICINE

## 2023-12-22 RX ORDER — PREDNISONE 20 MG/1
20 TABLET ORAL DAILY
Qty: 3 TABLET | Refills: 0 | Status: SHIPPED | OUTPATIENT
Start: 2023-12-22

## 2023-12-22 RX ADMIN — METHYLPREDNISOLONE SODIUM SUCCINATE 40 MG: 40 INJECTION, POWDER, FOR SOLUTION INTRAMUSCULAR; INTRAVENOUS at 08:14

## 2023-12-22 RX ADMIN — INSULIN ASPART 4 UNITS: 100 INJECTION, SOLUTION INTRAVENOUS; SUBCUTANEOUS at 08:13

## 2023-12-22 RX ADMIN — PANTOPRAZOLE SODIUM 40 MG: 40 TABLET, DELAYED RELEASE ORAL at 05:10

## 2023-12-22 RX ADMIN — AMLODIPINE BESYLATE 10 MG: 5 TABLET ORAL at 08:14

## 2023-12-22 RX ADMIN — APIXABAN 5 MG: 5 TABLET, FILM COATED ORAL at 08:13

## 2023-12-22 RX ADMIN — SPIRONOLACTONE 50 MG: 25 TABLET ORAL at 08:13

## 2023-12-22 RX ADMIN — IPRATROPIUM BROMIDE AND ALBUTEROL SULFATE 3 ML: .5; 3 SOLUTION RESPIRATORY (INHALATION) at 06:42

## 2023-12-22 RX ADMIN — LISINOPRIL 40 MG: 20 TABLET ORAL at 08:13

## 2023-12-22 RX ADMIN — AMIODARONE HYDROCHLORIDE 200 MG: 200 TABLET ORAL at 08:14

## 2023-12-22 RX ADMIN — Medication 10 ML: at 08:13

## 2023-12-22 NOTE — CASE MANAGEMENT/SOCIAL WORK
Spoke with Angela/Ja to update pt's increased O2 need. Records indicate he has a POC.    10:20 Food bag given, confirmed his daughter was bringing portable O2 and transporting him home.

## 2023-12-22 NOTE — PAYOR COMM NOTE
"To:  [employer]  From: Janel Teague RN  Phone: [phone]  Fax: [phone]  NPI: [id number]  TIN: [id number]  Member ID: [id number]   MRN: [id number]    Todd Garnett (73 y.o. Male)       Date of Birth   1950    Social Security Number       Address   [address] [address]    Home Phone   [phone]    MRN   [id number]       [place]   Orthodoxy    Marital Status                               Admission Date   23    Admission Type   Emergency    Admitting Provider   Raj Sullivan MD    Attending Provider       Department, Room/Bed   [facility] TELEMETRY 4, 423/1       Discharge Date   2023    Discharge Disposition   Home or Self Care    Discharge Destination   Home                              Attending Provider: (none)   Allergies: No Known Allergies    Isolation: None   Infection: None   Code Status: Prior    Ht: 165.1 cm (65\")   Wt: 99.6 kg (219 lb 9.3 oz)    Admission Cmt: None   Principal Problem: COPD exacerbation [J44.1]                   Active Insurance as of 2023       Primary Coverage       Payor Plan Insurance Group Employer/Plan Group    [employer] MEDICARE REPLACEMENT HU[employer] MEDICARE REPLACEMENT [id number]       Payor Plan Address Payor Plan Phone Number Payor Plan Fax Number Effective Dates    [address] [address] [phone]  3/1/2023 - None Entered    [facility] [id number]         Subscriber Name Subscriber Birth Date Member ID       TODD GARNETT 1950 [id number]                     Emergency Contacts        (Rel.) Home Phone Work Phone Mobile Phone    GARNETTBISI (Spouse) -- -- [phone]    KennedyMAICO reyez (Daughter) [phone] -- --                 Discharge Summary        Raj Sullivan MD at 23 0976              [facility]   INTERNAL MEDICINE DISCHARGE SUMMARY      Name:  Todd Garnett   Age:  73 y.o.  Sex:  male  :  1950  MRN:  [id number]   Visit Number:  " [id number]  Primary Care Physician:  Raj Sullivan MD  Date of Discharge:  12/22/2023  Admission Date:  12/19/2023      Discharge Diagnosis:         COPD exacerbation    Chronic kidney disease, stage III (moderate)    Paroxysmal A-fib    Diastolic heart failure    Obstructive sleep apnea of adult    Diabetes mellitus    Restless leg syndrome    Osteoarthritis of multiple joints    Multilevel degenerative disc disease    Moderate episode of recurrent major depressive disorder    Morbid obesity    Panlobular emphysema    Chronic respiratory failure with hypoxia          Consults:     Consults       No orders found from 11/20/2023 to 12/20/2023.            Procedures Performed:                 Hospital Course:   The patient was admitted on 12/19/2023  Please see H&P for details on admission HPI and ROS.    73-year-old patient with history of COPD on 2 L oxygen, hypertension, BPH, paroxysmal A-fib on anticoagulation, chronic diastolic heart failure, sleep apnea, diabetes, s/p pacemaker, who comes into the ER because of worsening shortness of breath.     He stated that he has been gradually getting short of breath over the last 2 to 3 days and has been coughing which has been productive occasionally.  In the ER he was worked up found to have exacerbation of COPD.  He has been more hypoxic needing 6 L oxygen to maintain his saturation compared to the baseline of 2 L.  He did not have any infiltrate so no antibiotic was given but steroids was given and a dose of diuretic because of history of chronic diastolic heart failure.  He denies any chest pain or fever.  He is being admitted further inpatient for management of his COPD exacerbation with worsening respiratory failure     Patient seen on December 20. after admission he has been feeling some better.  He got diuresed but he is feeling better with the steroids bronchodilators.  His oxygen is 96% currently on 6 L.     Patient seen on December 21.  He is feeling  slightly better gradually over the course of last 24 hours his oxygen has been taken tapered down to 3 L and this morning his saturation is about 94%.  He has not got out of the bed and not walked.  His leg swelling seems to have improved    Patient has been seen December 21.  He is feeling better hemodynamically stable and oxygenation is back to his baseline around 2 L at rest and 3 L on ambulation.  He will be discharged home on oral steroids and follow-up will be done in the office on Thursday at 11:00 next week    Vital Signs:     Temp:  [97.4 °F (36.3 °C)-98.2 °F (36.8 °C)] 97.5 °F (36.4 °C)  Heart Rate:  [60-62] 60  Resp:  [18] 18  BP: (120-131)/(56-62) 128/57    Physical Exam:     General Appearance:    Alert and cooperative, not in any acute distress.   Head:    Atraumatic and normocephalic, without obvious abnormality.   Eyes:            PERRLA,  No pallor. Extraocular movements are within normal limits.   Neck:   Supple,  No lymph glands, no bruit   Lungs:     Chest shape is normal. Breath sounds heard bilaterally equally.  No crackles or wheezing.     Heart:    Normal S1 and S2, no murmur,  No JVD   Abdomen:     Normal bowel sounds, no masses, no organomegaly. Soft     nontender, no guarding, no rebound tenderness   Extremities:   Moves all extremities well, no edema, no cyanosis,    Skin:   No  bruising or rash.   Neurologic:   Grossly nonfocal and moves all extremities.        Pertinent Lab Results:     Results from last 7 days   Lab Units 12/22/23  0511 12/21/23  0527 12/20/23  0722   SODIUM mmol/L 140 136 140   POTASSIUM mmol/L 4.5 4.5 4.2   CHLORIDE mmol/L 104 102 104   CO2 mmol/L 27.1 25.8 26.1   BUN mg/dL 45* 37* 19   CREATININE mg/dL 1.61* 1.89* 1.39*   CALCIUM mg/dL 9.0 9.1 9.1   BILIRUBIN mg/dL 0.3 0.3 0.8   ALK PHOS U/L 75 76 91   ALT (SGPT) U/L 10 9 11   AST (SGOT) U/L 13 13 15   GLUCOSE mg/dL 188* 202* 174*     Results from last 7 days   Lab Units 12/22/23  0511 12/21/23  0527 12/20/23  0722  "  WBC 10*3/mm3 11.77* 12.91* 6.02   HEMOGLOBIN g/dL 10.8* 10.6* 11.8*   HEMATOCRIT % 33.7* 33.4* 37.7   PLATELETS 10*3/mm3 210 196 185         No results found for: \"BLOODCX\", \"URINECX\", \"WOUNDCX\", \"MRSACX\"    Pertinent Radiology Results:    Imaging Results (Most Recent)       Procedure Component Value Units Date/Time    XR Chest PA & Lateral [[id number]] Collected: 12/22/23 0738     Updated: 12/22/23 0741    Narrative:      PROCEDURE: XR CHEST PA AND LATERAL-       HISTORY: Follow-up on volume overload; J44.1-Chronic obstructive  pulmonary disease with (acute) exacerbation     COMPARISON: 12/19/2023.     FINDINGS:  The lungs are clear.       There is no evidence of effusion or other pleural disease.  The  mediastinum has a normal appearance.      The cardiac silhouette is mildly enlarged but stable. Left-sided pacer  is present.       Impression:      No acute findings           This report was signed and finalized on 12/22/2023 7:39 AM by Fran Rojas MD.       XR Chest 1 View [[id number]] Collected: 12/20/23 0738     Updated: 12/20/23 0742    Narrative:      CLINICAL INDICATION:    SOA Triage Protocol shortness of breath.     EXAMINATION TECHNIQUE:  XR CHEST 1 VW-     COMPARISON:  Radiograph 6/11/2023.     FINDINGS:  Left chest wall subclavian approach right atrioventricular cardiac pacer  in place. Stable mild cardiomegaly. Vascular engorgement, cephalization  and mild interstitial edema. Small bilateral pleural effusions.  Bibasilar opacities, likely atelectasis. No pneumothorax.       Impression:      Findings are concerning for mild congestive heart failure/cardiogenic  pulmonary edema. Bibasilar opacities, likely atelectasis.     Images personally reviewed, interpreted and dictated by EDENILSON Cobb.              This report was signed and finalized on 12/20/2023 7:40 AM by EDENILSON Cobb.                       Discharge Disposition:      Home or Self Care    Discharge Medication:       "   Discharge Medications        New Medications        Instructions Start Date   predniSONE 20 MG tablet  Commonly known as: DELTASONE   20 mg, Oral, Daily             Continue These Medications        Instructions Start Date   albuterol sulfate  (90 Base) MCG/ACT inhaler  Commonly known as: PROVENTIL HFA;VENTOLIN HFA;PROAIR HFA   2 puffs, Inhalation, Every 4 Hours PRN      amiodarone 200 MG tablet  Commonly known as: PACERONE   200 mg, Oral, Every 24 Hours Scheduled      amLODIPine 10 MG tablet  Commonly known as: NORVASC   10 mg, Oral, Daily      apixaban 5 MG tablet tablet  Commonly known as: ELIQUIS   5 mg, Oral, Every 12 Hours Scheduled      atorvastatin 20 MG tablet  Commonly known as: LIPITOR   20 mg, Oral, Nightly      bumetanide 0.5 MG tablet  Commonly known as: BUMEX   0.5 mg, Oral, Daily, Stop lasix      FLUoxetine 20 MG capsule  Commonly known as: PROzac   20 mg, Oral, Daily      gabapentin 400 MG capsule  Commonly known as: NEURONTIN   400 mg, Oral, Nightly      glipizide 5 MG tablet  Commonly known as: GLUCOTROL   TAKE 1 TABLET TWICE DAILY BEFORE MEALS      glucose monitor monitoring kit   1 each, Does not apply, Daily, E11.9      HYDROcodone-acetaminophen 5-325 MG per tablet  Commonly known as: NORCO   1 tablet, Oral, Every 6 Hours PRN      ipratropium-albuterol 0.5-2.5 mg/3 ml nebulizer  Commonly known as: DUO-NEB   3 mL, Nebulization, 4 Times Daily - RT      Lancet Device misc   1 each, Does not apply, Daily, E11.9      lisinopril 40 MG tablet  Commonly known as: PRINIVIL,ZESTRIL   40 mg, Oral, Daily      metFORMIN 500 MG tablet  Commonly known as: GLUCOPHAGE   1 tablet, Oral, Every 12 Hours Scheduled      metoprolol succinate XL 50 MG 24 hr tablet  Commonly known as: Toprol XL   50 mg, Oral, Daily      Misc. Devices misc   Bipap tubing.      multivitamin tablet tablet  Commonly known as: THERAGRAN   1 tablet, Oral, Daily      Nebulizer misc   1 each, Does not apply, Every 4 Hours PRN       O2  Commonly known as: OXYGEN   2 L/min, Inhalation, Continuous PRN      omeprazole 40 MG capsule  Commonly known as: priLOSEC   40 mg, Oral, Daily      spironolactone 50 MG tablet  Commonly known as: ALDACTONE   TAKE 1 TABLET EVERY DAY      True Metrix Air Glucose Meter w/Device kit   1 each, In Vitro, 2 times daily, E11.9      TRUEplus Lancets 33G misc   1 each, Other, Daily, E11.9               Discharge Diet:             Follow-up Appointments:      Future Appointments   Date Time Provider Department Center   3/7/2024 11:30 AM Lawrence Fan III, MD [facility]     Follow-up with me will be done next Thursday at 11:00 in office    Test Results Pending at Discharge:             Raj Sullivan MD  12/22/23  09:45 EST    Time:  Discharge 34 min    Please note that portions of this note were completed with a voice recognition program.        Electronically signed by Raj Sullivan MD at 12/22/23 1421

## 2023-12-22 NOTE — DISCHARGE SUMMARY
[facility]   INTERNAL MEDICINE DISCHARGE SUMMARY      Name:  Robe Bryant   Age:  73 y.o.  Sex:  male  :  1950  MRN:  [id number]   Visit Number:  [id number]  Primary Care Physician:  Raj Sullivan MD  Date of Discharge:  2023  Admission Date:  2023      Discharge Diagnosis:         COPD exacerbation    Chronic kidney disease, stage III (moderate)    Paroxysmal A-fib    Diastolic heart failure    Obstructive sleep apnea of adult    Diabetes mellitus    Restless leg syndrome    Osteoarthritis of multiple joints    Multilevel degenerative disc disease    Moderate episode of recurrent major depressive disorder    Morbid obesity    Panlobular emphysema    Chronic respiratory failure with hypoxia          Consults:     Consults       No orders found from 2023 to 2023.            Procedures Performed:                 Hospital Course:   The patient was admitted on 2023  Please see H&P for details on admission HPI and ROS.    73-year-old patient with history of COPD on 2 L oxygen, hypertension, BPH, paroxysmal A-fib on anticoagulation, chronic diastolic heart failure, sleep apnea, diabetes, s/p pacemaker, who comes into the ER because of worsening shortness of breath.     He stated that he has been gradually getting short of breath over the last 2 to 3 days and has been coughing which has been productive occasionally.  In the ER he was worked up found to have exacerbation of COPD.  He has been more hypoxic needing 6 L oxygen to maintain his saturation compared to the baseline of 2 L.  He did not have any infiltrate so no antibiotic was given but steroids was given and a dose of diuretic because of history of chronic diastolic heart failure.  He denies any chest pain or fever.  He is being admitted further inpatient for management of his COPD exacerbation with worsening respiratory failure     Patient seen on . after admission he has been feeling  some better.  He got diuresed but he is feeling better with the steroids bronchodilators.  His oxygen is 96% currently on 6 L.     Patient seen on December 21.  He is feeling slightly better gradually over the course of last 24 hours his oxygen has been taken tapered down to 3 L and this morning his saturation is about 94%.  He has not got out of the bed and not walked.  His leg swelling seems to have improved    Patient has been seen December 21.  He is feeling better hemodynamically stable and oxygenation is back to his baseline around 2 L at rest and 3 L on ambulation.  He will be discharged home on oral steroids and follow-up will be done in the office on Thursday at 11:00 next week    Vital Signs:     Temp:  [97.4 °F (36.3 °C)-98.2 °F (36.8 °C)] 97.5 °F (36.4 °C)  Heart Rate:  [60-62] 60  Resp:  [18] 18  BP: (120-131)/(56-62) 128/57    Physical Exam:     General Appearance:    Alert and cooperative, not in any acute distress.   Head:    Atraumatic and normocephalic, without obvious abnormality.   Eyes:            PERRLA,  No pallor. Extraocular movements are within normal limits.   Neck:   Supple,  No lymph glands, no bruit   Lungs:     Chest shape is normal. Breath sounds heard bilaterally equally.  No crackles or wheezing.     Heart:    Normal S1 and S2, no murmur,  No JVD   Abdomen:     Normal bowel sounds, no masses, no organomegaly. Soft     nontender, no guarding, no rebound tenderness   Extremities:   Moves all extremities well, no edema, no cyanosis,    Skin:   No  bruising or rash.   Neurologic:   Grossly nonfocal and moves all extremities.        Pertinent Lab Results:     Results from last 7 days   Lab Units 12/22/23  0511 12/21/23  0527 12/20/23  0722   SODIUM mmol/L 140 136 140   POTASSIUM mmol/L 4.5 4.5 4.2   CHLORIDE mmol/L 104 102 104   CO2 mmol/L 27.1 25.8 26.1   BUN mg/dL 45* 37* 19   CREATININE mg/dL 1.61* 1.89* 1.39*   CALCIUM mg/dL 9.0 9.1 9.1   BILIRUBIN mg/dL 0.3 0.3 0.8   ALK PHOS U/L 75  "76 91   ALT (SGPT) U/L 10 9 11   AST (SGOT) U/L 13 13 15   GLUCOSE mg/dL 188* 202* 174*     Results from last 7 days   Lab Units 12/22/23  0511 12/21/23  0527 12/20/23  0722   WBC 10*3/mm3 11.77* 12.91* 6.02   HEMOGLOBIN g/dL 10.8* 10.6* 11.8*   HEMATOCRIT % 33.7* 33.4* 37.7   PLATELETS 10*3/mm3 210 196 185         No results found for: \"BLOODCX\", \"URINECX\", \"WOUNDCX\", \"MRSACX\"    Pertinent Radiology Results:    Imaging Results (Most Recent)       Procedure Component Value Units Date/Time    XR Chest PA & Lateral [[id number]] Collected: 12/22/23 0738     Updated: 12/22/23 0741    Narrative:      PROCEDURE: XR CHEST PA AND LATERAL-       HISTORY: Follow-up on volume overload; J44.1-Chronic obstructive  pulmonary disease with (acute) exacerbation     COMPARISON: 12/19/2023.     FINDINGS:  The lungs are clear.       There is no evidence of effusion or other pleural disease.  The  mediastinum has a normal appearance.      The cardiac silhouette is mildly enlarged but stable. Left-sided pacer  is present.       Impression:      No acute findings           This report was signed and finalized on 12/22/2023 7:39 AM by Fran Rojas MD.       XR Chest 1 View [[id number]] Collected: 12/20/23 0738     Updated: 12/20/23 0742    Narrative:      CLINICAL INDICATION:    SOA Triage Protocol shortness of breath.     EXAMINATION TECHNIQUE:  XR CHEST 1 VW-     COMPARISON:  Radiograph 6/11/2023.     FINDINGS:  Left chest wall subclavian approach right atrioventricular cardiac pacer  in place. Stable mild cardiomegaly. Vascular engorgement, cephalization  and mild interstitial edema. Small bilateral pleural effusions.  Bibasilar opacities, likely atelectasis. No pneumothorax.       Impression:      Findings are concerning for mild congestive heart failure/cardiogenic  pulmonary edema. Bibasilar opacities, likely atelectasis.     Images personally reviewed, interpreted and dictated by EDENILSON Cobb.              This report was " signed and finalized on 12/20/2023 7:40 AM by EDENILSON Cobb.                       Discharge Disposition:      Home or Self Care    Discharge Medication:         Discharge Medications        New Medications        Instructions Start Date   predniSONE 20 MG tablet  Commonly known as: DELTASONE   20 mg, Oral, Daily             Continue These Medications        Instructions Start Date   albuterol sulfate  (90 Base) MCG/ACT inhaler  Commonly known as: PROVENTIL HFA;VENTOLIN HFA;PROAIR HFA   2 puffs, Inhalation, Every 4 Hours PRN      amiodarone 200 MG tablet  Commonly known as: PACERONE   200 mg, Oral, Every 24 Hours Scheduled      amLODIPine 10 MG tablet  Commonly known as: NORVASC   10 mg, Oral, Daily      apixaban 5 MG tablet tablet  Commonly known as: ELIQUIS   5 mg, Oral, Every 12 Hours Scheduled      atorvastatin 20 MG tablet  Commonly known as: LIPITOR   20 mg, Oral, Nightly      bumetanide 0.5 MG tablet  Commonly known as: BUMEX   0.5 mg, Oral, Daily, Stop lasix      FLUoxetine 20 MG capsule  Commonly known as: PROzac   20 mg, Oral, Daily      gabapentin 400 MG capsule  Commonly known as: NEURONTIN   400 mg, Oral, Nightly      glipizide 5 MG tablet  Commonly known as: GLUCOTROL   TAKE 1 TABLET TWICE DAILY BEFORE MEALS      glucose monitor monitoring kit   1 each, Does not apply, Daily, E11.9      HYDROcodone-acetaminophen 5-325 MG per tablet  Commonly known as: NORCO   1 tablet, Oral, Every 6 Hours PRN      ipratropium-albuterol 0.5-2.5 mg/3 ml nebulizer  Commonly known as: DUO-NEB   3 mL, Nebulization, 4 Times Daily - RT      Lancet Device misc   1 each, Does not apply, Daily, E11.9      lisinopril 40 MG tablet  Commonly known as: PRINIVIL,ZESTRIL   40 mg, Oral, Daily      metFORMIN 500 MG tablet  Commonly known as: GLUCOPHAGE   1 tablet, Oral, Every 12 Hours Scheduled      metoprolol succinate XL 50 MG 24 hr tablet  Commonly known as: Toprol XL   50 mg, Oral, Daily      Misc. Devices misc    Bipap tubing.      multivitamin tablet tablet  Commonly known as: THERAGRAN   1 tablet, Oral, Daily      Nebulizer misc   1 each, Does not apply, Every 4 Hours PRN      O2  Commonly known as: OXYGEN   2 L/min, Inhalation, Continuous PRN      omeprazole 40 MG capsule  Commonly known as: priLOSEC   40 mg, Oral, Daily      spironolactone 50 MG tablet  Commonly known as: ALDACTONE   TAKE 1 TABLET EVERY DAY      True Metrix Air Glucose Meter w/Device kit   1 each, In Vitro, 2 times daily, E11.9      TRUEplus Lancets 33G misc   1 each, Other, Daily, E11.9               Discharge Diet:             Follow-up Appointments:      Future Appointments   Date Time Provider Department Center   3/7/2024 11:30 AM Lawrence Fan III, MD [facility] IRN [facility]     Follow-up with me will be done next Thursday at 11:00 in office    Test Results Pending at Discharge:             Raj Sullivan MD  12/22/23  09:45 EST    Time:  Discharge 34 min    Please note that portions of this note were completed with a voice recognition program.

## 2023-12-22 NOTE — CASE MANAGEMENT/SOCIAL WORK
Case Management Discharge Note                Selected Continued Care - Discharged on 12/22/2023 Admission date: 12/19/2023 - Discharge disposition: Home or Self Care      Destination    No services have been selected for the patient.                Durable Medical Equipment Coordination complete.      Service Provider Selected Services Address Phone Fax Patient Preferred    [facility] [employer] [facility] [employer] [address] [address] [phone] --       Internal Comment last updated by Sabrina Terry RN 12/22/2023 1104    Current supplier of O2                         Dialysis/Infusion    No services have been selected for the patient.                Home Medical Care       Service Provider Selected Services Address Phone Fax Patient Preferred    [facility] [address] [facility] [address] [phone] --       Internal Comment last updated by Sabrina Terry RN 12/22/2023 1105    HH not otdered                         Therapy    No services have been selected for the patient.                Community Resources Coordination complete.      Service Provider Selected Services Address Phone Fax Patient Preferred    [facility] PATIENTS ONLY [employer] [address] [facility] [address] [phone] -- --              Community & DME    No services have been selected for the patient.                    Transportation Services  Private: Car    Final Discharge Disposition Code: 01 - home or self-care

## 2023-12-22 NOTE — PLAN OF CARE
Goal Outcome Evaluation:  Plan of Care Reviewed With: patient        Progress: no change  Outcome Evaluation: PT LYING IN BED RESTING COMFORTABLY THIS SHIFT. PRN PAIN MEDICATION GIVEN THIS SHIFT. VSS ON 3LPM VIA NC. PT HOPEFUL TO D/C HOME LATER TODAY. WILL CONTINUE TO MONITOR.

## 2023-12-22 NOTE — PLAN OF CARE
Goal Outcome Evaluation:  Plan of Care Reviewed With: patient           Outcome Evaluation: DISCHARGE TODAY

## 2023-12-23 NOTE — OUTREACH NOTE
Prep Survey      Flowsheet Row Responses   [facility] facility patient discharged from? [facility]   Is LACE score < 7 ? No   Eligibility Readm Mgmt   Discharge diagnosis COPD exacerbation   Does the patient have one of the following disease processes/diagnoses(primary or secondary)? COPD   Does the patient have Home health ordered? No   Is there a DME ordered? No   Prep survey completed? Yes            Courtney NELSON - Registered Nurse

## 2023-12-26 ENCOUNTER — READMISSION MANAGEMENT (OUTPATIENT)
Dept: CALL CENTER | Facility: HOSPITAL | Age: 73
End: 2023-12-26
Payer: MEDICARE

## 2023-12-27 NOTE — OUTREACH NOTE
COPD/PN Week 1 Survey      Flowsheet Row Responses   [facility] facility patient discharged from? [facility]   Does the patient have one of the following disease processes/diagnoses(primary or secondary)? COPD   Week 1 attempt successful? No   Unsuccessful attempts Attempt 1            Magaly NERI - Licensed Nurse

## 2024-01-01 ENCOUNTER — HOSPITAL ENCOUNTER (EMERGENCY)
Facility: HOSPITAL | Age: 74
End: 2024-10-09
Attending: STUDENT IN AN ORGANIZED HEALTH CARE EDUCATION/TRAINING PROGRAM
Payer: MEDICARE

## 2024-01-01 VITALS — OXYGEN SATURATION: 100 % | TEMPERATURE: 97 F | DIASTOLIC BLOOD PRESSURE: 30 MMHG | SYSTOLIC BLOOD PRESSURE: 85 MMHG

## 2024-01-01 DIAGNOSIS — I46.9 CARDIAC ARREST: Primary | ICD-10-CM

## 2024-01-01 PROCEDURE — 99291 CRITICAL CARE FIRST HOUR: CPT

## 2024-01-01 PROCEDURE — 92950 HEART/LUNG RESUSCITATION CPR: CPT

## 2024-01-01 PROCEDURE — 99285 EMERGENCY DEPT VISIT HI MDM: CPT

## 2024-01-01 PROCEDURE — 25010000002 EPINEPHRINE 1 MG/10ML SOLUTION PREFILLED SYRINGE: Performed by: STUDENT IN AN ORGANIZED HEALTH CARE EDUCATION/TRAINING PROGRAM

## 2024-01-01 PROCEDURE — 25010000002 AMIODARONE PER 30 MG: Performed by: STUDENT IN AN ORGANIZED HEALTH CARE EDUCATION/TRAINING PROGRAM

## 2024-01-01 PROCEDURE — 94799 UNLISTED PULMONARY SVC/PX: CPT

## 2024-01-01 RX ORDER — AMIODARONE HYDROCHLORIDE 150 MG/3ML
INJECTION, SOLUTION INTRAVENOUS
Status: COMPLETED | OUTPATIENT
Start: 2024-01-01 | End: 2024-01-01

## 2024-01-01 RX ORDER — CALCIUM CHLORIDE 100 MG/ML
INJECTION INTRAVENOUS; INTRAVENTRICULAR
Status: COMPLETED | OUTPATIENT
Start: 2024-01-01 | End: 2024-01-01

## 2024-01-01 RX ADMIN — AMIODARONE HYDROCHLORIDE 300 MG: 50 INJECTION, SOLUTION INTRAVENOUS at 06:08

## 2024-01-01 RX ADMIN — AMIODARONE HYDROCHLORIDE 150 MG: 50 INJECTION, SOLUTION INTRAVENOUS at 06:17

## 2024-01-01 RX ADMIN — EPINEPHRINE 1 MG: 0.1 INJECTION INTRAVENOUS at 06:09

## 2024-01-01 RX ADMIN — EPINEPHRINE 1 MG: 0.1 INJECTION INTRAVENOUS at 06:12

## 2024-01-01 RX ADMIN — CALCIUM CHLORIDE 1 G: 100 INJECTION, SOLUTION INTRAVENOUS; INTRAVENTRICULAR at 06:13

## 2024-01-01 RX ADMIN — EPINEPHRINE 1 MG: 0.1 INJECTION INTRAVENOUS at 06:19

## 2024-01-01 RX ADMIN — SODIUM BICARBONATE 50 MEQ: 84 INJECTION, SOLUTION INTRAVENOUS at 06:11

## 2024-01-03 ENCOUNTER — APPOINTMENT (OUTPATIENT)
Dept: GENERAL RADIOLOGY | Facility: HOSPITAL | Age: 74
End: 2024-01-03
Payer: MEDICARE

## 2024-01-03 ENCOUNTER — HOSPITAL ENCOUNTER (EMERGENCY)
Facility: HOSPITAL | Age: 74
Discharge: HOME | End: 2024-01-03
Attending: STUDENT IN AN ORGANIZED HEALTH CARE EDUCATION/TRAINING PROGRAM | Admitting: EMERGENCY MEDICINE
Payer: MEDICARE

## 2024-01-03 VITALS
HEART RATE: 75 BPM | RESPIRATION RATE: 18 BRPM | TEMPERATURE: 97.9 F | OXYGEN SATURATION: 95 % | SYSTOLIC BLOOD PRESSURE: 115 MMHG | DIASTOLIC BLOOD PRESSURE: 60 MMHG

## 2024-01-03 DIAGNOSIS — I50.9 ACUTE CONGESTIVE HEART FAILURE, UNSPECIFIED HEART FAILURE TYPE: Primary | ICD-10-CM

## 2024-01-03 LAB
ALBUMIN SERPL-MCNC: 3.7 G/DL (ref 3.5–5.2)
ALBUMIN/GLOB SERPL: 1.8 G/DL
ALP SERPL-CCNC: 90 U/L (ref 39–117)
ALT SERPL W P-5'-P-CCNC: 14 U/L (ref 1–41)
ANION GAP SERPL CALCULATED.3IONS-SCNC: 8.1 MMOL/L (ref 5–15)
AST SERPL-CCNC: 20 U/L (ref 1–40)
BASOPHILS # BLD AUTO: 0.04 10*3/MM3 (ref 0–0.2)
BASOPHILS NFR BLD AUTO: 0.5 % (ref 0–1.5)
BILIRUB SERPL-MCNC: 0.6 MG/DL (ref 0–1.2)
BUN SERPL-MCNC: 29 MG/DL (ref 8–23)
BUN/CREAT SERPL: 14 (ref 7–25)
CALCIUM SPEC-SCNC: 8.5 MG/DL (ref 8.6–10.5)
CHLORIDE SERPL-SCNC: 106 MMOL/L (ref 98–107)
CO2 SERPL-SCNC: 26.9 MMOL/L (ref 22–29)
CREAT SERPL-MCNC: 2.07 MG/DL (ref 0.76–1.27)
CRP SERPL-MCNC: 1.1 MG/DL (ref 0–0.5)
DEPRECATED RDW RBC AUTO: 48 FL (ref 37–54)
EGFRCR SERPLBLD CKD-EPI 2021: 33 ML/MIN/1.73
EOSINOPHIL # BLD AUTO: 0.16 10*3/MM3 (ref 0–0.4)
EOSINOPHIL NFR BLD AUTO: 2 % (ref 0.3–6.2)
ERYTHROCYTE [DISTWIDTH] IN BLOOD BY AUTOMATED COUNT: 14.3 % (ref 12.3–15.4)
GLOBULIN UR ELPH-MCNC: 2.1 GM/DL
GLUCOSE SERPL-MCNC: 122 MG/DL (ref 65–99)
HCT VFR BLD AUTO: 32.8 % (ref 37.5–51)
HGB BLD-MCNC: 10.5 G/DL (ref 13–17.7)
HOLD SPECIMEN: NORMAL
HOLD SPECIMEN: NORMAL
IMM GRANULOCYTES # BLD AUTO: 0.02 10*3/MM3 (ref 0–0.05)
IMM GRANULOCYTES NFR BLD AUTO: 0.2 % (ref 0–0.5)
LYMPHOCYTES # BLD AUTO: 1.49 10*3/MM3 (ref 0.7–3.1)
LYMPHOCYTES NFR BLD AUTO: 18.4 % (ref 19.6–45.3)
MCH RBC QN AUTO: 29.9 PG (ref 26.6–33)
MCHC RBC AUTO-ENTMCNC: 32 G/DL (ref 31.5–35.7)
MCV RBC AUTO: 93.4 FL (ref 79–97)
MONOCYTES # BLD AUTO: 0.75 10*3/MM3 (ref 0.1–0.9)
MONOCYTES NFR BLD AUTO: 9.3 % (ref 5–12)
NEUTROPHILS NFR BLD AUTO: 5.64 10*3/MM3 (ref 1.7–7)
NEUTROPHILS NFR BLD AUTO: 69.6 % (ref 42.7–76)
NRBC BLD AUTO-RTO: 0 /100 WBC (ref 0–0.2)
NT-PROBNP SERPL-MCNC: 2017 PG/ML (ref 0–900)
PLATELET # BLD AUTO: 188 10*3/MM3 (ref 140–450)
PMV BLD AUTO: 10.5 FL (ref 6–12)
POTASSIUM SERPL-SCNC: 4.3 MMOL/L (ref 3.5–5.2)
PROCALCITONIN SERPL-MCNC: 0.07 NG/ML (ref 0–0.25)
PROT SERPL-MCNC: 5.8 G/DL (ref 6–8.5)
RBC # BLD AUTO: 3.51 10*6/MM3 (ref 4.14–5.8)
SODIUM SERPL-SCNC: 141 MMOL/L (ref 136–145)
TROPONIN T SERPL HS-MCNC: 30 NG/L
TROPONIN T SERPL HS-MCNC: 31 NG/L
WBC NRBC COR # BLD AUTO: 8.1 10*3/MM3 (ref 3.4–10.8)
WHOLE BLOOD HOLD COAG: NORMAL
WHOLE BLOOD HOLD SPECIMEN: NORMAL

## 2024-01-03 PROCEDURE — 80053 COMPREHEN METABOLIC PANEL: CPT | Performed by: STUDENT IN AN ORGANIZED HEALTH CARE EDUCATION/TRAINING PROGRAM

## 2024-01-03 PROCEDURE — 84484 ASSAY OF TROPONIN QUANT: CPT | Performed by: STUDENT IN AN ORGANIZED HEALTH CARE EDUCATION/TRAINING PROGRAM

## 2024-01-03 PROCEDURE — 36415 COLL VENOUS BLD VENIPUNCTURE: CPT

## 2024-01-03 PROCEDURE — 85025 COMPLETE CBC W/AUTO DIFF WBC: CPT | Performed by: STUDENT IN AN ORGANIZED HEALTH CARE EDUCATION/TRAINING PROGRAM

## 2024-01-03 PROCEDURE — 86140 C-REACTIVE PROTEIN: CPT | Performed by: STUDENT IN AN ORGANIZED HEALTH CARE EDUCATION/TRAINING PROGRAM

## 2024-01-03 PROCEDURE — 99284 EMERGENCY DEPT VISIT MOD MDM: CPT

## 2024-01-03 PROCEDURE — 25010000002 FUROSEMIDE PER 20 MG: Performed by: STUDENT IN AN ORGANIZED HEALTH CARE EDUCATION/TRAINING PROGRAM

## 2024-01-03 PROCEDURE — 93005 ELECTROCARDIOGRAM TRACING: CPT | Performed by: STUDENT IN AN ORGANIZED HEALTH CARE EDUCATION/TRAINING PROGRAM

## 2024-01-03 PROCEDURE — 84145 PROCALCITONIN (PCT): CPT | Performed by: STUDENT IN AN ORGANIZED HEALTH CARE EDUCATION/TRAINING PROGRAM

## 2024-01-03 PROCEDURE — 71045 X-RAY EXAM CHEST 1 VIEW: CPT

## 2024-01-03 PROCEDURE — 96374 THER/PROPH/DIAG INJ IV PUSH: CPT

## 2024-01-03 PROCEDURE — 83880 ASSAY OF NATRIURETIC PEPTIDE: CPT | Performed by: STUDENT IN AN ORGANIZED HEALTH CARE EDUCATION/TRAINING PROGRAM

## 2024-01-03 RX ORDER — FUROSEMIDE 10 MG/ML
80 INJECTION INTRAMUSCULAR; INTRAVENOUS ONCE
Status: COMPLETED | OUTPATIENT
Start: 2024-01-03 | End: 2024-01-03

## 2024-01-03 RX ORDER — SODIUM CHLORIDE 0.9 % (FLUSH) 0.9 %
10 SYRINGE (ML) INJECTION AS NEEDED
Status: DISCONTINUED | OUTPATIENT
Start: 2024-01-03 | End: 2024-01-03 | Stop reason: HOSPADM

## 2024-01-03 RX ADMIN — FUROSEMIDE 80 MG: 10 INJECTION, SOLUTION INTRAMUSCULAR; INTRAVENOUS at 17:45

## 2024-01-03 NOTE — ED PROVIDER NOTES
Subjective:  History of Present Illness:    Patient is a 73-year-old male with history of diabetes, CAD, heart failure on Bumex at home, diabetes mellitus, COPD on 3 L nasal cannula chronically, hypertension, hyperlipidemia, obesity, sleep apnea, CVA presents today with shortness of breath.  Family bedside reports that he has become increasingly orthopneic, dyspneic.  Has had increasing pedal edema.  Called their primary care doctor today who advised him come to the emergency department for evaluation.  Saturating well on home O2.  Does have 2+ pitting edema bilateral.  No fevers at home.  Denies any congestion.      Nurses Notes reviewed and agree, including vitals, allergies, social history and prior medical history.     REVIEW OF SYSTEMS: All systems reviewed and not pertinent unless noted.  Review of Systems   Constitutional:  Positive for activity change. Negative for appetite change, chills, fatigue and fever.   HENT:  Negative for congestion, sinus pressure, sneezing and trouble swallowing.    Eyes:  Negative for discharge and itching.   Respiratory:  Positive for cough and shortness of breath.    Cardiovascular:  Positive for leg swelling. Negative for chest pain and palpitations.   Gastrointestinal:  Negative for abdominal distention and abdominal pain.   Endocrine: Negative for cold intolerance and heat intolerance.   Genitourinary:  Negative for decreased urine volume, dysuria and urgency.   Musculoskeletal:  Negative for gait problem, neck pain and neck stiffness.   Skin:  Negative for color change and rash.   Allergic/Immunologic: Negative for immunocompromised state.   Neurological:  Negative for facial asymmetry and headaches.   Hematological:  Negative for adenopathy.   Psychiatric/Behavioral:  Negative for self-injury and suicidal ideas.        Past Medical History:   Diagnosis Date    Anxiety and depression     Arthritis     Backache     20 years    Benign prostatic hyperplasia     Bladder trauma      Cervicalgia     Chronic kidney disease     Cr up to 2.1    Coronary artery disease     Diabetes mellitus     15 years--    Emphysema of lung     Erectile dysfunction     Eye exam, routine     FH: colonic polyps     Gout     for 10 years--    History of echocardiogram 09/15/2014    History of tobacco use     with cessation x7 years    Hyperlipidemia     Hypertension     Impaired functional mobility, balance, gait, and endurance     Migraine     Myocardial infarction     h/o coronary artery stenting--    Prostate cancer     Restless leg syndrome     20 years    Sleep apnea     12 years; uses a Bi-Pap    Stroke     Syncope 2017    Warfarin anticoagulation 2016       Allergies:    Patient has no known allergies.      Past Surgical History:   Procedure Laterality Date    BLADDER SURGERY      CARDIAC CATHETERIZATION  03/15/2013    revealing widely patent stents of the left circumflex and ramus intermedius coronary arteries, no significant disease is seen in any territory    CARDIAC CATHETERIZATION Left 2019    Procedure: Left Heart Cath;  Surgeon: Arelis Tucker MD;  Location:  [facility];  Service: Cardiology    CARDIAC ELECTROPHYSIOLOGY PROCEDURE N/A 5/10/2021    Procedure: PPM battery change;  Surgeon: Arelis Tucker MD;  Location:  [facility];  Service: Cardiology;  Laterality: N/A;    CARDIAC PACEMAKER PLACEMENT      CATARACT EXTRACTION      COLONOSCOPY      No family history of colon cancer.      COLONOSCOPY      HERNIA REPAIR      Type unknown    PACEMAKER IMPLANTATION      PROSTATE SURGERY           Social History     Socioeconomic History    Marital status:    Tobacco Use    Smoking status: Former     Packs/day: 1.00     Years: 30.00     Additional pack years: 0.00     Total pack years: 30.00     Types: Cigarettes     Quit date: 8/15/2001     Years since quittin.4    Smokeless tobacco: Never    Tobacco comments:      quit 16 years ago   Vaping Use    Vaping Use: Never used   Substance and Sexual Activity    Alcohol use: No    Drug use: No    Sexual activity: Defer         Family History   Problem Relation Age of Onset    Cancer Mother     Diabetes Mother     Hypertension Mother     Stroke Mother     Stomach cancer Mother     Heart disease Mother     Stomach cancer Father     Cancer Father     Lung cancer Father        Objective  Physical Exam:  /72   Pulse 60   Temp 97.8 °F (36.6 °C) (Oral)   Resp 20   SpO2 95%      Physical Exam  Constitutional:       General: He is not in acute distress.     Appearance: Normal appearance. He is normal weight. He is not ill-appearing.   HENT:      Head: Normocephalic and atraumatic.      Nose: Nose normal. No congestion or rhinorrhea.      Mouth/Throat:      Mouth: Mucous membranes are moist.      Pharynx: Oropharynx is clear.   Eyes:      Extraocular Movements: Extraocular movements intact.      Conjunctiva/sclera: Conjunctivae normal.      Pupils: Pupils are equal, round, and reactive to light.   Cardiovascular:      Rate and Rhythm: Normal rate and regular rhythm.      Pulses: Normal pulses.   Pulmonary:      Effort: Pulmonary effort is normal. No tachypnea, accessory muscle usage or respiratory distress.      Breath sounds: Normal breath sounds.   Abdominal:      General: Abdomen is flat. Bowel sounds are normal. There is no distension.      Palpations: Abdomen is soft.      Tenderness: There is no abdominal tenderness.   Musculoskeletal:         General: No swelling or tenderness. Normal range of motion.      Cervical back: Normal range of motion and neck supple. No rigidity or tenderness.      Right lower leg: Edema present.      Left lower leg: Edema present.      Comments: 2+ pitting edema bilateral   Skin:     General: Skin is warm and dry.      Capillary Refill: Capillary refill takes less than 2 seconds.   Neurological:      General: No focal deficit present.       Mental Status: He is alert and oriented to person, place, and time. Mental status is at baseline.      Cranial Nerves: No cranial nerve deficit.      Sensory: No sensory deficit.      Motor: No weakness.   Psychiatric:         Mood and Affect: Mood normal.         Behavior: Behavior normal.         Thought Content: Thought content normal.         Judgment: Judgment normal.         Procedures    ED Course:    ED Course as of 01/03/24 2122 Wed Jan 03, 2024 1727 EKG interpreted by me, atrial paced rhythm with no concerning ST changes noted, rate of 60, abnormal EKG, left axis deviation [JE]      ED Course User Index  [JE] Juan Antonio Flores MD       Lab Results (last 24 hours)       Procedure Component Value Units Date/Time    CBC & Differential [[id number]]  (Abnormal) Collected: 01/03/24 1713    Specimen: Blood Updated: 01/03/24 1720    Narrative:      The following orders were created for panel order CBC & Differential.  Procedure                               Abnormality         Status                     ---------                               -----------         ------                     CBC Auto Differential[[id number]]        Abnormal            Final result                 Please view results for these tests on the individual orders.    Comprehensive Metabolic Panel [[id number]]  (Abnormal) Collected: 01/03/24 1713    Specimen: Blood Updated: 01/03/24 1736     Glucose 122 mg/dL      BUN 29 mg/dL      Creatinine 2.07 mg/dL      Sodium 141 mmol/L      Potassium 4.3 mmol/L      Comment: Slight hemolysis detected by analyzer. Result may be falsely elevated.        Chloride 106 mmol/L      CO2 26.9 mmol/L      Calcium 8.5 mg/dL      Total Protein 5.8 g/dL      Albumin 3.7 g/dL      ALT (SGPT) 14 U/L      AST (SGOT) 20 U/L      Alkaline Phosphatase 90 U/L      Total Bilirubin 0.6 mg/dL      Globulin 2.1 gm/dL      A/G Ratio 1.8 g/dL      BUN/Creatinine Ratio 14.0     Anion Gap 8.1 mmol/L      eGFR 33.0  mL/min/1.73     Narrative:      GFR Normal >60  Chronic Kidney Disease <60  Kidney Failure <15    The GFR formula is only valid for adults with stable renal function between ages 18 and 70.    BNP [[id number]]  (Abnormal) Collected: 01/03/24 1713    Specimen: Blood Updated: 01/03/24 1741     proBNP 2,017.0 pg/mL     Narrative:      This assay is used as an aid in the diagnosis of individuals suspected of having heart failure. It can be used as an aid in the diagnosis of acute decompensated heart failure (ADHF) in patients presenting with signs and symptoms of ADHF to the emergency department (ED). In addition, NT-proBNP of <300 pg/mL indicates ADHF is not likely.    Age Range Result Interpretation  NT-proBNP Concentration (pg/mL:      <50             Positive            >450                   Gray                 300-450                    Negative             <300    50-75           Positive            >900                  Gray                300-900                  Negative            <300      >75             Positive            >1800                  Gray                300-1800                  Negative            <300    Single High Sensitivity Troponin T [[id number]]  (Abnormal) Collected: 01/03/24 1713    Specimen: Blood Updated: 01/03/24 1739     HS Troponin T 31 ng/L     Narrative:      High Sensitive Troponin T Reference Range:  <14.0 ng/L- Negative Female for AMI  <22.0 ng/L- Negative Male for AMI  >=14 - Abnormal Female indicating possible myocardial injury.  >=22 - Abnormal Male indicating possible myocardial injury.   Clinicians would have to utilize clinical acumen, EKG, Troponin, and serial changes to determine if it is an Acute Myocardial Infarction or myocardial injury due to an underlying chronic condition.         CBC Auto Differential [[id number]]  (Abnormal) Collected: 01/03/24 1713    Specimen: Blood Updated: 01/03/24 1720     WBC 8.10 10*3/mm3      RBC 3.51 10*6/mm3      Hemoglobin 10.5 g/dL   "    Hematocrit 32.8 %      MCV 93.4 fL      MCH 29.9 pg      MCHC 32.0 g/dL      RDW 14.3 %      RDW-SD 48.0 fl      MPV 10.5 fL      Platelets 188 10*3/mm3      Neutrophil % 69.6 %      Lymphocyte % 18.4 %      Monocyte % 9.3 %      Eosinophil % 2.0 %      Basophil % 0.5 %      Immature Grans % 0.2 %      Neutrophils, Absolute 5.64 10*3/mm3      Lymphocytes, Absolute 1.49 10*3/mm3      Monocytes, Absolute 0.75 10*3/mm3      Eosinophils, Absolute 0.16 10*3/mm3      Basophils, Absolute 0.04 10*3/mm3      Immature Grans, Absolute 0.02 10*3/mm3      nRBC 0.0 /100 WBC     C-reactive Protein [[id number]]  (Abnormal) Collected: 01/03/24 1713    Specimen: Blood Updated: 01/03/24 1736     C-Reactive Protein 1.10 mg/dL     Procalcitonin [[id number]]  (Normal) Collected: 01/03/24 1713    Specimen: Blood Updated: 01/03/24 1822     Procalcitonin 0.07 ng/mL     Narrative:      As a Marker for Sepsis (Non-Neonates):    1. <0.5 ng/mL represents a low risk of severe sepsis and/or septic shock.  2. >2 ng/mL represents a high risk of severe sepsis and/or septic shock.    As a Marker for Lower Respiratory Tract Infections that require antibiotic therapy:    PCT on Admission    Antibiotic Therapy       6-12 Hrs later    >0.5                Strongly Recommended  >0.25 - <0.5        Recommended   0.1 - 0.25          Discouraged              Remeasure/reassess PCT  <0.1                Strongly Discouraged     Remeasure/reassess PCT    As 28 day mortality risk marker: \"Change in Procalcitonin Result\" (>80% or <=80%) if Day 0 (or Day 1) and Day 4 values are available. Refer to http://www.[email or web address]-pct-calculator.com    Change in PCT <=80%  A decrease of PCT levels below or equal to 80% defines a positive change in PCT test result representing a higher risk for 28-day all-cause mortality of patients diagnosed with severe sepsis for septic shock.    Change in PCT >80%  A decrease of PCT levels of more than 80% defines a negative change in PCT " result representing a lower risk for 28-day all-cause mortality of patients diagnosed with severe sepsis or septic shock.       High Sensitivity Troponin T [[id number]]  (Abnormal) Collected: 01/03/24 2026    Specimen: Blood Updated: 01/03/24 2049     HS Troponin T 30 ng/L     Narrative:      High Sensitive Troponin T Reference Range:  <14.0 ng/L- Negative Female for AMI  <22.0 ng/L- Negative Male for AMI  >=14 - Abnormal Female indicating possible myocardial injury.  >=22 - Abnormal Male indicating possible myocardial injury.   Clinicians would have to utilize clinical acumen, EKG, Troponin, and serial changes to determine if it is an Acute Myocardial Infarction or myocardial injury due to an underlying chronic condition.                  No radiology results from the last 24 hrs       MDM     Amount and/or Complexity of Data Reviewed  Decide to obtain previous medical records or to obtain history from someone other than the patient: yes  Independent visualization of images, tracings, or specimens: yes        Initial impression of presenting illness: Shortness of breath    DDX: includes but is not limited to: CHF exacerbation, COPD exacerbation, viral URI, bacterial pneumonia    Patient arrives stable with vitals interpreted by myself.     Pertinent features from physical exam: Clear to auscultation, nontender to abdominal palpation, regular rate and rhythm, 2+ pitting edema bilateral.    Initial diagnostic plan: CBC, CMP, troponin, BNP, ECG, chest x-ray, CRP, Pro-Royce    Results from initial plan were reviewed and interpreted by me revealing patient with elevated BNP concerning for worsening fluid status, chest x-ray clear, no concern for pneumonia    Diagnostic information from other sources: Discussed wife at bedside and reviewed past medical records    Interventions / Re-evaluation: Given concerns for CHF exacerbation given IV Lasix, diuresing well.    Results/clinical rationale were discussed with patient and  wife at bedside    Consultations/Discussion of results with other physicians: Dr. Sullivan, recommended discharge home with close outpatient follow-up.    Disposition plan: Home, patient and family happy with plan.  -----        Final diagnoses:   Acute congestive heart failure, unspecified heart failure type          Ronny Cardona MD  01/03/24 2881

## 2024-01-04 ENCOUNTER — READMISSION MANAGEMENT (OUTPATIENT)
Dept: CALL CENTER | Facility: HOSPITAL | Age: 74
End: 2024-01-04
Payer: MEDICARE

## 2024-01-04 NOTE — OUTREACH NOTE
COPD/PN Week 2 Survey      Flowsheet Row Responses   [facility] patient discharged from? [facility]   Does the patient have one of the following disease processes/diagnoses(primary or secondary)? COPD   Week 2 attempt successful? Yes   Call start time 1344   Call end time 1345   Discharge diagnosis COPD exacerbation   Person spoke with today (if not patient) and relationship patient   Meds reviewed with patient/caregiver? Yes   Prescription comments no concerns or questions noted.   Does the patient have a primary care provider?  Yes   Comments regarding PCP Seeing pcp tomorrow   Has home health visited the patient within 72 hours of discharge? N/A   Psychosocial issues? No   Did the patient receive a copy of their discharge instructions? Yes   Nursing interventions Reviewed instructions with patient   What is the patient's perception of their health status since discharge? Improving   Is the patient/caregiver able to teach back the hierarchy of who to call/visit for symptoms/problems? PCP, Specialist, Home health nurse, Urgent Care, ED, 911 Yes   Is the patient able to teach back COPD zones? Yes   Patient reports what zone on this call? Green Zone   Green Zone Reports doing well   Green Zone interventions: Take daily medications   Yellow Zone More breathless than usual   Week 2 call completed? Yes   Wrap up additional comments Patient reports doing okay- More breathless than usual but overall doing okay- he feels better once he put his 02 on. no concerns or questions noted.   Call end time 1345            Shruti SCHOFIELD - Registered Nurse

## 2024-01-10 ENCOUNTER — TELEPHONE (OUTPATIENT)
Dept: CARDIOLOGY | Facility: CLINIC | Age: 74
End: 2024-01-10
Payer: MEDICARE

## 2024-02-07 RX ORDER — APIXABAN 5 MG/1
5 TABLET, FILM COATED ORAL EVERY 12 HOURS
Qty: 180 TABLET | Refills: 3 | Status: SHIPPED | OUTPATIENT
Start: 2024-02-07

## 2024-02-08 ENCOUNTER — APPOINTMENT (OUTPATIENT)
Dept: GENERAL RADIOLOGY | Facility: HOSPITAL | Age: 74
DRG: 291 | End: 2024-02-08
Payer: MEDICARE

## 2024-02-08 ENCOUNTER — HOSPITAL ENCOUNTER (INPATIENT)
Facility: HOSPITAL | Age: 74
LOS: 3 days | Discharge: HOME | DRG: 291 | End: 2024-02-11
Attending: EMERGENCY MEDICINE | Admitting: STUDENT IN AN ORGANIZED HEALTH CARE EDUCATION/TRAINING PROGRAM
Payer: MEDICARE

## 2024-02-08 ENCOUNTER — APPOINTMENT (OUTPATIENT)
Dept: CT IMAGING | Facility: HOSPITAL | Age: 74
DRG: 291 | End: 2024-02-08
Payer: MEDICARE

## 2024-02-08 DIAGNOSIS — I5A MYOCARDIAL INJURY: ICD-10-CM

## 2024-02-08 DIAGNOSIS — J96.21 ACUTE ON CHRONIC HYPOXIC RESPIRATORY FAILURE: Primary | ICD-10-CM

## 2024-02-08 PROBLEM — J96.01 ACUTE HYPOXIC RESPIRATORY FAILURE: Status: ACTIVE | Noted: 2024-02-08

## 2024-02-08 LAB
A-A DO2: 30.1 MMHG
ALBUMIN SERPL-MCNC: 4 G/DL (ref 3.5–5.2)
ALBUMIN/GLOB SERPL: 1.7 G/DL
ALP SERPL-CCNC: 129 U/L (ref 39–117)
ALT SERPL W P-5'-P-CCNC: 15 U/L (ref 1–41)
ANION GAP SERPL CALCULATED.3IONS-SCNC: 10.7 MMOL/L (ref 5–15)
ANION GAP SERPL CALCULATED.3IONS-SCNC: 8.8 MMOL/L (ref 5–15)
ARTERIAL PATENCY WRIST A: POSITIVE
AST SERPL-CCNC: 22 U/L (ref 1–40)
ATMOSPHERIC PRESS: 737 MMHG
BASE EXCESS BLDA CALC-SCNC: 2.7 MMOL/L (ref 0–2)
BASOPHILS # BLD AUTO: 0.05 10*3/MM3 (ref 0–0.2)
BASOPHILS # BLD AUTO: 0.05 10*3/MM3 (ref 0–0.2)
BASOPHILS NFR BLD AUTO: 0.5 % (ref 0–1.5)
BASOPHILS NFR BLD AUTO: 0.5 % (ref 0–1.5)
BDY SITE: ABNORMAL
BILIRUB SERPL-MCNC: 0.8 MG/DL (ref 0–1.2)
BILIRUB UR QL STRIP: NEGATIVE
BUN SERPL-MCNC: 24 MG/DL (ref 8–23)
BUN SERPL-MCNC: 27 MG/DL (ref 8–23)
BUN/CREAT SERPL: 10.8 (ref 7–25)
BUN/CREAT SERPL: 13.4 (ref 7–25)
CALCIUM SPEC-SCNC: 8.2 MG/DL (ref 8.6–10.5)
CALCIUM SPEC-SCNC: 8.4 MG/DL (ref 8.6–10.5)
CHLORIDE SERPL-SCNC: 101 MMOL/L (ref 98–107)
CHLORIDE SERPL-SCNC: 102 MMOL/L (ref 98–107)
CLARITY UR: CLEAR
CO2 SERPL-SCNC: 27.3 MMOL/L (ref 22–29)
CO2 SERPL-SCNC: 29.2 MMOL/L (ref 22–29)
COHGB MFR BLD: 2.2 % (ref 0–2)
COLOR UR: YELLOW
CREAT SERPL-MCNC: 2.01 MG/DL (ref 0.76–1.27)
CREAT SERPL-MCNC: 2.22 MG/DL (ref 0.76–1.27)
D-LACTATE SERPL-SCNC: 1.3 MMOL/L (ref 0.5–2)
DEPRECATED RDW RBC AUTO: 48.1 FL (ref 37–54)
DEPRECATED RDW RBC AUTO: 48.3 FL (ref 37–54)
EGFRCR SERPLBLD CKD-EPI 2021: 30.3 ML/MIN/1.73
EGFRCR SERPLBLD CKD-EPI 2021: 34.2 ML/MIN/1.73
EOSINOPHIL # BLD AUTO: 0 10*3/MM3 (ref 0–0.4)
EOSINOPHIL # BLD AUTO: 0.09 10*3/MM3 (ref 0–0.4)
EOSINOPHIL NFR BLD AUTO: 0 % (ref 0.3–6.2)
EOSINOPHIL NFR BLD AUTO: 0.8 % (ref 0.3–6.2)
ERYTHROCYTE [DISTWIDTH] IN BLOOD BY AUTOMATED COUNT: 13.7 % (ref 12.3–15.4)
ERYTHROCYTE [DISTWIDTH] IN BLOOD BY AUTOMATED COUNT: 13.7 % (ref 12.3–15.4)
FLUAV RNA RESP QL NAA+PROBE: NOT DETECTED
FLUBV RNA RESP QL NAA+PROBE: NOT DETECTED
GAS FLOW AIRWAY: 6 LPM
GLOBULIN UR ELPH-MCNC: 2.4 GM/DL
GLUCOSE BLDC GLUCOMTR-MCNC: 194 MG/DL (ref 70–130)
GLUCOSE BLDC GLUCOMTR-MCNC: 260 MG/DL (ref 70–130)
GLUCOSE BLDC GLUCOMTR-MCNC: 262 MG/DL (ref 70–130)
GLUCOSE SERPL-MCNC: 188 MG/DL (ref 65–99)
GLUCOSE SERPL-MCNC: 200 MG/DL (ref 65–99)
GLUCOSE UR STRIP-MCNC: NEGATIVE MG/DL
HCO3 BLDA-SCNC: 29.1 MMOL/L (ref 22–28)
HCT VFR BLD AUTO: 33.6 % (ref 37.5–51)
HCT VFR BLD AUTO: 34.5 % (ref 37.5–51)
HCT VFR BLD CALC: 32.7 %
HGB BLD-MCNC: 10.4 G/DL (ref 13–17.7)
HGB BLD-MCNC: 10.8 G/DL (ref 13–17.7)
HGB UR QL STRIP.AUTO: NEGATIVE
HOLD SPECIMEN: NORMAL
IMM GRANULOCYTES # BLD AUTO: 0.08 10*3/MM3 (ref 0–0.05)
IMM GRANULOCYTES # BLD AUTO: 0.09 10*3/MM3 (ref 0–0.05)
IMM GRANULOCYTES NFR BLD AUTO: 0.8 % (ref 0–0.5)
IMM GRANULOCYTES NFR BLD AUTO: 0.8 % (ref 0–0.5)
KETONES UR QL STRIP: NEGATIVE
LEUKOCYTE ESTERASE UR QL STRIP.AUTO: NEGATIVE
LYMPHOCYTES # BLD AUTO: 0.41 10*3/MM3 (ref 0.7–3.1)
LYMPHOCYTES # BLD AUTO: 0.73 10*3/MM3 (ref 0.7–3.1)
LYMPHOCYTES NFR BLD AUTO: 4.3 % (ref 19.6–45.3)
LYMPHOCYTES NFR BLD AUTO: 6.8 % (ref 19.6–45.3)
Lab: ABNORMAL
MCH RBC QN AUTO: 29.5 PG (ref 26.6–33)
MCH RBC QN AUTO: 29.8 PG (ref 26.6–33)
MCHC RBC AUTO-ENTMCNC: 31 G/DL (ref 31.5–35.7)
MCHC RBC AUTO-ENTMCNC: 31.3 G/DL (ref 31.5–35.7)
MCV RBC AUTO: 95 FL (ref 79–97)
MCV RBC AUTO: 95.2 FL (ref 79–97)
METHGB BLD QL: 0.3 % (ref 0–1.5)
MODALITY: ABNORMAL
MONOCYTES # BLD AUTO: 0.4 10*3/MM3 (ref 0.1–0.9)
MONOCYTES # BLD AUTO: 1 10*3/MM3 (ref 0.1–0.9)
MONOCYTES NFR BLD AUTO: 4.2 % (ref 5–12)
MONOCYTES NFR BLD AUTO: 9.3 % (ref 5–12)
NEUTROPHILS NFR BLD AUTO: 8.68 10*3/MM3 (ref 1.7–7)
NEUTROPHILS NFR BLD AUTO: 8.83 10*3/MM3 (ref 1.7–7)
NEUTROPHILS NFR BLD AUTO: 81.8 % (ref 42.7–76)
NEUTROPHILS NFR BLD AUTO: 90.2 % (ref 42.7–76)
NITRITE UR QL STRIP: NEGATIVE
NRBC BLD AUTO-RTO: 0 /100 WBC (ref 0–0.2)
NRBC BLD AUTO-RTO: 0 /100 WBC (ref 0–0.2)
NT-PROBNP SERPL-MCNC: 2153 PG/ML (ref 0–900)
OXYHGB MFR BLDV: 85.9 % (ref 94–99)
PCO2 BLDA: 52.3 MM HG (ref 35–45)
PCO2 TEMP ADJ BLD: ABNORMAL MM[HG]
PH BLDA: 7.35 PH UNITS (ref 7.35–7.45)
PH UR STRIP.AUTO: <=5 [PH] (ref 5–8)
PH, TEMP CORRECTED: ABNORMAL
PLATELET # BLD AUTO: 175 10*3/MM3 (ref 140–450)
PLATELET # BLD AUTO: 189 10*3/MM3 (ref 140–450)
PMV BLD AUTO: 11.3 FL (ref 6–12)
PMV BLD AUTO: 11.9 FL (ref 6–12)
PO2 BLDA: 55.9 MM HG (ref 75–100)
PO2 TEMP ADJ BLD: ABNORMAL MM[HG]
POTASSIUM SERPL-SCNC: 4.1 MMOL/L (ref 3.5–5.2)
POTASSIUM SERPL-SCNC: 4.2 MMOL/L (ref 3.5–5.2)
PROCALCITONIN SERPL-MCNC: 0.18 NG/ML (ref 0–0.25)
PROT SERPL-MCNC: 6.4 G/DL (ref 6–8.5)
PROT UR QL STRIP: NEGATIVE
RBC # BLD AUTO: 3.53 10*6/MM3 (ref 4.14–5.8)
RBC # BLD AUTO: 3.63 10*6/MM3 (ref 4.14–5.8)
RSV RNA RESP QL NAA+PROBE: NOT DETECTED
SAO2 % BLDCOA: 88.2 % (ref 94–100)
SARS-COV-2 RNA RESP QL NAA+PROBE: NOT DETECTED
SODIUM SERPL-SCNC: 139 MMOL/L (ref 136–145)
SODIUM SERPL-SCNC: 140 MMOL/L (ref 136–145)
SP GR UR STRIP: 1.01 (ref 1–1.03)
TROPONIN T SERPL HS-MCNC: 28 NG/L
UROBILINOGEN UR QL STRIP: NORMAL
VENTILATOR MODE: ABNORMAL
WBC NRBC COR # BLD AUTO: 10.79 10*3/MM3 (ref 3.4–10.8)
WBC NRBC COR # BLD AUTO: 9.62 10*3/MM3 (ref 3.4–10.8)
WHOLE BLOOD HOLD COAG: NORMAL
WHOLE BLOOD HOLD SPECIMEN: NORMAL

## 2024-02-08 PROCEDURE — 83880 ASSAY OF NATRIURETIC PEPTIDE: CPT

## 2024-02-08 PROCEDURE — 85025 COMPLETE CBC W/AUTO DIFF WBC: CPT | Performed by: STUDENT IN AN ORGANIZED HEALTH CARE EDUCATION/TRAINING PROGRAM

## 2024-02-08 PROCEDURE — 25010000002 CEFTRIAXONE SODIUM-DEXTROSE 2-2.22 GM-%(50ML) RECONSTITUTED SOLUTION: Performed by: STUDENT IN AN ORGANIZED HEALTH CARE EDUCATION/TRAINING PROGRAM

## 2024-02-08 PROCEDURE — 82948 REAGENT STRIP/BLOOD GLUCOSE: CPT | Performed by: STUDENT IN AN ORGANIZED HEALTH CARE EDUCATION/TRAINING PROGRAM

## 2024-02-08 PROCEDURE — 93005 ELECTROCARDIOGRAM TRACING: CPT

## 2024-02-08 PROCEDURE — 99223 1ST HOSP IP/OBS HIGH 75: CPT | Performed by: STUDENT IN AN ORGANIZED HEALTH CARE EDUCATION/TRAINING PROGRAM

## 2024-02-08 PROCEDURE — 82375 ASSAY CARBOXYHB QUANT: CPT

## 2024-02-08 PROCEDURE — 82805 BLOOD GASES W/O2 SATURATION: CPT

## 2024-02-08 PROCEDURE — 85025 COMPLETE CBC W/AUTO DIFF WBC: CPT

## 2024-02-08 PROCEDURE — 51702 INSERT TEMP BLADDER CATH: CPT

## 2024-02-08 PROCEDURE — 84145 PROCALCITONIN (PCT): CPT | Performed by: STUDENT IN AN ORGANIZED HEALTH CARE EDUCATION/TRAINING PROGRAM

## 2024-02-08 PROCEDURE — 87040 BLOOD CULTURE FOR BACTERIA: CPT | Performed by: EMERGENCY MEDICINE

## 2024-02-08 PROCEDURE — 94799 UNLISTED PULMONARY SVC/PX: CPT

## 2024-02-08 PROCEDURE — 63710000001 INSULIN DETEMIR PER 5 UNITS: Performed by: STUDENT IN AN ORGANIZED HEALTH CARE EDUCATION/TRAINING PROGRAM

## 2024-02-08 PROCEDURE — 94761 N-INVAS EAR/PLS OXIMETRY MLT: CPT

## 2024-02-08 PROCEDURE — 81003 URINALYSIS AUTO W/O SCOPE: CPT | Performed by: STUDENT IN AN ORGANIZED HEALTH CARE EDUCATION/TRAINING PROGRAM

## 2024-02-08 PROCEDURE — 97166 OT EVAL MOD COMPLEX 45 MIN: CPT

## 2024-02-08 PROCEDURE — 25010000002 CEFTRIAXONE SODIUM-DEXTROSE 2-2.22 GM-%(50ML) RECONSTITUTED SOLUTION: Performed by: EMERGENCY MEDICINE

## 2024-02-08 PROCEDURE — 25010000002 FUROSEMIDE PER 20 MG: Performed by: EMERGENCY MEDICINE

## 2024-02-08 PROCEDURE — 97162 PT EVAL MOD COMPLEX 30 MIN: CPT

## 2024-02-08 PROCEDURE — 25810000003 SODIUM CHLORIDE 0.9 % SOLUTION: Performed by: EMERGENCY MEDICINE

## 2024-02-08 PROCEDURE — 70450 CT HEAD/BRAIN W/O DYE: CPT

## 2024-02-08 PROCEDURE — 80053 COMPREHEN METABOLIC PANEL: CPT

## 2024-02-08 PROCEDURE — 25010000002 FUROSEMIDE PER 20 MG: Performed by: STUDENT IN AN ORGANIZED HEALTH CARE EDUCATION/TRAINING PROGRAM

## 2024-02-08 PROCEDURE — 25010000002 METHYLPREDNISOLONE PER 125 MG: Performed by: EMERGENCY MEDICINE

## 2024-02-08 PROCEDURE — 83605 ASSAY OF LACTIC ACID: CPT | Performed by: EMERGENCY MEDICINE

## 2024-02-08 PROCEDURE — 94640 AIRWAY INHALATION TREATMENT: CPT

## 2024-02-08 PROCEDURE — 84484 ASSAY OF TROPONIN QUANT: CPT

## 2024-02-08 PROCEDURE — 83050 HGB METHEMOGLOBIN QUAN: CPT

## 2024-02-08 PROCEDURE — 36415 COLL VENOUS BLD VENIPUNCTURE: CPT

## 2024-02-08 PROCEDURE — 36600 WITHDRAWAL OF ARTERIAL BLOOD: CPT

## 2024-02-08 PROCEDURE — 94660 CPAP INITIATION&MGMT: CPT

## 2024-02-08 PROCEDURE — 99291 CRITICAL CARE FIRST HOUR: CPT

## 2024-02-08 PROCEDURE — 25010000002 METHYLPREDNISOLONE PER 40 MG: Performed by: STUDENT IN AN ORGANIZED HEALTH CARE EDUCATION/TRAINING PROGRAM

## 2024-02-08 PROCEDURE — 82948 REAGENT STRIP/BLOOD GLUCOSE: CPT

## 2024-02-08 PROCEDURE — 63710000001 INSULIN ASPART PER 5 UNITS: Performed by: STUDENT IN AN ORGANIZED HEALTH CARE EDUCATION/TRAINING PROGRAM

## 2024-02-08 PROCEDURE — 71045 X-RAY EXAM CHEST 1 VIEW: CPT

## 2024-02-08 PROCEDURE — 94664 DEMO&/EVAL PT USE INHALER: CPT

## 2024-02-08 PROCEDURE — 87637 SARSCOV2&INF A&B&RSV AMP PRB: CPT | Performed by: EMERGENCY MEDICINE

## 2024-02-08 PROCEDURE — 25010000002 AZITHROMYCIN PER 500 MG: Performed by: EMERGENCY MEDICINE

## 2024-02-08 RX ORDER — AMIODARONE HYDROCHLORIDE 200 MG/1
200 TABLET ORAL
Status: DISCONTINUED | OUTPATIENT
Start: 2024-02-08 | End: 2024-02-11 | Stop reason: HOSPADM

## 2024-02-08 RX ORDER — INSULIN ASPART 100 [IU]/ML
1-200 INJECTION, SOLUTION INTRAVENOUS; SUBCUTANEOUS AS NEEDED
Status: DISCONTINUED | OUTPATIENT
Start: 2024-02-08 | End: 2024-02-11 | Stop reason: HOSPADM

## 2024-02-08 RX ORDER — CEFTRIAXONE 2 G/50ML
2000 INJECTION, SOLUTION INTRAVENOUS EVERY 24 HOURS
Status: DISCONTINUED | OUTPATIENT
Start: 2024-02-09 | End: 2024-02-11 | Stop reason: HOSPADM

## 2024-02-08 RX ORDER — IPRATROPIUM BROMIDE AND ALBUTEROL SULFATE 2.5; .5 MG/3ML; MG/3ML
3 SOLUTION RESPIRATORY (INHALATION)
Status: DISCONTINUED | OUTPATIENT
Start: 2024-02-08 | End: 2024-02-10

## 2024-02-08 RX ORDER — ACETAMINOPHEN 650 MG/1
650 SUPPOSITORY RECTAL EVERY 4 HOURS PRN
Status: DISCONTINUED | OUTPATIENT
Start: 2024-02-08 | End: 2024-02-11 | Stop reason: HOSPADM

## 2024-02-08 RX ORDER — LISINOPRIL 20 MG/1
40 TABLET ORAL DAILY
Status: DISCONTINUED | OUTPATIENT
Start: 2024-02-08 | End: 2024-02-09

## 2024-02-08 RX ORDER — BISACODYL 5 MG/1
5 TABLET, DELAYED RELEASE ORAL DAILY PRN
Status: DISCONTINUED | OUTPATIENT
Start: 2024-02-08 | End: 2024-02-11 | Stop reason: HOSPADM

## 2024-02-08 RX ORDER — FLUOXETINE HYDROCHLORIDE 20 MG/1
20 CAPSULE ORAL DAILY
Status: DISCONTINUED | OUTPATIENT
Start: 2024-02-08 | End: 2024-02-11 | Stop reason: HOSPADM

## 2024-02-08 RX ORDER — METHYLPREDNISOLONE SODIUM SUCCINATE 125 MG/2ML
125 INJECTION, POWDER, LYOPHILIZED, FOR SOLUTION INTRAMUSCULAR; INTRAVENOUS ONCE
Status: COMPLETED | OUTPATIENT
Start: 2024-02-08 | End: 2024-02-08

## 2024-02-08 RX ORDER — HYDROCODONE BITARTRATE AND ACETAMINOPHEN 7.5; 325 MG/1; MG/1
1 TABLET ORAL EVERY 6 HOURS
Status: DISCONTINUED | OUTPATIENT
Start: 2024-02-08 | End: 2024-02-11 | Stop reason: HOSPADM

## 2024-02-08 RX ORDER — HYDROCODONE BITARTRATE AND ACETAMINOPHEN 5; 325 MG/1; MG/1
1 TABLET ORAL EVERY 6 HOURS PRN
Status: DISCONTINUED | OUTPATIENT
Start: 2024-02-08 | End: 2024-02-11 | Stop reason: HOSPADM

## 2024-02-08 RX ORDER — NITROGLYCERIN 0.4 MG/1
0.4 TABLET SUBLINGUAL
Status: DISCONTINUED | OUTPATIENT
Start: 2024-02-08 | End: 2024-02-11 | Stop reason: HOSPADM

## 2024-02-08 RX ORDER — AMOXICILLIN 250 MG
2 CAPSULE ORAL 2 TIMES DAILY PRN
Status: DISCONTINUED | OUTPATIENT
Start: 2024-02-08 | End: 2024-02-11 | Stop reason: HOSPADM

## 2024-02-08 RX ORDER — ACETAMINOPHEN 160 MG/5ML
650 SOLUTION ORAL EVERY 4 HOURS PRN
Status: DISCONTINUED | OUTPATIENT
Start: 2024-02-08 | End: 2024-02-11 | Stop reason: HOSPADM

## 2024-02-08 RX ORDER — HYDROCODONE BITARTRATE AND ACETAMINOPHEN 7.5; 325 MG/1; MG/1
1 TABLET ORAL EVERY 6 HOURS
COMMUNITY
Start: 2024-01-15

## 2024-02-08 RX ORDER — METOPROLOL SUCCINATE 50 MG/1
50 TABLET, EXTENDED RELEASE ORAL DAILY
Status: DISCONTINUED | OUTPATIENT
Start: 2024-02-08 | End: 2024-02-09

## 2024-02-08 RX ORDER — PANTOPRAZOLE SODIUM 40 MG/1
40 TABLET, DELAYED RELEASE ORAL
Status: DISCONTINUED | OUTPATIENT
Start: 2024-02-08 | End: 2024-02-11 | Stop reason: HOSPADM

## 2024-02-08 RX ORDER — SODIUM CHLORIDE 9 MG/ML
40 INJECTION, SOLUTION INTRAVENOUS AS NEEDED
Status: DISCONTINUED | OUTPATIENT
Start: 2024-02-08 | End: 2024-02-11 | Stop reason: HOSPADM

## 2024-02-08 RX ORDER — ATORVASTATIN CALCIUM 20 MG/1
20 TABLET, FILM COATED ORAL NIGHTLY
Status: DISCONTINUED | OUTPATIENT
Start: 2024-02-08 | End: 2024-02-11 | Stop reason: HOSPADM

## 2024-02-08 RX ORDER — IPRATROPIUM BROMIDE AND ALBUTEROL SULFATE 2.5; .5 MG/3ML; MG/3ML
6 SOLUTION RESPIRATORY (INHALATION) ONCE
Status: COMPLETED | OUTPATIENT
Start: 2024-02-08 | End: 2024-02-08

## 2024-02-08 RX ORDER — POLYETHYLENE GLYCOL 3350 17 G/17G
17 POWDER, FOR SOLUTION ORAL DAILY PRN
Status: DISCONTINUED | OUTPATIENT
Start: 2024-02-08 | End: 2024-02-11 | Stop reason: HOSPADM

## 2024-02-08 RX ORDER — SODIUM CHLORIDE 0.9 % (FLUSH) 0.9 %
10 SYRINGE (ML) INJECTION EVERY 12 HOURS SCHEDULED
Status: DISCONTINUED | OUTPATIENT
Start: 2024-02-08 | End: 2024-02-11 | Stop reason: HOSPADM

## 2024-02-08 RX ORDER — METHYLPREDNISOLONE SODIUM SUCCINATE 40 MG/ML
40 INJECTION, POWDER, LYOPHILIZED, FOR SOLUTION INTRAMUSCULAR; INTRAVENOUS EVERY 12 HOURS
Status: DISCONTINUED | OUTPATIENT
Start: 2024-02-08 | End: 2024-02-11 | Stop reason: HOSPADM

## 2024-02-08 RX ORDER — INSULIN ASPART 100 [IU]/ML
1-200 INJECTION, SOLUTION INTRAVENOUS; SUBCUTANEOUS
Status: DISCONTINUED | OUTPATIENT
Start: 2024-02-08 | End: 2024-02-11 | Stop reason: HOSPADM

## 2024-02-08 RX ORDER — SODIUM CHLORIDE 0.9 % (FLUSH) 0.9 %
10 SYRINGE (ML) INJECTION AS NEEDED
Status: DISCONTINUED | OUTPATIENT
Start: 2024-02-08 | End: 2024-02-11 | Stop reason: HOSPADM

## 2024-02-08 RX ORDER — FUROSEMIDE 10 MG/ML
40 INJECTION INTRAMUSCULAR; INTRAVENOUS
Status: DISCONTINUED | OUTPATIENT
Start: 2024-02-08 | End: 2024-02-09

## 2024-02-08 RX ORDER — ACETAMINOPHEN 325 MG/1
650 TABLET ORAL EVERY 4 HOURS PRN
Status: DISCONTINUED | OUTPATIENT
Start: 2024-02-08 | End: 2024-02-11 | Stop reason: HOSPADM

## 2024-02-08 RX ORDER — FUROSEMIDE 40 MG/1
40 TABLET ORAL DAILY
COMMUNITY
Start: 2024-01-06

## 2024-02-08 RX ORDER — DEXTROSE MONOHYDRATE 25 G/50ML
10-50 INJECTION, SOLUTION INTRAVENOUS
Status: DISCONTINUED | OUTPATIENT
Start: 2024-02-08 | End: 2024-02-11 | Stop reason: HOSPADM

## 2024-02-08 RX ORDER — FUROSEMIDE 10 MG/ML
40 INJECTION INTRAMUSCULAR; INTRAVENOUS ONCE
Status: COMPLETED | OUTPATIENT
Start: 2024-02-08 | End: 2024-02-08

## 2024-02-08 RX ORDER — BISACODYL 10 MG
10 SUPPOSITORY, RECTAL RECTAL DAILY PRN
Status: DISCONTINUED | OUTPATIENT
Start: 2024-02-08 | End: 2024-02-11 | Stop reason: HOSPADM

## 2024-02-08 RX ORDER — AMLODIPINE BESYLATE 5 MG/1
10 TABLET ORAL DAILY
Status: DISCONTINUED | OUTPATIENT
Start: 2024-02-08 | End: 2024-02-08

## 2024-02-08 RX ORDER — NICOTINE POLACRILEX 4 MG
15 LOZENGE BUCCAL
Status: DISCONTINUED | OUTPATIENT
Start: 2024-02-08 | End: 2024-02-11 | Stop reason: HOSPADM

## 2024-02-08 RX ORDER — GABAPENTIN 400 MG/1
400 CAPSULE ORAL NIGHTLY
Status: DISCONTINUED | OUTPATIENT
Start: 2024-02-08 | End: 2024-02-11 | Stop reason: HOSPADM

## 2024-02-08 RX ORDER — CEFTRIAXONE 2 G/50ML
2000 INJECTION, SOLUTION INTRAVENOUS ONCE
Status: COMPLETED | OUTPATIENT
Start: 2024-02-08 | End: 2024-02-08

## 2024-02-08 RX ORDER — SPIRONOLACTONE 25 MG/1
50 TABLET ORAL DAILY
Status: DISCONTINUED | OUTPATIENT
Start: 2024-02-08 | End: 2024-02-10

## 2024-02-08 RX ORDER — IBUPROFEN 600 MG/1
1 TABLET ORAL
Status: DISCONTINUED | OUTPATIENT
Start: 2024-02-08 | End: 2024-02-11 | Stop reason: HOSPADM

## 2024-02-08 RX ORDER — ALBUTEROL SULFATE 90 UG/1
2 AEROSOL, METERED RESPIRATORY (INHALATION) EVERY 4 HOURS PRN
Status: DISCONTINUED | OUTPATIENT
Start: 2024-02-08 | End: 2024-02-11 | Stop reason: HOSPADM

## 2024-02-08 RX ORDER — ONDANSETRON 2 MG/ML
4 INJECTION INTRAMUSCULAR; INTRAVENOUS EVERY 6 HOURS PRN
Status: DISCONTINUED | OUTPATIENT
Start: 2024-02-08 | End: 2024-02-11 | Stop reason: HOSPADM

## 2024-02-08 RX ADMIN — IPRATROPIUM BROMIDE AND ALBUTEROL SULFATE 3 ML: .5; 3 SOLUTION RESPIRATORY (INHALATION) at 12:49

## 2024-02-08 RX ADMIN — Medication 10 ML: at 08:17

## 2024-02-08 RX ADMIN — FUROSEMIDE 40 MG: 10 INJECTION, SOLUTION INTRAMUSCULAR; INTRAVENOUS at 08:16

## 2024-02-08 RX ADMIN — HYDROCODONE BITARTRATE AND ACETAMINOPHEN 1 TABLET: 7.5; 325 TABLET ORAL at 14:01

## 2024-02-08 RX ADMIN — APIXABAN 5 MG: 5 TABLET, FILM COATED ORAL at 08:43

## 2024-02-08 RX ADMIN — INSULIN DETEMIR 8 UNITS: 100 INJECTION, SOLUTION SUBCUTANEOUS at 20:23

## 2024-02-08 RX ADMIN — HYDROCODONE BITARTRATE AND ACETAMINOPHEN 1 TABLET: 7.5; 325 TABLET ORAL at 18:53

## 2024-02-08 RX ADMIN — IPRATROPIUM BROMIDE AND ALBUTEROL SULFATE 3 ML: .5; 3 SOLUTION RESPIRATORY (INHALATION) at 16:17

## 2024-02-08 RX ADMIN — METHYLPREDNISOLONE SODIUM SUCCINATE 40 MG: 40 INJECTION, POWDER, FOR SOLUTION INTRAMUSCULAR; INTRAVENOUS at 20:23

## 2024-02-08 RX ADMIN — IPRATROPIUM BROMIDE AND ALBUTEROL SULFATE 6 ML: .5; 3 SOLUTION RESPIRATORY (INHALATION) at 02:04

## 2024-02-08 RX ADMIN — ATORVASTATIN CALCIUM 20 MG: 20 TABLET, FILM COATED ORAL at 20:22

## 2024-02-08 RX ADMIN — METHYLPREDNISOLONE SODIUM SUCCINATE 40 MG: 40 INJECTION, POWDER, FOR SOLUTION INTRAMUSCULAR; INTRAVENOUS at 08:16

## 2024-02-08 RX ADMIN — FUROSEMIDE 40 MG: 10 INJECTION, SOLUTION INTRAMUSCULAR; INTRAVENOUS at 18:54

## 2024-02-08 RX ADMIN — CEFTRIAXONE 2000 MG: 2 INJECTION, SOLUTION INTRAVENOUS at 02:42

## 2024-02-08 RX ADMIN — AMIODARONE HYDROCHLORIDE 200 MG: 200 TABLET ORAL at 08:43

## 2024-02-08 RX ADMIN — IPRATROPIUM BROMIDE AND ALBUTEROL SULFATE 3 ML: .5; 3 SOLUTION RESPIRATORY (INHALATION) at 19:14

## 2024-02-08 RX ADMIN — SODIUM CHLORIDE 500 MG: 900 INJECTION, SOLUTION INTRAVENOUS at 03:33

## 2024-02-08 RX ADMIN — FLUOXETINE HYDROCHLORIDE 20 MG: 20 CAPSULE ORAL at 08:43

## 2024-02-08 RX ADMIN — INSULIN DETEMIR 8 UNITS: 100 INJECTION, SOLUTION SUBCUTANEOUS at 09:59

## 2024-02-08 RX ADMIN — HYDROCODONE BITARTRATE AND ACETAMINOPHEN 1 TABLET: 7.5; 325 TABLET ORAL at 08:43

## 2024-02-08 RX ADMIN — INSULIN ASPART 7 UNITS: 100 INJECTION, SOLUTION INTRAVENOUS; SUBCUTANEOUS at 17:42

## 2024-02-08 RX ADMIN — PANTOPRAZOLE SODIUM 40 MG: 40 TABLET, DELAYED RELEASE ORAL at 08:43

## 2024-02-08 RX ADMIN — SPIRONOLACTONE 50 MG: 25 TABLET ORAL at 08:43

## 2024-02-08 RX ADMIN — GABAPENTIN 400 MG: 400 CAPSULE ORAL at 20:23

## 2024-02-08 RX ADMIN — FUROSEMIDE 40 MG: 10 INJECTION, SOLUTION INTRAMUSCULAR; INTRAVENOUS at 02:42

## 2024-02-08 RX ADMIN — CEFTRIAXONE 2000 MG: 2 INJECTION, SOLUTION INTRAVENOUS at 23:57

## 2024-02-08 RX ADMIN — IPRATROPIUM BROMIDE AND ALBUTEROL SULFATE 3 ML: .5; 3 SOLUTION RESPIRATORY (INHALATION) at 06:56

## 2024-02-08 RX ADMIN — Medication 10 ML: at 17:42

## 2024-02-08 RX ADMIN — INSULIN ASPART 7 UNITS: 100 INJECTION, SOLUTION INTRAVENOUS; SUBCUTANEOUS at 11:56

## 2024-02-08 RX ADMIN — Medication 10 ML: at 20:23

## 2024-02-08 RX ADMIN — METHYLPREDNISOLONE SODIUM SUCCINATE 125 MG: 125 INJECTION, POWDER, FOR SOLUTION INTRAMUSCULAR; INTRAVENOUS at 02:42

## 2024-02-08 RX ADMIN — APIXABAN 5 MG: 5 TABLET, FILM COATED ORAL at 20:22

## 2024-02-09 ENCOUNTER — APPOINTMENT (OUTPATIENT)
Dept: CARDIOLOGY | Facility: HOSPITAL | Age: 74
DRG: 291 | End: 2024-02-09
Payer: MEDICARE

## 2024-02-09 LAB
ALBUMIN SERPL-MCNC: 3.4 G/DL (ref 3.5–5.2)
ALBUMIN/GLOB SERPL: 1.4 G/DL
ALP SERPL-CCNC: 113 U/L (ref 39–117)
ALT SERPL W P-5'-P-CCNC: 11 U/L (ref 1–41)
ANION GAP SERPL CALCULATED.3IONS-SCNC: 10.4 MMOL/L (ref 5–15)
AST SERPL-CCNC: 13 U/L (ref 1–40)
BASOPHILS # BLD AUTO: 0.01 10*3/MM3 (ref 0–0.2)
BASOPHILS NFR BLD AUTO: 0.1 % (ref 0–1.5)
BH CV ECHO MEAS - AO MAX PG: 11.3 MMHG
BH CV ECHO MEAS - AO MEAN PG: 5 MMHG
BH CV ECHO MEAS - AO ROOT DIAM: 2.9 CM
BH CV ECHO MEAS - AO V2 MAX: 168 CM/SEC
BH CV ECHO MEAS - AO V2 VTI: 37.5 CM
BH CV ECHO MEAS - AVA(I,D): 2.25 CM2
BH CV ECHO MEAS - EDV(CUBED): 179.4 ML
BH CV ECHO MEAS - EDV(MOD-SP2): 110 ML
BH CV ECHO MEAS - EDV(MOD-SP4): 138 ML
BH CV ECHO MEAS - EF(MOD-BP): 67.9 %
BH CV ECHO MEAS - EF(MOD-SP2): 66.6 %
BH CV ECHO MEAS - EF(MOD-SP4): 70.6 %
BH CV ECHO MEAS - EF_3D-VOL: 74 %
BH CV ECHO MEAS - ESV(CUBED): 30.1 ML
BH CV ECHO MEAS - ESV(MOD-SP2): 36.7 ML
BH CV ECHO MEAS - ESV(MOD-SP4): 40.6 ML
BH CV ECHO MEAS - FS: 44.9 %
BH CV ECHO MEAS - IVS/LVPW: 1.01 CM
BH CV ECHO MEAS - IVSD: 1.42 CM
BH CV ECHO MEAS - LA DIMENSION: 3.6 CM
BH CV ECHO MEAS - LAT PEAK E' VEL: 8.2 CM/SEC
BH CV ECHO MEAS - LV DIASTOLIC VOL/BSA (35-75): 68.6 CM2
BH CV ECHO MEAS - LV MASS(C)D: 356.9 GRAMS
BH CV ECHO MEAS - LV MAX PG: 5.8 MMHG
BH CV ECHO MEAS - LV MEAN PG: 3 MMHG
BH CV ECHO MEAS - LV SYSTOLIC VOL/BSA (12-30): 20.2 CM2
BH CV ECHO MEAS - LV V1 MAX: 120 CM/SEC
BH CV ECHO MEAS - LV V1 VTI: 31.1 CM
BH CV ECHO MEAS - LVIDD: 5.6 CM
BH CV ECHO MEAS - LVIDS: 3.1 CM
BH CV ECHO MEAS - LVOT AREA: 2.7 CM2
BH CV ECHO MEAS - LVOT DIAM: 1.86 CM
BH CV ECHO MEAS - LVPWD: 1.41 CM
BH CV ECHO MEAS - MED PEAK E' VEL: 6.1 CM/SEC
BH CV ECHO MEAS - MV A MAX VEL: 109 CM/SEC
BH CV ECHO MEAS - MV DEC SLOPE: 515 CM/SEC2
BH CV ECHO MEAS - MV DEC TIME: 0.27 SEC
BH CV ECHO MEAS - MV E MAX VEL: 140 CM/SEC
BH CV ECHO MEAS - MV E/A: 1.28
BH CV ECHO MEAS - MV MAX PG: 6.9 MMHG
BH CV ECHO MEAS - MV MEAN PG: 3 MMHG
BH CV ECHO MEAS - MV V2 VTI: 40.9 CM
BH CV ECHO MEAS - MVA(VTI): 2.07 CM2
BH CV ECHO MEAS - PA ACC TIME: 0.1 SEC
BH CV ECHO MEAS - PA V2 MAX: 99 CM/SEC
BH CV ECHO MEAS - RAP SYSTOLE: 15 MMHG
BH CV ECHO MEAS - RV MAX PG: 2.06 MMHG
BH CV ECHO MEAS - RV V1 MAX: 71.8 CM/SEC
BH CV ECHO MEAS - RV V1 VTI: 19.1 CM
BH CV ECHO MEAS - RVSP: 60.7 MMHG
BH CV ECHO MEAS - SI(MOD-SP2): 36.4 ML/M2
BH CV ECHO MEAS - SI(MOD-SP4): 48.4 ML/M2
BH CV ECHO MEAS - SV(LVOT): 84.5 ML
BH CV ECHO MEAS - SV(MOD-SP2): 73.3 ML
BH CV ECHO MEAS - SV(MOD-SP4): 97.4 ML
BH CV ECHO MEAS - TAPSE (>1.6): 3 CM
BH CV ECHO MEAS - TR MAX PG: 45.7 MMHG
BH CV ECHO MEAS - TR MAX VEL: 338 CM/SEC
BH CV ECHO MEASUREMENTS AVERAGE E/E' RATIO: 19.58
BH CV XLRA - RV BASE: 4.5 CM
BH CV XLRA - TDI S': 13.4 CM/SEC
BILIRUB SERPL-MCNC: 0.3 MG/DL (ref 0–1.2)
BUN SERPL-MCNC: 37 MG/DL (ref 8–23)
BUN/CREAT SERPL: 21 (ref 7–25)
CALCIUM SPEC-SCNC: 8.4 MG/DL (ref 8.6–10.5)
CHLORIDE SERPL-SCNC: 98 MMOL/L (ref 98–107)
CO2 SERPL-SCNC: 29.6 MMOL/L (ref 22–29)
CREAT SERPL-MCNC: 1.76 MG/DL (ref 0.76–1.27)
DEPRECATED RDW RBC AUTO: 44.8 FL (ref 37–54)
EGFRCR SERPLBLD CKD-EPI 2021: 40.1 ML/MIN/1.73
EOSINOPHIL # BLD AUTO: 0 10*3/MM3 (ref 0–0.4)
EOSINOPHIL NFR BLD AUTO: 0 % (ref 0.3–6.2)
ERYTHROCYTE [DISTWIDTH] IN BLOOD BY AUTOMATED COUNT: 13.2 % (ref 12.3–15.4)
GLOBULIN UR ELPH-MCNC: 2.4 GM/DL
GLUCOSE BLDC GLUCOMTR-MCNC: 180 MG/DL (ref 70–130)
GLUCOSE BLDC GLUCOMTR-MCNC: 197 MG/DL (ref 70–130)
GLUCOSE BLDC GLUCOMTR-MCNC: 199 MG/DL (ref 70–130)
GLUCOSE SERPL-MCNC: 187 MG/DL (ref 65–99)
HCT VFR BLD AUTO: 30.5 % (ref 37.5–51)
HGB BLD-MCNC: 9.8 G/DL (ref 13–17.7)
IMM GRANULOCYTES # BLD AUTO: 0.09 10*3/MM3 (ref 0–0.05)
IMM GRANULOCYTES NFR BLD AUTO: 0.8 % (ref 0–0.5)
LYMPHOCYTES # BLD AUTO: 0.7 10*3/MM3 (ref 0.7–3.1)
LYMPHOCYTES NFR BLD AUTO: 6.2 % (ref 19.6–45.3)
MCH RBC QN AUTO: 29.3 PG (ref 26.6–33)
MCHC RBC AUTO-ENTMCNC: 32.1 G/DL (ref 31.5–35.7)
MCV RBC AUTO: 91 FL (ref 79–97)
MONOCYTES # BLD AUTO: 0.45 10*3/MM3 (ref 0.1–0.9)
MONOCYTES NFR BLD AUTO: 4 % (ref 5–12)
NEUTROPHILS NFR BLD AUTO: 10.08 10*3/MM3 (ref 1.7–7)
NEUTROPHILS NFR BLD AUTO: 88.9 % (ref 42.7–76)
NRBC BLD AUTO-RTO: 0 /100 WBC (ref 0–0.2)
PLATELET # BLD AUTO: 199 10*3/MM3 (ref 140–450)
PMV BLD AUTO: 11.9 FL (ref 6–12)
POTASSIUM SERPL-SCNC: 3.9 MMOL/L (ref 3.5–5.2)
PROT SERPL-MCNC: 5.8 G/DL (ref 6–8.5)
RBC # BLD AUTO: 3.35 10*6/MM3 (ref 4.14–5.8)
SODIUM SERPL-SCNC: 138 MMOL/L (ref 136–145)
WBC NRBC COR # BLD AUTO: 11.33 10*3/MM3 (ref 3.4–10.8)

## 2024-02-09 PROCEDURE — 63710000001 INSULIN ASPART PER 5 UNITS: Performed by: INTERNAL MEDICINE

## 2024-02-09 PROCEDURE — 25010000002 METHYLPREDNISOLONE PER 40 MG: Performed by: INTERNAL MEDICINE

## 2024-02-09 PROCEDURE — 25010000002 METHYLPREDNISOLONE PER 40 MG: Performed by: STUDENT IN AN ORGANIZED HEALTH CARE EDUCATION/TRAINING PROGRAM

## 2024-02-09 PROCEDURE — 94799 UNLISTED PULMONARY SVC/PX: CPT

## 2024-02-09 PROCEDURE — 97530 THERAPEUTIC ACTIVITIES: CPT

## 2024-02-09 PROCEDURE — 93306 TTE W/DOPPLER COMPLETE: CPT | Performed by: INTERNAL MEDICINE

## 2024-02-09 PROCEDURE — 82948 REAGENT STRIP/BLOOD GLUCOSE: CPT

## 2024-02-09 PROCEDURE — 25010000002 FUROSEMIDE PER 20 MG: Performed by: STUDENT IN AN ORGANIZED HEALTH CARE EDUCATION/TRAINING PROGRAM

## 2024-02-09 PROCEDURE — 63710000001 INSULIN DETEMIR PER 5 UNITS: Performed by: STUDENT IN AN ORGANIZED HEALTH CARE EDUCATION/TRAINING PROGRAM

## 2024-02-09 PROCEDURE — 63710000001 INSULIN ASPART PER 5 UNITS: Performed by: STUDENT IN AN ORGANIZED HEALTH CARE EDUCATION/TRAINING PROGRAM

## 2024-02-09 PROCEDURE — 82948 REAGENT STRIP/BLOOD GLUCOSE: CPT | Performed by: STUDENT IN AN ORGANIZED HEALTH CARE EDUCATION/TRAINING PROGRAM

## 2024-02-09 PROCEDURE — 93306 TTE W/DOPPLER COMPLETE: CPT

## 2024-02-09 PROCEDURE — 85025 COMPLETE CBC W/AUTO DIFF WBC: CPT | Performed by: STUDENT IN AN ORGANIZED HEALTH CARE EDUCATION/TRAINING PROGRAM

## 2024-02-09 PROCEDURE — 94761 N-INVAS EAR/PLS OXIMETRY MLT: CPT

## 2024-02-09 PROCEDURE — 63710000001 INSULIN DETEMIR PER 5 UNITS: Performed by: INTERNAL MEDICINE

## 2024-02-09 PROCEDURE — 80053 COMPREHEN METABOLIC PANEL: CPT | Performed by: INTERNAL MEDICINE

## 2024-02-09 PROCEDURE — 25010000002 CEFTRIAXONE SODIUM-DEXTROSE 2-2.22 GM-%(50ML) RECONSTITUTED SOLUTION: Performed by: INTERNAL MEDICINE

## 2024-02-09 PROCEDURE — 94664 DEMO&/EVAL PT USE INHALER: CPT

## 2024-02-09 PROCEDURE — 94660 CPAP INITIATION&MGMT: CPT

## 2024-02-09 RX ORDER — IBUPROFEN 200 MG
600 TABLET ORAL EVERY 8 HOURS PRN
Status: DISCONTINUED | OUTPATIENT
Start: 2024-02-09 | End: 2024-02-11 | Stop reason: HOSPADM

## 2024-02-09 RX ORDER — LISINOPRIL 5 MG/1
5 TABLET ORAL DAILY
Status: DISCONTINUED | OUTPATIENT
Start: 2024-02-10 | End: 2024-02-11 | Stop reason: HOSPADM

## 2024-02-09 RX ORDER — GLYCOPYRROLATE AND FORMOTEROL FUMARATE 9; 4.8 UG/1; UG/1
1 AEROSOL, METERED RESPIRATORY (INHALATION) DAILY
COMMUNITY
End: 2024-02-11 | Stop reason: HOSPADM

## 2024-02-09 RX ORDER — FAMOTIDINE 20 MG/1
20 TABLET, FILM COATED ORAL 2 TIMES DAILY
COMMUNITY

## 2024-02-09 RX ORDER — CIPROFLOXACIN HYDROCHLORIDE 3.5 MG/ML
2 SOLUTION/ DROPS TOPICAL 3 TIMES DAILY
Status: DISCONTINUED | OUTPATIENT
Start: 2024-02-09 | End: 2024-02-10

## 2024-02-09 RX ORDER — DICLOFENAC SODIUM 75 MG/1
75 TABLET, DELAYED RELEASE ORAL 2 TIMES DAILY
COMMUNITY

## 2024-02-09 RX ORDER — SUMATRIPTAN 50 MG/1
100 TABLET, FILM COATED ORAL ONCE
Status: COMPLETED | OUTPATIENT
Start: 2024-02-09 | End: 2024-02-09

## 2024-02-09 RX ORDER — FUROSEMIDE 10 MG/ML
40 INJECTION INTRAMUSCULAR; INTRAVENOUS ONCE
Status: DISCONTINUED | OUTPATIENT
Start: 2024-02-09 | End: 2024-02-11 | Stop reason: HOSPADM

## 2024-02-09 RX ORDER — ACETYLCYSTEINE 200 MG/ML
3 SOLUTION ORAL; RESPIRATORY (INHALATION)
Status: DISCONTINUED | OUTPATIENT
Start: 2024-02-09 | End: 2024-02-11 | Stop reason: HOSPADM

## 2024-02-09 RX ORDER — METOPROLOL SUCCINATE 25 MG/1
25 TABLET, EXTENDED RELEASE ORAL DAILY
Status: DISCONTINUED | OUTPATIENT
Start: 2024-02-10 | End: 2024-02-11 | Stop reason: HOSPADM

## 2024-02-09 RX ORDER — GUAIFENESIN 600 MG/1
600 TABLET, EXTENDED RELEASE ORAL EVERY 12 HOURS SCHEDULED
Status: DISCONTINUED | OUTPATIENT
Start: 2024-02-09 | End: 2024-02-11 | Stop reason: HOSPADM

## 2024-02-09 RX ADMIN — METHYLPREDNISOLONE SODIUM SUCCINATE 40 MG: 40 INJECTION, POWDER, FOR SOLUTION INTRAMUSCULAR; INTRAVENOUS at 08:36

## 2024-02-09 RX ADMIN — Medication 10 ML: at 08:34

## 2024-02-09 RX ADMIN — SUMATRIPTAN SUCCINATE 100 MG: 50 TABLET ORAL at 09:21

## 2024-02-09 RX ADMIN — ACETYLCYSTEINE 3 ML: 200 SOLUTION ORAL; RESPIRATORY (INHALATION) at 16:13

## 2024-02-09 RX ADMIN — CIPROFLOXACIN 2 DROP: 3 SOLUTION OPHTHALMIC at 20:59

## 2024-02-09 RX ADMIN — IBUPROFEN 600 MG: 400 TABLET, FILM COATED ORAL at 09:21

## 2024-02-09 RX ADMIN — IPRATROPIUM BROMIDE AND ALBUTEROL SULFATE 3 ML: .5; 3 SOLUTION RESPIRATORY (INHALATION) at 07:01

## 2024-02-09 RX ADMIN — INSULIN ASPART 2 UNITS: 100 INJECTION, SOLUTION INTRAVENOUS; SUBCUTANEOUS at 21:00

## 2024-02-09 RX ADMIN — GUAIFENESIN 600 MG: 600 TABLET, EXTENDED RELEASE ORAL at 20:59

## 2024-02-09 RX ADMIN — CEFTRIAXONE 2000 MG: 2 INJECTION, SOLUTION INTRAVENOUS at 23:30

## 2024-02-09 RX ADMIN — HYDROCODONE BITARTRATE AND ACETAMINOPHEN 1 TABLET: 7.5; 325 TABLET ORAL at 20:59

## 2024-02-09 RX ADMIN — IPRATROPIUM BROMIDE AND ALBUTEROL SULFATE 3 ML: .5; 3 SOLUTION RESPIRATORY (INHALATION) at 12:54

## 2024-02-09 RX ADMIN — PANTOPRAZOLE SODIUM 40 MG: 40 TABLET, DELAYED RELEASE ORAL at 06:18

## 2024-02-09 RX ADMIN — HYDROCODONE BITARTRATE AND ACETAMINOPHEN 1 TABLET: 7.5; 325 TABLET ORAL at 01:35

## 2024-02-09 RX ADMIN — INSULIN ASPART 6 UNITS: 100 INJECTION, SOLUTION INTRAVENOUS; SUBCUTANEOUS at 08:36

## 2024-02-09 RX ADMIN — INSULIN DETEMIR 9 UNITS: 100 INJECTION, SOLUTION SUBCUTANEOUS at 21:00

## 2024-02-09 RX ADMIN — GUAIFENESIN 600 MG: 600 TABLET, EXTENDED RELEASE ORAL at 09:21

## 2024-02-09 RX ADMIN — IPRATROPIUM BROMIDE AND ALBUTEROL SULFATE 3 ML: .5; 3 SOLUTION RESPIRATORY (INHALATION) at 16:13

## 2024-02-09 RX ADMIN — APIXABAN 5 MG: 5 TABLET, FILM COATED ORAL at 20:59

## 2024-02-09 RX ADMIN — FUROSEMIDE 40 MG: 10 INJECTION, SOLUTION INTRAMUSCULAR; INTRAVENOUS at 09:29

## 2024-02-09 RX ADMIN — INSULIN DETEMIR 9 UNITS: 100 INJECTION, SOLUTION SUBCUTANEOUS at 08:36

## 2024-02-09 RX ADMIN — INSULIN ASPART 8 UNITS: 100 INJECTION, SOLUTION INTRAVENOUS; SUBCUTANEOUS at 12:17

## 2024-02-09 RX ADMIN — HYDROCODONE BITARTRATE AND ACETAMINOPHEN 1 TABLET: 7.5; 325 TABLET ORAL at 14:03

## 2024-02-09 RX ADMIN — GABAPENTIN 400 MG: 400 CAPSULE ORAL at 20:59

## 2024-02-09 RX ADMIN — ACETYLCYSTEINE 3 ML: 200 SOLUTION ORAL; RESPIRATORY (INHALATION) at 12:54

## 2024-02-09 RX ADMIN — Medication 10 ML: at 20:59

## 2024-02-09 RX ADMIN — APIXABAN 5 MG: 5 TABLET, FILM COATED ORAL at 08:37

## 2024-02-09 RX ADMIN — SPIRONOLACTONE 50 MG: 25 TABLET ORAL at 08:37

## 2024-02-09 RX ADMIN — ATORVASTATIN CALCIUM 20 MG: 20 TABLET, FILM COATED ORAL at 20:59

## 2024-02-09 RX ADMIN — METHYLPREDNISOLONE SODIUM SUCCINATE 40 MG: 40 INJECTION, POWDER, FOR SOLUTION INTRAMUSCULAR; INTRAVENOUS at 21:00

## 2024-02-09 RX ADMIN — AMIODARONE HYDROCHLORIDE 200 MG: 200 TABLET ORAL at 08:37

## 2024-02-09 RX ADMIN — INSULIN ASPART 6 UNITS: 100 INJECTION, SOLUTION INTRAVENOUS; SUBCUTANEOUS at 17:39

## 2024-02-09 RX ADMIN — IPRATROPIUM BROMIDE AND ALBUTEROL SULFATE 3 ML: .5; 3 SOLUTION RESPIRATORY (INHALATION) at 18:51

## 2024-02-09 RX ADMIN — HYDROCODONE BITARTRATE AND ACETAMINOPHEN 1 TABLET: 7.5; 325 TABLET ORAL at 08:37

## 2024-02-09 RX ADMIN — FLUOXETINE HYDROCHLORIDE 20 MG: 20 CAPSULE ORAL at 08:37

## 2024-02-10 LAB
ANION GAP SERPL CALCULATED.3IONS-SCNC: 9.1 MMOL/L (ref 5–15)
BASOPHILS # BLD AUTO: 0.01 10*3/MM3 (ref 0–0.2)
BASOPHILS NFR BLD AUTO: 0.1 % (ref 0–1.5)
BUN SERPL-MCNC: 48 MG/DL (ref 8–23)
BUN/CREAT SERPL: 22.4 (ref 7–25)
CALCIUM SPEC-SCNC: 8.7 MG/DL (ref 8.6–10.5)
CHLORIDE SERPL-SCNC: 98 MMOL/L (ref 98–107)
CO2 SERPL-SCNC: 28.9 MMOL/L (ref 22–29)
CREAT SERPL-MCNC: 2.14 MG/DL (ref 0.76–1.27)
DEPRECATED RDW RBC AUTO: 45.8 FL (ref 37–54)
EGFRCR SERPLBLD CKD-EPI 2021: 31.7 ML/MIN/1.73
EOSINOPHIL # BLD AUTO: 0.03 10*3/MM3 (ref 0–0.4)
EOSINOPHIL NFR BLD AUTO: 0.2 % (ref 0.3–6.2)
ERYTHROCYTE [DISTWIDTH] IN BLOOD BY AUTOMATED COUNT: 13.4 % (ref 12.3–15.4)
GLUCOSE BLDC GLUCOMTR-MCNC: 163 MG/DL (ref 70–130)
GLUCOSE BLDC GLUCOMTR-MCNC: 220 MG/DL (ref 70–130)
GLUCOSE BLDC GLUCOMTR-MCNC: 255 MG/DL (ref 70–130)
GLUCOSE BLDC GLUCOMTR-MCNC: 266 MG/DL (ref 70–130)
GLUCOSE SERPL-MCNC: 178 MG/DL (ref 65–99)
HCT VFR BLD AUTO: 33.6 % (ref 37.5–51)
HGB BLD-MCNC: 10.7 G/DL (ref 13–17.7)
IMM GRANULOCYTES # BLD AUTO: 0.13 10*3/MM3 (ref 0–0.05)
IMM GRANULOCYTES NFR BLD AUTO: 0.9 % (ref 0–0.5)
LYMPHOCYTES # BLD AUTO: 0.62 10*3/MM3 (ref 0.7–3.1)
LYMPHOCYTES NFR BLD AUTO: 4.1 % (ref 19.6–45.3)
MCH RBC QN AUTO: 29.5 PG (ref 26.6–33)
MCHC RBC AUTO-ENTMCNC: 31.8 G/DL (ref 31.5–35.7)
MCV RBC AUTO: 92.6 FL (ref 79–97)
MONOCYTES # BLD AUTO: 0.49 10*3/MM3 (ref 0.1–0.9)
MONOCYTES NFR BLD AUTO: 3.2 % (ref 5–12)
NEUTROPHILS NFR BLD AUTO: 13.85 10*3/MM3 (ref 1.7–7)
NEUTROPHILS NFR BLD AUTO: 91.5 % (ref 42.7–76)
NRBC BLD AUTO-RTO: 0 /100 WBC (ref 0–0.2)
PLATELET # BLD AUTO: 238 10*3/MM3 (ref 140–450)
PMV BLD AUTO: 11.7 FL (ref 6–12)
POTASSIUM SERPL-SCNC: 4 MMOL/L (ref 3.5–5.2)
RBC # BLD AUTO: 3.63 10*6/MM3 (ref 4.14–5.8)
SODIUM SERPL-SCNC: 136 MMOL/L (ref 136–145)
WBC NRBC COR # BLD AUTO: 15.13 10*3/MM3 (ref 3.4–10.8)

## 2024-02-10 PROCEDURE — 82948 REAGENT STRIP/BLOOD GLUCOSE: CPT

## 2024-02-10 PROCEDURE — 94799 UNLISTED PULMONARY SVC/PX: CPT

## 2024-02-10 PROCEDURE — 82948 REAGENT STRIP/BLOOD GLUCOSE: CPT | Performed by: INTERNAL MEDICINE

## 2024-02-10 PROCEDURE — 94660 CPAP INITIATION&MGMT: CPT

## 2024-02-10 PROCEDURE — 0T9B70Z DRAINAGE OF BLADDER WITH DRAINAGE DEVICE, VIA NATURAL OR ARTIFICIAL OPENING: ICD-10-PCS | Performed by: INTERNAL MEDICINE

## 2024-02-10 PROCEDURE — 63710000001 INSULIN DETEMIR PER 5 UNITS: Performed by: INTERNAL MEDICINE

## 2024-02-10 PROCEDURE — 25010000002 METHYLPREDNISOLONE PER 40 MG: Performed by: INTERNAL MEDICINE

## 2024-02-10 PROCEDURE — 80048 BASIC METABOLIC PNL TOTAL CA: CPT | Performed by: INTERNAL MEDICINE

## 2024-02-10 PROCEDURE — 63710000001 INSULIN ASPART PER 5 UNITS: Performed by: INTERNAL MEDICINE

## 2024-02-10 PROCEDURE — 25010000002 METHYLPREDNISOLONE PER 125 MG: Performed by: INTERNAL MEDICINE

## 2024-02-10 PROCEDURE — 85025 COMPLETE CBC W/AUTO DIFF WBC: CPT | Performed by: INTERNAL MEDICINE

## 2024-02-10 PROCEDURE — 94761 N-INVAS EAR/PLS OXIMETRY MLT: CPT

## 2024-02-10 PROCEDURE — 94664 DEMO&/EVAL PT USE INHALER: CPT

## 2024-02-10 RX ORDER — METHYLPREDNISOLONE SODIUM SUCCINATE 125 MG/2ML
60 INJECTION, POWDER, LYOPHILIZED, FOR SOLUTION INTRAMUSCULAR; INTRAVENOUS ONCE
Status: COMPLETED | OUTPATIENT
Start: 2024-02-10 | End: 2024-02-10

## 2024-02-10 RX ORDER — IPRATROPIUM BROMIDE AND ALBUTEROL SULFATE 2.5; .5 MG/3ML; MG/3ML
3 SOLUTION RESPIRATORY (INHALATION)
Status: DISCONTINUED | OUTPATIENT
Start: 2024-02-10 | End: 2024-02-11 | Stop reason: HOSPADM

## 2024-02-10 RX ORDER — SPIRONOLACTONE 25 MG/1
25 TABLET ORAL DAILY
Status: DISCONTINUED | OUTPATIENT
Start: 2024-02-11 | End: 2024-02-11 | Stop reason: HOSPADM

## 2024-02-10 RX ORDER — NEOMYCIN SULFATE, POLYMYXIN B SULFATE, AND DEXAMETHASONE 3.5; 10000; 1 MG/G; [USP'U]/G; MG/G
OINTMENT OPHTHALMIC EVERY 6 HOURS SCHEDULED
Status: DISCONTINUED | OUTPATIENT
Start: 2024-02-10 | End: 2024-02-11 | Stop reason: HOSPADM

## 2024-02-10 RX ORDER — TETRACAINE HYDROCHLORIDE 5 MG/ML
2 SOLUTION OPHTHALMIC ONCE
Status: COMPLETED | OUTPATIENT
Start: 2024-02-10 | End: 2024-02-10

## 2024-02-10 RX ORDER — TAMSULOSIN HYDROCHLORIDE 0.4 MG/1
0.4 CAPSULE ORAL DAILY
Status: DISCONTINUED | OUTPATIENT
Start: 2024-02-10 | End: 2024-02-11 | Stop reason: HOSPADM

## 2024-02-10 RX ADMIN — CIPROFLOXACIN 2 DROP: 3 SOLUTION OPHTHALMIC at 08:17

## 2024-02-10 RX ADMIN — APIXABAN 5 MG: 5 TABLET, FILM COATED ORAL at 08:16

## 2024-02-10 RX ADMIN — POLYETHYLENE GLYCOL 3350 17 G: 17 POWDER, FOR SOLUTION ORAL at 17:32

## 2024-02-10 RX ADMIN — FLUOXETINE HYDROCHLORIDE 20 MG: 20 CAPSULE ORAL at 08:17

## 2024-02-10 RX ADMIN — INSULIN ASPART 8 UNITS: 100 INJECTION, SOLUTION INTRAVENOUS; SUBCUTANEOUS at 08:17

## 2024-02-10 RX ADMIN — PANTOPRAZOLE SODIUM 40 MG: 40 TABLET, DELAYED RELEASE ORAL at 06:12

## 2024-02-10 RX ADMIN — IPRATROPIUM BROMIDE AND ALBUTEROL SULFATE 3 ML: .5; 3 SOLUTION RESPIRATORY (INHALATION) at 06:45

## 2024-02-10 RX ADMIN — Medication 10 ML: at 08:17

## 2024-02-10 RX ADMIN — APIXABAN 5 MG: 5 TABLET, FILM COATED ORAL at 21:18

## 2024-02-10 RX ADMIN — HYDROCODONE BITARTRATE AND ACETAMINOPHEN 1 TABLET: 7.5; 325 TABLET ORAL at 18:45

## 2024-02-10 RX ADMIN — METHYLPREDNISOLONE SODIUM SUCCINATE 40 MG: 40 INJECTION, POWDER, FOR SOLUTION INTRAMUSCULAR; INTRAVENOUS at 21:19

## 2024-02-10 RX ADMIN — INSULIN ASPART 2 UNITS: 100 INJECTION, SOLUTION INTRAVENOUS; SUBCUTANEOUS at 12:22

## 2024-02-10 RX ADMIN — ATORVASTATIN CALCIUM 20 MG: 20 TABLET, FILM COATED ORAL at 21:18

## 2024-02-10 RX ADMIN — GUAIFENESIN 600 MG: 600 TABLET, EXTENDED RELEASE ORAL at 08:16

## 2024-02-10 RX ADMIN — IPRATROPIUM BROMIDE AND ALBUTEROL SULFATE 3 ML: .5; 3 SOLUTION RESPIRATORY (INHALATION) at 14:07

## 2024-02-10 RX ADMIN — INSULIN DETEMIR 9 UNITS: 100 INJECTION, SOLUTION SUBCUTANEOUS at 08:17

## 2024-02-10 RX ADMIN — NEOMYCIN SULFATE, POLYMYXIN B SULFATE, AND DEXAMETHASONE: 3.5; 10000; 1 OINTMENT OPHTHALMIC at 12:01

## 2024-02-10 RX ADMIN — INSULIN DETEMIR 9 UNITS: 100 INJECTION, SOLUTION SUBCUTANEOUS at 21:19

## 2024-02-10 RX ADMIN — TAMSULOSIN HYDROCHLORIDE 0.4 MG: 0.4 CAPSULE ORAL at 10:39

## 2024-02-10 RX ADMIN — INSULIN ASPART 2 UNITS: 100 INJECTION, SOLUTION INTRAVENOUS; SUBCUTANEOUS at 21:18

## 2024-02-10 RX ADMIN — GUAIFENESIN 600 MG: 600 TABLET, EXTENDED RELEASE ORAL at 21:18

## 2024-02-10 RX ADMIN — HYDROCODONE BITARTRATE AND ACETAMINOPHEN 1 TABLET: 7.5; 325 TABLET ORAL at 08:17

## 2024-02-10 RX ADMIN — Medication 10 ML: at 21:19

## 2024-02-10 RX ADMIN — ACETYLCYSTEINE 3 ML: 200 SOLUTION ORAL; RESPIRATORY (INHALATION) at 14:07

## 2024-02-10 RX ADMIN — ACETYLCYSTEINE 3 ML: 200 SOLUTION ORAL; RESPIRATORY (INHALATION) at 06:45

## 2024-02-10 RX ADMIN — NEOMYCIN SULFATE, POLYMYXIN B SULFATE, AND DEXAMETHASONE: 3.5; 10000; 1 OINTMENT OPHTHALMIC at 17:32

## 2024-02-10 RX ADMIN — METHYLPREDNISOLONE SODIUM SUCCINATE 40 MG: 40 INJECTION, POWDER, FOR SOLUTION INTRAMUSCULAR; INTRAVENOUS at 08:17

## 2024-02-10 RX ADMIN — HYDROCODONE BITARTRATE AND ACETAMINOPHEN 1 TABLET: 7.5; 325 TABLET ORAL at 13:23

## 2024-02-10 RX ADMIN — IPRATROPIUM BROMIDE AND ALBUTEROL SULFATE 3 ML: .5; 3 SOLUTION RESPIRATORY (INHALATION) at 20:06

## 2024-02-10 RX ADMIN — HYDROCODONE BITARTRATE AND ACETAMINOPHEN 1 TABLET: 7.5; 325 TABLET ORAL at 01:43

## 2024-02-10 RX ADMIN — METOPROLOL SUCCINATE 25 MG: 25 TABLET, EXTENDED RELEASE ORAL at 08:16

## 2024-02-10 RX ADMIN — INSULIN ASPART 6 UNITS: 100 INJECTION, SOLUTION INTRAVENOUS; SUBCUTANEOUS at 17:32

## 2024-02-10 RX ADMIN — TETRACAINE HYDROCHLORIDE 2 DROP: 5 SOLUTION OPHTHALMIC at 12:01

## 2024-02-10 RX ADMIN — LISINOPRIL 5 MG: 5 TABLET ORAL at 08:16

## 2024-02-10 RX ADMIN — AMIODARONE HYDROCHLORIDE 200 MG: 200 TABLET ORAL at 08:16

## 2024-02-10 RX ADMIN — METHYLPREDNISOLONE SODIUM SUCCINATE 60 MG: 125 INJECTION, POWDER, FOR SOLUTION INTRAMUSCULAR; INTRAVENOUS at 10:39

## 2024-02-10 RX ADMIN — SPIRONOLACTONE 50 MG: 25 TABLET ORAL at 08:17

## 2024-02-10 RX ADMIN — GABAPENTIN 400 MG: 400 CAPSULE ORAL at 21:26

## 2024-02-11 ENCOUNTER — READMISSION MANAGEMENT (OUTPATIENT)
Dept: CALL CENTER | Facility: HOSPITAL | Age: 74
End: 2024-02-11
Payer: MEDICARE

## 2024-02-11 VITALS
RESPIRATION RATE: 18 BRPM | BODY MASS INDEX: 35.78 KG/M2 | OXYGEN SATURATION: 94 % | HEIGHT: 65 IN | WEIGHT: 214.73 LBS | HEART RATE: 63 BPM | SYSTOLIC BLOOD PRESSURE: 143 MMHG | DIASTOLIC BLOOD PRESSURE: 73 MMHG | TEMPERATURE: 97.6 F

## 2024-02-11 LAB
ANION GAP SERPL CALCULATED.3IONS-SCNC: 10.4 MMOL/L (ref 5–15)
BASOPHILS # BLD AUTO: 0.01 10*3/MM3 (ref 0–0.2)
BASOPHILS NFR BLD AUTO: 0.1 % (ref 0–1.5)
BUN SERPL-MCNC: 50 MG/DL (ref 8–23)
BUN/CREAT SERPL: 29.1 (ref 7–25)
CALCIUM SPEC-SCNC: 8.8 MG/DL (ref 8.6–10.5)
CHLORIDE SERPL-SCNC: 98 MMOL/L (ref 98–107)
CO2 SERPL-SCNC: 30.6 MMOL/L (ref 22–29)
CREAT SERPL-MCNC: 1.72 MG/DL (ref 0.76–1.27)
DEPRECATED RDW RBC AUTO: 45.1 FL (ref 37–54)
EGFRCR SERPLBLD CKD-EPI 2021: 41.2 ML/MIN/1.73
EOSINOPHIL # BLD AUTO: 0.01 10*3/MM3 (ref 0–0.4)
EOSINOPHIL NFR BLD AUTO: 0.1 % (ref 0.3–6.2)
ERYTHROCYTE [DISTWIDTH] IN BLOOD BY AUTOMATED COUNT: 13.2 % (ref 12.3–15.4)
GLUCOSE BLDC GLUCOMTR-MCNC: 189 MG/DL (ref 70–130)
GLUCOSE BLDC GLUCOMTR-MCNC: 205 MG/DL (ref 70–130)
GLUCOSE SERPL-MCNC: 206 MG/DL (ref 65–99)
HCT VFR BLD AUTO: 34.9 % (ref 37.5–51)
HGB BLD-MCNC: 11.1 G/DL (ref 13–17.7)
IMM GRANULOCYTES # BLD AUTO: 0.19 10*3/MM3 (ref 0–0.05)
IMM GRANULOCYTES NFR BLD AUTO: 1.7 % (ref 0–0.5)
LYMPHOCYTES # BLD AUTO: 0.73 10*3/MM3 (ref 0.7–3.1)
LYMPHOCYTES NFR BLD AUTO: 6.7 % (ref 19.6–45.3)
MCH RBC QN AUTO: 29.4 PG (ref 26.6–33)
MCHC RBC AUTO-ENTMCNC: 31.8 G/DL (ref 31.5–35.7)
MCV RBC AUTO: 92.3 FL (ref 79–97)
MONOCYTES # BLD AUTO: 0.45 10*3/MM3 (ref 0.1–0.9)
MONOCYTES NFR BLD AUTO: 4.1 % (ref 5–12)
NEUTROPHILS NFR BLD AUTO: 87.3 % (ref 42.7–76)
NEUTROPHILS NFR BLD AUTO: 9.51 10*3/MM3 (ref 1.7–7)
NRBC BLD AUTO-RTO: 0 /100 WBC (ref 0–0.2)
PLATELET # BLD AUTO: 283 10*3/MM3 (ref 140–450)
PMV BLD AUTO: 11.3 FL (ref 6–12)
POTASSIUM SERPL-SCNC: 4.2 MMOL/L (ref 3.5–5.2)
RBC # BLD AUTO: 3.78 10*6/MM3 (ref 4.14–5.8)
SODIUM SERPL-SCNC: 139 MMOL/L (ref 136–145)
WBC NRBC COR # BLD AUTO: 10.9 10*3/MM3 (ref 3.4–10.8)

## 2024-02-11 PROCEDURE — 85025 COMPLETE CBC W/AUTO DIFF WBC: CPT | Performed by: INTERNAL MEDICINE

## 2024-02-11 PROCEDURE — 80048 BASIC METABOLIC PNL TOTAL CA: CPT | Performed by: INTERNAL MEDICINE

## 2024-02-11 PROCEDURE — 94799 UNLISTED PULMONARY SVC/PX: CPT

## 2024-02-11 PROCEDURE — 63710000001 INSULIN ASPART PER 5 UNITS: Performed by: INTERNAL MEDICINE

## 2024-02-11 PROCEDURE — 82948 REAGENT STRIP/BLOOD GLUCOSE: CPT | Performed by: INTERNAL MEDICINE

## 2024-02-11 PROCEDURE — 94660 CPAP INITIATION&MGMT: CPT

## 2024-02-11 PROCEDURE — 94664 DEMO&/EVAL PT USE INHALER: CPT

## 2024-02-11 PROCEDURE — 25010000002 METHYLPREDNISOLONE PER 40 MG: Performed by: INTERNAL MEDICINE

## 2024-02-11 PROCEDURE — 25010000002 CEFTRIAXONE SODIUM-DEXTROSE 2-2.22 GM-%(50ML) RECONSTITUTED SOLUTION: Performed by: INTERNAL MEDICINE

## 2024-02-11 PROCEDURE — 63710000001 INSULIN DETEMIR PER 5 UNITS: Performed by: INTERNAL MEDICINE

## 2024-02-11 PROCEDURE — 82948 REAGENT STRIP/BLOOD GLUCOSE: CPT

## 2024-02-11 RX ORDER — NEOMYCIN SULFATE, POLYMYXIN B SULFATE, AND DEXAMETHASONE 3.5; 10000; 1 MG/G; [USP'U]/G; MG/G
OINTMENT OPHTHALMIC EVERY 6 HOURS SCHEDULED
Start: 2024-02-11 | End: 2024-02-13

## 2024-02-11 RX ORDER — TAMSULOSIN HYDROCHLORIDE 0.4 MG/1
0.4 CAPSULE ORAL DAILY
Qty: 30 CAPSULE | Refills: 6 | Status: SHIPPED | OUTPATIENT
Start: 2024-02-12

## 2024-02-11 RX ORDER — LISINOPRIL 40 MG/1
40 TABLET ORAL DAILY
Qty: 90 TABLET | Refills: 3 | Status: SHIPPED | OUTPATIENT
Start: 2024-02-11

## 2024-02-11 RX ADMIN — TAMSULOSIN HYDROCHLORIDE 0.4 MG: 0.4 CAPSULE ORAL at 08:31

## 2024-02-11 RX ADMIN — FLUOXETINE HYDROCHLORIDE 20 MG: 20 CAPSULE ORAL at 08:31

## 2024-02-11 RX ADMIN — INSULIN ASPART 4 UNITS: 100 INJECTION, SOLUTION INTRAVENOUS; SUBCUTANEOUS at 08:31

## 2024-02-11 RX ADMIN — HYDROCODONE BITARTRATE AND ACETAMINOPHEN 1 TABLET: 7.5; 325 TABLET ORAL at 06:51

## 2024-02-11 RX ADMIN — IPRATROPIUM BROMIDE AND ALBUTEROL SULFATE 3 ML: .5; 3 SOLUTION RESPIRATORY (INHALATION) at 00:28

## 2024-02-11 RX ADMIN — APIXABAN 5 MG: 5 TABLET, FILM COATED ORAL at 08:31

## 2024-02-11 RX ADMIN — CEFTRIAXONE 2000 MG: 2 INJECTION, SOLUTION INTRAVENOUS at 01:06

## 2024-02-11 RX ADMIN — LISINOPRIL 5 MG: 5 TABLET ORAL at 08:31

## 2024-02-11 RX ADMIN — INSULIN ASPART 5 UNITS: 100 INJECTION, SOLUTION INTRAVENOUS; SUBCUTANEOUS at 12:11

## 2024-02-11 RX ADMIN — PANTOPRAZOLE SODIUM 40 MG: 40 TABLET, DELAYED RELEASE ORAL at 05:47

## 2024-02-11 RX ADMIN — AMIODARONE HYDROCHLORIDE 200 MG: 200 TABLET ORAL at 08:31

## 2024-02-11 RX ADMIN — HYDROCODONE BITARTRATE AND ACETAMINOPHEN 1 TABLET: 7.5; 325 TABLET ORAL at 13:02

## 2024-02-11 RX ADMIN — IPRATROPIUM BROMIDE AND ALBUTEROL SULFATE 3 ML: .5; 3 SOLUTION RESPIRATORY (INHALATION) at 13:15

## 2024-02-11 RX ADMIN — METHYLPREDNISOLONE SODIUM SUCCINATE 40 MG: 40 INJECTION, POWDER, FOR SOLUTION INTRAMUSCULAR; INTRAVENOUS at 08:32

## 2024-02-11 RX ADMIN — Medication 10 ML: at 08:32

## 2024-02-11 RX ADMIN — GUAIFENESIN 600 MG: 600 TABLET, EXTENDED RELEASE ORAL at 08:31

## 2024-02-11 RX ADMIN — HYDROCODONE BITARTRATE AND ACETAMINOPHEN 1 TABLET: 7.5; 325 TABLET ORAL at 01:07

## 2024-02-11 RX ADMIN — NEOMYCIN SULFATE, POLYMYXIN B SULFATE, AND DEXAMETHASONE: 3.5; 10000; 1 OINTMENT OPHTHALMIC at 12:11

## 2024-02-11 RX ADMIN — ACETYLCYSTEINE 3 ML: 200 SOLUTION ORAL; RESPIRATORY (INHALATION) at 06:53

## 2024-02-11 RX ADMIN — ACETYLCYSTEINE 3 ML: 200 SOLUTION ORAL; RESPIRATORY (INHALATION) at 13:15

## 2024-02-11 RX ADMIN — NEOMYCIN SULFATE, POLYMYXIN B SULFATE, AND DEXAMETHASONE: 3.5; 10000; 1 OINTMENT OPHTHALMIC at 05:48

## 2024-02-11 RX ADMIN — INSULIN DETEMIR 10 UNITS: 100 INJECTION, SOLUTION SUBCUTANEOUS at 08:31

## 2024-02-11 RX ADMIN — SPIRONOLACTONE 25 MG: 25 TABLET ORAL at 08:31

## 2024-02-11 RX ADMIN — IPRATROPIUM BROMIDE AND ALBUTEROL SULFATE 3 ML: .5; 3 SOLUTION RESPIRATORY (INHALATION) at 06:53

## 2024-02-11 RX ADMIN — METOPROLOL SUCCINATE 25 MG: 25 TABLET, EXTENDED RELEASE ORAL at 08:31

## 2024-02-11 NOTE — OUTREACH NOTE
Prep Survey      Flowsheet Row Responses   [facility] facility patient discharged from? [facility]   Is LACE score < 7 ? No   Eligibility Readm Mgmt   Discharge diagnosis Acute hypoxic respiratory failure   Does the patient have one of the following disease processes/diagnoses(primary or secondary)? Other   Does the patient have Home health ordered? No   Is there a DME ordered? No   Prep survey completed? Yes            Leonora NELSON - Registered Nurse

## 2024-02-13 ENCOUNTER — READMISSION MANAGEMENT (OUTPATIENT)
Dept: CALL CENTER | Facility: HOSPITAL | Age: 74
End: 2024-02-13
Payer: MEDICARE

## 2024-02-13 LAB
BACTERIA SPEC AEROBE CULT: NORMAL
BACTERIA SPEC AEROBE CULT: NORMAL

## 2024-02-16 ENCOUNTER — READMISSION MANAGEMENT (OUTPATIENT)
Dept: CALL CENTER | Facility: HOSPITAL | Age: 74
End: 2024-02-16
Payer: MEDICARE

## 2024-03-28 ENCOUNTER — APPOINTMENT (OUTPATIENT)
Dept: GENERAL RADIOLOGY | Facility: HOSPITAL | Age: 74
End: 2024-03-28
Payer: MEDICARE

## 2024-03-28 ENCOUNTER — HOSPITAL ENCOUNTER (OUTPATIENT)
Facility: HOSPITAL | Age: 74
Setting detail: OBSERVATION
LOS: 1 days | Discharge: HOME | End: 2024-03-29
Attending: EMERGENCY MEDICINE | Admitting: INTERNAL MEDICINE
Payer: MEDICARE

## 2024-03-28 DIAGNOSIS — J96.01 ACUTE RESPIRATORY FAILURE WITH HYPOXIA: ICD-10-CM

## 2024-03-28 DIAGNOSIS — I50.33 ACUTE ON CHRONIC DIASTOLIC CONGESTIVE HEART FAILURE: ICD-10-CM

## 2024-03-28 DIAGNOSIS — M50.30 DDD (DEGENERATIVE DISC DISEASE), CERVICAL: ICD-10-CM

## 2024-03-28 DIAGNOSIS — I50.9 ACUTE ON CHRONIC CONGESTIVE HEART FAILURE, UNSPECIFIED HEART FAILURE TYPE: Primary | ICD-10-CM

## 2024-03-28 DIAGNOSIS — G89.4 CHRONIC PAIN SYNDROME: ICD-10-CM

## 2024-03-28 PROBLEM — J44.9 COPD (CHRONIC OBSTRUCTIVE PULMONARY DISEASE): Status: ACTIVE | Noted: 2023-12-19

## 2024-03-28 LAB
A-A DO2: 135.4 MMHG
ALBUMIN SERPL-MCNC: 3.8 G/DL (ref 3.5–5.2)
ALBUMIN/GLOB SERPL: 1.7 G/DL
ALP SERPL-CCNC: 96 U/L (ref 39–117)
ALT SERPL W P-5'-P-CCNC: 10 U/L (ref 1–41)
ANION GAP SERPL CALCULATED.3IONS-SCNC: 13 MMOL/L (ref 5–15)
ARTERIAL PATENCY WRIST A: ABNORMAL
AST SERPL-CCNC: 18 U/L (ref 1–40)
ATMOSPHERIC PRESS: 739 MMHG
BASE EXCESS BLDA CALC-SCNC: 4.8 MMOL/L (ref 0–2)
BASOPHILS # BLD AUTO: 0.04 10*3/MM3 (ref 0–0.2)
BASOPHILS NFR BLD AUTO: 0.7 % (ref 0–1.5)
BDY SITE: ABNORMAL
BILIRUB SERPL-MCNC: 0.7 MG/DL (ref 0–1.2)
BILIRUB UR QL STRIP: NEGATIVE
BUN SERPL-MCNC: 18 MG/DL (ref 8–23)
BUN/CREAT SERPL: 11.7 (ref 7–25)
CALCIUM SPEC-SCNC: 8.4 MG/DL (ref 8.6–10.5)
CHLORIDE SERPL-SCNC: 99 MMOL/L (ref 98–107)
CLARITY UR: CLEAR
CO2 SERPL-SCNC: 29 MMOL/L (ref 22–29)
COHGB MFR BLD: 1.8 % (ref 0–2)
COLOR UR: YELLOW
CREAT SERPL-MCNC: 1.54 MG/DL (ref 0.76–1.27)
DEPRECATED RDW RBC AUTO: 48.9 FL (ref 37–54)
EGFRCR SERPLBLD CKD-EPI 2021: 47 ML/MIN/1.73
EOSINOPHIL # BLD AUTO: 0.29 10*3/MM3 (ref 0–0.4)
EOSINOPHIL NFR BLD AUTO: 5 % (ref 0.3–6.2)
ERYTHROCYTE [DISTWIDTH] IN BLOOD BY AUTOMATED COUNT: 14.5 % (ref 12.3–15.4)
FLUAV SUBTYP SPEC NAA+PROBE: NOT DETECTED
FLUBV RNA ISLT QL NAA+PROBE: NOT DETECTED
GEN 5 2HR TROPONIN T REFLEX: 26 NG/L
GLOBULIN UR ELPH-MCNC: 2.3 GM/DL
GLUCOSE SERPL-MCNC: 99 MG/DL (ref 65–99)
GLUCOSE UR STRIP-MCNC: NEGATIVE MG/DL
HCO3 BLDA-SCNC: 30.5 MMOL/L (ref 22–28)
HCT VFR BLD AUTO: 33 % (ref 37.5–51)
HCT VFR BLD CALC: 31.1 %
HGB BLD-MCNC: 10.2 G/DL (ref 13–17.7)
HGB UR QL STRIP.AUTO: NEGATIVE
HOLD SPECIMEN: NORMAL
HOLD SPECIMEN: NORMAL
IMM GRANULOCYTES # BLD AUTO: 0.03 10*3/MM3 (ref 0–0.05)
IMM GRANULOCYTES NFR BLD AUTO: 0.5 % (ref 0–0.5)
INHALED O2 CONCENTRATION: 40 %
KETONES UR QL STRIP: NEGATIVE
LEUKOCYTE ESTERASE UR QL STRIP.AUTO: NEGATIVE
LYMPHOCYTES # BLD AUTO: 0.72 10*3/MM3 (ref 0.7–3.1)
LYMPHOCYTES NFR BLD AUTO: 12.5 % (ref 19.6–45.3)
Lab: ABNORMAL
MCH RBC QN AUTO: 28.3 PG (ref 26.6–33)
MCHC RBC AUTO-ENTMCNC: 30.9 G/DL (ref 31.5–35.7)
MCV RBC AUTO: 91.7 FL (ref 79–97)
METHGB BLD QL: 0.4 % (ref 0–1.5)
MODALITY: ABNORMAL
MONOCYTES # BLD AUTO: 0.73 10*3/MM3 (ref 0.1–0.9)
MONOCYTES NFR BLD AUTO: 12.6 % (ref 5–12)
NEUTROPHILS NFR BLD AUTO: 3.97 10*3/MM3 (ref 1.7–7)
NEUTROPHILS NFR BLD AUTO: 68.7 % (ref 42.7–76)
NITRITE UR QL STRIP: NEGATIVE
NRBC BLD AUTO-RTO: 0 /100 WBC (ref 0–0.2)
NT-PROBNP SERPL-MCNC: 2580 PG/ML (ref 0–900)
OXYHGB MFR BLDV: 95.1 % (ref 94–99)
PCO2 BLDA: 50.2 MM HG (ref 35–45)
PCO2 TEMP ADJ BLD: ABNORMAL MM[HG]
PH BLDA: 7.39 PH UNITS (ref 7.35–7.45)
PH UR STRIP.AUTO: 7.5 [PH] (ref 5–8)
PH, TEMP CORRECTED: ABNORMAL
PLATELET # BLD AUTO: 191 10*3/MM3 (ref 140–450)
PMV BLD AUTO: 10.9 FL (ref 6–12)
PO2 BLDA: 86.2 MM HG (ref 75–100)
PO2 TEMP ADJ BLD: ABNORMAL MM[HG]
POTASSIUM SERPL-SCNC: 3.5 MMOL/L (ref 3.5–5.2)
PROT SERPL-MCNC: 6.1 G/DL (ref 6–8.5)
PROT UR QL STRIP: NEGATIVE
RBC # BLD AUTO: 3.6 10*6/MM3 (ref 4.14–5.8)
SAO2 % BLDCOA: 97.2 % (ref 94–100)
SARS-COV-2 RNA RESP QL NAA+PROBE: NOT DETECTED
SODIUM SERPL-SCNC: 141 MMOL/L (ref 136–145)
SP GR UR STRIP: 1.01 (ref 1–1.03)
T4 FREE SERPL-MCNC: 1.57 NG/DL (ref 0.93–1.7)
TROPONIN T DELTA: 1 NG/L
TROPONIN T SERPL HS-MCNC: 25 NG/L
UROBILINOGEN UR QL STRIP: NORMAL
VENTILATOR MODE: ABNORMAL
WBC NRBC COR # BLD AUTO: 5.78 10*3/MM3 (ref 3.4–10.8)
WHOLE BLOOD HOLD COAG: NORMAL
WHOLE BLOOD HOLD SPECIMEN: NORMAL

## 2024-03-28 PROCEDURE — 25010000002 HYDRALAZINE PER 20 MG: Performed by: EMERGENCY MEDICINE

## 2024-03-28 PROCEDURE — G0378 HOSPITAL OBSERVATION PER HR: HCPCS

## 2024-03-28 PROCEDURE — 83050 HGB METHEMOGLOBIN QUAN: CPT

## 2024-03-28 PROCEDURE — 85025 COMPLETE CBC W/AUTO DIFF WBC: CPT | Performed by: NURSE PRACTITIONER

## 2024-03-28 PROCEDURE — 71045 X-RAY EXAM CHEST 1 VIEW: CPT

## 2024-03-28 PROCEDURE — 84484 ASSAY OF TROPONIN QUANT: CPT | Performed by: NURSE PRACTITIONER

## 2024-03-28 PROCEDURE — 36600 WITHDRAWAL OF ARTERIAL BLOOD: CPT

## 2024-03-28 PROCEDURE — 93005 ELECTROCARDIOGRAM TRACING: CPT | Performed by: NURSE PRACTITIONER

## 2024-03-28 PROCEDURE — 82805 BLOOD GASES W/O2 SATURATION: CPT

## 2024-03-28 PROCEDURE — 83880 ASSAY OF NATRIURETIC PEPTIDE: CPT | Performed by: NURSE PRACTITIONER

## 2024-03-28 PROCEDURE — 87636 SARSCOV2 & INF A&B AMP PRB: CPT | Performed by: NURSE PRACTITIONER

## 2024-03-28 PROCEDURE — 80053 COMPREHEN METABOLIC PANEL: CPT | Performed by: NURSE PRACTITIONER

## 2024-03-28 PROCEDURE — 84439 ASSAY OF FREE THYROXINE: CPT | Performed by: INTERNAL MEDICINE

## 2024-03-28 PROCEDURE — 36415 COLL VENOUS BLD VENIPUNCTURE: CPT

## 2024-03-28 PROCEDURE — 96374 THER/PROPH/DIAG INJ IV PUSH: CPT

## 2024-03-28 PROCEDURE — 96375 TX/PRO/DX INJ NEW DRUG ADDON: CPT

## 2024-03-28 PROCEDURE — 81003 URINALYSIS AUTO W/O SCOPE: CPT | Performed by: NURSE PRACTITIONER

## 2024-03-28 PROCEDURE — 94660 CPAP INITIATION&MGMT: CPT

## 2024-03-28 PROCEDURE — 25010000002 METHYLPREDNISOLONE PER 125 MG: Performed by: NURSE PRACTITIONER

## 2024-03-28 PROCEDURE — 99285 EMERGENCY DEPT VISIT HI MDM: CPT

## 2024-03-28 PROCEDURE — 25010000002 FUROSEMIDE PER 20 MG: Performed by: NURSE PRACTITIONER

## 2024-03-28 PROCEDURE — 82375 ASSAY CARBOXYHB QUANT: CPT

## 2024-03-28 PROCEDURE — 94799 UNLISTED PULMONARY SVC/PX: CPT

## 2024-03-28 RX ORDER — SODIUM CHLORIDE 0.9 % (FLUSH) 0.9 %
10 SYRINGE (ML) INJECTION AS NEEDED
Status: DISCONTINUED | OUTPATIENT
Start: 2024-03-28 | End: 2024-03-29 | Stop reason: HOSPADM

## 2024-03-28 RX ORDER — AMLODIPINE BESYLATE 5 MG/1
10 TABLET ORAL DAILY
Status: DISCONTINUED | OUTPATIENT
Start: 2024-03-29 | End: 2024-03-29 | Stop reason: HOSPADM

## 2024-03-28 RX ORDER — FUROSEMIDE 10 MG/ML
60 INJECTION INTRAMUSCULAR; INTRAVENOUS DAILY
Status: DISCONTINUED | OUTPATIENT
Start: 2024-03-29 | End: 2024-03-29 | Stop reason: HOSPADM

## 2024-03-28 RX ORDER — FUROSEMIDE 10 MG/ML
80 INJECTION INTRAMUSCULAR; INTRAVENOUS ONCE
Status: COMPLETED | OUTPATIENT
Start: 2024-03-28 | End: 2024-03-28

## 2024-03-28 RX ORDER — BUDESONIDE, GLYCOPYRROLATE, AND FORMOTEROL FUMARATE 160; 9; 4.8 UG/1; UG/1; UG/1
2 AEROSOL, METERED RESPIRATORY (INHALATION) 2 TIMES DAILY
COMMUNITY

## 2024-03-28 RX ORDER — TAMSULOSIN HYDROCHLORIDE 0.4 MG/1
0.4 CAPSULE ORAL DAILY
Status: DISCONTINUED | OUTPATIENT
Start: 2024-03-29 | End: 2024-03-29 | Stop reason: HOSPADM

## 2024-03-28 RX ORDER — HYDRALAZINE HYDROCHLORIDE 20 MG/ML
10 INJECTION INTRAMUSCULAR; INTRAVENOUS ONCE
Status: COMPLETED | OUTPATIENT
Start: 2024-03-28 | End: 2024-03-28

## 2024-03-28 RX ORDER — FLUTICASONE PROPIONATE 50 MCG
2 SPRAY, SUSPENSION (ML) NASAL 2 TIMES DAILY
COMMUNITY

## 2024-03-28 RX ORDER — FLUTICASONE PROPIONATE 50 MCG
2 SPRAY, SUSPENSION (ML) NASAL 2 TIMES DAILY
Status: DISCONTINUED | OUTPATIENT
Start: 2024-03-28 | End: 2024-03-29 | Stop reason: HOSPADM

## 2024-03-28 RX ORDER — METHYLPREDNISOLONE SODIUM SUCCINATE 125 MG/2ML
125 INJECTION, POWDER, LYOPHILIZED, FOR SOLUTION INTRAMUSCULAR; INTRAVENOUS ONCE
Status: COMPLETED | OUTPATIENT
Start: 2024-03-28 | End: 2024-03-28

## 2024-03-28 RX ORDER — FAMOTIDINE 20 MG/1
20 TABLET, FILM COATED ORAL 2 TIMES DAILY
Status: DISCONTINUED | OUTPATIENT
Start: 2024-03-28 | End: 2024-03-28

## 2024-03-28 RX ORDER — PRAMIPEXOLE DIHYDROCHLORIDE 1 MG/1
1 TABLET ORAL NIGHTLY
COMMUNITY

## 2024-03-28 RX ORDER — METOPROLOL SUCCINATE 50 MG/1
50 TABLET, EXTENDED RELEASE ORAL DAILY
Status: DISCONTINUED | OUTPATIENT
Start: 2024-03-29 | End: 2024-03-29 | Stop reason: HOSPADM

## 2024-03-28 RX ORDER — FLUOXETINE 10 MG/1
10 CAPSULE ORAL DAILY
COMMUNITY

## 2024-03-28 RX ORDER — BACLOFEN 10 MG/1
10 TABLET ORAL 2 TIMES DAILY
Status: DISCONTINUED | OUTPATIENT
Start: 2024-03-28 | End: 2024-03-29 | Stop reason: HOSPADM

## 2024-03-28 RX ORDER — PRAMIPEXOLE DIHYDROCHLORIDE 1 MG/1
1 TABLET ORAL NIGHTLY
Status: DISCONTINUED | OUTPATIENT
Start: 2024-03-28 | End: 2024-03-29 | Stop reason: HOSPADM

## 2024-03-28 RX ORDER — FUROSEMIDE 40 MG/1
40 TABLET ORAL DAILY
Status: DISCONTINUED | OUTPATIENT
Start: 2024-03-28 | End: 2024-03-29 | Stop reason: HOSPADM

## 2024-03-28 RX ORDER — IPRATROPIUM BROMIDE AND ALBUTEROL SULFATE 2.5; .5 MG/3ML; MG/3ML
3 SOLUTION RESPIRATORY (INHALATION) 4 TIMES DAILY PRN
COMMUNITY

## 2024-03-28 RX ORDER — HYDROCODONE BITARTRATE AND ACETAMINOPHEN 7.5; 325 MG/1; MG/1
1 TABLET ORAL EVERY 6 HOURS
Status: DISCONTINUED | OUTPATIENT
Start: 2024-03-28 | End: 2024-03-29 | Stop reason: HOSPADM

## 2024-03-28 RX ORDER — BACLOFEN 10 MG/1
10 TABLET ORAL 2 TIMES DAILY
COMMUNITY

## 2024-03-28 RX ORDER — GABAPENTIN 400 MG/1
400 CAPSULE ORAL NIGHTLY
Status: DISCONTINUED | OUTPATIENT
Start: 2024-03-28 | End: 2024-03-29 | Stop reason: HOSPADM

## 2024-03-28 RX ORDER — FLUOXETINE 10 MG/1
10 CAPSULE ORAL DAILY
Status: DISCONTINUED | OUTPATIENT
Start: 2024-03-29 | End: 2024-03-29 | Stop reason: HOSPADM

## 2024-03-28 RX ORDER — OMEPRAZOLE 40 MG/1
40 CAPSULE, DELAYED RELEASE ORAL DAILY
COMMUNITY

## 2024-03-28 RX ORDER — IPRATROPIUM BROMIDE AND ALBUTEROL SULFATE 2.5; .5 MG/3ML; MG/3ML
3 SOLUTION RESPIRATORY (INHALATION) 4 TIMES DAILY PRN
Status: DISCONTINUED | OUTPATIENT
Start: 2024-03-28 | End: 2024-03-29 | Stop reason: HOSPADM

## 2024-03-28 RX ORDER — GLYCOPYRROLATE AND FORMOTEROL FUMARATE 9; 4.8 UG/1; UG/1
1 AEROSOL, METERED RESPIRATORY (INHALATION) 2 TIMES DAILY
COMMUNITY
End: 2024-03-29 | Stop reason: HOSPADM

## 2024-03-28 RX ORDER — LISINOPRIL 20 MG/1
40 TABLET ORAL DAILY
Status: DISCONTINUED | OUTPATIENT
Start: 2024-03-29 | End: 2024-03-29 | Stop reason: HOSPADM

## 2024-03-28 RX ORDER — PANTOPRAZOLE SODIUM 40 MG/1
40 TABLET, DELAYED RELEASE ORAL
Status: DISCONTINUED | OUTPATIENT
Start: 2024-03-29 | End: 2024-03-29 | Stop reason: HOSPADM

## 2024-03-28 RX ORDER — AMIODARONE HYDROCHLORIDE 200 MG/1
200 TABLET ORAL
Status: DISCONTINUED | OUTPATIENT
Start: 2024-03-29 | End: 2024-03-29 | Stop reason: HOSPADM

## 2024-03-28 RX ORDER — AMLODIPINE BESYLATE 10 MG/1
10 TABLET ORAL DAILY
COMMUNITY

## 2024-03-28 RX ADMIN — METFORMIN HYDROCHLORIDE 500 MG: 500 TABLET ORAL at 21:32

## 2024-03-28 RX ADMIN — METHYLPREDNISOLONE SODIUM SUCCINATE 125 MG: 125 INJECTION, POWDER, FOR SOLUTION INTRAMUSCULAR; INTRAVENOUS at 08:22

## 2024-03-28 RX ADMIN — FLUTICASONE PROPIONATE 2 SPRAY: 50 SPRAY, METERED NASAL at 21:33

## 2024-03-28 RX ADMIN — GABAPENTIN 400 MG: 400 CAPSULE ORAL at 21:32

## 2024-03-28 RX ADMIN — HYDRALAZINE HYDROCHLORIDE 10 MG: 20 INJECTION, SOLUTION INTRAMUSCULAR; INTRAVENOUS at 13:20

## 2024-03-28 RX ADMIN — HYDROCODONE BITARTRATE AND ACETAMINOPHEN 1 TABLET: 7.5; 325 TABLET ORAL at 18:40

## 2024-03-28 RX ADMIN — APIXABAN 5 MG: 5 TABLET, FILM COATED ORAL at 18:40

## 2024-03-28 RX ADMIN — PRAMIPEXOLE DIHYDROCHLORIDE 1 MG: 1 TABLET ORAL at 21:32

## 2024-03-28 RX ADMIN — BACLOFEN 10 MG: 10 TABLET ORAL at 21:32

## 2024-03-28 RX ADMIN — FUROSEMIDE 80 MG: 10 INJECTION, SOLUTION INTRAMUSCULAR; INTRAVENOUS at 08:23

## 2024-03-28 NOTE — Clinical Note
Level of Care: Telemetry [5]   Diagnosis: Acute on chronic congestive heart failure [648924]   Admitting Physician: GAVI BOWENS [[id number]]   Attending Physician: GAVI BOWENS [[id number]]

## 2024-03-28 NOTE — H&P
[facility]   HISTORY AND PHYSICAL      Name:  Robe Bryant   Age:  73 y.o.  Sex:  male  :  1950  MRN:  [id number]   Visit Number:  [id number]  Admission Date:  3/28/2024  Date Of Service:  24  Primary Care Physician:  Raj Sullivan MD     Admitting diagnosis:      Acute on chronic diastolic heart failure    COPD (chronic obstructive pulmonary disease)    Chronic kidney disease, stage III (moderate)    Pacemaker    Essential hypertension    Paroxysmal A-fib    Diastolic heart failure    Obstructive sleep apnea of adult    Diabetes mellitus    Restless leg syndrome    Osteoarthritis of multiple joints    Panlobular emphysema    Acute exacerbation of CHF (congestive heart failure)        History Of Presenting Illness:      Robe Bryant is a 73-year-old male with past medical history of COPD on 2 L oxygen baseline, paroxysmal atrial fibrillation on anticoagulation, heart failure preserved ejection fraction, BPH, hypertension, ECHO, diabetes, pacemaker, restless leg syndrome, osteoarthritis, degenerative disc disease, depression who presented to the ER because of significant shortness of dyspnea and unable to breathe with 2 L of oxygen that he uses at home.  In the ER workup was done he was found to have acute pulmonary edema with exacerbation of heart failure.  He was also slightly hypercapnic so he was placed on CPAP and given Lasix 80 mg IV and Solu-Medrol 125 mg IV.  Patient has been further admitted for observation and further management of the above.    Patient has diuresed more than 500 mL and he is feeling slightly better currently on 3-1/2 L his saturation is 93% his blood pressure was elevated earlier which has improved.  He is feeling slightly better.  He is coughing which is mainly dry denies any fever or chills.  Other review of systems unremarkable except as stated above.  X-ray and blood reports have been reviewed as below    Review Of Systems:      The following systems were reviewed and negative;  constitution, eyes, ENT, respiratory, cardiovascular, gastrointestinal, genitourinary, musculoskeletal, neurological and behavioral/psych,  Skin except as above.     Past Medical History:    Past Medical History:   Diagnosis Date    Anxiety and depression     Arthritis     Backache     20 years    Benign prostatic hyperplasia     Bladder trauma     Cervicalgia     Chronic kidney disease     Cr up to 2.1    Coronary artery disease     Diabetes mellitus     15 years--    Emphysema of lung     Erectile dysfunction     Eye exam, routine 2015    FH: colonic polyps     Gout     for 10 years--    History of echocardiogram 09/15/2014    History of tobacco use     with cessation x7 years    Hyperlipidemia     Hypertension     Impaired functional mobility, balance, gait, and endurance     Migraine     Myocardial infarction     h/o coronary artery stenting--    Prostate cancer     Restless leg syndrome     20 years    Sleep apnea     12 years; uses a Bi-Pap    Stroke     Syncope 04/28/2017    Warfarin anticoagulation 09/14/2016       Past Surgical history:    Past Surgical History:   Procedure Laterality Date    BLADDER SURGERY      CARDIAC CATHETERIZATION  03/15/2013    revealing widely patent stents of the left circumflex and ramus intermedius coronary arteries, no significant disease is seen in any territory    CARDIAC CATHETERIZATION Left 9/30/2019    Procedure: Left Heart Cath;  Surgeon: Arelis Tucker MD;  Location:  [facility];  Service: Cardiology    CARDIAC ELECTROPHYSIOLOGY PROCEDURE N/A 5/10/2021    Procedure: PPM battery change;  Surgeon: Arelis Tucker MD;  Location:  [facility];  Service: Cardiology;  Laterality: N/A;    CARDIAC PACEMAKER PLACEMENT  2012    CATARACT EXTRACTION      COLONOSCOPY  2004    No family history of colon cancer.      COLONOSCOPY  2015    HERNIA REPAIR      Type unknown    PACEMAKER  IMPLANTATION      PROSTATE SURGERY         Social History:    Social History     Socioeconomic History    Marital status:    Tobacco Use    Smoking status: Former     Current packs/day: 0.00     Average packs/day: 1 pack/day for 30.0 years (30.0 ttl pk-yrs)     Types: Cigarettes     Start date: 8/15/1971     Quit date: 8/15/2001     Years since quittin.6    Smokeless tobacco: Never    Tobacco comments:     quit 16 years ago   Vaping Use    Vaping status: Never Used   Substance and Sexual Activity    Alcohol use: No    Drug use: No    Sexual activity: Defer       Family History:    Family History   Problem Relation Age of Onset    Cancer Mother     Diabetes Mother     Hypertension Mother     Stroke Mother     Stomach cancer Mother     Heart disease Mother     Stomach cancer Father     Cancer Father     Lung cancer Father          Allergies:      Patient has no known allergies.    Home Medications:    Prior to Admission Medications       Prescriptions Last Dose Informant Patient Reported? Taking?    amiodarone (PACERONE) 200 MG tablet 3/28/2024  No Yes    Take 1 tablet by mouth Daily.    amLODIPine (NORVASC) 10 MG tablet 3/28/2024 Provider's Office Yes Yes    Take 1 tablet by mouth Daily.    baclofen (LIORESAL) 10 MG tablet 3/28/2024 Provider's Office Yes Yes    Take 1 tablet by mouth 2 (Two) Times a Day.    Budeson-Glycopyrrol-Formoterol (Breztri Aerosphere) 160-9-4.8 MCG/ACT aerosol inhaler 3/28/2024 Provider's Office Yes Yes    Inhale 2 puffs 2 (Two) Times a Day.    diclofenac (VOLTAREN) 75 MG EC tablet 3/28/2024  Yes Yes    Take 1 tablet by mouth 2 (Two) Times a Day.    Eliquis 5 MG tablet tablet 3/28/2024  No Yes    TAKE 1 TABLET EVERY 12 HOURS    famotidine (PEPCID) 20 MG tablet 3/28/2024  Yes Yes    Take 1 tablet by mouth 2 (Two) Times a Day.    FLUoxetine (PROzac) 10 MG capsule 3/28/2024 Provider's Office Yes Yes    Take 1 capsule by mouth Daily.    fluticasone (FLONASE) 50 MCG/ACT nasal spray  3/28/2024 Provider's Office Yes Yes    2 sprays into the nostril(s) as directed by provider 2 (Two) Times a Day.    furosemide (LASIX) 40 MG tablet 3/28/2024  Yes Yes    Take 1 tablet by mouth Daily.    gabapentin (NEURONTIN) 400 MG capsule 3/27/2024  No Yes    Take 1 capsule by mouth Every Night.    Glycopyrrolate-Formoterol (Bevespi Aerosphere) 9-4.8 MCG/ACT aerosol 3/28/2024 Provider's Office Yes Yes    Inhale 1 spray 2 (Two) Times a Day.    HYDROcodone-acetaminophen (NORCO) 7.5-325 MG per tablet 3/28/2024  Yes Yes    Take 1 tablet by mouth Every 6 (Six) Hours.    lisinopril (PRINIVIL,ZESTRIL) 40 MG tablet 3/28/2024  No Yes    Take 1 tablet by mouth Daily.    metFORMIN (GLUCOPHAGE) 500 MG tablet 3/28/2024  Yes Yes    Take 1 tablet by mouth Every 12 (Twelve) Hours.    metoprolol succinate XL (Toprol XL) 50 MG 24 hr tablet 3/28/2024  No Yes    Take 1 tablet by mouth Daily.    Nebulizer misc 3/28/2024  No Yes    1 each Every 4 (Four) Hours As Needed (shortness of breath).    O2 (OXYGEN) 3/28/2024 Self Yes Yes    Inhale 2 L/min Continuous As Needed (With activity).    omeprazole (priLOSEC) 40 MG capsule 3/28/2024  No Yes    Take 1 capsule by mouth Daily.    tamsulosin (FLOMAX) 0.4 MG capsule 24 hr capsule 3/28/2024  No Yes    Take 1 capsule by mouth Daily.    atorvastatin (LIPITOR) 20 MG tablet   No No    Take 1 tablet by mouth Every Night.    Blood Glucose Monitoring Suppl (TRUE METRIX AIR GLUCOSE METER) w/Device kit Unknown  No No    1 each by In Vitro route 2 (two) times a day. E11.9    glucose monitor monitoring kit Unknown  No No    1 each Daily. E11.9    ipratropium-albuterol (DUO-NEB) 0.5-2.5 mg/3 ml nebulizer Unknown Provider's Office Yes No    Take 3 mL by nebulization 4 (Four) Times a Day As Needed for Wheezing.    Lancet Device misc Unknown  No No    1 each Daily. E11.9    Misc. Devices misc Unknown  No No    Bipap tubing.    omeprazole (priLOSEC) 40 MG capsule  Provider's Office Yes No    Take 1 capsule  by mouth Daily.    pramipexole (MIRAPEX) 1 MG tablet  Provider's Office Yes No    Take 1 tablet by mouth Every Night.    TRUEplus Lancets 33G misc Unknown  No No    1 each by Other route Daily. E11.9                   Vital Signs:    Temp:  [97.5 °F (36.4 °C)-98.3 °F (36.8 °C)] 97.5 °F (36.4 °C)  Heart Rate:  [60-64] 64  Resp:  [16-26] 18  BP: (143-199)/() 143/65        03/28/24  0758   Weight: 98 kg (216 lb 0.8 oz)       Body mass index is 34.87 kg/m².    Physical Exam:      General Appearance:    Alert and cooperative,  And lying comfortably in bed   Head:    Atraumatic and normocephalic, without obvious abnormality.   Eyes:            PERRLA, conjunctivae and sclerae normal, no Icterus. No pallor. Extraocular movements are within normal limits.   Ears:    Ears appear intact with no abnormalities noted.   Throat:   No oral lesions, no thrush, oral mucosa moist.   Neck:   Supple, trachea midline, no thyromegaly, no carotid bruit, no lymphadenopathy   Lungs:    Breath sounds heard bilaterally equally.  Fine crepitations in both bases       Heart:    Normal S1 and S2, no murmur, no gallop, no rub. No JVD   Abdomen:     Normal bowel sounds, no masses, no organomegaly. Soft   nontender, nondistended, no guarding, no rebound    tenderness   Extremities:   Moves all extremities well, no edema, no cyanosis, no          clubbing   Skin:   No  bruising or rash   Neurologic:   Cranial nerves 2 - 12 grossly intact, sensation intact, Motor power is normal and equal bilaterally.       EKG:      Paroxysmal A-fib with rate in the 104 range    Labs:    Lab Results (last 24 hours)       Procedure Component Value Units Date/Time    High Sensitivity Troponin T 2Hr [[id number]]  (Abnormal) Collected: 03/28/24 1014    Specimen: Blood Updated: 03/28/24 1038     HS Troponin T 26 ng/L      Troponin T Delta 1 ng/L     Narrative:      High Sensitive Troponin T Reference Range:  <14.0 ng/L- Negative Female for AMI  <22.0 ng/L- Negative  Male for AMI  >=14 - Abnormal Female indicating possible myocardial injury.  >=22 - Abnormal Male indicating possible myocardial injury.   Clinicians would have to utilize clinical acumen, EKG, Troponin, and serial changes to determine if it is an Acute Myocardial Infarction or myocardial injury due to an underlying chronic condition.         Urinalysis With Microscopic If Indicated (No Culture) - Urine, Clean Catch [[id number]]  (Normal) Collected: 03/28/24 1003    Specimen: Urine, Clean Catch Updated: 03/28/24 1009     Color, UA Yellow     Appearance, UA Clear     pH, UA 7.5     Specific Gravity, UA 1.006     Glucose, UA Negative     Ketones, UA Negative     Bilirubin, UA Negative     Blood, UA Negative     Protein, UA Negative     Leuk Esterase, UA Negative     Nitrite, UA Negative     Urobilinogen, UA 0.2 E.U./dL    Narrative:      Urine microscopic not indicated.    Jadwin Draw [[id number]] Collected: 03/28/24 0815    Specimen: Blood Updated: 03/28/24 0931    Narrative:      The following orders were created for panel order Jadwin Draw.  Procedure                               Abnormality         Status                     ---------                               -----------         ------                     Green Top (Gel)[[id number]]                                  Final result               Lavender Top[[id number]]                                     Final result               Gold Top - SST[[id number]]                                   Final result               Light Blue Top[[id number]]                                   Final result                 Please view results for these tests on the individual orders.    Green Top (Gel) [[id number]] Collected: 03/28/24 0815    Specimen: Blood Updated: 03/28/24 0931     Extra Tube Hold for add-ons.     Comment: Auto resulted.       Lavender Top [[id number]] Collected: 03/28/24 0815    Specimen: Blood Updated: 03/28/24 0931     Extra Tube hold for add-on     Comment:  Auto resulted       Gold Top - SST [[id number]] Collected: 03/28/24 0815    Specimen: Blood Updated: 03/28/24 0931     Extra Tube Hold for add-ons.     Comment: Auto resulted.       Light Blue Top [[id number]] Collected: 03/28/24 0815    Specimen: Blood Updated: 03/28/24 0931     Extra Tube Hold for add-ons.     Comment: Auto resulted       BNP [[id number]]  (Abnormal) Collected: 03/28/24 0815    Specimen: Blood Updated: 03/28/24 0906     proBNP 2,580.0 pg/mL     Narrative:      This assay is used as an aid in the diagnosis of individuals suspected of having heart failure. It can be used as an aid in the diagnosis of acute decompensated heart failure (ADHF) in patients presenting with signs and symptoms of ADHF to the emergency department (ED). In addition, NT-proBNP of <300 pg/mL indicates ADHF is not likely.    Age Range Result Interpretation  NT-proBNP Concentration (pg/mL:      <50             Positive            >450                   Gray                 300-450                    Negative             <300    50-75           Positive            >900                  Gray                300-900                  Negative            <300      >75             Positive            >1800                  Gray                300-1800                  Negative            <300    High Sensitivity Troponin T [[id number]]  (Abnormal) Collected: 03/28/24 0815    Specimen: Blood Updated: 03/28/24 0900     HS Troponin T 25 ng/L     Narrative:      High Sensitive Troponin T Reference Range:  <14.0 ng/L- Negative Female for AMI  <22.0 ng/L- Negative Male for AMI  >=14 - Abnormal Female indicating possible myocardial injury.  >=22 - Abnormal Male indicating possible myocardial injury.   Clinicians would have to utilize clinical acumen, EKG, Troponin, and serial changes to determine if it is an Acute Myocardial Infarction or myocardial injury due to an underlying chronic condition.         Comprehensive Metabolic Panel [[id number]]   (Abnormal) Collected: 03/28/24 0815    Specimen: Blood Updated: 03/28/24 0857     Glucose 99 mg/dL      BUN 18 mg/dL      Creatinine 1.54 mg/dL      Sodium 141 mmol/L      Potassium 3.5 mmol/L      Chloride 99 mmol/L      CO2 29.0 mmol/L      Calcium 8.4 mg/dL      Total Protein 6.1 g/dL      Albumin 3.8 g/dL      ALT (SGPT) 10 U/L      AST (SGOT) 18 U/L      Alkaline Phosphatase 96 U/L      Total Bilirubin 0.7 mg/dL      Globulin 2.3 gm/dL      A/G Ratio 1.7 g/dL      BUN/Creatinine Ratio 11.7     Anion Gap 13.0 mmol/L      eGFR 47.0 mL/min/1.73     Narrative:      GFR Normal >60  Chronic Kidney Disease <60  Kidney Failure <15    The GFR formula is only valid for adults with stable renal function between ages 18 and 70.    COVID PRE-OP / PRE-PROCEDURE SCREENING ORDER (NO ISOLATION) - Swab, Nasopharynx [[id number]]  (Normal) Collected: 03/28/24 0816    Specimen: Swab from Nasopharynx Updated: 03/28/24 0842    Narrative:      The following orders were created for panel order COVID PRE-OP / PRE-PROCEDURE SCREENING ORDER (NO ISOLATION) - Swab, Nasopharynx.  Procedure                               Abnormality         Status                     ---------                               -----------         ------                     COVID-19 and FLU A/B PCR...[[id number]]  Normal              Final result                 Please view results for these tests on the individual orders.    COVID-19 and FLU A/B PCR, 1 HR TAT - Swab, Nasopharynx [[id number]]  (Normal) Collected: 03/28/24 0816    Specimen: Swab from Nasopharynx Updated: 03/28/24 0842     COVID19 Not Detected     Influenza A PCR Not Detected     Influenza B PCR Not Detected    Narrative:      Fact sheet for providers: https://www.fda.gov/media/414573/download    Fact sheet for patients: https://www.fda.gov/media/618794/download    Test performed by PCR.    CBC & Differential [[id number]]  (Abnormal) Collected: 03/28/24 0815    Specimen: Blood Updated: 03/28/24 0825     Narrative:      The following orders were created for panel order CBC & Differential.  Procedure                               Abnormality         Status                     ---------                               -----------         ------                     CBC Auto Differential[[id number]]        Abnormal            Final result                 Please view results for these tests on the individual orders.    CBC Auto Differential [[id number]]  (Abnormal) Collected: 03/28/24 0815    Specimen: Blood Updated: 03/28/24 0825     WBC 5.78 10*3/mm3      RBC 3.60 10*6/mm3      Hemoglobin 10.2 g/dL      Hematocrit 33.0 %      MCV 91.7 fL      MCH 28.3 pg      MCHC 30.9 g/dL      RDW 14.5 %      RDW-SD 48.9 fl      MPV 10.9 fL      Platelets 191 10*3/mm3      Neutrophil % 68.7 %      Lymphocyte % 12.5 %      Monocyte % 12.6 %      Eosinophil % 5.0 %      Basophil % 0.7 %      Immature Grans % 0.5 %      Neutrophils, Absolute 3.97 10*3/mm3      Lymphocytes, Absolute 0.72 10*3/mm3      Monocytes, Absolute 0.73 10*3/mm3      Eosinophils, Absolute 0.29 10*3/mm3      Basophils, Absolute 0.04 10*3/mm3      Immature Grans, Absolute 0.03 10*3/mm3      nRBC 0.0 /100 WBC     Blood Gas, Arterial With Co-Ox [[id number]]  (Abnormal) Collected: 03/28/24 0805    Specimen: Arterial Blood Updated: 03/28/24 0806     Site Left Radial     Sudarshan's Test N/A     pH, Arterial 7.392 pH units      pCO2, Arterial 50.2 mm Hg      Comment: 83 Value above reference range        pO2, Arterial 86.2 mm Hg      HCO3, Arterial 30.5 mmol/L      Comment: 83 Value above reference range        Base Excess, Arterial 4.8 mmol/L      Comment: 83 Value above reference range        O2 Saturation, Arterial 97.2 %      Hematocrit, Blood Gas 31.1 %      Comment: 84 Value below reference range        Oxyhemoglobin 95.1 %      Methemoglobin 0.40 %      Carboxyhemoglobin 1.8 %      A-a DO2 135.4 mmHg      Barometric Pressure for Blood Gas 739 mmHg      Modality BiPap      FIO2 40 %      Ventilator Mode NA     Collected by [id number]     Comment: Meter: [id number]     :  [id number]        pH, Temp Corrected --     pCO2, Temperature Corrected --     pO2, Temperature Corrected --            Radiology:    Imaging Results (Last 72 Hours)       Procedure Component Value Units Date/Time    XR Chest 1 View [[id number]] Collected: 03/28/24 0848     Updated: 03/28/24 0900    Narrative:      PROCEDURE: XR CHEST 1 VW-     HISTORY: CHF/COPD Protocol     COMPARISON: February 8, 2024..     FINDINGS: The heart is upper limits of normal.. There are increased  interstitial markings with small bilateral pleural effusions and  bibasilar airspace disease. Left basilar airspace disease appears mildly  worsened from prior. There increased groundglass opacities bilaterally  in the mid and lower lung fields suggesting mildly worsened edema.  2-lead pacemaker noted. Aortic mural calcifications noted. There are no  acute osseous abnormalities.       Impression:      Cardiomegaly with interstitial pulmonary edema and small  bilateral pleural effusions consistent with CHF, mildly worsened from  prior..     Follow-up recommended..           This report was signed and finalized on 3/28/2024 8:57 AM by Marie Brown MD.               Assessment:    Assessment & Plan       Acute on chronic diastolic heart failure    COPD (chronic obstructive pulmonary disease)    Chronic kidney disease, stage III (moderate)    Pacemaker    Essential hypertension    Paroxysmal A-fib    Diastolic heart failure    Obstructive sleep apnea of adult    Diabetes mellitus    Restless leg syndrome    Osteoarthritis of multiple joints    Panlobular emphysema    Acute exacerbation of CHF (congestive heart failure)        Plan:     1.  Acute on chronic diastolic heart failure-will give IV diuresis after giving today's dose followed with her in the morning.  Will reevaluate he has diuresed well and if stable he may be able to be  discharged in 24 to 48 hours    2.  COPD-currently on 3 L and with diuresis that should help continue with his DuoNeb    3.  Paroxysmal L-hbl-[id number] on beta-blocker as well as Eliquis and will be continued his rate is better controlled now    Continue rest of the medication for all other medical problems and goals of treatment plan of care has been addressed.  If he gets diuresed and can be tapered down his oxygen back to 2 L and if he is stable he may be discharged in the next 24 hours    Raj Sullivan MD  03/28/24  18:13 EDT    Please note that portions of this note were completed with a voice recognition program.

## 2024-03-28 NOTE — ED PROVIDER NOTES
Pt Name: Robe Bryant  MRN: [id number]  : 1950  Date of Encounter: 3/28/2024    PCP: Raj Sullivan MD      Subjective    History of Present Illness:    Chief Complaint: Short of breath    History of Present Illness: Robe Bryant is a 73 y.o. male who presents to the ER via EMS transfer from home complaining of shortness of breath.  Patient called EMS to his home after having continual shortness of breath.  Upon arrival EMS states patient's O2 sats were 88% on 2 L.  Albuterol neb treatment was given and route which did not improve his oxygen saturations patient was placed on BiPAP by EMS.        Nurses Notes reviewed and agree, including vitals, allergies, social history and prior medical history.       Allergies:    Patient has no known allergies.    Past Medical History:   Diagnosis Date    Anxiety and depression     Arthritis     Backache     20 years    Benign prostatic hyperplasia     Bladder trauma     Cervicalgia     Chronic kidney disease     Cr up to 2.1    Coronary artery disease     Diabetes mellitus     15 years--    Emphysema of lung     Erectile dysfunction     Eye exam, routine     FH: colonic polyps     Gout     for 10 years--    History of echocardiogram 09/15/2014    History of tobacco use     with cessation x7 years    Hyperlipidemia     Hypertension     Impaired functional mobility, balance, gait, and endurance     Migraine     Myocardial infarction     h/o coronary artery stenting--    Prostate cancer     Restless leg syndrome     20 years    Sleep apnea     12 years; uses a Bi-Pap    Stroke     Syncope 2017    Warfarin anticoagulation 2016       Past Surgical History:   Procedure Laterality Date    BLADDER SURGERY      CARDIAC CATHETERIZATION  03/15/2013    revealing widely patent stents of the left circumflex and ramus intermedius coronary arteries, no significant disease is seen in any territory    CARDIAC CATHETERIZATION Left 2019    Procedure:  Left Heart Cath;  Surgeon: Arelis Tucker MD;  Location:  [facility];  Service: Cardiology    CARDIAC ELECTROPHYSIOLOGY PROCEDURE N/A 5/10/2021    Procedure: PPM battery change;  Surgeon: Arelis Tucker MD;  Location:  [facility];  Service: Cardiology;  Laterality: N/A;    CARDIAC PACEMAKER PLACEMENT      CATARACT EXTRACTION      COLONOSCOPY  2004    No family history of colon cancer.      COLONOSCOPY      HERNIA REPAIR      Type unknown    PACEMAKER IMPLANTATION      PROSTATE SURGERY         Social History     Socioeconomic History    Marital status:    Tobacco Use    Smoking status: Former     Current packs/day: 0.00     Average packs/day: 1 pack/day for 30.0 years (30.0 ttl pk-yrs)     Types: Cigarettes     Start date: 8/15/1971     Quit date: 8/15/2001     Years since quittin.6    Smokeless tobacco: Never    Tobacco comments:     quit 16 years ago   Vaping Use    Vaping status: Never Used   Substance and Sexual Activity    Alcohol use: No    Drug use: No    Sexual activity: Defer       Family History   Problem Relation Age of Onset    Cancer Mother     Diabetes Mother     Hypertension Mother     Stroke Mother     Stomach cancer Mother     Heart disease Mother     Stomach cancer Father     Cancer Father     Lung cancer Father        REVIEW OF SYSTEMS:     All systems reviewed and not pertinent unless noted.    Review of Systems   Constitutional:  Positive for fatigue.   Respiratory:  Positive for shortness of breath and wheezing.    Cardiovascular:  Positive for leg swelling.   All other systems reviewed and are negative.      Objective    Physical Exam  Vitals and nursing note reviewed.   Constitutional:       Appearance: Normal appearance. He is ill-appearing.   HENT:      Head: Normocephalic and atraumatic.   Eyes:      Extraocular Movements: Extraocular movements intact.      Pupils: Pupils are equal, round, and reactive to light.    Cardiovascular:      Rate and Rhythm: Normal rate and regular rhythm.      Pulses: Normal pulses.      Heart sounds: Normal heart sounds.   Pulmonary:      Effort: Pulmonary effort is normal.      Breath sounds: Examination of the right-lower field reveals decreased breath sounds. Examination of the left-lower field reveals decreased breath sounds. Decreased breath sounds and wheezing present.   Abdominal:      General: Bowel sounds are normal.      Palpations: Abdomen is soft.   Musculoskeletal:         General: Normal range of motion.      Cervical back: Normal range of motion and neck supple.   Skin:     General: Skin is warm and dry.      Capillary Refill: Capillary refill takes less than 2 seconds.   Neurological:      Mental Status: He is alert.      GCS: GCS eye subscore is 4. GCS verbal subscore is 5. GCS motor subscore is 6.      Sensory: Sensation is intact.      Motor: Motor function is intact.   Psychiatric:         Attention and Perception: Attention and perception normal.         Mood and Affect: Mood and affect normal.         Speech: Speech normal.               Procedures    ED Course:    ED Course as of 03/28/24 1237   Thu Mar 28, 2024   1042 Discussed case with primary care provider and he has agreed to admit patient placed him in observation status for acute on chronic congestive heart failure, acute respiratory failure with hypoxia.  Patient has been given 80 of Lasix and has diuresed some fluid will continue to monitor. [KH]      ED Course User Index  [KH] Alec Blount APRN       LAB Results:    Lab Results (last 24 hours)       Procedure Component Value Units Date/Time    Blood Gas, Arterial With Co-Ox [[id number]]  (Abnormal) Collected: 03/28/24 0805    Specimen: Arterial Blood Updated: 03/28/24 0806     Site Left Radial     Sudarshan's Test N/A     pH, Arterial 7.392 pH units      pCO2, Arterial 50.2 mm Hg      Comment: 83 Value above reference range        pO2, Arterial 86.2 mm Hg       HCO3, Arterial 30.5 mmol/L      Comment: 83 Value above reference range        Base Excess, Arterial 4.8 mmol/L      Comment: 83 Value above reference range        O2 Saturation, Arterial 97.2 %      Hematocrit, Blood Gas 31.1 %      Comment: 84 Value below reference range        Oxyhemoglobin 95.1 %      Methemoglobin 0.40 %      Carboxyhemoglobin 1.8 %      A-a DO2 135.4 mmHg      Barometric Pressure for Blood Gas 739 mmHg      Modality BiPap     FIO2 40 %      Ventilator Mode NA     Collected by [id number]     Comment: Meter: [id number]     :  [id number]        pH, Temp Corrected --     pCO2, Temperature Corrected --     pO2, Temperature Corrected --    CBC & Differential [[id number]]  (Abnormal) Collected: 03/28/24 0815    Specimen: Blood Updated: 03/28/24 0825    Narrative:      The following orders were created for panel order CBC & Differential.  Procedure                               Abnormality         Status                     ---------                               -----------         ------                     CBC Auto Differential[[id number]]        Abnormal            Final result                 Please view results for these tests on the individual orders.    Comprehensive Metabolic Panel [[id number]]  (Abnormal) Collected: 03/28/24 0815    Specimen: Blood Updated: 03/28/24 0857     Glucose 99 mg/dL      BUN 18 mg/dL      Creatinine 1.54 mg/dL      Sodium 141 mmol/L      Potassium 3.5 mmol/L      Chloride 99 mmol/L      CO2 29.0 mmol/L      Calcium 8.4 mg/dL      Total Protein 6.1 g/dL      Albumin 3.8 g/dL      ALT (SGPT) 10 U/L      AST (SGOT) 18 U/L      Alkaline Phosphatase 96 U/L      Total Bilirubin 0.7 mg/dL      Globulin 2.3 gm/dL      A/G Ratio 1.7 g/dL      BUN/Creatinine Ratio 11.7     Anion Gap 13.0 mmol/L      eGFR 47.0 mL/min/1.73     Narrative:      GFR Normal >60  Chronic Kidney Disease <60  Kidney Failure <15    The GFR formula is only valid for adults with stable renal  function between ages 18 and 70.    BNP [[id number]]  (Abnormal) Collected: 03/28/24 0815    Specimen: Blood Updated: 03/28/24 0906     proBNP 2,580.0 pg/mL     Narrative:      This assay is used as an aid in the diagnosis of individuals suspected of having heart failure. It can be used as an aid in the diagnosis of acute decompensated heart failure (ADHF) in patients presenting with signs and symptoms of ADHF to the emergency department (ED). In addition, NT-proBNP of <300 pg/mL indicates ADHF is not likely.    Age Range Result Interpretation  NT-proBNP Concentration (pg/mL:      <50             Positive            >450                   Gray                 300-450                    Negative             <300    50-75           Positive            >900                  Gray                300-900                  Negative            <300      >75             Positive            >1800                  Gray                300-1800                  Negative            <300    High Sensitivity Troponin T [[id number]]  (Abnormal) Collected: 03/28/24 0815    Specimen: Blood Updated: 03/28/24 0900     HS Troponin T 25 ng/L     Narrative:      High Sensitive Troponin T Reference Range:  <14.0 ng/L- Negative Female for AMI  <22.0 ng/L- Negative Male for AMI  >=14 - Abnormal Female indicating possible myocardial injury.  >=22 - Abnormal Male indicating possible myocardial injury.   Clinicians would have to utilize clinical acumen, EKG, Troponin, and serial changes to determine if it is an Acute Myocardial Infarction or myocardial injury due to an underlying chronic condition.         CBC Auto Differential [[id number]]  (Abnormal) Collected: 03/28/24 0815    Specimen: Blood Updated: 03/28/24 0825     WBC 5.78 10*3/mm3      RBC 3.60 10*6/mm3      Hemoglobin 10.2 g/dL      Hematocrit 33.0 %      MCV 91.7 fL      MCH 28.3 pg      MCHC 30.9 g/dL      RDW 14.5 %      RDW-SD 48.9 fl      MPV 10.9 fL      Platelets 191 10*3/mm3       Neutrophil % 68.7 %      Lymphocyte % 12.5 %      Monocyte % 12.6 %      Eosinophil % 5.0 %      Basophil % 0.7 %      Immature Grans % 0.5 %      Neutrophils, Absolute 3.97 10*3/mm3      Lymphocytes, Absolute 0.72 10*3/mm3      Monocytes, Absolute 0.73 10*3/mm3      Eosinophils, Absolute 0.29 10*3/mm3      Basophils, Absolute 0.04 10*3/mm3      Immature Grans, Absolute 0.03 10*3/mm3      nRBC 0.0 /100 WBC     COVID PRE-OP / PRE-PROCEDURE SCREENING ORDER (NO ISOLATION) - Swab, Nasopharynx [[id number]]  (Normal) Collected: 03/28/24 0816    Specimen: Swab from Nasopharynx Updated: 03/28/24 0842    Narrative:      The following orders were created for panel order COVID PRE-OP / PRE-PROCEDURE SCREENING ORDER (NO ISOLATION) - Swab, Nasopharynx.  Procedure                               Abnormality         Status                     ---------                               -----------         ------                     COVID-19 and FLU A/B PCR...[[id number]]  Normal              Final result                 Please view results for these tests on the individual orders.    COVID-19 and FLU A/B PCR, 1 HR TAT - Swab, Nasopharynx [[id number]]  (Normal) Collected: 03/28/24 0816    Specimen: Swab from Nasopharynx Updated: 03/28/24 0842     COVID19 Not Detected     Influenza A PCR Not Detected     Influenza B PCR Not Detected    Narrative:      Fact sheet for providers: https://www.fda.gov/media/672552/download    Fact sheet for patients: https://www.fda.gov/media/548296/download    Test performed by PCR.    Urinalysis With Microscopic If Indicated (No Culture) - Urine, Clean Catch [[id number]]  (Normal) Collected: 03/28/24 1003    Specimen: Urine, Clean Catch Updated: 03/28/24 1009     Color, UA Yellow     Appearance, UA Clear     pH, UA 7.5     Specific Gravity, UA 1.006     Glucose, UA Negative     Ketones, UA Negative     Bilirubin, UA Negative     Blood, UA Negative     Protein, UA Negative     Leuk Esterase, UA Negative      Nitrite, UA Negative     Urobilinogen, UA 0.2 E.U./dL    Narrative:      Urine microscopic not indicated.    High Sensitivity Troponin T 2Hr [[id number]]  (Abnormal) Collected: 03/28/24 1014    Specimen: Blood Updated: 03/28/24 1038     HS Troponin T 26 ng/L      Troponin T Delta 1 ng/L     Narrative:      High Sensitive Troponin T Reference Range:  <14.0 ng/L- Negative Female for AMI  <22.0 ng/L- Negative Male for AMI  >=14 - Abnormal Female indicating possible myocardial injury.  >=22 - Abnormal Male indicating possible myocardial injury.   Clinicians would have to utilize clinical acumen, EKG, Troponin, and serial changes to determine if it is an Acute Myocardial Infarction or myocardial injury due to an underlying chronic condition.                  If labs were ordered, I have independently reviewed the results and considered them in the diagnosis and treatment plan for the patient    RADIOLOGY    XR Chest 1 View    Result Date: 3/28/2024  PROCEDURE: XR CHEST 1 VW-  HISTORY: CHF/COPD Protocol  COMPARISON: February 8, 2024..  FINDINGS: The heart is upper limits of normal.. There are increased interstitial markings with small bilateral pleural effusions and bibasilar airspace disease. Left basilar airspace disease appears mildly worsened from prior. There increased groundglass opacities bilaterally in the mid and lower lung fields suggesting mildly worsened edema. 2-lead pacemaker noted. Aortic mural calcifications noted. There are no acute osseous abnormalities.      Impression: Cardiomegaly with interstitial pulmonary edema and small bilateral pleural effusions consistent with CHF, mildly worsened from prior..  Follow-up recommended..    This report was signed and finalized on 3/28/2024 8:57 AM by Marie Brown MD.        If I have ordered, I have independently reviewed the above noted radiographic studies.  Please see the radiologist dictation for the official interpretation    Medications given to patient in  the ER    Medications   sodium chloride 0.9 % flush 10 mL (has no administration in time range)   methylPREDNISolone sodium succinate (SOLU-Medrol) injection 125 mg (125 mg Intravenous Given 3/28/24 0822)   furosemide (LASIX) injection 80 mg (80 mg Intravenous Given 3/28/24 0823)           I have discussed all the test results with patient and available family and the plan for disposition is admission to the hospital for acute on chronic congestive heart failure, acute respiratory failure with hypoxia.      Medical Decision Making  Robe Bryant is a 73 y.o. male who presents to the ER via EMS transfer from home complaining of shortness of breath.  Patient called EMS to his home after having continual shortness of breath.  Upon arrival EMS states patient's O2 sats were 88% on 2 L.  Albuterol neb treatment was given and route which did not improve his oxygen saturations patient was placed on BiPAP by EMS.      DDX: includes but is not limited to: NSTEMI, STEMI, chest pain unspecified, congestive heart failure, acute respiratory failure, other    Problems Addressed:  Acute on chronic congestive heart failure, unspecified heart failure type: complicated acute illness or injury  Acute respiratory failure with hypoxia: complicated acute illness or injury    Amount and/or Complexity of Data Reviewed  External Data Reviewed:      Details: I have personally reviewed labs, radiology EKG and notes from patient's chart  Labs: ordered. Decision-making details documented in ED Course.     Details: I have personally reviewed and documented all results  Radiology: ordered. Decision-making details documented in ED Course.     Details: I have personally reviewed and documented all results  ECG/medicine tests: ordered. Decision-making details documented in ED Course.     Details: I have personally reviewed and documented all results  Discussion of management or test interpretation with external provider(s): Discussed  assessment, treatment and plan with ER attending, cardiology on-call, hospitalist    Risk  Prescription drug management.  Decision regarding hospitalization.  Risk Details: Patient will be admitted to the hospital by primary care provider Dr. Sullivan placed on [employer] telemetry.    31 minutes of critical care provided. This time excludes other billable procedures. Time does include preparation of documents, medical consultations, review of old records, and direct bedside care. Patient is at high risk for life-threatening deterioration due to acute respiratory failure with hypoxia, fluid overload.  Patient given multiple emergency medications for his acute respiratory failure with hypoxia prior to stabilization      Critical Care  Total time providing critical care: 31 minutes          Final diagnoses:   Acute on chronic congestive heart failure, unspecified heart failure type   Acute respiratory failure with hypoxia         Please note that portions of this document were completed using voice recognition dictation software.       Alec Blount, APRN  03/28/24 [id number]

## 2024-03-28 NOTE — CASE MANAGEMENT/SOCIAL WORK
Case Management/Social Work    Patient Name:  Robe Bryant  YOB: 1950  MRN: [id number]  Admit Date:  3/28/2024    Patient currently utilizing bi-pap; unable to complete DCP at this time.      Electronically signed by:  Dottie Phoenix RN  03/28/24 15:19 EDT

## 2024-03-29 VITALS
WEIGHT: 218.48 LBS | OXYGEN SATURATION: 94 % | DIASTOLIC BLOOD PRESSURE: 85 MMHG | SYSTOLIC BLOOD PRESSURE: 158 MMHG | HEIGHT: 66 IN | TEMPERATURE: 98.3 F | RESPIRATION RATE: 18 BRPM | HEART RATE: 63 BPM | BODY MASS INDEX: 35.11 KG/M2

## 2024-03-29 LAB
ALBUMIN SERPL-MCNC: 3.3 G/DL (ref 3.5–5.2)
ALBUMIN/GLOB SERPL: 1.4 G/DL
ALP SERPL-CCNC: 86 U/L (ref 39–117)
ALT SERPL W P-5'-P-CCNC: 8 U/L (ref 1–41)
ANION GAP SERPL CALCULATED.3IONS-SCNC: 9.5 MMOL/L (ref 5–15)
AST SERPL-CCNC: 14 U/L (ref 1–40)
BASOPHILS # BLD AUTO: 0.01 10*3/MM3 (ref 0–0.2)
BASOPHILS NFR BLD AUTO: 0.1 % (ref 0–1.5)
BILIRUB SERPL-MCNC: 0.5 MG/DL (ref 0–1.2)
BUN SERPL-MCNC: 24 MG/DL (ref 8–23)
BUN/CREAT SERPL: 17.5 (ref 7–25)
CALCIUM SPEC-SCNC: 8.5 MG/DL (ref 8.6–10.5)
CHLORIDE SERPL-SCNC: 99 MMOL/L (ref 98–107)
CO2 SERPL-SCNC: 30.5 MMOL/L (ref 22–29)
CREAT SERPL-MCNC: 1.37 MG/DL (ref 0.76–1.27)
DEPRECATED RDW RBC AUTO: 48.5 FL (ref 37–54)
EGFRCR SERPLBLD CKD-EPI 2021: 54.1 ML/MIN/1.73
EOSINOPHIL # BLD AUTO: 0.03 10*3/MM3 (ref 0–0.4)
EOSINOPHIL NFR BLD AUTO: 0.3 % (ref 0.3–6.2)
ERYTHROCYTE [DISTWIDTH] IN BLOOD BY AUTOMATED COUNT: 14.6 % (ref 12.3–15.4)
GLOBULIN UR ELPH-MCNC: 2.3 GM/DL
GLUCOSE SERPL-MCNC: 119 MG/DL (ref 65–99)
HBA1C MFR BLD: 5.7 % (ref 4.8–5.6)
HCT VFR BLD AUTO: 32 % (ref 37.5–51)
HGB BLD-MCNC: 10 G/DL (ref 13–17.7)
IMM GRANULOCYTES # BLD AUTO: 0.06 10*3/MM3 (ref 0–0.05)
IMM GRANULOCYTES NFR BLD AUTO: 0.6 % (ref 0–0.5)
LYMPHOCYTES # BLD AUTO: 0.78 10*3/MM3 (ref 0.7–3.1)
LYMPHOCYTES NFR BLD AUTO: 8.1 % (ref 19.6–45.3)
MCH RBC QN AUTO: 28.3 PG (ref 26.6–33)
MCHC RBC AUTO-ENTMCNC: 31.3 G/DL (ref 31.5–35.7)
MCV RBC AUTO: 90.7 FL (ref 79–97)
MONOCYTES # BLD AUTO: 0.77 10*3/MM3 (ref 0.1–0.9)
MONOCYTES NFR BLD AUTO: 8 % (ref 5–12)
NEUTROPHILS NFR BLD AUTO: 8.02 10*3/MM3 (ref 1.7–7)
NEUTROPHILS NFR BLD AUTO: 82.9 % (ref 42.7–76)
NRBC BLD AUTO-RTO: 0 /100 WBC (ref 0–0.2)
PLATELET # BLD AUTO: 219 10*3/MM3 (ref 140–450)
PMV BLD AUTO: 11.9 FL (ref 6–12)
POTASSIUM SERPL-SCNC: 3.8 MMOL/L (ref 3.5–5.2)
PROT SERPL-MCNC: 5.6 G/DL (ref 6–8.5)
RBC # BLD AUTO: 3.53 10*6/MM3 (ref 4.14–5.8)
SODIUM SERPL-SCNC: 139 MMOL/L (ref 136–145)
TSH SERPL DL<=0.05 MIU/L-ACNC: 0.38 UIU/ML (ref 0.27–4.2)
WBC NRBC COR # BLD AUTO: 9.67 10*3/MM3 (ref 3.4–10.8)

## 2024-03-29 PROCEDURE — 84443 ASSAY THYROID STIM HORMONE: CPT | Performed by: INTERNAL MEDICINE

## 2024-03-29 PROCEDURE — G0378 HOSPITAL OBSERVATION PER HR: HCPCS

## 2024-03-29 PROCEDURE — 94799 UNLISTED PULMONARY SVC/PX: CPT

## 2024-03-29 PROCEDURE — 25010000002 FUROSEMIDE PER 20 MG: Performed by: INTERNAL MEDICINE

## 2024-03-29 PROCEDURE — 80053 COMPREHEN METABOLIC PANEL: CPT | Performed by: INTERNAL MEDICINE

## 2024-03-29 PROCEDURE — 94660 CPAP INITIATION&MGMT: CPT

## 2024-03-29 PROCEDURE — 96376 TX/PRO/DX INJ SAME DRUG ADON: CPT

## 2024-03-29 PROCEDURE — 85025 COMPLETE CBC W/AUTO DIFF WBC: CPT | Performed by: INTERNAL MEDICINE

## 2024-03-29 PROCEDURE — 83036 HEMOGLOBIN GLYCOSYLATED A1C: CPT | Performed by: INTERNAL MEDICINE

## 2024-03-29 RX ORDER — BUMETANIDE 1 MG/1
1 TABLET ORAL DAILY
Qty: 30 TABLET | Refills: 1 | Status: SHIPPED | OUTPATIENT
Start: 2024-03-29

## 2024-03-29 RX ORDER — BUMETANIDE 0.5 MG/1
0.5 TABLET ORAL DAILY
COMMUNITY

## 2024-03-29 RX ORDER — ALBUTEROL SULFATE 90 UG/1
2 AEROSOL, METERED RESPIRATORY (INHALATION) EVERY 4 HOURS PRN
COMMUNITY

## 2024-03-29 RX ADMIN — PANTOPRAZOLE SODIUM 40 MG: 40 TABLET, DELAYED RELEASE ORAL at 06:25

## 2024-03-29 RX ADMIN — APIXABAN 5 MG: 5 TABLET, FILM COATED ORAL at 06:25

## 2024-03-29 RX ADMIN — HYDROCODONE BITARTRATE AND ACETAMINOPHEN 1 TABLET: 7.5; 325 TABLET ORAL at 06:25

## 2024-03-29 RX ADMIN — AMIODARONE HYDROCHLORIDE 200 MG: 200 TABLET ORAL at 08:37

## 2024-03-29 RX ADMIN — METFORMIN HYDROCHLORIDE 500 MG: 500 TABLET ORAL at 08:37

## 2024-03-29 RX ADMIN — TAMSULOSIN HYDROCHLORIDE 0.4 MG: 0.4 CAPSULE ORAL at 08:37

## 2024-03-29 RX ADMIN — HYDROCODONE BITARTRATE AND ACETAMINOPHEN 1 TABLET: 7.5; 325 TABLET ORAL at 00:45

## 2024-03-29 RX ADMIN — METOPROLOL SUCCINATE 50 MG: 50 TABLET, EXTENDED RELEASE ORAL at 08:37

## 2024-03-29 RX ADMIN — FUROSEMIDE 60 MG: 10 INJECTION, SOLUTION INTRAMUSCULAR; INTRAVENOUS at 08:37

## 2024-03-29 RX ADMIN — BACLOFEN 10 MG: 10 TABLET ORAL at 10:03

## 2024-03-29 RX ADMIN — FLUOXETINE 10 MG: 10 CAPSULE ORAL at 08:37

## 2024-03-29 RX ADMIN — LISINOPRIL 40 MG: 20 TABLET ORAL at 08:37

## 2024-03-29 RX ADMIN — AMLODIPINE BESYLATE 10 MG: 5 TABLET ORAL at 08:37

## 2024-03-29 RX ADMIN — Medication 10 ML: at 08:37

## 2024-03-29 RX ADMIN — FLUTICASONE PROPIONATE 2 SPRAY: 50 SPRAY, METERED NASAL at 08:38

## 2024-03-29 NOTE — CASE MANAGEMENT/SOCIAL WORK
Case Management Discharge Note           Provided Post Acute Provider List?: N/A  N/A Provider List Comment: Pt established with [facility]    Selected Continued Care - Admitted Since 3/28/2024       Destination    No services have been selected for the patient.                D[employer] Medical Equipment Coordination complete.      Service Provider Selected Services Address Phone Fax Patient Preferred    [facility] [facility] [employer] [address] [place] [address] [address] [address] [phone] --              Dialysis/Infusion    No services have been selected for the patient.                Home Medical Care    No services have been selected for the patient.                Therapy    No services have been selected for the patient.                Community Resources    No services have been selected for the patient.                Community & DME    No services have been selected for the patient.                    Transportation Services  Private: Car    Final Discharge Disposition Code: 01 - home or self-care

## 2024-03-29 NOTE — PLAN OF CARE
Goal Outcome Evaluation:              Outcome Evaluation: VSS. No acute events during this shift. No concerns voiced at this time.                               
Goal Outcome Evaluation:      Patient ready for discharge                                        
Goal Outcome Evaluation:  Plan of Care Reviewed With: patient, significant other   Blood pressure improved after administration of hydralazine in ED.  NSR on telemetry.  Alert and oriented.  No acute events since admission                                        
Goal Outcome Evaluation: Patient being discharged home today                                               
done

## 2024-03-29 NOTE — DISCHARGE SUMMARY
[facility]   INTERNAL MEDICINE DISCHARGE SUMMARY      Name:  Robe Bryant   Age:  73 y.o.  Sex:  male  :  1950  MRN:  [id number]   Visit Number:  [id number]  Primary Care Physician:  Raj Sullivan MD  Date of Discharge:  3/29/2024  Admission Date:  3/28/2024      Discharge Diagnosis:         Acute on chronic diastolic heart failure    COPD (chronic obstructive pulmonary disease)    Chronic kidney disease, stage III (moderate)    Pacemaker    Essential hypertension    Paroxysmal A-fib    Diastolic heart failure    Obstructive sleep apnea of adult    Diabetes mellitus    Restless leg syndrome    Osteoarthritis of multiple joints    Panlobular emphysema    Acute exacerbation of CHF (congestive heart failure)          Consults:     Consults       No orders found for last 30 day(s).            Procedures Performed:                 Hospital Course:   The patient was admitted on 3/28/2024  Please see H&P for details on admission HPI and ROS.  Robe Bryant is a 73-year-old male with past medical history of COPD on 2 L oxygen baseline, paroxysmal atrial fibrillation on anticoagulation, heart failure preserved ejection fraction, BPH, hypertension, ECHO, diabetes, pacemaker, restless leg syndrome, osteoarthritis, degenerative disc disease, depression who presented to the ER because of significant shortness of dyspnea and unable to breathe with 2 L of oxygen that he uses at home.  In the ER workup was done he was found to have acute pulmonary edema with exacerbation of heart failure.  He was also slightly hypercapnic so he was placed on CPAP and given Lasix 80 mg IV and Solu-Medrol 125 mg IV.  Patient has been further admitted for observation and further management of the above.     Patient has diuresed more than 500 mL and he is feeling slightly better currently on 3-1/2 L his saturation is 93% his blood pressure was elevated earlier which has improved.  He is feeling slightly better.  He  is coughing which is mainly dry denies any fever or chills.  Other review of systems unremarkable except as stated above.  X-ray and blood reports have been reviewed as below    After admission he has been aggressively diuresed and his I's and O's show that he has 4-1/2 L negative balance.  He is feeling better saturation is improved to the mid 90s on 4 L and he will be tapered down to 3 L upon discharge.  He will be switched from Lasix to Bumex 1 mg oral daily and I will see him back in the office next Wednesday at 3:15 PM.  He will benefit for a rollator so that has been ordered and will closely manage the patient outpatient from your onwards      Vital Signs:     Temp:  [97.5 °F (36.4 °C)-98.6 °F (37 °C)] 98.3 °F (36.8 °C)  Heart Rate:  [60-81] 63  Resp:  [16-18] 18  BP: (127-199)/() 158/85    Physical Exam:     General Appearance:    Alert and cooperative, not in any acute distress.   Head:    Atraumatic and normocephalic, without obvious abnormality.   Eyes:            PERRLA,  No pallor. Extraocular movements are within normal limits.   Neck:   Supple,  No lymph glands, no bruit   Lungs:     Chest shape is normal. Breath sounds heard bilaterally equally.  No crackles or wheezing.     Heart:    Normal S1 and S2, no murmur,  No JVD   Abdomen:     Normal bowel sounds, no masses, no organomegaly. Soft     nontender, no guarding, no rebound tenderness   Extremities:   Moves all extremities well, no edema, no cyanosis,    Skin:   No  bruising or rash.   Neurologic:   Grossly nonfocal and moves all extremities.        Pertinent Lab Results:     Results from last 7 days   Lab Units 03/29/24  0655 03/28/24  0815   SODIUM mmol/L 139 141   POTASSIUM mmol/L 3.8 3.5   CHLORIDE mmol/L 99 99   CO2 mmol/L 30.5* 29.0   BUN mg/dL 24* 18   CREATININE mg/dL 1.37* 1.54*   CALCIUM mg/dL 8.5* 8.4*   BILIRUBIN mg/dL 0.5 0.7   ALK PHOS U/L 86 96   ALT (SGPT) U/L 8 10   AST (SGOT) U/L 14 18   GLUCOSE mg/dL 119* 99     Results  "from last 7 days   Lab Units 03/29/24  0655 03/28/24  0815   WBC 10*3/mm3 9.67 5.78   HEMOGLOBIN g/dL 10.0* 10.2*   HEMATOCRIT % 32.0* 33.0*   PLATELETS 10*3/mm3 219 191         No results found for: \"BLOODCX\", \"URINECX\", \"WOUNDCX\", \"MRSACX\"    Pertinent Radiology Results:    Imaging Results (Most Recent)       Procedure Component Value Units Date/Time    XR Chest 1 View [[id number]] Collected: 03/28/24 0848     Updated: 03/28/24 0900    Narrative:      PROCEDURE: XR CHEST 1 VW-     HISTORY: CHF/COPD Protocol     COMPARISON: February 8, 2024..     FINDINGS: The heart is upper limits of normal.. There are increased  interstitial markings with small bilateral pleural effusions and  bibasilar airspace disease. Left basilar airspace disease appears mildly  worsened from prior. There increased groundglass opacities bilaterally  in the mid and lower lung fields suggesting mildly worsened edema.  2-lead pacemaker noted. Aortic mural calcifications noted. There are no  acute osseous abnormalities.       Impression:      Cardiomegaly with interstitial pulmonary edema and small  bilateral pleural effusions consistent with CHF, mildly worsened from  prior..     Follow-up recommended..           This report was signed and finalized on 3/28/2024 8:57 AM by Marie Brown MD.                       Discharge Disposition:      Home or Self Care    Discharge Medication:         Discharge Medications        New Medications        Instructions Start Date   bumetanide 1 MG tablet  Commonly known as: BUMEX   1 mg, Oral, Daily             Continue These Medications        Instructions Start Date   amLODIPine 10 MG tablet  Commonly known as: NORVASC   10 mg, Oral, Daily      atorvastatin 20 MG tablet  Commonly known as: LIPITOR   20 mg, Oral, Nightly      baclofen 10 MG tablet  Commonly known as: LIORESAL   10 mg, Oral, 2 Times Daily      Breztri Aerosphere 160-9-4.8 MCG/ACT aerosol inhaler  Generic drug: Budeson-Glycopyrrol-Formoterol   2 " puffs, Inhalation, 2 Times Daily      diclofenac 75 MG EC tablet  Commonly known as: VOLTAREN   75 mg, Oral, 2 Times Daily      Eliquis 5 MG tablet tablet  Generic drug: apixaban   5 mg, Oral, Every 12 Hours      famotidine 20 MG tablet  Commonly known as: PEPCID   20 mg, Oral, 2 Times Daily      FLUoxetine 10 MG capsule  Commonly known as: PROzac   10 mg, Oral, Daily      fluticasone 50 MCG/ACT nasal spray  Commonly known as: FLONASE   2 sprays, Nasal, 2 Times Daily      gabapentin 400 MG capsule  Commonly known as: NEURONTIN   400 mg, Oral, Nightly      glucose monitor monitoring kit   1 each, Does not apply, Daily, E11.9      HYDROcodone-acetaminophen 7.5-325 MG per tablet  Commonly known as: NORCO   1 tablet, Oral, Every 6 Hours      ipratropium-albuterol 0.5-2.5 mg/3 ml nebulizer  Commonly known as: DUO-NEB   3 mL, Nebulization, 4 Times Daily PRN      Lancet Device misc   1 each, Does not apply, Daily, E11.9      lisinopril 40 MG tablet  Commonly known as: PRINIVIL,ZESTRIL   40 mg, Oral, Daily      metFORMIN 500 MG tablet  Commonly known as: GLUCOPHAGE   1 tablet, Oral, Every 12 Hours Scheduled      metoprolol succinate XL 50 MG 24 hr tablet  Commonly known as: Toprol XL   50 mg, Oral, Daily      Misc. Devices misc   Bipap tubing.      Nebulizer misc   1 each, Does not apply, Every 4 Hours PRN      O2  Commonly known as: OXYGEN   2 L/min, Inhalation, Continuous PRN      omeprazole 40 MG capsule  Commonly known as: priLOSEC   40 mg, Oral, Daily      omeprazole 40 MG capsule  Commonly known as: priLOSEC   40 mg, Oral, Daily      pramipexole 1 MG tablet  Commonly known as: MIRAPEX   1 mg, Oral, Nightly      tamsulosin 0.4 MG capsule 24 hr capsule  Commonly known as: FLOMAX   0.4 mg, Oral, Daily      True Metrix Air Glucose Meter w/Device kit   1 each, In Vitro, 2 times daily, E11.9      TRUEplus Lancets 33G misc   1 each, Other, Daily, E11.9             Stop These Medications      amiodarone 200 MG  tablet  Commonly known as: PACERONE     Bevespi Aerosphere 9-4.8 MCG/ACT aerosol  Generic drug: Glycopyrrolate-Formoterol     furosemide 40 MG tablet  Commonly known as: LASIX              Discharge Diet:             Follow-up Appointments:      No future appointments.      Test Results Pending at Discharge:             Raj Sullivan MD  03/29/24  09:27 EDT    Time:  Discharge 28 min    Please note that portions of this note were completed with a voice recognition program.

## 2024-03-29 NOTE — CASE MANAGEMENT/SOCIAL WORK
Discharge Planning Assessment  [facility]     Patient Name: Robe Bryant  MRN: [id number]  Today's Date: 3/29/2024    Admit Date: 3/28/2024        Discharge Needs Assessment       Row Name 03/29/24 0947       Living Environment    People in Home child(roman), adult;spouse    Name(s) of People in Home Berny Bryant/ spouse, Shirley Kennedy/ daughter    Current Living Arrangements other (see comments)  Duplex    Duration at Residence Pt lives in duplex with spouse and daughter    Potentially Unsafe Housing Conditions none    In the past 12 months has the electric, gas, oil, or water company threatened to shut off services in your home? No    Primary Care Provided by self    Provides Primary Care For no one;other (see comments)  Assists wife as needed    Family Caregiver if Needed child(roman), adult;spouse    Family Caregiver Names Berny/spouse   Daughter, Shirley, will assist as needed    Quality of Family Relationships supportive    Able to Return to Prior Arrangements yes       Resource/Environmental Concerns    Resource/Environmental Concerns none    Transportation Concerns none       Transportation Needs    In the past 12 months, has lack of transportation kept you from medical appointments or from getting medications? no    In the past 12 months, has lack of transportation kept you from meetings, work, or from getting things needed for daily living? No       Food Insecurity    Within the past 12 months, you worried that your food would run out before you got the money to buy more. Never true    Within the past 12 months, the food you bought just didn't last and you didn't have money to get more. Never true       Transition Planning    Patient/Family Anticipates Transition to home with family    Patient/Family Anticipated Services at Transition durable medical equipment  Reports needs O2 and BiPAP checked.  Needs rollator walker    Transportation Anticipated family or friend will provide       Discharge Needs  "Assessment    Readmission Within the Last 30 Days no previous admission in last 30 days    Equipment Currently Used at Home oxygen;glucometer;nebulizer;walker, standard;shower chair;bipap  Reported needs BiPAP and O2 need to be serviced.  Has been using spouse's walker.  Agreeable needs rolling walker with wheels if possible    Concerns to be Addressed --  Reported needs O2 and BiPAP serviced    Anticipated Changes Related to Illness inability to care for someone else    Equipment Needed After Discharge rollator    Provided Post Acute Provider List? N/A    N/A Provider List Comment Pt established with [facility]    Current Discharge Risk chronically ill                   Discharge Plan       Row Name 03/29/24 1003       Plan    Plan Comments Pt awake and alert at time of visit.  O2 per NC in place.  PT confirmed his address (duplex), PCP, telephone number, contact persons:  Berny Bryant/spouse/[phone]  and Shirley Benavides/daughter/[phone].   Pt does not have a POA or Living Will.  He reported lives with spouse and daughter in duplex.   He reports uses O2 at 2L continuously.  He reported [facility] supplies O2 and BiPAP.  Pt reported needing O2 and BiPAP serviced.  Pt reported had a straight cane but it's missing, he uses his spouse's standard walker and was agreeable to needing a rolling walker or rollator (hx COPD).  Pt reports thinks he has had HH in past \"with [employer]\".  We discussed HH agencies which he had no preference.  When mentioning [facility] he recalled having them in the past.  Pt reports being independent of most ADLs.  Reports his spouse assists with medications.  Pt reported using [facility].  Meds to Beds explained and pt agreeable to using Meds to Beds at [place].  Pt reports plans to return home at [place] and has family that can transport him home.  Explained CM will contact [facility] to service O2 and BiPAP.  Pt  agreeable to using [facility] for walker if ordered.   SDOH completed.         "          Continued Care and Services - Admitted Since 3/28/2024       Durable Medical Equipment       Service Provider Request Status Selected Services Address Phone Fax Patient Preferred    [facility] Pending - No Request Sent N/A [address] [address] [phone] --                  Expected Discharge Date and Time       Expected Discharge Date Expected Discharge Time    Mar 29, 2024            Demographic Summary       Row Name 03/29/24 0938       General Information    Admission Type observation    Arrived From home    Required Notices Provided Observation Status Notice    Expected Length of Stay (LOS) 48 hr    Referral Source admission list    Reason for Consult discharge planning    Preferred Language English       Contact Information    Permission Granted to Share Info With ;family/designee    Contact Information Obtained for     Contact Information Comments Berny Bryant/spouse/[phone]    Shirley Benavides/daughter/[phone]                   Functional Status       Row Name 03/29/24 0940       Functional Status    Usual Activity Tolerance fair    Current Activity Tolerance fair    Functional Status Comments Reports tries to walk as much as he can but usually around house       Physical Activity    On average, how many days per week do you engage in moderate to strenuous exercise (like a brisk walk)? 0 days    On average, how many minutes do you engage in exercise at this level? 0 min    Number of minutes of exercise per week 0       Functional Status, IADL    Medications assistive person  Reports wife helps with medications    Meal Preparation independent    Housekeeping independent    Laundry independent    Shopping assistive person    IADL Comments Independent of most ADLs.  Spouse assists with meds.  Daughter assists with shopping       Mental Status    General Appearance WDL WDL                   Psychosocial    No documentation.                   Abuse/Neglect    No documentation.                  Legal    No documentation.                  Substance Abuse    No documentation.                  Patient Forms    No documentation.                     Angella Liu RN

## 2024-03-29 NOTE — PAYOR COMM NOTE
"TO:HUMANA  FROM:RONAL [facility]WK RN PHONE [phone]  OBSERVATION NOTIFICATION AND CLINICALS  TAX ID [id number] NPI [id number]   Todd Garnett (73 y.o. Male)       Date of Birth   1950    Social Security Number       Address   [address] [facility] [address]    Home Phone   [phone]    MRN   [id number]       Religious   Voodoo    Marital Status                               Admission Date   3/28/24    Admission Type   Emergency    Admitting Provider   Raj Sullivan MD    Attending Provider   Raj Sullivan MD    Department, Room/Bed   [facility] TELEMETRY 3, 320/1       Discharge Date       Discharge Disposition   Home or Self Care    Discharge Destination                                 Attending Provider: Raj Sullivan MD    Allergies: No Known Allergies    Isolation: None   Infection: None   Code Status: No CPR    Ht: 167.6 cm (66\")   Wt: 99.1 kg (218 lb 7.6 oz)    Admission Cmt: None   Principal Problem: Acute on chronic diastolic heart failure [I50.33]                   Active Insurance as of 3/28/2024       Primary Coverage       Payor Plan Insurance Group Employer/Plan Group    HUMANA MEDICARE REPLACEMENT HUMANA MED ADV PPO [id number]       Payor Plan Address Payor Plan Phone Number Payor Plan Fax Number Effective Dates    [address] [address] [phone]  3/1/2023 - None Entered    [facility] [id number]         Subscriber Name Subscriber Birth Date Member ID       TODD GARNETT 1950 [id number]                     Emergency Contacts        (Rel.) Home Phone Work Phone Mobile Phone    BISI GARNETT (Spouse) -- -- [phone]    VITOR MCGILL (Daughter) [phone] -- --                 History & Physical        Raj Sullivan MD at 24 1813              [facility] MEDICINE   HISTORY AND PHYSICAL      Name:  Todd Garnett   Age:  73 y.o.  Sex:  male  :  1950  MRN:  " [id number]   Visit Number:  [id number]  Admission Date:  3/28/2024  Date Of Service:  03/28/24  Primary Care Physician:  Raj Sullivan MD     Admitting diagnosis:      Acute on chronic diastolic heart failure    COPD (chronic obstructive pulmonary disease)    Chronic kidney disease, stage III (moderate)    Pacemaker    Essential hypertension    Paroxysmal A-fib    Diastolic heart failure    Obstructive sleep apnea of adult    Diabetes mellitus    Restless leg syndrome    Osteoarthritis of multiple joints    Panlobular emphysema    Acute exacerbation of CHF (congestive heart failure)        History Of Presenting Illness:      Robe Bryant is a 73-year-old male with past medical history of COPD on 2 L oxygen baseline, paroxysmal atrial fibrillation on anticoagulation, heart failure preserved ejection fraction, BPH, hypertension, ECHO, diabetes, pacemaker, restless leg syndrome, osteoarthritis, degenerative disc disease, depression who presented to the ER because of significant shortness of dyspnea and unable to breathe with 2 L of oxygen that he uses at home.  In the ER workup was done he was found to have acute pulmonary edema with exacerbation of heart failure.  He was also slightly hypercapnic so he was placed on CPAP and given Lasix 80 mg IV and Solu-Medrol 125 mg IV.  Patient has been further admitted for observation and further management of the above.    Patient has diuresed more than 500 mL and he is feeling slightly better currently on 3-1/2 L his saturation is 93% his blood pressure was elevated earlier which has improved.  He is feeling slightly better.  He is coughing which is mainly dry denies any fever or chills.  Other review of systems unremarkable except as stated above.  X-ray and blood reports have been reviewed as below    Review Of Systems:     The following systems were reviewed and negative;  constitution, eyes, ENT, respiratory, cardiovascular, gastrointestinal, genitourinary,  musculoskeletal, neurological and behavioral/psych,  Skin except as above.     Past Medical History:    Past Medical History:   Diagnosis Date    Anxiety and depression     Arthritis     Backache     20 years    Benign prostatic hyperplasia     Bladder trauma     Cervicalgia     Chronic kidney disease     Cr up to 2.1    Coronary artery disease     Diabetes mellitus     15 years--    Emphysema of lung     Erectile dysfunction     Eye exam, routine 2015    FH: colonic polyps     Gout     for 10 years--    History of echocardiogram 09/15/2014    History of tobacco use     with cessation x7 years    Hyperlipidemia     Hypertension     Impaired functional mobility, balance, gait, and endurance     Migraine     Myocardial infarction     h/o coronary artery stenting--    Prostate cancer     Restless leg syndrome     20 years    Sleep apnea     12 years; uses a Bi-Pap    Stroke     Syncope 04/28/2017    Warfarin anticoagulation 09/14/2016       Past Surgical history:    Past Surgical History:   Procedure Laterality Date    BLADDER SURGERY      CARDIAC CATHETERIZATION  03/15/2013    revealing widely patent stents of the left circumflex and ramus intermedius coronary arteries, no significant disease is seen in any territory    CARDIAC CATHETERIZATION Left 9/30/2019    Procedure: Left Heart Cath;  Surgeon: Arelis Tucker MD;  Location:  PRE[facility] CATH [facility]VASIVE LOCATION;  Service: Cardiology    CARDIAC ELECTROPHYSIOLOGY PROCEDURE N/A 5/10/2021    Procedure: PPM battery change;  Surgeon: Arelis Tucker MD;  Location:  PREM CATH INVASIVE LOCATION;  Service: Cardiology;  Laterality: N/A;    CARDIAC PACEMAKER PLACEMENT  2012    CATARACT EXTRACTION      COLONOSCOPY  2004    No family history of colon cancer.      COLONOSCOPY  2015    HERNIA REPAIR      Type unknown    PACEMAKER IMPLANTATION      PROSTATE SURGERY         Social History:    Social History     Socioeconomic History    Marital status:    Tobacco  Use    Smoking status: Former     Current packs/day: 0.00     Average packs/day: 1 pack/day for 30.0 years (30.0 ttl pk-yrs)     Types: Cigarettes     Start date: 8/15/1971     Quit date: 8/15/2001     Years since quittin.6    Smokeless tobacco: Never    Tobacco comments:     quit 16 years ago   Vaping Use    Vaping status: Never Used   Substance and Sexual Activity    Alcohol use: No    Drug use: No    Sexual activity: Defer       Family History:    Family History   Problem Relation Age of Onset    Cancer Mother     Diabetes Mother     Hypertension Mother     Stroke Mother     Stomach cancer Mother     Heart disease Mother     Stomach cancer Father     Cancer Father     Lung cancer Father          Allergies:      Patient has no known allergies.    Home Medications:    Prior to Admission Medications       Prescriptions Last Dose Informant Patient Reported? Taking?    amiodarone (PACERONE) 200 MG tablet 3/28/2024  No Yes    Take 1 tablet by mouth Daily.    amLODIPine (NORVASC) 10 MG tablet 3/28/2024 Provider's Office Yes Yes    Take 1 tablet by mouth Daily.    baclofen (LIORESAL) 10 MG tablet 3/28/2024 Provider's Office Yes Yes    Take 1 tablet by mouth 2 (Two) Times a Day.    Budeson-Glycopyrrol-Formoterol (Breztri Aerosphere) 160-9-4.8 MCG/ACT aerosol inhaler 3/28/2024 Provider's Office Yes Yes    Inhale 2 puffs 2 (Two) Times a Day.    diclofenac (VOLTAREN) 75 MG EC tablet 3/28/2024  Yes Yes    Take 1 tablet by mouth 2 (Two) Times a Day.    Eliquis 5 MG tablet tablet 3/28/2024  No Yes    TAKE 1 TABLET EVERY 12 HOURS    famotidine (PEPCID) 20 MG tablet 3/28/2024  Yes Yes    Take 1 tablet by mouth 2 (Two) Times a Day.    FLUoxetine (PROzac) 10 MG capsule 3/28/2024 Provider's Office Yes Yes    Take 1 capsule by mouth Daily.    fluticasone (FLONASE) 50 MCG/ACT nasal spray 3/28/2024 Provider's Office Yes Yes    2 sprays into the nostril(s) as directed by provider 2 (Two) Times a Day.    furosemide (LASIX) 40 MG  tablet 3/28/2024  Yes Yes    Take 1 tablet by mouth Daily.    gabapentin (NEURONTIN) 400 MG capsule 3/27/2024  No Yes    Take 1 capsule by mouth Every Night.    Glycopyrrolate-Formoterol (Bevespi Aerosphere) 9-4.8 MCG/ACT aerosol 3/28/2024 Provider's Office Yes Yes    Inhale 1 spray 2 (Two) Times a Day.    HYDROcodone-acetaminophen (NORCO) 7.5-325 MG per tablet 3/28/2024  Yes Yes    Take 1 tablet by mouth Every 6 (Six) Hours.    lisinopril (PRINIVIL,ZESTRIL) 40 MG tablet 3/28/2024  No Yes    Take 1 tablet by mouth Daily.    metFORMIN (GLUCOPHAGE) 500 MG tablet 3/28/2024  Yes Yes    Take 1 tablet by mouth Every 12 (Twelve) Hours.    metoprolol succinate XL (Toprol XL) 50 MG 24 hr tablet 3/28/2024  No Yes    Take 1 tablet by mouth Daily.    Nebulizer misc 3/28/2024  No Yes    1 each Every 4 (Four) Hours As Needed (shortness of breath).    O2 (OXYGEN) 3/28/2024 Self Yes Yes    Inhale 2 L/min Continuous As Needed (With activity).    omeprazole (priLOSEC) 40 MG capsule 3/28/2024  No Yes    Take 1 capsule by mouth Daily.    tamsulosin (FLOMAX) 0.4 MG capsule 24 hr capsule 3/28/2024  No Yes    Take 1 capsule by mouth Daily.    atorvastatin (LIPITOR) 20 MG tablet   No No    Take 1 tablet by mouth Every Night.    Blood Glucose Monitoring Suppl (TRUE METRIX AIR GLUCOSE METER) w/Device kit Unknown  No No    1 each by In Vitro route 2 (two) times a day. E11.9    glucose monitor monitoring kit Unknown  No No    1 each Daily. E11.9    ipratropium-albuterol (DUO-NEB) 0.5-2.5 mg/3 ml nebulizer Unknown Provider's Office Yes No    Take 3 mL by nebulization 4 (Four) Times a Day As Needed for Wheezing.    Lancet Device misc Unknown  No No    1 each Daily. E11.9    Misc. Devices misc Unknown  No No    Bipap tubing.    omeprazole (priLOSEC) 40 MG capsule  Provider's Office Yes No    Take 1 capsule by mouth Daily.    pramipexole (MIRAPEX) 1 MG tablet  Provider's Office Yes No    Take 1 tablet by mouth Every Night.    TRUEplus Lancets 33G  misc Unknown  No No    1 each by Other route Daily. E11.9                   Vital Signs:    Temp:  [97.5 °F (36.4 °C)-98.3 °F (36.8 °C)] 97.5 °F (36.4 °C)  Heart Rate:  [60-64] 64  Resp:  [16-26] 18  BP: (143-199)/() 143/65        03/28/24  0758   Weight: 98 kg (216 lb 0.8 oz)       Body mass index is 34.87 kg/m².    Physical Exam:      General Appearance:    Alert and cooperative,  And lying comfortably in bed   Head:    Atraumatic and normocephalic, without obvious abnormality.   Eyes:            PERRLA, conjunctivae and sclerae normal, no Icterus. No pallor. Extraocular movements are within normal limits.   Ears:    Ears appear intact with no abnormalities noted.   Throat:   No oral lesions, no thrush, oral mucosa moist.   Neck:   Supple, trachea midline, no thyromegaly, no carotid bruit, no lymphadenopathy   Lungs:    Breath sounds heard bilaterally equally.  Fine crepitations in both bases       Heart:    Normal S1 and S2, no murmur, no gallop, no rub. No JVD   Abdomen:     Normal bowel sounds, no masses, no organomegaly. Soft   nontender, nondistended, no guarding, no rebound    tenderness   Extremities:   Moves all extremities well, no edema, no cyanosis, no          clubbing   Skin:   No  bruising or rash   Neurologic:   Cranial nerves 2 - 12 grossly intact, sensation intact, Motor power is normal and equal bilaterally.       EKG:      Paroxysmal A-fib with rate in the 104 range    Labs:    Lab Results (last 24 hours)       Procedure Component Value Units Date/Time    High Sensitivity Troponin T 2Hr [[id number]]  (Abnormal) Collected: 03/28/24 1014    Specimen: Blood Updated: 03/28/24 1038     HS Troponin T 26 ng/L      Troponin T Delta 1 ng/L     Narrative:      High Sensitive Troponin T Reference Range:  <14.0 ng/L- Negative Female for AMI  <22.0 ng/L- Negative Male for AMI  >=14 - Abnormal Female indicating possible myocardial injury.  >=22 - Abnormal Male indicating possible myocardial injury.    Clinicians would have to utilize clinical acumen, EKG, Troponin, and serial changes to determine if it is an Acute Myocardial Infarction or myocardial injury due to an underlying chronic condition.         Urinalysis With Microscopic If Indicated (No Culture) - Urine, Clean Catch [[id number]]  (Normal) Collected: 03/28/24 1003    Specimen: Urine, Clean Catch Updated: 03/28/24 1009     Color, UA Yellow     Appearance, UA Clear     pH, UA 7.5     Specific Gravity, UA 1.006     Glucose, UA Negative     Ketones, UA Negative     Bilirubin, UA Negative     Blood, UA Negative     Protein, UA Negative     Leuk Esterase, UA Negative     Nitrite, UA Negative     Urobilinogen, UA 0.2 E.U./dL    Narrative:      Urine microscopic not indicated.    Citra Draw [[id number]] Collected: 03/28/24 0815    Specimen: Blood Updated: 03/28/24 0931    Narrative:      The following orders were created for panel order Citra Draw.  Procedure                               Abnormality         Status                     ---------                               -----------         ------                     Green Top (Gel)[[id number]]                                  Final result               Lavender Top[[id number]]                                     Final result               Gold Top - SST[[id number]]                                   Final result               Light Blue Top[[id number]]                                   Final result                 Please view results for these tests on the individual orders.    Green Top (Gel) [[id number]] Collected: 03/28/24 0815    Specimen: Blood Updated: 03/28/24 0931     Extra Tube Hold for add-ons.     Comment: Auto resulted.       Lavender Top [[id number]] Collected: 03/28/24 0815    Specimen: Blood Updated: 03/28/24 0931     Extra Tube hold for add-on     Comment: Auto resulted       Gold Top - SST [[id number]] Collected: 03/28/24 0815    Specimen: Blood Updated: 03/28/24 0931     Extra Tube Hold for  add-ons.     Comment: Auto resulted.       Light Blue Top [[id number]] Collected: 03/28/24 0815    Specimen: Blood Updated: 03/28/24 0931     Extra Tube Hold for add-ons.     Comment: Auto resulted       BNP [[id number]]  (Abnormal) Collected: 03/28/24 0815    Specimen: Blood Updated: 03/28/24 0906     proBNP 2,580.0 pg/mL     Narrative:      This assay is used as an aid in the diagnosis of individuals suspected of having heart failure. It can be used as an aid in the diagnosis of acute decompensated heart failure (ADHF) in patients presenting with signs and symptoms of ADHF to the emergency department (ED). In addition, NT-proBNP of <300 pg/mL indicates ADHF is not likely.    Age Range Result Interpretation  NT-proBNP Concentration (pg/mL:      <50             Positive            >450                   Gray                 300-450                    Negative             <300    50-75           Positive            >900                  Gray                300-900                  Negative            <300      >75             Positive            >1800                  Gray                300-1800                  Negative            <300    High Sensitivity Troponin T [[id number]]  (Abnormal) Collected: 03/28/24 0815    Specimen: Blood Updated: 03/28/24 0900     HS Troponin T 25 ng/L     Narrative:      High Sensitive Troponin T Reference Range:  <14.0 ng/L- Negative Female for AMI  <22.0 ng/L- Negative Male for AMI  >=14 - Abnormal Female indicating possible myocardial injury.  >=22 - Abnormal Male indicating possible myocardial injury.   Clinicians would have to utilize clinical acumen, EKG, Troponin, and serial changes to determine if it is an Acute Myocardial Infarction or myocardial injury due to an underlying chronic condition.         Comprehensive Metabolic Panel [[id number]]  (Abnormal) Collected: 03/28/24 0815    Specimen: Blood Updated: 03/28/24 0857     Glucose 99 mg/dL      BUN 18 mg/dL      Creatinine 1.54  mg/dL      Sodium 141 mmol/L      Potassium 3.5 mmol/L      Chloride 99 mmol/L      CO2 29.0 mmol/L      Calcium 8.4 mg/dL      Total Protein 6.1 g/dL      Albumin 3.8 g/dL      ALT (SGPT) 10 U/L      AST (SGOT) 18 U/L      Alkaline Phosphatase 96 U/L      Total Bilirubin 0.7 mg/dL      Globulin 2.3 gm/dL      A/G Ratio 1.7 g/dL      BUN/Creatinine Ratio 11.7     Anion Gap 13.0 mmol/L      eGFR 47.0 mL/min/1.73     Narrative:      GFR Normal >60  Chronic Kidney Disease <60  Kidney Failure <15    The GFR formula is only valid for adults with stable renal function between ages 18 and 70.    COVID PRE-OP / PRE-PROCEDURE SCREENING ORDER (NO ISOLATION) - Swab, Nasopharynx [[id number]]  (Normal) Collected: 03/28/24 0816    Specimen: Swab from Nasopharynx Updated: 03/28/24 0842    Narrative:      The following orders were created for panel order COVID PRE-OP / PRE-PROCEDURE SCREENING ORDER (NO ISOLATION) - Swab, Nasopharynx.  Procedure                               Abnormality         Status                     ---------                               -----------         ------                     COVID-19 and FLU A/B PCR...[[id number]]  Normal              Final result                 Please view results for these tests on the individual orders.    COVID-19 and FLU A/B PCR, 1 HR TAT - Swab, Nasopharynx [[id number]]  (Normal) Collected: 03/28/24 0816    Specimen: Swab from Nasopharynx Updated: 03/28/24 0842     COVID19 Not Detected     Influenza A PCR Not Detected     Influenza B PCR Not Detected    Narrative:      Fact sheet for providers: https://www.fda.gov/media/725534/download    Fact sheet for patients: https://www.fda.gov/media/575316/download    Test performed by PCR.    CBC & Differential [[id number]]  (Abnormal) Collected: 03/28/24 0815    Specimen: Blood Updated: 03/28/24 0825    Narrative:      The following orders were created for panel order CBC & Differential.  Procedure                               Abnormality          Status                     ---------                               -----------         ------                     CBC Auto Differential[[id number]]        Abnormal            Final result                 Please view results for these tests on the individual orders.    CBC Auto Differential [[id number]]  (Abnormal) Collected: 03/28/24 0815    Specimen: Blood Updated: 03/28/24 0825     WBC 5.78 10*3/mm3      RBC 3.60 10*6/mm3      Hemoglobin 10.2 g/dL      Hematocrit 33.0 %      MCV 91.7 fL      MCH 28.3 pg      MCHC 30.9 g/dL      RDW 14.5 %      RDW-SD 48.9 fl      MPV 10.9 fL      Platelets 191 10*3/mm3      Neutrophil % 68.7 %      Lymphocyte % 12.5 %      Monocyte % 12.6 %      Eosinophil % 5.0 %      Basophil % 0.7 %      Immature Grans % 0.5 %      Neutrophils, Absolute 3.97 10*3/mm3      Lymphocytes, Absolute 0.72 10*3/mm3      Monocytes, Absolute 0.73 10*3/mm3      Eosinophils, Absolute 0.29 10*3/mm3      Basophils, Absolute 0.04 10*3/mm3      Immature Grans, Absolute 0.03 10*3/mm3      nRBC 0.0 /100 WBC     Blood Gas, Arterial With Co-Ox [[id number]]  (Abnormal) Collected: 03/28/24 0805    Specimen: Arterial Blood Updated: 03/28/24 0806     Site Left Radial     Sudarshan's Test N/A     pH, Arterial 7.392 pH units      pCO2, Arterial 50.2 mm Hg      Comment: 83 Value above reference range        pO2, Arterial 86.2 mm Hg      HCO3, Arterial 30.5 mmol/L      Comment: 83 Value above reference range        Base Excess, Arterial 4.8 mmol/L      Comment: 83 Value above reference range        O2 Saturation, Arterial 97.2 %      Hematocrit, Blood Gas 31.1 %      Comment: 84 Value below reference range        Oxyhemoglobin 95.1 %      Methemoglobin 0.40 %      Carboxyhemoglobin 1.8 %      A-a DO2 135.4 mmHg      Barometric Pressure for Blood Gas 739 mmHg      Modality BiPap     FIO2 40 %      Ventilator Mode NA     Collected by [id number]     Comment: Meter: [id number]     :  [id number]        pH, Temp  Corrected --     pCO2, Temperature Corrected --     pO2, Temperature Corrected --            Radiology:    Imaging Results (Last 72 Hours)       Procedure Component Value Units Date/Time    XR Chest 1 View [[id number]] Collected: 03/28/24 0848     Updated: 03/28/24 0900    Narrative:      PROCEDURE: XR CHEST 1 VW-     HISTORY: CHF/COPD Protocol     COMPARISON: February 8, 2024..     FINDINGS: The heart is upper limits of normal.. There are increased  interstitial markings with small bilateral pleural effusions and  bibasilar airspace disease. Left basilar airspace disease appears mildly  worsened from prior. There increased groundglass opacities bilaterally  in the mid and lower lung fields suggesting mildly worsened edema.  2-lead pacemaker noted. Aortic mural calcifications noted. There are no  acute osseous abnormalities.       Impression:      Cardiomegaly with interstitial pulmonary edema and small  bilateral pleural effusions consistent with CHF, mildly worsened from  prior..     Follow-up recommended..           This report was signed and finalized on 3/28/2024 8:57 AM by Marie Brown MD.               Assessment:    Assessment & Plan      Acute on chronic diastolic heart failure    COPD (chronic obstructive pulmonary disease)    Chronic kidney disease, stage III (moderate)    Pacemaker    Essential hypertension    Paroxysmal A-fib    Diastolic heart failure    Obstructive sleep apnea of adult    Diabetes mellitus    Restless leg syndrome    Osteoarthritis of multiple joints    Panlobular emphysema    Acute exacerbation of CHF (congestive heart failure)        Plan:     1.  Acute on chronic diastolic heart failure-will give IV diuresis after giving today's dose followed with her in the morning.  Will reevaluate he has diuresed well and if stable he may be able to be discharged in 24 to 48 hours    2.  COPD-currently on 3 L and with diuresis that should help continue with his DuoNeb    3.  Paroxysmal  B-inp-yjyqlsmak on beta-blocker as well as Eliquis and will be continued his rate is better controlled now    Continue rest of the medication for all other medical problems and goals of treatment plan of care has been addressed.  If he gets diuresed and can be tapered down his oxygen back to 2 L and if he is stable he may be discharged in the next 24 hours    Raj Sullivan MD  24  18:13 EDT    Please note that portions of this note were completed with a voice recognition program.    Electronically signed by Raj Sullivan MD at 24 1818          Emergency Department Notes        Zaki Samson PCT at 24 1331       Summary:REPORT                 Transferred 3rd floor to RN to get Pt report.    Electronically signed by Zaki Samson PCT at 24 1332       Zaki Samson PCT at 24 1011          Re-Paged Dr. Sullivan at this time.     Electronically signed by Zaki Samson PCT at 24 1012       Zaki Samson PCT at 24 0931          Paged Dr. Sullivan at this time per BROOKE Cota.     Electronically signed by Zaki Samson PCT at 24 0932       Zaki Samson PCT at 24 0905       Summary:LAB                 Transferred lab to RN to report a critical result.     Electronically signed by Zaki Samson PCT at 24 0905       Alec Blount APRN at 24 0803       Attestation signed by Arthur Browning MD at 24 1617        SUPERVISE: For this patient encounter, I reviewed the APC's documentation, treatment plan, and medical decision making.  Arthur Browning MD 3/28/2024 16:17 EDT                         Pt Name: Robe Bryant  MRN: [id number]  : 1950  Date of Encounter: 3/28/2024    PCP: Raj Sullivan MD      Subjective    History of Present Illness:    Chief Complaint: Short of breath    History of Present Illness: Robe Bryant is a 73 y.o. male who presents to the ER via  EMS transfer from home complaining of shortness of breath.  Patient called EMS to his home after having continual shortness of breath.  Upon arrival EMS states patient's O2 sats were 88% on 2 L.  Albuterol neb treatment was given and route which did not improve his oxygen saturations patient was placed on BiPAP by EMS.        Nurses Notes reviewed and agree, including vitals, allergies, social history and prior medical history.       Allergies:    Patient has no known allergies.    Past Medical History:   Diagnosis Date    Anxiety and depression     Arthritis     Backache     20 years    Benign prostatic hyperplasia     Bladder trauma     Cervicalgia     Chronic kidney disease     Cr up to 2.1    Coronary artery disease     Diabetes mellitus     15 years--    Emphysema of lung     Erectile dysfunction     Eye exam, routine 2015    FH: colonic polyps     Gout     for 10 years--    History of echocardiogram 09/15/2014    History of tobacco use     with cessation x7 years    Hyperlipidemia     Hypertension     Impaired functional mobility, balance, gait, and endurance     Migraine     Myocardial infarction     h/o coronary artery stenting--    Prostate cancer     Restless leg syndrome     20 years    Sleep apnea     12 years; uses a Bi-Pap    Stroke     Syncope 04/28/2017    Warfarin anticoagulation 09/14/2016       Past Surgical History:   Procedure Laterality Date    BLADDER SURGERY      CARDIAC CATHETERIZATION  03/15/2013    revealing widely patent stents of the left circumflex and ramus intermedius coronary arteries, no significant disease is seen in any territory    CARDIAC CATHETERIZATION Left 9/30/2019    Procedure: Left Heart Cath;  Surgeon: Arelis Tucker MD;  Location:  [facility];  Service: Cardiology    CARDIAC ELECTROPHYSIOLOGY PROCEDURE N/A 5/10/2021    Procedure: PPM battery change;  Surgeon: Arelis Tucker MD;  Location:  [facility];  Service:  Cardiology;  Laterality: N/A;    CARDIAC PACEMAKER PLACEMENT  2012    CATARACT EXTRACTION      COLONOSCOPY  2004    No family history of colon cancer.      COLONOSCOPY  2015    HERNIA REPAIR      Type unknown    PACEMAKER IMPLANTATION      PROSTATE SURGERY         Social History     Socioeconomic History    Marital status:    Tobacco Use    Smoking status: Former     Current packs/day: 0.00     Average packs/day: 1 pack/day for 30.0 years (30.0 ttl pk-yrs)     Types: Cigarettes     Start date: 8/15/1971     Quit date: 8/15/2001     Years since quittin.6    Smokeless tobacco: Never    Tobacco comments:     quit 16 years ago   Vaping Use    Vaping status: Never Used   Substance and Sexual Activity    Alcohol use: No    Drug use: No    Sexual activity: Defer       Family History   Problem Relation Age of Onset    Cancer Mother     Diabetes Mother     Hypertension Mother     Stroke Mother     Stomach cancer Mother     Heart disease Mother     Stomach cancer Father     Cancer Father     Lung cancer Father        REVIEW OF SYSTEMS:     All systems reviewed and not pertinent unless noted.    Review of Systems   Constitutional:  Positive for fatigue.   Respiratory:  Positive for shortness of breath and wheezing.    Cardiovascular:  Positive for leg swelling.   All other systems reviewed and are negative.      Objective    Physical Exam  Vitals and nursing note reviewed.   Constitutional:       Appearance: Normal appearance. He is ill-appearing.   HENT:      Head: Normocephalic and atraumatic.   Eyes:      Extraocular Movements: Extraocular movements intact.      Pupils: Pupils are equal, round, and reactive to light.   Cardiovascular:      Rate and Rhythm: Normal rate and regular rhythm.      Pulses: Normal pulses.      Heart sounds: Normal heart sounds.   Pulmonary:      Effort: Pulmonary effort is normal.      Breath sounds: Examination of the right-lower field reveals decreased breath sounds. Examination of  the left-lower field reveals decreased breath sounds. Decreased breath sounds and wheezing present.   Abdominal:      General: Bowel sounds are normal.      Palpations: Abdomen is soft.   Musculoskeletal:         General: Normal range of motion.      Cervical back: Normal range of motion and neck supple.   Skin:     General: Skin is warm and dry.      Capillary Refill: Capillary refill takes less than 2 seconds.   Neurological:      Mental Status: He is alert.      GCS: GCS eye subscore is 4. GCS verbal subscore is 5. GCS motor subscore is 6.      Sensory: Sensation is intact.      Motor: Motor function is intact.   Psychiatric:         Attention and Perception: Attention and perception normal.         Mood and Affect: Mood and affect normal.         Speech: Speech normal.               Procedures    ED Course:    ED Course as of 03/28/24 1237   Thu Mar 28, 2024   1042 Discussed case with primary care provider and he has agreed to admit patient placed him in observation status for acute on chronic congestive heart failure, acute respiratory failure with hypoxia.  Patient has been given 80 of Lasix and has diuresed some fluid will continue to monitor. [KH]      ED Course User Index  [KH] Alec Blount APRN       LAB Results:    Lab Results (last 24 hours)       Procedure Component Value Units Date/Time    Blood Gas, Arterial With Co-Ox [[id number]]  (Abnormal) Collected: 03/28/24 0805    Specimen: Arterial Blood Updated: 03/28/24 0806     Site Left Radial     Sudarshan's Test N/A     pH, Arterial 7.392 pH units      pCO2, Arterial 50.2 mm Hg      Comment: 83 Value above reference range        pO2, Arterial 86.2 mm Hg      HCO3, Arterial 30.5 mmol/L      Comment: 83 Value above reference range        Base Excess, Arterial 4.8 mmol/L      Comment: 83 Value above reference range        O2 Saturation, Arterial 97.2 %      Hematocrit, Blood Gas 31.1 %      Comment: 84 Value below reference range        Oxyhemoglobin 95.1 %       Methemoglobin 0.40 %      Carboxyhemoglobin 1.8 %      A-a DO2 135.4 mmHg      Barometric Pressure for Blood Gas 739 mmHg      Modality BiPap     FIO2 40 %      Ventilator Mode NA     Collected by [id number]     Comment: Meter: [id number]     :  [id number]        pH, Temp Corrected --     pCO2, Temperature Corrected --     pO2, Temperature Corrected --    CBC & Differential [[id number]]  (Abnormal) Collected: 03/28/24 0815    Specimen: Blood Updated: 03/28/24 0825    Narrative:      The following orders were created for panel order CBC & Differential.  Procedure                               Abnormality         Status                     ---------                               -----------         ------                     CBC Auto Differential[[id number]]        Abnormal            Final result                 Please view results for these tests on the individual orders.    Comprehensive Metabolic Panel [[id number]]  (Abnormal) Collected: 03/28/24 0815    Specimen: Blood Updated: 03/28/24 0857     Glucose 99 mg/dL      BUN 18 mg/dL      Creatinine 1.54 mg/dL      Sodium 141 mmol/L      Potassium 3.5 mmol/L      Chloride 99 mmol/L      CO2 29.0 mmol/L      Calcium 8.4 mg/dL      Total Protein 6.1 g/dL      Albumin 3.8 g/dL      ALT (SGPT) 10 U/L      AST (SGOT) 18 U/L      Alkaline Phosphatase 96 U/L      Total Bilirubin 0.7 mg/dL      Globulin 2.3 gm/dL      A/G Ratio 1.7 g/dL      BUN/Creatinine Ratio 11.7     Anion Gap 13.0 mmol/L      eGFR 47.0 mL/min/1.73     Narrative:      GFR Normal >60  Chronic Kidney Disease <60  Kidney Failure <15    The GFR formula is only valid for adults with stable renal function between ages 18 and 70.    BNP [[id number]]  (Abnormal) Collected: 03/28/24 0815    Specimen: Blood Updated: 03/28/24 0906     proBNP 2,580.0 pg/mL     Narrative:      This assay is used as an aid in the diagnosis of individuals suspected of having heart failure. It can be used as an aid in the  diagnosis of acute decompensated heart failure (ADHF) in patients presenting with signs and symptoms of ADHF to the emergency department (ED). In addition, NT-proBNP of <300 pg/mL indicates ADHF is not likely.    Age Range Result Interpretation  NT-proBNP Concentration (pg/mL:      <50             Positive            >450                   Gray                 300-450                    Negative             <300    50-75           Positive            >900                  Gray                300-900                  Negative            <300      >75             Positive            >1800                  Gray                300-1800                  Negative            <300    High Sensitivity Troponin T [[id number]]  (Abnormal) Collected: 03/28/24 0815    Specimen: Blood Updated: 03/28/24 0900     HS Troponin T 25 ng/L     Narrative:      High Sensitive Troponin T Reference Range:  <14.0 ng/L- Negative Female for AMI  <22.0 ng/L- Negative Male for AMI  >=14 - Abnormal Female indicating possible myocardial injury.  >=22 - Abnormal Male indicating possible myocardial injury.   Clinicians would have to utilize clinical acumen, EKG, Troponin, and serial changes to determine if it is an Acute Myocardial Infarction or myocardial injury due to an underlying chronic condition.         CBC Auto Differential [[id number]]  (Abnormal) Collected: 03/28/24 0815    Specimen: Blood Updated: 03/28/24 0825     WBC 5.78 10*3/mm3      RBC 3.60 10*6/mm3      Hemoglobin 10.2 g/dL      Hematocrit 33.0 %      MCV 91.7 fL      MCH 28.3 pg      MCHC 30.9 g/dL      RDW 14.5 %      RDW-SD 48.9 fl      MPV 10.9 fL      Platelets 191 10*3/mm3      Neutrophil % 68.7 %      Lymphocyte % 12.5 %      Monocyte % 12.6 %      Eosinophil % 5.0 %      Basophil % 0.7 %      Immature Grans % 0.5 %      Neutrophils, Absolute 3.97 10*3/mm3      Lymphocytes, Absolute 0.72 10*3/mm3      Monocytes, Absolute 0.73 10*3/mm3      Eosinophils, Absolute 0.29 10*3/mm3       Basophils, Absolute 0.04 10*3/mm3      Immature Grans, Absolute 0.03 10*3/mm3      nRBC 0.0 /100 WBC     COVID PRE-OP / PRE-PROCEDURE SCREENING ORDER (NO ISOLATION) - Swab, Nasopharynx [[id number]]  (Normal) Collected: 03/28/24 0816    Specimen: Swab from Nasopharynx Updated: 03/28/24 0842    Narrative:      The following orders were created for panel order COVID PRE-OP / PRE-PROCEDURE SCREENING ORDER (NO ISOLATION) - Swab, Nasopharynx.  Procedure                               Abnormality         Status                     ---------                               -----------         ------                     COVID-19 and FLU A/B PCR...[[id number]]  Normal              Final result                 Please view results for these tests on the individual orders.    COVID-19 and FLU A/B PCR, 1 HR TAT - Swab, Nasopharynx [[id number]]  (Normal) Collected: 03/28/24 0816    Specimen: Swab from Nasopharynx Updated: 03/28/24 0842     COVID19 Not Detected     Influenza A PCR Not Detected     Influenza B PCR Not Detected    Narrative:      Fact sheet for providers: https://www.fda.gov/media/691490/download    Fact sheet for patients: https://www.fda.gov/media/294449/download    Test performed by PCR.    Urinalysis With Microscopic If Indicated (No Culture) - Urine, Clean Catch [[id number]]  (Normal) Collected: 03/28/24 1003    Specimen: Urine, Clean Catch Updated: 03/28/24 1009     Color, UA Yellow     Appearance, UA Clear     pH, UA 7.5     Specific Gravity, UA 1.006     Glucose, UA Negative     Ketones, UA Negative     Bilirubin, UA Negative     Blood, UA Negative     Protein, UA Negative     Leuk Esterase, UA Negative     Nitrite, UA Negative     Urobilinogen, UA 0.2 E.U./dL    Narrative:      Urine microscopic not indicated.    High Sensitivity Troponin T 2Hr [[id number]]  (Abnormal) Collected: 03/28/24 1014    Specimen: Blood Updated: 03/28/24 1038     HS Troponin T 26 ng/L      Troponin T Delta 1 ng/L     Narrative:       High Sensitive Troponin T Reference Range:  <14.0 ng/L- Negative Female for AMI  <22.0 ng/L- Negative Male for AMI  >=14 - Abnormal Female indicating possible myocardial injury.  >=22 - Abnormal Male indicating possible myocardial injury.   Clinicians would have to utilize clinical acumen, EKG, Troponin, and serial changes to determine if it is an Acute Myocardial Infarction or myocardial injury due to an underlying chronic condition.                  If labs were ordered, I have independently reviewed the results and considered them in the diagnosis and treatment plan for the patient    RADIOLOGY    XR Chest 1 View    Result Date: 3/28/2024  PROCEDURE: XR CHEST 1 VW-  HISTORY: CHF/COPD Protocol  COMPARISON: February 8, 2024..  FINDINGS: The heart is upper limits of normal.. There are increased interstitial markings with small bilateral pleural effusions and bibasilar airspace disease. Left basilar airspace disease appears mildly worsened from prior. There increased groundglass opacities bilaterally in the mid and lower lung fields suggesting mildly worsened edema. 2-lead pacemaker noted. Aortic mural calcifications noted. There are no acute osseous abnormalities.      Impression: Cardiomegaly with interstitial pulmonary edema and small bilateral pleural effusions consistent with CHF, mildly worsened from prior..  Follow-up recommended..    This report was signed and finalized on 3/28/2024 8:57 AM by Marie Brown MD.        If I have ordered, I have independently reviewed the above noted radiographic studies.  Please see the radiologist dictation for the official interpretation    Medications given to patient in the ER    Medications   sodium chloride 0.9 % flush 10 mL (has no administration in time range)   methylPREDNISolone sodium succinate (SOLU-Medrol) injection 125 mg (125 mg Intravenous Given 3/28/24 0822)   furosemide (LASIX) injection 80 mg (80 mg Intravenous Given 3/28/24 0823)           I have discussed  all the test results with patient and available family and the plan for disposition is admission to the hospital for acute on chronic congestive heart failure, acute respiratory failure with hypoxia.      Medical Decision Making  Robe Bryant is a 73 y.o. male who presents to the ER via EMS transfer from home complaining of shortness of breath.  Patient called EMS to his home after having continual shortness of breath.  Upon arrival EMS states patient's O2 sats were 88% on 2 L.  Albuterol neb treatment was given and route which did not improve his oxygen saturations patient was placed on BiPAP by EMS.      DDX: includes but is not limited to: NSTEMI, STEMI, chest pain unspecified, congestive heart failure, acute respiratory failure, other    Problems Addressed:  Acute on chronic congestive heart failure, unspecified heart failure type: complicated acute illness or injury  Acute respiratory failure with hypoxia: complicated acute illness or injury    Amount and/or Complexity of Data Reviewed  External Data Reviewed:      Details: I have personally reviewed labs, radiology EKG and notes from patient's chart  Labs: ordered. Decision-making details documented in ED Course.     Details: I have personally reviewed and documented all results  Radiology: ordered. Decision-making details documented in ED Course.     Details: I have personally reviewed and documented all results  ECG/medicine tests: ordered. Decision-making details documented in ED Course.     Details: I have personally reviewed and documented all results  Discussion of management or test interpretation with external provider(s): Discussed assessment, treatment and plan with ER attending, cardiology on-call, hospitalist    Risk  Prescription drug management.  Decision regarding hospitalization.  Risk Details: Patient will be admitted to the hospital by primary care provider Dr. Sullivan placed on [employer] telemetry.    31 minutes of critical care provided.  This time excludes other billable procedures. Time does include preparation of documents, medical consultations, review of old records, and direct bedside care. Patient is at high risk for life-threatening deterioration due to acute respiratory failure with hypoxia, fluid overload.  Patient given multiple emergency medications for his acute respiratory failure with hypoxia prior to stabilization      Critical Care  Total time providing critical care: 31 minutes          Final diagnoses:   Acute on chronic congestive heart failure, unspecified heart failure type   Acute respiratory failure with hypoxia         Please note that portions of this document were completed using voice recognition dictation software.       Alec Blount APRN  03/28/24 1237      Electronically signed by Arthur Browning MD at 03/28/24 1617       Vital Signs (last day)       Date/Time Temp Temp src Pulse Resp BP Patient Position SpO2    03/29/24 0717 98.3 (36.8) Temporal -- 18 158/85 Lying --    03/29/24 0330 97.9 (36.6) Oral 63 16 166/87 Lying 94    03/28/24 2339 98.6 (37) Oral 81 16 128/65 Lying 92    03/28/24 1939 98.2 (36.8) Oral 62 16 127/72 Lying 93    03/28/24 1540 97.5 (36.4) Oral 64 18 143/65 Lying 93    03/28/24 1400 98.3 (36.8) Oral 60 16 158/77 Lying 94    03/28/24 1305 -- -- 60 -- -- -- 97    03/28/24 1300 -- -- 60 -- 197/106 -- 97    03/28/24 1255 -- -- 60 -- -- -- 97    03/28/24 12:27:11 -- -- 60 16 199/100 -- 96    03/28/24 1120 -- -- 63 -- 177/106 -- 93    03/28/24 1107 -- -- 60 -- -- -- 94    03/28/24 1102 -- -- 60 -- 170/91 -- --    03/28/24 1042 -- -- 60 -- 188/99 -- 95    03/28/24 1022 -- -- 60 -- 174/96 -- 94    03/28/24 0940 -- -- 60 -- 169/92 -- 94    03/28/24 0920 -- -- 60 -- 166/92 -- 94    03/28/24 0900 -- -- 60 -- 161/97 -- 96    03/28/24 0840 -- -- 60 -- 155/85 -- 96    03/28/24 0823 -- -- 60 -- 163/91 -- --    03/28/24 0820 -- -- 60 -- 163/91 -- 94    03/28/24 0810 -- -- 60 -- 162/86 -- 95    03/28/24  0758 98.2 (36.8) Axillary 60 26 181/92 -- 100          Current Facility-Administered Medications   Medication Dose Route Frequency Provider Last Rate Last Admin    amiodarone (PACERONE) tablet 200 mg  200 mg Oral Q24H Raj Sullivan MD   200 mg at 03/29/24 0837    amLODIPine (NORVASC) tablet 10 mg  10 mg Oral Daily Raj Sullivan MD   10 mg at 03/29/24 0837    apixaban (ELIQUIS) tablet 5 mg  5 mg Oral Q12H Raj Sullivan MD   5 mg at 03/29/24 0625    baclofen (LIORESAL) tablet 10 mg  10 mg Oral BID Raj Sullivan MD   10 mg at 03/29/24 1003    FLUoxetine (PROzac) capsule 10 mg  10 mg Oral Daily Raj Sullivan MD   10 mg at 03/29/24 0837    fluticasone (FLONASE) 50 MCG/ACT nasal spray 2 spray  2 spray Nasal BID Raj Sullivan MD   2 spray at 03/29/24 0838    furosemide (LASIX) injection 60 mg  60 mg Intravenous Daily Raj Sullivan MD   60 mg at 03/29/24 0837    [Held by provider] furosemide (LASIX) tablet 40 mg  40 mg Oral Daily Raj Sullivan MD        gabapentin (NEURONTIN) capsule 400 mg  400 mg Oral Nightly Raj Sullivan MD   400 mg at 03/28/24 2132    HYDROcodone-acetaminophen (NORCO) 7.5-325 MG per tablet 1 tablet  1 tablet Oral Q6H Raj Sullivan MD   1 tablet at 03/29/24 0625    ipratropium-albuterol (DUO-NEB) nebulizer solution 3 mL  3 mL Nebulization 4x Daily PRN Raj Sullivan MD        lisinopril (PRINIVIL,ZESTRIL) tablet 40 mg  40 mg Oral Daily Raj Sullivan MD   40 mg at 03/29/24 0837    metFORMIN (GLUCOPHAGE) tablet 500 mg  500 mg Oral Q12H Raj Sullivan MD   500 mg at 03/29/24 0837    metoprolol succinate XL (TOPROL-XL) 24 hr tablet 50 mg  50 mg Oral Daily Raj Sullivan MD   50 mg at 03/29/24 0837    O2 (OXYGEN)  2 L/min Inhalation Continuous PRN Raj Sullivan MD        pantoprazole (PROTONIX) EC tablet 40 mg  40 mg Oral Q AM Raj Sullivan MD   40 mg at 03/29/24 0625    pramipexole (MIRAPEX) tablet 1 mg  1 mg Oral Nightly Raj Sullivan MD   1 mg at 03/28/24  2132    sodium chloride 0.9 % flush 10 mL  10 mL Intravenous PRN Raj Sullivan MD   10 mL at 03/29/24 0837    tamsulosin (FLOMAX) 24 hr capsule 0.4 mg  0.4 mg Oral Daily Raj Sullivan MD   0.4 mg at 03/29/24 0837     Lab Results (last 24 hours)       Procedure Component Value Units Date/Time    TSH [[id number]]  (Normal) Collected: 03/29/24 0655    Specimen: Blood Updated: 03/29/24 0816     TSH 0.375 uIU/mL     Comprehensive Metabolic Panel [[id number]]  (Abnormal) Collected: 03/29/24 0655    Specimen: Blood Updated: 03/29/24 0806     Glucose 119 mg/dL      BUN 24 mg/dL      Creatinine 1.37 mg/dL      Sodium 139 mmol/L      Potassium 3.8 mmol/L      Chloride 99 mmol/L      CO2 30.5 mmol/L      Calcium 8.5 mg/dL      Total Protein 5.6 g/dL      Albumin 3.3 g/dL      ALT (SGPT) 8 U/L      AST (SGOT) 14 U/L      Alkaline Phosphatase 86 U/L      Total Bilirubin 0.5 mg/dL      Globulin 2.3 gm/dL      A/G Ratio 1.4 g/dL      BUN/Creatinine Ratio 17.5     Anion Gap 9.5 mmol/L      eGFR 54.1 mL/min/1.73     Narrative:      GFR Normal >60  Chronic Kidney Disease <60  Kidney Failure <15    The GFR formula is only valid for adults with stable renal function between ages 18 and 70.    CBC & Differential [[id number]]  (Abnormal) Collected: 03/29/24 0655    Specimen: Blood Updated: 03/29/24 0800    Narrative:      The following orders were created for panel order CBC & Differential.  Procedure                               Abnormality         Status                     ---------                               -----------         ------                     CBC Auto Differential[[id number]]        Abnormal            Final result                 Please view results for these tests on the individual orders.    CBC Auto Differential [[id number]]  (Abnormal) Collected: 03/29/24 0655    Specimen: Blood Updated: 03/29/24 0800     WBC 9.67 10*3/mm3      RBC 3.53 10*6/mm3      Hemoglobin 10.0 g/dL      Hematocrit 32.0 %      MCV 90.7 fL       MCH 28.3 pg      MCHC 31.3 g/dL      RDW 14.6 %      RDW-SD 48.5 fl      MPV 11.9 fL      Platelets 219 10*3/mm3      Neutrophil % 82.9 %      Lymphocyte % 8.1 %      Monocyte % 8.0 %      Eosinophil % 0.3 %      Basophil % 0.1 %      Immature Grans % 0.6 %      Neutrophils, Absolute 8.02 10*3/mm3      Lymphocytes, Absolute 0.78 10*3/mm3      Monocytes, Absolute 0.77 10*3/mm3      Eosinophils, Absolute 0.03 10*3/mm3      Basophils, Absolute 0.01 10*3/mm3      Immature Grans, Absolute 0.06 10*3/mm3      nRBC 0.0 /100 WBC     Hemoglobin A1c [[id number]]  (Abnormal) Collected: 03/29/24 0655    Specimen: Blood Updated: 03/29/24 0758     Hemoglobin A1C 5.70 %     Narrative:      Hemoglobin A1C Ranges:    Increased Risk for Diabetes  5.7% to 6.4%  Diabetes                     >= 6.5%  Diabetic Goal                < 7.0%    T4, Free [[id number]]  (Normal) Collected: 03/28/24 1014    Specimen: Blood Updated: 03/28/24 1843     Free T4 1.57 ng/dL     Narrative:      Results may be falsely increased if patient taking Biotin.      High Sensitivity Troponin T 2Hr [[id number]]  (Abnormal) Collected: 03/28/24 1014    Specimen: Blood Updated: 03/28/24 1038     HS Troponin T 26 ng/L      Troponin T Delta 1 ng/L     Narrative:      High Sensitive Troponin T Reference Range:  <14.0 ng/L- Negative Female for AMI  <22.0 ng/L- Negative Male for AMI  >=14 - Abnormal Female indicating possible myocardial injury.  >=22 - Abnormal Male indicating possible myocardial injury.   Clinicians would have to utilize clinical acumen, EKG, Troponin, and serial changes to determine if it is an Acute Myocardial Infarction or myocardial injury due to an underlying chronic condition.               Imaging Results (Last 24 Hours)       ** No results found for the last 24 hours. **          Physician Progress Notes (last 24 hours)  Notes from 03/28/24 1037 through 03/29/24 1037   No notes of this type exist for this encounter.       Consult Notes (last 24  hours)  Notes from 03/28/24 1037 through 03/29/24 1037   No notes of this type exist for this encounter.

## 2024-03-30 ENCOUNTER — READMISSION MANAGEMENT (OUTPATIENT)
Dept: CALL CENTER | Facility: HOSPITAL | Age: 74
End: 2024-03-30
Payer: MEDICARE

## 2024-03-30 NOTE — OUTREACH NOTE
Prep Survey      Flowsheet Row Responses   [facility] facility patient discharged from? [facility]   Is LACE score < 7 ? No   Eligibility Readm Mgmt   Discharge diagnosis Acute on chronic diastolic heart failure    COPD (chronic obstructive pulmonary disease)   Does the patient have one of the following disease processes/diagnoses(primary or secondary)? CHF   Does the patient have Home health ordered? No   Is there a DME ordered? No   Prep survey completed? Yes            Uyen NELSON - Registered Nurse

## 2024-04-03 ENCOUNTER — READMISSION MANAGEMENT (OUTPATIENT)
Dept: CALL CENTER | Facility: HOSPITAL | Age: 74
End: 2024-04-03
Payer: MEDICARE

## 2024-04-03 NOTE — OUTREACH NOTE
CHF Week 1 Survey      Flowsheet Row Responses   [facility] facility patient discharged from? [facility]   Does the patient have one of the following disease processes/diagnoses(primary or secondary)? CHF   CHF Week 1 attempt successful? No   Unsuccessful attempts Attempt 1  [at PCP office for hospital f/u appt at time of call.]            Jacqueline HU - Registered Nurse

## 2024-04-16 ENCOUNTER — READMISSION MANAGEMENT (OUTPATIENT)
Dept: CALL CENTER | Facility: HOSPITAL | Age: 74
End: 2024-04-16
Payer: MEDICARE

## 2024-04-16 NOTE — OUTREACH NOTE
CHF Week 3 Survey      Flowsheet Row Responses   [facility] patient discharged from? [facility]   Does the patient have one of the following disease processes/diagnoses(primary or secondary)? CHF   Week 3 attempt successful? Yes   Call start time 1526   Call end time 1533   Discharge diagnosis Acute on chronic diastolic heart failure    COPD (chronic obstructive pulmonary disease)   Person spoke with today (if not patient) and relationship spouse   Meds reviewed with patient/caregiver? Yes   Prescription comments Pt was started on a medication spouse cant remember the name.   Is the patient taking all medications as directed (includes completed medication regime)? Yes   Does the patient have a primary care provider?  Yes   Does the patient have an appointment with their PCP within 7 days of discharge? Yes   Comments regarding PCP Dr. Sullivan, patient went to appt yesterday.   Has the patient kept scheduled appointments due by today? Yes   DME comments Pt uses continueous oxygen 3L   Pulse Ox monitoring None   Psychosocial issues? No   Comments Bilateral leg swelling.   Did the patient receive a copy of their discharge instructions? Yes   Nursing interventions Reviewed instructions with patient   What is the patient's perception of their health status since discharge? Same   If the patient is a current smoker, are they able to teach back resources for cessation? Not a smoker   Is the patient/caregiver able to teach back the hierarchy of who to call/visit for symptoms/problems? PCP, Specialist, Home health nurse, Urgent Care, ED, 911 Yes   CHF Zone this Call Yellow Zone   Yellow Zone Not feeling as good as usual  [Pt doesnt have scales.  Spouse reports she is going to get some.]   CHF Week 3 call completed? Yes   Graduated Yes   Is the patient interested in additional calls from an ambulatory ? No   Would this patient benefit from a Referral to Amb Social Work? No   Wrap up additional comments Spouse  reports patient is drinking a lot of soda.  She is discouraging him but he continues.  Pt denies needs at the end of the call.   Call end time [id number]            ANGELICA VALDOVINOS - Registered Nurse

## 2024-05-30 RX ORDER — BUMETANIDE 0.5 MG/1
0.5 TABLET ORAL DAILY
OUTPATIENT
Start: 2024-05-30

## 2024-05-30 NOTE — TELEPHONE ENCOUNTER
Lab Results   Component Value Date    GLUCOSE 119 (H) 03/29/2024    BUN 24 (H) 03/29/2024    CREATININE 1.37 (H) 03/29/2024    EGFR 54.1 (L) 03/29/2024    BCR 17.5 03/29/2024    K 3.8 03/29/2024    CO2 30.5 (H) 03/29/2024    CALCIUM 8.5 (L) 03/29/2024    PROTENTOTREF 6.0 01/21/2022    ALBUMIN 3.3 (L) 03/29/2024    BILITOT 0.5 03/29/2024    AST 14 03/29/2024    ALT 8 03/29/2024     Patient needs an appointment for further refills

## 2024-07-27 ENCOUNTER — APPOINTMENT (OUTPATIENT)
Dept: GENERAL RADIOLOGY | Facility: HOSPITAL | Age: 74
End: 2024-07-27
Payer: MEDICARE

## 2024-07-27 ENCOUNTER — HOSPITAL ENCOUNTER (INPATIENT)
Facility: HOSPITAL | Age: 74
LOS: 2 days | Discharge: HOME | End: 2024-07-29
Attending: EMERGENCY MEDICINE
Payer: MEDICARE

## 2024-07-27 DIAGNOSIS — J96.22 ACUTE ON CHRONIC RESPIRATORY FAILURE WITH HYPOXIA AND HYPERCAPNIA: Primary | ICD-10-CM

## 2024-07-27 DIAGNOSIS — J96.21 ACUTE ON CHRONIC RESPIRATORY FAILURE WITH HYPOXIA AND HYPERCAPNIA: Primary | ICD-10-CM

## 2024-07-27 LAB
A-A DO2: 26 MMHG
ALBUMIN SERPL-MCNC: 3.4 G/DL (ref 3.5–5.2)
ALBUMIN/GLOB SERPL: 1.3 G/DL
ALP SERPL-CCNC: 102 U/L (ref 39–117)
ALT SERPL W P-5'-P-CCNC: 14 U/L (ref 1–41)
ANION GAP SERPL CALCULATED.3IONS-SCNC: 14 MMOL/L (ref 5–15)
ARTERIAL PATENCY WRIST A: ABNORMAL
AST SERPL-CCNC: 21 U/L (ref 1–40)
ATMOSPHERIC PRESS: 736 MMHG
BASE EXCESS BLDA CALC-SCNC: 4 MMOL/L (ref 0–2)
BASOPHILS # BLD AUTO: 0.05 10*3/MM3 (ref 0–0.2)
BASOPHILS NFR BLD AUTO: 0.9 % (ref 0–1.5)
BDY SITE: ABNORMAL
BILIRUB SERPL-MCNC: 0.7 MG/DL (ref 0–1.2)
BUN SERPL-MCNC: 15 MG/DL (ref 8–23)
BUN/CREAT SERPL: 12.1 (ref 7–25)
CALCIUM SPEC-SCNC: 9 MG/DL (ref 8.6–10.5)
CHLORIDE SERPL-SCNC: 102 MMOL/L (ref 98–107)
CO2 SERPL-SCNC: 29 MMOL/L (ref 22–29)
COHGB MFR BLD: 1.9 % (ref 0–2)
CREAT SERPL-MCNC: 1.24 MG/DL (ref 0.76–1.27)
DEPRECATED RDW RBC AUTO: 49 FL (ref 37–54)
EGFRCR SERPLBLD CKD-EPI 2021: 61 ML/MIN/1.73
EOSINOPHIL # BLD AUTO: 0.48 10*3/MM3 (ref 0–0.4)
EOSINOPHIL NFR BLD AUTO: 8.4 % (ref 0.3–6.2)
ERYTHROCYTE [DISTWIDTH] IN BLOOD BY AUTOMATED COUNT: 15.2 % (ref 12.3–15.4)
FLUAV SUBTYP SPEC NAA+PROBE: NOT DETECTED
FLUBV RNA ISLT QL NAA+PROBE: NOT DETECTED
GAS FLOW AIRWAY: 2 LPM
GLOBULIN UR ELPH-MCNC: 2.6 GM/DL
GLUCOSE SERPL-MCNC: 125 MG/DL (ref 65–99)
HCO3 BLDA-SCNC: 29.5 MMOL/L (ref 22–28)
HCT VFR BLD AUTO: 34.3 % (ref 37.5–51)
HCT VFR BLD CALC: 32.6 %
HGB BLD-MCNC: 10.6 G/DL (ref 13–17.7)
HOLD SPECIMEN: NORMAL
HOLD SPECIMEN: NORMAL
IMM GRANULOCYTES # BLD AUTO: 0.01 10*3/MM3 (ref 0–0.05)
IMM GRANULOCYTES NFR BLD AUTO: 0.2 % (ref 0–0.5)
LYMPHOCYTES # BLD AUTO: 1.26 10*3/MM3 (ref 0.7–3.1)
LYMPHOCYTES NFR BLD AUTO: 22 % (ref 19.6–45.3)
Lab: ABNORMAL
MCH RBC QN AUTO: 27 PG (ref 26.6–33)
MCHC RBC AUTO-ENTMCNC: 30.9 G/DL (ref 31.5–35.7)
MCV RBC AUTO: 87.3 FL (ref 79–97)
METHGB BLD QL: -0.1 % (ref 0–1.5)
MODALITY: ABNORMAL
MONOCYTES # BLD AUTO: 0.63 10*3/MM3 (ref 0.1–0.9)
MONOCYTES NFR BLD AUTO: 11 % (ref 5–12)
NEUTROPHILS NFR BLD AUTO: 3.31 10*3/MM3 (ref 1.7–7)
NEUTROPHILS NFR BLD AUTO: 57.5 % (ref 42.7–76)
NRBC BLD AUTO-RTO: 0 /100 WBC (ref 0–0.2)
NT-PROBNP SERPL-MCNC: 2185 PG/ML (ref 0–900)
OXYHGB MFR BLDV: 91.2 % (ref 94–99)
PCO2 BLDA: 47.4 MM HG (ref 35–45)
PCO2 TEMP ADJ BLD: ABNORMAL MM[HG]
PH BLDA: 7.4 PH UNITS (ref 7.3–7.5)
PH, TEMP CORRECTED: ABNORMAL
PLATELET # BLD AUTO: 184 10*3/MM3 (ref 140–450)
PMV BLD AUTO: 10.7 FL (ref 6–12)
PO2 BLDA: 65.3 MM HG (ref 75–100)
PO2 TEMP ADJ BLD: ABNORMAL MM[HG]
POTASSIUM SERPL-SCNC: 4.3 MMOL/L (ref 3.5–5.2)
PROCALCITONIN SERPL-MCNC: 0.08 NG/ML (ref 0–0.25)
PROT SERPL-MCNC: 6 G/DL (ref 6–8.5)
RBC # BLD AUTO: 3.93 10*6/MM3 (ref 4.14–5.8)
SAO2 % BLDCOA: 93 % (ref 94–100)
SARS-COV-2 RNA RESP QL NAA+PROBE: NOT DETECTED
SODIUM SERPL-SCNC: 145 MMOL/L (ref 136–145)
T4 FREE SERPL-MCNC: 1.3 NG/DL (ref 0.92–1.68)
TROPONIN T SERPL HS-MCNC: 28 NG/L
VENTILATOR MODE: ABNORMAL
WBC NRBC COR # BLD AUTO: 5.74 10*3/MM3 (ref 3.4–10.8)
WHOLE BLOOD HOLD COAG: NORMAL
WHOLE BLOOD HOLD SPECIMEN: NORMAL

## 2024-07-27 PROCEDURE — 82375 ASSAY CARBOXYHB QUANT: CPT

## 2024-07-27 PROCEDURE — 84484 ASSAY OF TROPONIN QUANT: CPT | Performed by: EMERGENCY MEDICINE

## 2024-07-27 PROCEDURE — 84439 ASSAY OF FREE THYROXINE: CPT | Performed by: INTERNAL MEDICINE

## 2024-07-27 PROCEDURE — 25010000002 METHYLPREDNISOLONE PER 125 MG: Performed by: EMERGENCY MEDICINE

## 2024-07-27 PROCEDURE — 71045 X-RAY EXAM CHEST 1 VIEW: CPT

## 2024-07-27 PROCEDURE — 87636 SARSCOV2 & INF A&B AMP PRB: CPT | Performed by: EMERGENCY MEDICINE

## 2024-07-27 PROCEDURE — 84145 PROCALCITONIN (PCT): CPT | Performed by: EMERGENCY MEDICINE

## 2024-07-27 PROCEDURE — 36600 WITHDRAWAL OF ARTERIAL BLOOD: CPT

## 2024-07-27 PROCEDURE — 94799 UNLISTED PULMONARY SVC/PX: CPT

## 2024-07-27 PROCEDURE — 83050 HGB METHEMOGLOBIN QUAN: CPT

## 2024-07-27 PROCEDURE — 83880 ASSAY OF NATRIURETIC PEPTIDE: CPT | Performed by: EMERGENCY MEDICINE

## 2024-07-27 PROCEDURE — 80053 COMPREHEN METABOLIC PANEL: CPT | Performed by: EMERGENCY MEDICINE

## 2024-07-27 PROCEDURE — 93005 ELECTROCARDIOGRAM TRACING: CPT | Performed by: EMERGENCY MEDICINE

## 2024-07-27 PROCEDURE — 94761 N-INVAS EAR/PLS OXIMETRY MLT: CPT

## 2024-07-27 PROCEDURE — 36415 COLL VENOUS BLD VENIPUNCTURE: CPT

## 2024-07-27 PROCEDURE — 99291 CRITICAL CARE FIRST HOUR: CPT

## 2024-07-27 PROCEDURE — 94640 AIRWAY INHALATION TREATMENT: CPT

## 2024-07-27 PROCEDURE — 85025 COMPLETE CBC W/AUTO DIFF WBC: CPT | Performed by: EMERGENCY MEDICINE

## 2024-07-27 PROCEDURE — 25010000002 FUROSEMIDE PER 20 MG: Performed by: EMERGENCY MEDICINE

## 2024-07-27 PROCEDURE — 94660 CPAP INITIATION&MGMT: CPT

## 2024-07-27 PROCEDURE — 82805 BLOOD GASES W/O2 SATURATION: CPT

## 2024-07-27 RX ORDER — AMLODIPINE BESYLATE 5 MG/1
10 TABLET ORAL DAILY
Status: DISCONTINUED | OUTPATIENT
Start: 2024-07-27 | End: 2024-07-29 | Stop reason: HOSPADM

## 2024-07-27 RX ORDER — IPRATROPIUM BROMIDE AND ALBUTEROL SULFATE 2.5; .5 MG/3ML; MG/3ML
3 SOLUTION RESPIRATORY (INHALATION) ONCE
Status: COMPLETED | OUTPATIENT
Start: 2024-07-27 | End: 2024-07-27

## 2024-07-27 RX ORDER — PANTOPRAZOLE SODIUM 40 MG/1
40 TABLET, DELAYED RELEASE ORAL
Status: DISCONTINUED | OUTPATIENT
Start: 2024-07-28 | End: 2024-07-29 | Stop reason: HOSPADM

## 2024-07-27 RX ORDER — BUMETANIDE 1 MG/1
1 TABLET ORAL DAILY
Status: DISCONTINUED | OUTPATIENT
Start: 2024-07-27 | End: 2024-07-29 | Stop reason: HOSPADM

## 2024-07-27 RX ORDER — BACLOFEN 10 MG/1
10 TABLET ORAL 2 TIMES DAILY
Status: DISCONTINUED | OUTPATIENT
Start: 2024-07-27 | End: 2024-07-29 | Stop reason: HOSPADM

## 2024-07-27 RX ORDER — SODIUM CHLORIDE 0.9 % (FLUSH) 0.9 %
10 SYRINGE (ML) INJECTION AS NEEDED
Status: DISCONTINUED | OUTPATIENT
Start: 2024-07-27 | End: 2024-07-29 | Stop reason: HOSPADM

## 2024-07-27 RX ORDER — METHYLPREDNISOLONE SODIUM SUCCINATE 125 MG/2ML
125 INJECTION, POWDER, LYOPHILIZED, FOR SOLUTION INTRAMUSCULAR; INTRAVENOUS ONCE
Status: COMPLETED | OUTPATIENT
Start: 2024-07-27 | End: 2024-07-27

## 2024-07-27 RX ORDER — LABETALOL HYDROCHLORIDE 5 MG/ML
10 INJECTION, SOLUTION INTRAVENOUS EVERY 6 HOURS PRN
Status: DISCONTINUED | OUTPATIENT
Start: 2024-07-27 | End: 2024-07-29 | Stop reason: HOSPADM

## 2024-07-27 RX ORDER — ATORVASTATIN CALCIUM 20 MG/1
20 TABLET, FILM COATED ORAL NIGHTLY
Status: DISCONTINUED | OUTPATIENT
Start: 2024-07-27 | End: 2024-07-29 | Stop reason: HOSPADM

## 2024-07-27 RX ORDER — METHYLPREDNISOLONE SODIUM SUCCINATE 40 MG/ML
40 INJECTION, POWDER, LYOPHILIZED, FOR SOLUTION INTRAMUSCULAR; INTRAVENOUS EVERY 12 HOURS
Status: DISCONTINUED | OUTPATIENT
Start: 2024-07-28 | End: 2024-07-29 | Stop reason: HOSPADM

## 2024-07-27 RX ORDER — TAMSULOSIN HYDROCHLORIDE 0.4 MG/1
0.4 CAPSULE ORAL DAILY
Status: DISCONTINUED | OUTPATIENT
Start: 2024-07-27 | End: 2024-07-29 | Stop reason: HOSPADM

## 2024-07-27 RX ORDER — PRAMIPEXOLE DIHYDROCHLORIDE 1 MG/1
1 TABLET ORAL NIGHTLY
Status: DISCONTINUED | OUTPATIENT
Start: 2024-07-27 | End: 2024-07-29 | Stop reason: HOSPADM

## 2024-07-27 RX ORDER — METOPROLOL TARTRATE 1 MG/ML
5 INJECTION, SOLUTION INTRAVENOUS ONCE
Status: COMPLETED | OUTPATIENT
Start: 2024-07-27 | End: 2024-07-27

## 2024-07-27 RX ORDER — FLUOXETINE 10 MG/1
10 CAPSULE ORAL DAILY
Status: DISCONTINUED | OUTPATIENT
Start: 2024-07-27 | End: 2024-07-29 | Stop reason: HOSPADM

## 2024-07-27 RX ORDER — HYDROCODONE BITARTRATE AND ACETAMINOPHEN 7.5; 325 MG/1; MG/1
1 TABLET ORAL EVERY 6 HOURS
Status: DISCONTINUED | OUTPATIENT
Start: 2024-07-27 | End: 2024-07-29 | Stop reason: HOSPADM

## 2024-07-27 RX ORDER — FUROSEMIDE 10 MG/ML
60 INJECTION INTRAMUSCULAR; INTRAVENOUS DAILY
Status: DISCONTINUED | OUTPATIENT
Start: 2024-07-28 | End: 2024-07-28

## 2024-07-27 RX ORDER — METOPROLOL SUCCINATE 50 MG/1
50 TABLET, EXTENDED RELEASE ORAL DAILY
Status: DISCONTINUED | OUTPATIENT
Start: 2024-07-27 | End: 2024-07-29 | Stop reason: HOSPADM

## 2024-07-27 RX ORDER — FUROSEMIDE 10 MG/ML
40 INJECTION INTRAMUSCULAR; INTRAVENOUS ONCE
Status: COMPLETED | OUTPATIENT
Start: 2024-07-27 | End: 2024-07-27

## 2024-07-27 RX ORDER — LISINOPRIL 20 MG/1
40 TABLET ORAL DAILY
Status: DISCONTINUED | OUTPATIENT
Start: 2024-07-27 | End: 2024-07-29 | Stop reason: HOSPADM

## 2024-07-27 RX ORDER — GABAPENTIN 400 MG/1
400 CAPSULE ORAL NIGHTLY
Status: DISCONTINUED | OUTPATIENT
Start: 2024-07-27 | End: 2024-07-29 | Stop reason: HOSPADM

## 2024-07-27 RX ADMIN — LISINOPRIL 40 MG: 20 TABLET ORAL at 17:37

## 2024-07-27 RX ADMIN — METOPROLOL TARTRATE 5 MG: 1 INJECTION, SOLUTION INTRAVENOUS at 15:44

## 2024-07-27 RX ADMIN — IPRATROPIUM BROMIDE AND ALBUTEROL SULFATE 3 ML: .5; 3 SOLUTION RESPIRATORY (INHALATION) at 13:58

## 2024-07-27 RX ADMIN — ATORVASTATIN CALCIUM 20 MG: 20 TABLET, FILM COATED ORAL at 21:41

## 2024-07-27 RX ADMIN — BACLOFEN 10 MG: 10 TABLET ORAL at 21:41

## 2024-07-27 RX ADMIN — METFORMIN HYDROCHLORIDE 500 MG: 500 TABLET ORAL at 21:41

## 2024-07-27 RX ADMIN — PRAMIPEXOLE DIHYDROCHLORIDE 1 MG: 1 TABLET ORAL at 21:41

## 2024-07-27 RX ADMIN — METHYLPREDNISOLONE SODIUM SUCCINATE 125 MG: 125 INJECTION, POWDER, FOR SOLUTION INTRAMUSCULAR; INTRAVENOUS at 13:45

## 2024-07-27 RX ADMIN — FUROSEMIDE 40 MG: 10 INJECTION, SOLUTION INTRAMUSCULAR; INTRAVENOUS at 15:09

## 2024-07-27 RX ADMIN — HYDROCODONE BITARTRATE AND ACETAMINOPHEN 1 TABLET: 7.5; 325 TABLET ORAL at 17:37

## 2024-07-27 RX ADMIN — AMLODIPINE BESYLATE 10 MG: 5 TABLET ORAL at 17:37

## 2024-07-27 RX ADMIN — GABAPENTIN 400 MG: 400 CAPSULE ORAL at 21:41

## 2024-07-27 NOTE — ED PROVIDER NOTES
[facility] EMERGENCY DEPARTMENT  Emergency Department Encounter  Emergency Medicine Physician Note     Pt Name:Robe Bryant  MRN: [id number]  Birthdate 1950  Date of evaluation: 7/27/2024  PCP:  Raj Sullivan MD  Note Started: 1:29 PM EDT      CHIEF COMPLAINT       Chief Complaint   Patient presents with    Shortness of Breath       HISTORY OF PRESENT ILLNESS  (Location/Symptom, Timing/Onset, Context/Setting, Quality, Duration, Modifying Factors, Severity.)      Robe Bryant is a 73 y.o. male who presents with acute shortness of breath.  Patient describes worsening shortness of breath over the last 3 days.  Patient denies any fevers or chills.  Patient has a history of COPD and CHF.  Difficult to obtain full history of review of systems the patient is acutely dyspneic, 1-2 word dyspnea on for initial evaluation.    PAST MEDICAL / SURGICAL / SOCIAL / FAMILY HISTORY     Past Medical History:   Diagnosis Date    Anxiety and depression     Arthritis     Backache     20 years    Benign prostatic hyperplasia     Bladder trauma     Cervicalgia     Chronic kidney disease     Cr up to 2.1    Coronary artery disease     Diabetes mellitus     15 years--    Emphysema of lung     Erectile dysfunction     Eye exam, routine 2015    FH: colonic polyps     Gout     for 10 years--    History of echocardiogram 09/15/2014    History of tobacco use     with cessation x7 years    Hyperlipidemia     Hypertension     Impaired functional mobility, balance, gait, and endurance     Migraine     Myocardial infarction     h/o coronary artery stenting--    Prostate cancer     Restless leg syndrome     20 years    Sleep apnea     12 years; uses a Bi-Pap    Stroke     Syncope 04/28/2017    Warfarin anticoagulation 09/14/2016     No additional pertinent       Past Surgical History:   Procedure Laterality Date    BLADDER SURGERY      CARDIAC CATHETERIZATION  03/15/2013    revealing widely patent stents of  the left circumflex and ramus intermedius coronary arteries, no significant disease is seen in any territory    CARDIAC CATHETERIZATION Left 2019    Procedure: Left Heart Cath;  Surgeon: Arelis Tucker MD;  Location:  PRE[facility] CATH INVASIVE LOCATION;  Service: Cardiology    CARDIAC ELECTROPHYSIOLOGY PROCEDURE N/A 5/10/2021    Procedure: PPM battery change;  Surgeon: Arelis Tucker MD;  Location:  PREM CATH INVASIVE LOCATION;  Service: Cardiology;  Laterality: N/A;    CARDIAC PACEMAKER PLACEMENT      CATARACT EXTRACTION      COLONOSCOPY      No family history of colon cancer.      COLONOSCOPY      HERNIA REPAIR      Type unknown    PACEMAKER IMPLANTATION      PROSTATE SURGERY       No additional pertinent       Social History     Socioeconomic History    Marital status:    Tobacco Use    Smoking status: Former     Current packs/day: 0.00     Average packs/day: 1 pack/day for 30.0 years (30.0 ttl pk-yrs)     Types: Cigarettes     Start date: 8/15/1971     Quit date: 8/15/2001     Years since quittin.9    Smokeless tobacco: Never    Tobacco comments:     quit 16 years ago   Vaping Use    Vaping status: Never Used   Substance and Sexual Activity    Alcohol use: No    Drug use: No    Sexual activity: Defer       Family History   Problem Relation Age of Onset    Cancer Mother     Diabetes Mother     Hypertension Mother     Stroke Mother     Stomach cancer Mother     Heart disease Mother     Stomach cancer Father     Cancer Father     Lung cancer Father        Allergies:  Patient has no known allergies.    Home Medications:  Prior to Admission medications    Medication Sig Start Date End Date Taking? Authorizing Provider   albuterol sulfate  (90 Base) MCG/ACT inhaler Inhale 2 puffs Every 4 (Four) Hours As Needed for Wheezing.    Provider, MD Sonia   amLODIPine (NORVASC) 10 MG tablet Take 1 tablet by mouth Daily.    Provider, MD Sonia   atorvastatin (LIPITOR)  20 MG tablet Take 1 tablet by mouth Every Night. 4/20/21   Radha Jean DO   baclofen (LIORESAL) 10 MG tablet Take 1 tablet by mouth 2 (Two) Times a Day.    Sonia Kwon MD   Blood Glucose Monitoring Suppl (TRUE METRIX AIR GLUCOSE METER) w/Device kit 1 each by In Vitro route 2 (two) times a day. E11.9 7/29/20   Radha Jean DO   Budeson-Glycopyrrol-Formoterol (Breztri Aerosphere) 160-9-4.8 MCG/ACT aerosol inhaler Inhale 2 puffs 2 (Two) Times a Day.    Sonia Kwon MD   bumetanide (BUMEX) 0.5 MG tablet Take 1 tablet by mouth Daily.    Sonia Kwon MD   bumetanide (BUMEX) 1 MG tablet Take 1 tablet by mouth Daily. 3/29/24   Raj Sullivan MD   diclofenac (VOLTAREN) 75 MG EC tablet Take 1 tablet by mouth 2 (Two) Times a Day.    Sonia Kwon MD   Eliquis 5 MG tablet tablet TAKE 1 TABLET EVERY 12 HOURS 2/7/24   Arelis Tucker MD   famotidine (PEPCID) 20 MG tablet Take 1 tablet by mouth 2 (Two) Times a Day.    Sonia Kwon MD   FLUoxetine (PROzac) 10 MG capsule Take 1 capsule by mouth Daily.    Sonia Kwon MD   fluticasone (FLONASE) 50 MCG/ACT nasal spray 2 sprays into the nostril(s) as directed by provider 2 (Two) Times a Day.    Sonia Kwon MD   gabapentin (NEURONTIN) 400 MG capsule Take 1 capsule by mouth Every Night. 6/8/23   Raj Sullivan MD   glucose monitor monitoring kit 1 each Daily. E11.9 9/26/19   Radha Jean DO   HYDROcodone-acetaminophen (NORCO) 7.5-325 MG per tablet Take 1 tablet by mouth Every 6 (Six) Hours. 1/15/24   Sonia Kwon MD   ipratropium-albuterol (DUO-NEB) 0.5-2.5 mg/3 ml nebulizer Take 3 mL by nebulization 4 (Four) Times a Day As Needed for Wheezing.    Sonia Kwon MD   Lancet Device misc 1 each Daily. E11.9 9/26/19   Radha Jean DO   lisinopril (PRINIVIL,ZESTRIL) 40 MG tablet Take 1 tablet by mouth Daily. 2/11/24   Raj Sullivan MD   metFORMIN  "(GLUCOPHAGE) 500 MG tablet Take 1 tablet by mouth Every 12 (Twelve) Hours. 2/2/23   ProviderSonia MD   metoprolol succinate XL (Toprol XL) 50 MG 24 hr tablet Take 1 tablet by mouth Daily. 1/26/21   Jaki Ramos APRN   Misc. Devices misc Bipap tubing. 3/23/21   Radha Jean DO   Nebulizer misc 1 each Every 4 (Four) Hours As Needed (shortness of breath). 3/23/21   Radha Jean DO   O2 (OXYGEN) Inhale 2 L/min Continuous As Needed (With activity).    Provider, MD Sonia   omeprazole (priLOSEC) 40 MG capsule Take 1 capsule by mouth Daily.  Patient not taking: Reported on 3/29/2024 2/3/22   Radha Jean DO   omeprazole (priLOSEC) 40 MG capsule Take 1 capsule by mouth Daily.    Provider, MD Sonia   pramipexole (MIRAPEX) 1 MG tablet Take 1 tablet by mouth Every Night.    Provider, MD Sonia   tamsulosin (FLOMAX) 0.4 MG capsule 24 hr capsule Take 1 capsule by mouth Daily. 2/12/24   Raj Sullivan MD   TRUEplus Lancets 33G misc 1 each by Other route Daily. E11.9 10/1/20   Radha Jean DO         REVIEW OF SYSTEMS       Review of Systems   Unable to perform ROS: Severe respiratory distress   Constitutional:  Negative for chills and fever.   Respiratory:  Positive for cough, chest tightness, shortness of breath and wheezing.    Cardiovascular:  Negative for chest pain.       PHYSICAL EXAM      INITIAL VITALS:   /97   Pulse 61   Temp 97.6 °F (36.4 °C) (Oral)   Resp 25   Ht 167.6 cm (66\")   Wt 99.8 kg (220 lb)   SpO2 93%   BMI 35.51 kg/m²     Physical Exam  Constitutional:       Appearance: Normal appearance.   HENT:      Head: Normocephalic and atraumatic.   Eyes:      Extraocular Movements: Extraocular movements intact.   Cardiovascular:      Rate and Rhythm: Normal rate and regular rhythm.   Pulmonary:      Effort: Pulmonary effort is normal. Tachypnea and respiratory distress present.      Breath sounds: Decreased breath sounds, wheezing and " rhonchi present.   Abdominal:      General: Abdomen is flat.      Palpations: Abdomen is soft.      Tenderness: There is no abdominal tenderness.   Musculoskeletal:      Right lower leg: Edema present.      Left lower leg: Edema present.   Skin:     General: Skin is warm and dry.   Neurological:      General: No focal deficit present.      Mental Status: He is alert and oriented to person, place, and time.   Psychiatric:         Mood and Affect: Mood normal.         Behavior: Behavior normal.           DDX/DIAGNOSTIC RESULTS / EMERGENCY DEPARTMENT COURSE / MDM     Differential Diagnosis included but not limited: Seizure vascular ablation versus COPD exacerbation, low concern for ACS or severe pneumonia.    Diagnoses Considered but Do Not Suspect: Sepsis, patient not meeting any SIRS criteria other than tachypnea, do feel this is most likely secondary to a COPD/CHF exacerbation.    Decision Rules/Scores utilized: N/A     Tests considered but not ordered and why:  N/A     MIPS: N/A     Code Status Discussion:  Not Discussed    Additional Patient Education Provided: None     Medical Decision Making    Medical Decision Making  73-year-old male with COPD and CHF history who presented acutely dyspneic, patient sounded tight bilaterally with auditory wheezes, on auscultation patient was not moving air very well initially.  Patient denies any fever, was tachypneic, SpO2 was in the low 90s.  Patient given Solu-Medrol, given breathing treatment by EMS prior to arrival, given DuoNeb..  Patient started on BiPAP in order to help push fluid, ABG demonstrated mild anoxia.  Did give patient 40 of Lasix after evaluation potassium.  Do feel patient would need admission for couple days for respiratory improvement.    Amount and/or Complexity of Data Reviewed  External Data Reviewed: notes.     Details: Previous notes from previous respiratory distress.  Labs: ordered. Decision-making details documented in ED Course.  Radiology:  ordered.  ECG/medicine tests: ordered.  Discussion of management or test interpretation with external provider(s): Dr. Sullivan for admission    Risk  Prescription drug management.  Decision regarding hospitalization.        See ED COURSE for additional MDM statements    EKG  EKG Interpretation    Evaluated and interpreted by emergency department physician    Rhythm: Paced, atrially paced noted  Rate: Normal  Axis: Normal  Ectopy: NONE  Conduction: Normal  ST Segments: Nonspecific Changes  T Waves: Non Specific Changes  Q Waves: avr, v1, v2    Clinical Impression: non-specific EKG    Kendrick Brown DO     All EKG's are interpreted by the Emergency Department Physician who either signs or Co-signs this chart in the absence of a cardiologist.    Additional Scores                   EMERGENCY DEPARTMENT COURSE:    ED Course as of 07/27/24 1612   Sat Jul 27, 2024   1435 Personally evaluated the chest x-ray, noted to be mildly edematous most likely secondary to CHF.  Patient does have mildly elevated BNP.  No signs of lobar pneumonia, osseous structures grossly intact, no hemopneumothorax noted. [CR]   1500 HS Troponin T(!): 28  Patient's troponin appears to be at baseline. [CR]   1528 Patient admitted to Dr. Sullivan. [CR]      ED Course User Index  [CR] Kendrick Brown DO       PROCEDURES:  None Performed   Procedures    DATA FOR LAB AND RADIOLOGY TESTS ORDERED BELOW ARE REVIEWED BY THE ED CLINICIAN:    RADIOLOGY: All x-rays, CT, MRI, and formal ultrasound images (except ED bedside ultrasound) are read by the radiologist, see reports below, unless otherwise noted in MDM or here.  Reports below are reviewed by myself.  XR Chest 1 View   Final Result   Mild CHF.      Authenticated and Electronically Signed by Hua Bailey M.D. on   07/27/2024 03:00:45 PM          LABS: Lab orders shown below, the results are reviewed by myself, and all abnormals are listed below.  Labs Reviewed   COMPREHENSIVE METABOLIC PANEL -  Abnormal; Notable for the following components:       Result Value    Glucose 125 (*)     Albumin 3.4 (*)     All other components within normal limits    Narrative:     GFR Normal >60  Chronic Kidney Disease <60  Kidney Failure <15    The GFR formula is only valid for adults with stable renal function between ages 18 and 70.   BNP (IN-HOUSE) - Abnormal; Notable for the following components:    proBNP 2,185.0 (*)     All other components within normal limits    Narrative:     This assay is used as an aid in the diagnosis of individuals suspected of having heart failure. It can be used as an aid in the diagnosis of acute decompensated heart failure (ADHF) in patients presenting with signs and symptoms of ADHF to the emergency department (ED). In addition, NT-proBNP of <300 pg/mL indicates ADHF is not likely.    Age Range Result Interpretation  NT-proBNP Concentration (pg/mL:      <50             Positive            >450                   Gray                 300-450                    Negative             <300    50-75           Positive            >900                  Gray                300-900                  Negative            <300      >75             Positive            >1800                  Gray                300-1800                  Negative            <300   SINGLE HS TROPONIN T - Abnormal; Notable for the following components:    HS Troponin T 28 (*)     All other components within normal limits    Narrative:     High Sensitive Troponin T Reference Range:  <14.0 ng/L- Negative Female for AMI  <22.0 ng/L- Negative Male for AMI  >=14 - Abnormal Female indicating possible myocardial injury.  >=22 - Abnormal Male indicating possible myocardial injury.   Clinicians would have to utilize clinical acumen, EKG, Troponin, and serial changes to determine if it is an Acute Myocardial Infarction or myocardial injury due to an underlying chronic condition.        CBC WITH AUTO DIFFERENTIAL - Abnormal; Notable for  "the following components:    RBC 3.93 (*)     Hemoglobin 10.6 (*)     Hematocrit 34.3 (*)     MCHC 30.9 (*)     Eosinophil % 8.4 (*)     Eosinophils, Absolute 0.48 (*)     All other components within normal limits   BLOOD GAS, ARTERIAL W/CO-OXIMETRY - Abnormal; Notable for the following components:    pCO2, Arterial 47.4 (*)     pO2, Arterial 65.3 (*)     HCO3, Arterial 29.5 (*)     Base Excess, Arterial 4.0 (*)     O2 Saturation, Arterial 93.0 (*)     Oxyhemoglobin 91.2 (*)     Methemoglobin -0.10 (*)     All other components within normal limits   COVID-19 AND FLU A/B, NP SWAB IN TRANSPORT MEDIA 1 HR TAT - Normal    Narrative:     Fact sheet for providers: https://www.fda.gov/media/688045/download    Fact sheet for patients: https://www.[employer].gov/media/213094/download    Test performed by PCR.   PROCALCITONIN - Normal    Narrative:     As a Marker for Sepsis (Non-Neonates):    1. <0.5 ng/mL represents a low risk of severe sepsis and/or septic shock.  2. >2 ng/mL represents a high risk of severe sepsis and/or septic shock.    As a Marker for Lower Respiratory Tract Infections that require antibiotic therapy:    PCT on Admission    Antibiotic Therapy       6-12 Hrs later    >0.5                Strongly Recommended  >0.25 - <0.5        Recommended   0.1 - 0.25          Discouraged              Remeasure/reassess PCT  <0.1                Strongly Discouraged     Remeasure/reassess PCT    As 28 day mortality risk marker: \"Change in Procalcitonin Result\" (>80% or <=80%) if Day 0 (or Day 1) and Day 4 values are available. Refer to http://www.[facility]-pct-calculator.com    Change in PCT <=80%  A decrease of PCT levels below or equal to 80% defines a positive change in PCT test result representing a higher risk for 28-day all-cause mortality of patients diagnosed with severe sepsis for septic shock.    Change in PCT >80%  A decrease of PCT levels of more than 80% defines a negative change in PCT result representing a lower " risk for 28-day all-cause mortality of patients diagnosed with severe sepsis or septic shock.      COVID PRE-OP / PRE-PROCEDURE SCREENING ORDER (NO ISOLATION)    Narrative:     The following orders were created for panel order COVID PRE-OP / PRE-PROCEDURE SCREENING ORDER (NO ISOLATION) - Swab, Nasopharynx.  Procedure                               Abnormality         Status                     ---------                               -----------         ------                     COVID-19 and FLU A/B PCR...[[id number]]  Normal              Final result                 Please view results for these tests on the individual orders.   RAINBOW DRAW    Narrative:     The following orders were created for panel order [place] Draw.  Procedure                               Abnormality         Status                     ---------                               -----------         ------                     Green Top (Gel)[[id number]]                                  Final result               Lavender Top[[id number]]                                     Final result               Gold Top - SST[[id number]]                                   Final result               Light Blue Top[[id number]]                                   Final result                 Please view results for these tests on the individual orders.   BLOOD GAS, ARTERIAL   CBC AND DIFFERENTIAL    Narrative:     The following orders were created for panel order CBC & Differential.  Procedure                               Abnormality         Status                     ---------                               -----------         ------                     CBC Auto Differential[[id number]]        Abnormal            Final result                 Please view results for these tests on the individual orders.   GREEN TOP   LAVENDER TOP   GOLD TOP - SST   LIGHT BLUE TOP       Vitals Reviewed:    Vitals:    07/27/24 1544 07/27/24 1550 07/27/24 1557 07/27/24 1600   BP: 180/96 (!)  197/115 164/78 169/97   BP Location:   Left arm    Patient Position:   Lying    Pulse: 64 61  61   Resp:       Temp:       TempSrc:       SpO2:  93%  93%   Weight:       Height:           MEDICATIONS GIVEN TO PATIENT THIS ENCOUNTER:  Medications   sodium chloride 0.9 % flush 10 mL (has no administration in time range)   methylPREDNISolone sodium succinate (SOLU-Medrol) injection 125 mg (125 mg Intravenous Given 7/27/24 1345)   ipratropium-albuterol (DUO-NEB) nebulizer solution 3 mL (3 mL Nebulization Given 7/27/24 1358)   furosemide (LASIX) injection 40 mg (40 mg Intravenous Given 7/27/24 1509)   metoprolol tartrate (LOPRESSOR) injection 5 mg (5 mg Intravenous Given 7/27/24 1544)       CONSULTS:  None    CRITICAL CARE:  There was significant risk of life threatening deterioration of patient's condition requiring my direct management. Critical care time 35 minutes, excluding any documented procedures.    FINAL IMPRESSION      1. Acute on chronic respiratory failure with hypoxia and hypercapnia          DISPOSITION / PLAN     ED Disposition       ED Disposition   Decision to Admit    Condition   --    Comment   Level of Care: Telemetry [5]   Diagnosis: Acute on chronic respiratory failure with hypoxia and hypercapnia [[id number]]   Isolate for COVID?: No [0]   Certification: I Certify That Inpatient Hospital Services Are Medically Necessary For Greater Than 2 Midnights                 PATIENT REFERRED TO:  No follow-up provider specified.    DISCHARGE MEDICATIONS:     Medication List        CONTINUE taking these medications      glucose monitor monitoring kit  1 each Daily. E11.9     Lancet Device misc  1 each Daily. E11.9     Misc. Devices misc  Bipap tubing.     Nebulizer misc  1 each Every 4 (Four) Hours As Needed (shortness of breath).     True Metrix Air Glucose Meter w/Device kit  1 each by In Vitro route 2 (two) times a day. E11.9     TRUEplus Lancets 33G misc  1 each by Other route Daily. E11.9            ASK  your doctor about these medications      albuterol sulfate  (90 Base) MCG/ACT inhaler  Commonly known as: PROVENTIL HFA;VENTOLIN HFA;PROAIR HFA     amLODIPine 10 MG tablet  Commonly known as: NORVASC     atorvastatin 20 MG tablet  Commonly known as: LIPITOR  Take 1 tablet by mouth Every Night.     baclofen 10 MG tablet  Commonly known as: LIORESAL     Andreytri Aerosphere 160-9-4.8 MCG/ACT aerosol inhaler  Generic drug: Budeson-Glycopyrrol-Formoterol     * bumetanide 1 MG tablet  Commonly known as: BUMEX  Take 1 tablet by mouth Daily.     * bumetanide 0.5 MG tablet  Commonly known as: BUMEX     diclofenac 75 MG EC tablet  Commonly known as: VOLTAREN     Eliquis 5 MG tablet tablet  Generic drug: apixaban  TAKE 1 TABLET EVERY 12 HOURS     famotidine 20 MG tablet  Commonly known as: PEPCID     FLUoxetine 10 MG capsule  Commonly known as: PROzac     fluticasone 50 MCG/ACT nasal spray  Commonly known as: FLONASE     gabapentin 400 MG capsule  Commonly known as: NEURONTIN  Take 1 capsule by mouth Every Night.     HYDROcodone-acetaminophen 7.5-325 MG per tablet  Commonly known as: NORCO     ipratropium-albuterol 0.5-2.5 mg/3 ml nebulizer  Commonly known as: DUO-NEB     lisinopril 40 MG tablet  Commonly known as: PRINIVIL,ZESTRIL  Take 1 tablet by mouth Daily.     metFORMIN 500 MG tablet  Commonly known as: GLUCOPHAGE     metoprolol succinate XL 50 MG 24 hr tablet  Commonly known as: Toprol XL  Take 1 tablet by mouth Daily.     O2  Commonly known as: OXYGEN     * omeprazole 40 MG capsule  Commonly known as: priLOSEC  Take 1 capsule by mouth Daily.     * omeprazole 40 MG capsule  Commonly known as: priLOSEC     pramipexole 1 MG tablet  Commonly known as: MIRAPEX     tamsulosin 0.4 MG capsule 24 hr capsule  Commonly known as: FLOMAX  Take 1 capsule by mouth Daily.           * This list has 4 medication(s) that are the same as other medications prescribed for you. Read the directions carefully, and ask your doctor or  other care provider to review them with you.                  Electronically signed by Kendrick Brown DO, 07/27/24, 1:30 PM EDT.    Emergency Medicine Physician  [facility]  (Please note that portions of thisnote were completed with a voice recognition program.  Efforts were made to edit the dictations but occasionally words are mis-transcribed.)       Kendrick Brown DO  07/27/24 7810

## 2024-07-27 NOTE — H&P
[facility]   HISTORY AND PHYSICAL      Name:  Robe Bryant   Age:  73 y.o.  Sex:  male  :  1950  MRN:  [id number]   Visit Number:  [id number]  Admission Date:  2024  Date Of Service:  24  Primary Care Physician:  Raj Sullivan MD     Admitting diagnosis:      Acute on chronic respiratory failure with hypoxia and hypercapnia    COPD with acute exacerbation    Acute on chronic congestive heart failure    Pacemaker    Chronic low back pain    Essential hypertension    Paroxysmal A-fib    Hypercholesterolemia    Gastroesophageal reflux disease    Diabetes mellitus    Restless leg syndrome    Chronic pain syndrome    Morbid obesity    Acute on chronic respiratory failure with hypoxemia        History Of Presenting Illness:      73-year-old patient with past medical history of COPD on 2 L oxygen at baseline, paroxysmal atrial fibrillation on anticoagulation, heart failure with preserved ejection fraction, BPH, hypertension, diabetes, pacemaker, sleep apnea, degenerative disc disease and chronic back pain with neuropathy on narcotics, who comes into the ER because of worsening shortness of breath than his baseline over the last 3 days.  He states that since last 3 days he has been gradually getting worse and unable to do her daily activities.  He has been using his oxygen and still struggling to breathe along with coughing and worsening leg swelling.    In the ER workup was done and he was found to have evidence DepoDur and wheezing with COPD exacerbation as well as signs of volume overload.  He was given IV Solu-Medrol Lasix and being further admitted for management of his worsening respiratory failure with exacerbation of COPD and his diastolic heart failure.  He denies chest pain and is not having any productive cough but is complaining mainly of more wheezing.  With the effect of the IV Lasix given in the ER he has diuresed about 400 mL and currently he is  on BiPAP which has helped.  Labs and x-rays have been reviewed as below  Patient is being admitted inpatient for management of his worsening respiratory failure    Review Of Systems:     The following systems were reviewed and negative;  constitution, eyes, ENT, respiratory, cardiovascular, gastrointestinal, genitourinary, musculoskeletal, neurological and behavioral/psych,  Skin except as above.     Past Medical History:    Past Medical History:   Diagnosis Date    Anxiety and depression     Arthritis     Backache     20 years    Benign prostatic hyperplasia     Bladder trauma     Cervicalgia     Chronic kidney disease     Cr up to 2.1    Coronary artery disease     Diabetes mellitus     15 years--    Emphysema of lung     Erectile dysfunction     Eye exam, routine 2015    FH: colonic polyps     Gout     for 10 years--    History of echocardiogram 09/15/2014    History of tobacco use     with cessation x7 years    Hyperlipidemia     Hypertension     Impaired functional mobility, balance, gait, and endurance     Migraine     Myocardial infarction     h/o coronary artery stenting--    Prostate cancer     Restless leg syndrome     20 years    Sleep apnea     12 years; uses a Bi-Pap    Stroke     Syncope 04/28/2017    Warfarin anticoagulation 09/14/2016       Past Surgical history:    Past Surgical History:   Procedure Laterality Date    BLADDER SURGERY      CARDIAC CATHETERIZATION  03/15/2013    revealing widely patent stents of the left circumflex and ramus intermedius coronary arteries, no significant disease is seen in any territory    CARDIAC CATHETERIZATION Left 9/30/2019    Procedure: Left Heart Cath;  Surgeon: Arelis Tucker MD;  Location: [facility] IN[facility]ASIVE LOCATION;  Service: Cardiology    CARDIAC ELECTROPHYSIOLOGY PROCEDURE N/A 5/10/2021    Procedure: PPM battery change;  Surgeon: Arelis Tucker MD;  Location: [facility] CATH INVASIVE LOCATION;  Service: Cardiology;  Laterality: N/A;     CARDIAC PACEMAKER PLACEMENT  2012    CATARACT EXTRACTION      COLONOSCOPY  2004    No family history of colon cancer.      COLONOSCOPY  2015    HERNIA REPAIR      Type unknown    PACEMAKER IMPLANTATION      PROSTATE SURGERY         Social History:    Social History     Socioeconomic History    Marital status:    Tobacco Use    Smoking status: Former     Current packs/day: 0.00     Average packs/day: 1 pack/day for 30.0 years (30.0 ttl pk-yrs)     Types: Cigarettes     Start date: 8/15/1971     Quit date: 8/15/2001     Years since quittin.9    Smokeless tobacco: Never    Tobacco comments:     quit 16 years ago   Vaping Use    Vaping status: Never Used   Substance and Sexual Activity    Alcohol use: No    Drug use: No    Sexual activity: Defer       Family History:    Family History   Problem Relation Age of Onset    Cancer Mother     Diabetes Mother     Hypertension Mother     Stroke Mother     Stomach cancer Mother     Heart disease Mother     Stomach cancer Father     Cancer Father     Lung cancer Father          Allergies:      Patient has no known allergies.    Home Medications:    Prior to Admission Medications       Prescriptions Last Dose Informant Patient Reported? Taking?    albuterol sulfate  (90 Base) MCG/ACT inhaler   Yes No    Inhale 2 puffs Every 4 (Four) Hours As Needed for Wheezing.    amLODIPine (NORVASC) 10 MG tablet  Provider's Office Yes No    Take 1 tablet by mouth Daily.    atorvastatin (LIPITOR) 20 MG tablet   No No    Take 1 tablet by mouth Every Night.    baclofen (LIORESAL) 10 MG tablet  Provider's Office Yes No    Take 1 tablet by mouth 2 (Two) Times a Day.    Blood Glucose Monitoring Suppl (TRUE METRIX AIR GLUCOSE METER) w/Device kit   No No    1 each by In Vitro route 2 (two) times a day. E11.9    Budeson-Glycopyrrol-Formoterol (Breztri Aerosphere) 160-9-4.8 MCG/ACT aerosol inhaler  Provider's Office Yes No    Inhale 2 puffs 2 (Two) Times a Day.    bumetanide (BUMEX)  0.5 MG tablet   Yes No    Take 1 tablet by mouth Daily.    bumetanide (BUMEX) 1 MG tablet   No No    Take 1 tablet by mouth Daily.    diclofenac (VOLTAREN) 75 MG EC tablet   Yes No    Take 1 tablet by mouth 2 (Two) Times a Day.    Eliquis 5 MG tablet tablet   No No    TAKE 1 TABLET EVERY 12 HOURS    famotidine (PEPCID) 20 MG tablet   Yes No    Take 1 tablet by mouth 2 (Two) Times a Day.    FLUoxetine (PROzac) 10 MG capsule  Provider's Office Yes No    Take 1 capsule by mouth Daily.    fluticasone (FLONASE) 50 MCG/ACT nasal spray  Provider's Office Yes No    2 sprays into the nostril(s) as directed by provider 2 (Two) Times a Day.    gabapentin (NEURONTIN) 400 MG capsule   No No    Take 1 capsule by mouth Every Night.    glucose monitor monitoring kit   No No    1 each Daily. E11.9    HYDROcodone-acetaminophen (NORCO) 7.5-325 MG per tablet   Yes No    Take 1 tablet by mouth Every 6 (Six) Hours.    ipratropium-albuterol (DUO-NEB) 0.5-2.5 mg/3 ml nebulizer  Provider's Office Yes No    Take 3 mL by nebulization 4 (Four) Times a Day As Needed for Wheezing.    Lancet Device misc   No No    1 each Daily. E11.9    lisinopril (PRINIVIL,ZESTRIL) 40 MG tablet   No No    Take 1 tablet by mouth Daily.    metFORMIN (GLUCOPHAGE) 500 MG tablet   Yes No    Take 1 tablet by mouth Every 12 (Twelve) Hours.    metoprolol succinate XL (Toprol XL) 50 MG 24 hr tablet   No No    Take 1 tablet by mouth Daily.    Misc. Devices misc   No No    Bipap tubing.    Nebulizer misc   No No    1 each Every 4 (Four) Hours As Needed (shortness of breath).    O2 (OXYGEN)  Self Yes No    Inhale 2 L/min Continuous As Needed (With activity).    omeprazole (priLOSEC) 40 MG capsule   No No    Take 1 capsule by mouth Daily.    Patient not taking:  Reported on 3/29/2024    omeprazole (priLOSEC) 40 MG capsule  Provider's Office Yes No    Take 1 capsule by mouth Daily.    pramipexole (MIRAPEX) 1 MG tablet  Provider's Office Yes No    Take 1 tablet by mouth Every  Night.    tamsulosin (FLOMAX) 0.4 MG capsule 24 hr capsule   No No    Take 1 capsule by mouth Daily.    TRUEplus Lancets 33G misc   No No    1 each by Other route Daily. E11.9                   Vital Signs:    Temp:  [97.6 °F (36.4 °C)] 97.6 °F (36.4 °C)  Heart Rate:  [60-90] 61  Resp:  [24-25] 25  BP: (164-216)/() 169/97        07/27/24  1332   Weight: 99.8 kg (220 lb)       Body mass index is 35.51 kg/m².    Physical Exam:      General Appearance:    Alert and cooperative,  And lying comfortably in bed   Head:    Atraumatic and normocephalic, without obvious abnormality.   Eyes:            PERRLA, conjunctivae and sclerae normal, no Icterus. No pallor. Extraocular movements are within normal limits.   Ears:    Ears appear intact with no abnormalities noted.   Throat:   No oral lesions, no thrush, oral mucosa moist.   Neck:   Supple, trachea midline, no thyromegaly, no carotid bruit, no lymphadenopathy   Lungs:    Breath sounds heard bilaterally equally.  Bilateral scattered wheezing with crepitations on the bases       Heart:    Normal S1 and S2, no murmur, no gallop, no rub. No JVD   Abdomen:     Normal bowel sounds, no masses, no organomegaly. Soft   nontender, nondistended, no guarding, no rebound    tenderness   Extremities:   Moves all extremities well, bilateral leg edema, no cyanosis, no          clubbing   Skin:   No  bruising or rash   Neurologic:   Cranial nerves 2 - 12 grossly intact, sensation intact, Motor power is normal and equal bilaterally.       EKG:      Paced rhythm    Labs:    Lab Results (last 24 hours)       Procedure Component Value Units Date/Time    Procalcitonin [[id number]]  (Normal) Collected: 07/27/24 1341    Specimen: Blood Updated: 07/27/24 1548     Procalcitonin 0.08 ng/mL     Narrative:      As a Marker for Sepsis (Non-Neonates):    1. <0.5 ng/mL represents a low risk of severe sepsis and/or septic shock.  2. >2 ng/mL represents a high risk of severe sepsis and/or septic  "shock.    As a Marker for Lower Respiratory Tract Infections that require antibiotic therapy:    PCT on Admission    Antibiotic Therapy       6-12 Hrs later    >0.5                Strongly Recommended  >0.25 - <0.5        Recommended   0.1 - 0.25          Discouraged              Remeasure/reassess PCT  <0.1                Strongly Discouraged     Remeasure/reassess PCT    As 28 day mortality risk marker: \"Change in Procalcitonin Result\" (>80% or <=80%) if Day 0 (or Day 1) and Day 4 values are available. Refer to http://www.[email or web address]-pct-calculator.com    Change in PCT <=80%  A decrease of PCT levels below or equal to 80% defines a positive change in PCT test result representing a higher risk for 28-day all-cause mortality of patients diagnosed with severe sepsis for septic shock.    Change in PCT >80%  A decrease of PCT levels of more than 80% defines a negative change in PCT result representing a lower risk for 28-day all-cause mortality of patients diagnosed with severe sepsis or septic shock.       COVID PRE-OP / PRE-PROCEDURE SCREENING ORDER (NO ISOLATION) - Swab, Nasopharynx [[id number]]  (Normal) Collected: 07/27/24 1345    Specimen: Swab from Nasopharynx Updated: 07/27/24 1409    Narrative:      The following orders were created for panel order COVID PRE-OP / PRE-PROCEDURE SCREENING ORDER (NO ISOLATION) - Swab, Nasopharynx.  Procedure                               Abnormality         Status                     ---------                               -----------         ------                     COVID-19 and FLU A/B PCR...[[id number]]  Normal              Final result                 Please view results for these tests on the individual orders.    COVID-19 and FLU A/B PCR, 1 HR TAT - Swab, Nasopharynx [[id number]]  (Normal) Collected: 07/27/24 1345    Specimen: Swab from Nasopharynx Updated: 07/27/24 1409     COVID19 Not Detected     Influenza A PCR Not Detected     Influenza B PCR Not Detected    Narrative:      " Fact sheet for providers: https://www.fda.gov/media/937754/download    Fact sheet for patients: https://www.fda.gov/media/745837/download    Test performed by PCR.    BNP [[id number]]  (Abnormal) Collected: 07/27/24 1341    Specimen: Blood Updated: 07/27/24 1406     proBNP 2,185.0 pg/mL     Narrative:      This assay is used as an aid in the diagnosis of individuals suspected of having heart failure. It can be used as an aid in the diagnosis of acute decompensated heart failure (ADHF) in patients presenting with signs and symptoms of ADHF to the emergency department (ED). In addition, NT-proBNP of <300 pg/mL indicates ADHF is not likely.    Age Range Result Interpretation  NT-proBNP Concentration (pg/mL:      <50             Positive            >450                   Gray                 300-450                    Negative             <300    50-75           Positive            >900                  Gray                300-900                  Negative            <300      >75             Positive            >1800                  Gray                300-1800                  Negative            <300    Single High Sensitivity Troponin T [[id number]]  (Abnormal) Collected: 07/27/24 1341    Specimen: Blood Updated: 07/27/24 1405     HS Troponin T 28 ng/L     Narrative:      High Sensitive Troponin T Reference Range:  <14.0 ng/L- Negative Female for AMI  <22.0 ng/L- Negative Male for AMI  >=14 - Abnormal Female indicating possible myocardial injury.  >=22 - Abnormal Male indicating possible myocardial injury.   Clinicians would have to utilize clinical acumen, EKG, Troponin, and serial changes to determine if it is an Acute Myocardial Infarction or myocardial injury due to an underlying chronic condition.         Comprehensive Metabolic Panel [[id number]]  (Abnormal) Collected: 07/27/24 1341    Specimen: Blood Updated: 07/27/24 1402     Glucose 125 mg/dL      BUN 15 mg/dL      Creatinine 1.24 mg/dL      Sodium 145 mmol/L       Potassium 4.3 mmol/L      Chloride 102 mmol/L      CO2 29.0 mmol/L      Calcium 9.0 mg/dL      Total Protein 6.0 g/dL      Albumin 3.4 g/dL      ALT (SGPT) 14 U/L      AST (SGOT) 21 U/L      Alkaline Phosphatase 102 U/L      Total Bilirubin 0.7 mg/dL      Globulin 2.6 gm/dL      A/G Ratio 1.3 g/dL      BUN/Creatinine Ratio 12.1     Anion Gap 14.0 mmol/L      eGFR 61.0 mL/min/1.73     Narrative:      GFR Normal >60  Chronic Kidney Disease <60  Kidney Failure <15    The GFR formula is only valid for adults with stable renal function between ages 18 and 70.    Blood Gas, Arterial With Co-Ox [[id number]]  (Abnormal) Collected: 07/27/24 1400    Specimen: Arterial Blood Updated: 07/27/24 1400     Site Left Radial     Sudarshan's Test N/A     pH, Arterial 7.402 pH units      pCO2, Arterial 47.4 mm Hg      Comment: 83 Value above reference range        pO2, Arterial 65.3 mm Hg      Comment: 84 Value below reference range        HCO3, Arterial 29.5 mmol/L      Comment: 83 Value above reference range        Base Excess, Arterial 4.0 mmol/L      Comment: 83 Value above reference range        O2 Saturation, Arterial 93.0 %      Comment: 84 Value below reference range        Hematocrit, Blood Gas 32.6 %      Comment: 84 Value below reference range        Oxyhemoglobin 91.2 %      Comment: 84 Value below reference range        Methemoglobin -0.10 %      Comment: 84 Value below reference range        Carboxyhemoglobin 1.9 %      A-a DO2 26.0 mmHg      Barometric Pressure for Blood Gas 736 mmHg      Modality Nasal Cannula     Flow Rate 2.0 lpm      Ventilator Mode NA     Collected by [id number]     Comment: Meter: [id number]     :  [id number]        pH, Temp Corrected --     pCO2, Temperature Corrected --     pO2, Temperature Corrected --    CBC & Differential [[id number]]  (Abnormal) Collected: 07/27/24 1341    Specimen: Blood Updated: 07/27/24 1347    Narrative:      The following orders were created for panel order CBC &  Differential.  Procedure                               Abnormality         Status                     ---------                               -----------         ------                     CBC Auto Differential[[id number]]        Abnormal            Final result                 Please view results for these tests on the individual orders.    CBC Auto Differential [[id number]]  (Abnormal) Collected: 07/27/24 1341    Specimen: Blood Updated: 07/27/24 1347     WBC 5.74 10*3/mm3      RBC 3.93 10*6/mm3      Hemoglobin 10.6 g/dL      Hematocrit 34.3 %      MCV 87.3 fL      MCH 27.0 pg      MCHC 30.9 g/dL      RDW 15.2 %      RDW-SD 49.0 fl      MPV 10.7 fL      Platelets 184 10*3/mm3      Neutrophil % 57.5 %      Lymphocyte % 22.0 %      Monocyte % 11.0 %      Eosinophil % 8.4 %      Basophil % 0.9 %      Immature Grans % 0.2 %      Neutrophils, Absolute 3.31 10*3/mm3      Lymphocytes, Absolute 1.26 10*3/mm3      Monocytes, Absolute 0.63 10*3/mm3      Eosinophils, Absolute 0.48 10*3/mm3      Basophils, Absolute 0.05 10*3/mm3      Immature Grans, Absolute 0.01 10*3/mm3      nRBC 0.0 /100 WBC     Carbon Draw [[id number]] Collected: 07/27/24 1341    Specimen: Blood Updated: 07/27/24 1345    Narrative:      The following orders were created for panel order Carbon Draw.  Procedure                               Abnormality         Status                     ---------                               -----------         ------                     Green Top (Gel)[[id number]]                                  Final result               Lavender Top[[id number]]                                     Final result               Gold Top - SST[[id number]]                                   Final result               Light Blue Top[[id number]]                                   Final result                 Please view results for these tests on the individual orders.    Green Top (Gel) [[id number]] Collected: 07/27/24 1341    Specimen: Blood Updated:  07/27/24 1345     Extra Tube Hold for add-ons.     Comment: Auto resulted.       Lavender Top [[id number]] Collected: 07/27/24 1341    Specimen: Blood Updated: 07/27/24 1345     Extra Tube hold for add-on     Comment: Auto resulted       Gold Top - SST [[id number]] Collected: 07/27/24 1341    Specimen: Blood Updated: 07/27/24 1345     Extra Tube Hold for add-ons.     Comment: Auto resulted.       Light Blue Top [[id number]] Collected: 07/27/24 1341    Specimen: Blood Updated: 07/27/24 1345     Extra Tube Hold for add-ons.     Comment: Auto resulted               Radiology:    Imaging Results (Last 72 Hours)       Procedure Component Value Units Date/Time    XR Chest 1 View [[id number]] Collected: 07/27/24 1454     Updated: 07/27/24 1501    Narrative:      FINAL REPORT    TECHNIQUE:  Single view chest    CLINICAL HISTORY:  SOA    FINDINGS:  A single view of the chest was obtained.  A pacemaker is in  place.  The heart is borderline enlarged.  There are small  bilateral pleural effusions with possible interstitial pulmonary  edema.  There is no pneumothorax.  Osseous structures are  unremarkable.      Impression:      Mild CHF.    Authenticated and Electronically Signed by Hua Bailey M.D. on  07/27/2024 03:00:45 PM            Assessment:    Assessment & Plan       Acute on chronic respiratory failure with hypoxia and hypercapnia    COPD with acute exacerbation    Acute on chronic congestive heart failure    Pacemaker    Chronic low back pain    Essential hypertension    Paroxysmal A-fib    Hypercholesterolemia    Gastroesophageal reflux disease    Diabetes mellitus    Restless leg syndrome    Chronic pain syndrome    Morbid obesity    Acute on chronic respiratory failure with hypoxemia        Plan:     1.  Acute on chronic respiratory failure with hypoxemia and hypercapnia-this seems to be combination of his COPD exacerbation with mild volume overload  Continue with the BiPAP and oxygen to keep saturation 90% and  above    2.  Acute on chronic diastolic heart failure-this seems to be signs of volume overload and IV diuresis has been started.  Will continue with the IV Lasix and medical management  Low-salt diet    3.  COPD exacerbation-start with IV steroids bronchodilators.  At present he does not have signs of acute bacterial infection and will not give antibiotic.  Will check procalcitonin and if elevated then we will cover him with IV Rocephin and Zithromax    Continue rest of the medication for all his other medical problems as ordered  Goals of treatment plan of care has been addressed with the patient in details    Raj Sullivan MD  07/27/24  16:23 EDT    Please note that portions of this note were completed with a voice recognition program.

## 2024-07-28 LAB
ALBUMIN SERPL-MCNC: 3.2 G/DL (ref 3.5–5.2)
ALBUMIN/GLOB SERPL: 1.1 G/DL
ALP SERPL-CCNC: 96 U/L (ref 39–117)
ALT SERPL W P-5'-P-CCNC: 12 U/L (ref 1–41)
ANION GAP SERPL CALCULATED.3IONS-SCNC: 12.4 MMOL/L (ref 5–15)
AST SERPL-CCNC: 16 U/L (ref 1–40)
BILIRUB SERPL-MCNC: 0.4 MG/DL (ref 0–1.2)
BUN SERPL-MCNC: 23 MG/DL (ref 8–23)
BUN/CREAT SERPL: 18 (ref 7–25)
CALCIUM SPEC-SCNC: 8.8 MG/DL (ref 8.6–10.5)
CHLORIDE SERPL-SCNC: 102 MMOL/L (ref 98–107)
CO2 SERPL-SCNC: 26.6 MMOL/L (ref 22–29)
CREAT SERPL-MCNC: 1.28 MG/DL (ref 0.76–1.27)
DEPRECATED RDW RBC AUTO: 48.1 FL (ref 37–54)
EGFRCR SERPLBLD CKD-EPI 2021: 58.7 ML/MIN/1.73
ERYTHROCYTE [DISTWIDTH] IN BLOOD BY AUTOMATED COUNT: 15 % (ref 12.3–15.4)
GLOBULIN UR ELPH-MCNC: 2.9 GM/DL
GLUCOSE SERPL-MCNC: 178 MG/DL (ref 65–99)
HBA1C MFR BLD: 5.8 % (ref 4.8–5.6)
HCT VFR BLD AUTO: 36 % (ref 37.5–51)
HGB BLD-MCNC: 11 G/DL (ref 13–17.7)
MCH RBC QN AUTO: 26.8 PG (ref 26.6–33)
MCHC RBC AUTO-ENTMCNC: 30.6 G/DL (ref 31.5–35.7)
MCV RBC AUTO: 87.6 FL (ref 79–97)
PLATELET # BLD AUTO: 189 10*3/MM3 (ref 140–450)
PMV BLD AUTO: 11.5 FL (ref 6–12)
POTASSIUM SERPL-SCNC: 4.4 MMOL/L (ref 3.5–5.2)
PROT SERPL-MCNC: 6.1 G/DL (ref 6–8.5)
RBC # BLD AUTO: 4.11 10*6/MM3 (ref 4.14–5.8)
SODIUM SERPL-SCNC: 141 MMOL/L (ref 136–145)
TSH SERPL DL<=0.05 MIU/L-ACNC: 0.27 UIU/ML (ref 0.27–4.2)
WBC NRBC COR # BLD AUTO: 6.59 10*3/MM3 (ref 3.4–10.8)

## 2024-07-28 PROCEDURE — 25010000002 ONDANSETRON PER 1 MG: Performed by: INTERNAL MEDICINE

## 2024-07-28 PROCEDURE — 25010000002 FUROSEMIDE PER 20 MG: Performed by: INTERNAL MEDICINE

## 2024-07-28 PROCEDURE — 25010000002 METHYLPREDNISOLONE PER 40 MG: Performed by: INTERNAL MEDICINE

## 2024-07-28 PROCEDURE — 85027 COMPLETE CBC AUTOMATED: CPT | Performed by: INTERNAL MEDICINE

## 2024-07-28 PROCEDURE — 80053 COMPREHEN METABOLIC PANEL: CPT | Performed by: INTERNAL MEDICINE

## 2024-07-28 PROCEDURE — 25010000002 CEFTRIAXONE PER 250 MG: Performed by: INTERNAL MEDICINE

## 2024-07-28 PROCEDURE — 84443 ASSAY THYROID STIM HORMONE: CPT | Performed by: INTERNAL MEDICINE

## 2024-07-28 PROCEDURE — 83036 HEMOGLOBIN GLYCOSYLATED A1C: CPT | Performed by: INTERNAL MEDICINE

## 2024-07-28 RX ORDER — FUROSEMIDE 10 MG/ML
40 INJECTION INTRAMUSCULAR; INTRAVENOUS DAILY
Status: DISCONTINUED | OUTPATIENT
Start: 2024-07-29 | End: 2024-07-28

## 2024-07-28 RX ORDER — FUROSEMIDE 10 MG/ML
40 INJECTION INTRAMUSCULAR; INTRAVENOUS DAILY
Status: DISCONTINUED | OUTPATIENT
Start: 2024-07-28 | End: 2024-07-29 | Stop reason: HOSPADM

## 2024-07-28 RX ORDER — ONDANSETRON 2 MG/ML
4 INJECTION INTRAMUSCULAR; INTRAVENOUS EVERY 6 HOURS PRN
Status: DISCONTINUED | OUTPATIENT
Start: 2024-07-28 | End: 2024-07-29 | Stop reason: HOSPADM

## 2024-07-28 RX ORDER — GUAIFENESIN AND DEXTROMETHORPHAN HYDROBROMIDE 600; 30 MG/1; MG/1
1 TABLET, EXTENDED RELEASE ORAL 2 TIMES DAILY
Status: DISCONTINUED | OUTPATIENT
Start: 2024-07-28 | End: 2024-07-29 | Stop reason: HOSPADM

## 2024-07-28 RX ORDER — GABAPENTIN 100 MG/1
200 CAPSULE ORAL ONCE
Status: COMPLETED | OUTPATIENT
Start: 2024-07-28 | End: 2024-07-28

## 2024-07-28 RX ORDER — ONDANSETRON 4 MG/1
4 TABLET, ORALLY DISINTEGRATING ORAL EVERY 6 HOURS PRN
Status: DISCONTINUED | OUTPATIENT
Start: 2024-07-28 | End: 2024-07-29 | Stop reason: HOSPADM

## 2024-07-28 RX ORDER — GABAPENTIN 100 MG/1
100 CAPSULE ORAL EVERY 12 HOURS PRN
Status: DISCONTINUED | OUTPATIENT
Start: 2024-07-28 | End: 2024-07-29 | Stop reason: HOSPADM

## 2024-07-28 RX ADMIN — BACLOFEN 10 MG: 10 TABLET ORAL at 09:48

## 2024-07-28 RX ADMIN — TAMSULOSIN HYDROCHLORIDE 0.4 MG: 0.4 CAPSULE ORAL at 09:48

## 2024-07-28 RX ADMIN — GABAPENTIN 400 MG: 400 CAPSULE ORAL at 20:15

## 2024-07-28 RX ADMIN — GUAIFENESIN AND DEXTROMETHORPHAN HYDROBROMIDE 1 TABLET: 600; 30 TABLET, EXTENDED RELEASE ORAL at 20:15

## 2024-07-28 RX ADMIN — METHYLPREDNISOLONE SODIUM SUCCINATE 40 MG: 40 INJECTION, POWDER, FOR SOLUTION INTRAMUSCULAR; INTRAVENOUS at 01:29

## 2024-07-28 RX ADMIN — CEFTRIAXONE 1000 MG: 1 INJECTION, POWDER, FOR SOLUTION INTRAMUSCULAR; INTRAVENOUS at 11:33

## 2024-07-28 RX ADMIN — HYDROCODONE BITARTRATE AND ACETAMINOPHEN 1 TABLET: 7.5; 325 TABLET ORAL at 00:23

## 2024-07-28 RX ADMIN — FUROSEMIDE 40 MG: 10 INJECTION, SOLUTION INTRAMUSCULAR; INTRAVENOUS at 11:32

## 2024-07-28 RX ADMIN — FLUOXETINE 10 MG: 10 CAPSULE ORAL at 09:48

## 2024-07-28 RX ADMIN — METHYLPREDNISOLONE SODIUM SUCCINATE 40 MG: 40 INJECTION, POWDER, FOR SOLUTION INTRAMUSCULAR; INTRAVENOUS at 13:37

## 2024-07-28 RX ADMIN — HYDROCODONE BITARTRATE AND ACETAMINOPHEN 1 TABLET: 7.5; 325 TABLET ORAL at 11:32

## 2024-07-28 RX ADMIN — GABAPENTIN 200 MG: 100 CAPSULE ORAL at 14:47

## 2024-07-28 RX ADMIN — APIXABAN 5 MG: 5 TABLET, FILM COATED ORAL at 05:20

## 2024-07-28 RX ADMIN — HYDROCODONE BITARTRATE AND ACETAMINOPHEN 1 TABLET: 7.5; 325 TABLET ORAL at 23:26

## 2024-07-28 RX ADMIN — ATORVASTATIN CALCIUM 20 MG: 20 TABLET, FILM COATED ORAL at 20:15

## 2024-07-28 RX ADMIN — METFORMIN HYDROCHLORIDE 500 MG: 500 TABLET ORAL at 09:48

## 2024-07-28 RX ADMIN — BACLOFEN 10 MG: 10 TABLET ORAL at 20:15

## 2024-07-28 RX ADMIN — METFORMIN HYDROCHLORIDE 500 MG: 500 TABLET ORAL at 20:15

## 2024-07-28 RX ADMIN — DICLOFENAC SODIUM 1 G: 10 GEL TOPICAL at 15:16

## 2024-07-28 RX ADMIN — AMLODIPINE BESYLATE 10 MG: 5 TABLET ORAL at 09:48

## 2024-07-28 RX ADMIN — HYDROCODONE BITARTRATE AND ACETAMINOPHEN 1 TABLET: 7.5; 325 TABLET ORAL at 18:29

## 2024-07-28 RX ADMIN — APIXABAN 5 MG: 5 TABLET, FILM COATED ORAL at 18:29

## 2024-07-28 RX ADMIN — PRAMIPEXOLE DIHYDROCHLORIDE 1 MG: 1 TABLET ORAL at 20:15

## 2024-07-28 RX ADMIN — LISINOPRIL 40 MG: 20 TABLET ORAL at 09:47

## 2024-07-28 RX ADMIN — PANTOPRAZOLE SODIUM 40 MG: 40 TABLET, DELAYED RELEASE ORAL at 05:20

## 2024-07-28 RX ADMIN — HYDROCODONE BITARTRATE AND ACETAMINOPHEN 1 TABLET: 7.5; 325 TABLET ORAL at 05:20

## 2024-07-28 RX ADMIN — ONDANSETRON 4 MG: 2 INJECTION INTRAMUSCULAR; INTRAVENOUS at 14:11

## 2024-07-28 RX ADMIN — METOPROLOL SUCCINATE 50 MG: 50 TABLET, EXTENDED RELEASE ORAL at 09:48

## 2024-07-28 RX ADMIN — GUAIFENESIN AND DEXTROMETHORPHAN HYDROBROMIDE 1 TABLET: 600; 30 TABLET, EXTENDED RELEASE ORAL at 11:32

## 2024-07-28 NOTE — PLAN OF CARE
Goal Outcome Evaluation:  Plan of Care Reviewed With: patient        Progress: no change       Pt admitted to floor during shift. BP elevated. See MAR.

## 2024-07-28 NOTE — PROGRESS NOTES
[facility]  INTERNAL MEDICINE PROGRESS NOTE    Name:  Robe Bryant   Age:  73 y.o.  Sex:  male  :  1950  MRN:  [id number]   Visit Number:  [id number]  Admission Date:  2024  Date Of Service:  24  Primary Care Physician:  Raj Sullivan MD     LOS: 1 day :  Patient Care Team:  Raj Sullivan MD as PCP - General (Internal Medicine)  Blayne Whitman MD as Consulting Physician (Ophthalmology):      Subjective / Interval History:     73-year-old patient with past medical history of COPD on 2 L oxygen at baseline, paroxysmal atrial fibrillation on anticoagulation, heart failure with preserved ejection fraction, BPH, hypertension, diabetes, pacemaker, sleep apnea, degenerative disc disease and chronic back pain with neuropathy on narcotics, who comes into the ER because of worsening shortness of breath than his baseline over the last 3 days.  He states that since last 3 days he has been gradually getting worse and unable to do her daily activities.  He has been using his oxygen and still struggling to breathe along with coughing and worsening leg swelling.     In the ER workup was done and he was found to have evidence DepoDur and wheezing with COPD exacerbation as well as signs of volume overload.  He was given IV Solu-Medrol Lasix and being further admitted for management of his worsening respiratory failure with exacerbation of COPD and his diastolic heart failure.  He denies chest pain and is not having any productive cough but is complaining mainly of more wheezing.  With the effect of the IV Lasix given in the ER he has diuresed about 400 mL and currently he is on BiPAP which has helped.  Labs and x-rays have been reviewed as below  Patient is being admitted inpatient for management of his worsening respiratory failure    Patient for admission has diuresed 1250 mL and he is feeling a lot better.  Currently on 4 L oxygen with saturation at 94%.  He has been having  cough which is little thick with mucus.  Leg edema has improved.  He did use BiPAP pain for the initial few hours and after that has been on nasal cannula and maintaining his saturation      Vital Signs:    Temp:  [97.4 °F (36.3 °C)-98.1 °F (36.7 °C)] 97.8 °F (36.6 °C)  Heart Rate:  [60-90] 60  Resp:  [18-25] 20  BP: (152-216)/() 159/81    Intake and output:    I/O last 3 completed shifts:  In: -   Out: 1250 [Urine:1250]  I/O this shift:  In: 360 [P.O.:360]  Out: -     Physical Examination:    General Appearance:    Alert and cooperative, not in any acute distress.   Head:    Atraumatic and normocephalic, without obvious abnormality.   Eyes:            PERRLA,  No pallor. Extraocular movements are within normal limits.   Neck:   Supple,  No lymph glands, no bruit   Lungs:     Chest shape is normal. Breath sounds heard bilaterally equally.  Improvement slightly in the crepitations and better air entry    Heart:    Normal S1 and S2, no murmur,  No JVD   Abdomen:     Normal bowel sounds, no masses, no organomegaly. Soft     nontender, no guarding, no rebound tenderness   Extremities:   Moves all extremities well, no edema, no cyanosis,    Skin:   No  bruising or rash.   Neurologic:   Grossly nonfocal and moves all extremities.      Laboratory results:  Results from last 7 days   Lab Units 07/28/24  0640 07/27/24  1341   SODIUM mmol/L 141 145   POTASSIUM mmol/L 4.4 4.3   CHLORIDE mmol/L 102 102   CO2 mmol/L 26.6 29.0   BUN mg/dL 23 15   CREATININE mg/dL 1.28* 1.24   CALCIUM mg/dL 8.8 9.0   BILIRUBIN mg/dL 0.4 0.7   ALK PHOS U/L 96 102   ALT (SGPT) U/L 12 14   AST (SGOT) U/L 16 21   GLUCOSE mg/dL 178* 125*     Results from last 7 days   Lab Units 07/28/24  0640 07/27/24  1341   WBC 10*3/mm3 6.59 5.74   HEMOGLOBIN g/dL 11.0* 10.6*   HEMATOCRIT % 36.0* 34.3*   PLATELETS 10*3/mm3 189 184         Results from last 7 days   Lab Units 07/27/24  1341   HSTROP T ng/L 28*           Radiology results:    Imaging Results  (Last 24 Hours)       Procedure Component Value Units Date/Time    XR Chest 1 View [[id number]] Collected: 07/27/24 1454     Updated: 07/27/24 1501    Narrative:      FINAL REPORT    TECHNIQUE:  Single view chest    CLINICAL HISTORY:  SOA    FINDINGS:  A single view of the chest was obtained.  A pacemaker is in  place.  The heart is borderline enlarged.  There are small  bilateral pleural effusions with possible interstitial pulmonary  edema.  There is no pneumothorax.  Osseous structures are  unremarkable.      Impression:      Mild CHF.    Authenticated and Electronically Signed by Hua Bailey M.D. on  07/27/2024 03:00:45 PM            I have reviewed the patient's radiology reports.    Medication Review:     I have reviewed the patients active and prn medications.     Assessment:      Acute on chronic respiratory failure with hypoxia and hypercapnia    COPD with acute exacerbation    Acute on chronic congestive heart failure    Pacemaker    Chronic low back pain    Essential hypertension    Paroxysmal A-fib    Hypercholesterolemia    Gastroesophageal reflux disease    Diabetes mellitus    Restless leg syndrome    Chronic pain syndrome    Morbid obesity    Acute on chronic respiratory failure with hypoxemia          Plan:    1.  Acute on chronic respiratory failure with hypoxemia and hypercapnia-this seems to be combination of his COPD exacerbation with mild volume overload  Continue with the BiPAP and oxygen to keep saturation 90% and above  He is currently on 4 L overnight had to increase his oxygen with saturation being maintained at 93%     2.  Acute on chronic diastolic heart failure-  .  Will continue with the IV Lasix and medical management  -He has 1250 in the negative balance since admission yesterday  Low-salt diet     3.  COPD exacerbation-he will be continued IV steroids and bronchodilators.  Could be component of bacterial bronchitis and will cover him with Rocephin and Mucinex     Continue rest of the  medication for all his other medical problems as ordered  Goals of treatment plan of care has been addressed with the patient in details      Raj Sullivan MD  07/28/24  09:05 EDT      Please note that portions of this note were completed with a voice recognition program.

## 2024-07-28 NOTE — PAYOR COMM NOTE
"Todd Bryant (73 y.o. Male)       Date of Birth   1950    Social Security Number       Address   [address] [id number]    Home Phone   [phone]    MRN   [id number]       Shinto   Latter day    Marital Status                               Admission Date   24    Admission Type   Emergency    Admitting Provider       Attending Provider   Raj Sullivan MD    Department, Room/Bed   [facility] TELEMETRY 4, 431/1       Discharge Date       Discharge Disposition       Discharge Destination                                 Attending Provider: Raj Sullivan MD    Allergies: No Known Allergies    Isolation: None   Infection: None   Code Status: No CPR    Ht: 167.6 cm (66\")   Wt: 99.8 kg (220 lb)    Admission Cmt: None   Principal Problem: Acute on chronic respiratory failure with hypoxia and hypercapnia [J96.21,J96.22]                   Active Insurance as of 2024       Primary Coverage       Payor Plan Insurance Group Employer/Plan Group    HUMANA MEDICARE REPLACEMENT HUMANA MED ADV PPO [id number]       Payor Plan Address Payor Plan Phone Number Payor Plan Fax Number Effective Dates    [address] [address] [phone]  3/1/2023 - None Entered    [facility] [id number]         Subscriber Name Subscriber Birth Date Member ID       TODD BRYANT 1950 [id number]                     Emergency Contacts        (Rel.) Home Phone Work Phone Mobile Phone    BISI BRYANT (Spouse) -- -- [phone]    VITOR MCGILL (Daughter) [phone] -- --                 History & Physical        Raj Sullivan MD at [address] [place] [address] MEDICINE   HISTORY AND PHYSICAL      Name:  Todd Bryant   Age:  73 y.o.  Sex:  male  :  1950  MRN:  [id number]   Visit Number:  [id number]  Admission Date:  2024  Date Of Service:  24  Primary Care Physician:  Raj Sullivan MD     Admitting " diagnosis:      Acute on chronic respiratory failure with hypoxia and hypercapnia    COPD with acute exacerbation    Acute on chronic congestive heart failure    Pacemaker    Chronic low back pain    Essential hypertension    Paroxysmal A-fib    Hypercholesterolemia    Gastroesophageal reflux disease    Diabetes mellitus    Restless leg syndrome    Chronic pain syndrome    Morbid obesity    Acute on chronic respiratory failure with hypoxemia        History Of Presenting Illness:      73-year-old patient with past medical history of COPD on 2 L oxygen at baseline, paroxysmal atrial fibrillation on anticoagulation, heart failure with preserved ejection fraction, BPH, hypertension, diabetes, pacemaker, sleep apnea, degenerative disc disease and chronic back pain with neuropathy on narcotics, who comes into the ER because of worsening shortness of breath than his baseline over the last 3 days.  He states that since last 3 days he has been gradually getting worse and unable to do her daily activities.  He has been using his oxygen and still struggling to breathe along with coughing and worsening leg swelling.    In the ER workup was done and he was found to have evidence DepoDur and wheezing with COPD exacerbation as well as signs of volume overload.  He was given IV Solu-Medrol Lasix and being further admitted for management of his worsening respiratory failure with exacerbation of COPD and his diastolic heart failure.  He denies chest pain and is not having any productive cough but is complaining mainly of more wheezing.  With the effect of the IV Lasix given in the ER he has diuresed about 400 mL and currently he is on BiPAP which has helped.  Labs and x-rays have been reviewed as below  Patient is being admitted inpatient for management of his worsening respiratory failure    Review Of Systems:     The following systems were reviewed and negative;  constitution, eyes, ENT, respiratory, cardiovascular,  gastrointestinal, genitourinary, musculoskeletal, neurological and behavioral/psych,  Skin except as above.     Past Medical History:    Past Medical History:   Diagnosis Date    Anxiety and depression     Arthritis     Backache     20 years    Benign prostatic hyperplasia     Bladder trauma     Cervicalgia     Chronic kidney disease     Cr up to 2.1    Coronary artery disease     Diabetes mellitus     15 years--    Emphysema of lung     Erectile dysfunction     Eye exam, routine 2015    FH: colonic polyps     Gout     for 10 years--    History of echocardiogram 09/15/2014    History of tobacco use     with cessation x7 years    Hyperlipidemia     Hypertension     Impaired functional mobility, balance, gait, and endurance     Migraine     Myocardial infarction     h/o coronary artery stenting--    Prostate cancer     Restless leg syndrome     20 years    Sleep apnea     12 years; uses a Bi-Pap    Stroke     Syncope 04/28/2017    Warfarin anticoagulation 09/14/2016       Past Surgical history:    Past Surgical History:   Procedure Laterality Date    BLADDER SURGERY      CARDIAC CATHETERIZATION  03/15/2013    revealing widely patent stents of the left circumflex and ramus intermedius coronary arteries, no significant disease is seen in any territory    CARDIAC CATHETERIZATION Left 9/30/2019    Procedure: Left Heart Cath;  Surgeon: Arelis Tucker MD;  Location:  PREM CATH INVASIVE LOCATION;  Service: Cardiology    CARDIAC ELECTROPHYSIOLOGY PROCEDURE N/A 5/10/2021    Procedure: PPM battery change;  Surgeon: Arelis Tucker MD;  Location:  PREM CATH INVASIVE LOCATION;  Service: Cardiology;  Laterality: N/A;    CARDIAC PACEMAKER PLACEMENT  2012    CATARACT EXTRACTION      COLONOSCOPY  2004    No family history of colon cancer.      COLONOSCOPY  2015    HERNIA REPAIR      Type unknown    PACEMAKER IMPLANTATION      PROSTATE SURGERY         Social History:    Social History     Socioeconomic History     Marital status:    Tobacco Use    Smoking status: Former     Current packs/day: 0.00     Average packs/day: 1 pack/day for 30.0 years (30.0 ttl pk-yrs)     Types: Cigarettes     Start date: 8/15/1971     Quit date: 8/15/2001     Years since quittin.9    Smokeless tobacco: Never    Tobacco comments:     quit 16 years ago   Vaping Use    Vaping status: Never Used   Substance and Sexual Activity    Alcohol use: No    Drug use: No    Sexual activity: Defer       Family History:    Family History   Problem Relation Age of Onset    Cancer Mother     Diabetes Mother     Hypertension Mother     Stroke Mother     Stomach cancer Mother     Heart disease Mother     Stomach cancer Father     Cancer Father     Lung cancer Father          Allergies:      Patient has no known allergies.    Home Medications:    Prior to Admission Medications       Prescriptions Last Dose Informant Patient Reported? Taking?    albuterol sulfate  (90 Base) MCG/ACT inhaler   Yes No    Inhale 2 puffs Every 4 (Four) Hours As Needed for Wheezing.    amLODIPine (NORVASC) 10 MG tablet  Provider's Office Yes No    Take 1 tablet by mouth Daily.    atorvastatin (LIPITOR) 20 MG tablet   No No    Take 1 tablet by mouth Every Night.    baclofen (LIORESAL) 10 MG tablet  Provider's Office Yes No    Take 1 tablet by mouth 2 (Two) Times a Day.    Blood Glucose Monitoring Suppl (TRUE METRIX AIR GLUCOSE METER) w/Device kit   No No    1 each by In Vitro route 2 (two) times a day. E11.9    Budeson-Glycopyrrol-Formoterol (Breztri Aerosphere) 160-9-4.8 MCG/ACT aerosol inhaler  Provider's Office Yes No    Inhale 2 puffs 2 (Two) Times a Day.    bumetanide (BUMEX) 0.5 MG tablet   Yes No    Take 1 tablet by mouth Daily.    bumetanide (BUMEX) 1 MG tablet   No No    Take 1 tablet by mouth Daily.    diclofenac (VOLTAREN) 75 MG EC tablet   Yes No    Take 1 tablet by mouth 2 (Two) Times a Day.    Eliquis 5 MG tablet tablet   No No    TAKE 1 TABLET EVERY 12 HOURS     famotidine (PEPCID) 20 MG tablet   Yes No    Take 1 tablet by mouth 2 (Two) Times a Day.    FLUoxetine (PROzac) 10 MG capsule  Provider's Office Yes No    Take 1 capsule by mouth Daily.    fluticasone (FLONASE) 50 MCG/ACT nasal spray  Provider's Office Yes No    2 sprays into the nostril(s) as directed by provider 2 (Two) Times a Day.    gabapentin (NEURONTIN) 400 MG capsule   No No    Take 1 capsule by mouth Every Night.    glucose monitor monitoring kit   No No    1 each Daily. E11.9    HYDROcodone-acetaminophen (NORCO) 7.5-325 MG per tablet   Yes No    Take 1 tablet by mouth Every 6 (Six) Hours.    ipratropium-albuterol (DUO-NEB) 0.5-2.5 mg/3 ml nebulizer  Provider's Office Yes No    Take 3 mL by nebulization 4 (Four) Times a Day As Needed for Wheezing.    Lancet Device misc   No No    1 each Daily. E11.9    lisinopril (PRINIVIL,ZESTRIL) 40 MG tablet   No No    Take 1 tablet by mouth Daily.    metFORMIN (GLUCOPHAGE) 500 MG tablet   Yes No    Take 1 tablet by mouth Every 12 (Twelve) Hours.    metoprolol succinate XL (Toprol XL) 50 MG 24 hr tablet   No No    Take 1 tablet by mouth Daily.    Misc. Devices misc   No No    Bipap tubing.    Nebulizer misc   No No    1 each Every 4 (Four) Hours As Needed (shortness of breath).    O2 (OXYGEN)  Self Yes No    Inhale 2 L/min Continuous As Needed (With activity).    omeprazole (priLOSEC) 40 MG capsule   No No    Take 1 capsule by mouth Daily.    Patient not taking:  Reported on 3/29/2024    omeprazole (priLOSEC) 40 MG capsule  Provider's Office Yes No    Take 1 capsule by mouth Daily.    pramipexole (MIRAPEX) 1 MG tablet  Provider's Office Yes No    Take 1 tablet by mouth Every Night.    tamsulosin (FLOMAX) 0.4 MG capsule 24 hr capsule   No No    Take 1 capsule by mouth Daily.    TRUEplus Lancets 33G misc   No No    1 each by Other route Daily. E11.9                   Vital Signs:    Temp:  [97.6 °F (36.4 °C)] 97.6 °F (36.4 °C)  Heart Rate:  [60-90] 61  Resp:  [24-25]  25  BP: (164-216)/() 169/97        07/27/24  1332   Weight: 99.8 kg (220 lb)       Body mass index is 35.51 kg/m².    Physical Exam:      General Appearance:    Alert and cooperative,  And lying comfortably in bed   Head:    Atraumatic and normocephalic, without obvious abnormality.   Eyes:            PERRLA, conjunctivae and sclerae normal, no Icterus. No pallor. Extraocular movements are within normal limits.   Ears:    Ears appear intact with no abnormalities noted.   Throat:   No oral lesions, no thrush, oral mucosa moist.   Neck:   Supple, trachea midline, no thyromegaly, no carotid bruit, no lymphadenopathy   Lungs:    Breath sounds heard bilaterally equally.  Bilateral scattered wheezing with crepitations on the bases       Heart:    Normal S1 and S2, no murmur, no gallop, no rub. No JVD   Abdomen:     Normal bowel sounds, no masses, no organomegaly. Soft   nontender, nondistended, no guarding, no rebound    tenderness   Extremities:   Moves all extremities well, bilateral leg edema, no cyanosis, no          clubbing   Skin:   No  bruising or rash   Neurologic:   Cranial nerves 2 - 12 grossly intact, sensation intact, Motor power is normal and equal bilaterally.       EKG:      Paced rhythm    Labs:    Lab Results (last 24 hours)       Procedure Component Value Units Date/Time    Procalcitonin [[id number]]  (Normal) Collected: 07/27/24 1341    Specimen: Blood Updated: 07/27/24 1548     Procalcitonin 0.08 ng/mL     Narrative:      As a Marker for Sepsis (Non-Neonates):    1. <0.5 ng/mL represents a low risk of severe sepsis and/or septic shock.  2. >2 ng/mL represents a high risk of severe sepsis and/or septic shock.    As a Marker for Lower Respiratory Tract Infections that require antibiotic therapy:    PCT on Admission    Antibiotic Therapy       6-12 Hrs later    >0.5                Strongly Recommended  >0.25 - <0.5        Recommended   0.1 - 0.25          Discouraged              Remeasure/reassess  "PCT  <0.1                Strongly Discouraged     Remeasure/reassess PCT    As 28 day mortality risk marker: \"Change in Procalcitonin Result\" (>80% or <=80%) if Day 0 (or Day 1) and Day 4 values are available. Refer to http://www.[facility]-pct-calculator.com    Change in PCT <=80%  A decrease of PCT levels below or equal to 80% defines a positive change in PCT test result representing a higher risk for 28-day all-cause mortality of patients diagnosed with severe sepsis for septic shock.    Change in PCT >80%  A decrease of PCT levels of more than 80% defines a negative change in PCT result representing a lower risk for 28-day all-cause mortality of patients diagnosed with severe sepsis or septic shock.       COVID PRE-OP / PRE-PROCEDURE SCREENING ORDER (NO ISOLATION) - Swab, Nasopharynx [[id number]]  (Normal) Collected: 07/27/24 1345    Specimen: Swab from Nasopharynx Updated: 07/27/24 1409    Narrative:      The following orders were created for panel order COVID PRE-OP / PRE-PROCEDURE SCREENING ORDER (NO ISOLATION) - Swab, Nasopharynx.  Procedure                               Abnormality         Status                     ---------                               -----------         ------                     COVID-19 and FLU A/B PCR...[[id number]]  Normal              Final result                 Please view results for these tests on the individual orders.    COVID-19 and FLU A/B PCR, 1 HR TAT - Swab, Nasopharynx [[id number]]  (Normal) Collected: 07/27/24 1345    Specimen: Swab from Nasopharynx Updated: 07/27/24 1409     COVID19 Not Detected     Influenza A PCR Not Detected     Influenza B PCR Not Detected    Narrative:      Fact sheet for providers: https://www.fda.gov/media/147573/download    Fact sheet for patients: https://www.fda.gov/media/353707/download    Test performed by PCR.    BNP [[id number]]  (Abnormal) Collected: 07/27/24 1341    Specimen: Blood Updated: 07/27/24 1406     proBNP 2,185.0 pg/mL     " Narrative:      This assay is used as an aid in the diagnosis of individuals suspected of having heart failure. It can be used as an aid in the diagnosis of acute decompensated heart failure (ADHF) in patients presenting with signs and symptoms of ADHF to the emergency department (ED). In addition, NT-proBNP of <300 pg/mL indicates ADHF is not likely.    Age Range Result Interpretation  NT-proBNP Concentration (pg/mL:      <50             Positive            >450                   Gray                 300-450                    Negative             <300    50-75           Positive            >900                  Gray                300-900                  Negative            <300      >75             Positive            >1800                  Gray                300-1800                  Negative            <300    Single High Sensitivity Troponin T [[id number]]  (Abnormal) Collected: 07/27/24 1341    Specimen: Blood Updated: 07/27/24 1405     HS Troponin T 28 ng/L     Narrative:      High Sensitive Troponin T Reference Range:  <14.0 ng/L- Negative Female for AMI  <22.0 ng/L- Negative Male for AMI  >=14 - Abnormal Female indicating possible myocardial injury.  >=22 - Abnormal Male indicating possible myocardial injury.   Clinicians would have to utilize clinical acumen, EKG, Troponin, and serial changes to determine if it is an Acute Myocardial Infarction or myocardial injury due to an underlying chronic condition.         Comprehensive Metabolic Panel [[id number]]  (Abnormal) Collected: 07/27/24 1341    Specimen: Blood Updated: 07/27/24 1402     Glucose 125 mg/dL      BUN 15 mg/dL      Creatinine 1.24 mg/dL      Sodium 145 mmol/L      Potassium 4.3 mmol/L      Chloride 102 mmol/L      CO2 29.0 mmol/L      Calcium 9.0 mg/dL      Total Protein 6.0 g/dL      Albumin 3.4 g/dL      ALT (SGPT) 14 U/L      AST (SGOT) 21 U/L      Alkaline Phosphatase 102 U/L      Total Bilirubin 0.7 mg/dL      Globulin 2.6 gm/dL      A/G  Ratio 1.3 g/dL      BUN/Creatinine Ratio 12.1     Anion Gap 14.0 mmol/L      eGFR 61.0 mL/min/1.73     Narrative:      GFR Normal >60  Chronic Kidney Disease <60  Kidney Failure <15    The GFR formula is only valid for adults with stable renal function between ages 18 and 70.    Blood Gas, Arterial With Co-Ox [[id number]]  (Abnormal) Collected: 07/27/24 1400    Specimen: Arterial Blood Updated: 07/27/24 1400     Site Left Radial     Sudarshan's Test N/A     pH, Arterial 7.402 pH units      pCO2, Arterial 47.4 mm Hg      Comment: 83 Value above reference range        pO2, Arterial 65.3 mm Hg      Comment: 84 Value below reference range        HCO3, Arterial 29.5 mmol/L      Comment: 83 Value above reference range        Base Excess, Arterial 4.0 mmol/L      Comment: 83 Value above reference range        O2 Saturation, Arterial 93.0 %      Comment: 84 Value below reference range        Hematocrit, Blood Gas 32.6 %      Comment: 84 Value below reference range        Oxyhemoglobin 91.2 %      Comment: 84 Value below reference range        Methemoglobin -0.10 %      Comment: 84 Value below reference range        Carboxyhemoglobin 1.9 %      A-a DO2 26.0 mmHg      Barometric Pressure for Blood Gas 736 mmHg      Modality Nasal Cannula     Flow Rate 2.0 lpm      Ventilator Mode NA     Collected by [id number]     Comment: Meter: [id number]     :  [id number]        pH, Temp Corrected --     pCO2, Temperature Corrected --     pO2, Temperature Corrected --    CBC & Differential [[id number]]  (Abnormal) Collected: 07/27/24 1341    Specimen: Blood Updated: 07/27/24 1347    Narrative:      The following orders were created for panel order CBC & Differential.  Procedure                               Abnormality         Status                     ---------                               -----------         ------                     CBC Auto Differential[[id number]]        Abnormal            Final result                 Please view  results for these tests on the individual orders.    CBC Auto Differential [[id number]]  (Abnormal) Collected: 07/27/24 1341    Specimen: Blood Updated: 07/27/24 1347     WBC 5.74 10*3/mm3      RBC 3.93 10*6/mm3      Hemoglobin 10.6 g/dL      Hematocrit 34.3 %      MCV 87.3 fL      MCH 27.0 pg      MCHC 30.9 g/dL      RDW 15.2 %      RDW-SD 49.0 fl      MPV 10.7 fL      Platelets 184 10*3/mm3      Neutrophil % 57.5 %      Lymphocyte % 22.0 %      Monocyte % 11.0 %      Eosinophil % 8.4 %      Basophil % 0.9 %      Immature Grans % 0.2 %      Neutrophils, Absolute 3.31 10*3/mm3      Lymphocytes, Absolute 1.26 10*3/mm3      Monocytes, Absolute 0.63 10*3/mm3      Eosinophils, Absolute 0.48 10*3/mm3      Basophils, Absolute 0.05 10*3/mm3      Immature Grans, Absolute 0.01 10*3/mm3      nRBC 0.0 /100 WBC     [place] Draw [[id number]] Collected: 07/27/24 1341    Specimen: Blood Updated: 07/27/24 1345    Narrative:      The following orders were created for panel order [place] Draw.  Procedure                               Abnormality         Status                     ---------                               -----------         ------                     Green Top (Gel)[[id number]]                                  Final result               Lavender Top[[id number]]                                     Final result               Gold Top - SST[[id number]]                                   Final result               Light Blue Top[[id number]]                                   Final result                 Please view results for these tests on the individual orders.    Green Top (Gel) [[id number]] Collected: 07/27/24 1341    Specimen: Blood Updated: 07/27/24 1345     Extra Tube Hold for add-ons.     Comment: Auto resulted.       Lavender Top [[id number]] Collected: 07/27/24 1341    Specimen: Blood Updated: 07/27/24 1345     Extra Tube hold for add-on     Comment: Auto resulted       Gold Top - SST [[id number]] Collected: 07/27/24 1341     Specimen: Blood Updated: 07/27/24 1345     Extra Tube Hold for add-ons.     Comment: Auto resulted.       Light Blue Top [[id number]] Collected: 07/27/24 1341    Specimen: Blood Updated: 07/27/24 1345     Extra Tube Hold for add-ons.     Comment: Auto resulted               Radiology:    Imaging Results (Last 72 Hours)       Procedure Component Value Units Date/Time    XR Chest 1 View [[id number]] Collected: 07/27/24 1454     Updated: 07/27/24 1501    Narrative:      FINAL REPORT    TECHNIQUE:  Single view chest    CLINICAL HISTORY:  SOA    FINDINGS:  A single view of the chest was obtained.  A pacemaker is in  place.  The heart is borderline enlarged.  There are small  bilateral pleural effusions with possible interstitial pulmonary  edema.  There is no pneumothorax.  Osseous structures are  unremarkable.      Impression:      Mild CHF.    Authenticated and Electronically Signed by Hua Bailey M.D. on  07/27/2024 03:00:45 PM            Assessment:    Assessment & Plan      Acute on chronic respiratory failure with hypoxia and hypercapnia    COPD with acute exacerbation    Acute on chronic congestive heart failure    Pacemaker    Chronic low back pain    Essential hypertension    Paroxysmal A-fib    Hypercholesterolemia    Gastroesophageal reflux disease    Diabetes mellitus    Restless leg syndrome    Chronic pain syndrome    Morbid obesity    Acute on chronic respiratory failure with hypoxemia        Plan:     1.  Acute on chronic respiratory failure with hypoxemia and hypercapnia-this seems to be combination of his COPD exacerbation with mild volume overload  Continue with the BiPAP and oxygen to keep saturation 90% and above    2.  Acute on chronic diastolic heart failure-this seems to be signs of volume overload and IV diuresis has been started.  Will continue with the IV Lasix and medical management  Low-salt diet    3.  COPD exacerbation-start with IV steroids bronchodilators.  At present he does not have  signs of acute bacterial infection and will not give antibiotic.  Will check procalcitonin and if elevated then we will cover him with IV Rocephin and Zithromax    Continue rest of the medication for all his other medical problems as ordered  Goals of treatment plan of care has been addressed with the patient in lyssa Sullivan MD  07/27/24  16:23 EDT    Please note that portions of this note were completed with a voice recognition program.    Electronically signed by Raj Sullivan MD at 07/27/24 1630          Emergency Department Notes        Radha Richmond, GOLDEN at 07/27/24 1610           to transport pt to room 431.    Electronically signed by Radha Richmond RN at 07/27/24 1610       Radha Richmond RN at 07/27/24 1605          Report given to Mia FRANKS.     Electronically signed by Radha Richmond RN at 07/27/24 1605       Kendrick Brown DO at 07/27/24 1329                 [facility] EMERGENCY DEPARTMENT  Emergency Department Encounter  Emergency Medicine Physician Note     Pt Name:Robe Bryant  MRN: [id number]  Birthdate 1950  Date of evaluation: 7/27/2024  PCP:  Raj Sullivan MD  Note Started: 1:29 PM EDT      CHIEF COMPLAINT       Chief Complaint   Patient presents with    Shortness of Breath       HISTORY OF PRESENT ILLNESS  (Location/Symptom, Timing/Onset, Context/Setting, Quality, Duration, Modifying Factors, Severity.)      Robe Bryant is a 73 y.o. male who presents with acute shortness of breath.  Patient describes worsening shortness of breath over the last 3 days.  Patient denies any fevers or chills.  Patient has a history of COPD and CHF.  Difficult to obtain full history of review of systems the patient is acutely dyspneic, 1-2 word dyspnea on for initial evaluation.    PAST MEDICAL / SURGICAL / SOCIAL / FAMILY HISTORY     Past Medical History:   Diagnosis Date    Anxiety and depression     Arthritis     Backache     20 years     Benign prostatic hyperplasia     Bladder trauma     Cervicalgia     Chronic kidney disease     Cr up to 2.1    Coronary artery disease     Diabetes mellitus     15 years--    Emphysema of lung     Erectile dysfunction     Eye exam, routine 2015    FH: colonic polyps     Gout     for 10 years--    History of echocardiogram 09/15/2014    History of tobacco use     with cessation x7 years    Hyperlipidemia     Hypertension     Impaired functional mobility, balance, gait, and endurance     Migraine     Myocardial infarction     h/o coronary artery stenting--    Prostate cancer     Restless leg syndrome     20 years    Sleep apnea     12 years; uses a Bi-Pap    Stroke     Syncope 04/28/2017    Warfarin anticoagulation 09/14/2016     No additional pertinent       Past Surgical History:   Procedure Laterality Date    BLADDER SURGERY      CARDIAC CATHETERIZATION  03/15/2013    revealing widely patent stents of the left circumflex and ramus intermedius coronary arteries, no significant disease is seen in any territory    CARDIAC CATHETERIZATION Left 9/30/2019    Procedure: Left Heart Cath;  Surgeon: Arelis Tucker MD;  Location:  [facility];  Service: Cardiology    CARDIAC ELECTROPHYSIOLOGY PROCEDURE N/A 5/10/2021    Procedure: PPM battery change;  Surgeon: Arelis Tucker MD;  Location:  [facility];  Service: Cardiology;  Laterality: N/A;    CARDIAC PACEMAKER PLACEMENT  2012    CATARACT EXTRACTION      COLONOSCOPY  2004    No family history of colon cancer.      COLONOSCOPY  2015    HERNIA REPAIR      Type unknown    PACEMAKER IMPLANTATION      PROSTATE SURGERY       No additional pertinent       Social History     Socioeconomic History    Marital status:    Tobacco Use    Smoking status: Former     Current packs/day: 0.00     Average packs/day: 1 pack/day for 30.0 years (30.0 ttl pk-yrs)     Types: Cigarettes     Start date: 8/15/1971     Quit date: 8/15/2001      Years since quittin.9    Smokeless tobacco: Never    Tobacco comments:     quit 16 years ago   Vaping Use    Vaping status: Never Used   Substance and Sexual Activity    Alcohol use: No    Drug use: No    Sexual activity: Defer       Family History   Problem Relation Age of Onset    Cancer Mother     Diabetes Mother     Hypertension Mother     Stroke Mother     Stomach cancer Mother     Heart disease Mother     Stomach cancer Father     Cancer Father     Lung cancer Father        Allergies:  Patient has no known allergies.    Home Medications:  Prior to Admission medications    Medication Sig Start Date End Date Taking? Authorizing Provider   albuterol sulfate  (90 Base) MCG/ACT inhaler Inhale 2 puffs Every 4 (Four) Hours As Needed for Wheezing.    Sonia Kwon MD   amLODIPine (NORVASC) 10 MG tablet Take 1 tablet by mouth Daily.    Sonia Kwon MD   atorvastatin (LIPITOR) 20 MG tablet Take 1 tablet by mouth Every Night. 21   Radha Jean DO   baclofen (LIORESAL) 10 MG tablet Take 1 tablet by mouth 2 (Two) Times a Day.    Sonia Kwon MD   Blood Glucose Monitoring Suppl (TRUE METRIX AIR GLUCOSE METER) w/Device kit 1 each by In Vitro route 2 (two) times a day. E11.9 20   Radha Jean DO   Budeson-Glycopyrrol-Formoterol (Breztri Aerosphere) 160-9-4.8 MCG/ACT aerosol inhaler Inhale 2 puffs 2 (Two) Times a Day.    Sonia Kwon MD   bumetanide (BUMEX) 0.5 MG tablet Take 1 tablet by mouth Daily.    Sonia Kwon MD   bumetanide (BUMEX) 1 MG tablet Take 1 tablet by mouth Daily. 3/29/24   Raj Sullivan MD   diclofenac (VOLTAREN) 75 MG EC tablet Take 1 tablet by mouth 2 (Two) Times a Day.    Sonia Kwon MD   Eliquis 5 MG tablet tablet TAKE 1 TABLET EVERY 12 HOURS 24   Arelis Tucker MD   famotidine (PEPCID) 20 MG tablet Take 1 tablet by mouth 2 (Two) Times a Day.    Sonia Kwon MD   FLUoxetine (PROzac) 10  MG capsule Take 1 capsule by mouth Daily.    Sonia Kwon MD   fluticasone (FLONASE) 50 MCG/ACT nasal spray 2 sprays into the nostril(s) as directed by provider 2 (Two) Times a Day.    Sonia Kwon MD   gabapentin (NEURONTIN) 400 MG capsule Take 1 capsule by mouth Every Night. 6/8/23   Raj Sullivan MD   glucose monitor monitoring kit 1 each Daily. E11.9 9/26/19   Radha Jean DO   HYDROcodone-acetaminophen (NORCO) 7.5-325 MG per tablet Take 1 tablet by mouth Every 6 (Six) Hours. 1/15/24   Sonia Kwon MD   ipratropium-albuterol (DUO-NEB) 0.5-2.5 mg/3 ml nebulizer Take 3 mL by nebulization 4 (Four) Times a Day As Needed for Wheezing.    Sonia Kwon MD   Lancet Device misc 1 each Daily. E11.9 9/26/19   Radha Jean DO   lisinopril (PRINIVIL,ZESTRIL) 40 MG tablet Take 1 tablet by mouth Daily. 2/11/24   Raj Sullivan MD   metFORMIN (GLUCOPHAGE) 500 MG tablet Take 1 tablet by mouth Every 12 (Twelve) Hours. 2/2/23   Sonia Kwon MD   metoprolol succinate XL (Toprol XL) 50 MG 24 hr tablet Take 1 tablet by mouth Daily. 1/26/21   Jaki Ramos APRN   Misc. Devices misc Bipap tubing. 3/23/21   Radha Jean DO   Nebulizer misc 1 each Every 4 (Four) Hours As Needed (shortness of breath). 3/23/21   Radha Jean DO   O2 (OXYGEN) Inhale 2 L/min Continuous As Needed (With activity).    Sonia Kwon MD   omeprazole (priLOSEC) 40 MG capsule Take 1 capsule by mouth Daily.  Patient not taking: Reported on 3/29/2024 2/3/22   Radha Jean DO   omeprazole (priLOSEC) 40 MG capsule Take 1 capsule by mouth Daily.    Sonia Kwon MD   pramipexole (MIRAPEX) 1 MG tablet Take 1 tablet by mouth Every Night.    Sonia Kwon MD   tamsulosin (FLOMAX) 0.4 MG capsule 24 hr capsule Take 1 capsule by mouth Daily. 2/12/24   Raj Sullivan MD   TRUEplus Lancets 33G misc 1 each by Other route Daily. E11.9 10/1/20    "Radha Jean,          REVIEW OF SYSTEMS       Review of Systems   Unable to perform ROS: Severe respiratory distress   Constitutional:  Negative for chills and fever.   Respiratory:  Positive for cough, chest tightness, shortness of breath and wheezing.    Cardiovascular:  Negative for chest pain.       PHYSICAL EXAM      INITIAL VITALS:   /97   Pulse 61   Temp 97.6 °F (36.4 °C) (Oral)   Resp 25   Ht 167.6 cm (66\")   Wt 99.8 kg (220 lb)   SpO2 93%   BMI 35.51 kg/m²     Physical Exam  Constitutional:       Appearance: Normal appearance.   HENT:      Head: Normocephalic and atraumatic.   Eyes:      Extraocular Movements: Extraocular movements intact.   Cardiovascular:      Rate and Rhythm: Normal rate and regular rhythm.   Pulmonary:      Effort: Pulmonary effort is normal. Tachypnea and respiratory distress present.      Breath sounds: Decreased breath sounds, wheezing and rhonchi present.   Abdominal:      General: Abdomen is flat.      Palpations: Abdomen is soft.      Tenderness: There is no abdominal tenderness.   Musculoskeletal:      Right lower leg: Edema present.      Left lower leg: Edema present.   Skin:     General: Skin is warm and dry.   Neurological:      General: No focal deficit present.      Mental Status: He is alert and oriented to person, place, and time.   Psychiatric:         Mood and Affect: Mood normal.         Behavior: Behavior normal.           DDX/DIAGNOSTIC RESULTS / EMERGENCY DEPARTMENT COURSE / MDM     Differential Diagnosis included but not limited: Seizure vascular ablation versus COPD exacerbation, low concern for ACS or severe pneumonia.    Diagnoses Considered but Do Not Suspect: Sepsis, patient not meeting any SIRS criteria other than tachypnea, do feel this is most likely secondary to a COPD/CHF exacerbation.    Decision Rules/Scores utilized: N/A     Tests considered but not ordered and why:  N/A     MIPS: N/A     Code Status Discussion:  Not " Discussed    Additional Patient Education Provided: None     Medical Decision Making    Medical Decision Making  73-year-old male with COPD and CHF history who presented acutely dyspneic, patient sounded tight bilaterally with auditory wheezes, on auscultation patient was not moving air very well initially.  Patient denies any fever, was tachypneic, SpO2 was in the low 90s.  Patient given Solu-Medrol, given breathing treatment by EMS prior to arrival, given DuoNeb..  Patient started on BiPAP in order to help push fluid, ABG demonstrated mild anoxia.  Did give patient 40 of Lasix after evaluation potassium.  Do feel patient would need admission for couple days for respiratory improvement.    Amount and/or Complexity of Data Reviewed  External Data Reviewed: notes.     Details: Previous notes from previous respiratory distress.  Labs: ordered. Decision-making details documented in ED Course.  Radiology: ordered.  ECG/medicine tests: ordered.  Discussion of management or test interpretation with external provider(s): Dr. Sullivan for admission    Risk  Prescription drug management.  Decision regarding hospitalization.        See ED COURSE for additional MDM statements    EKG  EKG Interpretation    Evaluated and interpreted by emergency department physician    Rhythm: Paced, atrially paced noted  Rate: Normal  Axis: Normal  Ectopy: NONE  Conduction: Normal  ST Segments: Nonspecific Changes  T Waves: Non Specific Changes  Q Waves: avr, v1, v2    Clinical Impression: non-specific EKG    Kendrick Brown DO     All EKG's are interpreted by the Emergency Department Physician who either signs or Co-signs this chart in the absence of a cardiologist.    Additional Scores                   EMERGENCY DEPARTMENT COURSE:    ED Course as of 07/27/24 1612   Sat Jul 27, 2024   1435 Personally evaluated the chest x-ray, noted to be mildly edematous most likely secondary to CHF.  Patient does have mildly elevated BNP.  No signs of  lobar pneumonia, osseous structures grossly intact, no hemopneumothorax noted. [CR]   1500 HS Troponin T(!): 28  Patient's troponin appears to be at baseline. [CR]   1528 Patient admitted to Dr. Sullivan. [CR]      ED Course User Index  [CR] Kendrick Brown DO       PROCEDURES:  None Performed   Procedures    DATA FOR LAB AND RADIOLOGY TESTS ORDERED BELOW ARE REVIEWED BY THE ED CLINICIAN:    RADIOLOGY: All x-rays, CT, MRI, and formal ultrasound images (except ED bedside ultrasound) are read by the radiologist, see reports below, unless otherwise noted in MDM or here.  Reports below are reviewed by myself.  XR Chest 1 View   Final Result   Mild CHF.      Authenticated and Electronically Signed by Hua Bailey M.D. on   07/27/2024 03:00:45 PM          LABS: Lab orders shown below, the results are reviewed by myself, and all abnormals are listed below.  Labs Reviewed   COMPREHENSIVE METABOLIC PANEL - Abnormal; Notable for the following components:       Result Value    Glucose 125 (*)     Albumin 3.4 (*)     All other components within normal limits    Narrative:     GFR Normal >60  Chronic Kidney Disease <60  Kidney Failure <15    The GFR formula is only valid for adults with stable renal function between ages 18 and 70.   BNP (IN-HOUSE) - Abnormal; Notable for the following components:    proBNP 2,185.0 (*)     All other components within normal limits    Narrative:     This assay is used as an aid in the diagnosis of individuals suspected of having heart failure. It can be used as an aid in the diagnosis of acute decompensated heart failure (ADHF) in patients presenting with signs and symptoms of ADHF to the emergency department (ED). In addition, NT-proBNP of <300 pg/mL indicates ADHF is not likely.    Age Range Result Interpretation  NT-proBNP Concentration (pg/mL:      <50             Positive            >450                   Gray                 300-450                    Negative             <300    50-75            Positive            >900                  Gray                300-900                  Negative            <300      >75             Positive            >1800                  Gray                300-1800                  Negative            <300   SINGLE HS TROPONIN T - Abnormal; Notable for the following components:    HS Troponin T 28 (*)     All other components within normal limits    Narrative:     High Sensitive Troponin T Reference Range:  <14.0 ng/L- Negative Female for AMI  <22.0 ng/L- Negative Male for AMI  >=14 - Abnormal Female indicating possible myocardial injury.  >=22 - Abnormal Male indicating possible myocardial injury.   Clinicians would have to utilize clinical acumen, EKG, Troponin, and serial changes to determine if it is an Acute Myocardial Infarction or myocardial injury due to an underlying chronic condition.        CBC WITH AUTO DIFFERENTIAL - Abnormal; Notable for the following components:    RBC 3.93 (*)     Hemoglobin 10.6 (*)     Hematocrit 34.3 (*)     MCHC 30.9 (*)     Eosinophil % 8.4 (*)     Eosinophils, Absolute 0.48 (*)     All other components within normal limits   BLOOD GAS, ARTERIAL W/CO-OXIMETRY - Abnormal; Notable for the following components:    pCO2, Arterial 47.4 (*)     pO2, Arterial 65.3 (*)     HCO3, Arterial 29.5 (*)     Base Excess, Arterial 4.0 (*)     O2 Saturation, Arterial 93.0 (*)     Oxyhemoglobin 91.2 (*)     Methemoglobin -0.10 (*)     All other components within normal limits   COVID-19 AND FLU A/B, NP SWAB IN TRANSPORT MEDIA 1 HR TAT - Normal    Narrative:     Fact sheet for providers: https://www.fda.gov/media/187988/download    Fact sheet for patients: https://www.fda.gov/media/844673/download    Test performed by PCR.   PROCALCITONIN - Normal    Narrative:     As a Marker for Sepsis (Non-Neonates):    1. <0.5 ng/mL represents a low risk of severe sepsis and/or septic shock.  2. >2 ng/mL represents a high risk of severe sepsis and/or septic  "shock.    As a Marker for Lower Respiratory Tract Infections that require antibiotic therapy:    PCT on Admission    Antibiotic Therapy       6-12 Hrs later    >0.5                Strongly Recommended  >0.25 - <0.5        Recommended   0.1 - 0.25          Discouraged              Remeasure/reassess PCT  <0.1                Strongly Discouraged     Remeasure/reassess PCT    As 28 day mortality risk marker: \"Change in Procalcitonin Result\" (>80% or <=80%) if Day 0 (or Day 1) and Day 4 values are available. Refer to http://www.[facility]pct-calculator.com    Change in PCT <=80%  A decrease of PCT levels below or equal to 80% defines a positive change in PCT test result representing a higher risk for 28-day all-cause mortality of patients diagnosed with severe sepsis for septic shock.    Change in PCT >80%  A decrease of PCT levels of more than 80% defines a negative change in PCT result representing a lower risk for 28-day all-cause mortality of patients diagnosed with severe sepsis or septic shock.      COVID PRE-OP / PRE-PROCEDURE SCREENING ORDER (NO ISOLATION)    Narrative:     The following orders were created for panel order COVID PRE-OP / PRE-PROCEDURE SCREENING ORDER (NO ISOLATION) - Swab, Nasopharynx.  Procedure                               Abnormality         Status                     ---------                               -----------         ------                     COVID-19 and FLU A/B PCR...[[id number]]  Normal              Final result                 Please view results for these tests on the individual orders.   RAINBOW DRAW    Narrative:     The following orders were created for panel order [place] Draw.  Procedure                               Abnormality         Status                     ---------                               -----------         ------                     Green Top (Gel)[[id number]]                                  Final result               Lavender Top[[id number]]                   "                   Final result               Gold Top - [facility][[id number]]                                   Final result               Light Blue Top[[id number]]                                   Final result                 Please view results for these tests on the individual orders.   BLOOD GAS, ARTERIAL   CBC AND DIFFERENTIAL    Narrative:     The following orders were created for panel order CBC & Differential.  Procedure                               Abnormality         Status                     ---------                               -----------         ------                     CBC Auto Differential[[id number]]        Abnormal            Final result                 Please view results for these tests on the individual orders.   GREEN TOP   LAVENDER TOP   GOLD TOP - SST   LIGHT BLUE TOP       Vitals Reviewed:    Vitals:    07/27/24 1544 07/27/24 1550 07/27/24 1557 07/27/24 1600   BP: 180/96 (!) 197/115 164/78 169/97   BP Location:   Left arm    Patient Position:   Lying    Pulse: 64 61  61   Resp:       Temp:       TempSrc:       SpO2:  93%  93%   Weight:       Height:           MEDICATIONS GIVEN TO PATIENT THIS ENCOUNTER:  Medications   sodium chloride 0.9 % flush 10 mL (has no administration in time range)   methylPREDNISolone sodium succinate (SOLU-Medrol) injection 125 mg (125 mg Intravenous Given 7/27/24 1345)   ipratropium-albuterol (DUO-NEB) nebulizer solution 3 mL (3 mL Nebulization Given 7/27/24 1358)   furosemide (LASIX) injection 40 mg (40 mg Intravenous Given 7/27/24 1509)   metoprolol tartrate (LOPRESSOR) injection 5 mg (5 mg Intravenous Given 7/27/24 1544)       CONSULTS:  None    CRITICAL CARE:  There was significant risk of life threatening deterioration of patient's condition requiring my direct management. Critical care time 35 minutes, excluding any documented procedures.    FINAL IMPRESSION      1. Acute on chronic respiratory failure with hypoxia and hypercapnia          DISPOSITION / PLAN      ED Disposition       ED Disposition   Decision to Admit    Condition   --    Comment   Level of Care: Telemetry [5]   Diagnosis: Acute on chronic respiratory failure with hypoxia and hypercapnia [[id number]]   Isolate for COVID?: No [0]   Certification: I Certify That Inpatient Hospital Services Are Medically Necessary For Greater Than 2 Midnights                 PATIENT REFERRED TO:  No follow-up provider specified.    DISCHARGE MEDICATIONS:     Medication List        CONTINUE taking these medications      glucose monitor monitoring kit  1 each Daily. E11.9     Lancet Device misc  1 each Daily. E11.9     Misc. Devices misc  Bipap tubing.     Nebulizer misc  1 each Every 4 (Four) Hours As Needed (shortness of breath).     True Metrix Air Glucose Meter w/Device kit  1 each by In Vitro route 2 (two) times a day. E11.9     TRUEplus Lancets 33G misc  1 each by Other route Daily. E11.9            ASK your doctor about these medications      albuterol sulfate  (90 Base) MCG/ACT inhaler  Commonly known as: PROVENTIL HFA;VENTOLIN HFA;PROAIR HFA     amLODIPine 10 MG tablet  Commonly known as: NORVASC     atorvastatin 20 MG tablet  Commonly known as: LIPITOR  Take 1 tablet by mouth Every Night.     baclofen 10 MG tablet  Commonly known as: LIORESAL     Breztri Aerosphere 160-9-4.8 MCG/ACT aerosol inhaler  Generic drug: Budeson-Glycopyrrol-Formoterol     * bumetanide 1 MG tablet  Commonly known as: BUMEX  Take 1 tablet by mouth Daily.     * bumetanide 0.5 MG tablet  Commonly known as: BUMEX     diclofenac 75 MG EC tablet  Commonly known as: VOLTAREN     Eliquis 5 MG tablet tablet  Generic drug: apixaban  TAKE 1 TABLET EVERY 12 HOURS     famotidine 20 MG tablet  Commonly known as: PEPCID     FLUoxetine 10 MG capsule  Commonly known as: PROzac     fluticasone 50 MCG/ACT nasal spray  Commonly known as: FLONASE     gabapentin 400 MG capsule  Commonly known as: NEURONTIN  Take 1 capsule by mouth Every Night.      HYDROcodone-acetaminophen 7.5-325 MG per tablet  Commonly known as: NORCO     ipratropium-albuterol 0.5-2.5 mg/3 ml nebulizer  Commonly known as: DUO-NEB     lisinopril 40 MG tablet  Commonly known as: PRINIVIL,ZESTRIL  Take 1 tablet by mouth Daily.     metFORMIN 500 MG tablet  Commonly known as: GLUCOPHAGE     metoprolol succinate XL 50 MG 24 hr tablet  Commonly known as: Toprol XL  Take 1 tablet by mouth Daily.     O2  Commonly known as: OXYGEN     * omeprazole 40 MG capsule  Commonly known as: priLOSEC  Take 1 capsule by mouth Daily.     * omeprazole 40 MG capsule  Commonly known as: priLOSEC     pramipexole 1 MG tablet  Commonly known as: MIRAPEX     tamsulosin 0.4 MG capsule 24 hr capsule  Commonly known as: FLOMAX  Take 1 capsule by mouth Daily.           * This list has 4 medication(s) that are the same as other medications prescribed for you. Read the directions carefully, and ask your doctor or other care provider to review them with you.                  Electronically signed by Kendrick Brown DO, 07/27/24, 1:30 PM EDT.    Emergency Medicine Physician  [facility]  (Please note that portions of thisnote were completed with a voice recognition program.  Efforts were made to edit the dictations but occasionally words are mis-transcribed.)       Kendrick Brown DO  07/27/24 1613      Electronically signed by Kendrick Brown DO at 07/27/24 1613       Lab Results (last 48 hours)       Procedure Component Value Units Date/Time    TSH [[id number]]  (Abnormal) Collected: 07/28/24 0640    Specimen: Blood Updated: 07/28/24 0720     TSH 0.268 uIU/mL     Comprehensive Metabolic Panel [[id number]]  (Abnormal) Collected: 07/28/24 0640    Specimen: Blood Updated: 07/28/24 0719     Glucose 178 mg/dL      BUN 23 mg/dL      Creatinine 1.28 mg/dL      Sodium 141 mmol/L      Potassium 4.4 mmol/L      Chloride 102 mmol/L      CO2 26.6 mmol/L      Calcium 8.8 mg/dL      Total  Protein 6.1 g/dL      Albumin 3.2 g/dL      ALT (SGPT) 12 U/L      AST (SGOT) 16 U/L      Alkaline Phosphatase 96 U/L      Total Bilirubin 0.4 mg/dL      Globulin 2.9 gm/dL      A/G Ratio 1.1 g/dL      BUN/Creatinine Ratio 18.0     Anion Gap 12.4 mmol/L      eGFR 58.7 mL/min/1.73     Narrative:      GFR Normal >60  Chronic Kidney Disease <60  Kidney Failure <15    The GFR formula is only valid for adults with stable renal function between ages 18 and 70.    Hemoglobin A1c [[id number]]  (Abnormal) Collected: 07/28/24 0640    Specimen: Blood Updated: 07/28/24 0712     Hemoglobin A1C 5.80 %     Narrative:      Hemoglobin A1C Ranges:    Increased Risk for Diabetes  5.7% to 6.4%  Diabetes                     >= 6.5%  Diabetic Goal                < 7.0%    CBC (No Diff) [[id number]]  (Abnormal) Collected: 07/28/24 0640    Specimen: Blood Updated: 07/28/24 0657     WBC 6.59 10*3/mm3      RBC 4.11 10*6/mm3      Hemoglobin 11.0 g/dL      Hematocrit 36.0 %      MCV 87.6 fL      MCH 26.8 pg      MCHC 30.6 g/dL      RDW 15.0 %      RDW-SD 48.1 fl      MPV 11.5 fL      Platelets 189 10*3/mm3     T4, Free [[id number]]  (Normal) Collected: 07/27/24 1341    Specimen: Blood Updated: 07/27/24 1739     Free T4 1.30 ng/dL     Procalcitonin [[id number]]  (Normal) Collected: 07/27/24 1341    Specimen: Blood Updated: 07/27/24 1548     Procalcitonin 0.08 ng/mL     Narrative:      As a Marker for Sepsis (Non-Neonates):    1. <0.5 ng/mL represents a low risk of severe sepsis and/or septic shock.  2. >2 ng/mL represents a high risk of severe sepsis and/or septic shock.    As a Marker for Lower Respiratory Tract Infections that require antibiotic therapy:    PCT on Admission    Antibiotic Therapy       6-12 Hrs later    >0.5                Strongly Recommended  >0.25 - <0.5        Recommended   0.1 - 0.25          Discouraged              Remeasure/reassess PCT  <0.1                Strongly Discouraged     Remeasure/reassess PCT    As 28 day  "mortality risk marker: \"Change in Procalcitonin Result\" (>80% or <=80%) if Day 0 (or Day 1) and Day 4 values are available. Refer to http://www.[facility]-calculator.com    Change in PCT <=80%  A decrease of PCT levels below or equal to 80% defines a positive change in PCT test result representing a higher risk for 28-day all-cause mortality of patients diagnosed with severe sepsis for septic shock.    Change in PCT >80%  A decrease of PCT levels of more than 80% defines a negative change in PCT result representing a lower risk for 28-day all-cause mortality of patients diagnosed with severe sepsis or septic shock.       COVID PRE-OP / PRE-PROCEDURE SCREENING ORDER (NO ISOLATION) - Swab, Nasopharynx [[id number]]  (Normal) Collected: 07/27/24 1345    Specimen: Swab from Nasopharynx Updated: 07/27/24 1409    Narrative:      The following orders were created for panel order COVID PRE-OP / PRE-PROCEDURE SCREENING ORDER (NO ISOLATION) - Swab, Nasopharynx.  Procedure                               Abnormality         Status                     ---------                               -----------         ------                     COVID-19 and FLU A/B PCR...[[id number]]  Normal              Final result                 Please view results for these tests on the individual orders.    COVID-19 and FLU A/B PCR, 1 HR TAT - Swab, Nasopharynx [[id number]]  (Normal) Collected: 07/27/24 1345    Specimen: Swab from Nasopharynx Updated: 07/27/24 1409     COVID19 Not Detected     Influenza A PCR Not Detected     Influenza B PCR Not Detected    Narrative:      Fact sheet for providers: https://www.fda.gov/media/173698/download    Fact sheet for patients: https://www.fda.gov/media/841028/download    Test performed by PCR.    BNP [[id number]]  (Abnormal) Collected: 07/27/24 1341    Specimen: Blood Updated: 07/27/24 1406     proBNP 2,185.0 pg/mL     Narrative:      This assay is used as an aid in the diagnosis of individuals suspected of " having heart failure. It can be used as an aid in the diagnosis of acute decompensated heart failure (ADHF) in patients presenting with signs and symptoms of ADHF to the emergency department (ED). In addition, NT-proBNP of <300 pg/mL indicates ADHF is not likely.    Age Range Result Interpretation  NT-proBNP Concentration (pg/mL:      <50             Positive            >450                   Gray                 300-450                    Negative             <300    50-75           Positive            >900                  Gray                300-900                  Negative            <300      >75             Positive            >1800                  Gray                300-1800                  Negative            <300    Single High Sensitivity Troponin T [[id number]]  (Abnormal) Collected: 07/27/24 1341    Specimen: Blood Updated: 07/27/24 1405     HS Troponin T 28 ng/L     Narrative:      High Sensitive Troponin T Reference Range:  <14.0 ng/L- Negative Female for AMI  <22.0 ng/L- Negative Male for AMI  >=14 - Abnormal Female indicating possible myocardial injury.  >=22 - Abnormal Male indicating possible myocardial injury.   Clinicians would have to utilize clinical acumen, EKG, Troponin, and serial changes to determine if it is an Acute Myocardial Infarction or myocardial injury due to an underlying chronic condition.         Comprehensive Metabolic Panel [[id number]]  (Abnormal) Collected: 07/27/24 1341    Specimen: Blood Updated: 07/27/24 1402     Glucose 125 mg/dL      BUN 15 mg/dL      Creatinine 1.24 mg/dL      Sodium 145 mmol/L      Potassium 4.3 mmol/L      Chloride 102 mmol/L      CO2 29.0 mmol/L      Calcium 9.0 mg/dL      Total Protein 6.0 g/dL      Albumin 3.4 g/dL      ALT (SGPT) 14 U/L      AST (SGOT) 21 U/L      Alkaline Phosphatase 102 U/L      Total Bilirubin 0.7 mg/dL      Globulin 2.6 gm/dL      A/G Ratio 1.3 g/dL      BUN/Creatinine Ratio 12.1     Anion Gap 14.0 mmol/L      eGFR 61.0  mL/min/1.73     Narrative:      GFR Normal >60  Chronic Kidney Disease <60  Kidney Failure <15    The GFR formula is only valid for adults with stable renal function between ages 18 and 70.    Blood Gas, Arterial With Co-Ox [[id number]]  (Abnormal) Collected: 07/27/24 1400    Specimen: Arterial Blood Updated: 07/27/24 1400     Site Left Radial     Sudarshan's Test N/A     pH, Arterial 7.402 pH units      pCO2, Arterial 47.4 mm Hg      Comment: 83 Value above reference range        pO2, Arterial 65.3 mm Hg      Comment: 84 Value below reference range        HCO3, Arterial 29.5 mmol/L      Comment: 83 Value above reference range        Base Excess, Arterial 4.0 mmol/L      Comment: 83 Value above reference range        O2 Saturation, Arterial 93.0 %      Comment: 84 Value below reference range        Hematocrit, Blood Gas 32.6 %      Comment: 84 Value below reference range        Oxyhemoglobin 91.2 %      Comment: 84 Value below reference range        Methemoglobin -0.10 %      Comment: 84 Value below reference range        Carboxyhemoglobin 1.9 %      A-a DO2 26.0 mmHg      Barometric Pressure for Blood Gas 736 mmHg      Modality Nasal Cannula     Flow Rate 2.0 lpm      Ventilator Mode NA     Collected by [id number]     Comment: Meter: [id number]     :  [id number]        pH, Temp Corrected --     pCO2, Temperature Corrected --     pO2, Temperature Corrected --    CBC & Differential [[id number]]  (Abnormal) Collected: 07/27/24 1341    Specimen: Blood Updated: 07/27/24 1347    Narrative:      The following orders were created for panel order CBC & Differential.  Procedure                               Abnormality         Status                     ---------                               -----------         ------                     CBC Auto Differential[[id number]]        Abnormal            Final result                 Please view results for these tests on the individual orders.    CBC Auto Differential [[id number]]   (Abnormal) Collected: 07/27/24 1341    Specimen: Blood Updated: 07/27/24 1347     WBC 5.74 10*3/mm3      RBC 3.93 10*6/mm3      Hemoglobin 10.6 g/dL      Hematocrit 34.3 %      MCV 87.3 fL      MCH 27.0 pg      MCHC 30.9 g/dL      RDW 15.2 %      RDW-SD 49.0 fl      MPV 10.7 fL      Platelets 184 10*3/mm3      Neutrophil % 57.5 %      Lymphocyte % 22.0 %      Monocyte % 11.0 %      Eosinophil % 8.4 %      Basophil % 0.9 %      Immature Grans % 0.2 %      Neutrophils, Absolute 3.31 10*3/mm3      Lymphocytes, Absolute 1.26 10*3/mm3      Monocytes, Absolute 0.63 10*3/mm3      Eosinophils, Absolute 0.48 10*3/mm3      Basophils, Absolute 0.05 10*3/mm3      Immature Grans, Absolute 0.01 10*3/mm3      nRBC 0.0 /100 WBC     [place] Draw [[id number]] Collected: 07/27/24 1341    Specimen: Blood Updated: 07/27/24 1345    Narrative:      The following orders were created for panel order [place] Draw.  Procedure                               Abnormality         Status                     ---------                               -----------         ------                     Green Top (Gel)[[id number]]                                  Final result               Lavender Top[[id number]]                                     Final result               Gold Top - SST[[id number]]                                   Final result               Light Blue Top[[id number]]                                   Final result                 Please view results for these tests on the individual orders.    Green Top (Gel) [[id number]] Collected: 07/27/24 1341    Specimen: Blood Updated: 07/27/24 1345     Extra Tube Hold for add-ons.     Comment: Auto resulted.       Lavender Top [[id number]] Collected: 07/27/24 1341    Specimen: Blood Updated: 07/27/24 1345     Extra Tube hold for add-on     Comment: Auto resulted       Gold Top - SST [[id number]] Collected: 07/27/24 1341    Specimen: Blood Updated: 07/27/24 1345     Extra Tube Hold for add-ons.     Comment:  Auto resulted.       Light Blue Top [[id number]] Collected: 07/27/24 1341    Specimen: Blood Updated: 07/27/24 1345     Extra Tube Hold for add-ons.     Comment: Auto resulted             Imaging Results (Last 48 Hours)       Procedure Component Value Units Date/Time    XR Chest 1 View [[id number]] Collected: 07/27/24 1454     Updated: 07/27/24 1501    Narrative:      FINAL REPORT    TECHNIQUE:  Single view chest    CLINICAL HISTORY:  SOA    FINDINGS:  A single view of the chest was obtained.  A pacemaker is in  place.  The heart is borderline enlarged.  There are small  bilateral pleural effusions with possible interstitial pulmonary  edema.  There is no pneumothorax.  Osseous structures are  unremarkable.      Impression:      Mild CHF.    Authenticated and Electronically Signed by Hua Bailey M.D. on  07/27/2024 03:00:45 PM          ECG/EMG Results (last 48 hours)       Procedure Component Value Units Date/Time    ECG 12 Lead ED Triage Standing Order; SOA [[id number]] Resulted: 07/27/24 1426     Updated: 07/27/24 1426    Telemetry Scan [[id number]] Resulted: 07/27/24     Updated: 07/27/24 2103    Telemetry Scan [[id number]] Resulted: 07/27/24     Updated: 07/28/24 0804

## 2024-07-28 NOTE — PLAN OF CARE
Goal Outcome Evaluation:  Plan of Care Reviewed With: patient        Progress: no change       VSS. BP elevated. See MAR. Pt c/o pain in legs. Provider notified. See MAR.

## 2024-07-28 NOTE — PLAN OF CARE
Problem: Noninvasive Ventilation Acute  Goal: Effective Unassisted Ventilation and Oxygenation  Outcome: Ongoing, Progressing     Problem: Adult Inpatient Plan of Care  Goal: Plan of Care Review  Outcome: Ongoing, Progressing  Goal: Patient-Specific Goal (Individualized)  Outcome: Ongoing, Progressing  Goal: Absence of Hospital-Acquired Illness or Injury  Outcome: Ongoing, Progressing  Intervention: Identify and Manage Fall Risk  Recent Flowsheet Documentation  Taken 7/28/2024 0200 by Nely Reese RN  Safety Promotion/Fall Prevention:   activity supervised   safety round/check completed   toileting scheduled  Taken 7/28/2024 0000 by Nely Reese RN  Safety Promotion/Fall Prevention:   activity supervised   safety round/check completed   toileting scheduled  Taken 7/27/2024 2200 by Nely Reese RN  Safety Promotion/Fall Prevention:   activity supervised   safety round/check completed   toileting scheduled  Intervention: Prevent Skin Injury  Recent Flowsheet Documentation  Taken 7/28/2024 0200 by Nely Reese RN  Body Position: position changed independently  Skin Protection:   adhesive use limited   tubing/devices free from skin contact  Taken 7/28/2024 0000 by Nely Reese RN  Body Position: position changed independently  Skin Protection:   adhesive use limited   tubing/devices free from skin contact  Taken 7/27/2024 2200 by Nely Reese RN  Body Position: position changed independently  Skin Protection:   adhesive use limited   tubing/devices free from skin contact  Intervention: Prevent and Manage VTE (Venous Thromboembolism) Risk  Recent Flowsheet Documentation  Taken 7/28/2024 0200 by Nely Reese RN  Activity Management: activity encouraged  Taken 7/28/2024 0000 by Nely Reese RN  Activity Management: activity encouraged  Taken 7/27/2024 2200 by Nely Reese RN  Activity Management: activity encouraged  Range of Motion: active ROM (range of motion) encouraged  Goal: Optimal  Comfort and Wellbeing  Outcome: Ongoing, Progressing  Intervention: Provide Person-Centered Care  Recent Flowsheet Documentation  Taken 7/27/2024 2200 by Nely Reese RN  Trust Relationship/Rapport: care explained  Goal: Readiness for Transition of Care  Outcome: Ongoing, Progressing     Problem: COPD (Chronic Obstructive Pulmonary Disease) Comorbidity  Goal: Maintenance of COPD Symptom Control  Outcome: Ongoing, Progressing     Problem: Gas Exchange Impaired  Goal: Optimal Gas Exchange  Outcome: Ongoing, Progressing  Intervention: Optimize Oxygenation and Ventilation  Recent Flowsheet Documentation  Taken 7/28/2024 0200 by Nely Reese, RN  Head of Bed (HOB) Positioning: HOB at 20 degrees  Taken 7/28/2024 0000 by Nely Reese, RN  Head of Bed (HOB) Positioning: HOB elevated  Taken 7/27/2024 2200 by Nely Reese RN  Head of Bed (HOB) Positioning: HOB elevated   Goal Outcome Evaluation:  Pt c/o chronic pain. Scheduled pain medication given for pain relief. Pt o2 requirements at 3.5 liter NC. Pt is wearing cpap during the night. No events noted. Care continues.

## 2024-07-28 NOTE — CASE MANAGEMENT/SOCIAL WORK
2 RN Skin Check: Complete with RN David  Devices in place: PEG tube     Skin assessed under devices:   PEG tube site is CD&I with crust around the site     Buttocks-pink and blanching     Bilateral heels and elbows-pink and blanching     Bilateral knees-pink and blanching     Multiple scabbed wounds/ scars all over limbs     Confirmed pressure ulcers: N/A     Wound consult: N/A     Interventions: Observed skin, СВЕТЛАНА in use, q2T in use, float boots refused by pt after multiple education, mepilex changed and placed on some bony prominences, barrier cream and barrier wipes are all in use.   Discharge Planning Assessment   [place]     Patient Name: Robe Bryant  MRN: [id number]  Today's Date: 7/28/2024    Admit Date: 7/27/2024    Plan: Home with Home Health   Discharge Needs Assessment       Row Name 07/28/24 1304       Living Environment    People in Home spouse    Current Living Arrangements apartment    Potentially Unsafe Housing Conditions none    In the past 12 months has the electric, gas, oil, or water company threatened to shut off services in your home? No    Primary Care Provided by self    Provides Primary Care For no one    Family Caregiver if Needed spouse    Quality of Family Relationships involved    Able to Return to Prior Arrangements yes       Resource/Environmental Concerns    Resource/Environmental Concerns none    Transportation Concerns none       Transportation Needs    In the past 12 months, has lack of transportation kept you from medical appointments or from getting medications? no    In the past 12 months, has lack of transportation kept you from meetings, work, or from getting things needed for daily living? No       Food Insecurity    Within the past 12 months, you worried that your food would run out before you got the money to buy more. Never true    Within the past 12 months, the food you bought just didn't last and you didn't have money to get more. Never true       Transition Planning    Patient/Family Anticipates Transition to home with help/services    Patient/Family Anticipated Services at Transition home health care    Transportation Anticipated car, drives self;family or friend will provide       Discharge Needs Assessment    Readmission Within the Last 30 Days no previous admission in last 30 days    Equipment Currently Used at Home oxygen;BiPAP;cane, straight    Concerns to be Addressed discharge planning    Outpatient/Agency/Support Group Needs homecare agency                   Discharge Plan       Row Name 07/28/24 8986       Plan    Plan Home with  Home Health    Patient/Family in Agreement with Plan yes    Plan Comments Spoke to pt regarding discharge plans .Confirmed address,phone number primary care provider as being correct on face sheet .He is independent with ADLS Has Oxygen 2 NC continuous and BIPAP with [facility]  Care reports need a service call Case Management to call 7/29  ,Interested in information for Advanced Directive consult placed  Reports need a walker at home At this time plan is to return  home with Home health  Preferring [facility]                  Continued Care and Services - Admitted Since 7/27/2024    No active coordination exists for this encounter.          Demographic Summary       Row Name 07/28/24 1302       General Information    Admission Type inpatient    Arrived From emergency department    Required Notices Provided Important Message from Medicare    Referral Source admission list    Preferred Language English                   Functional Status       Row Name 07/28/24 1303       Functional Status    Usual Activity Tolerance fair       Physical Activity    On average, how many days per week do you engage in moderate to strenuous exercise (like a brisk walk)? 0 days    On average, how many minutes do you engage in exercise at this level? 0 min    Number of minutes of exercise per week 0       Functional Status, IADL    Medications independent    Meal Preparation assistive person    Housekeeping assistive person    Laundry assistive person    Shopping assistive person       Mental Status    General Appearance WDL WDL                   Psychosocial    No documentation.                  Abuse/Neglect    No documentation.                  Legal    No documentation.                  Substance Abuse    No documentation.                  Patient Forms    No documentation.                     Alem Recinos RN

## 2024-07-29 ENCOUNTER — READMISSION MANAGEMENT (OUTPATIENT)
Dept: CALL CENTER | Facility: HOSPITAL | Age: 74
End: 2024-07-29
Payer: MEDICARE

## 2024-07-29 VITALS
WEIGHT: 220 LBS | TEMPERATURE: 97.1 F | DIASTOLIC BLOOD PRESSURE: 66 MMHG | HEIGHT: 66 IN | SYSTOLIC BLOOD PRESSURE: 133 MMHG | HEART RATE: 61 BPM | RESPIRATION RATE: 16 BRPM | OXYGEN SATURATION: 94 % | BODY MASS INDEX: 35.36 KG/M2

## 2024-07-29 LAB
ALBUMIN SERPL-MCNC: 3.4 G/DL (ref 3.5–5.2)
ALBUMIN/GLOB SERPL: 1.4 G/DL
ALP SERPL-CCNC: 92 U/L (ref 39–117)
ALT SERPL W P-5'-P-CCNC: 10 U/L (ref 1–41)
ANION GAP SERPL CALCULATED.3IONS-SCNC: 14.4 MMOL/L (ref 5–15)
AST SERPL-CCNC: 15 U/L (ref 1–40)
BILIRUB SERPL-MCNC: 0.3 MG/DL (ref 0–1.2)
BUN SERPL-MCNC: 33 MG/DL (ref 8–23)
BUN/CREAT SERPL: 24.8 (ref 7–25)
CALCIUM SPEC-SCNC: 8.8 MG/DL (ref 8.6–10.5)
CHLORIDE SERPL-SCNC: 100 MMOL/L (ref 98–107)
CO2 SERPL-SCNC: 27.6 MMOL/L (ref 22–29)
CREAT SERPL-MCNC: 1.33 MG/DL (ref 0.76–1.27)
DEPRECATED RDW RBC AUTO: 48.9 FL (ref 37–54)
EGFRCR SERPLBLD CKD-EPI 2021: 56.1 ML/MIN/1.73
ERYTHROCYTE [DISTWIDTH] IN BLOOD BY AUTOMATED COUNT: 15.4 % (ref 12.3–15.4)
GLOBULIN UR ELPH-MCNC: 2.5 GM/DL
GLUCOSE SERPL-MCNC: 166 MG/DL (ref 65–99)
HCT VFR BLD AUTO: 34.9 % (ref 37.5–51)
HGB BLD-MCNC: 10.6 G/DL (ref 13–17.7)
MCH RBC QN AUTO: 26.7 PG (ref 26.6–33)
MCHC RBC AUTO-ENTMCNC: 30.4 G/DL (ref 31.5–35.7)
MCV RBC AUTO: 87.9 FL (ref 79–97)
PLATELET # BLD AUTO: 221 10*3/MM3 (ref 140–450)
PMV BLD AUTO: 11.5 FL (ref 6–12)
POTASSIUM SERPL-SCNC: 4.9 MMOL/L (ref 3.5–5.2)
PROT SERPL-MCNC: 5.9 G/DL (ref 6–8.5)
RBC # BLD AUTO: 3.97 10*6/MM3 (ref 4.14–5.8)
SODIUM SERPL-SCNC: 142 MMOL/L (ref 136–145)
WBC NRBC COR # BLD AUTO: 12.78 10*3/MM3 (ref 3.4–10.8)

## 2024-07-29 PROCEDURE — 25010000002 FUROSEMIDE PER 20 MG: Performed by: INTERNAL MEDICINE

## 2024-07-29 PROCEDURE — 25010000002 METHYLPREDNISOLONE PER 40 MG: Performed by: INTERNAL MEDICINE

## 2024-07-29 PROCEDURE — 25010000002 CEFTRIAXONE PER 250 MG: Performed by: INTERNAL MEDICINE

## 2024-07-29 PROCEDURE — 85027 COMPLETE CBC AUTOMATED: CPT | Performed by: INTERNAL MEDICINE

## 2024-07-29 PROCEDURE — 80053 COMPREHEN METABOLIC PANEL: CPT | Performed by: INTERNAL MEDICINE

## 2024-07-29 RX ORDER — PREDNISONE 20 MG/1
20 TABLET ORAL DAILY
Qty: 5 TABLET | Refills: 0 | Status: SHIPPED | OUTPATIENT
Start: 2024-07-29

## 2024-07-29 RX ORDER — GUAIFENESIN AND DEXTROMETHORPHAN HYDROBROMIDE 600; 30 MG/1; MG/1
1 TABLET, EXTENDED RELEASE ORAL 2 TIMES DAILY
Qty: 11 TABLET | Refills: 0 | Status: SHIPPED | OUTPATIENT
Start: 2024-07-29 | End: 2024-08-04

## 2024-07-29 RX ORDER — AMOXICILLIN AND CLAVULANATE POTASSIUM 875; 125 MG/1; MG/1
1 TABLET, FILM COATED ORAL 2 TIMES DAILY
Qty: 10 TABLET | Refills: 0 | Status: SHIPPED | OUTPATIENT
Start: 2024-07-29

## 2024-07-29 RX ADMIN — AMLODIPINE BESYLATE 10 MG: 5 TABLET ORAL at 08:31

## 2024-07-29 RX ADMIN — Medication 10 ML: at 08:30

## 2024-07-29 RX ADMIN — METHYLPREDNISOLONE SODIUM SUCCINATE 40 MG: 40 INJECTION, POWDER, FOR SOLUTION INTRAMUSCULAR; INTRAVENOUS at 01:00

## 2024-07-29 RX ADMIN — METHYLPREDNISOLONE SODIUM SUCCINATE 40 MG: 40 INJECTION, POWDER, FOR SOLUTION INTRAMUSCULAR; INTRAVENOUS at 13:07

## 2024-07-29 RX ADMIN — METOPROLOL SUCCINATE 50 MG: 50 TABLET, EXTENDED RELEASE ORAL at 08:31

## 2024-07-29 RX ADMIN — APIXABAN 5 MG: 5 TABLET, FILM COATED ORAL at 05:13

## 2024-07-29 RX ADMIN — CEFTRIAXONE 1000 MG: 1 INJECTION, POWDER, FOR SOLUTION INTRAMUSCULAR; INTRAVENOUS at 09:17

## 2024-07-29 RX ADMIN — FUROSEMIDE 40 MG: 10 INJECTION, SOLUTION INTRAMUSCULAR; INTRAVENOUS at 08:31

## 2024-07-29 RX ADMIN — LISINOPRIL 40 MG: 20 TABLET ORAL at 08:31

## 2024-07-29 RX ADMIN — HYDROCODONE BITARTRATE AND ACETAMINOPHEN 1 TABLET: 7.5; 325 TABLET ORAL at 05:13

## 2024-07-29 RX ADMIN — BACLOFEN 10 MG: 10 TABLET ORAL at 08:30

## 2024-07-29 RX ADMIN — PANTOPRAZOLE SODIUM 40 MG: 40 TABLET, DELAYED RELEASE ORAL at 05:14

## 2024-07-29 RX ADMIN — FLUOXETINE 10 MG: 10 CAPSULE ORAL at 08:30

## 2024-07-29 RX ADMIN — HYDROCODONE BITARTRATE AND ACETAMINOPHEN 1 TABLET: 7.5; 325 TABLET ORAL at 12:01

## 2024-07-29 RX ADMIN — TAMSULOSIN HYDROCHLORIDE 0.4 MG: 0.4 CAPSULE ORAL at 08:31

## 2024-07-29 RX ADMIN — METFORMIN HYDROCHLORIDE 500 MG: 500 TABLET ORAL at 08:30

## 2024-07-29 RX ADMIN — GUAIFENESIN AND DEXTROMETHORPHAN HYDROBROMIDE 1 TABLET: 600; 30 TABLET, EXTENDED RELEASE ORAL at 08:30

## 2024-07-29 NOTE — OUTREACH NOTE
Prep Survey      Flowsheet Row Responses   [facility] facility patient discharged from? [facility]   Is LACE score < 7 ? No   Eligibility Readm Mgmt   Discharge diagnosis a/c Respiratory failure   Does the patient have one of the following disease processes/diagnoses(primary or secondary)? Other   Does the patient have Home health ordered? No   Is there a DME ordered? Yes   What DME was ordered? [facility] for walker-pending at discharge   Prep survey completed? Yes            ANGELICA VALDOVINOS - Registered Nurse

## 2024-07-29 NOTE — PLAN OF CARE
Goal Outcome Evaluation:                     Problem: Noninvasive Ventilation Acute  Goal: Effective Unassisted Ventilation and Oxygenation  Outcome: Ongoing, Progressing     Problem: Adult Inpatient Plan of Care  Goal: Plan of Care Review  Outcome: Ongoing, Progressing  Goal: Patient-Specific Goal (Individualized)  Outcome: Ongoing, Progressing  Goal: Absence of Hospital-Acquired Illness or Injury  Outcome: Ongoing, Progressing  Intervention: Identify and Manage Fall Risk  Recent Flowsheet Documentation  Taken 7/29/2024 0200 by Nely Reese RN  Safety Promotion/Fall Prevention:   activity supervised   safety round/check completed   toileting scheduled  Taken 7/29/2024 0000 by Nely Reese RN  Safety Promotion/Fall Prevention:   activity supervised   assistive device/personal items within reach   safety round/check completed   toileting scheduled  Taken 7/28/2024 2200 by Nely Reese RN  Safety Promotion/Fall Prevention:   activity supervised   safety round/check completed   toileting scheduled   nonskid shoes/slippers when out of bed  Taken 7/28/2024 1939 by Nely Reese RN  Safety Promotion/Fall Prevention: safety round/check completed  Intervention: Prevent Skin Injury  Recent Flowsheet Documentation  Taken 7/29/2024 0200 by Nely Reese RN  Body Position: position changed independently  Taken 7/29/2024 0000 by Nely Reese RN  Body Position: position changed independently  Taken 7/28/2024 2200 by Nely Reese RN  Body Position:   position changed independently   side-lying  Taken 7/28/2024 1939 by Nely Reese RN  Body Position: sitting up in bed  Intervention: Prevent and Manage VTE (Venous Thromboembolism) Risk  Recent Flowsheet Documentation  Taken 7/29/2024 0200 by Nely Reese RN  Activity Management: activity encouraged  Taken 7/29/2024 0000 by Nely Reese RN  Activity Management: activity encouraged  Taken 7/28/2024 2200 by Nely Reese RN  Activity Management: activity  encouraged  Intervention: Prevent Infection  Recent Flowsheet Documentation  Taken 7/29/2024 0200 by Nely Reese RN  Infection Prevention:   environmental surveillance performed   single patient room provided   rest/sleep promoted  Taken 7/29/2024 0000 by Nely Reese RN  Infection Prevention:   environmental surveillance performed   single patient room provided  Taken 7/28/2024 2200 by Nely Reese RN  Infection Prevention:   environmental surveillance performed   single patient room provided   rest/sleep promoted  Goal: Optimal Comfort and Wellbeing  Outcome: Ongoing, Progressing  Goal: Readiness for Transition of Care  Outcome: Ongoing, Progressing     Problem: COPD (Chronic Obstructive Pulmonary Disease) Comorbidity  Goal: Maintenance of COPD Symptom Control  Outcome: Ongoing, Progressing     Problem: Gas Exchange Impaired  Goal: Optimal Gas Exchange  Outcome: Ongoing, Progressing  Intervention: Optimize Oxygenation and Ventilation  Recent Flowsheet Documentation  Taken 7/29/2024 0200 by Nely Reese RN  Head of Bed (HOB) Positioning: HOB at 20 degrees  Taken 7/29/2024 0000 by Nely Reese RN  Head of Bed (HOB) Positioning:   HOB at 20-30 degrees   HOB elevated  Taken 7/28/2024 2200 by Nely Reese RN  Head of Bed (HOB) Positioning: HOB at 20 degrees  Taken 7/28/2024 1939 by Nely Reese RN  Head of Bed (HOB) Positioning: HOB elevated     Problem: Fall Injury Risk  Goal: Absence of Fall and Fall-Related Injury  Outcome: Ongoing, Progressing  Intervention: Promote Injury-Free Environment  Recent Flowsheet Documentation  Taken 7/29/2024 0200 by Nely Reese RN  Safety Promotion/Fall Prevention:   activity supervised   safety round/check completed   toileting scheduled  Taken 7/29/2024 0000 by Nely Reese RN  Safety Promotion/Fall Prevention:   activity supervised   assistive device/personal items within reach   safety round/check completed   toileting scheduled  Taken 7/28/2024 2200 by  Nely Reese, RN  Safety Promotion/Fall Prevention:   activity supervised   safety round/check completed   toileting scheduled   nonskid shoes/slippers when out of bed  Taken 7/28/2024 1939 by Nely Reese, RN  Safety Promotion/Fall Prevention: safety round/check completed        No changes overnight. NC at 4 liters when awake. Cpap at HS. Pt educated to call for assistance when getting out of bed. Care continues.

## 2024-07-29 NOTE — PROGRESS NOTES
"Enter Query Response Below      Query Response:   Acute respiratory failure with hypercapnia was not supported            If applicable, please update the problem list.     Patient: Robe Bryant        : 1950  Account: [id number]           Admit Date:         How to Respond to this query:       a. Click New Note     b. Answer query within the yellow box.                c. Update the Problem List, if applicable.      If you have any questions about this query contact me at: [phone]     Dr. Sullivan:    73-year-old male with history of COPD on 2 L oxygen at baseline, presented with worsening shortness of breath and found to have acute on chronic heart failure and COPD exacerbation.  ABG results pH 7.402, pC02 47.4, p02 65.3, 2L N/C.  Patient was given IV solumedrol and IV lasix.  Patient was placed on bipap and then weaned to 2L 02 prior to discharge.  Progress notes and discharge summary state \"acute on chronic respiratory failure with hypoxia and hypercapnia.     After study, was acute respiratory failure with hypercapnia clinically supported during this admission?    Acute respiratory failure with hypercapnia was supported with additional clinical indicators:____________  Acute respiratory failure with hypercapnia was not supported  Other- specify_____________  Unable to determine        By submitting this query, we are merely seeking further clarification of documentation to accurately reflect all conditions that you are monitoring, evaluating, treating or that extend the hospitalization or utilize additional resources of care. Please utilize your independent clinical judgment when addressing the question(s) above.     This query and your response, once completed, will be entered into the legal medical record.    Sincerely,  Joana Ledesma, RN, MSN, CCDS  Clinical Documentation Integrity Program   [email or web address]   "

## 2024-07-29 NOTE — NURSING NOTE
Patient being discharged home. Discharge instructions reviewed. Medications delivered, IV access removed, and informed on follow up appointment. Patient verbalizes understanding of discharge teaching.

## 2024-07-29 NOTE — ACP (ADVANCE CARE PLANNING)
Spoke with pt and his daughter at his bedside.  The Living Will form was completed giving his daughter and wife the authority to be his surrogate decision makers.

## 2024-07-29 NOTE — CASE MANAGEMENT/SOCIAL WORK
Case Management Discharge Note      Final Note: DC home with daughter to transport. Dr Haskins was called and informed of request for home health to follow and request for a walker. Dr. Haskins stated he will take care of this at his office. Pt and daughter informed at bedside about Home Health and walker. Verbalized Understanding.         Selected Continued Care - Discharged on 7/29/2024 Admission date: 7/27/2024 - Discharge disposition: Home or Self Care      Destination    No services have been selected for the patient.                Durable Medical Equipment    No services have been selected for the patient.                Dialysis/Infusion    No services have been selected for the patient.                Home Medical Care    No services have been selected for the patient.                Therapy    No services have been selected for the patient.                Community Resources    No services have been selected for the patient.                Community & DME    No services have been selected for the patient.                    Transportation Services  Private: Car    Final Discharge Disposition Code: 01 - home or self-care

## 2024-07-29 NOTE — DISCHARGE SUMMARY
[facility]   INTERNAL MEDICINE DISCHARGE SUMMARY      Name:  Robe Bryant   Age:  73 y.o.  Sex:  male  :  1950  MRN:  [id number]   Visit Number:  [id number]  Primary Care Physician:  Raj Sullivan MD  Date of Discharge:  2024  Admission Date:  2024      Discharge Diagnosis:         Acute on chronic respiratory failure with hypoxia and hypercapnia    COPD with acute exacerbation    Acute on chronic congestive heart failure    Pacemaker    Chronic low back pain    Essential hypertension    Paroxysmal A-fib    Hypercholesterolemia    Gastroesophageal reflux disease    Diabetes mellitus    Restless leg syndrome    Chronic pain syndrome    Morbid obesity    Acute on chronic respiratory failure with hypoxemia          Consults:     Consults       No orders found from 2024 to 2024.            Procedures Performed:                 Hospital Course:   The patient was admitted on 2024  Please see H&P for details on admission HPI and ROS.  73-year-old patient with past medical history of COPD on 2 L oxygen at baseline, paroxysmal atrial fibrillation on anticoagulation, heart failure with preserved ejection fraction, BPH, hypertension, diabetes, pacemaker, sleep apnea, degenerative disc disease and chronic back pain with neuropathy on narcotics, who comes into the ER because of worsening shortness of breath than his baseline over the last 3 days.  He states that since last 3 days he has been gradually getting worse and unable to do her daily activities.  He has been using his oxygen and still struggling to breathe along with coughing and worsening leg swelling.     In the ER workup was done and he was found to have evidence DepoDur and wheezing with COPD exacerbation as well as signs of volume overload.  He was given IV Solu-Medrol Lasix and being further admitted for management of his worsening respiratory failure with exacerbation of COPD and his diastolic heart  failure.  He denies chest pain and is not having any productive cough but is complaining mainly of more wheezing.  With the effect of the IV Lasix given in the ER he has diuresed about 400 mL and currently he is on BiPAP which has helped.  Labs and x-rays have been reviewed as below  Patient is being admitted inpatient for management of his worsening respiratory failure     Patient for admission has diuresed 1250 mL and he is feeling a lot better.  Currently on 4 L oxygen with saturation at 94%.  He has been having cough which is little thick with mucus.  Leg edema has improved.  He did use BiPAP pain for the initial few hours and after that has been on nasal cannula and maintaining his saturation.      Patient seen on July 29.  He has been feeling better.  Oxygen has been in the mid 90s and tapered down to 2-1/2 late and he does take 2 L at home which is his baseline and it is staying in the 90s.  He is feeling better is still coughing some.  He has diuresed again since last 24 hours and he is stable to be discharged home.  He will be given oral antibiotic for the bronchitis component with the few days of steroids and his blood pressure has been better controlled with the combination of Norvasc lisinopril and Toprol  He will be seen in my office on Wednesday at 2:15 PM and compliance with all medications has been addressed.            Vital Signs:     Temp:  [97.1 °F (36.2 °C)-97.8 °F (36.6 °C)] 97.1 °F (36.2 °C)  Heart Rate:  [60-63] 63  Resp:  [18-20] 18  BP: (107-157)/(49-75) 123/61    Physical Exam:     General Appearance:    Alert and cooperative, not in any acute distress.   Head:    Atraumatic and normocephalic, without obvious abnormality.   Eyes:            PERRLA,  No pallor. Extraocular movements are within normal limits.   Neck:   Supple,  No lymph glands, no bruit   Lungs:     Chest shape is normal. Breath sounds heard bilaterally equally.  No crackles or wheezing.     Heart:    Normal S1 and S2, no  "murmur,  No JVD   Abdomen:     Normal bowel sounds, no masses, no organomegaly. Soft     nontender, no guarding, no rebound tenderness   Extremities:   Moves all extremities well, no edema, no cyanosis,    Skin:   No  bruising or rash.   Neurologic:   Grossly nonfocal and moves all extremities.        Pertinent Lab Results:     Results from last 7 days   Lab Units 07/29/24  0610 07/28/24  0640 07/27/24  1341   SODIUM mmol/L 142 141 145   POTASSIUM mmol/L 4.9 4.4 4.3   CHLORIDE mmol/L 100 102 102   CO2 mmol/L 27.6 26.6 29.0   BUN mg/dL 33* 23 15   CREATININE mg/dL 1.33* 1.28* 1.24   CALCIUM mg/dL 8.8 8.8 9.0   BILIRUBIN mg/dL 0.3 0.4 0.7   ALK PHOS U/L 92 96 102   ALT (SGPT) U/L 10 12 14   AST (SGOT) U/L 15 16 21   GLUCOSE mg/dL 166* 178* 125*     Results from last 7 days   Lab Units 07/29/24  0610 07/28/24  0640 07/27/24  1341   WBC 10*3/mm3 12.78* 6.59 5.74   HEMOGLOBIN g/dL 10.6* 11.0* 10.6*   HEMATOCRIT % 34.9* 36.0* 34.3*   PLATELETS 10*3/mm3 221 189 184         No results found for: \"BLOODCX\", \"URINECX\", \"WOUNDCX\", \"MRSACX\"    Pertinent Radiology Results:    Imaging Results (Most Recent)       Procedure Component Value Units Date/Time    XR Chest 1 View [[id number]] Collected: 07/27/24 1454     Updated: 07/27/24 1501    Narrative:      FINAL REPORT    TECHNIQUE:  Single view chest    CLINICAL HISTORY:  SOA    FINDINGS:  A single view of the chest was obtained.  A pacemaker is in  place.  The heart is borderline enlarged.  There are small  bilateral pleural effusions with possible interstitial pulmonary  edema.  There is no pneumothorax.  Osseous structures are  unremarkable.      Impression:      Mild CHF.    Authenticated and Electronically Signed by Hua Bailey M.D. on  07/27/2024 03:00:45 PM                    Discharge Disposition:      Home or Self Care    Discharge Medication:         Discharge Medications        New Medications        Instructions Start Date   amoxicillin-clavulanate 875-125 MG per " tablet  Commonly known as: AUGMENTIN   1 tablet, Oral, 2 Times Daily      guaifenesin-dextromethorphan 600-30 mg  MG tablet sustained-release 12 hour   1 tablet, Oral, 2 Times Daily      predniSONE 20 MG tablet  Commonly known as: DELTASONE   20 mg, Oral, Daily             Changes to Medications        Instructions Start Date   bumetanide 1 MG tablet  Commonly known as: BUMEX  What changed: Another medication with the same name was removed. Continue taking this medication, and follow the directions you see here.   1 mg, Oral, Daily             Continue These Medications        Instructions Start Date   albuterol sulfate  (90 Base) MCG/ACT inhaler  Commonly known as: PROVENTIL HFA;VENTOLIN HFA;PROAIR HFA   2 puffs, Inhalation, Every 4 Hours PRN      amLODIPine 10 MG tablet  Commonly known as: NORVASC   10 mg, Oral, Daily      Eliquis 5 MG tablet tablet  Generic drug: apixaban   5 mg, Oral, Every 12 Hours      famotidine 20 MG tablet  Commonly known as: PEPCID   20 mg, Oral, 2 Times Daily      FLUoxetine 10 MG capsule  Commonly known as: PROzac   10 mg, Oral, Daily      gabapentin 400 MG capsule  Commonly known as: NEURONTIN   400 mg, Oral, Nightly      glucose monitor monitoring kit   1 each, Does not apply, Daily, E11.9      HYDROcodone-acetaminophen 7.5-325 MG per tablet  Commonly known as: NORCO   1 tablet, Oral, Every 6 Hours      Lancet Device misc   1 each, Does not apply, Daily, E11.9      lisinopril 40 MG tablet  Commonly known as: PRINIVIL,ZESTRIL   40 mg, Oral, Daily      metoprolol succinate XL 50 MG 24 hr tablet  Commonly known as: Toprol XL   50 mg, Oral, Daily      Misc. Devices misc   Bipap tubing.      Nebulizer misc   1 each, Does not apply, Every 4 Hours PRN      O2  Commonly known as: OXYGEN   2 L/min, Inhalation, Continuous PRN      omeprazole 40 MG capsule  Commonly known as: priLOSEC   40 mg, Oral, Daily      pramipexole 1 MG tablet  Commonly known as: MIRAPEX   1 mg, Oral,  Nightly      tamsulosin 0.4 MG capsule 24 hr capsule  Commonly known as: FLOMAX   0.4 mg, Oral, Daily      True Metrix Air Glucose Meter w/Device kit   1 each, In Vitro, 2 times daily, E11.9      TRUEplus Lancets 33G misc   1 each, Other, Daily, E11.9             Stop These Medications      atorvastatin 20 MG tablet  Commonly known as: LIPITOR     baclofen 10 MG tablet  Commonly known as: LIORESAL     Breztri Aerosphere 160-9-4.8 MCG/ACT aerosol inhaler  Generic drug: Budeson-Glycopyrrol-Formoterol     diclofenac 75 MG EC tablet  Commonly known as: VOLTAREN     fluticasone 50 MCG/ACT nasal spray  Commonly known as: FLONASE     ipratropium-albuterol 0.5-2.5 mg/3 ml nebulizer  Commonly known as: DUO-NEB     metFORMIN 500 MG tablet  Commonly known as: GLUCOPHAGE              Discharge Diet:             Follow-up Appointments:      Wednesday at 2:15 PM      Test Results Pending at Discharge:             Raj Sullivan MD  07/29/24  09:26 EDT    Time:  Discharge 34 min    Please note that portions of this note were completed with a voice recognition program.

## 2024-07-29 NOTE — CASE MANAGEMENT/SOCIAL WORK
1240: CHRISTA spoke with Yulissa/Emily and informed of request for his home 02 and Bi-PAP to be serviced. Also informed Emily that pt is being discharged today. Verbalized understanding.

## 2024-07-30 ENCOUNTER — TELEPHONE (OUTPATIENT)
Dept: CARDIOLOGY | Facility: CLINIC | Age: 74
End: 2024-07-30
Payer: MEDICARE

## 2024-07-30 NOTE — TELEPHONE ENCOUNTER
LEFT VOICEMAIL, NEED TO SCHEDULE PATIENT A DEVICE CHECK WITH FOLLOW UP APPT WITH DR. BROWN IN [place] LAST SEEN 2/16/2023.

## 2024-07-30 NOTE — PAYOR COMM NOTE
"To:  [employer]  From: Janel Teague RN  Phone: [phone]  Fax: [phone]  NPI: [id number]  TIN: [id number]  Member ID: [id number]   MRN: [id number]    Todd Garnett (73 y.o. Male)       Date of Birth   1950    Social Security Number       Address   [address] [place], [place] [address]    Home Phone   [phone]    MRN   [id number]       Buddhism   Episcopalian    Marital Status                               Admission Date   24    Admission Type   Emergency    Admitting Provider       Attending Provider       Department, Room/Bed   [facility] TELEMETRY 4, 431/       Discharge Date   2024    Discharge Disposition   Home or Self Care    Discharge Destination   Home                              Attending Provider: (none)   Allergies: No Known Allergies    Isolation: None   Infection: None   Code Status: Prior    Ht: 167.6 cm (66\")   Wt: 99.8 kg (220 lb)    Admission Cmt: None   Principal Problem: Acute on chronic respiratory failure with hypoxia and hypercapnia [J96.21,J96.22]                   Active Insurance as of 2024       Primary Coverage       Payor Plan Insurance Group Employer/Plan Group    HU[employer] MEDICARE REPLACEMENT HU[employer] MED ADV PPO [id number]       Payor Plan Address Payor Plan Phone Number Payor Plan Fax Number Effective Dates    [address] [address] [phone]  3/1/2023 - None Entered    [facility] [id number]         Subscriber Name Subscriber Birth Date Member ID       TODD GARNETT 1950 [id number]                     Emergency Contacts        (Rel.) Home Phone Work Phone Mobile Phone    BISI GARNETT (Spouse) -- -- [phone]    NANOVITOR VILLALPANDO (Daughter) [phone] -- --                 Discharge Summary        Raj Sullivan MD at 24 0925              [facility]   INTERNAL MEDICINE DISCHARGE SUMMARY      Name:  Todd Garnett   Age:  73 y.o.  Sex:  male  :  1950  MRN:  [id number] "   Visit Number:  [id number]  Primary Care Physician:  Raj Sullivan MD  Date of Discharge:  7/29/2024  Admission Date:  7/27/2024      Discharge Diagnosis:         Acute on chronic respiratory failure with hypoxia and hypercapnia    COPD with acute exacerbation    Acute on chronic congestive heart failure    Pacemaker    Chronic low back pain    Essential hypertension    Paroxysmal A-fib    Hypercholesterolemia    Gastroesophageal reflux disease    Diabetes mellitus    Restless leg syndrome    Chronic pain syndrome    Morbid obesity    Acute on chronic respiratory failure with hypoxemia          Consults:     Consults       No orders found from 6/28/2024 to 7/28/2024.            Procedures Performed:                 Hospital Course:   The patient was admitted on 7/27/2024  Please see H&P for details on admission HPI and ROS.  73-year-old patient with past medical history of COPD on 2 L oxygen at baseline, paroxysmal atrial fibrillation on anticoagulation, heart failure with preserved ejection fraction, BPH, hypertension, diabetes, pacemaker, sleep apnea, degenerative disc disease and chronic back pain with neuropathy on narcotics, who comes into the ER because of worsening shortness of breath than his baseline over the last 3 days.  He states that since last 3 days he has been gradually getting worse and unable to do her daily activities.  He has been using his oxygen and still struggling to breathe along with coughing and worsening leg swelling.     In the ER workup was done and he was found to have evidence DepoDur and wheezing with COPD exacerbation as well as signs of volume overload.  He was given IV Solu-Medrol Lasix and being further admitted for management of his worsening respiratory failure with exacerbation of COPD and his diastolic heart failure.  He denies chest pain and is not having any productive cough but is complaining mainly of more wheezing.  With the effect of the IV Lasix given in the ER he  has diuresed about 400 mL and currently he is on BiPAP which has helped.  Labs and x-rays have been reviewed as below  Patient is being admitted inpatient for management of his worsening respiratory failure     Patient for admission has diuresed 1250 mL and he is feeling a lot better.  Currently on 4 L oxygen with saturation at 94%.  He has been having cough which is little thick with mucus.  Leg edema has improved.  He did use BiPAP pain for the initial few hours and after that has been on nasal cannula and maintaining his saturation.      Patient seen on July 29.  He has been feeling better.  Oxygen has been in the mid 90s and tapered down to 2-1/2 late and he does take 2 L at home which is his baseline and it is staying in the 90s.  He is feeling better is still coughing some.  He has diuresed again since last 24 hours and he is stable to be discharged home.  He will be given oral antibiotic for the bronchitis component with the few days of steroids and his blood pressure has been better controlled with the combination of Norvasc lisinopril and Toprol  He will be seen in my office on Wednesday at 2:15 PM and compliance with all medications has been addressed.            Vital Signs:     Temp:  [97.1 °F (36.2 °C)-97.8 °F (36.6 °C)] 97.1 °F (36.2 °C)  Heart Rate:  [60-63] 63  Resp:  [18-20] 18  BP: (107-157)/(49-75) 123/61    Physical Exam:     General Appearance:    Alert and cooperative, not in any acute distress.   Head:    Atraumatic and normocephalic, without obvious abnormality.   Eyes:            PERRLA,  No pallor. Extraocular movements are within normal limits.   Neck:   Supple,  No lymph glands, no bruit   Lungs:     Chest shape is normal. Breath sounds heard bilaterally equally.  No crackles or wheezing.     Heart:    Normal S1 and S2, no murmur,  No JVD   Abdomen:     Normal bowel sounds, no masses, no organomegaly. Soft     nontender, no guarding, no rebound tenderness   Extremities:   Moves all  "extremities well, no edema, no cyanosis,    Skin:   No  bruising or rash.   Neurologic:   Grossly nonfocal and moves all extremities.        Pertinent Lab Results:     Results from last 7 days   Lab Units 07/29/24  0610 07/28/24  0640 07/27/24  1341   SODIUM mmol/L 142 141 145   POTASSIUM mmol/L 4.9 4.4 4.3   CHLORIDE mmol/L 100 102 102   CO2 mmol/L 27.6 26.6 29.0   BUN mg/dL 33* 23 15   CREATININE mg/dL 1.33* 1.28* 1.24   CALCIUM mg/dL 8.8 8.8 9.0   BILIRUBIN mg/dL 0.3 0.4 0.7   ALK PHOS U/L 92 96 102   ALT (SGPT) U/L 10 12 14   AST (SGOT) U/L 15 16 21   GLUCOSE mg/dL 166* 178* 125*     Results from last 7 days   Lab Units 07/29/24  0610 07/28/24  0640 07/27/24  1341   WBC 10*3/mm3 12.78* 6.59 5.74   HEMOGLOBIN g/dL 10.6* 11.0* 10.6*   HEMATOCRIT % 34.9* 36.0* 34.3*   PLATELETS 10*3/mm3 221 189 184         No results found for: \"BLOODCX\", \"URINECX\", \"WOUNDCX\", \"MRSACX\"    Pertinent Radiology Results:    Imaging Results (Most Recent)       Procedure Component Value Units Date/Time    XR Chest 1 View [[id number]] Collected: 07/27/24 1454     Updated: 07/27/24 1501    Narrative:      FINAL REPORT    TECHNIQUE:  Single view chest    CLINICAL HISTORY:  SOA    FINDINGS:  A single view of the chest was obtained.  A pacemaker is in  place.  The heart is borderline enlarged.  There are small  bilateral pleural effusions with possible interstitial pulmonary  edema.  There is no pneumothorax.  Osseous structures are  unremarkable.      Impression:      Mild CHF.    Authenticated and Electronically Signed by Hua Bailey M.D. on  07/27/2024 03:00:45 PM                    Discharge Disposition:      Home or Self Care    Discharge Medication:         Discharge Medications        New Medications        Instructions Start Date   amoxicillin-clavulanate 875-125 MG per tablet  Commonly known as: AUGMENTIN   1 tablet, Oral, 2 Times Daily      guaifenesin-dextromethorphan 600-30 mg  MG tablet sustained-release 12 hour   1 tablet, " Oral, 2 Times Daily      predniSONE 20 MG tablet  Commonly known as: DELTASONE   20 mg, Oral, Daily             Changes to Medications        Instructions Start Date   bumetanide 1 MG tablet  Commonly known as: BUMEX  What changed: Another medication with the same name was removed. Continue taking this medication, and follow the directions you see here.   1 mg, Oral, Daily             Continue These Medications        Instructions Start Date   albuterol sulfate  (90 Base) MCG/ACT inhaler  Commonly known as: PROVENTIL HFA;VENTOLIN HFA;PROAIR HFA   2 puffs, Inhalation, Every 4 Hours PRN      amLODIPine 10 MG tablet  Commonly known as: NORVASC   10 mg, Oral, Daily      Eliquis 5 MG tablet tablet  Generic drug: apixaban   5 mg, Oral, Every 12 Hours      famotidine 20 MG tablet  Commonly known as: PEPCID   20 mg, Oral, 2 Times Daily      FLUoxetine 10 MG capsule  Commonly known as: PROzac   10 mg, Oral, Daily      gabapentin 400 MG capsule  Commonly known as: NEURONTIN   400 mg, Oral, Nightly      glucose monitor monitoring kit   1 each, Does not apply, Daily, E11.9      HYDROcodone-acetaminophen 7.5-325 MG per tablet  Commonly known as: NORCO   1 tablet, Oral, Every 6 Hours      Lancet Device misc   1 each, Does not apply, Daily, E11.9      lisinopril 40 MG tablet  Commonly known as: PRINIVIL,ZESTRIL   40 mg, Oral, Daily      metoprolol succinate XL 50 MG 24 hr tablet  Commonly known as: Toprol XL   50 mg, Oral, Daily      Misc. Devices misc   Bipap tubing.      Nebulizer misc   1 each, Does not apply, Every 4 Hours PRN      O2  Commonly known as: OXYGEN   2 L/min, Inhalation, Continuous PRN      omeprazole 40 MG capsule  Commonly known as: priLOSEC   40 mg, Oral, Daily      pramipexole 1 MG tablet  Commonly known as: MIRAPEX   1 mg, Oral, Nightly      tamsulosin 0.4 MG capsule 24 hr capsule  Commonly known as: FLOMAX   0.4 mg, Oral, Daily      True Metrix Air Glucose Meter w/Device kit   1 each, In Vitro, 2  times daily, E11.9      TRUEplus Lancets 33G misc   1 each, Other, Daily, E11.9             Stop These Medications      atorvastatin 20 MG tablet  Commonly known as: LIPITOR     baclofen 10 MG tablet  Commonly known as: LIORESAL     Breztri Aerosphere 160-9-4.8 MCG/ACT aerosol inhaler  Generic drug: Budeson-Glycopyrrol-Formoterol     diclofenac 75 MG EC tablet  Commonly known as: VOLTAREN     fluticasone 50 MCG/ACT nasal spray  Commonly known as: FLONASE     ipratropium-albuterol 0.5-2.5 mg/3 ml nebulizer  Commonly known as: DUO-NEB     metFORMIN 500 MG tablet  Commonly known as: GLUCOPHAGE              Discharge Diet:             Follow-up Appointments:      Wednesday at 2:15 PM      Test Results Pending at Discharge:             Raj Sullivan MD  07/29/24  09:26 EDT    Time:  Discharge 34 min    Please note that portions of this note were completed with a voice recognition program.        Electronically signed by Raj Sullivan MD at 07/29/24 0946

## 2024-07-30 NOTE — TELEPHONE ENCOUNTER
Pt's last follow up visit with this cardiology practice was 2/16/2023. Left voice mail message for pt to call Dr. Tucker's  for an appointment, phone number provided.  _______________________________________    Facundo, please call pt & schedule a follow up appointment with Dr. Tucker w/RICHIE device check.

## 2024-08-01 ENCOUNTER — READMISSION MANAGEMENT (OUTPATIENT)
Dept: CALL CENTER | Facility: HOSPITAL | Age: 74
End: 2024-08-01
Payer: MEDICARE

## 2024-08-01 NOTE — OUTREACH NOTE
Medical Week 1 Survey      Flowsheet Row Responses   [facility] patient discharged from? [facility]   Does the patient have one of the following disease processes/diagnoses(primary or secondary)? Other   Week 1 attempt successful? No   Unsuccessful attempts Attempt 1            Frieda NELSON - Licensed Nurse

## 2024-08-01 NOTE — TELEPHONE ENCOUNTER
LEFT VOICEMAIL FOR PT TO CALL TO SCHEDULE FOLLOW UP AND STJ DEVICE WITH DR. BROWN, LEFT MY DIRECT NUMBER FOR PATIENT TO CALL, MAILED LETTER, LG

## 2024-08-05 ENCOUNTER — READMISSION MANAGEMENT (OUTPATIENT)
Dept: CALL CENTER | Facility: HOSPITAL | Age: 74
End: 2024-08-05
Payer: MEDICARE

## 2024-08-05 NOTE — OUTREACH NOTE
Medical Week 1 Survey      Flowsheet Row Responses   [facility] patient discharged from? [place]   Does the patient have one of the following disease processes/diagnoses(primary or secondary)? Other   Week 1 attempt successful? Yes   Call start time 1820   Call end time 1823   Discharge diagnosis a/c Respiratory failure   Meds reviewed with patient/caregiver? Yes   Is the patient having any side effects they believe may be caused by any medication additions or changes? No   Does the patient have all medications ordered at discharge? Yes   Is the patient taking all medications as directed (includes completed medication regime)? Yes   Does the patient have a primary care provider?  Yes   Has the patient kept scheduled appointments due by today? Yes   Psychosocial issues? No   Did the patient receive a copy of their discharge instructions? Yes   Nursing interventions Reviewed instructions with patient   What is the patient's perception of their health status since discharge? Improving   Is the patient/caregiver able to teach back signs and symptoms related to disease process for when to call PCP? Yes   Is the patient/caregiver able to teach back signs and symptoms related to disease process for when to call 911? Yes   Is the patient/caregiver able to teach back the hierarchy of who to call/visit for symptoms/problems? PCP, Specialist, Home health nurse, Urgent Care, ED, 911 Yes   If the patient is a current smoker, are they able to teach back resources for cessation? Not a smoker   Additional teach back comments He is doing well.  Had follow up with PCP today.  Is using 3L of oxygen. Encouraged to get pulse ox to monitor oxygen levels.   Week 1 call completed? Yes   Graduated Yes   Graduated/Revoked comments No questions or needs at this time.   Call end time 1823            Magaly NERI - Licensed Nurse

## 2024-08-12 ENCOUNTER — OFFICE VISIT (OUTPATIENT)
Dept: PULMONOLOGY | Facility: CLINIC | Age: 74
End: 2024-08-12
Payer: MEDICARE

## 2024-08-12 VITALS
OXYGEN SATURATION: 92 % | BODY MASS INDEX: 33.04 KG/M2 | DIASTOLIC BLOOD PRESSURE: 70 MMHG | SYSTOLIC BLOOD PRESSURE: 124 MMHG | HEART RATE: 64 BPM | HEIGHT: 66 IN | WEIGHT: 205.6 LBS | RESPIRATION RATE: 18 BRPM

## 2024-08-12 DIAGNOSIS — Z87.891 PERSONAL HISTORY OF NICOTINE DEPENDENCE: ICD-10-CM

## 2024-08-12 DIAGNOSIS — J44.9 CHRONIC OBSTRUCTIVE PULMONARY DISEASE, UNSPECIFIED COPD TYPE: Primary | ICD-10-CM

## 2024-08-12 DIAGNOSIS — G47.33 OBSTRUCTIVE SLEEP APNEA: ICD-10-CM

## 2024-08-12 DIAGNOSIS — E66.9 OBESITY (BMI 30-39.9): ICD-10-CM

## 2024-08-12 DIAGNOSIS — R93.89 ABNORMAL CT OF THE CHEST: ICD-10-CM

## 2024-08-12 PROCEDURE — 3074F SYST BP LT 130 MM HG: CPT | Performed by: INTERNAL MEDICINE

## 2024-08-12 PROCEDURE — 99214 OFFICE O/P EST MOD 30 MIN: CPT | Performed by: INTERNAL MEDICINE

## 2024-08-12 PROCEDURE — 3078F DIAST BP <80 MM HG: CPT | Performed by: INTERNAL MEDICINE

## 2024-08-12 RX ORDER — BUDESONIDE, GLYCOPYRROLATE, AND FORMOTEROL FUMARATE 160; 9; 4.8 UG/1; UG/1; UG/1
2 AEROSOL, METERED RESPIRATORY (INHALATION) 2 TIMES DAILY
Qty: 1 EACH | Refills: 5 | Status: SHIPPED | OUTPATIENT
Start: 2024-08-12

## 2024-08-12 NOTE — PROGRESS NOTES
"  Chief Complaint   Patient presents with    Shortness of Breath    Follow-up         Subjective   Robe Bryant is a 73 y.o. male.   Last seen in May 2022    Patient says that his symptoms have definitely worsened with regards to COPD and actually has been admitted atleast 7 times since last office visit.     The patient was on Breztri but this was discontinued a few months ago.  He has noticed symptomatic worsening and although he still was having some issues with regards to recurrent exacerbations, his baseline shortness of breath is definitely better when he was on Breztri.    He also has had multiple issues with BiPAP supplies and despite multiple requests to the DME company, he continued to struggle with obtaining masks and supplies .     He has finally been restocked with supplies and started using BiPAP 3-4 nights ago.    He actually feels better, compared to before, since he has been using BiPAP on a regular basis    The following portions of the patient's history were reviewed and updated as appropriate: allergies, current medications, past family history, past medical history, past social history, and past surgical history.    Review of Systems   HENT:  Negative for sinus pressure, sneezing and sore throat.    Respiratory:  Positive for cough and wheezing. Negative for chest tightness and shortness of breath.    Psychiatric/Behavioral:  Positive for sleep disturbance.        Objective   Visit Vitals  /70   Pulse 64   Resp 18   Ht 167.6 cm (65.98\") Comment: pt reported   Wt 93.3 kg (205 lb 9.6 oz)   SpO2 92%   BMI 33.20 kg/m²       BMI Readings from Last 8 Encounters:   08/12/24 33.20 kg/m²   07/27/24 35.51 kg/m²   03/29/24 35.26 kg/m²   02/10/24 35.73 kg/m²   12/22/23 36.54 kg/m²   06/11/23 38.60 kg/m²   06/08/23 38.64 kg/m²   05/11/23 41.20 kg/m²       Physical Exam  Vitals reviewed.   Constitutional:       Appearance: He is well-developed.   HENT:      Head: Normocephalic and atraumatic.    "   Mouth/Throat:      Comments: Oropharynx was crowded.  Eyes:      Extraocular Movements: Extraocular movements intact.   Cardiovascular:      Rate and Rhythm: Normal rate.   Pulmonary:      Comments: Somewhat hyperresonant to percussion.  Somewhat decreased air entry.  Mild to moderate wheezing noted.   Musculoskeletal:      Cervical back: Neck supple.   Neurological:      Mental Status: He is alert and oriented to person, place, and time.           Assessment & Plan   Diagnoses and all orders for this visit:    1. Chronic obstructive pulmonary disease, unspecified COPD type (Primary)  -     Complete PFT - Pre & Post Bronchodilator; Future  -     Overnight Sleep Oximetry Study; Future    2. Obesity (BMI 30-39.9)    3. Obstructive sleep apnea  -     Overnight Sleep Oximetry Study; Future    4. Personal history of nicotine dependence    5. Abnormal CT of the chest  -     CT Chest Without Contrast Diagnostic; Future    Other orders  -     Budeson-Glycopyrrol-Formoterol (Breztri Aerosphere) 160-9-4.8 MCG/ACT aerosol inhaler; Inhale 2 puffs 2 (Two) Times a Day. Rinse mouth with water after use.  Dispense: 1 each; Refill: 5           Return in about 7 weeks (around 10/2/2024) for Recheck, PFT F/U, Imaging, For [place] ([place]), ....Also 7-8 mths w/ Dr. Chan.    DISCUSSION (if any):  I reviewed the patient's discharge summary, dated 7/29/2024, that mentioned acute on chronic respiratory failure with hypoxia and hypercapnia, COPD exacerbation, acute on chronic congestive heart failure, pacemaker, hypertension, paroxysmal atrial fibrillation and morbid obesity.    I have personally reviewed images of the last chest x-ray and available CT.  Last CT scan results was reviewed in great detail with the patient.  Results for orders placed during the hospital encounter of 03/19/23    CT Angiogram Chest Pulmonary Embolism    Narrative  PROCEDURE: CT ANGIOGRAM CHEST PULMONARY EMBOLISM-    HISTORY: Pulmonary embolism (PE)  suspected, unknown D-dimer    COMPARISON: July 11, 2022    TECHNIQUE: The patient was injected with  IV contrast. Axial images were  obtained through the chest in a CTA/ PE protocol. 3 D Reconstruction  images were also performed. This study was performed with techniques to  keep radiation doses as low as reasonably achievable, (ALARA).  Individualized dose reduction techniques using automated exposure  control or adjustment of mA and/or kV according to the patient size were  employed.    FINDINGS: There is no axillary adenopathy. There is no hilar or  mediastinal adenopathy.  The heart is proper size. There is no  pericardial or pleural effusion. There is suboptimal timing of the  contrast bolus. No central pulmonary embolism is identified. Ascending  aorta is upper limits of normal at 40 mm; recommend one-year follow-up.  Vascular calcifications noted. Dissection cannot be excluded due to  timing of the contrast bolus. Limited images of the upper abdomen are  unremarkable. There is either new area of focal pneumonitis or new 8 mm  solid nodule anteriorly in the right upper lobe compared to the prior.  This is too small to be evaluated on PET or undergo CT-guided biopsy.  Recommend 8-12 week follow-up to document resolution. Pacemaker noted.  No bony destructive lesion seen. Calcified granulomas noted.    Impression  Suboptimal contrast bolus; no central pulmonary below some  identified.    New 8 mm focal pneumonitis versus mass right upper lobe; recommend 8-12  week follow-up to document resolution or stability.    Upper limits of normal ascending aorta, recommend one-year follow-up.    This report was signed and finalized on 3/19/2023 4:16 PM by Marie Brown MD.      I also reviewed his last echocardiogram and shared the results with him.   Results for orders placed during the hospital encounter of 02/08/24    Adult Transthoracic Echo Complete W/ Cont if Necessary Per Protocol    Interpretation Summary  1.   Normal left ventricular size and systolic function, LVEF 65-70%.  2.  Mild concentric LVH.  3.  Grade 2 diastolic dysfunction.  4.  Normal right ventricular size and systolic function.  5.  Moderately increased left atrial volume index.  6.  Mild tricuspid regurgitation.  7.  Pulmonary hypertension, RVSP 60 mmHg.      Results for orders placed during the hospital encounter of 03/19/23    Adult Transthoracic Echo Complete W/ Cont if Necessary Per Protocol    Interpretation Summary  1.  Normal left ventricular size and systolic function, LVEF 65-70%.  2.  Mild concentric LVH.  3.  Grade 1 diastolic dysfunction.  4.  Normal right ventricular size and systolic function.  5.  Moderately increased left atrial volume index.  6.  No significant valvular abnormalities.      Results for orders placed during the hospital encounter of 07/11/22    Adult Transthoracic Echo Complete W/ Cont if Necessary Per Protocol    Interpretation Summary  1.  Normal left ventricular size and systolic function, LVEF 60-65%.  2.  Normal LV diastolic filling pattern.  3.  Normal right ventricular size and systolic function.  4.  Normal left atrial volume index.  5.  No significant valvular abnormalities.      Laboratory workup also showed   Lab Results   Component Value Date    HGB 10.6 (L) 07/29/2024    HGB 11.0 (L) 07/28/2024    HGB 10.6 (L) 07/27/2024   ,   Lab Results   Component Value Date    HCT 34.9 (L) 07/29/2024    HCT 36.0 (L) 07/28/2024    HCT 34.3 (L) 07/27/2024       Lab Results   Component Value Date    EOSABS 0.48 (H) 07/27/2024    EOSABS 0.03 03/29/2024    EOSABS 0.29 03/28/2024    & Laboratory workup also showed   Lab Results   Component Value Date    CO2 27.6 07/29/2024   ,   Lab Results   Component Value Date    HGBA1C 5.80 (H) 07/28/2024    HGBA1C 5.70 (H) 03/29/2024    HGBA1C 5.80 (H) 12/20/2023   ,   Lab Results   Component Value Date    TSH 0.268 (L) 07/28/2024    TSH 0.375 03/29/2024    TSH 0.634 12/20/2023     Laboratory  workup also showed   Lab Results   Component Value Date    PHART 7.402 07/27/2024    FTE3QAQ 47.4 (H) 07/27/2024    PO2ART 65.3 (L) 07/27/2024    R0ECXKHU 93.0 (L) 07/27/2024    CARBOXYHGB 1.9 07/27/2024     ===========================  ===========================    Last PFTs showed moderate COPD.  These were performed in February 2021.    PFTs will be ordered to be done upon follow up.    Due to symptoms which are suggestive of poorly controlled obstructive lung disease, I have decided to add Breztri.    Patient has had multiple recent exacerbations and has severe COPD, therefore Daliresp will be started, once the patient is back on Breztri and is compliant with it.      Other medications will remain the same    Compliance with medications stressed.     Side effects of prescribed medications were discussed with the patient    The patient will need a repeat CT soon, as his last CT (2023) showed abnormality.     Sleep study performed in September 2010  AHI was 47.5/ hour.     DME company: [employer]    Current PAP settings: 135    Continue PAP device on a regular basis, at least 4 hours or more per night.    Sleep hygiene measures were discussed      Dictated utilizing Dragon dictation.    This document was electronically signed by Aminta Chan MD on 08/12/24 at 10:41 EDT

## 2024-08-15 ENCOUNTER — OFFICE VISIT (OUTPATIENT)
Dept: CARDIOLOGY | Facility: CLINIC | Age: 74
End: 2024-08-15
Payer: MEDICARE

## 2024-08-15 VITALS
DIASTOLIC BLOOD PRESSURE: 78 MMHG | WEIGHT: 210 LBS | SYSTOLIC BLOOD PRESSURE: 140 MMHG | OXYGEN SATURATION: 95 % | HEART RATE: 60 BPM | HEIGHT: 64 IN | BODY MASS INDEX: 35.85 KG/M2

## 2024-08-15 DIAGNOSIS — I10 ESSENTIAL HYPERTENSION: ICD-10-CM

## 2024-08-15 DIAGNOSIS — I25.10 CORONARY ARTERY DISEASE INVOLVING NATIVE CORONARY ARTERY OF NATIVE HEART WITHOUT ANGINA PECTORIS: Primary | ICD-10-CM

## 2024-08-15 DIAGNOSIS — I48.0 PAROXYSMAL A-FIB: ICD-10-CM

## 2024-08-15 DIAGNOSIS — I50.32 CHRONIC HEART FAILURE WITH PRESERVED EJECTION FRACTION (HFPEF): ICD-10-CM

## 2024-08-15 DIAGNOSIS — E78.5 DYSLIPIDEMIA: ICD-10-CM

## 2024-08-15 RX ORDER — AMLODIPINE BESYLATE 5 MG/1
5 TABLET ORAL DAILY
Qty: 90 TABLET | Refills: 2 | Status: SHIPPED | OUTPATIENT
Start: 2024-08-15

## 2024-08-15 RX ORDER — SPIRONOLACTONE 25 MG/1
25 TABLET ORAL DAILY
Qty: 90 TABLET | Refills: 3 | Status: SHIPPED | OUTPATIENT
Start: 2024-08-15

## 2024-08-15 NOTE — PROGRESS NOTES
[facility] Cardiology    Patient ID: Robe Bryant is a 73 y.o. male.  : 1950   Contact: [phone]    Encounter date: 08/15/2024    PCP: Raj Sullivan MD      Chief complaint:   Chief Complaint   Patient presents with    Paroxysmal atrial fibrillation    Shortness of Breath    Hypertension       Problem List:  Coronary artery disease:  [facility], 2009, Dr. Vasquez: Placement of a 3.0 x 23 mm Xience LATOYA to the ramus, 3.0 x 12 mm LATOYA to the mid-circumflex.  [facility], 10/11/2010: EF 45%, LVEDP 28.   [facility], 2011: 2.25 x 13 mm Cypher LATOYA placement to the distal circumflex.   [facility], 01/10/2012, [facility]: Noncritical CAD, patient ramus and left circumflex stents, LVH with near cavity obliteration.  Medical management.  Abnormal Cardiolite, 2013, [facility], showing moderate to severe perfusion defect of the basolateral wall of the LV.  [facility], 03/15/2013, [facility]: Widely patent stents of the LCx and ramus intermedius coronary arteries, no significant disease is seen in any territory.   Diastolic heart failure with hospital admission, May 2013, at [facility] in [place].  Recurrent anginal symptoms, 2014; initiation of Imdur therapy, 2014.  [facility], 09/15/2014, [facility]: Noncritical CAD with widely patent stents in the LCx and ramus intermedius arteries. Other sources for the patient’s chest discomfort should be considered.  [facility], 06/10/2016: EF Normal, widely patent ramus intermedius stent; no significant disease within LAD, LCx, RCA.  [facility], 2019: Noncritical CAD with patent stents noted. Aggressive risk factor modification which will include better control of the patient's excessively high blood pressure  Diastolic heart failure:  Echocardiogram, 2013, Hermes Vargas MD: Hyperdynamic LV with EF 75%, mild TR, trace MI.  Complaints of dyspnea at the time of office visit, 2013, with O2 saturation in the 91% to 95% range with ambulation.  Echocardiogram,  09/15/2014: Moderate LVH with LVEF 60% to 65%. Mild MR and mild TR.   Echo, 01/26/2021: LVEF: 57%, Left ventricular diastolic function is consistent with grade 1 impaired relaxation. Left atrial volume is mildly increased.   Echocardiogram, 07/11/2022; EF 60-65%. Normal echocardiogram.   Echo, 02/09/2024: EF 65-70%. Mild TR with RVSP 60 mmHg. Pulmonary hypertension.   Hypertension.  Symptomatic bradycardia:  Continued symptoms of presyncope in the setting of hypotension and bradycardia, 02/16/2012.  PPM implantation, Dr. Tucker, 02/21/2012, [employer] RFDR, model #2210.  Hospitalization with acute dyspnea, 02/27/2013, at [facility] felt secondary to pacemaker-induced tachycardia.  Tilt Table (6/22/2017): Positive for orthostatic syncope. Patient developed abrupt symptomatic hypotension at 9 minutes, with any corresponding increase in heart rate. Consistent with vasodepressive response.  Implantation of [employer] MRI PM 2272 serial #[id number] end-of-life on previous generator 5/10/2021  Paroxysmal atrial fibrillation:  Coumadin therapy followed by [facility].   CHADS-VASc score = 5. (Age 65-74, HTN, DM, HF, TIA?)  Admission to [facility], 06/13/2015 through 06/16/2015, with DC cardioversion and return to sinus rhythm and subsequent metoprolol dosage increase.  Recent symptoms of left-sided facial numbness and occasional left arm weakness.  Nonsustained VT per device interrogation, 09/04/2014.  Brief episodes of atrial tachycardia at the time of device interrogation, 05/03/2012.  Hyperlipidemia.  Type 2 diabetes mellitus.  Obstructive sleep apnea with CPAP therapy.  Questionable transient ischemic attack, January 2010, without workup:  Carotid duplex, 09/15/2014: Noncritical coronary artery disease with widely patent bilateral carotid arteries. Velocity is all within normal limits.   History of tobacco use with cessation x7 years.   Gastroesophageal reflux disease.  Hernia repair x3.   Chronic  kidney disease with a creatinine of 2.1 during hospitalization, 05/17/2013.    No Known Allergies    Current Medications:    Current Outpatient Medications:     albuterol sulfate  (90 Base) MCG/ACT inhaler, Inhale 2 puffs Every 4 (Four) Hours As Needed for Wheezing., Disp: , Rfl:     amLODIPine (NORVASC) 10 MG tablet, Take 1 tablet by mouth Daily., Disp: , Rfl:     Blood Glucose Monitoring Suppl (TRUE METRIX AIR GLUCOSE METER) w/Device kit, 1 each by In Vitro route 2 (two) times a day. E11.9, Disp: 1 kit, Rfl: 0    Budeson-Glycopyrrol-Formoterol (Breztri Aerosphere) 160-9-4.8 MCG/ACT aerosol inhaler, Inhale 2 puffs 2 (Two) Times a Day. Rinse mouth with water after use., Disp: 1 each, Rfl: 5    bumetanide (BUMEX) 1 MG tablet, Take 1 tablet by mouth Daily., Disp: 30 tablet, Rfl: 1    Eliquis 5 MG tablet tablet, TAKE 1 TABLET EVERY 12 HOURS, Disp: 180 tablet, Rfl: 3    famotidine (PEPCID) 20 MG tablet, Take 1 tablet by mouth 2 (Two) Times a Day., Disp: , Rfl:     FLUoxetine (PROzac) 10 MG capsule, Take 1 capsule by mouth Daily., Disp: , Rfl:     gabapentin (NEURONTIN) 400 MG capsule, Take 1 capsule by mouth Every Night., Disp: , Rfl:     glucose monitor monitoring kit, 1 each Daily. E11.9, Disp: 1 each, Rfl: 0    HYDROcodone-acetaminophen (NORCO) 7.5-325 MG per tablet, Take 1 tablet by mouth Every 6 (Six) Hours., Disp: , Rfl:     Lancet Device misc, 1 each Daily. E11.9, Disp: 100 each, Rfl: 3    lisinopril (PRINIVIL,ZESTRIL) 40 MG tablet, Take 1 tablet by mouth Daily., Disp: 90 tablet, Rfl: 3    metoprolol succinate XL (Toprol XL) 50 MG 24 hr tablet, Take 1 tablet by mouth Daily., Disp: 30 tablet, Rfl: 0    Misc. Devices misc, Bipap tubing., Disp: 1 each, Rfl: 0    Nebulizer misc, 1 each Every 4 (Four) Hours As Needed (shortness of breath)., Disp: 1 each, Rfl: 0    O2 (OXYGEN), Inhale 2 L/min Continuous As Needed (With activity)., Disp: , Rfl:     omeprazole (priLOSEC) 40 MG capsule, Take 1 capsule by mouth  "Daily., Disp: 90 capsule, Rfl: 3    pramipexole (MIRAPEX) 1 MG tablet, Take 1 tablet by mouth Every Night., Disp: , Rfl:     tamsulosin (FLOMAX) 0.4 MG capsule 24 hr capsule, Take 1 capsule by mouth Daily., Disp: 30 capsule, Rfl: 6    TRUEplus Lancets 33G misc, 1 each by Other route Daily. E11.9, Disp: 100 each, Rfl: 5    HPI    Robe Bryant is a 73 y.o. male who presents today for a follow up of CAD, PAF, diastolic heart failure, and cardiac risk factors.  It has been over a year since we have last seen the patient.  Unfortunately he was recently admitted to the hospital a couple of weeks ago in acute respiratory distress.  He was in there for a couple of days and it was noted on his device interrogation during that time when he was sick he did have a 36-hour episode of A-fib with RVR but has not had any significant episodes since.  Patient states he feels much better after the hospitalization.  He has had some swelling to his legs that seems to be worse than normal.  It seems to improve with elevation of his feet some.  He does tell me that he has had some difficulty sleeping.  He states it is not based on any symptoms he just feels as if he has a hard time falling asleep.  He did not sleep well last night.  He denies any significant orthopnea while sleeping.  He does not check his blood pressure at home.      The following portions of the patient's history were reviewed and updated as appropriate: allergies, current medications and problem list.    Pertinent positives as listed in the HPI.  All other systems reviewed are negative.         Vitals:    08/15/24 1400   BP: 140/78   BP Location: Left arm   Patient Position: Sitting   Pulse: 60   SpO2: 95%   Weight: 95.3 kg (210 lb)   Height: 162.6 cm (64\")       Physical Exam:  General: Alert and oriented.  No acute distress.  Nasal cannula in place  Neck: Jugular venous pressure is within normal limits. Carotids have normal upstrokes without bruits. "   Cardiovascular:  Regular rate and rhythm. No murmur, gallop or rub.  Lungs: Mild expiratory wheezing in right mid lung field otherwise clear to auscultation bilaterally.  Extremities: 1-2+ pitting edema bilaterally.  Skin: Warm and dry.  Neurologic: Nonfocal.     Diagnostic Data (reviewed with patient):  Lab Results   Component Value Date    GLUCOSE 166 (H) 07/29/2024    BUN 33 (H) 07/29/2024    CREATININE 1.33 (H) 07/29/2024    BCR 24.8 07/29/2024     07/29/2024    K 4.9 07/29/2024     07/29/2024    CO2 27.6 07/29/2024    CALCIUM 8.8 07/29/2024    ALBUMIN 3.4 (L) 07/29/2024    ALKPHOS 92 07/29/2024    AST 15 07/29/2024    ALT 10 07/29/2024     Lab Results   Component Value Date    WBC 12.78 (H) 07/29/2024    RBC 3.97 (L) 07/29/2024    HGB 10.6 (L) 07/29/2024    HCT 34.9 (L) 07/29/2024    MCV 87.9 07/29/2024     07/29/2024      Lab Results   Component Value Date    TSH 0.268 (L) 07/28/2024        Advance Care Planning   ACP discussion was held with the patient during this visit. Patient has an advance directive in EMR which is still valid.        Device interrogation [employer] permanent pacemaker.  P wave 3.1 mV.  Threshold 0.75 at 0.5 ms.  Impedance 360 ohms.  R wave 3.3 mV.  Threshold 1.25 V at 0.5 ms.  Impedance 400 ohms.  Battery life 5.7 years.  Events: 1.2% A-fib.  Longest episode 36 hours on 8/2 (this was while patient was sick and in acute respiratory failure  Is noted that patient has some noise on both leads so AV AutoSense was turned on.  Will continue to monitor through remote monitoring      Assessment:    ICD-10-CM ICD-9-CM   1. Coronary artery disease involving native coronary artery of native heart without angina pectoris  I25.10 414.01   2. Paroxysmal A-fib  I48.0 427.31   3. Acute on chronic diastolic heart failure  I50.33 428.33   4. Essential hypertension  I10 401.9   5. Dyslipidemia  E78.5 272.4         Plan:  Patient does have some lower extremity edema, this could be  related to amlodipine given that he is on 10 mg.  I will reduce his dose to amlodipine 5 mg daily to see if this helps with some of his swelling.  In the meantime I will also start the patient on spironolactone 25 mg daily to help with GDMT.  Continue on metoprolol succinate 50 mg daily and lisinopril 40 mg daily for hypertension and GDMT.  Can consider adding SGL 2 inhibitor in the future.  Will have the patient check a BMP in 1 to 2 weeks after the initiation of spironolactone to check kidney function as well as potassium.  Continue on Bumex 1 mg daily for fluid retention.   Continue on Eliquis 5 mg BID for stroke prophylaxis.   Continue all other current medications.  F/up in 2 months, sooner if needed.    Pura Long PA-C

## 2024-09-06 DIAGNOSIS — J44.9 CHRONIC OBSTRUCTIVE PULMONARY DISEASE, UNSPECIFIED COPD TYPE: Primary | ICD-10-CM

## 2024-09-06 NOTE — TELEPHONE ENCOUNTER
Notified patient he needs 2 liters of oxygen at night bled into his CPAP and will likely need a in lab titration study.

## 2024-09-09 DIAGNOSIS — G47.33 OBSTRUCTIVE SLEEP APNEA: ICD-10-CM

## 2024-09-09 DIAGNOSIS — J44.9 CHRONIC OBSTRUCTIVE PULMONARY DISEASE, UNSPECIFIED COPD TYPE: ICD-10-CM

## 2024-10-09 NOTE — CODE DOCUMENTATION
0606-PT to ED 3.  EMS stated family said pt woke c/o could not breath.  EMS pt was PEA then VFIB with 5 shocks given.  0607-fbs 253  0616-Dr. Dee does not want to give Epinephrine at this time  0620- MD continues to not want to give Epinephrine at this time  0627- notified  0659- GENIE Conklin here.  0726- stated family does not wish to donate to [facility] and patient will go to [facility]  home.

## 2024-10-09 NOTE — ED PROVIDER NOTES
[facility] EMERGENCY DEPARTMENT  Emergency Department Encounter  Emergency Medicine Physician Note       Pt Name: Robe Bryant  MRN: [id number]  Pt :   1950  Room Number:  PATEL/PATEL  Date of encounter:  10/9/2024  PCP: Raj Sullivan MD  ED Provider: Dimitri Dee MD    Historian: EMS      HPI:  Chief Complaint: Cardiac arrest        Context: Robe Bryant is a 73 y.o. male who presents to the ED for cardiac arrest.  Patient reportedly became short of breath and reported to his wife feeling this way before collapsing.  Upon EMS arrival patient was having agonal respirations, cyanotic.  ACLS was initiated.  Initial downtime approximately 5 AM.  Patient received x 5 epinephrine by EMS and reported defibrillation x 2 for possible V-fib.  Patient arrived at approximately 6:05 AM with CPR in progress via José Manuel device, intubated on ventilator.      PAST MEDICAL HISTORY  Past Medical History:   Diagnosis Date    Anxiety and depression     Arthritis     Backache     20 years    Benign prostatic hyperplasia     Bladder trauma     Cervicalgia     Chronic kidney disease     Cr up to 2.1    Coronary artery disease     Diabetes mellitus     15 years--    Emphysema of lung     Erectile dysfunction     Eye exam, routine     FH: colonic polyps     Gout     for 10 years--    History of echocardiogram 09/15/2014    History of tobacco use     with cessation x7 years    Hyperlipidemia     Hypertension     Impaired functional mobility, balance, gait, and endurance     Migraine     Myocardial infarction     h/o coronary artery stenting--    Prostate cancer     Restless leg syndrome     20 years    Sleep apnea     12 years; uses a Bi-Pap    Stroke     Syncope 2017    Warfarin anticoagulation 2016         PAST SURGICAL HISTORY  Past Surgical History:   Procedure Laterality Date    BLADDER SURGERY      CARDIAC CATHETERIZATION  03/15/2013    revealing widely patent stents of the  left circumflex and ramus intermedius coronary arteries, no significant disease is seen in any territory    CARDIAC CATHETERIZATION Left 2019    Procedure: Left Heart Cath;  Surgeon: Arelis Tucker MD;  Location:  [facility];  Service: Cardiology    CARDIAC ELECTROPHYSIOLOGY PROCEDURE N/A 5/10/2021    Procedure: PPM battery change;  Surgeon: Arelis Tucker MD;  Location:  [facility];  Service: Cardiology;  Laterality: N/A;    CARDIAC PACEMAKER PLACEMENT      CATARACT EXTRACTION      COLONOSCOPY      No family history of colon cancer.      COLONOSCOPY      HERNIA REPAIR      Type unknown    PACEMAKER IMPLANTATION      PROSTATE SURGERY           FAMILY HISTORY  Family History   Problem Relation Age of Onset    Cancer Mother     Diabetes Mother     Hypertension Mother     Stroke Mother     Stomach cancer Mother     Heart disease Mother     Stomach cancer Father     Cancer Father     Lung cancer Father          SOCIAL HISTORY  Social History     Socioeconomic History    Marital status:    Tobacco Use    Smoking status: Former     Current packs/day: 0.00     Average packs/day: 1 pack/day for 30.0 years (30.0 ttl pk-yrs)     Types: Cigarettes     Start date: 8/15/1971     Quit date: 8/15/2001     Years since quittin.1    Smokeless tobacco: Never    Tobacco comments:     quit 16 years ago   Vaping Use    Vaping status: Never Used   Substance and Sexual Activity    Alcohol use: No    Drug use: No    Sexual activity: Defer         ALLERGIES  Patient has no known allergies.        REVIEW OF SYSTEMS  Systems reviewed and negative      PHYSICAL EXAM    I have reviewed the triage vital signs and nursing notes.    ED Triage Vitals   Temp Pulse Resp BP SpO2   -- -- -- -- --      Temp src Heart Rate Source Patient Position BP Location FiO2 (%)   -- -- -- -- --       Physical Exam  Constitutional:       Comments: Unresponsive   Eyes:      Comments:  Pupils 4-5 mm, fixed   Cardiovascular:      Comments: PEA/pacemaker spikes on the monitor  Pulmonary:      Comments: Intubated, being bagged  Abdominal:      General: There is no distension.   Musculoskeletal:      Comments: Trace edema lower extremities   Skin:     General: Skin is warm and dry.   Neurological:      Comments: GCS 3T         LAB RESULTS  No results found for this or any previous visit (from the past 24 hour(s)).    If labs were ordered, I independently reviewed the results and considered them in treating the patient.        RADIOLOGY  No Radiology Exams Resulted Within Past 24 Hours    PROCEDURES    Critical Care    Performed by: Dimitri Dee MD  Authorized by: Dimitri Dee MD    Critical care provider statement:     Critical care time (minutes):  42    Critical care time was exclusive of:  Separately billable procedures and treating other patients    Critical care was necessary to treat or prevent imminent or life-threatening deterioration of the following conditions:  Circulatory failure and cardiac failure    Critical care was time spent personally by me on the following activities:  Development of treatment plan with patient or surrogate, evaluation of patient's response to treatment, examination of patient, re-evaluation of patient's condition and obtaining history from patient or surrogate      No orders to display       MEDICATIONS GIVEN IN ER    Medications   amiodarone (CORDARONE) injection (150 mg Intravenous Given 10/9/24 0617)   EPINEPHrine (ADRENALIN) injection (1 mg Intravenous Given 10/9/24 0619)   sodium bicarbonate injection 8.4% (50 mEq Intravenous Given 10/9/24 0611)   calcium chloride injection (1 g Intravenous Given 10/9/24 0613)         MEDICAL DECISION MAKING, PROGRESS, and CONSULTS    All labs, if obtained, have been independently reviewed by me.  All radiology studies, if obtained, have been reviewed by me and the radiologist dictating the report.  All EKG's, if  obtained, have been independently viewed and interpreted by me.      Discussion below represents my analysis of pertinent findings related to patient's condition, differential diagnosis, treatment plan and final disposition.                         Differential diagnosis:    Cardiac arrest, arrhythmia, electrolyte disturbance, ACS, PE, others.      Additional sources:    - Discussed/ obtained information from independent historians:  EMS    - External (non-ED) record review:  Discharge summary 07/29/2024    - Chronic or social conditions impacting care:      - Shared decision making:        Orders placed during this visit:  Orders Placed This Encounter   Procedures    Critical Care         Additional orders considered but not ordered:      ED Course/MDM Discussion:    Patient is a 73-year-old male with history of chronic respiratory failure with hypoxia and hypercapnia, COPD, congestive heart failure, pacemaker in place, paroxysmal atrial fibrillation who presented in cardiac arrest.  Patient felt short of breath and had a syncopal episode at home which degenerated into cardiac arrest.  EMS performed ACLS protocol on arrival.  Initial downtime at approximately 5 AM.  Patient arrived to the emergency department intubated with mechanical CPR in progress.  Patient was continued on serial epinephrine administration per ACLS protocol.  Patient had been shocked for reported ventricular fibrillation.  Patient appeared to have possible ventricular fibrillation however suspect artifact from pacemaker.  He was defibrillated x 2 at maximum joules with no success.  Patient was started on spironolactone in the past few months as an outpatient. Given this he was given empiric calcium and sodium bicarbonates.  He was loaded with amiodarone for possible V-fib x 2. These medications had no effect. On subsequent pulse check he was found to be in PEA.  Bedside cardiac ultrasound showed minimal valve flutter otherwise standstill no  organized cardiac activity.  After approximately an hour and 20 minutes of total downtime time of death was called due to futility and prolonged downtime. EKG confirmed pacemaker function with associated artifact/PEA. Family was updated of condition in the family room.                    Consultants:        Shared Decision Making:  After my consideration of clinical presentation and any laboratory/radiology studies obtained, I discussed the findings with the patient/patient representative who is in agreement with the treatment plan and the final disposition.   Risks and benefits of discharge and/or observation/admission were discussed.       AS OF 15:41 EDT VITALS:    BP - (!) 85/30  HR - (!) 0  TEMP - 97 °F (36.1 °C) (Rectal)  O2 SATS - 100%                  DIAGNOSIS  Final diagnoses:   Cardiac arrest         DISPOSITION  ED Disposition       ED Disposition       Condition   --    Comment   --                   Please note that portions of this document were completed with voice recognition software.        Dimitri Dee MD  10/09/24 [id number]

## (undated) DEVICE — PENCL ES MEGADINE EZ/CLEAN BUTN W/HOLSTR 10FT

## (undated) DEVICE — DRSNG SURG AQUACEL AG 9X15CM

## (undated) DEVICE — INTRO SHEATH PRELUDE IDEAL SPRNG COIL .021 6F 16X80CM

## (undated) DEVICE — 3M™ IOBAN™ 2 ANTIMICROBIAL INCISE DRAPE 6650EZ: Brand: IOBAN™ 2

## (undated) DEVICE — LIMB HOLDER, WRIST/ANKLE: Brand: DEROYAL

## (undated) DEVICE — LEX CATH LAB MINOR: Brand: MEDLINE INDUSTRIES, INC.

## (undated) DEVICE — CATH DIAG EXPO M/ PK 6FR FL4/FR4 PIG 3PK

## (undated) DEVICE — SUT VIC 3/0 PSL 3/0 27IN J502H

## (undated) DEVICE — SUT VIC FS2 4/0 27IN J422H

## (undated) DEVICE — PK CATH CARD 10

## (undated) DEVICE — KT MANIFOLD CATHLAB CUST

## (undated) DEVICE — ST INF PRI SMRTSTE 20DRP 2VLV 24ML 117

## (undated) DEVICE — DEV COMP RAD PRELUDESYNC 24CM

## (undated) DEVICE — CATH DIAG EXPO .056 FL3.5 6F 100CM

## (undated) DEVICE — PINNACLE INTRODUCER SHEATH: Brand: PINNACLE

## (undated) DEVICE — ST EXT IV SMARTSITE 2VLV SP M LL 5ML IV1

## (undated) DEVICE — GW J TP FIX CORE .035 150